# Patient Record
Sex: FEMALE | Race: BLACK OR AFRICAN AMERICAN | NOT HISPANIC OR LATINO | Employment: OTHER | URBAN - METROPOLITAN AREA
[De-identification: names, ages, dates, MRNs, and addresses within clinical notes are randomized per-mention and may not be internally consistent; named-entity substitution may affect disease eponyms.]

---

## 2017-01-26 ENCOUNTER — TELEPHONE (OUTPATIENT)
Dept: INTERNAL MEDICINE | Facility: CLINIC | Age: 82
End: 2017-01-26

## 2017-01-26 NOTE — TELEPHONE ENCOUNTER
----- Message from Noble Castaneda sent at 1/26/2017  3:51 PM CST -----  Contact: self/ [phone] home  Pt would like to speak with Dr. Whitfield about the swelling she is experiencing in her left leg.  She noticed the swelling when she got up this morning.  Please call and advise.    Thank you

## 2017-01-27 ENCOUNTER — OFFICE VISIT (OUTPATIENT)
Dept: INTERNAL MEDICINE | Facility: CLINIC | Age: 82
End: 2017-01-27
Payer: MEDICARE

## 2017-01-27 VITALS
BODY MASS INDEX: 30.04 KG/M2 | HEART RATE: 80 BPM | WEIGHT: 169.56 LBS | HEIGHT: 63 IN | SYSTOLIC BLOOD PRESSURE: 130 MMHG | OXYGEN SATURATION: 96 % | DIASTOLIC BLOOD PRESSURE: 60 MMHG

## 2017-01-27 DIAGNOSIS — D51.0 PERNICIOUS ANEMIA: ICD-10-CM

## 2017-01-27 DIAGNOSIS — M81.0 OSTEOPOROSIS: ICD-10-CM

## 2017-01-27 DIAGNOSIS — Z85.048 HISTORY OF RECTAL OR ANAL CANCER: ICD-10-CM

## 2017-01-27 DIAGNOSIS — C34.12 MALIGNANT NEOPLASM OF UPPER LOBE OF LEFT LUNG: ICD-10-CM

## 2017-01-27 DIAGNOSIS — E13.9 DIABETES MELLITUS DUE TO ABNORMAL INSULIN: ICD-10-CM

## 2017-01-27 DIAGNOSIS — E78.00 PURE HYPERCHOLESTEROLEMIA: ICD-10-CM

## 2017-01-27 DIAGNOSIS — I10 ESSENTIAL HYPERTENSION: ICD-10-CM

## 2017-01-27 DIAGNOSIS — I25.10 CORONARY ARTERY DISEASE INVOLVING NATIVE CORONARY ARTERY OF NATIVE HEART WITHOUT ANGINA PECTORIS: ICD-10-CM

## 2017-01-27 DIAGNOSIS — M79.605 LEFT LEG PAIN: Primary | ICD-10-CM

## 2017-01-27 PROCEDURE — 1126F AMNT PAIN NOTED NONE PRSNT: CPT | Mod: S$GLB,,, | Performed by: INTERNAL MEDICINE

## 2017-01-27 PROCEDURE — 3078F DIAST BP <80 MM HG: CPT | Mod: S$GLB,,, | Performed by: INTERNAL MEDICINE

## 2017-01-27 PROCEDURE — 1160F RVW MEDS BY RX/DR IN RCRD: CPT | Mod: S$GLB,,, | Performed by: INTERNAL MEDICINE

## 2017-01-27 PROCEDURE — 1159F MED LIST DOCD IN RCRD: CPT | Mod: S$GLB,,, | Performed by: INTERNAL MEDICINE

## 2017-01-27 PROCEDURE — 99999 PR PBB SHADOW E&M-EST. PATIENT-LVL III: CPT | Mod: PBBFAC,,, | Performed by: INTERNAL MEDICINE

## 2017-01-27 PROCEDURE — 99499 UNLISTED E&M SERVICE: CPT | Mod: S$PBB,,, | Performed by: INTERNAL MEDICINE

## 2017-01-27 PROCEDURE — 3075F SYST BP GE 130 - 139MM HG: CPT | Mod: S$GLB,,, | Performed by: INTERNAL MEDICINE

## 2017-01-27 PROCEDURE — 99214 OFFICE O/P EST MOD 30 MIN: CPT | Mod: S$GLB,,, | Performed by: INTERNAL MEDICINE

## 2017-01-27 PROCEDURE — 1157F ADVNC CARE PLAN IN RCRD: CPT | Mod: S$GLB,,, | Performed by: INTERNAL MEDICINE

## 2017-01-27 RX ORDER — TRAMADOL HYDROCHLORIDE 50 MG/1
50 TABLET ORAL EVERY 6 HOURS PRN
Qty: 30 TABLET | Refills: 1 | Status: SHIPPED | OUTPATIENT
Start: 2017-01-27 | End: 2017-09-25 | Stop reason: SDUPTHER

## 2017-01-27 NOTE — PROGRESS NOTES
CHIEF COMPLAINT: Left leg pain and swelling    HISTORY OF PRESENT ILLNESS: This is a 84-year-old woman who presents due to left leg pain and swelling. She had worsened left leg pain yesterday. Pain was severe and she could hardly walk due to the pain. The left leg has some mild swelling. She had shooting pain down the left thigh. She has had intermittent left leg pain and swelling since left common femoral endarterectomy, July 2007. She has been on her feet a little more the last week and she had eaten more salt than usual. Yesterday she rested, elevated her legs. She took a warm bath and rubbed liniment on her leg. Today her pain is much improved, slight. No chest pain, shortness of breath,       Her non small cell lung cancer is stable. She saw Dr Weir 7/28/16 and scans were improved. Nodule that they were watching got smaller. revealed a new 0.5 mm nodule - repeat CT scan in 6 months (feb). She is breathing well. No chest pain, shortness of breath., fever, chills.       She is currently taking actos 15 mg daily. She is not taking Prandin 1 mg prior to meals for her diabetes. No polydipsia, polyuria, hypoglycemia. Sugars are doing well. No hypoglycemia    She continues to take Lotrel 5/20 once daily, metoprolol 50 mg 1 tablet twice daily, and hydrochlorothiazide 12.5 mg daily for her hypertension. She denies any chest pain or shortness of breath. She continues to take simvastatin 80 mg at bedtime for her hyperlipidemia. No joint pain or muscle pain.        She is on Cymbalta 30 mg daliy and citalopram 10 mg daily for her mood, back and left leg pain.Mood is good.. She is coping with her  who has dementia. She is sleeping ok with Alprazolam at bedtime    She is taking Fosamax once week for her bones.  She denies any heartburn from the Fosamax. No melana, bloody stools, constipation. She has occasional diarrhea if she eats something wrong. .        She takes vitamin B12 1000 mcg daily for vitamin B12  "deficiency - anemia is stable        SHe has macular degeneration of the eyes and was followed by Dr Tresa Coreas. She got injections in her eyes every 6 weeks in Spring 2015. She now sees Dr Ruelas at [facility] (last saw him 10/2015 - no need for injections at that time). Vision has been stable. She is takinga Fish oil for her eyes.       NO vaginal bleeding and is using vaginal premarin cream    PAST MEDICAL HISTORY:    1. Stage III non-small cell lung cancer, completed chemoradiation with carboplatin and Taxol on 6/1/12    2. Hypertension.   3. Diabetes mellitus.   4. Hyperlipidemia.   5. Chronic diastolic dysfunction.   6. Coronary artery disease status post MI in 1990 and 2003. Stenting was   done at 2003 at [facility].   7. Cholecystectomy, December 2006.   8. Cataract removal.   9. Benign growth removed from her throat.   10. Left common femoral endarterectomy, July 2007.   11. History of anal cancer in 1996 status post radiation and chemotherapy.   12. Carpal tunnel syndrome.   13. Osteoporosis on BMD 12/11    14. Macular degeneration    SOCIAL HISTORY: She quit smoking in 1995, denies any alcohol use. She is etired.     REVIEW OF SYSTEMS: She denies fevers, chills, night sweats, fatigue, visual change, hearing loss, sinus congestion, sore throat, dysuria, hematuria, joint pain, muscle pain, polydipsia, and polyuria.     PHYSICAL EXAM:       Visit Vitals    /60 (BP Location: Right arm, Patient Position: Sitting, BP Method: Manual)    Pulse 80    Ht 5' 3" (1.6 m)    Wt 76.9 kg (169 lb 8.5 oz)    SpO2 96%    BMI 30.03 kg/m2           GENERAL: She is alert and oriented. No apparent distress. Affect within normal limits.   Conjunctivae anicteric. Tympanic membranes clear. Oropharynx clear.   NECK: Supple.   Respiratory effort normal. Lungs clear to auscultation.   HEART: Regular rate and rhythm without murmurs, gallops, or rubs. No lower   extremity edema.   NO pain or swelling in the " left leg. No pain upon flexion, internal or external rotation of the hip                  ASSESSMENT AND PLAN:    1. Left leg pain -chronic issue due to prior surgery. Wear compression stockings. Watch salt intake.   1. Stage III non-small cell lung cancer, completed chemoradiation with carboplatin and Taxol on 6/1/12 - doing well. FOllow up with Dr Weir in Feb 2017  2. Diabetes- stable   3. Hypertension-controlled  4. Hyperlipidemia-lipids controlled  5. Coronary artery disease modifying risk factors.   6. History of anal cancer-had a normal colonoscopy, November 2008; due 2015. Declined repeat  8. Pernicious anemia - on counter vitamin B12 1000 mcg daily. CBC  9. Osteoporosis - to restart alendroante.    10. Anxiety -stable on Cymbalta 30 mg daliy     Screening mammogram was December 2015 - scheduled. Colonoscopy is due 2015 -declined. Pap smear was July 2010.  Flu 9/272016, Prevnar 11/2015  I will see her back in 2 months as scheduled, sooner if problems arise

## 2017-01-27 NOTE — MR AVS SNAPSHOT
[facility] - Internal Medicine  [address]  [place] [address]  Phone: [phone]  Fax: [phone]                  Brunilda England   2017 9:30 AM   Office Visit    Description:  Female : 10/15/1931   Provider:  Pepper Whitfield MD   Department:  [facility] - Internal Medicine           Reason for Visit     Edema           Diagnoses this Visit        Comments    Diabetes mellitus due to abnormal insulin    -  Primary            To Do List           Future Appointments        Provider Department Dept Phone    2017 12:30 PM LAB, APPOINTMENT [place] [facility] [phone]    2017 1:30 PM [facility] CT1 64- LIMIT [address] [facility] [phone]    2017 3:00 PM Cielo Weir MD [place] - Hematology Oncology [phone]    3/21/2017 9:00 AM Pepper Whitfield MD [facility] - Internal Medicine [phone]    3/21/2017 10:00 AM [facility] [id number] SCREEN INT MED [facility] [phone]      Goals (5 Years of Data)     None       These Medications        Disp Refills Start End    tramadol (ULTRAM) 50 mg tablet 30 tablet 1 2017     Take 1 tablet (50 mg total) by mouth every 6 (six) hours as needed for Pain. - Oral    Pharmacy: [address] #: [phone]         [facility]     [facility] On Call Nurse Care Line -  Assistance  Registered nurses in the [facility] On Call Center provide clinical advisement, health education, appointment booking, and other advisory services.  Call for this free service at [phone].             Medications           Message regarding Medications     Verify the changes and/or additions to your medication regime listed below are the same as discussed with your clinician today.  If any of these changes or additions are incorrect, please notify your healthcare provider.        START taking these NEW medications        Refills    tramadol (ULTRAM)  50 mg tablet 1    Sig: Take 1 tablet (50 mg total) by mouth every 6 (six) hours as needed for Pain.    Class: Normal    Route: Oral           Verify that the below list of medications is an accurate representation of the medications you are currently taking.  If none reported, the list may be blank. If incorrect, please contact your healthcare provider. Carry this list with you in case of emergency.           Current Medications     alendronate (FOSAMAX) 70 MG tablet Take 1 tablet (70 mg total) by mouth every 7 days.    alprazolam (XANAX) 0.5 MG tablet One at bedtime as needed    amlodipine-benazepril 5-20 mg (LOTREL) 5-20 mg per capsule Take 1 capsule by mouth once daily.    ANTIOX #8/OM3/DHA/EPA/LUT/ZEAX (PRESERVISION AREDS 2, OMEGA-3, ORAL) Take by mouth 2 (two) times daily.    blood sugar diagnostic Strp Check blood sugar daily. Strips and lancets    blood-glucose meter kit Use as instructed    calcium-vitamin D (CALCIUM-VITAMIN D) 500 mg(1,250mg) -200 unit per tablet Take 1 tablet by mouth 2 (two) times daily with meals.      conjugated estrogens (PREMARIN) vaginal cream Place 0.5 g vaginally twice a week.    cyanocobalamin (VITAMIN B-12) 1000 MCG tablet Take 1,000 mcg by mouth once daily.     duloxetine (CYMBALTA) 30 MG capsule Take 1 capsule (30 mg total) by mouth once daily.    hydrochlorothiazide (MICROZIDE) 12.5 mg capsule Take 1 capsule (12.5 mg total) by mouth once daily.    lancets Misc Check blood sugar daily 250.00    loratadine (CLARITIN) 10 mg tablet Take 1 tablet (10 mg total) by mouth daily as needed for Allergies.    metoprolol tartrate (LOPRESSOR) 50 MG tablet Take 1 tablet (50 mg total) by mouth 2 (two) times daily.    nitroGLYCERIN (NITROSTAT) 0.4 MG SL tablet Place 1 tablet (0.4 mg total) under the tongue every 5 (five) minutes as needed.    pioglitazone (ACTOS) 15 MG tablet Take 1 tablet (15 mg total) by mouth every morning.    silver sulfADIAZINE 1% (SILVADENE) 1 % cream Apply topically  "once daily.    simvastatin (ZOCOR) 80 MG tablet Take 1 tablet (80 mg total) by mouth once daily.    VIT C/VIT E/LUTEIN/MIN/OMEGA-3 (OCUVITE ORAL) Take by mouth.    clobetasol (TEMOVATE) 0.05 % cream Apply topically nightly as needed.    tramadol (ULTRAM) 50 mg tablet Take 1 tablet (50 mg total) by mouth every 6 (six) hours as needed for Pain.           Clinical Reference Information           Vital Signs - Last Recorded  Most recent update: 1/27/2017 10:17 AM by Pepper Whitfield MD    BP Pulse Ht Wt SpO2 BMI    130/60 (BP Location: Right arm, Patient Position: Sitting, BP Method: Manual) 80 5' 3" (1.6 m) 76.9 kg (169 lb 8.5 oz) 96% 30.03 kg/m2      Blood Pressure          Most Recent Value    BP  130/60      Allergies as of 1/27/2017     Bextra [Valdecoxib]    Gabapentin      Immunizations Administered on Date of Encounter - 1/27/2017     None      Orders Placed During Today's Visit     Future Labs/Procedures Expected by Expires    Hemoglobin A1c  As directed 1/27/2018      "

## 2017-01-28 PROBLEM — D51.0 PERNICIOUS ANEMIA: Status: ACTIVE | Noted: 2017-01-28

## 2017-01-28 PROBLEM — I25.10 CORONARY ARTERY DISEASE INVOLVING NATIVE CORONARY ARTERY WITHOUT ANGINA PECTORIS: Status: ACTIVE | Noted: 2017-01-28

## 2017-01-31 RX ORDER — CITALOPRAM 10 MG/1
10 TABLET ORAL DAILY
Qty: 90 TABLET | Refills: 4 | Status: SHIPPED | OUTPATIENT
Start: 2017-01-31 | End: 2018-02-05 | Stop reason: SDUPTHER

## 2017-02-07 ENCOUNTER — OFFICE VISIT (OUTPATIENT)
Dept: OPHTHALMOLOGY | Facility: CLINIC | Age: 82
End: 2017-02-07
Payer: MEDICARE

## 2017-02-07 DIAGNOSIS — H35.62 MACULAR HEMORRHAGE OF LEFT EYE: ICD-10-CM

## 2017-02-07 DIAGNOSIS — H35.3230 BILATERAL EXUDATIVE AGE-RELATED MACULAR DEGENERATION: Primary | ICD-10-CM

## 2017-02-07 DIAGNOSIS — H35.033 HYPERTENSIVE RETINOPATHY OF BOTH EYES: ICD-10-CM

## 2017-02-07 DIAGNOSIS — H35.89 ATROPHY OF MACULA LUTEA: ICD-10-CM

## 2017-02-07 PROCEDURE — 99499 UNLISTED E&M SERVICE: CPT | Mod: S$PBB,,, | Performed by: OPHTHALMOLOGY

## 2017-02-07 PROCEDURE — 92014 COMPRE OPH EXAM EST PT 1/>: CPT | Mod: S$GLB,,, | Performed by: OPHTHALMOLOGY

## 2017-02-07 PROCEDURE — 92226 PR SPECIAL EYE EXAM, SUBSEQUENT: CPT | Mod: LT,S$GLB,, | Performed by: OPHTHALMOLOGY

## 2017-02-07 PROCEDURE — 92134 CPTRZ OPH DX IMG PST SGM RTA: CPT | Mod: S$GLB,,, | Performed by: OPHTHALMOLOGY

## 2017-02-07 PROCEDURE — 99999 PR PBB SHADOW E&M-EST. PATIENT-LVL III: CPT | Mod: PBBFAC,,, | Performed by: OPHTHALMOLOGY

## 2017-02-07 NOTE — MR AVS SNAPSHOT
[facility] - Ophthalmology  [address]  [place] [address]  Phone: [phone]  Fax: [phone]                  Brunilda England   2017 9:30 AM   Office Visit    Description:  Female : 10/15/1931   Provider:  ANTONI Leigh MD   Department:  [facility] - Ophthalmology           Reason for Visit     Eye Problem           Diagnoses this Visit        Comments    Bilateral exudative age-related macular degeneration    -  Primary     Hypertensive retinopathy of both eyes         Macular hemorrhage of left eye         Atrophy of macula lutea                To Do List           Future Appointments        Provider Department Dept Phone    2017 12:30 PM LAB, APPOINTMENT [place] [facility] [phone]    2017 1:30 PM [facility] CT1 64- LIM[address] [facility] [phone]    2017 3:00 PM MD [facility] - Hematology Oncology [phone]    3/21/2017 9:00 AM Pepper Whitfield MD [facility] - Internal Medicine [phone]    3/21/2017 10:00 AM [facility] [id number] SCREEN INT MED [facility] [phone]      Goals (5 Years of Data)     None      Follow-Up and Disposition     Return in about 6 months (around 2017).      [facility] On Call     [facility] On [facility] Nurse Care Line -  Assistance  Registered nurses in the [facility] On Call Center provide clinical advisement, health education, appointment booking, and other advisory services.  Call for this free service at [phone].             Medications           Message regarding Medications     Verify the changes and/or additions to your medication regime listed below are the same as discussed with your clinician today.  If any of these changes or additions are incorrect, please notify your healthcare provider.             Verify that the below list of medications is an accurate representation of the medications you are currently taking.  If none reported, the list  may be blank. If incorrect, please contact your healthcare provider. Carry this list with you in case of emergency.           Current Medications     alendronate (FOSAMAX) 70 MG tablet Take 1 tablet (70 mg total) by mouth every 7 days.    alprazolam (XANAX) 0.5 MG tablet One at bedtime as needed    amlodipine-benazepril 5-20 mg (LOTREL) 5-20 mg per capsule Take 1 capsule by mouth once daily.    ANTIOX #8/OM3/DHA/EPA/LUT/ZEAX (PRESERVISION AREDS 2, OMEGA-3, ORAL) Take by mouth 2 (two) times daily.    blood sugar diagnostic Strp Check blood sugar daily. Strips and lancets    blood-glucose meter kit Use as instructed    calcium-vitamin D (CALCIUM-VITAMIN D) 500 mg(1,250mg) -200 unit per tablet Take 1 tablet by mouth 2 (two) times daily with meals.      citalopram (CELEXA) 10 MG tablet Take 1 tablet (10 mg total) by mouth once daily.    conjugated estrogens (PREMARIN) vaginal cream Place 0.5 g vaginally twice a week.    cyanocobalamin (VITAMIN B-12) 1000 MCG tablet Take 1,000 mcg by mouth once daily.     duloxetine (CYMBALTA) 30 MG capsule Take 1 capsule (30 mg total) by mouth once daily.    hydrochlorothiazide (MICROZIDE) 12.5 mg capsule Take 1 capsule (12.5 mg total) by mouth once daily.    lancets Misc Check blood sugar daily 250.00    loratadine (CLARITIN) 10 mg tablet Take 1 tablet (10 mg total) by mouth daily as needed for Allergies.    metoprolol tartrate (LOPRESSOR) 50 MG tablet Take 1 tablet (50 mg total) by mouth 2 (two) times daily.    nitroGLYCERIN (NITROSTAT) 0.4 MG SL tablet Place 1 tablet (0.4 mg total) under the tongue every 5 (five) minutes as needed.    pioglitazone (ACTOS) 15 MG tablet Take 1 tablet (15 mg total) by mouth every morning.    silver sulfADIAZINE 1% (SILVADENE) 1 % cream Apply topically once daily.    simvastatin (ZOCOR) 80 MG tablet Take 1 tablet (80 mg total) by mouth once daily.    tramadol (ULTRAM) 50 mg tablet Take 1 tablet (50 mg total) by mouth every 6 (six) hours as needed for  Pain.    VIT C/VIT E/LUTEIN/MIN/OMEGA-3 (OCUVITE ORAL) Take by mouth.    clobetasol (TEMOVATE) 0.05 % cream Apply topically nightly as needed.           Clinical Reference Information           Allergies as of 2/7/2017     Bextra [Valdecoxib]    Gabapentin      Immunizations Administered on Date of Encounter - 2/7/2017     None      Orders Placed During Today's Visit      Normal Orders This Visit    Posterior Segment OCT Retina-Both eyes     Future Labs/Procedures Expected by Expires    Posterior Segment OCT Retina-Both eyes  As directed 2/7/2018      Language Assistance Services     ATTENTION: Language assistance services are available, free of charge. Please call [phone].      ATENCIÓN: Si cecy carroll, tiene a brito disposición servicios gratuitos de asistencia lingüística. Llame al [phone].     CHÚ Ý: N?u b?n nói Ti?ng Vi?t, có các d?ch v? h? tr? ngôn ng? mi?n phí dành cho b?n. G?i s? [phone].         [facility] complies with applicable Federal civil rights laws and does not discriminate on the basis of race, color, national origin, age, disability, or sex.

## 2017-02-07 NOTE — PROGRESS NOTES
OCT - [place]us[place], RPE atrophy, PED, some HE  No SRF OD, SRF OS improving and small SRH    Prior FA:  Shows window defect, no active leakage OU      A/P    1. Wet AMD OU  - H/o injections OU q3-4 months with Dr. Coreas  - No active leakage/edema OD cont to monitor  s/p Avastin OS x 2 here    With macular atrophy OS - conservative mgmt going forward    11/16 - had rebleed, but given poor prognosis, will monitor      2. HTN Ret OU    3. DM - No DR  BS/BP/chol contour    4. PCIOL OU    5. PVD OU      6 months OCT

## 2017-02-08 ENCOUNTER — HOSPITAL ENCOUNTER (OUTPATIENT)
Dept: RADIOLOGY | Facility: HOSPITAL | Age: 82
Discharge: HOME | End: 2017-02-08
Attending: NURSE PRACTITIONER
Payer: MEDICARE

## 2017-02-08 DIAGNOSIS — C77.9 SECONDARY MALIGNANT NEOPLASM OF REGIONAL LYMPH NODE: ICD-10-CM

## 2017-02-08 DIAGNOSIS — Z85.118 HISTORY OF LUNG CANCER: ICD-10-CM

## 2017-02-08 PROCEDURE — 71250 CT THORAX DX C-: CPT | Mod: TC

## 2017-02-08 PROCEDURE — 71250 CT THORAX DX C-: CPT | Mod: 26,,, | Performed by: RADIOLOGY

## 2017-02-09 ENCOUNTER — OFFICE VISIT (OUTPATIENT)
Dept: HEMATOLOGY/ONCOLOGY | Facility: CLINIC | Age: 82
End: 2017-02-09
Payer: MEDICARE

## 2017-02-09 VITALS
WEIGHT: 171.94 LBS | BODY MASS INDEX: 31.64 KG/M2 | RESPIRATION RATE: 18 BRPM | TEMPERATURE: 98 F | HEART RATE: 86 BPM | HEIGHT: 62 IN | OXYGEN SATURATION: 94 % | DIASTOLIC BLOOD PRESSURE: 72 MMHG | SYSTOLIC BLOOD PRESSURE: 140 MMHG

## 2017-02-09 DIAGNOSIS — Z85.118 HISTORY OF LUNG CANCER: Primary | ICD-10-CM

## 2017-02-09 DIAGNOSIS — I10 ESSENTIAL HYPERTENSION: ICD-10-CM

## 2017-02-09 DIAGNOSIS — C77.9 SECONDARY MALIGNANT NEOPLASM OF REGIONAL LYMPH NODE: ICD-10-CM

## 2017-02-09 PROCEDURE — 3077F SYST BP >= 140 MM HG: CPT | Mod: S$GLB,,, | Performed by: INTERNAL MEDICINE

## 2017-02-09 PROCEDURE — 3078F DIAST BP <80 MM HG: CPT | Mod: S$GLB,,, | Performed by: INTERNAL MEDICINE

## 2017-02-09 PROCEDURE — 99214 OFFICE O/P EST MOD 30 MIN: CPT | Mod: S$GLB,,, | Performed by: INTERNAL MEDICINE

## 2017-02-09 PROCEDURE — 1126F AMNT PAIN NOTED NONE PRSNT: CPT | Mod: S$GLB,,, | Performed by: INTERNAL MEDICINE

## 2017-02-09 PROCEDURE — 1160F RVW MEDS BY RX/DR IN RCRD: CPT | Mod: S$GLB,,, | Performed by: INTERNAL MEDICINE

## 2017-02-09 PROCEDURE — 99999 PR PBB SHADOW E&M-EST. PATIENT-LVL V: CPT | Mod: PBBFAC,,, | Performed by: INTERNAL MEDICINE

## 2017-02-09 PROCEDURE — 1159F MED LIST DOCD IN RCRD: CPT | Mod: S$GLB,,, | Performed by: INTERNAL MEDICINE

## 2017-02-09 PROCEDURE — 1157F ADVNC CARE PLAN IN RCRD: CPT | Mod: S$GLB,,, | Performed by: INTERNAL MEDICINE

## 2017-02-09 NOTE — PROGRESS NOTES
"PATIENT: Brunilda England  MRN: [id number]  DATE: 2/9/2017    Subjective:     Chief complaint:  Chief Complaint   Patient presents with    History of lung cancer       Oncologic History:  86 yo female with stage III non-small cell lung cancer, currently completed chemoradiation with carboplatin and Taxol on 6/1/12 . She did not receive consolidation chemotherapy due to worsening neuropathy .   PET scan on 8/7/12 revealed favorable response to therapy with post radiation therapy changes and a small right pleural effusion with pleural thickening. No new or metastatic disease identified    4/28/16-CT scan which reveals "In this patient with a history of lung cancer, there is interval development of a 0.5 cm pulmonary nodule located in the superior/anterior basal segment of the right lower lobe (SE:3, IM:27), concerning for metastatic disease or new primary neoplasm.  This nodule is likely too small for biopsy or PET scan; location may also limit biopsy.  Therefore we recommend continued CT surveillance with clinical considerations determining the surveillance schedule" \    CT from 7/27/16 reveals "Smaller, more inconspicuous pulmonary nodule in the superior/anterior basal segment of the right lower lobe. Given the improvement, recommend followup CT chest without contrast in 9-12 months.Stable 0.3 cm pulmonary nodule in the left upper lobe.Severe calcific atherosclerosis involving the aorta and its branches, as well as the coronary arteries.Stable trace pericardial effusion.Other stable findings in the lungs as previously described, including bilateral centrilobular emphysema with an upper lobe predominance, region of consolidation and volume loss along the right paramediastinal thorax, and other atelectatic changes in the right lung."    2/8/17 CT chest " 1. Interval resolution of a pulmonary nodule within the superior/anterior basal segment of the right lower lobe initially measuring 0.5 cm on examination dated 4/26/2016 " "and reduced in size to 0.3 cm on examination dated 7/26/2016. 2. Unchanged size and appearance of a 0.3 cm nodule within the anterior segment of the left upper lobe (axial series 2, image 21). This lesion demonstrates stability dating back to 9/22/2014. 3. Bilateral centrilobular emphysematous change with an upper lobe predominance. 4. Redemonstration of bandlike opacities involving the right upper and lower lobes consistent with atelectasis versus fibrotic changes versus postradiation change. 5. Extensive calcific atherosclerosis of the coronary vessels. 6. Small persistent pericardial effusion."    Interval History: Ms. England returns for follow up and scan results as above. She denies any nausea, vomiting, diarrhea, constipation, abdominal pain, weight loss or loss of appetite, chest pain, shortness of breath, leg swelling, fatigue, pain, headache, dizziness, or mood changes. She is alone.     ECOG Performance Status:   ECOG SCORE    0 - Fully active-able to carry on all pre-disease performance without restriction          PMFSH: all information reviewed and updated as relevant to today's visit    Review of Systems:   Review of Systems   Constitutional: Negative for activity change, appetite change, fatigue and fever.   HENT: Negative for mouth sores, nosebleeds and sore throat.    Eyes: Negative for visual disturbance.   Respiratory: Negative for cough and shortness of breath.    Cardiovascular: Negative for chest pain, palpitations and leg swelling.   Gastrointestinal: Negative for abdominal pain, constipation, diarrhea, nausea and vomiting.   Genitourinary: Negative for difficulty urinating and frequency.   Musculoskeletal: Negative for arthralgias and back pain.   Skin: Negative for rash.   Neurological: Negative for dizziness, numbness and headaches.   Hematological: Negative for adenopathy. Does not bruise/bleed easily.   Psychiatric/Behavioral: Negative for confusion and sleep disturbance. The patient is " "not nervous/anxious.    All other systems reviewed and are negative.        Objective:      Vitals:   Vitals:    02/09/17 1442   BP: (!) 140/72   Pulse: 86   Resp: 18   Temp: 98 °F (36.7 °C)   TempSrc: Oral   SpO2: (!) 94%   Weight: 78 kg (171 lb 15.3 oz)   Height: 5' 2" (1.575 m)     BMI: Body mass index is 31.45 kg/(m^2).      Physical Exam:   Physical Exam   Constitutional: She is oriented to person, place, and time. She appears well-developed and well-nourished. No distress.   HENT:   Head: Normocephalic.   Right Ear: External ear normal.   Left Ear: External ear normal.   Mouth/Throat: No oropharyngeal exudate.   No sinus tenderness.   Eyes: Conjunctivae and lids are normal. Pupils are equal, round, and reactive to light. No scleral icterus.   Neck: Trachea normal and normal range of motion. Neck supple. No thyromegaly present.   Cardiovascular: Normal rate, regular rhythm and normal heart sounds.    Pulmonary/Chest: Effort normal and breath sounds normal.   Abdominal: Soft. Normal appearance and bowel sounds are normal. She exhibits no distension and no mass. There is no tenderness.   Musculoskeletal: Normal range of motion.   Lymphadenopathy:        Head (right side): No submental and no submandibular adenopathy present.        Head (left side): No submental and no submandibular adenopathy present.     She has no cervical adenopathy.   Neurological: She is alert and oriented to person, place, and time. She has normal strength and normal reflexes. No cranial nerve deficit. Gait normal.   Skin: Skin is warm, dry and intact. No bruising and no rash noted. No cyanosis. Nails show no clubbing.   Psychiatric: She has a normal mood and affect. Her speech is normal and behavior is normal.   Nursing note and vitals reviewed.        Laboratory Data:  WBC   Date Value Ref Range Status   02/08/2017 3.77 (L) 3.90 - 12.70 K/uL Final     Hemoglobin   Date Value Ref Range Status   02/08/2017 10.4 (L) 12.0 - 16.0 g/dL Final "     Hematocrit   Date Value Ref Range Status   02/08/2017 30.6 (L) 37.0 - 48.5 % Final     Platelets   Date Value Ref Range Status   02/08/2017 179 150 - 350 K/uL Final     Gran #   Date Value Ref Range Status   02/08/2017 1.9 1.8 - 7.7 K/uL Final     Comment:     The ANC is based on a white cell differential from an   automated cell counter. It has not been microscopically   reviewed for the presence of abnormal cells. Clinical   correlation is required.       Gran%   Date Value Ref Range Status   08/09/2013 48.8 38 - 73 % Final       Chemistry        Component Value Date/Time     02/08/2017 1245    K 4.3 02/08/2017 1245     02/08/2017 1245    CO2 29 02/08/2017 1245    BUN 20 02/08/2017 1245    CREATININE 1.1 02/08/2017 1245     (H) 02/08/2017 1245        Component Value Date/Time    CALCIUM 9.5 02/08/2017 1245    ALKPHOS 40 (L) 02/08/2017 1245    AST 16 02/08/2017 1245    ALT 7 (L) 02/08/2017 1245    BILITOT 0.2 02/08/2017 1245              Assessment/Plan:     1. 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 Essential hypertension        Plan:    1,2- CT Chest shows improvement. RTC in 6 months to see Dr. Weir with labs (CBC,CMP) and CT Chest.   3- Controlled on meds.     Med and Orders:  Orders Placed This Encounter    CT Chest Without Contrast    CBC Oncology    Comprehensive metabolic panel       Follow Up:  Return in about 6 months (around 8/9/2017).      More than 25 mins were spent during this encounter, greater than 50% was spent in direct counseling and/or coordination of care.   Patient was also seen and examined by Dr. Weir who agrees with above plan. Patient is in agreement with the proposed treatment plan. All questions were answered to the patient's satisfaction. Pt knows to call clinic if anything is needed before the next clinic visit.    ZEESHAN Venegas  Hematology and Medical Oncology      STAFF NOTE:  I have reviewed the  notes, assessments, and/or procedures performed by the nurse practitioner, as above.  I have personally interviewed the patient at the beside, and rounded with the nurse practitioner. I formulated the plan of care.  I concur with her documentation of Brunilda England.

## 2017-02-24 ENCOUNTER — OFFICE VISIT (OUTPATIENT)
Dept: OPTOMETRY | Facility: CLINIC | Age: 82
End: 2017-02-24
Payer: MEDICARE

## 2017-02-24 DIAGNOSIS — H52.203 HYPEROPIC ASTIGMATISM, BILATERAL: Primary | ICD-10-CM

## 2017-02-24 PROCEDURE — 92015 DETERMINE REFRACTIVE STATE: CPT | Mod: S$GLB,,, | Performed by: OPTOMETRIST

## 2017-02-24 PROCEDURE — 99999 PR PBB SHADOW E&M-EST. PATIENT-LVL I: CPT | Mod: PBBFAC,,, | Performed by: OPTOMETRIST

## 2017-02-24 PROCEDURE — 99499 UNLISTED E&M SERVICE: CPT | Mod: S$GLB,,, | Performed by: OPTOMETRIST

## 2017-02-24 NOTE — PROGRESS NOTES
HPI     Refraction Per Lu (JLUIS 2/7/17)  Wet AMD  Requestsing new sRx to replace current sRx       Last edited by Regan Riggs, OD on 2/24/2017  7:44 AM.         Assessment /Plan     For exam results, see Encounter Report.    Hyperopic astigmatism, bilateral  Eyeglass Final Rx     Eyeglass Final Rx      Sphere Cylinder Axis Add   Right Kennedy +1.25 175 +2.75   Left Kennedy +0.75 180 +2.75       Type:  PAL    Expiration Date:  8/26/2017                  RTC as directed OMD

## 2017-03-21 ENCOUNTER — OFFICE VISIT (OUTPATIENT)
Dept: INTERNAL MEDICINE | Facility: CLINIC | Age: 82
End: 2017-03-21
Payer: MEDICARE

## 2017-03-21 VITALS
OXYGEN SATURATION: 96 % | HEIGHT: 65 IN | DIASTOLIC BLOOD PRESSURE: 70 MMHG | HEART RATE: 85 BPM | BODY MASS INDEX: 28.32 KG/M2 | WEIGHT: 170 LBS | SYSTOLIC BLOOD PRESSURE: 139 MMHG

## 2017-03-21 DIAGNOSIS — I10 ESSENTIAL HYPERTENSION: Primary | ICD-10-CM

## 2017-03-21 DIAGNOSIS — I73.9 PVD (PERIPHERAL VASCULAR DISEASE) WITH CLAUDICATION: ICD-10-CM

## 2017-03-21 DIAGNOSIS — M81.0 OSTEOPOROSIS: ICD-10-CM

## 2017-03-21 DIAGNOSIS — E11.51 TYPE 2 DIABETES MELLITUS WITH DIABETIC PERIPHERAL ANGIOPATHY WITHOUT GANGRENE, WITHOUT LONG-TERM CURRENT USE OF INSULIN: ICD-10-CM

## 2017-03-21 DIAGNOSIS — M54.16 LUMBAR RADICULOPATHY: ICD-10-CM

## 2017-03-21 DIAGNOSIS — G62.9 NEUROPATHY: ICD-10-CM

## 2017-03-21 PROBLEM — E11.59 TYPE 2 DIABETES MELLITUS WITH CIRCULATORY DISORDER, WITHOUT LONG-TERM CURRENT USE OF INSULIN: Status: ACTIVE | Noted: 2017-03-21

## 2017-03-21 PROCEDURE — 99214 OFFICE O/P EST MOD 30 MIN: CPT | Mod: S$GLB,,, | Performed by: INTERNAL MEDICINE

## 2017-03-21 PROCEDURE — 3078F DIAST BP <80 MM HG: CPT | Mod: S$GLB,,, | Performed by: INTERNAL MEDICINE

## 2017-03-21 PROCEDURE — 99499 UNLISTED E&M SERVICE: CPT | Mod: S$PBB,,, | Performed by: INTERNAL MEDICINE

## 2017-03-21 PROCEDURE — 1159F MED LIST DOCD IN RCRD: CPT | Mod: S$GLB,,, | Performed by: INTERNAL MEDICINE

## 2017-03-21 PROCEDURE — 1160F RVW MEDS BY RX/DR IN RCRD: CPT | Mod: S$GLB,,, | Performed by: INTERNAL MEDICINE

## 2017-03-21 PROCEDURE — 1126F AMNT PAIN NOTED NONE PRSNT: CPT | Mod: S$GLB,,, | Performed by: INTERNAL MEDICINE

## 2017-03-21 PROCEDURE — 1157F ADVNC CARE PLAN IN RCRD: CPT | Mod: S$GLB,,, | Performed by: INTERNAL MEDICINE

## 2017-03-21 PROCEDURE — 3075F SYST BP GE 130 - 139MM HG: CPT | Mod: S$GLB,,, | Performed by: INTERNAL MEDICINE

## 2017-03-21 PROCEDURE — 99999 PR PBB SHADOW E&M-EST. PATIENT-LVL III: CPT | Mod: PBBFAC,,, | Performed by: INTERNAL MEDICINE

## 2017-03-21 RX ORDER — ALENDRONATE SODIUM 70 MG/1
70 TABLET ORAL
Qty: 12 TABLET | Refills: 4 | Status: SHIPPED | OUTPATIENT
Start: 2017-03-21 | End: 2018-04-18 | Stop reason: SDUPTHER

## 2017-03-21 NOTE — PROGRESS NOTES
CHIEF COMPLAINT: Follow up of Left leg pain and swelling, hypertension, diabetes, lung cancer.    HISTORY OF PRESENT ILLNESS: This is a 85-year-old woman who presents for follow up of above. Left leg pain and swelling is better with compression stocking and elevating her leg.       Her non small cell lung cancer is stable. She saw Dr Weir 2/8/17 and scans were stable. She is breathing well. No chest pain, shortness of breath, fever, chills.       She is currently taking actos 15 mg daily. She is not taking Prandin 1 mg prior to meals for her diabetes. No polydipsia, polyuria, hypoglycemia. Sugars are doing well. No hypoglycemia. A1C 6.2 on 2/8/17    She continues to take Lotrel 5/20 once daily, metoprolol 50 mg 1 tablet twice daily, and hydrochlorothiazide 12.5 mg daily for her hypertension. She denies any chest pain or shortness of breath. She continues to take simvastatin 80 mg at bedtime for her hyperlipidemia. No joint pain or muscle pain.        She is on Cymbalta 30 mg daliy and citalopram 10 mg daily for her mood, back and left leg pain.Mood is good.. She is coping with her  who has dementia. She is sleeping ok with Alprazolam at bedtime    She is taking Fosamax once week for her bones. She denies any heartburn from the Fosamax. No melana, bloody stools, constipation. She has occasional diarrhea if she eats something wrong.      She takes vitamin B12 1000 mcg daily for vitamin B12 deficiency - anemia is stable        SHe has macular degeneration of the eyes and was followed by Dr Tresa Coreas. She got injections in her eyes every 6 weeks in Spring 2015. She now sees Dr Ruelas at [facility] (last saw him 10/2015 - no need for injections at that time). Vision has been stable. She is takinga Fish oil for her eyes.       NO vaginal bleeding and is using vaginal premarin cream    PAST MEDICAL HISTORY:    1. Stage III non-small cell lung cancer, completed chemoradiation with carboplatin and Taxol on 6/1/12  "   2. Hypertension.   3. Diabetes mellitus.   4. Hyperlipidemia.   5. Chronic diastolic dysfunction.   6. Coronary artery disease status post MI in 1990 and 2003. Stenting was   done at 2003 at [facility].   7. Cholecystectomy, December 2006.   8. Cataract removal.   9. Benign growth removed from her throat.   10. Left common femoral endarterectomy, July 2007.   11. History of anal cancer in 1996 status post radiation and chemotherapy.   12. Carpal tunnel syndrome.   13. Osteoporosis on BMD 12/11    14. Macular degeneration    SOCIAL HISTORY: She quit smoking in 1995, denies any alcohol use. She is etired.     REVIEW OF SYSTEMS: She denies fevers, chills, night sweats, fatigue, visual change, hearing loss, sinus congestion, sore throat, dysuria, hematuria, joint pain, muscle pain, polydipsia, and polyuria.     PHYSICAL EXAM:    /70 (BP Location: Right arm, Patient Position: Sitting, BP Method: Manual)  Pulse 85  Ht 5' 5" (1.651 m)  Wt 77.1 kg (169 lb 15.6 oz)  SpO2 96%  BMI 28.29 kg/m2    GENERAL: She is alert and oriented. No apparent distress. Affect within normal limits.   Conjunctivae anicteric. Tympanic membranes clear. Oropharynx clear.   NECK: Supple.   Respiratory effort normal. Lungs clear to auscultation.   HEART: Regular rate and rhythm without murmurs, gallops, or rubs. No lower   extremity edema.                     ASSESSMENT AND PLAN:    1. Left leg pain -better. chronic issue due to prior surgery. Wear compression stockings. Watch salt intake.   1. Stage III non-small cell lung cancer, completed chemoradiation with carboplatin and Taxol on 6/1/12 - doing well. FOllow up with Dr Weir in August 2017  2. Diabetes- stable   3. Hypertension-controlled  4. Hyperlipidemia-lipids controlled  5. Coronary artery disease modifying risk factors.   6. History of anal cancer-had a normal colonoscopy, November 2008; due 2015. Declined repeat  8. Pernicious anemia - on counter vitamin B12 1000 " mcg daily. CBC  9. Osteoporosis - on alendroante.    10. Anxiety -stable on Cymbalta 30 mg daliy      Screening mammogram was December 2015 - scheduled. Colonoscopy is due 2015 -declined. Pap smear was July 2010.  Flu 9/272016, Prevnar 11/2015  I will see her back in 4 months, sooner if problems arise

## 2017-04-10 DIAGNOSIS — E11.9 TYPE 2 DIABETES MELLITUS WITHOUT COMPLICATION: ICD-10-CM

## 2017-07-18 ENCOUNTER — HOSPITAL ENCOUNTER (OUTPATIENT)
Dept: RADIOLOGY | Facility: HOSPITAL | Age: 82
Discharge: HOME | End: 2017-07-18
Attending: INTERNAL MEDICINE
Payer: MEDICARE

## 2017-07-18 ENCOUNTER — OFFICE VISIT (OUTPATIENT)
Dept: INTERNAL MEDICINE | Facility: CLINIC | Age: 82
End: 2017-07-18
Payer: MEDICARE

## 2017-07-18 ENCOUNTER — TELEPHONE (OUTPATIENT)
Dept: INTERNAL MEDICINE | Facility: CLINIC | Age: 82
End: 2017-07-18

## 2017-07-18 VITALS — HEIGHT: 65 IN | BODY MASS INDEX: 27.16 KG/M2 | WEIGHT: 163 LBS

## 2017-07-18 VITALS
BODY MASS INDEX: 27.26 KG/M2 | OXYGEN SATURATION: 96 % | SYSTOLIC BLOOD PRESSURE: 128 MMHG | WEIGHT: 163.63 LBS | HEART RATE: 80 BPM | DIASTOLIC BLOOD PRESSURE: 60 MMHG | HEIGHT: 65 IN

## 2017-07-18 DIAGNOSIS — Z12.31 OTHER SCREENING MAMMOGRAM: ICD-10-CM

## 2017-07-18 DIAGNOSIS — M81.0 OSTEOPOROSIS, UNSPECIFIED OSTEOPOROSIS TYPE, UNSPECIFIED PATHOLOGICAL FRACTURE PRESENCE: ICD-10-CM

## 2017-07-18 DIAGNOSIS — E13.9 DIABETES MELLITUS DUE TO ABNORMAL INSULIN: Primary | ICD-10-CM

## 2017-07-18 DIAGNOSIS — I10 ESSENTIAL HYPERTENSION: ICD-10-CM

## 2017-07-18 DIAGNOSIS — I25.10 CORONARY ARTERY DISEASE INVOLVING NATIVE CORONARY ARTERY OF NATIVE HEART WITHOUT ANGINA PECTORIS: ICD-10-CM

## 2017-07-18 PROCEDURE — 77067 SCR MAMMO BI INCL CAD: CPT | Mod: 26,,, | Performed by: RADIOLOGY

## 2017-07-18 PROCEDURE — 99999 PR PBB SHADOW E&M-EST. PATIENT-LVL II: CPT | Mod: PBBFAC,,, | Performed by: INTERNAL MEDICINE

## 2017-07-18 PROCEDURE — 1159F MED LIST DOCD IN RCRD: CPT | Mod: S$GLB,,, | Performed by: INTERNAL MEDICINE

## 2017-07-18 PROCEDURE — 77067 SCR MAMMO BI INCL CAD: CPT | Mod: TC

## 2017-07-18 PROCEDURE — 99214 OFFICE O/P EST MOD 30 MIN: CPT | Mod: S$GLB,,, | Performed by: INTERNAL MEDICINE

## 2017-07-18 PROCEDURE — 99499 UNLISTED E&M SERVICE: CPT | Mod: S$PBB,,, | Performed by: INTERNAL MEDICINE

## 2017-07-18 RX ORDER — SIMVASTATIN 80 MG/1
80 TABLET, FILM COATED ORAL DAILY
Qty: 90 TABLET | Refills: 4 | Status: SHIPPED | OUTPATIENT
Start: 2017-07-18 | End: 2018-08-06 | Stop reason: SDUPTHER

## 2017-07-18 NOTE — PROGRESS NOTES
CHIEF COMPLAINT: Follow up of Left leg pain and swelling, hypertension, diabetes, lung cancer.    HISTORY OF PRESENT ILLNESS: This is a 85-year-old woman who presents for follow up of above. Left leg pain and swelling is better with compression stocking and elevating her leg.       Her non small cell lung cancer is stable. She saw Dr Weir 2/8/17  - (to see in August)and scans were stable. She is breathing well. No chest pain, shortness of breath, fever, chills.       She is currently taking actos 15 mg daily. She is not taking Prandin 1 mg prior to meals for her diabetes. No polydipsia, polyuria, hypoglycemia. Sugars are doing well. No hypoglycemia. A1C 6.2 on 2/8/17    She continues to take Lotrel 5/20 once daily, metoprolol 50 mg 1 tablet twice daily, and hydrochlorothiazide 12.5 mg daily for her hypertension. She denies any chest pain or shortness of breath. She continues to take simvastatin 80 mg at bedtime for her hyperlipidemia. No joint pain or muscle pain.        She is on Cymbalta 30 mg daliy and citalopram 10 mg daily for her mood, back and left leg pain.Mood is good.. She is coping with her  who has dementia. She is sleeping ok with Alprazolam at bedtime    She is taking Fosamax once week for her bones. She denies any heartburn from the Fosamax. No melana, bloody stools, constipation. She has occasional diarrhea if she eats something wrong.      She takes vitamin B12 1000 mcg daily for vitamin B12 deficiency - anemia is stable        SHe has macular degeneration of the eyes and was followed by Dr Tresa Coreas. She got injections in her eyes every 6 weeks in Spring 2015. She now sees Dr Ruelas at [facility] (last saw him 10/2015 - no need for injections at that time). Vision has been stable. She is takinga Fish oil for her eyes.       NO vaginal bleeding and is using vaginal premarin cream    PAST MEDICAL HISTORY:    1. Stage III non-small cell lung cancer, completed chemoradiation with carboplatin  "and Taxol on 6/1/12    2. Hypertension.   3. Diabetes mellitus.   4. Hyperlipidemia.   5. Chronic diastolic dysfunction.   6. Coronary artery disease status post MI in 1990 and 2003. Stenting was   done at 2003 at [facility].   7. Cholecystectomy, December 2006.   8. Cataract removal.   9. Benign growth removed from her throat.   10. Left common femoral endarterectomy, July 2007.   11. History of anal cancer in 1996 status post radiation and chemotherapy.   12. Carpal tunnel syndrome.   13. Osteoporosis on BMD 12/11    14. Macular degeneration    SOCIAL HISTORY: She quit smoking in 1995, denies any alcohol use. She is etired.     REVIEW OF SYSTEMS: She denies fevers, chills, night sweats, fatigue, visual change, hearing loss, sinus congestion, sore throat, dysuria, hematuria, joint pain, muscle pain, polydipsia, and polyuria.     PHYSICAL EXAM:      /60   Pulse 80   Ht 5' 5" (1.651 m)   Wt 74.2 kg (163 lb 9.6 oz)   SpO2 96%   BMI 27.22 kg/m²     GENERAL: She is alert and oriented. No apparent distress. Affect within normal limits.   Conjunctivae anicteric. Tympanic membranes clear. Oropharynx clear.   NECK: Supple.   Respiratory effort normal. Lungs clear to auscultation.   HEART: Regular rate and rhythm without murmurs, gallops, or rubs. No lower   extremity edema.                      ASSESSMENT AND PLAN:    1. Left leg pain -better. chronic issue due to prior surgery. Wear compression stockings. Watch salt intake.   1. Stage III non-small cell lung cancer, completed chemoradiation with carboplatin and Taxol on 6/1/12 - doing well. FOllow up with Dr Weir in August 2017  2. Diabetes- stable   3. Hypertension-controlled  4. Hyperlipidemia-lipids controlled  5. Coronary artery disease modifying risk factors.   6. History of anal cancer-had a normal colonoscopy, November 2008; due 2015. Declined repeat  8. Pernicious anemia - on counter vitamin B12 1000 mcg daily. CBC  9. Osteoporosis - on " ambar.    10. Anxiety -stable on Cymbalta 30 mg daliy      Screening mammogram was December 2015 - scheduled. Colonoscopy is due 2015 -declined. Pap smear was July 2010.  Flu 9/272016, Prevnar 11/2015  I will see her back in 4 months, sooner if problems arise

## 2017-07-18 NOTE — TELEPHONE ENCOUNTER
----- Message from Michaela Hurtado sent at 7/17/2017  4:30 PM CDT -----  Contact: Kaz/ / [phone]   Kaz wanted to let the doctor know the pt pass way on July 13. Please call and advise     Thank you

## 2017-07-18 NOTE — Clinical Note
Saw pt today. She has an apt with you 8/8.  No labs or CT are set up prior. Can you add my labs when you book her labs Thanks Pepper Whitfield M.D.

## 2017-07-19 NOTE — TELEPHONE ENCOUNTER
----- Message from Pepper Whitfield MD sent at 7/18/2017  7:58 PM CDT -----  Please notfiy pt  Your blood count, blood sugar, kidney function, liver function, cholesterol are stable  Continue current medications  SF

## 2017-08-02 ENCOUNTER — TELEPHONE (OUTPATIENT)
Dept: INTERNAL MEDICINE | Facility: CLINIC | Age: 82
End: 2017-08-02

## 2017-08-02 NOTE — TELEPHONE ENCOUNTER
----- Message from Omar Wiggins sent at 8/2/2017 10:24 AM CDT -----  Contact: SELF [phone]  Patient would like a call back from the office to discuss her leg pain , stated she have a sore the open up . Please call and advise , Thanks !

## 2017-08-02 NOTE — TELEPHONE ENCOUNTER
Pt states that the sore on her ankle is now painful. Pt states that the sore has opened up now for the first time ever.  Sore has been there since last visit. Pt would  Like to know what she should do about this.

## 2017-08-03 ENCOUNTER — OFFICE VISIT (OUTPATIENT)
Dept: INTERNAL MEDICINE | Facility: CLINIC | Age: 82
End: 2017-08-03
Payer: MEDICARE

## 2017-08-03 VITALS
TEMPERATURE: 98 F | HEIGHT: 62 IN | BODY MASS INDEX: 29.86 KG/M2 | HEART RATE: 76 BPM | DIASTOLIC BLOOD PRESSURE: 64 MMHG | SYSTOLIC BLOOD PRESSURE: 156 MMHG | WEIGHT: 162.25 LBS

## 2017-08-03 DIAGNOSIS — S90.511A: Primary | ICD-10-CM

## 2017-08-03 DIAGNOSIS — L08.9: Primary | ICD-10-CM

## 2017-08-03 PROCEDURE — 1126F AMNT PAIN NOTED NONE PRSNT: CPT | Mod: S$GLB,,, | Performed by: PHYSICIAN ASSISTANT

## 2017-08-03 PROCEDURE — 3008F BODY MASS INDEX DOCD: CPT | Mod: S$GLB,,, | Performed by: PHYSICIAN ASSISTANT

## 2017-08-03 PROCEDURE — 99999 PR PBB SHADOW E&M-EST. PATIENT-LVL V: CPT | Mod: PBBFAC,,, | Performed by: PHYSICIAN ASSISTANT

## 2017-08-03 PROCEDURE — 1159F MED LIST DOCD IN RCRD: CPT | Mod: S$GLB,,, | Performed by: PHYSICIAN ASSISTANT

## 2017-08-03 PROCEDURE — 99213 OFFICE O/P EST LOW 20 MIN: CPT | Mod: S$GLB,,, | Performed by: PHYSICIAN ASSISTANT

## 2017-08-03 RX ORDER — MUPIROCIN 20 MG/G
OINTMENT TOPICAL 2 TIMES DAILY
Qty: 30 G | Refills: 0 | Status: SHIPPED | OUTPATIENT
Start: 2017-08-03 | End: 2018-05-02

## 2017-08-03 RX ORDER — DOXYCYCLINE 100 MG/1
100 CAPSULE ORAL EVERY 12 HOURS
Qty: 14 CAPSULE | Refills: 0 | Status: SHIPPED | OUTPATIENT
Start: 2017-08-03 | End: 2017-09-12 | Stop reason: SDUPTHER

## 2017-08-03 NOTE — PATIENT INSTRUCTIONS
Abrasions  Abrasions are skin scrapes. Their treatment depends on how large and deep the abrasion is.  Home care  You may be prescribed an antibiotic cream or ointment to apply to the wound. This helps prevent infection. Follow instructions when using this medication.  General care  · To care for the abrasion, do the following each day for as long as directed by your health care provider.  ¨ If you were given a bandage, change it once a day. If your bandage sticks to the wound, soak it in warm water until it loosens.  ¨ Wash the area with soap and warm water. You may do this in a sink or under a tub faucet or shower. Rinse off the soap. Then pat the area dry with a clean towel.  ¨ If antibiotic ointment or cream was prescribed, reapply it to the wound as directed. Cover the wound with a fresh non-stick bandage. If the bandage becomes wet or dirty, change it as soon as possible.  · You may use acetaminophen or ibuprofen to control pain unless another pain medication was prescribed. Note: If you have chronic liver or kidney disease or ever had a stomach ulcer or GI bleeding, talk with your health care provider before using these medications. Do not use ibuprofen in children under six months of age.  · Most skin wounds heal within ten days. But an infection may occur despite treatment. Therefore, monitor the wound for signs of infection as listed below.  Follow-up care  Follow up with your health care provider, or as advised.  When to seek medical advice  Call your health care provider right away if any of these occur:  · Fever of 101ºF (38.3ºC) or higher, or as directed by your health care provider  · Increasing pain, redness, swelling, or drainage from the wound  · Bleeding from the wound that does not stop after a few minutes of steady, firm pressure  · Decreased ability to move any body part near wound  Date Last Reviewed: 3/22/2015  © 0701-5058 [employer]. [address] [place] [address], [place]  [address]. All rights reserved. This information is not intended as a substitute for professional medical care. Always follow your healthcare professional's instructions.

## 2017-08-03 NOTE — PROGRESS NOTES
Subjective:       Patient ID: Brunilda England is a 85 y.o. female.        Chief Complaint: Recurrent Skin Infections (R ankle)    Brunilda England is an established patient of Pepper Whitfield MD here today for urgent care visit.    3 weeks ago she sustained an abrasion to her right lateral ankle.  Then, 1 week ago, she hit the same area again.  2-3 days ago, the area opened and began to drain some serosanguinous fluid.  It is very sore and tender.  There is some mild surrounding erythema.  No fever, chills, sweats body aches.  No N/V/D/C.           Review of Systems   Constitutional: Negative for chills, diaphoresis, fatigue and fever.   HENT: Negative for congestion and sore throat.    Eyes: Negative for visual disturbance.   Respiratory: Negative for cough, chest tightness and shortness of breath.    Cardiovascular: Negative for chest pain, palpitations and leg swelling.   Gastrointestinal: Negative for abdominal pain, blood in stool, constipation, diarrhea, nausea and vomiting.   Genitourinary: Negative for dysuria, frequency, hematuria and urgency.   Musculoskeletal: Negative for arthralgias and back pain.   Skin: Positive for wound. Negative for rash.   Neurological: Negative for dizziness, syncope, weakness and headaches.   Psychiatric/Behavioral: Negative for dysphoric mood and sleep disturbance. The patient is not nervous/anxious.        Objective:      Physical Exam   Constitutional: She appears well-developed and well-nourished. No distress.   HENT:   Head: Normocephalic and atraumatic.   Right Ear: External ear normal.   Left Ear: External ear normal.   Neck: Neck supple.   Pulmonary/Chest: Effort normal.   Abdominal: Soft.   Musculoskeletal: She exhibits no edema.   Neurological: She is alert.   Skin: Skin is warm and dry. She is not diaphoretic.        Psychiatric: She has a normal mood and affect.       Assessment:       1. Infected abrasion of ankle, right, initial encounter        Plan:      "  Brunilda was seen today for recurrent skin infections.    Diagnoses and all orders for this visit:    Infected abrasion of ankle, right, initial encounter  -     doxycycline (VIBRAMYCIN) 100 MG Cap; Take 1 capsule (100 mg total) by mouth every 12 (twelve) hours.  -     mupirocin (BACTROBAN) 2 % ointment; Apply topically 2 (two) times daily.    Wound care instructions discussed.  Wash daily with Dial soap and warm water, pat dry.  Apply bactroban ointment and non adherent dressing.  Start doxycycline.      Pt has been given instructions populated from [employer] database and has verbalized understanding of the after visit summary and information contained wherein.    Follow up with a primary care provider. May go to ER for acute shortness of breath, lightheadedness, fever, or any other emergent complaints or changes in condition.    "This note will be shared with the patient"    Future Appointments  Date Time Provider Department Center   8/8/2017 7:45 AM [facility] CT2 64- LIMIT 600 LBS [facility] CT SCAN [place]   8/8/2017 10:30 AM Cielo Weir MD [facility] HEM ONC [place]   8/14/2017 7:50 AM ANTONI Leigh MD [facility] [place]   9/25/2017 11:30 AM Pepper Whitfield MD [facility] [place] PCW               "

## 2017-08-08 ENCOUNTER — OFFICE VISIT (OUTPATIENT)
Dept: HEMATOLOGY/ONCOLOGY | Facility: CLINIC | Age: 82
End: 2017-08-08
Payer: MEDICARE

## 2017-08-08 ENCOUNTER — HOSPITAL ENCOUNTER (OUTPATIENT)
Dept: RADIOLOGY | Facility: HOSPITAL | Age: 82
Discharge: HOME | End: 2017-08-08
Attending: NURSE PRACTITIONER
Payer: MEDICARE

## 2017-08-08 VITALS
WEIGHT: 160.94 LBS | OXYGEN SATURATION: 98 % | HEART RATE: 92 BPM | DIASTOLIC BLOOD PRESSURE: 78 MMHG | TEMPERATURE: 98 F | RESPIRATION RATE: 16 BRPM | SYSTOLIC BLOOD PRESSURE: 192 MMHG | HEIGHT: 62 IN | BODY MASS INDEX: 29.62 KG/M2

## 2017-08-08 DIAGNOSIS — Z85.118 HISTORY OF LUNG CANCER: Primary | ICD-10-CM

## 2017-08-08 DIAGNOSIS — E11.51 TYPE 2 DIABETES MELLITUS WITH DIABETIC PERIPHERAL ANGIOPATHY WITHOUT GANGRENE, WITHOUT LONG-TERM CURRENT USE OF INSULIN: ICD-10-CM

## 2017-08-08 DIAGNOSIS — Z85.118 HISTORY OF LUNG CANCER: ICD-10-CM

## 2017-08-08 PROCEDURE — 1159F MED LIST DOCD IN RCRD: CPT | Mod: S$GLB,,, | Performed by: INTERNAL MEDICINE

## 2017-08-08 PROCEDURE — 99999 PR PBB SHADOW E&M-EST. PATIENT-LVL V: CPT | Mod: PBBFAC,,, | Performed by: INTERNAL MEDICINE

## 2017-08-08 PROCEDURE — 3077F SYST BP >= 140 MM HG: CPT | Mod: S$GLB,,, | Performed by: INTERNAL MEDICINE

## 2017-08-08 PROCEDURE — 1126F AMNT PAIN NOTED NONE PRSNT: CPT | Mod: S$GLB,,, | Performed by: INTERNAL MEDICINE

## 2017-08-08 PROCEDURE — 71250 CT THORAX DX C-: CPT | Mod: 26,,, | Performed by: RADIOLOGY

## 2017-08-08 PROCEDURE — 99499 UNLISTED E&M SERVICE: CPT | Mod: S$PBB,,, | Performed by: INTERNAL MEDICINE

## 2017-08-08 PROCEDURE — 3078F DIAST BP <80 MM HG: CPT | Mod: S$GLB,,, | Performed by: INTERNAL MEDICINE

## 2017-08-08 PROCEDURE — 99213 OFFICE O/P EST LOW 20 MIN: CPT | Mod: S$GLB,,, | Performed by: INTERNAL MEDICINE

## 2017-08-08 NOTE — PROGRESS NOTES
"PATIENT: Brunilda England  MRN: [id number]  DATE: 8/8/2017    Subjective:     Chief complaint:  Chief Complaint   Patient presents with    History of lung cancer       Oncologic History:  84 yo female with stage III non-small cell lung cancer, currently completed chemoradiation with carboplatin and Taxol on 6/1/12 . She did not receive consolidation chemotherapy due to worsening neuropathy .   PET scan on 8/7/12 revealed favorable response to therapy with post radiation therapy changes and a small right pleural effusion with pleural thickening. No new or metastatic disease identified    4/28/16-CT scan which reveals "In this patient with a history of lung cancer, there is interval development of a 0.5 cm pulmonary nodule located in the superior/anterior basal segment of the right lower lobe (SE:3, IM:27), concerning for metastatic disease or new primary neoplasm.  This nodule is likely too small for biopsy or PET scan; location may also limit biopsy.  Therefore we recommend continued CT surveillance with clinical considerations determining the surveillance schedule" \     CT from 7/27/16 reveals "Smaller, more inconspicuous pulmonary nodule in the superior/anterior basal segment of the right lower lobe. Given the improvement, recommend followup CT chest without contrast in 9-12 months.Stable 0.3 cm pulmonary nodule in the left upper lobe.Severe calcific atherosclerosis involving the aorta and its branches, as well as the coronary arteries.Stable trace pericardial effusion.Other stable findings in the lungs as previously described, including bilateral centrilobular emphysema with an upper lobe predominance, region of consolidation and volume loss along the right paramediastinal thorax, and other atelectatic changes in the right lung."     2/8/17 CT chest " 1. Interval resolution of a pulmonary nodule within the superior/anterior basal segment of the right lower lobe initially measuring 0.5 cm on examination dated " "4/26/2016 and reduced in size to 0.3 cm on examination dated 7/26/2016. 2. Unchanged size and appearance of a 0.3 cm nodule within the anterior segment of the left upper lobe (axial series 2, image 21). This lesion demonstrates stability dating back to 9/22/2014. 3. Bilateral centrilobular emphysematous change with an upper lobe predominance. 4. Redemonstration of bandlike opacities involving the right upper and lower lobes consistent with atelectasis versus fibrotic changes versus postradiation change. 5. Extensive calcific atherosclerosis of the coronary vessels. 6. Small persistent pericardial effusion."     Interval History: Ms. England returns for follow up and scan results. Ct scan 8/8/17 reveals "1.  Stable cicatrization atelectasis involving the right paramediastinal lung lung parenchyma consistent with post radiation change. 2. Persistent moderate volume right pleural effusion with partial loculation along the right lateral hemithorax. 3.  Stable 0.3 cm solitary pulmonary nodule in the anterior segment of the left upper lobe unchanged since 2014.  No new opacities identified. 4.  Additional findings including centrilobular emphysematous change , coronary atherosclerosis, and small pericardial effusion. Although the patient has a history of malignancy, no measurable lesions per the RECIST criteria are identified. " She complains of fatigue.  She denies any nausea, vomiting, diarrhea, constipation, abdominal pain, weight loss or loss of appetite, chest pain, shortness of breath, leg swelling, pain, headache, dizziness, or mood changes. She is alone.   She did not take her daily medications today including BP medication.     ECOG Performance Status:   ECOG SCORE    0 - Fully active-able to carry on all pre-disease performance without restriction         PMFSH: all information reviewed and updated as relevant to today's visit    Review of Systems:   Review of Systems   Constitutional: Positive for fatigue. " "Negative for activity change, appetite change and fever.   HENT: Negative for mouth sores, nosebleeds and sore throat.    Eyes: Negative for visual disturbance.   Respiratory: Negative for cough and shortness of breath.    Cardiovascular: Negative for chest pain, palpitations and leg swelling.   Gastrointestinal: Negative for abdominal pain, constipation, diarrhea, nausea and vomiting.   Genitourinary: Negative for difficulty urinating and frequency.   Musculoskeletal: Negative for arthralgias and back pain.   Skin: Negative for rash.   Neurological: Negative for dizziness, numbness and headaches.   Hematological: Negative for adenopathy. Does not bruise/bleed easily.   Psychiatric/Behavioral: Negative for confusion and sleep disturbance. The patient is not nervous/anxious.    All other systems reviewed and are negative.        Objective:      Vitals:   Vitals:    08/08/17 1022   BP: (!) 192/78   BP Location: Right arm   Patient Position: Sitting   BP Method: Automatic   Pulse: 92   Resp: 16   Temp: 98.2 °F (36.8 °C)   TempSrc: Oral   SpO2: 98%   Weight: 73 kg (160 lb 15 oz)   Height: 5' 2" (1.575 m)     BMI: Body mass index is 29.44 kg/m².      Physical Exam:   Physical Exam   Constitutional: She is oriented to person, place, and time. She appears well-developed and well-nourished. No distress.   HENT:   Head: Normocephalic.   Right Ear: External ear normal.   Left Ear: External ear normal.   Mouth/Throat: No oropharyngeal exudate.   No sinus tenderness.   Eyes: Conjunctivae and lids are normal. Pupils are equal, round, and reactive to light. No scleral icterus.   Neck: Trachea normal and normal range of motion. Neck supple. No thyromegaly present.   Cardiovascular: Normal rate, regular rhythm and normal heart sounds.    Pulmonary/Chest: Effort normal.   BS slightly decreased to RLL.    Abdominal: Soft. Normal appearance and bowel sounds are normal. She exhibits no distension and no mass. There is no tenderness. "   Musculoskeletal: Normal range of motion.   Lymphadenopathy:        Head (right side): No submental and no submandibular adenopathy present.        Head (left side): No submental and no submandibular adenopathy present.     She has no cervical adenopathy.   Neurological: She is alert and oriented to person, place, and time. She has normal strength. No cranial nerve deficit. Gait normal.   Skin: Skin is warm, dry and intact. No bruising and no rash noted. No cyanosis. Nails show no clubbing.   Psychiatric: She has a normal mood and affect. Her speech is normal and behavior is normal.   Nursing note and vitals reviewed.        Laboratory Data:  WBC   Date Value Ref Range Status   07/18/2017 4.28 3.90 - 12.70 K/uL Final     Hemoglobin   Date Value Ref Range Status   07/18/2017 10.5 (L) 12.0 - 16.0 g/dL Final     Hematocrit   Date Value Ref Range Status   07/18/2017 30.9 (L) 37.0 - 48.5 % Final     Platelets   Date Value Ref Range Status   07/18/2017 184 150 - 350 K/uL Final     Gran #   Date Value Ref Range Status   07/18/2017 2.5 1.8 - 7.7 K/uL Final     Comment:     The ANC is based on a white cell differential from an   automated cell counter. It has not been microscopically   reviewed for the presence of abnormal cells. Clinical   correlation is required.         Chemistry        Component Value Date/Time     07/18/2017 0841    K 4.4 07/18/2017 0841     07/18/2017 0841    CO2 28 07/18/2017 0841    BUN 17 07/18/2017 0841    CREATININE 1.0 07/18/2017 0841     (H) 07/18/2017 0841        Component Value Date/Time    CALCIUM 9.8 07/18/2017 0841    ALKPHOS 41 (L) 07/18/2017 0841    AST 13 07/18/2017 0841    ALT 9 (L) 07/18/2017 0841    BILITOT 0.2 07/18/2017 0841    ESTGFRAFRICA 59 (A) 07/18/2017 0841    EGFRNONAA 51 (A) 07/18/2017 0841              Assessment/Plan:     1. History of lung cancer    2. Type 2 diabetes mellitus with diabetic peripheral angiopathy without gangrene, without long-term  current use of insulin        Plan:    1. She is doing well with no evidence of recurrence 5 years after diagnosis of lung cancer. So will plan on following her annually with Ct chest.  2. Stable on meds.    Above care plan was discussed with patient and all questions were addressed to her satisfaction

## 2017-08-14 ENCOUNTER — OFFICE VISIT (OUTPATIENT)
Dept: OPHTHALMOLOGY | Facility: CLINIC | Age: 82
End: 2017-08-14
Payer: MEDICARE

## 2017-08-14 ENCOUNTER — TELEPHONE (OUTPATIENT)
Dept: INTERNAL MEDICINE | Facility: CLINIC | Age: 82
End: 2017-08-14

## 2017-08-14 DIAGNOSIS — H35.3232 EXUDATIVE AGE-RELATED MACULAR DEGENERATION OF BOTH EYES WITH INACTIVE CHOROIDAL NEOVASCULARIZATION: Primary | ICD-10-CM

## 2017-08-14 DIAGNOSIS — H35.033 HYPERTENSIVE RETINOPATHY OF BOTH EYES: ICD-10-CM

## 2017-08-14 DIAGNOSIS — H43.813 POSTERIOR VITREOUS DETACHMENT, BOTH EYES: ICD-10-CM

## 2017-08-14 DIAGNOSIS — H35.3230 BILATERAL EXUDATIVE AGE-RELATED MACULAR DEGENERATION: ICD-10-CM

## 2017-08-14 PROCEDURE — 92134 CPTRZ OPH DX IMG PST SGM RTA: CPT | Mod: S$GLB,,, | Performed by: OPHTHALMOLOGY

## 2017-08-14 PROCEDURE — 92014 COMPRE OPH EXAM EST PT 1/>: CPT | Mod: S$GLB,,, | Performed by: OPHTHALMOLOGY

## 2017-08-14 PROCEDURE — 92226 PR SPECIAL EYE EXAM, SUBSEQUENT: CPT | Mod: RT,S$GLB,, | Performed by: OPHTHALMOLOGY

## 2017-08-14 PROCEDURE — 99999 PR PBB SHADOW E&M-EST. PATIENT-LVL III: CPT | Mod: PBBFAC,,, | Performed by: OPHTHALMOLOGY

## 2017-08-14 PROCEDURE — 99499 UNLISTED E&M SERVICE: CPT | Mod: S$PBB,,, | Performed by: OPHTHALMOLOGY

## 2017-08-14 NOTE — PROGRESS NOTES
HPI     DLS 2/7/17  Pt states vision seem to be ok. Not having any problems. No flashes no floaters no blurred vision.       OCT - Drusen, RPE atrophy, PED, some HE  No SRF OD, SRF OS improving and small SRH    Prior FA:  Shows window defect, no active leakage OU      A/P    1. Wet AMD OU  - H/o injections OU q3-4 months with Dr. Coreas  - No active leakage/edema OD cont to monitor  s/p Avastin OS x 2 here    With macular atrophy OS - conservative mgmt going forward    11/16 - had rebleed, but given poor prognosis, will monitor    2. HTN Ret OU    3. DM - No DR  BS/BP/chol contour    4. PCIOL OU    5. PVD OU      12 months OCT

## 2017-09-11 ENCOUNTER — TELEPHONE (OUTPATIENT)
Dept: INTERNAL MEDICINE | Facility: CLINIC | Age: 82
End: 2017-09-11

## 2017-09-11 NOTE — TELEPHONE ENCOUNTER
----- Message from Michaela Hurtado sent at 9/11/2017  2:58 PM CDT -----  Contact: Self/ [phone]   Type: Sooner appointment than  is able to schedule    When is the first available appointment? 09/21/2017     What is the nature of the appointment? Ankle pain     What appointment type: ep     Comments: pt is calling for a sooner appt. Please call and advise       Thank you

## 2017-09-11 NOTE — TELEPHONE ENCOUNTER
Pt  , not doing well, she also has the cellulitis on her leg that seems like it is getting worse. Pt seen by pa however she would like PCP to see her to determine what's going on.

## 2017-09-12 ENCOUNTER — OFFICE VISIT (OUTPATIENT)
Dept: INTERNAL MEDICINE | Facility: CLINIC | Age: 82
End: 2017-09-12
Payer: MEDICARE

## 2017-09-12 VITALS
DIASTOLIC BLOOD PRESSURE: 60 MMHG | SYSTOLIC BLOOD PRESSURE: 130 MMHG | BODY MASS INDEX: 30.31 KG/M2 | WEIGHT: 164.69 LBS | OXYGEN SATURATION: 96 % | HEIGHT: 62 IN | HEART RATE: 92 BPM

## 2017-09-12 DIAGNOSIS — S90.511A: ICD-10-CM

## 2017-09-12 DIAGNOSIS — I10 ESSENTIAL HYPERTENSION: ICD-10-CM

## 2017-09-12 DIAGNOSIS — L08.9: ICD-10-CM

## 2017-09-12 DIAGNOSIS — E11.51 TYPE 2 DIABETES MELLITUS WITH DIABETIC PERIPHERAL ANGIOPATHY WITHOUT GANGRENE, WITHOUT LONG-TERM CURRENT USE OF INSULIN: ICD-10-CM

## 2017-09-12 DIAGNOSIS — L97.319: Primary | ICD-10-CM

## 2017-09-12 PROCEDURE — 99999 PR PBB SHADOW E&M-EST. PATIENT-LVL IV: CPT | Mod: PBBFAC,,, | Performed by: INTERNAL MEDICINE

## 2017-09-12 PROCEDURE — 3008F BODY MASS INDEX DOCD: CPT | Mod: S$GLB,,, | Performed by: INTERNAL MEDICINE

## 2017-09-12 PROCEDURE — 1159F MED LIST DOCD IN RCRD: CPT | Mod: S$GLB,,, | Performed by: INTERNAL MEDICINE

## 2017-09-12 PROCEDURE — 99499 UNLISTED E&M SERVICE: CPT | Mod: S$PBB,,, | Performed by: INTERNAL MEDICINE

## 2017-09-12 PROCEDURE — 3075F SYST BP GE 130 - 139MM HG: CPT | Mod: S$GLB,,, | Performed by: INTERNAL MEDICINE

## 2017-09-12 PROCEDURE — 3078F DIAST BP <80 MM HG: CPT | Mod: S$GLB,,, | Performed by: INTERNAL MEDICINE

## 2017-09-12 PROCEDURE — 99214 OFFICE O/P EST MOD 30 MIN: CPT | Mod: S$GLB,,, | Performed by: INTERNAL MEDICINE

## 2017-09-12 RX ORDER — DOXYCYCLINE 100 MG/1
100 CAPSULE ORAL EVERY 12 HOURS
Qty: 14 CAPSULE | Refills: 0 | Status: SHIPPED | OUTPATIENT
Start: 2017-09-12 | End: 2017-09-25

## 2017-09-12 RX ORDER — CODEINE PHOSPHATE AND GUAIFENESIN 10; 100 MG/5ML; MG/5ML
10 SOLUTION ORAL EVERY 6 HOURS PRN
Qty: 240 ML | Refills: 1 | Status: SHIPPED | OUTPATIENT
Start: 2017-09-12 | End: 2018-12-05 | Stop reason: ALTCHOICE

## 2017-09-12 NOTE — PROGRESS NOTES
CHIEF COMPLAINT: Ulcer right lower leg    HISTORY OF PRESENT ILLNESS: This is a 85-year-old woman who presents  Due to ulcer right lateral ankle. The ulcer has been there for at least 6-8 weeks. She saw Princess HERNÁNDEZ on 8/3/17 and took a course of  Doxycycline which helped some.  She has not come sooner as she had been caring for her ill     Her  had been very ill and was in hospice. He passed away on August 26, 2017. They were  67 years. She misses him. She feels that she is coping well as he was sick for a very long time.     Left leg pain and swelling is controlled compression stocking and elevating her leg.       Her non small cell lung cancer is stable. She saw Dr Weir 8/8//17  and scans were stable. She is breathing well. No chest pain, shortness of breath, fever, chills.       She is currently taking actos 15 mg daily. She is not taking Prandin 1 mg prior to meals for her diabetes. No polydipsia, polyuria, hypoglycemia. Sugars are doing well. No hypoglycemia. A1C 6.2 on 2/8/17    She continues to take Lotrel 5/20 once daily, metoprolol 50 mg 1 tablet twice daily, and hydrochlorothiazide 12.5 mg daily for her hypertension. She denies any chest pain or shortness of breath. She continues to take simvastatin 80 mg at bedtime for her hyperlipidemia. No joint pain or muscle pain.        She is on Cymbalta 30 mg daliy and citalopram 10 mg daily for her mood, back and left leg pain.Mood is good.. She is coping with her  who has dementia. She is sleeping ok with Alprazolam at bedtime    She is taking Fosamax once week for her bones. She denies any heartburn from the Fosamax. No melana, bloody stools, constipation. She has occasional diarrhea if she eats something wrong.      She takes vitamin B12 1000 mcg daily for vitamin B12 deficiency - anemia is stable        SHe has macular degeneration of the eyes and was followed by Dr Tresa Coreas. She got injections in her eyes every 6 weeks  "in Spring 2015. She now sees Dr Ruelas at [facility] (last saw him 10/2015 - no need for injections at that time). Vision has been stable. She is takinga Fish oil for her eyes.       NO vaginal bleeding and is using vaginal premarin cream    PAST MEDICAL HISTORY:    1. Stage III non-small cell lung cancer, completed chemoradiation with carboplatin and Taxol on 6/1/12    2. Hypertension.   3. Diabetes mellitus.   4. Hyperlipidemia.   5. Chronic diastolic dysfunction.   6. Coronary artery disease status post MI in 1990 and 2003. Stenting was   done at 2003 at [facility].   7. Cholecystectomy, December 2006.   8. Cataract removal.   9. Benign growth removed from her throat.   10. Left common femoral endarterectomy, July 2007.   11. History of anal cancer in 1996 status post radiation and chemotherapy.   12. Carpal tunnel syndrome.   13. Osteoporosis on BMD 12/11    14. Macular degeneration    SOCIAL HISTORY: She quit smoking in 1995, denies any alcohol use. She is etired.     REVIEW OF SYSTEMS: She denies fevers, chills, night sweats, fatigue, visual change, hearing loss, sinus congestion, sore throat, dysuria, hematuria, joint pain, muscle pain, polydipsia, and polyuria.     PHYSICAL EXAM:     /60   Pulse 92   Ht 5' 2" (1.575 m)   Wt 74.7 kg (164 lb 10.9 oz)   SpO2 96%   BMI 30.12 kg/m²     GENERAL: She is alert and oriented. No apparent distress. Affect within normal limits.   Conjunctivae anicteric. Tympanic membranes clear. Oropharynx clear.   NECK: Supple.   Respiratory effort normal. Lungs clear to auscultation.   HEART: Regular rate and rhythm without murmurs, gallops, or rubs. No lower   extremity edema.   1x1 cm wound on the lateral mallelolus                     ASSESSMENT AND PLAN:    1. Right lower leg ulcer - arterial US lower leg. TO vascular and wound care. Doxycyline 100 mg twice daily for 7 days  2. Grief - appears to be coping well  2.  Stage III non-small cell lung cancer, " completed chemoradiation with carboplatin and Taxol on 6/1/12 - doing well. FOllow up with Dr Weir in August 2017  2. Diabetes- stable   3. Hypertension-controlled  4. Hyperlipidemia-lipids controlled  5. Coronary artery disease modifying risk factors.   6. History of anal cancer-had a normal colonoscopy, November 2008; due 2015. Declined repeat  8. Pernicious anemia - on counter vitamin B12 1000 mcg daily. CBC  9. Osteoporosis - on alendroante.    10. Anxiety -stable on Cymbalta 30 mg daliy  11. Left leg pain -better. chronic issue due to prior surgery. Wear compression stockings. .       Screening mammogram was December 2015 - scheduled. Colonoscopy is due 2015 -declined. Pap smear was July 2010.  Flu 9/272016, Prevnar 11/2015  I will see her back in 4 months, sooner if problems arise

## 2017-09-18 ENCOUNTER — HOSPITAL ENCOUNTER (OUTPATIENT)
Dept: VASCULAR SURGERY | Facility: CLINIC | Age: 82
Discharge: HOME | End: 2017-09-18
Attending: INTERNAL MEDICINE
Payer: MEDICARE

## 2017-09-18 DIAGNOSIS — L97.319: ICD-10-CM

## 2017-09-18 DIAGNOSIS — I73.9 PVD (PERIPHERAL VASCULAR DISEASE): ICD-10-CM

## 2017-09-18 PROCEDURE — 93923 UPR/LXTR ART STDY 3+ LVLS: CPT | Mod: S$GLB,,, | Performed by: SURGERY

## 2017-09-19 ENCOUNTER — HOSPITAL ENCOUNTER (OUTPATIENT)
Dept: RADIOLOGY | Facility: HOSPITAL | Age: 82
Discharge: HOME | End: 2017-09-19
Attending: SURGERY
Payer: MEDICARE

## 2017-09-19 ENCOUNTER — HOSPITAL ENCOUNTER (OUTPATIENT)
Dept: VASCULAR SURGERY | Facility: CLINIC | Age: 82
Discharge: HOME | End: 2017-09-19
Attending: SURGERY
Payer: MEDICARE

## 2017-09-19 ENCOUNTER — OFFICE VISIT (OUTPATIENT)
Dept: VASCULAR SURGERY | Facility: CLINIC | Age: 82
End: 2017-09-19
Payer: MEDICARE

## 2017-09-19 VITALS
SYSTOLIC BLOOD PRESSURE: 130 MMHG | HEIGHT: 65 IN | BODY MASS INDEX: 27.82 KG/M2 | HEART RATE: 86 BPM | WEIGHT: 167 LBS | TEMPERATURE: 99 F | DIASTOLIC BLOOD PRESSURE: 69 MMHG

## 2017-09-19 DIAGNOSIS — I73.9 PVD (PERIPHERAL VASCULAR DISEASE): ICD-10-CM

## 2017-09-19 DIAGNOSIS — I73.9 PAD (PERIPHERAL ARTERY DISEASE): ICD-10-CM

## 2017-09-19 DIAGNOSIS — I73.9 PAD (PERIPHERAL ARTERY DISEASE): Primary | ICD-10-CM

## 2017-09-19 DIAGNOSIS — I73.9 PVD (PERIPHERAL VASCULAR DISEASE): Primary | ICD-10-CM

## 2017-09-19 DIAGNOSIS — I70.238: Primary | ICD-10-CM

## 2017-09-19 PROCEDURE — 3078F DIAST BP <80 MM HG: CPT | Mod: S$GLB,,, | Performed by: SURGERY

## 2017-09-19 PROCEDURE — 73502 X-RAY EXAM HIP UNI 2-3 VIEWS: CPT | Mod: TC,RT

## 2017-09-19 PROCEDURE — 3075F SYST BP GE 130 - 139MM HG: CPT | Mod: S$GLB,,, | Performed by: SURGERY

## 2017-09-19 PROCEDURE — 99999 PR PBB SHADOW E&M-EST. PATIENT-LVL III: CPT | Mod: PBBFAC,,, | Performed by: SURGERY

## 2017-09-19 PROCEDURE — 73502 X-RAY EXAM HIP UNI 2-3 VIEWS: CPT | Mod: 26,RT,, | Performed by: RADIOLOGY

## 2017-09-19 PROCEDURE — 99205 OFFICE O/P NEW HI 60 MIN: CPT | Mod: S$GLB,,, | Performed by: SURGERY

## 2017-09-19 PROCEDURE — 1159F MED LIST DOCD IN RCRD: CPT | Mod: S$GLB,,, | Performed by: SURGERY

## 2017-09-19 PROCEDURE — 3008F BODY MASS INDEX DOCD: CPT | Mod: S$GLB,,, | Performed by: SURGERY

## 2017-09-19 PROCEDURE — 1125F AMNT PAIN NOTED PAIN PRSNT: CPT | Mod: S$GLB,,, | Performed by: SURGERY

## 2017-09-19 PROCEDURE — 99499 UNLISTED E&M SERVICE: CPT | Mod: S$PBB,,, | Performed by: SURGERY

## 2017-09-19 NOTE — PROGRESS NOTES
HISTORY OF PRESENT ILLNESS:  An 85-year-old female sent for evaluation of a   slowly healing right lateral lower leg ulcer.  She admits to some trauma with   this several weeks ago.  She states that this had much more swelling and   tenderness prior, but this has improved substantially.  Incidentally, she has   had a week's worth of right hip pain.  Denies any falls.    PAST MEDICAL HISTORY:  1.  Known peripheral arterial occlusive disease, status post left femoral   endarterectomy by me in 2007.  Notably, I have not seen her in many, many years.  2.  History of lung cancer at 2012, status post chemoradiation.  3.  Diabetes mellitus.  4.  Hypertension.  5.  History of anal cancer in 1995.    PAST SURGICAL HISTORY:  1.  Left femoral endarterectomy as above.  2.  Tonsillectomy.  3.  Cholecystectomy.  4.  PCI.    FAMILY HISTORY:  Positive for breast cancer and stroke.    SOCIAL HISTORY:  , 67 years, passed approximately four to six weeks ago.    She is an ex-smoker.    MEDICATIONS:  Include a statin.  See EPIC for full list.    ALLERGIES:  Gabapentin.    REVIEW OF SYSTEMS:  CARDIOVASCULAR:  Denies postprandial pain or DVT.  All other systems include   eyes, ENT, , musculoskeletal, breast, psychiatric, lymph, allergy and immune   are negative.    PHYSICAL EXAMINATION:  VITAL SIGNS:  See nursing note.  GENERAL:  She is no acute distress.  RESPIRATORY:  Normal effort, clear to auscultation.  CARDIOVASCULAR:  Regular rate and rhythm, nondisplaced PMI, no murmur.  VASCULAR:  2+ radial, brachial and femoral pulses.  EXTREMITIES:  Right foot:  Shows a lateral ulceration near the lateral malleolus   with some heaped up edges.  There is modest tenderness, no erythema or   drainage.  ABDOMEN:  No masses or tenderness.  No hepatosplenomegaly.  Aorta cannot be   palpated.  EYE:  Normal conjunctivae and lids.  ENT:  Good dentition.  NECK:  No JVD or thyromegaly.  MUSCULOSKELETAL:  No kyphosis or scoliosis.  Without  clubbing or cyanosis.  SKIN:  Warm and dry.  NEUROLOGIC:  Alert and oriented x3.  Normal mood and affect.  Midline tongue.    No speech difficulty or hoarseness, 5/5 motor strength in all extremities.    IMAGING:  ABIs from yesterday suggests moderate-to-severe right peripheral   arterial occlusive disease, mild-to-moderate on the left.    ASSESSMENT:  1.  Right venous stasis ulcer.  This is not ischemic in origin.  Despite her   significant peripheral arterial occlusive disease, she still has good   pulsatility at the ankle and this should heal with good wound care.  2.  Unexplained right hip pain x1 week.    PLAN:  1.  Ciera Deloris/Wound Care to see her now, has suggested a boot, and we will   follow up with her in approximately 1 week.  2.  Right hip x-rays to rule out fracture.      RUTHIE/DIANE  dd: 09/19/2017 10:08:06 (CDT)  td: 09/20/2017 02:23:40 (CDT)  Doc ID   #[id number]  Job ID #[id number]    CC: Pepper Whitfield M.D.

## 2017-09-19 NOTE — LETTER
September 19, 2017      Pepper Whitfield MD  [address]  [place] [address]           [facility]ascular [facility]  [address]  [place] [address]  Phone: [phone]  Fax: [phone]          Patient: Brunilda England   MR Number: [id number]   YOB: 1931   Date of Visit: 9/19/2017       Dear Dr. Pepper Whitfield:    Thank you for referring Brunilda England to me for evaluation. Attached you will find relevant portions of my assessment and plan of care.    If you have questions, please do not hesitate to call me. I look forward to following Brunilda England along with you.    Sincerely,    SEBASTIÁN Mcpherson III, MD    Enclosure  CC:  No Recipients    If you would like to receive this communication electronically, please contact [email or web address] or [phone] to request more information on [employer] access.    For providers and/or their staff who would like to refer a patient to [facility], please contact us through our one-stop-shop provider referral line, [facility], at [phone].    If you feel you have received this communication in error or would no longer like to receive these types of communications, please e-mail [email or web address]

## 2017-09-25 ENCOUNTER — IMMUNIZATION (OUTPATIENT)
Dept: INTERNAL MEDICINE | Facility: CLINIC | Age: 82
End: 2017-09-25
Payer: MEDICARE

## 2017-09-25 ENCOUNTER — OFFICE VISIT (OUTPATIENT)
Dept: INTERNAL MEDICINE | Facility: CLINIC | Age: 82
End: 2017-09-25
Payer: MEDICARE

## 2017-09-25 VITALS
OXYGEN SATURATION: 95 % | SYSTOLIC BLOOD PRESSURE: 138 MMHG | DIASTOLIC BLOOD PRESSURE: 60 MMHG | BODY MASS INDEX: 26.98 KG/M2 | HEIGHT: 65 IN | WEIGHT: 161.94 LBS | HEART RATE: 78 BPM

## 2017-09-25 DIAGNOSIS — I73.9 PVD (PERIPHERAL VASCULAR DISEASE): ICD-10-CM

## 2017-09-25 DIAGNOSIS — E11.51 TYPE 2 DIABETES MELLITUS WITH DIABETIC PERIPHERAL ANGIOPATHY WITHOUT GANGRENE, WITHOUT LONG-TERM CURRENT USE OF INSULIN: ICD-10-CM

## 2017-09-25 DIAGNOSIS — I10 ESSENTIAL HYPERTENSION: ICD-10-CM

## 2017-09-25 DIAGNOSIS — I73.9 PVD (PERIPHERAL VASCULAR DISEASE) WITH CLAUDICATION: ICD-10-CM

## 2017-09-25 DIAGNOSIS — L97.919 ULCER OF RIGHT LOWER LEG, WITH UNSPECIFIED SEVERITY: Primary | ICD-10-CM

## 2017-09-25 PROCEDURE — 90662 IIV NO PRSV INCREASED AG IM: CPT | Mod: S$GLB,,, | Performed by: INTERNAL MEDICINE

## 2017-09-25 PROCEDURE — 99999 PR PBB SHADOW E&M-EST. PATIENT-LVL II: CPT | Mod: PBBFAC,,, | Performed by: INTERNAL MEDICINE

## 2017-09-25 PROCEDURE — 99214 OFFICE O/P EST MOD 30 MIN: CPT | Mod: S$GLB,,, | Performed by: INTERNAL MEDICINE

## 2017-09-25 PROCEDURE — 3078F DIAST BP <80 MM HG: CPT | Mod: S$GLB,,, | Performed by: INTERNAL MEDICINE

## 2017-09-25 PROCEDURE — 3008F BODY MASS INDEX DOCD: CPT | Mod: S$GLB,,, | Performed by: INTERNAL MEDICINE

## 2017-09-25 PROCEDURE — G0008 ADMIN INFLUENZA VIRUS VAC: HCPCS | Mod: S$GLB,,, | Performed by: INTERNAL MEDICINE

## 2017-09-25 PROCEDURE — 3075F SYST BP GE 130 - 139MM HG: CPT | Mod: S$GLB,,, | Performed by: INTERNAL MEDICINE

## 2017-09-25 PROCEDURE — 99499 UNLISTED E&M SERVICE: CPT | Mod: S$PBB,,, | Performed by: INTERNAL MEDICINE

## 2017-09-25 PROCEDURE — 1159F MED LIST DOCD IN RCRD: CPT | Mod: S$GLB,,, | Performed by: INTERNAL MEDICINE

## 2017-09-25 RX ORDER — TRAMADOL HYDROCHLORIDE 50 MG/1
50 TABLET ORAL EVERY 6 HOURS PRN
Qty: 30 TABLET | Refills: 1 | Status: SHIPPED | OUTPATIENT
Start: 2017-09-25 | End: 2019-02-18

## 2017-09-25 NOTE — PROGRESS NOTES
CHIEF COMPLAINT: Follow up of Ulcer right lower leg, diabetes, hypertension, grief.     HISTORY OF PRESENT ILLNESS: This is a 85-year-old woman who presents for follow up of above. Since our last visit, she has seen vascular surgery on 9/19/17  for the ulcer on her right ankle.  Blood flow to the right foot is sufficient for healing of the ulcer.  She took doxycycline 100 mg twice daily for 7 days. NO fever, chills, nausea, vomiting, diarrhea. She is in a boot and will see wound care on 9/28/17. Her leg throbs and will keep her awake at night. She takes tramadol at night which helps.      She has had some right hip pain. 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  Right hip is better now.     Her  passed away on August 26, 2017. They were  67 years. She misses him. She feels that she is coping well as he was sick for a very long time.      Left leg pain and swelling is controlled compression stocking and elevating her leg.       Her non small cell lung cancer is stable. She saw Dr Weir 8/8//17  and scans were stable. She is breathing well. No chest pain, shortness of breath, fever, chills.       She is currently taking actos 15 mg daily. She is not taking Prandin 1 mg prior to meals for her diabetes. No polydipsia, polyuria, hypoglycemia. Sugars are doing well. No hypoglycemia. A1C 6.2 on 2/8/17    She continues to take Lotrel 5/20 once daily, metoprolol 50 mg 1 tablet twice daily, and hydrochlorothiazide 12.5 mg daily for her hypertension. She denies any chest pain or shortness of breath. She continues to take simvastatin 80 mg at bedtime for her hyperlipidemia. No joint pain or muscle pain.        She is on Cymbalta 30 mg daliy and citalopram 10 mg daily for her mood, back and left leg pain.Mood is good.. She is coping with her  who has dementia. She is sleeping ok with Alprazolam at bedtime    She is taking Fosamax once week for her bones. She denies any  "heartburn from the Fosamax. No melana, bloody stools, constipation. She has occasional diarrhea if she eats something wrong.      She takes vitamin B12 1000 mcg daily for vitamin B12 deficiency - anemia is stable        SHe has macular degeneration of the eyes and was followed by Dr Tresa Coreas. She got injections in her eyes every 6 weeks in Spring 2015. She now sees Dr Ruelas at [facility] (last saw him 10/2015 - no need for injections at that time). Vision has been stable. She is takinga Fish oil for her eyes.       NO vaginal bleeding and no longer needs vaginal premarin cream    PAST MEDICAL HISTORY:    1. Stage III non-small cell lung cancer, completed chemoradiation with carboplatin and Taxol on 6/1/12    2. Hypertension.   3. Diabetes mellitus.   4. Hyperlipidemia.   5. Chronic diastolic dysfunction.   6. Coronary artery disease status post MI in 1990 and 2003. Stenting was   done at 2003 at [facility].   7. Cholecystectomy, December 2006.   8. Cataract removal.   9. Benign growth removed from her throat.   10. Left common femoral endarterectomy, July 2007.   11. History of anal cancer in 1996 status post radiation and chemotherapy.   12. Carpal tunnel syndrome.   13. Osteoporosis on BMD 12/11    14. Macular degeneration    SOCIAL HISTORY: She quit smoking in 1995, denies any alcohol use. She is etired.     REVIEW OF SYSTEMS: She denies fevers, chills, night sweats, fatigue, visual change, hearing loss, sinus congestion, sore throat, dysuria, hematuria, joint pain, muscle pain, polydipsia, and polyuria.     PHYSICAL EXAM:     /60   Pulse 78   Ht 5' 5" (1.651 m)   Wt 73.5 kg (161 lb 14.9 oz)   SpO2 95%   BMI 26.95 kg/m²     GENERAL: She is alert and oriented. No apparent distress. Affect within normal limits.   Conjunctivae anicteric. Tympanic membranes clear. Oropharynx clear.   NECK: Supple.   Respiratory effort normal. Lungs clear to auscultation.   HEART: Regular rate and rhythm " without murmurs, gallops, or rubs. No lower   extremity edema.   Right leg wrapped in kerlec.                      ASSESSMENT AND PLAN:    1. Right lower leg ulcer -Follow up with vascular and wound care.   2. Grief - appears to be coping well. Counseling given  2.  Stage III non-small cell lung cancer, completed chemoradiation with carboplatin and Taxol on 6/1/12 - doing well. FOllow up with Dr Weir in August 2017  2. Diabetes- stable   3. Hypertension-controlled  4. Hyperlipidemia-lipids controlled  5. Coronary artery disease modifying risk factors.   6. History of anal cancer-had a normal colonoscopy, November 2008; due 2015. Declined repeat  8. Pernicious anemia - on counter vitamin B12 1000 mcg daily. CBC  9. Osteoporosis - on alendroante.    10. Anxiety -stable on Cymbalta 30 mg daliy  11. Left leg pain -better. chronic issue due to prior surgery. Wear compression stockings. .       Screening mammogram was December 2015 - scheduled. Colonoscopy is due 2015 -declined. Pap smear was July 2010.  Flu 9/272016, Prevnar 11/2015  I will see her back in 1 month, sooner if problems arise

## 2017-09-28 ENCOUNTER — OFFICE VISIT (OUTPATIENT)
Dept: WOUND CARE | Facility: CLINIC | Age: 82
End: 2017-09-28
Payer: MEDICARE

## 2017-09-28 VITALS
SYSTOLIC BLOOD PRESSURE: 162 MMHG | BODY MASS INDEX: 25.9 KG/M2 | DIASTOLIC BLOOD PRESSURE: 78 MMHG | HEIGHT: 66 IN | WEIGHT: 161.13 LBS | TEMPERATURE: 97 F | HEART RATE: 91 BPM

## 2017-09-28 DIAGNOSIS — L97.319: Primary | ICD-10-CM

## 2017-09-28 DIAGNOSIS — L97.919 VARICOSE VEINS OF RIGHT LOWER EXTREMITY WITH ULCER: ICD-10-CM

## 2017-09-28 DIAGNOSIS — I83.019 VARICOSE VEINS OF RIGHT LOWER EXTREMITY WITH ULCER: ICD-10-CM

## 2017-09-28 PROCEDURE — 29580 STRAPPING UNNA BOOT: CPT | Mod: RT,S$GLB,, | Performed by: NURSE PRACTITIONER

## 2017-09-28 PROCEDURE — 1159F MED LIST DOCD IN RCRD: CPT | Mod: S$GLB,,, | Performed by: NURSE PRACTITIONER

## 2017-09-28 PROCEDURE — 99999 PR PBB SHADOW E&M-EST. PATIENT-LVL V: CPT | Mod: PBBFAC,,, | Performed by: NURSE PRACTITIONER

## 2017-09-28 PROCEDURE — 3077F SYST BP >= 140 MM HG: CPT | Mod: S$GLB,,, | Performed by: NURSE PRACTITIONER

## 2017-09-28 PROCEDURE — 99212 OFFICE O/P EST SF 10 MIN: CPT | Mod: 25,S$GLB,, | Performed by: NURSE PRACTITIONER

## 2017-09-28 PROCEDURE — 3008F BODY MASS INDEX DOCD: CPT | Mod: S$GLB,,, | Performed by: NURSE PRACTITIONER

## 2017-09-28 PROCEDURE — 1125F AMNT PAIN NOTED PAIN PRSNT: CPT | Mod: S$GLB,,, | Performed by: NURSE PRACTITIONER

## 2017-09-28 PROCEDURE — 3078F DIAST BP <80 MM HG: CPT | Mod: S$GLB,,, | Performed by: NURSE PRACTITIONER

## 2017-09-28 NOTE — PATIENT INSTRUCTIONS
Elevate legs as much as possible. Do not get the dressings wet and use cast covers for showering.  Should the dressing become wet, remove it, place a wet-to-dry dressing over the wound, cover with gauze and roll gauze and use ace wraps for compression and to secure bandages.  Notify home health as soon as possible to have a new dressing applied.      Call [phone] with the name of your home health agency once Dr. Whitfield orders it.

## 2017-09-28 NOTE — PROGRESS NOTES
Subjective:       Patient ID: Brunilda England is a 85 y.o. female.    Chief Complaint: No chief complaint on file.    HPI   This is an 85 year old female referred by Dr. Mcpherson for evaluation and management of an ulcer to the right lower leg.  This began as trauma which occurred sometime the end of August.  A compression wrap was placed on the leg last week when she saw Dr. Mcpherson.  She is afebrile.  She denies increased redeness, swelling or purulent drainage.  Her pain level is 10/10.  She is homebound and could benefit from home health services.  She will discuss this with her PCP.  Review of Systems   Constitutional: Negative for chills, diaphoresis and fever.   HENT: Negative for hearing loss, postnasal drip, rhinorrhea, sinus pressure, sneezing, sore throat, tinnitus and trouble swallowing.    Eyes: Negative for visual disturbance.   Respiratory: Negative for apnea, cough, shortness of breath and wheezing.    Cardiovascular: Negative for chest pain, palpitations and leg swelling.   Gastrointestinal: Positive for constipation and diarrhea. Negative for nausea and vomiting.   Genitourinary: Negative for difficulty urinating, dysuria, frequency and hematuria.   Musculoskeletal: Positive for arthralgias (right hip). Negative for back pain and joint swelling.   Skin: Positive for wound.   Neurological: Negative for dizziness, weakness, light-headedness and headaches.   Hematological: Does not bruise/bleed easily.   Psychiatric/Behavioral: Negative for confusion, decreased concentration, dysphoric mood and sleep disturbance. The patient is not nervous/anxious.        Objective:      Physical Exam   Constitutional: She is oriented to person, place, and time. She appears well-developed and well-nourished. No distress.   HENT:   Head: Normocephalic and atraumatic.   Neck: Normal range of motion.   Pulmonary/Chest: Effort normal. No respiratory distress.   Musculoskeletal: Normal range of motion. She exhibits no  edema or tenderness.        Feet:    Neurological: She is alert and oriented to person, place, and time.   Skin: Skin is warm and dry. No rash noted. She is not diaphoretic. No cyanosis or erythema. Nails show no clubbing.   Psychiatric: She has a normal mood and affect. Her behavior is normal. Judgment and thought content normal.   Nursing note and vitals reviewed.      Brunilda was seen in the clinic room and placed in the supine position on the treatment table.  The coflex bandage was removed with scissors and the leg was cleansed with Easi-clense sponges and dried thoroughly.  Eucerin cream was applied to the lower legs. The shin and dorsal foot were padded with an ABD pad.  Medihoney gel and a hydrofiber dressing was applied to the wound.  The patient's foot was positioned at a 90 degree angle.  The coflex was applied per package instructions.  A two layered application was performed using a spiral technique beginning with the foam layer followed by the cohesive bandage avoiding creases or folds.  The wrap was started behind the first metatarsal and ended below the tibial tubercle of the knee.  There was overlap of each turn half the width of the previous turn.  The compression wrap will be changed every 2-3 days.    Assessment:       1. Chronic ulcer of right ankle, with unspecified severity    2. 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  Patient was warned not to get the dressings wet and to use cast covers for showering.  Should the dressing become wet, she is to remove it, place a wet-to-dry dressing over the wound, cover with gauze and roll gauze and use ace wraps for compression and to secure bandages.  She should then notify home health as soon as possible to have a new dressing applied.   Return to clinic in 2 weeks.  Patient will call us with home health information once it is ordered.    Home health  orders:  Cleanse right ankle ulcer with wound cleanser.  Apply medihoney gel to right ankle ulcer and cover with a hydrofiber dressing.  Pad shin and dorsal foot with ABD pads.  Coflex compression wrap right lower leg.          Change dressing three times weekly.

## 2017-09-28 NOTE — Clinical Note
She could benefit from home health services.  I cannot sign home health orders but if you order it for her and let me know which agency, I can them send them wound care orders. Thanks, Ciera

## 2017-09-29 ENCOUNTER — TELEPHONE (OUTPATIENT)
Dept: INTERNAL MEDICINE | Facility: CLINIC | Age: 82
End: 2017-09-29

## 2017-09-29 ENCOUNTER — TELEPHONE (OUTPATIENT)
Dept: WOUND CARE | Facility: CLINIC | Age: 82
End: 2017-09-29

## 2017-09-29 DIAGNOSIS — L97.319 CHRONIC ULCER OF RIGHT ANKLE, UNSPECIFIED ULCER STAGE: Primary | ICD-10-CM

## 2017-09-29 NOTE — TELEPHONE ENCOUNTER
----- Message from Dickey Tonya sent at 9/29/2017  8:54 AM CDT -----  Contact: Nora/ Daughter/ [phone] cell  Type: Referral to a Specialist    Where would you like a referral to? Home health wound care nurse    Have you previously requested? No    Is the physician within the [facility]? No    Name and phone number of specialist: [facility] preferred    Reason for appointment: Wound Care    Is an appointment scheduled with specialist? When? no    Comments: Pt was seen by Dr. Puentes on yesterday and told to request home health from the provider for wound care treatments 3 x per week.  [facility]  Pt's daughter would like to speak with someone in the office to discuss the home health and a form the [facility] needs to [facility].  She would like a call back today, if possible.      Thank you

## 2017-10-04 ENCOUNTER — TELEPHONE (OUTPATIENT)
Dept: INTERNAL MEDICINE | Facility: CLINIC | Age: 82
End: 2017-10-04

## 2017-10-04 NOTE — TELEPHONE ENCOUNTER
----- Message from Kim Cunningham sent at 10/4/2017  1:25 PM CDT -----  Contact: Dttete Vallejo [phone]  Genevieve is calling to follow up on a Form [facility] that was dropped off last Friday, please call her back and advise.      Thank you

## 2017-10-10 ENCOUNTER — TELEPHONE (OUTPATIENT)
Dept: INTERNAL MEDICINE | Facility: CLINIC | Age: 82
End: 2017-10-10

## 2017-10-10 NOTE — TELEPHONE ENCOUNTER
----- Message from Courtney Paul MA sent at 10/10/2017 12:26 PM CDT -----  Contact: Josie w/[employer] - [phone]   Stated the patient is taking 2 medications that a similar and would like to discuss this with someone today. Please call. Thanks!

## 2017-10-17 ENCOUNTER — OFFICE VISIT (OUTPATIENT)
Dept: WOUND CARE | Facility: CLINIC | Age: 82
End: 2017-10-17
Payer: MEDICARE

## 2017-10-17 VITALS
TEMPERATURE: 98 F | SYSTOLIC BLOOD PRESSURE: 137 MMHG | DIASTOLIC BLOOD PRESSURE: 70 MMHG | BODY MASS INDEX: 26.84 KG/M2 | HEART RATE: 91 BPM | HEIGHT: 66 IN | WEIGHT: 167 LBS

## 2017-10-17 DIAGNOSIS — L97.919 VARICOSE VEINS OF RIGHT LOWER EXTREMITY WITH ULCER: ICD-10-CM

## 2017-10-17 DIAGNOSIS — I83.019 VARICOSE VEINS OF RIGHT LOWER EXTREMITY WITH ULCER: ICD-10-CM

## 2017-10-17 DIAGNOSIS — L97.319 CHRONIC ULCER OF RIGHT ANKLE, UNSPECIFIED ULCER STAGE: Primary | ICD-10-CM

## 2017-10-17 PROCEDURE — 29580 STRAPPING UNNA BOOT: CPT | Mod: RT,S$GLB,, | Performed by: NURSE PRACTITIONER

## 2017-10-17 PROCEDURE — 99999 PR PBB SHADOW E&M-EST. PATIENT-LVL V: CPT | Mod: PBBFAC,,, | Performed by: NURSE PRACTITIONER

## 2017-10-17 PROCEDURE — 99499 UNLISTED E&M SERVICE: CPT | Mod: S$GLB,,, | Performed by: NURSE PRACTITIONER

## 2017-10-17 NOTE — PROGRESS NOTES
Subjective:       Patient ID: Brunilda England is a 86 y.o. female.    Chief Complaint: Wound Check    HPI   This patient is seen today for reevaluatio of an ulcer to the right lower leg.  This began as trauma which occurred sometime the end of August.  A compression wrap is on the leg and the wound is healing as evidenced by wound contracture.  She is afebrile.  She denies increased redeness, swelling or purulent drainage.  She does not complain of pain.    Review of Systems     Unchanged from prior visit.  Objective:      Physical Exam   Constitutional: She is oriented to person, place, and time. She appears well-developed and well-nourished. No distress.   HENT:   Head: Normocephalic and atraumatic.   Neck: Normal range of motion.   Pulmonary/Chest: Effort normal. No respiratory distress.   Musculoskeletal: Normal range of motion. She exhibits no edema or tenderness.        Feet:    Neurological: She is alert and oriented to person, place, and time.   Skin: Skin is warm and dry. No rash noted. She is not diaphoretic. No cyanosis or erythema. Nails show no clubbing.   Psychiatric: She has a normal mood and affect. Her behavior is normal. Judgment and thought content normal.   Nursing note and vitals reviewed.      Brunilda was seen in the clinic room and placed in the supine position on the treatment table.  The coflex bandage was removed with scissors and the leg was cleansed with Easi-clense sponges and dried thoroughly.  Eucerin cream was applied to the lower legs. The shin and dorsal foot were padded with an ABD pad.  Medihoney gel and a hydrofiber dressing was applied to the wound.  The patient's foot was positioned at a 90 degree angle.  The coflex was applied per package instructions.  A two layered application was performed using a spiral technique beginning with the foam layer followed by the cohesive bandage avoiding creases or folds.  The wrap was started behind the first metatarsal and ended below the  tibial tubercle of the knee.  There was overlap of each turn half the width of the previous turn.  The compression wrap will be changed every 2-3 days.    Assessment:       1. Chronic ulcer of right ankle, unspecified ulcer stage    2. 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  Patient was warned not to get the dressings wet and to use cast covers for showering.  Should the dressing become wet, she is to remove it, place a wet-to-dry dressing over the wound, cover with gauze and roll gauze and use ace wraps for compression and to secure bandages.  She should then notify home health as soon as possible to have a new dressing applied.   Return to clinic in 3 weeks.  [facility] notified of orders via [employer] fax.    Home health orders:  Cleanse right ankle ulcer with wound cleanser.  Apply medihoney gel to right ankle ulcer and cover with a hydrofiber dressing.  Pad shin and dorsal foot with ABD pads.  Coflex compression wrap right lower leg.              Change dressing three times weekly.

## 2017-10-27 ENCOUNTER — OFFICE VISIT (OUTPATIENT)
Dept: INTERNAL MEDICINE | Facility: CLINIC | Age: 82
End: 2017-10-27
Payer: MEDICARE

## 2017-10-27 VITALS
WEIGHT: 165 LBS | DIASTOLIC BLOOD PRESSURE: 60 MMHG | BODY MASS INDEX: 26.52 KG/M2 | HEIGHT: 66 IN | HEART RATE: 76 BPM | SYSTOLIC BLOOD PRESSURE: 128 MMHG | OXYGEN SATURATION: 96 %

## 2017-10-27 DIAGNOSIS — L97.919 CALCIPHYLAXIS OF RIGHT LOWER EXTREMITY WITH NONHEALING ULCER, UNSPECIFIED ULCER STAGE: ICD-10-CM

## 2017-10-27 DIAGNOSIS — I10 ESSENTIAL HYPERTENSION: ICD-10-CM

## 2017-10-27 DIAGNOSIS — E11.51 TYPE 2 DIABETES MELLITUS WITH DIABETIC PERIPHERAL ANGIOPATHY WITHOUT GANGRENE, WITHOUT LONG-TERM CURRENT USE OF INSULIN: Primary | ICD-10-CM

## 2017-10-27 DIAGNOSIS — Z85.118 HISTORY OF LUNG CANCER: ICD-10-CM

## 2017-10-27 DIAGNOSIS — E83.59 CALCIPHYLAXIS OF RIGHT LOWER EXTREMITY WITH NONHEALING ULCER, UNSPECIFIED ULCER STAGE: ICD-10-CM

## 2017-10-27 PROCEDURE — 99499 UNLISTED E&M SERVICE: CPT | Mod: S$PBB,,, | Performed by: INTERNAL MEDICINE

## 2017-10-27 PROCEDURE — 99214 OFFICE O/P EST MOD 30 MIN: CPT | Mod: S$GLB,,, | Performed by: INTERNAL MEDICINE

## 2017-10-27 PROCEDURE — 99999 PR PBB SHADOW E&M-EST. PATIENT-LVL II: CPT | Mod: PBBFAC,,, | Performed by: INTERNAL MEDICINE

## 2017-10-27 NOTE — PROGRESS NOTES
CHIEF COMPLAINT: Follow up of Ulcer right lower leg, diabetes, hypertension, grief.     HISTORY OF PRESENT ILLNESS: This is a 86-year-old woman who presents for follow up of above.    She continues to be followed by wound care for her right lower leg ulcer. Ulcer is slowly healing. Home health comes out 3 times a week.  Her leg throbs in the ace bandage and will keep her awake at night. She takes tramadol at night which helps.       She has had some right hip pain. 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  Right hip is better now.      Her  passed away on August 26, 2017. They were  67 years. She misses him. She feels that she is coping well as he was sick for a very long time. She was his primary care giver.       Her non small cell lung cancer is stable. She saw Dr Weir 8/8//17  and scans were stable. She is breathing well. No chest pain, shortness of breath, fever, chills.       She is currently taking actos 15 mg daily. She is not taking Prandin 1 mg prior to meals for her diabetes. No polydipsia, polyuria, hypoglycemia. Sugars are doing well. No hypoglycemia. A1C 6.2 on 2/8/17    She continues to take Lotrel 5/20 once daily, metoprolol 50 mg 1 tablet twice daily, and hydrochlorothiazide 12.5 mg daily for her hypertension. She denies any chest pain or shortness of breath. She continues to take simvastatin 80 mg at bedtime for her hyperlipidemia. No joint pain or muscle pain.        She is on Cymbalta 30 mg daliy and citalopram 10 mg daily for her mood, back and left leg pain.Mood is good.She is sleeping ok with Alprazolam at bedtime    She is taking Fosamax once week for her bones. She denies any heartburn from the Fosamax. No melana, bloody stools, constipation. She has occasional diarrhea if she eats something wrong.      She takes vitamin B12 1000 mcg daily for vitamin B12 deficiency - anemia is stable        SHe has macular degeneration of the eyes and  "was followed by Dr Tresa Coreas. She got injections in her eyes every 6 weeks in Spring 2015. She now sees Dr Ruelas at [facility] (last saw him 8/17- no need for injections at that time). Vision has been stable. She is takinga Fish oil for her eyes.       NO vaginal bleeding and no longer needs vaginal premarin cream    PAST MEDICAL HISTORY:    1. Stage III non-small cell lung cancer, completed chemoradiation with carboplatin and Taxol on 6/1/12    2. Hypertension.   3. Diabetes mellitus.   4. Hyperlipidemia.   5. Chronic diastolic dysfunction.   6. Coronary artery disease status post MI in 1990 and 2003. Stenting was   done at 2003 at [facility].   7. Cholecystectomy, December 2006.   8. Cataract removal.   9. Benign growth removed from her throat.   10. Left common femoral endarterectomy, July 2007.   11. History of anal cancer in 1996 status post radiation and chemotherapy.   12. Carpal tunnel syndrome.   13. Osteoporosis on BMD 12/11    14. Macular degeneration    SOCIAL HISTORY: She quit smoking in 1995, denies any alcohol use. She is etired.     REVIEW OF SYSTEMS: She denies fevers, chills, night sweats, fatigue, visual change, hearing loss, sinus congestion, sore throat, dysuria, hematuria, joint pain, muscle pain, polydipsia, and polyuria.     PHYSICAL EXAM:     /60   Pulse 76   Ht 5' 6" (1.676 m)   Wt 74.8 kg (165 lb)   SpO2 96%   BMI 26.63 kg/m²      GENERAL: She is alert and oriented. No apparent distress. Affect within normal limits.   Conjunctivae anicteric. Tympanic membranes clear. Oropharynx clear.   NECK: Supple.   Respiratory effort normal. Lungs clear to auscultation.   HEART: Regular rate and rhythm without murmurs, gallops, or rubs. No lower   extremity edema.   Right leg wrapped in bandage                 ASSESSMENT AND PLAN:    1. Right lower leg ulcer -Follow up with  wound care.   2. Grief - appears to be coping well. Counseling given  2.  Stage III non-small cell lung " cancer, completed chemoradiation with carboplatin and Taxol on 6/1/12 - doing well. FOllow up with Dr Weir in August 2017  2. Diabetes- stable   3. Hypertension-controlled  4. Hyperlipidemia-lipids controlled  5. Coronary artery disease modifying risk factors.   6. History of anal cancer-had a normal colonoscopy, November 2008; due 2015. Declined repeat  8. Pernicious anemia - on counter vitamin B12 1000 mcg daily. CBC  9. Osteoporosis - on alendroante.    10. Anxiety -stable on Cymbalta 30 mg daliy  11. Left leg pain -stable. Wear compression stockings. .       Screening mammogram was 7/17 Colonoscopy is due 2015 -declined. Pap smear was July 2010.  Flu 9/252017, Prevnar 11/2015  I will see her back in 1 month, sooner if problems arise

## 2017-11-07 ENCOUNTER — OFFICE VISIT (OUTPATIENT)
Dept: WOUND CARE | Facility: CLINIC | Age: 82
End: 2017-11-07
Payer: MEDICARE

## 2017-11-07 VITALS
HEIGHT: 66 IN | SYSTOLIC BLOOD PRESSURE: 137 MMHG | DIASTOLIC BLOOD PRESSURE: 66 MMHG | HEART RATE: 89 BPM | BODY MASS INDEX: 26.36 KG/M2 | WEIGHT: 164 LBS | TEMPERATURE: 97 F

## 2017-11-07 DIAGNOSIS — L97.319 CHRONIC ULCER OF RIGHT ANKLE, UNSPECIFIED ULCER STAGE: Primary | ICD-10-CM

## 2017-11-07 DIAGNOSIS — L97.919 VARICOSE VEINS OF RIGHT LOWER EXTREMITY WITH ULCER: ICD-10-CM

## 2017-11-07 DIAGNOSIS — I83.019 VARICOSE VEINS OF RIGHT LOWER EXTREMITY WITH ULCER: ICD-10-CM

## 2017-11-07 PROCEDURE — 99499 UNLISTED E&M SERVICE: CPT | Mod: S$GLB,,, | Performed by: NURSE PRACTITIONER

## 2017-11-07 PROCEDURE — 99999 PR PBB SHADOW E&M-EST. PATIENT-LVL V: CPT | Mod: PBBFAC,,, | Performed by: NURSE PRACTITIONER

## 2017-11-07 PROCEDURE — 29580 STRAPPING UNNA BOOT: CPT | Mod: RT,S$GLB,, | Performed by: NURSE PRACTITIONER

## 2017-11-07 NOTE — PROGRESS NOTES
Subjective:       Patient ID: Brunilda England is a 86 y.o. female.    Chief Complaint: Wound Check    HPI   This patient is seen today for reevaluatio of an ulcer to the right lower leg.  This began as trauma which occurred sometime the end of August.  She is supposed to have a coflex compression wrap on the leg but comes to clinic with gauze and ace wraps.  The lateral ankle wound is smaller.  However, she has a new wound to the anterior ankle.  She is afebrile.  She denies increased redeness, swelling or purulent drainage.  She does not complain of pain.    Review of Systems  Unchanged from prior visit.  Objective:      Physical Exam   Constitutional: She is oriented to person, place, and time. She appears well-developed and well-nourished. No distress.   HENT:   Head: Normocephalic and atraumatic.   Neck: Normal range of motion.   Pulmonary/Chest: Effort normal. No respiratory distress.   Musculoskeletal: Normal range of motion. She exhibits no edema or tenderness.        Feet:    Neurological: She is alert and oriented to person, place, and time.   Skin: Skin is warm and dry. No rash noted. She is not diaphoretic. No cyanosis or erythema. Nails show no clubbing.   Psychiatric: She has a normal mood and affect. Her behavior is normal. Judgment and thought content normal.   Nursing note and vitals reviewed.      Brunilda was seen in the clinic room and placed in the supine position on the treatment table.  The wrap was removed with scissors and the leg was cleansed with Easi-clense sponges and dried thoroughly.  Eucerin cream was applied to the lower legs. The shin and dorsal foot were padded with an ABD pad.  Medihoney gel and a hydrofiber dressing was applied to the wounds.  The patient's foot was positioned at a 90 degree angle.  The coflex was applied per package instructions.  A two layered application was performed using a spiral technique beginning with the foam layer followed by the cohesive bandage  avoiding creases or folds.  The wrap was started behind the first metatarsal and ended below the tibial tubercle of the knee.  There was overlap of each turn half the width of the previous turn.  The compression wrap will be changed every 2-3 days.    Assessment:       1. Chronic ulcer of right ankle, unspecified ulcer stage    2. 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  Patient was warned not to get the dressings wet and to use cast covers for showering.  Should the dressing become wet, she is to remove it, place a wet-to-dry dressing over the wound, cover with gauze and roll gauze and use ace wraps for compression and to secure bandages.  She should then notify home health as soon as possible to have a new dressing applied.   Return to clinic in 3 weeks.  [facility] notified of orders via [employer] fax.    Home health orders:  Cleanse right ankle ulcer with wound cleanser.  Apply medihoney gel to right ankle ulcers and cover with a hydrofiber dressing.  Pad shin and dorsal foot with ABD pads.  Coflex compression wrap right lower leg.    Right lateral ankle    Right anterior ankle              Change dressing three times weekly.

## 2017-11-28 ENCOUNTER — OFFICE VISIT (OUTPATIENT)
Dept: WOUND CARE | Facility: CLINIC | Age: 82
End: 2017-11-28
Payer: MEDICARE

## 2017-11-28 VITALS
TEMPERATURE: 98 F | HEIGHT: 66 IN | DIASTOLIC BLOOD PRESSURE: 60 MMHG | BODY MASS INDEX: 27.24 KG/M2 | HEART RATE: 89 BPM | WEIGHT: 169.5 LBS | SYSTOLIC BLOOD PRESSURE: 137 MMHG

## 2017-11-28 DIAGNOSIS — I83.019 VARICOSE VEINS OF RIGHT LOWER EXTREMITY WITH ULCER: ICD-10-CM

## 2017-11-28 DIAGNOSIS — L97.319 CHRONIC ULCER OF RIGHT ANKLE, UNSPECIFIED ULCER STAGE: Primary | ICD-10-CM

## 2017-11-28 DIAGNOSIS — L97.919 VARICOSE VEINS OF RIGHT LOWER EXTREMITY WITH ULCER: ICD-10-CM

## 2017-11-28 PROBLEM — E83.59: Status: RESOLVED | Noted: 2017-10-27 | Resolved: 2017-11-28

## 2017-11-28 PROCEDURE — 29580 STRAPPING UNNA BOOT: CPT | Mod: RT,S$GLB,, | Performed by: NURSE PRACTITIONER

## 2017-11-28 PROCEDURE — 99499 UNLISTED E&M SERVICE: CPT | Mod: S$GLB,,, | Performed by: NURSE PRACTITIONER

## 2017-11-28 PROCEDURE — 99999 PR PBB SHADOW E&M-EST. PATIENT-LVL V: CPT | Mod: PBBFAC,,, | Performed by: NURSE PRACTITIONER

## 2017-11-28 RX ORDER — SIMVASTATIN 80 MG/1
80 TABLET, FILM COATED ORAL NIGHTLY
COMMUNITY
Start: 2017-10-31 | End: 2018-08-09 | Stop reason: SDUPTHER

## 2017-11-28 NOTE — PROGRESS NOTES
Subjective:       Patient ID: Brunilda England is a 86 y.o. female.    Chief Complaint: Wound Check    HPI   This patient is seen today for reevaluatio of an ulcer to the right lower leg.  This began as trauma which occurred sometime the end of August.  A coflex compression wrap is on the leg.  The lateral ankle wound is healed and the anterior wound is healing as evidenced by wound contracture.  She is afebrile.  She denies increased redeness, swelling or purulent drainage.  Her pain level is 10/10.    Review of Systems  Unchanged from prior visit.  Objective:      Physical Exam   Constitutional: She is oriented to person, place, and time. She appears well-developed and well-nourished. No distress.   HENT:   Head: Normocephalic and atraumatic.   Neck: Normal range of motion.   Pulmonary/Chest: Effort normal. No respiratory distress.   Musculoskeletal: Normal range of motion. She exhibits no edema or tenderness.        Feet:    Neurological: She is alert and oriented to person, place, and time.   Skin: Skin is warm and dry. No rash noted. She is not diaphoretic. No cyanosis or erythema. Nails show no clubbing.   Psychiatric: She has a normal mood and affect. Her behavior is normal. Judgment and thought content normal.   Nursing note and vitals reviewed.      Brunilda was seen in the clinic room and placed in the supine position on the treatment table.  The wrap was removed with scissors and the leg was cleansed with Easi-clense sponges and dried thoroughly.  Eucerin cream was applied to the lower legs. The shin and dorsal foot were padded with an ABD pad.  A mepilex foam dressing was applied to the wound.  The patient's foot was positioned at a 90 degree angle.  The coflex was applied per package instructions.  A two layered application was performed using a spiral technique beginning with the foam layer followed by the cohesive bandage avoiding creases or folds.  The wrap was started behind the first metatarsal and  ended below the tibial tubercle of the knee.  There was overlap of each turn half the width of the previous turn.  The compression wrap will be changed every 2-3 days.    Assessment:       1. Chronic ulcer of right ankle, unspecified ulcer stage    2. 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  Patient was warned not to get the dressings wet and to use cast covers for showering.  Should the dressing become wet, she is to remove it, place a wet-to-dry dressing over the wound, cover with gauze and roll gauze and use ace wraps for compression and to secure bandages.  She should then notify home health as soon as possible to have a new dressing applied.   Return to clinic in 3 weeks.  [facility] notified of orders via [employer] fax.    Home health orders:  Cleanse right ankle ulcer with wound cleanser.  Apply mepilex foam to right ankle ulcer.  Pad shin and dorsal foot with ABD pads.  Coflex compression wrap right lower leg.  Change dressing three times weekly.    Right lateral ankle     Right anterior ankle

## 2017-12-01 RX ORDER — NITROGLYCERIN 0.4 MG/1
0.4 TABLET SUBLINGUAL EVERY 5 MIN PRN
Qty: 25 TABLET | Refills: 0 | Status: SHIPPED | OUTPATIENT
Start: 2017-12-01 | End: 2022-11-07 | Stop reason: SDUPTHER

## 2017-12-22 ENCOUNTER — OFFICE VISIT (OUTPATIENT)
Dept: WOUND CARE | Facility: CLINIC | Age: 82
End: 2017-12-22
Payer: MEDICARE

## 2017-12-22 VITALS
BODY MASS INDEX: 26.97 KG/M2 | DIASTOLIC BLOOD PRESSURE: 85 MMHG | WEIGHT: 167.81 LBS | HEART RATE: 91 BPM | TEMPERATURE: 98 F | SYSTOLIC BLOOD PRESSURE: 159 MMHG | HEIGHT: 66 IN

## 2017-12-22 DIAGNOSIS — L97.319 CHRONIC ULCER OF RIGHT ANKLE, UNSPECIFIED ULCER STAGE: Primary | ICD-10-CM

## 2017-12-22 DIAGNOSIS — I83.019 VARICOSE VEINS OF RIGHT LOWER EXTREMITY WITH ULCER: ICD-10-CM

## 2017-12-22 DIAGNOSIS — L97.919 VARICOSE VEINS OF RIGHT LOWER EXTREMITY WITH ULCER: ICD-10-CM

## 2017-12-22 PROCEDURE — 99999 PR PBB SHADOW E&M-EST. PATIENT-LVL V: CPT | Mod: PBBFAC,,, | Performed by: NURSE PRACTITIONER

## 2017-12-22 PROCEDURE — 29580 STRAPPING UNNA BOOT: CPT | Mod: RT,S$GLB,, | Performed by: NURSE PRACTITIONER

## 2017-12-22 PROCEDURE — 99499 UNLISTED E&M SERVICE: CPT | Mod: S$GLB,,, | Performed by: NURSE PRACTITIONER

## 2017-12-22 NOTE — PROGRESS NOTES
Subjective:       Patient ID: Brunilda England is a 86 y.o. female.    Chief Complaint: Wound Check    Wound Check        This patient is seen today for reevaluatio of an ulcer to the right lower leg.  This began as trauma which occurred sometime the end of August.  A coflex compression wrap is on the leg.  The anterior wound is healing as evidenced by wound contracture.  She is afebrile.  She denies increased redeness, swelling or purulent drainage.  She has no pain.    Review of Systems  Unchanged from prior visit.  Objective:      Physical Exam   Constitutional: She is oriented to person, place, and time. She appears well-developed and well-nourished. No distress.   HENT:   Head: Normocephalic and atraumatic.   Neck: Normal range of motion.   Pulmonary/Chest: Effort normal. No respiratory distress.   Musculoskeletal: Normal range of motion. She exhibits no edema or tenderness.        Feet:    Neurological: She is alert and oriented to person, place, and time.   Skin: Skin is warm and dry. No rash noted. She is not diaphoretic. No cyanosis or erythema. Nails show no clubbing.   Psychiatric: She has a normal mood and affect. Her behavior is normal. Judgment and thought content normal.   Nursing note and vitals reviewed.      Brunilda was seen in the clinic room and placed in the supine position on the treatment table.  The wrap was removed with scissors and the leg was cleansed with Easi-clense sponges and dried thoroughly.  Eucerin cream was applied to the lower legs. The shin and dorsal foot were padded with an ABD pad.  A mepilex lite foam dressing was applied to the wound.  The patient's foot was positioned at a 90 degree angle.  The coflex was applied per package instructions.  A two layered application was performed using a spiral technique beginning with the foam layer followed by the cohesive bandage avoiding creases or folds.  The wrap was started behind the first metatarsal and ended below the tibial  tubercle of the knee.  There was overlap of each turn half the width of the previous turn.  The compression wrap will be changed every 2-3 days.    Assessment:       1. Chronic ulcer of right ankle, unspecified ulcer stage    2. 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  Patient was warned not to get the dressings wet and to use cast covers for showering.  Should the dressing become wet, she is to remove it, place a wet-to-dry dressing over the wound, cover with gauze and roll gauze and use ace wraps for compression and to secure bandages.  She should then notify home health as soon as possible to have a new dressing applied.   Return to clinic in 3 weeks.  [facility] notified of orders via [employer] fax.    Home health orders:  Cleanse right ankle ulcer with wound cleanser.  Apply mepilex lite foam to right ankle ulcer.  Pad shin and dorsal foot with ABD pads.  Coflex compression wrap right lower leg.  Change dressing three times weekly.    Right anterior ankle

## 2017-12-26 DIAGNOSIS — I10 ESSENTIAL HYPERTENSION: ICD-10-CM

## 2017-12-27 ENCOUNTER — TELEPHONE (OUTPATIENT)
Dept: WOUND CARE | Facility: CLINIC | Age: 82
End: 2017-12-27

## 2017-12-27 RX ORDER — PIOGLITAZONEHYDROCHLORIDE 15 MG/1
15 TABLET ORAL EVERY MORNING
Qty: 90 TABLET | Refills: 4 | Status: SHIPPED | OUTPATIENT
Start: 2017-12-27 | End: 2019-03-30 | Stop reason: SDUPTHER

## 2017-12-27 RX ORDER — METOPROLOL TARTRATE 50 MG/1
50 TABLET ORAL 2 TIMES DAILY
Qty: 180 TABLET | Refills: 4 | Status: SHIPPED | OUTPATIENT
Start: 2017-12-27 | End: 2019-03-22 | Stop reason: SDUPTHER

## 2017-12-27 NOTE — TELEPHONE ENCOUNTER
----- Message from Rai Harvey sent at 12/27/2017 11:40 AM CST -----  Patient states that her wound is healed per the home health nurse and would like to knoe if she needs to still come in for her appt on 01/12//please call back at [phone]//thank you

## 2017-12-27 NOTE — TELEPHONE ENCOUNTER
"----- Message from Susan Bergeron sent at 12/27/2017 11:34 AM CST -----  Contact: Pt [phone]  RX request - refill or new RX.  Is this a refill or new RX:  Refill  RX name and strength: metoprolol tartrate (LOPRESSOR) 50 MG tablet  Directions:   Is this a 30 day or 90 day RX:    Pharmacy name and phone # (DON'T enter "on file" or "in chart"): [place], [place] - [address] [facility] [phone] (Phone)  [phone] (Fax)  Comments:      RX request - refill or new RX.  Is this a refill or new RX:  Refill  RX name and strength: pioglitazone (ACTOS) 15 MG tablet  Directions:   Is this a 30 day or 90 day RX:    Pharmacy name and phone # (DON'T enter "on file" or "in chart"): [place], [place] - [address] [facility] [phone] (Phone)  [phone] (Fax)  Comments:        "

## 2017-12-29 ENCOUNTER — TELEPHONE (OUTPATIENT)
Dept: WOUND CARE | Facility: CLINIC | Age: 82
End: 2017-12-29

## 2017-12-29 NOTE — TELEPHONE ENCOUNTER
----- Message from Cindy Mejia sent at 12/29/2017 10:43 AM CST -----  Contact: Abelardo/ home health   Sagrario States that she  needs a call returned by a nurse in reference to eleuterio's wound being heeled and has been discharged from home health Please call abelardo @ [phone] . Thanks :)

## 2018-01-04 RX ORDER — AMLODIPINE AND BENAZEPRIL HYDROCHLORIDE 5; 20 MG/1; MG/1
1 CAPSULE ORAL DAILY
Qty: 90 CAPSULE | Refills: 4 | Status: SHIPPED | OUTPATIENT
Start: 2018-01-04 | End: 2019-03-22 | Stop reason: SDUPTHER

## 2018-01-12 ENCOUNTER — LAB VISIT (OUTPATIENT)
Dept: LAB | Facility: HOSPITAL | Age: 83
End: 2018-01-12
Attending: INTERNAL MEDICINE
Payer: MEDICARE

## 2018-01-12 ENCOUNTER — OFFICE VISIT (OUTPATIENT)
Dept: INTERNAL MEDICINE | Facility: CLINIC | Age: 83
End: 2018-01-12
Payer: MEDICARE

## 2018-01-12 VITALS
SYSTOLIC BLOOD PRESSURE: 120 MMHG | DIASTOLIC BLOOD PRESSURE: 70 MMHG | HEART RATE: 86 BPM | WEIGHT: 170.88 LBS | BODY MASS INDEX: 28.47 KG/M2 | HEIGHT: 65 IN

## 2018-01-12 DIAGNOSIS — E55.9 VITAMIN D DEFICIENCY DISEASE: ICD-10-CM

## 2018-01-12 DIAGNOSIS — E53.8 VITAMIN B12 DEFICIENCY: ICD-10-CM

## 2018-01-12 DIAGNOSIS — M81.0 OSTEOPOROSIS, UNSPECIFIED OSTEOPOROSIS TYPE, UNSPECIFIED PATHOLOGICAL FRACTURE PRESENCE: ICD-10-CM

## 2018-01-12 DIAGNOSIS — E13.9 DIABETES MELLITUS DUE TO ABNORMAL INSULIN: ICD-10-CM

## 2018-01-12 DIAGNOSIS — E11.51 TYPE 2 DIABETES MELLITUS WITH DIABETIC PERIPHERAL ANGIOPATHY WITHOUT GANGRENE, WITHOUT LONG-TERM CURRENT USE OF INSULIN: Primary | ICD-10-CM

## 2018-01-12 DIAGNOSIS — I73.9 PVD (PERIPHERAL VASCULAR DISEASE) WITH CLAUDICATION: ICD-10-CM

## 2018-01-12 DIAGNOSIS — D51.0 PERNICIOUS ANEMIA: ICD-10-CM

## 2018-01-12 DIAGNOSIS — I25.10 CORONARY ARTERY DISEASE INVOLVING NATIVE CORONARY ARTERY OF NATIVE HEART WITHOUT ANGINA PECTORIS: ICD-10-CM

## 2018-01-12 DIAGNOSIS — I10 ESSENTIAL HYPERTENSION: ICD-10-CM

## 2018-01-12 LAB
25(OH)D3+25(OH)D2 SERPL-MCNC: 31 NG/ML
ALBUMIN SERPL BCP-MCNC: 3.9 G/DL
ALP SERPL-CCNC: 42 U/L
ALT SERPL W/O P-5'-P-CCNC: 8 U/L
ANION GAP SERPL CALC-SCNC: 8 MMOL/L
AST SERPL-CCNC: 13 U/L
BASOPHILS # BLD AUTO: 0.01 K/UL
BASOPHILS NFR BLD: 0.3 %
BILIRUB SERPL-MCNC: 0.3 MG/DL
BUN SERPL-MCNC: 16 MG/DL
CALCIUM SERPL-MCNC: 10.1 MG/DL
CHLORIDE SERPL-SCNC: 103 MMOL/L
CHOLEST SERPL-MCNC: 163 MG/DL
CHOLEST/HDLC SERPL: 2.9 {RATIO}
CO2 SERPL-SCNC: 29 MMOL/L
CREAT SERPL-MCNC: 0.8 MG/DL
DIFFERENTIAL METHOD: ABNORMAL
EOSINOPHIL # BLD AUTO: 0.1 K/UL
EOSINOPHIL NFR BLD: 1.5 %
ERYTHROCYTE [DISTWIDTH] IN BLOOD BY AUTOMATED COUNT: 16.9 %
EST. GFR  (AFRICAN AMERICAN): >60 ML/MIN/1.73 M^2
EST. GFR  (NON AFRICAN AMERICAN): >60 ML/MIN/1.73 M^2
ESTIMATED AVG GLUCOSE: 117 MG/DL
GLUCOSE SERPL-MCNC: 127 MG/DL
HBA1C MFR BLD HPLC: 5.7 %
HCT VFR BLD AUTO: 31.9 %
HDLC SERPL-MCNC: 56 MG/DL
HDLC SERPL: 34.4 %
HGB BLD-MCNC: 10.9 G/DL
LDLC SERPL CALC-MCNC: 82.6 MG/DL
LYMPHOCYTES # BLD AUTO: 1.2 K/UL
LYMPHOCYTES NFR BLD: 31.2 %
MCH RBC QN AUTO: 28.5 PG
MCHC RBC AUTO-ENTMCNC: 34.2 G/DL
MCV RBC AUTO: 84 FL
MONOCYTES # BLD AUTO: 0.5 K/UL
MONOCYTES NFR BLD: 12.4 %
NEUTROPHILS # BLD AUTO: 2.1 K/UL
NEUTROPHILS NFR BLD: 54.6 %
NONHDLC SERPL-MCNC: 107 MG/DL
PLATELET # BLD AUTO: 182 K/UL
PMV BLD AUTO: 10.2 FL
POTASSIUM SERPL-SCNC: 3.7 MMOL/L
PROT SERPL-MCNC: 8 G/DL
RBC # BLD AUTO: 3.82 M/UL
SODIUM SERPL-SCNC: 140 MMOL/L
TRIGL SERPL-MCNC: 122 MG/DL
TSH SERPL DL<=0.005 MIU/L-ACNC: 2.73 UIU/ML
VIT B12 SERPL-MCNC: 1207 PG/ML
WBC # BLD AUTO: 3.88 K/UL

## 2018-01-12 PROCEDURE — 99499 UNLISTED E&M SERVICE: CPT | Mod: S$GLB,,, | Performed by: INTERNAL MEDICINE

## 2018-01-12 PROCEDURE — 84443 ASSAY THYROID STIM HORMONE: CPT

## 2018-01-12 PROCEDURE — 99214 OFFICE O/P EST MOD 30 MIN: CPT | Mod: S$GLB,,, | Performed by: INTERNAL MEDICINE

## 2018-01-12 PROCEDURE — 80061 LIPID PANEL: CPT

## 2018-01-12 PROCEDURE — 82306 VITAMIN D 25 HYDROXY: CPT

## 2018-01-12 PROCEDURE — 99999 PR PBB SHADOW E&M-EST. PATIENT-LVL II: CPT | Mod: PBBFAC,,, | Performed by: INTERNAL MEDICINE

## 2018-01-12 PROCEDURE — 36415 COLL VENOUS BLD VENIPUNCTURE: CPT

## 2018-01-12 PROCEDURE — 82607 VITAMIN B-12: CPT

## 2018-01-12 PROCEDURE — 80053 COMPREHEN METABOLIC PANEL: CPT

## 2018-01-12 PROCEDURE — 85025 COMPLETE CBC W/AUTO DIFF WBC: CPT

## 2018-01-12 PROCEDURE — 83036 HEMOGLOBIN GLYCOSYLATED A1C: CPT

## 2018-01-12 RX ORDER — ALPRAZOLAM 0.5 MG/1
TABLET ORAL
Qty: 30 TABLET | Refills: 5 | Status: SHIPPED | OUTPATIENT
Start: 2018-01-12 | End: 2018-08-13 | Stop reason: SDUPTHER

## 2018-01-12 NOTE — PROGRESS NOTES
CHIEF COMPLAINT: Follow up of Ulcer right lower leg, diabetes, hypertension, grief.     HISTORY OF PRESENT ILLNESS: This is a 86-year-old woman who presents for follow up of above.     Ulcer on right lowe leg has healed. No edema. She has a dry scab.        Xray of the right hip on 17 revealed mild to moderate DJD of the right hip.  Right hip pain has resolved and has not returned.       Her  passed away on 2017. They were  67 years. She misses him. She feels that she is coping well as he was sick for a very long time. She was his primary care giver. She takes xanax 0.5 mg at bedtime to sleep (Since her daughter  years ago). She is on Cymbalta 30 mg daliy and citalopram 10 mg daily for her mood. Her daughter is with her today and feels that she is coping well. Daughter is worried about her memory      Her non small cell lung cancer is stable. She saw Dr Weir 17  and scans were stable. She is to follow up annually with CT scan chest (due 2018)  She is breathing well. No chest pain, shortness of breath, fever, chills.       She is currently taking actos 15 mg daily. She is not taking Prandin 1 mg prior to meals for her diabetes. No polydipsia, polyuria, hypoglycemia. Sugars are doing well. No hypoglycemia. A1C 6.2 on 17    She continues to take Lotrel 5/20 once daily, metoprolol 50 mg 1 tablet twice daily, and hydrochlorothiazide 12.5 mg daily for her hypertension. She denies any chest pain or shortness of breath. She continues to take simvastatin 80 mg at bedtime for her hyperlipidemia. No joint pain or muscle pain.        She is taking Fosamax once week for her bones. She denies any heartburn from the Fosamax. No melana, bloody stools, constipation. She has occasional diarrhea if she eats something wrong.      She takes vitamin B12 1000 mcg daily for vitamin B12 deficiency - anemia is stable        SHe has macular degeneration of the eyes and was followed by Dr Gtz  "Joss. She got injections in her eyes every 6 weeks in Spring 2015. She now sees Dr Ruelas at [facility] (last saw him 8/17- no need for injections at that time). Vision has been stable. She is takinga Fish oil for her eyes.       NO vaginal bleeding and no longer needs vaginal premarin cream    PAST MEDICAL HISTORY:    1. Stage III non-small cell lung cancer, completed chemoradiation with carboplatin and Taxol on 6/1/12    2. Hypertension.   3. Diabetes mellitus.   4. Hyperlipidemia.   5. Chronic diastolic dysfunction.   6. Coronary artery disease status post MI in 1990 and 2003. Stenting was   done at 2003 at [facility].   7. Cholecystectomy, December 2006.   8. Cataract removal.   9. Benign growth removed from her throat.   10. Left common femoral endarterectomy, July 2007.   11. History of anal cancer in 1996 status post radiation and chemotherapy.   12. Carpal tunnel syndrome.   13. Osteoporosis on BMD 12/11    14. Macular degeneration    SOCIAL HISTORY: She quit smoking in 1995, denies any alcohol use. She is etired.     REVIEW OF SYSTEMS: She denies fevers, chills, night sweats, fatigue, visual change, hearing loss, sinus congestion, sore throat, dysuria, hematuria, joint pain, muscle pain, polydipsia, and polyuria.     PHYSICAL EXAM:    /70 (BP Location: Left arm, Patient Position: Sitting, BP Method: Medium (Manual))   Pulse 86   Ht 5' 5" (1.651 m)   Wt 77.5 kg (170 lb 13.7 oz)   BMI 28.43 kg/m²     GENERAL: She is alert and oriented. No apparent distress. Affect within normal limits.   Conjunctivae anicteric. Tympanic membranes clear. Oropharynx clear.   NECK: Supple.   Respiratory effort normal. Lungs clear to auscultation.   HEART: Regular rate and rhythm without murmurs, gallops, or rubs. No lower   extremity edema.   Right leg scab on anterior lower ankle.      Mini mental status exam done and scored 27/30              ASSESSMENT AND PLAN:    1. Right lower leg ulcer- healed  2. " Grief - appears to be coping well. Counseling given. Very mild memory issues.   2.  Stage III non-small cell lung cancer, completed chemoradiation with carboplatin and Taxol on 6/1/12 - doing well. FOllow up with Dr Weir in August 2018  2. Diabetes- labs  3. Hypertension-controlled  4. Hyperlipidemia-lipids controlled  5. Coronary artery disease modifying risk factors.   6. History of anal cancer-had a normal colonoscopy, November 2008; due 2015. Declined repeat  8. Pernicious anemia - on counter vitamin B12 1000 mcg daily. CBC  9. Osteoporosis - on alendroante.    10. Anxiety -stable on Cymbalta 30 mg daliy  11. Left leg pain -stable. Wear compression stockings. .       Screening mammogram was 7/17 Colonoscopy is due 2015 -declined. Pap smear was July 2010.  Flu 9/252017, Prevnar 11/2015  I will see her back in 4 month, sooner if problems arise    Spent greater than 40 minutes with the patient, greater than 50% in face to face counseling.

## 2018-01-22 RX ORDER — DULOXETIN HYDROCHLORIDE 30 MG/1
30 CAPSULE, DELAYED RELEASE ORAL DAILY
Qty: 90 CAPSULE | Refills: 4 | Status: SHIPPED | OUTPATIENT
Start: 2018-01-22 | End: 2018-06-20 | Stop reason: SDUPTHER

## 2018-01-22 RX ORDER — HYDROCHLOROTHIAZIDE 12.5 MG/1
12.5 CAPSULE ORAL DAILY
Qty: 90 CAPSULE | Refills: 4 | Status: SHIPPED | OUTPATIENT
Start: 2018-01-22 | End: 2019-04-09 | Stop reason: SDUPTHER

## 2018-02-05 RX ORDER — CITALOPRAM 10 MG/1
10 TABLET ORAL DAILY
Qty: 90 TABLET | Refills: 4 | Status: SHIPPED | OUTPATIENT
Start: 2018-02-05 | End: 2018-06-20

## 2018-04-18 RX ORDER — ALENDRONATE SODIUM 70 MG/1
70 TABLET ORAL
Qty: 12 TABLET | Refills: 4 | Status: SHIPPED | OUTPATIENT
Start: 2018-04-18 | End: 2019-05-21 | Stop reason: SDUPTHER

## 2018-05-02 ENCOUNTER — OFFICE VISIT (OUTPATIENT)
Dept: INTERNAL MEDICINE | Facility: CLINIC | Age: 83
End: 2018-05-02
Payer: MEDICARE

## 2018-05-02 VITALS
BODY MASS INDEX: 31.07 KG/M2 | SYSTOLIC BLOOD PRESSURE: 138 MMHG | OXYGEN SATURATION: 96 % | DIASTOLIC BLOOD PRESSURE: 70 MMHG | HEART RATE: 86 BPM | WEIGHT: 175.38 LBS | HEIGHT: 63 IN

## 2018-05-02 DIAGNOSIS — D51.0 PERNICIOUS ANEMIA: ICD-10-CM

## 2018-05-02 DIAGNOSIS — I25.10 CORONARY ARTERY DISEASE INVOLVING NATIVE CORONARY ARTERY OF NATIVE HEART WITHOUT ANGINA PECTORIS: Primary | ICD-10-CM

## 2018-05-02 DIAGNOSIS — E11.51 TYPE 2 DIABETES MELLITUS WITH DIABETIC PERIPHERAL ANGIOPATHY WITHOUT GANGRENE, WITHOUT LONG-TERM CURRENT USE OF INSULIN: ICD-10-CM

## 2018-05-02 DIAGNOSIS — G57.92 NEUROPATHY OF LEFT LOWER EXTREMITY: ICD-10-CM

## 2018-05-02 DIAGNOSIS — I10 ESSENTIAL HYPERTENSION: ICD-10-CM

## 2018-05-02 DIAGNOSIS — Z12.31 SCREENING MAMMOGRAM, ENCOUNTER FOR: ICD-10-CM

## 2018-05-02 DIAGNOSIS — Z85.118 HISTORY OF LUNG CANCER: ICD-10-CM

## 2018-05-02 PROBLEM — L97.919 VARICOSE VEINS OF RIGHT LOWER EXTREMITY WITH ULCER: Status: RESOLVED | Noted: 2017-09-28 | Resolved: 2018-05-02

## 2018-05-02 PROBLEM — I83.019 VARICOSE VEINS OF RIGHT LOWER EXTREMITY WITH ULCER: Status: RESOLVED | Noted: 2017-09-28 | Resolved: 2018-05-02

## 2018-05-02 PROCEDURE — 99499 UNLISTED E&M SERVICE: CPT | Mod: S$GLB,,, | Performed by: INTERNAL MEDICINE

## 2018-05-02 PROCEDURE — 99214 OFFICE O/P EST MOD 30 MIN: CPT | Mod: S$GLB,,, | Performed by: INTERNAL MEDICINE

## 2018-05-02 PROCEDURE — 99999 PR PBB SHADOW E&M-EST. PATIENT-LVL III: CPT | Mod: PBBFAC,,, | Performed by: INTERNAL MEDICINE

## 2018-05-02 NOTE — PROGRESS NOTES
CHIEF COMPLAINT: Follow up of Ulcer right lower leg, diabetes, hypertension, grief.     HISTORY OF PRESENT ILLNESS: This is a 86-year-old woman who presents for follow up of above.     Ulcer on right lowe leg has healed. No edema. She moisturizes the area daily. She will occasionally have a sticking pain in the area.       Xray of the right hip on 17 revealed mild to moderate DJD of the right hip.  Right hip pain has resolved and has not returned.  She is not needed tramadol.      Her  passed away on 2017. They were  67 years. She misses him. She feels that she is coping well as he was sick for a very long time. She was his primary care giver. She takes xanax 0.5 mg at bedtime to sleep as needed.  Has not needed lately. (Since her daughter  years ago). She is on Cymbalta 30 mg daliy and citalopram 10 mg daily for her mood. Her daughter is with her today and feels that she is coping well. Daughter notes that her memory is a little worse. Her daughter notes that she is functioning well.       Her non small cell lung cancer is stable. She saw Dr Weir 17  and scans were stable. She is to follow up annually with CT scan chest (due 2018)  She is breathing well. No chest pain, shortness of breath, fever, chills.       She is currently taking actos 15 mg daily. She is not taking Prandin 1 mg prior to meals for her diabetes. No polydipsia, polyuria, hypoglycemia. She checks her blood sugars couple times a week. Blood sugar was 102 last time she checked it. No hypoglycemia. A1C 6.2 on 17    She continues to take Lotrel 5/20 once daily, metoprolol 50 mg 1 tablet twice daily, and hydrochlorothiazide 12.5 mg daily for her hypertension. She denies any chest pain or shortness of breath. She continues to take simvastatin 80 mg at bedtime for her hyperlipidemia. No joint pain or muscle pain.        She is taking Fosamax once week for her bones. She denies any heartburn from the Fosamax.  No melana, bloody stools, constipation. She has occasional diarrhea if she eats something wrong.      She takes vitamin B12 1000 mcg daily for vitamin B12 deficiency - anemia is stable        SHe has macular degeneration of the eyes and was followed by Dr Tresa Coreas. She got injections in her eyes every 6 weeks in Spring 2015. She now sees Dr Ruelas at [facility] (last saw him 8/17- no need for injections at that time). Vision has been stable. She is taking a Fish oil for her eyes.       NO vaginal bleeding and no longer needs vaginal premarin cream    PAST MEDICAL HISTORY:    1. Stage III non-small cell lung cancer, completed chemoradiation with carboplatin and Taxol on 6/1/12    2. Hypertension.   3. Diabetes mellitus.   4. Hyperlipidemia.   5. Chronic diastolic dysfunction.   6. Coronary artery disease status post MI in 1990 and 2003. Stenting was   done at 2003 at [facility].   7. Cholecystectomy, December 2006.   8. Cataract removal.   9. Benign growth removed from her throat.   10. Left common femoral endarterectomy, July 2007.   11. History of anal cancer in 1996 status post radiation and chemotherapy.   12. Carpal tunnel syndrome.   13. Osteoporosis on BMD 12/11    14. Macular degeneration    SOCIAL HISTORY: She quit smoking in 1995, denies any alcohol use. She is etired.     REVIEW OF SYSTEMS: She denies fevers, chills, night sweats, fatigue, visual change, hearing loss, sinus congestion, sore throat, dysuria, hematuria, joint pain, muscle pain, polydipsia, and polyuria.     PHYSICAL EXAM:         GENERAL: She is alert and oriented. No apparent distress. Affect within normal limits.   Conjunctivae anicteric. Tympanic membranes clear. Oropharynx clear.   NECK: Supple.   Respiratory effort normal. Lungs clear to auscultation.   HEART: Regular rate and rhythm without murmurs, gallops, or rubs. No lower   extremity edema. .                  ASSESSMENT AND PLAN:    1. Right lower leg ulcer-  healed  2. Grief - appears to be coping well. .   2.  Stage III non-small cell lung cancer, completed chemoradiation with carboplatin and Taxol on 6/1/12 - doing well. FOllow up with Dr Weir in August 2018  2. Diabetes- labs  3. Hypertension-controlled  4. Hyperlipidemia-lipids controlled  5. Coronary artery disease modifying risk factors.   6. History of anal cancer-had a normal colonoscopy, November 2008; due 2015. Declined repeat  8. Pernicious anemia - on counter vitamin B12 1000 mcg daily. Level fine in Jan 2018  9. Osteoporosis - on alendroante.    10. Anxiety -stable on Cymbalta 30 mg daliy and celexa  11. Left leg pain due to neuropathy-stable.  .    12. Macular degeneration - stable  Screening mammogram was 7/17 Colonoscopy is due 2015 -declined. Pap smear was July 2010.  Flu 9/252017, Prevnar 11/2015  I will see her back in 3-4 month, sooner if problems arise

## 2018-05-02 NOTE — Clinical Note
Dr Weir Saw pt today. She is doing well. I scheduled her yearly CT scan for August 6th. She will need her yearly follow up with you in August. Please assist in scheduling Pepper Whitfield M.D.

## 2018-06-11 ENCOUNTER — TELEPHONE (OUTPATIENT)
Dept: INTERNAL MEDICINE | Facility: CLINIC | Age: 83
End: 2018-06-11

## 2018-06-11 NOTE — TELEPHONE ENCOUNTER
----- Message from Selin Curtis sent at 6/11/2018  1:11 PM CDT -----  Contact: Daughter, Nora call  [phone]  Something is not right with her mental health and she would like to speak with doctor.
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
## 2018-06-18 ENCOUNTER — TELEPHONE (OUTPATIENT)
Dept: HEMATOLOGY/ONCOLOGY | Facility: CLINIC | Age: 83
End: 2018-06-18

## 2018-06-18 NOTE — TELEPHONE ENCOUNTER
spoke with pt daughter in regards to yearly f/u appointment, daughter has confirm new schedule time.

## 2018-06-20 ENCOUNTER — OFFICE VISIT (OUTPATIENT)
Dept: INTERNAL MEDICINE | Facility: CLINIC | Age: 83
End: 2018-06-20
Payer: MEDICARE

## 2018-06-20 VITALS
HEIGHT: 63 IN | WEIGHT: 178 LBS | OXYGEN SATURATION: 98 % | DIASTOLIC BLOOD PRESSURE: 80 MMHG | HEART RATE: 98 BPM | BODY MASS INDEX: 31.54 KG/M2 | SYSTOLIC BLOOD PRESSURE: 138 MMHG

## 2018-06-20 DIAGNOSIS — I10 HYPERTENSION, UNSPECIFIED TYPE: ICD-10-CM

## 2018-06-20 DIAGNOSIS — F43.21 GRIEF: ICD-10-CM

## 2018-06-20 DIAGNOSIS — E11.9 TYPE 2 DIABETES MELLITUS WITHOUT COMPLICATION, WITH LONG-TERM CURRENT USE OF INSULIN: ICD-10-CM

## 2018-06-20 DIAGNOSIS — R41.3 MEMORY LOSS: ICD-10-CM

## 2018-06-20 DIAGNOSIS — R26.9 GAIT ABNORMALITY: Primary | ICD-10-CM

## 2018-06-20 DIAGNOSIS — Z79.4 TYPE 2 DIABETES MELLITUS WITHOUT COMPLICATION, WITH LONG-TERM CURRENT USE OF INSULIN: ICD-10-CM

## 2018-06-20 PROCEDURE — 99499 UNLISTED E&M SERVICE: CPT | Mod: S$GLB,,, | Performed by: INTERNAL MEDICINE

## 2018-06-20 PROCEDURE — 99215 OFFICE O/P EST HI 40 MIN: CPT | Mod: S$GLB,,, | Performed by: INTERNAL MEDICINE

## 2018-06-20 PROCEDURE — 99999 PR PBB SHADOW E&M-EST. PATIENT-LVL III: CPT | Mod: PBBFAC,,, | Performed by: INTERNAL MEDICINE

## 2018-06-20 RX ORDER — DONEPEZIL HYDROCHLORIDE 5 MG/1
5 TABLET, FILM COATED ORAL DAILY
Qty: 30 TABLET | Refills: 2 | Status: SHIPPED | OUTPATIENT
Start: 2018-06-20 | End: 2018-08-13 | Stop reason: SDUPTHER

## 2018-06-20 RX ORDER — DULOXETIN HYDROCHLORIDE 60 MG/1
60 CAPSULE, DELAYED RELEASE ORAL DAILY
Qty: 90 CAPSULE | Refills: 4 | Status: SHIPPED | OUTPATIENT
Start: 2018-06-20 | End: 2019-05-21 | Stop reason: SDUPTHER

## 2018-06-20 NOTE — PROGRESS NOTES
CHIEF COMPLAINT: Follow up of Ulcer right lower leg, diabetes, hypertension, grief.     HISTORY OF PRESENT ILLNESS: This is a 86-year-old woman who presents with her daughters for follow up of above.    June has been a hard month for her.  Her 's birthday ws [birth year] then Father's day June 17, her anniversay is June 25, her mother's birthday is June 18.  She is sad.  Her memory gives her problems at times.  Her daughters report that she is repeating herself and is more confused. She is stumbling at times. No falls.     Her  passed away on August 26, 2017. They were  67 years. She misses him. She feels that she is coping well as he was sick for a very long time. She was his primary care giver. She is on Cymbalta 30 mg daliy and citalopram 10 mg daily for her mood      Ulcer on right lower leg has healed. No edema. She moisturizes the area daily. She has pain at the scar when she gets in bed around 6 pm.  She will take tramadol 50 mg in the evening as needed which helps the pain.  She sleeps well.  She goes to sleep after the 10 pm news.  She wakes up 6:30-7 am. She gets up 4-5 times at night to urinate then she goes back to sleep.  Leg does not bother her in the middling of the night.      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  Right hip pain has resolved and has not returned.          Her non small cell lung cancer is stable. She saw Dr Weir 8/8//17  and scans were stable. She is to follow up annually with CT scan chest (due 8/2018)  She is breathing well. No chest pain, shortness of breath, fever, chills.       She is currently taking actos 15 mg daily. She is not taking Prandin 1 mg prior to meals for her diabetes. No polydipsia, polyuria, hypoglycemia. She checks her blood sugars couple times a week. Blood sugar was 102 last time she checked it. No hypoglycemia.    She continues to take Lotrel 5/20 once daily, metoprolol 50 mg 1 tablet twice daily,  "and hydrochlorothiazide 12.5 mg daily for her hypertension. She denies any chest pain or shortness of breath. She continues to take simvastatin 80 mg at bedtime for her hyperlipidemia. No joint pain or muscle pain.        She is taking Fosamax once week for her bones. She denies any heartburn from the Fosamax. No melana, bloody stools, constipation. She has occasional diarrhea if she eats something wrong.      She takes vitamin B12 1000 mcg daily for vitamin B12 deficiency - anemia is stable        SHe has macular degeneration of the eyes and was followed by Dr Tresa Coreas. She got injections in her eyes every 6 weeks in Spring 2015. She now sees Dr Ruelas at [facility] (last saw him 8/17- no need for injections at that time). Vision has been stable. She is taking a Eye vitamins for her eyes.       NO vaginal bleeding and no longer needs vaginal premarin cream    PAST MEDICAL HISTORY:    1. Stage III non-small cell lung cancer, completed chemoradiation with carboplatin and Taxol on 6/1/12    2. Hypertension.   3. Diabetes mellitus.   4. Hyperlipidemia.   5. Chronic diastolic dysfunction.   6. Coronary artery disease status post MI in 1990 and 2003. Stenting was   done at 2003 at [facility].   7. Cholecystectomy, December 2006.   8. Cataract removal.   9. Benign growth removed from her throat.   10. Left common femoral endarterectomy, July 2007.   11. History of anal cancer in 1996 status post radiation and chemotherapy.   12. Carpal tunnel syndrome.   13. Osteoporosis on BMD 12/11    14. Macular degeneration    SOCIAL HISTORY: She quit smoking in 1995, denies any alcohol use. She is etired.     REVIEW OF SYSTEMS: She denies fevers, chills, night sweats, fatigue, visual change, hearing loss, sinus congestion, sore throat, dysuria, hematuria, joint pain, muscle pain, polydipsia, and polyuria.     PHYSICAL EXAM:    /80   Pulse 98   Ht 5' 3" (1.6 m)   Wt 80.7 kg (178 lb)   SpO2 98%   BMI 31.53 " kg/m²        GENERAL: She is alert and oriented. No apparent distress. Affect within normal limits.   Conjunctivae anicteric. Tympanic membranes clear. Oropharynx clear.   NECK: Supple.   Respiratory effort normal. Lungs clear to auscultation.   HEART: Regular rate and rhythm without murmurs, gallops, or rubs. No lower   extremity edema.     Mini mental status exam - 27/30  - could not recall the 3 objects                 ASSESSMENT AND PLAN:    1. Right lower leg ulcer- healed. Has some pain - try over the counter lidocaine gel or patches  2. Grief - stop celexa. Increase cymbalta to 60 mg daily  3. Memory issues - aricept 5 mg daily. Neurology apt  4. Gati abnormality - physical therapy for balance issues  5.  Stage III non-small cell lung cancer, completed chemoradiation with carboplatin and Taxol on 6/1/12 - doing well. FOllow up with Dr Weir in August 2018  5. Diabetes-stable  3. Hypertension-controlled  4. Hyperlipidemia-lipids controlled  5. Coronary artery disease modifying risk factors.   6. History of anal cancer-had a normal colonoscopy, November 2008; due 2015. Declined repeat  8. Pernicious anemia - on counter vitamin B12 1000 mcg daily. Level fine in Jan 2018  9. Osteoporosis - on alendroante.    10. Left leg pain due to neuropathy-stable.  .    11. Macular degeneration - stable  Screening mammogram was 7/17 Colonoscopy is due 2015 -declined. Pap smear was July 2010.  Flu 9/252017, Prevnar 11/2015  I will see her back in 2 months, sooner if problems arise    Spent greater than 40 minutes with the patient, greater than 50% in face to face counseling.

## 2018-07-05 ENCOUNTER — CLINICAL SUPPORT (OUTPATIENT)
Dept: REHABILITATION | Facility: HOSPITAL | Age: 83
End: 2018-07-05
Attending: INTERNAL MEDICINE
Payer: MEDICARE

## 2018-07-05 DIAGNOSIS — R26.89 BALANCE PROBLEM: ICD-10-CM

## 2018-07-05 DIAGNOSIS — R26.9 GAIT ABNORMALITY: ICD-10-CM

## 2018-07-05 PROCEDURE — G8979 MOBILITY GOAL STATUS: HCPCS | Mod: CI,PO

## 2018-07-05 PROCEDURE — G8978 MOBILITY CURRENT STATUS: HCPCS | Mod: CK,PO

## 2018-07-05 PROCEDURE — 97112 NEUROMUSCULAR REEDUCATION: CPT | Mod: PO

## 2018-07-05 PROCEDURE — 97161 PT EVAL LOW COMPLEX 20 MIN: CPT | Mod: PO

## 2018-07-05 NOTE — PLAN OF CARE
[facility]  Physical Therapy Initial Evaluation    Name: Eleuterio England  Clinic Number: [id number]    Therapy Diagnosis:   Encounter Diagnoses   Name Primary?    Gait abnormality     Balance problem      Physician: Pepper Whitfield MD    Physician Orders: PT Eval and Treat Gait abnormality   Medical Diagnosis:  R26.9 Gait abnormality  Evaluation Date: 7/5/2018  Authorization period Expiration:  6/19/2019  Plan of Care Certification Period:  8/9/2018    Visit #: 1/ Visits authorized: 1  Time In: 1000  Time Out: 1050  Total Billable Time: 50 minutes    Precautions: Standard  Subjective   Date of onset: Over the last 4 months  History of current condition - Eleuterio and eleuterio's daughter report that she has been less steady on her feet for the last year or so since her  has passed away.  Eleuterio also reported some decreased tolerance for bending activities and that her PCP wanted her to come to PT to improve her balance and decrease her risk of falling.       Past Medical History:   Diagnosis Date    Anal cancer 1995    Anemia     Diabetes mellitus     Hypertension     Lumbar radiculopathy 5/16/2014    Lung cancer 2012    s/p chemo/radiation    Macular degeneration     Myocardial infarction     x 2    Osteoporosis     Osteoporosis, unspecified 11/9/2012    Peripheral vascular disease      Eleuterio England  has a past surgical history that includes Tonsillectomy; Cholecystectomy; Hysterectomy; Bronchoscopy; Colonoscopy; left leg surgery; and heart stent.    Eleuterio has a current medication list which includes the following prescription(s): alendronate, alprazolam, amlodipine-benazepril 5-20 mg, antiox #8/om3/dha/epa/lut/zeax, blood sugar diagnostic, blood-glucose meter, calcium-vitamin d3, clobetasol, conjugated estrogens, cyanocobalamin, donepezil, duloxetine, guaifenesin-codeine 100-10 mg/5 ml, hydrochlorothiazide, lancets, loratadine, metoprolol tartrate, nitroglycerin,  pioglitazone, simvastatin, and tramadol.    Review of patient's allergies indicates:   Allergen Reactions    Bextra [valdecoxib] Swelling    Gabapentin Diarrhea and Nausea Only        Imaging, none    Prior Therapy:  None  Social History:  Patient lives alone in a 1 story home with an incline ramp.  (Daughter lives across the street)  Occupation:  Retired  Prior Level of Function:  (I) with all ADLs   Current Level of Function:  Intermittent sitting required for dressing, minimal difficulty with sit to stand, minimal difficulty with car transfers, difficulty with bending and returning to standing.    Pain:  Current 0/10, worst 0/10, best 0/10   Location: not tested   Description: none  Aggravating Factors: none  Easing Factors: none    Pts goals: Patient wants to have improved balance.      Objective     Observation:  unremarkable    Posture:  Forward head, rounded shoulders, decreased lumbar lordosis, forward trunk lean, increased thoracic kyphosis.       Lower Extremity Strength  Right LE  Left LE    Knee extension: 4/5 Knee extension: 4/5   Knee flexion: 4-/5 Knee flexion: 4-/5   Hip flexion: 4-/5 Hip flexion: 4-/5   Hip abduction: 4-/5 Hip abduction: 4-/5   Hip adduction: 3+/5 Hip adduction 3+/5   Ankle dorsiflexion: 4-/5 Ankle dorsiflexion: 4-/5   Ankle plantarflexion: 4-/5 Ankle plantarflexion: 4-/5       Gait Analysis:  Decreased step length, lacks arm swing, forward trunk lean present.    Function:    - SLS R: Fair with UE support  - SLS L: Fair with UE support   - Sit <--> Stand:  Fair with UE support, poor with no UE support    Balance Assessment:       ALVAREZ Assessment    1. Sitting to Standing   3 - able to stand independely using hands  2. Standing Unsupported   4 - able to stand safely 2 minutes without hold  3. Sitting Unsupported   4 - able to sit safely and securely 2 minutes  4. Standing to Sitting   3 - controls descent by using hands  5. Pivot Transfer   3 - able to transfer safely with  "definite use of hands  6. Standing with Eyes Closed   3 - able to stand 10 seconds with supervision  7. Standing with Feet Together   3 - able to place feet together independently and stand for 1 minute with supervision  8. Reaching Forward with Outstretched Arm   4 - can reach forward confidently 25 cm/10 inches  9. Retrieving Object from Floor   3 - able to pick slipper but needs supervision  10. Turning to Look Behind   4 - looks behind from both sides and weights shifts well  11. Turning 360 Degrees   3 - able to trun 360 safely one side only in 4 seconds or less  12. Placing Alternate Foot on Step   4 - able to stand independently safely and complete 8 steps in 20 seconds  13. Standing with One Foot in Front   2 - able to take small step indenpendently and hold 30 seconds  14. Standing on One Foot   3- able to lift leg independently and hold 5-10 seconds  Total: 46  Maximum: 56 (low fall risk)      CMS Impairment/Limitation/Restriction for FOTO Intake Survey    Therapist reviewed FOTO scores for Brunilda England on 7/5/2018.   FOTO documents entered into [employer] - see Media section.    Limitation Score: 58%  Category: Mobility    Current : CK = at least 40% but < 60% impaired, limited or restricted  Goal: CI = at least 1% but < 20% impaired, limited or restricted      PT Evaluation Completed? Yes  Discussed Plan of Care with patient: Yes    TREATMENT   Treatment Time In:  1000   Treatment Time Out:  1050  Total Treatment time separate from Evaluation time: 25      Brunilda participated in neuromuscular re-education activities to improve: Balance and Posture for 15 minutes. The following activities were included:  SLS bilaterally 3 x 20"  Tandem stance bilaterally 3 x 20"  Sit to stand 4 x 5    Home Exercises and Patient Education Provided    Education provided re: Activity modification, aging and balance  - progress towards goals   - role of therapy in multi - disciplinary team, goals for therapy  No spiritual or " educational barriers to learning provided    Written Home Exercises Provided: See scanned document in EMR.  Exercises were reviewed and Brunilda was able to demonstrate them prior to the end of the session.   Pt received a written copy of exercises to perform at home. Brunilda demonstrated fair  understanding of the education provided.       Assessment     Brunilda is a 86 y.o. female referred to outpatient Physical Therapy with a medical diagnosis of gait abnormality. Pt presents with weakness of BLE, decreased balance, and gait abnormalities.  Functionally, Brunilda has difficulty with bending, sit to stand, car transfers, dressing.    Pt prognosis is Good.   Pt will benefit from skilled outpatient Physical Therapy to address the deficits stated above and in the chart below, provide pt/family education, and to maximize pt's level of independence.     Plan of care discussed with patient: Yes  Pt's spiritual, cultural and educational needs considered and pt agreeable to plan of care and goals as stated below:     Anticipated Barriers for therapy: Forgetfullness, depression (patient's  recently  and she is still grieving)    Medical Necessity is demonstrated by the following  History  Co-morbidities and personal factors that may impact the plan of care Examination  Body Structures and Functions, activity limitations and participation restrictions that may impact the plan of care    Clinical Presentation   Co-morbidities:   CAD, depression, diabetes, history of cancer, HTN and PVD, macular degeneration, osteoporosis        Personal Factors:   coping style Body Regions:   Lower body    Body Systems:    balance  gait            Participation Restrictions:   none     Activity limitations:   Learning and applying knowledge  no deficits    General Tasks and Commands  no deficits    Communication  no deficits    Mobility  lifting and carrying objects  fine hand use (grasping/picking up)    Self  care  dressing    Domestic Life  no deficits    Interactions/Relationships  no deficits    Life Areas  no deficits    Community and Social Life  no deficits         stable and uncomplicated                      low   low  low Decision Making/ Complexity Score:  low       GOALS:   Short Term Goals:  3 weeks  1.  Brunilda will demonstrate a 3-4 point increase in ALVAREZ balance assessment.  2. Brunilda will demonstrate increased MMT to 4-/5 throughout LEs to increase tolerance for ADL and leisure activities.  3. Brunilda will demonstrate a fair sit to stand without UE support.  4. Brunilda to tolerate HEP to improve ROM and independence with ADL's.    Long Term Goals: 6 weeks  1.  Brunilda will demonstrate a 6-8 point increase in ALVAREZ balance assessment.  2. Brunilda will demonstrate increased MMT to 4/5 throughout LEs to increase tolerance for ADL and leisure activities.  3. Brunilda will demonstrate a good sit to stand without UE support.  4. Pt to be Independent with HEP to improve ROM and independence with ADL's      Plan     Certification Period: 7/5/2018 to 8/9/2018.    Outpatient Physical Therapy 2 times weekly for 6 weeks to include the following interventions: Gait Training, Neuromuscular Re-ed, Therapeutic Activites and Therapeutic Exercise.     Madina Garcia, PT, DPT

## 2018-07-20 ENCOUNTER — CLINICAL SUPPORT (OUTPATIENT)
Dept: REHABILITATION | Facility: HOSPITAL | Age: 83
End: 2018-07-20
Attending: INTERNAL MEDICINE
Payer: MEDICARE

## 2018-07-20 DIAGNOSIS — R26.89 BALANCE PROBLEM: ICD-10-CM

## 2018-07-20 DIAGNOSIS — R26.9 GAIT ABNORMALITY: ICD-10-CM

## 2018-07-20 PROCEDURE — 97110 THERAPEUTIC EXERCISES: CPT | Mod: PO

## 2018-07-20 NOTE — PROGRESS NOTES
"Physical Therapy Daily Treatment Note    Name: Brunilda England  Clinic Number: [id number]  Date of Treatment: 07/20/2018   Diagnosis:   Encounter Diagnoses   Name Primary?    Gait abnormality     Balance problem        Physician: Pepper Whitfield MD    Time in: 0100  Time Out: 0155  Total Billable Time: 555  Date of eval:  7/5/2018  Visit #:  1/1  Auth expiration:  6/19/2019  POC expiration:  8/9/2018    Precautions:  Standard    Subjective     Brunilda reports that her legs feel stiff today and they have felt weak for the last week or so.   Pain: 0/10  Location: n/a    Objective     Brunilda received therapeutic exercises to develop strength, endurance, ROM, flexibility and core stabilization for 55 minutes including:     Recumbent bike 8' L1 warm up  SLS 3 x 20"  Tandem stance 3 x 20"  Sit to stand 5 x 5  LAQ 1# 3 x 10  Seated alternating marching 1# 3 x 10  Standing HR 3 x 10  Standing TR 3 x 10  Hip abduction with OTB 3 x 10  Hip adduction with ball squeeze 10 x 10"    Written Home Exercises Provided: No new written exercises provided to patient today.    Pt educated on possibility of muscle soreness over the next 2-3 days following today's session. Pt demo fair understanding of the education provided. Brunilda demonstrated fair return demonstration of activities.     Assessment   Brunilda was able to tolerate all therex during tx session today.  Fair balance reactions present during single leg and tandem stances.  General fatigue present 75% through the treatment session secondary to decreased endurance.    Brunilda is progressing well towards her goals.   Pt prognosis is Good.     Pt will continue to benefit from skilled PT intervention. Medical Necessity is demonstrated by:  Fall Risk, Unable to participate fully in daily activities, Requires skilled supervision to complete and progress HEP and Weakness.    New/Revised goals:  No new goals established today      Plan       Continue with established Plan of Care " towards PT goals.     Madina Garcia, PT

## 2018-08-03 ENCOUNTER — CLINICAL SUPPORT (OUTPATIENT)
Dept: REHABILITATION | Facility: HOSPITAL | Age: 83
End: 2018-08-03
Attending: INTERNAL MEDICINE
Payer: MEDICARE

## 2018-08-03 DIAGNOSIS — R26.9 GAIT ABNORMALITY: ICD-10-CM

## 2018-08-03 DIAGNOSIS — R26.89 BALANCE PROBLEM: ICD-10-CM

## 2018-08-03 PROCEDURE — 97110 THERAPEUTIC EXERCISES: CPT | Mod: PO

## 2018-08-03 NOTE — PROGRESS NOTES
"Physical Therapy Daily Treatment Note    Name: Brunilda England  Clinic Number: [id number]  Date of Treatment: 08/03/2018   Diagnosis:   Encounter Diagnoses   Name Primary?    Gait abnormality     Balance problem        Physician: Pepper Whitfield MD    Time in: 0104  Time Out: 0158  Total Billable Time: 48  Date of eval:  7/5/2018  Visit #:  3/1  Auth expiration:  6/19/2019  POC expiration:  8/9/2018    Precautions:  Standard    Subjective     Brunilda reported that she could not remember why she has not come to PT in quite a while.  Patient denies any pain or increased weakness in the legs.  Patient reported that she has been doing her exercises at home.  Pain: 0/10  Location: n/a    Objective     Brunilda received therapeutic exercises to develop strength, endurance, ROM, flexibility and core stabilization for 48 minutes including:     Recumbent bike 8' L1 warm up (attempted upright bike but patient unable to tolerate secondary to feeling uncomfortable in her back and L leg)  SLS 3 x 20"  Tandem stance 3 x 20"-NP  Sit to stand 5 x 5-NP  LAQ 1# 3 x 10-NP  Seated alternating marching 1# 3 x 10-NP  Standing HR 3 x 10  Standing TR 3 x 10  Standing Hip abduction with 3 x 10  Hip adduction with ball squeeze 10 x 10"  Seated hip abduction with OTB 3 x 10  Standing stool taps 3 x 10 alternating onto 6" step    Written Home Exercises Provided: No new written exercises provided to patient today.    Pt educated on importance of not sitting for too long at home.  Patient advised to get up and walk every hour for 3-5 minutes. Pt demo fair understanding of the education provided. Brunilda demonstrated fair return demonstration of activities.     Assessment   Brunilda demonstrated significantly decreased tolerance and endurance during this session in comparison to last session.  Patient required more frequent and lengthier breaks than last session.  This may be do to possible non compliance with HEP and apparent non-compliance with " PT appointments.   Brunilda is progressing well towards her goals.   Pt prognosis is Good.     Pt will continue to benefit from skilled PT intervention. Medical Necessity is demonstrated by:  Fall Risk, Unable to participate fully in daily activities, Requires skilled supervision to complete and progress HEP and Weakness.    New/Revised goals:  No new goals established today      Plan       Continue with established Plan of Care towards PT goals.     Madina Garcia, PT

## 2018-08-06 ENCOUNTER — HOSPITAL ENCOUNTER (OUTPATIENT)
Dept: RADIOLOGY | Facility: HOSPITAL | Age: 83
Discharge: HOME | End: 2018-08-06
Attending: INTERNAL MEDICINE
Payer: MEDICARE

## 2018-08-06 DIAGNOSIS — Z12.31 SCREENING MAMMOGRAM, ENCOUNTER FOR: ICD-10-CM

## 2018-08-06 DIAGNOSIS — Z85.118 HISTORY OF LUNG CANCER: ICD-10-CM

## 2018-08-06 PROCEDURE — 77067 SCR MAMMO BI INCL CAD: CPT | Mod: 26,,, | Performed by: RADIOLOGY

## 2018-08-06 PROCEDURE — 71250 CT THORAX DX C-: CPT | Mod: 26,,, | Performed by: RADIOLOGY

## 2018-08-06 PROCEDURE — 77067 SCR MAMMO BI INCL CAD: CPT | Mod: TC

## 2018-08-06 PROCEDURE — 71250 CT THORAX DX C-: CPT | Mod: TC

## 2018-08-06 PROCEDURE — 77063 BREAST TOMOSYNTHESIS BI: CPT | Mod: 26,,, | Performed by: RADIOLOGY

## 2018-08-06 RX ORDER — SIMVASTATIN 80 MG/1
TABLET, FILM COATED ORAL
Qty: 90 TABLET | Refills: 4 | Status: SHIPPED | OUTPATIENT
Start: 2018-08-06 | End: 2019-09-23 | Stop reason: SDUPTHER

## 2018-08-07 ENCOUNTER — CLINICAL SUPPORT (OUTPATIENT)
Dept: REHABILITATION | Facility: HOSPITAL | Age: 83
End: 2018-08-07
Attending: INTERNAL MEDICINE
Payer: MEDICARE

## 2018-08-07 DIAGNOSIS — R26.9 GAIT ABNORMALITY: ICD-10-CM

## 2018-08-07 DIAGNOSIS — R26.89 BALANCE PROBLEM: ICD-10-CM

## 2018-08-07 PROCEDURE — 97110 THERAPEUTIC EXERCISES: CPT | Mod: PO

## 2018-08-07 NOTE — PROGRESS NOTES
"Physical Therapy Daily Treatment Note    Name: Brunilda England  Clinic Number: [id number]  Date of Treatment: 08/07/2018   Diagnosis:   Encounter Diagnoses   Name Primary?    Gait abnormality     Balance problem        Physician: Pepper Whitfield MD    Time in: 0104  Time Out: 0144  Total Billable Time: 40  Date of eval:  7/5/2018  Visit #:  4  Auth expiration:  6/19/2019  POC expiration:  8/9/2018    Precautions:  Standard    Subjective     Brunilda reported that she had a fall on Friday after going to lunch across the street after her PT session.  Brunilda said that her R shoulder and R knee are bothering her since then.  Brunilda reports that she feels tired today.  Brunilda denies any pain today.  Pain: 0/10  Location: n/a    Objective     Brunilda received therapeutic exercises to develop strength, endurance, ROM, flexibility and core stabilization for 40 minutes including:     Recumbent bike 8' L1 warm up (attempted upright bike but patient unable to tolerate secondary to feeling uncomfortable in her back and L leg)-NP  SLS 3 x 20"-NP  Tandem stance 3 x 20"-NP  Sit to stand 2 x 5  LAQ 1# 3 x 10  Seated alternating marching 1# 3 x 10  Standing HR 3 x 10  Standing TR 3 x 10  Standing Hip abduction with 3 x 10  Standing hip extension 3 x 10  Hip adduction with ball squeeze 5" x 15  Seated hip abduction with OTB 3 x 10  Standing stool taps 3 x 10 alternating onto 6" step-NP    Written Home Exercises Provided: No new written exercises provided to patient today.    Pt educated on importance of being active and not too sedentary at home.  Patient advised to get up and walk every hour for 3-5 minutes. Pt demo fair understanding of the education provided. Brunilda demonstrated fair return demonstration of activities.     Assessment   Brunilda attempted to leave 10 minutes into treatment session secondary to feeling that she was tired and "didn't feel like doing anymore". PT explained to PT the importance of performance of " exercises here and at home to improved overall function and balance.  Patient able to tolerate limited therex program with lengthy and more frequent breaks with fatigue reported at end of session.  Brunilda is progressing well towards her goals.   Pt prognosis is Good.     Pt will continue to benefit from skilled PT intervention. Medical Necessity is demonstrated by:  Fall Risk, Unable to participate fully in daily activities, Requires skilled supervision to complete and progress HEP and Weakness.    New/Revised goals:  No new goals established today      Plan       Continue with established Plan of Care towards PT goals.     Madina Garcia, PT

## 2018-08-09 ENCOUNTER — OFFICE VISIT (OUTPATIENT)
Dept: HEMATOLOGY/ONCOLOGY | Facility: CLINIC | Age: 83
End: 2018-08-09
Payer: MEDICARE

## 2018-08-09 VITALS
SYSTOLIC BLOOD PRESSURE: 176 MMHG | WEIGHT: 182.13 LBS | OXYGEN SATURATION: 93 % | DIASTOLIC BLOOD PRESSURE: 79 MMHG | TEMPERATURE: 99 F | HEIGHT: 62 IN | HEART RATE: 90 BPM | BODY MASS INDEX: 33.51 KG/M2 | RESPIRATION RATE: 18 BRPM

## 2018-08-09 DIAGNOSIS — Z85.118 HISTORY OF LUNG CANCER: Primary | ICD-10-CM

## 2018-08-09 PROCEDURE — 99214 OFFICE O/P EST MOD 30 MIN: CPT | Mod: S$GLB,,, | Performed by: INTERNAL MEDICINE

## 2018-08-09 PROCEDURE — 99999 PR PBB SHADOW E&M-EST. PATIENT-LVL IV: CPT | Mod: PBBFAC,,, | Performed by: INTERNAL MEDICINE

## 2018-08-09 PROCEDURE — 99499 UNLISTED E&M SERVICE: CPT | Mod: S$GLB,,, | Performed by: INTERNAL MEDICINE

## 2018-08-09 NOTE — PROGRESS NOTES
"Subjective:       Patient ID: Brunilda England is a 86 y.o. female.    Chief Complaint: History of lung cancer  Oncologic History:  86 yo female with stage III non-small cell lung cancer, currently completed chemoradiation with carboplatin and Taxol on 6/1/12 . She did not receive consolidation chemotherapy due to worsening neuropathy .   PET scan on 8/7/12 revealed favorable response to therapy with post radiation therapy changes and a small right pleural effusion with pleural thickening. No new or metastatic disease identified    4/28/16-CT scan which reveals "In this patient with a history of lung cancer, there is interval development of a 0.5 cm pulmonary nodule located in the superior/anterior basal segment of the right lower lobe (SE:3, IM:27), concerning for metastatic disease or new primary neoplasm.  This nodule is likely too small for biopsy or PET scan; location may also limit biopsy.  Therefore we recommend continued CT surveillance with clinical considerations determining the surveillance schedule" \     CT from 7/27/16 reveals "Smaller, more inconspicuous pulmonary nodule in the superior/anterior basal segment of the right lower lobe. Given the improvement, recommend followup CT chest without contrast in 9-12 months.Stable 0.3 cm pulmonary nodule in the left upper lobe.Severe calcific atherosclerosis involving the aorta and its branches, as well as the coronary arteries.Stable trace pericardial effusion.Other stable findings in the lungs as previously described, including bilateral centrilobular emphysema with an upper lobe predominance, region of consolidation and volume loss along the right paramediastinal thorax, and other atelectatic changes in the right lung."     2/8/17 CT chest " 1. Interval resolution of a pulmonary nodule within the superior/anterior basal segment of the right lower lobe initially measuring 0.5 cm on examination dated 4/26/2016 and reduced in size to 0.3 cm on examination dated " "7/26/2016. 2. Unchanged size and appearance of a 0.3 cm nodule within the anterior segment of the left upper lobe (axial series 2, image 21). This lesion demonstrates stability dating back to 9/22/2014. 3. Bilateral centrilobular emphysematous change with an upper lobe predominance. 4. Redemonstration of bandlike opacities involving the right upper and lower lobes consistent with atelectasis versus fibrotic changes versus postradiation change. 5. Extensive calcific atherosclerosis of the coronary vessels. 6. Small persistent pericardial effusion."       HPI Ms. England returns for follow up and scan results which reveal "Cicatricial atelectatic changes involving the right paramediastinal lung with rightward mediastinal shift and near collapse of the right lower lobe consistent with postradiation change. Subcentimeter pulmonary nodule in the left upper lobe, stable.  No evidence of new pulmonary nodules. Moderate right-sided pleural fluid, stable. Upper lung zone predominant centrilobular and paraseptal emphysematous changes. Calcific atherosclerosis of the thoracic aorta and coronary arteries, significant"    Review of Systems   Constitutional: Negative for appetite change, fatigue and unexpected weight change.   HENT: Negative for mouth sores.    Eyes: Negative for visual disturbance.   Respiratory: Negative for cough and shortness of breath.    Cardiovascular: Negative for chest pain.   Gastrointestinal: Negative for abdominal pain and diarrhea.   Genitourinary: Negative for frequency.   Musculoskeletal: Negative for back pain.   Skin: Negative for rash.   Neurological: Negative for headaches.   Hematological: Negative for adenopathy.   Psychiatric/Behavioral: The patient is not nervous/anxious.    All other systems reviewed and are negative.      Objective:      Physical Exam   Constitutional: She is oriented to person, place, and time. She appears well-developed and well-nourished.   HENT:   Mouth/Throat: No " oropharyngeal exudate.   Cardiovascular: Normal rate and normal heart sounds.    Pulmonary/Chest: Effort normal and breath sounds normal. She has no wheezes.   Abdominal: Soft. Bowel sounds are normal. There is no tenderness.   Musculoskeletal: She exhibits no edema or tenderness.   Lymphadenopathy:     She has no cervical adenopathy.   Neurological: She is alert and oriented to person, place, and time. Coordination normal.   Skin: Skin is warm and dry. No rash noted.   Psychiatric: She has a normal mood and affect. Judgment and thought content normal.   Vitals reviewed.        LABS:  WBC   Date Value Ref Range Status   08/06/2018 4.32 3.90 - 12.70 K/uL Final     Hemoglobin   Date Value Ref Range Status   08/06/2018 11.4 (L) 12.0 - 16.0 g/dL Final     Hematocrit   Date Value Ref Range Status   08/06/2018 33.7 (L) 37.0 - 48.5 % Final     Platelets   Date Value Ref Range Status   08/06/2018 192 150 - 350 K/uL Final     Gran # (ANC)   Date Value Ref Range Status   08/06/2018 2.5 1.8 - 7.7 K/uL Final     Comment:     The ANC is based on a white cell differential from an   automated cell counter. It has not been microscopically   reviewed for the presence of abnormal cells. Clinical   correlation is required.         Chemistry        Component Value Date/Time     08/06/2018 1033    K 4.2 08/06/2018 1033     08/06/2018 1033    CO2 30 (H) 08/06/2018 1033    BUN 14 08/06/2018 1033    CREATININE 1.1 08/06/2018 1033     (H) 08/06/2018 1033        Component Value Date/Time    CALCIUM 10.1 08/06/2018 1033    ALKPHOS 47 (L) 08/06/2018 1033    AST 15 08/06/2018 1033    ALT 11 08/06/2018 1033    BILITOT 0.3 08/06/2018 1033    ESTGFRAFRICA 53 (A) 08/06/2018 1033    EGFRNONAA 46 (A) 08/06/2018 1033          Assessment:       1. History of lung cancer        Plan:        1. She feels well and her CT scans reveal no evidence of recurrence. She will return in 1 year with labs and CT chest.    Above care plan was  discussed with patient and accompanying daughter and all questions were addressed to their satisfaction

## 2018-08-13 ENCOUNTER — OFFICE VISIT (OUTPATIENT)
Dept: INTERNAL MEDICINE | Facility: CLINIC | Age: 83
End: 2018-08-13
Payer: MEDICARE

## 2018-08-13 VITALS
WEIGHT: 179.19 LBS | DIASTOLIC BLOOD PRESSURE: 62 MMHG | HEART RATE: 92 BPM | BODY MASS INDEX: 29.85 KG/M2 | SYSTOLIC BLOOD PRESSURE: 124 MMHG | OXYGEN SATURATION: 96 % | HEIGHT: 65 IN

## 2018-08-13 DIAGNOSIS — E11.51 TYPE 2 DIABETES MELLITUS WITH DIABETIC PERIPHERAL ANGIOPATHY WITHOUT GANGRENE, WITHOUT LONG-TERM CURRENT USE OF INSULIN: ICD-10-CM

## 2018-08-13 DIAGNOSIS — G57.92 NEUROPATHY OF LEFT LOWER EXTREMITY: ICD-10-CM

## 2018-08-13 DIAGNOSIS — I10 ESSENTIAL HYPERTENSION: Primary | ICD-10-CM

## 2018-08-13 DIAGNOSIS — R26.9 GAIT ABNORMALITY: ICD-10-CM

## 2018-08-13 DIAGNOSIS — Z85.118 HISTORY OF LUNG CANCER: ICD-10-CM

## 2018-08-13 PROCEDURE — 99499 UNLISTED E&M SERVICE: CPT | Mod: S$GLB,,, | Performed by: INTERNAL MEDICINE

## 2018-08-13 PROCEDURE — 99999 PR PBB SHADOW E&M-EST. PATIENT-LVL III: CPT | Mod: PBBFAC,,, | Performed by: INTERNAL MEDICINE

## 2018-08-13 PROCEDURE — 99214 OFFICE O/P EST MOD 30 MIN: CPT | Mod: S$GLB,,, | Performed by: INTERNAL MEDICINE

## 2018-08-13 RX ORDER — DONEPEZIL HYDROCHLORIDE 5 MG/1
5 TABLET, FILM COATED ORAL DAILY
Qty: 90 TABLET | Refills: 3 | Status: SHIPPED | OUTPATIENT
Start: 2018-08-13 | End: 2019-08-06 | Stop reason: SDUPTHER

## 2018-08-13 RX ORDER — ALPRAZOLAM 0.5 MG/1
TABLET ORAL
Qty: 30 TABLET | Refills: 1 | Status: SHIPPED | OUTPATIENT
Start: 2018-08-13 | End: 2019-12-05

## 2018-08-13 NOTE — PROGRESS NOTES
CHIEF COMPLAINT: Follow up of Ulcer right lower leg, diabetes, hypertension, grief.     HISTORY OF PRESENT ILLNESS: This is a 86-year-old woman who presents for follow up of above.     June was a hard month for her.  Her 's birthday ws [birth year] then Father's day June 17, her anniversay is June 25, her mother's birthday is June 18.   Her  passed away on August 26, 2017. They were  67 years. It has been a tough year. She misses him. She feels that she is coping well as he was sick for a very long time. She was his primary care giver. She is on Cymbalta 60 mg daliy.  CItalopram 10 mg stopped at our last visit. Mood is doing better.  Neuropathic pain in the legs is better controlled.     She is doing physical therapy for her balance issues. She states that her balance is much better.  She has been doing her exercises at home.     She feels that her memory is better. She is taking aricept 5 mg daily.       Ulcer on right lower leg has healed. No edema. She moisturizes the area daily. She is no longer having to take tramadol.   She sleeps well.  seh gets up 2-3 times at night to urinate.       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  Right hip pain has resolved and has not returned.          Her non small cell lung cancer is stable. She saw Dr Weir 8/9/18  and scans were stable. She is to follow up annually with CT scan chest.  She is breathing well. No chest pain, shortness of breath, fever, chills.       She is currently taking actos 15 mg daily. She is not taking Prandin 1 mg prior to meals for her diabetes. No polydipsia, polyuria, hypoglycemia. She checks her blood sugars couple times a week. Blood sugar was 102 last time she checked it. No hypoglycemia.    She continues to take Lotrel 5/20 once daily, metoprolol 50 mg 1 tablet twice daily, and hydrochlorothiazide 12.5 mg daily for her hypertension. She denies any chest pain or shortness of breath. She  "continues to take simvastatin 80 mg at bedtime for her hyperlipidemia. No joint pain or muscle pain.        She is taking Fosamax once week for her bones. She denies any heartburn from the Fosamax. No melana, bloody stools, constipation. She has occasional diarrhea if she eats something wrong.      She takes vitamin B12 1000 mcg daily for vitamin B12 deficiency - anemia is stable        SHe has macular degeneration of the eyes and was followed by Dr Tresa Coreas. She got injections in her eyes every 6 weeks in Spring 2015. She now sees Dr Ruelas at [facility] (last saw him 8/17- no need for injections at that time). Vision has been stable. She is taking a Eye vitamins for her eyes.       NO vaginal bleeding and no longer needs vaginal premarin cream    PAST MEDICAL HISTORY:    1. Stage III non-small cell lung cancer, completed chemoradiation with carboplatin and Taxol on 6/1/12    2. Hypertension.   3. Diabetes mellitus.   4. Hyperlipidemia.   5. Chronic diastolic dysfunction.   6. Coronary artery disease status post MI in 1990 and 2003. Stenting was   done at 2003 at [facility].   7. Cholecystectomy, December 2006.   8. Cataract removal.   9. Benign growth removed from her throat.   10. Left common femoral endarterectomy, July 2007.   11. History of anal cancer in 1996 status post radiation and chemotherapy.   12. Carpal tunnel syndrome.   13. Osteoporosis on BMD 12/11    14. Macular degeneration    SOCIAL HISTORY: She quit smoking in 1995, denies any alcohol use. She is etired.     REVIEW OF SYSTEMS: She denies fevers, chills, night sweats, fatigue, visual change, hearing loss, sinus congestion, sore throat, dysuria, hematuria, joint pain, muscle pain, polydipsia, and polyuria.     PHYSICAL EXAM:    /62   Pulse 92   Ht 5' 5" (1.651 m)   Wt 81.3 kg (179 lb 3.2 oz)   SpO2 96%   BMI 29.82 kg/m²     GENERAL: She is alert and oriented. No apparent distress. Affect within normal limits. "   Conjunctivae anicteric. Tympanic membranes clear. Oropharynx clear.   NECK: Supple.   Respiratory effort normal. Lungs clear to auscultation.   HEART: Regular rate and rhythm without murmurs, gallops, or rubs. No lower   extremity edema.     Protective Sensation (w/ 10 gram monofilament):  Right: Intact  Left: Intact    Visual Inspection:  Onychomycosis -  Bilateral    Pedal Pulses:   Right: Present  Left: Present    Posterior tibialis:   Right:Present  Left: Present          ASSESSMENT AND PLAN:    1. Right lower leg ulcer- resolved  2. Grief - better on cymbalta to 60 mg daily  3. Memory issues - better on aricept 5 mg daily.   4. Gati abnormality - physical therapy for balance issues  5.  Stage III non-small cell lung cancer, completed chemoradiation with carboplatin and Taxol on 6/1/12 - doing well. FOllow up with Dr Weir in August 2018  5. Diabetes-stable  3. Hypertension-controlled  4. Hyperlipidemia-lipids controlled  5. Coronary artery disease modifying risk factors.   6. History of anal cancer-had a normal colonoscopy, November 2008; due 2015. Declined repeat  8. Pernicious anemia - on counter vitamin B12 1000 mcg daily. Level fine in Jan 2018  9. Osteoporosis - on alendroante.    10. Left leg pain due to neuropathy-stable.  .    11. Macular degeneration - stable  Screening mammogram was 8/18 Colonoscopy is due 2015 -declined. Pap smear was July 2010.  Flu 9/252017, Prevnar 11/2015  I will see her back in 3 months, sooner if problems arise

## 2018-08-15 ENCOUNTER — TELEPHONE (OUTPATIENT)
Dept: INTERNAL MEDICINE | Facility: CLINIC | Age: 83
End: 2018-08-15

## 2018-08-15 NOTE — TELEPHONE ENCOUNTER
----- Message from Charmaine Franklin sent at 8/15/2018 12:01 PM CDT -----  Contact: Patient [phone]  She said you needed to give her a phone number.    Please call and advise      Thank you

## 2018-08-20 ENCOUNTER — CLINICAL SUPPORT (OUTPATIENT)
Dept: REHABILITATION | Facility: HOSPITAL | Age: 83
End: 2018-08-20
Attending: INTERNAL MEDICINE
Payer: MEDICARE

## 2018-08-20 DIAGNOSIS — R26.9 GAIT ABNORMALITY: ICD-10-CM

## 2018-08-20 DIAGNOSIS — R26.89 BALANCE PROBLEM: ICD-10-CM

## 2018-08-20 PROCEDURE — 97110 THERAPEUTIC EXERCISES: CPT | Mod: PO

## 2018-08-20 NOTE — PROGRESS NOTES
"Physical Therapy Daily Treatment Note    Name: Brunilda England  Clinic Number: [id number]  Date of Treatment: 08/20/2018   Diagnosis:   Encounter Diagnoses   Name Primary?    Gait abnormality     Balance problem        Physician: Pepper Whitfield MD    Time in: 0855  Time Out: 0943  Total Billable Time: 44  Date of eval:  7/5/2018  Visit #:  5  Auth expiration:  6/19/2019  POC expiration:  8/9/2018    Precautions:  Standard    Subjective     Brunilda reported that she is feeling okay today and does not have any particular complaints.  Pain: 0/10  Location: n/a    Objective     Brunilda received therapeutic exercises to develop strength, endurance, ROM, flexibility and core stabilization for 44 minutes including:     Recumbent bike 5' L1 warm up  SLS 3 x 20"  Tandem stance 3 x 20"  Sit to stand 2 x 5  LAQ 1# 3 x 10  Seated alternating marching 1# 1'   Standing HR 3 x 10  Standing TR 3 x 10  Standing Hip abduction with 3 x 10  Standing hip extension 3 x 10  Hip adduction with ball squeeze 5" x 15  Seated hip abduction with OTB 3 x 10  Standing stool taps 3 x 10 alternating onto 6" step    Written Home Exercises Provided: No new written exercises provided to patient today.    Pt educated having one more visit to go over exercises and the importance of carryover of exercises after she is done with PT here. Patient advised to get up and walk every hour for 3-5 minutes. Pt demo fair understanding of the education provided. Brunilda demonstrated fair return demonstration of activities.     Assessment   Brunilda demonstrated a significant improvement in tolerance for activity today and was able to complete nearly 45 minutes of activity with less frequent and length breaks.  Brunilda continues to have minimal deficits in balance.    Brunilda is progressing well towards her goals.   Pt prognosis is Good.     Pt will continue to benefit from skilled PT intervention. Medical Necessity is demonstrated by:  Fall Risk, Unable to " participate fully in daily activities, Requires skilled supervision to complete and progress HEP and Weakness.    New/Revised goals:  No new goals established today      Plan       Continue with established Plan of Care towards PT goals.     Madina Garcia, PT

## 2018-08-21 ENCOUNTER — OFFICE VISIT (OUTPATIENT)
Dept: OPHTHALMOLOGY | Facility: CLINIC | Age: 83
End: 2018-08-21
Payer: MEDICARE

## 2018-08-21 DIAGNOSIS — H43.813 POSTERIOR VITREOUS DETACHMENT, BOTH EYES: ICD-10-CM

## 2018-08-21 DIAGNOSIS — H35.033 HYPERTENSIVE RETINOPATHY OF BOTH EYES: ICD-10-CM

## 2018-08-21 DIAGNOSIS — H35.62 MACULAR HEMORRHAGE OF LEFT EYE: ICD-10-CM

## 2018-08-21 DIAGNOSIS — H35.3232 EXUDATIVE AGE-RELATED MACULAR DEGENERATION OF BOTH EYES WITH INACTIVE CHOROIDAL NEOVASCULARIZATION: Primary | ICD-10-CM

## 2018-08-21 PROCEDURE — 99999 PR PBB SHADOW E&M-EST. PATIENT-LVL II: CPT | Mod: PBBFAC,,, | Performed by: OPHTHALMOLOGY

## 2018-08-21 PROCEDURE — 92226 PR SPECIAL EYE EXAM, SUBSEQUENT: CPT | Mod: LT,S$GLB,, | Performed by: OPHTHALMOLOGY

## 2018-08-21 PROCEDURE — 99499 UNLISTED E&M SERVICE: CPT | Mod: S$GLB,,, | Performed by: OPHTHALMOLOGY

## 2018-08-21 PROCEDURE — 92134 CPTRZ OPH DX IMG PST SGM RTA: CPT | Mod: S$GLB,,, | Performed by: OPHTHALMOLOGY

## 2018-08-21 PROCEDURE — 92014 COMPRE OPH EXAM EST PT 1/>: CPT | Mod: S$GLB,,, | Performed by: OPHTHALMOLOGY

## 2018-08-21 NOTE — PROGRESS NOTES
HPI     DLS 8/14/18  Pt sts vision seem to be good. No flashes no floaters.        OCT - Drusen, RPE atrophy, PED, some HE  No SRF OD, SRF OS improving and small SRH    Prior FA:  Shows window defect, no active leakage OU      A/P    1. Wet AMD OU  - H/o injections OU q3-4 months with Dr. Coreas  - No active leakage/edema OD cont to monitor  s/p Avastin OS x 2 here    With macular atrophy OS - conservative mgmt going forward    11/16 - had rebleed, but given poor prognosis, will monitor    2. HTN Ret OU    3. DM - No DR  BS/BP/chol contour    4. PCIOL OU    5. PVD OU      12 months OCT

## 2018-09-04 ENCOUNTER — OFFICE VISIT (OUTPATIENT)
Dept: OPTOMETRY | Facility: CLINIC | Age: 83
End: 2018-09-04
Payer: MEDICARE

## 2018-09-04 DIAGNOSIS — H52.203 HYPEROPIC ASTIGMATISM, BILATERAL: Primary | ICD-10-CM

## 2018-09-04 DIAGNOSIS — H52.4 PRESBYOPIA: ICD-10-CM

## 2018-09-04 PROCEDURE — 99212 OFFICE O/P EST SF 10 MIN: CPT | Mod: PBBFAC | Performed by: OPTOMETRIST

## 2018-09-04 PROCEDURE — 92015 DETERMINE REFRACTIVE STATE: CPT | Mod: ,,, | Performed by: OPTOMETRIST

## 2018-09-04 PROCEDURE — 99999 PR PBB SHADOW E&M-EST. PATIENT-LVL II: CPT | Mod: PBBFAC,,, | Performed by: OPTOMETRIST

## 2018-09-05 ENCOUNTER — OFFICE VISIT (OUTPATIENT)
Dept: NEUROLOGY | Facility: CLINIC | Age: 83
End: 2018-09-05
Payer: MEDICARE

## 2018-09-05 VITALS
BODY MASS INDEX: 30.08 KG/M2 | DIASTOLIC BLOOD PRESSURE: 78 MMHG | SYSTOLIC BLOOD PRESSURE: 156 MMHG | WEIGHT: 180.56 LBS | HEIGHT: 65 IN | HEART RATE: 97 BPM

## 2018-09-05 DIAGNOSIS — T45.1X5A ANEMIA DUE TO ANTINEOPLASTIC CHEMOTHERAPY: ICD-10-CM

## 2018-09-05 DIAGNOSIS — G31.84 MILD COGNITIVE IMPAIRMENT: Primary | ICD-10-CM

## 2018-09-05 DIAGNOSIS — G62.9 NEUROPATHY: ICD-10-CM

## 2018-09-05 DIAGNOSIS — E11.51 TYPE 2 DIABETES MELLITUS WITH DIABETIC PERIPHERAL ANGIOPATHY WITHOUT GANGRENE, WITHOUT LONG-TERM CURRENT USE OF INSULIN: ICD-10-CM

## 2018-09-05 DIAGNOSIS — D64.81 ANEMIA DUE TO ANTINEOPLASTIC CHEMOTHERAPY: ICD-10-CM

## 2018-09-05 DIAGNOSIS — I10 ESSENTIAL HYPERTENSION: ICD-10-CM

## 2018-09-05 DIAGNOSIS — D51.0 PERNICIOUS ANEMIA: ICD-10-CM

## 2018-09-05 DIAGNOSIS — I25.10 CORONARY ARTERY DISEASE INVOLVING NATIVE CORONARY ARTERY OF NATIVE HEART WITHOUT ANGINA PECTORIS: ICD-10-CM

## 2018-09-05 DIAGNOSIS — Z85.118 HISTORY OF LUNG CANCER: ICD-10-CM

## 2018-09-05 DIAGNOSIS — F43.23 ADJUSTMENT DISORDER WITH MIXED ANXIETY AND DEPRESSED MOOD: ICD-10-CM

## 2018-09-05 PROCEDURE — 99499 UNLISTED E&M SERVICE: CPT | Mod: S$GLB,,, | Performed by: PSYCHIATRY & NEUROLOGY

## 2018-09-05 PROCEDURE — 99213 OFFICE O/P EST LOW 20 MIN: CPT | Mod: PBBFAC | Performed by: PSYCHIATRY & NEUROLOGY

## 2018-09-05 PROCEDURE — 99999 PR PBB SHADOW E&M-EST. PATIENT-LVL III: CPT | Mod: PBBFAC,,, | Performed by: PSYCHIATRY & NEUROLOGY

## 2018-09-05 PROCEDURE — 3288F FALL RISK ASSESSMENT DOCD: CPT | Mod: CPTII,,, | Performed by: PSYCHIATRY & NEUROLOGY

## 2018-09-05 PROCEDURE — 99204 OFFICE O/P NEW MOD 45 MIN: CPT | Mod: S$PBB,,, | Performed by: PSYCHIATRY & NEUROLOGY

## 2018-09-05 PROCEDURE — 1100F PTFALLS ASSESS-DOCD GE2>/YR: CPT | Mod: CPTII,,, | Performed by: PSYCHIATRY & NEUROLOGY

## 2018-09-05 NOTE — PROGRESS NOTES
Subjective:       Patient ID: Brunilda England is a 86 y.o. female.    Chief Complaint:  Memory Loss      History of Present Illness  HPI   This is an 86-year-old female was referred for evaluation of memory difficulties.  The patient does admit that she has occasional difficulty with retention of recent information or repeating herself however she is otherwise quite independent.  Her daughter was present today and collaborated with the history.  Patient lives alone and her daughter lives across the street.  Patient is able to take care of her day-to-day needs at home without any problems including managing her medications, cooking, taking care of the finances and house work.  She continues to drive in the neighborhood going to the local grocery, to the bank or the head dresser.  Her daughter helps her with her appointments.  She denies any headaches or visual difficulties and is not hearing impaired.  She has had no falls.    She does report that her memory transiently got worse after the death of her .  Her  passed away on August 26, 2017. They were  67 years. She misses him. She feels that she is coping well as he was sick for a very long time and she was his primary care giver.  She does report that she has gradually improved since she has been on Cymbalta.  She also reports that her memory also improved specially since Aricept was started.  She is presently on 5 mg in the morning daily.  Her daughter did admit that she keeps fairly active and has not noted any significant memory changes otherwise.    Vascular risk factors include diabetes, hypertension and coronary artery disease.  In addition she has history non small cell lung cancer, which is stable. She saw Dr Weir in August 2018 and scans were stable. She is to follow up annually with CT scan chest.  She is breathing well and denied chest pain or shortness of breath.        Review of Systems  Review of Systems   Constitutional:  Negative.    HENT: Negative.  Negative for hearing loss.    Eyes: Negative.  Negative for visual disturbance.   Respiratory: Negative.  Negative for shortness of breath.         History of lung cancer   Cardiovascular: Negative.  Negative for chest pain and palpitations.   Gastrointestinal: Negative.    Genitourinary: Negative.    Musculoskeletal: Positive for arthralgias and gait problem. Negative for back pain and neck pain.   Skin: Negative.    Neurological: Positive for numbness. Negative for tremors, seizures, syncope, speech difficulty, weakness and headaches.   Psychiatric/Behavioral: Positive for decreased concentration. Negative for confusion. The patient is nervous/anxious.        Objective:      Neurologic Exam     Mental Status   Oriented to person, place, and time.   Registration: recalls 3 of 3 objects. Follows 3 step commands.   Attention: normal. Concentration: normal.   Speech: speech is normal   Level of consciousness: alert  Knowledge: good.   Able to name object. Able to read. Able to repeat. Able to write. Normal comprehension.    Patient is appropriate and able to give an an adequate recent and past medical history.  She is fully oriented.     Cranial Nerves   Cranial nerves II through XII intact.     Motor Exam   Muscle bulk: normal  Overall muscle tone: normal    Strength   Strength 5/5 throughout.     Sensory Exam   Right leg light touch: decreased from toes  Left leg light touch: decreased from toes  Proprioception normal.   Right leg pinprick: decreased from ankle  Left leg pinprick: decreased from ankle    Gait, Coordination, and Reflexes     Gait  Gait: normal (Walks slowly and stiffly but without assistance)    Coordination   Romberg: negative  Finger to nose coordination: normal    Tremor   Resting tremor: absent  Intention tremor: absent  Action tremor: absent    Reflexes   Right brachioradialis: 0  Left brachioradialis: 0  Right biceps: 1+  Left biceps: 1+  Right triceps: 1+  Left  triceps: 1+  Right patellar: 0  Left patellar: 0  Right achilles: 0  Left achilles: 0  Right plantar: normal  Left plantar: normal      Physical Exam   Constitutional: She is oriented to person, place, and time. She appears well-developed and well-nourished.   HENT:   Head: Normocephalic and atraumatic.   Neck: Normal range of motion. Neck supple. Carotid bruit is not present.   Neurological: She is oriented to person, place, and time. She has normal strength. She has a normal Finger-Nose-Finger Test and a normal Romberg Test. Gait normal.   Reflex Scores:       Tricep reflexes are 1+ on the right side and 1+ on the left side.       Bicep reflexes are 1+ on the right side and 1+ on the left side.       Brachioradialis reflexes are 0 on the right side and 0 on the left side.       Patellar reflexes are 0 on the right side and 0 on the left side.       Achilles reflexes are 0 on the right side and 0 on the left side.  Psychiatric: Her speech is normal.   Vitals reviewed.        Assessment:        1. Mild cognitive impairment    2. History of lung cancer    3. Essential hypertension    4. Anemia due to antineoplastic chemotherapy    5. Neuropathy    6. Pernicious anemia    7. Type 2 diabetes mellitus with diabetic peripheral angiopathy without gangrene, without long-term current use of insulin    8. Coronary artery disease involving native coronary artery of native heart without angina pectoris    9. Adjustment disorder with mixed anxiety and depressed mood            Plan:       Discussed with patient and daughter.  She had minimal cognitive difficulties that may be age related or may represent mild cognitive impairment on a vascular basis given her multiple risk factors.  She is otherwise quite independent, living alone.  History of an adjustment disorder with depression may also be a contributing factor but this has improved with Cymbalta.  She is advised to continue Aricept 5 mg daily in the morning.  B12 OTC  supplementation.  Continue present level of activities.  Patient counseled about getting her immunization shots and is given a prescription for this.  Follow-up in 1 year if stable

## 2018-09-05 NOTE — PATIENT INSTRUCTIONS
Discussed with patient and daughter.  She had minimal cognitive difficulties that may be age related or may represent mild cognitive impairment on a vascular basis given her multiple risk factors.  She is otherwise quite independent, living alone.  History of an adjustment disorder with depression may also be a contributing factor but this has improved with Cymbalta.  She is advised to continue Aricept 5 mg daily in the morning.  B12 OTC supplementation.  Continue present level of activities. Reviewed immunization history.  Patient counseled about getting her immunization shots and is given a prescription for this.  Follow-up in 1 year if stable

## 2018-10-02 ENCOUNTER — TELEPHONE (OUTPATIENT)
Dept: INTERNAL MEDICINE | Facility: CLINIC | Age: 83
End: 2018-10-02

## 2018-10-02 NOTE — TELEPHONE ENCOUNTER
Pt called back and stated her daughter called the dental office and they did not receive the fax. Re faxed paperwork to [phone]. Pt advised

## 2018-10-02 NOTE — TELEPHONE ENCOUNTER
----- Message from Mounika Tsai sent at 10/2/2018  3:59 PM CDT -----  Contact: Patient [phone]  Patient called about a medical clearance for [facility] [address] Li tosha [phone] fax [phone] Clearance requested on September 17, 2018.    Please call and advise  Thank you

## 2018-10-02 NOTE — TELEPHONE ENCOUNTER
----- Message from Yokasta Lei sent at 10/2/2018  4:34 PM CDT -----  Contact: Patient [phone]  Pt is calling to speak with someone in regards to a fax that was supposed to be sent over to her dentist. Please call back and advise.        Thanks

## 2018-10-04 ENCOUNTER — TELEPHONE (OUTPATIENT)
Dept: INTERNAL MEDICINE | Facility: CLINIC | Age: 83
End: 2018-10-04

## 2018-10-04 NOTE — TELEPHONE ENCOUNTER
----- Message from Skylar Beyer sent at 10/4/2018 11:10 AM CDT -----  Contact: Monica ([facility]) [phone]      ----- Message -----  From: Angie Joy  Sent: 10/4/2018  10:39 AM  To: Nahid [facility] Staff    Monica states Brunilda's daughter is calling them frustrated saying that Dr. Whitfield faxed over a medical release form to them 3 days ago. Monica states they did not receive the medical release forms for Brunilda , they also faxed a request over to Dr. Whitfield and didn't receive a response. Please give them a call back so that everything can be resolved and Brunilda can be seen they have been waiting for 3 weeks now.   Fax # [phone]

## 2018-10-04 NOTE — TELEPHONE ENCOUNTER
Spoke with Monica faxed form over several times while on the line, Monica confirmed she received the form and will notify pt and pt's daughter.

## 2018-11-13 ENCOUNTER — TELEPHONE (OUTPATIENT)
Dept: WOUND CARE | Facility: CLINIC | Age: 83
End: 2018-11-13

## 2018-11-13 NOTE — TELEPHONE ENCOUNTER
----- Message from Marge Vieira sent at 11/13/2018  2:00 PM CST -----  Contact: Pts' Daughter Ms Melendez [phone]  Pt's Daughter Nora England called to speak to the nurse regarding the pt's care due to pt having a wound that is not healing and would like a call back today.    Ms England can be reached [phone].    Thanks

## 2018-11-16 ENCOUNTER — OFFICE VISIT (OUTPATIENT)
Dept: WOUND CARE | Facility: CLINIC | Age: 83
End: 2018-11-16
Payer: MEDICARE

## 2018-11-16 ENCOUNTER — HOSPITAL ENCOUNTER (OUTPATIENT)
Dept: VASCULAR SURGERY | Facility: CLINIC | Age: 83
Discharge: HOME | End: 2018-11-16
Attending: NURSE PRACTITIONER
Payer: MEDICARE

## 2018-11-16 VITALS
DIASTOLIC BLOOD PRESSURE: 70 MMHG | SYSTOLIC BLOOD PRESSURE: 121 MMHG | WEIGHT: 176.56 LBS | BODY MASS INDEX: 29.42 KG/M2 | HEIGHT: 65 IN | HEART RATE: 99 BPM | TEMPERATURE: 98 F

## 2018-11-16 DIAGNOSIS — I73.9 PVD (PERIPHERAL VASCULAR DISEASE) WITH CLAUDICATION: ICD-10-CM

## 2018-11-16 DIAGNOSIS — I73.9 PVD (PERIPHERAL VASCULAR DISEASE) WITH CLAUDICATION: Primary | ICD-10-CM

## 2018-11-16 PROCEDURE — 1101F PT FALLS ASSESS-DOCD LE1/YR: CPT | Mod: CPTII,S$GLB,, | Performed by: NURSE PRACTITIONER

## 2018-11-16 PROCEDURE — 93923 UPR/LXTR ART STDY 3+ LVLS: CPT | Mod: S$GLB,,, | Performed by: SURGERY

## 2018-11-16 PROCEDURE — 99999 PR PBB SHADOW E&M-EST. PATIENT-LVL V: CPT | Mod: PBBFAC,,, | Performed by: NURSE PRACTITIONER

## 2018-11-16 PROCEDURE — 99213 OFFICE O/P EST LOW 20 MIN: CPT | Mod: S$GLB,,, | Performed by: NURSE PRACTITIONER

## 2018-11-16 NOTE — PROGRESS NOTES
Subjective:       Patient ID: Brunilda England is a 87 y.o. female.    Chief Complaint: Wound Check    Wound Check        This patient is well known to my service.  She is seen today for evaluation and management of pain to the right lower leg.  This occurs when she ambulates about 20 feet and is relieved if she stops and rests.  This has been going on for several weeks. Her pain level is 10/10.  This pain also sometimes occurs at night and will wake her up.  She has no open wounds to the lower legs or feet.        PAST MEDICAL HISTORY:  1.  Known peripheral arterial occlusive disease, status post left femoral   endarterectomy by me in 2007.  Notably, I have not seen her in many, many years.  2.  History of lung cancer at 2012, status post chemoradiation.  3.  Diabetes mellitus.  4.  Hypertension.  5.  History of anal cancer in 1995.     PAST SURGICAL HISTORY:  1.  Left femoral endarterectomy as above.  2.  Tonsillectomy.  3.  Cholecystectomy.  4.  PCI.      Review of Systems  Unchanged from prior visit.  Objective:      Physical Exam   Constitutional: She is oriented to person, place, and time. She appears well-developed and well-nourished. No distress.   HENT:   Head: Normocephalic and atraumatic.   Neck: Normal range of motion.   Cardiovascular:   Pulses:       Dorsalis pedis pulses are 0 on the right side, and 0 on the left side.        Posterior tibial pulses are 0 on the right side, and 0 on the left side.   Pulmonary/Chest: Effort normal. No respiratory distress.   Musculoskeletal: Normal range of motion. She exhibits no edema or tenderness.        Feet:    Neurological: She is alert and oriented to person, place, and time.   Skin: Skin is warm and dry. No rash noted. She is not diaphoretic. No cyanosis or erythema. Nails show no clubbing.   Psychiatric: She has a normal mood and affect. Her behavior is normal. Judgment and thought content normal.   Nursing note and vitals reviewed.        Assessment:       1.  PVD (peripheral vascular disease) with claudication        Plan:           STEPHANIA/PVRs today.  Return to clinic after vascular study.    Addendum:    Result of ABIs today as follows:  The right ABO was unobtainable due to absent DPA and PTA signals. PVR waveforms suggest ischemic PAOD.  The left STEPHANIA is 1.07 which suggests minimal PAOD. However, PVR waveforms suggest mild PAOD. Absent PTA signal.    Will refer to Dr. Mcpherson for vascular evaluation.    Right anterior ankle and lower leg

## 2018-11-20 ENCOUNTER — OFFICE VISIT (OUTPATIENT)
Dept: VASCULAR SURGERY | Facility: CLINIC | Age: 83
End: 2018-11-20
Payer: MEDICARE

## 2018-11-20 ENCOUNTER — HOSPITAL ENCOUNTER (OUTPATIENT)
Dept: RADIOLOGY | Facility: HOSPITAL | Age: 83
Discharge: HOME | End: 2018-11-20
Attending: SURGERY
Payer: MEDICARE

## 2018-11-20 VITALS
SYSTOLIC BLOOD PRESSURE: 135 MMHG | HEIGHT: 65 IN | RESPIRATION RATE: 20 BRPM | DIASTOLIC BLOOD PRESSURE: 73 MMHG | BODY MASS INDEX: 29.61 KG/M2 | TEMPERATURE: 99 F | WEIGHT: 177.69 LBS | HEART RATE: 91 BPM

## 2018-11-20 DIAGNOSIS — I70.219 ATHEROSCLEROTIC PVD WITH INTERMITTENT CLAUDICATION: ICD-10-CM

## 2018-11-20 DIAGNOSIS — I77.9 PAOD (PERIPHERAL ARTERIAL OCCLUSIVE DISEASE): Primary | ICD-10-CM

## 2018-11-20 DIAGNOSIS — I70.229 ATHEROSCLEROTIC PERIPHERAL VASCULAR DISEASE WITH REST PAIN: ICD-10-CM

## 2018-11-20 LAB
CREAT SERPL-MCNC: 1 MG/DL (ref 0.5–1.4)
SAMPLE: NORMAL

## 2018-11-20 PROCEDURE — 1101F PT FALLS ASSESS-DOCD LE1/YR: CPT | Mod: CPTII,S$GLB,, | Performed by: SURGERY

## 2018-11-20 PROCEDURE — 75635 CT ANGIO ABDOMINAL ARTERIES: CPT | Mod: 26,,, | Performed by: RADIOLOGY

## 2018-11-20 PROCEDURE — 99499 UNLISTED E&M SERVICE: CPT | Mod: S$GLB,,, | Performed by: SURGERY

## 2018-11-20 PROCEDURE — 75635 CT ANGIO ABDOMINAL ARTERIES: CPT | Mod: TC

## 2018-11-20 PROCEDURE — 99214 OFFICE O/P EST MOD 30 MIN: CPT | Mod: S$GLB,,, | Performed by: SURGERY

## 2018-11-20 PROCEDURE — 99999 PR PBB SHADOW E&M-EST. PATIENT-LVL III: CPT | Mod: PBBFAC,,, | Performed by: SURGERY

## 2018-11-20 PROCEDURE — 25500020 PHARM REV CODE 255: Performed by: SURGERY

## 2018-11-20 RX ORDER — IBUPROFEN 800 MG/1
800 TABLET ORAL EVERY 6 HOURS PRN
COMMUNITY
Start: 2018-10-22 | End: 2019-02-18

## 2018-11-20 RX ORDER — HYDROCODONE BITARTRATE AND ACETAMINOPHEN 5; 325 MG/1; MG/1
1 TABLET ORAL EVERY 4 HOURS PRN
COMMUNITY
Start: 2018-10-22 | End: 2019-02-18

## 2018-11-20 RX ORDER — CITALOPRAM 10 MG/1
10 TABLET ORAL EVERY MORNING
COMMUNITY
Start: 2018-10-03 | End: 2020-06-08

## 2018-11-20 RX ORDER — AMOXICILLIN 500 MG/1
CAPSULE ORAL
COMMUNITY
Start: 2018-11-08 | End: 2018-12-05 | Stop reason: ALTCHOICE

## 2018-11-20 RX ORDER — CHLORHEXIDINE GLUCONATE ORAL RINSE 1.2 MG/ML
15 SOLUTION DENTAL NIGHTLY
COMMUNITY
Start: 2018-11-08 | End: 2019-12-05

## 2018-11-20 RX ADMIN — IOHEXOL 125 ML: 350 INJECTION, SOLUTION INTRAVENOUS at 03:11

## 2018-11-20 NOTE — PROGRESS NOTES
HISTORY OF PRESENT ILLNESS:  An 87-year-old female with 2-3 month hx of ischemic rest pain, R foot and ~ 10 ft claudication.     PAST MEDICAL HISTORY:  1.  Known peripheral arterial occlusive disease, status post left femoral   endarterectomy by me in 2007.  Notably, I have not seen her in many, many years.  2.  History of lung cancer at 2012, status post chemoradiation.  3.  Diabetes mellitus.  4.  Hypertension.  5.  History of anal cancer in 1995.    PAST SURGICAL HISTORY:  1.  Left femoral endarterectomy as above.  2.  Tonsillectomy.  3.  Cholecystectomy.  4.  PCI.    FAMILY HISTORY:  Positive for breast cancer and stroke.    SOCIAL HISTORY:  , 67 years, passed approximately~1.5 yr ago       She is an ex-smoker.    MEDICATIONS:  Include a statin.  See EPIC for full list.    ALLERGIES:  Gabapentin.    REVIEW OF SYSTEMS:  CARDIOVASCULAR:  Denies postprandial pain or DVT.  All other systems include   eyes, ENT, , musculoskeletal, breast, psychiatric, lymph, allergy and immune   are negative.    PHYSICAL EXAMINATION:  VITAL SIGNS:  See nursing note.  GENERAL:  She is no acute distress.  RESPIRATORY:  Normal effort, clear to auscultation.  CARDIOVASCULAR:  Regular rate and rhythm, nondisplaced PMI, no murmur.  VASCULAR:  2+ radial, brachial.   R femoral non-palpable  EXTREMITIES:  Right foot:  Shows a healed lateral ulceration near the lateral malleolus   with some heaped up edges.  There is modest tenderness, no erythema or   drainage.  ABDOMEN:  No masses or tenderness.  No hepatosplenomegaly.  Aorta cannot be   palpated.  EYE:  Normal conjunctivae and lids.  ENT:  Good dentition.  NECK:  No JVD or thyromegaly.  MUSCULOSKELETAL:  No kyphosis or scoliosis.  Without clubbing or cyanosis.  SKIN:  Warm and dry.  NEUROLOGIC:  Alert and oriented x3.  Normal mood and affect.  Midline tongue.    No speech difficulty or hoarseness, 5/5 motor strength in all extremities.    IMAGING:  ABIs  0 on R (0.4 1 yr ago) and  1.1 L    ASSESSMENT:  1. Severe R LE PAOD with 2 month hx of ischemic rest pain    Cr 1.1    PLAN:  1.  CTA Ao/ro and f/u after    JOAQUÍN Mcpherson III, MD, FACS  Professor and Chief, Vascular and Endovascular Surgery

## 2018-11-27 ENCOUNTER — OFFICE VISIT (OUTPATIENT)
Dept: VASCULAR SURGERY | Facility: CLINIC | Age: 83
End: 2018-11-27
Payer: MEDICARE

## 2018-11-27 VITALS
WEIGHT: 180 LBS | HEIGHT: 65 IN | DIASTOLIC BLOOD PRESSURE: 68 MMHG | HEART RATE: 91 BPM | BODY MASS INDEX: 29.99 KG/M2 | TEMPERATURE: 99 F | SYSTOLIC BLOOD PRESSURE: 151 MMHG

## 2018-11-27 DIAGNOSIS — Z01.818 PRE-OP EVALUATION: ICD-10-CM

## 2018-11-27 DIAGNOSIS — I70.229 ATHEROSCLEROTIC PERIPHERAL VASCULAR DISEASE WITH REST PAIN: Primary | ICD-10-CM

## 2018-11-27 PROCEDURE — 99999 PR PBB SHADOW E&M-EST. PATIENT-LVL III: CPT | Mod: PBBFAC,,, | Performed by: SURGERY

## 2018-11-27 PROCEDURE — 99215 OFFICE O/P EST HI 40 MIN: CPT | Mod: S$GLB,,, | Performed by: SURGERY

## 2018-11-27 PROCEDURE — 1101F PT FALLS ASSESS-DOCD LE1/YR: CPT | Mod: CPTII,S$GLB,, | Performed by: SURGERY

## 2018-11-27 PROCEDURE — 99499 UNLISTED E&M SERVICE: CPT | Mod: S$GLB,,, | Performed by: SURGERY

## 2018-11-27 NOTE — PROGRESS NOTES
HISTORY OF PRESENT ILLNESS:  An 87-year-old female with 2-3 month hx of ischemic rest pain, R foot and ~ 10 ft claudication.    She now returns with a CTA.  No change in her symptoms     PAST MEDICAL HISTORY:  1.  Known peripheral arterial occlusive disease, status post left femoral   endarterectomy by me in 2007.  Notably, I have not seen her in many, many years.  2.  History of lung cancer at 2012, status post chemoradiation.  3.  Diabetes mellitus.  4.  Hypertension.  5.  History of anal cancer in 1995.    PAST SURGICAL HISTORY:  1.  Left femoral endarterectomy as above.  2.  Tonsillectomy.  3.  Cholecystectomy.  4.  PCI.    FAMILY HISTORY:  Positive for breast cancer and stroke.    SOCIAL HISTORY:  , 67 years, passed approximately~1.5 yr ago       She is an ex-smoker.    MEDICATIONS:  Include a statin.  See EPIC for full list.    ALLERGIES:  Gabapentin.    REVIEW OF SYSTEMS:  CARDIOVASCULAR:  Denies postprandial pain or DVT.  All other systems include   eyes, ENT, , musculoskeletal, breast, psychiatric, lymph, allergy and immune   are negative.    PHYSICAL EXAMINATION:  VITAL SIGNS:  See nursing note.  GENERAL:  She is no acute distress.  RESPIRATORY:  Normal effort, clear to auscultation.  CARDIOVASCULAR:  Regular rate and rhythm, nondisplaced PMI, no murmur.  VASCULAR:  2+ radial, brachial.   R femoral non-palpable  EXTREMITIES:  Right foot:  Shows a healed lateral ulceration near the lateral malleolus   with some heaped up edges.  There is modest tenderness, no erythema or   drainage.  ABDOMEN:  No masses or tenderness.  No hepatosplenomegaly.  Aorta cannot be   palpated.  EYE:  Normal conjunctivae and lids.  ENT:  Good dentition.  NECK:  No JVD or thyromegaly.  MUSCULOSKELETAL:  No kyphosis or scoliosis.  Without clubbing or cyanosis.  SKIN:  Warm and dry.  NEUROLOGIC:  Alert and oriented x3.  Normal mood and affect.  Midline tongue.    No speech difficulty or hoarseness, 5/5 motor strength in all  extremities.    IMAGING:  CTA personally reviewed:   RIGHT CFA occlusion, Prox profunda reconstitution, Complete SFA occlusion    Also signif LEFT ext iliac stenosis    ABIs  0 on R (0.4 1 yr ago) and 1.1 L    ASSESSMENT:  1. Severe R LE PAOD with 2 month hx of ischemic rest pain    R CFA occlusion.    While this would ideally be Rxed with an endarterectomy, given her age and co-morbidities, an endovascular approach is attractive    Cr 1.1    PLAN:  1.  Left CFA approach, R LE angio and possible intervention 12/6/18  2. May need LEFT ext iliac Rx to safely deliver sheath yo RIGHT    I have explained the risks, benefits and alternatives for this procedure in detail.  The patients voices understanding and all questions have be answered, and agrees to proceed with the procedure.    JOAQUÍN Mcpherson III, MD, FACS  Professor and Chief, Vascular and Endovascular Surgery

## 2018-12-05 ENCOUNTER — TELEPHONE (OUTPATIENT)
Dept: VASCULAR SURGERY | Facility: CLINIC | Age: 83
End: 2018-12-05

## 2018-12-05 RX ORDER — ASPIRIN 81 MG/1
81 TABLET ORAL EVERY MORNING
Status: ON HOLD | COMMUNITY
End: 2023-05-22 | Stop reason: HOSPADM

## 2018-12-05 NOTE — PRE-PROCEDURE INSTRUCTIONS
Spoke with Patient.  NPO, medication, and pre-op instructions reviewed.  Medications verified against her medicine list.  Denies previous problems with Anesthesia.  Stated that she does not check her glucose every morning, but her last reading was 110. Does not like to take pain medication.  Verbalized understanding of instructions.

## 2018-12-06 ENCOUNTER — ANESTHESIA (OUTPATIENT)
Dept: SURGERY | Facility: HOSPITAL | Age: 83
End: 2018-12-06
Payer: MEDICARE

## 2018-12-06 ENCOUNTER — HOSPITAL ENCOUNTER (OUTPATIENT)
Facility: HOSPITAL | Age: 83
Discharge: HOME | End: 2018-12-06
Attending: SURGERY | Admitting: SURGERY
Payer: MEDICARE

## 2018-12-06 ENCOUNTER — ANESTHESIA EVENT (OUTPATIENT)
Dept: SURGERY | Facility: HOSPITAL | Age: 83
End: 2018-12-06
Payer: MEDICARE

## 2018-12-06 ENCOUNTER — TELEPHONE (OUTPATIENT)
Dept: VASCULAR SURGERY | Facility: CLINIC | Age: 83
End: 2018-12-06

## 2018-12-06 VITALS
BODY MASS INDEX: 29.99 KG/M2 | RESPIRATION RATE: 16 BRPM | WEIGHT: 180 LBS | HEART RATE: 105 BPM | HEIGHT: 65 IN | TEMPERATURE: 98 F | OXYGEN SATURATION: 98 % | DIASTOLIC BLOOD PRESSURE: 68 MMHG | SYSTOLIC BLOOD PRESSURE: 140 MMHG

## 2018-12-06 DIAGNOSIS — I73.9 PVD (PERIPHERAL VASCULAR DISEASE): Primary | ICD-10-CM

## 2018-12-06 DIAGNOSIS — I73.9 PAD (PERIPHERAL ARTERY DISEASE): ICD-10-CM

## 2018-12-06 LAB
POCT GLUCOSE: 133 MG/DL (ref 70–110)
POCT GLUCOSE: 149 MG/DL (ref 70–110)

## 2018-12-06 PROCEDURE — 25000003 PHARM REV CODE 250: Performed by: STUDENT IN AN ORGANIZED HEALTH CARE EDUCATION/TRAINING PROGRAM

## 2018-12-06 PROCEDURE — 25000003 PHARM REV CODE 250: Performed by: SURGERY

## 2018-12-06 PROCEDURE — 63600175 PHARM REV CODE 636 W HCPCS: Performed by: NURSE ANESTHETIST, CERTIFIED REGISTERED

## 2018-12-06 PROCEDURE — 37226 PR FEM/POPL REVASC W/STENT: CPT | Mod: RT,,, | Performed by: SURGERY

## 2018-12-06 PROCEDURE — 82962 GLUCOSE BLOOD TEST: CPT | Performed by: SURGERY

## 2018-12-06 PROCEDURE — 71000015 HC POSTOP RECOV 1ST HR: Performed by: SURGERY

## 2018-12-06 PROCEDURE — C1769 GUIDE WIRE: HCPCS | Performed by: SURGERY

## 2018-12-06 PROCEDURE — C1887 CATHETER, GUIDING: HCPCS | Performed by: SURGERY

## 2018-12-06 PROCEDURE — 63600175 PHARM REV CODE 636 W HCPCS: Performed by: STUDENT IN AN ORGANIZED HEALTH CARE EDUCATION/TRAINING PROGRAM

## 2018-12-06 PROCEDURE — 63600175 PHARM REV CODE 636 W HCPCS: Performed by: SURGERY

## 2018-12-06 PROCEDURE — 27201423 OPTIME MED/SURG SUP & DEVICES STERILE SUPPLY: Performed by: SURGERY

## 2018-12-06 PROCEDURE — 36000705 HC OR TIME LEV I EA ADD 15 MIN: Performed by: SURGERY

## 2018-12-06 PROCEDURE — C1894 INTRO/SHEATH, NON-LASER: HCPCS | Performed by: SURGERY

## 2018-12-06 PROCEDURE — C1725 CATH, TRANSLUMIN NON-LASER: HCPCS | Performed by: SURGERY

## 2018-12-06 PROCEDURE — 75710 ARTERY X-RAYS ARM/LEG: CPT | Mod: 26,59,, | Performed by: SURGERY

## 2018-12-06 PROCEDURE — 37221 PR REVASCULARIZE ILIAC ARTERY,ANGIOPLASTY/STENT, INITIAL VESSEL: CPT | Mod: 51,LT,, | Performed by: SURGERY

## 2018-12-06 PROCEDURE — 71000044 HC DOSC ROUTINE RECOVERY FIRST HOUR: Performed by: SURGERY

## 2018-12-06 PROCEDURE — D9220A PRA ANESTHESIA: Mod: CRNA,,, | Performed by: NURSE ANESTHETIST, CERTIFIED REGISTERED

## 2018-12-06 PROCEDURE — 37000009 HC ANESTHESIA EA ADD 15 MINS: Performed by: SURGERY

## 2018-12-06 PROCEDURE — 25000003 PHARM REV CODE 250: Performed by: ANESTHESIOLOGY

## 2018-12-06 PROCEDURE — 71000016 HC POSTOP RECOV ADDL HR: Performed by: SURGERY

## 2018-12-06 PROCEDURE — 25500020 PHARM REV CODE 255: Performed by: SURGERY

## 2018-12-06 PROCEDURE — 37000008 HC ANESTHESIA 1ST 15 MINUTES: Performed by: SURGERY

## 2018-12-06 PROCEDURE — C1760 CLOSURE DEV, VASC: HCPCS | Performed by: SURGERY

## 2018-12-06 PROCEDURE — 36000704 HC OR TIME LEV I 1ST 15 MIN: Performed by: SURGERY

## 2018-12-06 PROCEDURE — C1876 STENT, NON-COA/NON-COV W/DEL: HCPCS | Performed by: SURGERY

## 2018-12-06 PROCEDURE — D9220A PRA ANESTHESIA: Mod: ANES,,, | Performed by: ANESTHESIOLOGY

## 2018-12-06 DEVICE — IMPLANTABLE DEVICE: Type: IMPLANTABLE DEVICE | Site: OTHER (ADD COMMENT) | Status: FUNCTIONAL

## 2018-12-06 RX ORDER — MIDAZOLAM HYDROCHLORIDE 1 MG/ML
INJECTION, SOLUTION INTRAMUSCULAR; INTRAVENOUS
Status: DISCONTINUED | OUTPATIENT
Start: 2018-12-06 | End: 2018-12-06

## 2018-12-06 RX ORDER — LIDOCAINE HYDROCHLORIDE 10 MG/ML
INJECTION, SOLUTION EPIDURAL; INFILTRATION; INTRACAUDAL; PERINEURAL
Status: DISCONTINUED | OUTPATIENT
Start: 2018-12-06 | End: 2018-12-06 | Stop reason: HOSPADM

## 2018-12-06 RX ORDER — CEFAZOLIN SODIUM 1 G/3ML
2 INJECTION, POWDER, FOR SOLUTION INTRAMUSCULAR; INTRAVENOUS
Status: COMPLETED | OUTPATIENT
Start: 2018-12-06 | End: 2018-12-06

## 2018-12-06 RX ORDER — IODIXANOL 320 MG/ML
INJECTION, SOLUTION INTRAVASCULAR
Status: DISCONTINUED | OUTPATIENT
Start: 2018-12-06 | End: 2018-12-06 | Stop reason: HOSPADM

## 2018-12-06 RX ORDER — LIDOCAINE HYDROCHLORIDE 10 MG/ML
1 INJECTION, SOLUTION EPIDURAL; INFILTRATION; INTRACAUDAL; PERINEURAL ONCE
Status: COMPLETED | OUTPATIENT
Start: 2018-12-06 | End: 2018-12-06

## 2018-12-06 RX ORDER — CLOPIDOGREL BISULFATE 75 MG/1
150 TABLET ORAL ONCE
Status: COMPLETED | OUTPATIENT
Start: 2018-12-06 | End: 2018-12-06

## 2018-12-06 RX ORDER — HEPARIN SODIUM 1000 [USP'U]/ML
INJECTION, SOLUTION INTRAVENOUS; SUBCUTANEOUS
Status: DISCONTINUED | OUTPATIENT
Start: 2018-12-06 | End: 2018-12-06 | Stop reason: HOSPADM

## 2018-12-06 RX ORDER — FENTANYL CITRATE 50 UG/ML
INJECTION, SOLUTION INTRAMUSCULAR; INTRAVENOUS
Status: DISCONTINUED | OUTPATIENT
Start: 2018-12-06 | End: 2018-12-06

## 2018-12-06 RX ORDER — HEPARIN SODIUM 1000 [USP'U]/ML
INJECTION, SOLUTION INTRAVENOUS; SUBCUTANEOUS
Status: DISCONTINUED | OUTPATIENT
Start: 2018-12-06 | End: 2018-12-06

## 2018-12-06 RX ORDER — SODIUM CHLORIDE 0.9 % (FLUSH) 0.9 %
3 SYRINGE (ML) INJECTION
Status: DISCONTINUED | OUTPATIENT
Start: 2018-12-06 | End: 2018-12-06 | Stop reason: HOSPADM

## 2018-12-06 RX ORDER — SODIUM CHLORIDE 0.9 % (FLUSH) 0.9 %
5 SYRINGE (ML) INJECTION
Status: DISPENSED | OUTPATIENT
Start: 2018-12-06

## 2018-12-06 RX ORDER — LORAZEPAM 2 MG/ML
0.25 INJECTION INTRAMUSCULAR ONCE AS NEEDED
Status: DISCONTINUED | OUTPATIENT
Start: 2018-12-06 | End: 2018-12-06 | Stop reason: HOSPADM

## 2018-12-06 RX ORDER — HYDROMORPHONE HYDROCHLORIDE 1 MG/ML
0.2 INJECTION, SOLUTION INTRAMUSCULAR; INTRAVENOUS; SUBCUTANEOUS EVERY 5 MIN PRN
Status: DISCONTINUED | OUTPATIENT
Start: 2018-12-06 | End: 2018-12-06 | Stop reason: HOSPADM

## 2018-12-06 RX ORDER — SODIUM CHLORIDE 9 MG/ML
INJECTION, SOLUTION INTRAVENOUS CONTINUOUS
Status: DISCONTINUED | OUTPATIENT
Start: 2018-12-06 | End: 2018-12-06 | Stop reason: HOSPADM

## 2018-12-06 RX ORDER — PROTAMINE SULFATE 10 MG/ML
INJECTION, SOLUTION INTRAVENOUS
Status: DISCONTINUED | OUTPATIENT
Start: 2018-12-06 | End: 2018-12-06

## 2018-12-06 RX ORDER — CLOPIDOGREL BISULFATE 75 MG/1
75 TABLET ORAL DAILY
Qty: 30 TABLET | Refills: 11 | Status: SHIPPED | OUTPATIENT
Start: 2018-12-06 | End: 2019-02-18

## 2018-12-06 RX ADMIN — FENTANYL CITRATE 25 MCG: 50 INJECTION, SOLUTION INTRAMUSCULAR; INTRAVENOUS at 01:12

## 2018-12-06 RX ADMIN — CEFAZOLIN 2 G: 330 INJECTION, POWDER, FOR SOLUTION INTRAMUSCULAR; INTRAVENOUS at 12:12

## 2018-12-06 RX ADMIN — MIDAZOLAM HYDROCHLORIDE 1 MG: 1 INJECTION, SOLUTION INTRAMUSCULAR; INTRAVENOUS at 01:12

## 2018-12-06 RX ADMIN — LIDOCAINE HYDROCHLORIDE 10 MG: 10 INJECTION, SOLUTION EPIDURAL; INFILTRATION; INTRACAUDAL; PERINEURAL at 10:12

## 2018-12-06 RX ADMIN — PROTAMINE SULFATE 5 MG: 10 INJECTION, SOLUTION INTRAVENOUS at 01:12

## 2018-12-06 RX ADMIN — MIDAZOLAM HYDROCHLORIDE 1 MG: 1 INJECTION, SOLUTION INTRAMUSCULAR; INTRAVENOUS at 12:12

## 2018-12-06 RX ADMIN — FENTANYL CITRATE 25 MCG: 50 INJECTION, SOLUTION INTRAMUSCULAR; INTRAVENOUS at 12:12

## 2018-12-06 RX ADMIN — HEPARIN SODIUM 7500 UNITS: 1000 INJECTION, SOLUTION INTRAVENOUS; SUBCUTANEOUS at 12:12

## 2018-12-06 RX ADMIN — SODIUM CHLORIDE: 0.9 INJECTION, SOLUTION INTRAVENOUS at 10:12

## 2018-12-06 RX ADMIN — CLOPIDOGREL 150 MG: 75 TABLET, FILM COATED ORAL at 02:12

## 2018-12-06 RX ADMIN — PROTAMINE SULFATE 55 MG: 10 INJECTION, SOLUTION INTRAVENOUS at 01:12

## 2018-12-06 NOTE — ANESTHESIA PREPROCEDURE EVALUATION
12/06/2018  Brunilda England is a 87 y.o., female.    Anesthesia Evaluation    I have reviewed the Patient Summary Reports.        Review of Systems  Anesthesia Hx:  No problems with previous Anesthesia    Social:  Non-Smoker    Hematology/Oncology:  Hematology Normal   Oncology Normal     EENT/Dental:EENT/Dental Normal   Cardiovascular:   Hypertension Past MI CAD      Pulmonary:  Pulmonary Normal    Renal/:  Renal/ Normal     Hepatic/GI:  Hepatic/GI Normal    Musculoskeletal:  Musculoskeletal Normal    Neurological:  Neurology Normal    Endocrine:  Endocrine Normal    Dermatological:  Skin Normal    Psych:  Psychiatric Normal           Physical Exam  General:  Well nourished    Airway/Jaw/Neck:  Airway Findings: Mouth Opening: Normal Tongue: Normal  General Airway Assessment: Adult  Mallampati: II  Improves to II with phonation.  TM Distance: Normal, at least 6 cm  Jaw/Neck Findings:  Neck ROM: Normal ROM      Dental:  Dental Findings: In tact   Chest/Lungs:  Chest/Lungs Findings: Clear to auscultation, Normal Respiratory Rate     Heart/Vascular:  Heart Findings: Rate: Normal  Rhythm: Regular Rhythm  Sounds: Normal             Anesthesia Plan  Type of Anesthesia, risks & benefits discussed:  Anesthesia Type:  general  Patient's Preference: General  Intra-op Monitoring Plan:   Intra-op Monitoring Plan Comments:   Post Op Pain Control Plan:   Post Op Pain Control Plan Comments:   Induction:   IV  Beta Blocker:  Patient is on a Beta-Blocker and has received one dose within the past 24 hours (No further documentation required).       Informed Consent: Patient understands risks and agrees with Anesthesia plan.  Questions answered. Anesthesia consent signed with patient.  ASA Score: 3     Day of Surgery Review of History & Physical: I have interviewed and examined the patient. I have reviewed the patient's H&P  dated:  There are no significant changes.          Ready For Surgery From Anesthesia Perspective.

## 2018-12-06 NOTE — BRIEF OP NOTE
[facility]  Brief Operative Note     SUMMARY     Surgery Date: 12/6/2018     Surgeon(s) and Role:     * SEBASTIÁN Mcpherson III, MD - Primary     * Anette Cruz MD - Fellow    Assisting Surgeon: None    Pre-op Diagnosis:  Atherosclerotic peripheral vascular disease with rest pain [I70.229]    Post-op Diagnosis:  Post-Op Diagnosis Codes:     * Atherosclerotic peripheral vascular disease with rest pain [I70.229]    PROCEDURES:    1. Stent placement, RIGHT CFA/profunda (6x100 Lifestent)  2. PTA, LEFT ext iliiac artery (7x20)  3. Selective L LE angiogram  4. US guided L CFA access    Anesthesia: LOCAL/MAC    Description of the findings of the procedure: 5 fr L CFA/mynx closure; 16.5 min fluoro; 1059 mGy; 59 cc visipaque    Findings/Key Components: R CFA/prox profunda occlusion, successful recannalization with <10% residual; L 80% ext iliac stenosis, 10% residual    Estimated Blood Loss: <5cc         Specimens:   Specimen (12h ago, onward)    None          Discharge Note    SUMMARY     Admit Date: 12/6/2018    Discharge Date and Time: 12/6/18    Hospital Course (synopsis of major diagnoses, care, treatment, and services provided during the course of the hospital stay): successful outpatient procedure    Final Diagnosis: Post-Op Diagnosis Codes:     * Atherosclerotic peripheral vascular disease with rest pain [I70.229]    Disposition: home    Follow Up/Patient Instructions: Diet: reg  Act: ad rustam in AM  FU: 2 week with STEPHANIA    Medications: plavix/ASA + pre-op

## 2018-12-06 NOTE — ANESTHESIA POSTPROCEDURE EVALUATION
"Anesthesia Post Evaluation    Patient: Brunilda England    Procedure(s) Performed: Procedure(s) (LRB):  ARTERIOGRAM-LEG AND ULTRASOUND (Right)    Final Anesthesia Type: general  Patient location during evaluation: PACU  Patient participation: Yes- Able to Participate  Level of consciousness: awake and alert  Post-procedure vital signs: reviewed and stable  Pain management: adequate  Airway patency: patent  PONV status at discharge: No PONV  Anesthetic complications: no      Cardiovascular status: blood pressure returned to baseline and stable  Respiratory status: unassisted  Hydration status: euvolemic  Follow-up not needed.        Visit Vitals  BP (!) 159/72 (BP Location: Left arm, Patient Position: Lying)   Pulse 94   Temp 36.6 °C (97.9 °F) (Temporal)   Resp 14   Ht 5' 5" (1.651 m)   Wt 81.6 kg (180 lb)   SpO2 96%   Breastfeeding? No   BMI 29.95 kg/m²       Pain/Shelia Score: Pain Assessment Performed: Yes (12/6/2018 10:20 AM)  Presence of Pain: denies (12/6/2018 10:20 AM)        "

## 2018-12-06 NOTE — DISCHARGE INSTRUCTIONS
Peripheral Angiography  Peripheral angiography is an outpatient procedure that makes a map of the vessels (arteries) in your lower body, legs, and arms, using X-ray and dye.This map can show where blood flow may be blocked.  Talk with your healthcare provider about the risks and complications of angiography.   Before the procedure  Prepare for the peripheral angiography as follows:   · Tell your healthcare provider about all medicines you take and any allergies you may have.  · Follow any directions youre given for not eating or drinking before the procedure. If your provider says to take your normal medicines, swallow them with only small sips of water.  · Arrange for a family member or friend to drive you home.  During the procedure  Here is what to expect:  ·   You may get medicine through an IV (intravenous) line to relax you. Youre given an injection to numb the insertion site. Then, a tiny skin cut (incision) is made near an artery in your groin.  · Your provider inserts a thin tube (catheter) through the incision. He or she then threads the catheter into an artery while looking at a video monitor.  · Contrast dye is injected into the catheter to confirm position. You may feel warmth or pressure in your legs and back. You lie still as X-rays are taken. The catheter is then taken out.  After the procedure  Youll be taken to a recovery area. A healthcare provider will apply pressure to the site for about 10 minutes. Your healthcare provider will tell you how long to lie down and keep the insertion site still. Your healthcare provider will discuss the results with you soon after the procedure.  Back at home  On the day you get home, dont drive, dont exercise, avoid walking and taking stairs, and avoid bending and lifting. Your healthcare provider may give you other care instructions.  Call your healthcare provider  Call your healthcare provider right away if:  · You notice a lump or bleeding at the  insertion site  · You feel pain at the insertion site  · You become lightheaded or dizzy  · You have leg pain or numbness  · You do not urinate in 8 hours    Date Last Reviewed: 5/1/2016  © 5800-6494 [employer]. [address] [place] [address], [place] [address]. All rights reserved. This information is not intended as a substitute for professional medical care. Always follow your healthcare professional's instructions.

## 2018-12-06 NOTE — TRANSFER OF CARE
"Anesthesia Transfer of Care Note    Patient: Brunilda England    Procedure(s) Performed: Procedure(s) (LRB):  ARTERIOGRAM-LEG AND ULTRASOUND (Right)    Patient location: [facility]    Anesthesia Type: MAC    Transport from OR: Transported from OR on 6-10 L/min O2 by face mask with adequate spontaneous ventilation    Post pain: adequate analgesia    Post assessment: no apparent anesthetic complications and tolerated procedure well    Post vital signs: stable    Level of consciousness: awake    Nausea/Vomiting: no nausea/vomiting    Complications: none    Transfer of care protocol was followed      Last vitals:   Visit Vitals  /79 (BP Location: Left arm, Patient Position: Lying)   Pulse 97   Temp 36.7 °C (98.1 °F) (Oral)   Resp 18   Ht 5' 5" (1.651 m)   Wt 81.6 kg (180 lb)   SpO2 99%   Breastfeeding? No   BMI 29.95 kg/m²     "

## 2018-12-07 NOTE — OP NOTE
Date: 12/06/2018    Surgeon: MIREYA Mcpherson III, MD    Assistant: Anette Cruz MD    Pre-op Diagnosis:   Atherosclerotic peripheral vascular disease with rest pain [I70.229]    Post-op Diagnosis: Same    Procedures:   1. Stent placement, RIGHT CFA/profunda (6x100 Lifestent)  2. PTA, LEFT ext iliiac artery (7x20)  3. Selective L LE angiogram  4. US guided L CFA access    Anesthesia: Local MAC    EBL: Minimal    Anesthesia: Local/Sedation    Findings:   5 fr L CFA/mynx closure; 16.5 min fluoro; 1059 mGy; 59 cc visipaque    R CFA/prox profunda occlusion, successful recannalization with <10% residual; L 80% ext iliac stenosis, 10% residual    Indications: 87 y F with RLE rest pain and short distance claudication, found to have R CFA occlusion on CTA. Angiogram done rather than CFA endarterectomy due to age and co-morbidities.    Description of Procedure:  After an informed consent was obtained the patient was brought to the interventional suite and placed in the supine position. Both the patient and procedure were confirmed and identified during timeout process. The patient received perioperative antibiotics. The patient's bilateral groins were prepped and draped in usual sterile fashion. Using ultrasound guidance, the L common femoral artery vessel patency was confirmed. Then with direct ultrasound guidance the L CFA was entered with a 21-G needle, Mandril wire and 4-Fr micropuncture needle. Then under fluoroscopic guidance, an 0.035-in wire was placed into the distal aorta. The micropuncture dilator was then exchanged over the wire for a 5-Tajik sheath. Sheathogram demonstrated access in the CFA, as well as a severe focal 80% stenosis of the L EIA. She was given 5000 u of heparin. This was then treated with a 7 x 20 mustang balloon (10 cindy, 2 min). Repeat angiogram showed resolution of the stenosis.with 10% residual. Next a VCF-catheter was placed over the wire and into the distal aorta under fluoroscopy and an  aortogram with runoff to R leg was performed which demonstrated:    Aorto-iliac vessels: Resolved focal stenosis of L EIA  R Common femoral, profunda femoral arteries: R CFA occluded. Large dilated profunda  R Superficial femoral artery: occluded  R Popliteal artery: Short segment reconstituted above knee, then again occluded  Tibials: Peroneal runoff to foot. ZEYAD and PTA occluded    Next, we placed a 5x45 jeanine and used a combination of an angled and straight 0.035 stiff shaft glide wire with berenstein catheter to cross the R CFA occlusion and select the PTA. Treatment of the R CFA was done with a pre-dilation with a 5 mm balloon , then a 6x 100 lifestent, and post-dilation with a 6 mm balloon. A follow-up angiogram showed resolution of the lesion with <10% residual stenosis. Heparin was reversed with 25 units of protamine. We then deployed a 5-Yi Mynx closure device without issue. At the conclusion of the case the patient was noted to have no evidence of a groin hematoma. All instrument and sponge counts were correct at the end of the case. The patient tolerated the procedure well and was transferred to the pacu for further recovery.     Complications:  None; patient tolerated the procedure well.    Disposition: Recovery- hemodynamically stable in good condition    Anette Cruz MD   Fellow, Vascular/Endovascular Surgery

## 2018-12-21 ENCOUNTER — HOSPITAL ENCOUNTER (OUTPATIENT)
Dept: VASCULAR SURGERY | Facility: CLINIC | Age: 83
Discharge: HOME | End: 2018-12-21
Payer: MEDICARE

## 2018-12-21 DIAGNOSIS — I73.9 PVD (PERIPHERAL VASCULAR DISEASE): ICD-10-CM

## 2018-12-21 PROCEDURE — 93923 UPR/LXTR ART STDY 3+ LVLS: CPT | Mod: S$GLB,,, | Performed by: SURGERY

## 2018-12-28 ENCOUNTER — TELEPHONE (OUTPATIENT)
Dept: VASCULAR SURGERY | Facility: CLINIC | Age: 83
End: 2018-12-28

## 2018-12-28 ENCOUNTER — OFFICE VISIT (OUTPATIENT)
Dept: VASCULAR SURGERY | Facility: CLINIC | Age: 83
End: 2018-12-28
Payer: MEDICARE

## 2018-12-28 VITALS
HEIGHT: 65 IN | BODY MASS INDEX: 29.75 KG/M2 | DIASTOLIC BLOOD PRESSURE: 70 MMHG | WEIGHT: 178.56 LBS | HEART RATE: 94 BPM | TEMPERATURE: 99 F | SYSTOLIC BLOOD PRESSURE: 131 MMHG

## 2018-12-28 DIAGNOSIS — I70.229 ATHEROSCLEROTIC PERIPHERAL VASCULAR DISEASE WITH REST PAIN: Primary | ICD-10-CM

## 2018-12-28 PROCEDURE — 99999 PR PBB SHADOW E&M-EST. PATIENT-LVL III: CPT | Mod: PBBFAC,,, | Performed by: SURGERY

## 2018-12-28 PROCEDURE — 99499 UNLISTED E&M SERVICE: CPT | Mod: S$GLB,,, | Performed by: SURGERY

## 2018-12-28 PROCEDURE — 99214 OFFICE O/P EST MOD 30 MIN: CPT | Mod: S$GLB,,, | Performed by: SURGERY

## 2018-12-28 PROCEDURE — 1101F PT FALLS ASSESS-DOCD LE1/YR: CPT | Mod: CPTII,S$GLB,, | Performed by: SURGERY

## 2018-12-28 NOTE — TELEPHONE ENCOUNTER
----- Message from SEBASTIÁN Mcpherson III, MD sent at 12/28/2018  1:30 PM CST -----  Contact: Pt.Self   ASA 81 mg daily is fine  ----- Message -----  From: Justa Perez MA  Sent: 12/28/2018   1:17 PM  To: SEBASTIÁN Mcpherson III, MD     Please Advise  ----- Message -----  From: Anastasiia Holliday  Sent: 12/28/2018  12:29 PM  To: Vida ROMO III Staff    Needs Advice    Reason for call:  Pt states the prescription given for clopidogrel (PLAVIX) 75 mg tablet is causing chronic diahrea and would like to know if she can get different medicine to try out     Communication Preference:  [phone] (call anytime)     Additional Information:    Thank You

## 2018-12-28 NOTE — PROGRESS NOTES
HISTORY OF PRESENT ILLNESS:  An 87-year-old female with 2-3 month hx of ischemic rest pain, R foot and ~ 10 ft claudication.    S/p    1.  R CFA stent for occlusion 12/6/18  2. L CFA endart and patch 2007    This is her initial return after the above procedure.   Complete relief from the severe ischemic rest pain, now no claudication.  She is very pleased     PAST MEDICAL HISTORY:  1.  Known peripheral arterial occlusive disease, status post left femoral   endarterectomy by me in 2007.  Notably, I have not seen her in many, many years.  2.  History of lung cancer at 2012, status post chemoradiation.  3.  Diabetes mellitus.  4.  Hypertension.  5.  History of anal cancer in 1995.    PAST SURGICAL HISTORY:  1.  Left femoral endarterectomy as above.  2.  Tonsillectomy.  3.  Cholecystectomy.  4.  PCI.    FAMILY HISTORY:  Positive for breast cancer and stroke.    SOCIAL HISTORY:  , 67 years, passed approximately~1.5 yr ago       She is an ex-smoker.    MEDICATIONS:  Include a statin. plavix/ASA See EPIC for full list.    ALLERGIES:  Gabapentin.    REVIEW OF SYSTEMS:  CARDIOVASCULAR:  Denies postprandial pain or DVT.  All other systems include   eyes, ENT, , musculoskeletal, breast, psychiatric, lymph, allergy and immune   are negative.    PHYSICAL EXAMINATION:  VITAL SIGNS:  See nursing note.  GENERAL:  She is no acute distress.  RESPIRATORY:  Normal effort, clear to auscultation.  CARDIOVASCULAR:  Regular rate and rhythm, nondisplaced PMI, no murmur.  VASCULAR:  2+ radial, brachial.   R femoral non-palpable  EXTREMITIES:  Right foot:  Shows a healed lateral ulceration near the lateral malleolus   with some heaped up edges.  There is modest tenderness, no erythema or   drainage.  ABDOMEN:  No masses or tenderness.  No hepatosplenomegaly.  Aorta cannot be   palpated.  EYE:  Normal conjunctivae and lids.  ENT:  Good dentition.  NECK:  No JVD or thyromegaly.  MUSCULOSKELETAL:  No kyphosis or scoliosis.  Without  clubbing or cyanosis.  SKIN:  Warm and dry.  NEUROLOGIC:  Alert and oriented x3.  Normal mood and affect.  Midline tongue.    No speech difficulty or hoarseness, 5/5 motor strength in all extremities.    IMAGING:    STEPHANIA R 0.73 (o prior), L non-compressible      ASSESSMENT:   MArked improvement in severe R PAOD    PLAN:  1.   Cont med Rx  2. 6 month f/u with R CFA stent duplex and stephania    JOAQUÍN Mcpherson III, MD, FACS  Professor and Chief, Vascular and Endovascular Surgery

## 2019-01-02 DIAGNOSIS — I73.9 PVD (PERIPHERAL VASCULAR DISEASE): Primary | ICD-10-CM

## 2019-01-02 DIAGNOSIS — I73.9 PAD (PERIPHERAL ARTERY DISEASE): ICD-10-CM

## 2019-01-04 ENCOUNTER — INITIAL CONSULT (OUTPATIENT)
Dept: OPHTHALMOLOGY | Facility: CLINIC | Age: 84
End: 2019-01-04
Payer: MEDICARE

## 2019-01-04 ENCOUNTER — OFFICE VISIT (OUTPATIENT)
Dept: OPTOMETRY | Facility: CLINIC | Age: 84
End: 2019-01-04
Payer: MEDICARE

## 2019-01-04 DIAGNOSIS — H43.813 POSTERIOR VITREOUS DETACHMENT, BOTH EYES: ICD-10-CM

## 2019-01-04 DIAGNOSIS — H35.033 HYPERTENSIVE RETINOPATHY OF BOTH EYES: ICD-10-CM

## 2019-01-04 DIAGNOSIS — H00.11 CHALAZION OF RIGHT UPPER EYELID: ICD-10-CM

## 2019-01-04 DIAGNOSIS — H35.3232 EXUDATIVE AGE-RELATED MACULAR DEGENERATION OF BOTH EYES WITH INACTIVE CHOROIDAL NEOVASCULARIZATION: Primary | ICD-10-CM

## 2019-01-04 DIAGNOSIS — H35.89 ATROPHY OF MACULA LUTEA: ICD-10-CM

## 2019-01-04 DIAGNOSIS — H02.9 LESION OF UPPER EYELID: Primary | ICD-10-CM

## 2019-01-04 PROCEDURE — 92012 PR EYE EXAM, EST PATIENT,INTERMED: ICD-10-PCS | Mod: S$GLB,,, | Performed by: OPTOMETRIST

## 2019-01-04 PROCEDURE — 99499 RISK ADDL DX/OHS AUDIT: ICD-10-PCS | Mod: S$GLB,,, | Performed by: OPHTHALMOLOGY

## 2019-01-04 PROCEDURE — 99999 PR PBB SHADOW E&M-EST. PATIENT-LVL I: ICD-10-PCS | Mod: PBBFAC,,, | Performed by: OPHTHALMOLOGY

## 2019-01-04 PROCEDURE — 99499 UNLISTED E&M SERVICE: CPT | Mod: S$GLB,,, | Performed by: OPHTHALMOLOGY

## 2019-01-04 PROCEDURE — 92014 COMPRE OPH EXAM EST PT 1/>: CPT | Mod: S$GLB,,, | Performed by: OPHTHALMOLOGY

## 2019-01-04 PROCEDURE — 92285 EXTERNAL OCULAR PHOTOGRAPHY: CPT | Mod: S$GLB,,, | Performed by: OPHTHALMOLOGY

## 2019-01-04 PROCEDURE — 92014 PR EYE EXAM, EST PATIENT,COMPREHESV: ICD-10-PCS | Mod: S$GLB,,, | Performed by: OPHTHALMOLOGY

## 2019-01-04 PROCEDURE — 99999 PR PBB SHADOW E&M-EST. PATIENT-LVL II: ICD-10-PCS | Mod: PBBFAC,,, | Performed by: OPTOMETRIST

## 2019-01-04 PROCEDURE — 99999 PR PBB SHADOW E&M-EST. PATIENT-LVL I: CPT | Mod: PBBFAC,,, | Performed by: OPHTHALMOLOGY

## 2019-01-04 PROCEDURE — 92285 EXTERNAL PHOTOGRAPHY - OU - BOTH EYES: ICD-10-PCS | Mod: S$GLB,,, | Performed by: OPHTHALMOLOGY

## 2019-01-04 PROCEDURE — 99999 PR PBB SHADOW E&M-EST. PATIENT-LVL II: CPT | Mod: PBBFAC,,, | Performed by: OPTOMETRIST

## 2019-01-04 PROCEDURE — 92012 INTRM OPH EXAM EST PATIENT: CPT | Mod: S$GLB,,, | Performed by: OPTOMETRIST

## 2019-01-04 NOTE — PROGRESS NOTES
HPI     87 year old female presents for urgent visit with Dr. Beltrán for bump   inside upper eyelid temp OD.  Pt states that for the past few days the bump on her OD has gotten larger   and notcied the OD was having a lot of discharge. Pt states that the   discharge will make vision slightly hazy. Pt states that it feels like a   FB sensation in OD. Pt has had the bump for a long time but it was never   this big. Pt denies use of eye gtts or during warm compresses at this   time.    JLUIS- 09/04/2018 Dr. Beltrán    Ocular Hx  Dry AMD OU  Hyperopic OU        Last edited by Ashlyn Chacon on 1/4/2019  9:44 AM. (History)            Assessment /Plan     For exam results, see Encounter Report.    Lesion of upper eyelid      Chalzion vs cyst?    Consult Dr. Castillo

## 2019-01-04 NOTE — LETTER
January 4, 2019      Ladan Beltrán, OD  [address]  [facility] [address]           [facility]  [address]  [facility] [address]  Phone: [phone]  Fax: [phone]          Patient: Brunilda England   MR Number: [id number]   YOB: 1931   Date of Visit: 1/4/2019       Dear Dr. Ladan Beltrán:    Thank you for referring Brunilda England to me for evaluation. Attached you will find relevant portions of my assessment and plan of care.    If you have questions, please do not hesitate to call me. I look forward to following Brunilda England along with you.    Sincerely,    Leslie Castillo MD    Enclosure  CC:  No Recipients    If you would like to receive this communication electronically, please contact [email or web address] or [phone] to request more information on [employer] Link access.    For providers and/or their staff who would like to refer a patient to [facility], please contact us through our one-stop-shop provider referral line, [facility], at [phone].    If you feel you have received this communication in error or would no longer like to receive these types of communications, please e-mail [email or web address]

## 2019-01-04 NOTE — PROGRESS NOTES
HPI     Pt here for a chalazion consult.  Referred by Dr. Beltrán from [facility].    Pt c/o of discharge that leaks from lesion.  Pt denies eye pain, blurry/double vision, tearing, dryness, black spots,   floaters, flashes, photophobia.  Pt denies squeezing lesion.  Pt has used warm compresses with no improvement.    Eye meds: otc drops for dryness ou prn.    Last edited by Caio Butt on 1/4/2019 10:49 AM. (History)            Assessment /Plan     For exam results, see Encounter Report.    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  She complains of a right upper eyelid growth that has been present for approximately a year but has worsened over the last 2 weeks.    The patient has a right upper eyelid chalazion.  Plan for I and D of the right upper eyelid chalazion in the minor room.    Informed consent obtained after extensive risks/benefits/alternatives were discussed with the patient including but not limited to pain, bleeding, infection, ocular injury, loss of the eye, asymmetry, need for revision in future, scarring.  Alternatives such as waiting were discussed.  All questions were answered.      Hold ASA, NSAIDS, and fish oil 5 to 7 days prior to procedure.    Return for surgery     Recommend strict blood sugar and blood pressure control.    Wet AMD ou monitored by Dr. Leigh and Dr. Luque

## 2019-01-09 ENCOUNTER — TELEPHONE (OUTPATIENT)
Dept: OPHTHALMOLOGY | Facility: CLINIC | Age: 84
End: 2019-01-09

## 2019-01-09 NOTE — TELEPHONE ENCOUNTER
----- Message from Mitchel Campbell sent at 1/9/2019 11:40 AM CST -----  Contact: eleuterio  This is being done on the 10th floor.  I will call james Grullon   ----- Message -----  From: Caio Butt  Sent: 1/9/2019  11:18 AM  To: Mitchel Campbell        ----- Message -----  From: Isha Cazares  Sent: 1/9/2019  10:45 AM  To: Jonathan Nelson Staff    Ms. England is schedule on Friday for a procedure. She would like to know if it is okay if she takes a half xanax before she comes. She can be reached at [phone]

## 2019-01-09 NOTE — TELEPHONE ENCOUNTER
----- Message from Caio Butt sent at 1/9/2019 11:18 AM CST -----  Contact: eleuterio      ----- Message -----  From: Isha Cazares  Sent: 1/9/2019  10:45 AM  To: Jonathan Nelson Staff    Ms. England is schedule on Friday for a procedure. She would like to know if it is okay if she takes a half xanax before she comes. She can be reached at [phone]    Phoned pt.  Advised she could take 1/2 Xanax before procedure on Friday with Dr. Castillo.  Pt will have someone with her.  [facility]

## 2019-01-11 ENCOUNTER — PROCEDURE VISIT (OUTPATIENT)
Dept: OPHTHALMOLOGY | Facility: CLINIC | Age: 84
End: 2019-01-11
Payer: MEDICARE

## 2019-01-11 DIAGNOSIS — H35.3232 EXUDATIVE AGE-RELATED MACULAR DEGENERATION OF BOTH EYES WITH INACTIVE CHOROIDAL NEOVASCULARIZATION: ICD-10-CM

## 2019-01-11 DIAGNOSIS — H02.9 LESION OF UPPER EYELID: ICD-10-CM

## 2019-01-11 DIAGNOSIS — H35.89 ATROPHY OF MACULA LUTEA: ICD-10-CM

## 2019-01-11 DIAGNOSIS — H00.11 CHALAZION OF RIGHT UPPER EYELID: Primary | ICD-10-CM

## 2019-01-11 DIAGNOSIS — H43.813 POSTERIOR VITREOUS DETACHMENT, BOTH EYES: ICD-10-CM

## 2019-01-11 DIAGNOSIS — H35.033 HYPERTENSIVE RETINOPATHY OF BOTH EYES: ICD-10-CM

## 2019-01-11 PROCEDURE — 88304 TISSUE EXAM BY PATHOLOGIST: CPT | Mod: 26,,, | Performed by: PATHOLOGY

## 2019-01-11 PROCEDURE — 99499 UNLISTED E&M SERVICE: CPT | Mod: S$GLB,,, | Performed by: OPHTHALMOLOGY

## 2019-01-11 PROCEDURE — 67800 PR EXCIS CHALAZION,SINGLE: ICD-10-PCS | Mod: 51,E3,S$GLB, | Performed by: OPHTHALMOLOGY

## 2019-01-11 PROCEDURE — 99499 RISK ADDL DX/OHS AUDIT: ICD-10-PCS | Mod: S$GLB,,, | Performed by: OPHTHALMOLOGY

## 2019-01-11 PROCEDURE — 88304 TISSUE EXAM BY PATHOLOGIST: CPT | Mod: 59 | Performed by: PATHOLOGY

## 2019-01-11 PROCEDURE — 67840 REMOVE EYELID LESION: CPT | Mod: E3,S$GLB,, | Performed by: OPHTHALMOLOGY

## 2019-01-11 PROCEDURE — 88304 TISSUE SPECIMEN TO PATHOLOGY, OPHTHALMOLOGY: ICD-10-PCS | Mod: 26,,, | Performed by: PATHOLOGY

## 2019-01-11 PROCEDURE — 67840 PR REMOVE, EYELID, LESN (NOT CHALAZION): ICD-10-PCS | Mod: E3,S$GLB,, | Performed by: OPHTHALMOLOGY

## 2019-01-11 PROCEDURE — 67800 REMOVE EYELID LESION: CPT | Mod: 51,E3,S$GLB, | Performed by: OPHTHALMOLOGY

## 2019-01-11 RX ORDER — HYDROCODONE BITARTRATE AND ACETAMINOPHEN 5; 325 MG/1; MG/1
1 TABLET ORAL EVERY 6 HOURS PRN
Qty: 12 TABLET | Refills: 0 | Status: SHIPPED | OUTPATIENT
Start: 2019-01-11 | End: 2019-02-18

## 2019-01-11 RX ORDER — TOBRAMYCIN AND DEXAMETHASONE 3; 1 MG/ML; MG/ML
1 SUSPENSION/ DROPS OPHTHALMIC 4 TIMES DAILY
Qty: 5 ML | Refills: 0 | Status: SHIPPED | OUTPATIENT
Start: 2019-01-11 | End: 2019-01-18

## 2019-01-11 NOTE — PROGRESS NOTES
HPI     Pt here for chalazion removal.  Last seen on 1/4/2019.    Last edited by Caio Butt on 1/11/2019  9:07 AM. (History)            Assessment /Plan     For exam results, see Encounter Report.    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 for 7 days  Dispense: 5 mL; Refill: 0  -     HYDROcodone-acetaminophen (NORCO) 5-325 mg per tablet; Take 1 tablet by mouth every 6 (six) hours as needed for Pain.  Dispense: 12 tablet; Refill: 0      The patient is here for removal of chalazion of right lateral upper eyelid. The patient is bothered by an umbilicated central upper eyelid lesion that has been present for at least a couple of years.    Procedure Note     Attending: Leslie Castillo  Resident: Teddy Austin  Pre-op Dx: right upper eyelid chalazion, rul lesion  Post-op Dx: same  Local: 2% lidocaine with epinephrine, 0.5 % marcaine with 4% NaHCO3, and vitrase  Specimens: right chalazion contents and right upper eyelid lesion  Complications: None  Blood Loss: minimal     The patient was prepped and draped in the usual sterile manner for ophthalmic plastic surgery.  A chalazion clamp was placed over the chalazion. 2 cc of local anesthesia was given to the eyelid. The chalazion clamp was placed on the eyelid and everted. An 11 blade was used to incise the tarsus and conjunctiva. The contents were expressed with curettes, and a  specimen was sent to pathology. Next, the eyelid margin lesion was excised with jacqueline scissors. Hemostasis was obtained with high-temp cautery. Tobradex ointment was applied to the wounds. The patient tolerated the procedure well. There were no complications.      Post-operative instructions were given to the patient.      Return prn     Follow-up with Dr. Leigh for AMD ou.

## 2019-01-18 ENCOUNTER — TELEPHONE (OUTPATIENT)
Dept: OPHTHALMOLOGY | Facility: CLINIC | Age: 84
End: 2019-01-18

## 2019-02-14 ENCOUNTER — TELEPHONE (OUTPATIENT)
Dept: INTERNAL MEDICINE | Facility: CLINIC | Age: 84
End: 2019-02-14

## 2019-02-14 NOTE — TELEPHONE ENCOUNTER
----- Message from Nicole Byers sent at 2/14/2019 10:52 AM CST -----  Contact: self  Pt called in about wanting to schedule appt.pt need to see her soon. Next appt is 4/4/19, Pt would like the nurse to give her a call back      Pt can be reached at [phone]      TY

## 2019-02-14 NOTE — TELEPHONE ENCOUNTER
Pt states that she ate some broccoli yesterday and her stomach has not been the same. She states that she took some OTC, with no relief, she now has gas. Pt doesn't want to see anyone else but PCP on Monday. apt booked .

## 2019-02-15 DIAGNOSIS — E11.59 TYPE 2 DIABETES MELLITUS WITH OTHER CIRCULATORY COMPLICATION, WITHOUT LONG-TERM CURRENT USE OF INSULIN: ICD-10-CM

## 2019-02-15 DIAGNOSIS — I10 ESSENTIAL HYPERTENSION: Primary | ICD-10-CM

## 2019-02-18 ENCOUNTER — OFFICE VISIT (OUTPATIENT)
Dept: INTERNAL MEDICINE | Facility: CLINIC | Age: 84
End: 2019-02-18
Payer: MEDICARE

## 2019-02-18 VITALS
WEIGHT: 181.75 LBS | SYSTOLIC BLOOD PRESSURE: 134 MMHG | HEIGHT: 65 IN | OXYGEN SATURATION: 98 % | HEART RATE: 77 BPM | DIASTOLIC BLOOD PRESSURE: 60 MMHG | BODY MASS INDEX: 30.28 KG/M2

## 2019-02-18 DIAGNOSIS — D64.9 ANEMIA, UNSPECIFIED TYPE: Primary | ICD-10-CM

## 2019-02-18 DIAGNOSIS — I10 ESSENTIAL HYPERTENSION: ICD-10-CM

## 2019-02-18 DIAGNOSIS — M81.0 OSTEOPOROSIS, UNSPECIFIED OSTEOPOROSIS TYPE, UNSPECIFIED PATHOLOGICAL FRACTURE PRESENCE: ICD-10-CM

## 2019-02-18 DIAGNOSIS — I25.10 CORONARY ARTERY DISEASE INVOLVING NATIVE CORONARY ARTERY OF NATIVE HEART WITHOUT ANGINA PECTORIS: ICD-10-CM

## 2019-02-18 DIAGNOSIS — G31.84 MILD COGNITIVE IMPAIRMENT: ICD-10-CM

## 2019-02-18 DIAGNOSIS — E11.51 TYPE 2 DIABETES MELLITUS WITH DIABETIC PERIPHERAL ANGIOPATHY WITHOUT GANGRENE, WITHOUT LONG-TERM CURRENT USE OF INSULIN: ICD-10-CM

## 2019-02-18 DIAGNOSIS — G57.92 NEUROPATHY OF LEFT LOWER EXTREMITY: ICD-10-CM

## 2019-02-18 DIAGNOSIS — F39 MOOD DISORDER: ICD-10-CM

## 2019-02-18 PROBLEM — F43.23 ADJUSTMENT DISORDER WITH MIXED ANXIETY AND DEPRESSED MOOD: Status: RESOLVED | Noted: 2018-09-05 | Resolved: 2019-02-18

## 2019-02-18 PROCEDURE — 99214 OFFICE O/P EST MOD 30 MIN: CPT | Mod: S$GLB,,, | Performed by: INTERNAL MEDICINE

## 2019-02-18 PROCEDURE — 99999 PR PBB SHADOW E&M-EST. PATIENT-LVL IV: ICD-10-PCS | Mod: PBBFAC,,, | Performed by: INTERNAL MEDICINE

## 2019-02-18 PROCEDURE — 1101F PR PT FALLS ASSESS DOC 0-1 FALLS W/OUT INJ PAST YR: ICD-10-PCS | Mod: CPTII,S$GLB,, | Performed by: INTERNAL MEDICINE

## 2019-02-18 PROCEDURE — 1101F PT FALLS ASSESS-DOCD LE1/YR: CPT | Mod: CPTII,S$GLB,, | Performed by: INTERNAL MEDICINE

## 2019-02-18 PROCEDURE — 99499 RISK ADDL DX/OHS AUDIT: ICD-10-PCS | Mod: S$GLB,,, | Performed by: INTERNAL MEDICINE

## 2019-02-18 PROCEDURE — 99999 PR PBB SHADOW E&M-EST. PATIENT-LVL IV: CPT | Mod: PBBFAC,,, | Performed by: INTERNAL MEDICINE

## 2019-02-18 PROCEDURE — 99499 UNLISTED E&M SERVICE: CPT | Mod: S$GLB,,, | Performed by: INTERNAL MEDICINE

## 2019-02-18 PROCEDURE — 99214 PR OFFICE/OUTPT VISIT, EST, LEVL IV, 30-39 MIN: ICD-10-PCS | Mod: S$GLB,,, | Performed by: INTERNAL MEDICINE

## 2019-02-18 NOTE — PROGRESS NOTES
CHIEF COMPLAINT: Follow up of Ulcer right lower leg, diabetes, hypertension, grief.     HISTORY OF PRESENT ILLNESS: This is a 87-year-old woman who presents for follow up of above.    She has been alternating with constipation and diarrhea. No melana or bloody stools, nausea, or vomiting. She had all of her teeth pulled in January. She is going Wednesday to get fitted for dentures.  Diet has been off.      June was a hard month for her.  Her 's birthday ws [birth year] then Father's day June 17, her anniversay is June 25, her mother's birthday is June 18.   Her  passed away on August 26, 2017. They were  67 years. It has been a tough year. She misses him. She feels that she is coping well as he was sick for a very long time. She was his primary care giver. She is on Cymbalta 60 mg daliy and celexa 10 mg daily.. Mood is good Neuropathic pain in the legs is better controlled with the duloxetine.      She had right leg pain - she had a blockage. Dr Gaviria placed a stent in the right CFA and PTA left ext loulou artery. Right leg pain has resolved.  The ulcerated area on the right leg has totally healed.  She is taking aspirin 81 mg daily.      Balance is ok.  She has to take her time when she first gets up.  She is not doing her physical therapy exercises at home.     She feels that her memory is better. She is taking aricept 5 mg daily.         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  Right hip pain has resolved and has not returned.          Her non small cell lung cancer is stable. She saw Dr Weir 8/9/18  and scans were stable. She is to follow up annually with CT scan chest.  She is breathing well. No chest pain, shortness of breath, fever, chills.       She is currently taking actos 15 mg daily. No polydipsia, polyuria, hypoglycemia. She checks her blood sugars couple times a week. Blood sugar was 102 last time she checked it. No hypoglycemia.    She  continues to take Lotrel 5/20 once daily, metoprolol 50 mg 1 tablet twice daily, and hydrochlorothiazide 12.5 mg daily for her hypertension. She denies any chest pain or shortness of breath. She continues to take simvastatin 80 mg at bedtime for her hyperlipidemia. No joint pain or muscle pain.        She is taking Fosamax once week for her bones. She denies any heartburn from the Fosamax. No melana, bloody stools, constipation. She has occasional diarrhea if she eats something wrong.      She takes vitamin B12 1000 mcg daily for vitamin B12 deficiency - anemia is stable        SHe has macular degeneration of the eyes and was followed by Dr Tresa Coreas. She got injections in her eyes every 6 weeks in Spring 2015. She now sees Dr Ruelas at [facility] (last saw him 8/17- no need for injections at that time). Vision has been stable. She is taking a Eye vitamins for her eyes.       NO vaginal bleeding and no longer needs vaginal premarin cream    PAST MEDICAL HISTORY:    1. Stage III non-small cell lung cancer, completed chemoradiation with carboplatin and Taxol on 6/1/12    2. Hypertension.   3. Diabetes mellitus.   4. Hyperlipidemia.   5. Chronic diastolic dysfunction.   6. Coronary artery disease status post MI in 1990 and 2003. Stenting was   done at 2003 at [facility].   7. Cholecystectomy, December 2006.   8. Cataract removal.   9. Benign growth removed from her throat.   10. Left common femoral endarterectomy, July 2007.   11. History of anal cancer in 1996 status post radiation and chemotherapy.   12. Carpal tunnel syndrome.   13. Osteoporosis on BMD 12/11    14. Macular degeneration    SOCIAL HISTORY: She quit smoking in 1995, denies any alcohol use. She is etired.     REVIEW OF SYSTEMS: She denies fevers, chills, night sweats, fatigue, visual change, hearing loss, sinus congestion, sore throat, dysuria, hematuria, joint pain, muscle pain, polydipsia, and polyuria.     PHYSICAL EXAM:    /60  "(BP Location: Right arm, Patient Position: Sitting, BP Method: Medium (Manual))   Pulse 77   Ht 5' 5" (1.651 m)   Wt 82.5 kg (181 lb 12.3 oz)   SpO2 98%   BMI 30.25 kg/m²   GENERAL: She is alert and oriented. No apparent distress. Affect within normal limits.   Conjunctivae anicteric. Tympanic membranes clear. Oropharynx clear.   NECK: Supple.   Respiratory effort normal. Lungs clear to auscultation.   HEART: Regular rate and rhythm without murmurs, gallops, or rubs. No lower   extremity edema.            ASSESSMENT AND PLAN:    1. PVD - better after stenting  2. Mood disorder - better on cymbalta to 60 mg daily  3. Memory issues - better on aricept 5 mg daily.   4..  Stage III non-small cell lung cancer, completed chemoradiation with carboplatin and Taxol on 6/1/12 - doing well. FOllow up with Dr Weir in August 2019  5. Diabetes-stable  3. Hypertension-controlled  4. Hyperlipidemia-lipids controlled  5. Coronary artery disease modifying risk factors.   6. History of anal cancer-had a normal colonoscopy, November 2008; due 2015. Declined repeat  8. Pernicious anemia - on counter vitamin B12 1000 mcg daily. Level fine in Jan 2018  9. Osteoporosis - on alendroante.    10. Left leg pain due to neuropathy-stable.  .    11. Macular degeneration - stable  Screening mammogram was 8/18 Colonoscopy is due 2015 -declined. Pap smear was July 2010.  Flu 9/252017, Prevnar 11/2015  I will see her back in 3 months, sooner if problems arise  "

## 2019-03-22 DIAGNOSIS — I10 ESSENTIAL HYPERTENSION: ICD-10-CM

## 2019-03-22 RX ORDER — METOPROLOL TARTRATE 50 MG/1
TABLET ORAL
Qty: 180 TABLET | Refills: 4 | Status: SHIPPED | OUTPATIENT
Start: 2019-03-22 | End: 2020-06-08

## 2019-03-22 RX ORDER — AMLODIPINE AND BENAZEPRIL HYDROCHLORIDE 5; 20 MG/1; MG/1
CAPSULE ORAL
Qty: 90 CAPSULE | Refills: 4 | Status: SHIPPED | OUTPATIENT
Start: 2019-03-22 | End: 2020-06-08

## 2019-03-30 RX ORDER — PIOGLITAZONEHYDROCHLORIDE 15 MG/1
TABLET ORAL
Qty: 90 TABLET | Refills: 4 | Status: SHIPPED | OUTPATIENT
Start: 2019-03-30 | End: 2020-06-08

## 2019-04-08 RX ORDER — CITALOPRAM 10 MG/1
10 TABLET ORAL DAILY
Qty: 90 TABLET | Refills: 4 | Status: SHIPPED | OUTPATIENT
Start: 2019-04-08 | End: 2020-04-07

## 2019-04-09 RX ORDER — HYDROCHLOROTHIAZIDE 12.5 MG/1
12.5 CAPSULE ORAL EVERY MORNING
Qty: 90 CAPSULE | Refills: 4 | Status: SHIPPED | OUTPATIENT
Start: 2019-04-09 | End: 2020-06-08

## 2019-04-16 ENCOUNTER — TELEPHONE (OUTPATIENT)
Dept: HEMATOLOGY/ONCOLOGY | Facility: CLINIC | Age: 84
End: 2019-04-16

## 2019-04-16 NOTE — TELEPHONE ENCOUNTER
spoke with pt on today in regards to August f/u,  inform pt  schedule is not quite open, pt voiced she understands,  inform pt once open will be mailed out, pt agrees.

## 2019-05-21 ENCOUNTER — OFFICE VISIT (OUTPATIENT)
Dept: INTERNAL MEDICINE | Facility: CLINIC | Age: 84
End: 2019-05-21
Payer: MEDICARE

## 2019-05-21 ENCOUNTER — LAB VISIT (OUTPATIENT)
Dept: LAB | Facility: HOSPITAL | Age: 84
End: 2019-05-21
Attending: INTERNAL MEDICINE
Payer: MEDICARE

## 2019-05-21 VITALS
BODY MASS INDEX: 29.66 KG/M2 | HEIGHT: 65 IN | SYSTOLIC BLOOD PRESSURE: 128 MMHG | OXYGEN SATURATION: 98 % | HEART RATE: 88 BPM | DIASTOLIC BLOOD PRESSURE: 60 MMHG | WEIGHT: 178 LBS

## 2019-05-21 DIAGNOSIS — I10 ESSENTIAL HYPERTENSION: Primary | ICD-10-CM

## 2019-05-21 DIAGNOSIS — M89.9 BONE DISORDER: ICD-10-CM

## 2019-05-21 DIAGNOSIS — E11.51 TYPE 2 DIABETES MELLITUS WITH DIABETIC PERIPHERAL ANGIOPATHY WITHOUT GANGRENE, WITHOUT LONG-TERM CURRENT USE OF INSULIN: ICD-10-CM

## 2019-05-21 DIAGNOSIS — F39 MOOD DISORDER: ICD-10-CM

## 2019-05-21 DIAGNOSIS — I25.10 CORONARY ARTERY DISEASE INVOLVING NATIVE CORONARY ARTERY OF NATIVE HEART WITHOUT ANGINA PECTORIS: ICD-10-CM

## 2019-05-21 DIAGNOSIS — Z85.048 HISTORY OF RECTAL OR ANAL CANCER: ICD-10-CM

## 2019-05-21 DIAGNOSIS — Z85.118 HISTORY OF LUNG CANCER: ICD-10-CM

## 2019-05-21 DIAGNOSIS — I70.229 ATHEROSCLEROTIC PERIPHERAL VASCULAR DISEASE WITH REST PAIN: ICD-10-CM

## 2019-05-21 DIAGNOSIS — I10 ESSENTIAL HYPERTENSION: ICD-10-CM

## 2019-05-21 DIAGNOSIS — M81.0 OSTEOPOROSIS, UNSPECIFIED OSTEOPOROSIS TYPE, UNSPECIFIED PATHOLOGICAL FRACTURE PRESENCE: ICD-10-CM

## 2019-05-21 DIAGNOSIS — E11.59 TYPE 2 DIABETES MELLITUS WITH OTHER CIRCULATORY COMPLICATION, WITHOUT LONG-TERM CURRENT USE OF INSULIN: ICD-10-CM

## 2019-05-21 LAB
ALBUMIN SERPL BCP-MCNC: 3.7 G/DL (ref 3.5–5.2)
ALP SERPL-CCNC: 40 U/L (ref 55–135)
ALT SERPL W/O P-5'-P-CCNC: 10 U/L (ref 10–44)
ANION GAP SERPL CALC-SCNC: 8 MMOL/L (ref 8–16)
AST SERPL-CCNC: 16 U/L (ref 10–40)
BASOPHILS # BLD AUTO: 0.01 K/UL (ref 0–0.2)
BASOPHILS NFR BLD: 0.2 % (ref 0–1.9)
BILIRUB SERPL-MCNC: 0.3 MG/DL (ref 0.1–1)
BUN SERPL-MCNC: 15 MG/DL (ref 8–23)
CALCIUM SERPL-MCNC: 10.4 MG/DL (ref 8.7–10.5)
CHLORIDE SERPL-SCNC: 102 MMOL/L (ref 95–110)
CHOLEST SERPL-MCNC: 156 MG/DL (ref 120–199)
CHOLEST/HDLC SERPL: 2.9 {RATIO} (ref 2–5)
CO2 SERPL-SCNC: 32 MMOL/L (ref 23–29)
CREAT SERPL-MCNC: 1.1 MG/DL (ref 0.5–1.4)
DIFFERENTIAL METHOD: ABNORMAL
EOSINOPHIL # BLD AUTO: 0.1 K/UL (ref 0–0.5)
EOSINOPHIL NFR BLD: 2.7 % (ref 0–8)
ERYTHROCYTE [DISTWIDTH] IN BLOOD BY AUTOMATED COUNT: 16.5 % (ref 11.5–14.5)
EST. GFR  (AFRICAN AMERICAN): 52 ML/MIN/1.73 M^2
EST. GFR  (NON AFRICAN AMERICAN): 45 ML/MIN/1.73 M^2
ESTIMATED AVG GLUCOSE: 140 MG/DL (ref 68–131)
GLUCOSE SERPL-MCNC: 107 MG/DL (ref 70–110)
HBA1C MFR BLD HPLC: 6.5 % (ref 4–5.6)
HCT VFR BLD AUTO: 33 % (ref 37–48.5)
HDLC SERPL-MCNC: 54 MG/DL (ref 40–75)
HDLC SERPL: 34.6 % (ref 20–50)
HGB BLD-MCNC: 11 G/DL (ref 12–16)
LDLC SERPL CALC-MCNC: 64.4 MG/DL (ref 63–159)
LYMPHOCYTES # BLD AUTO: 1.4 K/UL (ref 1–4.8)
LYMPHOCYTES NFR BLD: 33.7 % (ref 18–48)
MCH RBC QN AUTO: 28.9 PG (ref 27–31)
MCHC RBC AUTO-ENTMCNC: 33.3 G/DL (ref 32–36)
MCV RBC AUTO: 87 FL (ref 82–98)
MONOCYTES # BLD AUTO: 0.5 K/UL (ref 0.3–1)
MONOCYTES NFR BLD: 12.6 % (ref 4–15)
NEUTROPHILS # BLD AUTO: 2.1 K/UL (ref 1.8–7.7)
NEUTROPHILS NFR BLD: 50.6 % (ref 38–73)
NONHDLC SERPL-MCNC: 102 MG/DL
PLATELET # BLD AUTO: 175 K/UL (ref 150–350)
PMV BLD AUTO: 10.3 FL (ref 9.2–12.9)
POTASSIUM SERPL-SCNC: 3.9 MMOL/L (ref 3.5–5.1)
PROT SERPL-MCNC: 7.9 G/DL (ref 6–8.4)
RBC # BLD AUTO: 3.81 M/UL (ref 4–5.4)
SODIUM SERPL-SCNC: 142 MMOL/L (ref 136–145)
TRIGL SERPL-MCNC: 188 MG/DL (ref 30–150)
TSH SERPL DL<=0.005 MIU/L-ACNC: 3.86 UIU/ML (ref 0.4–4)
WBC # BLD AUTO: 4.12 K/UL (ref 3.9–12.7)

## 2019-05-21 PROCEDURE — 99999 PR PBB SHADOW E&M-EST. PATIENT-LVL IV: ICD-10-PCS | Mod: PBBFAC,,, | Performed by: INTERNAL MEDICINE

## 2019-05-21 PROCEDURE — 99214 OFFICE O/P EST MOD 30 MIN: CPT | Mod: S$GLB,,, | Performed by: INTERNAL MEDICINE

## 2019-05-21 PROCEDURE — 80053 COMPREHEN METABOLIC PANEL: CPT

## 2019-05-21 PROCEDURE — 83036 HEMOGLOBIN GLYCOSYLATED A1C: CPT

## 2019-05-21 PROCEDURE — 1101F PR PT FALLS ASSESS DOC 0-1 FALLS W/OUT INJ PAST YR: ICD-10-PCS | Mod: CPTII,S$GLB,, | Performed by: INTERNAL MEDICINE

## 2019-05-21 PROCEDURE — 99499 RISK ADDL DX/OHS AUDIT: ICD-10-PCS | Mod: S$GLB,,, | Performed by: INTERNAL MEDICINE

## 2019-05-21 PROCEDURE — 99214 PR OFFICE/OUTPT VISIT, EST, LEVL IV, 30-39 MIN: ICD-10-PCS | Mod: S$GLB,,, | Performed by: INTERNAL MEDICINE

## 2019-05-21 PROCEDURE — 99499 UNLISTED E&M SERVICE: CPT | Mod: S$GLB,,, | Performed by: INTERNAL MEDICINE

## 2019-05-21 PROCEDURE — 84443 ASSAY THYROID STIM HORMONE: CPT

## 2019-05-21 PROCEDURE — 1101F PT FALLS ASSESS-DOCD LE1/YR: CPT | Mod: CPTII,S$GLB,, | Performed by: INTERNAL MEDICINE

## 2019-05-21 PROCEDURE — 99999 PR PBB SHADOW E&M-EST. PATIENT-LVL IV: CPT | Mod: PBBFAC,,, | Performed by: INTERNAL MEDICINE

## 2019-05-21 PROCEDURE — 36415 COLL VENOUS BLD VENIPUNCTURE: CPT

## 2019-05-21 PROCEDURE — 85025 COMPLETE CBC W/AUTO DIFF WBC: CPT

## 2019-05-21 PROCEDURE — 80061 LIPID PANEL: CPT

## 2019-05-21 RX ORDER — ALENDRONATE SODIUM 70 MG/1
70 TABLET ORAL
Qty: 12 TABLET | Refills: 4 | Status: SHIPPED | OUTPATIENT
Start: 2019-05-21 | End: 2019-09-16

## 2019-05-21 RX ORDER — DULOXETIN HYDROCHLORIDE 60 MG/1
60 CAPSULE, DELAYED RELEASE ORAL EVERY MORNING
Qty: 90 CAPSULE | Refills: 4 | Status: SHIPPED | OUTPATIENT
Start: 2019-05-21 | End: 2020-06-11

## 2019-05-21 NOTE — PROGRESS NOTES
CHIEF COMPLAINT: Follow up of Ulcer right lower leg, diabetes, hypertension, grief.     HISTORY OF PRESENT ILLNESS: This is a 87-year-old woman who presents for follow up of above.     She has been alternating with constipation and diarrhea. No melana or bloody stools, nausea, or vomiting. She had all of her teeth pulled in January. She has not gotten her dentures yet. She had to have some bone cut down prior dentures.  Appetite is good.  She is eating good without her teeth.        She had right leg pain - she had a blockage. Dr Gaviria placed a stent in the right CFA and PTA left ext loulou artery 12/18. Right leg pain has resolved.  The ulcerated area on the right leg has totally healed and has not returned.  She is taking aspirin 81 mg daily.       Balance is ok.  No falls. She has to take her time when she gets up from a seated position.      She feels that her memory is better. She is taking aricept 5 mg daily.      June was a hard month for her.  Her 's birthday ws [birth year] then Father's day June 17, her anniversay is June 25, her mother's birthday is June 18.   Her  passed away on August 26, 2017. They were  67 years. It has been a tough year. She misses him. She feels that she is coping well as he was sick for a very long time. She was his primary care giver. She is on Cymbalta 60 mg daliy and celexa 10 mg daily. Mood is good Neuropathic pain in the legs is better controlled with the duloxetine.        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  Right hip pain has resolved and has not returned.          Her non small cell lung cancer is stable. She saw Dr Weir 8/9/18  and scans were stable. She is to follow up annually with CT scan chest.  She is breathing well. No chest pain, shortness of breath, fever, chills.       She is currently taking actos 15 mg daily. No polydipsia, polyuria, hypoglycemia. She checks her blood sugars couple times a week.  Blood sugar was 102 last time she checked it. No hypoglycemia.    She continues to take Lotrel 5/20 once daily, metoprolol 50 mg 1 tablet twice daily, and hydrochlorothiazide 12.5 mg daily for her hypertension. She denies any chest pain or shortness of breath. She continues to take simvastatin 80 mg at bedtime for her hyperlipidemia. No joint pain or muscle pain.        She is taking Fosamax once week for her bones. She denies any heartburn from the Fosamax. No melana, bloody stools, constipation. She has occasional diarrhea if she eats something wrong.      She takes vitamin B12 1000 mcg daily for vitamin B12 deficiency - anemia is stable        SHe has macular degeneration of the eyes and was followed by Dr Tresa Coreas. She got injections in her eyes every 6 weeks in Spring 2015. She now sees Dr Ruelas at [facility] (last saw him 8/18- no need for injections at that time- yearly follow up). Vision has been stable. She is taking a Eye vitamins for her eyes.       No vaginal bleeding and no longer needs vaginal premarin cream    PAST MEDICAL HISTORY:    1. Stage III non-small cell lung cancer, completed chemoradiation with carboplatin and Taxol on 6/1/12    2. Hypertension.   3. Diabetes mellitus.   4. Hyperlipidemia.   5. Chronic diastolic dysfunction.   6. Coronary artery disease status post MI in 1990 and 2003. Stenting was   done at 2003 at [facility].   7. Cholecystectomy, December 2006.   8. Cataract removal.   9. Benign growth removed from her throat.   10. Left common femoral endarterectomy, July 2007.   11. History of anal cancer in 1996 status post radiation and chemotherapy.   12. Carpal tunnel syndrome.   13. Osteoporosis on BMD 12/11    14. Macular degeneration    SOCIAL HISTORY: She quit smoking in 1995, denies any alcohol use. She is etired.     REVIEW OF SYSTEMS: She denies fevers, chills, night sweats, fatigue, visual change, hearing loss, sinus congestion, sore throat, dysuria,  hematuria, joint pain, muscle pain, polydipsia, and polyuria.     PHYSICAL EXAM:      GENERAL: She is alert and oriented. No apparent distress. Affect within normal limits.   Conjunctivae anicteric.    NECK: Supple.   Respiratory effort normal. Lungs clear to auscultation.   HEART: Regular rate and rhythm without murmurs, gallops, or rubs. No lower   extremity edema.            ASSESSMENT AND PLAN:    1. PVD - stable  2. Mood disorder - better on cymbalta to 60 mg daily  3. Memory issues - better on aricept 5 mg daily.   4. Stage III non-small cell lung cancer, completed chemoradiation with carboplatin and Taxol on 6/1/12 - doing well. FOllow up with Dr Weir in August 2019  5. Diabetes-labs  3. Hypertension-controlled  4. Hyperlipidemia-lipids controlled  5. Coronary artery disease modifying risk factors.   6. History of anal cancer-had a normal colonoscopy, November 2008; due 2015. Declined repeat  8. Pernicious anemia - on counter vitamin B12 1000 mcg daily. Level fine in Jan 2018  9. Osteoporosis - on alendroante.    10. Left leg pain due to neuropathy-stable.  .    11. Macular degeneration - stable  Screening mammogram was 8/18 Colonoscopy is due 2015 -declined. Pap smear was July 2010.  Flu 9/252017, Prevnar 11/2015  I will see her back in 3-4 months, sooner if problems arise

## 2019-05-22 ENCOUNTER — TELEPHONE (OUTPATIENT)
Dept: INTERNAL MEDICINE | Facility: CLINIC | Age: 84
End: 2019-05-22

## 2019-06-07 ENCOUNTER — OFFICE VISIT (OUTPATIENT)
Dept: OPTOMETRY | Facility: CLINIC | Age: 84
End: 2019-06-07
Payer: MEDICARE

## 2019-06-07 DIAGNOSIS — H00.11 CHALAZION OF RIGHT UPPER EYELID: Primary | ICD-10-CM

## 2019-06-07 PROCEDURE — 99999 PR PBB SHADOW E&M-EST. PATIENT-LVL II: ICD-10-PCS | Mod: PBBFAC,,, | Performed by: OPTOMETRIST

## 2019-06-07 PROCEDURE — 99999 PR PBB SHADOW E&M-EST. PATIENT-LVL II: CPT | Mod: PBBFAC,,, | Performed by: OPTOMETRIST

## 2019-06-07 PROCEDURE — 92012 INTRM OPH EXAM EST PATIENT: CPT | Mod: S$GLB,,, | Performed by: OPTOMETRIST

## 2019-06-07 PROCEDURE — 92012 PR EYE EXAM, EST PATIENT,INTERMED: ICD-10-PCS | Mod: S$GLB,,, | Performed by: OPTOMETRIST

## 2019-06-07 RX ORDER — DOXYCYCLINE 100 MG/1
100 CAPSULE ORAL EVERY 12 HOURS
Qty: 20 CAPSULE | Refills: 0 | Status: SHIPPED | OUTPATIENT
Start: 2019-06-07 | End: 2019-06-17

## 2019-06-07 NOTE — PROGRESS NOTES
HPI     Pt got chalazion removed in January.   Pt says feels like it is back underneath upper lid of OD.   Says it leaks and when it leaks it blurs her vision. Has been using otc   AT's to clear up the vision prn.   Says feels like been happening for past week.   No pain/itching/burning.     Last edited by Marixa Blanchard on 6/7/2019  8:50 AM. (History)            Assessment /Plan     For exam results, see Encounter Report.    Chalazion of right upper eyelid   No pain today, greenish mucous discharge when pressure applies to lesion   Start warm compresses QID OD  -     Followed by  tobramycin-dexamethasone (TOBRADEX ST) 0.3-0.05 % DrpS; Apply 1 drop to eye 4 (four) times daily. for 10 days  Dispense: 5 mL; Refill: 0   doxycycline (VIBRAMYCIN) 100 MG Cap; Take 1 capsule (100 mg total) by mouth every 12 (twelve) hours. for 10 days  Dispense: 20 capsule; Refill: 0    Return to [facility] if no improvement        Overdue for retinal exam with Lu

## 2019-06-17 ENCOUNTER — TELEPHONE (OUTPATIENT)
Dept: INTERNAL MEDICINE | Facility: CLINIC | Age: 84
End: 2019-06-17

## 2019-06-17 NOTE — TELEPHONE ENCOUNTER
----- Message from Omar Wiggins sent at 6/17/2019  3:24 PM CDT -----  Contact: [phone]  Patient requesting a call from the office to discuss a cold she has and receiving medication  . Please call and advise, Thanks

## 2019-06-18 ENCOUNTER — OFFICE VISIT (OUTPATIENT)
Dept: INTERNAL MEDICINE | Facility: CLINIC | Age: 84
End: 2019-06-18
Payer: MEDICARE

## 2019-06-18 VITALS
HEART RATE: 94 BPM | WEIGHT: 183.19 LBS | DIASTOLIC BLOOD PRESSURE: 65 MMHG | SYSTOLIC BLOOD PRESSURE: 129 MMHG | OXYGEN SATURATION: 96 % | TEMPERATURE: 98 F | HEIGHT: 65 IN | BODY MASS INDEX: 30.52 KG/M2

## 2019-06-18 DIAGNOSIS — Z85.118 HISTORY OF LUNG CANCER: ICD-10-CM

## 2019-06-18 DIAGNOSIS — F39 MOOD DISORDER: ICD-10-CM

## 2019-06-18 DIAGNOSIS — E11.51 TYPE 2 DIABETES MELLITUS WITH DIABETIC PERIPHERAL ANGIOPATHY WITHOUT GANGRENE, WITHOUT LONG-TERM CURRENT USE OF INSULIN: ICD-10-CM

## 2019-06-18 DIAGNOSIS — R09.81 SINUS CONGESTION: Primary | ICD-10-CM

## 2019-06-18 DIAGNOSIS — I10 ESSENTIAL HYPERTENSION: ICD-10-CM

## 2019-06-18 DIAGNOSIS — I25.10 CORONARY ARTERY DISEASE INVOLVING NATIVE CORONARY ARTERY OF NATIVE HEART WITHOUT ANGINA PECTORIS: ICD-10-CM

## 2019-06-18 PROCEDURE — 1101F PR PT FALLS ASSESS DOC 0-1 FALLS W/OUT INJ PAST YR: ICD-10-PCS | Mod: CPTII,S$GLB,, | Performed by: INTERNAL MEDICINE

## 2019-06-18 PROCEDURE — 1101F PT FALLS ASSESS-DOCD LE1/YR: CPT | Mod: CPTII,S$GLB,, | Performed by: INTERNAL MEDICINE

## 2019-06-18 PROCEDURE — 99499 UNLISTED E&M SERVICE: CPT | Mod: S$GLB,,, | Performed by: INTERNAL MEDICINE

## 2019-06-18 PROCEDURE — 99999 PR PBB SHADOW E&M-EST. PATIENT-LVL III: CPT | Mod: PBBFAC,,, | Performed by: INTERNAL MEDICINE

## 2019-06-18 PROCEDURE — 99214 OFFICE O/P EST MOD 30 MIN: CPT | Mod: S$GLB,,, | Performed by: INTERNAL MEDICINE

## 2019-06-18 PROCEDURE — 99499 RISK ADDL DX/OHS AUDIT: ICD-10-PCS | Mod: S$GLB,,, | Performed by: INTERNAL MEDICINE

## 2019-06-18 PROCEDURE — 99214 PR OFFICE/OUTPT VISIT, EST, LEVL IV, 30-39 MIN: ICD-10-PCS | Mod: S$GLB,,, | Performed by: INTERNAL MEDICINE

## 2019-06-18 PROCEDURE — 99999 PR PBB SHADOW E&M-EST. PATIENT-LVL III: ICD-10-PCS | Mod: PBBFAC,,, | Performed by: INTERNAL MEDICINE

## 2019-06-18 NOTE — PROGRESS NOTES
CHIEF COMPLAINT: cough.     HISTORY OF PRESENT ILLNESS: This is a 87-year-old woman who presents for follow up of above.\    She has had a cough for the last month. She has white rhinorrhea and coughs at night due to post nasal drip. White mucous is coming from the back of her throat. She denies fever, chills, sore throat, chest pain, shortness of breath, ear pain. She took 2 I buprofen last night which helped a little. She has not taken anything for cough.  Appetite is good.  Bowels are better.  She has a BM daily - formed stool.      She is currently taking doxycyline 100 mg twice daily for the Chalazion for her eye. Eye is better     She had right leg pain - she had a blockage. Dr Gaviria placed a stent in the right CFA and PTA left ext loulou artery 12/18. Right leg pain has resolved.  The ulcerated area on the right leg has totally healed and has not returned.  She is taking aspirin 81 mg daily.       Balance is ok.  No falls. She has to take her time when she gets up from a seated position.      She feels that her memory is better. She is taking aricept 5 mg daily.       June was a hard month for her.  Her 's birthday ws [birth year] then Father's day June 17, her anniversay is June 25, her mother's birthday is June 18.   Her  passed away on August 26, 2017. They were  67 years. It has been a tough year. She misses him. She feels that she is coping well as he was sick for a very long time. She was his primary care giver. She is on Cymbalta 60 mg daliy and celexa 10 mg daily. Mood is good Neuropathic pain in the legs is better controlled with the duloxetine.        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  Right hip pain has resolved and has not returned.          Her non small cell lung cancer is stable. She saw Dr Weir 8/9/18  and scans were stable. She is to follow up annually with CT scan chest.  She is breathing well. No chest pain, shortness of  breath, fever, chills.       She is currently taking actos 15 mg daily. No polydipsia, polyuria, hypoglycemia. She checks her blood sugars couple times a week. Blood sugar was 102 last time she checked it. No hypoglycemia.    She continues to take Lotrel 5/20 once daily, metoprolol 50 mg 1 tablet twice daily, and hydrochlorothiazide 12.5 mg daily for her hypertension. She denies any chest pain or shortness of breath. She continues to take simvastatin 80 mg at bedtime for her hyperlipidemia. No joint pain or muscle pain.        She is taking Fosamax once week for her bones. She denies any heartburn from the Fosamax. No melana, bloody stools, constipation. She has occasional diarrhea if she eats something wrong.      She takes vitamin B12 1000 mcg daily for vitamin B12 deficiency - anemia is stable        SHe has macular degeneration of the eyes and was followed by Dr Tresa Coreas. She got injections in her eyes every 6 weeks in Spring 2015. She now sees Dr Ruelas at [facility] (last saw him 8/18- no need for injections at that time- yearly follow up). Vision has been stable. She is taking a Eye vitamins for her eyes.       No vaginal bleeding and no longer needs vaginal premarin cream    PAST MEDICAL HISTORY:    1. Stage III non-small cell lung cancer, completed chemoradiation with carboplatin and Taxol on 6/1/12    2. Hypertension.   3. Diabetes mellitus.   4. Hyperlipidemia.   5. Chronic diastolic dysfunction.   6. Coronary artery disease status post MI in 1990 and 2003. Stenting was   done at 2003 at [facility].   7. Cholecystectomy, December 2006.   8. Cataract removal.   9. Benign growth removed from her throat.   10. Left common femoral endarterectomy, July 2007.   11. History of anal cancer in 1996 status post radiation and chemotherapy.   12. Carpal tunnel syndrome.   13. Osteoporosis on BMD 12/11    14. Macular degeneration    SOCIAL HISTORY: She quit smoking in 1995, denies any alcohol use. She  "is etired.     REVIEW OF SYSTEMS: She denies fevers, chills, night sweats, fatigue, visual change, hearing loss, sinus congestion, sore throat, dysuria, hematuria, joint pain, muscle pain, polydipsia, and polyuria.     PHYSICAL EXAM:     /65 (BP Location: Right arm, Patient Position: Sitting, BP Method: Large (Manual))   Pulse 94   Temp 98.3 °F (36.8 °C) (Oral)   Ht 5' 5" (1.651 m)   Wt 83.1 kg (183 lb 3.2 oz)   SpO2 96%   BMI 30.49 kg/m²     GENERAL: She is alert and oriented. No apparent distress. Affect within normal limits.   Conjunctivae anicteric.  Nodule right upper eye lid. No erythema, warmth or drainage.   NECK: Supple.   Respiratory effort normal. Lungs clear to auscultation.   HEART: Regular rate and rhythm without murmurs, gallops, or rubs. No lower   extremity edema.            ASSESSMENT AND PLAN:    1. Cough - due to sinus congestion. Loratadine 10 mg dialy and robitussin 2 tsp three times daily  2. PVD - stable  2. Mood disorder - stable on cymbalta to 60 mg daily  3. Memory issues - stable on aricept 5 mg daily.   4. Stage III non-small cell lung cancer, completed chemoradiation with carboplatin and Taxol on 6/1/12 - doing well. FOllow up with Dr Weir in August 2019  5. Diabetes-stable  3. Hypertension-controlled  4. Hyperlipidemia-lipids controlled  5. Coronary artery disease modifying risk factors.   6. History of anal cancer-had a normal colonoscopy, November 2008; due 2015. Declined repeat  8. Pernicious anemia - on counter vitamin B12 1000 mcg daily. Level fine in Jan 2018  9. Osteoporosis - on alendroante.    10. Left leg pain due to neuropathy-stable.  .    11. Macular degeneration - stable  Screening mammogram was 8/18 Colonoscopy is due 2015 -declined. Pap smear was July 2010.  Flu 9/252017, Prevnar 11/2015  I will see her back in 3-4 months, sooner if problems arise  "

## 2019-07-16 DIAGNOSIS — Z85.118 HISTORY OF LUNG CANCER: Primary | ICD-10-CM

## 2019-07-22 ENCOUNTER — TELEPHONE (OUTPATIENT)
Dept: OPHTHALMOLOGY | Facility: CLINIC | Age: 84
End: 2019-07-22

## 2019-07-22 NOTE — TELEPHONE ENCOUNTER
----- Message from Viri Roman sent at 7/22/2019  3:23 PM CDT -----  Contact: Brunilda England   Pt daughter  would like to speak with  nurse please ,about the right eye,she can be reached at [phone] please thank you.

## 2019-08-06 DIAGNOSIS — N93.9 VAGINAL BLEEDING: Primary | ICD-10-CM

## 2019-08-06 RX ORDER — DONEPEZIL HYDROCHLORIDE 5 MG/1
5 TABLET, FILM COATED ORAL EVERY MORNING
Qty: 90 TABLET | Refills: 4 | Status: SHIPPED | OUTPATIENT
Start: 2019-08-06 | End: 2020-11-09

## 2019-08-06 NOTE — TELEPHONE ENCOUNTER
Spoke with pt and informed her of gyn appt for 8/7 @ 9am and to go to the ED if bleeding starts again

## 2019-08-06 NOTE — TELEPHONE ENCOUNTER
Spoke with the pt she stated she had some bleeding this morning from her vagina and it was heavy. Pt stated she gets a little pinkish spotting once a month. She stated this morning she was getting in the shower and the blood just began running. She used a depend and a pad and the bleeding has stopped so far..please advise.Kelly Crouch LPN

## 2019-08-06 NOTE — TELEPHONE ENCOUNTER
She needs to see GYN asap for the vaginal bleeding  See if you can get her an apt tomorrow.  IF bleeding starts again,s he needs to come into the emergency room

## 2019-08-06 NOTE — TELEPHONE ENCOUNTER
----- Message from Hawa Crouch MA sent at 8/6/2019  1:12 PM CDT -----  Contact: Self [phone]      ----- Message -----  From: Ernesto Carbone  Sent: 8/6/2019  11:53 AM  To: [facility] Staff    Patient would like to get medical advice.  Symptoms (please be specific):  Losing blood from the vagina (vaginal bleeding)  How long has patient had these symptoms:  Few days   Pharmacy name and phone # (copy/paste from chart):    Any drug allergies (copy/paste from chart):      Would the patient rather a call back or a response via [facility]?:  Call Back   Comments:

## 2019-08-07 ENCOUNTER — TELEPHONE (OUTPATIENT)
Dept: GYNECOLOGIC ONCOLOGY | Facility: CLINIC | Age: 84
End: 2019-08-07

## 2019-08-07 ENCOUNTER — OFFICE VISIT (OUTPATIENT)
Dept: GYNECOLOGIC ONCOLOGY | Facility: CLINIC | Age: 84
End: 2019-08-07
Payer: MEDICARE

## 2019-08-07 VITALS
HEIGHT: 65 IN | SYSTOLIC BLOOD PRESSURE: 144 MMHG | HEART RATE: 87 BPM | WEIGHT: 183.19 LBS | BODY MASS INDEX: 30.52 KG/M2 | DIASTOLIC BLOOD PRESSURE: 64 MMHG

## 2019-08-07 DIAGNOSIS — N95.0 PMB (POSTMENOPAUSAL BLEEDING): ICD-10-CM

## 2019-08-07 DIAGNOSIS — N95.2 ATROPHIC VAGINITIS: ICD-10-CM

## 2019-08-07 PROCEDURE — 1101F PT FALLS ASSESS-DOCD LE1/YR: CPT | Mod: CPTII,S$GLB,, | Performed by: OBSTETRICS & GYNECOLOGY

## 2019-08-07 PROCEDURE — 88305 TISSUE EXAM BY PATHOLOGIST: CPT | Performed by: PATHOLOGY

## 2019-08-07 PROCEDURE — 99999 PR PBB SHADOW E&M-EST. PATIENT-LVL III: CPT | Mod: PBBFAC,,, | Performed by: OBSTETRICS & GYNECOLOGY

## 2019-08-07 PROCEDURE — 99205 PR OFFICE/OUTPT VISIT, NEW, LEVL V, 60-74 MIN: ICD-10-PCS | Mod: S$GLB,,, | Performed by: OBSTETRICS & GYNECOLOGY

## 2019-08-07 PROCEDURE — 99999 PR PBB SHADOW E&M-EST. PATIENT-LVL III: ICD-10-PCS | Mod: PBBFAC,,, | Performed by: OBSTETRICS & GYNECOLOGY

## 2019-08-07 PROCEDURE — 88305 TISSUE SPECIMEN TO PATHOLOGY, OBSTETRICS/GYNECOLOGY: ICD-10-PCS | Mod: 26,,, | Performed by: PATHOLOGY

## 2019-08-07 PROCEDURE — 1101F PR PT FALLS ASSESS DOC 0-1 FALLS W/OUT INJ PAST YR: ICD-10-PCS | Mod: CPTII,S$GLB,, | Performed by: OBSTETRICS & GYNECOLOGY

## 2019-08-07 PROCEDURE — 99205 OFFICE O/P NEW HI 60 MIN: CPT | Mod: S$GLB,,, | Performed by: OBSTETRICS & GYNECOLOGY

## 2019-08-07 PROCEDURE — 88305 TISSUE EXAM BY PATHOLOGIST: CPT | Mod: 26,,, | Performed by: PATHOLOGY

## 2019-08-07 NOTE — TELEPHONE ENCOUNTER
Spoke with pt daughter Nora. She was informed to contact the pharmacy regarding another brand of vaginal cream insurance will cover and to contact pharmacy. Nora voiced understanding

## 2019-08-07 NOTE — PROGRESS NOTES
Subjective:      Patient ID: Brunilda England is a 87 y.o. female.    Chief Complaint: Vaginal Bleeding (Consult)      HPI  Presents today for urgent problem visit of vaginal bleeding.   Reports vaginal spotting. Here with daughter.     Medical records reviewed.  After discussions with patient. Vaginal spotting is not new, has been intermittent off and on for many years. It did seem a little more than usual over the last several days.   Prior hysterectomy.   Seen by Dr. Orr and Dr. Galvan [id number] for vaginal bleeding.   History of anal cancer treated with radiation.   Biopsies from the vagina 2014 and 2016 negative for malignancy.   Previously prescribed vaginal estrogen cream but not using much lately.     Review of Systems   Constitutional: Negative for appetite change, chills, fatigue and fever.   HENT: Negative for mouth sores.    Respiratory: Negative for cough and shortness of breath.    Cardiovascular: Negative for leg swelling.   Gastrointestinal: Negative for abdominal pain, blood in stool, constipation and diarrhea.   Endocrine: Negative for cold intolerance.   Genitourinary: Positive for vaginal bleeding. Negative for dysuria.   Musculoskeletal: Negative for myalgias.   Skin: Negative for rash.   Allergic/Immunologic: Negative.    Neurological: Negative for weakness and numbness.   Hematological: Negative for adenopathy. Does not bruise/bleed easily.   Psychiatric/Behavioral: Negative for confusion.       Past Medical History:   Diagnosis Date    Anal cancer 1995    Anemia     Cancer 1995    anal cancer    Coronary artery disease     Diabetes mellitus     With circulatory disorder    Hypertension     Hypertensive retinopathy of both eyes    Lumbar radiculopathy 5/16/2014    Lung cancer 2012    s/p chemo/radiation    Macular degeneration     Macular hemorrhage of left eye     Myocardial infarction     x 2    Neuropathy     Osteoporosis     Osteoporosis, unspecified 11/9/2012     Peripheral vascular disease     Pure hypercholesterolemia      Past Surgical History:   Procedure Laterality Date    ARTERIOGRAM-LEG AND ULTRASOUND Right 2018    Performed [facility], [facility] OR 2ND FLR    BRONCHOSCOPY      CHOLECYSTECTOMY      COLONOSCOPY      CYSTOSCOPY WITH RETROGRADE PYELOGRAM Bilateral 2016    [facility]    CYSTOSCOPY; urethral biopsy N/A 2016    Performed by Francisco [facility]r [facility] at [facility] OR    heart stent      HYSTERECTOMY      left leg surgery      blockage    PLACEMENT-STENT N/A 2018    Performed by [facility] at [facility] OR 2ND FLR    PTA (ANGIOPLASTY, PERCUTANEOUS, TRANSLUMINAL) N/A 2018    Performed by [facility] at [facility] OR 2ND FLR    TONSILLECTOMY       Family History   Problem Relation Age of Onset    Stroke Mother     Cancer Father     Breast cancer Maternal Aunt     Ovarian cancer Neg Hx     Amblyopia Neg Hx     Blindness Neg Hx     Cataracts Neg Hx     Glaucoma Neg Hx     Macular degeneration Neg Hx     Retinal detachment Neg Hx     Strabismus Neg Hx      Social History     Socioeconomic History    Marital status:      Spouse name: Not on file    Number of children: Not on file    Years of education: Not on file    Highest education level: Not on file   Occupational History    Not on file   Social Needs    Financial resource strain: Not on file    Food insecurity:     Worry: Not on file     Inability: Not on file    Transportation needs:     Medical: Not on file     Non-medical: Not on file   Tobacco Use    Smoking status: Former Smoker     Packs/day: 0.50     Years: 30.00     Pack years: 15.00     Types: Cigarettes     Last attempt to quit: 1995     Years since quittin.6    Smokeless tobacco: Never Used   Substance and Sexual Activity    Alcohol use: No    Drug use: No    Sexual activity: Not Currently     Birth control/protection:  Surgical   Lifestyle    Physical activity:     Days per week: Not on file     Minutes per session: Not on file    Stress: Not on file   Relationships    Social connections:     Talks on phone: Not on file     Gets together: Not on file     Attends Nondenominational service: Not on file     Active member of club or organization: Not on file     Attends meetings of clubs or organizations: Not on file     Relationship status: Not on file   Other Topics Concern    Not on file   Social History Narrative    Not on file     Current Outpatient Medications   Medication Sig    alendronate (FOSAMAX) 70 MG tablet Take 1 tablet (70 mg total) by mouth every 7 days.    ALPRAZolam (XANAX) 0.5 MG tablet One at bedtime as needed    amlodipine-benazepril 5-20 mg (LOTREL) 5-20 mg per capsule TAKE ONE CAPSULE BY MOUTH ONCE DAILY    ANTIOX #8/OM3/DHA/EPA/LUT/ZEAX (PRESERVISION AREDS 2, OMEGA-3, ORAL) Take 1 capsule by mouth 2 (two) times daily.     aspirin (ECOTRIN) 81 MG EC tablet Take 81 mg by mouth every morning.    blood sugar diagnostic Strp Check blood sugar daily. Strips and lancets    calcium-vitamin D (CALCIUM-VITAMIN D) 500 mg(1,250mg) -200 unit per tablet Take 1 tablet by mouth every morning.     chlorhexidine (PERIDEX) 0.12 % solution Use as directed 15 mLs in the mouth or throat nightly.     citalopram (CELEXA) 10 MG tablet Take 10 mg by mouth every morning.     citalopram (CELEXA) 10 MG tablet Take 1 tablet (10 mg total) by mouth once daily.    cyanocobalamin (VITAMIN B-12) 1000 MCG tablet Take 1,000 mcg by mouth every morning.     donepezil (ARICEPT) 5 MG tablet Take 1 tablet (5 mg total) by mouth every morning.    DULoxetine (CYMBALTA) 60 MG capsule Take 1 capsule (60 mg total) by mouth every morning.    ferrous sulfate (IRON ORAL) Take 1 tablet by mouth every morning.    hydroCHLOROthiazide (MICROZIDE) 12.5 mg capsule Take 1 capsule (12.5 mg total) by mouth every morning.    lancets Misc Check blood sugar  daily 250.00    loratadine (CLARITIN) 10 mg tablet Take 1 tablet (10 mg total) by mouth daily as needed for Allergies.    metoprolol tartrate (LOPRESSOR) 50 MG tablet TAKE ONE TABLET BY MOUTH TWICE DAILY    nitroGLYCERIN (NITROSTAT) 0.4 MG SL tablet Place 1 tablet (0.4 mg total) under the tongue every 5 (five) minutes as needed.    pioglitazone (ACTOS) 15 MG tablet TAKE ONE TABLET BY MOUTH EVERY MORNING    simvastatin (ZOCOR) 80 MG tablet TAKE ONE TABLET BY MOUTH ONCE DAILY (Patient taking differently: TAKE ONE TABLET BY MOUTH ONCE DAILY, morning)    blood-glucose meter kit Use as instructed    clobetasol (TEMOVATE) 0.05 % cream Apply topically nightly as needed.    conjugated estrogens (PREMARIN) vaginal cream Place 0.5 g vaginally every evening.     No current facility-administered medications for this visit.      Facility-Administered Medications Ordered in Other Visits   Medication    sodium chloride 0.9% flush 5 mL     Review of patient's allergies indicates:   Allergen Reactions    Bextra [valdecoxib] Swelling    Gabapentin Diarrhea and Nausea Only       Objective:   Physical Exam:   Constitutional: She is oriented to person, place, and time. She appears well-developed and well-nourished.    HENT:   Head: Normocephalic and atraumatic.    Eyes: Pupils are equal, round, and reactive to light. EOM are normal.    Neck: Normal range of motion. Neck supple. No thyromegaly present.    Cardiovascular: Normal rate, regular rhythm and intact distal pulses.     Pulmonary/Chest: Effort normal and breath sounds normal. No respiratory distress. She has no wheezes.        Abdominal: Soft. Bowel sounds are normal. She exhibits no distension, no ascites and no mass. There is no tenderness.     Genitourinary: Rectum normal. Pelvic exam was performed with patient supine. There is no lesion on the right labia. There is no lesion on the left labia. Uterus is absent. There is an absent adnexa. Right adnexum displays no  mass. Left adnexum displays no mass. There is bleeding in the vagina. Vaginal cuff normal.Cervix exhibits absence.   Genitourinary Comments: At the vaginal opening there are several small vascular granulomatous lesions. Biopsy taken.            Musculoskeletal: Normal range of motion and moves all extremeties.      Lymphadenopathy:     She has no cervical adenopathy.        Right: No inguinal and no supraclavicular adenopathy present.        Left: No inguinal and no supraclavicular adenopathy present.    Neurological: She is alert and oriented to person, place, and time.    Skin: Skin is warm and dry. No rash noted.    Psychiatric: She has a normal mood and affect.       Assessment:     1. PMB (postmenopausal bleeding)    2. Atrophic vaginitis        Plan:   No orders of the defined types were placed in this encounter.      Vaginal bleeding is not a new occurrence. Exam shows some small granulomatous lesions at the vaginal introitus from prior radiation. These seem unchanged from review of prior exams. Previously biopsied in 2014 and 2016 and benign. I repeated a biopsy today and tissue is also benign. No new findings or concerns to suggest malignant cause of vaginal bleeding. Previously used vaginal estrogen but has not used recently. New rx sent and encouraged use.     May follow up prn.

## 2019-08-07 NOTE — LETTER
August 11, 2019      Pepper Whitfield MD  [address]  [place] [address]           [facility] [address] [facility] [address]  [address], [address]  [place] [address]  Phone: [phone]  Fax: [phone]          Patient: Brunilda England   MR Number: [id number]   YOB: 1931   Date of Visit: 8/7/2019       Dear Dr. Pepper Whitfield:    Thank you for referring Brunilda England to me for evaluation. Attached you will find relevant portions of my assessment and plan of care.    If you have questions, please do not hesitate to call me. I look forward to following Brunilda England along with you.    Sincerely,    Lorena Leach MD    Enclosure  CC:  No Recipients    If you would like to receive this communication electronically, please contact [email or web address] or [phone] to request more information on [facility] [facility] access.    For providers and/or their staff who would like to refer a patient to [facility], please contact us through our one-stop-shop provider referral line, [facility], at [phone].    If you feel you have received this communication in error or would no longer like to receive these types of communications, please e-mail [email or web address]

## 2019-08-07 NOTE — TELEPHONE ENCOUNTER
----- Message from Bella Rosen sent at 8/7/2019 10:53 AM CDT -----  Contact: Nora England [phone]  Pt daughter called saying that prescription sent this morning is not covered by ins. She says Sulfadiazine is.  Nora [phone].

## 2019-08-09 ENCOUNTER — TELEPHONE (OUTPATIENT)
Dept: GYNECOLOGIC ONCOLOGY | Facility: CLINIC | Age: 84
End: 2019-08-09

## 2019-08-09 NOTE — TELEPHONE ENCOUNTER
----- Message from Shruthi Hinson sent at 8/9/2019  2:40 PM CDT -----  Contact: Kera ( daughter )   Name of Who is Calling: Kera ( daughter )       What is the request in detail: Kera ( daughter ) is returning a call from staff in regards to her mothers medication  .....Please contact to further discuss and advise.     Can the clinic reply by [facility]: no     What Number to Call Back [facility] not [facility]: [phone]

## 2019-08-09 NOTE — TELEPHONE ENCOUNTER
Lm informing Emily with [employer] forms for medication authorization was not received and can it be re-faxed, phone and fax number was given in message.

## 2019-08-09 NOTE — TELEPHONE ENCOUNTER
----- Message from Shruthi Hinson sent at 8/9/2019 12:52 PM CDT -----  Contact: SEEMA TINAJERO [[id number]]  Name of Who is Calling: SEEMA TINAJERO [[id number]]       What is the request in detail: Patient is requesting a call from staff she would like to discuss getting a medication that her insurance will cover she states the medication prescribed isn't covered and is too expensive  .....Please contact to further discuss and advise.     Can the clinic reply by [facility]: no     What Number to Call Back if not in [facility]: [phone]

## 2019-08-09 NOTE — TELEPHONE ENCOUNTER
Spoke with pt. Pt informed someone form her insurance company contacted the office regarding medication authorization, return called and left message. Pt advised she can contact pharmacy or insurance to see what alternate medication will be covered by insurance and contact office. Pt voiced understanding

## 2019-08-09 NOTE — TELEPHONE ENCOUNTER
Spoke with pt daughter Nora. She was informed paperwork from pt insurance for medication has been received, Dr. Leach is not in until Monday. Nora voiced understanding

## 2019-08-09 NOTE — TELEPHONE ENCOUNTER
----- Message from Claudialouis Giraldo sent at 8/9/2019  9:51 AM CDT -----  Contact: Emily [phone] Ext [phone]    Name of Who is Calling: Emily with [facility]       What is the request in detail: Emily with [facility]  is requesting a call from staff in regards to authorization form in regards to her prescription that was sent in on Wednesday   .....Please contact to further discuss and advise.     Can the clinic reply by [facility]: No     What Number to Call Back if not in [facility]:  Emily [phone] Ext [phone]

## 2019-08-11 NOTE — PROCEDURES
Biopsy (Gynecological)  Date/Time: 8/7/2019 12:02 PM  Performed by: Lroena Leach MD  Authorized by: Lorena Leach MD     Consent Done?:  Yes (Verbal)    Biopsy Location:  Vagina  Estimated blood loss (cc):  0   Patient tolerated the procedure well with no immediate complications.     Biopsy of granulomatous lesion at the vaginal opening.

## 2019-08-12 ENCOUNTER — TELEPHONE (OUTPATIENT)
Dept: GYNECOLOGIC ONCOLOGY | Facility: CLINIC | Age: 84
End: 2019-08-12

## 2019-08-15 ENCOUNTER — TELEPHONE (OUTPATIENT)
Dept: GYNECOLOGIC ONCOLOGY | Facility: CLINIC | Age: 84
End: 2019-08-15

## 2019-08-15 NOTE — TELEPHONE ENCOUNTER
----- Message from Brigida Manrique, Patient Care Assistant sent at 8/15/2019  2:40 PM CDT -----  Contact: Kera (daughter)  Name of Who is Calling: Kera (daughter)    What is the request in detail:Kera (daughter) is calling in regards to [employer] is needing 3 forms filled out to receive RX estradiol. Please contact to further discuss and advise      Can the clinic reply by [facility]: No    What Number to Call Back [facility] not [facility]:   [phone]

## 2019-08-19 ENCOUNTER — TELEPHONE (OUTPATIENT)
Dept: GYNECOLOGIC ONCOLOGY | Facility: CLINIC | Age: 84
End: 2019-08-19

## 2019-08-19 DIAGNOSIS — N95.0 PMB (POSTMENOPAUSAL BLEEDING): ICD-10-CM

## 2019-08-19 DIAGNOSIS — N95.2 ATROPHIC VAGINITIS: ICD-10-CM

## 2019-08-19 RX ORDER — ESTRADIOL 0.1 MG/G
CREAM VAGINAL
Qty: 1 TUBE | Refills: 6 | Status: SHIPPED | OUTPATIENT
Start: 2019-08-19 | End: 2020-10-01

## 2019-08-19 NOTE — TELEPHONE ENCOUNTER
----- Message from Kelly Rubalcava RN sent at 8/19/2019  3:44 PM CDT -----  Spoke to pt daughter. States that Premarin is not covered by insurance. Requesting a prescription for estradiol. Please call into pharmacy on file-- [facility].    Thanks  Kelly

## 2019-08-20 ENCOUNTER — TELEPHONE (OUTPATIENT)
Dept: OPHTHALMOLOGY | Facility: CLINIC | Age: 84
End: 2019-08-20

## 2019-08-20 ENCOUNTER — OFFICE VISIT (OUTPATIENT)
Dept: OPHTHALMOLOGY | Facility: CLINIC | Age: 84
End: 2019-08-20
Payer: MEDICARE

## 2019-08-20 DIAGNOSIS — H00.11 CHALAZION OF RIGHT UPPER EYELID: Primary | ICD-10-CM

## 2019-08-20 DIAGNOSIS — C44.111 BASAL CELL CARCINOMA (BCC) OF RIGHT EYELID: ICD-10-CM

## 2019-08-20 DIAGNOSIS — H35.3232 EXUDATIVE AGE-RELATED MACULAR DEGENERATION OF BOTH EYES WITH INACTIVE CHOROIDAL NEOVASCULARIZATION: ICD-10-CM

## 2019-08-20 PROCEDURE — 92012 PR EYE EXAM, EST PATIENT,INTERMED: ICD-10-PCS | Mod: S$GLB,,, | Performed by: OPHTHALMOLOGY

## 2019-08-20 PROCEDURE — 92285 EXTERNAL PHOTOGRAPHY - OU - BOTH EYES: ICD-10-PCS | Mod: S$GLB,,, | Performed by: OPHTHALMOLOGY

## 2019-08-20 PROCEDURE — 99999 PR PBB SHADOW E&M-EST. PATIENT-LVL II: CPT | Mod: PBBFAC,,, | Performed by: OPHTHALMOLOGY

## 2019-08-20 PROCEDURE — 99499 RISK ADDL DX/OHS AUDIT: ICD-10-PCS | Mod: S$GLB,,, | Performed by: OPHTHALMOLOGY

## 2019-08-20 PROCEDURE — 92012 INTRM OPH EXAM EST PATIENT: CPT | Mod: S$GLB,,, | Performed by: OPHTHALMOLOGY

## 2019-08-20 PROCEDURE — 99999 PR PBB SHADOW E&M-EST. PATIENT-LVL II: ICD-10-PCS | Mod: PBBFAC,,, | Performed by: OPHTHALMOLOGY

## 2019-08-20 PROCEDURE — 99499 UNLISTED E&M SERVICE: CPT | Mod: S$GLB,,, | Performed by: OPHTHALMOLOGY

## 2019-08-20 PROCEDURE — 92285 EXTERNAL OCULAR PHOTOGRAPHY: CPT | Mod: S$GLB,,, | Performed by: OPHTHALMOLOGY

## 2019-08-20 NOTE — PROGRESS NOTES
HPI     Pt here for chalazion OD.  [facility]  Patient has had chalazion for several months. It was I&D and returned.  Pt c/o discharge OD + intermittent blurry vision.    otc drops prn OU  Warm compresses    No previous eye sx, nasal, facial.    Last edited by Caio Butt on 8/20/2019  8:58 AM. (History)            Assessment /Plan     For exam results, see Encounter Report.    Chalazion of right upper eyelid  -     External Photography - OU - Both Eyes    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    Basal cell carcinoma (BCC) of right eyelid  -     Ambulatory Referral to Dermatology        Chalazion, right upper eyelid  - recurrent, using WC, draining  - has had I&D x2, one with Dr. Castillo 1/11/19  - offered, however pt not interested in I&D at this time  - will continue WC BID  - monitor    Basal cell carcinoma, right upper lid  - biopsy with positive margin 1/11/19, has not had growth of lesion, no madarosis  - options include wedge resection with pathology vs observation with close f/u  - pt and family elect to observe at this time, are aware that they should return ASAP if lesion begins to grow, ulcerate, bothers patient, or lash loss occurs.  - also with concerning lesions of nose and lateral brow   - will biopsy nose and right brow lesions in next few weeks, RBA discussed, pt consented, all questions answered  - hold ASA 5d before procedure  - avoid goody powder, NSAIDS, fish oil, other blood thinners  - rec'd pt see dermatology  - obtaining external photos today    Return for procedure visit    Follow-up with Dr. Leigh for AMD ou.     I have reviewed and concur with the resident's history, physical, assessment, and plan.  I have personally interviewed and examined the patient.

## 2019-08-26 ENCOUNTER — PROCEDURE VISIT (OUTPATIENT)
Dept: OPHTHALMOLOGY | Facility: CLINIC | Age: 84
End: 2019-08-26
Payer: MEDICARE

## 2019-08-26 VITALS — HEART RATE: 92 BPM | SYSTOLIC BLOOD PRESSURE: 141 MMHG | DIASTOLIC BLOOD PRESSURE: 71 MMHG

## 2019-08-26 DIAGNOSIS — H02.9 LESION OF UPPER EYELID: Primary | ICD-10-CM

## 2019-08-26 DIAGNOSIS — H35.3232 EXUDATIVE AGE-RELATED MACULAR DEGENERATION OF BOTH EYES WITH INACTIVE CHOROIDAL NEOVASCULARIZATION: ICD-10-CM

## 2019-08-26 DIAGNOSIS — J34.89 NASAL LESION: ICD-10-CM

## 2019-08-26 DIAGNOSIS — H35.033 HYPERTENSIVE RETINOPATHY OF BOTH EYES: ICD-10-CM

## 2019-08-26 DIAGNOSIS — C44.111 BASAL CELL CARCINOMA (BCC) OF RIGHT EYELID: ICD-10-CM

## 2019-08-26 DIAGNOSIS — H35.89 ATROPHY OF MACULA LUTEA: ICD-10-CM

## 2019-08-26 DIAGNOSIS — H43.813 POSTERIOR VITREOUS DETACHMENT, BOTH EYES: ICD-10-CM

## 2019-08-26 PROCEDURE — 88305 TISSUE EXAM BY PATHOLOGIST: CPT | Mod: 26,,, | Performed by: PATHOLOGY

## 2019-08-26 PROCEDURE — 11441 EXC FACE-MM B9+MARG 0.6-1 CM: CPT | Mod: S$GLB,,, | Performed by: OPHTHALMOLOGY

## 2019-08-26 PROCEDURE — 99499 UNLISTED E&M SERVICE: CPT | Mod: S$GLB,,, | Performed by: OPHTHALMOLOGY

## 2019-08-26 PROCEDURE — 11441 PR EXC SKIN BENIG 0.6-1 CM FACE,FACIAL: ICD-10-PCS | Mod: 51,S$GLB,, | Performed by: OPHTHALMOLOGY

## 2019-08-26 PROCEDURE — 88305 TISSUE SPECIMEN TO PATHOLOGY, OPHTHALMOLOGY: ICD-10-PCS | Mod: 26,,, | Performed by: PATHOLOGY

## 2019-08-26 PROCEDURE — 99499 RISK ADDL DX/OHS AUDIT: ICD-10-PCS | Mod: S$GLB,,, | Performed by: OPHTHALMOLOGY

## 2019-08-26 PROCEDURE — 88305 TISSUE EXAM BY PATHOLOGIST: CPT | Performed by: PATHOLOGY

## 2019-08-26 NOTE — PROGRESS NOTES
Assessment /Plan     For exam results, see Encounter Report.    Lesion of upper eyelid    Nasal lesion    Basal cell carcinoma (BCC) of right eyelid    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    Hypertensive retinopathy of both eyes    Posterior vitreous detachment, both eyes    Atrophy of macula lutea      Patient is a pleasant 87-year-old female here for biopsy of right brow lesion and nasal lesion.  There is concern for basal cell carcinoma both of these lesions.    Procedure Note    Attending: Leslie Castillo  Resident:none  Pre-op Dx: Eyelid lesion  Post-op Dx: same  Local: 2% lidocaine with epinephrine with 0.5% marcaine and 4% NaHCO3 and vitrase  Specimens: right brow lesion, nasal lesion   Complications: None  Blood Loss: minimal    The patient was prepped and draped in the usual sterile manner for ophthalmic plastic surgery.  A corneal shield was placed in the right palpebral fissure. 1 cc of local anesthesia was given to the right brow and nose.  A jacqueline scissor was used to excise the lesion from its base in the brow. A 15 blade was used to excise the lesion from the nose. The tissue was sent to pathology.  High-temp cautery was used to obtain hemostasis. The corneal shield was removed. Tobradex ointment was applied  to the wounds. The patient tolerated the procedure well. There were no complications.     Post-operative instructions were given to the patient.

## 2019-09-03 ENCOUNTER — TELEPHONE (OUTPATIENT)
Dept: OPHTHALMOLOGY | Facility: CLINIC | Age: 84
End: 2019-09-03

## 2019-09-06 ENCOUNTER — HOSPITAL ENCOUNTER (OUTPATIENT)
Dept: RADIOLOGY | Facility: HOSPITAL | Age: 84
Discharge: HOME | End: 2019-09-06
Attending: INTERNAL MEDICINE
Payer: MEDICARE

## 2019-09-06 ENCOUNTER — HOSPITAL ENCOUNTER (OUTPATIENT)
Dept: RADIOLOGY | Facility: CLINIC | Age: 84
Discharge: HOME | End: 2019-09-06
Attending: INTERNAL MEDICINE
Payer: MEDICARE

## 2019-09-06 DIAGNOSIS — Z85.118 HISTORY OF LUNG CANCER: ICD-10-CM

## 2019-09-06 DIAGNOSIS — M89.9 BONE DISORDER: ICD-10-CM

## 2019-09-06 PROCEDURE — 77080 DEXA BONE DENSITY SPINE HIP: ICD-10-PCS | Mod: 26,,, | Performed by: INTERNAL MEDICINE

## 2019-09-06 PROCEDURE — 71250 CT THORAX DX C-: CPT | Mod: TC

## 2019-09-06 PROCEDURE — 77080 DXA BONE DENSITY AXIAL: CPT | Mod: TC

## 2019-09-06 PROCEDURE — 71250 CT THORAX DX C-: CPT | Mod: 26,,, | Performed by: RADIOLOGY

## 2019-09-06 PROCEDURE — 77080 DXA BONE DENSITY AXIAL: CPT | Mod: 26,,, | Performed by: INTERNAL MEDICINE

## 2019-09-06 PROCEDURE — 71250 CT CHEST WITHOUT CONTRAST: ICD-10-PCS | Mod: 26,,, | Performed by: RADIOLOGY

## 2019-09-09 ENCOUNTER — OFFICE VISIT (OUTPATIENT)
Dept: HEMATOLOGY/ONCOLOGY | Facility: CLINIC | Age: 84
End: 2019-09-09
Payer: MEDICARE

## 2019-09-09 ENCOUNTER — LAB VISIT (OUTPATIENT)
Dept: LAB | Facility: HOSPITAL | Age: 84
End: 2019-09-09
Payer: MEDICARE

## 2019-09-09 VITALS
WEIGHT: 184.31 LBS | RESPIRATION RATE: 18 BRPM | HEIGHT: 62 IN | SYSTOLIC BLOOD PRESSURE: 150 MMHG | BODY MASS INDEX: 33.92 KG/M2 | DIASTOLIC BLOOD PRESSURE: 67 MMHG | TEMPERATURE: 99 F | HEART RATE: 91 BPM | OXYGEN SATURATION: 94 %

## 2019-09-09 DIAGNOSIS — Z85.118 HISTORY OF LUNG CANCER: ICD-10-CM

## 2019-09-09 DIAGNOSIS — Z85.118 HISTORY OF LUNG CANCER: Primary | ICD-10-CM

## 2019-09-09 LAB
ALBUMIN SERPL BCP-MCNC: 3.6 G/DL (ref 3.5–5.2)
ALP SERPL-CCNC: 45 U/L (ref 55–135)
ALT SERPL W/O P-5'-P-CCNC: 7 U/L (ref 10–44)
ANION GAP SERPL CALC-SCNC: 11 MMOL/L (ref 8–16)
AST SERPL-CCNC: 15 U/L (ref 10–40)
BILIRUB SERPL-MCNC: 0.2 MG/DL (ref 0.1–1)
BUN SERPL-MCNC: 13 MG/DL (ref 8–23)
CALCIUM SERPL-MCNC: 10.1 MG/DL (ref 8.7–10.5)
CHLORIDE SERPL-SCNC: 102 MMOL/L (ref 95–110)
CO2 SERPL-SCNC: 29 MMOL/L (ref 23–29)
CREAT SERPL-MCNC: 1.1 MG/DL (ref 0.5–1.4)
ERYTHROCYTE [DISTWIDTH] IN BLOOD BY AUTOMATED COUNT: 15.9 % (ref 11.5–14.5)
EST. GFR  (AFRICAN AMERICAN): 52.2 ML/MIN/1.73 M^2
EST. GFR  (NON AFRICAN AMERICAN): 45.3 ML/MIN/1.73 M^2
GLUCOSE SERPL-MCNC: 111 MG/DL (ref 70–110)
HCT VFR BLD AUTO: 33.7 % (ref 37–48.5)
HGB BLD-MCNC: 11.1 G/DL (ref 12–16)
IMM GRANULOCYTES # BLD AUTO: 0.01 K/UL (ref 0–0.04)
MCH RBC QN AUTO: 29.1 PG (ref 27–31)
MCHC RBC AUTO-ENTMCNC: 32.9 G/DL (ref 32–36)
MCV RBC AUTO: 89 FL (ref 82–98)
NEUTROPHILS # BLD AUTO: 2.2 K/UL (ref 1.8–7.7)
PLATELET # BLD AUTO: 176 K/UL (ref 150–350)
PMV BLD AUTO: 10.6 FL (ref 9.2–12.9)
POTASSIUM SERPL-SCNC: 4.1 MMOL/L (ref 3.5–5.1)
PROT SERPL-MCNC: 7.9 G/DL (ref 6–8.4)
RBC # BLD AUTO: 3.81 M/UL (ref 4–5.4)
SODIUM SERPL-SCNC: 142 MMOL/L (ref 136–145)
WBC # BLD AUTO: 4.58 K/UL (ref 3.9–12.7)

## 2019-09-09 PROCEDURE — 80053 COMPREHEN METABOLIC PANEL: CPT

## 2019-09-09 PROCEDURE — 85027 COMPLETE CBC AUTOMATED: CPT

## 2019-09-09 PROCEDURE — 99999 PR PBB SHADOW E&M-EST. PATIENT-LVL V: ICD-10-PCS | Mod: PBBFAC,,, | Performed by: INTERNAL MEDICINE

## 2019-09-09 PROCEDURE — 99214 OFFICE O/P EST MOD 30 MIN: CPT | Mod: S$GLB,,, | Performed by: INTERNAL MEDICINE

## 2019-09-09 PROCEDURE — 99999 PR PBB SHADOW E&M-EST. PATIENT-LVL V: CPT | Mod: PBBFAC,,, | Performed by: INTERNAL MEDICINE

## 2019-09-09 PROCEDURE — 99214 PR OFFICE/OUTPT VISIT, EST, LEVL IV, 30-39 MIN: ICD-10-PCS | Mod: S$GLB,,, | Performed by: INTERNAL MEDICINE

## 2019-09-09 PROCEDURE — 36415 COLL VENOUS BLD VENIPUNCTURE: CPT

## 2019-09-09 PROCEDURE — 1101F PR PT FALLS ASSESS DOC 0-1 FALLS W/OUT INJ PAST YR: ICD-10-PCS | Mod: CPTII,S$GLB,, | Performed by: INTERNAL MEDICINE

## 2019-09-09 PROCEDURE — 1101F PT FALLS ASSESS-DOCD LE1/YR: CPT | Mod: CPTII,S$GLB,, | Performed by: INTERNAL MEDICINE

## 2019-09-09 NOTE — PROGRESS NOTES
"Subjective:       Patient ID: Brunilda England is a 87 y.o. female.    Chief Complaint: History of lung cancer  Oncologic History:  84 yo female with stage III non-small cell lung cancer, currently completed chemoradiation with carboplatin and Taxol on 6/1/12 . She did not receive consolidation chemotherapy due to worsening neuropathy .   PET scan on 8/7/12 revealed favorable response to therapy with post radiation therapy changes and a small right pleural effusion with pleural thickening. No new or metastatic disease identified   She is on surveillance.         HPI Ms. England returns for follow up and scan results from 9/6/19 which reveal "Cicatricial atelectatic changes involving the right paramediastinal lung with rightward mediastinal shift and near collapse of the right lower lobe, consistent with post radiation changes. Stable moderate right-sided pleural effusion with associated compressive atelectasis. Emphysematous lung changes. Additional stable findings as above. There are no measurable lesions per RECIST criteria"  She feels well and denies any new complaints. ECOG PS is zero.      Review of Systems   Constitutional: Negative for appetite change, fatigue and unexpected weight change.   HENT: Negative for mouth sores.    Eyes: Negative for visual disturbance.   Respiratory: Negative for cough and shortness of breath.    Cardiovascular: Negative for chest pain.   Gastrointestinal: Negative for abdominal pain and diarrhea.   Genitourinary: Negative for frequency.   Musculoskeletal: Negative for back pain.   Skin: Negative for rash.   Neurological: Negative for headaches.   Hematological: Negative for adenopathy.   Psychiatric/Behavioral: The patient is not nervous/anxious.    All other systems reviewed and are negative.      PMFSH: all information reviewed and updated as relevant to today's visit  Objective:      Physical Exam   Constitutional: She is oriented to person, place, and time. She appears " well-developed and well-nourished.   HENT:   Mouth/Throat: No oropharyngeal exudate.   Cardiovascular: Normal rate and normal heart sounds.   Pulmonary/Chest: Effort normal and breath sounds normal. She has no wheezes.   Abdominal: Soft. Bowel sounds are normal. There is no tenderness.   Musculoskeletal: She exhibits no edema or tenderness.   Lymphadenopathy:     She has no cervical adenopathy.   Neurological: She is alert and oriented to person, place, and time. Coordination normal.   Skin: Skin is warm and dry. No rash noted.   Psychiatric: She has a normal mood and affect. Judgment and thought content normal.   Vitals reviewed.        LABS:  WBC   Date Value Ref Range Status   09/09/2019 4.58 3.90 - 12.70 K/uL Final     Hemoglobin   Date Value Ref Range Status   09/09/2019 11.1 (L) 12.0 - 16.0 g/dL Final     Hematocrit   Date Value Ref Range Status   09/09/2019 33.7 (L) 37.0 - 48.5 % Final     Platelets   Date Value Ref Range Status   09/09/2019 176 150 - 350 K/uL Final     Gran # (ANC)   Date Value Ref Range Status   09/09/2019 2.2 1.8 - 7.7 K/uL Final     Comment:     The ANC is based on a white cell differential from an   automated cell counter. It has not been microscopically   reviewed for the presence of abnormal cells. Clinical   correlation is required.         Chemistry        Component Value Date/Time     09/09/2019 1408    K 4.1 09/09/2019 1408     09/09/2019 1408    CO2 29 09/09/2019 1408    BUN 13 09/09/2019 1408    CREATININE 1.1 09/09/2019 1408     (H) 09/09/2019 1408        Component Value Date/Time    CALCIUM 10.1 09/09/2019 1408    ALKPHOS 45 (L) 09/09/2019 1408    AST 15 09/09/2019 1408    ALT 7 (L) 09/09/2019 1408    BILITOT 0.2 09/09/2019 1408    ESTGFRAFRICA 52.2 (A) 09/09/2019 1408    EGFRNONAA 45.3 (A) 09/09/2019 1408          Assessment:       1. History of lung cancer        Plan:        1. She is doing well with no evidence of recurrence on CT and will return in 1  year with labs and CT chest.    Above care plan was discussed with patient and accompanying daughter and all questions were addressed to their satisfaction

## 2019-09-10 ENCOUNTER — OFFICE VISIT (OUTPATIENT)
Dept: OPHTHALMOLOGY | Facility: CLINIC | Age: 84
End: 2019-09-10
Payer: MEDICARE

## 2019-09-10 DIAGNOSIS — H35.033 HYPERTENSIVE RETINOPATHY OF BOTH EYES: ICD-10-CM

## 2019-09-10 DIAGNOSIS — H35.3232 EXUDATIVE AGE-RELATED MACULAR DEGENERATION OF BOTH EYES WITH INACTIVE CHOROIDAL NEOVASCULARIZATION: Primary | ICD-10-CM

## 2019-09-10 DIAGNOSIS — H43.813 POSTERIOR VITREOUS DETACHMENT, BOTH EYES: ICD-10-CM

## 2019-09-10 DIAGNOSIS — H35.62 MACULAR HEMORRHAGE OF LEFT EYE: ICD-10-CM

## 2019-09-10 PROCEDURE — 99499 RISK ADDL DX/OHS AUDIT: ICD-10-PCS | Mod: S$GLB,,, | Performed by: OPHTHALMOLOGY

## 2019-09-10 PROCEDURE — 99999 PR PBB SHADOW E&M-EST. PATIENT-LVL II: ICD-10-PCS | Mod: PBBFAC,,, | Performed by: OPHTHALMOLOGY

## 2019-09-10 PROCEDURE — 92134 CPTRZ OPH DX IMG PST SGM RTA: CPT | Mod: S$GLB,,, | Performed by: OPHTHALMOLOGY

## 2019-09-10 PROCEDURE — 92226 PR SPECIAL EYE EXAM, SUBSEQUENT: CPT | Mod: LT,S$GLB,, | Performed by: OPHTHALMOLOGY

## 2019-09-10 PROCEDURE — 92014 COMPRE OPH EXAM EST PT 1/>: CPT | Mod: S$GLB,,, | Performed by: OPHTHALMOLOGY

## 2019-09-10 PROCEDURE — 92226 PR SPECIAL EYE EXAM, SUBSEQUENT: ICD-10-PCS | Mod: LT,S$GLB,, | Performed by: OPHTHALMOLOGY

## 2019-09-10 PROCEDURE — 99499 UNLISTED E&M SERVICE: CPT | Mod: S$GLB,,, | Performed by: OPHTHALMOLOGY

## 2019-09-10 PROCEDURE — 92134 POSTERIOR SEGMENT OCT RETINA (OCULAR COHERENCE TOMOGRAPHY)-BOTH EYES: ICD-10-PCS | Mod: S$GLB,,, | Performed by: OPHTHALMOLOGY

## 2019-09-10 PROCEDURE — 99999 PR PBB SHADOW E&M-EST. PATIENT-LVL II: CPT | Mod: PBBFAC,,, | Performed by: OPHTHALMOLOGY

## 2019-09-10 PROCEDURE — 92014 PR EYE EXAM, EST PATIENT,COMPREHESV: ICD-10-PCS | Mod: S$GLB,,, | Performed by: OPHTHALMOLOGY

## 2019-09-10 NOTE — PROGRESS NOTES
HPI     12 mo OCT   DLS- 08/21/2018 Dr. Leigh     Pt sts still has chalazion on OD   Can see pretty well when her eye doesn't drain constantly   (-)Flashes (-)Floaters  (-)Photophobia  (-)Glare    Refresh tears PRN       OCT - Drusen, RPE atrophy, PED, some HE  No SRF OD, SRF OS improving and small SRH    Prior FA:  Shows window defect, no active leakage OU      A/P    1. Wet AMD OU  - H/o injections OU q3-4 months with Dr. Coreas  - No active leakage/edema OD cont to monitor  s/p Avastin OS x 2 here    With macular atrophy OS - conservative mgmt going forward    11/16 - had rebleed, but given poor prognosis, will monitor    2. HTN Ret OU    3. DM - No DR  BS/BP/chol contour    4. PCIOL OU    5. PVD OU    6. Chalazia RUL  Jonathan following      12 months OCT

## 2019-09-16 ENCOUNTER — OFFICE VISIT (OUTPATIENT)
Dept: INTERNAL MEDICINE | Facility: CLINIC | Age: 84
End: 2019-09-16
Payer: MEDICARE

## 2019-09-16 VITALS
BODY MASS INDEX: 30.49 KG/M2 | HEART RATE: 88 BPM | DIASTOLIC BLOOD PRESSURE: 52 MMHG | WEIGHT: 183 LBS | HEIGHT: 65 IN | SYSTOLIC BLOOD PRESSURE: 124 MMHG | TEMPERATURE: 98 F | OXYGEN SATURATION: 95 %

## 2019-09-16 DIAGNOSIS — I10 ESSENTIAL HYPERTENSION: Primary | ICD-10-CM

## 2019-09-16 DIAGNOSIS — E11.51 TYPE 2 DIABETES MELLITUS WITH DIABETIC PERIPHERAL ANGIOPATHY WITHOUT GANGRENE, WITHOUT LONG-TERM CURRENT USE OF INSULIN: ICD-10-CM

## 2019-09-16 DIAGNOSIS — G31.84 MILD COGNITIVE IMPAIRMENT: ICD-10-CM

## 2019-09-16 DIAGNOSIS — I73.9 PVD (PERIPHERAL VASCULAR DISEASE): ICD-10-CM

## 2019-09-16 PROCEDURE — 1101F PR PT FALLS ASSESS DOC 0-1 FALLS W/OUT INJ PAST YR: ICD-10-PCS | Mod: CPTII,S$GLB,, | Performed by: INTERNAL MEDICINE

## 2019-09-16 PROCEDURE — 99214 PR OFFICE/OUTPT VISIT, EST, LEVL IV, 30-39 MIN: ICD-10-PCS | Mod: S$GLB,,, | Performed by: INTERNAL MEDICINE

## 2019-09-16 PROCEDURE — 99499 UNLISTED E&M SERVICE: CPT | Mod: S$GLB,,, | Performed by: INTERNAL MEDICINE

## 2019-09-16 PROCEDURE — 99214 OFFICE O/P EST MOD 30 MIN: CPT | Mod: S$GLB,,, | Performed by: INTERNAL MEDICINE

## 2019-09-16 PROCEDURE — 99499 RISK ADDL DX/OHS AUDIT: ICD-10-PCS | Mod: S$GLB,,, | Performed by: INTERNAL MEDICINE

## 2019-09-16 PROCEDURE — 99999 PR PBB SHADOW E&M-EST. PATIENT-LVL III: ICD-10-PCS | Mod: PBBFAC,,, | Performed by: INTERNAL MEDICINE

## 2019-09-16 PROCEDURE — 1101F PT FALLS ASSESS-DOCD LE1/YR: CPT | Mod: CPTII,S$GLB,, | Performed by: INTERNAL MEDICINE

## 2019-09-16 PROCEDURE — 99999 PR PBB SHADOW E&M-EST. PATIENT-LVL III: CPT | Mod: PBBFAC,,, | Performed by: INTERNAL MEDICINE

## 2019-09-16 NOTE — PROGRESS NOTES
CHIEF COMPLAINT: Follow up of hypertension, diabetes, PVD, mood, arthriits     HISTORY OF PRESENT ILLNESS: This is a 87-year-old woman who presents for follow up of above.    She tripped on the post of her bed and her left breast hit the post of the bed.  She has a bruise on th left breast. She iced the area after the fall. Her breast is sore. She is rubbing aspercreme with lidocaine on the area.  She has been putting linimen on the area which is helping. She did not fall to the ground. She did not hit her head.      She has dry cough at night. She has some white mucous at times.      She saw Dr Castillo for the Chalazion on the right upper eye lid     She had right leg pain - she had a blockage. Dr Gaviria placed a stent in the right CFA and PTA left ext loulou artery 12/18. Right leg pain has resolved.  The ulcerated area on the right leg has totally healed and has not returned.  She is taking aspirin 81 mg daily.       She feels that her memory is better. She is taking aricept 5 mg daily.       June was a hard month for her.  Her 's birthday ws [birth year] then Father's day June 17, her anniversay is June 25, her mother's birthday is June 18.   Her  passed away on August 26, 2017. They were  67 years. It has been a tough year. She misses him. She feels that she is coping well as he was sick for a very long time. She was his primary care giver. She is on Cymbalta 60 mg daliy and celexa 10 mg daily. Mood is good Neuropathic pain in the legs is better controlled with the duloxetine.        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  Right hip pain has resolved and has not returned.          Her non small cell lung cancer is stable. She saw Dr Weir 8/9/18  and scans were stable. She is to follow up annually with CT scan chest.  She is breathing well. No chest pain, shortness of breath, fever, chills.       She is currently taking actos 15 mg daily. No polydipsia,  polyuria, hypoglycemia. She checks her blood sugars couple times a week. Blood sugar was 102 last time she checked it. No hypoglycemia.    She continues to take Lotrel 5/20 once daily, metoprolol 50 mg 1 tablet twice daily, and hydrochlorothiazide 12.5 mg daily for her hypertension. She denies any chest pain or shortness of breath. She continues to take simvastatin 80 mg at bedtime for her hyperlipidemia. No joint pain or muscle pain.        She is taking Fosamax once week for her bones. She denies any heartburn from the Fosamax. No melana, bloody stools, constipation. She has occasional diarrhea if she eats something wrong.      She takes vitamin B12 1000 mcg daily for vitamin B12 deficiency - anemia is stable        SHe has macular degeneration of the eyes and was followed by Dr Tresa Coreas. She got injections in her eyes every 6 weeks in Spring 2015. She now sees Dr Ruelas at [facility] (last saw him 8/18- no need for injections at that time- yearly follow up). Vision has been stable. She is taking a Eye vitamins for her eyes.       No vaginal bleeding and no longer needs vaginal premarin cream    PAST MEDICAL HISTORY:    1. Stage III non-small cell lung cancer, completed chemoradiation with carboplatin and Taxol on 6/1/12    2. Hypertension.   3. Diabetes mellitus.   4. Hyperlipidemia.   5. Chronic diastolic dysfunction.   6. Coronary artery disease status post MI in 1990 and 2003. Stenting was   done at 2003 at [facility].   7. Cholecystectomy, December 2006.   8. Cataract removal.   9. Benign growth removed from her throat.   10. Left common femoral endarterectomy, July 2007.   11. History of anal cancer in 1996 status post radiation and chemotherapy.   12. Carpal tunnel syndrome.   13. Osteoporosis on BMD 12/11    14. Macular degeneration    SOCIAL HISTORY: She quit smoking in 1995, denies any alcohol use. She is etired.     REVIEW OF SYSTEMS: She denies fevers, chills, night sweats, fatigue,  "visual change, hearing loss, sinus congestion, sore throat, dysuria, hematuria, joint pain, muscle pain, polydipsia, and polyuria.     PHYSICAL EXAM:    BP (!) 124/52 (BP Location: Right arm, Patient Position: Sitting, BP Method: Large (Manual))   Pulse 88   Temp 98.4 °F (36.9 °C) (Oral)   Ht 5' 5" (1.651 m)   Wt 83 kg (182 lb 15.7 oz)   SpO2 95%   BMI 30.45 kg/m²     GENERAL: She is alert and oriented. No apparent distress. Affect within normal limits.   Conjunctivae anicteric.  Nodule right upper eye lid. No erythema, warmth or drainage.   NECK: Supple.   Respiratory effort normal. Lungs clear to auscultation.   HEART: Regular rate and rhythm without murmurs, gallops, or rubs. No lower   extremity edema.   Superficial bruise on the left breast. No induration or warmth       BMD 9/2019 report reviewed    CT chest 9/6/10 stable      ASSESSMENT AND PLAN:    1. PVD - stable  2. Mood disorder - stable on cymbalta to 60 mg daily  3. Memory issues - stable on aricept 5 mg daily.   4. Stage III non-small cell lung cancer, completed chemoradiation with carboplatin and Taxol on 6/1/12 - doing well. FOllow up with Dr Weir in August 2019  5. Diabetes-stable  3. Hypertension-controlled  4. Hyperlipidemia-lipids controlled  5. Coronary artery disease modifying risk factors.   6. History of anal cancer-had a normal colonoscopy, November 2008; due 2015. Declined repeat  8. Pernicious anemia - on counter vitamin B12 1000 mcg daily. Level fine in Jan 2018  9. Osteoporosis - on alendroante for 7 years.  She can go on a drug holiday. BMD 9/2019  10. Left leg pain due to neuropathy-stable.  .    11. Macular degeneration - stable  Screening mammogram was 8/18 Colonoscopy is due 2015 -declined. Pap smear was July 2010.  Flu 9/252017, Prevnar 11/2015  I will see her back in 3-4 months, sooner if problems arise  "

## 2019-09-17 ENCOUNTER — OFFICE VISIT (OUTPATIENT)
Dept: OPHTHALMOLOGY | Facility: CLINIC | Age: 84
End: 2019-09-17
Payer: MEDICARE

## 2019-09-17 DIAGNOSIS — H35.033 HYPERTENSIVE RETINOPATHY OF BOTH EYES: ICD-10-CM

## 2019-09-17 DIAGNOSIS — H43.813 POSTERIOR VITREOUS DETACHMENT, BOTH EYES: ICD-10-CM

## 2019-09-17 DIAGNOSIS — H35.3232 EXUDATIVE AGE-RELATED MACULAR DEGENERATION OF BOTH EYES WITH INACTIVE CHOROIDAL NEOVASCULARIZATION: ICD-10-CM

## 2019-09-17 DIAGNOSIS — H35.62 MACULAR HEMORRHAGE OF LEFT EYE: ICD-10-CM

## 2019-09-17 DIAGNOSIS — H02.9 EYELID LESION: Primary | ICD-10-CM

## 2019-09-17 PROCEDURE — 92012 INTRM OPH EXAM EST PATIENT: CPT | Mod: S$GLB,,, | Performed by: OPHTHALMOLOGY

## 2019-09-17 PROCEDURE — 99499 UNLISTED E&M SERVICE: CPT | Mod: S$GLB,,, | Performed by: OPHTHALMOLOGY

## 2019-09-17 PROCEDURE — 99999 PR PBB SHADOW E&M-EST. PATIENT-LVL II: ICD-10-PCS | Mod: PBBFAC,,, | Performed by: OPHTHALMOLOGY

## 2019-09-17 PROCEDURE — 92012 PR EYE EXAM, EST PATIENT,INTERMED: ICD-10-PCS | Mod: S$GLB,,, | Performed by: OPHTHALMOLOGY

## 2019-09-17 PROCEDURE — 99999 PR PBB SHADOW E&M-EST. PATIENT-LVL II: CPT | Mod: PBBFAC,,, | Performed by: OPHTHALMOLOGY

## 2019-09-17 PROCEDURE — 99499 RISK ADDL DX/OHS AUDIT: ICD-10-PCS | Mod: S$GLB,,, | Performed by: OPHTHALMOLOGY

## 2019-09-23 ENCOUNTER — TELEPHONE (OUTPATIENT)
Dept: OPHTHALMOLOGY | Facility: CLINIC | Age: 84
End: 2019-09-23

## 2019-09-23 ENCOUNTER — TELEPHONE (OUTPATIENT)
Dept: INTERNAL MEDICINE | Facility: CLINIC | Age: 84
End: 2019-09-23

## 2019-09-23 RX ORDER — SIMVASTATIN 80 MG/1
TABLET, FILM COATED ORAL
Qty: 90 TABLET | Refills: 4 | Status: SHIPPED | OUTPATIENT
Start: 2019-09-23 | End: 2020-12-18 | Stop reason: SDUPTHER

## 2019-09-23 NOTE — TELEPHONE ENCOUNTER
Received a call from the pts daughter upset and unaware of the appt scheduled for her mom. I offered her another appt for tomorrow morning at 8 and she calmed down and accepted.Kelly Crouch LPN

## 2019-09-23 NOTE — TELEPHONE ENCOUNTER
Spoke with the pts daughter she stated the pt has a know in her breast that is kind of big and causing pain.Kelly Crouch LPN

## 2019-09-23 NOTE — TELEPHONE ENCOUNTER
----- Message from Monalisa Fung MA sent at 9/23/2019 11:58 AM CDT -----  Contact: Self [phone]      ----- Message -----  From: Ernesto Carbone  Sent: 9/23/2019   9:48 AM CDT  To: Nahid DE LA GARZA Staff    Patient would like to get medical advice.  Symptoms (please be specific):  Knot on left breast   How long has patient had these symptoms:  Few days  Pharmacy name and phone # (copy/paste from chart):    Any drug allergies (copy/paste from chart):      Would the patient rather a call back or a response via [facility]?: call back    Comments:

## 2019-09-23 NOTE — TELEPHONE ENCOUNTER
----- Message from Linda Reyes sent at 9/23/2019 12:44 PM CDT -----  Contact: Nora Wilcox daughter calling to schedule for surgery.  She can be reached at [phone]

## 2019-09-24 ENCOUNTER — OFFICE VISIT (OUTPATIENT)
Dept: INTERNAL MEDICINE | Facility: CLINIC | Age: 84
End: 2019-09-24
Payer: MEDICARE

## 2019-09-24 VITALS
TEMPERATURE: 98 F | DIASTOLIC BLOOD PRESSURE: 70 MMHG | HEIGHT: 65 IN | SYSTOLIC BLOOD PRESSURE: 156 MMHG | OXYGEN SATURATION: 95 % | WEIGHT: 177.25 LBS | BODY MASS INDEX: 29.53 KG/M2 | HEART RATE: 86 BPM

## 2019-09-24 DIAGNOSIS — Z23 NEED FOR 23-POLYVALENT PNEUMOCOCCAL POLYSACCHARIDE VACCINE: ICD-10-CM
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
## 2019-09-24 DIAGNOSIS — N63.22 BREAST LUMP ON LEFT SIDE AT 11 O'CLOCK POSITION: Primary | ICD-10-CM

## 2019-09-24 DIAGNOSIS — Z23 NEED FOR INFLUENZA VACCINATION: ICD-10-CM

## 2019-09-24 PROCEDURE — 99213 OFFICE O/P EST LOW 20 MIN: CPT | Mod: S$GLB,,, | Performed by: NURSE PRACTITIONER

## 2019-09-24 PROCEDURE — 1101F PR PT FALLS ASSESS DOC 0-1 FALLS W/OUT INJ PAST YR: ICD-10-PCS | Mod: CPTII,S$GLB,, | Performed by: NURSE PRACTITIONER

## 2019-09-24 PROCEDURE — 99999 PR PBB SHADOW E&M-EST. PATIENT-LVL III: CPT | Mod: PBBFAC,,, | Performed by: NURSE PRACTITIONER

## 2019-09-24 PROCEDURE — 1101F PT FALLS ASSESS-DOCD LE1/YR: CPT | Mod: CPTII,S$GLB,, | Performed by: NURSE PRACTITIONER

## 2019-09-24 PROCEDURE — 99999 PR PBB SHADOW E&M-EST. PATIENT-LVL III: ICD-10-PCS | Mod: PBBFAC,,, | Performed by: NURSE PRACTITIONER

## 2019-09-24 PROCEDURE — 99213 PR OFFICE/OUTPT VISIT, EST, LEVL III, 20-29 MIN: ICD-10-PCS | Mod: S$GLB,,, | Performed by: NURSE PRACTITIONER

## 2019-09-24 NOTE — PROGRESS NOTES
Subjective:       Patient ID: Brunilda England is a 87 y.o. female.    Chief Complaint: Fall and Breast Pain (left)    Pt of Dr. Whitfield, here for painful left breast lump s/p a fall. She was getting into the bed, fell, and hit the bedpost 2 Fridays ago. Seen on 9-16-19 by Dr. Whitfield, mentioned that she tripped on the post of her bed and her left breast hit the post of the bed.  She had a bruise on the left breast. She iced the area after the fall. Her breast is sore. She is rubbing aspercreme with lidocaine on the area.  She has been putting linimen on the area which is helping. She did not fall to the ground. She did not hit her head.      She prsents today because now a hard lump has developed in her left breast that is tender and was not there before. Denies nipple discharge, fever, or drainage.    Review of Systems   Constitutional: Negative for chills and fever.   Respiratory: Negative for chest tightness and shortness of breath.    Cardiovascular: Negative for chest pain.   Musculoskeletal: Positive for arthralgias. Negative for myalgias.        Left breast as documented in HPI   Skin: Negative for color change, pallor, rash and wound.   Allergic/Immunologic: Negative for environmental allergies, food allergies and immunocompromised state.   Neurological: Negative for dizziness, weakness and numbness.   Hematological: Negative for adenopathy. Does not bruise/bleed easily.         Review of patient's allergies indicates:   Allergen Reactions    Bextra [valdecoxib] Swelling    Gabapentin Diarrhea and Nausea Only     Current Outpatient Medications:     ALPRAZolam (XANAX) 0.5 MG tablet, One at bedtime as needed, Disp: 30 tablet, Rfl: 1    amlodipine-benazepril 5-20 mg (LOTREL) 5-20 mg per capsule, TAKE ONE CAPSULE BY MOUTH ONCE DAILY, Disp: 90 capsule, Rfl: 4    ANTIOX #8/OM3/DHA/EPA/LUT/ZEAX (PRESERVISION AREDS 2, OMEGA-3, ORAL), Take 1 capsule by mouth 2 (two) times daily. , Disp: , Rfl:     aspirin  (ECOTRIN) 81 MG EC tablet, Take 81 mg by mouth every morning., Disp: , Rfl:     blood sugar diagnostic Strp, Check blood sugar daily. Strips and lancets, Disp: 100 strip, Rfl: 6    calcium-vitamin D (CALCIUM-VITAMIN D) 500 mg(1,250mg) -200 unit per tablet, Take 1 tablet by mouth every morning. , Disp: , Rfl:     chlorhexidine (PERIDEX) 0.12 % solution, Use as directed 15 mLs in the mouth or throat nightly. , Disp: , Rfl:     citalopram (CELEXA) 10 MG tablet, Take 10 mg by mouth every morning. , Disp: , Rfl:     citalopram (CELEXA) 10 MG tablet, Take 1 tablet (10 mg total) by mouth once daily., Disp: 90 tablet, Rfl: 4    cyanocobalamin (VITAMIN B-12) 1000 MCG tablet, Take 1,000 mcg by mouth every morning. , Disp: , Rfl:     donepezil (ARICEPT) 5 MG tablet, Take 1 tablet (5 mg total) by mouth every morning., Disp: 90 tablet, Rfl: 4    DULoxetine (CYMBALTA) 60 MG capsule, Take 1 capsule (60 mg total) by mouth every morning., Disp: 90 capsule, Rfl: 4    estradiol (ESTRACE) 0.01 % (0.1 mg/gram) vaginal cream, Use 1/2 gram vaginally twice a week., Disp: 1 Tube, Rfl: 6    ferrous sulfate (IRON ORAL), Take 1 tablet by mouth every morning., Disp: , Rfl:     hydroCHLOROthiazide (MICROZIDE) 12.5 mg capsule, Take 1 capsule (12.5 mg total) by mouth every morning., Disp: 90 capsule, Rfl: 4    lancets Misc, Check blood sugar daily 250.00, Disp: 100 each, Rfl: 5    loratadine (CLARITIN) 10 mg tablet, Take 1 tablet (10 mg total) by mouth daily as needed for Allergies., Disp: , Rfl:     metoprolol tartrate (LOPRESSOR) 50 MG tablet, TAKE ONE TABLET BY MOUTH TWICE DAILY, Disp: 180 tablet, Rfl: 4    nitroGLYCERIN (NITROSTAT) 0.4 MG SL tablet, Place 1 tablet (0.4 mg total) under the tongue every 5 (five) minutes as needed., Disp: 25 tablet, Rfl: 0    pioglitazone (ACTOS) 15 MG tablet, TAKE ONE TABLET BY MOUTH EVERY MORNING, Disp: 90 tablet, Rfl: 4    simvastatin (ZOCOR) 80 MG tablet, TAKE ONE TABLET BY MOUTH ONCE DAILY,  Disp: 90 tablet, Rfl: 4    blood-glucose meter kit, Use as instructed, Disp: 1 each, Rfl: 0    clobetasol (TEMOVATE) 0.05 % cream, Apply topically nightly as needed., Disp: , Rfl:   No current facility-administered medications for this visit.     Facility-Administered Medications Ordered in Other Visits:     sodium chloride 0.9% flush 5 mL, 5 mL, Intravenous, PRN, Sarah Earl MD    Patient Active Problem List   Diagnosis    History of lung cancer    Anemia due to antineoplastic chemotherapy    Essential hypertension    Neuropathy    Osteoporosis    History of rectal or anal cancer    PMB (postmenopausal bleeding)    Pure hypercholesterolemia    Anemia    Chronic back pain    Lumbar radiculopathy    Neuropathy of left lower extremity    PVD (peripheral vascular disease) with claudication    Radiation vaginitis    Bilateral exudative age-related macular degeneration    Hypertensive retinopathy of both eyes    Posterior vitreous detachment, both eyes    History of lung cancer    Macular hemorrhage of left eye    Atrophy of macula lutea    Pernicious anemia    Coronary artery disease involving native coronary artery without angina pectoris    Type 2 diabetes mellitus with circulatory disorder, without long-term current use of insulin    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    Gait abnormality    Balance problem    Mild cognitive impairment    Atherosclerotic peripheral vascular disease with rest pain    PVD (peripheral vascular disease)    Mood disorder     Past Medical History:   Diagnosis Date    Anal cancer 1995    Anemia     Cancer 1995    anal cancer    Coronary artery disease     Diabetes mellitus     With circulatory disorder    Hypertension     Hypertensive retinopathy of both eyes    Lumbar radiculopathy 5/16/2014    Lung cancer 2012    s/p chemo/radiation    Macular degeneration     Macular  hemorrhage of left eye     Myocardial infarction     x 2    Neuropathy     Osteoporosis     Osteoporosis, unspecified 2012    Peripheral vascular disease     Pure hypercholesterolemia      Past Surgical History:   Procedure Laterality Date    BRONCHOSCOPY      CHOLECYSTECTOMY      COLONOSCOPY      FLUOROSCOPIC ANGIOGRAPHY OF LOWER EXTREMITY WITH TOPICAL ULTRASOUND Right 2018    Procedure: ARTERIOGRAM-LEG AND ULTRASOUND;  Surgeon: SEBASTIÁN Mcpherson III, MD;  Location: [facility] OR [address] [place] [address];  Service: Peripheral Vascular;  Laterality: Right;  16.5 min  1059.42 mGy  59ml Dye  2ml Local    heart stent      HYSTERECTOMY      left leg surgery      blockage    PERCUTANEOUS TRANSLUMINAL ANGIOPLASTY N/A 2018    Procedure: PTA (ANGIOPLASTY, PERCUTANEOUS, TRANSLUMINAL);  Surgeon: SEBASTIÁN Mcpherson III, MD;  Location: [facility] OR [address] [place] [address];  Service: Peripheral Vascular;  Laterality: N/A;    TONSILLECTOMY       Social History     Socioeconomic History    Marital status:      Spouse name: Not on file    Number of children: Not on file    Years of education: Not on file    Highest education level: Not on file   Occupational History    Not on file   Social Needs    Financial resource strain: Not on file    Food insecurity:     Worry: Not on file     Inability: Not on file    Transportation needs:     Medical: Not on file     Non-medical: Not on file   Tobacco Use    Smoking status: Former Smoker     Packs/day: 0.50     Years: 30.00     Pack years: 15.00     Types: Cigarettes     Last attempt to quit: 1995     Years since quittin.7    Smokeless tobacco: Never Used   Substance and Sexual Activity    Alcohol use: No    Drug use: No    Sexual activity: Not Currently     Birth control/protection: Surgical   Lifestyle    Physical activity:     Days per week: Not on file     Minutes per session: Not on file    Stress: Not on file   Relationships    Social connections:     Talks on phone: Not  "on file     Gets together: Not on file     Attends Christianity service: Not on file     Active member of club or organization: Not on file     Attends meetings of clubs or organizations: Not on file     Relationship status: Not on file   Other Topics Concern    Not on file   Social History Narrative    Not on file     Family History   Problem Relation Age of Onset    Stroke Mother     Cancer Father     Breast cancer Maternal Aunt     Ovarian cancer Neg Hx     Amblyopia Neg Hx     Blindness Neg Hx     Cataracts Neg Hx     Glaucoma Neg Hx     Macular degeneration Neg Hx     Retinal detachment Neg Hx     Strabismus Neg Hx        Objective:       Vitals:    09/24/19 0808   BP: (!) 156/70   Pulse: 86   Temp: 97.9 °F (36.6 °C)   SpO2: 95%   Weight: 80.4 kg (177 lb 4 oz)   Height: 5' 5" (1.651 m)   PainSc: 0-No pain       Body mass index is 29.5 kg/m².    Physical Exam   Constitutional: She appears well-developed and well-nourished.   overweight   HENT:   Head: Normocephalic.   Eyes: Pupils are equal, round, and reactive to light.   Neck: Normal range of motion. Neck supple.   Cardiovascular: Normal rate, regular rhythm, normal heart sounds and intact distal pulses.   Pulmonary/Chest: Effort normal and breath sounds normal.   Musculoskeletal: She exhibits tenderness. She exhibits no edema.   Left breast lump that is hard and tender, quarter sized and raised. No erythema. At the 11 o'clock position of breast.   Lymphadenopathy:     She has no cervical adenopathy.   Skin: Skin is warm and dry. Capillary refill takes less than 2 seconds.   Psychiatric: She has a normal mood and affect. Her behavior is normal. Judgment and thought content normal.   Nursing note and vitals reviewed.      Assessment:       1. Breast lump on left side at 11 o'clock position    2. BMI 29.0-29.9,adult    3. Need for influenza vaccination    4. Need for 23-polyvalent pneumococcal polysaccharide vaccine    5. Fall from bed, initial " encounter        Plan:       Brunilda was seen today for fall and breast pain.    Diagnoses and all orders for this visit:    Breast lump on left side at 11 o'clock position  -     US Breast Left Complete; Future    BMI 29.0-29.9,adult  BMI reviewed    Need for influenza vaccination  Given today    Need for 23-polyvalent pneumococcal polysaccharide vaccine  -     (In Office Administered) Pneumococcal Polysaccharide Vaccine (23 Valent) (SQ/IM)    Fall from bed, initial encounter  -     US Breast Left Complete; Future    Check US of left breast today, will call with results    Continue cold compresses and alternate with warm compress    May take Tylenol or Motrin otc as needed for pain    High dose flu shot and Pneumovax 23 vaccines today    Pt will get second [facility] at [facility]    Follow up if symptoms worsen or fail to improve.

## 2019-09-24 NOTE — PATIENT INSTRUCTIONS
Check US of left breast today, will call with results    Continue cold compresses and alternate with warm compress    May take Tylenol or Motrin otc as needed for pain    High dose flu shot and Pneumovax 23 vaccines today    Pt will get second Shingles shot at [facility]

## 2019-09-25 ENCOUNTER — IMMUNIZATION (OUTPATIENT)
Dept: PHARMACY | Facility: CLINIC | Age: 84
End: 2019-09-25

## 2019-09-25 ENCOUNTER — IMMUNIZATION (OUTPATIENT)
Dept: PHARMACY | Facility: CLINIC | Age: 84
End: 2019-09-25
Payer: MEDICARE

## 2019-10-14 NOTE — PROGRESS NOTES
HPI     S/p biopsy results from 8/26/19    Refresh prn OU    No previous nasal, facial, eye sx.    Last edited by Jacki Orosco on 9/17/2019  5:12 PM. (History)            Assessment /Plan     For exam results, see Encounter Report.    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 H/o prior I and D by me.     Plan for full-thickness resection and reconstruction of lateral right upper eyelid with removal of blocked duct.     Informed consent obtained after extensive risks/benefits/alternatives were discussed with the patient including but not limited to pain, bleeding, infection, ocular injury, loss of the eye, asymmetry, need for revision in future, scarring.  Alternatives such as waiting were discussed.  All questions were answered.      Hold ASA, NSAIDS, and fish oil 5 to 7 days prior to procedure.    Return for surgery

## 2019-12-02 ENCOUNTER — TELEPHONE (OUTPATIENT)
Dept: OPHTHALMOLOGY | Facility: CLINIC | Age: 84
End: 2019-12-02

## 2019-12-02 DIAGNOSIS — H02.9 EYELID LESION: Primary | ICD-10-CM

## 2019-12-03 ENCOUNTER — TELEPHONE (OUTPATIENT)
Dept: OPHTHALMOLOGY | Facility: CLINIC | Age: 84
End: 2019-12-03

## 2019-12-04 ENCOUNTER — TELEPHONE (OUTPATIENT)
Dept: OPHTHALMOLOGY | Facility: CLINIC | Age: 84
End: 2019-12-04

## 2019-12-04 ENCOUNTER — PATIENT OUTREACH (OUTPATIENT)
Dept: ADMINISTRATIVE | Facility: HOSPITAL | Age: 84
End: 2019-12-04

## 2019-12-04 DIAGNOSIS — E11.51 TYPE 2 DIABETES MELLITUS WITH DIABETIC PERIPHERAL ANGIOPATHY WITHOUT GANGRENE, WITHOUT LONG-TERM CURRENT USE OF INSULIN: Primary | ICD-10-CM

## 2019-12-04 NOTE — TELEPHONE ENCOUNTER
Mrs. Vallejo called me on my cell phone on my way in today. I spoke with her regarding surgery time and went over meds again she is with holding them correctly.     ----- Message from Caio Butt sent at 12/4/2019  9:19 AM CST -----  Contact: Nora England (daughter)      ----- Message -----  From: Shauna Barlow  Sent: 12/4/2019   9:10 AM CST  To: Jonathan Nelson Staff    Pt daughter is trying to get information on her mother surgery on 12/12/19 she has questions to ask about the time and about the procedure. She can be reached at [phone]

## 2019-12-05 ENCOUNTER — TELEPHONE (OUTPATIENT)
Dept: OPHTHALMOLOGY | Facility: CLINIC | Age: 84
End: 2019-12-05

## 2019-12-05 ENCOUNTER — ANESTHESIA EVENT (OUTPATIENT)
Dept: SURGERY | Facility: HOSPITAL | Age: 84
DRG: 987 | End: 2019-12-05
Payer: MEDICARE

## 2019-12-05 NOTE — ANESTHESIA PREPROCEDURE EVALUATION
12/05/2019  Brunilda England is a 88 y.o., female.  Pre-operative evaluation for RECONSTRUCTION USING FLAP/FULL THICKNESS MRESETION AND RECONSTRUCTION RIGHT UPPER EYELID WITH BIOPSY (Right), INCISIONAL BIOPSY (Right)    Chief Complaint:    PMH:  stage III non-small cell lung cancer, currently completed chemoradiation with carboplatin and Taxol on 6/1/12    Past Surgical History:   Procedure Laterality Date    BRONCHOSCOPY      CHOLECYSTECTOMY      COLONOSCOPY      FLUOROSCOPIC ANGIOGRAPHY OF LOWER EXTREMITY WITH TOPICAL ULTRASOUND Right 12/6/2018    Procedure: ARTERIOGRAM-LEG AND ULTRASOUND;  Surgeon: SEBASTIÁN Mcpherson III, MD;  Location: [facility] OR [address] [place] [address];  Service: Peripheral Vascular;  Laterality: Right;  16.5 min  1059.42 mGy  59ml Dye  2ml Local    heart stent      HYSTERECTOMY      left leg surgery      blockage    PERCUTANEOUS TRANSLUMINAL ANGIOPLASTY N/A 12/6/2018    Procedure: PTA (ANGIOPLASTY, PERCUTANEOUS, TRANSLUMINAL);  Surgeon: SEBASTIÁN Mcpherson III, MD;  Location: [facility] OR [address] [place] [address];  Service: Peripheral Vascular;  Laterality: N/A;    TONSILLECTOMY           Vital Signs Range (Last 24H):  Temp:  [37 °C (98.6 °F)]   Pulse:  [87]   Resp:  [18]   BP: (157)/(72)   SpO2:  [98 %]       CBC:   No results for input(s): WBC, RBC, HGB, HCT, PLT, MCV, MCH, MCHC in the last 720 hours.    CMP: No results for input(s): NA, K, CL, CO2, BUN, CREATININE, GLU, MG, PHOS, CALCIUM, ALBUMIN, PROT, ALKPHOS, ALT, AST, BILITOT in the last 720 hours.    INR:  No results for input(s): PT, INR, PROTIME, APTT in the last 720 hours.      Diagnostic Studies:    Cicatricial atelectatic changes involving the right paramediastinal lung with rightward mediastinal shift and near collapse of the right lower lobe, consistent with post radiation changes.    Stable moderate right-sided pleural effusion with associated compressive  atelectasis.    Emphysematous lung changes.    Additional stable findings as above.    There are no measurable lesions per RECIST criteria.    Electronically signed by resident: Leonid Hackett  Date: 2019  [facility] Echo:  Anesthesia Evaluation    I have reviewed the Patient Summary Reports.     I have reviewed the Nursing Notes.   I have reviewed the Medications.     Review of Systems  Anesthesia Hx:  No problems with previous Anesthesia  Personal Hx of Anesthesia complications   Social:  Non-Smoker, No Alcohol Use    Pulmonary:  Pulmonary Normal        Physical Exam  General:  Obesity    Airway/Jaw/Neck:  Airway Findings: Mouth Opening: Normal Tongue: Normal  General Airway Assessment: Good  Mallampati: I  TM Distance: Normal, at least 6 cm  Jaw/Neck Findings:  Neck ROM: Normal ROM      Dental:  Dental Findings: Edentulous   Chest/Lungs:  Chest/Lungs Findings: Clear to auscultation, Normal Respiratory Rate     Heart/Vascular:  Heart Findings: Rate: Normal  Rhythm: Regular Rhythm  Sounds: Normal        Mental Status:  Mental Status Findings:  Cooperative, Alert and Oriented         Anesthesia Plan  Type of Anesthesia, risks & benefits discussed:  Anesthesia Type:  general  Patient's Preference:   Intra-op Monitoring Plan:   Intra-op Monitoring Plan Comments:   Post Op Pain Control Plan: per primary service following discharge from PACU  Post Op Pain Control Plan Comments:   Induction:   IV  Beta Blocker:  Patient is not currently on a Beta-Blocker (No further documentation required).       Informed Consent: Patient understands risks and agrees with Anesthesia plan.  Questions answered. Anesthesia consent signed with patient.  ASA Score: 3     Day of Surgery Review of History & Physical:    H&P update referred to the surgeon.         Ready For Surgery From Anesthesia Perspective.     Patient's daughter denies patient having any side effects or issues with anesthesia or sedation.She does however recall one  incident associated with a surgical procedure at [facility] where the patient unexpectedly had to stay overnight because of breathing issues.

## 2019-12-06 ENCOUNTER — TELEPHONE (OUTPATIENT)
Dept: OPHTHALMOLOGY | Facility: CLINIC | Age: 84
End: 2019-12-06

## 2019-12-06 ENCOUNTER — HOSPITAL ENCOUNTER (INPATIENT)
Facility: HOSPITAL | Age: 84
LOS: 1 days | Discharge: HOME | DRG: 987 | End: 2019-12-07
Attending: EMERGENCY MEDICINE | Admitting: STUDENT IN AN ORGANIZED HEALTH CARE EDUCATION/TRAINING PROGRAM
Payer: MEDICARE

## 2019-12-06 ENCOUNTER — ANESTHESIA (OUTPATIENT)
Dept: SURGERY | Facility: HOSPITAL | Age: 84
DRG: 987 | End: 2019-12-06
Payer: MEDICARE

## 2019-12-06 DIAGNOSIS — Z98.890 POST-OPERATIVE STATE: Primary | ICD-10-CM

## 2019-12-06 DIAGNOSIS — R55 SYNCOPE: ICD-10-CM

## 2019-12-06 DIAGNOSIS — R09.02 HYPOXEMIA: ICD-10-CM

## 2019-12-06 PROBLEM — H00.13 CHALAZION OF RIGHT EYE: Status: ACTIVE | Noted: 2019-12-06

## 2019-12-06 LAB
ALBUMIN SERPL BCP-MCNC: 3.4 G/DL (ref 3.5–5.2)
ALLENS TEST: ABNORMAL
ALP SERPL-CCNC: 42 U/L (ref 55–135)
ALT SERPL W/O P-5'-P-CCNC: 6 U/L (ref 10–44)
ANION GAP SERPL CALC-SCNC: 7 MMOL/L (ref 8–16)
AST SERPL-CCNC: 14 U/L (ref 10–40)
BASOPHILS # BLD AUTO: 0.01 K/UL (ref 0–0.2)
BASOPHILS NFR BLD: 0.2 % (ref 0–1.9)
BILIRUB SERPL-MCNC: 0.2 MG/DL (ref 0.1–1)
BNP SERPL-MCNC: 220 PG/ML (ref 0–99)
BUN SERPL-MCNC: 16 MG/DL (ref 6–30)
BUN SERPL-MCNC: 16 MG/DL (ref 8–23)
CALCIUM SERPL-MCNC: 9.8 MG/DL (ref 8.7–10.5)
CHLORIDE SERPL-SCNC: 100 MMOL/L (ref 95–110)
CHLORIDE SERPL-SCNC: 102 MMOL/L (ref 95–110)
CO2 SERPL-SCNC: 30 MMOL/L (ref 23–29)
CREAT SERPL-MCNC: 1.1 MG/DL (ref 0.5–1.4)
CREAT SERPL-MCNC: 1.1 MG/DL (ref 0.5–1.4)
DELSYS: ABNORMAL
DIFFERENTIAL METHOD: ABNORMAL
EOSINOPHIL # BLD AUTO: 0.1 K/UL (ref 0–0.5)
EOSINOPHIL NFR BLD: 0.8 % (ref 0–8)
ERYTHROCYTE [DISTWIDTH] IN BLOOD BY AUTOMATED COUNT: 16 % (ref 11.5–14.5)
EST. GFR  (AFRICAN AMERICAN): 51.8 ML/MIN/1.73 M^2
EST. GFR  (NON AFRICAN AMERICAN): 44.9 ML/MIN/1.73 M^2
ESTIMATED AVG GLUCOSE: 143 MG/DL (ref 68–131)
FLOW: 1
GLUCOSE SERPL-MCNC: 144 MG/DL (ref 70–110)
GLUCOSE SERPL-MCNC: 151 MG/DL (ref 70–110)
HBA1C MFR BLD HPLC: 6.6 % (ref 4–5.6)
HCO3 UR-SCNC: 28 MMOL/L (ref 24–28)
HCT VFR BLD AUTO: 31.9 % (ref 37–48.5)
HCT VFR BLD CALC: 32 %PCV (ref 36–54)
HGB BLD-MCNC: 10.4 G/DL (ref 12–16)
IMM GRANULOCYTES # BLD AUTO: 0.02 K/UL (ref 0–0.04)
IMM GRANULOCYTES NFR BLD AUTO: 0.3 % (ref 0–0.5)
INR PPP: 1 (ref 0.8–1.2)
LYMPHOCYTES # BLD AUTO: 1 K/UL (ref 1–4.8)
LYMPHOCYTES NFR BLD: 16.1 % (ref 18–48)
MAGNESIUM SERPL-MCNC: 1.8 MG/DL (ref 1.6–2.6)
MCH RBC QN AUTO: 28.7 PG (ref 27–31)
MCHC RBC AUTO-ENTMCNC: 32.6 G/DL (ref 32–36)
MCV RBC AUTO: 88 FL (ref 82–98)
MODE: ABNORMAL
MONOCYTES # BLD AUTO: 0.5 K/UL (ref 0.3–1)
MONOCYTES NFR BLD: 8.4 % (ref 4–15)
NEUTROPHILS # BLD AUTO: 4.4 K/UL (ref 1.8–7.7)
NEUTROPHILS NFR BLD: 74.2 % (ref 38–73)
NRBC BLD-RTO: 0 /100 WBC
PCO2 BLDA: 48.6 MMHG (ref 35–45)
PH SMN: 7.37 [PH] (ref 7.35–7.45)
PLATELET # BLD AUTO: 172 K/UL (ref 150–350)
PMV BLD AUTO: 10.2 FL (ref 9.2–12.9)
PO2 BLDA: 63 MMHG (ref 80–100)
POC BE: 3 MMOL/L
POC IONIZED CALCIUM: 1.24 MMOL/L (ref 1.06–1.42)
POC SATURATED O2: 91 % (ref 95–100)
POC TCO2 (MEASURED): 29 MMOL/L (ref 23–29)
POC TCO2: 29 MMOL/L (ref 23–27)
POCT GLUCOSE: 100 MG/DL (ref 70–110)
POCT GLUCOSE: 130 MG/DL (ref 70–110)
POTASSIUM BLD-SCNC: 3.5 MMOL/L (ref 3.5–5.1)
POTASSIUM SERPL-SCNC: 3.6 MMOL/L (ref 3.5–5.1)
PROT SERPL-MCNC: 7.2 G/DL (ref 6–8.4)
PROTHROMBIN TIME: 10.2 SEC (ref 9–12.5)
RBC # BLD AUTO: 3.62 M/UL (ref 4–5.4)
SAMPLE: ABNORMAL
SAMPLE: ABNORMAL
SITE: ABNORMAL
SODIUM BLD-SCNC: 139 MMOL/L (ref 136–145)
SODIUM SERPL-SCNC: 139 MMOL/L (ref 136–145)
SP02: 98
TROPONIN I SERPL DL<=0.01 NG/ML-MCNC: 0.01 NG/ML (ref 0–0.03)
TSH SERPL DL<=0.005 MIU/L-ACNC: 2.38 UIU/ML (ref 0.4–4)
WBC # BLD AUTO: 5.95 K/UL (ref 3.9–12.7)

## 2019-12-06 PROCEDURE — D9220A PRA ANESTHESIA: ICD-10-PCS | Mod: CRNA,,, | Performed by: NURSE ANESTHETIST, CERTIFIED REGISTERED

## 2019-12-06 PROCEDURE — 36415 COLL VENOUS BLD VENIPUNCTURE: CPT

## 2019-12-06 PROCEDURE — 36600 WITHDRAWAL OF ARTERIAL BLOOD: CPT

## 2019-12-06 PROCEDURE — 63600175 PHARM REV CODE 636 W HCPCS: Performed by: NURSE ANESTHETIST, CERTIFIED REGISTERED

## 2019-12-06 PROCEDURE — 96361 HYDRATE IV INFUSION ADD-ON: CPT

## 2019-12-06 PROCEDURE — 63600175 PHARM REV CODE 636 W HCPCS: Performed by: PHYSICIAN ASSISTANT

## 2019-12-06 PROCEDURE — 80047 BASIC METABLC PNL IONIZED CA: CPT

## 2019-12-06 PROCEDURE — 93010 EKG 12-LEAD: ICD-10-PCS | Mod: ,,, | Performed by: INTERNAL MEDICINE

## 2019-12-06 PROCEDURE — 11000001 HC ACUTE MED/SURG PRIVATE ROOM

## 2019-12-06 PROCEDURE — 25500020 PHARM REV CODE 255: Performed by: EMERGENCY MEDICINE

## 2019-12-06 PROCEDURE — 25000003 PHARM REV CODE 250: Performed by: STUDENT IN AN ORGANIZED HEALTH CARE EDUCATION/TRAINING PROGRAM

## 2019-12-06 PROCEDURE — 82962 GLUCOSE BLOOD TEST: CPT

## 2019-12-06 PROCEDURE — 80053 COMPREHEN METABOLIC PANEL: CPT

## 2019-12-06 PROCEDURE — 93005 ELECTROCARDIOGRAM TRACING: CPT

## 2019-12-06 PROCEDURE — 96360 HYDRATION IV INFUSION INIT: CPT

## 2019-12-06 PROCEDURE — 83880 ASSAY OF NATRIURETIC PEPTIDE: CPT

## 2019-12-06 PROCEDURE — 99285 EMERGENCY DEPT VISIT HI MDM: CPT | Mod: ,,, | Performed by: PHYSICIAN ASSISTANT

## 2019-12-06 PROCEDURE — 99285 EMERGENCY DEPT VISIT HI MDM: CPT | Mod: 25

## 2019-12-06 PROCEDURE — 84484 ASSAY OF TROPONIN QUANT: CPT

## 2019-12-06 PROCEDURE — 88305 TISSUE EXAM BY PATHOLOGIST: CPT | Performed by: PATHOLOGY

## 2019-12-06 PROCEDURE — 84443 ASSAY THYROID STIM HORMONE: CPT

## 2019-12-06 PROCEDURE — 99900035 HC TECH TIME PER 15 MIN (STAT)

## 2019-12-06 PROCEDURE — 83036 HEMOGLOBIN GLYCOSYLATED A1C: CPT

## 2019-12-06 PROCEDURE — 93010 ELECTROCARDIOGRAM REPORT: CPT | Mod: ,,, | Performed by: INTERNAL MEDICINE

## 2019-12-06 PROCEDURE — D9220A PRA ANESTHESIA: Mod: ANES,,, | Performed by: ANESTHESIOLOGY

## 2019-12-06 PROCEDURE — 82803 BLOOD GASES ANY COMBINATION: CPT

## 2019-12-06 PROCEDURE — 85025 COMPLETE CBC W/AUTO DIFF WBC: CPT

## 2019-12-06 PROCEDURE — 99285 PR EMERGENCY DEPT VISIT,LEVEL V: ICD-10-PCS | Mod: ,,, | Performed by: PHYSICIAN ASSISTANT

## 2019-12-06 PROCEDURE — 63600175 PHARM REV CODE 636 W HCPCS: Performed by: OPHTHALMOLOGY

## 2019-12-06 PROCEDURE — 85610 PROTHROMBIN TIME: CPT

## 2019-12-06 PROCEDURE — 83735 ASSAY OF MAGNESIUM: CPT

## 2019-12-06 PROCEDURE — 63600175 PHARM REV CODE 636 W HCPCS: Performed by: STUDENT IN AN ORGANIZED HEALTH CARE EDUCATION/TRAINING PROGRAM

## 2019-12-06 PROCEDURE — D9220A PRA ANESTHESIA: Mod: CRNA,,, | Performed by: NURSE ANESTHETIST, CERTIFIED REGISTERED

## 2019-12-06 PROCEDURE — D9220A PRA ANESTHESIA: ICD-10-PCS | Mod: ANES,,, | Performed by: ANESTHESIOLOGY

## 2019-12-06 RX ORDER — ONDANSETRON 2 MG/ML
INJECTION INTRAMUSCULAR; INTRAVENOUS
Status: DISCONTINUED | OUTPATIENT
Start: 2019-12-06 | End: 2019-12-06

## 2019-12-06 RX ORDER — GLUCAGON 1 MG
1 KIT INJECTION
Status: DISCONTINUED | OUTPATIENT
Start: 2019-12-06 | End: 2019-12-06

## 2019-12-06 RX ORDER — CITALOPRAM 10 MG/1
10 TABLET ORAL EVERY MORNING
Status: DISCONTINUED | OUTPATIENT
Start: 2019-12-07 | End: 2019-12-06

## 2019-12-06 RX ORDER — GLUCAGON 1 MG
1 KIT INJECTION
Status: DISCONTINUED | OUTPATIENT
Start: 2019-12-06 | End: 2019-12-07 | Stop reason: HOSPADM

## 2019-12-06 RX ORDER — SODIUM CHLORIDE 0.9 % (FLUSH) 0.9 %
10 SYRINGE (ML) INJECTION
Status: CANCELLED | OUTPATIENT
Start: 2019-12-06

## 2019-12-06 RX ORDER — ASPIRIN 81 MG/1
81 TABLET ORAL EVERY MORNING
Status: DISCONTINUED | OUTPATIENT
Start: 2019-12-07 | End: 2019-12-07 | Stop reason: HOSPADM

## 2019-12-06 RX ORDER — PROPOFOL 10 MG/ML
VIAL (ML) INTRAVENOUS
Status: DISCONTINUED | OUTPATIENT
Start: 2019-12-06 | End: 2019-12-06

## 2019-12-06 RX ORDER — SODIUM CHLORIDE 9 MG/ML
INJECTION, SOLUTION INTRAVENOUS CONTINUOUS PRN
Status: DISCONTINUED | OUTPATIENT
Start: 2019-12-06 | End: 2019-12-06

## 2019-12-06 RX ORDER — IBUPROFEN 200 MG
16 TABLET ORAL
Status: DISCONTINUED | OUTPATIENT
Start: 2019-12-06 | End: 2019-12-07 | Stop reason: HOSPADM

## 2019-12-06 RX ORDER — INSULIN ASPART 100 [IU]/ML
0-5 INJECTION, SOLUTION INTRAVENOUS; SUBCUTANEOUS
Status: DISCONTINUED | OUTPATIENT
Start: 2019-12-06 | End: 2019-12-07 | Stop reason: HOSPADM

## 2019-12-06 RX ORDER — DULOXETIN HYDROCHLORIDE 60 MG/1
60 CAPSULE, DELAYED RELEASE ORAL EVERY MORNING
Status: DISCONTINUED | OUTPATIENT
Start: 2019-12-07 | End: 2019-12-07 | Stop reason: HOSPADM

## 2019-12-06 RX ORDER — METOPROLOL TARTRATE 50 MG/1
50 TABLET ORAL 2 TIMES DAILY
Status: DISCONTINUED | OUTPATIENT
Start: 2019-12-06 | End: 2019-12-07 | Stop reason: HOSPADM

## 2019-12-06 RX ORDER — IBUPROFEN 200 MG
24 TABLET ORAL
Status: DISCONTINUED | OUTPATIENT
Start: 2019-12-06 | End: 2019-12-07 | Stop reason: HOSPADM

## 2019-12-06 RX ORDER — INSULIN ASPART 100 [IU]/ML
1-10 INJECTION, SOLUTION INTRAVENOUS; SUBCUTANEOUS
Status: DISCONTINUED | OUTPATIENT
Start: 2019-12-06 | End: 2019-12-06

## 2019-12-06 RX ORDER — FENTANYL CITRATE 50 UG/ML
INJECTION, SOLUTION INTRAMUSCULAR; INTRAVENOUS
Status: DISCONTINUED | OUTPATIENT
Start: 2019-12-06 | End: 2019-12-06

## 2019-12-06 RX ORDER — AMLODIPINE AND BENAZEPRIL HYDROCHLORIDE 5; 20 MG/1; MG/1
1 CAPSULE ORAL DAILY
Status: DISCONTINUED | OUTPATIENT
Start: 2019-12-07 | End: 2019-12-07 | Stop reason: HOSPADM

## 2019-12-06 RX ORDER — SIMVASTATIN 40 MG/1
80 TABLET, FILM COATED ORAL DAILY
Status: DISCONTINUED | OUTPATIENT
Start: 2019-12-07 | End: 2019-12-07 | Stop reason: HOSPADM

## 2019-12-06 RX ORDER — HEPARIN SODIUM 5000 [USP'U]/ML
5000 INJECTION, SOLUTION INTRAVENOUS; SUBCUTANEOUS EVERY 8 HOURS
Status: DISCONTINUED | OUTPATIENT
Start: 2019-12-06 | End: 2019-12-07 | Stop reason: HOSPADM

## 2019-12-06 RX ORDER — DULOXETIN HYDROCHLORIDE 60 MG/1
60 CAPSULE, DELAYED RELEASE ORAL EVERY MORNING
Status: DISCONTINUED | OUTPATIENT
Start: 2019-12-07 | End: 2019-12-06

## 2019-12-06 RX ORDER — DONEPEZIL HYDROCHLORIDE 5 MG/1
5 TABLET, FILM COATED ORAL EVERY MORNING
Status: DISCONTINUED | OUTPATIENT
Start: 2019-12-07 | End: 2019-12-07 | Stop reason: HOSPADM

## 2019-12-06 RX ORDER — HYDROCHLOROTHIAZIDE 12.5 MG/1
12.5 TABLET ORAL DAILY
Status: DISCONTINUED | OUTPATIENT
Start: 2019-12-07 | End: 2019-12-07 | Stop reason: HOSPADM

## 2019-12-06 RX ORDER — LIDOCAINE HCL/PF 100 MG/5ML
SYRINGE (ML) INTRAVENOUS
Status: DISCONTINUED | OUTPATIENT
Start: 2019-12-06 | End: 2019-12-06

## 2019-12-06 RX ADMIN — METOPROLOL TARTRATE 50 MG: 50 TABLET ORAL at 09:12

## 2019-12-06 RX ADMIN — CEFAZOLIN 2 G: 330 INJECTION, POWDER, FOR SOLUTION INTRAMUSCULAR; INTRAVENOUS at 07:12

## 2019-12-06 RX ADMIN — SODIUM CHLORIDE 500 ML: 0.9 INJECTION, SOLUTION INTRAVENOUS at 02:12

## 2019-12-06 RX ADMIN — ONDANSETRON 4 MG: 2 INJECTION INTRAMUSCULAR; INTRAVENOUS at 08:12

## 2019-12-06 RX ADMIN — LIDOCAINE HYDROCHLORIDE 50 MG: 20 INJECTION, SOLUTION INTRAVENOUS at 07:12

## 2019-12-06 RX ADMIN — HEPARIN SODIUM 5000 UNITS: 5000 INJECTION, SOLUTION INTRAVENOUS; SUBCUTANEOUS at 09:12

## 2019-12-06 RX ADMIN — SODIUM CHLORIDE: 0.9 INJECTION, SOLUTION INTRAVENOUS at 06:12

## 2019-12-06 RX ADMIN — FENTANYL CITRATE 25 MCG: 50 INJECTION, SOLUTION INTRAMUSCULAR; INTRAVENOUS at 07:12

## 2019-12-06 RX ADMIN — PROPOFOL 100 MG: 10 INJECTION, EMULSION INTRAVENOUS at 07:12

## 2019-12-06 RX ADMIN — IOHEXOL 100 ML: 350 INJECTION, SOLUTION INTRAVENOUS at 06:12

## 2019-12-06 NOTE — TRANSFER OF CARE
"Anesthesia Transfer of Care Note    Patient: Brunilda England    Procedure(s) Performed: Procedure(s) (LRB):  RECONSTRUCTION USING FLAP/FULL THICKNESS MRESETION AND RECONSTRUCTION RIGHT UPPER EYELID WITH BIOPSY (Right)  INCISIONAL BIOPSY (Right)    Patient location: [facility]    Anesthesia Type: general    Transport from OR: Transported from OR on 6-10 L/min O2 by face mask with adequate spontaneous ventilation    Post pain: adequate analgesia    Post assessment: no apparent anesthetic complications    Post vital signs: stable    Level of consciousness: awake, alert and oriented    Nausea/Vomiting: no nausea/vomiting    Complications: none          Last vitals:   Visit Vitals  BP (!) 157/72 (BP Location: Left arm, Patient Position: Lying)   Pulse 87   Temp 37 °C (98.6 °F) (Oral)   Resp 18   Ht 5' 5" (1.651 m)   Wt 77.1 kg (170 lb)   SpO2 98%   Breastfeeding? No   BMI 28.29 kg/m²     "

## 2019-12-06 NOTE — ED TRIAGE NOTES
Pt presents via EMS s/p right eye surgery earlier this morning. She went under general anesthesia. Pt daughter states that she became unresponsive and O2 sats were in the 70's. Pt does not wear O2 at home. Pt was given 1 norco prior to discharge around 11 and got home close to noon.

## 2019-12-06 NOTE — ANESTHESIA POSTPROCEDURE EVALUATION
Anesthesia Post Evaluation    Patient: Brunilda England    Procedure(s) Performed: Procedure(s) (LRB):  RECONSTRUCTION USING FLAP/FULL THICKNESS MRESETION AND RECONSTRUCTION RIGHT UPPER EYELID WITH BIOPSY (Right)  INCISIONAL BIOPSY (Right)    Final Anesthesia Type: general    Patient location during evaluation: PACU  Patient participation: Yes- Able to Participate  Level of consciousness: awake and alert and oriented  Post-procedure vital signs: reviewed and stable  Pain management: adequate  Airway patency: patent    PONV status at discharge: No PONV  Anesthetic complications: no      Cardiovascular status: hemodynamically stable  Respiratory status: unassisted  Hydration status: euvolemic  Follow-up not needed.          Vitals Value Taken Time   /66 12/6/2019 10:47 AM   Temp 36.6 °C (97.9 °F) 12/6/2019 10:45 AM   Pulse 98 12/6/2019 11:04 AM   Resp 17 12/6/2019 10:45 AM   SpO2 99 % 12/6/2019 11:04 AM   Vitals shown include unvalidated device data.      No case tracking events are documented in the log.      Pain/Shelia Score: Pain Rating Prior to Med Admin: 10 (12/6/2019  9:30 AM)  Shelia Score: 10 (12/6/2019 11:06 AM)

## 2019-12-06 NOTE — TELEPHONE ENCOUNTER
Per guardian, Nora:    Patient was in recovery and nurse gave patient norco. Patient's oxygen level dropped and was given a breathing treatment. Patient levels were normal after treatment, and patient was released to go home. Nora states that patient was not very responsive and was sitting in a chair and passed out. Nora called 911 immediately and patient was brought to ER.    Patient currently on oxygen and Dr. Castillo informed.

## 2019-12-06 NOTE — ED PROVIDER NOTES
"Encounter Date: 12/6/2019       History     Chief Complaint   Patient presents with    Altered Mental Status     patient was discharged from surgery this morning after an eye surgery to have a cyst  removed.  patient had "episode of AMS and unresponsiveness" at home with daughter.  patient is now GCS 15 but appears lethargic and sats 85% on RA     Ms England is a 88yoF who presents for postoperative syncope; pertinent PMHx CAD with history MI, DM 2, history of lung cancer s/p resection and chemo radiation.  Patient underwent right upper eyelid flap reconstruction surgery this morning.  According to family and chart, no immediate postoperative complications and patient handled anesthesia well. She was given hydrocodone 5 mg prior to discharge. Patient was sitting at her table shortly after arriving home, when she "slumped over" became diaphoretic per daughter.  Daughter reports she was "out for 15-20 minutes".  Daughter reports shallow, slow breathing by the patient.  She eventually came to and was mildly confused.  85% on room air upon EMS arrival, placed on 2 L.  No trauma or fall from chair.  Patient has no recollection of syncopizing and denies prodrome.  Reports she feels "fine" and has not experienced any chest pain, palpitations, shortness of breath, nausea, abdominal pain. Daughter reports patient is more alert at this time.  Daughter recounts episode of "slow breathing after anesthesia" that occurred postop several years ago.  Cannot find report of this event on EMR.  The patients available PMH, PSH, Social History, medications, allergies, and triage vital signs were reviewed just prior to their medical evaluation.  A ten point review of systems was completed and is negative except as documented above.  Patient denies any other acute medical complaint.    Please be advised this text was dictated with avolution*Dreamise software and may contain errors due to translation.           Review of patient's allergies indicates: "   Allergen Reactions    Bextra [valdecoxib] Swelling    Gabapentin Diarrhea and Nausea Only     Past Medical History:   Diagnosis Date    Anal cancer     Anemia     Cancer     anal cancer    Coronary artery disease     Diabetes mellitus     With circulatory disorder    Hypertension     Hypertensive retinopathy of both eyes    Lumbar radiculopathy 2014    Lung cancer     s/p chemo/radiation    Macular degeneration     Macular hemorrhage of left eye     Myocardial infarction     x 2    Neuropathy     Osteoporosis     Osteoporosis, unspecified 2012    Peripheral vascular disease     Pure hypercholesterolemia      Past Surgical History:   Procedure Laterality Date    BRONCHOSCOPY      CHOLECYSTECTOMY      COLONOSCOPY      FLUOROSCOPIC ANGIOGRAPHY OF LOWER EXTREMITY WITH TOPICAL ULTRASOUND Right 2018    Procedure: ARTERIOGRAM-LEG AND ULTRASOUND;  Surgeon: SEBASTIÁN Mcpherson III, MD;  Location: [facility] OR [address] [place] [address];  Service: Peripheral Vascular;  Laterality: Right;  16.5 min  1059.42 mGy  59ml Dye  2ml Local    heart stent      HYSTERECTOMY      left leg surgery      blockage    PERCUTANEOUS TRANSLUMINAL ANGIOPLASTY N/A 2018    Procedure: PTA (ANGIOPLASTY, PERCUTANEOUS, TRANSLUMINAL);  Surgeon: SEBASTIÁN Mcpherson III, MD;  Location: [facility] OR [address] [place] [address];  Service: Peripheral Vascular;  Laterality: N/A;    TONSILLECTOMY       Family History   Problem Relation Age of Onset    Stroke Mother     Cancer Father     Breast cancer Maternal Aunt     Ovarian cancer Neg Hx     Amblyopia Neg Hx     Blindness Neg Hx     Cataracts Neg Hx     Glaucoma Neg Hx     Macular degeneration Neg Hx     Retinal detachment Neg Hx     Strabismus Neg Hx      Social History     Tobacco Use    Smoking status: Former Smoker     Packs/day: 0.50     Years: 30.00     Pack years: 15.00     Types: Cigarettes     Last attempt to quit: 1995     Years since quittin.9    Smokeless tobacco: Never  Used   Substance Use Topics    Alcohol use: No    Drug use: No     Review of Systems   Constitutional: Negative for chills and fever.   HENT: Negative for facial swelling, trouble swallowing and voice change.    Eyes: Negative for visual disturbance.   Respiratory: Negative for cough, chest tightness, shortness of breath and wheezing. Apnea: slowed breathing per daughter.    Cardiovascular: Negative for chest pain, palpitations and leg swelling.   Gastrointestinal: Negative for abdominal pain, nausea and vomiting.   Genitourinary: Negative for pelvic pain.   Musculoskeletal: Negative for back pain and neck pain.   Neurological: Positive for syncope. Negative for dizziness, facial asymmetry, weakness, light-headedness, numbness and headaches.   Psychiatric/Behavioral: Negative for confusion.       Physical Exam     Initial Vitals [12/06/19 1255]   BP Pulse Resp Temp SpO2   135/68 87 18 98.2 °F (36.8 °C) (!) 94 %      MAP       --         Physical Exam    Vitals reviewed.  Constitutional: She appears well-developed and well-nourished. She is not diaphoretic. No distress.   HENT:   Head: Normocephalic and atraumatic.   Right Ear: External ear normal.   Left Ear: External ear normal.   Nose: Nose normal.   Mouth/Throat: Oropharynx is clear and moist.   Small periorbital sutures, no active drainage  Ice pack to right eye  edentulous   Eyes: Right eye exhibits no discharge. Left eye exhibits no discharge.   OS: EOM intact, reactive to light, conjunctiva normal   Neck: Normal range of motion. No JVD present.   Cardiovascular: Normal rate, regular rhythm and intact distal pulses.   Pulmonary/Chest: Breath sounds normal.   Resting comfortably on 2 L, no tachypnea, no retractions, no nasal flaring, speaks in full sentences   Abdominal: Soft. Bowel sounds are normal. There is no tenderness.   Musculoskeletal: Normal range of motion. She exhibits no edema.   Neurological: She is alert and oriented to person, place, and time.  She has normal strength. No cranial nerve deficit or sensory deficit.   Skin: Skin is warm and dry. Capillary refill takes less than 2 seconds. No rash noted. No erythema. No pallor.   Psychiatric: She has a normal mood and affect. Her behavior is normal. Judgment and thought content normal.         ED Course   Procedures  Labs Reviewed   CBC W/ AUTO DIFFERENTIAL - Abnormal; Notable for the following components:       Result Value    RBC 3.62 (*)     Hemoglobin 10.4 (*)     Hematocrit 31.9 (*)     RDW 16.0 (*)     Gran% 74.2 (*)     Lymph% 16.1 (*)     All other components within normal limits   COMPREHENSIVE METABOLIC PANEL - Abnormal; Notable for the following components:    CO2 30 (*)     Glucose 151 (*)     Albumin 3.4 (*)     Alkaline Phosphatase 42 (*)     ALT 6 (*)     Anion Gap 7 (*)     eGFR if  51.8 (*)     eGFR if non  44.9 (*)     All other components within normal limits   B-TYPE NATRIURETIC PEPTIDE - Abnormal; Notable for the following components:     (*)     All other components within normal limits   POCT GLUCOSE - Abnormal; Notable for the following components:    POCT Glucose 130 (*)     All other components within normal limits   ISTAT PROCEDURE - Abnormal; Notable for the following components:    POC Glucose 144 (*)     POC Hematocrit 32 (*)     All other components within normal limits   ISTAT PROCEDURE - Abnormal; Notable for the following components:    POC PCO2 48.6 (*)     POC PO2 63 (*)     POC SATURATED O2 91 (*)     POC TCO2 29 (*)     All other components within normal limits   MAGNESIUM   TROPONIN I          Imaging Results          CTA Chest Non-Coronary (PE Study) (Final result)  Result time 12/06/19 19:00:21    Final result by Mark Crouch MD (12/06/19 19:00:21)                 Impression:      No evidence of pulmonary embolism.    Moderate right pleural effusion with atelectasis/consolidation of the right lower lobe.  Findings are  similar when compared to prior exam from 09/06/2019.      Electronically signed by: Mark Crouch MD  Date:    12/06/2019  Time:    19:00             Narrative:    EXAMINATION:  CTA CHEST NON CORONARY    CLINICAL HISTORY:  Dyspnea, cardiac origin suspected;    TECHNIQUE:  Low dose axial images, sagittal and coronal reformations were obtained from the thoracic inlet to the lung bases following the IV administration of 100 mL of Omnipaque 350.  Contrast timing was optimized to evaluate the pulmonary arteries.  MIP images were performed.    COMPARISON:  CT abdomen pelvis from 09/06/2019    FINDINGS:  Pulmonary Arteries: This study is adequate for the evaluation of pulmonary thromboembolism.  No evidence of pulmonary embolism to the level of the subsegmental arteries bilaterally.    Soft tissues of the neck:  Visualized portion of the thyroid is normal in appearance.    Thoracic aorta:  Normal in caliber and contour.  No evidence of dissection.  Moderate calcific atherosclerosis.    Heart: No cardiomegaly.  No pericardial effusion.    Lymph nodes:  No evidence of mediastinal, hilar, or axillary lymphadenopathy.    Trachea and bronchi: Patent.    Lungs:  Lucencies within the lungs centrally suggestive of centrilobular emphysema.  Moderate right-sided pleural effusion with atelectasis/consolidation of the right lower lobe.  Trace atelectasis of the left lower lobe.    Limited evaluation of the upper abdomen:  Small hiatal hernia.    Osseous structures:  No evidence of fractures or suspicious osseous lesions.                               X-Ray Chest PA And Lateral (Final result)  Result time 12/06/19 15:00:08    Final result by Nichelle Petersen MD (12/06/19 15:00:08)                 Impression:      No acute disease identified to account for the patient's history of syncope and altered mental status, noting that the patient has been treated for right lung cancer with post treatment changes stable compared with the  "recent CT of 09/06/2019.    This 88-year-old woman has underlying cardiovascular disease but no evidence of jessica cardiac decompensation.      Electronically signed by: Nichelle Petersen MD  Date:    12/06/2019  Time:    15:00             Narrative:    EXAMINATION:  XR CHEST PA AND LATERAL    CLINICAL HISTORY:  Syncope and collapse    TECHNIQUE:  PA and lateral views of the chest were performed.    COMPARISON:  Chest CT: 09/06/2019.    Chest radiograph 06/12/2012.    FINDINGS:  CT performed 9/June 2019 revealed cicatricial atelectatic changes in the right paramediastinal lung with rightward mediastinal shift and near collapse of the right lower lobe plus right subpulmonic pleural fluid.  Findings on chest radiograph today are similar with right lung volume reduced and rightward mediastinal shift.  There is dense calcific plaque in the thoracic aorta.    Allowing for the patient's treatment for right lung cancer I detect no convincing evidence of new pulmonary disease in this patient with a recent history of postoperative syncope followed by altered mental status.  There is no evidence of pulmonary edema, left pleural fluid, pneumothorax, pneumomediastinum, pneumoperitoneum or significant osseous abnormality.                                 Medical Decision Making:   History:   I obtained history from: someone other than patient.       <> Summary of History: Daughter at bedside  Old Medical Records: I decided to obtain old medical records.  Old Records Summarized: records from clinic visits and records from previous admission(s).  Initial Assessment:   Patient released from PACU to home presents for syncope while sitting in chair, no prodrome, slowed, shallow breathing per daughter, hypoxia at 85% room air upon EMS arrival, alert and oriented on ED arrival, "more alert" per daughter, hypoxia resolved with 2 L, other VSS, afebrile  Differential Diagnosis:   DDx includes delayed recovery from anesthesia, medication " overuse. Physical exam and history taking lower clinical suspicion for ACS, pneumothorax, pneumonia, anaphylaxis.   Independently Interpreted Test(s):   I have ordered and independently interpreted X-rays - see prior notes.  I have ordered and independently interpreted EKG Reading(s) - see prior notes  Clinical Tests:   Lab Tests: Ordered and Reviewed  Radiological Study: Ordered and Reviewed  Medical Tests: Ordered and Reviewed  ED Management:  Chest x-ray largely unchanged from prior, suspected radiation changes noted to right lung.  I-STAT largely unremarkable.  Update:  Weaned patient down to 1 L NC, no hypoxia noted. Weaned to room air, patient became hypoxic to 84% with mild tachypnea.  Heart rate around 100.  Workup expanded; labs show stable anemia, hypoxemia on ABG, stable renal function, troponin WNL.  , no prior readings.  Patient does not appear hypervolemic on exam.  Do not expect hypoxia, will obtain CTA to assess for possible PE.  Admitted to IM for hypoxia in postoperative state.  Neuro exam unchanged.  BP stable throughout ED stay. Patient agreed to plan of care and voiced understanding.    Lainey Yates PA-C  12/06/2019    I discussed the following case, diagnosis and plan of care with attending physician.                Attending Attestation:     Physician Attestation Statement for NP/PA:   I discussed this assessment and plan of this patient with the NP/PA, but I did not personally examine the patient. The face to face encounter was performed by the NP/PA.                  ED Course as of Dec 08 1305   Fri Dec 06, 2019   1717 SpO2: 95 % [MF]      ED Course User Index  [MF] Lainey Yates PA-C                Clinical Impression:       ICD-10-CM ICD-9-CM   1. Post-operative state Z98.890 V45.89   2. Syncope R55 780.2   3. Hypoxemia R09.02 799.02         Disposition:   Disposition: Admitted  Condition: Serious                     Lainey Yates PA-C  12/06/19 1726       Ji BENOIT  MD Roxann  12/08/19 0828

## 2019-12-07 VITALS
WEIGHT: 184.31 LBS | TEMPERATURE: 99 F | RESPIRATION RATE: 16 BRPM | HEIGHT: 65 IN | BODY MASS INDEX: 30.71 KG/M2 | DIASTOLIC BLOOD PRESSURE: 63 MMHG | HEART RATE: 95 BPM | OXYGEN SATURATION: 97 % | SYSTOLIC BLOOD PRESSURE: 139 MMHG

## 2019-12-07 LAB
ALBUMIN SERPL BCP-MCNC: 3.2 G/DL (ref 3.5–5.2)
ALP SERPL-CCNC: 39 U/L (ref 55–135)
ALT SERPL W/O P-5'-P-CCNC: 6 U/L (ref 10–44)
ANION GAP SERPL CALC-SCNC: 6 MMOL/L (ref 8–16)
AST SERPL-CCNC: 16 U/L (ref 10–40)
BASOPHILS # BLD AUTO: 0.02 K/UL (ref 0–0.2)
BASOPHILS NFR BLD: 0.5 % (ref 0–1.9)
BILIRUB SERPL-MCNC: 0.3 MG/DL (ref 0.1–1)
BUN SERPL-MCNC: 14 MG/DL (ref 8–23)
CALCIUM SERPL-MCNC: 9.7 MG/DL (ref 8.7–10.5)
CHLORIDE SERPL-SCNC: 104 MMOL/L (ref 95–110)
CO2 SERPL-SCNC: 31 MMOL/L (ref 23–29)
CREAT SERPL-MCNC: 1 MG/DL (ref 0.5–1.4)
DIFFERENTIAL METHOD: ABNORMAL
EOSINOPHIL # BLD AUTO: 0.1 K/UL (ref 0–0.5)
EOSINOPHIL NFR BLD: 3 % (ref 0–8)
ERYTHROCYTE [DISTWIDTH] IN BLOOD BY AUTOMATED COUNT: 16.1 % (ref 11.5–14.5)
EST. GFR  (AFRICAN AMERICAN): 58.1 ML/MIN/1.73 M^2
EST. GFR  (NON AFRICAN AMERICAN): 50.4 ML/MIN/1.73 M^2
GLUCOSE SERPL-MCNC: 119 MG/DL (ref 70–110)
HCT VFR BLD AUTO: 29.3 % (ref 37–48.5)
HGB BLD-MCNC: 9.6 G/DL (ref 12–16)
IMM GRANULOCYTES # BLD AUTO: 0.01 K/UL (ref 0–0.04)
IMM GRANULOCYTES NFR BLD AUTO: 0.2 % (ref 0–0.5)
LYMPHOCYTES # BLD AUTO: 1 K/UL (ref 1–4.8)
LYMPHOCYTES NFR BLD: 23 % (ref 18–48)
MAGNESIUM SERPL-MCNC: 1.7 MG/DL (ref 1.6–2.6)
MCH RBC QN AUTO: 28.9 PG (ref 27–31)
MCHC RBC AUTO-ENTMCNC: 32.8 G/DL (ref 32–36)
MCV RBC AUTO: 88 FL (ref 82–98)
MONOCYTES # BLD AUTO: 0.6 K/UL (ref 0.3–1)
MONOCYTES NFR BLD: 12.7 % (ref 4–15)
NEUTROPHILS # BLD AUTO: 2.6 K/UL (ref 1.8–7.7)
NEUTROPHILS NFR BLD: 60.6 % (ref 38–73)
NRBC BLD-RTO: 0 /100 WBC
PHOSPHATE SERPL-MCNC: 3.5 MG/DL (ref 2.7–4.5)
PLATELET # BLD AUTO: 167 K/UL (ref 150–350)
PMV BLD AUTO: 11 FL (ref 9.2–12.9)
POCT GLUCOSE: 152 MG/DL (ref 70–110)
POCT GLUCOSE: 86 MG/DL (ref 70–110)
POTASSIUM SERPL-SCNC: 4 MMOL/L (ref 3.5–5.1)
PROT SERPL-MCNC: 7 G/DL (ref 6–8.4)
RBC # BLD AUTO: 3.32 M/UL (ref 4–5.4)
SODIUM SERPL-SCNC: 141 MMOL/L (ref 136–145)
WBC # BLD AUTO: 4.34 K/UL (ref 3.9–12.7)

## 2019-12-07 PROCEDURE — 99223 1ST HOSP IP/OBS HIGH 75: CPT | Mod: AI,GC,, | Performed by: HOSPITALIST

## 2019-12-07 PROCEDURE — 83735 ASSAY OF MAGNESIUM: CPT

## 2019-12-07 PROCEDURE — 80053 COMPREHEN METABOLIC PANEL: CPT

## 2019-12-07 PROCEDURE — 84100 ASSAY OF PHOSPHORUS: CPT

## 2019-12-07 PROCEDURE — 36415 COLL VENOUS BLD VENIPUNCTURE: CPT

## 2019-12-07 PROCEDURE — 85025 COMPLETE CBC W/AUTO DIFF WBC: CPT

## 2019-12-07 PROCEDURE — 25000003 PHARM REV CODE 250: Performed by: STUDENT IN AN ORGANIZED HEALTH CARE EDUCATION/TRAINING PROGRAM

## 2019-12-07 PROCEDURE — 99223 PR INITIAL HOSPITAL CARE,LEVL III: ICD-10-PCS | Mod: AI,GC,, | Performed by: HOSPITALIST

## 2019-12-07 RX ADMIN — AMLODIPINE AND BENAZEPRIL HYDROCHLORIDE 1 CAPSULE: 5; 20 CAPSULE ORAL at 09:12

## 2019-12-07 RX ADMIN — METOPROLOL TARTRATE 50 MG: 50 TABLET ORAL at 09:12

## 2019-12-07 RX ADMIN — SIMVASTATIN 80 MG: 40 TABLET, FILM COATED ORAL at 09:12

## 2019-12-07 RX ADMIN — ASPIRIN 81 MG: 81 TABLET, COATED ORAL at 06:12

## 2019-12-07 RX ADMIN — HYDROCHLOROTHIAZIDE 12.5 MG: 12.5 TABLET ORAL at 09:12

## 2019-12-07 RX ADMIN — DONEPEZIL HYDROCHLORIDE 5 MG: 5 TABLET, FILM COATED ORAL at 06:12

## 2019-12-07 RX ADMIN — DULOXETINE HYDROCHLORIDE 60 MG: 60 CAPSULE, DELAYED RELEASE ORAL at 06:12

## 2019-12-07 NOTE — ED NOTES
2nd attempt to call report to inpatient floor. Nurse unable to take report because the nurses are currently in shift report

## 2019-12-07 NOTE — ASSESSMENT & PLAN NOTE
Syncope  Acute Hypoxic Respiratory Failure    Patient was unconscious with decreased respiratory rate for around 20 minutes today after a routine eyelid surgery with no complications. EKG was unremarkable, patient has no history of seizure disorder, CTA neg for PE, Trop  Neg, BNP only mildly elevated - but no s/sx to suggest CHF exacerbation. At time of my exam, pt asymptomatic and back at baseline.    - TSH wnl  - Telemetry  - Orthostatic neg  - Determine true O2 need. May not need supplemental O2 - as pt is not symptomatic (w/ or w/o O2) and there is no clinical reason to explain a new oxygen requirement. Suspect she has some degree of mild hypoxia at home, and this is not an acute issue. Will monitor O2 sat off supplemental O2 and walk pt to determine if she desats with ambulation. If so, will arrange home oxygen.  - VTE PPX: Heparin

## 2019-12-07 NOTE — DISCHARGE SUMMARY
"[facility] Hospital Medicine  Discharge Summary      Patient Name: Brunilda England  MRN: [id number]  Admission Date: 12/6/2019  Hospital Length of Stay: 1 days  Discharge Date and Time:  12/07/2019 3:52 PM  Attending Physician: Gilberto Ribera MD   Discharging Provider: Johana Blankenship MD  Primary Care Provider: Pepper Whitfield MD  Hospital Medicine Team: Networked reference to record PCT  Johana Blankenship MD    HPI:   89 yo F PMHx CAD with history MI, T2DM, HTN, history of lung cancer s/p resection and chemo radiation presents for syncope after undergoing right upper eyelid flap reconstruction surgery this morning 12/6.  According to family and chart, no immediate postoperative complications and patient handled anesthesia well. She was given hydrocodone 5 mg prior to discharge. Patient was sitting at her table shortly after arriving home, when she "slumped over" became diaphoretic per daughter. Daughter reports she was "out for 15-20 minutes".  Daughter reports shallow, slow breathing by the patient.  She eventually came to and was mildly confused.  85% on room air upon EMS arrival, placed on 2 L.  No trauma or fall from chair.  Patient has no recollection of syncopizing and denies prodrome.  Reports she feels "fine" and has not experienced any chest pain, palpitations, shortness of breath, nausea, abdominal pain. Patient is currently back to baseline and fully oriented.       * No surgery found *      Hospital Course:   Observed o/n and pt remained asymptomatic although still intermittently required very low O2 by NC. No further syncopal episodes. Orthostatics negative. Pt weaned to room air easily on 12/7. Did not desat with ambulation. Meeting criteria for d/c home on 12/7.     Consults:     No new Assessment & Plan notes have been filed under this hospital service since the last note was generated.  Service: Hospital Medicine    Final Active Diagnoses:    Diagnosis Date Noted POA    " PRINCIPAL PROBLEM:  Syncope [R55] 12/06/2019 Unknown    Post-operative state [Z98.890] 12/06/2019 Not Applicable    Chalazion of right eye [H00.13] 12/06/2019 Yes    Mood disorder [F39] 02/18/2019 Yes    Mild cognitive impairment [G31.84] 09/05/2018 Yes    Type 2 diabetes mellitus with circulatory disorder, without long-term current use of insulin [E11.59] 03/21/2017 Yes    Coronary artery disease involving native coronary artery without angina pectoris [I25.10] 01/28/2017 Yes    Pure hypercholesterolemia [E78.00] 10/18/2013 Yes    Essential hypertension [I10] 06/29/2012 Yes      Problems Resolved During this Admission:       Discharged Condition: good    Disposition: Home or Self Care    Follow Up:    Patient Instructions:   No discharge procedures on file.    Significant Diagnostic Studies: Labs:   CMP   Recent Labs   Lab 12/06/19  1608 12/07/19  0411    141   K 3.6 4.0    104   CO2 30* 31*   * 119*   BUN 16 14   CREATININE 1.1 1.0   CALCIUM 9.8 9.7   PROT 7.2 7.0   ALBUMIN 3.4* 3.2*   BILITOT 0.2 0.3   ALKPHOS 42* 39*   AST 14 16   ALT 6* 6*   ANIONGAP 7* 6*   ESTGFRAFRICA 51.8* 58.1*   EGFRNONAA 44.9* 50.4*   , CBC   Recent Labs   Lab 12/06/19  1608 12/07/19  0411   WBC 5.95 4.34   HGB 10.4* 9.6*   HCT 31.9* 29.3*    167   , Troponin   Recent Labs   Lab 12/06/19  1608   TROPONINI 0.013    and A1C:   Recent Labs   Lab 12/06/19 2052   HGBA1C 6.6*       Pending Diagnostic Studies:     None         Medications:  Reconciled Home Medications:      Medication List      CONTINUE taking these medications    amlodipine-benazepril 5-20 mg 5-20 mg per capsule  Commonly known as:  LOTREL  TAKE ONE CAPSULE BY MOUTH ONCE DAILY     aspirin 81 MG EC tablet  Commonly known as:  ECOTRIN  Take 81 mg by mouth every morning.     blood sugar diagnostic Strp  Check blood sugar daily. Strips and lancets     blood-glucose meter kit  Use as instructed     Calcium 500 + D 500 mg(1,250mg) -200 unit per  tablet  Generic drug:  calcium-vitamin D3  Take 1 tablet by mouth every morning.     * citalopram 10 MG tablet  Commonly known as:  CELEXA  Take 10 mg by mouth every morning.     * citalopram 10 MG tablet  Commonly known as:  CELEXA  Take 1 tablet (10 mg total) by mouth once daily.     donepezil 5 MG tablet  Commonly known as:  ARICEPT  Take 1 tablet (5 mg total) by mouth every morning.     DULoxetine 60 MG capsule  Commonly known as:  CYMBALTA  Take 1 capsule (60 mg total) by mouth every morning.     estradiol 0.01 % (0.1 mg/gram) vaginal cream  Commonly known as:  ESTRACE  Use 1/2 gram vaginally twice a week.     hydroCHLOROthiazide 12.5 mg capsule  Commonly known as:  MICROZIDE  Take 1 capsule (12.5 mg total) by mouth every morning.     IRON ORAL  Take 1 tablet by mouth every morning.     lancets Misc  Check blood sugar daily 250.00     metoprolol tartrate 50 MG tablet  Commonly known as:  LOPRESSOR  TAKE ONE TABLET BY MOUTH TWICE DAILY     nitroGLYCERIN 0.4 MG SL tablet  Commonly known as:  NITROSTAT  Place 1 tablet (0.4 mg total) under the tongue every 5 (five) minutes as needed.     pioglitazone 15 MG tablet  Commonly known as:  ACTOS  TAKE ONE TABLET BY MOUTH EVERY MORNING     PRESERVISION AREDS 2 (OMEGA-3) ORAL  Take 1 capsule by mouth 2 (two) times daily.     simvastatin 80 MG tablet  Commonly known as:  ZOCOR  TAKE ONE TABLET BY MOUTH ONCE DAILY     Tobradex 0.3-0.1 % Oint  Generic drug:  tobramycin-dexamethasone 0.3-0.1%  Apply to suture lines three times daily     Vitamin B-12 1000 MCG tablet  Generic drug:  cyanocobalamin  Take 1,000 mcg by mouth every morning.         * This list has 2 medication(s) that are the same as other medications prescribed for you. Read the directions carefully, and ask your doctor or other care provider to review them with you.            STOP taking these medications    HYDROcodone-acetaminophen 5-325 mg per tablet  Commonly known as:  NORCO            Indwelling  Lines/Drains at time of discharge:   Lines/Drains/Airways     None                 Time spent on the discharge of patient: 35 minutes  Patient was seen and examined on the date of discharge and determined to be suitable for discharge.         Johana Blankenship MD  Department of Hospital Medicine  [facility]

## 2019-12-07 NOTE — H&P
"[facility] Medicine  History & Physical    Patient Name: Brunilda England  MRN: [id number]  Admission Date: 12/6/2019  Attending Physician: Gilberto Ribera MD   Primary Care Provider: Pepper Whitfiedl MD    [facility] Medicine Team: Networked reference to record PCT  Johana Blankenship MD     Patient information was obtained from patient, relative(s), past medical records and ER records.     Subjective:     Principal Problem: Syncopal episode   Chief Complaint:   Chief Complaint   Patient presents with    Altered Mental Status     patient was discharged from surgery this morning after an eye surgery to have a cyst  removed.  patient had "episode of AMS and unresponsiveness" at home with daughter.  patient is now GCS 15 but appears lethargic and sats 85% on RA        HPI: 87 yo F PMHx CAD with history MI, T2DM, HTN, history of lung cancer s/p resection and chemo radiation presents for syncope after undergoing right upper eyelid flap reconstruction surgery this morning 12/6.  According to family and chart, no immediate postoperative complications and patient handled anesthesia well. She was given hydrocodone 5 mg prior to discharge. Patient was sitting at her table shortly after arriving home, when she "slumped over" became diaphoretic per daughter. Daughter reports she was "out for 15-20 minutes".  Daughter reports shallow, slow breathing by the patient.  She eventually came to and was mildly confused.  85% on room air upon EMS arrival, placed on 2 L.  No trauma or fall from chair.  Patient has no recollection of syncopizing and denies prodrome.  Reports she feels "fine" and has not experienced any chest pain, palpitations, shortness of breath, nausea, abdominal pain. Patient is currently back to baseline and fully oriented.       Past Medical History:   Diagnosis Date    Anal cancer 1995    Anemia     Cancer 1995    anal cancer    Coronary artery disease     Diabetes mellitus     " With circulatory disorder    Hypertension     Hypertensive retinopathy of both eyes    Lumbar radiculopathy 5/16/2014    Lung cancer 2012    s/p chemo/radiation    Macular degeneration     Macular hemorrhage of left eye     Myocardial infarction     x 2    Neuropathy     Osteoporosis     Osteoporosis, unspecified 11/9/2012    Peripheral vascular disease     Pure hypercholesterolemia        Past Surgical History:   Procedure Laterality Date    BRONCHOSCOPY      CHOLECYSTECTOMY      COLONOSCOPY      FLUOROSCOPIC ANGIOGRAPHY OF LOWER EXTREMITY WITH TOPICAL ULTRASOUND Right 12/6/2018    Procedure: ARTERIOGRAM-LEG AND ULTRASOUND;  Surgeon: SEBASTIÁN Mcpherson III, MD;  Location: [facility] OR [address] [place] [address];  Service: Peripheral Vascular;  Laterality: Right;  16.5 min  1059.42 mGy  59ml Dye  2ml Local    heart stent      HYSTERECTOMY      left leg surgery      blockage    PERCUTANEOUS TRANSLUMINAL ANGIOPLASTY N/A 12/6/2018    Procedure: PTA (ANGIOPLASTY, PERCUTANEOUS, TRANSLUMINAL);  Surgeon: SEBASTIÁN Mcpherson III, MD;  Location: [facility] OR [address] [place] [address];  Service: Peripheral Vascular;  Laterality: N/A;    TONSILLECTOMY         Review of patient's allergies indicates:   Allergen Reactions    Bextra [valdecoxib] Swelling    Gabapentin Diarrhea and Nausea Only       Current Facility-Administered Medications on File Prior to Encounter   Medication    [COMPLETED] ceFAZolin injection 1 g    sodium chloride 0.9% flush 5 mL    [DISCONTINUED] 0.9%  NaCl infusion    [DISCONTINUED] acetaminophen tablet 650 mg    [DISCONTINUED] albuterol sulfate nebulizer solution 2.5 mg    [DISCONTINUED] balanced salt irrigation intra-ocular solution    [DISCONTINUED] bupivacaine (PF) 0.5% (5 mg/mL) (MARCAINE) 0.5 % (5 mg/mL) injection    [DISCONTINUED] bupivacaine (PF) 0.5% (5 mg/mL) injection    [DISCONTINUED] fentaNYL injection 25 mcg    [DISCONTINUED] fentaNYL injection    [DISCONTINUED] hyaluronidase solution    [DISCONTINUED]  hyaluronidase, human recomb. 150 unit/mL injection Soln    [DISCONTINUED] HYDROcodone-acetaminophen 5-325 mg per tablet 1 tablet    [DISCONTINUED] lactated ringers infusion    [DISCONTINUED] lidocaine (cardiac) injection    [DISCONTINUED] lidocaine (PF) 10 mg/ml (1%) injection 10 mg    [DISCONTINUED] lidocaine-EPINEPHrine (PF) 2%-1:200,000 injection    [DISCONTINUED] metoclopramide HCl injection 10 mg    [DISCONTINUED] ondansetron injection 4 mg    [DISCONTINUED] ondansetron injection 4 mg    [DISCONTINUED] ondansetron injection    [DISCONTINUED] oxyCODONE immediate release tablet 10 mg    [DISCONTINUED] promethazine (PHENERGAN) 6.25 mg in dextrose 5 % 50 mL IVPB    [DISCONTINUED] propofol (DIPRIVAN) 10 mg/mL infusion    [DISCONTINUED] tetracaine HCl (PF) 0.5 % Drop 1 drop    [DISCONTINUED] tetracaine HCl (PF) 0.5 % Drop 1 drop    [DISCONTINUED] tobramycin-dexamethasone 0.3-0.1% (TOBRADEX) 0.3-0.1 % ophthalmic ointment    [DISCONTINUED] tobramycin-dexamethasone 0.3-0.1% (TOBRADEX) 0.3-0.1 % ophthalmic suspension    [DISCONTINUED] tobramycin-dexamethasone 0.3-0.1% ophthalmic ointment    [DISCONTINUED] tobramycin-dexamethasone 0.3-0.1% ophthalmic suspension     Current Outpatient Medications on File Prior to Encounter   Medication Sig    amlodipine-benazepril 5-20 mg (LOTREL) 5-20 mg per capsule TAKE ONE CAPSULE BY MOUTH ONCE DAILY    ANTIOX #8/OM3/DHA/EPA/LUT/ZEAX (PRESERVISION AREDS 2, OMEGA-3, ORAL) Take 1 capsule by mouth 2 (two) times daily.     aspirin (ECOTRIN) 81 MG EC tablet Take 81 mg by mouth every morning.    blood sugar diagnostic Strp Check blood sugar daily. Strips and lancets    blood-glucose meter kit Use as instructed    calcium-vitamin D (CALCIUM-VITAMIN D) 500 mg(1,250mg) -200 unit per tablet Take 1 tablet by mouth every morning.     citalopram (CELEXA) 10 MG tablet Take 10 mg by mouth every morning.     citalopram (CELEXA) 10 MG tablet Take 1 tablet (10 mg total) by mouth  "once daily.    cyanocobalamin (VITAMIN B-12) 1000 MCG tablet Take 1,000 mcg by mouth every morning.     donepezil (ARICEPT) 5 MG tablet Take 1 tablet (5 mg total) by mouth every morning.    DULoxetine (CYMBALTA) 60 MG capsule Take 1 capsule (60 mg total) by mouth every morning.    estradiol (ESTRACE) 0.01 % (0.1 mg/gram) vaginal cream Use 1/2 gram vaginally twice a week.    ferrous sulfate (IRON ORAL) Take 1 tablet by mouth every morning.    hydroCHLOROthiazide (MICROZIDE) 12.5 mg capsule Take 1 capsule (12.5 mg total) by mouth every morning.    HYDROcodone-acetaminophen (NORCO) 5-325 mg per tablet Take 1 tablet by mouth every 6 (six) hours as needed for Pain.    lancets Misc Check blood sugar daily 250.00    metoprolol tartrate (LOPRESSOR) 50 MG tablet TAKE ONE TABLET BY MOUTH TWICE DAILY    nitroGLYCERIN (NITROSTAT) 0.4 MG SL tablet Place 1 tablet (0.4 mg total) under the tongue every 5 (five) minutes as needed.    pioglitazone (ACTOS) 15 MG tablet TAKE ONE TABLET BY MOUTH EVERY MORNING    simvastatin (ZOCOR) 80 MG tablet TAKE ONE TABLET BY MOUTH ONCE DAILY    tobramycin-dexamethasone 0.3-0.1% (TOBRADEX) 0.3-0.1 % Oint Apply to suture lines three times daily     Family History     Problem Relation (Age of Onset)    Breast cancer Maternal Aunt    Cancer Father    Stroke Mother        Tobacco Use    Smoking status: Former Smoker     Packs/day: 0.50     Years: 30.00     Pack years: 15.00     Types: Cigarettes     Last attempt to quit: 1995     Years since quittin.9    Smokeless tobacco: Never Used   Substance and Sexual Activity    Alcohol use: No    Drug use: No    Sexual activity: Not Currently     Birth control/protection: Surgical     Review of Systems   Constitutional: Negative for chills and fever.   HENT: Negative for congestion, sore throat and trouble swallowing.    Eyes: Negative for photophobia, pain and visual disturbance.   Respiratory: Positive for apnea (per daughter, "slowed " "breathing" at time of incident). Negative for cough, choking, chest tightness, shortness of breath and wheezing.    Cardiovascular: Negative for chest pain, palpitations and leg swelling.   Gastrointestinal: Negative for abdominal pain, blood in stool, diarrhea, nausea and vomiting.   Genitourinary: Negative for decreased urine volume, difficulty urinating and dysuria.   Musculoskeletal: Positive for arthralgias. Negative for myalgias.   Allergic/Immunologic: Positive for immunocompromised state.   Neurological: Positive for syncope. Negative for speech difficulty and headaches.   Hematological: Negative for adenopathy.   Psychiatric/Behavioral: Negative for confusion.     Objective:     Vital Signs (Most Recent):  Temp: 98.2 °F (36.8 °C) (12/06/19 1255)  Pulse: 106 (12/06/19 1922)  Resp: 19 (12/06/19 1922)  BP: (!) 164/71 (12/06/19 1922)  SpO2: 95 % (12/06/19 1922) Vital Signs (24h Range):  Temp:  [97.7 °F (36.5 °C)-98.6 °F (37 °C)] 98.2 °F (36.8 °C)  Pulse:  [] 106  Resp:  [0-24] 19  SpO2:  [84 %-100 %] 95 %  BP: (135-197)/(62-96) 164/71     Weight: 77.1 kg (170 lb)  Body mass index is 28.29 kg/m².    Physical Exam   Constitutional: She is oriented to person, place, and time. She appears well-developed and well-nourished. No distress.   HENT:   Head: Normocephalic and atraumatic.   Eyes: Right eye exhibits no discharge. Left eye exhibits no discharge.   Periorbital sutures, no drainage    Neck: Normal range of motion. Neck supple.   Cardiovascular: Regular rhythm.   Mildly tachycardic, low 100s   Pulmonary/Chest: Effort normal and breath sounds normal. No respiratory distress. She has no wheezes. She has no rales.   O2 sat high 90s on O2 2L NC   Abdominal: Soft. She exhibits no distension. There is no tenderness. There is no rebound and no guarding.   Musculoskeletal: Normal range of motion. She exhibits no edema or tenderness.   Lymphadenopathy:     She has no cervical adenopathy.   Neurological: She is " alert and oriented to person, place, and time. No cranial nerve deficit or sensory deficit. She exhibits normal muscle tone.   Skin: Skin is warm and dry. No erythema.   Psychiatric: She has a normal mood and affect. Her behavior is normal.           Significant Labs:  Recent Results (from the past 24 hour(s))   POCT glucose    Collection Time: 12/06/19  6:19 AM   Result Value Ref Range    POCT Glucose 145 (H) 70 - 110 mg/dL   POCT glucose    Collection Time: 12/06/19  8:57 AM   Result Value Ref Range    POCT Glucose 115 (H) 70 - 110 mg/dL   POCT glucose    Collection Time: 12/06/19  1:58 PM   Result Value Ref Range    POCT Glucose 130 (H) 70 - 110 mg/dL   Magnesium    Collection Time: 12/06/19  2:34 PM   Result Value Ref Range    Magnesium 1.8 1.6 - 2.6 mg/dL   ISTAT PROCEDURE    Collection Time: 12/06/19  2:40 PM   Result Value Ref Range    POC Glucose 144 (H) 70 - 110 mg/dL    POC BUN 16 6 - 30 mg/dL    POC Creatinine 1.1 0.5 - 1.4 mg/dL    POC Sodium 139 136 - 145 mmol/L    POC Potassium 3.5 3.5 - 5.1 mmol/L    POC Chloride 100 95 - 110 mmol/L    POC TCO2 (MEASURED) 29 23 - 29 mmol/L    POC Ionized Calcium 1.24 1.06 - 1.42 mmol/L    POC Hematocrit 32 (L) 36 - 54 %PCV    Sample CRISTIAN    CBC auto differential    Collection Time: 12/06/19  4:08 PM   Result Value Ref Range    WBC 5.95 3.90 - 12.70 K/uL    RBC 3.62 (L) 4.00 - 5.40 M/uL    Hemoglobin 10.4 (L) 12.0 - 16.0 g/dL    Hematocrit 31.9 (L) 37.0 - 48.5 %    Mean Corpuscular Volume 88 82 - 98 fL    Mean Corpuscular Hemoglobin 28.7 27.0 - 31.0 pg    Mean Corpuscular Hemoglobin Conc 32.6 32.0 - 36.0 g/dL    RDW 16.0 (H) 11.5 - 14.5 %    Platelets 172 150 - 350 K/uL    MPV 10.2 9.2 - 12.9 fL    Immature Granulocytes 0.3 0.0 - 0.5 %    Gran # (ANC) 4.4 1.8 - 7.7 K/uL    Immature Grans (Abs) 0.02 0.00 - 0.04 K/uL    Lymph # 1.0 1.0 - 4.8 K/uL    Mono # 0.5 0.3 - 1.0 K/uL    Eos # 0.1 0.0 - 0.5 K/uL    Baso # 0.01 0.00 - 0.20 K/uL    nRBC 0 0 /100 WBC    Gran% 74.2 (H)  38.0 - 73.0 %    Lymph% 16.1 (L) 18.0 - 48.0 %    Mono% 8.4 4.0 - 15.0 %    Eosinophil% 0.8 0.0 - 8.0 %    Basophil% 0.2 0.0 - 1.9 %    Differential Method Automated    Comprehensive metabolic panel    Collection Time: 12/06/19  4:08 PM   Result Value Ref Range    Sodium 139 136 - 145 mmol/L    Potassium 3.6 3.5 - 5.1 mmol/L    Chloride 102 95 - 110 mmol/L    CO2 30 (H) 23 - 29 mmol/L    Glucose 151 (H) 70 - 110 mg/dL    BUN, Bld 16 8 - 23 mg/dL    Creatinine 1.1 0.5 - 1.4 mg/dL    Calcium 9.8 8.7 - 10.5 mg/dL    Total Protein 7.2 6.0 - 8.4 g/dL    Albumin 3.4 (L) 3.5 - 5.2 g/dL    Total Bilirubin 0.2 0.1 - 1.0 mg/dL    Alkaline Phosphatase 42 (L) 55 - 135 U/L    AST 14 10 - 40 U/L    ALT 6 (L) 10 - 44 U/L    Anion Gap 7 (L) 8 - 16 mmol/L    eGFR if African American 51.8 (A) >60 mL/min/1.73 m^2    eGFR if non African American 44.9 (A) >60 mL/min/1.73 m^2   Troponin I    Collection Time: 12/06/19  4:08 PM   Result Value Ref Range    Troponin I 0.013 0.000 - 0.026 ng/mL   Brain natriuretic peptide    Collection Time: 12/06/19  4:08 PM   Result Value Ref Range     (H) 0 - 99 pg/mL   ISTAT PROCEDURE    Collection Time: 12/06/19  4:16 PM   Result Value Ref Range    POC PH 7.368 7.35 - 7.45    POC PCO2 48.6 (H) 35 - 45 mmHg    POC PO2 63 (L) 80 - 100 mmHg    POC HCO3 28.0 24 - 28 mmol/L    POC BE 3 -2 to 2 mmol/L    POC SATURATED O2 91 (L) 95 - 100 %    POC TCO2 29 (H) 23 - 27 mmol/L    Sample ARTERIAL     Site RR     Allens Test Pass     DelSys Nasal Can     Mode SPONT     Flow 1     Sp02 98        Significant Imaging: I have reviewed all pertinent imaging results/findings within the past 24 hours.   CTA Chest Non-Coronary (PE Study) [[id number]] Resulted: 12/06/19 1900   Order Status: Completed Updated: 12/06/19 1902   Narrative:     EXAMINATION:  CTA CHEST NON CORONARY    CLINICAL HISTORY:  Dyspnea, cardiac origin suspected;    TECHNIQUE:  Low dose axial images, sagittal and coronal reformations were obtained  from the thoracic inlet to the lung bases following the IV administration of 100 mL of Omnipaque 350.  Contrast timing was optimized to evaluate the pulmonary arteries.  MIP images were performed.    COMPARISON:  CT abdomen pelvis from 09/06/2019    FINDINGS:  Pulmonary Arteries: This study is adequate for the evaluation of pulmonary thromboembolism.  No evidence of pulmonary embolism to the level of the subsegmental arteries bilaterally.    Soft tissues of the neck:  Visualized portion of the thyroid is normal in appearance.    Thoracic aorta:  Normal in caliber and contour.  No evidence of dissection.  Moderate calcific atherosclerosis.    Heart: No cardiomegaly.  No pericardial effusion.    Lymph nodes:  No evidence of mediastinal, hilar, or axillary lymphadenopathy.    Trachea and bronchi: Patent.    Lungs:  Lucencies within the lungs centrally suggestive of centrilobular emphysema.  Moderate right-sided pleural effusion with atelectasis/consolidation of the right lower lobe.  Trace atelectasis of the left lower lobe.    Limited evaluation of the upper abdomen:  Small hiatal hernia.    Osseous structures:  No evidence of fractures or suspicious osseous lesions.   Impression:       No evidence of pulmonary embolism.    Moderate right pleural effusion with atelectasis/consolidation of the right lower lobe.  Findings are similar when compared to prior exam from 09/06/2019.     X-Ray Chest PA And Lateral [[id number]] Resulted: 12/06/19 1500   Order Status: Completed Updated: 12/06/19 1502   Narrative:     EXAMINATION:  XR CHEST PA AND LATERAL    CLINICAL HISTORY:  Syncope and collapse    TECHNIQUE:  PA and lateral views of the chest were performed.    COMPARISON:  Chest CT: 09/06/2019.    Chest radiograph 06/12/2012.    FINDINGS:  CT performed 9/June 2019 revealed cicatricial atelectatic changes in the right paramediastinal lung with rightward mediastinal shift and near collapse of the right lower lobe plus right  subpulmonic pleural fluid.  Findings on chest radiograph today are similar with right lung volume reduced and rightward mediastinal shift.  There is dense calcific plaque in the thoracic aorta.    Allowing for the patient's treatment for right lung cancer I detect no convincing evidence of new pulmonary disease in this patient with a recent history of postoperative syncope followed by altered mental status.  There is no evidence of pulmonary edema, left pleural fluid, pneumothorax, pneumomediastinum, pneumoperitoneum or significant osseous abnormality.   Impression:       No acute disease identified to account for the patient's history of syncope and altered mental status, noting that the patient has been treated for right lung cancer with post treatment changes stable compared with the recent CT of 09/06/2019.    This 88-year-old woman has underlying cardiovascular disease but no evidence of jessica cardiac decompensation.         Assessment/Plan:     Syncope  Syncope  Acute Hypoxic Respiratory Failure    Patient was unconscious with decreased respiratory rate for around 20 minutes today after a routine eyelid surgery with no complications. EKG was unremarkable, patient has no history of seizure disorder, CTA neg for PE, Trop  Neg, BNP only mildly elevated - but no s/sx to suggest CHF exacerbation. At time of my exam, pt asymptomatic and back at baseline.    - f/u TSH  - Telemetry  - Orthostatics  - Closely monitor VS and repeat labs in AM  - Wean O2, currently on 2L NC with Os sat in 90s  - VTE PPX: Heparin     Post-operative state  Patient reportedly tolerated eyelid surgery well with no complications, but was given 5 mg hydrocodone on discharge. Cause of syncope still unknown although daughter reports the patient experienced an episode of slow breathing after anesthesia in the past. Patient now fully recovered and at baseline, fully oriented.           Chalazion of right eye  S/p surgical procedure with  ophthalmology day of admission, 12/6. Per their d/c summary, ABX eye ointment prescribed.    - Continue tobramycin-dexamethasone ointment to eye      Mood disorder  Per PCP progress note in 9/2019, pt stable on Cymbalta 60mg.    - Continue home Cymbalta       Mild cognitive impairment  - continue home donepezil       Type 2 diabetes mellitus with circulatory disorder, without long-term current use of insulin  Home med is Actos 15mg daily. A1c 6.5 in 5/2019.     - DM diet  - Accuchecks TIDAC meals + qhs  - LDSSI  - a1c pending    Coronary artery disease involving native coronary artery without angina pectoris  CAD, hx MI s/p stent. No CP or cardiac sx at present.    - Continue bASA      Pure hypercholesterolemia  - Continue home statin      Essential hypertension  - Continue home Amlodipine-benazepril, Lopressor, and HCTZ          VTE Risk Mitigation (From admission, onward)         Ordered     heparin (porcine) injection 5,000 Units  Every 8 hours      12/06/19 1833     IP VTE HIGH RISK PATIENT  Once      12/06/19 1833                   Johana Blankenship MD  Department of Hospital Medicine   [facility]

## 2019-12-07 NOTE — ASSESSMENT & PLAN NOTE
Home med is Actos 15mg daily. A1c 6.5 in 5/2019.     - DM diet  - Accuchecks TIDAC meals + qhs  - LDSSI  - a1c pending

## 2019-12-07 NOTE — SUBJECTIVE & OBJECTIVE
Past Medical History:   Diagnosis Date    Anal cancer 1995    Anemia     Cancer 1995    anal cancer    Coronary artery disease     Diabetes mellitus     With circulatory disorder    Hypertension     Hypertensive retinopathy of both eyes    Lumbar radiculopathy 5/16/2014    Lung cancer 2012    s/p chemo/radiation    Macular degeneration     Macular hemorrhage of left eye     Myocardial infarction     x 2    Neuropathy     Osteoporosis     Osteoporosis, unspecified 11/9/2012    Peripheral vascular disease     Pure hypercholesterolemia        Past Surgical History:   Procedure Laterality Date    BRONCHOSCOPY      CHOLECYSTECTOMY      COLONOSCOPY      FLUOROSCOPIC ANGIOGRAPHY OF LOWER EXTREMITY WITH TOPICAL ULTRASOUND Right 12/6/2018    Procedure: ARTERIOGRAM-LEG AND ULTRASOUND;  Surgeon: SEBASTIÁN Mcpherson III, MD;  Location: [facility] OR [address] [place] [address];  Service: Peripheral Vascular;  Laterality: Right;  16.5 min  1059.42 mGy  59ml Dye  2ml Local    heart stent      HYSTERECTOMY      left leg surgery      blockage    PERCUTANEOUS TRANSLUMINAL ANGIOPLASTY N/A 12/6/2018    Procedure: PTA (ANGIOPLASTY, PERCUTANEOUS, TRANSLUMINAL);  Surgeon: SEBASTIÁN Mcpherson III, MD;  Location: [facility] OR [address] [place] [address];  Service: Peripheral Vascular;  Laterality: N/A;    TONSILLECTOMY         Review of patient's allergies indicates:   Allergen Reactions    Bextra [valdecoxib] Swelling    Gabapentin Diarrhea and Nausea Only       Current Facility-Administered Medications on File Prior to Encounter   Medication    [COMPLETED] ceFAZolin injection 1 g    sodium chloride 0.9% flush 5 mL    [DISCONTINUED] 0.9%  NaCl infusion    [DISCONTINUED] acetaminophen tablet 650 mg    [DISCONTINUED] albuterol sulfate nebulizer solution 2.5 mg    [DISCONTINUED] balanced salt irrigation intra-ocular solution    [DISCONTINUED] bupivacaine (PF) 0.5% (5 mg/mL) (MARCAINE) 0.5 % (5 mg/mL) injection    [DISCONTINUED] bupivacaine (PF) 0.5% (5 mg/mL)  injection    [DISCONTINUED] fentaNYL injection 25 mcg    [DISCONTINUED] fentaNYL injection    [DISCONTINUED] hyaluronidase solution    [DISCONTINUED] hyaluronidase, human recomb. 150 unit/mL injection Soln    [DISCONTINUED] HYDROcodone-acetaminophen 5-325 mg per tablet 1 tablet    [DISCONTINUED] lactated ringers infusion    [DISCONTINUED] lidocaine (cardiac) injection    [DISCONTINUED] lidocaine (PF) 10 mg/ml (1%) injection 10 mg    [DISCONTINUED] lidocaine-EPINEPHrine (PF) 2%-1:200,000 injection    [DISCONTINUED] metoclopramide HCl injection 10 mg    [DISCONTINUED] ondansetron injection 4 mg    [DISCONTINUED] ondansetron injection 4 mg    [DISCONTINUED] ondansetron injection    [DISCONTINUED] oxyCODONE immediate release tablet 10 mg    [DISCONTINUED] promethazine (PHENERGAN) 6.25 mg in dextrose 5 % 50 mL IVPB    [DISCONTINUED] propofol (DIPRIVAN) 10 mg/mL infusion    [DISCONTINUED] tetracaine HCl (PF) 0.5 % Drop 1 drop    [DISCONTINUED] tetracaine HCl (PF) 0.5 % Drop 1 drop    [DISCONTINUED] tobramycin-dexamethasone 0.3-0.1% (TOBRADEX) 0.3-0.1 % ophthalmic ointment    [DISCONTINUED] tobramycin-dexamethasone 0.3-0.1% (TOBRADEX) 0.3-0.1 % ophthalmic suspension    [DISCONTINUED] tobramycin-dexamethasone 0.3-0.1% ophthalmic ointment    [DISCONTINUED] tobramycin-dexamethasone 0.3-0.1% ophthalmic suspension     Current Outpatient Medications on File Prior to Encounter   Medication Sig    amlodipine-benazepril 5-20 mg (LOTREL) 5-20 mg per capsule TAKE ONE CAPSULE BY MOUTH ONCE DAILY    ANTIOX #8/OM3/DHA/EPA/LUT/ZEAX (PRESERVISION AREDS 2, OMEGA-3, ORAL) Take 1 capsule by mouth 2 (two) times daily.     aspirin (ECOTRIN) 81 MG EC tablet Take 81 mg by mouth every morning.    blood sugar diagnostic Strp Check blood sugar daily. Strips and lancets    blood-glucose meter kit Use as instructed    calcium-vitamin D (CALCIUM-VITAMIN D) 500 mg(1,250mg) -200 unit per tablet Take 1 tablet by mouth every  morning.     citalopram (CELEXA) 10 MG tablet Take 10 mg by mouth every morning.     citalopram (CELEXA) 10 MG tablet Take 1 tablet (10 mg total) by mouth once daily.    cyanocobalamin (VITAMIN B-12) 1000 MCG tablet Take 1,000 mcg by mouth every morning.     donepezil (ARICEPT) 5 MG tablet Take 1 tablet (5 mg total) by mouth every morning.    DULoxetine (CYMBALTA) 60 MG capsule Take 1 capsule (60 mg total) by mouth every morning.    estradiol (ESTRACE) 0.01 % (0.1 mg/gram) vaginal cream Use 1/2 gram vaginally twice a week.    ferrous sulfate (IRON ORAL) Take 1 tablet by mouth every morning.    hydroCHLOROthiazide (MICROZIDE) 12.5 mg capsule Take 1 capsule (12.5 mg total) by mouth every morning.    HYDROcodone-acetaminophen (NORCO) 5-325 mg per tablet Take 1 tablet by mouth every 6 (six) hours as needed for Pain.    lancets Misc Check blood sugar daily 250.00    metoprolol tartrate (LOPRESSOR) 50 MG tablet TAKE ONE TABLET BY MOUTH TWICE DAILY    nitroGLYCERIN (NITROSTAT) 0.4 MG SL tablet Place 1 tablet (0.4 mg total) under the tongue every 5 (five) minutes as needed.    pioglitazone (ACTOS) 15 MG tablet TAKE ONE TABLET BY MOUTH EVERY MORNING    simvastatin (ZOCOR) 80 MG tablet TAKE ONE TABLET BY MOUTH ONCE DAILY    tobramycin-dexamethasone 0.3-0.1% (TOBRADEX) 0.3-0.1 % Oint Apply to suture lines three times daily     Family History     Problem Relation (Age of Onset)    Breast cancer Maternal Aunt    Cancer Father    Stroke Mother        Tobacco Use    Smoking status: Former Smoker     Packs/day: 0.50     Years: 30.00     Pack years: 15.00     Types: Cigarettes     Last attempt to quit: 1995     Years since quittin.9    Smokeless tobacco: Never Used   Substance and Sexual Activity    Alcohol use: No    Drug use: No    Sexual activity: Not Currently     Birth control/protection: Surgical     Review of Systems   Constitutional: Negative for chills and fever.   HENT: Negative for congestion,  "sore throat and trouble swallowing.    Eyes: Negative for photophobia, pain and visual disturbance.   Respiratory: Positive for apnea (per daughter, "slowed breathing" at time of incident). Negative for cough, choking, chest tightness, shortness of breath and wheezing.    Cardiovascular: Negative for chest pain, palpitations and leg swelling.   Gastrointestinal: Negative for abdominal pain, blood in stool, diarrhea, nausea and vomiting.   Genitourinary: Negative for decreased urine volume, difficulty urinating and dysuria.   Musculoskeletal: Positive for arthralgias. Negative for myalgias.   Allergic/Immunologic: Positive for immunocompromised state.   Neurological: Positive for syncope. Negative for speech difficulty and headaches.   Hematological: Negative for adenopathy.   Psychiatric/Behavioral: Negative for confusion.     Objective:     Vital Signs (Most Recent):  Temp: 98.2 °F (36.8 °C) (12/06/19 1255)  Pulse: 106 (12/06/19 1922)  Resp: 19 (12/06/19 1922)  BP: (!) 164/71 (12/06/19 1922)  SpO2: 95 % (12/06/19 1922) Vital Signs (24h Range):  Temp:  [97.7 °F (36.5 °C)-98.6 °F (37 °C)] 98.2 °F (36.8 °C)  Pulse:  [] 106  Resp:  [0-24] 19  SpO2:  [84 %-100 %] 95 %  BP: (135-197)/(62-96) 164/71     Weight: 77.1 kg (170 lb)  Body mass index is 28.29 kg/m².    Physical Exam   Constitutional: She is oriented to person, place, and time. She appears well-developed and well-nourished. No distress.   HENT:   Head: Normocephalic and atraumatic.   Eyes: Right eye exhibits no discharge. Left eye exhibits no discharge.   Periorbital sutures, no drainage    Neck: Normal range of motion. Neck supple.   Cardiovascular: Regular rhythm.   Mildly tachycardic, low 100s   Pulmonary/Chest: Effort normal and breath sounds normal. No respiratory distress. She has no wheezes. She has no rales.   O2 sat high 90s on O2 2L NC   Abdominal: Soft. She exhibits no distension. There is no tenderness. There is no rebound and no guarding. "   Musculoskeletal: Normal range of motion. She exhibits no edema or tenderness.   Lymphadenopathy:     She has no cervical adenopathy.   Neurological: She is alert and oriented to person, place, and time. No cranial nerve deficit or sensory deficit. She exhibits normal muscle tone.   Skin: Skin is warm and dry. No erythema.   Psychiatric: She has a normal mood and affect. Her behavior is normal.           Significant Labs:  Recent Results (from the past 24 hour(s))   POCT glucose    Collection Time: 12/06/19  6:19 AM   Result Value Ref Range    POCT Glucose 145 (H) 70 - 110 mg/dL   POCT glucose    Collection Time: 12/06/19  8:57 AM   Result Value Ref Range    POCT Glucose 115 (H) 70 - 110 mg/dL   POCT glucose    Collection Time: 12/06/19  1:58 PM   Result Value Ref Range    POCT Glucose 130 (H) 70 - 110 mg/dL   Magnesium    Collection Time: 12/06/19  2:34 PM   Result Value Ref Range    Magnesium 1.8 1.6 - 2.6 mg/dL   ISTAT PROCEDURE    Collection Time: 12/06/19  2:40 PM   Result Value Ref Range    POC Glucose 144 (H) 70 - 110 mg/dL    POC BUN 16 6 - 30 mg/dL    POC Creatinine 1.1 0.5 - 1.4 mg/dL    POC Sodium 139 136 - 145 mmol/L    POC Potassium 3.5 3.5 - 5.1 mmol/L    POC Chloride 100 95 - 110 mmol/L    POC TCO2 (MEASURED) 29 23 - 29 mmol/L    POC Ionized Calcium 1.24 1.06 - 1.42 mmol/L    POC Hematocrit 32 (L) 36 - 54 %PCV    Sample CRISTIAN    CBC auto differential    Collection Time: 12/06/19  4:08 PM   Result Value Ref Range    WBC 5.95 3.90 - 12.70 K/uL    RBC 3.62 (L) 4.00 - 5.40 M/uL    Hemoglobin 10.4 (L) 12.0 - 16.0 g/dL    Hematocrit 31.9 (L) 37.0 - 48.5 %    Mean Corpuscular Volume 88 82 - 98 fL    Mean Corpuscular Hemoglobin 28.7 27.0 - 31.0 pg    Mean Corpuscular Hemoglobin Conc 32.6 32.0 - 36.0 g/dL    RDW 16.0 (H) 11.5 - 14.5 %    Platelets 172 150 - 350 K/uL    MPV 10.2 9.2 - 12.9 fL    Immature Granulocytes 0.3 0.0 - 0.5 %    Gran # (ANC) 4.4 1.8 - 7.7 K/uL    Immature Grans (Abs) 0.02 0.00 - 0.04 K/uL     Lymph # 1.0 1.0 - 4.8 K/uL    Mono # 0.5 0.3 - 1.0 K/uL    Eos # 0.1 0.0 - 0.5 K/uL    Baso # 0.01 0.00 - 0.20 K/uL    nRBC 0 0 /100 WBC    Gran% 74.2 (H) 38.0 - 73.0 %    Lymph% 16.1 (L) 18.0 - 48.0 %    Mono% 8.4 4.0 - 15.0 %    Eosinophil% 0.8 0.0 - 8.0 %    Basophil% 0.2 0.0 - 1.9 %    Differential Method Automated    Comprehensive metabolic panel    Collection Time: 12/06/19  4:08 PM   Result Value Ref Range    Sodium 139 136 - 145 mmol/L    Potassium 3.6 3.5 - 5.1 mmol/L    Chloride 102 95 - 110 mmol/L    CO2 30 (H) 23 - 29 mmol/L    Glucose 151 (H) 70 - 110 mg/dL    BUN, Bld 16 8 - 23 mg/dL    Creatinine 1.1 0.5 - 1.4 mg/dL    Calcium 9.8 8.7 - 10.5 mg/dL    Total Protein 7.2 6.0 - 8.4 g/dL    Albumin 3.4 (L) 3.5 - 5.2 g/dL    Total Bilirubin 0.2 0.1 - 1.0 mg/dL    Alkaline Phosphatase 42 (L) 55 - 135 U/L    AST 14 10 - 40 U/L    ALT 6 (L) 10 - 44 U/L    Anion Gap 7 (L) 8 - 16 mmol/L    eGFR if African American 51.8 (A) >60 mL/min/1.73 m^2    eGFR if non African American 44.9 (A) >60 mL/min/1.73 m^2   Troponin I    Collection Time: 12/06/19  4:08 PM   Result Value Ref Range    Troponin I 0.013 0.000 - 0.026 ng/mL   Brain natriuretic peptide    Collection Time: 12/06/19  4:08 PM   Result Value Ref Range     (H) 0 - 99 pg/mL   ISTAT PROCEDURE    Collection Time: 12/06/19  4:16 PM   Result Value Ref Range    POC PH 7.368 7.35 - 7.45    POC PCO2 48.6 (H) 35 - 45 mmHg    POC PO2 63 (L) 80 - 100 mmHg    POC HCO3 28.0 24 - 28 mmol/L    POC BE 3 -2 to 2 mmol/L    POC SATURATED O2 91 (L) 95 - 100 %    POC TCO2 29 (H) 23 - 27 mmol/L    Sample ARTERIAL     Site RR     Allens Test Pass     DelSys Nasal Can     Mode SPONT     Flow 1     Sp02 98        Significant Imaging: I have reviewed all pertinent imaging results/findings within the past 24 hours.   CTA Chest Non-Coronary (PE Study) [[id number]] Resulted: 12/06/19 1900   Order Status: Completed Updated: 12/06/19 1902   Narrative:     EXAMINATION:  CTA CHEST  NON CORONARY    CLINICAL HISTORY:  Dyspnea, cardiac origin suspected;    TECHNIQUE:  Low dose axial images, sagittal and coronal reformations were obtained from the thoracic inlet to the lung bases following the IV administration of 100 mL of Omnipaque 350.  Contrast timing was optimized to evaluate the pulmonary arteries.  MIP images were performed.    COMPARISON:  CT abdomen pelvis from 09/06/2019    FINDINGS:  Pulmonary Arteries: This study is adequate for the evaluation of pulmonary thromboembolism.  No evidence of pulmonary embolism to the level of the subsegmental arteries bilaterally.    Soft tissues of the neck:  Visualized portion of the thyroid is normal in appearance.    Thoracic aorta:  Normal in caliber and contour.  No evidence of dissection.  Moderate calcific atherosclerosis.    Heart: No cardiomegaly.  No pericardial effusion.    Lymph nodes:  No evidence of mediastinal, hilar, or axillary lymphadenopathy.    Trachea and bronchi: Patent.    Lungs:  Lucencies within the lungs centrally suggestive of centrilobular emphysema.  Moderate right-sided pleural effusion with atelectasis/consolidation of the right lower lobe.  Trace atelectasis of the left lower lobe.    Limited evaluation of the upper abdomen:  Small hiatal hernia.    Osseous structures:  No evidence of fractures or suspicious osseous lesions.   Impression:       No evidence of pulmonary embolism.    Moderate right pleural effusion with atelectasis/consolidation of the right lower lobe.  Findings are similar when compared to prior exam from 09/06/2019.     X-Ray Chest PA And Lateral [[id number]] Resulted: 12/06/19 1500   Order Status: Completed Updated: 12/06/19 1502   Narrative:     EXAMINATION:  XR CHEST PA AND LATERAL    CLINICAL HISTORY:  Syncope and collapse    TECHNIQUE:  PA and lateral views of the chest were performed.    COMPARISON:  Chest CT: 09/06/2019.    Chest radiograph 06/12/2012.    FINDINGS:  CT performed 9/June 2019 revealed  cicatricial atelectatic changes in the right paramediastinal lung with rightward mediastinal shift and near collapse of the right lower lobe plus right subpulmonic pleural fluid.  Findings on chest radiograph today are similar with right lung volume reduced and rightward mediastinal shift.  There is dense calcific plaque in the thoracic aorta.    Allowing for the patient's treatment for right lung cancer I detect no convincing evidence of new pulmonary disease in this patient with a recent history of postoperative syncope followed by altered mental status.  There is no evidence of pulmonary edema, left pleural fluid, pneumothorax, pneumomediastinum, pneumoperitoneum or significant osseous abnormality.   Impression:       No acute disease identified to account for the patient's history of syncope and altered mental status, noting that the patient has been treated for right lung cancer with post treatment changes stable compared with the recent CT of 09/06/2019.    This 88-year-old woman has underlying cardiovascular disease but no evidence of jessica cardiac decompensation.

## 2019-12-07 NOTE — NURSING
2019    1) Patient's  O2 Sat on room air, while at rest: 98        If O2 sats on room air at rest are 88% or below, patient qualifies. No additional testing needed. Document N/A in steps 2 and 3. If 89% or above, complete steps 2.      2) Patient's O2 Sat on room air while exercisin-94

## 2019-12-07 NOTE — HOSPITAL COURSE
Observed o/n and pt remained asymptomatic although still intermittently required very low O2 by NC. No further syncopal episodes. Orthostatics negative. Pt weaned to room air easily on 12/7. Did not desat with ambulation. Meeting criteria for d/c home on 12/7.

## 2019-12-07 NOTE — SUBJECTIVE & OBJECTIVE
"Interval History: NAEON. Pt feels "great and ready to go home". Intermittently required supplemental O2 per nursing, however, pt asymptomatic. No further episodes of syncope. She denies  Fatigue, weakness, SOB, CP, HA, light-headedness, vision changes, N/V, F/C.    Review of Systems   Constitutional: Negative for chills and fever.   HENT: Negative for trouble swallowing.    Eyes: Negative for photophobia, pain and visual disturbance.   Respiratory: Negative for cough, choking, chest tightness, shortness of breath and wheezing.    Cardiovascular: Negative for chest pain, palpitations and leg swelling.   Gastrointestinal: Negative for abdominal pain, blood in stool, diarrhea, nausea and vomiting.   Genitourinary: Negative for decreased urine volume, difficulty urinating and dysuria.   Musculoskeletal: Negative for myalgias.   Neurological: Negative for syncope, speech difficulty and headaches.   Hematological: Negative for adenopathy.   Psychiatric/Behavioral: Negative for confusion.     Objective:     Vital Signs (Most Recent):  Temp: 98.9 °F (37.2 °C) (12/07/19 1108)  Pulse: 96 (12/07/19 1145)  Resp: 16 (12/07/19 1108)  BP: 139/63 (12/07/19 1108)  SpO2: 98 % (12/07/19 1322) Vital Signs (24h Range):  Temp:  [98.1 °F (36.7 °C)-98.9 °F (37.2 °C)] 98.9 °F (37.2 °C)  Pulse:  [] 96  Resp:  [0-24] 16  SpO2:  [84 %-99 %] 98 %  BP: (123-197)/(58-96) 139/63     Weight: 83.6 kg (184 lb 4.9 oz)  Body mass index is 30.67 kg/m².    Intake/Output Summary (Last 24 hours) at 12/7/2019 1358  Last data filed at 12/6/2019 1834  Gross per 24 hour   Intake 500 ml   Output --   Net 500 ml      Physical Exam   Constitutional: She is oriented to person, place, and time. She appears well-developed and well-nourished. No distress.   HENT:   Head: Normocephalic and atraumatic.   Eyes: Right eye exhibits no discharge. Left eye exhibits no discharge.   Periorbital sutures, no drainage    Neck: Normal range of motion. Neck supple. "   Cardiovascular: Regular rhythm.   Mildly tachycardic, low 100s   Pulmonary/Chest: Effort normal and breath sounds normal. No respiratory distress. She has no wheezes. She has no rales.   O2 sat 90s, ranges from RA-->2L O2 by NC   Abdominal: Soft. She exhibits no distension. There is no tenderness. There is no rebound and no guarding.   Musculoskeletal: Normal range of motion. She exhibits no edema or tenderness.   Lymphadenopathy:     She has no cervical adenopathy.   Neurological: She is alert and oriented to person, place, and time. No cranial nerve deficit or sensory deficit. She exhibits normal muscle tone.   Skin: Skin is warm and dry. No erythema.   Psychiatric: She has a normal mood and affect. Her behavior is normal.       Significant Labs:   Recent Results (from the past 24 hour(s))   CBC auto differential    Collection Time: 12/06/19  4:08 PM   Result Value Ref Range    WBC 5.95 3.90 - 12.70 K/uL    RBC 3.62 (L) 4.00 - 5.40 M/uL    Hemoglobin 10.4 (L) 12.0 - 16.0 g/dL    Hematocrit 31.9 (L) 37.0 - 48.5 %    Mean Corpuscular Volume 88 82 - 98 fL    Mean Corpuscular Hemoglobin 28.7 27.0 - 31.0 pg    Mean Corpuscular Hemoglobin Conc 32.6 32.0 - 36.0 g/dL    RDW 16.0 (H) 11.5 - 14.5 %    Platelets 172 150 - 350 K/uL    MPV 10.2 9.2 - 12.9 fL    Immature Granulocytes 0.3 0.0 - 0.5 %    Gran # (ANC) 4.4 1.8 - 7.7 K/uL    Immature Grans (Abs) 0.02 0.00 - 0.04 K/uL    Lymph # 1.0 1.0 - 4.8 K/uL    Mono # 0.5 0.3 - 1.0 K/uL    Eos # 0.1 0.0 - 0.5 K/uL    Baso # 0.01 0.00 - 0.20 K/uL    nRBC 0 0 /100 WBC    Gran% 74.2 (H) 38.0 - 73.0 %    Lymph% 16.1 (L) 18.0 - 48.0 %    Mono% 8.4 4.0 - 15.0 %    Eosinophil% 0.8 0.0 - 8.0 %    Basophil% 0.2 0.0 - 1.9 %    Differential Method Automated    Comprehensive metabolic panel    Collection Time: 12/06/19  4:08 PM   Result Value Ref Range    Sodium 139 136 - 145 mmol/L    Potassium 3.6 3.5 - 5.1 mmol/L    Chloride 102 95 - 110 mmol/L    CO2 30 (H) 23 - 29 mmol/L    Glucose  151 (H) 70 - 110 mg/dL    BUN, Bld 16 8 - 23 mg/dL    Creatinine 1.1 0.5 - 1.4 mg/dL    Calcium 9.8 8.7 - 10.5 mg/dL    Total Protein 7.2 6.0 - 8.4 g/dL    Albumin 3.4 (L) 3.5 - 5.2 g/dL    Total Bilirubin 0.2 0.1 - 1.0 mg/dL    Alkaline Phosphatase 42 (L) 55 - 135 U/L    AST 14 10 - 40 U/L    ALT 6 (L) 10 - 44 U/L    Anion Gap 7 (L) 8 - 16 mmol/L    eGFR if African American 51.8 (A) >60 mL/min/1.73 m^2    eGFR if non African American 44.9 (A) >60 mL/min/1.73 m^2   Troponin I    Collection Time: 12/06/19  4:08 PM   Result Value Ref Range    Troponin I 0.013 0.000 - 0.026 ng/mL   Brain natriuretic peptide    Collection Time: 12/06/19  4:08 PM   Result Value Ref Range     (H) 0 - 99 pg/mL   ISTAT PROCEDURE    Collection Time: 12/06/19  4:16 PM   Result Value Ref Range    POC PH 7.368 7.35 - 7.45    POC PCO2 48.6 (H) 35 - 45 mmHg    POC PO2 63 (L) 80 - 100 mmHg    POC HCO3 28.0 24 - 28 mmol/L    POC BE 3 -2 to 2 mmol/L    POC SATURATED O2 91 (L) 95 - 100 %    POC TCO2 29 (H) 23 - 27 mmol/L    Sample ARTERIAL     Site RR     Allens Test Pass     DelSys Nasal Can     Mode SPONT     Flow 1     Sp02 98    Hemoglobin A1c    Collection Time: 12/06/19  8:52 PM   Result Value Ref Range    Hemoglobin A1C 6.6 (H) 4.0 - 5.6 %    Estimated Avg Glucose 143 (H) 68 - 131 mg/dL   TSH    Collection Time: 12/06/19  8:52 PM   Result Value Ref Range    TSH 2.381 0.400 - 4.000 uIU/mL   PT/INR    Collection Time: 12/06/19  8:52 PM   Result Value Ref Range    Prothrombin Time 10.2 9.0 - 12.5 sec    INR 1.0 0.8 - 1.2   POCT glucose    Collection Time: 12/06/19  9:03 PM   Result Value Ref Range    POCT Glucose 100 70 - 110 mg/dL   Comprehensive Metabolic Panel (CMP)    Collection Time: 12/07/19  4:11 AM   Result Value Ref Range    Sodium 141 136 - 145 mmol/L    Potassium 4.0 3.5 - 5.1 mmol/L    Chloride 104 95 - 110 mmol/L    CO2 31 (H) 23 - 29 mmol/L    Glucose 119 (H) 70 - 110 mg/dL    BUN, Bld 14 8 - 23 mg/dL    Creatinine 1.0 0.5 -  1.4 mg/dL    Calcium 9.7 8.7 - 10.5 mg/dL    Total Protein 7.0 6.0 - 8.4 g/dL    Albumin 3.2 (L) 3.5 - 5.2 g/dL    Total Bilirubin 0.3 0.1 - 1.0 mg/dL    Alkaline Phosphatase 39 (L) 55 - 135 U/L    AST 16 10 - 40 U/L    ALT 6 (L) 10 - 44 U/L    Anion Gap 6 (L) 8 - 16 mmol/L    eGFR if African American 58.1 (A) >60 mL/min/1.73 m^2    eGFR if non African American 50.4 (A) >60 mL/min/1.73 m^2   Magnesium    Collection Time: 12/07/19  4:11 AM   Result Value Ref Range    Magnesium 1.7 1.6 - 2.6 mg/dL   Phosphorus    Collection Time: 12/07/19  4:11 AM   Result Value Ref Range    Phosphorus 3.5 2.7 - 4.5 mg/dL   CBC with Automated Differential    Collection Time: 12/07/19  4:11 AM   Result Value Ref Range    WBC 4.34 3.90 - 12.70 K/uL    RBC 3.32 (L) 4.00 - 5.40 M/uL    Hemoglobin 9.6 (L) 12.0 - 16.0 g/dL    Hematocrit 29.3 (L) 37.0 - 48.5 %    Mean Corpuscular Volume 88 82 - 98 fL    Mean Corpuscular Hemoglobin 28.9 27.0 - 31.0 pg    Mean Corpuscular Hemoglobin Conc 32.8 32.0 - 36.0 g/dL    RDW 16.1 (H) 11.5 - 14.5 %    Platelets 167 150 - 350 K/uL    MPV 11.0 9.2 - 12.9 fL    Immature Granulocytes 0.2 0.0 - 0.5 %    Gran # (ANC) 2.6 1.8 - 7.7 K/uL    Immature Grans (Abs) 0.01 0.00 - 0.04 K/uL    Lymph # 1.0 1.0 - 4.8 K/uL    Mono # 0.6 0.3 - 1.0 K/uL    Eos # 0.1 0.0 - 0.5 K/uL    Baso # 0.02 0.00 - 0.20 K/uL    nRBC 0 0 /100 WBC    Gran% 60.6 38.0 - 73.0 %    Lymph% 23.0 18.0 - 48.0 %    Mono% 12.7 4.0 - 15.0 %    Eosinophil% 3.0 0.0 - 8.0 %    Basophil% 0.5 0.0 - 1.9 %    Differential Method Automated    POCT glucose    Collection Time: 12/07/19  7:48 AM   Result Value Ref Range    POCT Glucose 86 70 - 110 mg/dL       Significant Imaging: I have reviewed all pertinent imaging results/findings within the past 24 hours.

## 2019-12-07 NOTE — PLAN OF CARE
Pt remains free from falls and injury. Pt makes statement of no pain. Family remains in room overnight. Bed low and locked, Call light within reach,

## 2019-12-07 NOTE — PLAN OF CARE
Problem: Fall Injury Risk  Goal: Absence of Fall and Fall-Related Injury  Outcome: Met  Intervention: Identify and Manage Contributors to Fall Injury Risk  Flowsheets (Taken 12/7/2019 1625)  Self-Care Promotion: independence encouraged; BADL personal objects within reach  Medication Review/Management: medications reviewed  Intervention: Promote Injury-Free Environment  Flowsheets (Taken 12/7/2019 1625)  Safety Promotion/Fall Prevention: assistive device/personal item within reach; diversional activities provided; Fall Risk reviewed with patient/family; Fall Risk signage in place; in recliner, wheels locked; medications reviewed; nonskid shoes/socks when out of bed; orthostatic vital signs; side rails raised x 3; instructed to call staff for mobility  Environmental Safety Modification: assistive device/personal items within reach; clutter free environment maintained; lighting adjusted   Pt discharged. VSS, no acute distress. No needs at this time. Discharge instructions given to pt and daughter. Pt being brought home by daughter.

## 2019-12-07 NOTE — ASSESSMENT & PLAN NOTE
Home med is Actos 15mg daily. A1c 6.5 in 5/2019, A1c this admission 6.6.    - DM diet  - Accuchecks TIDAC meals + qhs  - LDSSI

## 2019-12-07 NOTE — PROGRESS NOTES
"[facility] Hospital Medicine  Progress Note    Patient Name: Brunilda England  MRN: [id number]  Patient Class: IP- Inpatient   Admission Date: 12/6/2019  Length of Stay: 1 days  Attending Physician: Gilberto Ribera MD  Primary Care Provider: Pepper Whitfield MD    [facility] Medicine Team: Networked reference to record PCT  Johana Blankenship MD    Subjective:     Principal Problem:<principal problem not specified>        HPI:  89 yo F PMHx CAD with history MI, T2DM, HTN, history of lung cancer s/p resection and chemo radiation presents for syncope after undergoing right upper eyelid flap reconstruction surgery this morning 12/6.  According to family and chart, no immediate postoperative complications and patient handled anesthesia well. She was given hydrocodone 5 mg prior to discharge. Patient was sitting at her table shortly after arriving home, when she "slumped over" became diaphoretic per daughter. Daughter reports she was "out for 15-20 minutes".  Daughter reports shallow, slow breathing by the patient.  She eventually came to and was mildly confused.  85% on room air upon EMS arrival, placed on 2 L.  No trauma or fall from chair.  Patient has no recollection of syncopizing and denies prodrome.  Reports she feels "fine" and has not experienced any chest pain, palpitations, shortness of breath, nausea, abdominal pain. Patient is currently back to baseline and fully oriented.       Overview/Hospital Course:  Observed o/n and pt remained asymptomatic although still intermittently required very low O2 by NC. No further syncopal episodes. Orthostatics negative. Need to wean pt to RA or determine if there is an O2 requirement trish d/c prior to discharge.    Interval History: NAEON. Pt feels "great and ready to go home". Intermittently required supplemental O2 per nursing, however, pt asymptomatic. No further episodes of syncope. She denies  Fatigue, weakness, SOB, CP, HA, light-headedness, vision " changes, N/V, F/C.    Review of Systems   Constitutional: Negative for chills and fever.   HENT: Negative for trouble swallowing.    Eyes: Negative for photophobia, pain and visual disturbance.   Respiratory: Negative for cough, choking, chest tightness, shortness of breath and wheezing.    Cardiovascular: Negative for chest pain, palpitations and leg swelling.   Gastrointestinal: Negative for abdominal pain, blood in stool, diarrhea, nausea and vomiting.   Genitourinary: Negative for decreased urine volume, difficulty urinating and dysuria.   Musculoskeletal: Negative for myalgias.   Neurological: Negative for syncope, speech difficulty and headaches.   Hematological: Negative for adenopathy.   Psychiatric/Behavioral: Negative for confusion.     Objective:     Vital Signs (Most Recent):  Temp: 98.9 °F (37.2 °C) (12/07/19 1108)  Pulse: 96 (12/07/19 1145)  Resp: 16 (12/07/19 1108)  BP: 139/63 (12/07/19 1108)  SpO2: 98 % (12/07/19 1322) Vital Signs (24h Range):  Temp:  [98.1 °F (36.7 °C)-98.9 °F (37.2 °C)] 98.9 °F (37.2 °C)  Pulse:  [] 96  Resp:  [0-24] 16  SpO2:  [84 %-99 %] 98 %  BP: (123-197)/(58-96) 139/63     Weight: 83.6 kg (184 lb 4.9 oz)  Body mass index is 30.67 kg/m².    Intake/Output Summary (Last 24 hours) at 12/7/2019 1358  Last data filed at 12/6/2019 1834  Gross per 24 hour   Intake 500 ml   Output --   Net 500 ml      Physical Exam   Constitutional: She is oriented to person, place, and time. She appears well-developed and well-nourished. No distress.   HENT:   Head: Normocephalic and atraumatic.   Eyes: Right eye exhibits no discharge. Left eye exhibits no discharge.   Periorbital sutures, no drainage    Neck: Normal range of motion. Neck supple.   Cardiovascular: Regular rhythm.   Mildly tachycardic, low 100s   Pulmonary/Chest: Effort normal and breath sounds normal. No respiratory distress. She has no wheezes. She has no rales.   O2 sat 90s, ranges from RA-->2L O2 by NC   Abdominal: Soft. She  exhibits no distension. There is no tenderness. There is no rebound and no guarding.   Musculoskeletal: Normal range of motion. She exhibits no edema or tenderness.   Lymphadenopathy:     She has no cervical adenopathy.   Neurological: She is alert and oriented to person, place, and time. No cranial nerve deficit or sensory deficit. She exhibits normal muscle tone.   Skin: Skin is warm and dry. No erythema.   Psychiatric: She has a normal mood and affect. Her behavior is normal.       Significant Labs:   Recent Results (from the past 24 hour(s))   CBC auto differential    Collection Time: 12/06/19  4:08 PM   Result Value Ref Range    WBC 5.95 3.90 - 12.70 K/uL    RBC 3.62 (L) 4.00 - 5.40 M/uL    Hemoglobin 10.4 (L) 12.0 - 16.0 g/dL    Hematocrit 31.9 (L) 37.0 - 48.5 %    Mean Corpuscular Volume 88 82 - 98 fL    Mean Corpuscular Hemoglobin 28.7 27.0 - 31.0 pg    Mean Corpuscular Hemoglobin Conc 32.6 32.0 - 36.0 g/dL    RDW 16.0 (H) 11.5 - 14.5 %    Platelets 172 150 - 350 K/uL    MPV 10.2 9.2 - 12.9 fL    Immature Granulocytes 0.3 0.0 - 0.5 %    Gran # (ANC) 4.4 1.8 - 7.7 K/uL    Immature Grans (Abs) 0.02 0.00 - 0.04 K/uL    Lymph # 1.0 1.0 - 4.8 K/uL    Mono # 0.5 0.3 - 1.0 K/uL    Eos # 0.1 0.0 - 0.5 K/uL    Baso # 0.01 0.00 - 0.20 K/uL    nRBC 0 0 /100 WBC    Gran% 74.2 (H) 38.0 - 73.0 %    Lymph% 16.1 (L) 18.0 - 48.0 %    Mono% 8.4 4.0 - 15.0 %    Eosinophil% 0.8 0.0 - 8.0 %    Basophil% 0.2 0.0 - 1.9 %    Differential Method Automated    Comprehensive metabolic panel    Collection Time: 12/06/19  4:08 PM   Result Value Ref Range    Sodium 139 136 - 145 mmol/L    Potassium 3.6 3.5 - 5.1 mmol/L    Chloride 102 95 - 110 mmol/L    CO2 30 (H) 23 - 29 mmol/L    Glucose 151 (H) 70 - 110 mg/dL    BUN, Bld 16 8 - 23 mg/dL    Creatinine 1.1 0.5 - 1.4 mg/dL    Calcium 9.8 8.7 - 10.5 mg/dL    Total Protein 7.2 6.0 - 8.4 g/dL    Albumin 3.4 (L) 3.5 - 5.2 g/dL    Total Bilirubin 0.2 0.1 - 1.0 mg/dL    Alkaline Phosphatase 42  (L) 55 - 135 U/L    AST 14 10 - 40 U/L    ALT 6 (L) 10 - 44 U/L    Anion Gap 7 (L) 8 - 16 mmol/L    eGFR if African American 51.8 (A) >60 mL/min/1.73 m^2    eGFR if non African American 44.9 (A) >60 mL/min/1.73 m^2   Troponin I    Collection Time: 12/06/19  4:08 PM   Result Value Ref Range    Troponin I 0.013 0.000 - 0.026 ng/mL   Brain natriuretic peptide    Collection Time: 12/06/19  4:08 PM   Result Value Ref Range     (H) 0 - 99 pg/mL   ISTAT PROCEDURE    Collection Time: 12/06/19  4:16 PM   Result Value Ref Range    POC PH 7.368 7.35 - 7.45    POC PCO2 48.6 (H) 35 - 45 mmHg    POC PO2 63 (L) 80 - 100 mmHg    POC HCO3 28.0 24 - 28 mmol/L    POC BE 3 -2 to 2 mmol/L    POC SATURATED O2 91 (L) 95 - 100 %    POC TCO2 29 (H) 23 - 27 mmol/L    Sample ARTERIAL     Site RR     Allens Test Pass     DelSys Nasal Can     Mode SPONT     Flow 1     Sp02 98    Hemoglobin A1c    Collection Time: 12/06/19  8:52 PM   Result Value Ref Range    Hemoglobin A1C 6.6 (H) 4.0 - 5.6 %    Estimated Avg Glucose 143 (H) 68 - 131 mg/dL   TSH    Collection Time: 12/06/19  8:52 PM   Result Value Ref Range    TSH 2.381 0.400 - 4.000 uIU/mL   PT/INR    Collection Time: 12/06/19  8:52 PM   Result Value Ref Range    Prothrombin Time 10.2 9.0 - 12.5 sec    INR 1.0 0.8 - 1.2   POCT glucose    Collection Time: 12/06/19  9:03 PM   Result Value Ref Range    POCT Glucose 100 70 - 110 mg/dL   Comprehensive Metabolic Panel (CMP)    Collection Time: 12/07/19  4:11 AM   Result Value Ref Range    Sodium 141 136 - 145 mmol/L    Potassium 4.0 3.5 - 5.1 mmol/L    Chloride 104 95 - 110 mmol/L    CO2 31 (H) 23 - 29 mmol/L    Glucose 119 (H) 70 - 110 mg/dL    BUN, Bld 14 8 - 23 mg/dL    Creatinine 1.0 0.5 - 1.4 mg/dL    Calcium 9.7 8.7 - 10.5 mg/dL    Total Protein 7.0 6.0 - 8.4 g/dL    Albumin 3.2 (L) 3.5 - 5.2 g/dL    Total Bilirubin 0.3 0.1 - 1.0 mg/dL    Alkaline Phosphatase 39 (L) 55 - 135 U/L    AST 16 10 - 40 U/L    ALT 6 (L) 10 - 44 U/L    Anion  Gap 6 (L) 8 - 16 mmol/L    eGFR if African American 58.1 (A) >60 mL/min/1.73 m^2    eGFR if non African American 50.4 (A) >60 mL/min/1.73 m^2   Magnesium    Collection Time: 12/07/19  4:11 AM   Result Value Ref Range    Magnesium 1.7 1.6 - 2.6 mg/dL   Phosphorus    Collection Time: 12/07/19  4:11 AM   Result Value Ref Range    Phosphorus 3.5 2.7 - 4.5 mg/dL   CBC with Automated Differential    Collection Time: 12/07/19  4:11 AM   Result Value Ref Range    WBC 4.34 3.90 - 12.70 K/uL    RBC 3.32 (L) 4.00 - 5.40 M/uL    Hemoglobin 9.6 (L) 12.0 - 16.0 g/dL    Hematocrit 29.3 (L) 37.0 - 48.5 %    Mean Corpuscular Volume 88 82 - 98 fL    Mean Corpuscular Hemoglobin 28.9 27.0 - 31.0 pg    Mean Corpuscular Hemoglobin Conc 32.8 32.0 - 36.0 g/dL    RDW 16.1 (H) 11.5 - 14.5 %    Platelets 167 150 - 350 K/uL    MPV 11.0 9.2 - 12.9 fL    Immature Granulocytes 0.2 0.0 - 0.5 %    Gran # (ANC) 2.6 1.8 - 7.7 K/uL    Immature Grans (Abs) 0.01 0.00 - 0.04 K/uL    Lymph # 1.0 1.0 - 4.8 K/uL    Mono # 0.6 0.3 - 1.0 K/uL    Eos # 0.1 0.0 - 0.5 K/uL    Baso # 0.02 0.00 - 0.20 K/uL    nRBC 0 0 /100 WBC    Gran% 60.6 38.0 - 73.0 %    Lymph% 23.0 18.0 - 48.0 %    Mono% 12.7 4.0 - 15.0 %    Eosinophil% 3.0 0.0 - 8.0 %    Basophil% 0.5 0.0 - 1.9 %    Differential Method Automated    POCT glucose    Collection Time: 12/07/19  7:48 AM   Result Value Ref Range    POCT Glucose 86 70 - 110 mg/dL       Significant Imaging: I have reviewed all pertinent imaging results/findings within the past 24 hours.      Assessment/Plan:      Syncope  Syncope  Acute Hypoxic Respiratory Failure    Patient was unconscious with decreased respiratory rate for around 20 minutes today after a routine eyelid surgery with no complications. EKG was unremarkable, patient has no history of seizure disorder, CTA neg for PE, Trop  Neg, BNP only mildly elevated - but no s/sx to suggest CHF exacerbation. At time of my exam, pt asymptomatic and back at baseline.    - TSH wnl  -  Telemetry  - Orthostatic neg  - Determine true O2 need. May not need supplemental O2 - as pt is not symptomatic (w/ or w/o O2) and there is no clinical reason to explain a new oxygen requirement. Suspect she has some degree of mild hypoxia at home, and this is not an acute issue. Will monitor O2 sat off supplemental O2 and walk pt to determine if she desats with ambulation. If so, will arrange home oxygen.  - VTE PPX: Heparin     Post-operative state  Patient reportedly tolerated eyelid surgery well with no complications, but was given 5 mg hydrocodone on discharge. Cause of syncope still unknown although daughter reports the patient experienced an episode of slow breathing after anesthesia in the past. Patient now fully recovered and at baseline, fully oriented.           Chalazion of right eye  S/p surgical procedure with ophthalmology day of admission, 12/6. Per their d/c summary, ABX eye ointment prescribed.    - Continue tobramycin-dexamethasone ointment to eye      Mood disorder  Per PCP progress note in 9/2019, pt stable on Cymbalta 60mg.    - Continue home Cymbalta       Mild cognitive impairment  - continue home donepezil       Type 2 diabetes mellitus with circulatory disorder, without long-term current use of insulin  Home med is Actos 15mg daily. A1c 6.5 in 5/2019, A1c this admission 6.6.    - DM diet  - Accuchecks TIDAC meals + qhs  - LDSSI      Coronary artery disease involving native coronary artery without angina pectoris  CAD, hx MI s/p stent. No CP or cardiac sx at present.    - Continue bASA      Pure hypercholesterolemia  - Continue home statin      Essential hypertension  - Continue home Amlodipine-benazepril, Lopressor, and HCTZ            VTE Risk Mitigation (From admission, onward)         Ordered     heparin (porcine) injection 5,000 Units  Every 8 hours      12/06/19 1833     IP VTE HIGH RISK PATIENT  Once      12/06/19 1833                      Johana Blankenship MD  Department of Hospital  Medicine   [facility]

## 2019-12-07 NOTE — ASSESSMENT & PLAN NOTE
Patient reportedly tolerated eyelid surgery well with no complications, but was given 5 mg hydrocodone on discharge. Cause of syncope still unknown although daughter reports the patient experienced an episode of slow breathing after anesthesia in the past. Patient now fully recovered and at baseline, fully oriented.

## 2019-12-07 NOTE — HPI
"87 yo F PMHx CAD with history MI, T2DM, HTN, history of lung cancer s/p resection and chemo radiation presents for syncope after undergoing right upper eyelid flap reconstruction surgery this morning 12/6.  According to family and chart, no immediate postoperative complications and patient handled anesthesia well. She was given hydrocodone 5 mg prior to discharge. Patient was sitting at her table shortly after arriving home, when she "slumped over" became diaphoretic per daughter. Daughter reports she was "out for 15-20 minutes".  Daughter reports shallow, slow breathing by the patient.  She eventually came to and was mildly confused.  85% on room air upon EMS arrival, placed on 2 L.  No trauma or fall from chair.  Patient has no recollection of syncopizing and denies prodrome.  Reports she feels "fine" and has not experienced any chest pain, palpitations, shortness of breath, nausea, abdominal pain. Patient is currently back to baseline and fully oriented.     "

## 2019-12-07 NOTE — ASSESSMENT & PLAN NOTE
S/p surgical procedure with ophthalmology day of admission, 12/6. Per their d/c summary, ABX eye ointment prescribed.    - Continue tobramycin-dexamethasone ointment to eye

## 2019-12-07 NOTE — ASSESSMENT & PLAN NOTE
Syncope  Acute Hypoxic Respiratory Failure    Patient was unconscious with decreased respiratory rate for around 20 minutes today after a routine eyelid surgery with no complications. EKG was unremarkable, patient has no history of seizure disorder, CTA neg for PE, Trop  Neg, BNP only mildly elevated - but no s/sx to suggest CHF exacerbation. At time of my exam, pt asymptomatic and back at baseline.    - f/u TSH  - Telemetry  - Orthostatics  - Closely monitor VS and repeat labs in AM  - Wean O2, currently on 2L NC with Os sat in 90s  - VTE PPX: Heparin

## 2019-12-09 ENCOUNTER — TELEPHONE (OUTPATIENT)
Dept: OPHTHALMOLOGY | Facility: CLINIC | Age: 84
End: 2019-12-09

## 2019-12-09 NOTE — TELEPHONE ENCOUNTER
----- Message from Judson Hayes sent at 12/9/2019  9:02 AM CST -----  Contact: Nora (daughter) @ [phone]  Nora states eye is swollen after surgery and she needs to confirm what she should do. Nora is asking for a call back asap.

## 2019-12-11 NOTE — PHYSICIAN QUERY
"PT Name: Brunilda England  MR #: [id number]     Physician Query Form - Etiology of Condition Clarification      CDS/: Katya Richardson RN               Contact information:[email or web address]  This form is a permanent document in the medical record.     Query Date: December 11, 2019    By submitting this query, we are merely seeking further clarification of documentation.  Please utilize your independent clinical judgment when addressing the question(s) below.     The medical record contains the following:    Findings Supporting Clinical Information Location in Medical Record    presents via EMS s/p right eye surgery earlier this morning under general anesthesia. Pt daughter states that she became unresponsive ;O2 sats were in the 70's. Pt does not wear O2 at home. Pt was given 1 norco prior to discharge around 11 and got home close to noon.    --"episode of AMS and unresponsiveness" at home with daughter.  patient is now GCS 15 but appears lethargic and sats 85% on RA  --Patient was sitting at her table shortly after arriving home, when she "slumped over" became diaphoretic per daughter.  Daughter reports she was "out for 15-20 minutes".  Daughter reports shallow, slow breathing by the patient.  She eventually came to and was mildly confused.  85% on room air upon EMS arrival, placed on 2 L.      --CAD, DM2, hx of lung cancer s/p resection, chemo and XRT.   -- Suspect this may have been related to pain medication.   --Respiratory: Positive for apnea (per daughter, "slowed breathing" at time of incident).   --Mildly tachycardic, low 100s , O2 sat high 90s on O2 2L NC. Orthostatics negative    Acute Hypoxic Respiratory Failure    , POC ph 7.368 , POC PCO2 48.6, POC PO2 63, POC HCO3 28, POC SATURATED O2 91    Moderate right pleural effusion with atelectasis/consolidation of the right lower lobe. Findings are similar when compared to prior exam from 09/06/2019    Post-operative state, syncope, hypoxia ED " Nursing note           ED provider                H&P            Hospital Medicine note 12/6    Labs 12/6      Chest CT 12/6        Discharge summary     Please document your best medical opinion regarding the etiology of _Syncope__ for which treatment is/was directed.     Provider use only    [ x  ] Syncopal episode related to pain medication.  [   ] Syncopal episode related to acute respiratory failure with hyoxia  [   ] Syncopal episode related to anesthesia  [   ] Syncopal episode related to other ________(please specify)               [  ] Clinically Undetermined     Please document in your progress notes daily for the duration of treatment, until resolved, and include in your discharge summary.

## 2019-12-12 ENCOUNTER — TELEPHONE (OUTPATIENT)
Dept: OPHTHALMOLOGY | Facility: CLINIC | Age: 84
End: 2019-12-12

## 2019-12-12 NOTE — TELEPHONE ENCOUNTER
----- Message from Isha Cazares sent at 12/12/2019 11:17 AM CST -----  Contact: Nora England  Ms. England would like to know if she can still bring her mom in today for her post op appointment. She can be reached at [phone]

## 2019-12-16 ENCOUNTER — OFFICE VISIT (OUTPATIENT)
Dept: OPHTHALMOLOGY | Facility: CLINIC | Age: 84
End: 2019-12-16
Payer: MEDICARE

## 2019-12-16 DIAGNOSIS — H43.813 POSTERIOR VITREOUS DETACHMENT, BOTH EYES: ICD-10-CM

## 2019-12-16 DIAGNOSIS — Z98.890 POST-OPERATIVE STATE: Primary | ICD-10-CM

## 2019-12-16 DIAGNOSIS — H35.89 ATROPHY OF MACULA LUTEA: ICD-10-CM

## 2019-12-16 DIAGNOSIS — C44.1121 BASAL CELL CARCINOMA (BCC) OF RIGHT UPPER EYELID: ICD-10-CM

## 2019-12-16 DIAGNOSIS — H35.033 HYPERTENSIVE RETINOPATHY OF BOTH EYES: ICD-10-CM

## 2019-12-16 DIAGNOSIS — H35.3232 EXUDATIVE AGE-RELATED MACULAR DEGENERATION OF BOTH EYES WITH INACTIVE CHOROIDAL NEOVASCULARIZATION: ICD-10-CM

## 2019-12-16 DIAGNOSIS — H35.62 MACULAR HEMORRHAGE OF LEFT EYE: ICD-10-CM

## 2019-12-16 PROCEDURE — 99499 RISK ADDL DX/OHS AUDIT: ICD-10-PCS | Mod: S$GLB,,, | Performed by: OPHTHALMOLOGY

## 2019-12-16 PROCEDURE — 92285 EXTERNAL PHOTOGRAPHY - OU - BOTH EYES: ICD-10-PCS | Mod: S$GLB,,, | Performed by: OPHTHALMOLOGY

## 2019-12-16 PROCEDURE — 99024 POSTOP FOLLOW-UP VISIT: CPT | Mod: S$GLB,,, | Performed by: OPHTHALMOLOGY

## 2019-12-16 PROCEDURE — 92285 EXTERNAL OCULAR PHOTOGRAPHY: CPT | Mod: S$GLB,,, | Performed by: OPHTHALMOLOGY

## 2019-12-16 PROCEDURE — 99999 PR PBB SHADOW E&M-EST. PATIENT-LVL II: CPT | Mod: PBBFAC,,, | Performed by: OPHTHALMOLOGY

## 2019-12-16 PROCEDURE — 99024 PR POST-OP FOLLOW-UP VISIT: ICD-10-PCS | Mod: S$GLB,,, | Performed by: OPHTHALMOLOGY

## 2019-12-16 PROCEDURE — 99999 PR PBB SHADOW E&M-EST. PATIENT-LVL II: ICD-10-PCS | Mod: PBBFAC,,, | Performed by: OPHTHALMOLOGY

## 2019-12-16 PROCEDURE — 99499 UNLISTED E&M SERVICE: CPT | Mod: S$GLB,,, | Performed by: OPHTHALMOLOGY

## 2019-12-16 NOTE — PROGRESS NOTES
HPI     Post-op Evaluation      Additional comments: 1 wk              Comments     Patient here for 1 wk PO.    Tobradex nadira tid    S/P RECONSTRUCTION USING FLAP, RIGHT, 12/6/2019  S/P INCISIONAL BIOPSY, RIGHT - 12/6/2019    1. Eyelid lesion  2. Chalazion of right upper eyelid  3. Exudative age-related macular degeneration OU  4. Hypertensive retinopathy OU  5. PVD OU  6. Macular hemorrhage OS  7. Atrophy of macula lutea  8. HTN  9. H/o lung cancer  10. Anemia due to antineoplastic chemo  11. H/o rectal/anal cancer  12. Pernicious anemia  12. DM          Last edited by Leslie Castillo MD on 12/16/2019 11:51 AM. (History)            Assessment /Plan     For exam results, see Encounter Report.    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 Post-operative instructions reviewed. Sutures dissolving. All questions answered.  Return in 3 weeks prn sooner any worsening of vision/symptoms or any concerns.

## 2019-12-17 ENCOUNTER — HOSPITAL ENCOUNTER (OUTPATIENT)
Dept: RADIOLOGY | Facility: HOSPITAL | Age: 84
Discharge: HOME | End: 2019-12-17
Attending: INTERNAL MEDICINE
Payer: MEDICARE

## 2019-12-17 ENCOUNTER — OFFICE VISIT (OUTPATIENT)
Dept: INTERNAL MEDICINE | Facility: CLINIC | Age: 84
End: 2019-12-17
Payer: MEDICARE

## 2019-12-17 VITALS
SYSTOLIC BLOOD PRESSURE: 126 MMHG | OXYGEN SATURATION: 95 % | DIASTOLIC BLOOD PRESSURE: 62 MMHG | TEMPERATURE: 98 F | HEIGHT: 65 IN | HEART RATE: 92 BPM | WEIGHT: 183 LBS | BODY MASS INDEX: 30.49 KG/M2

## 2019-12-17 DIAGNOSIS — Z00.00 ROUTINE GENERAL MEDICAL EXAMINATION AT A HEALTH CARE FACILITY: Primary | ICD-10-CM

## 2019-12-17 DIAGNOSIS — Z12.31 SCREENING MAMMOGRAM, ENCOUNTER FOR: ICD-10-CM

## 2019-12-17 PROCEDURE — 99999 PR PBB SHADOW E&M-EST. PATIENT-LVL IV: CPT | Mod: PBBFAC,,, | Performed by: INTERNAL MEDICINE

## 2019-12-17 PROCEDURE — 99214 OFFICE O/P EST MOD 30 MIN: CPT | Mod: S$GLB,,, | Performed by: INTERNAL MEDICINE

## 2019-12-17 PROCEDURE — 77063 MAMMO DIGITAL SCREENING BILAT WITH TOMOSYNTHESIS_CAD: ICD-10-PCS | Mod: 26,,, | Performed by: RADIOLOGY

## 2019-12-17 PROCEDURE — 77067 SCR MAMMO BI INCL CAD: CPT | Mod: TC

## 2019-12-17 PROCEDURE — 77063 BREAST TOMOSYNTHESIS BI: CPT | Mod: 26,,, | Performed by: RADIOLOGY

## 2019-12-17 PROCEDURE — 77067 SCR MAMMO BI INCL CAD: CPT | Mod: 26,,, | Performed by: RADIOLOGY

## 2019-12-17 PROCEDURE — 99214 PR OFFICE/OUTPT VISIT, EST, LEVL IV, 30-39 MIN: ICD-10-PCS | Mod: S$GLB,,, | Performed by: INTERNAL MEDICINE

## 2019-12-17 PROCEDURE — 99499 UNLISTED E&M SERVICE: CPT | Mod: S$GLB,,, | Performed by: INTERNAL MEDICINE

## 2019-12-17 PROCEDURE — 99499 RISK ADDL DX/OHS AUDIT: ICD-10-PCS | Mod: S$GLB,,, | Performed by: INTERNAL MEDICINE

## 2019-12-17 PROCEDURE — 99999 PR PBB SHADOW E&M-EST. PATIENT-LVL IV: ICD-10-PCS | Mod: PBBFAC,,, | Performed by: INTERNAL MEDICINE

## 2019-12-17 PROCEDURE — 77067 MAMMO DIGITAL SCREENING BILAT WITH TOMOSYNTHESIS_CAD: ICD-10-PCS | Mod: 26,,, | Performed by: RADIOLOGY

## 2019-12-17 NOTE — PROGRESS NOTES
CHIEF COMPLAINT: Annual exam and  Follow up of hypertension, diabetes, PVD, mood, arthriits     HISTORY OF PRESENT ILLNESS: This is a 88-year-old woman who presents for follow up of above.     Bruising on left breast has resolved.    She had the chalazion removed from the right eye on 12/5/19. Area is healing. She passed out the evening after the procedure. She monitored overnight at [facility].      The night before the procedure, she slipped in the bathtub and and hit her left rib cage on the bathtub.  She has a small bruise on the area.  The area is slowing getting better. She is rubbing aspercreme with lidocaine on the area which helps.     Breathing has been good.  No cough, chest pain, palpitations. She has some white rhinorrhea with white post nasal drip       She had right leg pain - she had a blockage. Dr Gaviria placed a stent in the right CFA and PTA left ext loulou artery 12/18. Right leg pain has resolved.  The ulcerated area on the right leg has totally healed and has not returned.  She is taking aspirin 81 mg daily.       She feels that her memory is better. She is taking aricept 5 mg daily.       June is a hard month for her.  Her 's birthday ws [birth year] then Father's day June 17, her anniversay is June 25, her mother's birthday is June 18.   Her  passed away on August 26, 2017. They were  67 years. It has been a tough year. She misses him. She feels that she is coping well as he was sick for a very long time. She was his primary care giver. She is on Cymbalta 60 mg daliy and celexa 10 mg daily. Mood is good Neuropathic pain in the legs is better controlled with the duloxetine.        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  Right hip pain has resolved and has not returned.          Her non small cell lung cancer is stable. She saw Dr Weir 8/9/18  and scans were stable. She is to follow up annually with CT scan chest.  She is  breathing well. No chest pain, shortness of breath, fever, chills.       She is currently taking actos 15 mg daily. No polydipsia, polyuria, hypoglycemia. She checks her blood sugars couple times a week. Blood sugar was 102 last time she checked it. No hypoglycemia.    She continues to take Lotrel 5/20 once daily, metoprolol 50 mg 1 tablet twice daily, and hydrochlorothiazide 12.5 mg daily for her hypertension. She denies any chest pain or shortness of breath. She continues to take simvastatin 80 mg at bedtime for her hyperlipidemia. No joint pain or muscle pain.        She is off Fosamax once week for her bones.  No melana, bloody stools, constipation.     She takes vitamin B12 1000 mcg daily for vitamin B12 deficiency - anemia is stable        SHe has macular degeneration of the eyes and was followed by Dr Tersa Coreas. She got injections in her eyes every 6 weeks in Spring 2015. She now sees Dr Ruelas at [facility] (last saw him 8/18- no need for injections at that time- yearly follow up). Vision has been stable. She is taking a Eye vitamins for her eyes.       No vaginal bleeding and no longer needs vaginal premarin cream    PAST MEDICAL HISTORY:    1. Stage III non-small cell lung cancer, completed chemoradiation with carboplatin and Taxol on 6/1/12    2. Hypertension.   3. Diabetes mellitus.   4. Hyperlipidemia.   5. Chronic diastolic dysfunction.   6. Coronary artery disease status post MI in 1990 and 2003. Stenting was   done at 2003 at [facility].   7. Cholecystectomy, December 2006.   8. Cataract removal.   9. Benign growth removed from her throat.   10. Left common femoral endarterectomy, July 2007.   11. History of anal cancer in 1996 status post radiation and chemotherapy.   12. Carpal tunnel syndrome.   13. Osteoporosis on BMD 12/11    14. Macular degeneration    SOCIAL HISTORY: She quit smoking in 1995, denies any alcohol use. She is etired.     REVIEW OF SYSTEMS: She denies fevers, chills,  "night sweats, fatigue, visual change, hearing loss, sinus congestion, sore throat, dysuria, hematuria, joint pain, muscle pain, polydipsia, and polyuria.     PHYSICAL EXAM:    /62 (BP Location: Right arm, Patient Position: Sitting, BP Method: Large (Manual))   Pulse 92   Temp 97.7 °F (36.5 °C) (Oral)   Ht 5' 5" (1.651 m)   Wt 83 kg (182 lb 15.7 oz)   SpO2 95%   BMI 30.45 kg/m² \    GENERAL: She is alert and oriented. No apparent distress. Affect within normal limits.   Conjunctivae anicteric.  Nodule right upper eye lid. No erythema, warmth or drainage.   NECK: Supple.   Respiratory effort normal. Lungs clear to auscultation.   HEART: Regular rate and rhythm without murmurs, gallops, or rubs. No lower   extremity edema.   No bruising on the left ribs.       Labs 12/2019 reviewed    CTA chest 12/6/19 reviewed       BMD 9/2019 report reviewed     EKG 12/6/19 reviewed      ASSESSMENT AND PLAN:      Annual exam - discussed diet, exercise and safety issues.    1. PVD - stable  2. Mood disorder - stable on cymbalta to 60 mg daily  3. Memory issues - stable on aricept 5 mg daily.   4. Stage III non-small cell lung cancer, completed chemoradiation with carboplatin and Taxol on 6/1/12 - doing well. FOllow up with Dr Weir   5. Diabetes-stable  3. Hypertension-controlled  4. Hyperlipidemia-lipids controlled  5. Coronary artery disease modifying risk factors.   6. History of anal cancer-had a normal colonoscopy, November 2008; due 2015. Declined repeat  8. Pernicious anemia - on counter vitamin B12 1000 mcg daily. Level fine in Jan 2018  9. Osteoporosis - off alendroante for 7 years.  She can go on a drug holiday. BMD 9/2019  10. Left leg pain due to neuropathy-stable.  .    11. Macular degeneration - stable  Screening mammogram was 8/18 Colonoscopy is due 2015 -declined. Pap smear was July 2010.  Flu 9/252017, Prevnar 11/2015  I will see her back in 3-4 months, sooner if problems arise  "

## 2020-01-04 ENCOUNTER — OFFICE VISIT (OUTPATIENT)
Dept: URGENT CARE | Facility: CLINIC | Age: 85
End: 2020-01-04
Payer: MEDICARE

## 2020-01-04 VITALS
WEIGHT: 182 LBS | HEIGHT: 65 IN | TEMPERATURE: 99 F | RESPIRATION RATE: 20 BRPM | BODY MASS INDEX: 30.32 KG/M2 | SYSTOLIC BLOOD PRESSURE: 143 MMHG | DIASTOLIC BLOOD PRESSURE: 64 MMHG | HEART RATE: 85 BPM | OXYGEN SATURATION: 96 %

## 2020-01-04 DIAGNOSIS — B34.9 ACUTE VIRAL SYNDROME: Primary | ICD-10-CM

## 2020-01-04 PROCEDURE — 99214 OFFICE O/P EST MOD 30 MIN: CPT | Mod: S$GLB,,, | Performed by: EMERGENCY MEDICINE

## 2020-01-04 PROCEDURE — 99214 PR OFFICE/OUTPT VISIT, EST, LEVL IV, 30-39 MIN: ICD-10-PCS | Mod: S$GLB,,, | Performed by: EMERGENCY MEDICINE

## 2020-01-04 NOTE — PROGRESS NOTES
[facility] - Visit Note                                           Chief Complaint  88 y.o. female with Cough    History of Present Illness  Brunilda England presents to the urgent care with complaints of cough and congestion for 3 weeks.  Patient reports that she has a history of smoking but she stopped when she was 65 which was approximately 25 years ago.  She reports the symptoms that is making her the most crazy is mucus production. She denies fever.  She has no wheezing.  She has not feel short of breath.    Past Medical History:   Diagnosis Date    Anal cancer 1995    Anemia     Cancer 1995    anal cancer    Coronary artery disease     Diabetes mellitus     With circulatory disorder    Hypertension     Hypertensive retinopathy of both eyes    Lumbar radiculopathy 5/16/2014    Lung cancer 2012    s/p chemo/radiation    Macular degeneration     Macular hemorrhage of left eye     Myocardial infarction     x 2    Neuropathy     Osteoporosis     Osteoporosis, unspecified 11/9/2012    Peripheral vascular disease     Pure hypercholesterolemia      Past Surgical History:   Procedure Laterality Date    BRONCHOSCOPY      CHOLECYSTECTOMY      COLONOSCOPY      FLUOROSCOPIC ANGIOGRAPHY OF LOWER EXTREMITY WITH TOPICAL ULTRASOUND Right 12/6/2018    Procedure: ARTERIOGRAM-LEG AND ULTRASOUND;  Surgeon: SEBASTIÁN Mcpherson III, MD;  Location: [facility] OR [address] [place] [address];  Service: Peripheral Vascular;  Laterality: Right;  16.5 min  1059.42 mGy  59ml Dye  2ml Local    heart stent      HYSTERECTOMY      INCISIONAL BIOPSY Right 12/6/2019    Procedure: INCISIONAL BIOPSY;  Surgeon: Leslie Castillo MD;  Location: [facility] OR [address] [place] [address];  Service: Plastics;  Laterality: Right;    left leg surgery      blockage    PERCUTANEOUS TRANSLUMINAL ANGIOPLASTY N/A 12/6/2018    Procedure: PTA (ANGIOPLASTY, PERCUTANEOUS, TRANSLUMINAL);  Surgeon: SEBASTIÁN Mcpherson III, MD;  Location: [facility] OR [address] [place] [address];  Service: Peripheral Vascular;   "Laterality: N/A;    RECONSTRUCTION USING FLAP Right 12/6/2019    Procedure: RECONSTRUCTION USING FLAP/FULL THICKNESS MRESETION AND RECONSTRUCTION RIGHT UPPER EYELID WITH BIOPSY;  Surgeon: Leslie Castillo MD;  Location: [facility] OR [address] [place] [address];  Service: Plastics;  Laterality: Right;    TONSILLECTOMY        Review of patient's allergies indicates:   Allergen Reactions    Bextra [valdecoxib] Swelling    Gabapentin Diarrhea and Nausea Only        Review of Systems and Physical Exam     Review of Systems  -- Constitution - no fever, no weight loss, no loss of consciousness  -- Eyes - no changes in vision, no redness, no swelling, no discharge  -- Ear, Nose - no  earache, no loss of hearing, no epistaxis  -- Mouth,Throat - no sore throat, no toothache, normal voice, normal swallowing  -- Respiratory - reports cough and congestion with sinus mucus production, no shortness of breath, no wheezing, no increased WOB   -- Cardiovascular - denies chest pain, no palpitations, no lower extremity edema  -- Gastrointestinal - denies abdominal pain, denies nausea, vomiting, and diarrhea  -- Genitourinary - no dysuria, denies flank pain, no hematuria or frequency   -- Musculoskeletal - denies back pain, negative for myalgias and arthralgias   -- Neurological - no headache, no neurologic changes, no loss of bladder or bowel function no seizure like activity, no changes in hearing or vision  -- Skin - denies skin changes, no rash, no hives, no suspected skin infection    Vital Signs   height is 5' 5" (1.651 m) and weight is 82.6 kg (182 lb). Her temperature is 98.7 °F (37.1 °C). Her blood pressure is 143/64 (abnormal) and her pulse is 85. Her respiration is 20 and oxygen saturation is 96%.      Physical Exam  -- Nursing note and vitals reviewed -   -- Constitutional:  Awake alert and oriented, GCS 15, no acute distress.  Appears well.  -- Head: Atraumatic. Normocephalic. No obvious abnormality  -- Eyes: Pupils are equal and reactive to " light. Extraocular movements intact. No nystagmus.  No periorbital swelling. Normal conjunctiva.  -- Nose: Nose grossly normal in appearance, nares grossly normal. No rhinorrhea.  -- Throat: Mucous membranes moist, pharynx normal, normal tonsils.  Airway patent.  -- Ears: External ears and TM normal bilaterally. Normal hearing.   -- Neck: Normal range of motion. Neck supple. No meningismus. No adenopathy  -- Cardiac: Normal rate, regular rhythm and normal heart sounds. No carotid bruit. No lower extremity edema.  -- Pulmonary: Normal respiratory effort, breath sounds equal bilaterally. Adequate flow.  No wheezing.  No crackles. No increased work of breathing  -- Abdominal: Soft, no tenderness, no guarding, no rebound. Normal bowel sounds.   -- Musculoskeletal: Normal range of motion, all 4 extremities 5/5 strength.  Neurovascularly intact. Atraumatic. No deformities.  -- Neurological:  Cranial nerves 2-12 grossly intact. No focal deficits.   -- Vascular: Posterior tibial, dorsalis pedis and radial pulses 2+ bilaterally    -- Lymphatics: No cervical or peripheral lymphadenopathy.   -- Skin: Warm and dry. No evidence of rash or cellulitis  -- Psychiatric: Normal mood and affect. Bedside behavior is appropriate.  Patient is cooperative.  Denies suicidal homicidal ideation.    Emergency Room Course     Treatment Course, Evaluation, and Medical Decision Makin.  Physical exam unremarkable. Patient has no wheezing.  Lungs are clear to auscultation.  She has no lower extremity edema. She has no crackles.  No signs of pneumonia or sinusitis, no concern for congestive heart failure  2.  Over-the-counter recommendations for acute viral syndrome      Diagnosis  -- acute viral syndrome    Disposition and Plan  -- Disposition: home  -- Condition: stable  -- Follow-up: Patient to follow up with Pepper Whitfield MD in 1-2 days, and any specialists noted on discharge paperwork  -- I advised the patient that we have found no  life threatening condition today and have provided recommendations his/her care  -- At this time, I believe the patient is clinically stable for discharge.   -- The patient acknowledges that ongoing follow up with a MD is required   -- Patient agrees to comply with all instruction and direction given in the urgent care  -- Patient counseled on strict return precautions as discussed

## 2020-01-04 NOTE — PATIENT INSTRUCTIONS
"Over the Counter Medications for Upper Respiratory Infection:    1. Ibuprofen 800 mg every 8 hours. May alternate with tylenol 1000 mg every 4 hours. (Do not take tylenol with other sources of acetaminophen such as Dayquil)  2. Zyrtec or Claritin 10 mg daily  3. Delsym or Dayquil for cough and congestion  4. Flonase for congestion and sinus pressure      Please return for evaluation if you began to have fever.      Viral Syndrome (Adult)  A viral illness may cause a number of symptoms. The symptoms depend on the part of the body that the virus affects. If it settles in your nose, throat, and lungs, it may cause cough, sore throat, congestion, and sometimes headache. If it settles in your stomach and intestinal tract, it may cause vomiting and diarrhea. Sometimes it causes vague symptoms like "aching all over," feeling tired, loss of appetite, or fever.  A viral illness usually lasts 1 to 2 weeks, but sometimes it lasts longer. In some cases, a more serious infection can look like a viral syndrome in the first few days of the illness. You may need another exam and additional tests to know the difference. Watch for the warning signs listed below.  Home care  Follow these guidelines for taking care of yourself at home:  · If symptoms are severe, rest at home for the first 2 to 3 days.  · Stay away from cigarette smoke - both your smoke and the smoke from others.  · You may use over-the-counter acetaminophen or ibuprofen for fever, muscle aching, and headache, unless another medicine was prescribed for this. If you have chronic liver or kidney disease or ever had a stomach ulcer or GI bleeding, talk with your doctor before using these medicines. No one who is younger than 18 and ill with a fever should take aspirin. It may cause severe disease or death.  · Your appetite may be poor, so a light diet is fine. Avoid dehydration by drinking 8 to 12 8-ounce glasses of fluids each day. This may include water; orange juice; " lemonade; apple, grape, and cranberry juice; clear fruit drinks; electrolyte replacement and sports drinks; and decaffeinated teas and coffee. If you have been diagnosed with a kidney disease, ask your doctor how much and what types of fluids you should drink to prevent dehydration. If you have kidney disease, drinking too much fluid can cause it build up in the your body and be dangerous to your health.  · Over-the-counter remedies won't shorten the length of the illness but may be helpful for cough, sore throat; and nasal and sinus congestion. Don't use decongestants if you have high blood pressure.  Follow-up care  Follow up with your healthcare provider if you do not improve over the next week.  Call 911  Get emergency medical care if any of the following occur:  · Convulsion  · Feeling weak, dizzy, or like you are going to faint  · Chest pain, shortness of breath, wheezing, or difficulty breathing  When to seek medical advice  Call your healthcare provider right away if any of these occur:  · Cough with lots of colored sputum (mucus) or blood in your sputum  · Chest pain, shortness of breath, wheezing, or difficulty breathing  · Severe headache; face, neck, or ear pain  · Severe, constant pain in the lower right side of your belly (abdominal)  · Continued vomiting (cant keep liquids down)  · Frequent diarrhea (more than 5 times a day); blood (red or black color) or mucus in diarrhea  · Feeling weak, dizzy, or like you are going to faint  · Extreme thirst  · Fever of 100.4°F (38°C) or higher, or as directed by your healthcare provider  Date Last Reviewed: 9/25/2015 © 2000-2017 [employer]. [address] [place] [address], [place] [address]. All rights reserved. This information is not intended as a substitute for professional medical care. Always follow your healthcare professional's instructions.

## 2020-01-16 ENCOUNTER — OFFICE VISIT (OUTPATIENT)
Dept: OPHTHALMOLOGY | Facility: CLINIC | Age: 85
End: 2020-01-16
Payer: MEDICARE

## 2020-01-16 DIAGNOSIS — H35.3232 EXUDATIVE AGE-RELATED MACULAR DEGENERATION OF BOTH EYES WITH INACTIVE CHOROIDAL NEOVASCULARIZATION: ICD-10-CM

## 2020-01-16 DIAGNOSIS — H43.813 POSTERIOR VITREOUS DETACHMENT, BOTH EYES: ICD-10-CM

## 2020-01-16 DIAGNOSIS — H35.89 ATROPHY OF MACULA LUTEA: ICD-10-CM

## 2020-01-16 DIAGNOSIS — H00.11 CHALAZION OF RIGHT UPPER EYELID: ICD-10-CM

## 2020-01-16 DIAGNOSIS — Z98.890 POST-OPERATIVE STATE: Primary | ICD-10-CM

## 2020-01-16 DIAGNOSIS — H35.033 HYPERTENSIVE RETINOPATHY OF BOTH EYES: ICD-10-CM

## 2020-01-16 PROCEDURE — 99024 PR POST-OP FOLLOW-UP VISIT: ICD-10-PCS | Mod: S$GLB,,, | Performed by: OPHTHALMOLOGY

## 2020-01-16 PROCEDURE — 99999 PR PBB SHADOW E&M-EST. PATIENT-LVL II: ICD-10-PCS | Mod: PBBFAC,,, | Performed by: OPHTHALMOLOGY

## 2020-01-16 PROCEDURE — 99499 UNLISTED E&M SERVICE: CPT | Mod: S$GLB,,, | Performed by: OPHTHALMOLOGY

## 2020-01-16 PROCEDURE — 92285 EXTERNAL OCULAR PHOTOGRAPHY: CPT | Mod: S$GLB,,, | Performed by: OPHTHALMOLOGY

## 2020-01-16 PROCEDURE — 92285 EXTERNAL PHOTOGRAPHY - OU - BOTH EYES: ICD-10-PCS | Mod: S$GLB,,, | Performed by: OPHTHALMOLOGY

## 2020-01-16 PROCEDURE — 99499 RISK ADDL DX/OHS AUDIT: ICD-10-PCS | Mod: S$GLB,,, | Performed by: OPHTHALMOLOGY

## 2020-01-16 PROCEDURE — 99999 PR PBB SHADOW E&M-EST. PATIENT-LVL II: CPT | Mod: PBBFAC,,, | Performed by: OPHTHALMOLOGY

## 2020-01-16 PROCEDURE — 99024 POSTOP FOLLOW-UP VISIT: CPT | Mod: S$GLB,,, | Performed by: OPHTHALMOLOGY

## 2020-01-16 NOTE — PROGRESS NOTES
HPI     Post-op Evaluation      Additional comments: 1 mo              Comments     Patient here for 1 mo PO. Patient c/o intermittent sharp pain behind   right eye. No pain at this time. Patient denies any other complaints.    Tobradex nadira tid D/C    S/P RECONSTRUCTION USING FLAP, RIGHT, 12/6/2019  S/P INCISIONAL BIOPSY, RIGHT - 12/6/2019    1. Eyelid lesion  2. Chalazion of right upper eyelid  3. Exudative age-related macular degeneration OU  4. Hypertensive retinopathy OU  5. PVD OU  6. Macular hemorrhage OS  7. Atrophy of macula lutea  8. HTN  9. H/o lung cancer  10. Anemia due to antineoplastic chemo  11. H/o rectal/anal cancer  12. Pernicious anemia  12. DM          Last edited by Caio Butt on 1/16/2020 10:17 AM. (History)            Assessment /Plan     For exam results, see Encounter Report.    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 Post-operative instructions reviewed. All questions answered. Return prn sooner any worsening of vision/symptoms or any concerns.    Path: dilated dermoid cyst with acute and chronic inflammation

## 2020-03-10 ENCOUNTER — TELEPHONE (OUTPATIENT)
Dept: INTERNAL MEDICINE | Facility: CLINIC | Age: 85
End: 2020-03-10

## 2020-03-10 NOTE — TELEPHONE ENCOUNTER
----- Message from Tiara Yuejeannie sent at 3/10/2020 11:28 AM CDT -----  Contact: Patient [phone]  Patient is requesting Rx for symptoms below.    Symptoms:   Runny nose/clear mucus    If patient taken anything OTC: yes    How long has patient has these symptoms: over a week    Comment: call patient once sent    Pharmacy: [address] [phone] [phone] (Phone)  [phone] (Fax)    Please call and advise.    Thank You

## 2020-03-10 NOTE — TELEPHONE ENCOUNTER
Nasal congestion, persistent cough x 2 weeks that is worse at night. Pt is coughing up thick white mucus. Pt states that the cough Is aggravating at this point. Pt has tried OTC medication that isn't working (triametic, robitussin). Pt has no fever. Pt keeps saying she doesn't feel good or right. Pt daughter states that the pt looks weak.  Pt daughter also thinks that the pt is down in her spirit. Please advise.

## 2020-03-11 ENCOUNTER — OFFICE VISIT (OUTPATIENT)
Dept: INTERNAL MEDICINE | Facility: CLINIC | Age: 85
End: 2020-03-11
Payer: MEDICARE

## 2020-03-11 ENCOUNTER — LAB VISIT (OUTPATIENT)
Dept: LAB | Facility: HOSPITAL | Age: 85
End: 2020-03-11
Attending: INTERNAL MEDICINE
Payer: MEDICARE

## 2020-03-11 VITALS
SYSTOLIC BLOOD PRESSURE: 126 MMHG | OXYGEN SATURATION: 97 % | WEIGHT: 192 LBS | HEIGHT: 65 IN | BODY MASS INDEX: 31.99 KG/M2 | DIASTOLIC BLOOD PRESSURE: 60 MMHG | HEART RATE: 101 BPM

## 2020-03-11 DIAGNOSIS — I70.229 ATHEROSCLEROTIC PERIPHERAL VASCULAR DISEASE WITH REST PAIN: ICD-10-CM

## 2020-03-11 DIAGNOSIS — G31.84 MILD COGNITIVE IMPAIRMENT: ICD-10-CM

## 2020-03-11 DIAGNOSIS — E13.9 DIABETES MELLITUS DUE TO ABNORMAL INSULIN: Primary | ICD-10-CM

## 2020-03-11 DIAGNOSIS — E13.9 DIABETES MELLITUS DUE TO ABNORMAL INSULIN: ICD-10-CM

## 2020-03-11 DIAGNOSIS — T45.1X5A ANEMIA DUE TO ANTINEOPLASTIC CHEMOTHERAPY: ICD-10-CM

## 2020-03-11 DIAGNOSIS — Z85.048 HISTORY OF RECTAL OR ANAL CANCER: ICD-10-CM

## 2020-03-11 DIAGNOSIS — I10 ESSENTIAL HYPERTENSION: ICD-10-CM

## 2020-03-11 DIAGNOSIS — D64.81 ANEMIA DUE TO ANTINEOPLASTIC CHEMOTHERAPY: ICD-10-CM

## 2020-03-11 DIAGNOSIS — Z85.118 HISTORY OF LUNG CANCER: ICD-10-CM

## 2020-03-11 LAB
ALBUMIN SERPL BCP-MCNC: 3.5 G/DL (ref 3.5–5.2)
ALP SERPL-CCNC: 52 U/L (ref 55–135)
ALT SERPL W/O P-5'-P-CCNC: 5 U/L (ref 10–44)
ANION GAP SERPL CALC-SCNC: 9 MMOL/L (ref 8–16)
AST SERPL-CCNC: 13 U/L (ref 10–40)
BASOPHILS # BLD AUTO: 0.02 K/UL (ref 0–0.2)
BASOPHILS NFR BLD: 0.4 % (ref 0–1.9)
BILIRUB SERPL-MCNC: 0.3 MG/DL (ref 0.1–1)
BUN SERPL-MCNC: 14 MG/DL (ref 8–23)
CALCIUM SERPL-MCNC: 10.6 MG/DL (ref 8.7–10.5)
CHLORIDE SERPL-SCNC: 101 MMOL/L (ref 95–110)
CO2 SERPL-SCNC: 31 MMOL/L (ref 23–29)
CREAT SERPL-MCNC: 1.2 MG/DL (ref 0.5–1.4)
DIFFERENTIAL METHOD: ABNORMAL
EOSINOPHIL # BLD AUTO: 0.1 K/UL (ref 0–0.5)
EOSINOPHIL NFR BLD: 1.1 % (ref 0–8)
ERYTHROCYTE [DISTWIDTH] IN BLOOD BY AUTOMATED COUNT: 15.9 % (ref 11.5–14.5)
EST. GFR  (AFRICAN AMERICAN): 46.6 ML/MIN/1.73 M^2
EST. GFR  (NON AFRICAN AMERICAN): 40.4 ML/MIN/1.73 M^2
ESTIMATED AVG GLUCOSE: 146 MG/DL (ref 68–131)
GLUCOSE SERPL-MCNC: 176 MG/DL (ref 70–110)
HBA1C MFR BLD HPLC: 6.7 % (ref 4–5.6)
HCT VFR BLD AUTO: 35.9 % (ref 37–48.5)
HGB BLD-MCNC: 11.6 G/DL (ref 12–16)
IMM GRANULOCYTES # BLD AUTO: 0.01 K/UL (ref 0–0.04)
IMM GRANULOCYTES NFR BLD AUTO: 0.2 % (ref 0–0.5)
LYMPHOCYTES # BLD AUTO: 1.4 K/UL (ref 1–4.8)
LYMPHOCYTES NFR BLD: 30.1 % (ref 18–48)
MCH RBC QN AUTO: 28.8 PG (ref 27–31)
MCHC RBC AUTO-ENTMCNC: 32.3 G/DL (ref 32–36)
MCV RBC AUTO: 89 FL (ref 82–98)
MONOCYTES # BLD AUTO: 0.4 K/UL (ref 0.3–1)
MONOCYTES NFR BLD: 9.8 % (ref 4–15)
NEUTROPHILS # BLD AUTO: 2.6 K/UL (ref 1.8–7.7)
NEUTROPHILS NFR BLD: 58.4 % (ref 38–73)
NRBC BLD-RTO: 0 /100 WBC
PLATELET # BLD AUTO: 197 K/UL (ref 150–350)
PMV BLD AUTO: 11.5 FL (ref 9.2–12.9)
POTASSIUM SERPL-SCNC: 4.3 MMOL/L (ref 3.5–5.1)
PROT SERPL-MCNC: 7.9 G/DL (ref 6–8.4)
RBC # BLD AUTO: 4.03 M/UL (ref 4–5.4)
SODIUM SERPL-SCNC: 141 MMOL/L (ref 136–145)
TSH SERPL DL<=0.005 MIU/L-ACNC: 3.39 UIU/ML (ref 0.4–4)
WBC # BLD AUTO: 4.49 K/UL (ref 3.9–12.7)

## 2020-03-11 PROCEDURE — 83036 HEMOGLOBIN GLYCOSYLATED A1C: CPT

## 2020-03-11 PROCEDURE — 36415 COLL VENOUS BLD VENIPUNCTURE: CPT

## 2020-03-11 PROCEDURE — 1159F MED LIST DOCD IN RCRD: CPT | Mod: S$GLB,,, | Performed by: INTERNAL MEDICINE

## 2020-03-11 PROCEDURE — 99499 UNLISTED E&M SERVICE: CPT | Mod: S$GLB,,, | Performed by: INTERNAL MEDICINE

## 2020-03-11 PROCEDURE — 85025 COMPLETE CBC W/AUTO DIFF WBC: CPT

## 2020-03-11 PROCEDURE — 99214 PR OFFICE/OUTPT VISIT, EST, LEVL IV, 30-39 MIN: ICD-10-PCS | Mod: S$GLB,,, | Performed by: INTERNAL MEDICINE

## 2020-03-11 PROCEDURE — 99499 RISK ADDL DX/OHS AUDIT: ICD-10-PCS | Mod: S$GLB,,, | Performed by: INTERNAL MEDICINE

## 2020-03-11 PROCEDURE — 99999 PR PBB SHADOW E&M-EST. PATIENT-LVL II: ICD-10-PCS | Mod: PBBFAC,,, | Performed by: INTERNAL MEDICINE

## 2020-03-11 PROCEDURE — 99999 PR PBB SHADOW E&M-EST. PATIENT-LVL II: CPT | Mod: PBBFAC,,, | Performed by: INTERNAL MEDICINE

## 2020-03-11 PROCEDURE — 84443 ASSAY THYROID STIM HORMONE: CPT

## 2020-03-11 PROCEDURE — 1159F PR MEDICATION LIST DOCUMENTED IN MEDICAL RECORD: ICD-10-PCS | Mod: S$GLB,,, | Performed by: INTERNAL MEDICINE

## 2020-03-11 PROCEDURE — 99214 OFFICE O/P EST MOD 30 MIN: CPT | Mod: S$GLB,,, | Performed by: INTERNAL MEDICINE

## 2020-03-11 PROCEDURE — 1101F PT FALLS ASSESS-DOCD LE1/YR: CPT | Mod: CPTII,S$GLB,, | Performed by: INTERNAL MEDICINE

## 2020-03-11 PROCEDURE — 80053 COMPREHEN METABOLIC PANEL: CPT

## 2020-03-11 PROCEDURE — 1101F PR PT FALLS ASSESS DOC 0-1 FALLS W/OUT INJ PAST YR: ICD-10-PCS | Mod: CPTII,S$GLB,, | Performed by: INTERNAL MEDICINE

## 2020-03-11 NOTE — PROGRESS NOTES
CHIEF COMPLAINT:  Follow up of hypertension, diabetes, PVD, mood, arthriits     HISTORY OF PRESENT ILLNESS: This is a 88-year-old woman who presents for follow up of above.    She reports that she has not been feeling good. Daughter feels that she could be depressed.  She feels that she is quiet.  She has not been getting out of the house.  She feels that she will get out of the house when the weather is better.     She had her teeth pulled at least 6 months ago.  She has not gotten her dentures yet.  Her gums are sore at times.  She eats a lot of bread. She eats liver, chicken, meatballs.  She can eat just about everything.       Rib cage is fine from fall. No pain.       Breathing has been good.  No fever, chills, chest pain, palpitations.  She has a cough at night with white mucous.  She has had a little sinus congestion with rhinorrhea at night - white mucous.     She had right leg pain - she had a blockage. Dr Gaviria placed a stent in the right CFA and PTA left ext loulou artery 12/18. Right leg pain has resolved.  The ulcerated area on the right leg has totally healed and has not returned.  She is taking aspirin 81 mg daily.       She feels that her memory is better. She is taking aricept 5 mg daily.       June is a hard month for her.  Her 's birthday ws [birth year] then Father's day June 17, her anniversay is June 25, her mother's birthday is June 18.   Her  passed away on August 26, 2017. They were  67 years. It has been a tough year. She misses him. She feels that she is coping well as he was sick for a very long time. She was his primary care giver. She is on Cymbalta 60 mg daliy and celexa 10 mg daily. Mood is good Neuropathic pain in the legs is better controlled with the duloxetine.        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  Right hip pain has resolved and has not returned.          Her non small cell lung cancer is stable. She saw   Carmella 8/9/18  and scans were stable. She is to follow up annually with CT scan chest.  She is breathing well. No chest pain, shortness of breath, fever, chills.       She is currently taking actos 15 mg daily. No polydipsia, polyuria, hypoglycemia. She checks her blood sugars couple times a week. Blood sugar was 102 last time she checked it. No hypoglycemia.    She continues to take Lotrel 5/20 once daily, metoprolol 50 mg 1 tablet twice daily, and hydrochlorothiazide 12.5 mg daily for her hypertension. She denies any chest pain or shortness of breath. She continues to take simvastatin 80 mg at bedtime for her hyperlipidemia. No joint pain or muscle pain.        She is off Fosamax once week for her bones.  No melana, bloody stools, constipation.     She takes vitamin B12 1000 mcg daily for vitamin B12 deficiency - anemia is stable        SHe has macular degeneration of the eyes and was followed by Dr Tresa Coreas. She got injections in her eyes every 6 weeks in Spring 2015. She now sees Dr Ruelas at [facility] (last saw him 8/18- no need for injections at that time- yearly follow up). Vision has been stable. She is taking a Eye vitamins for her eyes.       No vaginal bleeding and no longer needs vaginal premarin cream    PAST MEDICAL HISTORY:    1. Stage III non-small cell lung cancer, completed chemoradiation with carboplatin and Taxol on 6/1/12    2. Hypertension.   3. Diabetes mellitus.   4. Hyperlipidemia.   5. Chronic diastolic dysfunction.   6. Coronary artery disease status post MI in 1990 and 2003. Stenting was   done at 2003 at [facility].   7. Cholecystectomy, December 2006.   8. Cataract removal.   9. Benign growth removed from her throat.   10. Left common femoral endarterectomy, July 2007.   11. History of anal cancer in 1996 status post radiation and chemotherapy.   12. Carpal tunnel syndrome.   13. Osteoporosis on BMD 12/11    14. Macular degeneration    SOCIAL HISTORY: She quit smoking in  "1995, denies any alcohol use. She is etired.     REVIEW OF SYSTEMS: She denies fevers, chills, night sweats, fatigue, visual change, hearing loss, sinus congestion, sore throat, dysuria, hematuria, joint pain, muscle pain, polydipsia, and polyuria.     PHYSICAL EXAM:    /60   Pulse 101   Ht 5' 5" (1.651 m)   Wt 87.1 kg (192 lb 0.3 oz)   SpO2 97%   BMI 31.95 kg/m²        GENERAL: She is alert and oriented. No apparent distress. Affect within normal limits.   Conjunctivae anicteric.  Nodule right upper eye lid. No erythema, warmth or drainage.   NECK: Supple.   Respiratory effort normal. Lungs clear to auscultation.   HEART: Regular rate and rhythm without murmurs, gallops, or rubs. No lower   extremity edema.       ASSESSMENT AND PLAN:     1. Not feeling good- labs today. Wonder if dehydrated.  She is to push fluids. Will not change meds for depression at this point in time. Encoursged to get outside  1. PVD - stable  2. Mood disorder -get out cymbalta to 60 mg daily  3. Memory issues - stable on aricept 5 mg daily.   4. Stage III non-small cell lung cancer, completed chemoradiation with carboplatin and Taxol on 6/1/12 - doing well. FOllow up with Dr Weir   5. Diabetes-labs  3. Hypertension-controlled  4. Hyperlipidemia-lipids controlled  5. Coronary artery disease modifying risk factors.   6. History of anal cancer-had a normal colonoscopy, November 2008; due 2015. Declined repeat  8. Pernicious anemia - on counter vitamin B12 1000 mcg daily. Level fine in Jan 2018  9. Osteoporosis - off alendroante for 7 years.  She can go on a drug holiday. BMD 9/2019  10. Left leg pain due to neuropathy-stable.  .    11. Macular degeneration - stable  Screening mammogram was 8/18 Colonoscopy is due 2015 -declined. Pap smear was July 2010.  Flu 9/252017, Prevnar 11/2015  I will see her back in 1 months, sooner if problems arise  "

## 2020-03-12 ENCOUNTER — TELEPHONE (OUTPATIENT)
Dept: INTERNAL MEDICINE | Facility: CLINIC | Age: 85
End: 2020-03-12

## 2020-03-12 NOTE — TELEPHONE ENCOUNTER
----- Message from Ernesto Carbone sent at 3/12/2020 11:37 AM CDT -----  Contact: Self [phone]  Calling to get test results.  Name of test (lab, xray, etc.):   labs  Date of test:  3/11/20  Ordering provider: Dr. Whitfield  Where was the test performed:  [facility]  Would the patient rather a call back or a response via [facility]?:  Call back  Comments:

## 2020-03-13 NOTE — TELEPHONE ENCOUNTER
Please notify pt  Your blood work is fine.  Your blood count, blood sugar, kidney function, liver function, thyroid function are fine  Continue current medications

## 2020-03-13 NOTE — TELEPHONE ENCOUNTER
Pt advised, pt wants to know if she can get something for coughing up thick white mucus. Pt states that this was discussed at her last visit, however no rx was given.

## 2020-03-13 NOTE — TELEPHONE ENCOUNTER
----- Message from Pepper Whitfield MD sent at 3/12/2020  9:07 PM CDT -----  Please notify pt  Your blood work is fine.  Your blood count, blood sugar, kidney function, liver function, thyroid function are fine  Continue current medications  SF

## 2020-04-02 ENCOUNTER — TELEPHONE (OUTPATIENT)
Dept: INTERNAL MEDICINE | Facility: CLINIC | Age: 85
End: 2020-04-02

## 2020-04-02 RX ORDER — ALPRAZOLAM 0.5 MG/1
0.5 TABLET ORAL 3 TIMES DAILY PRN
Qty: 60 TABLET | Refills: 0 | Status: SHIPPED | OUTPATIENT
Start: 2020-04-02 | End: 2020-12-18 | Stop reason: SDUPTHER

## 2020-04-02 NOTE — TELEPHONE ENCOUNTER
----- Message from Mounika Tsai sent at 4/2/2020  9:56 AM CDT -----  Contact: Patient [phone]  Requesting an RX refill or new RX.  Is this a refill or new RX: new Rx    RX name and strength: alprazolam (XANAX) 0.5 MG tablet   Directions (copy/paste from chart):  [phone]  Is this a 30 day or 90 day RX:  30  Local pharmacy or mail order pharmacy:  local  Pharmacy name and phone #[id number] - [place], [place] - [facility] [address] [phone] (Phone) [phone] (Fax)      Patients daughter has been hospitalized.

## 2020-04-02 NOTE — TELEPHONE ENCOUNTER
----- Message from Mounika Tsai sent at 4/2/2020  9:56 AM CDT -----  Contact: Patient [phone]  Requesting an RX refill or new RX.  Is this a refill or new RX: new Rx    RX name and strength: alprazolam (XANAX) 0.5 MG tablet   Directions (copy/paste from chart):  [phone]  Is this a 30 day or 90 day RX:  30  Local pharmacy or mail order pharmacy:  local  Pharmacy name and phone #[id number] - [place], [place] - [facility] [address] [phone] (Phone) [phone] (Fax)      Patients daughter has been hospitalized.

## 2020-04-21 ENCOUNTER — OFFICE VISIT (OUTPATIENT)
Dept: INTERNAL MEDICINE | Facility: CLINIC | Age: 85
End: 2020-04-21
Payer: MEDICARE

## 2020-04-21 DIAGNOSIS — E11.51 TYPE 2 DIABETES MELLITUS WITH DIABETIC PERIPHERAL ANGIOPATHY WITHOUT GANGRENE, WITHOUT LONG-TERM CURRENT USE OF INSULIN: ICD-10-CM

## 2020-04-21 DIAGNOSIS — F39 MOOD DISORDER: Primary | ICD-10-CM

## 2020-04-21 DIAGNOSIS — G57.92 NEUROPATHY OF LEFT LOWER EXTREMITY: ICD-10-CM

## 2020-04-21 DIAGNOSIS — I25.10 CORONARY ARTERY DISEASE INVOLVING NATIVE CORONARY ARTERY OF NATIVE HEART WITHOUT ANGINA PECTORIS: ICD-10-CM

## 2020-04-21 DIAGNOSIS — I10 ESSENTIAL HYPERTENSION: ICD-10-CM

## 2020-04-21 DIAGNOSIS — Z85.118 HISTORY OF LUNG CANCER: ICD-10-CM

## 2020-04-21 PROCEDURE — 1101F PR PT FALLS ASSESS DOC 0-1 FALLS W/OUT INJ PAST YR: ICD-10-PCS | Mod: CPTII,95,, | Performed by: INTERNAL MEDICINE

## 2020-04-21 PROCEDURE — 1159F PR MEDICATION LIST DOCUMENTED IN MEDICAL RECORD: ICD-10-PCS | Mod: 95,,, | Performed by: INTERNAL MEDICINE

## 2020-04-21 PROCEDURE — 99442 PR PHYSICIAN TELEPHONE EVALUATION 11-20 MIN: CPT | Mod: 95,,, | Performed by: INTERNAL MEDICINE

## 2020-04-21 PROCEDURE — 99442 PR PHYSICIAN TELEPHONE EVALUATION 11-20 MIN: ICD-10-PCS | Mod: 95,,, | Performed by: INTERNAL MEDICINE

## 2020-04-21 PROCEDURE — 1159F MED LIST DOCD IN RCRD: CPT | Mod: 95,,, | Performed by: INTERNAL MEDICINE

## 2020-04-21 PROCEDURE — 1101F PT FALLS ASSESS-DOCD LE1/YR: CPT | Mod: CPTII,95,, | Performed by: INTERNAL MEDICINE

## 2020-04-21 NOTE — PROGRESS NOTES
The patient location is: home  The chief complaint leading to consultation is: follow up  Visit type: audio only  Total time spent with patient: 15 minutes  Each patient to whom he or she provides medical services by telemedicine is:  (1) informed of the relationship between the physician and patient and the respective role of any other health care provider with respect to management of the patient; and (2) notified that he or she may decline to receive medical services by telemedicine and may withdraw from such care at any time.    Notes:     CHIEF COMPLAINT:  Follow up of mood    HISTORY OF PRESENT ILLNESS: This is a 88-year-old woman who presents for follow up of above.     At our last visit on 3/11/20, She had not been feeling good. Daughter thought she was depressed.  She feels that she is quiet.  She was not getting out of the house.  She continues to take duloxetine 60 mg daily and citalopram 10 mg daily.  Neuropathic pain in the legs is better controlled with the duloxetine.  I encouraged her to get out of the house.  She only took xanax 0.5 mg 1/2 tablet once since our last ivist.     Today she reports that she has been upset for 3 weeks. Her daughter was in the hospital at [facility] for 3 weeks with COVID 19. SHe was discharged to inpatient rehab (5 blocks from her house)  yesterday.  She has not been able to rest very well. Her daughter's friends were calling her.  She is taking care of her own household chores. Her neighbor is going to the grocery for her.     She is not sad.  She is grateful to God that her daughter is better.  She is very spirtual.     June tends to be a hard month for her.  Her 's birthday ws [birth year] then Father's day June 17, her anniversay is June 25, her mother's birthday is June 18.   Her  passed away on August 26, 2017. They were  67 years.  She continues to miss him.     Legs are good. Appetite is great. She is eating well. Ribs are fine.       Breathing has  been good.  No fever, chills, chest pain, palpitations.  She has occasional cough with white mucous. Sinuses are ok.       She had right leg pain - she had a blockage. Dr Gaviria placed a stent in the right CFA and PTA left ext loulou artery 12/18. Right leg pain has resolved.  The ulcerated area on the right leg has totally healed and has not returned.  She is taking aspirin 81 mg daily.       She feels that her memory is better. She is taking aricept 5 mg daily.        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  Right hip pain has resolved and has not returned.          Her non small cell lung cancer is stable. She saw Dr Weir 8/9/18  and scans were stable. She is to follow up annually with CT scan chest.  She is breathing well.     She is currently taking actos 15 mg daily. No polydipsia, polyuria, hypoglycemia. She checks her blood sugars couple times a week. Blood sugar was 102 last time she checked it. No hypoglycemia.    She continues to take Lotrel 5/20 once daily, metoprolol 50 mg 1 tablet twice daily, and hydrochlorothiazide 12.5 mg daily for her hypertension. She denies any chest pain or shortness of breath. She continues to take simvastatin 80 mg at bedtime for her hyperlipidemia. No joint pain or muscle pain.        She is off Fosamax once week for her bones.  No melana, bloody stools, constipation.     She takes vitamin B12 1000 mcg daily for vitamin B12 deficiency - anemia is stable        SHe has macular degeneration of the eyes and was followed by Dr Tresa Coreas. She got injections in her eyes every 6 weeks in Spring 2015. She now sees Dr Ruelas at [facility] (last saw him 8/18- no need for injections at that time- yearly follow up). Vision has been stable. She is taking a Eye vitamins for her eyes.       No vaginal bleeding and no longer needs vaginal premarin cream    PAST MEDICAL HISTORY:    1. Stage III non-small cell lung cancer, completed chemoradiation  with carboplatin and Taxol on 6/1/12    2. Hypertension.   3. Diabetes mellitus.   4. Hyperlipidemia.   5. Chronic diastolic dysfunction.   6. Coronary artery disease status post MI in 1990 and 2003. Stenting was   done at 2003 at [facility].   7. Cholecystectomy, December 2006.   8. Cataract removal.   9. Benign growth removed from her throat.   10. Left common femoral endarterectomy, July 2007.   11. History of anal cancer in 1996 status post radiation and chemotherapy.   12. Carpal tunnel syndrome.   13. Osteoporosis on BMD 12/11    14. Macular degeneration    SOCIAL HISTORY: She quit smoking in 1995, denies any alcohol use. She is etired.     REVIEW OF SYSTEMS: She denies fevers, chills, night sweats, fatigue, visual change, hearing loss, sinus congestion, sore throat, dysuria, hematuria, joint pain, muscle pain, polydipsia, and polyuria.     PHYSICAL EXAM:        GENERAL: She is alert and oriented. No apparent distress.  Speech normal    Labs 3/11/20 reviewed      ASSESSMENT AND PLAN:    1. PVD - stable  2. Mood disorder - stable. Counseling given  3. Memory issues - stable on aricept 5 mg daily.   4. Stage III non-small cell lung cancer, completed chemoradiation with carboplatin and Taxol on 6/1/12 - doing well. FOllow up with Dr Weir yearly  5. Diabetes-stable  3. Hypertension-controlled  4. Hyperlipidemia-lipids controlled  5. Coronary artery disease modifying risk factors.   6. History of anal cancer-had a normal colonoscopy, November 2008; due 2015. Declined repeat  8. Pernicious anemia - on counter vitamin B12 1000 mcg daily. Level fine in Jan 2018  9. Osteoporosis - off alendroante for 7 years.  She can go on a drug holiday. BMD 9/2019  10. Left leg pain due to neuropathy-stable.  .    11. Macular degeneration - stable  Screening mammogram was 8/18 Colonoscopy is due 2015 -declined. Pap smear was July 2010.  Flu 9/252017, Prevnar 11/2015  I will see her back in 4 months, sooner if problems  arise

## 2020-05-22 ENCOUNTER — TELEPHONE (OUTPATIENT)
Dept: INTERNAL MEDICINE | Facility: CLINIC | Age: 85
End: 2020-05-22

## 2020-05-22 ENCOUNTER — LAB VISIT (OUTPATIENT)
Dept: INTERNAL MEDICINE | Facility: CLINIC | Age: 85
End: 2020-05-22
Payer: MEDICARE

## 2020-05-22 DIAGNOSIS — R52 BODY ACHES: ICD-10-CM

## 2020-05-22 DIAGNOSIS — R52 BODY ACHES: Primary | ICD-10-CM

## 2020-05-22 LAB — SARS-COV-2 RNA RESP QL NAA+PROBE: NOT DETECTED

## 2020-05-22 PROCEDURE — U0003 INFECTIOUS AGENT DETECTION BY NUCLEIC ACID (DNA OR RNA); SEVERE ACUTE RESPIRATORY SYNDROME CORONAVIRUS 2 (SARS-COV-2) (CORONAVIRUS DISEASE [COVID-19]), AMPLIFIED PROBE TECHNIQUE, MAKING USE OF HIGH THROUGHPUT TECHNOLOGIES AS DESCRIBED BY CMS-2020-01-R: HCPCS

## 2020-05-22 NOTE — TELEPHONE ENCOUNTER
----- Message from Charissa Cole sent at 5/22/2020  8:58 AM CDT -----  Contact: [phone] pt eleuterio Wilcox would like a call from  regarding, new symptoms weak, fatigue. May need pain meds.

## 2020-05-22 NOTE — TELEPHONE ENCOUNTER
Pt states that she doesn't have any energy. She has lost her get up and go. Pt states that this recently started. Pt has a good appetite. Pt wants to know what she can do for th is. X awhile. Pt daughter has covid, she doesn't live with her but she does.

## 2020-05-25 ENCOUNTER — TELEPHONE (OUTPATIENT)
Dept: INTERNAL MEDICINE | Facility: CLINIC | Age: 85
End: 2020-05-25

## 2020-05-25 NOTE — TELEPHONE ENCOUNTER
----- Message from Pepper Whitfield MD sent at 5/24/2020  2:56 PM CDT -----  PLease notify pt  You do NOT have COVID 19  If you start to develop fever, chlls, sore throat, cough, nausea, vomiting, diarrhea, let us know  SF

## 2020-06-02 ENCOUNTER — TELEPHONE (OUTPATIENT)
Dept: INTERNAL MEDICINE | Facility: CLINIC | Age: 85
End: 2020-06-02

## 2020-06-02 NOTE — TELEPHONE ENCOUNTER
----- Message from Karel Hopson sent at 6/2/2020  9:46 AM CDT -----  Contact: patient [phone]  Patient would like to get medical advice.    Comments: has a form that is needing to be filled out, will drop off, stating is for [employer]. Would like for office to give a call back to discuss forms.    Please call an advise  Thank you

## 2020-06-07 DIAGNOSIS — I10 ESSENTIAL HYPERTENSION: ICD-10-CM

## 2020-06-08 RX ORDER — AMLODIPINE AND BENAZEPRIL HYDROCHLORIDE 5; 20 MG/1; MG/1
CAPSULE ORAL
Qty: 90 CAPSULE | Refills: 3 | Status: SHIPPED | OUTPATIENT
Start: 2020-06-08 | End: 2021-06-18

## 2020-06-08 RX ORDER — HYDROCHLOROTHIAZIDE 12.5 MG/1
12.5 CAPSULE ORAL EVERY MORNING
Qty: 90 CAPSULE | Refills: 1 | Status: SHIPPED | OUTPATIENT
Start: 2020-06-08 | End: 2020-12-18

## 2020-06-08 RX ORDER — PIOGLITAZONEHYDROCHLORIDE 15 MG/1
TABLET ORAL
Qty: 90 TABLET | Refills: 1 | Status: SHIPPED | OUTPATIENT
Start: 2020-06-08 | End: 2020-12-18 | Stop reason: SDUPTHER

## 2020-06-09 ENCOUNTER — TELEPHONE (OUTPATIENT)
Dept: INTERNAL MEDICINE | Facility: CLINIC | Age: 85
End: 2020-06-09

## 2020-06-09 RX ORDER — CITALOPRAM 10 MG/1
TABLET ORAL
Qty: 90 TABLET | Refills: 1 | Status: SHIPPED | OUTPATIENT
Start: 2020-06-09 | End: 2020-12-18 | Stop reason: SDUPTHER

## 2020-06-09 RX ORDER — METOPROLOL TARTRATE 50 MG/1
TABLET ORAL
Qty: 180 TABLET | Refills: 3 | Status: SHIPPED | OUTPATIENT
Start: 2020-06-09 | End: 2020-08-05 | Stop reason: SDUPTHER

## 2020-06-09 NOTE — TELEPHONE ENCOUNTER
----- Message from Karel Hopson sent at 6/9/2020  9:50 AM CDT -----  Contact: Patient [phone]  Patient would like to get medical advice.    Comments: call to check the status of the forms that are for [employer], is needing to have office send back to patient in mail, call to update.    Please call an advise  Thank you

## 2020-06-09 NOTE — TELEPHONE ENCOUNTER
Pt is calling to check the status of her forms. Pt need forms asap as she has to get them back to the [employer].

## 2020-06-09 NOTE — PROGRESS NOTES
Refill Authorization Note     is requesting a refill authorization.    Brief assessment and rationale for refill: DEFER: citalopram -drug drug interaction / metoprolol tartrate -drug disease interaction //  APPROVE: hydroCHLOROthiazide, amlodipine-benazepril and pioglitazone -prr     Medication-related problems identified:   Drug-drug interaction  Drug-disease interaction    Medication Therapy Plan: Drug-Drug: citalopram and donepeziL; Use of citalopram in patients maintained on agents that prolong the QTc interval may result in potentially life-threatening cardiac arrhythmias, including torsades de pointes.(1-3);  Drug-Disease: metoprolol tartrate and PVD (peripheral vascular disease); PVD (peripheral vascular disease) with claudication;; Defer citalopram and metoprolol tartrate; Approve hydrochlorothiazide, amlodipine-benazepril and pioglitazone    Medication reconciliation completed: No                         Comments:   Automatic Epic Protocol Generated Data:    Requested Prescriptions   Pending Prescriptions Disp Refills    citalopram (CELEXA) 10 MG tablet [Pharmacy Med Name: citalopram 10 mg tablet] 90 tablet 1     Sig: TAKE ONE TABLET BY MOUTH ONCE DAILY       Psychiatry:  Antidepressants - SSRI Passed - 6/7/2020  8:00 AM        Passed - Patient is at least 18 years old        Passed - Office visit in past 6 months or future 90 days.     Recent Outpatient Visits            1 month ago Mood disorder    [facility] Pepper Whitfield MD    2 months ago Diabetes mellitus due to abnormal insulin    [facility] Pepper Whitfield MD    4 months ago Post-operative state    [facility] Leslie Castillo MD    5 months ago Acute viral syndrome    [facility] Deepti Hinson MD    5 months ago Routine general medical examination at a health care facility    [facility] Pepper Whitfield MD          Future Appointments               In 1 month MD [facility], [facility]               metoprolol tartrate (LOPRESSOR) 50 MG tablet [Pharmacy Med Name: metoprolol tartrate 50 mg tablet] 180 tablet 3     Sig: TAKE ONE TABLET BY MOUTH TWICE DAILY       Cardiovascular:  Beta Blockers Passed - 6/7/2020  8:00 AM        Passed - Patient is at least 18 years old        Passed - Last BP in normal range within 360 days.     BP Readings from Last 3 Encounters:   03/11/20 126/60   01/04/20 (!) 143/64   12/17/19 126/62              Passed - Last Heart Rate in normal range within 360 days.     Pulse Readings from Last 3 Encounters:   03/11/20 101   01/04/20 85   12/17/19 92             Passed - Office visit in past 12 months or future 90 days.     Recent Outpatient Visits            1 month ago Mood disorder    [facility] Pepper Whitfield MD    2 months ago Diabetes mellitus due to abnormal insulin    [facility] Pepper Whitfield MD    4 months ago Post-operative state    [facility] - Ophthalmology Leslie Castillo MD    5 months ago Acute viral syndrome    [facility] Deepti Hinson MD    5 months ago Routine general medical examination at a health care facility    [facility] Pepper Whitfield MD          Future Appointments              In 1 month Pepper Whitfield MD [facility] Internal Medicine, [facility]              Signed Prescriptions Disp Refills    hydroCHLOROthiazide (MICROZIDE) 12.5 mg capsule 90 capsule 1     Sig: Take 1 capsule (12.5 mg total) by mouth every morning.       Cardiovascular: Diuretics - Thiazide Failed - 6/7/2020  8:00 AM        Failed - Ca in normal range and within 360 days     Calcium   Date Value Ref Range Status   03/11/2020 10.6 (H) 8.7 - 10.5 mg/dL Final   12/07/2019 9.7 8.7 - 10.5 mg/dL Final   12/06/2019 9.8 8.7 - 10.5 mg/dL Final              Passed - Patient is at least 18 years old        Passed -  Last BP in normal range within 360 days.     BP Readings from Last 3 Encounters:   03/11/20 126/60   01/04/20 (!) 143/64   12/17/19 126/62              Passed - Office visit in past 12 months or future 90 days.     Recent Outpatient Visits            1 month ago Mood disorder    [facility] Internal Medicine Pepper Whitfield MD    2 months ago Diabetes mellitus due to abnormal insulin    [facility] Internal Medicine Pepper Whitfield MD    4 months ago Post-operative state    [facility] - Ophthalmology Leslie Castillo MD    5 months ago Acute viral syndrome    [facility] De[facility]i Hinson MD    5 months ago Routine general medical examination at a health care facility    [facility] Internal Medicine Pepper Whitfield MD          Future Appointments              In 1 month Pepper Whitfield MD [facility] Internal Medicine, [facility]                Passed - Cr is 1.3 or below and within 180 days     Creatinine   Date Value Ref Range Status   03/11/2020 1.2 0.5 - 1.4 mg/dL Final   12/07/2019 1.0 0.5 - 1.4 mg/dL Final   12/06/2019 1.1 0.5 - 1.4 mg/dL Final     POC Creatinine   Date Value Ref Range Status   12/06/2019 1.1 0.5 - 1.4 mg/dL Final   11/20/2018 1.0 0.5 - 1.4 mg/dL Final              Passed - K in normal range and within 180 days     POC Potassium   Date Value Ref Range Status   12/06/2019 3.5 3.5 - 5.1 mmol/L Final     Potassium   Date Value Ref Range Status   03/11/2020 4.3 3.5 - 5.1 mmol/L Final   12/07/2019 4.0 3.5 - 5.1 mmol/L Final   12/06/2019 3.6 3.5 - 5.1 mmol/L Final              Passed - Na is between 130 and 148 and within 180 days     POC Sodium   Date Value Ref Range Status   12/06/2019 139 136 - 145 mmol/L Final     Sodium   Date Value Ref Range Status   03/11/2020 141 136 - 145 mmol/L Final   12/07/2019 141 136 - 145 mmol/L Final   12/06/2019 139 136 - 145 mmol/L Final              Passed - eGFR within 180 days     eGFR if non    Date Value Ref Range  Status   03/11/2020 40.4 (A) >60 mL/min/1.73 m^2 Final     Comment:     Calculation used to obtain the estimated glomerular filtration  rate (eGFR) is the CKD-EPI equation.      12/07/2019 50.4 (A) >60 mL/min/1.73 m^2 Final     Comment:     Calculation used to obtain the estimated glomerular filtration  rate (eGFR) is the CKD-EPI equation.      12/06/2019 44.9 (A) >60 mL/min/1.73 m^2 Final     Comment:     Calculation used to obtain the estimated glomerular filtration  rate (eGFR) is the CKD-EPI equation.        eGFR if    Date Value Ref Range Status   03/11/2020 46.6 (A) >60 mL/min/1.73 m^2 Final   12/07/2019 58.1 (A) >60 mL/min/1.73 m^2 Final   12/06/2019 51.8 (A) >60 mL/min/1.73 m^2 Final             pioglitazone (ACTOS) 15 MG tablet 90 tablet 1     Sig: TAKE ONE TABLET BY MOUTH EVERY MORNING       Endocrinology:  Diabetes - Glitazones - pioglitazone Passed - 6/7/2020  8:00 AM        Passed - Patient is at least 18 years old        Passed - Office visit in past 12 months or future 90 days.     Recent Outpatient Visits            1 month ago Mood disorder    [facility] Internal Medicine Pepper Whitfield MD    2 months ago Diabetes mellitus due to abnormal insulin    [facility] - Internal Medicine Pepper Whitfield MD    4 months ago Post-operative state    [facility] - Ophthalmology Leslie Castillo MD    5 months ago Acute viral syndrome    [facility] Deepti Hinson MD    5 months ago Routine general medical examination at a health care facility    [facility] Pepper Whitfield MD          Future Appointments              In 1 month Pepper Whitfield MD [facility] - Internal Medicine, [facility]                Passed - Patient does not have a history of Heart Failure        Passed - HBA1C is 7.9 or below and within 180 days     Hemoglobin A1C   Date Value Ref Range Status   03/11/2020 6.7 (H) 4.0 - 5.6 % Final     Comment:     ADA Screening  Guidelines:  5.7-6.4%  Consistent with prediabetes  >or=6.5%  Consistent with diabetes  High levels of fetal hemoglobin interfere with the HbA1C  assay. Heterozygous hemoglobin variants (HbS, HgC, etc)do  not significantly interfere with this assay.   However, presence of multiple variants may affect accuracy.     12/06/2019 6.6 (H) 4.0 - 5.6 % Final     Comment:     ADA Screening Guidelines:  5.7-6.4%  Consistent with prediabetes  >or=6.5%  Consistent with diabetes  High levels of fetal hemoglobin interfere with the HbA1C  assay. Heterozygous hemoglobin variants (HbS, HgC, etc)do  not significantly interfere with this assay.   However, presence of multiple variants may affect accuracy.     05/21/2019 6.5 (H) 4.0 - 5.6 % Final     Comment:     ADA Screening Guidelines:  5.7-6.4%  Consistent with prediabetes  >or=6.5%  Consistent with diabetes  High levels of fetal hemoglobin interfere with the HbA1C  assay. Heterozygous hemoglobin variants (HbS, HgC, etc)do  not significantly interfere with this assay.   However, presence of multiple variants may affect accuracy.                Passed - ALT is 94 or below and within 360 days     ALT   Date Value Ref Range Status   03/11/2020 5 (L) 10 - 44 U/L Final   12/07/2019 6 (L) 10 - 44 U/L Final   12/06/2019 6 (L) 10 - 44 U/L Final             amlodipine-benazepril 5-20 mg (LOTREL) 5-20 mg per capsule 90 capsule 3     Sig: TAKE ONE CAPSULE BY MOUTH ONCE DAILY       Cardiovascular: CCB + ACEI Combos Passed - 6/7/2020  8:00 AM        Passed - Patient is at least 18 years old        Passed - Last BP in normal range within 360 days.     BP Readings from Last 3 Encounters:   03/11/20 126/60   01/04/20 (!) 143/64   12/17/19 126/62              Passed - Office visit in past 12 months or future 90 days.     Recent Outpatient Visits            1 month ago Mood disorder    [facility] Pepper Whitfield MD    2 months ago Diabetes mellitus due to abnormal insulin     [facility] - Internal Medicine Pepper Whitfield MD    4 months ago Post-operative [facility] - Ophthalmology Leslie Castillo MD    5 months ago Acute viral syndrome    [facility] De[facility] H[facility] MD    5 months ago Routine general medical examination at a health care facility    [facility] - Internal Medicine Pepper Whitfield MD          Future Appointments              In 1 month Pepper Whitfield MD [facility] - Internal Medicine, [facility]                Passed - Cr is 1.3 or below and within 360 days     Creatinine   Date Value Ref Range Status   03/11/2020 1.2 0.5 - 1.4 mg/dL Final   12/07/2019 1.0 0.5 - 1.4 mg/dL Final   12/06/2019 1.1 0.5 - 1.4 mg/dL Final     POC Creatinine   Date Value Ref Range Status   12/06/2019 1.1 0.5 - 1.4 mg/dL Final   11/20/2018 1.0 0.5 - 1.4 mg/dL Final              Passed - K in normal range and within 360 days     POC Potassium   Date Value Ref Range Status   12/06/2019 3.5 3.5 - 5.1 mmol/L Final     Potassium   Date Value Ref Range Status   03/11/2020 4.3 3.5 - 5.1 mmol/L Final   12/07/2019 4.0 3.5 - 5.1 mmol/L Final   12/06/2019 3.6 3.5 - 5.1 mmol/L Final              Passed - eGFR within 360 days     eGFR if non    Date Value Ref Range Status   03/11/2020 40.4 (A) >60 mL/min/1.73 m^2 Final     Comment:     Calculation used to obtain the estimated glomerular filtration  rate (eGFR) is the CKD-EPI equation.      12/07/2019 50.4 (A) >60 mL/min/1.73 m^2 Final     Comment:     Calculation used to obtain the estimated glomerular filtration  rate (eGFR) is the CKD-EPI equation.      12/06/2019 44.9 (A) >60 mL/min/1.73 m^2 Final     Comment:     Calculation used to obtain the estimated glomerular filtration  rate (eGFR) is the CKD-EPI equation.        eGFR if    Date Value Ref Range Status   03/11/2020 46.6 (A) >60 mL/min/1.73 m^2 Final   12/07/2019 58.1 (A) >60 mL/min/1.73 m^2 Final   12/06/2019 51.8 (A) >60 mL/min/1.73  m^2 Final                 Appointments  past 12m or future 3m with PCP    Date Provider   Last Visit   4/21/2020 Pepper Whitfield MD   Next Visit   7/23/2020 Pepper Whitfield MD   ED visits in past 90 days: 0     Note composed:10:57 PM 06/08/2020

## 2020-06-10 ENCOUNTER — TELEPHONE (OUTPATIENT)
Dept: INTERNAL MEDICINE | Facility: CLINIC | Age: 85
End: 2020-06-10

## 2020-06-10 NOTE — TELEPHONE ENCOUNTER
----- Message from Ernesto Carbone sent at 6/10/2020  4:09 PM CDT -----  Contact: Nora cintron [phone]  Patient is returning a phone call.  Who left a message for the patient: unknown  Does patient know what this is regarding:  Missed call  Comments:

## 2020-06-11 RX ORDER — DULOXETIN HYDROCHLORIDE 60 MG/1
CAPSULE, DELAYED RELEASE ORAL
Qty: 90 CAPSULE | Refills: 4 | Status: SHIPPED | OUTPATIENT
Start: 2020-06-11 | End: 2021-06-18

## 2020-06-11 NOTE — PROGRESS NOTES
Refill Routing Note    Medication(s) are not appropriate for processing by [facility]:       Drug-Drug Interaction (citalopram and DULoxetine)     Medication-related problems identified: Drug-drug interaction  Medication Therapy Plan: Duplicate Therapy: citalopram, DULoxetine; Serotonin Reuptake Inhib. Antidepressants (SSRIs,SNRIs,SDRI); Defer to you  Medication reconciliation completed: No      Automatic Epic Protocol Generated Data:    Requested Prescriptions   Pending Prescriptions Disp Refills    DULoxetine (CYMBALTA) 60 MG capsule [Pharmacy Med Name: duloxetine 60 mg capsule,delayed release] 90 capsule 0     Sig: TAKE ONE CAPSULE BY MOUTH EVERY MORNING       Psychiatry: Antidepressants - SNRI Passed - 6/10/2020 12:29 PM        Passed - Patient is at least 18 years old        Passed - Last BP in normal range within 360 days.     BP Readings from Last 3 Encounters:   03/11/20 126/60   01/04/20 (!) 143/64   12/17/19 126/62              Passed - Office visit in past 6 months or future 90 days.     Recent Outpatient Visits            1 month ago Mood disorder    [facility] - Internal Medicine Pepper Whitfield MD    3 months ago Diabetes mellitus due to abnormal insulin    [facility] Internal Medicine Pepper Whitfield MD    4 months ago Post-operative state    [facility] - Ophthalmology Leslie Castillo MD    5 months ago Acute viral syndrome    [facility] Deepti Hinson MD    5 months ago Routine general medical examination at a health care facility    [facility] Internal Medicine Pepper Whitfield MD          Future Appointments              In 1 month Pepper Whitifeld MD [facility] Internal Medicine, [facility]                Passed - Cr is 1.3 or below and within 360 days     Creatinine   Date Value Ref Range Status   03/11/2020 1.2 0.5 - 1.4 mg/dL Final   12/07/2019 1.0 0.5 - 1.4 mg/dL Final   12/06/2019 1.1 0.5 - 1.4 mg/dL Final     POC Creatinine   Date Value Ref Range  Status   12/06/2019 1.1 0.5 - 1.4 mg/dL Final   11/20/2018 1.0 0.5 - 1.4 mg/dL Final              Passed - eGFR within 360 days     eGFR if non    Date Value Ref Range Status   03/11/2020 40.4 (A) >60 mL/min/1.73 m^2 Final     Comment:     Calculation used to obtain the estimated glomerular filtration  rate (eGFR) is the CKD-EPI equation.      12/07/2019 50.4 (A) >60 mL/min/1.73 m^2 Final     Comment:     Calculation used to obtain the estimated glomerular filtration  rate (eGFR) is the CKD-EPI equation.      12/06/2019 44.9 (A) >60 mL/min/1.73 m^2 Final     Comment:     Calculation used to obtain the estimated glomerular filtration  rate (eGFR) is the CKD-EPI equation.        eGFR if    Date Value Ref Range Status   03/11/2020 46.6 (A) >60 mL/min/1.73 m^2 Final   12/07/2019 58.1 (A) >60 mL/min/1.73 m^2 Final   12/06/2019 51.8 (A) >60 mL/min/1.73 m^2 Final                 Appointments  past 12m or future 3m with PCP    Date Provider   Last Visit   4/21/2020 Pepper Whitfield MD   Next Visit   6/10/2020 Pepper Whitfield MD   ED visits in past 90 days: 0     Note composed:6:53 AM 06/11/2020

## 2020-07-06 ENCOUNTER — OFFICE VISIT (OUTPATIENT)
Dept: INTERNAL MEDICINE | Facility: CLINIC | Age: 85
End: 2020-07-06
Payer: MEDICARE

## 2020-07-06 VITALS
DIASTOLIC BLOOD PRESSURE: 72 MMHG | HEIGHT: 63 IN | SYSTOLIC BLOOD PRESSURE: 138 MMHG | WEIGHT: 176 LBS | BODY MASS INDEX: 31.18 KG/M2

## 2020-07-06 DIAGNOSIS — E11.51 TYPE 2 DIABETES MELLITUS WITH DIABETIC PERIPHERAL ANGIOPATHY WITHOUT GANGRENE, WITHOUT LONG-TERM CURRENT USE OF INSULIN: ICD-10-CM

## 2020-07-06 DIAGNOSIS — F39 MOOD DISORDER: ICD-10-CM

## 2020-07-06 DIAGNOSIS — J31.0 RHINITIS, UNSPECIFIED TYPE: Primary | ICD-10-CM

## 2020-07-06 DIAGNOSIS — J43.2 CENTRILOBULAR EMPHYSEMA: ICD-10-CM

## 2020-07-06 DIAGNOSIS — I10 ESSENTIAL HYPERTENSION: ICD-10-CM

## 2020-07-06 PROBLEM — R55 SYNCOPE: Status: RESOLVED | Noted: 2019-12-06 | Resolved: 2020-07-06

## 2020-07-06 PROBLEM — I73.9 PVD (PERIPHERAL VASCULAR DISEASE): Status: RESOLVED | Noted: 2018-12-06 | Resolved: 2020-07-06

## 2020-07-06 PROBLEM — Z98.890 POST-OPERATIVE STATE: Status: RESOLVED | Noted: 2019-12-06 | Resolved: 2020-07-06

## 2020-07-06 PROCEDURE — 99214 OFFICE O/P EST MOD 30 MIN: CPT | Mod: S$GLB,,, | Performed by: INTERNAL MEDICINE

## 2020-07-06 PROCEDURE — 99999 PR PBB SHADOW E&M-EST. PATIENT-LVL V: CPT | Mod: PBBFAC,,, | Performed by: INTERNAL MEDICINE

## 2020-07-06 PROCEDURE — 99499 RISK ADDL DX/OHS AUDIT: ICD-10-PCS | Mod: S$GLB,,, | Performed by: INTERNAL MEDICINE

## 2020-07-06 PROCEDURE — 1101F PR PT FALLS ASSESS DOC 0-1 FALLS W/OUT INJ PAST YR: ICD-10-PCS | Mod: CPTII,S$GLB,, | Performed by: INTERNAL MEDICINE

## 2020-07-06 PROCEDURE — 1125F AMNT PAIN NOTED PAIN PRSNT: CPT | Mod: S$GLB,,, | Performed by: INTERNAL MEDICINE

## 2020-07-06 PROCEDURE — 1101F PT FALLS ASSESS-DOCD LE1/YR: CPT | Mod: CPTII,S$GLB,, | Performed by: INTERNAL MEDICINE

## 2020-07-06 PROCEDURE — 99999 PR PBB SHADOW E&M-EST. PATIENT-LVL V: ICD-10-PCS | Mod: PBBFAC,,, | Performed by: INTERNAL MEDICINE

## 2020-07-06 PROCEDURE — 1159F MED LIST DOCD IN RCRD: CPT | Mod: S$GLB,,, | Performed by: INTERNAL MEDICINE

## 2020-07-06 PROCEDURE — 1125F PR PAIN SEVERITY QUANTIFIED, PAIN PRESENT: ICD-10-PCS | Mod: S$GLB,,, | Performed by: INTERNAL MEDICINE

## 2020-07-06 PROCEDURE — 99214 PR OFFICE/OUTPT VISIT, EST, LEVL IV, 30-39 MIN: ICD-10-PCS | Mod: S$GLB,,, | Performed by: INTERNAL MEDICINE

## 2020-07-06 PROCEDURE — 1159F PR MEDICATION LIST DOCUMENTED IN MEDICAL RECORD: ICD-10-PCS | Mod: S$GLB,,, | Performed by: INTERNAL MEDICINE

## 2020-07-06 PROCEDURE — 99499 UNLISTED E&M SERVICE: CPT | Mod: S$GLB,,, | Performed by: INTERNAL MEDICINE

## 2020-07-06 RX ORDER — FLUTICASONE PROPIONATE 50 MCG
2 SPRAY, SUSPENSION (ML) NASAL DAILY
Qty: 16 G | Refills: 3 | Status: SHIPPED | OUTPATIENT
Start: 2020-07-06 | End: 2022-03-07 | Stop reason: SDUPTHER

## 2020-07-06 RX ORDER — LORATADINE 10 MG/1
10 TABLET ORAL DAILY PRN
Qty: 30 TABLET | Refills: 0 | Status: SHIPPED | OUTPATIENT
Start: 2020-07-06 | End: 2020-12-18

## 2020-07-06 NOTE — PATIENT INSTRUCTIONS

## 2020-07-06 NOTE — PROGRESS NOTES
Subjective:       Patient ID: Brunilda England is a 88 y.o. female.    Chief Complaint: URI and Cough    [facility] appt    Dr Whitfield patient    Blowing nose all day, runs like water, clear all day long and also aggravates at night.    No fever or chills.   Daughter had coronavirus, she was tested and was negative.    Multiple other stable issues, lung cancer gets CT scans annually, last done .  Denies cough, shortness of breath, chest pain, wheezing.    Stable depression, stress OK despite COVID.  No SI or HI.    about 2 years ago.    CKD reviewed, she was not aware.  Tries to drink water.    Patient Active Problem List:     History of lung cancer     Essential hypertension     Neuropathy     Osteoporosis     History of rectal or anal cancer     PMB (postmenopausal bleeding)     Pure hypercholesterolemia     Anemia     Chronic back pain     Lumbar radiculopathy     Neuropathy of left lower extremity     PVD (peripheral vascular disease) with claudication     Radiation vaginitis     Bilateral exudative age-related macular degeneration     Hypertensive retinopathy of both eyes     Posterior vitreous detachment, both eyes     Macular hemorrhage of left eye     Atrophy of macula lutea     Pernicious anemia     Coronary artery disease involving native coronary artery without angina pectoris     Type 2 diabetes mellitus with circulatory disorder, without long-term current use of insulin     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   HENT: Positive for postnasal drip and sneezing. Negative for congestion, ear pain, sinus pressure and sinus pain.    Eyes: Negative.  Negative  for visual disturbance.   Respiratory: Negative for cough, chest tightness, shortness of breath and wheezing.    Cardiovascular: Negative.    Gastrointestinal: Negative.    Musculoskeletal: Positive for arthralgias.        Chronic, stable   Psychiatric/Behavioral:        Stable mood       Objective:      Physical Exam  Constitutional:       Appearance: She is well-developed.   HENT:      Head: Normocephalic and atraumatic.      Right Ear: External ear normal.      Left Ear: External ear normal.   Eyes:      Conjunctiva/sclera: Conjunctivae normal.   Neck:      Musculoskeletal: Normal range of motion and neck supple.      Thyroid: No thyromegaly.   Cardiovascular:      Rate and Rhythm: Normal rate and regular rhythm.      Heart sounds: Normal heart sounds.   Pulmonary:      Effort: No respiratory distress.      Breath sounds: No wheezing or rales.   Abdominal:      General: Bowel sounds are normal.      Palpations: Abdomen is soft.   Lymphadenopathy:      Cervical: No cervical adenopathy.   Skin:     General: Skin is warm and dry.      Findings: No erythema or rash.   Neurological:      Mental Status: She is alert and oriented to person, place, and time.         Assessment:       1. Rhinitis, unspecified type    2. Centrilobular emphysema    3. Mood disorder    4. Essential hypertension    5. Type 2 diabetes mellitus with diabetic peripheral angiopathy without gangrene, without long-term current use of insulin        Plan:           Brunilda was seen today for uri and cough.    Diagnoses and all orders for this visit:    Rhinitis, unspecified type    Centrilobular emphysema    Mood disorder    Essential hypertension; continue regimen    Type 2 diabetes mellitus with diabetic peripheral angiopathy without gangrene, without long-term current use of insulin; continue regimen    Other orders  -     fluticasone propionate (FLONASE) 50 mcg/actuation nasal spray; 2 sprays (100 mcg total) by Each Nostril route once daily.  -      loratadine (CLARITIN) 10 mg tablet; Take 1 tablet (10 mg total) by mouth daily as needed for Allergies.     Symptoms do not sound like a bacterial infection or either viral syndrome.  She does not have fever, chills, sweats, myalgias, cough, chest pain or shortness of breath.  No nausea, vomiting or diarrhea.  She sounds like she has rhinitis with clear drainage.    Trial of Flonase and low-dose Claritin p.r.n., cautions and side effects reviewed  Hydration  Continue regimen  Schedule follow-up with her PCP within the next 4-6 weeks, sooner with problems in the interim

## 2020-07-21 ENCOUNTER — TELEPHONE (OUTPATIENT)
Dept: HEMATOLOGY/ONCOLOGY | Facility: CLINIC | Age: 85
End: 2020-07-21

## 2020-07-21 NOTE — TELEPHONE ENCOUNTER
Returned call scheduled recall for patient in Sept. Mailed appt slip.      ----- Message from Nathaniel Dominguez sent at 7/21/2020  2:45 PM CDT -----  Contact: Patient  Scheduling request    Scheduling appt :: f/u    Treating provider:: Carmella    Do you feel you need to be seen today:: No    Contact:: [phone]    Additional info::

## 2020-08-05 ENCOUNTER — OFFICE VISIT (OUTPATIENT)
Dept: INTERNAL MEDICINE | Facility: CLINIC | Age: 85
End: 2020-08-05
Payer: MEDICARE

## 2020-08-05 ENCOUNTER — LAB VISIT (OUTPATIENT)
Dept: LAB | Facility: HOSPITAL | Age: 85
End: 2020-08-05
Attending: INTERNAL MEDICINE
Payer: MEDICARE

## 2020-08-05 VITALS
HEART RATE: 108 BPM | DIASTOLIC BLOOD PRESSURE: 52 MMHG | HEIGHT: 65 IN | BODY MASS INDEX: 29.33 KG/M2 | SYSTOLIC BLOOD PRESSURE: 122 MMHG | OXYGEN SATURATION: 96 % | WEIGHT: 176.06 LBS

## 2020-08-05 DIAGNOSIS — E13.9 DIABETES MELLITUS DUE TO ABNORMAL INSULIN: ICD-10-CM

## 2020-08-05 DIAGNOSIS — E13.9 DIABETES MELLITUS DUE TO ABNORMAL INSULIN: Primary | ICD-10-CM

## 2020-08-05 DIAGNOSIS — E78.00 PURE HYPERCHOLESTEROLEMIA: ICD-10-CM

## 2020-08-05 DIAGNOSIS — Z85.118 HISTORY OF LUNG CANCER: ICD-10-CM

## 2020-08-05 DIAGNOSIS — G62.9 NEUROPATHY: ICD-10-CM

## 2020-08-05 DIAGNOSIS — I10 ESSENTIAL HYPERTENSION: ICD-10-CM

## 2020-08-05 LAB
ALBUMIN SERPL BCP-MCNC: 3.8 G/DL (ref 3.5–5.2)
ALP SERPL-CCNC: 45 U/L (ref 55–135)
ALT SERPL W/O P-5'-P-CCNC: 9 U/L (ref 10–44)
ANION GAP SERPL CALC-SCNC: 8 MMOL/L (ref 8–16)
AST SERPL-CCNC: 15 U/L (ref 10–40)
BASOPHILS # BLD AUTO: 0.02 K/UL (ref 0–0.2)
BASOPHILS NFR BLD: 0.6 % (ref 0–1.9)
BILIRUB SERPL-MCNC: 0.3 MG/DL (ref 0.1–1)
BUN SERPL-MCNC: 11 MG/DL (ref 8–23)
CALCIUM SERPL-MCNC: 10.2 MG/DL (ref 8.7–10.5)
CHLORIDE SERPL-SCNC: 102 MMOL/L (ref 95–110)
CO2 SERPL-SCNC: 31 MMOL/L (ref 23–29)
CREAT SERPL-MCNC: 1.2 MG/DL (ref 0.5–1.4)
DIFFERENTIAL METHOD: ABNORMAL
EOSINOPHIL # BLD AUTO: 0.1 K/UL (ref 0–0.5)
EOSINOPHIL NFR BLD: 2.2 % (ref 0–8)
ERYTHROCYTE [DISTWIDTH] IN BLOOD BY AUTOMATED COUNT: 16.5 % (ref 11.5–14.5)
EST. GFR  (AFRICAN AMERICAN): 46.6 ML/MIN/1.73 M^2
EST. GFR  (NON AFRICAN AMERICAN): 40.4 ML/MIN/1.73 M^2
GLUCOSE SERPL-MCNC: 214 MG/DL (ref 70–110)
HCT VFR BLD AUTO: 35.2 % (ref 37–48.5)
HGB BLD-MCNC: 11 G/DL (ref 12–16)
IMM GRANULOCYTES # BLD AUTO: 0.01 K/UL (ref 0–0.04)
IMM GRANULOCYTES NFR BLD AUTO: 0.3 % (ref 0–0.5)
LYMPHOCYTES # BLD AUTO: 1.2 K/UL (ref 1–4.8)
LYMPHOCYTES NFR BLD: 32.3 % (ref 18–48)
MAGNESIUM SERPL-MCNC: 1.8 MG/DL (ref 1.6–2.6)
MCH RBC QN AUTO: 28.5 PG (ref 27–31)
MCHC RBC AUTO-ENTMCNC: 31.3 G/DL (ref 32–36)
MCV RBC AUTO: 91 FL (ref 82–98)
MONOCYTES # BLD AUTO: 0.5 K/UL (ref 0.3–1)
MONOCYTES NFR BLD: 12.4 % (ref 4–15)
NEUTROPHILS # BLD AUTO: 1.9 K/UL (ref 1.8–7.7)
NEUTROPHILS NFR BLD: 52.2 % (ref 38–73)
NRBC BLD-RTO: 0 /100 WBC
PLATELET # BLD AUTO: 189 K/UL (ref 150–350)
PMV BLD AUTO: 11.7 FL (ref 9.2–12.9)
POTASSIUM SERPL-SCNC: 4.3 MMOL/L (ref 3.5–5.1)
PROT SERPL-MCNC: 7.8 G/DL (ref 6–8.4)
RBC # BLD AUTO: 3.86 M/UL (ref 4–5.4)
SODIUM SERPL-SCNC: 141 MMOL/L (ref 136–145)
TSH SERPL DL<=0.005 MIU/L-ACNC: 2.3 UIU/ML (ref 0.4–4)
WBC # BLD AUTO: 3.62 K/UL (ref 3.9–12.7)

## 2020-08-05 PROCEDURE — 1101F PT FALLS ASSESS-DOCD LE1/YR: CPT | Mod: CPTII,S$GLB,, | Performed by: INTERNAL MEDICINE

## 2020-08-05 PROCEDURE — 36415 COLL VENOUS BLD VENIPUNCTURE: CPT

## 2020-08-05 PROCEDURE — 1101F PR PT FALLS ASSESS DOC 0-1 FALLS W/OUT INJ PAST YR: ICD-10-PCS | Mod: CPTII,S$GLB,, | Performed by: INTERNAL MEDICINE

## 2020-08-05 PROCEDURE — 84443 ASSAY THYROID STIM HORMONE: CPT

## 2020-08-05 PROCEDURE — 99214 PR OFFICE/OUTPT VISIT, EST, LEVL IV, 30-39 MIN: ICD-10-PCS | Mod: S$GLB,,, | Performed by: INTERNAL MEDICINE

## 2020-08-05 PROCEDURE — 83036 HEMOGLOBIN GLYCOSYLATED A1C: CPT

## 2020-08-05 PROCEDURE — 85025 COMPLETE CBC W/AUTO DIFF WBC: CPT

## 2020-08-05 PROCEDURE — 99214 OFFICE O/P EST MOD 30 MIN: CPT | Mod: S$GLB,,, | Performed by: INTERNAL MEDICINE

## 2020-08-05 PROCEDURE — 99499 RISK ADDL DX/OHS AUDIT: ICD-10-PCS | Mod: S$GLB,,, | Performed by: INTERNAL MEDICINE

## 2020-08-05 PROCEDURE — 83735 ASSAY OF MAGNESIUM: CPT

## 2020-08-05 PROCEDURE — 99999 PR PBB SHADOW E&M-EST. PATIENT-LVL III: CPT | Mod: PBBFAC,,, | Performed by: INTERNAL MEDICINE

## 2020-08-05 PROCEDURE — 99999 PR PBB SHADOW E&M-EST. PATIENT-LVL III: ICD-10-PCS | Mod: PBBFAC,,, | Performed by: INTERNAL MEDICINE

## 2020-08-05 PROCEDURE — 80053 COMPREHEN METABOLIC PANEL: CPT

## 2020-08-05 PROCEDURE — 1159F MED LIST DOCD IN RCRD: CPT | Mod: S$GLB,,, | Performed by: INTERNAL MEDICINE

## 2020-08-05 PROCEDURE — 1159F PR MEDICATION LIST DOCUMENTED IN MEDICAL RECORD: ICD-10-PCS | Mod: S$GLB,,, | Performed by: INTERNAL MEDICINE

## 2020-08-05 PROCEDURE — 99499 UNLISTED E&M SERVICE: CPT | Mod: S$GLB,,, | Performed by: INTERNAL MEDICINE

## 2020-08-05 RX ORDER — METOPROLOL TARTRATE 50 MG/1
75 TABLET ORAL 2 TIMES DAILY
Start: 2020-08-05 | End: 2020-12-18

## 2020-08-05 NOTE — PROGRESS NOTES
CHIEF COMPLAINT:  Follow up of mood    HISTORY OF PRESENT ILLNESS: This is a 88-year-old woman who presents for follow up of above.    She has been getting tired when she walks through her house. Occasional lightheadedness.     She has been having sinus congestion and has been taking flonase 1 spray in each nostril once daily and  fexofenadine 180 mg once daily which is helping a little. SHe is no longer having any symptoms at night.  No fever, chills, sore throat, nausea, vomiting     SHe continues to take duloxetine 60 mg daily and citalopram 10 mg daily. Mood has been goo.  Neuropathic pain in the legs is better controlled with the duloxetine.  I encouraged her to get out of the house.  She has not needed  xanax 0.5 mg since April     Daughter is doing much better.  She is completing outpatient rehab this week. . Her daughter was in the hospital at [facility] for 3 weeks with COVID 19 (she was intubated for 5 days).  She is grateful to God that her daughter is better.  She is very spirtual.      June tends to be a hard month for her.  Her 's birthday ws [birth year] then Father's day June 17, her anniversay is June 25, her mother's birthday is June 18.   Her  passed away on August 26, 2017. They were  67 years.  She continues to miss him.      Breathing has been good.  Appetite is good. No chest pain, palpitations.  She has occasional cough with white mucous.        She had right leg pain - she had a blockage. Dr Gaviria placed a stent in the right CFA and PTA left ext loulou artery 12/18. Right leg pain has resolved.  The ulcerated area on the right leg has totally healed and has not returned.  She is taking aspirin 81 mg daily.       She feels that her memory is better. She is taking aricept 5 mg daily.        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  Right hip pain has resolved and has not returned.          Her non small cell lung cancer is stable. She  saw Dr Weir 8/9/18  and scans were stable. She is to follow up annually with CT scan chest.  She is breathing well.      She is currently taking actos 15 mg daily. No polydipsia, polyuria, hypoglycemia. She checks her blood sugars couple times a week. Blood sugar was 102 last time she checked it. No hypoglycemia.    She continues to take Lotrel 5/20 once daily, metoprolol 50 mg 1 tablet twice daily, and hydrochlorothiazide 12.5 mg daily for her hypertension. She denies any chest pain or shortness of breath. She continues to take simvastatin 80 mg at bedtime for her hyperlipidemia. No joint pain or muscle pain.        She is off Fosamax once week for her bones.  No melana, bloody stools, constipation.     She takes vitamin B12 1000 mcg daily for vitamin B12 deficiency - anemia is stable        SHe has macular degeneration of the eyes and was followed by Dr Tresa Coreas. She got injections in her eyes every 6 weeks in Spring 2015. She now sees Dr Ruelas at [facility] (last saw him 8/18- no need for injections at that time- yearly follow up). Vision has been stable. She is taking a Eye vitamins for her eyes.       No vaginal bleeding and no longer needs vaginal premarin cream    PAST MEDICAL HISTORY:    1. Stage III non-small cell lung cancer, completed chemoradiation with carboplatin and Taxol on 6/1/12    2. Hypertension.   3. Diabetes mellitus.   4. Hyperlipidemia.   5. Chronic diastolic dysfunction.   6. Coronary artery disease status post MI in 1990 and 2003. Stenting was   done at 2003 at [facility].   7. Cholecystectomy, December 2006.   8. Cataract removal.   9. Benign growth removed from her throat.   10. Left common femoral endarterectomy, July 2007.   11. History of anal cancer in 1996 status post radiation and chemotherapy.   12. Carpal tunnel syndrome.   13. Osteoporosis on BMD 12/11    14. Macular degeneration    SOCIAL HISTORY: She quit smoking in 1995, denies any alcohol use. She is etired.  "    REVIEW OF SYSTEMS: She denies fevers, chills, night sweats, fatigue, visual change, hearing loss, sinus congestion, sore throat, dysuria, hematuria, joint pain, muscle pain, polydipsia, and polyuria.     PHYSICAL EXAM:     BP (!) 122/52   Pulse 108   Ht 5' 5" (1.651 m)   Wt 79.9 kg (176 lb 0.6 oz)   SpO2 96%   BMI 29.29 kg/m²        GENERAL: She is alert and oriented. No apparent distress. Affect within normal limits.   Conjunctivae anicteric.    NECK: Supple.   Respiratory effort normal. Lungs clear to auscultation.   HEART: Regular rate and rhythm without murmurs, gallops, or rubs. No lower   extremity edema.      Labs 3/11/20 reviewed      ASSESSMENT AND PLAN:    1. Hypertension with history of diastolic dyfunction on echo in 2006 - pulse is elevated today.  Increase metoprolol to 50 mg 1.5 tablets twice a day. Stop hydrochlorothyzide  2.PVD - stable  2. Mood disorder - stable. Counseling given  3. Memory issues - stable on aricept 5 mg daily.   4. Stage III non-small cell lung cancer, completed chemoradiation with carboplatin and Taxol on 6/1/12 - doing well. FOllow up with Dr Weir yearly  5. Diabetes-stable  3. Hypertension-controlled  4. Hyperlipidemia-lipids controlled  5. Coronary artery disease modifying risk factors.   6. History of anal cancer-had a normal colonoscopy, November 2008; due 2015. Declined repeat  8. Pernicious anemia - on counter vitamin B12 1000 mcg daily. Level fine in Jan 2018  9. Osteoporosis - off alendroante for 7 years.  She can go on a drug holiday. BMD 9/2019  10. Left leg pain due to neuropathy-stable.  .    11. Macular degeneration - stable  Screening mammogram was 9/19 Colonoscopy is due 2015 -declined. Pap smear was July 2010.  Flu 9/252017, Prevnar 11/2015  I will see her back in 2 weeks, sooner if problems arise     "

## 2020-08-06 ENCOUNTER — PATIENT OUTREACH (OUTPATIENT)
Dept: ADMINISTRATIVE | Facility: HOSPITAL | Age: 85
End: 2020-08-06

## 2020-08-06 DIAGNOSIS — E78.5 HYPERLIPIDEMIA, UNSPECIFIED HYPERLIPIDEMIA TYPE: Primary | ICD-10-CM

## 2020-08-06 LAB
ESTIMATED AVG GLUCOSE: 140 MG/DL (ref 68–131)
HBA1C MFR BLD HPLC: 6.5 % (ref 4–5.6)

## 2020-08-07 ENCOUNTER — TELEPHONE (OUTPATIENT)
Dept: INTERNAL MEDICINE | Facility: CLINIC | Age: 85
End: 2020-08-07

## 2020-08-07 ENCOUNTER — PATIENT OUTREACH (OUTPATIENT)
Dept: ADMINISTRATIVE | Facility: HOSPITAL | Age: 85
End: 2020-08-07

## 2020-08-07 NOTE — TELEPHONE ENCOUNTER
----- Message from Pepper Whitfield MD sent at 8/6/2020  9:13 PM CDT -----  Please notify pt   Your blood work is fine. How are you feeling with the change in medication?  Pepper Whitfield M.D.

## 2020-08-07 NOTE — TELEPHONE ENCOUNTER
Pt states that she feels fine, she just wishes she can stop the cough at night and constantly blowing her nose.Other than that pt feels fine.

## 2020-08-18 ENCOUNTER — OFFICE VISIT (OUTPATIENT)
Dept: INTERNAL MEDICINE | Facility: CLINIC | Age: 85
End: 2020-08-18
Payer: MEDICARE

## 2020-08-18 VITALS
HEIGHT: 65 IN | WEIGHT: 176.25 LBS | OXYGEN SATURATION: 98 % | SYSTOLIC BLOOD PRESSURE: 130 MMHG | HEART RATE: 84 BPM | BODY MASS INDEX: 29.37 KG/M2 | DIASTOLIC BLOOD PRESSURE: 60 MMHG

## 2020-08-18 DIAGNOSIS — E13.9 DIABETES MELLITUS DUE TO ABNORMAL INSULIN: ICD-10-CM

## 2020-08-18 DIAGNOSIS — C34.90 MALIGNANT NEOPLASM OF LUNG, UNSPECIFIED LATERALITY, UNSPECIFIED PART OF LUNG: ICD-10-CM

## 2020-08-18 DIAGNOSIS — I10 ESSENTIAL HYPERTENSION: Primary | ICD-10-CM

## 2020-08-18 DIAGNOSIS — I51.89 DIASTOLIC DYSFUNCTION: ICD-10-CM

## 2020-08-18 PROCEDURE — 1159F PR MEDICATION LIST DOCUMENTED IN MEDICAL RECORD: ICD-10-PCS | Mod: S$GLB,,, | Performed by: INTERNAL MEDICINE

## 2020-08-18 PROCEDURE — 99499 UNLISTED E&M SERVICE: CPT | Mod: S$GLB,,, | Performed by: INTERNAL MEDICINE

## 2020-08-18 PROCEDURE — 99214 OFFICE O/P EST MOD 30 MIN: CPT | Mod: S$GLB,,, | Performed by: INTERNAL MEDICINE

## 2020-08-18 PROCEDURE — 99214 PR OFFICE/OUTPT VISIT, EST, LEVL IV, 30-39 MIN: ICD-10-PCS | Mod: S$GLB,,, | Performed by: INTERNAL MEDICINE

## 2020-08-18 PROCEDURE — 99999 PR PBB SHADOW E&M-EST. PATIENT-LVL III: CPT | Mod: PBBFAC,,, | Performed by: INTERNAL MEDICINE

## 2020-08-18 PROCEDURE — 1159F MED LIST DOCD IN RCRD: CPT | Mod: S$GLB,,, | Performed by: INTERNAL MEDICINE

## 2020-08-18 PROCEDURE — 99499 RISK ADDL DX/OHS AUDIT: ICD-10-PCS | Mod: S$GLB,,, | Performed by: INTERNAL MEDICINE

## 2020-08-18 PROCEDURE — 1101F PR PT FALLS ASSESS DOC 0-1 FALLS W/OUT INJ PAST YR: ICD-10-PCS | Mod: CPTII,S$GLB,, | Performed by: INTERNAL MEDICINE

## 2020-08-18 PROCEDURE — 1126F AMNT PAIN NOTED NONE PRSNT: CPT | Mod: S$GLB,,, | Performed by: INTERNAL MEDICINE

## 2020-08-18 PROCEDURE — 1126F PR PAIN SEVERITY QUANTIFIED, NO PAIN PRESENT: ICD-10-PCS | Mod: S$GLB,,, | Performed by: INTERNAL MEDICINE

## 2020-08-18 PROCEDURE — 99999 PR PBB SHADOW E&M-EST. PATIENT-LVL III: ICD-10-PCS | Mod: PBBFAC,,, | Performed by: INTERNAL MEDICINE

## 2020-08-18 PROCEDURE — 1101F PT FALLS ASSESS-DOCD LE1/YR: CPT | Mod: CPTII,S$GLB,, | Performed by: INTERNAL MEDICINE

## 2020-08-18 RX ORDER — CITALOPRAM 10 MG/1
TABLET, FILM COATED ORAL
COMMUNITY
Start: 2020-06-09 | End: 2020-12-18

## 2020-08-18 RX ORDER — HYDROCHLOROTHIAZIDE 12.5 MG/1
CAPSULE ORAL
COMMUNITY
Start: 2020-06-09 | End: 2020-12-18

## 2020-08-18 NOTE — PROGRESS NOTES
CHIEF COMPLAINT:  Follow up of hypertension, copd, sinus congestion, diastolic dysfunction    HISTORY OF PRESENT ILLNESS: This is a 88-year-old woman who presents for follow up of above.    Her lightheadedness and tiredness upon ambulation has resolved since our last visit.  She increased her metoprolol tartrate 50 mg 1.5 tablets twice daily.  SHe is breathing ok. No chest pain, palpitations. She did not stop HCTZ 12.5 mg daily.  She continues to take amlodipine - benazepril 5/20 one tablet daliy.  She reports that she sleeps all the time since increasing the metoprolol. No swelling      She has been having sinus congestion and has been taking flonase 1 spray in each nostril once daily and  fexofenadine 180 mg once daily which is helping a little. No fever, chills, sore throat, nausea, vomiting     SHe continues to take duloxetine 60 mg daily and citalopram 10 mg daily. Mood has been goo.  Neuropathic pain in the legs is better controlled with the duloxetine.  I encouraged her to get out of the house.  She has not needed  xanax 0.5 mg since April       Breathing has been good.  Appetite is good. No chest pain, palpitations.  She has occasional cough with white mucous.        She had right leg pain - she had a blockage. Dr Gaviria placed a stent in the right CFA and PTA left ext loulou artery 12/18. Right leg pain has resolved.  The ulcerated area on the right leg has totally healed and has not returned.  She is taking aspirin 81 mg daily.       She feels that her memory is better. She is taking aricept 5 mg daily.        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  Right hip pain has resolved and has not returned.          Her non small cell lung cancer is stable. She saw Dr Weir 8/19 and scans were stable. She is to follow up annually with CT scan chest.  She is breathing well.      She is currently taking actos 15 mg daily. No polydipsia, polyuria, hypoglycemia. She checks her  "blood sugars couple times a week. Blood sugar was 102 last time she checked it. No hypoglycemia.     She continues to take simvastatin 80 mg at bedtime for her hyperlipidemia. No joint pain or muscle pain.        She is off Fosamax once week for her bones.  No melana, bloody stools, constipation.     She takes vitamin B12 1000 mcg daily for vitamin B12 deficiency - anemia is stable        SHe has macular degeneration of the eyes and was followed by Dr Tresa Coreas. She got injections in her eyes every 6 weeks in Spring 2015. She now sees Dr Ruelas at [facility] (last saw him 8/18- no need for injections at that time- yearly follow up). Vision has been stable. She is taking a Eye vitamins for her eyes.       No vaginal bleeding and no longer needs vaginal premarin cream    PAST MEDICAL HISTORY:    1. Stage III non-small cell lung cancer, completed chemoradiation with carboplatin and Taxol on 6/1/12    2. Hypertension.   3. Diabetes mellitus.   4. Hyperlipidemia.   5. Chronic diastolic dysfunction.   6. Coronary artery disease status post MI in 1990 and 2003. Stenting was   done at 2003 at [facility].   7. Cholecystectomy, December 2006.   8. Cataract removal.   9. Benign growth removed from her throat.   10. Left common femoral endarterectomy, July 2007.   11. History of anal cancer in 1996 status post radiation and chemotherapy.   12. Carpal tunnel syndrome.   13. Osteoporosis on BMD 12/11    14. Macular degeneration    SOCIAL HISTORY: She quit smoking in 1995, denies any alcohol use. She is etired.     REVIEW OF SYSTEMS: She denies fevers, chills, night sweats, fatigue, visual change, hearing loss, sinus congestion, sore throat, dysuria, hematuria, joint pain, muscle pain, polydipsia, and polyuria.     PHYSICAL EXAM:         /60   Pulse 84   Ht 5' 5" (1.651 m)   Wt 79.9 kg (176 lb 4.1 oz)   SpO2 98%   BMI 29.33 kg/m²     GENERAL: She is alert and oriented. No apparent distress. Affect within " normal limits.   Conjunctivae anicteric.    NECK: Supple.   Respiratory effort normal. Lungs clear to auscultation.   HEART: Regular rate and rhythm without murmurs, gallops, or rubs. No lower   extremity edema.      Labs 8/5/20 reviewed      ASSESSMENT AND PLAN:    1. Hypertension with history of diastolic dyfunction on echo in 2006 -.  Stop hydrochlorothyzide.  Change metoprolol tartrate 50 mg one tablet in am and take 1.5 tablets at night. Due to sedation  2.PVD - stable  2. Mood disorder - stable. Counseling given  3. Memory issues - stable on aricept 5 mg daily.   4. Stage III non-small cell lung cancer, completed chemoradiation with carboplatin and Taxol on 6/1/12 - doing well. FOllow up with Dr Weir yearly  5. Diabetes-stable  3. Hypertension-controlled  4. Hyperlipidemia-lipids controlled  5. Coronary artery disease modifying risk factors.   6. History of anal cancer-had a normal colonoscopy, November 2008; due 2015. Declined repeat  8. Pernicious anemia - on counter vitamin B12 1000 mcg daily. Level fine in Jan 2018  9. Osteoporosis - off alendroante for 7 years.  She can go on a drug holiday. BMD 9/2019  10. Left leg pain due to neuropathy-stable.  .    11. Macular degeneration - stable  Screening mammogram was 9/19 Colonoscopy is due 2015 -declined. Pap smear was July 2010.  Flu 9/252017, Prevnar 11/2015  I will see her back in 4 weeks, sooner if problems arise

## 2020-08-18 NOTE — PATIENT INSTRUCTIONS
Stop hydrochlorothyzide.    Change metoprolol tartrate 50 mg one tablet in am and take 1.5 tablets at night.

## 2020-09-08 ENCOUNTER — HOSPITAL ENCOUNTER (OUTPATIENT)
Dept: RADIOLOGY | Facility: HOSPITAL | Age: 85
Discharge: HOME | End: 2020-09-08
Attending: INTERNAL MEDICINE
Payer: MEDICARE

## 2020-09-08 DIAGNOSIS — Z85.118 HISTORY OF LUNG CANCER: ICD-10-CM

## 2020-09-08 PROCEDURE — 71250 CT THORAX DX C-: CPT | Mod: 26,,, | Performed by: RADIOLOGY

## 2020-09-08 PROCEDURE — 71250 CT CHEST WITHOUT CONTRAST: ICD-10-PCS | Mod: 26,,, | Performed by: RADIOLOGY

## 2020-09-08 PROCEDURE — 71250 CT THORAX DX C-: CPT | Mod: TC

## 2020-09-09 ENCOUNTER — PATIENT OUTREACH (OUTPATIENT)
Dept: ADMINISTRATIVE | Facility: HOSPITAL | Age: 85
End: 2020-09-09

## 2020-09-09 ENCOUNTER — OFFICE VISIT (OUTPATIENT)
Dept: HEMATOLOGY/ONCOLOGY | Facility: CLINIC | Age: 85
End: 2020-09-09
Payer: MEDICARE

## 2020-09-09 VITALS
BODY MASS INDEX: 32.49 KG/M2 | OXYGEN SATURATION: 96 % | DIASTOLIC BLOOD PRESSURE: 67 MMHG | HEART RATE: 90 BPM | WEIGHT: 176.56 LBS | RESPIRATION RATE: 18 BRPM | SYSTOLIC BLOOD PRESSURE: 151 MMHG | TEMPERATURE: 98 F | HEIGHT: 62 IN

## 2020-09-09 DIAGNOSIS — Z85.118 HISTORY OF LUNG CANCER: Primary | ICD-10-CM

## 2020-09-09 PROCEDURE — 1126F PR PAIN SEVERITY QUANTIFIED, NO PAIN PRESENT: ICD-10-PCS | Mod: S$GLB,,, | Performed by: INTERNAL MEDICINE

## 2020-09-09 PROCEDURE — 1101F PR PT FALLS ASSESS DOC 0-1 FALLS W/OUT INJ PAST YR: ICD-10-PCS | Mod: CPTII,S$GLB,, | Performed by: INTERNAL MEDICINE

## 2020-09-09 PROCEDURE — 99213 PR OFFICE/OUTPT VISIT, EST, LEVL III, 20-29 MIN: ICD-10-PCS | Mod: S$GLB,,, | Performed by: INTERNAL MEDICINE

## 2020-09-09 PROCEDURE — 1159F MED LIST DOCD IN RCRD: CPT | Mod: S$GLB,,, | Performed by: INTERNAL MEDICINE

## 2020-09-09 PROCEDURE — 1126F AMNT PAIN NOTED NONE PRSNT: CPT | Mod: S$GLB,,, | Performed by: INTERNAL MEDICINE

## 2020-09-09 PROCEDURE — 1101F PT FALLS ASSESS-DOCD LE1/YR: CPT | Mod: CPTII,S$GLB,, | Performed by: INTERNAL MEDICINE

## 2020-09-09 PROCEDURE — 99999 PR PBB SHADOW E&M-EST. PATIENT-LVL V: CPT | Mod: PBBFAC,,, | Performed by: INTERNAL MEDICINE

## 2020-09-09 PROCEDURE — 99213 OFFICE O/P EST LOW 20 MIN: CPT | Mod: S$GLB,,, | Performed by: INTERNAL MEDICINE

## 2020-09-09 PROCEDURE — 99999 PR PBB SHADOW E&M-EST. PATIENT-LVL V: ICD-10-PCS | Mod: PBBFAC,,, | Performed by: INTERNAL MEDICINE

## 2020-09-09 PROCEDURE — 1159F PR MEDICATION LIST DOCUMENTED IN MEDICAL RECORD: ICD-10-PCS | Mod: S$GLB,,, | Performed by: INTERNAL MEDICINE

## 2020-09-09 NOTE — PROGRESS NOTES
"Subjective:       Patient ID: Brunilda England is a 88 y.o. female.    Chief Complaint: History of lung cancer  Oncologic History:  87 yo female with stage III non-small cell lung cancer, currently completed chemoradiation with carboplatin and Taxol on 6/1/12 . She did not receive consolidation chemotherapy due to worsening neuropathy .   PET scan on 8/7/12 revealed favorable response to therapy with post radiation therapy changes and a small right pleural effusion with pleural thickening. No new or metastatic disease identified   She is on surveillance.        .   HPI Ms. England returns for follow up and scan results from 9/8/2020 reveals "Patient with a reported history of lung cancer status post chemoradiation.  No CT findings to suggest recurrent tumor. Persistent volume loss throughout the right lung with paramediastinal consolidation in keeping with sequela of post radiation change. Stable right-sided pleural effusion. Upper lobe predominant emphysema"  She feels well overall and denies any new issues    Review of Systems   Constitutional: Negative for appetite change, fatigue and unexpected weight change.   HENT: Negative for mouth sores.    Eyes: Negative for visual disturbance.   Respiratory: Negative for cough and shortness of breath.    Cardiovascular: Negative for chest pain.   Gastrointestinal: Negative for abdominal pain and diarrhea.   Genitourinary: Negative for frequency.   Musculoskeletal: Negative for back pain.   Integumentary:  Negative for rash.   Neurological: Negative for headaches.   Hematological: Negative for adenopathy.   Psychiatric/Behavioral: The patient is not nervous/anxious.    All other systems reviewed and are negative.        Objective:      Physical Exam  Vitals signs reviewed.   Constitutional:       Appearance: She is well-developed.   HENT:      Mouth/Throat:      Pharynx: No oropharyngeal exudate.   Cardiovascular:      Rate and Rhythm: Normal rate.      Heart sounds: Normal " heart sounds.   Pulmonary:      Effort: Pulmonary effort is normal.      Breath sounds: Normal breath sounds. No wheezing.   Abdominal:      General: Bowel sounds are normal.      Palpations: Abdomen is soft.      Tenderness: There is no abdominal tenderness.   Musculoskeletal:         General: No tenderness.   Lymphadenopathy:      Cervical: No cervical adenopathy.   Skin:     General: Skin is warm and dry.      Findings: No rash.   Neurological:      Mental Status: She is alert and oriented to person, place, and time.      Coordination: Coordination normal.   Psychiatric:         Thought Content: Thought content normal.         Judgment: Judgment normal.           LABS:  WBC   Date Value Ref Range Status   09/08/2020 3.16 (L) 3.90 - 12.70 K/uL Final     Hemoglobin   Date Value Ref Range Status   09/08/2020 10.3 (L) 12.0 - 16.0 g/dL Final     POC Hematocrit   Date Value Ref Range Status   12/06/2019 32 (L) 36 - 54 %PCV Final     Hematocrit   Date Value Ref Range Status   09/08/2020 30.9 (L) 37.0 - 48.5 % Final     Platelets   Date Value Ref Range Status   09/08/2020 168 150 - 350 K/uL Final     Gran # (ANC)   Date Value Ref Range Status   09/08/2020 1.6 (L) 1.8 - 7.7 K/uL Final     Comment:     The ANC is based on a white cell differential from an   automated cell counter. It has not been microscopically   reviewed for the presence of abnormal cells. Clinical   correlation is required.         Chemistry        Component Value Date/Time     09/08/2020 1124    K 4.7 09/08/2020 1124     09/08/2020 1124    CO2 27 09/08/2020 1124    BUN 13 09/08/2020 1124    CREATININE 1.1 09/08/2020 1124     (H) 09/08/2020 1124        Component Value Date/Time    CALCIUM 9.6 09/08/2020 1124    ALKPHOS 46 (L) 09/08/2020 1124    AST 13 09/08/2020 1124    ALT 8 (L) 09/08/2020 1124    BILITOT 0.2 09/08/2020 1124    ESTGFRAFRICA 51.8 (A) 09/08/2020 1124    EGFRNONAA 44.9 (A) 09/08/2020 1124           Assessment:       1.  History of lung cancer        Plan:         1. She is doing very well clinically with no evidence of recurrence on her CT scans and will return in 1 year with labs and CT chest.    Above care plan was discussed with patient and accompanying daughter and all questions were addressed to their satisfaction

## 2020-09-15 ENCOUNTER — OFFICE VISIT (OUTPATIENT)
Dept: OPHTHALMOLOGY | Facility: CLINIC | Age: 85
End: 2020-09-15
Payer: MEDICARE

## 2020-09-15 DIAGNOSIS — H35.033 HYPERTENSIVE RETINOPATHY OF BOTH EYES: ICD-10-CM

## 2020-09-15 DIAGNOSIS — H35.3232 EXUDATIVE AGE-RELATED MACULAR DEGENERATION OF BOTH EYES WITH INACTIVE CHOROIDAL NEOVASCULARIZATION: Primary | ICD-10-CM

## 2020-09-15 DIAGNOSIS — H43.813 POSTERIOR VITREOUS DETACHMENT, BOTH EYES: ICD-10-CM

## 2020-09-15 PROCEDURE — 92202 OPSCPY EXTND ON/MAC DRAW: CPT | Mod: S$GLB,,, | Performed by: OPHTHALMOLOGY

## 2020-09-15 PROCEDURE — 99499 RISK ADDL DX/OHS AUDIT: ICD-10-PCS | Mod: S$GLB,,, | Performed by: OPHTHALMOLOGY

## 2020-09-15 PROCEDURE — 92014 PR EYE EXAM, EST PATIENT,COMPREHESV: ICD-10-PCS | Mod: S$GLB,,, | Performed by: OPHTHALMOLOGY

## 2020-09-15 PROCEDURE — 92202 PR OPHTHALMOSCOPY, EXT, W/DRAW OPTIC NERVE/MACULA, I&R, UNI/BI: ICD-10-PCS | Mod: S$GLB,,, | Performed by: OPHTHALMOLOGY

## 2020-09-15 PROCEDURE — 92134 POSTERIOR SEGMENT OCT RETINA (OCULAR COHERENCE TOMOGRAPHY)-BOTH EYES: ICD-10-PCS | Mod: S$GLB,,, | Performed by: OPHTHALMOLOGY

## 2020-09-15 PROCEDURE — 92014 COMPRE OPH EXAM EST PT 1/>: CPT | Mod: S$GLB,,, | Performed by: OPHTHALMOLOGY

## 2020-09-15 PROCEDURE — 92134 CPTRZ OPH DX IMG PST SGM RTA: CPT | Mod: S$GLB,,, | Performed by: OPHTHALMOLOGY

## 2020-09-15 PROCEDURE — 99999 PR PBB SHADOW E&M-EST. PATIENT-LVL IV: ICD-10-PCS | Mod: PBBFAC,,, | Performed by: OPHTHALMOLOGY

## 2020-09-15 PROCEDURE — 99999 PR PBB SHADOW E&M-EST. PATIENT-LVL IV: CPT | Mod: PBBFAC,,, | Performed by: OPHTHALMOLOGY

## 2020-09-15 PROCEDURE — 99499 UNLISTED E&M SERVICE: CPT | Mod: S$GLB,,, | Performed by: OPHTHALMOLOGY

## 2020-09-15 NOTE — PROGRESS NOTES
HPI     12 mo OCT   DLS: 09/10/2019 Dr. Leigh       (-)Flashes (-)Floaters  (-)Photophobia  (-)Glare    Refresh tears PRN       OCT - Drusen, RPE atrophy, PED, some HE  No SRF OD, SRF OS improving and small SRH    Prior FA:  Shows window defect, no active leakage OU      A/P    1. Wet AMD OU  - H/o injections OU q3-4 months with Dr. Coreas  - No active leakage/edema OD cont to monitor  s/p Avastin OS x 2 here    With macular atrophy OS - conservative mgmt going forward    11/16 - had rebleed, but given poor prognosis, will monitor    2. HTN Ret OU    3. DM - No DR  BS/BP/chol contour    4. PCIOL OU    5. PVD OU    6. Chalazia RUL  Jonatahn following      12 months OCT

## 2020-09-23 ENCOUNTER — OFFICE VISIT (OUTPATIENT)
Dept: INTERNAL MEDICINE | Facility: CLINIC | Age: 85
End: 2020-09-23
Payer: MEDICARE

## 2020-09-23 VITALS
DIASTOLIC BLOOD PRESSURE: 70 MMHG | BODY MASS INDEX: 32.46 KG/M2 | SYSTOLIC BLOOD PRESSURE: 132 MMHG | HEIGHT: 62 IN | HEART RATE: 89 BPM | OXYGEN SATURATION: 98 % | WEIGHT: 176.38 LBS

## 2020-09-23 DIAGNOSIS — G57.92 NEUROPATHY OF LEFT LOWER EXTREMITY: ICD-10-CM

## 2020-09-23 DIAGNOSIS — I10 ESSENTIAL HYPERTENSION: ICD-10-CM

## 2020-09-23 DIAGNOSIS — E11.51 TYPE 2 DIABETES MELLITUS WITH DIABETIC PERIPHERAL ANGIOPATHY WITHOUT GANGRENE, WITHOUT LONG-TERM CURRENT USE OF INSULIN: ICD-10-CM

## 2020-09-23 DIAGNOSIS — M54.16 LUMBAR RADICULOPATHY: Primary | ICD-10-CM

## 2020-09-23 DIAGNOSIS — F39 MOOD DISORDER: ICD-10-CM

## 2020-09-23 PROCEDURE — 1101F PR PT FALLS ASSESS DOC 0-1 FALLS W/OUT INJ PAST YR: ICD-10-PCS | Mod: CPTII,S$GLB,, | Performed by: INTERNAL MEDICINE

## 2020-09-23 PROCEDURE — 1159F MED LIST DOCD IN RCRD: CPT | Mod: S$GLB,,, | Performed by: INTERNAL MEDICINE

## 2020-09-23 PROCEDURE — 99999 PR PBB SHADOW E&M-EST. PATIENT-LVL II: ICD-10-PCS | Mod: PBBFAC,,, | Performed by: INTERNAL MEDICINE

## 2020-09-23 PROCEDURE — 99214 OFFICE O/P EST MOD 30 MIN: CPT | Mod: S$GLB,,, | Performed by: INTERNAL MEDICINE

## 2020-09-23 PROCEDURE — 99999 PR PBB SHADOW E&M-EST. PATIENT-LVL II: CPT | Mod: PBBFAC,,, | Performed by: INTERNAL MEDICINE

## 2020-09-23 PROCEDURE — 1159F PR MEDICATION LIST DOCUMENTED IN MEDICAL RECORD: ICD-10-PCS | Mod: S$GLB,,, | Performed by: INTERNAL MEDICINE

## 2020-09-23 PROCEDURE — 99214 PR OFFICE/OUTPT VISIT, EST, LEVL IV, 30-39 MIN: ICD-10-PCS | Mod: S$GLB,,, | Performed by: INTERNAL MEDICINE

## 2020-09-23 PROCEDURE — 1101F PT FALLS ASSESS-DOCD LE1/YR: CPT | Mod: CPTII,S$GLB,, | Performed by: INTERNAL MEDICINE

## 2020-09-23 NOTE — PROGRESS NOTES
CHIEF COMPLAINT:  Follow up of hypertension, copd, sinus congestion, diastolic dysfunction    HISTORY OF PRESENT ILLNESS: This is a 88-year-old woman who presents for follow up of above.    Her lightheadedness resolved with stopping HCTZ 12.5 mg daily and decreasing metoprolol - she now takes metoprolol 50 mg one tablet in am and 1/2 tablet at [facility]. NO   chest pain, palpitations.  She continues to take amlodipine - benazepril 5/20 one tablet daliy.        She has been having sinus congestion and has been taking flonase 1 spray in each nostril once daily and  fexofenadine 180 mg once daily which is helping a little. No fever, chills, sore throat, nausea, vomiting     SHe continues to take duloxetine 60 mg daily and citalopram 10 mg daily. Mood has been goo.  Neuropathic pain in the legs is better controlled with the duloxetine.  I encouraged her to get out of the house.  She has not needed  xanax 0.5 mg since April    Breathing has been good.  Appetite is good. No chest pain, palpitations.  She has occasional cough with white mucous.        She had right leg pain - she had a blockage. Dr Gaviria placed a stent in the right CFA and PTA left ext loulou artery 12/18. Right leg pain has resolved.  The ulcerated area on the right leg has totally healed and has not returned.  She is taking aspirin 81 mg daily.       She feels that her memory is better. She is taking aricept 5 mg daily.        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  Right hip pain has resolved and has not returned.          Her non small cell lung cancer is stable. She saw Dr Weir 8/19 and scans were stable. She is to follow up annually with CT scan chest.  She is breathing well.      She is currently taking actos 15 mg daily. No polydipsia, polyuria, hypoglycemia. She checks her blood sugars couple times a week. Blood sugar was 102 last time she checked it. No hypoglycemia.     She continues to take simvastatin  80 mg at bedtime for her hyperlipidemia. No joint pain or muscle pain.        She is off Fosamax once week for her bones.  No melana, bloody stools, constipation.     She takes vitamin B12 1000 mcg daily for vitamin B12 deficiency - anemia is stable        SHe has macular degeneration of the eyes and was followed by Dr Tresa Coreas. She got injections in her eyes every 6 weeks in Spring 2015. She now sees Dr Ruelas at [facility] (last saw him 8/18- no need for injections at that time- yearly follow up). Vision has been stable. She is taking a Eye vitamins for her eyes.       No vaginal bleeding and no longer needs vaginal premarin cream    PAST MEDICAL HISTORY:    1. Stage III non-small cell lung cancer, completed chemoradiation with carboplatin and Taxol on 6/1/12    2. Hypertension.   3. Diabetes mellitus.   4. Hyperlipidemia.   5. Chronic diastolic dysfunction.   6. Coronary artery disease status post MI in 1990 and 2003. Stenting was   done at 2003 at [facility].   7. Cholecystectomy, December 2006.   8. Cataract removal.   9. Benign growth removed from her throat.   10. Left common femoral endarterectomy, July 2007.   11. History of anal cancer in 1996 status post radiation and chemotherapy.   12. Carpal tunnel syndrome.   13. Osteoporosis on BMD 12/11    14. Macular degeneration    SOCIAL HISTORY: She quit smoking in 1995, denies any alcohol use. She is etired.     REVIEW OF SYSTEMS: She denies fevers, chills, night sweats, fatigue, visual change, hearing loss, sinus congestion, sore throat, dysuria, hematuria, joint pain, muscle pain, polydipsia, and polyuria.     PHYSICAL EXAM:        GENERAL: She is alert and oriented. No apparent distress. Affect within normal limits.   Conjunctivae anicteric.    NECK: Supple.   Respiratory effort normal. Lungs clear to auscultation.   HEART: Regular rate and rhythm without murmurs, gallops, or rubs. No lower   extremity edema.      Labs 9/2/20 reviewed. CT  chest 9/8/20 reviewed and stable      ASSESSMENT AND PLAN:    1. Hypertension with history of diastolic dyfunction on echo in 2006 -. Stable  2.PVD - stable  2. Mood disorder - stable. Counseling given  3. Memory issues - stable on aricept 5 mg daily.   4. Stage III non-small cell lung cancer, completed chemoradiation with carboplatin and Taxol on 6/1/12 - doing well. FOllow up with Dr Weir yearly  5. Diabetes-stable  3. Hypertension-controlled  4. Hyperlipidemia-lipids controlled  5. Coronary artery disease modifying risk factors.   6. History of anal cancer-had a normal colonoscopy, November 2008; due 2015. Declined repeat  8. Pernicious anemia - on counter vitamin B12 1000 mcg daily. Level fine in Jan 2018  9. Osteoporosis - off alendroante for 7 years.  She can go on a drug holiday. BMD 9/2019  10. Left leg pain due to neuropathy-stable.  .    11. Macular degeneration - stable  Screening mammogram was 12/19 - declines further. Colonoscopy is due 2015 -declined. Pap smear was July 2010.  Flu 8/31/20, Prevnar 11/2015  I will see her back in 4 weeks, sooner if problems arise

## 2020-11-09 RX ORDER — DONEPEZIL HYDROCHLORIDE 5 MG/1
TABLET, FILM COATED ORAL
Qty: 90 TABLET | Refills: 4 | Status: SHIPPED | OUTPATIENT
Start: 2020-11-09 | End: 2021-12-10 | Stop reason: SDUPTHER

## 2020-11-09 NOTE — PROGRESS NOTES
Refill Routing Note   Medication(s) are not appropriate for processing by [facility] for the following reason(s):     - Outside of Protocol  [facility] action(s):   Route          Medication reconciliation completed: No   Automatic Epic Generated Protocol Data:        Requested Prescriptions   Pending Prescriptions Disp Refills    donepeziL (ARICEPT) 5 MG tablet [Pharmacy Med Name: donepezil 5 mg tablet] 90 tablet 4     Sig: TAKE ONE TABLET BY MOUTH EVERY MORNING,       There is no refill protocol information for this order           Appointments  past 12m or future 3m with PCP    Date Provider   Last Visit   9/23/2020 Pepper Whitfield MD   Next Visit   12/18/2020 Pepper Whitfield MD   ED visits in past 90 days: 0        Note composed:4:18 PM 11/09/2020

## 2020-12-04 ENCOUNTER — PATIENT OUTREACH (OUTPATIENT)
Dept: ADMINISTRATIVE | Facility: HOSPITAL | Age: 85
End: 2020-12-04

## 2020-12-18 ENCOUNTER — OFFICE VISIT (OUTPATIENT)
Dept: INTERNAL MEDICINE | Facility: CLINIC | Age: 85
End: 2020-12-18
Payer: MEDICARE

## 2020-12-18 VITALS
DIASTOLIC BLOOD PRESSURE: 58 MMHG | HEART RATE: 93 BPM | OXYGEN SATURATION: 95 % | SYSTOLIC BLOOD PRESSURE: 128 MMHG | WEIGHT: 174.69 LBS | HEIGHT: 62 IN | BODY MASS INDEX: 32.15 KG/M2

## 2020-12-18 DIAGNOSIS — Z00.00 ROUTINE GENERAL MEDICAL EXAMINATION AT A HEALTH CARE FACILITY: Primary | ICD-10-CM

## 2020-12-18 DIAGNOSIS — Z91.89 AT INCREASED RISK OF EXPOSURE TO COVID-19 VIRUS: ICD-10-CM

## 2020-12-18 DIAGNOSIS — N95.2 ATROPHIC VAGINITIS: ICD-10-CM

## 2020-12-18 DIAGNOSIS — E11.51 TYPE 2 DIABETES MELLITUS WITH DIABETIC PERIPHERAL ANGIOPATHY WITHOUT GANGRENE, WITHOUT LONG-TERM CURRENT USE OF INSULIN: ICD-10-CM

## 2020-12-18 DIAGNOSIS — I10 ESSENTIAL HYPERTENSION: ICD-10-CM

## 2020-12-18 DIAGNOSIS — E55.9 VITAMIN D DEFICIENCY DISEASE: ICD-10-CM

## 2020-12-18 DIAGNOSIS — E53.8 VITAMIN B12 DEFICIENCY: ICD-10-CM

## 2020-12-18 PROCEDURE — 1126F PR PAIN SEVERITY QUANTIFIED, NO PAIN PRESENT: ICD-10-PCS | Mod: S$GLB,,, | Performed by: INTERNAL MEDICINE

## 2020-12-18 PROCEDURE — U0003 INFECTIOUS AGENT DETECTION BY NUCLEIC ACID (DNA OR RNA); SEVERE ACUTE RESPIRATORY SYNDROME CORONAVIRUS 2 (SARS-COV-2) (CORONAVIRUS DISEASE [COVID-19]), AMPLIFIED PROBE TECHNIQUE, MAKING USE OF HIGH THROUGHPUT TECHNOLOGIES AS DESCRIBED BY CMS-2020-01-R: HCPCS

## 2020-12-18 PROCEDURE — 99999 PR PBB SHADOW E&M-EST. PATIENT-LVL III: ICD-10-PCS | Mod: PBBFAC,,, | Performed by: INTERNAL MEDICINE

## 2020-12-18 PROCEDURE — 1126F AMNT PAIN NOTED NONE PRSNT: CPT | Mod: S$GLB,,, | Performed by: INTERNAL MEDICINE

## 2020-12-18 PROCEDURE — 99214 PR OFFICE/OUTPT VISIT, EST, LEVL IV, 30-39 MIN: ICD-10-PCS | Mod: S$GLB,,, | Performed by: INTERNAL MEDICINE

## 2020-12-18 PROCEDURE — 99499 RISK ADDL DX/OHS AUDIT: ICD-10-PCS | Mod: S$GLB,,, | Performed by: INTERNAL MEDICINE

## 2020-12-18 PROCEDURE — 99999 PR PBB SHADOW E&M-EST. PATIENT-LVL III: CPT | Mod: PBBFAC,,, | Performed by: INTERNAL MEDICINE

## 2020-12-18 PROCEDURE — 99499 UNLISTED E&M SERVICE: CPT | Mod: S$GLB,,, | Performed by: INTERNAL MEDICINE

## 2020-12-18 PROCEDURE — 99214 OFFICE O/P EST MOD 30 MIN: CPT | Mod: S$GLB,,, | Performed by: INTERNAL MEDICINE

## 2020-12-18 RX ORDER — SIMVASTATIN 80 MG/1
80 TABLET, FILM COATED ORAL DAILY
Qty: 90 TABLET | Refills: 4 | Status: SHIPPED | OUTPATIENT
Start: 2020-12-18 | End: 2021-12-10 | Stop reason: SDUPTHER

## 2020-12-18 RX ORDER — CITALOPRAM 10 MG/1
10 TABLET ORAL DAILY
Qty: 90 TABLET | Refills: 4 | Status: SHIPPED | OUTPATIENT
Start: 2020-12-18 | End: 2021-12-10 | Stop reason: SDUPTHER

## 2020-12-18 RX ORDER — ALPRAZOLAM 0.5 MG/1
0.5 TABLET ORAL 3 TIMES DAILY PRN
Qty: 60 TABLET | Refills: 0 | Status: SHIPPED | OUTPATIENT
Start: 2020-12-18 | End: 2022-11-15

## 2020-12-18 RX ORDER — PIOGLITAZONEHYDROCHLORIDE 15 MG/1
15 TABLET ORAL EVERY MORNING
Qty: 90 TABLET | Refills: 4 | Status: SHIPPED | OUTPATIENT
Start: 2020-12-18 | End: 2021-12-10 | Stop reason: SDUPTHER

## 2020-12-18 RX ORDER — METOPROLOL TARTRATE 25 MG/1
TABLET, FILM COATED ORAL
Qty: 270 TABLET | Refills: 3 | Status: SHIPPED | OUTPATIENT
Start: 2020-12-18 | End: 2021-12-10 | Stop reason: SDUPTHER

## 2020-12-18 RX ORDER — ESTRADIOL 0.1 MG/G
CREAM VAGINAL
Qty: 42.5 G | Refills: 6 | Status: SHIPPED | OUTPATIENT
Start: 2020-12-18 | End: 2022-04-19 | Stop reason: SDUPTHER

## 2020-12-18 NOTE — PROGRESS NOTES
CHIEF COMPLAINT: Annual exam and   Follow up of hypertension, copd, sinus congestion, diastolic dysfunction    HISTORY OF PRESENT ILLNESS: This is a 89-year-old woman who presents for follow up of above.     Her lightheadedness resolved with stopping HCTZ 12.5 mg daily and decreasing metoprolol - she continues to take metoprolol 50 mg one tablet in am and 1/2 tablet at [facility]. She feels that the metoprolol makes her sleepy. NO   chest pain, palpitations.  She continues to take amlodipine - benazepril 5/20 one tablet daliy.        She has been having sinus congestion that comes and goes. She uses flonase 1 spray in each nostril once daily. No fever, chills, sore throat, nausea, vomiting     SHe continues to take duloxetine 60 mg daily and citalopram 10 mg daily. Mood has been goo.  Neuropathic pain in the legs is better controlled with the duloxetine.  I encouraged her to get out of the house.  She has not needed  xanax 0.5 mg since April     Breathing has been good.  Appetite is good. No chest pain, palpitations.  She has occasional cough with white mucous.        She had right leg pain - she had a blockage. Dr Gaviria placed a stent in the right CFA and PTA left ext loulou artery 12/18. Right leg pain has resolved.  The ulcerated area on the right leg has totally healed and has not returned.  She is taking aspirin 81 mg daily.       She feels that her memory is better. She is taking aricept 5 mg daily.        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  Right hip pain has resolved and has not returned.          Her non small cell lung cancer is stable. She saw Dr Weir 8/19 and scans were stable. She is to follow up annually with CT scan chest.  She is breathing well.      She is currently taking actos 15 mg daily. No polydipsia, polyuria, hypoglycemia. She checks her blood sugars couple times a week. Blood sugar was 102 last time she checked it. No hypoglycemia.     She  continues to take simvastatin 80 mg at bedtime for her hyperlipidemia. No joint pain or muscle pain.        She is off Fosamax once week for her bones.  No melana, bloody stools, constipation.       She takes vitamin B12 1000 mcg daily for vitamin B12 deficiency - anemia is stable        SHe has macular degeneration of the eyes and was followed by Dr Tresa Coreas. She got injections in her eyes every 6 weeks in Spring 2015. She now sees Dr Ruelas at [facility] (last saw him 8/18- no need for injections at that time- yearly follow up). Vision has been stable. She is taking a Eye vitamins for her eyes.       No vaginal bleeding and no longer needs vaginal premarin cream    PAST MEDICAL HISTORY:    1. Stage III non-small cell lung cancer, completed chemoradiation with carboplatin and Taxol on 6/1/12    2. Hypertension.   3. Diabetes mellitus.   4. Hyperlipidemia.   5. Chronic diastolic dysfunction.   6. Coronary artery disease status post MI in 1990 and 2003. Stenting was   done at 2003 at [facility].   7. Cholecystectomy, December 2006.   8. Cataract removal.   9. Benign growth removed from her throat.   10. Left common femoral endarterectomy, July 2007.   11. History of anal cancer in 1996 status post radiation and chemotherapy.   12. Carpal tunnel syndrome.   13. Osteoporosis on BMD 12/11    14. Macular degeneration    SOCIAL HISTORY: She quit smoking in 1995, denies any alcohol use. She is etired.     REVIEW OF SYSTEMS: She denies fevers, chills, night sweats, fatigue, visual change, hearing loss, sinus congestion, sore throat, dysuria, hematuria, joint pain, muscle pain, polydipsia, and polyuria.     PHYSICAL EXAM:        GENERAL: She is alert and oriented. No apparent distress. Affect within normal limits.   Conjunctivae anicteric.    NECK: Supple.   Respiratory effort normal. Lungs clear to auscultation.   HEART: Regular rate and rhythm without murmurs, gallops, or rubs. No lower   extremity  edema.    Protective Sensation (w/ 10 gram monofilament):  Right: Intact  Left: Intact    Visual Inspection:  Onychomycosis -  Bilateral    Pedal Pulses:   Right: Present  Left: Present    Posterior tibialis:   Right:Present  Left: Present          Labs 9/2/20 reviewed. CT chest 9/8/20 reviewed and stable      ASSESSMENT AND PLAN:      Annual exam - discussed diet, exercise and safety issues.    1. Hypertension with history of diastolic dyfunction on echo in 2006 -. Stable  2.PVD - stable  2. Mood disorder - stable. Counseling given  3. Memory issues - stable on aricept 5 mg daily.   4. Stage III non-small cell lung cancer, completed chemoradiation with carboplatin and Taxol on 6/1/12 - doing well. FOllow up with Dr Weir yearly  5. Diabetes-stable  3. Hypertension-controlled  4. Hyperlipidemia-lipids controlled  5. Coronary artery disease modifying risk factors.   6. History of anal cancer-had a normal colonoscopy, November 2008; due 2015. Declined repeat  8. Pernicious anemia - on counter vitamin B12 1000 mcg daily. Level fine in Jan 2018  9. Osteoporosis - off alendroante for 7 years.  She can go on a drug holiday. BMD 9/2019  10. Left leg pain due to neuropathy-stable.  .    11. Macular degeneration - stable  Screening mammogram was 12/19 - declines further. Colonoscopy is due 2015 -declined. Pap smear was July 2010.  Flu 8/31/20, Prevnar 11/2015  I will see her back in 4 weeks, sooner if problems arise

## 2020-12-20 LAB — SARS-COV-2 RNA RESP QL NAA+PROBE: NOT DETECTED

## 2021-01-08 ENCOUNTER — TELEPHONE (OUTPATIENT)
Dept: INTERNAL MEDICINE | Facility: CLINIC | Age: 86
End: 2021-01-08

## 2021-01-10 ENCOUNTER — IMMUNIZATION (OUTPATIENT)
Dept: INTERNAL MEDICINE | Facility: CLINIC | Age: 86
End: 2021-01-10
Payer: MEDICARE

## 2021-01-10 DIAGNOSIS — Z23 NEED FOR VACCINATION: ICD-10-CM

## 2021-01-10 PROCEDURE — 91300 COVID-19, MRNA, LNP-S, PF, 30 MCG/0.3 ML DOSE VACCINE: CPT | Mod: PBBFAC | Performed by: INTERNAL MEDICINE

## 2021-01-20 NOTE — Clinical Note
Tammi,Chalazion continuing to drain x 6-8 weeks, she is inquiring about formal I&D/sac excision. Patent

## 2021-01-31 ENCOUNTER — IMMUNIZATION (OUTPATIENT)
Dept: INTERNAL MEDICINE | Facility: CLINIC | Age: 86
End: 2021-01-31
Payer: MEDICARE

## 2021-01-31 DIAGNOSIS — Z23 NEED FOR VACCINATION: Primary | ICD-10-CM

## 2021-01-31 PROCEDURE — 0002A PR IMMUNIZ ADMIN, SARS-COV-2 COVID-19 VACC, 30MCG/0.3ML, 2ND DOSE: CPT | Mod: PBBFAC | Performed by: INTERNAL MEDICINE

## 2021-01-31 PROCEDURE — 91300 PR SARS-COV- 2 COVID-19 VACCINE, NO PRSV, 30MCG/0.3ML, IM: CPT | Mod: PBBFAC | Performed by: INTERNAL MEDICINE

## 2021-01-31 RX ADMIN — RNA INGREDIENT BNT-162B2 0.3 ML: 0.23 INJECTION, SUSPENSION INTRAMUSCULAR at 09:01

## 2021-04-12 ENCOUNTER — HOSPITAL ENCOUNTER (EMERGENCY)
Facility: HOSPITAL | Age: 86
Discharge: HOME | End: 2021-04-12
Attending: EMERGENCY MEDICINE
Payer: MEDICARE

## 2021-04-12 VITALS
HEIGHT: 63 IN | RESPIRATION RATE: 25 BRPM | OXYGEN SATURATION: 96 % | DIASTOLIC BLOOD PRESSURE: 73 MMHG | HEART RATE: 98 BPM | BODY MASS INDEX: 33.13 KG/M2 | TEMPERATURE: 99 F | WEIGHT: 187 LBS | SYSTOLIC BLOOD PRESSURE: 159 MMHG

## 2021-04-12 DIAGNOSIS — R42 LIGHTHEADEDNESS: Primary | ICD-10-CM

## 2021-04-12 LAB
ALBUMIN SERPL BCP-MCNC: 3.2 G/DL (ref 3.5–5.2)
ALP SERPL-CCNC: 54 U/L (ref 55–135)
ALT SERPL W/O P-5'-P-CCNC: 8 U/L (ref 10–44)
ANION GAP SERPL CALC-SCNC: 11 MMOL/L (ref 8–16)
AST SERPL-CCNC: 12 U/L (ref 10–40)
BASOPHILS # BLD AUTO: 0.02 K/UL (ref 0–0.2)
BASOPHILS NFR BLD: 0.5 % (ref 0–1.9)
BILIRUB SERPL-MCNC: 0.2 MG/DL (ref 0.1–1)
BUN SERPL-MCNC: 10 MG/DL (ref 8–23)
CALCIUM SERPL-MCNC: 9.6 MG/DL (ref 8.7–10.5)
CHLORIDE SERPL-SCNC: 103 MMOL/L (ref 95–110)
CO2 SERPL-SCNC: 27 MMOL/L (ref 23–29)
CREAT SERPL-MCNC: 0.9 MG/DL (ref 0.5–1.4)
DIFFERENTIAL METHOD: ABNORMAL
EOSINOPHIL # BLD AUTO: 0.1 K/UL (ref 0–0.5)
EOSINOPHIL NFR BLD: 1.4 % (ref 0–8)
ERYTHROCYTE [DISTWIDTH] IN BLOOD BY AUTOMATED COUNT: 16.3 % (ref 11.5–14.5)
EST. GFR  (AFRICAN AMERICAN): >60 ML/MIN/1.73 M^2
EST. GFR  (NON AFRICAN AMERICAN): 56.9 ML/MIN/1.73 M^2
GLUCOSE SERPL-MCNC: 153 MG/DL (ref 70–110)
HCT VFR BLD AUTO: 32.4 % (ref 37–48.5)
HGB BLD-MCNC: 11.1 G/DL (ref 12–16)
IMM GRANULOCYTES # BLD AUTO: 0.01 K/UL (ref 0–0.04)
IMM GRANULOCYTES NFR BLD AUTO: 0.3 % (ref 0–0.5)
LIPASE SERPL-CCNC: 42 U/L (ref 4–60)
LYMPHOCYTES # BLD AUTO: 1.1 K/UL (ref 1–4.8)
LYMPHOCYTES NFR BLD: 30.2 % (ref 18–48)
MCH RBC QN AUTO: 29.3 PG (ref 27–31)
MCHC RBC AUTO-ENTMCNC: 34.3 G/DL (ref 32–36)
MCV RBC AUTO: 86 FL (ref 82–98)
MONOCYTES # BLD AUTO: 0.4 K/UL (ref 0.3–1)
MONOCYTES NFR BLD: 12.1 % (ref 4–15)
NEUTROPHILS # BLD AUTO: 2 K/UL (ref 1.8–7.7)
NEUTROPHILS NFR BLD: 55.5 % (ref 38–73)
NRBC BLD-RTO: 0 /100 WBC
PLATELET # BLD AUTO: 184 K/UL (ref 150–450)
PMV BLD AUTO: 11.6 FL (ref 9.2–12.9)
POTASSIUM SERPL-SCNC: 4.1 MMOL/L (ref 3.5–5.1)
PROT SERPL-MCNC: 7.7 G/DL (ref 6–8.4)
RBC # BLD AUTO: 3.79 M/UL (ref 4–5.4)
SODIUM SERPL-SCNC: 141 MMOL/L (ref 136–145)
TROPONIN I SERPL DL<=0.01 NG/ML-MCNC: 0.02 NG/ML (ref 0–0.03)
WBC # BLD AUTO: 3.64 K/UL (ref 3.9–12.7)

## 2021-04-12 PROCEDURE — 99284 PR EMERGENCY DEPT VISIT,LEVEL IV: ICD-10-PCS | Mod: GC,,, | Performed by: EMERGENCY MEDICINE

## 2021-04-12 PROCEDURE — 99284 EMERGENCY DEPT VISIT MOD MDM: CPT | Mod: 25

## 2021-04-12 PROCEDURE — 85025 COMPLETE CBC W/AUTO DIFF WBC: CPT | Performed by: EMERGENCY MEDICINE

## 2021-04-12 PROCEDURE — 99284 EMERGENCY DEPT VISIT MOD MDM: CPT | Mod: GC,,, | Performed by: EMERGENCY MEDICINE

## 2021-04-12 PROCEDURE — 93010 EKG 12-LEAD: ICD-10-PCS | Mod: ,,, | Performed by: INTERNAL MEDICINE

## 2021-04-12 PROCEDURE — 84484 ASSAY OF TROPONIN QUANT: CPT | Performed by: EMERGENCY MEDICINE

## 2021-04-12 PROCEDURE — 93005 ELECTROCARDIOGRAM TRACING: CPT

## 2021-04-12 PROCEDURE — 83690 ASSAY OF LIPASE: CPT | Performed by: EMERGENCY MEDICINE

## 2021-04-12 PROCEDURE — 93010 ELECTROCARDIOGRAM REPORT: CPT | Mod: ,,, | Performed by: INTERNAL MEDICINE

## 2021-04-12 PROCEDURE — 80053 COMPREHEN METABOLIC PANEL: CPT | Performed by: EMERGENCY MEDICINE

## 2021-04-13 ENCOUNTER — PES CALL (OUTPATIENT)
Dept: ADMINISTRATIVE | Facility: CLINIC | Age: 86
End: 2021-04-13

## 2021-04-13 ENCOUNTER — LAB VISIT (OUTPATIENT)
Dept: LAB | Facility: HOSPITAL | Age: 86
End: 2021-04-13
Attending: INTERNAL MEDICINE
Payer: MEDICARE

## 2021-04-13 ENCOUNTER — TELEPHONE (OUTPATIENT)
Dept: EMERGENCY MEDICINE | Facility: HOSPITAL | Age: 86
End: 2021-04-13

## 2021-04-13 DIAGNOSIS — E55.9 VITAMIN D DEFICIENCY DISEASE: ICD-10-CM

## 2021-04-13 DIAGNOSIS — E53.8 VITAMIN B12 DEFICIENCY: ICD-10-CM

## 2021-04-13 DIAGNOSIS — E11.51 TYPE 2 DIABETES MELLITUS WITH DIABETIC PERIPHERAL ANGIOPATHY WITHOUT GANGRENE, WITHOUT LONG-TERM CURRENT USE OF INSULIN: ICD-10-CM

## 2021-04-13 DIAGNOSIS — I10 ESSENTIAL HYPERTENSION: ICD-10-CM

## 2021-04-13 LAB
ALBUMIN SERPL BCP-MCNC: 3.4 G/DL (ref 3.5–5.2)
ALP SERPL-CCNC: 52 U/L (ref 55–135)
ALT SERPL W/O P-5'-P-CCNC: 6 U/L (ref 10–44)
ANION GAP SERPL CALC-SCNC: 11 MMOL/L (ref 8–16)
AST SERPL-CCNC: 13 U/L (ref 10–40)
BASOPHILS # BLD AUTO: 0.02 K/UL (ref 0–0.2)
BASOPHILS NFR BLD: 0.5 % (ref 0–1.9)
BILIRUB SERPL-MCNC: 0.3 MG/DL (ref 0.1–1)
BUN SERPL-MCNC: 10 MG/DL (ref 8–23)
CALCIUM SERPL-MCNC: 10 MG/DL (ref 8.7–10.5)
CHLORIDE SERPL-SCNC: 105 MMOL/L (ref 95–110)
CHOLEST SERPL-MCNC: 160 MG/DL (ref 120–199)
CHOLEST/HDLC SERPL: 3 {RATIO} (ref 2–5)
CO2 SERPL-SCNC: 26 MMOL/L (ref 23–29)
CREAT SERPL-MCNC: 1 MG/DL (ref 0.5–1.4)
DIFFERENTIAL METHOD: ABNORMAL
EOSINOPHIL # BLD AUTO: 0.1 K/UL (ref 0–0.5)
EOSINOPHIL NFR BLD: 1.5 % (ref 0–8)
ERYTHROCYTE [DISTWIDTH] IN BLOOD BY AUTOMATED COUNT: 16.7 % (ref 11.5–14.5)
EST. GFR  (AFRICAN AMERICAN): 57.7 ML/MIN/1.73 M^2
EST. GFR  (NON AFRICAN AMERICAN): 50.1 ML/MIN/1.73 M^2
ESTIMATED AVG GLUCOSE: 134 MG/DL (ref 68–131)
GLUCOSE SERPL-MCNC: 145 MG/DL (ref 70–110)
HBA1C MFR BLD: 6.3 % (ref 4–5.6)
HCT VFR BLD AUTO: 33.6 % (ref 37–48.5)
HDLC SERPL-MCNC: 53 MG/DL (ref 40–75)
HDLC SERPL: 33.1 % (ref 20–50)
HGB BLD-MCNC: 11.3 G/DL (ref 12–16)
IMM GRANULOCYTES # BLD AUTO: 0.01 K/UL (ref 0–0.04)
IMM GRANULOCYTES NFR BLD AUTO: 0.3 % (ref 0–0.5)
LDLC SERPL CALC-MCNC: 72 MG/DL (ref 63–159)
LYMPHOCYTES # BLD AUTO: 1.5 K/UL (ref 1–4.8)
LYMPHOCYTES NFR BLD: 38.6 % (ref 18–48)
MCH RBC QN AUTO: 28.9 PG (ref 27–31)
MCHC RBC AUTO-ENTMCNC: 33.6 G/DL (ref 32–36)
MCV RBC AUTO: 86 FL (ref 82–98)
MONOCYTES # BLD AUTO: 0.4 K/UL (ref 0.3–1)
MONOCYTES NFR BLD: 9.5 % (ref 4–15)
NEUTROPHILS # BLD AUTO: 1.9 K/UL (ref 1.8–7.7)
NEUTROPHILS NFR BLD: 49.6 % (ref 38–73)
NONHDLC SERPL-MCNC: 107 MG/DL
NRBC BLD-RTO: 0 /100 WBC
PLATELET # BLD AUTO: 207 K/UL (ref 150–450)
PMV BLD AUTO: 11.5 FL (ref 9.2–12.9)
POTASSIUM SERPL-SCNC: 3.8 MMOL/L (ref 3.5–5.1)
PROT SERPL-MCNC: 8 G/DL (ref 6–8.4)
RBC # BLD AUTO: 3.91 M/UL (ref 4–5.4)
SODIUM SERPL-SCNC: 142 MMOL/L (ref 136–145)
TRIGL SERPL-MCNC: 175 MG/DL (ref 30–150)
TSH SERPL DL<=0.005 MIU/L-ACNC: 2.92 UIU/ML (ref 0.4–4)
WBC # BLD AUTO: 3.91 K/UL (ref 3.9–12.7)

## 2021-04-13 PROCEDURE — 80053 COMPREHEN METABOLIC PANEL: CPT | Performed by: INTERNAL MEDICINE

## 2021-04-13 PROCEDURE — 85025 COMPLETE CBC W/AUTO DIFF WBC: CPT | Performed by: INTERNAL MEDICINE

## 2021-04-13 PROCEDURE — 82306 VITAMIN D 25 HYDROXY: CPT | Performed by: INTERNAL MEDICINE

## 2021-04-13 PROCEDURE — 80061 LIPID PANEL: CPT | Performed by: INTERNAL MEDICINE

## 2021-04-13 PROCEDURE — 82607 VITAMIN B-12: CPT | Performed by: INTERNAL MEDICINE

## 2021-04-13 PROCEDURE — 84443 ASSAY THYROID STIM HORMONE: CPT | Performed by: INTERNAL MEDICINE

## 2021-04-13 PROCEDURE — 36415 COLL VENOUS BLD VENIPUNCTURE: CPT | Mod: PO | Performed by: INTERNAL MEDICINE

## 2021-04-13 PROCEDURE — 83036 HEMOGLOBIN GLYCOSYLATED A1C: CPT | Performed by: INTERNAL MEDICINE

## 2021-04-14 LAB
25(OH)D3+25(OH)D2 SERPL-MCNC: 24 NG/ML (ref 30–96)
VIT B12 SERPL-MCNC: 793 PG/ML (ref 210–950)

## 2021-04-16 ENCOUNTER — OFFICE VISIT (OUTPATIENT)
Dept: INTERNAL MEDICINE | Facility: CLINIC | Age: 86
End: 2021-04-16
Payer: MEDICARE

## 2021-04-16 VITALS
OXYGEN SATURATION: 96 % | WEIGHT: 170.31 LBS | DIASTOLIC BLOOD PRESSURE: 60 MMHG | BODY MASS INDEX: 30.18 KG/M2 | HEIGHT: 63 IN | SYSTOLIC BLOOD PRESSURE: 120 MMHG

## 2021-04-16 DIAGNOSIS — Z85.118 HISTORY OF LUNG CANCER: ICD-10-CM

## 2021-04-16 DIAGNOSIS — E78.00 PURE HYPERCHOLESTEROLEMIA: ICD-10-CM

## 2021-04-16 DIAGNOSIS — G62.9 NEUROPATHY: ICD-10-CM

## 2021-04-16 DIAGNOSIS — I10 ESSENTIAL HYPERTENSION: Primary | ICD-10-CM

## 2021-04-16 DIAGNOSIS — G31.84 MILD COGNITIVE IMPAIRMENT: ICD-10-CM

## 2021-04-16 DIAGNOSIS — E11.51 TYPE 2 DIABETES MELLITUS WITH DIABETIC PERIPHERAL ANGIOPATHY WITHOUT GANGRENE, WITHOUT LONG-TERM CURRENT USE OF INSULIN: ICD-10-CM

## 2021-04-16 DIAGNOSIS — I25.10 CORONARY ARTERY DISEASE INVOLVING NATIVE CORONARY ARTERY OF NATIVE HEART WITHOUT ANGINA PECTORIS: ICD-10-CM

## 2021-04-16 DIAGNOSIS — Z85.048 HISTORY OF RECTAL OR ANAL CANCER: ICD-10-CM

## 2021-04-16 DIAGNOSIS — H35.3232 EXUDATIVE AGE-RELATED MACULAR DEGENERATION OF BOTH EYES WITH INACTIVE CHOROIDAL NEOVASCULARIZATION: ICD-10-CM

## 2021-04-16 DIAGNOSIS — I73.9 PVD (PERIPHERAL VASCULAR DISEASE) WITH CLAUDICATION: ICD-10-CM

## 2021-04-16 DIAGNOSIS — I70.229 ATHEROSCLEROTIC PERIPHERAL VASCULAR DISEASE WITH REST PAIN: ICD-10-CM

## 2021-04-16 PROCEDURE — 99499 UNLISTED E&M SERVICE: CPT | Mod: S$GLB,,, | Performed by: INTERNAL MEDICINE

## 2021-04-16 PROCEDURE — 99999 PR PBB SHADOW E&M-EST. PATIENT-LVL II: ICD-10-PCS | Mod: PBBFAC,,, | Performed by: INTERNAL MEDICINE

## 2021-04-16 PROCEDURE — 1159F MED LIST DOCD IN RCRD: CPT | Mod: S$GLB,,, | Performed by: INTERNAL MEDICINE

## 2021-04-16 PROCEDURE — 99214 PR OFFICE/OUTPT VISIT, EST, LEVL IV, 30-39 MIN: ICD-10-PCS | Mod: S$GLB,,, | Performed by: INTERNAL MEDICINE

## 2021-04-16 PROCEDURE — 1159F PR MEDICATION LIST DOCUMENTED IN MEDICAL RECORD: ICD-10-PCS | Mod: S$GLB,,, | Performed by: INTERNAL MEDICINE

## 2021-04-16 PROCEDURE — 99499 RISK ADDL DX/OHS AUDIT: ICD-10-PCS | Mod: S$GLB,,, | Performed by: INTERNAL MEDICINE

## 2021-04-16 PROCEDURE — 99999 PR PBB SHADOW E&M-EST. PATIENT-LVL II: CPT | Mod: PBBFAC,,, | Performed by: INTERNAL MEDICINE

## 2021-04-16 PROCEDURE — 99214 OFFICE O/P EST MOD 30 MIN: CPT | Mod: S$GLB,,, | Performed by: INTERNAL MEDICINE

## 2021-04-19 ENCOUNTER — TELEPHONE (OUTPATIENT)
Dept: INTERNAL MEDICINE | Facility: CLINIC | Age: 86
End: 2021-04-19

## 2021-04-29 ENCOUNTER — TELEPHONE (OUTPATIENT)
Dept: INTERNAL MEDICINE | Facility: CLINIC | Age: 86
End: 2021-04-29

## 2021-04-30 ENCOUNTER — TELEPHONE (OUTPATIENT)
Dept: INTERNAL MEDICINE | Facility: CLINIC | Age: 86
End: 2021-04-30

## 2021-04-30 DIAGNOSIS — R41.3 MEMORY LOSS: Primary | ICD-10-CM

## 2021-05-04 ENCOUNTER — OFFICE VISIT (OUTPATIENT)
Dept: DERMATOLOGY | Facility: CLINIC | Age: 86
End: 2021-05-04
Payer: MEDICARE

## 2021-05-04 DIAGNOSIS — B07.9 VERRUCA: Primary | ICD-10-CM

## 2021-05-04 DIAGNOSIS — D23.30 INTRADERMAL NEVUS OF FACE: ICD-10-CM

## 2021-05-04 DIAGNOSIS — L85.3 XEROSIS CUTIS: ICD-10-CM

## 2021-05-04 DIAGNOSIS — L73.8 SEBACEOUS HYPERPLASIA OF FACE: ICD-10-CM

## 2021-05-04 DIAGNOSIS — D22.9 MULTIPLE NEVI: ICD-10-CM

## 2021-05-04 PROCEDURE — 99999 PR PBB SHADOW E&M-EST. PATIENT-LVL III: ICD-10-PCS | Mod: PBBFAC,,,

## 2021-05-04 PROCEDURE — 99999 PR PBB SHADOW E&M-EST. PATIENT-LVL III: CPT | Mod: PBBFAC,,,

## 2021-05-04 PROCEDURE — 1159F PR MEDICATION LIST DOCUMENTED IN MEDICAL RECORD: ICD-10-PCS | Mod: S$GLB,,, | Performed by: DERMATOLOGY

## 2021-05-04 PROCEDURE — 99203 OFFICE O/P NEW LOW 30 MIN: CPT | Mod: GC,S$GLB,, | Performed by: DERMATOLOGY

## 2021-05-04 PROCEDURE — 1159F MED LIST DOCD IN RCRD: CPT | Mod: S$GLB,,, | Performed by: DERMATOLOGY

## 2021-05-04 PROCEDURE — 99203 PR OFFICE/OUTPT VISIT, NEW, LEVL III, 30-44 MIN: ICD-10-PCS | Mod: GC,S$GLB,, | Performed by: DERMATOLOGY

## 2021-05-31 ENCOUNTER — OFFICE VISIT (OUTPATIENT)
Dept: OTOLARYNGOLOGY | Facility: CLINIC | Age: 86
End: 2021-05-31
Payer: MEDICARE

## 2021-05-31 ENCOUNTER — OFFICE VISIT (OUTPATIENT)
Dept: URGENT CARE | Facility: CLINIC | Age: 86
End: 2021-05-31
Payer: MEDICARE

## 2021-05-31 VITALS
HEIGHT: 63 IN | DIASTOLIC BLOOD PRESSURE: 68 MMHG | WEIGHT: 172 LBS | HEART RATE: 104 BPM | SYSTOLIC BLOOD PRESSURE: 138 MMHG | BODY MASS INDEX: 30.48 KG/M2 | RESPIRATION RATE: 16 BRPM | TEMPERATURE: 97 F | OXYGEN SATURATION: 98 %

## 2021-05-31 VITALS — SYSTOLIC BLOOD PRESSURE: 136 MMHG | DIASTOLIC BLOOD PRESSURE: 70 MMHG | HEART RATE: 108 BPM

## 2021-05-31 DIAGNOSIS — H61.21 IMPACTED CERUMEN OF RIGHT EAR: Primary | ICD-10-CM

## 2021-05-31 DIAGNOSIS — T16.1XXA FOREIGN BODY OF RIGHT EAR, INITIAL ENCOUNTER: ICD-10-CM

## 2021-05-31 DIAGNOSIS — H60.91 OTITIS EXTERNA OF RIGHT EAR, UNSPECIFIED CHRONICITY, UNSPECIFIED TYPE: Primary | ICD-10-CM

## 2021-05-31 PROCEDURE — 1125F PR PAIN SEVERITY QUANTIFIED, PAIN PRESENT: ICD-10-PCS | Mod: S$GLB,,, | Performed by: NURSE PRACTITIONER

## 2021-05-31 PROCEDURE — 99214 OFFICE O/P EST MOD 30 MIN: CPT | Mod: S$GLB,,, | Performed by: INTERNAL MEDICINE

## 2021-05-31 PROCEDURE — 1125F AMNT PAIN NOTED PAIN PRSNT: CPT | Mod: S$GLB,,, | Performed by: NURSE PRACTITIONER

## 2021-05-31 PROCEDURE — 69200 PR REMV EXT CANAL FOREIGN BODY: ICD-10-PCS | Mod: RT,S$GLB,, | Performed by: NURSE PRACTITIONER

## 2021-05-31 PROCEDURE — 99203 PR OFFICE/OUTPT VISIT, NEW, LEVL III, 30-44 MIN: ICD-10-PCS | Mod: 25,S$GLB,, | Performed by: NURSE PRACTITIONER

## 2021-05-31 PROCEDURE — 1159F PR MEDICATION LIST DOCUMENTED IN MEDICAL RECORD: ICD-10-PCS | Mod: S$GLB,,, | Performed by: NURSE PRACTITIONER

## 2021-05-31 PROCEDURE — 1159F MED LIST DOCD IN RCRD: CPT | Mod: S$GLB,,, | Performed by: NURSE PRACTITIONER

## 2021-05-31 PROCEDURE — 1101F PR PT FALLS ASSESS DOC 0-1 FALLS W/OUT INJ PAST YR: ICD-10-PCS | Mod: CPTII,S$GLB,, | Performed by: NURSE PRACTITIONER

## 2021-05-31 PROCEDURE — 99999 PR PBB SHADOW E&M-EST. PATIENT-LVL III: ICD-10-PCS | Mod: PBBFAC,,, | Performed by: NURSE PRACTITIONER

## 2021-05-31 PROCEDURE — 3288F PR FALLS RISK ASSESSMENT DOCUMENTED: ICD-10-PCS | Mod: CPTII,S$GLB,, | Performed by: NURSE PRACTITIONER

## 2021-05-31 PROCEDURE — 3288F FALL RISK ASSESSMENT DOCD: CPT | Mod: CPTII,S$GLB,, | Performed by: NURSE PRACTITIONER

## 2021-05-31 PROCEDURE — 1101F PT FALLS ASSESS-DOCD LE1/YR: CPT | Mod: CPTII,S$GLB,, | Performed by: NURSE PRACTITIONER

## 2021-05-31 PROCEDURE — 69200 CLEAR OUTER EAR CANAL: CPT | Mod: RT,S$GLB,, | Performed by: NURSE PRACTITIONER

## 2021-05-31 PROCEDURE — 99214 PR OFFICE/OUTPT VISIT, EST, LEVL IV, 30-39 MIN: ICD-10-PCS | Mod: S$GLB,,, | Performed by: INTERNAL MEDICINE

## 2021-05-31 PROCEDURE — 99203 OFFICE O/P NEW LOW 30 MIN: CPT | Mod: 25,S$GLB,, | Performed by: NURSE PRACTITIONER

## 2021-05-31 PROCEDURE — 99999 PR PBB SHADOW E&M-EST. PATIENT-LVL III: CPT | Mod: PBBFAC,,, | Performed by: NURSE PRACTITIONER

## 2021-05-31 RX ORDER — CIPROFLOXACIN AND DEXAMETHASONE 3; 1 MG/ML; MG/ML
4 SUSPENSION/ DROPS AURICULAR (OTIC) 2 TIMES DAILY
Qty: 7.5 ML | Refills: 0 | Status: SHIPPED | OUTPATIENT
Start: 2021-05-31 | End: 2021-06-07

## 2021-06-07 ENCOUNTER — OFFICE VISIT (OUTPATIENT)
Dept: OTOLARYNGOLOGY | Facility: CLINIC | Age: 86
End: 2021-06-07
Payer: MEDICARE

## 2021-06-07 VITALS — DIASTOLIC BLOOD PRESSURE: 77 MMHG | HEART RATE: 89 BPM | SYSTOLIC BLOOD PRESSURE: 144 MMHG

## 2021-06-07 DIAGNOSIS — H60.91 OTITIS EXTERNA OF RIGHT EAR, UNSPECIFIED CHRONICITY, UNSPECIFIED TYPE: Primary | ICD-10-CM

## 2021-06-07 DIAGNOSIS — T16.1XXD FOREIGN BODY OF RIGHT EAR, SUBSEQUENT ENCOUNTER: ICD-10-CM

## 2021-06-07 PROCEDURE — 3288F PR FALLS RISK ASSESSMENT DOCUMENTED: ICD-10-PCS | Mod: CPTII,S$GLB,, | Performed by: NURSE PRACTITIONER

## 2021-06-07 PROCEDURE — 99999 PR PBB SHADOW E&M-EST. PATIENT-LVL III: CPT | Mod: PBBFAC,,, | Performed by: NURSE PRACTITIONER

## 2021-06-07 PROCEDURE — 1159F MED LIST DOCD IN RCRD: CPT | Mod: S$GLB,,, | Performed by: NURSE PRACTITIONER

## 2021-06-07 PROCEDURE — 1126F AMNT PAIN NOTED NONE PRSNT: CPT | Mod: S$GLB,,, | Performed by: NURSE PRACTITIONER

## 2021-06-07 PROCEDURE — 1101F PR PT FALLS ASSESS DOC 0-1 FALLS W/OUT INJ PAST YR: ICD-10-PCS | Mod: CPTII,S$GLB,, | Performed by: NURSE PRACTITIONER

## 2021-06-07 PROCEDURE — 1101F PT FALLS ASSESS-DOCD LE1/YR: CPT | Mod: CPTII,S$GLB,, | Performed by: NURSE PRACTITIONER

## 2021-06-07 PROCEDURE — 1126F PR PAIN SEVERITY QUANTIFIED, NO PAIN PRESENT: ICD-10-PCS | Mod: S$GLB,,, | Performed by: NURSE PRACTITIONER

## 2021-06-07 PROCEDURE — 99212 OFFICE O/P EST SF 10 MIN: CPT | Mod: S$GLB,,, | Performed by: NURSE PRACTITIONER

## 2021-06-07 PROCEDURE — 3288F FALL RISK ASSESSMENT DOCD: CPT | Mod: CPTII,S$GLB,, | Performed by: NURSE PRACTITIONER

## 2021-06-07 PROCEDURE — 92504 PR EAR MICROSCOPY EXAMINATION: ICD-10-PCS | Mod: RT,S$GLB,, | Performed by: NURSE PRACTITIONER

## 2021-06-07 PROCEDURE — 99212 PR OFFICE/OUTPT VISIT, EST, LEVL II, 10-19 MIN: ICD-10-PCS | Mod: S$GLB,,, | Performed by: NURSE PRACTITIONER

## 2021-06-07 PROCEDURE — 1159F PR MEDICATION LIST DOCUMENTED IN MEDICAL RECORD: ICD-10-PCS | Mod: S$GLB,,, | Performed by: NURSE PRACTITIONER

## 2021-06-07 PROCEDURE — 99999 PR PBB SHADOW E&M-EST. PATIENT-LVL III: ICD-10-PCS | Mod: PBBFAC,,, | Performed by: NURSE PRACTITIONER

## 2021-06-07 PROCEDURE — 92504 EAR MICROSCOPY EXAMINATION: CPT | Mod: RT,S$GLB,, | Performed by: NURSE PRACTITIONER

## 2021-06-16 DIAGNOSIS — F39 MOOD DISORDER: Primary | ICD-10-CM

## 2021-06-16 DIAGNOSIS — I10 ESSENTIAL HYPERTENSION: ICD-10-CM

## 2021-06-20 RX ORDER — DULOXETIN HYDROCHLORIDE 60 MG/1
60 CAPSULE, DELAYED RELEASE ORAL EVERY MORNING
Qty: 90 CAPSULE | Refills: 3 | Status: SHIPPED | OUTPATIENT
Start: 2021-06-20 | End: 2022-07-11 | Stop reason: SDUPTHER

## 2021-06-20 RX ORDER — AMLODIPINE AND BENAZEPRIL HYDROCHLORIDE 5; 20 MG/1; MG/1
1 CAPSULE ORAL DAILY
Qty: 90 CAPSULE | Refills: 3 | Status: SHIPPED | OUTPATIENT
Start: 2021-06-20 | End: 2021-08-06 | Stop reason: SDUPTHER

## 2021-06-24 ENCOUNTER — TELEPHONE (OUTPATIENT)
Dept: NEUROLOGY | Facility: CLINIC | Age: 86
End: 2021-06-24

## 2021-06-29 ENCOUNTER — OFFICE VISIT (OUTPATIENT)
Dept: NEUROLOGY | Facility: CLINIC | Age: 86
End: 2021-06-29
Payer: MEDICARE

## 2021-06-29 VITALS
BODY MASS INDEX: 30.46 KG/M2 | HEART RATE: 90 BPM | WEIGHT: 171.94 LBS | SYSTOLIC BLOOD PRESSURE: 130 MMHG | DIASTOLIC BLOOD PRESSURE: 67 MMHG

## 2021-06-29 DIAGNOSIS — R41.3 MEMORY LOSS: Primary | ICD-10-CM

## 2021-06-29 PROCEDURE — 1159F MED LIST DOCD IN RCRD: CPT | Mod: S$GLB,,, | Performed by: PSYCHIATRY & NEUROLOGY

## 2021-06-29 PROCEDURE — 99999 PR PBB SHADOW E&M-EST. PATIENT-LVL IV: CPT | Mod: PBBFAC,,, | Performed by: PSYCHIATRY & NEUROLOGY

## 2021-06-29 PROCEDURE — 3288F FALL RISK ASSESSMENT DOCD: CPT | Mod: CPTII,S$GLB,, | Performed by: PSYCHIATRY & NEUROLOGY

## 2021-06-29 PROCEDURE — 99214 OFFICE O/P EST MOD 30 MIN: CPT | Mod: S$GLB,,, | Performed by: PSYCHIATRY & NEUROLOGY

## 2021-06-29 PROCEDURE — 1126F PR PAIN SEVERITY QUANTIFIED, NO PAIN PRESENT: ICD-10-PCS | Mod: S$GLB,,, | Performed by: PSYCHIATRY & NEUROLOGY

## 2021-06-29 PROCEDURE — 99999 PR PBB SHADOW E&M-EST. PATIENT-LVL IV: ICD-10-PCS | Mod: PBBFAC,,, | Performed by: PSYCHIATRY & NEUROLOGY

## 2021-06-29 PROCEDURE — 3288F PR FALLS RISK ASSESSMENT DOCUMENTED: ICD-10-PCS | Mod: CPTII,S$GLB,, | Performed by: PSYCHIATRY & NEUROLOGY

## 2021-06-29 PROCEDURE — 1101F PR PT FALLS ASSESS DOC 0-1 FALLS W/OUT INJ PAST YR: ICD-10-PCS | Mod: CPTII,S$GLB,, | Performed by: PSYCHIATRY & NEUROLOGY

## 2021-06-29 PROCEDURE — 99214 PR OFFICE/OUTPT VISIT, EST, LEVL IV, 30-39 MIN: ICD-10-PCS | Mod: S$GLB,,, | Performed by: PSYCHIATRY & NEUROLOGY

## 2021-06-29 PROCEDURE — 1126F AMNT PAIN NOTED NONE PRSNT: CPT | Mod: S$GLB,,, | Performed by: PSYCHIATRY & NEUROLOGY

## 2021-06-29 PROCEDURE — 99499 RISK ADDL DX/OHS AUDIT: ICD-10-PCS | Mod: S$GLB,,, | Performed by: PSYCHIATRY & NEUROLOGY

## 2021-06-29 PROCEDURE — 1159F PR MEDICATION LIST DOCUMENTED IN MEDICAL RECORD: ICD-10-PCS | Mod: S$GLB,,, | Performed by: PSYCHIATRY & NEUROLOGY

## 2021-06-29 PROCEDURE — 1101F PT FALLS ASSESS-DOCD LE1/YR: CPT | Mod: CPTII,S$GLB,, | Performed by: PSYCHIATRY & NEUROLOGY

## 2021-06-29 PROCEDURE — 99499 UNLISTED E&M SERVICE: CPT | Mod: S$GLB,,, | Performed by: PSYCHIATRY & NEUROLOGY

## 2021-07-07 ENCOUNTER — HOSPITAL ENCOUNTER (OUTPATIENT)
Dept: RADIOLOGY | Facility: HOSPITAL | Age: 86
Discharge: HOME | End: 2021-07-07
Attending: PSYCHIATRY & NEUROLOGY
Payer: MEDICARE

## 2021-07-07 DIAGNOSIS — R41.3 MEMORY LOSS: ICD-10-CM

## 2021-07-07 PROCEDURE — 70551 MRI BRAIN WITHOUT CONTRAST: ICD-10-PCS | Mod: 26,,, | Performed by: RADIOLOGY

## 2021-07-07 PROCEDURE — 70551 MRI BRAIN STEM W/O DYE: CPT | Mod: 26,,, | Performed by: RADIOLOGY

## 2021-07-07 PROCEDURE — 70551 MRI BRAIN STEM W/O DYE: CPT | Mod: TC

## 2021-07-08 ENCOUNTER — TELEPHONE (OUTPATIENT)
Dept: NEUROLOGY | Facility: CLINIC | Age: 86
End: 2021-07-08

## 2021-07-12 ENCOUNTER — TELEPHONE (OUTPATIENT)
Dept: NEUROLOGY | Facility: CLINIC | Age: 86
End: 2021-07-12

## 2021-07-14 ENCOUNTER — TELEPHONE (OUTPATIENT)
Dept: NEUROLOGY | Facility: CLINIC | Age: 86
End: 2021-07-14

## 2021-07-20 ENCOUNTER — TELEPHONE (OUTPATIENT)
Dept: HEMATOLOGY/ONCOLOGY | Facility: CLINIC | Age: 86
End: 2021-07-20

## 2021-07-21 DIAGNOSIS — Z85.118 HISTORY OF LUNG CANCER: Primary | ICD-10-CM

## 2021-08-03 ENCOUNTER — LAB VISIT (OUTPATIENT)
Dept: LAB | Facility: HOSPITAL | Age: 86
End: 2021-08-03
Attending: INTERNAL MEDICINE
Payer: MEDICARE

## 2021-08-03 DIAGNOSIS — E11.51 TYPE 2 DIABETES MELLITUS WITH DIABETIC PERIPHERAL ANGIOPATHY WITHOUT GANGRENE, WITHOUT LONG-TERM CURRENT USE OF INSULIN: ICD-10-CM

## 2021-08-03 LAB
ALBUMIN SERPL BCP-MCNC: 3.5 G/DL (ref 3.5–5.2)
ALP SERPL-CCNC: 44 U/L (ref 55–135)
ALT SERPL W/O P-5'-P-CCNC: 8 U/L (ref 10–44)
ANION GAP SERPL CALC-SCNC: 5 MMOL/L (ref 8–16)
AST SERPL-CCNC: 12 U/L (ref 10–40)
BILIRUB SERPL-MCNC: 0.3 MG/DL (ref 0.1–1)
BUN SERPL-MCNC: 13 MG/DL (ref 8–23)
CALCIUM SERPL-MCNC: 9.9 MG/DL (ref 8.7–10.5)
CHLORIDE SERPL-SCNC: 101 MMOL/L (ref 95–110)
CO2 SERPL-SCNC: 30 MMOL/L (ref 23–29)
CREAT SERPL-MCNC: 1.1 MG/DL (ref 0.5–1.4)
EST. GFR  (AFRICAN AMERICAN): 51.4 ML/MIN/1.73 M^2
EST. GFR  (NON AFRICAN AMERICAN): 44.6 ML/MIN/1.73 M^2
ESTIMATED AVG GLUCOSE: 131 MG/DL (ref 68–131)
GLUCOSE SERPL-MCNC: 184 MG/DL (ref 70–110)
HBA1C MFR BLD: 6.2 % (ref 4–5.6)
POTASSIUM SERPL-SCNC: 4.4 MMOL/L (ref 3.5–5.1)
PROT SERPL-MCNC: 7.5 G/DL (ref 6–8.4)
SODIUM SERPL-SCNC: 136 MMOL/L (ref 136–145)

## 2021-08-03 PROCEDURE — 36415 COLL VENOUS BLD VENIPUNCTURE: CPT | Mod: PO | Performed by: INTERNAL MEDICINE

## 2021-08-03 PROCEDURE — 80053 COMPREHEN METABOLIC PANEL: CPT | Performed by: INTERNAL MEDICINE

## 2021-08-03 PROCEDURE — 83036 HEMOGLOBIN GLYCOSYLATED A1C: CPT | Performed by: INTERNAL MEDICINE

## 2021-08-06 ENCOUNTER — OFFICE VISIT (OUTPATIENT)
Dept: INTERNAL MEDICINE | Facility: CLINIC | Age: 86
End: 2021-08-06
Payer: MEDICARE

## 2021-08-06 VITALS
BODY MASS INDEX: 27.56 KG/M2 | DIASTOLIC BLOOD PRESSURE: 60 MMHG | OXYGEN SATURATION: 96 % | HEIGHT: 65 IN | HEART RATE: 97 BPM | SYSTOLIC BLOOD PRESSURE: 122 MMHG | WEIGHT: 165.38 LBS

## 2021-08-06 DIAGNOSIS — E11.51 TYPE 2 DIABETES MELLITUS WITH DIABETIC PERIPHERAL ANGIOPATHY WITHOUT GANGRENE, WITHOUT LONG-TERM CURRENT USE OF INSULIN: ICD-10-CM

## 2021-08-06 DIAGNOSIS — I10 ESSENTIAL HYPERTENSION: ICD-10-CM

## 2021-08-06 DIAGNOSIS — M54.16 LUMBAR RADICULOPATHY: ICD-10-CM

## 2021-08-06 DIAGNOSIS — R41.3 MEMORY LOSS: Primary | ICD-10-CM

## 2021-08-06 DIAGNOSIS — E78.00 PURE HYPERCHOLESTEROLEMIA: ICD-10-CM

## 2021-08-06 PROCEDURE — 3288F PR FALLS RISK ASSESSMENT DOCUMENTED: ICD-10-PCS | Mod: CPTII,S$GLB,, | Performed by: INTERNAL MEDICINE

## 2021-08-06 PROCEDURE — 99499 UNLISTED E&M SERVICE: CPT | Mod: S$GLB,,, | Performed by: INTERNAL MEDICINE

## 2021-08-06 PROCEDURE — 1126F AMNT PAIN NOTED NONE PRSNT: CPT | Mod: CPTII,S$GLB,, | Performed by: INTERNAL MEDICINE

## 2021-08-06 PROCEDURE — 1159F MED LIST DOCD IN RCRD: CPT | Mod: CPTII,S$GLB,, | Performed by: INTERNAL MEDICINE

## 2021-08-06 PROCEDURE — 3288F FALL RISK ASSESSMENT DOCD: CPT | Mod: CPTII,S$GLB,, | Performed by: INTERNAL MEDICINE

## 2021-08-06 PROCEDURE — 1159F PR MEDICATION LIST DOCUMENTED IN MEDICAL RECORD: ICD-10-PCS | Mod: CPTII,S$GLB,, | Performed by: INTERNAL MEDICINE

## 2021-08-06 PROCEDURE — 99999 PR PBB SHADOW E&M-EST. PATIENT-LVL III: ICD-10-PCS | Mod: PBBFAC,,, | Performed by: INTERNAL MEDICINE

## 2021-08-06 PROCEDURE — 99999 PR PBB SHADOW E&M-EST. PATIENT-LVL III: CPT | Mod: PBBFAC,,, | Performed by: INTERNAL MEDICINE

## 2021-08-06 PROCEDURE — 99214 OFFICE O/P EST MOD 30 MIN: CPT | Mod: S$GLB,,, | Performed by: INTERNAL MEDICINE

## 2021-08-06 PROCEDURE — 99499 RISK ADDL DX/OHS AUDIT: ICD-10-PCS | Mod: S$GLB,,, | Performed by: INTERNAL MEDICINE

## 2021-08-06 PROCEDURE — 99214 PR OFFICE/OUTPT VISIT, EST, LEVL IV, 30-39 MIN: ICD-10-PCS | Mod: S$GLB,,, | Performed by: INTERNAL MEDICINE

## 2021-08-06 PROCEDURE — 1101F PR PT FALLS ASSESS DOC 0-1 FALLS W/OUT INJ PAST YR: ICD-10-PCS | Mod: CPTII,S$GLB,, | Performed by: INTERNAL MEDICINE

## 2021-08-06 PROCEDURE — 1101F PT FALLS ASSESS-DOCD LE1/YR: CPT | Mod: CPTII,S$GLB,, | Performed by: INTERNAL MEDICINE

## 2021-08-06 PROCEDURE — 1126F PR PAIN SEVERITY QUANTIFIED, NO PAIN PRESENT: ICD-10-PCS | Mod: CPTII,S$GLB,, | Performed by: INTERNAL MEDICINE

## 2021-08-06 RX ORDER — AMLODIPINE AND BENAZEPRIL HYDROCHLORIDE 5; 20 MG/1; MG/1
1 CAPSULE ORAL DAILY
Qty: 90 CAPSULE | Refills: 3 | Status: SHIPPED | OUTPATIENT
Start: 2021-08-06 | End: 2022-07-11 | Stop reason: SDUPTHER

## 2021-08-11 NOTE — PROGRESS NOTES
HPI     86yr old female present for Glasses prescription per Dr. Leigh. Pt   last seen by Dr. Leigh (8/21/18) due to ARMD OU. Patient says she has   no trouble with her vision OU, just need a new pair of glasses. Pt denies   floaters and flashes.     Last edited by Tigre Henley on 9/4/2018  2:22 PM. (History)            Assessment /Plan     For exam results, see Encounter Report.    Hyperopic astigmatism, bilateral    Presbyopia      Rx specs                     
Detail Level: Zone

## 2021-09-29 ENCOUNTER — HOSPITAL ENCOUNTER (OUTPATIENT)
Dept: RADIOLOGY | Facility: HOSPITAL | Age: 86
Discharge: HOME | End: 2021-09-29
Attending: INTERNAL MEDICINE
Payer: MEDICARE

## 2021-09-29 DIAGNOSIS — Z85.118 HISTORY OF LUNG CANCER: ICD-10-CM

## 2021-09-29 PROCEDURE — 71250 CT THORAX DX C-: CPT | Mod: 26,,, | Performed by: RADIOLOGY

## 2021-09-29 PROCEDURE — 71250 CT CHEST WITHOUT CONTRAST: ICD-10-PCS | Mod: 26,,, | Performed by: RADIOLOGY

## 2021-09-29 PROCEDURE — 71250 CT THORAX DX C-: CPT | Mod: TC

## 2021-10-04 ENCOUNTER — OFFICE VISIT (OUTPATIENT)
Dept: HEMATOLOGY/ONCOLOGY | Facility: CLINIC | Age: 86
End: 2021-10-04
Payer: MEDICARE

## 2021-10-04 VITALS
HEIGHT: 62 IN | TEMPERATURE: 99 F | BODY MASS INDEX: 29.7 KG/M2 | OXYGEN SATURATION: 95 % | WEIGHT: 161.38 LBS | SYSTOLIC BLOOD PRESSURE: 162 MMHG | DIASTOLIC BLOOD PRESSURE: 73 MMHG | RESPIRATION RATE: 20 BRPM | HEART RATE: 106 BPM

## 2021-10-04 DIAGNOSIS — C34.90 MALIGNANT NEOPLASM OF LUNG, UNSPECIFIED LATERALITY, UNSPECIFIED PART OF LUNG: ICD-10-CM

## 2021-10-04 DIAGNOSIS — J43.2 CENTRILOBULAR EMPHYSEMA: ICD-10-CM

## 2021-10-04 DIAGNOSIS — Z85.118 HISTORY OF LUNG CANCER: Primary | ICD-10-CM

## 2021-10-04 PROCEDURE — 99499 RISK ADDL DX/OHS AUDIT: ICD-10-PCS | Mod: S$GLB,,, | Performed by: INTERNAL MEDICINE

## 2021-10-04 PROCEDURE — 1101F PR PT FALLS ASSESS DOC 0-1 FALLS W/OUT INJ PAST YR: ICD-10-PCS | Mod: CPTII,S$GLB,, | Performed by: INTERNAL MEDICINE

## 2021-10-04 PROCEDURE — 1126F AMNT PAIN NOTED NONE PRSNT: CPT | Mod: CPTII,S$GLB,, | Performed by: INTERNAL MEDICINE

## 2021-10-04 PROCEDURE — 1159F MED LIST DOCD IN RCRD: CPT | Mod: CPTII,S$GLB,, | Performed by: INTERNAL MEDICINE

## 2021-10-04 PROCEDURE — 1159F PR MEDICATION LIST DOCUMENTED IN MEDICAL RECORD: ICD-10-PCS | Mod: CPTII,S$GLB,, | Performed by: INTERNAL MEDICINE

## 2021-10-04 PROCEDURE — 99499 UNLISTED E&M SERVICE: CPT | Mod: S$GLB,,, | Performed by: INTERNAL MEDICINE

## 2021-10-04 PROCEDURE — 99213 PR OFFICE/OUTPT VISIT, EST, LEVL III, 20-29 MIN: ICD-10-PCS | Mod: S$GLB,,, | Performed by: INTERNAL MEDICINE

## 2021-10-04 PROCEDURE — 3288F FALL RISK ASSESSMENT DOCD: CPT | Mod: CPTII,S$GLB,, | Performed by: INTERNAL MEDICINE

## 2021-10-04 PROCEDURE — 99213 OFFICE O/P EST LOW 20 MIN: CPT | Mod: S$GLB,,, | Performed by: INTERNAL MEDICINE

## 2021-10-04 PROCEDURE — 3288F PR FALLS RISK ASSESSMENT DOCUMENTED: ICD-10-PCS | Mod: CPTII,S$GLB,, | Performed by: INTERNAL MEDICINE

## 2021-10-04 PROCEDURE — 99999 PR PBB SHADOW E&M-EST. PATIENT-LVL IV: CPT | Mod: PBBFAC,,, | Performed by: INTERNAL MEDICINE

## 2021-10-04 PROCEDURE — 1101F PT FALLS ASSESS-DOCD LE1/YR: CPT | Mod: CPTII,S$GLB,, | Performed by: INTERNAL MEDICINE

## 2021-10-04 PROCEDURE — 1126F PR PAIN SEVERITY QUANTIFIED, NO PAIN PRESENT: ICD-10-PCS | Mod: CPTII,S$GLB,, | Performed by: INTERNAL MEDICINE

## 2021-10-04 PROCEDURE — 99999 PR PBB SHADOW E&M-EST. PATIENT-LVL IV: ICD-10-PCS | Mod: PBBFAC,,, | Performed by: INTERNAL MEDICINE

## 2021-10-05 ENCOUNTER — IMMUNIZATION (OUTPATIENT)
Dept: INTERNAL MEDICINE | Facility: CLINIC | Age: 86
End: 2021-10-05
Payer: MEDICARE

## 2021-10-05 DIAGNOSIS — Z23 NEED FOR VACCINATION: Primary | ICD-10-CM

## 2021-10-05 PROCEDURE — 0003A COVID-19, MRNA, LNP-S, PF, 30 MCG/0.3 ML DOSE VACCINE: CPT | Mod: CV19,PBBFAC | Performed by: INTERNAL MEDICINE

## 2021-10-05 PROCEDURE — 91300 COVID-19, MRNA, LNP-S, PF, 30 MCG/0.3 ML DOSE VACCINE: CPT | Mod: PBBFAC | Performed by: INTERNAL MEDICINE

## 2021-11-11 ENCOUNTER — OFFICE VISIT (OUTPATIENT)
Dept: OPHTHALMOLOGY | Facility: CLINIC | Age: 86
End: 2021-11-11
Payer: MEDICARE

## 2021-11-11 DIAGNOSIS — H35.033 HYPERTENSIVE RETINOPATHY OF BOTH EYES: ICD-10-CM

## 2021-11-11 DIAGNOSIS — H43.813 POSTERIOR VITREOUS DETACHMENT, BOTH EYES: ICD-10-CM

## 2021-11-11 DIAGNOSIS — H35.3232 EXUDATIVE AGE-RELATED MACULAR DEGENERATION OF BOTH EYES WITH INACTIVE CHOROIDAL NEOVASCULARIZATION: Primary | ICD-10-CM

## 2021-11-11 PROCEDURE — 92134 CPTRZ OPH DX IMG PST SGM RTA: CPT | Mod: S$GLB,,, | Performed by: OPHTHALMOLOGY

## 2021-11-11 PROCEDURE — 1126F AMNT PAIN NOTED NONE PRSNT: CPT | Mod: CPTII,S$GLB,, | Performed by: OPHTHALMOLOGY

## 2021-11-11 PROCEDURE — 92014 COMPRE OPH EXAM EST PT 1/>: CPT | Mod: S$GLB,,, | Performed by: OPHTHALMOLOGY

## 2021-11-11 PROCEDURE — 3288F PR FALLS RISK ASSESSMENT DOCUMENTED: ICD-10-PCS | Mod: CPTII,S$GLB,, | Performed by: OPHTHALMOLOGY

## 2021-11-11 PROCEDURE — 1101F PR PT FALLS ASSESS DOC 0-1 FALLS W/OUT INJ PAST YR: ICD-10-PCS | Mod: CPTII,S$GLB,, | Performed by: OPHTHALMOLOGY

## 2021-11-11 PROCEDURE — 1160F RVW MEDS BY RX/DR IN RCRD: CPT | Mod: CPTII,S$GLB,, | Performed by: OPHTHALMOLOGY

## 2021-11-11 PROCEDURE — 92014 PR EYE EXAM, EST PATIENT,COMPREHESV: ICD-10-PCS | Mod: S$GLB,,, | Performed by: OPHTHALMOLOGY

## 2021-11-11 PROCEDURE — 99499 RISK ADDL DX/OHS AUDIT: ICD-10-PCS | Mod: S$GLB,,, | Performed by: OPHTHALMOLOGY

## 2021-11-11 PROCEDURE — 1160F PR REVIEW ALL MEDS BY PRESCRIBER/CLIN PHARMACIST DOCUMENTED: ICD-10-PCS | Mod: CPTII,S$GLB,, | Performed by: OPHTHALMOLOGY

## 2021-11-11 PROCEDURE — 92201 OPSCPY EXTND RTA DRAW UNI/BI: CPT | Mod: S$GLB,,, | Performed by: OPHTHALMOLOGY

## 2021-11-11 PROCEDURE — 1159F PR MEDICATION LIST DOCUMENTED IN MEDICAL RECORD: ICD-10-PCS | Mod: CPTII,S$GLB,, | Performed by: OPHTHALMOLOGY

## 2021-11-11 PROCEDURE — 1126F PR PAIN SEVERITY QUANTIFIED, NO PAIN PRESENT: ICD-10-PCS | Mod: CPTII,S$GLB,, | Performed by: OPHTHALMOLOGY

## 2021-11-11 PROCEDURE — 1101F PT FALLS ASSESS-DOCD LE1/YR: CPT | Mod: CPTII,S$GLB,, | Performed by: OPHTHALMOLOGY

## 2021-11-11 PROCEDURE — 1159F MED LIST DOCD IN RCRD: CPT | Mod: CPTII,S$GLB,, | Performed by: OPHTHALMOLOGY

## 2021-11-11 PROCEDURE — 92134 POSTERIOR SEGMENT OCT RETINA (OCULAR COHERENCE TOMOGRAPHY)-BOTH EYES: ICD-10-PCS | Mod: S$GLB,,, | Performed by: OPHTHALMOLOGY

## 2021-11-11 PROCEDURE — 99999 PR PBB SHADOW E&M-EST. PATIENT-LVL III: CPT | Mod: PBBFAC,,, | Performed by: OPHTHALMOLOGY

## 2021-11-11 PROCEDURE — 92201 PR OPHTHALMOSCOPY, EXT, W/RET DRAW/SCLERAL DEPR, I&R, UNI/BI: ICD-10-PCS | Mod: S$GLB,,, | Performed by: OPHTHALMOLOGY

## 2021-11-11 PROCEDURE — 99999 PR PBB SHADOW E&M-EST. PATIENT-LVL III: ICD-10-PCS | Mod: PBBFAC,,, | Performed by: OPHTHALMOLOGY

## 2021-11-11 PROCEDURE — 99499 UNLISTED E&M SERVICE: CPT | Mod: S$GLB,,, | Performed by: OPHTHALMOLOGY

## 2021-11-11 PROCEDURE — 3288F FALL RISK ASSESSMENT DOCD: CPT | Mod: CPTII,S$GLB,, | Performed by: OPHTHALMOLOGY

## 2021-12-07 ENCOUNTER — LAB VISIT (OUTPATIENT)
Dept: LAB | Facility: HOSPITAL | Age: 86
End: 2021-12-07
Attending: INTERNAL MEDICINE
Payer: MEDICARE

## 2021-12-07 DIAGNOSIS — E11.51 TYPE 2 DIABETES MELLITUS WITH DIABETIC PERIPHERAL ANGIOPATHY WITHOUT GANGRENE, WITHOUT LONG-TERM CURRENT USE OF INSULIN: ICD-10-CM

## 2021-12-07 LAB
ALBUMIN SERPL BCP-MCNC: 3.4 G/DL (ref 3.5–5.2)
ALP SERPL-CCNC: 49 U/L (ref 55–135)
ALT SERPL W/O P-5'-P-CCNC: 6 U/L (ref 10–44)
ANION GAP SERPL CALC-SCNC: 10 MMOL/L (ref 8–16)
AST SERPL-CCNC: 11 U/L (ref 10–40)
BILIRUB SERPL-MCNC: 0.3 MG/DL (ref 0.1–1)
BUN SERPL-MCNC: 16 MG/DL (ref 8–23)
CALCIUM SERPL-MCNC: 10.3 MG/DL (ref 8.7–10.5)
CHLORIDE SERPL-SCNC: 105 MMOL/L (ref 95–110)
CO2 SERPL-SCNC: 26 MMOL/L (ref 23–29)
CREAT SERPL-MCNC: 0.9 MG/DL (ref 0.5–1.4)
EST. GFR  (AFRICAN AMERICAN): >60 ML/MIN/1.73 M^2
EST. GFR  (NON AFRICAN AMERICAN): 56.5 ML/MIN/1.73 M^2
ESTIMATED AVG GLUCOSE: 128 MG/DL (ref 68–131)
GLUCOSE SERPL-MCNC: 99 MG/DL (ref 70–110)
HBA1C MFR BLD: 6.1 % (ref 4–5.6)
POTASSIUM SERPL-SCNC: 4.1 MMOL/L (ref 3.5–5.1)
PROT SERPL-MCNC: 7.3 G/DL (ref 6–8.4)
SODIUM SERPL-SCNC: 141 MMOL/L (ref 136–145)

## 2021-12-07 PROCEDURE — 83036 HEMOGLOBIN GLYCOSYLATED A1C: CPT | Performed by: INTERNAL MEDICINE

## 2021-12-07 PROCEDURE — 80053 COMPREHEN METABOLIC PANEL: CPT | Performed by: INTERNAL MEDICINE

## 2021-12-07 PROCEDURE — 36415 COLL VENOUS BLD VENIPUNCTURE: CPT | Mod: PO | Performed by: INTERNAL MEDICINE

## 2021-12-10 ENCOUNTER — OFFICE VISIT (OUTPATIENT)
Dept: INTERNAL MEDICINE | Facility: CLINIC | Age: 86
End: 2021-12-10
Payer: MEDICARE

## 2021-12-10 VITALS
BODY MASS INDEX: 25.99 KG/M2 | SYSTOLIC BLOOD PRESSURE: 132 MMHG | HEIGHT: 67 IN | WEIGHT: 165.56 LBS | OXYGEN SATURATION: 99 % | HEART RATE: 53 BPM | DIASTOLIC BLOOD PRESSURE: 60 MMHG

## 2021-12-10 DIAGNOSIS — E11.51 TYPE 2 DIABETES MELLITUS WITH DIABETIC PERIPHERAL ANGIOPATHY WITHOUT GANGRENE, WITHOUT LONG-TERM CURRENT USE OF INSULIN: ICD-10-CM

## 2021-12-10 DIAGNOSIS — I10 ESSENTIAL HYPERTENSION: ICD-10-CM

## 2021-12-10 DIAGNOSIS — Z00.00 ROUTINE GENERAL MEDICAL EXAMINATION AT A HEALTH CARE FACILITY: Primary | ICD-10-CM

## 2021-12-10 PROCEDURE — 99999 PR PBB SHADOW E&M-EST. PATIENT-LVL IV: CPT | Mod: PBBFAC,,, | Performed by: INTERNAL MEDICINE

## 2021-12-10 PROCEDURE — 99397 PR PREVENTIVE VISIT,EST,65 & OVER: ICD-10-PCS | Mod: S$GLB,,, | Performed by: INTERNAL MEDICINE

## 2021-12-10 PROCEDURE — 99499 UNLISTED E&M SERVICE: CPT | Mod: S$GLB,,, | Performed by: INTERNAL MEDICINE

## 2021-12-10 PROCEDURE — 99999 PR PBB SHADOW E&M-EST. PATIENT-LVL IV: ICD-10-PCS | Mod: PBBFAC,,, | Performed by: INTERNAL MEDICINE

## 2021-12-10 PROCEDURE — 99397 PER PM REEVAL EST PAT 65+ YR: CPT | Mod: S$GLB,,, | Performed by: INTERNAL MEDICINE

## 2021-12-10 PROCEDURE — 99499 RISK ADDL DX/OHS AUDIT: ICD-10-PCS | Mod: S$GLB,,, | Performed by: INTERNAL MEDICINE

## 2021-12-10 RX ORDER — CITALOPRAM 10 MG/1
10 TABLET ORAL DAILY
Qty: 90 TABLET | Refills: 4 | Status: SHIPPED | OUTPATIENT
Start: 2021-12-10 | End: 2022-09-24 | Stop reason: SDUPTHER

## 2021-12-10 RX ORDER — METOPROLOL TARTRATE 25 MG/1
TABLET, FILM COATED ORAL
Qty: 270 TABLET | Refills: 3 | Status: SHIPPED | OUTPATIENT
Start: 2021-12-10 | End: 2022-07-11 | Stop reason: SDUPTHER

## 2021-12-10 RX ORDER — DONEPEZIL HYDROCHLORIDE 5 MG/1
5 TABLET, FILM COATED ORAL EVERY MORNING
Qty: 90 TABLET | Refills: 4 | Status: SHIPPED | OUTPATIENT
Start: 2021-12-10 | End: 2022-01-14

## 2021-12-10 RX ORDER — PIOGLITAZONEHYDROCHLORIDE 15 MG/1
15 TABLET ORAL EVERY MORNING
Qty: 90 TABLET | Refills: 4 | Status: SHIPPED | OUTPATIENT
Start: 2021-12-10 | End: 2022-05-03

## 2021-12-10 RX ORDER — SIMVASTATIN 80 MG/1
80 TABLET, FILM COATED ORAL DAILY
Qty: 90 TABLET | Refills: 4 | Status: SHIPPED | OUTPATIENT
Start: 2021-12-10 | End: 2022-07-11 | Stop reason: SDUPTHER

## 2022-01-01 NOTE — TELEPHONE ENCOUNTER
----- Message from Jolly Rooney sent at 8/14/2017 12:00 PM CDT -----  Contact: self [phone]  Patient would like to speak to nurse regarding lotrel 5-20 mg     Please advise   
If she verified the medication with the pharmacist, then it is likely the same medication,She can always verify the pill on the internet    A cold can cause a cough. Dose she need to be seen for the cold. If so, book her an urgent care apt  
Pt states she did verify with the pharmacy. Pt states she does not want to be seen for the cough that she has been taking OTC robitussin. Advised pt to call if the cough worsens. Pt agreed  
Pt states that she picked up her medication. It looked different. Pt took the medication and started coughing. Pt states that she has a cold, so she is uncertain if its the medication or the cold that is making her cough. Pt states that she didn't take the medication this morning and is still coughing. Pt confirmed the medication with her pharmacy and they advised her that this is the same medication just a different manufacture. Pt would like to know what PCP thinks.   
DISPLAY PLAN FREE TEXT

## 2022-02-02 ENCOUNTER — TELEPHONE (OUTPATIENT)
Dept: NEUROLOGY | Facility: CLINIC | Age: 87
End: 2022-02-02
Payer: MEDICARE

## 2022-02-02 NOTE — TELEPHONE ENCOUNTER
----- Message from Selam Barnard sent at 2/2/2022  9:03 AM CST -----  Regarding: Pt Advise  Contact: patient daughter  Pt. Daughter requesting a call back in regards to assistance with pt. Care. Stated pt. Is angry and depressed requesting new mediation be called in to help with dementia.               Pt. Daughter [phone]

## 2022-02-03 ENCOUNTER — TELEPHONE (OUTPATIENT)
Dept: NEUROLOGY | Facility: CLINIC | Age: 87
End: 2022-02-03
Payer: MEDICARE

## 2022-02-03 NOTE — TELEPHONE ENCOUNTER
I have attempted without success to contact this patient by phone, I left a message on answering machine.

## 2022-02-03 NOTE — TELEPHONE ENCOUNTER
----- Message from Radha Lei sent at 2/3/2022  3:51 PM CST -----  Regarding: Patient Advice  Contact: Haley [phone]  Pt. Daughter Haley calling back in regards to assistance with pt. Care. Stated pt. Is angry and depressed requesting new mediation be called in to help with dementia . Please call.

## 2022-02-07 ENCOUNTER — TELEPHONE (OUTPATIENT)
Dept: NEUROLOGY | Facility: HOSPITAL | Age: 87
End: 2022-02-07
Payer: MEDICARE

## 2022-02-07 ENCOUNTER — OFFICE VISIT (OUTPATIENT)
Dept: URGENT CARE | Facility: CLINIC | Age: 87
End: 2022-02-07
Payer: MEDICARE

## 2022-02-07 VITALS
TEMPERATURE: 98 F | SYSTOLIC BLOOD PRESSURE: 169 MMHG | HEART RATE: 69 BPM | DIASTOLIC BLOOD PRESSURE: 77 MMHG | WEIGHT: 165 LBS | RESPIRATION RATE: 18 BRPM | BODY MASS INDEX: 25.84 KG/M2 | OXYGEN SATURATION: 96 %

## 2022-02-07 DIAGNOSIS — R09.89 RUNNY NOSE: ICD-10-CM

## 2022-02-07 DIAGNOSIS — R05.9 COUGH: ICD-10-CM

## 2022-02-07 DIAGNOSIS — J06.9 UPPER RESPIRATORY TRACT INFECTION, UNSPECIFIED TYPE: Primary | ICD-10-CM

## 2022-02-07 LAB
CTP QC/QA: YES
SARS-COV-2 RDRP RESP QL NAA+PROBE: NEGATIVE

## 2022-02-07 PROCEDURE — 1159F PR MEDICATION LIST DOCUMENTED IN MEDICAL RECORD: ICD-10-PCS | Mod: CPTII,S$GLB,, | Performed by: NURSE PRACTITIONER

## 2022-02-07 PROCEDURE — 1159F MED LIST DOCD IN RCRD: CPT | Mod: CPTII,S$GLB,, | Performed by: NURSE PRACTITIONER

## 2022-02-07 PROCEDURE — 99213 PR OFFICE/OUTPT VISIT, EST, LEVL III, 20-29 MIN: ICD-10-PCS | Mod: S$GLB,,, | Performed by: NURSE PRACTITIONER

## 2022-02-07 PROCEDURE — U0002 COVID-19 LAB TEST NON-CDC: HCPCS | Mod: QW,S$GLB,, | Performed by: NURSE PRACTITIONER

## 2022-02-07 PROCEDURE — 1160F PR REVIEW ALL MEDS BY PRESCRIBER/CLIN PHARMACIST DOCUMENTED: ICD-10-PCS | Mod: CPTII,S$GLB,, | Performed by: NURSE PRACTITIONER

## 2022-02-07 PROCEDURE — U0002: ICD-10-PCS | Mod: QW,S$GLB,, | Performed by: NURSE PRACTITIONER

## 2022-02-07 PROCEDURE — 99213 OFFICE O/P EST LOW 20 MIN: CPT | Mod: S$GLB,,, | Performed by: NURSE PRACTITIONER

## 2022-02-07 PROCEDURE — 1160F RVW MEDS BY RX/DR IN RCRD: CPT | Mod: CPTII,S$GLB,, | Performed by: NURSE PRACTITIONER

## 2022-02-07 RX ORDER — FLUTICASONE PROPIONATE 50 MCG
2 SPRAY, SUSPENSION (ML) NASAL DAILY
Qty: 16 ML | Refills: 0 | Status: SHIPPED | OUTPATIENT
Start: 2022-02-07 | End: 2022-11-15

## 2022-02-07 NOTE — TELEPHONE ENCOUNTER
Spoke to pt daughter in regards of  Requesting new medication. Daughter states pt Is having emotion ways going on and needs help and answers for pt current state of mind. Daughter states she is new to dealing with dementia and would love help and answers please advise.

## 2022-02-07 NOTE — TELEPHONE ENCOUNTER
The patient's daughter, Haley England,  phoned ([phone]) to report that her mother shows worsening of memory function, and is frequently anxious.  Her mother is reported to be refusing to take her present medications.  I am setting up an appointment with Dr. Alanis, in [facility],  for followup next week.     The patient, Ms. Brunilda England, may be contact at: [phone].    Alok Ureña MD

## 2022-02-07 NOTE — PATIENT INSTRUCTIONS
Please drink plenty of fluids.  Please get plenty of rest.  Please return here or go to the Emergency Department for any concerns or worsening of condition.  If you do not have Hypertension or any history of palpitations, it is ok to take over the counter Sudafed or Mucinex D or Allegra-D or Claritin-D or Zyrtec-D.  If you do take one of the above, it is ok to combine that with plain over the counter Mucinex or Allegra or Claritin or Zyrtec.  If for example you are taking Zyrtec -D, you can combine that with Mucinex, but not Mucinex-D.  If you are taking Mucinex-D, you can combine that with plain Allegra or Claritin or Zyrtec.   If you do have Hypertension or palpitations, it is safe to take Coricidin HBP for relief of sinus symptoms.  We recommend you take over the counter Flonase (Fluticasone) or another nasally inhaled steroid unless you are already taking one.  Nasal irrigation with a saline spray or Netti Pot like device per their directions is also recommended.  If not allergic, please take over the counter Tylenol (Acetaminophen) and/or Motrin (Ibuprofen) as directed for control of pain and/or fever.  Please follow up with your primary care doctor or specialist as needed.    If you  smoke, please stop smoking.  Patient Education       Cough, Runny Nose, and the Common Cold Discharge Instructions   About this topic   The common cold is an infection in the nose and throat. A tiny germ, called a virus, causes this infection. It often affects your nose, throat, ears, and sinuses. A cold can easily spread from person to person. Coughing, sneezing, or touching something with the germ on it spreads the cold.  Most colds go away on their own without treatment. Antibiotics will not help a cold. Your doctor may order drugs to help with the signs of a cold. Even though you may feel very sick, the common cold is not a health emergency.         What care is needed at home?   · Ask your doctor what you need to do when you  go home. Make sure you ask questions if you do not understand what the doctor says.  · To help you feel better:  ? Use a cool mist humidifier to avoid breathing dry air.  ? Use hard candy or cough drops to soothe sore throat and cough.  ? Gargle with salt water. Mix 1/2 teaspoon (2.5 grams) salt with a cup (240 mL) of warm water a few times a day.  ? Spray saltwater mist in each nostril. Any normal saline spray works.  ? Sip warm liquids to keep your throat moist.  ? Take warm, steamy showers to help soothe the cough.  · Do not smoke or be in smoke-filled places. Avoid things that may cause breathing problems like vaping, fumes, pollution, dust, and other common allergens.  · You may want to use over-the-counter medicines for allergies or acid reflux if your cough is due to one of these problems.  · You can also use an over-the-counter cough medicine.  · Drink plenty of fluids whenever you are thirsty.  What follow-up care is needed?   Your doctor may ask you to make visits to the office to check on your progress. Be sure to keep these visits.  What drugs may be needed?   Your doctor may order drugs to:  · Help a stuffy nose  · Lower a fever  · Help with pain  · Treat an allergy  · Help with cough  Always talk to your doctor before you take any drugs. This includes over-the-counter (OTC) drugs and herbal supplements. This is very important if you have high blood pressure.  Will physical activity be limited?   Get lots of rest. Sleep when you are feeling tired. Avoid doing tiring activities.  What changes to diet are needed?   · Chicken soup may be helpful. Warm fluids help thin mucus and help the body get rid of it easier.  What problems could happen?   · Colds may cause signs of asthma for people who have asthma.  · Sinus or ear infection  · Lung infections  · Bronchitis  What can be done to prevent this health problem?   · Wash your hands often with soap and water for at least 20 seconds, especially after coughing  or sneezing. Alcohol-based hand sanitizers also work to kill the virus.  · If you are sick, cover your mouth and nose with tissue when you cough or sneeze. You can also cough into your elbow. Throw away tissues in the trash and wash your hands after touching used tissues.  · Clean items and surfaces you most often touch like door handles, remotes, phones, or toys. Wipe them with a disinfectant. This can help reduce the spread of infection.  · Do not get too close (kissing, hugging) to people who are sick.  · Do not share towels or hankies with anyone who is sick.  · Stay away from crowded places.  · Keep your hands away from your eyes and nose because the virus can enter easily to those body areas.  · Get a flu shot each year.  When do I need to call the doctor?   · You have chest pain when you cough or trouble breathing.  · You start to cough up blood or yellow or green mucus.  · You have a fever of 100.4°F (38°C) or higher or chills.  · You cough so hard you throw up.  · You are still coughing in 10 days.  Helpful tips   · It is normal that mucus from your runny nose may become thicker and change color. Do not take an antibiotic. Instead, drink more water-based fluids, especially hot tea and soups.  · Most colds go away within a week. If you are still sick after 14 days, see your doctor.  Teach Back: Helping You Understand   The Teach Back Method helps you understand the information we are giving you. After you talk with the staff, tell them in your own words what you learned. This helps to make sure the staff has described each thing clearly. It also helps to explain things that may have been confusing. Before going home, make sure you can do these:  · I can tell you about my condition.  · I can tell you what may help ease my breathing.  · I can tell you what I can do to help avoid passing the infection to others.  · I can tell you what I will do if I have a fever, chills, or trouble breathing.  Where can I learn  more?   American Academy of Family Physicians  https://familydoctor.org/condition/colds-and-the-flu/   American Lung Association  http://www.lung.org/lung-health-and-diseases/lung-disease-lookup/influenza/facts-about-the-common-cold.html   Centers for Disease Control and Prevention  https://www.cdc.gov/features/rhinoviruses/   Kids Health  http://kidshealth.org/en/parents/cold.html?ref=search&WT.ac=zsa-d-ocfq-aa-myfaiu-adn   Last Reviewed Date   2021-06-09  Consumer Information Use and Disclaimer   This information is not specific medical advice and does not replace information you receive from your health care provider. This is only a brief summary of general information. It does NOT include all information about conditions, illnesses, injuries, tests, procedures, treatments, therapies, discharge instructions or life-style choices that may apply to you. You must talk with your health care provider for complete information about your health and treatment options. This information should not be used to decide whether or not to accept your health care providers advice, instructions or recommendations. Only your health care provider has the knowledge and training to provide advice that is right for you.  Copyright   Copyright © 2021 [employer] and its affiliates and/or licensors. All rights reserved.

## 2022-02-07 NOTE — PROGRESS NOTES
Subjective:       Patient ID: Brunilda England is a 89 y.o. female.    Vitals:  weight is 74.8 kg (165 lb). Her temperature is 98.2 °F (36.8 °C). Her blood pressure is 169/77 (abnormal) and her pulse is 69. Her respiration is 18 and oxygen saturation is 96%.     Chief Complaint: Cough, COVID-19 Concerns, and Sinus Problem    C/o cough with a runny nose x 2-3 days.  Denies purulent rhinorrhea.  Denies bad taste in the back of her mouth.  Denies trouble swallowing or sore throat.  Denies fever.  Taking Mucinex DM with some relief.  Denies shortness of breath or chest pain.  Denies dental pain or ear pain.  Here with family member.  Denies covid 19 exposure.    Cough  This is a new problem. The current episode started in the past 7 days. The problem has been unchanged. The problem occurs every few minutes. The cough is non-productive. Associated symptoms include headaches, postnasal drip and rhinorrhea. Pertinent negatives include no chest pain, chills, ear congestion, ear pain, fever, heartburn, hemoptysis, myalgias, nasal congestion, rash, sore throat, shortness of breath, sweats, weight loss or wheezing. Nothing aggravates the symptoms. She has tried prescription cough suppressant for the symptoms. The treatment provided mild relief. There is no history of asthma, bronchitis, COPD or pneumonia.       Constitution: Negative for chills, sweating, fatigue and fever.   HENT: Positive for postnasal drip. Negative for ear pain, congestion, sinus pain, sinus pressure, sore throat, trouble swallowing and voice change.    Cardiovascular: Negative for chest pain.   Respiratory: Positive for cough. Negative for bloody sputum, shortness of breath and wheezing.    Gastrointestinal: Negative for heartburn.   Musculoskeletal: Negative for muscle ache.   Skin: Negative for rash.   Neurological: Positive for headaches.       Objective:      Physical Exam   Constitutional: She is oriented to person, place, and time. She appears  well-developed and well-nourished. She is cooperative.  Non-toxic appearance. She does not have a sickly appearance. She does not appear ill. No distress.   HENT:   Head: Normocephalic and atraumatic.   Ears:   Right Ear: Hearing, tympanic membrane, external ear and ear canal normal.   Left Ear: Hearing, tympanic membrane, external ear and ear canal normal.   Nose: Mucosal edema and rhinorrhea present. No purulent discharge or nasal deformity. No epistaxis. Right sinus exhibits no maxillary sinus tenderness and no frontal sinus tenderness. Left sinus exhibits no maxillary sinus tenderness and no frontal sinus tenderness.   Mouth/Throat: Uvula is midline, oropharynx is clear and moist and mucous membranes are normal. No trismus in the jaw. Normal dentition. No uvula swelling. No oropharyngeal exudate, posterior oropharyngeal edema or posterior oropharyngeal erythema.   Eyes: Conjunctivae and lids are normal. No scleral icterus.   Neck: Trachea normal and phonation normal. Neck supple. No edema present. No erythema present. No neck rigidity present.   Cardiovascular: Normal rate, regular rhythm, normal heart sounds, intact distal pulses and normal pulses.   Pulmonary/Chest: Effort normal and breath sounds normal. No respiratory distress. She has no decreased breath sounds. She has no rhonchi.   Abdominal: Normal appearance.   Musculoskeletal: Normal range of motion.         General: No deformity or edema. Normal range of motion.   Neurological: She is alert and oriented to person, place, and time. She exhibits normal muscle tone. Coordination normal.   Skin: Skin is warm, dry, intact, not diaphoretic and not pale.   Psychiatric: She has a normal mood and affect. Her speech is normal and behavior is normal. Judgment and thought content normal. Cognition and memory  Nursing note and vitals reviewed.    Results for orders placed or performed in visit on 02/07/22   POCT COVID-19 Rapid Screening   Result Value Ref Range     POC Rapid COVID Negative Negative     Acceptable Yes            Assessment:       1. Upper respiratory tract infection, unspecified type    2. Cough    3. Runny nose          Plan:       Lab reviewed.  Upper respiratory tract infection, unspecified type  -     fluticasone propionate (FLONASE) 50 mcg/actuation nasal spray; 2 sprays (100 mcg total) by Each Nostril route once daily.  Dispense: 16 mL; Refill: 0    Cough  -     POCT COVID-19 Rapid Screening    Runny nose      Patient Instructions   Please drink plenty of fluids.  Please get plenty of rest.  Please return here or go to the Emergency Department for any concerns or worsening of condition.  If you do not have Hypertension or any history of palpitations, it is ok to take over the counter Sudafed or Mucinex D or Allegra-D or Claritin-D or Zyrtec-D.  If you do take one of the above, it is ok to combine that with plain over the counter Mucinex or Allegra or Claritin or Zyrtec.  If for example you are taking Zyrtec -D, you can combine that with Mucinex, but not Mucinex-D.  If you are taking Mucinex-D, you can combine that with plain Allegra or Claritin or Zyrtec.   If you do have Hypertension or palpitations, it is safe to take Coricidin HBP for relief of sinus symptoms.  We recommend you take over the counter Flonase (Fluticasone) or another nasally inhaled steroid unless you are already taking one.  Nasal irrigation with a saline spray or Netti Pot like device per their directions is also recommended.  If not allergic, please take over the counter Tylenol (Acetaminophen) and/or Motrin (Ibuprofen) as directed for control of pain and/or fever.  Please follow up with your primary care doctor or specialist as needed.    If you  smoke, please stop smoking.  Patient Education       Cough, Runny Nose, and the Common Cold Discharge Instructions   About this topic   The common cold is an infection in the nose and throat. A tiny germ, called a virus,  causes this infection. It often affects your nose, throat, ears, and sinuses. A cold can easily spread from person to person. Coughing, sneezing, or touching something with the germ on it spreads the cold.  Most colds go away on their own without treatment. Antibiotics will not help a cold. Your doctor may order drugs to help with the signs of a cold. Even though you may feel very sick, the common cold is not a health emergency.         What care is needed at home?   · Ask your doctor what you need to do when you go home. Make sure you ask questions if you do not understand what the doctor says.  · To help you feel better:  ? Use a cool mist humidifier to avoid breathing dry air.  ? Use hard candy or cough drops to soothe sore throat and cough.  ? Gargle with salt water. Mix 1/2 teaspoon (2.5 grams) salt with a cup (240 mL) of warm water a few times a day.  ? Spray saltwater mist in each nostril. Any normal saline spray works.  ? Sip warm liquids to keep your throat moist.  ? Take warm, steamy showers to help soothe the cough.  · Do not smoke or be in smoke-filled places. Avoid things that may cause breathing problems like vaping, fumes, pollution, dust, and other common allergens.  · You may want to use over-the-counter medicines for allergies or acid reflux if your cough is due to one of these problems.  · You can also use an over-the-counter cough medicine.  · Drink plenty of fluids whenever you are thirsty.  What follow-up care is needed?   Your doctor may ask you to make visits to the office to check on your progress. Be sure to keep these visits.  What drugs may be needed?   Your doctor may order drugs to:  · Help a stuffy nose  · Lower a fever  · Help with pain  · Treat an allergy  · Help with cough  Always talk to your doctor before you take any drugs. This includes over-the-counter (OTC) drugs and herbal supplements. This is very important if you have high blood pressure.  Will physical activity be limited?    Get lots of rest. Sleep when you are feeling tired. Avoid doing tiring activities.  What changes to diet are needed?   · Chicken soup may be helpful. Warm fluids help thin mucus and help the body get rid of it easier.  What problems could happen?   · Colds may cause signs of asthma for people who have asthma.  · Sinus or ear infection  · Lung infections  · Bronchitis  What can be done to prevent this health problem?   · Wash your hands often with soap and water for at least 20 seconds, especially after coughing or sneezing. Alcohol-based hand sanitizers also work to kill the virus.  · If you are sick, cover your mouth and nose with tissue when you cough or sneeze. You can also cough into your elbow. Throw away tissues in the trash and wash your hands after touching used tissues.  · Clean items and surfaces you most often touch like door handles, remotes, phones, or toys. Wipe them with a disinfectant. This can help reduce the spread of infection.  · Do not get too close (kissing, hugging) to people who are sick.  · Do not share towels or hankies with anyone who is sick.  · Stay away from crowded places.  · Keep your hands away from your eyes and nose because the virus can enter easily to those body areas.  · Get a flu shot each year.  When do I need to call the doctor?   · You have chest pain when you cough or trouble breathing.  · You start to cough up blood or yellow or green mucus.  · You have a fever of 100.4°F (38°C) or higher or chills.  · You cough so hard you throw up.  · You are still coughing in 10 days.  Helpful tips   · It is normal that mucus from your runny nose may become thicker and change color. Do not take an antibiotic. Instead, drink more water-based fluids, especially hot tea and soups.  · Most colds go away within a week. If you are still sick after 14 days, see your doctor.  Teach Back: Helping You Understand   The Teach Back Method helps you understand the information we are giving you.  After you talk with the staff, tell them in your own words what you learned. This helps to make sure the staff has described each thing clearly. It also helps to explain things that may have been confusing. Before going home, make sure you can do these:  · I can tell you about my condition.  · I can tell you what may help ease my breathing.  · I can tell you what I can do to help avoid passing the infection to others.  · I can tell you what I will do if I have a fever, chills, or trouble breathing.  Where can I learn more?   American Academy of Family Physicians  https://familydoctor.org/condition/colds-and-the-flu/   American Lung Association  http://www.lung.org/lung-health-and-diseases/lung-disease-lookup/influenza/facts-about-the-common-cold.html   Centers for Disease Control and Prevention  https://www.cdc.gov/features/rhinoviruses/   Kids Health  http://kidshealth.org/en/parents/cold.html?ref=search&WT.ac=ihi-a-intb-mr-brntbv-okl   Last Reviewed Date   2021-06-09  Consumer Information Use and Disclaimer   This information is not specific medical advice and does not replace information you receive from your health care provider. This is only a brief summary of general information. It does NOT include all information about conditions, illnesses, injuries, tests, procedures, treatments, therapies, discharge instructions or life-style choices that may apply to you. You must talk with your health care provider for complete information about your health and treatment options. This information should not be used to decide whether or not to accept your health care providers advice, instructions or recommendations. Only your health care provider has the knowledge and training to provide advice that is right for you.  Copyright   Copyright © 2021 [employer], [employer]. and its affiliates and/or licensors. All rights reserved.

## 2022-02-09 ENCOUNTER — TELEPHONE (OUTPATIENT)
Dept: URGENT CARE | Facility: CLINIC | Age: 87
End: 2022-02-09
Payer: MEDICARE

## 2022-02-22 ENCOUNTER — TELEPHONE (OUTPATIENT)
Dept: NEUROLOGY | Facility: CLINIC | Age: 87
End: 2022-02-22
Payer: MEDICARE

## 2022-02-22 DIAGNOSIS — D84.9 IMMUNOSUPPRESSED STATUS: ICD-10-CM

## 2022-02-22 NOTE — TELEPHONE ENCOUNTER
Spoke to daughter, Haley and patient, regarding appt follow-up in Neurology. Appt offered/accepted for 3/3/22 @ 1:00pm red Alanis. Last seen as outpatient by Dr. Ureña mid-2021.

## 2022-02-22 NOTE — TELEPHONE ENCOUNTER
----- Message from Luis Carlos Arshad sent at 2/22/2022  3:26 PM CST -----  Contact: Patient daughter  Patient daughter requesting requesting call back       [phone] ([facility]) care taker

## 2022-02-28 ENCOUNTER — OUTPATIENT CASE MANAGEMENT (OUTPATIENT)
Dept: NEUROLOGY | Facility: CLINIC | Age: 87
End: 2022-02-28
Payer: MEDICARE

## 2022-02-28 DIAGNOSIS — G31.84 MCI (MILD COGNITIVE IMPAIRMENT): ICD-10-CM

## 2022-02-28 PROCEDURE — 99358 PR PROLONGED SERV,NO CONTACT,1ST HR: ICD-10-PCS | Mod: S$GLB,,, | Performed by: PSYCHIATRY & NEUROLOGY

## 2022-02-28 PROCEDURE — 99358 PROLONG SERVICE W/O CONTACT: CPT | Mod: S$GLB,,, | Performed by: PSYCHIATRY & NEUROLOGY

## 2022-02-28 NOTE — PROGRESS NOTES
[facility]  Brain Health and Cognitive Disorders Program    PATIENT: Brunilda England  DATE: 02/28/2022  MRN: [id number]  PRIMARY PROVIDER: Pepper Whitfield MD    Future Appointments   Date Time Provider Department Center   3/3/2022  1:00 PM Rubio Alanis MD [facility] NEURO8 [facility]   4/14/2022  1:30 PM LAB, [facility] LAB [facility]   4/19/2022  1:30 PM Pepper Whitfield MD [facility]     I reviewed old records and/or communicated with other professionals or the patient's family from 12:16 PM until 12:56 PM on 02/28/2022. This is directly related to a face-to-face visit encounter with the patient (Evaluation and Management service) conducted on 03/03/2022.    I reviewed the following documentation for a total of 40 minutes.  CPT codes for billing for prolonged evaluation and management service (non-face-to-face review of records or communications with patient's family or other medical professionals):   00488  [facility] EMR records I reviewed include:     Relevant Background/Context   Known Relevant Genetics:  o There is no known relevant genetic testing available.   Known Relevant Family history:  o No Known Relevant Family History.  o The family denies a history of early/late onset cognitive impairment.  o The family denies a history of movement disorder (PD, PDD, tremor, etc).  o The family denies a history of motor neuron disease (ALS).  o The family denies a history of developmental learning disorder (Dyslexia, ADHD, ASD, etc.).  o The family denies a history of mood/substance abuse disorder (MDD, PIPER, Schizophrenia, etc.).   Developmental/Milestones:  o The patient/family report no known birth complications or early life problems. The patient met all developmental milestones.   Learning Disorders:  o The patient/family report no signs or symptoms suggestive of developmental learning disorder.   Education:  o 12 years of formal education.  o HS.   Social History:  o Her   passed away on August 26, 2017.   Relevant Medical History:  o Stage III non-small cell lung cancer, completed chemoradiation with carboplatin and Taxol on 6/1/12  o Hypertension.  o Diabetes mellitus.  o Hyperlipidemia.  o Chronic diastolic dysfunction.  o Coronary artery disease status post MI in 1990 and 2003. Stenting was  o done at 2003 at [facility].  o Cholecystectomy, December 2006  o Osteoporosis on BMD 12/11  o Macular degeneration   Relevant Exposure/Trauma to CNS:  o No History of Traumatic Brain Injury or Concussions  o No History of Chronic Mood Disorder  o No History of Chronic Stress  o No History of Chronic Substance Abuse  o No History of Malnutrition  o No History of Toxic Exposure     Neurocognitive Disorder Features   Onset/Duration:  o Feb 2017 (~5-year)   First Symptom:  o Memory impairment   Progression:  o Gradually Progressive   Clinical Course:  o Neurologist (09/05/2018)  - Type: Chart Review. This is an 86-year-old female was referred for evaluation of memory difficulties. The patient does admit that she has occasional difficulty with retention of recent information or repeating herself however she is otherwise quite independent. Her daughter was present today and collaborated with the history. The patient lives alone and her daughter lives across the street. The patient is able to take care of her day-to-day needs at home without any problems including managing her medications, cooking, taking care of the finances and house work. She continues to drive in the neighborhood going to the local grocery, to the bank or the head dresser. Her daughter helps her with her appointments. She denies any headaches or visual difficulties and is not hearing impaired. She has had no falls. She does report that her memory transiently got worse after the death of her . Her  passed away on August 26, 2017. They were  67 years. She misses him. She feels that she is  coping well as he was sick for a very long time and she was his primary care giver. She does report that she has gradually improved since she has been on Cymbalta. She also reports that her memory also improved specially since Aricept was started. She is presently on 5 mg in the morning daily. Her daughter did admit that she keeps fairly active and has not noted any significant memory changes otherwise. Vascular risk factors include diabetes, hypertension and coronary artery disease. In addition she has history non small cell lung cancer, which is stable. She saw Dr Weir in August 2018 and scans were stable. She is to follow up annually with CT scan chest.   She is breathing well and denied chest pain or shortness of breath. Discussed with the patient and her daughter. She had minimal cognitive difficulties that may be age related or may represent mild cognitive impairment on a vascular basis given her multiple risk factors. She is otherwise quite independent, living alone. History of an adjustment disorder with depression may also be a contributing factor but this has improved with Cymbalta. She is advised to continue Aricept 5 mg daily in the morning. B12 OTC supplementation. Continue present level of activities. The patient counseled about getting her immunization shots and is given a prescription for this. Follow-up in 1 year if stable.  o Neurologist (06/29/2021)  - Type: Chart Review. Significant issues in the patient's past medical history is that of non-small cell lung cancer that was diagnosed in 2012. The patient was treated in 2012, and is followed by Oncology on a yearly basis, with no report of recurrence. She was treated with chemotherapy and radiation therapy. Additional medical issues include hypertension, coronary artery disease, and atherosclerotic peripheral vascular disease. The patient was seen in the ER at [facility] for lightheadedness for several days on 4-13-21. Symptoms appear to have abated  in the ER. The patient reports that she continues to operate a motor vehicle. This is of concern to her family. Noted by her family has been occasions in which she left her house and forgot to lock it. The family is concerned as well that the patient is appears generally weaker than in the past, and may not be able to take care of herself when she exits her house to go to the street. I reviewed a prior CT scan of the head from 3-. Noted on my reading of the scan is mild cortical atrophy with ventricular enlargement. The patient manifests cognitive impairment. I am not certain as to its severity, based on my testing today. The patient will be re-examined by me in several months. It would be useful to view an MRI scan of the brain for possible presence of vascular type lesions. My belief at this time is that I am observing an early dementia.  o Primary Care Provider (08/06/2021)  - Type: Chart Review. No more lightheadedness. She is taking metoprolol 25 mg 2 tablets in am and 1 tablet at night (sometimes skips the evening dose).  NO   chest pain, palpitations.  She continues to take amlodipine - benazepril 5/20 one tablet daliy.  . She has been having sinus congestion that comes and goes. She uses flonase 1 spray in each nostril once daily. No fever, chills, sore throat, nausea, vomiting. SHe continues to take duloxetine 60 mg daily and citalopram 10 mg daily.  Mood has been good.   Neuropathic pain in the legs is better controlled with the duloxetine.   I encouraged her to get out of the house.  She has not needed  xanax 0. 5 mg since April.     Current Presentation   Recent/Interim History:  o Unknown   Unresolved Concern(s) reported by patient/family:  o Unknown          Neuroimaging:    MRI brain/head without contrast on 7/7/2021   Formal interpretation by Radiology:   No acute intracranial process. Changes of chronic small vessel ischemic disease and cerebral volume loss.   Independently reviewed  radiological imaging by Rubio Garner MD. MPH. Behavioral Neurologist   T1: moderate degree P>F D>V cortical atrophy with severe widening of marginal sulci - multi-infarct subcortical WMD with cystic cavitation, relative sparing of subcortical/hippocampus   T2/FLAIR: Compared to 2012, there are some progression of the T2/FLAIR signal hyperintensities within the periventricular and subcortical white matter.   DWI/ADC: No Significant DWI hyperintensities/hypointensities. No ADC correlation.   SWI/GRE: No Significant hypointensities to suggest cortical/subcortical hemosiderin deposition.  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 MPH.    Brain Health and Cognitive Disorders Program  [facility]

## 2022-03-03 ENCOUNTER — TELEPHONE (OUTPATIENT)
Dept: NEUROLOGY | Facility: CLINIC | Age: 87
End: 2022-03-03
Payer: MEDICARE

## 2022-03-03 ENCOUNTER — OFFICE VISIT (OUTPATIENT)
Dept: NEUROLOGY | Facility: CLINIC | Age: 87
End: 2022-03-03
Payer: MEDICARE

## 2022-03-03 VITALS
SYSTOLIC BLOOD PRESSURE: 159 MMHG | HEART RATE: 87 BPM | DIASTOLIC BLOOD PRESSURE: 76 MMHG | BODY MASS INDEX: 26.26 KG/M2 | WEIGHT: 167.69 LBS

## 2022-03-03 DIAGNOSIS — G30.1 LATE ONSET ALZHEIMER'S DEMENTIA WITH BEHAVIORAL DISTURBANCE: Primary | ICD-10-CM

## 2022-03-03 DIAGNOSIS — E16.2 HYPOGLYCEMIA: ICD-10-CM

## 2022-03-03 DIAGNOSIS — F02.818 LATE ONSET ALZHEIMER'S DEMENTIA WITH BEHAVIORAL DISTURBANCE: Primary | ICD-10-CM

## 2022-03-03 PROCEDURE — 96116 NUBHVL XM PHYS/QHP 1ST HR: CPT | Mod: 59,S$GLB,, | Performed by: PSYCHIATRY & NEUROLOGY

## 2022-03-03 PROCEDURE — 1101F PT FALLS ASSESS-DOCD LE1/YR: CPT | Mod: CPTII,S$GLB,, | Performed by: PSYCHIATRY & NEUROLOGY

## 2022-03-03 PROCEDURE — 99999 PR PBB SHADOW E&M-EST. PATIENT-LVL V: ICD-10-PCS | Mod: PBBFAC,,, | Performed by: PSYCHIATRY & NEUROLOGY

## 2022-03-03 PROCEDURE — 99215 PR OFFICE/OUTPT VISIT, EST, LEVL V, 40-54 MIN: ICD-10-PCS | Mod: S$GLB,,, | Performed by: PSYCHIATRY & NEUROLOGY

## 2022-03-03 PROCEDURE — 90887 PR CONSULTATION WITH FAMILY: ICD-10-PCS | Mod: 59,S$GLB,, | Performed by: PSYCHIATRY & NEUROLOGY

## 2022-03-03 PROCEDURE — 1101F PR PT FALLS ASSESS DOC 0-1 FALLS W/OUT INJ PAST YR: ICD-10-PCS | Mod: CPTII,S$GLB,, | Performed by: PSYCHIATRY & NEUROLOGY

## 2022-03-03 PROCEDURE — 99417 PR PROLONGED SVC, OUTPT, W/WO DIRECT PT CONTACT,  EA ADDTL 15 MIN: ICD-10-PCS | Mod: S$GLB,,, | Performed by: PSYCHIATRY & NEUROLOGY

## 2022-03-03 PROCEDURE — 3288F PR FALLS RISK ASSESSMENT DOCUMENTED: ICD-10-PCS | Mod: CPTII,S$GLB,, | Performed by: PSYCHIATRY & NEUROLOGY

## 2022-03-03 PROCEDURE — 96116 PR NEUROBEHAVIORAL STATUS EXAM BY PSYCH/PHYS: ICD-10-PCS | Mod: 59,S$GLB,, | Performed by: PSYCHIATRY & NEUROLOGY

## 2022-03-03 PROCEDURE — 99417 PROLNG OP E/M EACH 15 MIN: CPT | Mod: S$GLB,,, | Performed by: PSYCHIATRY & NEUROLOGY

## 2022-03-03 PROCEDURE — 1159F PR MEDICATION LIST DOCUMENTED IN MEDICAL RECORD: ICD-10-PCS | Mod: CPTII,S$GLB,, | Performed by: PSYCHIATRY & NEUROLOGY

## 2022-03-03 PROCEDURE — 99215 OFFICE O/P EST HI 40 MIN: CPT | Mod: S$GLB,,, | Performed by: PSYCHIATRY & NEUROLOGY

## 2022-03-03 PROCEDURE — 1160F RVW MEDS BY RX/DR IN RCRD: CPT | Mod: CPTII,S$GLB,, | Performed by: PSYCHIATRY & NEUROLOGY

## 2022-03-03 PROCEDURE — 1126F AMNT PAIN NOTED NONE PRSNT: CPT | Mod: CPTII,S$GLB,, | Performed by: PSYCHIATRY & NEUROLOGY

## 2022-03-03 PROCEDURE — 3288F FALL RISK ASSESSMENT DOCD: CPT | Mod: CPTII,S$GLB,, | Performed by: PSYCHIATRY & NEUROLOGY

## 2022-03-03 PROCEDURE — 99999 PR PBB SHADOW E&M-EST. PATIENT-LVL V: CPT | Mod: PBBFAC,,, | Performed by: PSYCHIATRY & NEUROLOGY

## 2022-03-03 PROCEDURE — 90887 INTERPJ/EXPLNAJ RSLT PSYC XM: CPT | Mod: 59,S$GLB,, | Performed by: PSYCHIATRY & NEUROLOGY

## 2022-03-03 PROCEDURE — 1126F PR PAIN SEVERITY QUANTIFIED, NO PAIN PRESENT: ICD-10-PCS | Mod: CPTII,S$GLB,, | Performed by: PSYCHIATRY & NEUROLOGY

## 2022-03-03 PROCEDURE — 1159F MED LIST DOCD IN RCRD: CPT | Mod: CPTII,S$GLB,, | Performed by: PSYCHIATRY & NEUROLOGY

## 2022-03-03 PROCEDURE — 1160F PR REVIEW ALL MEDS BY PRESCRIBER/CLIN PHARMACIST DOCUMENTED: ICD-10-PCS | Mod: CPTII,S$GLB,, | Performed by: PSYCHIATRY & NEUROLOGY

## 2022-03-03 RX ORDER — MEMANTINE HYDROCHLORIDE 5 MG/1
TABLET ORAL
Qty: 60 TABLET | Refills: 0 | Status: SHIPPED | OUTPATIENT
Start: 2022-03-03 | End: 2022-04-18 | Stop reason: SDUPTHER

## 2022-03-03 NOTE — PROGRESS NOTES
[facility]  [facility]     PATIENT: Brunilda England  VISIT DATE: 2022  MRN: [id number]  PRIMARY PROVIDER: Pepper Whitfield MD  : 10/15/1931       Chief complaint: Progressive Cognitive Impairment     History of present illness:      Ms. England is a 89-year-old right-handed female who presents today to the [facility]'s [facility] due to concerns related to progressive neurocognitive impairment.    Ms. England is accompanied by daughters who participates in providing history.   Additional information is obtained by reviewing available medical records.     Relevant Background/Context   Known Relevant Genetics:  o There is no known relevant genetic testing available.   Known Relevant Family history:  o No Known Relevant Family History.  o The family denies a history of early/late onset cognitive impairment.  o The family denies a history of movement disorder (PD, PDD, tremor, etc).  o The family denies a history of motor neuron disease (ALS).  o The family denies a history of developmental learning disorder (Dyslexia, ADHD, ASD, etc.).  o The family denies a history of mood/substance abuse disorder (MDD, PIPER, Schizophrenia, etc.).   Developmental/Milestones:  o The patient/family report no known birth complications or early life problems. The patient met all developmental milestones.   Learning Disorders:  o The patient/family report no signs or symptoms suggestive of developmental learning disorder.   Education:  o 12 years of formal education.  o HS.   Social History:  o Her  passed away on 2017. daughter and primary caregiver moved in to her home within the last 3 months.   Career/Skills:  o Housewife   Relevant Medical History:  o Stage III non-small cell lung cancer, completed chemoradiation with carboplatin and Taxol on 12  o Hypertension.  o Diabetes mellitus.  o Hyperlipidemia.  o Chronic diastolic  dysfunction.  o Coronary artery disease status post MI in 1990 and 2003. Stenting was  o done at 2003 at [facility].  o Cholecystectomy, December 2006  o Osteoporosis on BMD 12/11  o Macular degeneration   Relevant Exposure/Trauma to CNS:  o No History of Traumatic Brain Injury or Concussions  o No History of Chronic Mood Disorder  o No History of Chronic Stress  o No History of Chronic Substance Abuse  o No History of Malnutrition  o No History of Toxic Exposure     Neurocognitive Disorder Features   Onset/Duration:  o Feb 2017 (~5-year)   First Symptom:  o Memory impairment   Progression:  o Gradually Progressive   Clinical Course:  o Neurologist (09/05/2018)  - Type: Chart Review. This is an 86-year-old female was referred for evaluation of memory difficulties. The patient does admit that she has occasional difficulty with retention of recent information or repeating herself however she is otherwise quite independent. Her daughter was present today and collaborated with the history. The patient lives alone and her daughter lives across the street. The patient is able to take care of her day-to-day needs at home without any problems including managing her medications, cooking, taking care of the finances and house work. She continues to drive in the neighborhood going to the local grocery, to the bank or the head dresser. Her daughter helps her with her appointments. She denies any headaches or visual difficulties and is not hearing impaired. She has had no falls. She does report that her memory transiently got worse after the death of her . Her  passed away on August 26, 2017. They were  67 years. She misses him. She feels that she is coping well as he was sick for a very long time and she was his primary care giver. She does report that she has gradually improved since she has been on Cymbalta. She also reports that her memory also improved specially since Aricept was  started. She is presently on 5 mg in the morning daily. Her daughter did admit that she keeps fairly active and has not noted any significant memory changes otherwise. Vascular risk factors include diabetes, hypertension and coronary artery disease. In addition she has history non small cell lung cancer, which is stable. She saw Dr Weri in August 2018 and scans were stable. She is to follow up annually with CT scan chest.   She is breathing well and denied chest pain or shortness of breath. Discussed with the patient and her daughter. She had minimal cognitive difficulties that may be age related or may represent mild cognitive impairment on a vascular basis given her multiple risk factors. She is otherwise quite independent, living alone. History of an adjustment disorder with depression may also be a contributing factor but this has improved with Cymbalta. She is advised to continue Aricept 5 mg daily in the morning. B12 OTC supplementation. Continue present level of activities. The patient counseled about getting her immunization shots and is given a prescription for this. Follow-up in 1 year if stable.  o Neurologist (06/29/2021)  - Type: Chart Review. Significant issues in the patient's past medical history is that of non-small cell lung cancer that was diagnosed in 2012. The patient was treated in 2012, and is followed by Oncology on a yearly basis, with no report of recurrence. She was treated with chemotherapy and radiation therapy. Additional medical issues include hypertension, coronary artery disease, and atherosclerotic peripheral vascular disease. The patient was seen in the ER at [facility] for lightheadedness for several days on 4-13-21. Symptoms appear to have abated in the ER. The patient reports that she continues to operate a motor vehicle. This is of concern to her family. Noted by her family has been occasions in which she left her house and forgot to lock it. The family is concerned as well that  the patient is appears generally weaker than in the past, and may not be able to take care of herself when she exits her house to go to the street. I reviewed a prior CT scan of the head from 3-. Noted on my reading of the scan is mild cortical atrophy with ventricular enlargement. The patient manifests cognitive impairment. I am not certain as to its severity, based on my testing today. The patient will be re-examined by me in several months. It would be useful to view an MRI scan of the brain for possible presence of vascular type lesions. My belief at this time is that I am observing an early dementia.  o Primary Care Provider (08/06/2021)  - Type: Chart Review. No more lightheadedness. She is taking metoprolol 25 mg 2 tablets in am and 1 tablet at night (sometimes skips the evening dose).  NO   chest pain, palpitations.  She continues to take amlodipine - benazepril 5/20 one tablet daliy.  . She has been having sinus congestion that comes and goes. She uses flonase 1 spray in each nostril once daily. No fever, chills, sore throat, nausea, vomiting. SHe continues to take duloxetine 60 mg daily and citalopram 10 mg daily.  Mood has been good.   Neuropathic pain in the legs is better controlled with the duloxetine.   I encouraged her to get out of the house.  She has not needed  xanax 0. 5 mg since April.     Current Presentation   Recent/Interim History:  o The patient presents with 2 her daughters. Records indicate the patient has been describing cognitive impairment as early as 2018 however the patient was the primary her caregiver of her  with late onset dementia who passed in 2017. As such is unclear whether not the patient was having any memory deficits as early as 2017 or prior to this. Regardless the patient was diagnosed with mild cognitive impairment in 2018. Over the last 3-4 years her family reported gradual progression in patient's cognitive deficits. The patient is largely stable  "however her family does report new bothersome and progressive behavior. Over the last calendar year her family report the patient often is confused and disoriented her house. She moves objects frequently throughout her house often forgetting where she has placed them. The patient is able to maintain her household responsibilities include lutein cooking, cleaning, and handling simple bills, her family has reported that her cooking has declined and she tends to add abnormal ingredients to her meals. her family's primary concern today include worsening irritability and agitation. Per the report of the family, the patient has always been a very strong-willed woman. The patient has always wanted do things "her way "and has had somewhat OCD tendencies throughout her life. These prior tendencies have magnified. her family reports frequent irritability more often during the morning prior to and during breakfast. The patient has diabetes which appears to be somewhat poorly controlled. The patient refuses frequent glucose checks as such is uncertain whether not the patient is hyperglycemic in the morning. Patient's mood tends to improve after eating her morning meal. her family report frequent bouts of irritability and often jessica agitation and borderline violence if frustrated during the morning time however can not the patient can be irritable and easily frustrated throughout the day. The patient is currently taking citalopram 10 mg q. Day. We discussed potential interventions which include but are not limited to simple over-the-counter juice/sugar water in the morning prior to her coffee and sweetener in order to potentially a counterbalance hypo glycemia. We discussed potential management by Endocrinology. Patient's primary her caregiver current her daughter recently moved from [place] into her current home to facilitate care. her caregiver reports burnout over the last 3 months due to frequent irritability and " frustrating behavior by the patient. her caregiver does support patient's independence however does seem to be worn down by the experience. We discussed social work opportunities for which the patient's her caregiver is open. The patient continues to take Xanax however very infrequently often less than once per month for agitation. The patient denies any bothersome insomnia, hallucinations, or any other bothersome behavior at this time. We have discussed opportunities for social engagement however per the family, the patient has there been a very social individual in never been part of any community groups. The patient does not have any meaningful hobbies. As such patient's primary activities include wandering the house cleaning up.   Unresolved Concern(s) reported by patient/family:  o Agitation/Irritability - start with Namenda, continue citalopram, will increase to 20mg at next appt (max 40mg)  o Caregiver Burnout - referral to social work  o Symptomatic hypoglycemia - referral to endocrinology        Review of cognitive, visuospatial, motor, sensory, and behavioral systems:     Memory:    Ms. Mascorros memory has worsened in the past few years.   She does repeat statements or asks the same question repeatedly.   She does have difficulty remembering recent important conversations.   She does have difficulty remembering recent events.   She does not forget information within minutes.   Her remote memory is intact.   Her recent retrograde memory is intact.  Attention:    Her attention and concentration are impaired.   She does not have attentional fluctuations.   She does not have difficulty maintaining selective attention.   She does become easily distracted.   She does not have difficulty dividing their attention.  Executive:    Ms. England's cognitive processing speed is slower.   She does have difficulty with working memory.   She does misplace personal items (e.g., keys, cell phone, wallet) more  frequently.   She does not have difficulty keeping track of her medications.   She does not have difficulty with planning/organizing/completing multistep tasks.   She does have difficulty with executive attention.   She does not have difficulty with flexible thinking.   She does not difficulty with self control.   She is exhibiting new symptoms that suggest they have become more impulsive, rash and/or careless.   Her judgment is intact.  Language:    Her speech output is affected.   She does forget people's names more frequently.   She does have word-finding difficulties.   Ms. Mascorros speech is fluent and non-effortful.   Ms. Mascorros speech is grammatically intact.   She does not make word substitutions.   She does not have difficulty reading.   She does not appear to have impaired comprehension.  Visuospatial:    She has new visuospatial problems.   She does not have difficulty recognizing objects or faces.  Motor/Coordination:    Ms. England does have difficulty with walking.   She does feel imbalanced.   She has fallen.   She does not appear to have new muscle weakness.   She does not have difficulty buttoning shirts, operating zippers, or manipulating tools/utensils.   Her handwriting has not become micrographic.   She does not have a resting tremor.   She denies having any new involuntary movements and/or muscle jerking.   She does not have swallowing difficulty.   She denies new muscle cramps and twitching.  Sensory:    Ms. England denies new numbness, tingling, paresthesias, or pain.   Ms. England denies a loss of vision, blurry vision, or double vision.   Ms. England denies new loss of hearing or worsening tinnitus.   Ms. England denies anosmia.  Sleep:    Ms. England denies difficulty sleeping.   Ms. England does not have difficulty going to sleep.   Ms. England denies difficulty staying asleep or frequently awakening at night.   Ms. England does not snore or have witnessed apneas while  sleeping.   When she wakes up in the morning, she does feel well-rested.   She denies dream-enactment behavior.   She denies symptoms suggestive of restless leg syndrome.  Behavior:    Ms. England's personality has not changed.   She does not have symptoms of disinhibition and social inappropriateness.   She does not have symptoms to suggest a loss of manners or decorum.   She does not appear apathetic or has decreased motivation.   She does not appear to have a change in inertia.   There is no report that Ms. England has had a change in their emotional expression.   She does not have emotional blunting or lability.   She does have symptoms of irritability and mood lability.   She has been reported to have new symptoms of agitation, aggression, or violent outbursts.   Her insight into his disease and situation is impaired.   Her personal hygiene is intact.   She is not exhibiting a diminished response to other people's needs and feelings.   She is not exhibiting a diminished social interest, interrelatedness, or personal warmth.   She denies restlessness.   She denies new and/or worsening simple repetitive behaviors.   Her speech has not become simplified or become repetitive/stereotyped.   She reports symptoms that are suggestive of new/worsening complex repetitive/ritualistic compulsions and behaviors. Comment: baseline   She does not have symptoms of hyper-religiosity or dogmatism.   Her interests/pleasures have not become restrictive, simplified, interrupting, or repetitive.   She denies a change of self-stimulating behavior.   She denies any changes in eating behavior.   She denies increased consumption of food or substances.   She denies oral exploration or consumption of inedible objects.  Psychiatric:    She does not feel depressed.   She is exhibiting symptoms of social withdrawal/indifference.   She denies anxiety.   She does not exhibit cycling behavior.   She does not exhibit  hyperactive behavior.   She is not exhibited symptoms of paranoia.   She does not have delusions.   She does not have hallucinations.   She does not have a history of sensitivity to neuroleptic/psychotropic medications.  Medical Review of Systems:    Ms. England does not have constipation.   Ms. England does not have urinary incontinence.   Ms. England denies orthostatic lightheadedness.   Ms. England's weight is stable.  Functional status:   Difficulty performing the following Instrumental ADLs:  o Housekeeping: No  o Food Preparation: Yes  o Shopping: Yes  o Ability to Handle Finances: No  o Transportation/Driving: Yes  o Household Appliances/Stove: No  o Laundry: No   Difficulty performing the following Basic ADLs:  o Dressing: No  o Bathing: No  o Toileting: No  o Personal hygiene and grooming: No  o Feeding: No  Care Management:   Patient/Family Safety Concerns:  o Medication Adherence: No  o Home Safety: No  o Wandered: No  o Firearms: No  o Fall Risk: Yes  o Home Alone: No       Past Medical History:   Diagnosis Date    Anal cancer 1995    Anemia     Cancer 1995    anal cancer    Centrilobular emphysema 7/6/2020    Coronary artery disease     Diabetes mellitus     With circulatory disorder    Hypertension     Hypertensive retinopathy of both eyes    Lumbar radiculopathy 5/16/2014    Lung cancer 2012    s/p chemo/radiation    Macular degeneration     Macular hemorrhage of left eye     Myocardial infarction     x 2    Neuropathy     Osteoporosis     Osteoporosis, unspecified 11/9/2012    Peripheral vascular disease     Pure hypercholesterolemia        Past Surgical History:   Procedure Laterality Date    BRONCHOSCOPY      CHOLECYSTECTOMY      COLONOSCOPY      FLUOROSCOPIC ANGIOGRAPHY OF LOWER EXTREMITY WITH TOPICAL ULTRASOUND Right 12/6/2018    Procedure: ARTERIOGRAM-LEG AND ULTRASOUND;  Surgeon: SEBASTIÁN Mcpherson III, MD;  Location: [facility] OR [address] [place] [address];  Service: Peripheral Vascular;   Laterality: Right;  16.5 min  1059.42 mGy  59ml Dye  2ml Local    heart stent      HYSTERECTOMY      INCISIONAL BIOPSY Right 2019    Procedure: INCISIONAL BIOPSY;  Surgeon: Leslie Castillo MD;  Location: [facility] OR [address] [place] [address];  Service: Plastics;  Laterality: Right;    left leg surgery      blockage    PERCUTANEOUS TRANSLUMINAL ANGIOPLASTY N/A 2018    Procedure: PTA (ANGIOPLASTY, PERCUTANEOUS, TRANSLUMINAL);  Surgeon: SEBASTIÁN Mcpherson III, MD;  [facility];  Service: Peripheral Vascular;  Laterality: N/A;    RECONSTRUCTION USING FLAP Right 2019    Procedure: RECONSTRUCTION USING FLAP/FULL THICKNESS MRESETION AND RECONSTRUCTION RIGHT UPPER EYELID WITH BIOPSY;  Surgeon: Leslie Castillo MD;  Location: [facility] OR [address] [place] [address];  Service: Plastics;  Laterality: Right;    TONSILLECTOMY         Family History   Problem Relation Age of Onset    Stroke Mother     Cancer Father     Breast cancer Maternal Aunt     Ovarian cancer Neg Hx     Amblyopia Neg Hx     Blindness Neg Hx     Cataracts Neg Hx     Glaucoma Neg Hx     Macular degeneration Neg Hx     Retinal detachment Neg Hx     Strabismus Neg Hx        Social History     Socioeconomic History    Marital status:    Tobacco Use    Smoking status: Former Smoker     Packs/day: 0.50     Years: 30.00     Pack years: 15.00     Types: Cigarettes     Quit date: 1995     Years since quittin.1    Smokeless tobacco: Never Used   Substance and Sexual Activity    Alcohol use: No    Drug use: No    Sexual activity: Not Currently     Birth control/protection: Surgical       Medication:     Current Outpatient Medications on File Prior to Visit   Medication Sig Dispense Refill    amlodipine-benazepril 5-20 mg (LOTREL) 5-20 mg per capsule Take 1 capsule by mouth once daily. 90 capsule 3    ANTIOX #8/OM3/DHA/EPA/LUT/ZEAX (PRESERVISION AREDS 2, OMEGA-3, ORAL) Take 1 capsule by mouth 2 (two) times daily.       aspirin (ECOTRIN) 81 MG EC  tablet Take 81 mg by mouth every morning.      blood sugar diagnostic Strp Check blood sugar daily. Strips and lancets 100 strip 6    calcium-vitamin D3 (OS-ERIC 500 + D3) 500 mg-5 mcg (200 unit) per tablet Take 1 tablet by mouth every morning.      citalopram (CELEXA) 10 MG tablet Take 1 tablet (10 mg total) by mouth once daily. 90 tablet 4    cyanocobalamin (VITAMIN B-12) 1000 MCG tablet Take 1,000 mcg by mouth every morning.      donepeziL (ARICEPT) 10 MG tablet Take 1 tablet (10 mg total) by mouth every evening. 30 tablet 3    DULoxetine (CYMBALTA) 60 MG capsule Take 1 capsule (60 mg total) by mouth every morning. 90 capsule 3    estradioL (ESTRACE) 0.01 % (0.1 mg/gram) vaginal cream USE ONE-HALF GRAM VAGINALLY TWICE a WEEK 42.5 g 6    ferrous sulfate (IRON ORAL) Take 1 tablet by mouth every morning.      fluticasone propionate (FLONASE) 50 mcg/actuation nasal spray 2 sprays (100 mcg total) by Each Nostril route once daily. 16 g 3    fluticasone propionate (FLONASE) 50 mcg/actuation nasal spray 2 sprays (100 mcg total) by Each Nostril route once daily. 16 mL 0    lancets Misc Check blood sugar daily 250.00 100 each 5    metoprolol tartrate (LOPRESSOR) 25 MG tablet 2 tablets in the morning and one in the evening. 270 tablet 3    nitroGLYCERIN (NITROSTAT) 0.4 MG SL tablet Place 1 tablet (0.4 mg total) under the tongue every 5 (five) minutes as needed. 25 tablet 0    pioglitazone (ACTOS) 15 MG tablet Take 1 tablet (15 mg total) by mouth every morning. 90 tablet 4    simvastatin (ZOCOR) 80 MG tablet Take 1 tablet (80 mg total) by mouth once daily. 90 tablet 4    ALPRAZolam (XANAX) 0.5 MG tablet Take 1 tablet (0.5 mg total) by mouth 3 (three) times daily as needed for Anxiety. 60 tablet 0    blood-glucose meter kit Use as instructed 1 each 0     Current Facility-Administered Medications on File Prior to Visit   Medication Dose Route Frequency Provider Last Rate Last Admin    sodium chloride 0.9%  flush 5 mL  5 mL Intravenous PRN Sarah Earl MD            Review of patient's allergies indicates:   Allergen Reactions    Bextra [valdecoxib] Swelling    Gabapentin Diarrhea and Nausea Only       Medications Reconciliation:   I have reconciled the patient's home medications and discharge medications with the patient/family. I have updated all changes.  Refer to After-Visit Medication List.    Objective:  Vital Signs:  Vitals:    03/03/22 1300   BP: (!) 159/76   Pulse: 87     Wt Readings from Last 3 Encounters:   03/03/22 1300 76.1 kg (167 lb 10.6 oz)   02/07/22 0808 74.8 kg (165 lb)   12/10/21 1340 75.1 kg (165 lb 9.1 oz)     Body mass index is 26.26 kg/m².     Neurological examination:      Mental Status:       Ms. England is awake; Her energy level appeared normal.   Her appearance is normal (hygiene is appropriate; attire is proper and clean).   She has appropriate attention/concentration (GULSHAN/BARBIE forwards and backward).   She can complete three-step commands.   Her thought process is logical and goal-oriented.   She has no evidence of hallucinations (auditory, visual, olfactory).   She has no evidence of delusions (paranoid, grandiose, bizarre).   She demonstrated good judgment based on actions and plans for the future.   She demonstrated appropriate insight based on actions, awareness of her illness, plans for the future.   Throughout the interview, she is cooperative, her eye contact is appropriate.  Cranial Nerves:    Her pupils were normal.   Her visual fields were full to confrontation in all quadrants.   Her ocular pursuit in the horizontal and vertical plane was complete.   Her saccadic initiation, velocity, and amplitude are normal.   She demonstrated no square-wave jerks.   Her eyelid assessment showed no apraxia. There was no eyelid dysfunction, retraction, or torres sign.   Her facial strength was normal.   Her facial expression was symmetric and appropriate to the  "context.   Her tongue showed no evidence of scalloping.   Her tongue showed no evidence of fasciculation or scalloping.   She can protrude their tongue beyond Her lips for >10 sec.   She can move their extended tongue back and forth rapidly.   She had no significant evidence of anterocollis or retrocollis.  Speech/Language:    Ms. Lu speech was fluent, non-effortful, and her rate was appropriate to the context.   Her speech volume is within normal range and appropriate to the context.   Her speech rate is normal.   She made articulation (segmental features) errors.   She has no speech dysdiadochokinesia with repetition of syllables such as "/PA/, /TA/, /KA/, /OM/".   She made errors during the repetition of rapid syllables and or words.    Comment: 'caterpillar' '', and 'huckleberry'   She made errors during the repetition of rapid sequences of consonants.    Comment: 'Restorationist Latter day' or '[employer]'.   She has no prosody (suprasegmental features) errors.   Her stress assessment showed no repetition errors in linguistically complex words, including multisyllabic words ("planetarium," "questionable," "accomplishment," "phonetic.   Ms. Mascorros speech is not dysarthric.   Ms. Mascorros speech was without evidence of anomia.   She showed no evidence of anomia during spontaneous speech.   She makes no phonological loop errors.   She makes no errors during the repetition of gibberish words (e.g., "Supercalifragilisticexpialidocious," "Pigglywiggly," "Woospiedoo," "Zowzy," "Bazinga").   She can comprehend commands that cross the midline (e.g., with your left thumb, touch your right ear).  Motor:    Ms. England's bilateral upper extremity muscle bulk is appropriate.   Ms. England's upper extremity muscle tone is increased.    Comment: mainly paratonia with very mild rigidity b/l   Ms. Mascorros bilateral upper extremity muscle tone does not suggest spasticity.   There is " evidence of rigidity/cogwheeling.    Comment: Muscle tone is increased and there is evidence of rigidity/cogwheeling.   There is evidence of paratonia.    Comment: Muscle tone is increased and there is evidence of paratonia.   Assessment of motor strength showed evidence of abnormal weakness.    Comment: mild b/l LE proximal weakness   There is no pronator or downward drift.   There is no upward drift.   There is no outward/diagonal drift.   There is no myoclonus observed in Ms. England's bilateral upper and lower extremities.   There are no fasciculations observed in Ms. England's bilateral upper and lower extremities.  Coordination:    She has no bilateral upper extremity limb dysmetria or past pointing on finger-nose-finger bilaterally.   She has no limb dysdiadochokinesia of the upper extremity on the pronation/supination test and screwing in a light bulb or lower extremity during tapping ball of each foot bilaterally.   She has no visible tremor.   She has evidence of interhemispheric motor control deficits.   She demonstrates evidence of motor overflow.   Ms. Mascorros upper extremity fine motor coordination was abnormal.    Comment: mild L>R   Ms. England's upper extremity fine motor coordination was not slow.    Comment: finger tapping, pronation/supination, and the open-close fist was slow.   Ms. England's upper extremity fine motor coordination was not hypometric.    Comment: finger tapping, pronation/supination, and the open-close fist showed hypometria.   Ms. Mascorros upper extremity fine motor coordination was not dysrhythmic.    Comment: finger tapping, pronation/supination, and the open-close fist showed dysrhythmia.  Higher Cortical Function:    Ms. England showed evidence of apraxia.   She showed no evidence of ideomotor apraxia performing tool-use pantomimes (e.g., use a hammer, use a screwdriver, use a comb, flip a coin, waving goodbye).   She showed no evidence of ideational apraxia  (e.g., taking off and putting on shoes, folding paper into an envelope).   She showed no evidence of limb-motor apraxia while mimicking complex bimanual hand shapes.   She showed no evidence of buccofacial apraxia (e.g., blow out a candle, puff out cheeks, and whistle).   She showed no dysexecutive behavior.   She has no perseverative or stereotyped behavior.  Sensory:    Her cortical sensory assessment demonstrated no neglect bilaterally.  Reflexes:    Reflexes were symmetric and 2+ at biceps, 2+ triceps, and 2+ brachioradialis, 2+ at the knees bilaterally, there was no cross-abductor sign, 2+ in the bilateral ankles.  Gait:    She has normal posture sitting unaided.   She is unable to rise from a chair and sit back down without using their arms.   Her gait was abnormal.   Her posture while walking is normal.   Her gait initiation/inhibition was normal.   Her stance while walking is abnormal.    Comment: narrow   Her gait speed was abnormal (70-80 F 1.13 m/s M 1.26 m/s, >80 F 0.94 m/sec, M 0.97 m/sec).    Comment: slow   Her stride (gait cycle) was abnormal.    Comment: decreased step-time, step-width, and step-length.   Her arms swing is symmetric and of normal amplitude.   She takes turns in <4 steps.   When attempting to walk abnormally (heels, tiptoes, tandem), she makes errors.   She has no evidence of posture/balance impairment.   She has no evidence of a specific gait disorder.       Neuropsychological Evaluation Summary:     Prior Neurocognitive/Neuropsychological Evaluations   Summary from EMR:    Neurocognitive Evaluation completed on 03/03/2022:  Neuropsychiatric/Behavioral Focused Evaluation Assessment   BEHAV5+ 3/6 See ROS section for a full description   Laboratories:     Lab Date Value [Reference]   Metabolic Screening           Hemoglobin A1C External 2021, Dec-07  2021, Aug-03  2021, Apr-13    6.1 (H) [4.0 - 5.6 %]  6.2 (H) [4.0 - 5.6 %]  6.3 (H) [4.0 - 5.6 %]      Lipase  04/13/2021  42 [4 - 60 U/L]      TSH 01/12/2018  2.923 [0.400 - 4.000 uIU/mL]  2.301 [0.400 - 4.000 uIU/mL]  3.394 [0.400 - 4.000 uIU/mL]      Cholesterol 2021, Apr-13    160 [120 - 199 mg/dL]      HDL 2021, Apr-13    53 [40 - 75 mg/dL]      Non-HDL Cholesterol 2021, Apr-13    107 [mg/dL]      Triglycerides 2021, Apr-13    175 (H) [30 - 150 mg/dL]      Vit D, 25-Hydroxy 04/13/2021  24 (L) [30 - 96 ng/mL]  31 [30 - 96 ng/mL]      Vitamin B-12 01/12/20182018, Jan-12    793 [210 - 950 pg/mL]  1207 (H) [210 - 950 pg/mL]      Coagulopathy Screening   INR 2019, Dec-06    1.0 [0.8 - 1.2]      Protime 08/05/2020  10.2 [9.0 - 12.5 sec]      Neuroendocrine/Electrolyte Screening   Magnesium 12/06/21382442, Dec-06    1.8 [1.6 - 2.6 mg/dL]  1.7 [1.6 - 2.6 mg/dL]  1.8 [1.6 - 2.6 mg/dL]      Phosphorus 2019, Dec-07    3.5 [2.7 - 4.5 mg/dL]      Infectious Disease/Immunocompromised Screening   SARS-CoV-2 RNA, Amplification, Qual 2022, Feb-07    Negative      SARS-CoV2 (COVID-19) Qualitative PCR 2020, Dec-18  2020, May-22    Not Detected [Not Detected]  Not Detected [Not Detected]      Standard Hematology Screen   Hematocrit 2021, Sep-29    30.3 (L) [37.0 - 48.5 %]      Hemoglobin 2021, Sep-29    10.1 (L) [12.0 - 16.0 g/dL]      MCV 2021, Sep-29    88 [82 - 98 fL]      Platelets 2021, Sep-29    179 [150 - 450 K/uL]           Neuroimaging:    MRI brain/head without contrast on 7/7/2021   Formal interpretation by Radiology:   No acute intracranial process. Changes of chronic small vessel ischemic disease and cerebral volume loss.   Independently reviewed radiological imaging by Rubio Garner MD. MPH. Behavioral Neurologist   T1: moderate degree P>F D>V cortical atrophy with severe widening of marginal sulci - multi-infarct subcortical WMD with cystic cavitation, relative sparing of subcortical/hippocampus   T2/FLAIR: Compared to 2012, there are some progression of the T2/FLAIR signal hyperintensities within the  periventricular and subcortical white matter.   DWI/ADC: No Significant DWI hyperintensities/hypointensities. No ADC correlation.   SWI/GRE: No Significant hypointensities to suggest cortical/subcortical hemosiderin deposition.  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 Rate : 093 BPM     Atrial Rate : 093 BPM    P-R Int : 168 ms          QRS Dur : 084 ms     QT Int : 350 ms       P-R-T Axes : 071 045 034 degrees    QTc Int : 435 ms Normal sinus rhythm Normal ECG   Independently reviewed Electrocardiogram by Rubio Garner MD. MPH. Behavioral Neurologist   Impression: : Received ECG has no evidence of sinus node disease. HR (>=50-60). Prolonged TX interval (>0.22 s). Broad QRS complex (> 0.12 s).     Clinical Summary:     Ms. England is a 89-year-old right-handed female with a relevant past medical history of HTN, HLD, CAD, CHF, MD, Lung cancer s/p chemo/rad, who presents reporting a 5-year history of gradually progressive neurocognitive impairment.       The clinical history is suggestive of:   Memory Impairment: STM encoding impairment, LTM encoding-retrieval impairment   Attention Impairment: Attention, Sustained attention   Executive Impairment: Energization, Working Memory, Set-Shifting, Response Inhibition   Language Impairment: Language Dysfunction   Motor/Coordination Impairment: Sensory motor integration   Behavior Impairment: Emotional Regulation, Self-Preservation Dysregulation, Sensorimotor Dysregulation   Psychiatric Impairment: Social Coherence   iADL Impairment: [place] Instrumental Activities of Daily Living Scale  The neurological examination is significant for:   Cortical Transcallosal Disconnection: interhemispheric motor control (interhemispheric motor control ), motor efference (motor overflow)   Movement Disorder (Gait): strength (difficulty rising),  abnormal features (stance, speed, stride/cycle)   Movement Disorder (Hypokinetic): parkinsonism (tone, bradykinesia), dyskinesia (slowing, hypometria, dysrhythmia)   Movement Disorder (Speech): abnormal vocal features (articulation), AMR/Dysdiadochokinesia (multisyllabic, consonants)  Informal neuropsychology battery is positive (based on age and education) for:   No testing completed today - prior assessment consistent with amnestic multidomain major cognitive impairment    BEHAV5+ 3/6: See ROS section for a full description  Neurological imaging   MRI brain/head without contrast (7/7/2021): moderate degree WMD and cortical atrophy, given age 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     Ms. 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 MAlzheimer's & Dementia 2011). Meets criteria for dementia. Insidious onset over months to years. Clear-cut history of worsening cognition by report or observation. Amnestic presentation: impairment in learning and recall. There is no evidence of a) stroke temporarily related to the onset of cognitive symptoms or presence of extensive infarcts or severe white matter hyperintensity burden, b) core features of DLB other than dementia itself, c) prominent features of bvFTD, d) prominent features of semantic or non-fluent/agrammatic PPA or e) another active neurological disease, medical comorbidity or use of medications with effects on cognition.    The pathology underlying Ms. Mascorros cognitive impairment is likely a mixture of pathologies (Alzheimer's Disease Related Pathology, Vascular Contributions to Cognitive Impairment and Dementia).     The observations made above were discussed with the patient her  family. The patient presents reporting a minimal 4 year history of progressive cognitive impairment however deficits likely preceded this. Per the report of the family, memory symptoms were 1st time have gradually progressed to general cognitive impairment. The patient maintained most instrumental activities of daily living however is having more difficulty shopping and cooking. The patient has a strong support network and her daughter recently moved in to help support her general well-being. her family report declines in appetite and recent months with mild weight loss. Primary concerns at this time include agitation. The patient is taking citalopram 10 mg. Recommend starting Namenda titrating up to 10 mg b. I. D. Following this we will consider increasing citalopram or adding Seroquel. Patient's morning irritability might be due to hypoglycemia. Recommended over-the-counter strategies for managing hypoglycemia. Recommend referral to endocrinology. her caregiver reports burnout. Recommend referral to social Work for care management and additional resources.        Care Management Plan:        #Neurocognitive Disorder Treatment:   Continue donepezil 10mg qD   We have made referrals to care [facility] for additional care support.  #Behavioral Disorder Treatment:   Start Namenda and titrate over 1 month to 10mg BID   Continue citalopram 10mg  -next appt will increase to 20mg (max 40mg)   Recommend PCP D/C xanax and duloxetine   Patient's morning irritability likely due to hypoglycemia.  Recommend over-the-counter sugar water/ grape juice and follow-up with Endocrinology for closer diabetes management  #Behavioral/Environmental Treatment   We recommend engaging in activities that stimulate cognitively and socially while avoiding excessive stimulation and fatigue in overwhelmingly complex situations.   We recommend integrating routine and schedule into your daily life.  "https://www.alzheimersproject.org/news/the-importance-of-routine-and-familiarity-to-persons-with-dementia/  #Health Maintenance/Lifestyle Advice   We have discussed the value in aggressively controlling vascular risk factors like hypertension, hyperlipidemia, and Diabetes SBP<130, LDL<100, A1C<7.0.   We discussed the need to optimize lifestyle choices including a heart-healthy diet (e.g., Mediterranean or DASH), increased cardiovascular exercise (goal 150 minutes of moderate-intensity per week), and stay cognitively and socially active.  #Support   We all need support sometimes. Get easy access to local resources, community programs, and services. https://www.communityresourcefinder.org/   Learn more about Cognitive Impairment in [place]: https://www.alz.org/professionals/public-health/state-overview/[place]  #Safety   [place] has no laws against driving with dementia specifically but obviously has laws about medical conditions which impact a person's ability to drive safely. If you believe your loved one's driving capacity has diminished, please reach out to either your primary care physician or our office to discuss driving restrictions or revocation of their license. To learn more: https://www.dementiacarecentral.com/caregiverinfo/driving-problems/   The Alzheimer's Association administers the nationwide "Safe Return" program with identification bracelets, necklaces, or clothing tags and 24-hour assistance. More information is available online at https://www.alz.org/help-support/caregiving/safety/medicalert-with-24-7-wandering-support  #Follow up:   Follow-up in 4 weeks (Mar 2022).    Thank you for allowing us to participate in the care of your patient. Please do not hesitate to contact us with any questions or concerns.     It was a pleasure seeing Ms. England and we look forward to seeing them at their follow-up visit.     This note is dictated on M*Modal Fluency Direct word recognition program. " There are word recognition mistakes that are occasionally missed on review.      Scheduled Follow-up :  Future Appointments   Date Time Provider Department [place]   4/14/2022  1:30 PM LAB, [facility]   4/19/2022  1:30 PM Pepper Whitfield MD [facility]       After Visit Medication List :     Medication List          Accurate as of March 3, 2022  5:36 PM. If you have any questions, ask your nurse or doctor.            START taking these medications    memantine 5 MG Tab  Commonly known as: NAMENDA  Take 1 tablet in AM for 1 week then increase to 1 tablet twice a day for 2 weeks, then 2 tablets in AM and 1 tablet in PM for 1 week, then 2 tablets Twice a day. After 4 weeks switch to 10 mg prescription  Started by: Rubio Alanis MD        CONTINUE taking these medications    ALPRAZolam 0.5 MG tablet  Commonly known as: XANAX  Take 1 tablet (0.5 mg total) by mouth 3 (three) times daily as needed for Anxiety.     amlodipine-benazepril 5-20 mg 5-20 mg per capsule  Commonly known as: LOTREL  Take 1 capsule by mouth once daily.     aspirin 81 MG EC tablet  Commonly known as: ECOTRIN     blood sugar diagnostic Strp  Check blood sugar daily. Strips and lancets     blood-glucose meter kit  Use as instructed     calcium-vitamin D3 500 mg-5 mcg (200 unit) per tablet  Commonly known as: OS-ERIC 500 + D3     citalopram 10 MG tablet  Commonly known as: CeleXA  Take 1 tablet (10 mg total) by mouth once daily.     cyanocobalamin 1000 MCG tablet  Commonly known as: VITAMIN B-12     donepeziL 10 MG tablet  Commonly known as: ARICEPT  Take 1 tablet (10 mg total) by mouth every evening.     DULoxetine 60 MG capsule  Commonly known as: CYMBALTA  Take 1 capsule (60 mg total) by mouth every morning.     estradioL 0.01 % (0.1 mg/gram) vaginal cream  Commonly known as: ESTRACE  USE ONE-HALF GRAM VAGINALLY TWICE a WEEK     * fluticasone propionate 50 mcg/actuation nasal spray  Commonly known as: FLONASE  2 sprays (100  mcg total) by Each Nostril route once daily.     * fluticasone propionate 50 mcg/actuation nasal spray  Commonly known as: FLONASE  2 sprays (100 mcg total) by Each Nostril route once daily.     IRON ORAL     lancets Misc  Check blood sugar daily 250.00     metoprolol tartrate 25 MG tablet  Commonly known as: LOPRESSOR  2 tablets in the morning and one in the evening.     nitroGLYCERIN 0.4 MG SL tablet  Commonly known as: NITROSTAT  Place 1 tablet (0.4 mg total) under the tongue every 5 (five) minutes as needed.     pioglitazone 15 MG tablet  Commonly known as: ACTOS  Take 1 tablet (15 mg total) by mouth every morning.     PRESERVISION AREDS 2 (OMEGA-3) ORAL     simvastatin 80 MG tablet  Commonly known as: ZOCOR  Take 1 tablet (80 mg total) by mouth once daily.         * This list has 2 medication(s) that are the same as other medications prescribed for you. Read the directions carefully, and ask your doctor or other care provider to review them with you.               Where to Get Your Medications      These medications were sent to [address] [address]    Phone: [phone]   · memantine 5 MG Tab         Signing Physician:  Rubio Alanis MD    Billing:    -----------------------------------------------------------------------------    I spent a total of 90 minutes (time-in: 13:00 PM; time-out: 14:30 PM) on 03/03/2022, in-person face-to-face with the patient and caregiver(s), >50% of that time was spent counseling regarding the symptoms, treatment plan, risks, therapeutic options, lifestyle modifications, and/or safety issues for the diagnoses above.    10/14 Review of Systems completed and is negative except as stated above in HPI (Systems reviewed: Const, Eyes, ENT, Resp, CV, GI, , MSK, Skin, Neuro)    I performed a neurobehavioral status examination that included a clinical assessment of thinking, reasoning, and judgment. Please see  above HPI and ROS for full details. This exam was performed on 03/03/2022 and included 12 minutes spent on direct face-to-face clinical observation and interview with the patient and 27 minutes spent interpreting test results and preparing the report. The total time of 39 minutes spent on the neurobehavioral status examination is not included in the time spent on evaluation and management coding.    I provided prolonged explanation to the family regarding the nature of the diagnosis and the need for their support in the therapy process including an explanation psychiatric exams, medical exams, neurocognitive testing, and other accumulated data. This information was provided to family members/caregivers to give them advice on how to help care for the patient.    Total Billing time spent on encounter/documentation for this patient's evaluation and management, not including the neurobehavioral status examination: 78 minutes.

## 2022-03-03 NOTE — TELEPHONE ENCOUNTER
I called [facility] pharmacy and spoke with tech, confirmed that current prescription is correct.  They will fill as ordered.

## 2022-03-03 NOTE — TELEPHONE ENCOUNTER
----- Message from Evelyne Quiros sent at 3/3/2022  2:39 PM CST -----  Regarding: [facility]  Pharmacy is calling to speak with the nurse in ref to the pts namenda 5 mg they are calling to clarify the pts prescription they are asking if they will 10 mg prescription they don't have that right now they are asking if you will be resending this with in the four weeks. Can you please call pharmacy at [phone].    JAZ

## 2022-03-07 ENCOUNTER — PATIENT MESSAGE (OUTPATIENT)
Dept: NEUROLOGY | Facility: CLINIC | Age: 87
End: 2022-03-07
Payer: MEDICARE

## 2022-03-07 NOTE — TELEPHONE ENCOUNTER
Received [facility] message with medication list.  I updated medication profile, but not that I am not aware of a medication named Diagram, and there are also other medications in her profile that are not on the list that was sent.  Reply sent in [facility] as well as voicemail left with daughter Nora requesting a call back for clarification.

## 2022-03-07 NOTE — TELEPHONE ENCOUNTER
Medication list updated, per conversation with daughter, Haley and routed to Dr. Alanis for review.  Haley was also inquiring to see if Endocrinology, Behavioral Health and a  referral were in place, and I told her I would inquire and send a message through [facility].  Upon review, Encocrinology and Behavior Health are referred, and I sent a message to Dr. Alanis inquiring about  support with goal of caregiving resources.     Regular rate and rhythm, Heart sounds S1 S2 present, no murmurs, rubs or gallops

## 2022-03-08 NOTE — TELEPHONE ENCOUNTER
Message sent to Chandni Kaur and Nichelle Pena for referral to [facility] per request of Dr. Alanis, with goal of receiving help with caregiving.  I also sent a [facility] message to daughter Haley informing her of this.

## 2022-03-16 ENCOUNTER — TELEPHONE (OUTPATIENT)
Dept: NEUROLOGY | Facility: CLINIC | Age: 87
End: 2022-03-16
Payer: MEDICARE

## 2022-03-16 NOTE — TELEPHONE ENCOUNTER
Patient and caregiver referred to [facility] by Dr. Alanis. I call caregiver (Nora) today to offer program, she requests callback, we schedule for 3/18 at 2:00 pm.     3/18 - Called Nora and Haley to offer [facility] program, they are interested in enrolling. I mail welcome package and books, we schedule over the phone baseline visit for 3/30 at 2:00 pm.

## 2022-03-21 ENCOUNTER — PATIENT MESSAGE (OUTPATIENT)
Dept: NEUROLOGY | Facility: CLINIC | Age: 87
End: 2022-03-21
Payer: MEDICARE

## 2022-03-28 ENCOUNTER — PATIENT MESSAGE (OUTPATIENT)
Dept: NEUROLOGY | Facility: CLINIC | Age: 87
End: 2022-03-28
Payer: MEDICARE

## 2022-03-31 ENCOUNTER — OUTPATIENT CASE MANAGEMENT (OUTPATIENT)
Dept: NEUROLOGY | Facility: CLINIC | Age: 87
End: 2022-03-31
Payer: MEDICARE

## 2022-03-31 NOTE — PROGRESS NOTES
Subject was seen in clinic today and consented for the following study:     Study title: [employer]  IRB #: 2018.241  IRB approval date: 02/13/2019  Sponsor: [facility]       Informed Consent Process/ involvement in care/ Proxy   Present for discussion: Nora England Chifaisalsadia Venturase    Does subject have capacity to consent per evaluation: no  Is LAR Consenting for Subject: yes      LAR Determined by: Next of Kin   If applicable, next of kin:Adult Child    Verbal Consent yes  Written Consent no      Notes on signing ICF/Involvement in Care/Proxy  (Capacity is determined per chart review, interview with LAR and patient, along with taking into consideration past practices)     Subject does not have capacity - No POA  LAR will sign forms and we will attempt to acquire POA as part of the study    Remove if not addressed:  Advance Care Planning - Caregivers believe they do have POA and Living Will, they will look for these and let me know so that we can work on getting them in case they do not have them yet.        Caregiver Name: Haley England and Nora England  Subject ID: [id number]     Prior to the Informed Consent (IC) being signed, or any protocol required testing, procedure, or intervention being performed, the following was done or discussed:    · Purpose of the Study, Qualifications to Participate: yes  · Study Design, Schedule and Procedures: yes  · Risks, Benefits, Alternative Treatments, Compensation and Costs: yes  · Confidentiality and HIPAA Authorization for Release of Medical Records for the research trial/subject's right/study related injury: yes  · Study related contact information: yes  · Voluntary Participation and Withdrawal from the research trial at any time: yes  · Optional samples/procedures (if applicable): yes  · Patient has been offered the opportunity to ask questions regarding the study and all questions were answered satisfactorily: yes  · Patient and/or LAR verbalizes understanding of the  study/procedures and agrees to participate: yes  · CRC and PI contact information given to LAR and/or patient: yes  · Signed copy given to patient and/or LAR: no - Verbal consent sent via email.   · Copy in patient's chart and original uploaded to Epic: no - Documented on [employer]     Person Obtaining Consent: Chandni Kaur  Witness (if applicable) Philly Strong       Baseline Visit:  Questionnaires completed with caregiver: Demographics, self-efficacy, Zarit, PHQ, QDRS, NPI, QualiDem  MMSE and PIPER-7 with dementia patient  Involvement in Care and Proxy Access forms completed  Inclusion/Exclusion assessed: patient eligible  Medications reviewed  Discussed the following:     Cognitive Behavior Function Cognitive: Short term memory is affected, interferes with everyday activities, mild disorientation to place and time, in familiar places she will walk around and per caregiver not really know what she is looking for (I.e. grocery shopping). Cgs report difficulty making decisions, will even defer small decisions like what to eat or wear.   Behavior: Major concern is patient's anxiety, per caregivers she is constatnly worried about something, might get fixated on topics (I.e. news). One isolated episode of delusion/hallucination reported by caregivers. Might resist help at times and becomes easily irritable. Caregivers report she sleeps a lot during the day.   Function: Able to perform ADLs, dependent on IADLs, needs help preparing meals and doing heavy house chores, can heat something in microwave, will use stove under supervision. Not driving, needs to be accompanied if going outside of the home, no wandering events.    Social Background Patient is close to her daughters, enjoys talking on the phone to friends and family members, compliant with medication, likes to work on the yard and sit outside when weather is nice.    Living & Caregiving Patient now lives with daughter Haley who moved in after seeing  patient was having difficulty caring for her self. Both daughters (Nora and Haley) highly involved in patient's care and eager to learn about diagnosis and how to better care for patient.    Medication Reconciliation  No major concerns, daughter Haley organizes medication and patient can take them on her own, currently not taking Xanax or Valium. Caregivers wonder if they should take her off some medications, especially dementia medications, they feel she is on a lot and fear side effects.    CTN Recommendations After initial contact I send books, care ecosystem welcome pages, introduction to behavioral changes, and self-care handout to both daughters. After baseline I offer support groups and education series, flyer sent via email. No other needs or concerns at this time, I encourage them to reach out if needed before I call again next month.      GAD7 8/24/2021   1. Feeling nervous, anxious, or on edge? 0   2. Not being able to stop or control worrying? 0   3. Worrying too much about different things? 0   4. Trouble relaxing? 0   5. Being so restless that it is hard to sit still? 0   6. Becoming easily annoyed or irritable? 0   7. Feeling afraid as if something awful might happen? 0   8. If you checked off any problems, how difficult have these problems made it for you to do your work, take care of things at home, or get along with other people? 0   PIPER-7 Score 0         NPIQ RFS 8/24/2021   WHO IS FILLING OUT FORM? Caregiver   Does this patient have false beliefs, such as thinking that others are stealing from him/her or planning to harm him/her in some way? No   Does this patient have hallucinations such as false visions or voices? Li she/he seem to hear or see things that are not present? No   Is the patient resistive to help from others at times, or hard to handle? Yes   Agitation Agression Severity 1   Agitation/Agression Distress 4   Does the patient seem sad or say that he/she is depressed? Yes    Depression/Dysphoria Severity 1   Depression/Dysphoria Distress 4   Does the patient become upset when  from you? Does he/she have any other signs of nervousness such as shortness of breath, sighing, being unable tor elax, or feeling excessively tense? No   Does the patient appear to feel good or act excessively happy? No   Does this patient seem less interested in his/her usual activities or in the activities and plans of others? Yes   Apathy/Indifference Severity 1   Apathy/Indifference Distress 3   Does this patient seem to act cumpolsively, for example, talking to strangers as if she/he knows them, or saying things that may hurt people's feelings? No   Is the patient impatient and cranky? Does he/she have difficulty coping with delays or waiting for planned activities? No   Does the patient engage in repetitive activities such as pacing around the house, handling buttons, wrapping string, or doing other things repeatedly? No   Does this patient awaken you during the night, rise too early in the morning, or take excessive naps during the day? Yes   Nightime Behavior Severity 1   Nightime Behavior Distress 2   Has the patient lost or gained weight, or had a change in the type of food he/she likes? No   NPI Total Severity Score 4   NPI Total Distress Score 13       [facility] 8/24/2021   Memory and Recall Consistent mild forgetfulness or partial recollection of events that may interfere with performing everyday activities: repeats questions/statements, misplaces items, forgets appointments   Orientation Slight difficulty keeping track of time, may forget day or date more frequently than in the past   Decision Making and Problem Solving Abilities Moderate difficulty with handling problems and making decisions, defers many decisions to others, social judgment and behavior may be slightly impaired, loss of insight   Activities Outside the Home No pretense of independent function outside the home, appears well  enough to be taken to activities outside the family home but generally needs to be accompanied   Function at Home and Hobby Activities Mild but definite impairment in home and hobby function, more difficult chores or tasks abandoned, more complicated hobbies and interests given up   Toileting and Personal Hygeine Slight changes in abilities and attention to these activities   Behavior and Personality Changes Mild changes in behavior or personality   Language and Communication Abilities Moderate word finding difficulty in speech, cannot name objects, marked reduction in word production, reduced comprehension, conversation, writing and/or reading   Mood Daily mild issues with sadness, depression, anxiety, nervousness or loss of interest/motivation   Attention and Concentration Moderate problems with attention and concentration, may have staring spells or spend time with eyes closed, increased daytime sleepiness       No flowsheet data found.    ZARIT-12 RFS 8/24/2021   That because of the time you spend with your relative that you don't have enough time for yourself? 4   Stressed between caring for your relative and trying to meet other responsibilities (work/family)? 4   Angry when you are around your relative? 2   That your relative currently affects your relationship with family members or friends in a negative way? 0   Strained when you are around your relative? 2   That your health has suffered because of your involvement with your relative?  4   That you don't have as much privacy as you would like because of your relative? 2   That your social life has suffered because you are caring for your relative? 3   That you have lost control of your life since your relative's illness? 3   Uncertain about what to do about your relative? 3   You should be doing more for your relative? 2   You could do a better job in caring for your relative 2   ZARIT Total Score 31       No flowsheet data found.    No flowsheet data  found.

## 2022-04-06 ENCOUNTER — OFFICE VISIT (OUTPATIENT)
Dept: ENDOCRINOLOGY | Facility: CLINIC | Age: 87
End: 2022-04-06
Payer: MEDICARE

## 2022-04-06 VITALS
WEIGHT: 168.13 LBS | SYSTOLIC BLOOD PRESSURE: 130 MMHG | HEIGHT: 67 IN | DIASTOLIC BLOOD PRESSURE: 60 MMHG | BODY MASS INDEX: 26.39 KG/M2

## 2022-04-06 DIAGNOSIS — E16.2 HYPOGLYCEMIA: Primary | ICD-10-CM

## 2022-04-06 DIAGNOSIS — E11.51 TYPE 2 DIABETES MELLITUS WITH DIABETIC PERIPHERAL ANGIOPATHY WITHOUT GANGRENE, WITHOUT LONG-TERM CURRENT USE OF INSULIN: ICD-10-CM

## 2022-04-06 PROCEDURE — 3288F FALL RISK ASSESSMENT DOCD: CPT | Mod: CPTII,S$GLB,, | Performed by: GENERAL ACUTE CARE HOSPITAL

## 2022-04-06 PROCEDURE — 1160F RVW MEDS BY RX/DR IN RCRD: CPT | Mod: CPTII,S$GLB,, | Performed by: GENERAL ACUTE CARE HOSPITAL

## 2022-04-06 PROCEDURE — 1126F PR PAIN SEVERITY QUANTIFIED, NO PAIN PRESENT: ICD-10-PCS | Mod: CPTII,S$GLB,, | Performed by: GENERAL ACUTE CARE HOSPITAL

## 2022-04-06 PROCEDURE — 99999 PR PBB SHADOW E&M-EST. PATIENT-LVL IV: CPT | Mod: PBBFAC,,, | Performed by: GENERAL ACUTE CARE HOSPITAL

## 2022-04-06 PROCEDURE — 1159F MED LIST DOCD IN RCRD: CPT | Mod: CPTII,S$GLB,, | Performed by: GENERAL ACUTE CARE HOSPITAL

## 2022-04-06 PROCEDURE — 1101F PR PT FALLS ASSESS DOC 0-1 FALLS W/OUT INJ PAST YR: ICD-10-PCS | Mod: CPTII,S$GLB,, | Performed by: GENERAL ACUTE CARE HOSPITAL

## 2022-04-06 PROCEDURE — 1160F PR REVIEW ALL MEDS BY PRESCRIBER/CLIN PHARMACIST DOCUMENTED: ICD-10-PCS | Mod: CPTII,S$GLB,, | Performed by: GENERAL ACUTE CARE HOSPITAL

## 2022-04-06 PROCEDURE — 3288F PR FALLS RISK ASSESSMENT DOCUMENTED: ICD-10-PCS | Mod: CPTII,S$GLB,, | Performed by: GENERAL ACUTE CARE HOSPITAL

## 2022-04-06 PROCEDURE — 1101F PT FALLS ASSESS-DOCD LE1/YR: CPT | Mod: CPTII,S$GLB,, | Performed by: GENERAL ACUTE CARE HOSPITAL

## 2022-04-06 PROCEDURE — 99204 OFFICE O/P NEW MOD 45 MIN: CPT | Mod: S$GLB,,, | Performed by: GENERAL ACUTE CARE HOSPITAL

## 2022-04-06 PROCEDURE — 99204 PR OFFICE/OUTPT VISIT, NEW, LEVL IV, 45-59 MIN: ICD-10-PCS | Mod: S$GLB,,, | Performed by: GENERAL ACUTE CARE HOSPITAL

## 2022-04-06 PROCEDURE — 1159F PR MEDICATION LIST DOCUMENTED IN MEDICAL RECORD: ICD-10-PCS | Mod: CPTII,S$GLB,, | Performed by: GENERAL ACUTE CARE HOSPITAL

## 2022-04-06 PROCEDURE — 1126F AMNT PAIN NOTED NONE PRSNT: CPT | Mod: CPTII,S$GLB,, | Performed by: GENERAL ACUTE CARE HOSPITAL

## 2022-04-06 PROCEDURE — 99999 PR PBB SHADOW E&M-EST. PATIENT-LVL IV: ICD-10-PCS | Mod: PBBFAC,,, | Performed by: GENERAL ACUTE CARE HOSPITAL

## 2022-04-11 ENCOUNTER — PES CALL (OUTPATIENT)
Dept: ADMINISTRATIVE | Facility: CLINIC | Age: 87
End: 2022-04-11
Payer: MEDICARE

## 2022-04-14 ENCOUNTER — LAB VISIT (OUTPATIENT)
Dept: LAB | Facility: HOSPITAL | Age: 87
End: 2022-04-14
Attending: INTERNAL MEDICINE
Payer: MEDICARE

## 2022-04-14 DIAGNOSIS — I10 ESSENTIAL HYPERTENSION: ICD-10-CM

## 2022-04-14 DIAGNOSIS — E11.51 TYPE 2 DIABETES MELLITUS WITH DIABETIC PERIPHERAL ANGIOPATHY WITHOUT GANGRENE, WITHOUT LONG-TERM CURRENT USE OF INSULIN: ICD-10-CM

## 2022-04-14 LAB
ALBUMIN SERPL BCP-MCNC: 3.3 G/DL (ref 3.5–5.2)
ALP SERPL-CCNC: 50 U/L (ref 55–135)
ALT SERPL W/O P-5'-P-CCNC: 9 U/L (ref 10–44)
ANION GAP SERPL CALC-SCNC: 9 MMOL/L (ref 8–16)
AST SERPL-CCNC: 12 U/L (ref 10–40)
BASOPHILS # BLD AUTO: 0.02 K/UL (ref 0–0.2)
BASOPHILS NFR BLD: 0.6 % (ref 0–1.9)
BILIRUB SERPL-MCNC: 0.3 MG/DL (ref 0.1–1)
BUN SERPL-MCNC: 10 MG/DL (ref 8–23)
CALCIUM SERPL-MCNC: 10 MG/DL (ref 8.7–10.5)
CHLORIDE SERPL-SCNC: 103 MMOL/L (ref 95–110)
CO2 SERPL-SCNC: 28 MMOL/L (ref 23–29)
CREAT SERPL-MCNC: 0.9 MG/DL (ref 0.5–1.4)
DIFFERENTIAL METHOD: ABNORMAL
EOSINOPHIL # BLD AUTO: 0.1 K/UL (ref 0–0.5)
EOSINOPHIL NFR BLD: 1.7 % (ref 0–8)
ERYTHROCYTE [DISTWIDTH] IN BLOOD BY AUTOMATED COUNT: 16.3 % (ref 11.5–14.5)
EST. GFR  (AFRICAN AMERICAN): >60 ML/MIN/1.73 M^2
EST. GFR  (NON AFRICAN AMERICAN): 56.5 ML/MIN/1.73 M^2
ESTIMATED AVG GLUCOSE: 140 MG/DL (ref 68–131)
GLUCOSE SERPL-MCNC: 203 MG/DL (ref 70–110)
HBA1C MFR BLD: 6.5 % (ref 4–5.6)
HCT VFR BLD AUTO: 34.3 % (ref 37–48.5)
HGB BLD-MCNC: 11 G/DL (ref 12–16)
IMM GRANULOCYTES # BLD AUTO: 0 K/UL (ref 0–0.04)
IMM GRANULOCYTES NFR BLD AUTO: 0 % (ref 0–0.5)
LYMPHOCYTES # BLD AUTO: 1.4 K/UL (ref 1–4.8)
LYMPHOCYTES NFR BLD: 39 % (ref 18–48)
MCH RBC QN AUTO: 28.6 PG (ref 27–31)
MCHC RBC AUTO-ENTMCNC: 32.1 G/DL (ref 32–36)
MCV RBC AUTO: 89 FL (ref 82–98)
MONOCYTES # BLD AUTO: 0.3 K/UL (ref 0.3–1)
MONOCYTES NFR BLD: 8.1 % (ref 4–15)
NEUTROPHILS # BLD AUTO: 1.8 K/UL (ref 1.8–7.7)
NEUTROPHILS NFR BLD: 50.6 % (ref 38–73)
NRBC BLD-RTO: 0 /100 WBC
PLATELET # BLD AUTO: 195 K/UL (ref 150–450)
PMV BLD AUTO: 12 FL (ref 9.2–12.9)
POTASSIUM SERPL-SCNC: 4.2 MMOL/L (ref 3.5–5.1)
PROT SERPL-MCNC: 7.4 G/DL (ref 6–8.4)
RBC # BLD AUTO: 3.84 M/UL (ref 4–5.4)
SODIUM SERPL-SCNC: 140 MMOL/L (ref 136–145)
TSH SERPL DL<=0.005 MIU/L-ACNC: 3.19 UIU/ML (ref 0.4–4)
WBC # BLD AUTO: 3.56 K/UL (ref 3.9–12.7)

## 2022-04-14 PROCEDURE — 83036 HEMOGLOBIN GLYCOSYLATED A1C: CPT | Performed by: INTERNAL MEDICINE

## 2022-04-14 PROCEDURE — 36415 COLL VENOUS BLD VENIPUNCTURE: CPT | Mod: PO | Performed by: INTERNAL MEDICINE

## 2022-04-14 PROCEDURE — 84443 ASSAY THYROID STIM HORMONE: CPT | Performed by: INTERNAL MEDICINE

## 2022-04-14 PROCEDURE — 85025 COMPLETE CBC W/AUTO DIFF WBC: CPT | Performed by: INTERNAL MEDICINE

## 2022-04-14 PROCEDURE — 80053 COMPREHEN METABOLIC PANEL: CPT | Performed by: INTERNAL MEDICINE

## 2022-04-18 ENCOUNTER — PATIENT MESSAGE (OUTPATIENT)
Dept: NEUROLOGY | Facility: CLINIC | Age: 87
End: 2022-04-18
Payer: MEDICARE

## 2022-04-18 DIAGNOSIS — G30.1 LATE ONSET ALZHEIMER'S DEMENTIA WITH BEHAVIORAL DISTURBANCE: ICD-10-CM

## 2022-04-18 DIAGNOSIS — F02.818 LATE ONSET ALZHEIMER'S DEMENTIA WITH BEHAVIORAL DISTURBANCE: ICD-10-CM

## 2022-04-18 RX ORDER — MEMANTINE HYDROCHLORIDE 5 MG/1
TABLET ORAL
Qty: 60 TABLET | Refills: 3 | Status: SHIPPED | OUTPATIENT
Start: 2022-04-18 | End: 2022-08-27 | Stop reason: SDUPTHER

## 2022-04-19 ENCOUNTER — OFFICE VISIT (OUTPATIENT)
Dept: INTERNAL MEDICINE | Facility: CLINIC | Age: 87
End: 2022-04-19
Payer: MEDICARE

## 2022-04-19 ENCOUNTER — CLINICAL SUPPORT (OUTPATIENT)
Dept: ENDOCRINOLOGY | Facility: CLINIC | Age: 87
End: 2022-04-19
Payer: MEDICARE

## 2022-04-19 ENCOUNTER — TELEPHONE (OUTPATIENT)
Dept: NEUROLOGY | Facility: CLINIC | Age: 87
End: 2022-04-19
Payer: MEDICARE

## 2022-04-19 VITALS
HEIGHT: 65 IN | WEIGHT: 168.13 LBS | SYSTOLIC BLOOD PRESSURE: 138 MMHG | DIASTOLIC BLOOD PRESSURE: 70 MMHG | HEART RATE: 83 BPM | OXYGEN SATURATION: 98 % | BODY MASS INDEX: 28.01 KG/M2

## 2022-04-19 DIAGNOSIS — F39 MOOD DISORDER: ICD-10-CM

## 2022-04-19 DIAGNOSIS — E11.51 TYPE 2 DIABETES MELLITUS WITH DIABETIC PERIPHERAL ANGIOPATHY WITHOUT GANGRENE, WITHOUT LONG-TERM CURRENT USE OF INSULIN: ICD-10-CM

## 2022-04-19 DIAGNOSIS — G57.92 NEUROPATHY OF LEFT LOWER EXTREMITY: ICD-10-CM

## 2022-04-19 DIAGNOSIS — J43.2 CENTRILOBULAR EMPHYSEMA: ICD-10-CM

## 2022-04-19 DIAGNOSIS — N95.2 ATROPHIC VAGINITIS: ICD-10-CM

## 2022-04-19 DIAGNOSIS — I25.10 CORONARY ARTERY DISEASE INVOLVING NATIVE CORONARY ARTERY OF NATIVE HEART WITHOUT ANGINA PECTORIS: ICD-10-CM

## 2022-04-19 DIAGNOSIS — I10 ESSENTIAL HYPERTENSION: Primary | ICD-10-CM

## 2022-04-19 DIAGNOSIS — E16.2 HYPOGLYCEMIA: ICD-10-CM

## 2022-04-19 DIAGNOSIS — G31.84 MILD COGNITIVE IMPAIRMENT: ICD-10-CM

## 2022-04-19 DIAGNOSIS — I70.229 ATHEROSCLEROTIC PERIPHERAL VASCULAR DISEASE WITH REST PAIN: ICD-10-CM

## 2022-04-19 DIAGNOSIS — E13.9 DIABETES MELLITUS DUE TO ABNORMAL INSULIN: ICD-10-CM

## 2022-04-19 PROCEDURE — 99214 PR OFFICE/OUTPT VISIT, EST, LEVL IV, 30-39 MIN: ICD-10-PCS | Mod: S$GLB,,, | Performed by: INTERNAL MEDICINE

## 2022-04-19 PROCEDURE — 99999 PR PBB SHADOW E&M-EST. PATIENT-LVL III: ICD-10-PCS | Mod: PBBFAC,,, | Performed by: INTERNAL MEDICINE

## 2022-04-19 PROCEDURE — 1159F PR MEDICATION LIST DOCUMENTED IN MEDICAL RECORD: ICD-10-PCS | Mod: CPTII,S$GLB,, | Performed by: INTERNAL MEDICINE

## 2022-04-19 PROCEDURE — 1101F PT FALLS ASSESS-DOCD LE1/YR: CPT | Mod: CPTII,S$GLB,, | Performed by: INTERNAL MEDICINE

## 2022-04-19 PROCEDURE — 1126F AMNT PAIN NOTED NONE PRSNT: CPT | Mod: CPTII,S$GLB,, | Performed by: INTERNAL MEDICINE

## 2022-04-19 PROCEDURE — 99214 OFFICE O/P EST MOD 30 MIN: CPT | Mod: S$GLB,,, | Performed by: INTERNAL MEDICINE

## 2022-04-19 PROCEDURE — 3288F FALL RISK ASSESSMENT DOCD: CPT | Mod: CPTII,S$GLB,, | Performed by: INTERNAL MEDICINE

## 2022-04-19 PROCEDURE — 1126F PR PAIN SEVERITY QUANTIFIED, NO PAIN PRESENT: ICD-10-PCS | Mod: CPTII,S$GLB,, | Performed by: INTERNAL MEDICINE

## 2022-04-19 PROCEDURE — 1159F MED LIST DOCD IN RCRD: CPT | Mod: CPTII,S$GLB,, | Performed by: INTERNAL MEDICINE

## 2022-04-19 PROCEDURE — 3288F PR FALLS RISK ASSESSMENT DOCUMENTED: ICD-10-PCS | Mod: CPTII,S$GLB,, | Performed by: INTERNAL MEDICINE

## 2022-04-19 PROCEDURE — 99999 PR PBB SHADOW E&M-EST. PATIENT-LVL III: CPT | Mod: PBBFAC,,, | Performed by: INTERNAL MEDICINE

## 2022-04-19 PROCEDURE — 1101F PR PT FALLS ASSESS DOC 0-1 FALLS W/OUT INJ PAST YR: ICD-10-PCS | Mod: CPTII,S$GLB,, | Performed by: INTERNAL MEDICINE

## 2022-04-19 PROCEDURE — 99499 RISK ADDL DX/OHS AUDIT: ICD-10-PCS | Mod: S$GLB,,, | Performed by: INTERNAL MEDICINE

## 2022-04-19 PROCEDURE — 99499 UNLISTED E&M SERVICE: CPT | Mod: S$GLB,,, | Performed by: INTERNAL MEDICINE

## 2022-04-19 RX ORDER — ESTRADIOL 0.1 MG/G
CREAM VAGINAL
Qty: 42.5 G | Refills: 3 | Status: SHIPPED | OUTPATIENT
Start: 2022-04-19 | End: 2022-12-19 | Stop reason: SDUPTHER

## 2022-04-19 RX ORDER — CHOLECALCIFEROL (VITAMIN D3) 25 MCG
1000 TABLET ORAL DAILY
COMMUNITY

## 2022-04-19 NOTE — PROGRESS NOTES
CHIEF COMPLAINT:  Follow up of hypertension, copd, sinus congestion, diastolic dysfunction    HISTORY OF PRESENT ILLNESS: This is a 89-year-old woman who presents for follow up of above. Her daughter, Haley is with her today. Haley is living with her right now.     Today she reports that she has had some vaginal spotting the last month. She uses one pad a day - has bright red blood.  No vaginal pain. She has a history of radiation to the rectum for her rectal cancer - had granulomatous area on the vagina..  She saw Dr Lorena Leach 8/7/2019 with biopsies that were benign    She is seeing Dr Alanis in neurology on 3/3/2022 for her dementia - she is shaky in the morning.  Her daughter is giving her juice which is helping her mood in the morning.  She is now taking Memantine 5 mg just at night (was sleeping too much) and Donepezil 10 mg in the morning.      Her oldest daughter, Brenda, age 70 has been ill -  Now in the hospital with sepsis and heart and kidney issues. She will be going to a rehab facility. Her daughter, Nora has been having to help care for her sister.    Appetite is ok.  Weight is stable.       No more lightheadedness.  She is taking metoprolol 25 mg 2 tablets in am and 1 tablet at night (sometimes skips the evening dose). NO   chest pain, palpitations.  She continues to take amlodipine - benazepril 5/20 one tablet daliy.        She has been having sinus congestion that comes and goes. She has clear drainage.  She uses flonase 1 spray in each nostril once daily. No fever, chills, sore throat, nausea, vomiting     SHe continues to take duloxetine 60 mg daily and citalopram 10 mg daily. Mood has been good.  Neuropathic pain in the legs is better controlled with the duloxetine.  She has taken  xanax 0.5 mg twice since our last visit.      Breathing has been good.  appetite is so so. . No chest pain, palpitations.  She has occasional cough with white mucous.   She is short of breath of being in the  tub     She had right leg pain - she had a blockage. Dr Gaviria placed a stent in the right CFA and PTA left ext loulou artery 12/18. Right leg pain has resolved.  The ulcerated area on the right leg has totally healed and has not returned.  She is taking aspirin 81 mg daily.        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  Right hip pain has resolved and has not returned.          Her non small cell lung cancer is stable. She saw Dr Carmella Bennett0/4/2011 and scans were stable. She is 11 years out from her diagnosis     She is currently taking actos 15 mg EVERY OTHER daily. No polydipsia, polyuria, hypoglycemia. She has a dexcom 6 continuous blood sugars. She saw endocrine 4/6/2022     She continues to take simvastatin 80 mg at bedtime for her hyperlipidemia. No joint pain or muscle pain.        She is off Fosamax once week for her bones.  No melana, bloody stools, constipation.        She takes vitamin B12 1000 mcg daily for vitamin B12 deficiency - anemia is stable        SHe has macular degeneration of the eyes and was followed by Dr Tresa Coreas. She got injections in her eyes every 6 weeks in Spring 2015. She now sees Dr Ruelas at [facility] (last saw him 8/18- no need for injections at that time- yearly follow up). Vision has been stable. She is taking a Eye vitamins for her eyes.       PAST MEDICAL HISTORY:    1. Stage III non-small cell lung cancer, completed chemoradiation with carboplatin and Taxol on 6/1/12    2. Hypertension.   3. Diabetes mellitus.   4. Hyperlipidemia.   5. Chronic diastolic dysfunction.   6. Coronary artery disease status post MI in 1990 and 2003. Stenting was   done at 2003 at [facility].   7. Cholecystectomy, December 2006.   8. Cataract removal.   9. Benign growth removed from her throat.   10. Left common femoral endarterectomy, July 2007.   11. History of anal cancer in 1996 status post radiation and chemotherapy.   12. Carpal tunnel  syndrome.   13. Osteoporosis on BMD 12/11    14. Macular degeneration    SOCIAL HISTORY: She quit smoking in 1995, denies any alcohol use. She is etired.     REVIEW OF SYSTEMS: She denies fevers, chills, night sweats, fatigue, visual change, hearing loss, sinus congestion, sore throat, dysuria, hematuria, joint pain, muscle pain, polydipsia, and polyuria.     PHYSICAL EXAM:          GENERAL: She is alert and oriented. No apparent distress. Affect within normal limits.   Conjunctivae anicteric.  TM clear  NECK: Supple.   Respiratory effort normal. Lungs clear to auscultation.   HEART: Regular rate and rhythm without murmurs, gallops, or rubs. No lower   extremity edema.            Labs 4/14/22  reviewed.      CT chest 9/8/20 reviewed and stable      ASSESSMENT AND PLAN:       1. Hypertension with history of diastolic dyfunction on echo in 2006 and atheroscloeritc heart disease and peripheral vascular disease -.push fluids.   2.PVD - stable  2. Mood disorder - stable.   3. Memory issues - follow up with Dr Alanis  4. Stage III non-small cell lung cancer, completed chemoradiation with carboplatin and Taxol on 6/1/12 - doing well.   5. Diabetes-controlled on  pioglitazone to 15 mg every other day. Has a Dexcom 6 to see if she can stop the pioglitazone  3. Hypertension-controlled  4. Hyperlipidemia-lipids controlled  5. Coronary artery disease modifying risk factors.   6. History of anal cancer-had a normal colonoscopy, November 2008; due 2015. Declined repeat  8. Pernicious anemia - on counter vitamin B12 1000 mcg daily. Level fine in Jan 2018  9. Osteoporosis - off alendroante for 7 years.  She can go on a drug holiday. BMD 9/2019  10. Left leg pain due to neuropathy-stable.  .    11. Macular degeneration - stable  12.  VItamin D deficiency -  vitamin D 2000 units daily.   13. Vaginal bleeding - get back on estradiol cream twice weekly - if does not resolve, will get back to DR Leach for additional biopsies  Screening  mammogram was 12/19 - declines further. Colonoscopy is due 2015 -declined. Pap smear was July 2010.  Flu 8/31/20, Prevnar 11/2015  I will see her back in 4 months, sooner if problems arise

## 2022-04-19 NOTE — PROGRESS NOTES
"DIABETES EDUCATOR NOTE   PLACEMENT OF DEXCOM G6 PRO SENSOR  CONTINOUS GLUCOSE MONITORING SYSTEM (CGMS)     Patient is here in clinic today for placement of continuous glucose monitoring sensor.                Each patient verified that they were here for CGMS procedure ordered by their provider and that they have a working glucose meter and supplies at home.   Patient will be provided with a Dexcom G6 Pro sensor, transmitter, and a copy of the Continuous Glucose Monitoring Patient Log to fill out during the study.              A detailed  explanation of Continuous Glucose Monitoring was  provided. Patient informed that this is a blind procedure and that they will not actually see the blood sugar tracing in real time.    Instructed patient to check blood sugar using home glucometer and to record the following on provided patient log sheets:Blood sugar taken at home, Meals and snacks, Activity, and Diabetes medications taken and dosage               Patient was brought to a private location.  Site selected and prepared and allowed to dry. Glucose Transmitter Serial Number [id number]  was inserted to patient's abdomen.               The following forms  were given and reviewed in detail with patient and all questions answered.   · Continuous Glucose Monitoring Patient Log   · Dexcom G6 PRO Patient Handout "Blinded CGM Patient Handout"                 Instructions: Time: 15 min   Insertion of sensor:  Time: 5 minutes     "

## 2022-04-19 NOTE — TELEPHONE ENCOUNTER
----- Message from Rubio Alanis MD sent at 4/18/2022  4:54 PM CDT -----  Please set up telephone call next week to discussion medication adjustments.  Rubio

## 2022-04-25 ENCOUNTER — OFFICE VISIT (OUTPATIENT)
Dept: INTERNAL MEDICINE | Facility: CLINIC | Age: 87
End: 2022-04-25
Payer: MEDICARE

## 2022-04-25 ENCOUNTER — LAB VISIT (OUTPATIENT)
Dept: LAB | Facility: HOSPITAL | Age: 87
End: 2022-04-25
Payer: MEDICARE

## 2022-04-25 ENCOUNTER — CLINICAL SUPPORT (OUTPATIENT)
Dept: ENDOCRINOLOGY | Facility: CLINIC | Age: 87
End: 2022-04-25
Payer: MEDICARE

## 2022-04-25 VITALS
DIASTOLIC BLOOD PRESSURE: 72 MMHG | BODY MASS INDEX: 27.85 KG/M2 | WEIGHT: 167.13 LBS | HEIGHT: 65 IN | HEART RATE: 100 BPM | SYSTOLIC BLOOD PRESSURE: 180 MMHG

## 2022-04-25 DIAGNOSIS — I25.10 CORONARY ARTERY DISEASE INVOLVING NATIVE CORONARY ARTERY OF NATIVE HEART WITHOUT ANGINA PECTORIS: ICD-10-CM

## 2022-04-25 DIAGNOSIS — E66.3 OVERWEIGHT (BMI 25.0-29.9): ICD-10-CM

## 2022-04-25 DIAGNOSIS — E11.51 TYPE 2 DIABETES MELLITUS WITH DIABETIC PERIPHERAL ANGIOPATHY WITHOUT GANGRENE, WITHOUT LONG-TERM CURRENT USE OF INSULIN: ICD-10-CM

## 2022-04-25 DIAGNOSIS — I70.229 ATHEROSCLEROTIC PERIPHERAL VASCULAR DISEASE WITH REST PAIN: ICD-10-CM

## 2022-04-25 DIAGNOSIS — H35.3232 EXUDATIVE AGE-RELATED MACULAR DEGENERATION OF BOTH EYES WITH INACTIVE CHOROIDAL NEOVASCULARIZATION: ICD-10-CM

## 2022-04-25 DIAGNOSIS — Z23 NEED FOR SHINGLES VACCINE: ICD-10-CM

## 2022-04-25 DIAGNOSIS — J43.2 CENTRILOBULAR EMPHYSEMA: ICD-10-CM

## 2022-04-25 DIAGNOSIS — Z78.0 POSTMENOPAUSAL ESTROGEN DEFICIENCY: ICD-10-CM

## 2022-04-25 DIAGNOSIS — I73.9 PVD (PERIPHERAL VASCULAR DISEASE) WITH CLAUDICATION: ICD-10-CM

## 2022-04-25 DIAGNOSIS — E78.00 PURE HYPERCHOLESTEROLEMIA: ICD-10-CM

## 2022-04-25 DIAGNOSIS — H35.033 HYPERTENSIVE RETINOPATHY OF BOTH EYES: ICD-10-CM

## 2022-04-25 DIAGNOSIS — E11.9 ENCOUNTER FOR DIABETIC FOOT EXAM: ICD-10-CM

## 2022-04-25 DIAGNOSIS — G57.92 NEUROPATHY OF LEFT LOWER EXTREMITY: ICD-10-CM

## 2022-04-25 DIAGNOSIS — H35.3230 BILATERAL EXUDATIVE AGE-RELATED MACULAR DEGENERATION, UNSPECIFIED STAGE: ICD-10-CM

## 2022-04-25 DIAGNOSIS — F39 MOOD DISORDER: ICD-10-CM

## 2022-04-25 DIAGNOSIS — G31.84 MILD COGNITIVE IMPAIRMENT: ICD-10-CM

## 2022-04-25 DIAGNOSIS — I10 ESSENTIAL HYPERTENSION: ICD-10-CM

## 2022-04-25 DIAGNOSIS — C34.90 MALIGNANT NEOPLASM OF LUNG, UNSPECIFIED LATERALITY, UNSPECIFIED PART OF LUNG: ICD-10-CM

## 2022-04-25 DIAGNOSIS — Z85.118 HISTORY OF LUNG CANCER: ICD-10-CM

## 2022-04-25 DIAGNOSIS — Z00.00 ENCOUNTER FOR PREVENTIVE HEALTH EXAMINATION: Primary | ICD-10-CM

## 2022-04-25 LAB
CHOLEST SERPL-MCNC: 162 MG/DL (ref 120–199)
CHOLEST/HDLC SERPL: 2.4 {RATIO} (ref 2–5)
HDLC SERPL-MCNC: 68 MG/DL (ref 40–75)
HDLC SERPL: 42 % (ref 20–50)
LDLC SERPL CALC-MCNC: 72.4 MG/DL (ref 63–159)
NONHDLC SERPL-MCNC: 94 MG/DL
TRIGL SERPL-MCNC: 108 MG/DL (ref 30–150)

## 2022-04-25 PROCEDURE — 1170F PR FUNCTIONAL STATUS ASSESSED: ICD-10-PCS | Mod: CPTII,S$GLB,, | Performed by: NURSE PRACTITIONER

## 2022-04-25 PROCEDURE — G0439 PPPS, SUBSEQ VISIT: HCPCS | Mod: S$GLB,,, | Performed by: NURSE PRACTITIONER

## 2022-04-25 PROCEDURE — 1170F FXNL STATUS ASSESSED: CPT | Mod: CPTII,S$GLB,, | Performed by: NURSE PRACTITIONER

## 2022-04-25 PROCEDURE — 1159F PR MEDICATION LIST DOCUMENTED IN MEDICAL RECORD: ICD-10-PCS | Mod: CPTII,S$GLB,, | Performed by: NURSE PRACTITIONER

## 2022-04-25 PROCEDURE — 99999 PR PBB SHADOW E&M-EST. PATIENT-LVL V: CPT | Mod: PBBFAC,,, | Performed by: NURSE PRACTITIONER

## 2022-04-25 PROCEDURE — 95251 PR GLUCOSE MONITOR, 72 HOUR, PHYS INTERP: ICD-10-PCS | Mod: S$GLB,,, | Performed by: NURSE PRACTITIONER

## 2022-04-25 PROCEDURE — 3288F FALL RISK ASSESSMENT DOCD: CPT | Mod: CPTII,S$GLB,, | Performed by: NURSE PRACTITIONER

## 2022-04-25 PROCEDURE — 95250 CONT GLUC MNTR PHYS/QHP EQP: CPT | Mod: S$GLB,,, | Performed by: NURSE PRACTITIONER

## 2022-04-25 PROCEDURE — 1101F PT FALLS ASSESS-DOCD LE1/YR: CPT | Mod: CPTII,S$GLB,, | Performed by: NURSE PRACTITIONER

## 2022-04-25 PROCEDURE — 1160F RVW MEDS BY RX/DR IN RCRD: CPT | Mod: CPTII,S$GLB,, | Performed by: NURSE PRACTITIONER

## 2022-04-25 PROCEDURE — 1160F PR REVIEW ALL MEDS BY PRESCRIBER/CLIN PHARMACIST DOCUMENTED: ICD-10-PCS | Mod: CPTII,S$GLB,, | Performed by: NURSE PRACTITIONER

## 2022-04-25 PROCEDURE — 36415 COLL VENOUS BLD VENIPUNCTURE: CPT | Performed by: NURSE PRACTITIONER

## 2022-04-25 PROCEDURE — 80061 LIPID PANEL: CPT | Performed by: NURSE PRACTITIONER

## 2022-04-25 PROCEDURE — 3288F PR FALLS RISK ASSESSMENT DOCUMENTED: ICD-10-PCS | Mod: CPTII,S$GLB,, | Performed by: NURSE PRACTITIONER

## 2022-04-25 PROCEDURE — 95250 PR GLUCOSE MONITORING,72 HRS,SUB-Q SENSOR: ICD-10-PCS | Mod: S$GLB,,, | Performed by: NURSE PRACTITIONER

## 2022-04-25 PROCEDURE — 95251 CONT GLUC MNTR ANALYSIS I&R: CPT | Mod: S$GLB,,, | Performed by: NURSE PRACTITIONER

## 2022-04-25 PROCEDURE — 1126F AMNT PAIN NOTED NONE PRSNT: CPT | Mod: CPTII,S$GLB,, | Performed by: NURSE PRACTITIONER

## 2022-04-25 PROCEDURE — 1159F MED LIST DOCD IN RCRD: CPT | Mod: CPTII,S$GLB,, | Performed by: NURSE PRACTITIONER

## 2022-04-25 PROCEDURE — 1126F PR PAIN SEVERITY QUANTIFIED, NO PAIN PRESENT: ICD-10-PCS | Mod: CPTII,S$GLB,, | Performed by: NURSE PRACTITIONER

## 2022-04-25 PROCEDURE — 99999 PR PBB SHADOW E&M-EST. PATIENT-LVL V: ICD-10-PCS | Mod: PBBFAC,,, | Performed by: NURSE PRACTITIONER

## 2022-04-25 PROCEDURE — G0439 PR MEDICARE ANNUAL WELLNESS SUBSEQUENT VISIT: ICD-10-PCS | Mod: S$GLB,,, | Performed by: NURSE PRACTITIONER

## 2022-04-25 PROCEDURE — 1101F PR PT FALLS ASSESS DOC 0-1 FALLS W/OUT INJ PAST YR: ICD-10-PCS | Mod: CPTII,S$GLB,, | Performed by: NURSE PRACTITIONER

## 2022-04-25 NOTE — PATIENT INSTRUCTIONS
Counseling and Referral of Other Preventative  (Italic type indicates deductible and co-insurance are waived)    Patient Name: Brunilda England  Today's Date: 4/25/2022    Health Maintenance         Date Due Completion Date    Shingles Vaccine (2 of 2) 09/03/2019 7/9/2019    DEXA Scan 09/06/2021 9/6/2019    Foot Exam 12/18/2021 12/18/2020 (Done)    Override on 12/18/2020: Done    Override on 7/15/2016: Done    Lipid Panel 04/13/2022 4/13/2021    Diabetes Urine Screening 12/10/2022 (Originally 11/15/2017) 11/15/2016    Hemoglobin A1c 10/14/2022 4/14/2022    Eye Exam 11/11/2022 11/11/2021    Aspirin/Antiplatelet Therapy 04/19/2023 4/19/2022    TETANUS VACCINE 09/05/2028 9/5/2018          Orders Placed This Encounter   Procedures    DXA Bone Density Spine And Hip    (In Office Administered) Zoster Recombinant Vaccine    Lipid Panel     The following information is provided to all patients.  This information is to help you find resources for any of the problems found today that may be affecting your health:                Living healthy guide: www.[facility].[place].gov      Understanding Diabetes: www.diabetes.org      Eating healthy: www.cdc.gov/healthyweight      CDC home safety checklist: www.cdc.gov/steadi/patient.html      Agency on Aging: www.goea.[place].[facility]      Alcoholics anonymous (AA): www.aa.org      Physical Activity: www.katherine.nih.gov/sb0cemh      Tobacco use: www.quitwithusla.org

## 2022-04-25 NOTE — PROGRESS NOTES
"  Brunilda England presented for a  Medicare AWV and comprehensive Health Risk Assessment today. The following components were reviewed and updated:    · Medical history  · Family History  · Social history  · Allergies and Current Medications  · Health Risk Assessment  · Health Maintenance  · Care Team         ** See Completed Assessments for Annual Wellness Visit within the encounter summary.**         The following assessments were completed:  · Living Situation  · CAGE  · Depression Screening  · Timed Get Up and Go--not done walks with a cane  · Whisper Test  · Cognitive Function Screening--not done has dementia and on meds  · Nutrition Screening  · ADL Screening  · PAQ Screening        Vitals:    04/25/22 0855   BP: (!) 180/72   BP Location: Right arm   Patient Position: Sitting   BP Method: Medium (Manual)   Pulse: 100   Weight: 75.8 kg (167 lb 1.7 oz)   Height: 5' 5" (1.651 m)     Body mass index is 27.81 kg/m².  Physical Exam  Vitals and nursing note reviewed.   Constitutional:       Appearance: Normal appearance.   HENT:      Head: Normocephalic.      Nose: Nose normal.      Mouth/Throat:      Mouth: Mucous membranes are moist.   Eyes:      Conjunctiva/sclera: Conjunctivae normal.   Cardiovascular:      Rate and Rhythm: Normal rate.   Pulmonary:      Effort: Pulmonary effort is normal.   Musculoskeletal:         General: Normal range of motion.      Cervical back: Normal range of motion.   Skin:     General: Skin is warm and dry.   Neurological:      General: No focal deficit present.      Mental Status: She is alert and oriented to person, place, and time.   Psychiatric:         Mood and Affect: Mood normal.         Behavior: Behavior normal.         Thought Content: Thought content normal.         Judgment: Judgment normal.       Diagnoses and health risks identified today and associated recommendations/orders:    1. Encounter for preventive health examination  Exam done    Health Maintenance " updated    Records reviewed    2. Malignant neoplasm of lung, unspecified laterality, unspecified part of lung  Chronic, followed by Oncology    3. Type 2 diabetes mellitus with diabetic peripheral angiopathy without gangrene, without long-term current use of insulin  Chronic, followed by PCP    A1C goal < 7.0, BP goal < 140/80, LDL goal < 100.    Adhere to ADA diet.    Take meds as prescribed, check BS daily. Notify if sugars are out of range discussed during visit.    Yearly eye exam discussed.    Yearly foot exam discussed.    - Lipid Panel; Future    4. Mood disorder  Chronic, followed by PCP    5. Centrilobular emphysema  Chronic, followed by PCP    6. Hypertensive retinopathy of both eyes  Chronic, followed by PCP    7. Bilateral exudative age-related macular degeneration, unspecified stage  Chronic, followed by Ophthalmology    8. Mild cognitive impairment  Chronic, followed by PCP    9. Neuropathy of left lower extremity  Chronic, followed by PCP    10. 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 Essential hypertension  High today, did not take meds    Chronic, followed by PCP    Take medications as prescribed.    Monitor BP at home, goal BP < or = 140/80, call office if consistently above this range.    Follow low salt DASH diet and exercise.    BMI reviewed.    Go to ED if Headaches, blurred vision, chest pain, or SOB occurs along with elevated readings > or = 160/90.    12. Pure hypercholesterolemia  Chronic, followed by PCP    Continue cholesterol med, low fat diet, and exercise. Check lipids.    - Lipid Panel; Future    13. PVD (peripheral vascular disease) with claudication  Chronic, followed by PCP    14. Atherosclerotic peripheral vascular disease with rest pain  Chronic, followed by PCP    15. Coronary artery disease involving native coronary artery of native heart without angina pectoris  Chronic,  followed by PCP    16. History of lung cancer  Chronic, followed by Oncology    17. Postmenopausal estrogen deficiency  - DXA Bone Density Spine And Hip; Future    18. Need for shingles vaccine  - (In Office Administered) Zoster Recombinant Vaccine    19. Encounter for diabetic foot exam  Pt will defer to PCP during visit in August 20. BMI 27.0-27.9,adult  BMI reviewed    21. Overweight (BMI 25.0-29.9)  BMI reviewed.    Diet and exercise to lose weight.    Provided Brunilda with a 5-10 year written screening schedule and personal prevention plan. Recommendations were developed using the USPSTF age appropriate recommendations. Education, counseling, and referrals were provided as needed. After Visit Summary printed and given to patient which includes a list of additional screenings\tests needed.    Follow up in about 4 months (around 8/30/2022) for with PCP Dr. Whitfield as scheduled.    Shanda Burleson, DNP  I offered to discuss advanced care planning, including how to pick a person who would make decisions for you if you were unable to make them for yourself, called a health care power of , and what kind of decisions you might make such as use of life sustaining treatments such as ventilators and tube feeding when faced with a life limiting illness recorded on a living will that they will need to know. (How you want to be cared for as you near the end of your natural life)     X Patient is interested in learning more about how to make advanced directives.  I provided them paperwork and offered to discuss this with them.

## 2022-04-26 ENCOUNTER — OFFICE VISIT (OUTPATIENT)
Dept: NEUROLOGY | Facility: CLINIC | Age: 87
End: 2022-04-26
Payer: MEDICARE

## 2022-04-26 DIAGNOSIS — F02.818 LATE ONSET ALZHEIMER'S DEMENTIA WITH BEHAVIORAL DISTURBANCE: Primary | ICD-10-CM

## 2022-04-26 DIAGNOSIS — R26.81 GAIT INSTABILITY: ICD-10-CM

## 2022-04-26 DIAGNOSIS — T88.7XXA MEDICATION SIDE EFFECT: ICD-10-CM

## 2022-04-26 DIAGNOSIS — E16.2 HYPOGLYCEMIA: ICD-10-CM

## 2022-04-26 DIAGNOSIS — G30.1 LATE ONSET ALZHEIMER'S DEMENTIA WITH BEHAVIORAL DISTURBANCE: Primary | ICD-10-CM

## 2022-04-26 PROCEDURE — 99443 PR PHYSICIAN TELEPHONE EVALUATION 21-30 MIN: ICD-10-PCS | Mod: 95,,, | Performed by: PSYCHIATRY & NEUROLOGY

## 2022-04-26 PROCEDURE — 99443 PR PHYSICIAN TELEPHONE EVALUATION 21-30 MIN: CPT | Mod: 95,,, | Performed by: PSYCHIATRY & NEUROLOGY

## 2022-04-26 NOTE — PROGRESS NOTES
Established Patient - Audio Only Telehealth Visit     The patient location is: home  The chief complaint leading to consultation is: dementia  Visit type: Virtual visit with audio only (telephone)  -----------------------------------------------------------------------------    I spent a total of 20 minutes (time-in: 14:30 PM; time-out: 14:50 PM) on 04/26/2022 via non-video telephone with the daughter using real-time Telehealth tools and >50% of that time was spent counseling regarding the symptoms, treatment plan, risks, therapeutic options, lifestyle modifications, and/or safety issues for the diagnoses above.    I reviewed the summation of records from outside physicians for a total of 10 minutes on 04/26/2022. Reviewed and summation of records from an outside physician was performed as summarized above in HPI        Total Billing time spent on encounter/documentation for this patient's evaluation and management: 30 minutes.   -----------------------------------------------------------------------------    The reason for the audio only service rather than synchronous audio and video virtual visit was related to technical difficulties or patient preference/necessity.     Each patient to whom I provide medical services by telemedicine is:  (1) informed of the relationship between the physician and patient and the respective role of any other health care provider with respect to management of the patient; and (2) notified that they may decline to receive medical services by telemedicine and may withdraw from such care at any time. Patient verbally consented to receive this service via voice-only telephone call.        Chief complaint: Progressive Cognitive Impairment     History of present illness:      Ms. England is a 89-year-old right-handed female who presents today to the [facility] due to concerns related to progressive neurocognitive impairment.    Ms. England  is accompanied by the daughter who participates in providing history.   Additional information is obtained by reviewing available medical records.     Relevant Background/Context   Known Relevant Genetics:  o There is no known relevant genetic testing available.   Known Relevant Family history:  o No Known Relevant Family History.  o The family denies a history of early/late onset cognitive impairment.  o The family denies a history of movement disorder (PD, PDD, tremor, etc).  o The family denies a history of motor neuron disease (ALS).  o The family denies a history of developmental learning disorder (Dyslexia, ADHD, ASD, etc.).  o The family denies a history of mood/substance abuse disorder (MDD, PIPER, Schizophrenia, etc.).   Developmental/Milestones:  o The patient/family report no known birth complications or early life problems. The patient met all developmental milestones.   Learning Disorders:  o The patient/family report no signs or symptoms suggestive of developmental learning disorder.   Education:  o 12 years of formal education.  o HS.   Social History:  o Her  passed away on August 26, 2017. daughter and primary caregiver moved in to her home within the last 3 months.   Career/Skills:  o Housewife   Relevant Medical History:  o Stage III non-small cell lung cancer, completed chemoradiation with carboplatin and Taxol on 6/1/12  o Hypertension.  o Diabetes mellitus.  o Hyperlipidemia.  o Chronic diastolic dysfunction.  o Coronary artery disease status post MI in 1990 and 2003. Stenting was  o done at 2003 at [facility].  o Cholecystectomy, December 2006  o Osteoporosis on BMD 12/11  o Macular degeneration   Relevant Exposure/Trauma to CNS:  o No History of Traumatic Brain Injury or Concussions  o No History of Chronic Mood Disorder  o No History of Chronic Stress  o No History of Chronic Substance Abuse  o No History of Malnutrition  o No History of Toxic Exposure      Neurocognitive Disorder Features   Onset/Duration:  o Feb 2017 (~5-year)   First Symptom:  o Memory impairment   Progression:  o Gradually Progressive   Clinical Course:  o Neurologist (09/05/2018)  - Type: Chart Review. This is an 86-year-old female was referred for evaluation of memory difficulties. The patient does admit that she has occasional difficulty with retention of recent information or repeating herself however she is otherwise quite independent. Her daughter was present today and collaborated with the history. The patient lives alone and her daughter lives across the street. The patient is able to take care of her day-to-day needs at home without any problems including managing her medications, cooking, taking care of the finances and house work. She continues to drive in the neighborhood going to the local grocery, to the bank or the head dresser. Her daughter helps her with her appointments. She denies any headaches or visual difficulties and is not hearing impaired. She has had no falls. She does report that her memory transiently got worse after the death of her . Her  passed away on August 26, 2017. They were  67 years. She misses him. She feels that she is coping well as he was sick for a very long time and she was his primary care giver. She does report that she has gradually improved since she has been on Cymbalta. She also reports that her memory also improved specially since Aricept was started. She is presently on 5 mg in the morning daily. Her daughter did admit that she keeps fairly active and has not noted any significant memory changes otherwise. Vascular risk factors include diabetes, hypertension and coronary artery disease. In addition she has history non small cell lung cancer, which is stable. She saw Dr Weir in August 2018 and scans were stable. She is to follow up annually with CT scan chest.   She is breathing well and denied chest pain or shortness of  breath. Discussed with the patient and her daughter. She had minimal cognitive difficulties that may be age related or may represent mild cognitive impairment on a vascular basis given her multiple risk factors. She is otherwise quite independent, living alone. History of an adjustment disorder with depression may also be a contributing factor but this has improved with Cymbalta. She is advised to continue Aricept 5 mg daily in the morning. B12 OTC supplementation. Continue present level of activities. The patient counseled about getting her immunization shots and is given a prescription for this. Follow-up in 1 year if stable.  o Neurologist (06/29/2021)  - Type: Chart Review. Significant issues in the patient's past medical history is that of non-small cell lung cancer that was diagnosed in 2012. The patient was treated in 2012, and is followed by Oncology on a yearly basis, with no report of recurrence. She was treated with chemotherapy and radiation therapy. Additional medical issues include hypertension, coronary artery disease, and atherosclerotic peripheral vascular disease. The patient was seen in the ER at [facility] for lightheadedness for several days on 4-13-21. Symptoms appear to have abated in the ER. The patient reports that she continues to operate a motor vehicle. This is of concern to her family. Noted by her family has been occasions in which she left her house and forgot to lock it. The family is concerned as well that the patient is appears generally weaker than in the past, and may not be able to take care of herself when she exits her house to go to the street. I reviewed a prior CT scan of the head from 3-. Noted on my reading of the scan is mild cortical atrophy with ventricular enlargement. The patient manifests cognitive impairment. I am not certain as to its severity, based on my testing today. The patient will be re-examined by me in several months. It would be useful to view an MRI  scan of the brain for possible presence of vascular type lesions. My belief at this time is that I am observing an early dementia.  o Primary Care Provider (08/06/2021)  - Type: Chart Review. No more lightheadedness. She is taking metoprolol 25 mg 2 tablets in am and 1 tablet at night (sometimes skips the evening dose).  NO   chest pain, palpitations.  She continues to take amlodipine - benazepril 5/20 one tablet daliy.  . She has been having sinus congestion that comes and goes. She uses flonase 1 spray in each nostril once daily. No fever, chills, sore throat, nausea, vomiting. SHe continues to take duloxetine 60 mg daily and citalopram 10 mg daily.  Mood has been good.   Neuropathic pain in the legs is better controlled with the duloxetine.   I encouraged her to get out of the house.  She has not needed  xanax 0. 5 mg since April.  o [facility] - Rubio Alanis MD. Neurologist (03/03/2022)  - Type: Chart Review. The patient presents with 2 her daughters. Records indicate the patient has been describing cognitive impairment as early as 2018 however the patient was the primary her caregiver of her  with late onset dementia who passed in 2017. As such is unclear whether not the patient was having any memory deficits as early as 2017 or prior to this. Regardless the patient was diagnosed with mild cognitive impairment in 2018. Over the last 3-4 years her family reported gradual progression in patient's cognitive deficits. The patient is largely stable however her family does report new bothersome and progressive behavior. Over the last calendar year her family report the patient often is confused and disoriented her house. She moves objects frequently throughout her house often forgetting where she has placed them. The patient is able to maintain her household responsibilities include lutein cooking, cleaning, and handling simple bills, her family has reported that her cooking has declined and  "she tends to add abnormal ingredients to her meals. her family's primary concern today include worsening irritability and agitation. Per the report of the family, the patient has always been a very strong-willed woman. The patient has always wanted do things "her way "and has had somewhat OCD tendencies throughout her life. These prior tendencies have magnified. her family reports frequent irritability more often during the morning prior to and during breakfast. The patient has diabetes which appears to be somewhat poorly controlled. The patient refuses frequent glucose checks as such is uncertain whether not the patient is hyperglycemic in the morning. Patient's mood tends to improve after eating her morning meal. her family report frequent bouts of irritability and often jessica agitation and borderline violence if frustrated during the morning time however can not the patient can be irritable and easily frustrated throughout the day. The patient is currently taking citalopram 10 mg q. Day. We discussed potential interventions which include but are not limited to simple over-the-counter juice/sugar water in the morning prior to her coffee and sweetener in order to potentially a counterbalance hypo glycemia. We discussed potential management by Endocrinology. Patient's primary her caregiver current her daughter recently moved from [place] into her current home to facilitate care. her caregiver reports burnout over the last 3 months due to frequent irritability and frustrating behavior by the patient. her caregiver does support patient's independence however does seem to be worn down by the experience. We discussed social work opportunities for which the patient's her caregiver is open. The patient continues to take Xanax however very infrequently often less than once per month for agitation. The patient denies any bothersome insomnia, hallucinations, or any other bothersome behavior at this time. We have discussed " opportunities for social engagement however per the family, the patient has there been a very social individual in never been part of any community groups. The patient does not have any meaningful hobbies. As such patient's primary activities include wandering the house cleaning up. The observations made above were discussed with the patient her family. The patient presents reporting a minimal 4 year history of progressive cognitive impairment however deficits likely preceded this. Per the report of the family, memory symptoms were 1st time have gradually progressed to general cognitive impairment. The patient maintained most instrumental activities of daily living however is having more difficulty shopping and cooking. The patient has a strong support network and her daughter recently moved in to help support her general well-being. her family report declines in appetite and recent months with mild weight loss. Primary concerns at this time include agitation. The patient is taking citalopram 10 mg. Recommend starting Namenda titrating up to 10 mg b. I. D. Following this we will consider increasing citalopram or adding Seroquel. Patient's morning irritability might be due to hypoglycemia. Recommended over-the-counter strategies for managing hypoglycemia. Recommend referral to endocrinology. her caregiver reports burnout. Recommend referral to social Work for care management and additional resources.  o Electronic Medical Record (03/28/2022)  - Type: Chart Review. Thank you for the clarification. It would appear there is more going on than the simple change in Namenda.  . I recommend the following changes. 1) Switch the Namenda to 5 mg at night - I sent a new prescription to your pharmacy. 2) Switch the Donepzil 10mg to the morning (this may help with the urination at night). 3) Try to consolidate the majority of oral intake of water to before 4-6 PM. Do not limit water intake just attempt to have her consume it in  the earlier part of the day.  . My MA will set up a telephone visit next week to see if medication adjustments helped and if not we can work on consolidating sleep at night so she has more energy during the day. Type: Chart Review. Since following your directives, cutting back on the medication, my mom has not been as sleepy during late evenings, however she has continued to be somewhat sleepy in the late evenings. I filled her medication tray for this week and she has no more of Namenda and there are no refills. Please advise the next steps. Thank you. Thank you for the clarification. It would appear there is more going on than the simple change in Namenda. I recommend the following changes. 1) Switch the Namenda to 5 mg at night - I sent a new prescription to your pharmacy. 2) Switch the Donepzil 10mg to the morning (this may help with the urination at night). 3) Try to consolidate the majority of oral intake of water to before 4-6 PM. Do not limit water intake just attempt to have her consume it in the earlier part of the day. Type: Chart Review. Good morning, needed to make Dr. Alanis aware of my mother's condition. Since starting the medicine that Dr. Alanis has her on, for the last maybe 2 to 3 weeks she becomes very sleepy around the middle part of the day & has to lay down. She goes to sleep & sleeps for hours. She tells me she doesn't like how she's feeling & strongly feels that it's the medicine & doesn't want to continue taking it. I feel that she's not eating enough for all of the medicine that she takes in the morning & night. Recently she tells me she doesn't have an appetite in the evening, so she won't allow me to give her food, the next morning she awakes & is very weak,disoriented & sleepy. Let Dr. Alanis know & let me know what to do. Thank you. Like before, I agree with you that many of Brunilda's symptoms begin with adequate nutrition and hypoglycemia. We started Namenda for the irritability/agitation  so hearing that she has become less hyperactive and more sleepy is not entirely unexpected. As it has been three weeks since your appointment, I imagine she is taking Namenda 10 mg in AM and 5 mg in PM at this point. I recommend decreasing the Namenda back to 5 mg in AM and seeing if the fatigue and appetite improve. After adjusting the medication, please send me a message in 1 week to see if there has been a change and/or improvement.  o Electronic Medical Record (04/18/2022)  - Type: Chart Review. Yes she is sleepy late evenings, her appetite is still the same and she is out of the medication! For the past 2weeks she has been sleeping very late ! 12noon to 1pm. I have been waking her up because that's too late to be sleeping, she says it's because she's up & down all during the night going to the bathroom.     Current Presentation   Recent/Interim History:  o Since last time seen patient's her family has completed medication changes as requested. Namenda was decreased to 5 mg at night. Donepezil 10 mg was which the morning. Increase p. O. Water intake and morning juice for irritability. Since adjustments patient's irritability and frustration has improved. Patient's appetite is improved. The patient's morning daytime irritability likely due to hypoglycemia improved with PO juice. Addition of donepezil Namenda have curved aggressive outbursts. The patient continues to have mild daytime fatigue however does not interfere with diet at this time. No recent falls. At this time there are no major concerns reported by her family.   Unresolved Concern(s) reported by patient/family:  o Agitation/Irritability - continue citalopram, Continue low-dose Namenda 5 mg the morning. High-dose 10-15 mg Namenda caused bothersome sedation.  o Caregiver Burnout - referral to social work  o Symptomatic hypoglycemia - referral to endocrinology        Review of cognitive, visuospatial, motor, sensory, and behavioral systems:     Memory:     Ms. Mascorros memory has worsened in the past few years.   She does repeat statements or asks the same question repeatedly.   She does have difficulty remembering recent important conversations.   She does have difficulty remembering recent events.   She does not forget information within minutes.   Her remote memory is intact.   Her recent retrograde memory is intact.  Attention:    Her attention and concentration are impaired.   She does not have attentional fluctuations.   She does not have difficulty maintaining selective attention.   She does become easily distracted.   She does not have difficulty dividing their attention.  Executive:    Ms. Mascorros cognitive processing speed is slower.   She does have difficulty with working memory.   She does misplace personal items (e.g., keys, cell phone, wallet) more frequently.   She does not have difficulty keeping track of her medications.   She does not have difficulty with planning/organizing/completing multistep tasks.   She does have difficulty with executive attention.   She does not have difficulty with flexible thinking.   She does not difficulty with self control.   She is exhibiting new symptoms that suggest they have become more impulsive, rash and/or careless.   Her judgment is intact.  Language:    Her speech output is affected.   She does forget people's names more frequently.   She does have word-finding difficulties.   Ms. Mascorros speech is fluent and non-effortful.   Ms. Mascorros speech is grammatically intact.   She does not make word substitutions.   She does not have difficulty reading.   She does not appear to have impaired comprehension.  Visuospatial:    She has new visuospatial problems.   She does not have difficulty recognizing objects or faces.  Motor/Coordination:    Ms. England does have difficulty with walking.   She does feel imbalanced.   She has fallen.   She does not appear to have new muscle  weakness.   She does not have difficulty buttoning shirts, operating zippers, or manipulating tools/utensils.   Her handwriting has not become micrographic.   She does not have a resting tremor.   She denies having any new involuntary movements and/or muscle jerking.   She does not have swallowing difficulty.   She denies new muscle cramps and twitching.  Sensory:    Ms. England denies new numbness, tingling, paresthesias, or pain.   Ms. England denies a loss of vision, blurry vision, or double vision.   Ms. England denies new loss of hearing or worsening tinnitus.   Ms. England denies anosmia.  Sleep:    Ms. England denies difficulty sleeping.   Ms. England does not have difficulty going to sleep.   Ms. England denies difficulty staying asleep or frequently awakening at night.   Ms. England does not snore or have witnessed apneas while sleeping.   When she wakes up in the morning, she does feel well-rested.   She denies dream-enactment behavior.   She denies symptoms suggestive of restless leg syndrome.  Behavior:    Ms. England's personality has not changed.   She does not have symptoms of disinhibition and social inappropriateness.   She does not have symptoms to suggest a loss of manners or decorum.   She does not appear apathetic or has decreased motivation.   She does not appear to have a change in inertia.   There is no report that Ms. England has had a change in their emotional expression.   She does not have emotional blunting or lability.   She does have symptoms of irritability and mood lability.   She has been reported to have new symptoms of agitation, aggression, or violent outbursts.   Her insight into his disease and situation is impaired.   Her personal hygiene is intact.   She is not exhibiting a diminished response to other people's needs and feelings.   She is not exhibiting a diminished social interest, interrelatedness, or personal warmth.   She denies restlessness.   She denies new  and/or worsening simple repetitive behaviors.   Her speech has not become simplified or become repetitive/stereotyped.   She reports symptoms that are suggestive of new/worsening complex repetitive/ritualistic compulsions and behaviors. Comment: baseline   She does not have symptoms of hyper-religiosity or dogmatism.   Her interests/pleasures have not become restrictive, simplified, interrupting, or repetitive.   She denies a change of self-stimulating behavior.   She denies any changes in eating behavior.   She denies increased consumption of food or substances.   She denies oral exploration or consumption of inedible objects.  Psychiatric:    She does not feel depressed.   She is exhibiting symptoms of social withdrawal/indifference.   She denies anxiety.   She does not exhibit cycling behavior.   She does not exhibit hyperactive behavior.   She is not exhibited symptoms of paranoia.   She does not have delusions.   She does not have hallucinations.   She does not have a history of sensitivity to neuroleptic/psychotropic medications.  Medical Review of Systems:    Ms. England does not have constipation.   Ms. England does not have urinary incontinence.   Ms. England denies orthostatic lightheadedness.   Ms. England's weight is stable.  Functional status:   Difficulty performing the following Instrumental ADLs:  o Housekeeping: No  o Food Preparation: Yes  o Shopping: Yes  o Ability to Handle Finances: No  o Transportation/Driving: Yes  o Household Appliances/Stove: No  o Laundry: No   Difficulty performing the following Basic ADLs:  o Dressing: No  o Bathing: No  o Toileting: No  o Personal hygiene and grooming: No  o Feeding: No  Care Management:   Patient/Family Safety Concerns:  o Medication Adherence: No  o Home Safety: No  o Wandered: No  o Firearms: No  o Fall Risk: Yes  o Home Alone: No       Neuropsychological Evaluation Summary:     Prior Neurocognitive/Neuropsychological  Evaluations   Summary from EMR:   2022-03-03:  o No testing completed today - prior assessment consistent with amnestic multidomain major cognitive impairment   o BEHAV5+ 3/6: See ROS section for a full description    Neurocognitive Evaluation completed on 04/26/2022:  Neuropsychiatric/Behavioral Focused Evaluation Assessment   BEHAV5+ 3/6 See ROS section for a full description   Laboratories:     Lab Date Value [Reference]   Metabolic Screening           Hemoglobin A1C External 2021, Dec-07  2021, Aug-03  2021, Apr-13    6.1 (H) [4.0 - 5.6 %]  6.2 (H) [4.0 - 5.6 %]  6.3 (H) [4.0 - 5.6 %]      Lipase 04/13/2021  42 [4 - 60 U/L]      TSH 01/12/2018  2.923 [0.400 - 4.000 uIU/mL]  2.301 [0.400 - 4.000 uIU/mL]  3.394 [0.400 - 4.000 uIU/mL]      Cholesterol 2021, Apr-13    160 [120 - 199 mg/dL]      HDL 2021, Apr-13    53 [40 - 75 mg/dL]      Non-HDL Cholesterol 2021, Apr-13    107 [mg/dL]      Triglycerides 2021, Apr-13    175 (H) [30 - 150 mg/dL]      Vit D, 25-Hydroxy 04/13/2021  24 (L) [30 - 96 ng/mL]  31 [30 - 96 ng/mL]      Vitamin B-12 01/12/20182018, Jan-12    793 [210 - 950 pg/mL]  1207 (H) [210 - 950 pg/mL]      Coagulopathy Screening   INR 2019, Dec-06    1.0 [0.8 - 1.2]      Protime 08/05/2020  10.2 [9.0 - 12.5 sec]      Neuroendocrine/Electrolyte Screening   Magnesium 12/06/03754824, Dec-06    1.8 [1.6 - 2.6 mg/dL]  1.7 [1.6 - 2.6 mg/dL]  1.8 [1.6 - 2.6 mg/dL]      Phosphorus 2019, Dec-07    3.5 [2.7 - 4.5 mg/dL]      Infectious Disease/Immunocompromised Screening   SARS-CoV-2 RNA, Amplification, Qual 2022, Feb-07    Negative      SARS-CoV2 (COVID-19) Qualitative PCR 2020, Dec-18  2020, May-22    Not Detected [Not Detected]  Not Detected [Not Detected]      Standard Hematology Screen   Hematocrit 2021, Sep-29    30.3 (L) [37.0 - 48.5 %]      Hemoglobin 2021, Sep-29    10.1 (L) [12.0 - 16.0 g/dL]      MCV 2021, Sep-29    88 [82 - 98 fL]      Platelets 2021, Sep-29    179 [150 - 450 K/uL]            Neuroimaging:    MRI brain/head without contrast on 7/7/2021   Formal interpretation by Radiology:   No acute intracranial process. Changes of chronic small vessel ischemic disease and cerebral volume loss.   Independently reviewed radiological imaging by Rubio Garner MD. MPH. Behavioral Neurologist   T1: moderate degree P>F D>V cortical atrophy with severe widening of marginal sulci - multi-infarct subcortical WMD with cystic cavitation, relative sparing of subcortical/hippocampus   T2/FLAIR: Compared to 2012, there are some progression of the T2/FLAIR signal hyperintensities within the periventricular and subcortical white matter.   DWI/ADC: No Significant DWI hyperintensities/hypointensities. No ADC correlation.   SWI/GRE: No Significant hypointensities to suggest cortical/subcortical hemosiderin deposition.  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 Rate : 093 BPM     Atrial Rate : 093 BPM    P-R Int : 168 ms          QRS Dur : 084 ms     QT Int : 350 ms       P-R-T Axes : 071 045 034 degrees    QTc Int : 435 ms Normal sinus rhythm Normal ECG   Independently reviewed Electrocardiogram by Rubio Garner MD. MPH. Behavioral Neurologist   Impression: : Received ECG has no evidence of sinus node disease. HR (>=50-60). Prolonged RI interval (>0.22 s). Broad QRS complex (> 0.12 s).     Clinical Summary:     Ms. England is a 89-year-old right-handed female with a relevant past medical history of HTN, HLD, CAD, CHF, MD, Lung cancer s/p chemo/rad, who presents reporting a 5-year history of gradually progressive neurocognitive impairment.       The clinical history is suggestive of:   Memory Impairment: STM encoding impairment, LTM encoding-retrieval impairment   Attention Impairment: Attention, Sustained attention   Executive Impairment: Energization, Working Memory,  Set-Shifting, Response Inhibition   Language Impairment: Language Dysfunction   Motor/Coordination Impairment: Sensory motor integration   Behavior Impairment: Emotional Regulation, Self-Preservation Dysregulation, Sensorimotor Dysregulation   Psychiatric Impairment: Social Coherence   iADL Impairment: [place] Rafael Instrumental Activities of Daily Living Scale  The neurological examination is significant for:   Cortical Transcallosal Disconnection: interhemispheric motor control (interhemispheric motor control ), motor efference (motor overflow)   Movement Disorder (Gait): strength (difficulty rising), abnormal features (stance, speed, stride/cycle)   Movement Disorder (Hypokinetic): parkinsonism (tone, bradykinesia), dyskinesia (slowing, hypometria, dysrhythmia)   Movement Disorder (Speech): abnormal vocal features (articulation), AMR/Dysdiadochokinesia (multisyllabic, consonants)  Informal neuropsychology battery is positive (based on age and education) for:   No testing completed today - prior assessment consistent with amnestic multidomain major cognitive impairment    BEHAV5+ 3/6: See ROS section for a full description  Neurological imaging   MRI brain/head without contrast (7/7/2021): moderate degree WMD and cortical atrophy, given age 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     Ms. 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 MAlzheimer's & Dementia 2011). Meets criteria for dementia. Insidious onset over months to years. Clear-cut history of worsening cognition by report or observation. Amnestic presentation: impairment in learning and recall. There is no evidence of a) stroke temporarily related to the onset of cognitive symptoms  or presence of extensive infarcts or severe white matter hyperintensity burden, b) core features of DLB other than dementia itself, c) prominent features of bvFTD, d) prominent features of semantic or non-fluent/agrammatic PPA or e) another active neurological disease, medical comorbidity or use of medications with effects on cognition.  The pathology underlying Ms. England's neurocognitive impairment is likely a mixture of pathologies (Alzheimer's Disease Related Pathology, Vascular Contributions to Cognitive Impairment and Dementia).        The observations made above, were discussed with the patient and her family. Since last time seen minor adjustments to Namenda donepezil and diet have improved daytime aggression / agitation. The patient has mild sedation however is tolerable by the patient and her family. The patient appears to have low threshold for medication sensitivity. Will refrain from significant medication adjustments moving forward. Previous referrals have been to social work and Endocrinology which the patient has not yet followed up on. At this time her family does not have any other major medical concerns requiring adjustment. Follow-up as needed.        Care Management Plan:     #Neurocognitive Disorder Treatment:   Continue donepezil 10mg qD   We have made ref[facility] to [facility] for additional care support.  #Behavioral Disorder Treatment:   Continue Namenda 5 mg in the morning   Continue citalopram 10mg  -next appt will increase to 20mg (max 40mg)   Recommend D/C xanax 0.5 mg BID due to excessive daytime sedation   Patient's morning irritability likely due to hypoglycemia.  Recommend over-the-counter sugar water/ grape juice and follow-up with Endocrinology for closer diabetes management  #Behavioral/Environmental Treatment   We recommend engaging in activities that stimulate cognitively and socially while avoiding excessive stimulation and fatigue in overwhelmingly complex  "situations.   We recommend integrating routine and schedule into your daily life. https://www.alzheimersproject.org/news/the-importance-of-routine-and-familiarity-to-persons-with-dementia/  #Health Maintenance/Lifestyle Advice   We have discussed the value in aggressively controlling vascular risk factors like hypertension, hyperlipidemia, and Diabetes SBP<130, LDL<100, A1C<7.0.   We discussed the need to optimize lifestyle choices including a heart-healthy diet (e.g., Mediterranean or DASH), increased cardiovascular exercise (goal 150 minutes of moderate-intensity per week), and stay cognitively and socially active.  #Support   We all need support sometimes. Get easy access to local resources, community programs, and services. https://www.communityresourcefinder.org/   Learn more about Cognitive Impairment in [place]: https://www.alz.org/professionals/public-health/state-overview/[place]  #Safety   [place] has no laws against driving with dementia specifically but obviously has laws about medical conditions which impact a person's ability to drive safely. If you believe your loved one's driving capacity has diminished, please reach out to either your primary care physician or our office to discuss driving restrictions or revocation of their license. To learn more: https://www.dementiacarecentral.com/caregiverinfo/driving-problems/   The Alzheimer's Association administers the nationwide "Safe Return" program with identification bracelets, necklaces, or clothing tags and 24-hour assistance. More information is available online at https://www.alz.org/help-support/caregiving/safety/medicalert-with-24-7-wandering-support  #Follow up:   Follow-up as needed.    Thank you for allowing us to participate in the care of your patient. Please do not hesitate to contact us with any questions or concerns.     It was a pleasure seeing Ms. England and we look forward to seeing them at their follow-up visit.     This " note is dictated on M*Modal Fluency Direct word recognition program. There are word recognition mistakes that are occasionally missed on review.                 This service was not originating from a related E/M service provided within the previous 7 days nor will  to an E/M service or procedure within the next 24 hours or my soonest available appointment.  Prevailing standard of care was able to be met in this audio-only visit.

## 2022-04-27 ENCOUNTER — OUTPATIENT CASE MANAGEMENT (OUTPATIENT)
Dept: NEUROLOGY | Facility: CLINIC | Age: 87
End: 2022-04-27
Payer: MEDICARE

## 2022-04-30 ENCOUNTER — IMMUNIZATION (OUTPATIENT)
Dept: INTERNAL MEDICINE | Facility: CLINIC | Age: 87
End: 2022-04-30
Payer: MEDICARE

## 2022-04-30 DIAGNOSIS — Z23 NEED FOR VACCINATION: Primary | ICD-10-CM

## 2022-04-30 PROCEDURE — 91300 COVID-19, MRNA, LNP-S, PF, 30 MCG/0.3 ML DOSE VACCINE: CPT | Mod: PBBFAC

## 2022-05-03 ENCOUNTER — PATIENT MESSAGE (OUTPATIENT)
Dept: ENDOCRINOLOGY | Facility: CLINIC | Age: 87
End: 2022-05-03
Payer: MEDICARE

## 2022-05-03 DIAGNOSIS — E11.51 TYPE 2 DIABETES MELLITUS WITH DIABETIC PERIPHERAL ANGIOPATHY WITHOUT GANGRENE, WITHOUT LONG-TERM CURRENT USE OF INSULIN: Primary | ICD-10-CM

## 2022-05-03 RX ORDER — LINAGLIPTIN 5 MG/1
5 TABLET, FILM COATED ORAL DAILY
Qty: 90 TABLET | Refills: 3 | Status: SHIPPED | OUTPATIENT
Start: 2022-05-03 | End: 2022-07-11

## 2022-05-03 NOTE — TELEPHONE ENCOUNTER
Thank You , I did called pt jerel Cote to let her know and they are going get her medication shortly .

## 2022-05-04 ENCOUNTER — PATIENT MESSAGE (OUTPATIENT)
Dept: ENDOCRINOLOGY | Facility: CLINIC | Age: 87
End: 2022-05-04
Payer: MEDICARE

## 2022-05-05 NOTE — PROGRESS NOTES
"Called CG (Yuri) for monthly visit. CG reports things are going well overall. Eating seems to be a challenge, patient having cravings for "junk food" (i.e. Sharona Kulkarni). We talk about strategies to get her to eat and how she will need prompts to remember that there is food in the fridge. We review our pharmacist's medication recommendations, CG states that memory medication was changed recently and that they will try it for several months. No other needs or concerns at this time, no falls, UTIs, or hospital encounters in the past month. ClinCard was received.    "

## 2022-05-11 ENCOUNTER — HOSPITAL ENCOUNTER (OUTPATIENT)
Dept: RADIOLOGY | Facility: CLINIC | Age: 87
Discharge: HOME | End: 2022-05-11
Attending: NURSE PRACTITIONER
Payer: MEDICARE

## 2022-05-11 DIAGNOSIS — Z78.0 POSTMENOPAUSAL ESTROGEN DEFICIENCY: ICD-10-CM

## 2022-05-11 PROCEDURE — 77080 DXA BONE DENSITY AXIAL: CPT | Mod: 26,,, | Performed by: INTERNAL MEDICINE

## 2022-05-11 PROCEDURE — 77080 DEXA BONE DENSITY SPINE HIP: ICD-10-PCS | Mod: 26,,, | Performed by: INTERNAL MEDICINE

## 2022-05-11 PROCEDURE — 77080 DXA BONE DENSITY AXIAL: CPT | Mod: TC

## 2022-05-16 ENCOUNTER — PATIENT MESSAGE (OUTPATIENT)
Dept: NEUROLOGY | Facility: CLINIC | Age: 87
End: 2022-05-16
Payer: MEDICARE

## 2022-05-17 RX ORDER — DONEPEZIL HYDROCHLORIDE 10 MG/1
10 TABLET, FILM COATED ORAL NIGHTLY
Qty: 90 TABLET | Refills: 3 | Status: SHIPPED | OUTPATIENT
Start: 2022-05-17 | End: 2022-08-27 | Stop reason: SDUPTHER

## 2022-05-21 ENCOUNTER — PATIENT MESSAGE (OUTPATIENT)
Dept: NEUROLOGY | Facility: CLINIC | Age: 87
End: 2022-05-21
Payer: MEDICARE

## 2022-05-22 ENCOUNTER — TELEPHONE (OUTPATIENT)
Dept: INTERNAL MEDICINE | Facility: CLINIC | Age: 87
End: 2022-05-22
Payer: MEDICARE

## 2022-05-22 DIAGNOSIS — M81.0 OSTEOPOROSIS, UNSPECIFIED OSTEOPOROSIS TYPE, UNSPECIFIED PATHOLOGICAL FRACTURE PRESENCE: Primary | ICD-10-CM

## 2022-05-23 ENCOUNTER — PATIENT MESSAGE (OUTPATIENT)
Dept: NEUROLOGY | Facility: CLINIC | Age: 87
End: 2022-05-23
Payer: MEDICARE

## 2022-05-23 NOTE — TELEPHONE ENCOUNTER
PLease notify pt's daughter that her bone density reveals that you have weak bones that put you at risk for fractures  DR REYES wants you to see endocrinology for further evaluation of your bone den

## 2022-06-07 ENCOUNTER — OUTPATIENT CASE MANAGEMENT (OUTPATIENT)
Dept: NEUROLOGY | Facility: CLINIC | Age: 87
End: 2022-06-07
Payer: MEDICARE

## 2022-06-07 NOTE — PROGRESS NOTES
Called CG (Keegan) for monthly visit. She reports patient is doing well, no falls, UTIs, or hospital encounters in the past month. Patient's diabetes medication was recently changed and she seems to be doing well with the new medication. She also had a bone density exam a couple of weeks ago and her bones were found to be weak, we reviewed safety and fall prevention strategies. CG still struggling getting patient to eat meals sometimes, she has been implementing some of the strategies. CG reports she herself is doing better, she will start seeing a professional at the end of this month for additional support. Her efforts and strategies are validated. No major needs or concerns at this time, CG knows to reach me if needed before I call again next month.

## 2022-07-08 ENCOUNTER — PATIENT MESSAGE (OUTPATIENT)
Dept: INTERNAL MEDICINE | Facility: CLINIC | Age: 87
End: 2022-07-08
Payer: MEDICARE

## 2022-07-08 DIAGNOSIS — E55.9 VITAMIN D DEFICIENCY DISEASE: ICD-10-CM

## 2022-07-08 DIAGNOSIS — I10 ESSENTIAL HYPERTENSION: ICD-10-CM

## 2022-07-08 DIAGNOSIS — H35.033 HYPERTENSIVE RETINOPATHY OF BOTH EYES: ICD-10-CM

## 2022-07-08 DIAGNOSIS — R94.120 ABNORMAL AUDITORY FUNCTION STUDY: ICD-10-CM

## 2022-07-08 DIAGNOSIS — E11.51 TYPE 2 DIABETES MELLITUS WITH DIABETIC PERIPHERAL ANGIOPATHY WITHOUT GANGRENE, WITHOUT LONG-TERM CURRENT USE OF INSULIN: Primary | ICD-10-CM

## 2022-07-09 ENCOUNTER — LAB VISIT (OUTPATIENT)
Dept: LAB | Facility: HOSPITAL | Age: 87
End: 2022-07-09
Attending: INTERNAL MEDICINE
Payer: MEDICARE

## 2022-07-09 DIAGNOSIS — E55.9 VITAMIN D DEFICIENCY DISEASE: ICD-10-CM

## 2022-07-09 DIAGNOSIS — I10 ESSENTIAL HYPERTENSION: ICD-10-CM

## 2022-07-09 DIAGNOSIS — R94.120 ABNORMAL AUDITORY FUNCTION STUDY: ICD-10-CM

## 2022-07-09 DIAGNOSIS — E11.51 TYPE 2 DIABETES MELLITUS WITH DIABETIC PERIPHERAL ANGIOPATHY WITHOUT GANGRENE, WITHOUT LONG-TERM CURRENT USE OF INSULIN: ICD-10-CM

## 2022-07-09 LAB
25(OH)D3+25(OH)D2 SERPL-MCNC: 46 NG/ML (ref 30–96)
ALBUMIN SERPL BCP-MCNC: 3.5 G/DL (ref 3.5–5.2)
ALP SERPL-CCNC: 53 U/L (ref 55–135)
ALT SERPL W/O P-5'-P-CCNC: 14 U/L (ref 10–44)
ANION GAP SERPL CALC-SCNC: 9 MMOL/L (ref 8–16)
AST SERPL-CCNC: 14 U/L (ref 10–40)
BASOPHILS # BLD AUTO: 0.02 K/UL (ref 0–0.2)
BASOPHILS NFR BLD: 0.5 % (ref 0–1.9)
BILIRUB SERPL-MCNC: 0.3 MG/DL (ref 0.1–1)
BNP SERPL-MCNC: 280 PG/ML (ref 0–99)
BUN SERPL-MCNC: 11 MG/DL (ref 8–23)
CALCIUM SERPL-MCNC: 9.4 MG/DL (ref 8.7–10.5)
CHLORIDE SERPL-SCNC: 105 MMOL/L (ref 95–110)
CO2 SERPL-SCNC: 28 MMOL/L (ref 23–29)
CREAT SERPL-MCNC: 1.1 MG/DL (ref 0.5–1.4)
DIFFERENTIAL METHOD: ABNORMAL
EOSINOPHIL # BLD AUTO: 0.1 K/UL (ref 0–0.5)
EOSINOPHIL NFR BLD: 1.5 % (ref 0–8)
ERYTHROCYTE [DISTWIDTH] IN BLOOD BY AUTOMATED COUNT: 15.9 % (ref 11.5–14.5)
EST. GFR  (AFRICAN AMERICAN): 51.1 ML/MIN/1.73 M^2
EST. GFR  (NON AFRICAN AMERICAN): 44.3 ML/MIN/1.73 M^2
ESTIMATED AVG GLUCOSE: 134 MG/DL (ref 68–131)
GLUCOSE SERPL-MCNC: 238 MG/DL (ref 70–110)
HBA1C MFR BLD: 6.3 % (ref 4–5.6)
HCT VFR BLD AUTO: 33.8 % (ref 37–48.5)
HGB BLD-MCNC: 11.2 G/DL (ref 12–16)
IMM GRANULOCYTES # BLD AUTO: 0.01 K/UL (ref 0–0.04)
IMM GRANULOCYTES NFR BLD AUTO: 0.2 % (ref 0–0.5)
LYMPHOCYTES # BLD AUTO: 1.1 K/UL (ref 1–4.8)
LYMPHOCYTES NFR BLD: 28 % (ref 18–48)
MCH RBC QN AUTO: 29.9 PG (ref 27–31)
MCHC RBC AUTO-ENTMCNC: 33.1 G/DL (ref 32–36)
MCV RBC AUTO: 90 FL (ref 82–98)
MONOCYTES # BLD AUTO: 0.3 K/UL (ref 0.3–1)
MONOCYTES NFR BLD: 8.4 % (ref 4–15)
NEUTROPHILS # BLD AUTO: 2.5 K/UL (ref 1.8–7.7)
NEUTROPHILS NFR BLD: 61.4 % (ref 38–73)
NRBC BLD-RTO: 0 /100 WBC
PLATELET # BLD AUTO: 167 K/UL (ref 150–450)
PMV BLD AUTO: 11.6 FL (ref 9.2–12.9)
POTASSIUM SERPL-SCNC: 4.3 MMOL/L (ref 3.5–5.1)
PROT SERPL-MCNC: 7.2 G/DL (ref 6–8.4)
RBC # BLD AUTO: 3.74 M/UL (ref 4–5.4)
SODIUM SERPL-SCNC: 142 MMOL/L (ref 136–145)
TSH SERPL DL<=0.005 MIU/L-ACNC: 2.39 UIU/ML (ref 0.4–4)
WBC # BLD AUTO: 4.07 K/UL (ref 3.9–12.7)

## 2022-07-09 PROCEDURE — 83880 ASSAY OF NATRIURETIC PEPTIDE: CPT | Performed by: INTERNAL MEDICINE

## 2022-07-09 PROCEDURE — 83036 HEMOGLOBIN GLYCOSYLATED A1C: CPT | Performed by: INTERNAL MEDICINE

## 2022-07-09 PROCEDURE — 84443 ASSAY THYROID STIM HORMONE: CPT | Performed by: INTERNAL MEDICINE

## 2022-07-09 PROCEDURE — 85025 COMPLETE CBC W/AUTO DIFF WBC: CPT | Performed by: INTERNAL MEDICINE

## 2022-07-09 PROCEDURE — 80053 COMPREHEN METABOLIC PANEL: CPT | Performed by: INTERNAL MEDICINE

## 2022-07-09 PROCEDURE — 82306 VITAMIN D 25 HYDROXY: CPT | Performed by: INTERNAL MEDICINE

## 2022-07-09 PROCEDURE — 36415 COLL VENOUS BLD VENIPUNCTURE: CPT | Performed by: INTERNAL MEDICINE

## 2022-07-11 ENCOUNTER — OFFICE VISIT (OUTPATIENT)
Dept: INTERNAL MEDICINE | Facility: CLINIC | Age: 87
End: 2022-07-11
Payer: MEDICARE

## 2022-07-11 VITALS
BODY MASS INDEX: 28.54 KG/M2 | HEART RATE: 95 BPM | SYSTOLIC BLOOD PRESSURE: 136 MMHG | OXYGEN SATURATION: 96 % | HEIGHT: 65 IN | WEIGHT: 171.31 LBS | DIASTOLIC BLOOD PRESSURE: 60 MMHG

## 2022-07-11 DIAGNOSIS — F39 MOOD DISORDER: ICD-10-CM

## 2022-07-11 DIAGNOSIS — I10 ESSENTIAL HYPERTENSION: ICD-10-CM

## 2022-07-11 DIAGNOSIS — E78.00 PURE HYPERCHOLESTEROLEMIA: ICD-10-CM

## 2022-07-11 DIAGNOSIS — R06.00 DYSPNEA, UNSPECIFIED TYPE: Primary | ICD-10-CM

## 2022-07-11 DIAGNOSIS — E11.51 TYPE 2 DIABETES MELLITUS WITH DIABETIC PERIPHERAL ANGIOPATHY WITHOUT GANGRENE, WITHOUT LONG-TERM CURRENT USE OF INSULIN: ICD-10-CM

## 2022-07-11 PROCEDURE — 99999 PR PBB SHADOW E&M-EST. PATIENT-LVL IV: CPT | Mod: PBBFAC,,, | Performed by: INTERNAL MEDICINE

## 2022-07-11 PROCEDURE — 99499 UNLISTED E&M SERVICE: CPT | Mod: S$GLB,,, | Performed by: INTERNAL MEDICINE

## 2022-07-11 PROCEDURE — 1101F PR PT FALLS ASSESS DOC 0-1 FALLS W/OUT INJ PAST YR: ICD-10-PCS | Mod: CPTII,S$GLB,, | Performed by: INTERNAL MEDICINE

## 2022-07-11 PROCEDURE — 1159F MED LIST DOCD IN RCRD: CPT | Mod: CPTII,S$GLB,, | Performed by: INTERNAL MEDICINE

## 2022-07-11 PROCEDURE — 99214 PR OFFICE/OUTPT VISIT, EST, LEVL IV, 30-39 MIN: ICD-10-PCS | Mod: S$GLB,,, | Performed by: INTERNAL MEDICINE

## 2022-07-11 PROCEDURE — 1159F PR MEDICATION LIST DOCUMENTED IN MEDICAL RECORD: ICD-10-PCS | Mod: CPTII,S$GLB,, | Performed by: INTERNAL MEDICINE

## 2022-07-11 PROCEDURE — 99214 OFFICE O/P EST MOD 30 MIN: CPT | Mod: S$GLB,,, | Performed by: INTERNAL MEDICINE

## 2022-07-11 PROCEDURE — 99999 PR PBB SHADOW E&M-EST. PATIENT-LVL IV: ICD-10-PCS | Mod: PBBFAC,,, | Performed by: INTERNAL MEDICINE

## 2022-07-11 PROCEDURE — 1126F AMNT PAIN NOTED NONE PRSNT: CPT | Mod: CPTII,S$GLB,, | Performed by: INTERNAL MEDICINE

## 2022-07-11 PROCEDURE — 1101F PT FALLS ASSESS-DOCD LE1/YR: CPT | Mod: CPTII,S$GLB,, | Performed by: INTERNAL MEDICINE

## 2022-07-11 PROCEDURE — 3288F FALL RISK ASSESSMENT DOCD: CPT | Mod: CPTII,S$GLB,, | Performed by: INTERNAL MEDICINE

## 2022-07-11 PROCEDURE — 99499 RISK ADDL DX/OHS AUDIT: ICD-10-PCS | Mod: S$GLB,,, | Performed by: INTERNAL MEDICINE

## 2022-07-11 PROCEDURE — 3288F PR FALLS RISK ASSESSMENT DOCUMENTED: ICD-10-PCS | Mod: CPTII,S$GLB,, | Performed by: INTERNAL MEDICINE

## 2022-07-11 PROCEDURE — 1126F PR PAIN SEVERITY QUANTIFIED, NO PAIN PRESENT: ICD-10-PCS | Mod: CPTII,S$GLB,, | Performed by: INTERNAL MEDICINE

## 2022-07-11 RX ORDER — METOPROLOL TARTRATE 50 MG/1
50 TABLET ORAL 2 TIMES DAILY
Qty: 60 TABLET | Refills: 11 | Status: SHIPPED | OUTPATIENT
Start: 2022-07-11 | End: 2022-08-27

## 2022-07-11 RX ORDER — AMLODIPINE AND BENAZEPRIL HYDROCHLORIDE 5; 20 MG/1; MG/1
1 CAPSULE ORAL DAILY
Qty: 90 CAPSULE | Refills: 3 | Status: SHIPPED | OUTPATIENT
Start: 2022-07-11 | End: 2023-07-17 | Stop reason: SDUPTHER

## 2022-07-11 RX ORDER — DULOXETIN HYDROCHLORIDE 60 MG/1
60 CAPSULE, DELAYED RELEASE ORAL EVERY MORNING
Qty: 90 CAPSULE | Refills: 3 | OUTPATIENT
Start: 2022-07-11

## 2022-07-11 NOTE — TELEPHONE ENCOUNTER
No new care gaps identified.  [facility] Embedded Care Gaps. Reference number: [id number]. 7/11/2022   8:48:16 AM CDT

## 2022-07-11 NOTE — PROGRESS NOTES
CHIEF COMPLAINT:  Follow up of hypertension, copd, sinus congestion, diastolic dysfunction    HISTORY OF PRESENT ILLNESS: This is a 89-year-old woman who presents for follow up of above. Her daughter, Nora is with her today.   Haley is living with her.    She has had dyspnea on exertion from the kitchen to the bedroom (about 20 feet) for the last 1-2 months.  She also gets tired upon exertion.  NO fever, chills, chest pain, sinus congestion, sore throat, nausea, vomiting.     THe vaginal bleeding had stopped. She has a small spot of blood daily on her underwear.  She is NOT using Estradiol vaginal cream.  Haley did not know how to apply the cream.   No vaginal pain. She has a history of radiation to the rectum for her rectal cancer - had granulomatous area on the vagina..  She saw Dr Lorena Leach 8/7/2019 with biopsies that were benign. Nora demonstrated to Bartfaisalsadia how to apply the cream and the cream is now being applied     She is seeing Dr Alanis in neurology on 3/3/2022 for her dementia - she is shaky in the morning.  Her daughter is giving her juice which is helping her mood in the morning.  She is now taking Memantine 5 mg just at night (was sleeping too much) and Donepezil 10 mg in the morning.      Appetite is ok.  Weight is stable.       No more lightheadedness.  She is taking metoprolol 25 mg 2 tablets in am and 1 tablet at night (sometimes skips the evening dose). NO   chest pain, palpitations.  She continues to take amlodipine - benazepril 5/20 one tablet daliy.        She has been having sinus congestion that comes and goes. She has clear drainage.  She uses flonase 1 spray in each nostril once daily. No fever, chills, sore throat, nausea, vomiting     SHe continues to take duloxetine 60 mg daily and citalopram 10 mg daily. Mood has been good.  Neuropathic pain in the legs is better controlled with the duloxetine.  She has taken  xanax 0.5 mg twice since our last visit.      Breathing has been  good.  appetite is so so. . No chest pain, palpitations.  She has occasional cough with white mucous.   She is short of breath of being in the tub     She had right leg pain - she had a blockage. Dr Gaviria placed a stent in the right CFA and PTA left ext loulou artery 12/18. Right leg pain has resolved.  The ulcerated area on the right leg has totally healed and has not returned.  She is taking aspirin 81 mg daily.        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  Right hip pain has resolved and has not returned.          Her non small cell lung cancer is stable. She saw Dr Carmella Bennett0/4/2011 and scans were stable. She is 11 years out from her diagnosis     She is off actos 15 mg due to excellent blood sugars.  . No polydipsia, polyuria, hypoglycemia. She has a dexcom 6 continuous blood sugars. She saw endocrine 4/6/2022     She continues to take simvastatin 80 mg at bedtime for her hyperlipidemia. No joint pain or muscle pain.        She is off Fosamax once week for her bones.  No melana, bloody stools, constipation.        She takes vitamin B12 1000 mcg daily for vitamin B12 deficiency - anemia is stable        SHe has macular degeneration of the eyes and was followed by Dr Tresa Coreas. She got injections in her eyes every 6 weeks in Spring 2015. She now sees Dr Ruelas at [facility] (last saw him 8/18- no need for injections at that time- yearly follow up). Vision has been stable. She is taking a Eye vitamins for her eyes.       PAST MEDICAL HISTORY:    1. Stage III non-small cell lung cancer, completed chemoradiation with carboplatin and Taxol on 6/1/12    2. Hypertension.   3. Diabetes mellitus.   4. Hyperlipidemia.   5. Chronic diastolic dysfunction.   6. Coronary artery disease status post MI in 1990 and 2003. Stenting was   done at 2003 at [facility].   7. Cholecystectomy, December 2006.   8. Cataract removal.   9. Benign growth removed from her throat.   10.  "Left common femoral endarterectomy, July 2007.   11. History of anal cancer in 1996 status post radiation and chemotherapy.   12. Carpal tunnel syndrome.   13. Osteoporosis on BMD 12/11    14. Macular degeneration    SOCIAL HISTORY: She quit smoking in 1995, denies any alcohol use. She is etired.     REVIEW OF SYSTEMS: She denies fevers, chills, night sweats, fatigue, visual change, hearing loss, sinus congestion, sore throat, dysuria, hematuria, joint pain, muscle pain, polydipsia, and polyuria.     PHYSICAL EXAM:        /60 (BP Location: Left arm, Patient Position: Sitting)   Pulse 95   Ht 5' 5" (1.651 m)   Wt 77.7 kg (171 lb 4.8 oz)   SpO2 96%   BMI 28.51 kg/m²     GENERAL: She is alert and oriented. No apparent distress. Affect within normal limits.   Conjunctivae anicteric.  TM clear  NECK: Supple.   Respiratory effort normal. Lungs clear to auscultation.   HEART: Regular rate and rhythm without murmurs, gallops, or rubs. No lower   extremity edema.    Walked 100 feet and pulse went up 112 and pulse ox was 93%.  Heavy dyspnea.           Labs 4/14/22  reviewed.      CT chest 9/8/20 reviewed and stable      ASSESSMENT AND PLAN:       1. Hypertension with history of diastolic dyfunction on echo in 2006 - repeat echo. Increase metoprolol tartrate to 50 mg twice daily. 6 minute walk.   2.PVD - stable  2. Mood disorder - stable.   3. Memory issues - follow up with Dr Alains  4. Stage III non-small cell lung cancer, completed chemoradiation with carboplatin and Taxol on 6/1/12 - doing well.   5. Diabetes-off pioglitazone  3. Hypertension-controlled  4. Hyperlipidemia-lipids controlled  5. Coronary artery disease modifying risk factors.   6. History of anal cancer-had a normal colonoscopy, November 2008; due 2015. Declined repeat  8. Pernicious anemia - on counter vitamin B12 1000 mcg daily. Level fine in Jan 2018  9. Osteoporosis - took alendroante for 7 years - 2012 to 2019.  Currently on a drug holiday.  " BMD 5/2022 slightly worse.   10. Left leg pain due to neuropathy-stable.  .    11. Macular degeneration - stable  12.  VItamin D deficiency -  vitamin D 2000 units daily.   13. Vaginal bleeding - get back on estradiol cream twice weekly - if does not resolve, will get back to DR Leach for additional biopsies  Screening mammogram was 12/19 - declines further. Colonoscopy is due 2015 -declined. Pap smear was July 2010.  Flu 8/31/20, Prevnar 11/2015  I will see her back in 4 months, sooner if problems arise

## 2022-07-12 ENCOUNTER — OUTPATIENT CASE MANAGEMENT (OUTPATIENT)
Dept: NEUROLOGY | Facility: CLINIC | Age: 87
End: 2022-07-12
Payer: MEDICARE

## 2022-07-12 NOTE — TELEPHONE ENCOUNTER
Refill Decision Note   Brunilda Tomás  is requesting a refill authorization.  Brief Assessment and Rationale for Refill:  Quick Discontinue     Medication Therapy Plan:       Medication Reconciliation Completed: No   Comments:     No Care Gaps recommended.     Note composed:10:36 PM 07/11/2022

## 2022-07-13 ENCOUNTER — PATIENT MESSAGE (OUTPATIENT)
Dept: INTERNAL MEDICINE | Facility: CLINIC | Age: 87
End: 2022-07-13
Payer: MEDICARE

## 2022-07-15 NOTE — PROGRESS NOTES
Called CG (Yuri) for monthly visit. She reports feeling overwhelmed with patient's care, difficulty managing some behaviors at home as patient is resistant to help and hard to redirect at times. CG interested in getting some respite or more in-home help. She has already signed up for the [facility] resp[facility] program but there is a wait list. They are interested in [facility] adult day program, messaged Hector Gama to check eligibility and offer more information on the program. CG looking for professional support, I email her counseling/therapy options and link to the Stress-Busting Program (SBP) for Family Caregivers for her to explore. We talked about strategy to manage most difficult behavior at the time. Encouraged CG to reach me as needed. No falls, UTIs, or hospital encounters int he past month.

## 2022-07-21 ENCOUNTER — PATIENT MESSAGE (OUTPATIENT)
Dept: INTERNAL MEDICINE | Facility: CLINIC | Age: 87
End: 2022-07-21
Payer: MEDICARE

## 2022-07-21 DIAGNOSIS — R00.2 PALPITATIONS: Primary | ICD-10-CM

## 2022-07-25 ENCOUNTER — HOSPITAL ENCOUNTER (OUTPATIENT)
Dept: CARDIOLOGY | Facility: HOSPITAL | Age: 87
Discharge: HOME | End: 2022-07-25
Attending: INTERNAL MEDICINE
Payer: MEDICARE

## 2022-07-25 DIAGNOSIS — R00.2 PALPITATIONS: ICD-10-CM

## 2022-07-25 PROCEDURE — 93226 XTRNL ECG REC<48 HR SCAN A/R: CPT

## 2022-07-25 PROCEDURE — 93227 XTRNL ECG REC<48 HR R&I: CPT | Mod: ,,, | Performed by: INTERNAL MEDICINE

## 2022-07-25 PROCEDURE — 93227 HOLTER MONITOR - 48 HOUR (CUPID ONLY): ICD-10-PCS | Mod: ,,, | Performed by: INTERNAL MEDICINE

## 2022-07-27 ENCOUNTER — HOSPITAL ENCOUNTER (OUTPATIENT)
Dept: CARDIOLOGY | Facility: HOSPITAL | Age: 87
Discharge: HOME | End: 2022-07-27
Attending: INTERNAL MEDICINE
Payer: MEDICARE

## 2022-07-27 ENCOUNTER — HOSPITAL ENCOUNTER (OUTPATIENT)
Dept: PULMONOLOGY | Facility: CLINIC | Age: 87
Discharge: HOME | End: 2022-07-27
Payer: MEDICARE

## 2022-07-27 VITALS — BODY MASS INDEX: 29.81 KG/M2 | HEIGHT: 62 IN | WEIGHT: 162 LBS

## 2022-07-27 VITALS
SYSTOLIC BLOOD PRESSURE: 130 MMHG | HEIGHT: 65 IN | WEIGHT: 171 LBS | BODY MASS INDEX: 28.49 KG/M2 | HEART RATE: 57 BPM | DIASTOLIC BLOOD PRESSURE: 85 MMHG

## 2022-07-27 DIAGNOSIS — R06.00 DYSPNEA, UNSPECIFIED TYPE: ICD-10-CM

## 2022-07-27 LAB
ASCENDING AORTA: 2.86 CM
AV INDEX (PROSTH): 0.66
AV MEAN GRADIENT: 6 MMHG
AV PEAK GRADIENT: 12 MMHG
AV VALVE AREA: 2 CM2
AV VELOCITY RATIO: 0.56
BSA FOR ECHO PROCEDURE: 1.89 M2
CV ECHO LV RWT: 0.48 CM
DOP CALC AO PEAK VEL: 1.71 M/S
DOP CALC AO VTI: 34.33 CM
DOP CALC LVOT AREA: 3 CM2
DOP CALC LVOT DIAMETER: 1.97 CM
DOP CALC LVOT PEAK VEL: 0.95 M/S
DOP CALC LVOT STROKE VOLUME: 68.67 CM3
DOP CALCLVOT PEAK VEL VTI: 22.54 CM
E WAVE DECELERATION TIME: 238.27 MSEC
E/A RATIO: 0.67
E/E' RATIO: 17.09 M/S
ECHO LV POSTERIOR WALL: 0.76 CM (ref 0.6–1.1)
EJECTION FRACTION: 55 %
FRACTIONAL SHORTENING: 21 % (ref 28–44)
INTERVENTRICULAR SEPTUM: 0.95 CM (ref 0.6–1.1)
LA MAJOR: 5.42 CM
LA MINOR: 5.07 CM
LA WIDTH: 3.82 CM
LEFT ATRIUM SIZE: 2.65 CM
LEFT ATRIUM VOLUME INDEX MOD: 26.5 ML/M2
LEFT ATRIUM VOLUME INDEX: 24.4 ML/M2
LEFT ATRIUM VOLUME MOD: 49.11 CM3
LEFT ATRIUM VOLUME: 45.08 CM3
LEFT INTERNAL DIMENSION IN SYSTOLE: 2.47 CM (ref 2.1–4)
LEFT VENTRICLE DIASTOLIC VOLUME INDEX: 21.22 ML/M2
LEFT VENTRICLE DIASTOLIC VOLUME: 39.25 ML
LEFT VENTRICLE MASS INDEX: 38 G/M2
LEFT VENTRICLE SYSTOLIC VOLUME INDEX: 11.7 ML/M2
LEFT VENTRICLE SYSTOLIC VOLUME: 21.6 ML
LEFT VENTRICULAR INTERNAL DIMENSION IN DIASTOLE: 3.14 CM (ref 3.5–6)
LEFT VENTRICULAR MASS: 69.76 G
LV LATERAL E/E' RATIO: 15.67 M/S
LV SEPTAL E/E' RATIO: 18.8 M/S
MV A" WAVE DURATION": 12.18 MSEC
MV PEAK A VEL: 1.41 M/S
MV PEAK E VEL: 0.94 M/S
MV STENOSIS PRESSURE HALF TIME: 69.1 MS
MV VALVE AREA P 1/2 METHOD: 3.18 CM2
PISA TR MAX VEL: 2.54 M/S
PULM VEIN S/D RATIO: 1.09
PV PEAK D VEL: 0.35 M/S
PV PEAK S VEL: 0.38 M/S
RA MAJOR: 4.57 CM
RA PRESSURE: 15 MMHG
RA WIDTH: 3.42 CM
RIGHT VENTRICULAR END-DIASTOLIC DIMENSION: 3.68 CM
RV TISSUE DOPPLER FREE WALL SYSTOLIC VELOCITY 1 (APICAL 4 CHAMBER VIEW): 8.22 CM/S
SINUS: 2.88 CM
STJ: 2.68 CM
TDI LATERAL: 0.06 M/S
TDI SEPTAL: 0.05 M/S
TDI: 0.06 M/S
TR MAX PG: 26 MMHG
TRICUSPID ANNULAR PLANE SYSTOLIC EXCURSION: 1.71 CM
TV REST PULMONARY ARTERY PRESSURE: 41 MMHG

## 2022-07-27 PROCEDURE — 93306 TTE W/DOPPLER COMPLETE: CPT | Mod: 26,,, | Performed by: INTERNAL MEDICINE

## 2022-07-27 PROCEDURE — 94618 PULMONARY STRESS TESTING: ICD-10-PCS | Mod: S$GLB,,, | Performed by: INTERNAL MEDICINE

## 2022-07-27 PROCEDURE — 93306 TTE W/DOPPLER COMPLETE: CPT

## 2022-07-27 PROCEDURE — 94618 PULMONARY STRESS TESTING: CPT | Mod: S$GLB,,, | Performed by: INTERNAL MEDICINE

## 2022-07-27 PROCEDURE — 93306 ECHO (CUPID ONLY): ICD-10-PCS | Mod: 26,,, | Performed by: INTERNAL MEDICINE

## 2022-07-28 NOTE — PROCEDURES
Brunilda England is a 89 y.o.  female patient, who presents for a 6 minute walk test ordered by MD Nahid.  The diagnosis is Qualify for Oxygen, Shortness of Breath.  The patient's BMI is 29.6 kg/m2.  Predicted distance (lower limit of normal) is 168.6 meters.      Test Results:    The test was not completed.  The patient stopped 1 time for a total of 91 seconds.  The total time walked was 269 seconds.  During walking, the patient reported:  Dyspnea.  The patient used a wheelchair for assistance during testing.     07/27/2022---------Distance: 152.4 meters (500 feet)     O2 Sat % Supplemental Oxygen Heart Rate Blood Pressure Jillian Scale   Pre-exercise  (Resting) 96 % Room Air 63 bpm 147/64 mmHg 0   During Exercise 95 % Room Air 57 bpm 151/69 mmHg 4   Post-exercise  (Recovery) 97 % Room Air  96 bpm       Recovery Time: 429 seconds    Performing nurse/tech: Lillian LAN      PREVIOUS STUDY:   The patient has not had a previous study.      CLINICAL INTERPRETATION:  Six minute walk distance is 152.4 meters (500 feet) with somewhat heavy dyspnea.  During exercise, there was no significant desaturation while breathing room air.  Both blood pressure and heart rate remained stable with walking.  The patient did not report non-pulmonary symptoms during exercise.  Significant exercise impairment is likely due to subjective symptoms.  The patient did not complete the study, walking 269 seconds of the 360 second test.  No previous study performed.  Based upon age and body mass index, exercise capacity may be normal.

## 2022-07-29 ENCOUNTER — PATIENT MESSAGE (OUTPATIENT)
Dept: INTERNAL MEDICINE | Facility: CLINIC | Age: 87
End: 2022-07-29
Payer: MEDICARE

## 2022-07-29 DIAGNOSIS — I50.32 CHRONIC DIASTOLIC CONGESTIVE HEART FAILURE: Primary | ICD-10-CM

## 2022-07-29 LAB
OHS CV EVENT MONITOR DAY: 0
OHS CV HOLTER LENGTH DECIMAL HOURS: 48
OHS CV HOLTER LENGTH HOURS: 48
OHS CV HOLTER LENGTH MINUTES: 0
OHS CV HOLTER SINUS AVERAGE HR: 93
OHS CV HOLTER SINUS MAX HR: 133
OHS CV HOLTER SINUS MIN HR: 42

## 2022-07-31 ENCOUNTER — PATIENT MESSAGE (OUTPATIENT)
Dept: ENDOCRINOLOGY | Facility: CLINIC | Age: 87
End: 2022-07-31
Payer: MEDICARE

## 2022-07-31 ENCOUNTER — PATIENT MESSAGE (OUTPATIENT)
Dept: INTERNAL MEDICINE | Facility: CLINIC | Age: 87
End: 2022-07-31
Payer: MEDICARE

## 2022-07-31 DIAGNOSIS — E11.51 TYPE 2 DIABETES MELLITUS WITH DIABETIC PERIPHERAL ANGIOPATHY WITHOUT GANGRENE, WITHOUT LONG-TERM CURRENT USE OF INSULIN: Primary | ICD-10-CM

## 2022-07-31 NOTE — TELEPHONE ENCOUNTER
PLease notify pt below    Mrs England  The ultrasound of your heart reveals that the pump action of your heart is good.  Your heart is slightly thickened and has trouble relaxing due to your age.  The thickening of your heart could be playing a role in your shortness of breath.     You had significant shortness of breath on your walking test. You had good oxygen levels during and after the walking test.        The holter monitor revealed that you have a few extra beats (PVC) and intermittent elevated heart rate during activity.     Your shortness of breath could be due to the thickened heart.  Your blood pressure medications may need to be adjusted. I want you to see cardiology for further evaluation. I have booked you to see DR Yousuf Champion for Tuesday 8/2/2022 at 1 pm at [facility] [place] - [address]     PLease let me know if you cannot make this apt

## 2022-08-01 RX ORDER — LINAGLIPTIN 5 MG/1
5 TABLET, FILM COATED ORAL DAILY
Qty: 90 TABLET | Refills: 3 | Status: SHIPPED | OUTPATIENT
Start: 2022-08-01 | End: 2023-04-29 | Stop reason: SDUPTHER

## 2022-08-04 ENCOUNTER — HOSPITAL ENCOUNTER (OUTPATIENT)
Dept: RADIOLOGY | Facility: HOSPITAL | Age: 87
Discharge: HOME | End: 2022-08-04
Attending: INTERNAL MEDICINE
Payer: MEDICARE

## 2022-08-04 ENCOUNTER — OFFICE VISIT (OUTPATIENT)
Dept: CARDIOLOGY | Facility: CLINIC | Age: 87
End: 2022-08-04
Payer: MEDICARE

## 2022-08-04 ENCOUNTER — PATIENT MESSAGE (OUTPATIENT)
Dept: CARDIOLOGY | Facility: CLINIC | Age: 87
End: 2022-08-04

## 2022-08-04 VITALS — HEIGHT: 62 IN | WEIGHT: 170.19 LBS | BODY MASS INDEX: 31.32 KG/M2

## 2022-08-04 DIAGNOSIS — I50.32 CHRONIC DIASTOLIC CONGESTIVE HEART FAILURE: ICD-10-CM

## 2022-08-04 DIAGNOSIS — I73.9 PVD (PERIPHERAL VASCULAR DISEASE) WITH CLAUDICATION: ICD-10-CM

## 2022-08-04 DIAGNOSIS — I10 ESSENTIAL HYPERTENSION: ICD-10-CM

## 2022-08-04 DIAGNOSIS — N18.31 CHRONIC RENAL FAILURE, STAGE 3A: ICD-10-CM

## 2022-08-04 DIAGNOSIS — I25.10 CORONARY ARTERY DISEASE INVOLVING NATIVE CORONARY ARTERY OF NATIVE HEART WITHOUT ANGINA PECTORIS: ICD-10-CM

## 2022-08-04 DIAGNOSIS — E78.00 PURE HYPERCHOLESTEROLEMIA: ICD-10-CM

## 2022-08-04 DIAGNOSIS — R06.02 SHORTNESS OF BREATH: ICD-10-CM

## 2022-08-04 DIAGNOSIS — R00.1 JUNCTIONAL BRADYCARDIA: ICD-10-CM

## 2022-08-04 DIAGNOSIS — E11.51 TYPE 2 DIABETES MELLITUS WITH DIABETIC PERIPHERAL ANGIOPATHY WITHOUT GANGRENE, WITHOUT LONG-TERM CURRENT USE OF INSULIN: ICD-10-CM

## 2022-08-04 DIAGNOSIS — I50.32 CHRONIC DIASTOLIC CONGESTIVE HEART FAILURE: Primary | ICD-10-CM

## 2022-08-04 DIAGNOSIS — I70.0 AORTIC ATHEROSCLEROSIS: ICD-10-CM

## 2022-08-04 PROBLEM — I50.33 ACUTE ON CHRONIC DIASTOLIC HEART FAILURE: Status: ACTIVE | Noted: 2022-08-04

## 2022-08-04 PROCEDURE — 99499 UNLISTED E&M SERVICE: CPT | Mod: S$GLB,,, | Performed by: INTERNAL MEDICINE

## 2022-08-04 PROCEDURE — 71046 XR CHEST PA AND LATERAL: ICD-10-PCS | Mod: 26,,, | Performed by: RADIOLOGY

## 2022-08-04 PROCEDURE — 3288F PR FALLS RISK ASSESSMENT DOCUMENTED: ICD-10-PCS | Mod: CPTII,S$GLB,, | Performed by: INTERNAL MEDICINE

## 2022-08-04 PROCEDURE — 93000 ELECTROCARDIOGRAM COMPLETE: CPT | Mod: S$GLB,,, | Performed by: INTERNAL MEDICINE

## 2022-08-04 PROCEDURE — 1126F PR PAIN SEVERITY QUANTIFIED, NO PAIN PRESENT: ICD-10-PCS | Mod: CPTII,S$GLB,, | Performed by: INTERNAL MEDICINE

## 2022-08-04 PROCEDURE — 99204 PR OFFICE/OUTPT VISIT, NEW, LEVL IV, 45-59 MIN: ICD-10-PCS | Mod: 25,S$GLB,, | Performed by: INTERNAL MEDICINE

## 2022-08-04 PROCEDURE — 1101F PR PT FALLS ASSESS DOC 0-1 FALLS W/OUT INJ PAST YR: ICD-10-PCS | Mod: CPTII,S$GLB,, | Performed by: INTERNAL MEDICINE

## 2022-08-04 PROCEDURE — 1159F MED LIST DOCD IN RCRD: CPT | Mod: CPTII,S$GLB,, | Performed by: INTERNAL MEDICINE

## 2022-08-04 PROCEDURE — 1126F AMNT PAIN NOTED NONE PRSNT: CPT | Mod: CPTII,S$GLB,, | Performed by: INTERNAL MEDICINE

## 2022-08-04 PROCEDURE — 71046 X-RAY EXAM CHEST 2 VIEWS: CPT | Mod: TC,PO

## 2022-08-04 PROCEDURE — 1159F PR MEDICATION LIST DOCUMENTED IN MEDICAL RECORD: ICD-10-PCS | Mod: CPTII,S$GLB,, | Performed by: INTERNAL MEDICINE

## 2022-08-04 PROCEDURE — 71046 X-RAY EXAM CHEST 2 VIEWS: CPT | Mod: 26,,, | Performed by: RADIOLOGY

## 2022-08-04 PROCEDURE — 99499 RISK ADDL DX/OHS AUDIT: ICD-10-PCS | Mod: S$GLB,,, | Performed by: INTERNAL MEDICINE

## 2022-08-04 PROCEDURE — 3288F FALL RISK ASSESSMENT DOCD: CPT | Mod: CPTII,S$GLB,, | Performed by: INTERNAL MEDICINE

## 2022-08-04 PROCEDURE — 99999 PR PBB SHADOW E&M-EST. PATIENT-LVL V: ICD-10-PCS | Mod: PBBFAC,,, | Performed by: INTERNAL MEDICINE

## 2022-08-04 PROCEDURE — 99204 OFFICE O/P NEW MOD 45 MIN: CPT | Mod: 25,S$GLB,, | Performed by: INTERNAL MEDICINE

## 2022-08-04 PROCEDURE — 99999 PR PBB SHADOW E&M-EST. PATIENT-LVL V: CPT | Mod: PBBFAC,,, | Performed by: INTERNAL MEDICINE

## 2022-08-04 PROCEDURE — 93000 EKG 12-LEAD: ICD-10-PCS | Mod: S$GLB,,, | Performed by: INTERNAL MEDICINE

## 2022-08-04 PROCEDURE — 1101F PT FALLS ASSESS-DOCD LE1/YR: CPT | Mod: CPTII,S$GLB,, | Performed by: INTERNAL MEDICINE

## 2022-08-04 NOTE — PROGRESS NOTES
Subjective:   08/04/2022     Patient ID:  Brunilda England is a 89 y.o. female who presents for evaulation of Shortness of Breath      Patient here for evaluation of shortness of breath.  This occurs primarily with activity.  There is no PND or orthopnea.  She has not had chest pain.      Echocardiography does show overall normal left ventricular function, but there is mild pulmonary hypertension and elevation of central venous pressures.  Diastolic dysfunction appears to be present.  She does have an elevated BNP.    She gives a history of prior myocardial infarction; none recent.  Echocardiography does not show the presence of wall motion abnormalities.    Other medical problems include peripheral arterial disease, diabetes, history of tobacco abuse.    Recent echocardiogram:  · The left ventricle is normal in size with concentric remodeling and normal systolic function.  · There are segmental left ventricular wall motion abnormalities.  · The estimated ejection fraction is 55%.  · Grade II left ventricular diastolic dysfunction.  · Small circumferential pericardial effusion.  · Normal right ventricular size.  · Mild aortic regurgitation.  · Elevated central venous pressure (15 mmHg).  · The estimated PA systolic pressure is 41 mmHg.  · Mild tricuspid regurgitation.           Past Medical History:   Diagnosis Date    Anal cancer 1995    Anemia     Cancer 1995    anal cancer    Centrilobular emphysema 7/6/2020    Diabetes mellitus     With circulatory disorder    Lumbar radiculopathy 5/16/2014    Lung cancer 2012    s/p chemo/radiation    Macular degeneration     Macular hemorrhage of left eye     Neuropathy     Osteoporosis     Osteoporosis, unspecified 11/9/2012    Pure hypercholesterolemia        Review of patient's allergies indicates:   Allergen Reactions    Bextra [valdecoxib] Swelling    Gabapentin Diarrhea and Nausea Only         Current Outpatient Medications:     amlodipine-benazepril 5-20  mg (LOTREL) 5-20 mg per capsule, Take 1 capsule by mouth once daily., Disp: 90 capsule, Rfl: 3    aspirin (ECOTRIN) 81 MG EC tablet, Take 81 mg by mouth every morning., Disp: , Rfl:     calcium-vitamin D3 (OS-ERIC 500 + D3) 500 mg-5 mcg (200 unit) per tablet, Take 1 tablet by mouth every morning., Disp: , Rfl:     citalopram (CELEXA) 10 MG tablet, Take 1 tablet (10 mg total) by mouth once daily., Disp: 90 tablet, Rfl: 4    cyanocobalamin (VITAMIN B-12) 1000 MCG tablet, Take 1,000 mcg by mouth every morning., Disp: , Rfl:     donepeziL (ARICEPT) 10 MG tablet, Take 1 tablet (10 mg total) by mouth every evening., Disp: 90 tablet, Rfl: 3    DULoxetine (CYMBALTA) 60 MG capsule, Take 1 capsule (60 mg total) by mouth every morning., Disp: 90 capsule, Rfl: 3    estradioL (ESTRACE) 0.01 % (0.1 mg/gram) vaginal cream, USE ONE-HALF GRAM VAGINALLY TWICE a WEEK, Disp: 42.5 g, Rfl: 3    fluticasone propionate (FLONASE) 50 mcg/actuation nasal spray, 2 sprays (100 mcg total) by Each Nostril route once daily., Disp: 16 mL, Rfl: 0    linaGLIPtin (TRADJENTA) 5 mg Tab tablet, Take 1 tablet (5 mg total) by mouth once daily., Disp: 90 tablet, Rfl: 3    memantine (NAMENDA) 5 MG Tab, Take 1 tablet in the PM, Disp: 60 tablet, Rfl: 3    metoprolol tartrate (LOPRESSOR) 50 MG tablet, Take 1 tablet (50 mg total) by mouth 2 (two) times daily., Disp: 60 tablet, Rfl: 11    nitroGLYCERIN (NITROSTAT) 0.4 MG SL tablet, Place 1 tablet (0.4 mg total) under the tongue every 5 (five) minutes as needed., Disp: 25 tablet, Rfl: 0    simvastatin (ZOCOR) 80 MG tablet, Take 1 tablet (80 mg total) by mouth once daily., Disp: 90 tablet, Rfl: 4    vitamin D (VITAMIN D3) 1000 units Tab, Take 1,000 Units by mouth once daily., Disp: , Rfl:     ALPRAZolam (XANAX) 0.5 MG tablet, Take 1 tablet (0.5 mg total) by mouth 3 (three) times daily as needed for Anxiety., Disp: 60 tablet, Rfl: 0  No current facility-administered medications for this  visit.    Facility-Administered Medications Ordered in Other Visits:     sodium chloride 0.9% flush 5 mL, 5 mL, Intravenous, PRN, Sarah Earl MD     Objective:   Review of Systems   Cardiovascular: Positive for dyspnea on exertion. Negative for chest pain, claudication, cyanosis, irregular heartbeat, leg swelling, near-syncope, orthopnea, palpitations, paroxysmal nocturnal dyspnea and syncope.         There were no vitals filed for this visit.  Wt Readings from Last 3 Encounters:   08/04/22 77.2 kg (170 lb 3.1 oz)   07/27/22 73.5 kg (162 lb)   07/27/22 77.6 kg (171 lb)     Temp Readings from Last 3 Encounters:   02/07/22 98.2 °F (36.8 °C)   10/04/21 98.9 °F (37.2 °C) (Oral)   05/31/21 97 °F (36.1 °C)     BP Readings from Last 3 Encounters:   07/27/22 130/85   07/11/22 136/60   04/25/22 (!) 180/72     Pulse Readings from Last 3 Encounters:   07/27/22 (!) 57   07/11/22 95   04/25/22 100             Physical Exam  Vitals reviewed.   Constitutional:       General: She is not in acute distress.     Appearance: She is well-developed.   HENT:      Head: Normocephalic and atraumatic.      Nose: Nose normal.   Eyes:      Conjunctiva/sclera: Conjunctivae normal.      Pupils: Pupils are equal, round, and reactive to light.   Neck:      Vascular: No hepatojugular reflux or JVD.   Cardiovascular:      Rate and Rhythm: Normal rate and regular rhythm.      Pulses: Intact distal pulses.      Heart sounds: Normal heart sounds. No murmur heard.    No friction rub. No gallop.   Pulmonary:      Effort: Pulmonary effort is normal. No respiratory distress.      Breath sounds: Normal breath sounds. No wheezing or rales.   Chest:      Chest wall: No tenderness.   Abdominal:      General: Bowel sounds are normal. There is no distension.      Palpations: Abdomen is soft.      Tenderness: There is no abdominal tenderness.   Musculoskeletal:         General: No tenderness or deformity. Normal range of motion.      Cervical back:  Normal range of motion and neck supple.      Right lower leg: No edema.      Left lower leg: No edema.   Skin:     General: Skin is warm and dry.      Findings: No erythema or rash.   Neurological:      Mental Status: She is alert and oriented to person, place, and time.      Cranial Nerves: No cranial nerve deficit.      Motor: No abnormal muscle tone.      Coordination: Coordination normal.   Psychiatric:         Behavior: Behavior normal.         Thought Content: Thought content normal.         Judgment: Judgment normal.           Lab Results   Component Value Date    CHOL 162 04/25/2022    CHOL 160 04/13/2021    CHOL 156 05/21/2019     Lab Results   Component Value Date    HDL 68 04/25/2022    HDL 53 04/13/2021    HDL 54 05/21/2019     Lab Results   Component Value Date    LDLCALC 72.4 04/25/2022    LDLCALC 72.0 04/13/2021    LDLCALC 64.4 05/21/2019     Lab Results   Component Value Date    ALT 14 07/09/2022    AST 14 07/09/2022    AST 12 04/14/2022    AST 11 12/07/2021     Lab Results   Component Value Date    CREATININE 1.1 07/09/2022    BUN 11 07/09/2022     07/09/2022    K 4.3 07/09/2022    CO2 28 07/09/2022    CO2 28 04/14/2022    CO2 26 12/07/2021     Lab Results   Component Value Date    HGB 11.2 (L) 07/09/2022    HCT 33.8 (L) 07/09/2022    HCT 34.3 (L) 04/14/2022    HCT 30.3 (L) 09/29/2021     Component      Latest Ref Rng & Units 7/9/2022   WBC      3.90 - 12.70 K/uL 4.07   RBC      4.00 - 5.40 M/uL 3.74 (L)   Hemoglobin      12.0 - 16.0 g/dL 11.2 (L)   Hematocrit      37.0 - 48.5 % 33.8 (L)   MCV      82 - 98 fL 90   MCH      27.0 - 31.0 pg 29.9   MCHC      32.0 - 36.0 g/dL 33.1   RDW      11.5 - 14.5 % 15.9 (H)   Platelets      150 - 450 K/uL 167   MPV      9.2 - 12.9 fL 11.6   Immature Granulocytes      0.0 - 0.5 % 0.2   Gran # (ANC)      1.8 - 7.7 K/uL 2.5   Immature Grans (Abs)      0.00 - 0.04 K/uL 0.01   Lymph #      1.0 - 4.8 K/uL 1.1   Mono #      0.3 - 1.0 K/uL 0.3   Eos #      0.0 - 0.5  K/uL 0.1   Baso #      0.00 - 0.20 K/uL 0.02   nRBC      0 /100 WBC 0   Gran %      38.0 - 73.0 % 61.4   Lymph %      18.0 - 48.0 % 28.0   Mono %      4.0 - 15.0 % 8.4   Eosinophil %      0.0 - 8.0 % 1.5   Basophil %      0.0 - 1.9 % 0.5   Differential Method       Automated   Sodium      136 - 145 mmol/L 142   Potassium      3.5 - 5.1 mmol/L 4.3   Chloride      95 - 110 mmol/L 105   CO2      23 - 29 mmol/L 28   Glucose      70 - 110 mg/dL 238 (H)   BUN      8 - 23 mg/dL 11   Creatinine      0.5 - 1.4 mg/dL 1.1   Calcium      8.7 - 10.5 mg/dL 9.4   PROTEIN TOTAL      6.0 - 8.4 g/dL 7.2   Albumin      3.5 - 5.2 g/dL 3.5   BILIRUBIN TOTAL      0.1 - 1.0 mg/dL 0.3   Alkaline Phosphatase      55 - 135 U/L 53 (L)   AST      10 - 40 U/L 14   ALT      10 - 44 U/L 14   Anion Gap      8 - 16 mmol/L 9   eGFR if African American      >60 mL/min/1.73 m:2 51.1 (A)   eGFR if non African American      >60 mL/min/1.73 m:2 44.3 (A)   Hemoglobin A1C External      4.0 - 5.6 % 6.3 (H)   Estimated Avg Glucose      68 - 131 mg/dL 134 (H)   TSH      0.400 - 4.000 uIU/mL 2.393   Vit D, 25-Hydroxy      30 - 96 ng/mL 46   BNP      0 - 99 pg/mL 280 (H)                       Assessment and Plan:     Chronic diastolic congestive heart failure  Comments:  Chest x-ray to be reviewed  Recommend addition of SG LT 2 inhibitor, Jardiance 10 mg daily  Orders:  -     Ambulatory referral/consult to Cardiology  -     IN OFFICE EKG 12-LEAD (to Muse)  -     X-Ray Chest PA And Lateral; Future; Expected date: 08/04/2022    Junctional bradycardia  Comments:  Certainly may play a role in her dyspnea on exertion, wean and discontinue metoprolol    Essential hypertension  Comments:  Well controlled    Pure hypercholesterolemia  Comments:  Well controlled on simvastatin    Coronary artery disease involving native coronary artery of native heart without angina pectoris    Chronic renal failure, stage 3a  Comments:  May benefit from addition of SG LT 2  inhibitor    Type 2 diabetes mellitus with diabetic peripheral angiopathy without gangrene, without long-term current use of insulin    Aortic atherosclerosis    Shortness of breath  -     IN OFFICE EKG 12-LEAD (to Muse)    PVD (peripheral vascular disease) with claudication      Patient Instructions   Recommendations:    1. Decrease metoprolol to 1/2 tablet twice a day for 1 week, then 1/2 tablet once a day for 1 week, then stop    2. I will talk to Dr. Whitfield about beginning you on Jardiance for diastolic heart failure       Follow up in about 4 weeks (around 9/1/2022).          Future Appointments   Date Time Provider Department Center   8/17/2022 10:30 AM Azam Cohen MD [facility] [place]   8/30/2022 12:30 PM Pepper Whitfield MD [facility] [place]     Dr. Nahid jackson addition of Jardiance to medications, will begin

## 2022-08-04 NOTE — PATIENT INSTRUCTIONS
Recommendations:    1. Decrease metoprolol to 1/2 tablet twice a day for 1 week, then 1/2 tablet once a day for 1 week, then stop    2. I will talk to Dr. Whitfield about beginning you on Jardiance for diastolic heart failure

## 2022-08-15 ENCOUNTER — OUTPATIENT CASE MANAGEMENT (OUTPATIENT)
Dept: NEUROLOGY | Facility: CLINIC | Age: 87
End: 2022-08-15
Payer: MEDICARE

## 2022-08-15 ENCOUNTER — OFFICE VISIT (OUTPATIENT)
Dept: URGENT CARE | Facility: CLINIC | Age: 87
End: 2022-08-15
Payer: MEDICARE

## 2022-08-15 VITALS
DIASTOLIC BLOOD PRESSURE: 65 MMHG | TEMPERATURE: 97 F | RESPIRATION RATE: 20 BRPM | OXYGEN SATURATION: 95 % | BODY MASS INDEX: 31.28 KG/M2 | HEART RATE: 111 BPM | SYSTOLIC BLOOD PRESSURE: 161 MMHG | HEIGHT: 62 IN | WEIGHT: 170 LBS

## 2022-08-15 DIAGNOSIS — R05.9 COUGH: Primary | ICD-10-CM

## 2022-08-15 DIAGNOSIS — J20.9 ACUTE BRONCHITIS DUE TO INFECTION: ICD-10-CM

## 2022-08-15 DIAGNOSIS — U07.1 COVID-19: ICD-10-CM

## 2022-08-15 DIAGNOSIS — U07.1 COVID-19 VIRUS DETECTED: ICD-10-CM

## 2022-08-15 DIAGNOSIS — R09.81 NASAL CONGESTION: ICD-10-CM

## 2022-08-15 LAB
CTP QC/QA: YES
SARS-COV-2 RDRP RESP QL NAA+PROBE: POSITIVE

## 2022-08-15 PROCEDURE — 99214 PR OFFICE/OUTPT VISIT, EST, LEVL IV, 30-39 MIN: ICD-10-PCS | Mod: S$GLB,,, | Performed by: FAMILY MEDICINE

## 2022-08-15 PROCEDURE — 1159F MED LIST DOCD IN RCRD: CPT | Mod: CPTII,S$GLB,, | Performed by: FAMILY MEDICINE

## 2022-08-15 PROCEDURE — U0002 COVID-19 LAB TEST NON-CDC: HCPCS | Mod: QW,S$GLB,, | Performed by: FAMILY MEDICINE

## 2022-08-15 PROCEDURE — 1160F PR REVIEW ALL MEDS BY PRESCRIBER/CLIN PHARMACIST DOCUMENTED: ICD-10-PCS | Mod: CPTII,S$GLB,, | Performed by: FAMILY MEDICINE

## 2022-08-15 PROCEDURE — 71046 X-RAY EXAM CHEST 2 VIEWS: CPT | Mod: S$GLB,,, | Performed by: RADIOLOGY

## 2022-08-15 PROCEDURE — 1125F PR PAIN SEVERITY QUANTIFIED, PAIN PRESENT: ICD-10-PCS | Mod: CPTII,S$GLB,, | Performed by: FAMILY MEDICINE

## 2022-08-15 PROCEDURE — 99214 OFFICE O/P EST MOD 30 MIN: CPT | Mod: S$GLB,,, | Performed by: FAMILY MEDICINE

## 2022-08-15 PROCEDURE — U0002: ICD-10-PCS | Mod: QW,S$GLB,, | Performed by: FAMILY MEDICINE

## 2022-08-15 PROCEDURE — 1160F RVW MEDS BY RX/DR IN RCRD: CPT | Mod: CPTII,S$GLB,, | Performed by: FAMILY MEDICINE

## 2022-08-15 PROCEDURE — 1125F AMNT PAIN NOTED PAIN PRSNT: CPT | Mod: CPTII,S$GLB,, | Performed by: FAMILY MEDICINE

## 2022-08-15 PROCEDURE — 71046 XR CHEST PA AND LATERAL: ICD-10-PCS | Mod: S$GLB,,, | Performed by: RADIOLOGY

## 2022-08-15 PROCEDURE — 1159F PR MEDICATION LIST DOCUMENTED IN MEDICAL RECORD: ICD-10-PCS | Mod: CPTII,S$GLB,, | Performed by: FAMILY MEDICINE

## 2022-08-15 RX ORDER — DOXYCYCLINE HYCLATE 100 MG
100 TABLET ORAL 2 TIMES DAILY
Qty: 14 TABLET | Refills: 0 | Status: SHIPPED | OUTPATIENT
Start: 2022-08-15 | End: 2022-08-22

## 2022-08-15 RX ORDER — BENZONATATE 100 MG/1
200 CAPSULE ORAL 3 TIMES DAILY PRN
Qty: 20 CAPSULE | Refills: 0 | Status: SHIPPED | OUTPATIENT
Start: 2022-08-15 | End: 2022-10-18

## 2022-08-15 NOTE — PROGRESS NOTES
"Subjective:       Patient ID: Brunilda England is a 89 y.o. female.    Vitals:  height is 5' 2" (1.575 m) and weight is 77.1 kg (170 lb). Her temperature is 96.8 °F (36 °C). Her blood pressure is 161/65 (abnormal) and her pulse is 111 (abnormal). Her respiration is 20 and oxygen saturation is 95%.     Chief Complaint: Cough    Pt presents with a cough, sinus/chest congestion and fatigue that increased over the weekend. Aslo reports mild headache,  Pt also reports constipation and inability to sleep. Pt denies fever, chills, CP, SOB, N/V, diarrhea, dysuria, loss of smell or taste.    Pt's daughter/caretaker is using otc cough suppressants to help with symptoms with mild relief.     Cough  This is a new problem. The current episode started 1 to 4 weeks ago. The problem has been rapidly worsening. The problem occurs constantly. The cough is productive of sputum. Associated symptoms include headaches, nasal congestion and postnasal drip. She has tried OTC cough suppressant for the symptoms. The treatment provided mild relief.       HENT: Positive for postnasal drip.    Respiratory: Positive for cough.    Skin: Erythema: .eua.   Neurological: Positive for headaches.       Objective:      Physical Exam   Constitutional: She is oriented to person, place, and time. She appears well-developed. She is cooperative.  Non-toxic appearance. She does not appear ill. No distress.   HENT:   Head: Normocephalic and atraumatic.   Ears:   Right Ear: Hearing normal.   Left Ear: Hearing normal.   Nose: Congestion present. No mucosal edema, rhinorrhea or nasal deformity. No epistaxis. Right sinus exhibits no maxillary sinus tenderness and no frontal sinus tenderness. Left sinus exhibits no maxillary sinus tenderness and no frontal sinus tenderness.   Mouth/Throat: Uvula is midline, oropharynx is clear and moist and mucous membranes are normal. No trismus in the jaw. Normal dentition. No uvula swelling.   Eyes: Conjunctivae and lids are " normal. Right eye exhibits no discharge. Left eye exhibits no discharge. No scleral icterus.   Neck: Trachea normal and phonation normal. Neck supple.   Cardiovascular: Normal rate, regular rhythm, normal heart sounds and normal pulses.   No murmur heard.  Pulmonary/Chest: Effort normal and breath sounds normal. No stridor. No respiratory distress. She has no wheezes. She has no rhonchi. She has no rales.   Abdominal: Normal appearance and bowel sounds are normal. She exhibits no distension and no mass. Soft. There is no abdominal tenderness.   Musculoskeletal: Normal range of motion.         General: No deformity. Normal range of motion.      Cervical back: She exhibits no tenderness.   Lymphadenopathy:     She has no cervical adenopathy.   Neurological: She is alert and oriented to person, place, and time. She exhibits normal muscle tone. Coordination normal.   Skin: Skin is warm, dry, intact, not diaphoretic and not pale. Erythema: .eua.   Psychiatric: Her speech is normal and behavior is normal. Judgment and thought content normal.   Nursing note and vitals reviewed.        Assessment:       1. Cough    2. Nasal congestion    3. COVID-19    4. Acute bronchitis due to infection          Plan:   Counseled patient and answered questions in regards to COVID-19 testing and diagnosis. Quarantine precautions, home care with plenty of fluids and use of OTC meds discussed. Use of pulse oxymeter discussed. Advised the patient to inform the PCP for +ve COVID status. ER precautions discussed. Patient verbalized understanding.   EUA treatment is not considered as symptoms for more than one week.   CXR shows no interval changes from last x-ray.  Negative for pneumonia.         Cough  -     POCT COVID-19 Rapid Screening  -     X-Ray Chest PA And Lateral; Future; Expected date: 08/15/2022    Nasal congestion  -     POCT COVID-19 Rapid Screening    COVID-19  -     X-Ray Chest PA And Lateral; Future; Expected date:  08/15/2022    Acute bronchitis due to infection    Other orders  -     doxycycline (VIBRA-TABS) 100 MG tablet; Take 1 tablet (100 mg total) by mouth 2 (two) times daily. for 7 days  Dispense: 14 tablet; Refill: 0  -     benzonatate (TESSALON PERLES) 100 MG capsule; Take 2 capsules (200 mg total) by mouth 3 (three) times daily as needed for Cough.  Dispense: 20 capsule; Refill: 0

## 2022-08-15 NOTE — PROGRESS NOTES
Spoke to caregiver (Haley) for monthly visit. Call was brief as they were at urgent care waiting for next steps because patient tested positive for Covid. Haley does report they are going through a family loss and this has taken a toll on all of them. Encouraged caregiver to call me later this week or next week so that we can talk more in depth about current situation.

## 2022-08-21 DIAGNOSIS — F02.818 LATE ONSET ALZHEIMER'S DEMENTIA WITH BEHAVIORAL DISTURBANCE: ICD-10-CM

## 2022-08-21 DIAGNOSIS — G30.1 LATE ONSET ALZHEIMER'S DEMENTIA WITH BEHAVIORAL DISTURBANCE: ICD-10-CM

## 2022-08-21 RX ORDER — MEMANTINE HYDROCHLORIDE 5 MG/1
TABLET ORAL
Qty: 60 TABLET | Refills: 3 | Status: CANCELLED | OUTPATIENT
Start: 2022-08-21

## 2022-08-21 RX ORDER — DONEPEZIL HYDROCHLORIDE 10 MG/1
10 TABLET, FILM COATED ORAL NIGHTLY
Qty: 90 TABLET | Refills: 3 | Status: CANCELLED | OUTPATIENT
Start: 2022-08-21 | End: 2023-08-21

## 2022-08-24 ENCOUNTER — TELEPHONE (OUTPATIENT)
Dept: INTERNAL MEDICINE | Facility: CLINIC | Age: 87
End: 2022-08-24
Payer: MEDICARE

## 2022-08-24 NOTE — TELEPHONE ENCOUNTER
Pt tested pos for Covid, after attending her daughter  on .  Pt is feeling beter but she is still congested.

## 2022-08-24 NOTE — TELEPHONE ENCOUNTER
----- Message from Mounika Tsai sent at 8/24/2022 12:07 PM CDT -----  Regarding: advice  Contact: Daughter Nora [phone]  [email or web address]     Patient is calling for Medical Advice regarding: COVID positive    How long has patient had these symptoms: 8-    Pharmacy name and phone#:    [facility] - [address] [address]  Phone: [phone] Fax: [phone]    Would like response via [employer]:  please call    Comments: Patient is still testing positive. Please advise

## 2022-08-26 NOTE — TELEPHONE ENCOUNTER
Called and spoke to pt daughter. Pt tested positive for covid August 15 at  and was given Doxycycline only. Pt denies fever, chills, body aches, HA. Pt still c/o white productive cough and SOB with exertion. Pt also recently loss daughter and is grieving. Daughter not sure if the SOB is from stress or covid.     covid risk 9

## 2022-08-27 ENCOUNTER — OFFICE VISIT (OUTPATIENT)
Dept: INTERNAL MEDICINE | Facility: CLINIC | Age: 87
End: 2022-08-27
Payer: MEDICARE

## 2022-08-27 ENCOUNTER — LAB VISIT (OUTPATIENT)
Dept: LAB | Facility: HOSPITAL | Age: 87
End: 2022-08-27
Attending: INTERNAL MEDICINE
Payer: MEDICARE

## 2022-08-27 VITALS
BODY MASS INDEX: 30.26 KG/M2 | SYSTOLIC BLOOD PRESSURE: 136 MMHG | HEIGHT: 62 IN | HEART RATE: 69 BPM | WEIGHT: 164.44 LBS | DIASTOLIC BLOOD PRESSURE: 80 MMHG | OXYGEN SATURATION: 98 %

## 2022-08-27 DIAGNOSIS — F02.818 LATE ONSET ALZHEIMER'S DEMENTIA WITH BEHAVIORAL DISTURBANCE: ICD-10-CM

## 2022-08-27 DIAGNOSIS — F39 MOOD DISORDER: ICD-10-CM

## 2022-08-27 DIAGNOSIS — I10 ESSENTIAL HYPERTENSION: ICD-10-CM

## 2022-08-27 DIAGNOSIS — I25.119 ATHEROSCLEROSIS OF NATIVE CORONARY ARTERY OF NATIVE HEART WITH ANGINA PECTORIS: ICD-10-CM

## 2022-08-27 DIAGNOSIS — I10 ESSENTIAL HYPERTENSION: Primary | ICD-10-CM

## 2022-08-27 DIAGNOSIS — E11.51 TYPE 2 DIABETES MELLITUS WITH DIABETIC PERIPHERAL ANGIOPATHY WITHOUT GANGRENE, WITHOUT LONG-TERM CURRENT USE OF INSULIN: ICD-10-CM

## 2022-08-27 DIAGNOSIS — G30.1 LATE ONSET ALZHEIMER'S DEMENTIA WITH BEHAVIORAL DISTURBANCE: ICD-10-CM

## 2022-08-27 LAB
ALBUMIN SERPL BCP-MCNC: 3.4 G/DL (ref 3.5–5.2)
ALP SERPL-CCNC: 53 U/L (ref 55–135)
ALT SERPL W/O P-5'-P-CCNC: 9 U/L (ref 10–44)
ANION GAP SERPL CALC-SCNC: 7 MMOL/L (ref 8–16)
AST SERPL-CCNC: 13 U/L (ref 10–40)
BASOPHILS # BLD AUTO: 0.01 K/UL (ref 0–0.2)
BASOPHILS NFR BLD: 0.2 % (ref 0–1.9)
BILIRUB SERPL-MCNC: 0.3 MG/DL (ref 0.1–1)
BNP SERPL-MCNC: 256 PG/ML (ref 0–99)
BUN SERPL-MCNC: 12 MG/DL (ref 8–23)
CALCIUM SERPL-MCNC: 9.8 MG/DL (ref 8.7–10.5)
CHLORIDE SERPL-SCNC: 104 MMOL/L (ref 95–110)
CO2 SERPL-SCNC: 28 MMOL/L (ref 23–29)
CREAT SERPL-MCNC: 1.1 MG/DL (ref 0.5–1.4)
DIFFERENTIAL METHOD: ABNORMAL
EOSINOPHIL # BLD AUTO: 0 K/UL (ref 0–0.5)
EOSINOPHIL NFR BLD: 0.5 % (ref 0–8)
ERYTHROCYTE [DISTWIDTH] IN BLOOD BY AUTOMATED COUNT: 15.6 % (ref 11.5–14.5)
EST. GFR  (NO RACE VARIABLE): 47.7 ML/MIN/1.73 M^2
GLUCOSE SERPL-MCNC: 206 MG/DL (ref 70–110)
HCT VFR BLD AUTO: 33.4 % (ref 37–48.5)
HGB BLD-MCNC: 11.2 G/DL (ref 12–16)
IMM GRANULOCYTES # BLD AUTO: 0.01 K/UL (ref 0–0.04)
IMM GRANULOCYTES NFR BLD AUTO: 0.2 % (ref 0–0.5)
LYMPHOCYTES # BLD AUTO: 1 K/UL (ref 1–4.8)
LYMPHOCYTES NFR BLD: 22.1 % (ref 18–48)
MCH RBC QN AUTO: 29.6 PG (ref 27–31)
MCHC RBC AUTO-ENTMCNC: 33.5 G/DL (ref 32–36)
MCV RBC AUTO: 88 FL (ref 82–98)
MONOCYTES # BLD AUTO: 0.3 K/UL (ref 0.3–1)
MONOCYTES NFR BLD: 7.8 % (ref 4–15)
NEUTROPHILS # BLD AUTO: 3 K/UL (ref 1.8–7.7)
NEUTROPHILS NFR BLD: 69.2 % (ref 38–73)
NRBC BLD-RTO: 0 /100 WBC
PLATELET # BLD AUTO: 190 K/UL (ref 150–450)
PMV BLD AUTO: 11.3 FL (ref 9.2–12.9)
POTASSIUM SERPL-SCNC: 4 MMOL/L (ref 3.5–5.1)
PROT SERPL-MCNC: 7.2 G/DL (ref 6–8.4)
RBC # BLD AUTO: 3.78 M/UL (ref 4–5.4)
SODIUM SERPL-SCNC: 139 MMOL/L (ref 136–145)
WBC # BLD AUTO: 4.34 K/UL (ref 3.9–12.7)

## 2022-08-27 PROCEDURE — 1126F PR PAIN SEVERITY QUANTIFIED, NO PAIN PRESENT: ICD-10-PCS | Mod: CPTII,S$GLB,, | Performed by: INTERNAL MEDICINE

## 2022-08-27 PROCEDURE — 3288F FALL RISK ASSESSMENT DOCD: CPT | Mod: CPTII,S$GLB,, | Performed by: INTERNAL MEDICINE

## 2022-08-27 PROCEDURE — 1101F PR PT FALLS ASSESS DOC 0-1 FALLS W/OUT INJ PAST YR: ICD-10-PCS | Mod: CPTII,S$GLB,, | Performed by: INTERNAL MEDICINE

## 2022-08-27 PROCEDURE — 1101F PT FALLS ASSESS-DOCD LE1/YR: CPT | Mod: CPTII,S$GLB,, | Performed by: INTERNAL MEDICINE

## 2022-08-27 PROCEDURE — 3288F PR FALLS RISK ASSESSMENT DOCUMENTED: ICD-10-PCS | Mod: CPTII,S$GLB,, | Performed by: INTERNAL MEDICINE

## 2022-08-27 PROCEDURE — 99214 PR OFFICE/OUTPT VISIT, EST, LEVL IV, 30-39 MIN: ICD-10-PCS | Mod: S$GLB,,, | Performed by: INTERNAL MEDICINE

## 2022-08-27 PROCEDURE — 85025 COMPLETE CBC W/AUTO DIFF WBC: CPT | Performed by: INTERNAL MEDICINE

## 2022-08-27 PROCEDURE — 1159F MED LIST DOCD IN RCRD: CPT | Mod: CPTII,S$GLB,, | Performed by: INTERNAL MEDICINE

## 2022-08-27 PROCEDURE — 36415 COLL VENOUS BLD VENIPUNCTURE: CPT | Performed by: INTERNAL MEDICINE

## 2022-08-27 PROCEDURE — 1126F AMNT PAIN NOTED NONE PRSNT: CPT | Mod: CPTII,S$GLB,, | Performed by: INTERNAL MEDICINE

## 2022-08-27 PROCEDURE — 99499 UNLISTED E&M SERVICE: CPT | Mod: S$GLB,,, | Performed by: INTERNAL MEDICINE

## 2022-08-27 PROCEDURE — 1159F PR MEDICATION LIST DOCUMENTED IN MEDICAL RECORD: ICD-10-PCS | Mod: CPTII,S$GLB,, | Performed by: INTERNAL MEDICINE

## 2022-08-27 PROCEDURE — 99999 PR PBB SHADOW E&M-EST. PATIENT-LVL IV: CPT | Mod: PBBFAC,,, | Performed by: INTERNAL MEDICINE

## 2022-08-27 PROCEDURE — 99214 OFFICE O/P EST MOD 30 MIN: CPT | Mod: S$GLB,,, | Performed by: INTERNAL MEDICINE

## 2022-08-27 PROCEDURE — 99999 PR PBB SHADOW E&M-EST. PATIENT-LVL IV: ICD-10-PCS | Mod: PBBFAC,,, | Performed by: INTERNAL MEDICINE

## 2022-08-27 PROCEDURE — 99499 RISK ADDL DX/OHS AUDIT: ICD-10-PCS | Mod: S$GLB,,, | Performed by: INTERNAL MEDICINE

## 2022-08-27 PROCEDURE — 83880 ASSAY OF NATRIURETIC PEPTIDE: CPT | Performed by: INTERNAL MEDICINE

## 2022-08-27 PROCEDURE — 80053 COMPREHEN METABOLIC PANEL: CPT | Performed by: INTERNAL MEDICINE

## 2022-08-27 RX ORDER — MEMANTINE HYDROCHLORIDE 5 MG/1
TABLET ORAL
Qty: 90 TABLET | Refills: 3 | Status: SHIPPED | OUTPATIENT
Start: 2022-08-27 | End: 2022-11-07

## 2022-08-27 RX ORDER — DONEPEZIL HYDROCHLORIDE 10 MG/1
10 TABLET, FILM COATED ORAL NIGHTLY
Qty: 90 TABLET | Refills: 3 | Status: SHIPPED | OUTPATIENT
Start: 2022-08-27 | End: 2022-11-20 | Stop reason: SDUPTHER

## 2022-08-27 NOTE — PROGRESS NOTES
CHIEF COMPLAINT:  Follow up of hypertension, copd, sinus congestion, diastolic dysfunction    HISTORY OF PRESENT ILLNESS: This is a 89-year-old woman who presents for follow up of above. Her daughter, Nora is with her today.   Haley is living with her.    Her daughter   in her sleep at age 70.   was .  She came down with COVID on .  She continues to be tired    SHe had covid on 22. She went to urgent care. She was given doxycycline and tessalon perlse and mucinex XM.  She if feeling much better. Cough has resolved.  She is occasionally nauseated. No diarrhea. Some constipation that is controlled with miralax.  NO chest pain, shorntess of breath.     She has seen cardiology and is off metoprolol. She states she is feeling better off metoprolol    NO fever, chills, chest pain, sinus congestion, sore throat, vomiting.      THe vaginal bleeding had stopped. She has a small spot of blood daily on her underwear.  She is NOT using Estradiol vaginal cream.  Haley did not know how to apply the cream.   No vaginal pain. She has a history of radiation to the rectum for her rectal cancer - had granulomatous area on the vagina..  She saw Dr Lorena Leach 2019 with biopsies that were benign. Nora demonstrated to Haley how to apply the cream and the cream is now being applied     She is seeing Dr Alanis in neurology on 3/3/2022 for her dementia - she is shaky in the morning.  Juice helps the shakiness.   She is now taking Memantine 5 mg just at night (was sleeping too much) and Donepezil 10 mg in the morning.      Appetite is ok.  Weight is stable.       No more lightheadedness.  She continues to take amlodipine - benazepril /20 one tablet daliy.        She has been having sinus congestion that comes and goes. She has clear drainage.  She uses flonase 1 spray in each nostril once daily. No fever, chills, sore throat, nausea, vomiting     SHe continues to take duloxetine 60 mg  daily and citalopram 10 mg daily. Mood has been good.  Neuropathic pain in the legs is better controlled with the duloxetine.  She has taken  xanax 0.5 mg twice since our last visit.       She had right leg pain - she had a blockage. Dr Gaviria placed a stent in the right CFA and PTA left ext loulou artery 12/18. Right leg pain has resolved.  The ulcerated area on the right leg has totally healed and has not returned.  She is taking aspirin 81 mg daily.        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  Right hip pain has resolved and has not returned.          Her non small cell lung cancer is stable. She saw Dr Weir 910/4/2011 and scans were stable. She is 11 years out from her diagnosis     She is now prescribed Trajenta 5 mg daily and Jardiance 10 mg daily for her diabetes.     She continues to take simvastatin 80 mg at bedtime for her hyperlipidemia. No joint pain or muscle pain.        She is off Fosamax once week for her bones.  No melana, bloody stools, constipation.        She takes vitamin B12 1000 mcg daily for vitamin B12 deficiency - anemia is stable        SHe has macular degeneration of the eyes and was followed by Dr Tresa Coreas. She got injections in her eyes every 6 weeks in Spring 2015. She now sees Dr Ruelas at [facility] (last saw him 8/18- no need for injections at that time- yearly follow up). Vision has been stable. She is taking a Eye vitamins for her eyes.       PAST MEDICAL HISTORY:    1. Stage III non-small cell lung cancer, completed chemoradiation with carboplatin and Taxol on 6/1/12    2. Hypertension.   3. Diabetes mellitus.   4. Hyperlipidemia.   5. Chronic diastolic dysfunction.   6. Coronary artery disease status post MI in 1990 and 2003. Stenting was   done at 2003 at [facility].   7. Cholecystectomy, December 2006.   8. Cataract removal.   9. Benign growth removed from her throat.   10. Left common femoral endarterectomy, July  "2007.   11. History of anal cancer in 1996 status post radiation and chemotherapy.   12. Carpal tunnel syndrome.   13. Osteoporosis on BMD 12/11    14. Macular degeneration    SOCIAL HISTORY: She quit smoking in 1995, denies any alcohol use. She is etired.     REVIEW OF SYSTEMS: She denies fevers, chills, night sweats, fatigue, visual change, hearing loss, sinus congestion, sore throat, dysuria, hematuria, joint pain, muscle pain, polydipsia, and polyuria.     PHYSICAL EXAM:         Blood pressure 136/80, pulse 69, height 5' 2" (1.575 m), weight 74.6 kg (164 lb 7.4 oz), SpO2 98 %.    GENERAL: She is alert and oriented. No apparent distress. Affect within normal limits.   Conjunctivae anicteric.  TM clear  NECK: Supple.   Respiratory effort normal. Lungs clear to auscultation.   HEART: Regular rate and rhythm without murmurs, gallops, or rubs. No lower   extremity edema.         ASSESSMENT AND PLAN:    COVID - resolved  Diabetes  continue the Tradjenta. Stop Jardiance.   3. Hypertension with history of diastolic dyfunction on echo in 2006 - Follow up with cardiology  4.PVD - stable  2. Mood disorder - stable.   3. Memory issues - follow up with Dr Alanis  4. Stage III non-small cell lung cancer, completed chemoradiation with carboplatin and Taxol on 6/1/12 - doing well.   5. Diabetes-off pioglitazone  3. Hypertension-controlled  4. Hyperlipidemia-lipids controlled  5. Coronary artery disease modifying risk factors.   6. History of anal cancer-had a normal colonoscopy, November 2008; due 2015. Declined repeat  8. Pernicious anemia - on counter vitamin B12 1000 mcg daily. Level fine in Jan 2018  9. Osteoporosis - took alendroante for 7 years - 2012 to 2019.  Currently on a drug holiday.  BMD 5/2022 slightly worse.   10. Left leg pain due to neuropathy-stable.  .    11. Macular degeneration - stable  12.  VItamin D deficiency -  vitamin D 2000 units daily.   13. Vaginal bleeding - get back on estradiol cream twice weekly " - if does not resolve, will get back to DR Leach for additional biopsies  Screening mammogram was 12/19 - declines further. Colonoscopy is due 2015 -declined. Pap smear was July 2010.  Flu 8/31/20, Prevnar 11/2015  I will see her back in 1 month as scheduled, sooner if problems arise

## 2022-08-29 ENCOUNTER — PATIENT MESSAGE (OUTPATIENT)
Dept: INTERNAL MEDICINE | Facility: CLINIC | Age: 87
End: 2022-08-29
Payer: MEDICARE

## 2022-08-30 ENCOUNTER — TELEPHONE (OUTPATIENT)
Dept: INTERNAL MEDICINE | Facility: CLINIC | Age: 87
End: 2022-08-30
Payer: MEDICARE

## 2022-08-30 PROBLEM — I25.119 ATHEROSCLEROSIS OF NATIVE CORONARY ARTERY OF NATIVE HEART WITH ANGINA PECTORIS: Status: ACTIVE | Noted: 2022-08-30

## 2022-08-31 NOTE — TELEPHONE ENCOUNTER
Called and relayed message from PCP. Daughter states she will check her pill bottles and if the Jardiance is in there, she will take it out.

## 2022-09-07 ENCOUNTER — PATIENT MESSAGE (OUTPATIENT)
Dept: INTERNAL MEDICINE | Facility: CLINIC | Age: 87
End: 2022-09-07
Payer: MEDICARE

## 2022-09-07 RX ORDER — PREDNISONE 10 MG/1
TABLET ORAL
Qty: 10 TABLET | Refills: 0 | Status: SHIPPED | OUTPATIENT
Start: 2022-09-07 | End: 2022-09-20

## 2022-09-07 NOTE — TELEPHONE ENCOUNTER
I want her to take prednisone 10 mg 2 tablets daily for 3 days then one tablet daily for 4 days for the cough and nasal drainage.   Prescription sent to [facility].  Pepper Whitfield M.D.

## 2022-09-20 ENCOUNTER — OUTPATIENT CASE MANAGEMENT (OUTPATIENT)
Dept: NEUROLOGY | Facility: CLINIC | Age: 87
End: 2022-09-20
Payer: MEDICARE

## 2022-09-20 ENCOUNTER — OFFICE VISIT (OUTPATIENT)
Dept: INTERNAL MEDICINE | Facility: CLINIC | Age: 87
End: 2022-09-20
Payer: MEDICARE

## 2022-09-20 ENCOUNTER — IMMUNIZATION (OUTPATIENT)
Dept: INTERNAL MEDICINE | Facility: CLINIC | Age: 87
End: 2022-09-20
Payer: MEDICARE

## 2022-09-20 VITALS
HEART RATE: 98 BPM | HEIGHT: 62 IN | SYSTOLIC BLOOD PRESSURE: 136 MMHG | BODY MASS INDEX: 30.79 KG/M2 | DIASTOLIC BLOOD PRESSURE: 60 MMHG | WEIGHT: 167.31 LBS | OXYGEN SATURATION: 96 %

## 2022-09-20 DIAGNOSIS — J44.9 CHRONIC OBSTRUCTIVE PULMONARY DISEASE, UNSPECIFIED COPD TYPE: ICD-10-CM

## 2022-09-20 DIAGNOSIS — R26.9 GAIT ABNORMALITY: ICD-10-CM

## 2022-09-20 DIAGNOSIS — I10 ESSENTIAL HYPERTENSION: ICD-10-CM

## 2022-09-20 DIAGNOSIS — E11.9 TYPE 2 DIABETES MELLITUS WITHOUT COMPLICATION, WITHOUT LONG-TERM CURRENT USE OF INSULIN: ICD-10-CM

## 2022-09-20 DIAGNOSIS — M16.12 PRIMARY OSTEOARTHRITIS OF LEFT HIP: Primary | ICD-10-CM

## 2022-09-20 PROCEDURE — 99214 OFFICE O/P EST MOD 30 MIN: CPT | Mod: S$GLB,,, | Performed by: INTERNAL MEDICINE

## 2022-09-20 PROCEDURE — 1159F MED LIST DOCD IN RCRD: CPT | Mod: CPTII,S$GLB,, | Performed by: INTERNAL MEDICINE

## 2022-09-20 PROCEDURE — 1125F AMNT PAIN NOTED PAIN PRSNT: CPT | Mod: CPTII,S$GLB,, | Performed by: INTERNAL MEDICINE

## 2022-09-20 PROCEDURE — 1159F PR MEDICATION LIST DOCUMENTED IN MEDICAL RECORD: ICD-10-PCS | Mod: CPTII,S$GLB,, | Performed by: INTERNAL MEDICINE

## 2022-09-20 PROCEDURE — 1101F PT FALLS ASSESS-DOCD LE1/YR: CPT | Mod: CPTII,S$GLB,, | Performed by: INTERNAL MEDICINE

## 2022-09-20 PROCEDURE — 3288F FALL RISK ASSESSMENT DOCD: CPT | Mod: CPTII,S$GLB,, | Performed by: INTERNAL MEDICINE

## 2022-09-20 PROCEDURE — 99999 PR PBB SHADOW E&M-EST. PATIENT-LVL IV: CPT | Mod: PBBFAC,,, | Performed by: INTERNAL MEDICINE

## 2022-09-20 PROCEDURE — G0008 ADMIN INFLUENZA VIRUS VAC: HCPCS | Mod: S$GLB,,, | Performed by: INTERNAL MEDICINE

## 2022-09-20 PROCEDURE — 99214 PR OFFICE/OUTPT VISIT, EST, LEVL IV, 30-39 MIN: ICD-10-PCS | Mod: S$GLB,,, | Performed by: INTERNAL MEDICINE

## 2022-09-20 PROCEDURE — 99499 UNLISTED E&M SERVICE: CPT | Mod: S$GLB,,, | Performed by: INTERNAL MEDICINE

## 2022-09-20 PROCEDURE — 1125F PR PAIN SEVERITY QUANTIFIED, PAIN PRESENT: ICD-10-PCS | Mod: CPTII,S$GLB,, | Performed by: INTERNAL MEDICINE

## 2022-09-20 PROCEDURE — 90694 VACC AIIV4 NO PRSRV 0.5ML IM: CPT | Mod: S$GLB,,, | Performed by: INTERNAL MEDICINE

## 2022-09-20 PROCEDURE — 3288F PR FALLS RISK ASSESSMENT DOCUMENTED: ICD-10-PCS | Mod: CPTII,S$GLB,, | Performed by: INTERNAL MEDICINE

## 2022-09-20 PROCEDURE — 99499 RISK ADDL DX/OHS AUDIT: ICD-10-PCS | Mod: S$GLB,,, | Performed by: INTERNAL MEDICINE

## 2022-09-20 PROCEDURE — 1101F PR PT FALLS ASSESS DOC 0-1 FALLS W/OUT INJ PAST YR: ICD-10-PCS | Mod: CPTII,S$GLB,, | Performed by: INTERNAL MEDICINE

## 2022-09-20 PROCEDURE — G0008 FLU VACCINE - QUADRIVALENT - ADJUVANTED: ICD-10-PCS | Mod: S$GLB,,, | Performed by: INTERNAL MEDICINE

## 2022-09-20 PROCEDURE — 90694 FLU VACCINE - QUADRIVALENT - ADJUVANTED: ICD-10-PCS | Mod: S$GLB,,, | Performed by: INTERNAL MEDICINE

## 2022-09-20 PROCEDURE — 99999 PR PBB SHADOW E&M-EST. PATIENT-LVL IV: ICD-10-PCS | Mod: PBBFAC,,, | Performed by: INTERNAL MEDICINE

## 2022-09-20 NOTE — PROGRESS NOTES
CHIEF COMPLAINT:  Follow up of hypertension, copd, sinus congestion, diastolic dysfunction    HISTORY OF PRESENT ILLNESS: This is a 89-year-old woman who presents for follow up of above. Her daughter, Nora is with her today.   Haley lives with her.    She is having more trouble with her left hip. The salon pas patch helps her hip.  She walks with a cane which helps her hip.       She feels that she has gotten over COVID.   She took a prednisone taper which helped her cough and congestion. NO chest pain, shorntess of breath.      NO fever, chills, chest pain, sinus congestion, sore throat, vomiting.      THe vaginal bleeding had stopped. She has a small spot of blood daily on her underwear.  She is NOT using Estradiol vaginal cream.  Barttana did not know how to apply the cream.   No vaginal pain. She has a history of radiation to the rectum for her rectal cancer - had granulomatous area on the vagina..  She saw Dr Lorena Leach 8/7/2019 with biopsies that were benign. Nora demonstrated to Haley how to apply the cream and the cream is now being applied     She is seeing Dr Alanis in neurology on 3/3/2022 for her dementia - she is shaky in the morning.  Juice helps the shakiness.   She is now taking Memantine 5 mg just at night (was sleeping too much) and Donepezil 10 mg in the morning.      Appetite is ok.  Weight is stable.       No more lightheadedness.  She continues to take amlodipine - benazepril 5/20 one tablet daliy.        She has been having sinus congestion that comes and goes. She has clear drainage.  She uses flonase 1 spray in each nostril once daily. No fever, chills, sore throat, nausea, vomiting     SHe continues to take duloxetine 60 mg daily and citalopram 10 mg daily. Mood has been good.  Neuropathic pain in the legs is better controlled with the duloxetine.  She has taken  xanax 0.5 mg twice since our last visit.       She had right leg pain - she had a blockage. Dr Gaviria placed a stent in  the right CFA and PTA left ext loulou artery 12/18. Right leg pain has resolved.  The ulcerated area on the right leg has totally healed and has not returned.  She is taking aspirin 81 mg daily.        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  Right hip pain has resolved and has not returned.          Her non small cell lung cancer is stable. She saw Dr Weir 910/4/2011 and scans were stable. She is 11 years out from her diagnosis     She is now prescribed Trajenta 5 mg daily and Jardiance 10 mg daily for her diabetes.     She continues to take simvastatin 80 mg at bedtime for her hyperlipidemia. No joint pain or muscle pain.        She is off Fosamax once week for her bones.  No melana, bloody stools, constipation.        She takes vitamin B12 1000 mcg daily for vitamin B12 deficiency - anemia is stable        SHe has macular degeneration of the eyes and was followed by Dr Tresa Coreas. She got injections in her eyes every 6 weeks in Spring 2015. She now sees Dr Ruelas at [facility] (last saw him 8/18- no need for injections at that time- yearly follow up). Vision has been stable. She is taking a Eye vitamins for her eyes.       PAST MEDICAL HISTORY:    1. Stage III non-small cell lung cancer, completed chemoradiation with carboplatin and Taxol on 6/1/12    2. Hypertension.   3. Diabetes mellitus.   4. Hyperlipidemia.   5. Chronic diastolic dysfunction.   6. Coronary artery disease status post MI in 1990 and 2003. Stenting was   done at 2003 at [facility].   7. Cholecystectomy, December 2006.   8. Cataract removal.   9. Benign growth removed from her throat.   10. Left common femoral endarterectomy, July 2007.   11. History of anal cancer in 1996 status post radiation and chemotherapy.   12. Carpal tunnel syndrome.   13. Osteoporosis on BMD 12/11    14. Macular degeneration    SOCIAL HISTORY: She quit smoking in 1995, denies any alcohol use. She is etired.     REVIEW  "OF SYSTEMS: She denies fevers, chills, night sweats, fatigue, visual change, hearing loss, sinus congestion, sore throat, dysuria, hematuria, joint pain, muscle pain, polydipsia, and polyuria.     PHYSICAL EXAM:       /60 (BP Location: Left arm, Patient Position: Sitting)   Pulse 98   Ht 5' 2" (1.575 m)   Wt 75.9 kg (167 lb 5.3 oz)   SpO2 96%   BMI 30.60 kg/m²        GENERAL: She is alert and oriented. No apparent distress. Affect within normal limits.   Conjunctivae anicteric.  TM clear  NECK: Supple.   Respiratory effort normal. Lungs clear to auscultation.   HEART: Regular rate and rhythm without murmurs, gallops, or rubs. No lower   extremity edema.         ASSESSMENT AND PLAN:    Left hip pain - home health for physical therapy and aide for bathing.  Continue Salon pas patches.  Diabetes  continue the Tradjenta.   3. Hypertension with history of diastolic dyfunction on echo in 2006 - Follow up with cardiology  4.PVD - stable  5. Mood disorder - stable.   6. Memory issues - follow up with Dr Alanis  7. Stage III non-small cell lung cancer, completed chemoradiation with carboplatin and Taxol on 6/1/12 - doing well.   8.  Hypertension-controlled  9. Hyperlipidemia-lipids controlled  10. Coronary artery disease modifying risk factors.   11. History of anal cancer-had a normal colonoscopy, November 2008; due 2015. Declined repeat  12. Pernicious anemia - on counter vitamin B12 1000 mcg daily  10. Osteoporosis - took alendroante for 7 years - 2012 to 2019.  Currently on a drug holiday.  BMD 5/2022 slightly worse.    11. Macular degeneration - stable  12.  VItamin D deficiency -  vitamin D 2000 units daily.   13. Vaginal bleeding - get back on estradiol cream twice weekly - if does not resolve, will get back to DR Leach for additional biopsies    Screening mammogram was 12/19 - declines     I will see her back in 1 month as scheduled, sooner if problems arise  "

## 2022-09-21 NOTE — PROGRESS NOTES
Spoke to caregiver (Haley - daughter) for monthly visit. She reports patient's memory seems to be getting worse, patient is repeating herself more and asking the same question over and over. No falls, UTIs, or hospital encounters. Caregiver interested in exploring in-home help options as she reports being overwhelmed with patients care and having little support. [facility] reached out, caregiver yet to follow up and establish what services she is interested in. We also explore [facility] respite program and Medicaid waivers. Information about these options sent via email.    Caregiver is burdened and overwhelmed with patient's care, she is aware that she needs more help as she is also dealing with her own personal health issues. Caregiver has been looking for professional support to help her cope through current situation, specially recent family loss. Risk assessment performed, caregiver denies death or suicidal thoughts. She finds support in her magdiel and was encouraged to attend to community Worship. Grief support group and grief counselor list sent via email. Advised caregiver to reach out to her PCP as well for referrals.     I also called caregiver Nora, she requested call back for later during the day, when calling back no answer, left detailed voicemail. Will reach out again the week of 9/26.

## 2022-09-24 ENCOUNTER — PATIENT MESSAGE (OUTPATIENT)
Dept: CARDIOLOGY | Facility: CLINIC | Age: 87
End: 2022-09-24
Payer: MEDICARE

## 2022-09-26 ENCOUNTER — TELEPHONE (OUTPATIENT)
Dept: CARDIOLOGY | Facility: CLINIC | Age: 87
End: 2022-09-26
Payer: MEDICARE

## 2022-09-26 NOTE — TELEPHONE ENCOUNTER
----- Message from Che Thibodeaux sent at 9/26/2022 12:48 PM CDT -----  Regarding: empagliflozin (JARDIANCE) 10 mg tablet  Pt daughter calling in regards to     Rx empagliflozin (JARDIANCE) 10 mg tablet      Pt daughter would like a refill called in       Confirmed patient's contact info below:  Contact Name: Brunilda England  Phone Number: [phone]    [address] [address]  Phone: [phone] Fax: [phone]

## 2022-09-26 NOTE — TELEPHONE ENCOUNTER
Patient asking for refill on Jardiance. Medication was discontinued by PCP on 8/30 (note in chart review). Please advise.

## 2022-10-14 ENCOUNTER — HOSPITAL ENCOUNTER (EMERGENCY)
Facility: HOSPITAL | Age: 87
Discharge: HOME | End: 2022-10-14
Attending: EMERGENCY MEDICINE
Payer: MEDICARE

## 2022-10-14 ENCOUNTER — PATIENT MESSAGE (OUTPATIENT)
Dept: INTERNAL MEDICINE | Facility: CLINIC | Age: 87
End: 2022-10-14
Payer: MEDICARE

## 2022-10-14 VITALS
HEIGHT: 62 IN | DIASTOLIC BLOOD PRESSURE: 74 MMHG | TEMPERATURE: 98 F | BODY MASS INDEX: 32.39 KG/M2 | RESPIRATION RATE: 17 BRPM | WEIGHT: 176 LBS | HEART RATE: 92 BPM | SYSTOLIC BLOOD PRESSURE: 151 MMHG | OXYGEN SATURATION: 98 %
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
## 2022-10-14 DIAGNOSIS — M79.601 RIGHT ARM PAIN: ICD-10-CM

## 2022-10-14 LAB
ALBUMIN SERPL BCP-MCNC: 3.5 G/DL (ref 3.5–5.2)
ALP SERPL-CCNC: 49 U/L (ref 55–135)
ALT SERPL W/O P-5'-P-CCNC: 10 U/L (ref 10–44)
ANION GAP SERPL CALC-SCNC: 9 MMOL/L (ref 8–16)
AST SERPL-CCNC: 15 U/L (ref 10–40)
BACTERIA #/AREA URNS AUTO: ABNORMAL /HPF
BASOPHILS # BLD AUTO: 0.01 K/UL (ref 0–0.2)
BASOPHILS NFR BLD: 0.2 % (ref 0–1.9)
BILIRUB SERPL-MCNC: 0.4 MG/DL (ref 0.1–1)
BILIRUB UR QL STRIP: NEGATIVE
BUN SERPL-MCNC: 9 MG/DL (ref 8–23)
CALCIUM SERPL-MCNC: 9.9 MG/DL (ref 8.7–10.5)
CHLORIDE SERPL-SCNC: 103 MMOL/L (ref 95–110)
CK SERPL-CCNC: 42 U/L (ref 20–180)
CLARITY UR REFRACT.AUTO: ABNORMAL
CO2 SERPL-SCNC: 29 MMOL/L (ref 23–29)
COLOR UR AUTO: YELLOW
CREAT SERPL-MCNC: 1 MG/DL (ref 0.5–1.4)
DIFFERENTIAL METHOD: ABNORMAL
EOSINOPHIL # BLD AUTO: 0 K/UL (ref 0–0.5)
EOSINOPHIL NFR BLD: 0.4 % (ref 0–8)
ERYTHROCYTE [DISTWIDTH] IN BLOOD BY AUTOMATED COUNT: 16.2 % (ref 11.5–14.5)
EST. GFR  (NO RACE VARIABLE): 53.5 ML/MIN/1.73 M^2
GLUCOSE SERPL-MCNC: 147 MG/DL (ref 70–110)
GLUCOSE UR QL STRIP: NEGATIVE
HCT VFR BLD AUTO: 31.8 % (ref 37–48.5)
HGB BLD-MCNC: 10.6 G/DL (ref 12–16)
HGB UR QL STRIP: ABNORMAL
HYALINE CASTS UR QL AUTO: 23 /LPF
IMM GRANULOCYTES # BLD AUTO: 0.01 K/UL (ref 0–0.04)
IMM GRANULOCYTES NFR BLD AUTO: 0.2 % (ref 0–0.5)
KETONES UR QL STRIP: NEGATIVE
LEUKOCYTE ESTERASE UR QL STRIP: ABNORMAL
LYMPHOCYTES # BLD AUTO: 1.1 K/UL (ref 1–4.8)
LYMPHOCYTES NFR BLD: 25.6 % (ref 18–48)
MCH RBC QN AUTO: 29.2 PG (ref 27–31)
MCHC RBC AUTO-ENTMCNC: 33.3 G/DL (ref 32–36)
MCV RBC AUTO: 88 FL (ref 82–98)
MICROSCOPIC COMMENT: ABNORMAL
MONOCYTES # BLD AUTO: 0.5 K/UL (ref 0.3–1)
MONOCYTES NFR BLD: 11.7 % (ref 4–15)
NEUTROPHILS # BLD AUTO: 2.8 K/UL (ref 1.8–7.7)
NEUTROPHILS NFR BLD: 61.9 % (ref 38–73)
NITRITE UR QL STRIP: NEGATIVE
NRBC BLD-RTO: 0 /100 WBC
PH UR STRIP: 7 [PH] (ref 5–8)
PLATELET # BLD AUTO: 201 K/UL (ref 150–450)
PMV BLD AUTO: 10.9 FL (ref 9.2–12.9)
POTASSIUM SERPL-SCNC: 3.8 MMOL/L (ref 3.5–5.1)
PROT SERPL-MCNC: 7.5 G/DL (ref 6–8.4)
PROT UR QL STRIP: ABNORMAL
RBC # BLD AUTO: 3.63 M/UL (ref 4–5.4)
RBC #/AREA URNS AUTO: >100 /HPF (ref 0–4)
SODIUM SERPL-SCNC: 141 MMOL/L (ref 136–145)
SP GR UR STRIP: 1.01 (ref 1–1.03)
SQUAMOUS #/AREA URNS AUTO: 1 /HPF
TROPONIN I SERPL DL<=0.01 NG/ML-MCNC: 0.02 NG/ML (ref 0–0.03)
URN SPEC COLLECT METH UR: ABNORMAL
WBC # BLD AUTO: 4.45 K/UL (ref 3.9–12.7)
WBC #/AREA URNS AUTO: 57 /HPF (ref 0–5)

## 2022-10-14 PROCEDURE — 80053 COMPREHEN METABOLIC PANEL: CPT | Performed by: EMERGENCY MEDICINE

## 2022-10-14 PROCEDURE — 82550 ASSAY OF CK (CPK): CPT | Performed by: EMERGENCY MEDICINE

## 2022-10-14 PROCEDURE — 93005 ELECTROCARDIOGRAM TRACING: CPT

## 2022-10-14 PROCEDURE — 93010 ELECTROCARDIOGRAM REPORT: CPT | Mod: ,,, | Performed by: INTERNAL MEDICINE

## 2022-10-14 PROCEDURE — 93010 EKG 12-LEAD: ICD-10-PCS | Mod: ,,, | Performed by: INTERNAL MEDICINE

## 2022-10-14 PROCEDURE — 84484 ASSAY OF TROPONIN QUANT: CPT | Performed by: EMERGENCY MEDICINE

## 2022-10-14 PROCEDURE — 87086 URINE CULTURE/COLONY COUNT: CPT | Performed by: EMERGENCY MEDICINE

## 2022-10-14 PROCEDURE — 81001 URINALYSIS AUTO W/SCOPE: CPT | Performed by: EMERGENCY MEDICINE

## 2022-10-14 PROCEDURE — 99285 PR EMERGENCY DEPT VISIT,LEVEL V: ICD-10-PCS | Mod: ,,, | Performed by: EMERGENCY MEDICINE

## 2022-10-14 PROCEDURE — 25500020 PHARM REV CODE 255: Performed by: EMERGENCY MEDICINE

## 2022-10-14 PROCEDURE — 99285 EMERGENCY DEPT VISIT HI MDM: CPT | Mod: 25

## 2022-10-14 PROCEDURE — 99285 EMERGENCY DEPT VISIT HI MDM: CPT | Mod: ,,, | Performed by: EMERGENCY MEDICINE

## 2022-10-14 PROCEDURE — 25000003 PHARM REV CODE 250: Performed by: EMERGENCY MEDICINE

## 2022-10-14 PROCEDURE — 85025 COMPLETE CBC W/AUTO DIFF WBC: CPT | Performed by: EMERGENCY MEDICINE

## 2022-10-14 RX ADMIN — IOHEXOL 75 ML: 350 INJECTION, SOLUTION INTRAVENOUS at 03:10

## 2022-10-14 RX ADMIN — SODIUM CHLORIDE 500 ML: 0.9 INJECTION, SOLUTION INTRAVENOUS at 04:10

## 2022-10-14 NOTE — ED NOTES
"Patient identifiers verified and correct for Brunilda England.  LOC: The patient is awake, alert and aware of environment with an appropriate affect, the patient is oriented x 3 and speaking appropriately.   APPEARANCE: Patient appears comfortable and in no acute distress, patient is clean and well groomed.  SKIN: The skin is warm and dry, color consistent with ethnicity, patient has normal skin turgor and moist mucus membranes, skin intact, no breakdown or bruising noted.   MUSCULOSKELETAL: Patient moving all extremities spontaneously, no swelling noted. Patient c/o right elbow pain after falling out of the bed and laying on her arm for a little while. Patient states she wasn't "down there long".  RESPIRATORY: Airway is open and patent, respirations are spontaneous, patient has a normal effort and rate, no accessory muscle use noted, O2 sats noted at 97% on room air.  CARDIAC: Patient has a normal rate and regular rhythm, no edema noted, capillary refill < 3 seconds.   GASTRO: Soft and non tender to palpation, no distention noted, normoactive bowel sounds present in all four quadrants. Pt states bowel movements have been regular.  : Pt denies any pain or frequency with urination.  NEURO: Pt opens eyes spontaneously, behavior appropriate to situation, follows commands, facial expression symmetrical, bilateral hand grasp equal and even, purposeful motor response noted, normal sensation in all extremities when touched with a finger.   "

## 2022-10-14 NOTE — PROVIDER PROGRESS NOTES - EMERGENCY DEPT.
Encounter Date: 10/14/2022    ED Physician Progress Notes          STAFF PHYSICIAN F/U NOTE:  Brunilda England has been evaluated and treated. She reports much improvement/complete resolution of Sx and is ready to return home.  She does not want to spend her birthday here with me.  She is ambulating with no respiratory or any other distress.  No hypoxia or tachypnea appreciated on ambulation.  Imaging Results              CTA Chest Non-Coronary (PE Studies) (Final result)  Result time 10/14/22 16:53:14      Final result by Jc Scanlon MD (10/14/22 16:53:14)                   Impression:      1. Allowing for motion artifact, no convincing pulmonary thromboembolism.  2. Partially loculated right pleural effusion with associated compressive atelectasis of the right lung particularly involving the right lower lobe.  There are a few air bronchograms within the regions of atelectasis, superimposed infectious consolidation is not excluded.  3. Stable focus of bandlike atelectasis or scarring within the right upper lobe.  4. Mosaic parenchymal attenuation throughout the pulmonary parenchyma suggests sequela of small airways disease or small vessel disease versus edema.  5. Please see above for several additional findings.      Electronically signed by: Jc Scanlon MD  Date:    10/14/2022  Time:    16:53               Narrative:    EXAMINATION:  CTA CHEST NON CORONARY (PE STUDIES)    CLINICAL HISTORY:  Pulmonary embolism (PE) suspected, unknown D-dimer;    TECHNIQUE:  Low dose axial images, sagittal and coronal reformations were obtained from the thoracic inlet to the lung bases following the IV administration of 75 mL of Omnipaque 350.  Contrast timing was optimized to evaluate the pulmonary arteries.  MIP images were performed.    COMPARISON:  Radiograph 08/15/2022, CT chest 09/29/2021    FINDINGS:  The structures base of the neck are remarkable for subcentimeter low attenuating lesions and calcification within  the right lobe of the thyroid, further evaluation with ultrasound recommended on a nonemergent basis if not previously performed.  No significant mediastinal lymphadenopathy.  There is a small pericardial effusion.  The heart is not enlarged.  There is extensive atherosclerotic calcification of the aorta and in the distribution of the coronary arteries.  The thoracic aorta tapers normally.  There is infrarenal abdominal aortic ectasia measuring 2.2 cm.  Allowing for bolus timing, the visualized portions of the spleen, pancreas, gallbladder and liver are grossly unremarkable.  The gallbladder is absent.  There are low attenuating lesions arising from the kidneys, possibly reflecting cysts, attenuation measurement is inaccurate given artifact in the region.    Motion artifact limits evaluation of the airways and pulmonary parenchyma.  Allowing for this, the airways appear grossly patent.  There is bilateral emphysematous change.  There is scattered mosaic attenuation throughout the pulmonary parenchyma suggesting sequela of small airways disease or small vessel disease versus edema.  There is linear atelectasis versus consolidation within the right upper lobe noting a few scattered air bronchograms, similar to the previous examination.  There is a loculated right pleural effusion noting adjacent compressive atelectasis of the right lower lobe most significantly involving the posterior aspect where there are several air bronchograms.  There is left basilar dependent atelectasis.  No pneumothorax.    Bolus timing is adequate for evaluation of pulmonary thromboembolism.  Allowing for motion artifact, no convincing pulmonary arterial filling defect to the level of the segmental branches bilaterally to suggest pulmonary thromboembolism.    There is osteopenia.  There are degenerative changes of the spine.  No significant axillary lymphadenopathy.                                       X-Ray Elbow Complete Right (Final  result)  Result time 10/14/22 14:25:09      Final result by Rafa Salazar MD (10/14/22 14:25:09)                   Impression:      See above      Electronically signed by: Rafa Salazar MD  Date:    10/14/2022  Time:    14:25               Narrative:    EXAMINATION:  XR ELBOW COMPLETE 3 VIEW RIGHT    CLINICAL HISTORY:  . Unspecified fall, initial encounter    TECHNIQUE:  AP, lateral, and oblique views of the right elbow were performed.    COMPARISON:  None    FINDINGS:  DJD.  No definite fractures or dislocation.  No bone destruction identified.  Linear calcification posterior to the olecranon process probably an osteophyte which might have fractured at its attachment                                       CT Head Without Contrast (Final result)  Result time 10/14/22 14:05:12      Final result by Trever Carcamo DO (10/14/22 14:05:12)                   Impression:      No acute intracranial findings as detailed above specifically without evidence for acute intracranial hemorrhage or sulcal effacement to suggest large territory recent infarction.    Clinical correlation and further evaluation as warranted.      Electronically signed by: Trever Carcamo DO  Date:    10/14/2022  Time:    14:05               Narrative:    EXAMINATION:  CT HEAD WITHOUT CONTRAST    CLINICAL HISTORY:  Head trauma, minor (Age >= 65y);    TECHNIQUE:  Multiple sequential 5 mm axial images of the head without contrast.  Coronal and sagittal reformatted imaging from the axial acquisition.    COMPARISON:  MRI 07/07/2021    FINDINGS:  Generalized cerebral volume loss with compensatory enlargement ventricles sulci and cisterns similar to prior without evidence for hydrocephalus.    There is hypoattenuation slight volume loss in the left basal ganglia corresponds to remote infarction seen on MRI.  Patchy and confluent decreased attenuation elsewhere supratentorial white matter while nonspecific concerning for chronic ischemic change.    There is no  evidence for acute intracranial hemorrhage or sulcal effacement to suggest large territory recent infarction.  Visualized paranasal sinuses and mastoid air cells are clear.                                        CT Cervical Spine Without Contrast (Final result)  Result time 10/14/22 14:50:53      Final result by Tom Bliss MD (10/14/22 14:50:53)                   Impression:      No acute fracture or traumatic malalignment of the cervical spine.    Multilevel degenerative changes of the cervical spine with likely mild spinal canal stenosis at C6-7.    Centrilobular emphysema.    Lobulated consolidative opacity in the right apex, incompletely visualized and could represent atelectasis, infection, or pleural effusion.  Recommend chest radiograph for further evaluation.    This report was flagged in Epic as abnormal.    Electronically signed by resident: Gilberto Tellez  Date:    10/14/2022  Time:    14:07    Electronically signed by: Tom Bliss MD  Date:    10/14/2022  Time:    14:50               Narrative:    EXAMINATION:  CT CERVICAL SPINE WITHOUT CONTRAST    CLINICAL HISTORY:  Neck trauma (Age >= 65y);    TECHNIQUE:  Low dose axial images, sagittal and coronal reformations were performed though the cervical spine.  Contrast was not administered.    COMPARISON:  Chest radiograph 08/15/2022    FINDINGS:  Skull base and craniocervical junction (partially imaged): No significant abnormality.    Spinal alignment: Mild anterolisthesis of C4 on C5.  Otherwise vertebral bodies are well aligned..  Mild straightening of the normal cervical lordosis.    Vertebrae: Vertebral body heights are well maintained.  There is no evidence of fracture or dislocation.    Discs: Multilevel degenerative disc height loss most prominent at C6-7.    Multilevel facet hypertrophic arthropathy, posterior disc osteophyte complex, and uncovertebral spurring.  Likely mild spinal canal stenosis at C6-7.  Varying degrees of neural  foraminal narrowing, most pronounced with severe left neural foraminal narrowing at C4-5.    Limited evaluation of the intraspinal contents demonstrates no hematoma or mass.    Paraspinal soft tissues exhibit no acute abnormalities.    The soft tissue structures visualized in the neck are unremarkable.  Extensive atherosclerotic calcifications within the aortic arch, arch branch vessels, and the carotid arteries.  Focal calcification within the bilateral V3 segments of the vertebral arteries.    Centrilobular emphysematous changes within the lung apices.  Incompletely visualized slightly lobulated consolidative opacity layering along the posterior pleura, potentially atelectasis, infection, or pleural effusion.  The visualized portions of the brain demonstrate no significant abnormality.                                    Future Appointments   Date Time Provider Department Center   11/2/2022  1:30 PM Yuosuf Champion Jr., MD [facility] [place]   12/13/2022 10:20 AM ANTONI Leigh MD [facility] [place]   12/19/2022  1:30 PM Pepper Whitfield MD [facility]       Currently patient reports no new Sx and is tolerating PO challenge. We discussed Sx warranting immediate ED return, which were acknowledged. I recommended F/U and discussion of ED visit with primary care physician.  ____________________  Yassine Lei MD, [facility]  Emergency Medicine Staff  5:36 PM 10/14/2022

## 2022-10-14 NOTE — ED PROVIDER NOTES
Encounter Date: 10/14/2022       History     Chief Complaint   Patient presents with    Fall     Fell twice this week. R arm pain.      89 yo F presents w/ c/o right elbow pain after fall x 2.  She has a history of osteoporosis, remote history of anal cancer, anemia, diabetes, emphysema, lung cancer.  Patient reports that she tripped while walking across her bedroom 2 days ago.  She ambulates with a cane but since her recent fall she has been instructed to use a walker.  She denies syncope.  She denies seizure.  She has not had nausea vomiting since this episode.  Patient then reports that she fell out of bed last night but did not realize this immediately.  She slipped on the floor for extended period.  Now she complains of right elbow pain and believes she may have laid on her right side for a long time.  She has no discoloration of the affected extremity.  She has no numbness or weakness.  She has no swelling of the affected extremity.  She denies headache, neck pain, nausea, vomiting, fever, chills, urinary complaint.    Review of patient's allergies indicates:   Allergen Reactions    Bextra [valdecoxib] Swelling    Gabapentin Diarrhea and Nausea Only     Past Medical History:   Diagnosis Date    Anal cancer 1995    Anemia     Cancer 1995    anal cancer    Centrilobular emphysema 7/6/2020    Diabetes mellitus     With circulatory disorder    Lumbar radiculopathy 5/16/2014    Lung cancer 2012    s/p chemo/radiation    Macular degeneration     Macular hemorrhage of left eye     Neuropathy     Osteoporosis     Osteoporosis, unspecified 11/9/2012    Pure hypercholesterolemia      Past Surgical History:   Procedure Laterality Date    BRONCHOSCOPY      CHOLECYSTECTOMY      COLONOSCOPY      FLUOROSCOPIC ANGIOGRAPHY OF LOWER EXTREMITY WITH TOPICAL ULTRASOUND Right 12/6/2018    Procedure: ARTERIOGRAM-LEG AND ULTRASOUND;  Surgeon: SEBASTIÁN Mcpherson III, MD;  Location: [facility] OR [address] [place] [address];  Service: Peripheral Vascular;   Laterality: Right;  16.5 min  1059.42 mGy  59ml Dye  2ml Local    heart stent      HYSTERECTOMY      INCISIONAL BIOPSY Right 2019    Procedure: INCISIONAL BIOPSY;  Surgeon: Leslie Castillo MD;  Location: [facility] OR [address] [place] [address];  Service: Plastics;  Laterality: Right;    left leg surgery      blockage    PERCUTANEOUS TRANSLUMINAL ANGIOPLASTY N/A 2018    Procedure: PTA (ANGIOPLASTY, PERCUTANEOUS, TRANSLUMINAL);  Surgeon: SEBASTIÁN Mcpherson III, MD;  [facility];  Service: Peripheral Vascular;  Laterality: N/A;    RECONSTRUCTION USING FLAP Right 2019    Procedure: RECONSTRUCTION USING FLAP/FULL THICKNESS MRESETION AND RECONSTRUCTION RIGHT UPPER EYELID WITH BIOPSY;  Surgeon: Leslie Castillo MD;  Location: [facility] OR [address] [place] [address];  Service: Plastics;  Laterality: Right;    TONSILLECTOMY       Family History   Problem Relation Age of Onset    Stroke Mother     Cancer Father     Breast cancer Maternal Aunt     Ovarian cancer Neg Hx     Amblyopia Neg Hx     Blindness Neg Hx     Cataracts Neg Hx     Glaucoma Neg Hx     Macular degeneration Neg Hx     Retinal detachment Neg Hx     Strabismus Neg Hx      Social History     Tobacco Use    Smoking status: Former     Packs/day: 0.50     Years: 30.00     Pack years: 15.00     Types: Cigarettes     Quit date: 1995     Years since quittin.8    Smokeless tobacco: Never   Substance Use Topics    Alcohol use: No    Drug use: No     Review of Systems   Constitutional:  Negative for chills and fever.   Eyes:  Negative for photophobia and visual disturbance.   Respiratory:  Negative for cough and shortness of breath.    Cardiovascular:  Negative for chest pain, palpitations and leg swelling.   Gastrointestinal:  Negative for abdominal pain, constipation, diarrhea, nausea and vomiting.   Genitourinary:  Negative for difficulty urinating, dysuria, enuresis, flank pain and frequency.   Musculoskeletal:  Negative for gait problem and joint swelling.   Neurological:  Negative  for dizziness, tremors, seizures, syncope, weakness and light-headedness.   Hematological:  Does not bruise/bleed easily.   All other systems reviewed and are negative.    Physical Exam     Initial Vitals [10/14/22 1318]   BP Pulse Resp Temp SpO2   (!) 155/67 (!) 120 16 98.7 °F (37.1 °C) 95 %      MAP       --         Physical Exam  General: AO x 3, NAD. Well nourished. Well Developed  Head: Normocephalic and Atraumatic  HEENT: PERRLA. EOMI. OP Clear  Neck: Supple, Nontender in midline.  Cardiovascular: RRR. No m/r/g. 2+ Distal Pulses  Pulm/Chest: Chest nontender. Lungs clear to auscultation. No respiratory distress.  Abdomen: Nontender. Nondistended. No rigidity, rebound, or guarding  MSK: extremities atraumatic x 4. Gait normal. Painful ROM of right elbow. Soft compartments to RUE.  Ext: no clubbing, cyanosis, or edema  Neuro: GCS 15. CN II-XII intact. Intact symmetric sensation x 4 extremities  Skin: no bullae, petechiae, or purpura. Warm, dry, and intact.  Psych: normal mood and affect.    ED Course   Procedures  Labs Reviewed   COMPREHENSIVE METABOLIC PANEL - Abnormal; Notable for the following components:       Result Value    Glucose 147 (*)     Alkaline Phosphatase 49 (*)     eGFR 53.5 (*)     All other components within normal limits   CBC W/ AUTO DIFFERENTIAL - Abnormal; Notable for the following components:    RBC 3.63 (*)     Hemoglobin 10.6 (*)     Hematocrit 31.8 (*)     RDW 16.2 (*)     All other components within normal limits   CK   TROPONIN I   URINALYSIS, REFLEX TO URINE CULTURE   TROPONIN I   URINALYSIS MICROSCOPIC     EKG Readings: (Independently Interpreted)   Initial Reading: No STEMI. Previous EKG: Compared with most recent EKG Previous EKG Date: 8/4/22. Rhythm: Sinus Tachycardia. Heart Rate: 121. Ectopy: No Ectopy. Conduction: 1st Degree AV Block. ST Segments: Non-Specific ST Segment Depression. Axis: Normal.   Sinus tach has replaced junctional bradycardia from most recent EKG     Imaging  Results              X-Ray Elbow Complete Right (Final result)  Result time 10/14/22 14:25:09      Final result by Rafa Salazar MD (10/14/22 14:25:09)                   Impression:      See above      Electronically signed by: Rafa Salazar MD  Date:    10/14/2022  Time:    14:25               Narrative:    EXAMINATION:  XR ELBOW COMPLETE 3 VIEW RIGHT    CLINICAL HISTORY:  . Unspecified fall, initial encounter    TECHNIQUE:  AP, lateral, and oblique views of the right elbow were performed.    COMPARISON:  None    FINDINGS:  DJD.  No definite fractures or dislocation.  No bone destruction identified.  Linear calcification posterior to the olecranon process probably an osteophyte which might have fractured at its attachment                                       CT Head Without Contrast (Final result)  Result time 10/14/22 14:05:12      Final result by Trever Carcamo DO (10/14/22 14:05:12)                   Impression:      No acute intracranial findings as detailed above specifically without evidence for acute intracranial hemorrhage or sulcal effacement to suggest large territory recent infarction.    Clinical correlation and further evaluation as warranted.      Electronically signed by: Trever Carcamo DO  Date:    10/14/2022  Time:    14:05               Narrative:    EXAMINATION:  CT HEAD WITHOUT CONTRAST    CLINICAL HISTORY:  Head trauma, minor (Age >= 65y);    TECHNIQUE:  Multiple sequential 5 mm axial images of the head without contrast.  Coronal and sagittal reformatted imaging from the axial acquisition.    COMPARISON:  MRI 07/07/2021    FINDINGS:  Generalized cerebral volume loss with compensatory enlargement ventricles sulci and cisterns similar to prior without evidence for hydrocephalus.    There is hypoattenuation slight volume loss in the left basal ganglia corresponds to remote infarction seen on MRI.  Patchy and confluent decreased attenuation elsewhere supratentorial white matter while nonspecific  concerning for chronic ischemic change.    There is no evidence for acute intracranial hemorrhage or sulcal effacement to suggest large territory recent infarction.  Visualized paranasal sinuses and mastoid air cells are clear.                                        CT Cervical Spine Without Contrast (Final result)  Result time 10/14/22 14:50:53      Final result by Tom Bliss MD (10/14/22 14:50:53)                   Impression:      No acute fracture or traumatic malalignment of the cervical spine.    Multilevel degenerative changes of the cervical spine with likely mild spinal canal stenosis at C6-7.    Centrilobular emphysema.    Lobulated consolidative opacity in the right apex, incompletely visualized and could represent atelectasis, infection, or pleural effusion.  Recommend chest radiograph for further evaluation.    This report was flagged in Epic as abnormal.    Electronically signed by resident: Gilberto Tellez  Date:    10/14/2022  Time:    14:07    Electronically signed by: Tom Bliss MD  Date:    10/14/2022  Time:    14:50               Narrative:    EXAMINATION:  CT CERVICAL SPINE WITHOUT CONTRAST    CLINICAL HISTORY:  Neck trauma (Age >= 65y);    TECHNIQUE:  Low dose axial images, sagittal and coronal reformations were performed though the cervical spine.  Contrast was not administered.    COMPARISON:  Chest radiograph 08/15/2022    FINDINGS:  Skull base and craniocervical junction (partially imaged): No significant abnormality.    Spinal alignment: Mild anterolisthesis of C4 on C5.  Otherwise vertebral bodies are well aligned..  Mild straightening of the normal cervical lordosis.    Vertebrae: Vertebral body heights are well maintained.  There is no evidence of fracture or dislocation.    Discs: Multilevel degenerative disc height loss most prominent at C6-7.    Multilevel facet hypertrophic arthropathy, posterior disc osteophyte complex, and uncovertebral spurring.  Likely mild  spinal canal stenosis at C6-7.  Varying degrees of neural foraminal narrowing, most pronounced with severe left neural foraminal narrowing at C4-5.    Limited evaluation of the intraspinal contents demonstrates no hematoma or mass.    Paraspinal soft tissues exhibit no acute abnormalities.    The soft tissue structures visualized in the neck are unremarkable.  Extensive atherosclerotic calcifications within the aortic arch, arch branch vessels, and the carotid arteries.  Focal calcification within the bilateral V3 segments of the vertebral arteries.    Centrilobular emphysematous changes within the lung apices.  Incompletely visualized slightly lobulated consolidative opacity layering along the posterior pleura, potentially atelectasis, infection, or pleural effusion.  The visualized portions of the brain demonstrate no significant abnormality.                                       Medications   sodium chloride 0.9% bolus 500 mL (500 mLs Intravenous New Bag 10/14/22 1600)     Medical Decision Making:   History:   I obtained history from: someone other than patient.  Old Medical Records: I decided to obtain old medical records.  Old Records Summarized: records from clinic visits.       <> Summary of Records: H/o right apical lung abnormalities on prior CT  Initial Assessment:   Concern for possible traumatic injury after mechanical fall. Right apical lung abnormalities and tachycardia w/ ECG change noted. CT chest ordered to assess for PE, empyema, or recurrent Lung CA.  Independently Interpreted Test(s):   I have ordered and independently interpreted X-rays - see prior notes.  I have ordered and independently interpreted EKG Reading(s) - see prior notes  Clinical Tests:   Lab Tests: Ordered and Reviewed  Radiological Study: Ordered and Reviewed  Medical Tests: Ordered and Reviewed  ED Management:  See ED course for Medical Decision Making  Care transitioned to Dr. Lei pending CT and troponin results.           ED  Course as of 10/14/22 1537   Fri Oct 14, 2022   1455 CK unremarkable.  Compartments soft.  Chemistry unremarkable.  Slight worsening of chronic normocytic anemia. [DS]   1456 X-ray of elbow shows only possible fractured osteophyte but this is not correlate with the patient's area of tenderness to palpation.  CT head is unremarkable.  CT of cervical spine did not show any acute cervical injury.  About however, patient is noted to have a lobulated right apical lesion in her lung.  Additionally, she has significant tachycardia that was also noted an appointment 2 months ago.  Will obtain CT of chest. [DS]   1458 Chart review shows the patient has a history of lung cancer and has some right-sided pleural and lung changes consistent with her previous disease and therapy on her last surveillance CT in July of 2021.  Given the patient's new tachycardia, and the appearance on CT cervical spine, will obtain CT of chest.  Will assess for PE and possible recurrence of lung carcinoma [DS]      ED Course User Index  [DS] Bebeto Badillo MD                 Clinical Impression:   Final diagnoses:  [W19.XXXA] Fall               Bebeto Badillo MD  10/14/22 1537       Bebeto Badillo MD  10/15/22 0741

## 2022-10-14 NOTE — TELEPHONE ENCOUNTER
Called and spoke to daughters pt. States pt fell on Monday while walking, denies tripping, LOC, or NV. +THOMPSON Daughter states she examined her head and didn't see any marks or bruising. Daughter states pt didn't complain of anything on Tuesday or Wednesday. Daughter also states that pt fell out of bed yesterday morning while sleeping. Denied any injury. This morning started to c/o right arm pain. Daughter states pt just hasn't been feeling good, prior to the fall and has been having difficulty sleeping. Daughter wants pt to have head CT. Explained to daughter it would be best for pt to be seen today, no availability in clinic. Daughter states she will take pt into ED for evaluation.

## 2022-10-14 NOTE — ED TRIAGE NOTES
Patient arrived to the ER via POV c/o falling twice this week one on Monday and then again this morning. Patient now c/o right arm pain after the fall this AM. Patient states that she was laying in the bed and had a dream that she was falling, and then she landed on the floor (carpet). Patient states she was not on the floor long, but she now has right arm pain. Patient uses a cane and a walker. Denies any LOC or blood thinners, besides a baby aspirin.

## 2022-10-14 NOTE — ED NOTES
Hourly rounding complete. Patient resting in stretcher and is in NAD at this time. Pt is awake alert and oriented x4, VSS. Pt denies pain at this time. Pt updated on POC. Bed low and locked, SR up x2, call bell in patient reach. Pt voices no needs at this time, besides wanting to leave. MD updated.

## 2022-10-14 NOTE — DISCHARGE INSTRUCTIONS
Future Appointments   Date Time Provider Department Center   11/2/2022  1:30 PM Yousuf Champion Jr., MD [facility] [place]   12/13/2022 10:20 AM ANTONI Leigh MD [facility] [place]   12/19/2022  1:30 PM Pepper Whitfield MD [facility] [place] [facility] [facility]     Imaging Results              CTA Chest Non-Coronary (PE Studies) (Final result)  Result time 10/14/22 16:53:14      Final result by Jc Scanlon MD (10/14/22 16:53:14)                   Impression:      1. Allowing for motion artifact, no convincing pulmonary thromboembolism.  2. Partially loculated right pleural effusion with associated compressive atelectasis of the right lung particularly involving the right lower lobe.  There are a few air bronchograms within the regions of atelectasis, superimposed infectious consolidation is not excluded.  3. Stable focus of bandlike atelectasis or scarring within the right upper lobe.  4. Mosaic parenchymal attenuation throughout the pulmonary parenchyma suggests sequela of small airways disease or small vessel disease versus edema.  5. Please see above for several additional findings.      Electronically signed by: Jc Scanlon MD  Date:    10/14/2022  Time:    16:53               Narrative:    EXAMINATION:  CTA CHEST NON CORONARY (PE STUDIES)    CLINICAL HISTORY:  Pulmonary embolism (PE) suspected, unknown D-dimer;    TECHNIQUE:  Low dose axial images, sagittal and coronal reformations were obtained from the thoracic inlet to the lung bases following the IV administration of 75 mL of Omnipaque 350.  Contrast timing was optimized to evaluate the pulmonary arteries.  MIP images were performed.    COMPARISON:  Radiograph 08/15/2022, CT chest 09/29/2021    FINDINGS:  The structures base of the neck are remarkable for subcentimeter low attenuating lesions and calcification within the right lobe of the thyroid, further evaluation with ultrasound recommended on a nonemergent basis if not previously performed.   No significant mediastinal lymphadenopathy.  There is a small pericardial effusion.  The heart is not enlarged.  There is extensive atherosclerotic calcification of the aorta and in the distribution of the coronary arteries.  The thoracic aorta tapers normally.  There is infrarenal abdominal aortic ectasia measuring 2.2 cm.  Allowing for bolus timing, the visualized portions of the spleen, pancreas, gallbladder and liver are grossly unremarkable.  The gallbladder is absent.  There are low attenuating lesions arising from the kidneys, possibly reflecting cysts, attenuation measurement is inaccurate given artifact in the region.    Motion artifact limits evaluation of the airways and pulmonary parenchyma.  Allowing for this, the airways appear grossly patent.  There is bilateral emphysematous change.  There is scattered mosaic attenuation throughout the pulmonary parenchyma suggesting sequela of small airways disease or small vessel disease versus edema.  There is linear atelectasis versus consolidation within the right upper lobe noting a few scattered air bronchograms, similar to the previous examination.  There is a loculated right pleural effusion noting adjacent compressive atelectasis of the right lower lobe most significantly involving the posterior aspect where there are several air bronchograms.  There is left basilar dependent atelectasis.  No pneumothorax.    Bolus timing is adequate for evaluation of pulmonary thromboembolism.  Allowing for motion artifact, no convincing pulmonary arterial filling defect to the level of the segmental branches bilaterally to suggest pulmonary thromboembolism.    There is osteopenia.  There are degenerative changes of the spine.  No significant axillary lymphadenopathy.                                       X-Ray Elbow Complete Right (Final result)  Result time 10/14/22 14:25:09      Final result by Rafa Salazar MD (10/14/22 14:25:09)                   Impression:      See  above      Electronically signed by: Rafa Salazar MD  Date:    10/14/2022  Time:    14:25               Narrative:    EXAMINATION:  XR ELBOW COMPLETE 3 VIEW RIGHT    CLINICAL HISTORY:  . Unspecified fall, initial encounter    TECHNIQUE:  AP, lateral, and oblique views of the right elbow were performed.    COMPARISON:  None    FINDINGS:  DJD.  No definite fractures or dislocation.  No bone destruction identified.  Linear calcification posterior to the olecranon process probably an osteophyte which might have fractured at its attachment                                       CT Head Without Contrast (Final result)  Result time 10/14/22 14:05:12      Final result by Trever Carcamo DO (10/14/22 14:05:12)                   Impression:      No acute intracranial findings as detailed above specifically without evidence for acute intracranial hemorrhage or sulcal effacement to suggest large territory recent infarction.    Clinical correlation and further evaluation as warranted.      Electronically signed by: Trever Carcamo DO  Date:    10/14/2022  Time:    14:05               Narrative:    EXAMINATION:  CT HEAD WITHOUT CONTRAST    CLINICAL HISTORY:  Head trauma, minor (Age >= 65y);    TECHNIQUE:  Multiple sequential 5 mm axial images of the head without contrast.  Coronal and sagittal reformatted imaging from the axial acquisition.    COMPARISON:  MRI 07/07/2021    FINDINGS:  Generalized cerebral volume loss with compensatory enlargement ventricles sulci and cisterns similar to prior without evidence for hydrocephalus.    There is hypoattenuation slight volume loss in the left basal ganglia corresponds to remote infarction seen on MRI.  Patchy and confluent decreased attenuation elsewhere supratentorial white matter while nonspecific concerning for chronic ischemic change.    There is no evidence for acute intracranial hemorrhage or sulcal effacement to suggest large territory recent infarction.  Visualized paranasal  sinuses and mastoid air cells are clear.                                        CT Cervical Spine Without Contrast (Final result)  Result time 10/14/22 14:50:53      Final result by Tom Bliss MD (10/14/22 14:50:53)                   Impression:      No acute fracture or traumatic malalignment of the cervical spine.    Multilevel degenerative changes of the cervical spine with likely mild spinal canal stenosis at C6-7.    Centrilobular emphysema.    Lobulated consolidative opacity in the right apex, incompletely visualized and could represent atelectasis, infection, or pleural effusion.  Recommend chest radiograph for further evaluation.    This report was flagged in Epic as abnormal.    Electronically signed by resident: Gilberto Tellez  Date:    10/14/2022  Time:    14:07    Electronically signed by: Tom Bliss MD  Date:    10/14/2022  Time:    14:50               Narrative:    EXAMINATION:  CT CERVICAL SPINE WITHOUT CONTRAST    CLINICAL HISTORY:  Neck trauma (Age >= 65y);    TECHNIQUE:  Low dose axial images, sagittal and coronal reformations were performed though the cervical spine.  Contrast was not administered.    COMPARISON:  Chest radiograph 08/15/2022    FINDINGS:  Skull base and craniocervical junction (partially imaged): No significant abnormality.    Spinal alignment: Mild anterolisthesis of C4 on C5.  Otherwise vertebral bodies are well aligned..  Mild straightening of the normal cervical lordosis.    Vertebrae: Vertebral body heights are well maintained.  There is no evidence of fracture or dislocation.    Discs: Multilevel degenerative disc height loss most prominent at C6-7.    Multilevel facet hypertrophic arthropathy, posterior disc osteophyte complex, and uncovertebral spurring.  Likely mild spinal canal stenosis at C6-7.  Varying degrees of neural foraminal narrowing, most pronounced with severe left neural foraminal narrowing at C4-5.    Limited evaluation of the intraspinal  contents demonstrates no hematoma or mass.    Paraspinal soft tissues exhibit no acute abnormalities.    The soft tissue structures visualized in the neck are unremarkable.  Extensive atherosclerotic calcifications within the aortic arch, arch branch vessels, and the carotid arteries.  Focal calcification within the bilateral V3 segments of the vertebral arteries.    Centrilobular emphysematous changes within the lung apices.  Incompletely visualized slightly lobulated consolidative opacity layering along the posterior pleura, potentially atelectasis, infection, or pleural effusion.  The visualized portions of the brain demonstrate no significant abnormality.

## 2022-10-16 LAB — BACTERIA UR CULT: NORMAL

## 2022-10-18 ENCOUNTER — OFFICE VISIT (OUTPATIENT)
Dept: INTERNAL MEDICINE | Facility: CLINIC | Age: 87
End: 2022-10-18
Payer: MEDICARE

## 2022-10-18 VITALS
SYSTOLIC BLOOD PRESSURE: 150 MMHG | TEMPERATURE: 98 F | HEART RATE: 114 BPM | WEIGHT: 169.06 LBS | BODY MASS INDEX: 28.17 KG/M2 | HEIGHT: 65 IN | OXYGEN SATURATION: 96 % | DIASTOLIC BLOOD PRESSURE: 60 MMHG

## 2022-10-18 DIAGNOSIS — Z85.118 HISTORY OF LUNG CANCER: ICD-10-CM

## 2022-10-18 DIAGNOSIS — R09.81 CONGESTION OF PARANASAL SINUS: Primary | ICD-10-CM

## 2022-10-18 DIAGNOSIS — N76.0 RADIATION VAGINITIS: ICD-10-CM

## 2022-10-18 DIAGNOSIS — E11.8 CONTROLLED TYPE 2 DIABETES MELLITUS WITH COMPLICATION, WITHOUT LONG-TERM CURRENT USE OF INSULIN: ICD-10-CM

## 2022-10-18 DIAGNOSIS — Z85.048 HISTORY OF RECTAL OR ANAL CANCER: ICD-10-CM

## 2022-10-18 DIAGNOSIS — I10 HYPERTENSION, ESSENTIAL: ICD-10-CM

## 2022-10-18 PROCEDURE — 3288F PR FALLS RISK ASSESSMENT DOCUMENTED: ICD-10-PCS | Mod: CPTII,S$GLB,, | Performed by: PHYSICIAN ASSISTANT

## 2022-10-18 PROCEDURE — 1126F PR PAIN SEVERITY QUANTIFIED, NO PAIN PRESENT: ICD-10-PCS | Mod: CPTII,S$GLB,, | Performed by: PHYSICIAN ASSISTANT

## 2022-10-18 PROCEDURE — 1160F PR REVIEW ALL MEDS BY PRESCRIBER/CLIN PHARMACIST DOCUMENTED: ICD-10-PCS | Mod: CPTII,S$GLB,, | Performed by: PHYSICIAN ASSISTANT

## 2022-10-18 PROCEDURE — 99999 PR PBB SHADOW E&M-EST. PATIENT-LVL V: ICD-10-PCS | Mod: PBBFAC,,, | Performed by: PHYSICIAN ASSISTANT

## 2022-10-18 PROCEDURE — 1101F PT FALLS ASSESS-DOCD LE1/YR: CPT | Mod: CPTII,S$GLB,, | Performed by: PHYSICIAN ASSISTANT

## 2022-10-18 PROCEDURE — 99215 OFFICE O/P EST HI 40 MIN: CPT | Mod: S$GLB,,, | Performed by: PHYSICIAN ASSISTANT

## 2022-10-18 PROCEDURE — 99499 UNLISTED E&M SERVICE: CPT | Mod: S$GLB,,, | Performed by: PHYSICIAN ASSISTANT

## 2022-10-18 PROCEDURE — 1159F PR MEDICATION LIST DOCUMENTED IN MEDICAL RECORD: ICD-10-PCS | Mod: CPTII,S$GLB,, | Performed by: PHYSICIAN ASSISTANT

## 2022-10-18 PROCEDURE — 99215 PR OFFICE/OUTPT VISIT, EST, LEVL V, 40-54 MIN: ICD-10-PCS | Mod: S$GLB,,, | Performed by: PHYSICIAN ASSISTANT

## 2022-10-18 PROCEDURE — 1126F AMNT PAIN NOTED NONE PRSNT: CPT | Mod: CPTII,S$GLB,, | Performed by: PHYSICIAN ASSISTANT

## 2022-10-18 PROCEDURE — 99999 PR PBB SHADOW E&M-EST. PATIENT-LVL V: CPT | Mod: PBBFAC,,, | Performed by: PHYSICIAN ASSISTANT

## 2022-10-18 PROCEDURE — 1101F PR PT FALLS ASSESS DOC 0-1 FALLS W/OUT INJ PAST YR: ICD-10-PCS | Mod: CPTII,S$GLB,, | Performed by: PHYSICIAN ASSISTANT

## 2022-10-18 PROCEDURE — 3288F FALL RISK ASSESSMENT DOCD: CPT | Mod: CPTII,S$GLB,, | Performed by: PHYSICIAN ASSISTANT

## 2022-10-18 PROCEDURE — 1160F RVW MEDS BY RX/DR IN RCRD: CPT | Mod: CPTII,S$GLB,, | Performed by: PHYSICIAN ASSISTANT

## 2022-10-18 PROCEDURE — 1159F MED LIST DOCD IN RCRD: CPT | Mod: CPTII,S$GLB,, | Performed by: PHYSICIAN ASSISTANT

## 2022-10-18 RX ORDER — DOXYCYCLINE 100 MG/1
100 CAPSULE ORAL EVERY 12 HOURS
Qty: 20 CAPSULE | Refills: 0 | Status: SHIPPED | OUTPATIENT
Start: 2022-10-18 | End: 2022-11-07

## 2022-10-18 RX ORDER — PREDNISONE 10 MG/1
10 TABLET ORAL 2 TIMES DAILY
Qty: 10 TABLET | Refills: 0 | Status: SHIPPED | OUTPATIENT
Start: 2022-10-18 | End: 2022-11-07

## 2022-10-18 NOTE — PROGRESS NOTES
Subjective:       Patient ID: Brunilda England is a 89 y.o. female.        Chief Complaint: Follow-up    Brunilda England is an established patient of Pepper Whitfield MD here today for ED f/u visit.    Her today with her daughter, Haley.  She complains of ongoing issues with nasal congestion, blowing copious mucus from nose and coughing up mucus, unsure if worse than normal.  No fever.  Mucus is clear.  She took a course of prednisone 9/2022 and this did seem to help though temporarily.  She does not check her home blood sugar.  She is tachycardic.  She denies palpitations.  She has ongoing DONALDSON with no change.  No chest pain.  She was on metoprolol but that was tapered by cardiology 8/2022 due to junctional bradycardia.  She has been off of it for over a month.  She is not sure if she feels any different off of it.  She has cardiology f/u in 2 weeks.      She continues to have intermittent vaginal spotting but has NOT been using estrogen cream.    Seen in ED 10/14.  2 falls, one 10/10 and one 10/13.  The first fall she tripped while walking in her bedroom and hit her head.  The second fall was a fall out of bed.      She had right elbow pain.  X-ray showed a likely osteophyte that had fractured at its attachment.  Her elbow is doing much better with very little residual pain.  She has been applying muscle cream and resting the elbow.      She did hit her head during both falls.  She had CT scan of head and cervical spine with no bleed or fracture.  Abnormality of the lung partially visualized on CT cervical spine so CTA performed to better visualize lungs and also eval for PE given tachycardia.    No PE on CTA.  She has h/o non small cell lung cancer and completed cheomradiation 6/1/12.  She saw Dr. Weir 10/2021 who released her.    CTA 10/2022  There is bilateral emphysematous change.  There is scattered mosaic attenuation throughout the pulmonary parenchyma suggesting sequela of small airways disease or small  vessel disease versus edema.  There is linear atelectasis versus consolidation within the right upper lobe noting a few scattered air bronchograms, similar to the previous examination.  There is a loculated right pleural effusion noting adjacent compressive atelectasis of the right lower lobe most significantly involving the posterior aspect where there are several air bronchograms.  There is left basilar dependent atelectasis.  No pneumothorax.    CT 9/2021  Moderate to large sized right pleural effusion tracking along the fissure with associated lower lobe atelectasis.  The pleural fluid is incompletely dependent partially loculated.  There is progressive right lung volume loss and slight rightward mediastinal shift with persistent lower, middle, and right upper lobe consolidation in a predominantly perihilar distribution with air bronchograms and traction bronchiectasis.         Review of Systems   Constitutional:  Negative for chills, diaphoresis, fatigue and fever.   HENT:  Positive for congestion. Negative for sore throat.    Eyes:  Negative for visual disturbance.   Respiratory:  Positive for cough. Negative for chest tightness and shortness of breath.    Cardiovascular:  Negative for chest pain, palpitations and leg swelling.   Gastrointestinal:  Negative for abdominal pain, blood in stool, constipation, diarrhea, nausea and vomiting.   Genitourinary:  Negative for dysuria, frequency, hematuria and urgency.   Musculoskeletal:  Negative for arthralgias and back pain.   Skin:  Negative for rash.   Neurological:  Negative for dizziness, syncope, weakness and headaches.   Psychiatric/Behavioral:  Negative for dysphoric mood and sleep disturbance. The patient is not nervous/anxious.      Objective:      Physical Exam  Vitals and nursing note reviewed.   Constitutional:       Appearance: Normal appearance. She is well-developed.      Comments: Sitting in wheelchair   HENT:      Head: Normocephalic.      Right Ear:  External ear normal.      Left Ear: External ear normal.   Eyes:      Pupils: Pupils are equal, round, and reactive to light.   Cardiovascular:      Rate and Rhythm: Normal rate and regular rhythm.      Heart sounds: Normal heart sounds. No murmur heard.    No friction rub. No gallop.   Pulmonary:      Effort: Pulmonary effort is normal. No respiratory distress.      Breath sounds: Normal breath sounds.   Abdominal:      Palpations: Abdomen is soft.      Tenderness: There is no abdominal tenderness.   Musculoskeletal:      Comments: Right elbow non tender and with full ROM    Skin:     General: Skin is warm and dry.   Neurological:      Mental Status: She is alert.       Assessment:       1. Congestion of paranasal sinus    2. Hypertension, essential    3. History of lung cancer    4. Radiation vaginitis    5. History of rectal or anal cancer    6. Controlled type 2 diabetes mellitus with complication, without long-term current use of insulin        Plan:       Brunilda was seen today for follow-up.    Diagnoses and all orders for this visit:    Congestion of paranasal sinus  -     predniSONE (DELTASONE) 10 MG tablet; Take 1 tablet (10 mg total) by mouth 2 (two) times daily.  -     doxycycline (VIBRAMYCIN) 100 MG Cap; Take 1 capsule (100 mg total) by mouth every 12 (twelve) hours.    Hypertension, essential - keep BP log, monitor home BP  BP Readings from Last 5 Encounters:   10/18/22 (!) 150/60   10/14/22 (!) 151/74   09/20/22 136/60   08/27/22 136/80   08/15/22 (!) 161/65      History of lung cancer - discussed with Dr. Whitfield, will do dedicated chest CT in 2-3 weeks after completion of doxy as above as there was question of superimposed infection in review of CTA performed in ED and she has ongoing cough  -     CT Chest Without Contrast    Radiation vaginitis - use vaginal cream    History of rectal or anal cancer    Controlled type 2 diabetes mellitus with complication, without long-term current use of insulin  "- monitor blood sugar while on prednisone  Lab Results   Component Value Date    HGBA1C 6.3 (H) 07/09/2022    HGBA1C 6.5 (H) 04/14/2022    HGBA1C 6.1 (H) 12/07/2021     Tachycardia - discussed with Dr. Whitfield, will resume metoprolol 50 mg at lower dose of 1/2 tablet BID and monitor heart rate closely    >45 minutes spent on patient encounter  Will get CT in 2-3 weeks and f/u with Dr. REYES after CT complete    Pt has been given instructions populated from [employer] database and has verbalized understanding of the after visit summary and information contained wherein.    Follow up with a primary care provider. May go to ER for acute shortness of breath, lightheadedness, fever, or any other emergent complaints or changes in condition.    "This note will be shared with the patient"    Future Appointments   Date Time Provider Department Center   11/2/2022  1:30 PM Yousuf Champion Jr., MD [facility] [place]   12/13/2022 10:20 AM ANTONI Leigh MD [facility] [place]   12/19/2022  1:30 PM Pepper Whitfield MD [facility] [place] [facility]                   "

## 2022-10-20 ENCOUNTER — TELEPHONE (OUTPATIENT)
Dept: INTERNAL MEDICINE | Facility: CLINIC | Age: 87
End: 2022-10-20
Payer: MEDICARE

## 2022-10-20 RX ORDER — METOPROLOL TARTRATE 25 MG/1
25 TABLET, FILM COATED ORAL 2 TIMES DAILY
Qty: 60 TABLET | Refills: 11 | Status: SHIPPED | OUTPATIENT
Start: 2022-10-20 | End: 2023-04-03

## 2022-10-20 NOTE — TELEPHONE ENCOUNTER
Pt daughter advised, verbally understood. Pt daughter would like rx for metoprolol called to the pharmacy for her to .

## 2022-10-20 NOTE — TELEPHONE ENCOUNTER
Please call patient and her daughter  Let her know I discussed all with Dr. Whitfield  She needs to get a repeat chest CT in 2-3 weeks (ordered, please schedule) and then have a f/u with Dr. REYES once CT is complete, in about 3 weeks, Dr. REYES said she has some time on 11/10 she believes    Also, see if she still has her old metoprolol 50 mg to take BID  Dr. REYES would like her to resume at 1/2 tablet BID to see if that helps elevated heart rate    Let me know once complete  Thanks!   no indicators present

## 2022-10-28 ENCOUNTER — OUTPATIENT CASE MANAGEMENT (OUTPATIENT)
Dept: NEUROLOGY | Facility: CLINIC | Age: 87
End: 2022-10-28
Payer: MEDICARE

## 2022-11-03 ENCOUNTER — PATIENT MESSAGE (OUTPATIENT)
Dept: INTERNAL MEDICINE | Facility: CLINIC | Age: 87
End: 2022-11-03
Payer: MEDICARE

## 2022-11-03 ENCOUNTER — HOSPITAL ENCOUNTER (OUTPATIENT)
Dept: RADIOLOGY | Facility: HOSPITAL | Age: 87
Discharge: HOME | End: 2022-11-03
Attending: PHYSICIAN ASSISTANT
Payer: MEDICARE

## 2022-11-03 PROCEDURE — 71250 CT CHEST WITHOUT CONTRAST: ICD-10-PCS | Mod: 26,,, | Performed by: RADIOLOGY

## 2022-11-03 PROCEDURE — 71250 CT THORAX DX C-: CPT | Mod: 26,,, | Performed by: RADIOLOGY

## 2022-11-03 PROCEDURE — 71250 CT THORAX DX C-: CPT | Mod: TC

## 2022-11-07 ENCOUNTER — OFFICE VISIT (OUTPATIENT)
Dept: INTERNAL MEDICINE | Facility: CLINIC | Age: 87
End: 2022-11-07
Payer: MEDICARE

## 2022-11-07 VITALS
DIASTOLIC BLOOD PRESSURE: 60 MMHG | HEIGHT: 65 IN | OXYGEN SATURATION: 96 % | WEIGHT: 160.5 LBS | BODY MASS INDEX: 26.74 KG/M2 | HEART RATE: 83 BPM | SYSTOLIC BLOOD PRESSURE: 128 MMHG

## 2022-11-07 DIAGNOSIS — E78.00 PURE HYPERCHOLESTEROLEMIA: ICD-10-CM

## 2022-11-07 DIAGNOSIS — F39 MOOD DISORDER: Primary | ICD-10-CM

## 2022-11-07 DIAGNOSIS — I10 ESSENTIAL HYPERTENSION: ICD-10-CM

## 2022-11-07 DIAGNOSIS — G31.84 MILD COGNITIVE IMPAIRMENT: ICD-10-CM

## 2022-11-07 PROCEDURE — 1126F AMNT PAIN NOTED NONE PRSNT: CPT | Mod: CPTII,S$GLB,, | Performed by: INTERNAL MEDICINE

## 2022-11-07 PROCEDURE — 99999 PR PBB SHADOW E&M-EST. PATIENT-LVL IV: CPT | Mod: PBBFAC,,, | Performed by: INTERNAL MEDICINE

## 2022-11-07 PROCEDURE — 1126F PR PAIN SEVERITY QUANTIFIED, NO PAIN PRESENT: ICD-10-PCS | Mod: CPTII,S$GLB,, | Performed by: INTERNAL MEDICINE

## 2022-11-07 PROCEDURE — 99214 PR OFFICE/OUTPT VISIT, EST, LEVL IV, 30-39 MIN: ICD-10-PCS | Mod: S$GLB,,, | Performed by: INTERNAL MEDICINE

## 2022-11-07 PROCEDURE — 3288F FALL RISK ASSESSMENT DOCD: CPT | Mod: CPTII,S$GLB,, | Performed by: INTERNAL MEDICINE

## 2022-11-07 PROCEDURE — 1100F PTFALLS ASSESS-DOCD GE2>/YR: CPT | Mod: CPTII,S$GLB,, | Performed by: INTERNAL MEDICINE

## 2022-11-07 PROCEDURE — 1100F PR PT FALLS ASSESS DOC 2+ FALLS/FALL W/INJURY/YR: ICD-10-PCS | Mod: CPTII,S$GLB,, | Performed by: INTERNAL MEDICINE

## 2022-11-07 PROCEDURE — 99999 PR PBB SHADOW E&M-EST. PATIENT-LVL IV: ICD-10-PCS | Mod: PBBFAC,,, | Performed by: INTERNAL MEDICINE

## 2022-11-07 PROCEDURE — 99214 OFFICE O/P EST MOD 30 MIN: CPT | Mod: S$GLB,,, | Performed by: INTERNAL MEDICINE

## 2022-11-07 PROCEDURE — 3288F PR FALLS RISK ASSESSMENT DOCUMENTED: ICD-10-PCS | Mod: CPTII,S$GLB,, | Performed by: INTERNAL MEDICINE

## 2022-11-07 PROCEDURE — 1159F MED LIST DOCD IN RCRD: CPT | Mod: CPTII,S$GLB,, | Performed by: INTERNAL MEDICINE

## 2022-11-07 PROCEDURE — 1159F PR MEDICATION LIST DOCUMENTED IN MEDICAL RECORD: ICD-10-PCS | Mod: CPTII,S$GLB,, | Performed by: INTERNAL MEDICINE

## 2022-11-07 RX ORDER — NITROGLYCERIN 0.4 MG/1
0.4 TABLET SUBLINGUAL EVERY 5 MIN PRN
Qty: 25 TABLET | Refills: 0 | Status: SHIPPED | OUTPATIENT
Start: 2022-11-07 | End: 2024-03-12 | Stop reason: SDUPTHER

## 2022-11-07 NOTE — PROGRESS NOTES
CHIEF COMPLAINT:  Follow up of hypertension, copd, sinus congestion, diastolic dysfunction    HISTORY OF PRESENT ILLNESS: This is a 89-year-old woman who presents for follow up of above. Her daughter,  Haley is with her today    She has been sleeping more and has trouble staying awake.   No pain. Dyspnea on exertion is the same.  No nausea, vomiting, constiopation, dysuria, hematuria. Occasional diarrhea.    Her cough resolved after prednisone and doxycycline . CT chest last week was stable.     SHe is back taking metoprolol tartrate 25 mg twice daily for elevated heart rate.      Left hip is fine.       THe vaginal bleeding had stopped. She has a small spot of blood daily on her underwear.  She is NOT using Estradiol vaginal cream.  Haley did not know how to apply the cream.   No vaginal pain. She has a history of radiation to the rectum for her rectal cancer - had granulomatous area on the vagina..  She saw Dr Lorena Leach 8/7/2019 with biopsies that were benign. Nora demonstrated to Haley how to apply the cream and the cream is now being applied     She is seeing Dr Alanis in neurology on 3/3/2022 for her dementia - she is shaky in the morning.  Juice helps the shakiness.   She is now taking Memantine 5 mg just at night (was sleeping too much) and Donepezil 10 mg in the morning.      Appetite is ok.  Weight is stable.       No more lightheadedness.  She continues to take amlodipine - benazepril 5/20 one tablet daily.        She has been having sinus congestion that comes and goes. She has clear drainage.  She uses flonase 1 spray in each nostril once daily. No fever, chills, sore throat, nausea, vomiting     SHe continues to take duloxetine 60 mg daily and citalopram 10 mg daily. Mood has been good.  Neuropathic pain in the legs is better controlled with the duloxetine.  She has taken  xanax 0.5 mg twice since our last visit.       She had right leg pain - she had a blockage. Dr Gaviria placed a stent in  the right CFA and PTA left ext loulou artery 12/18. Right leg pain has resolved.  The ulcerated area on the right leg has totally healed and has not returned.  She is taking aspirin 81 mg daily.        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  Right hip pain has resolved and has not returned.          Her non small cell lung cancer is stable. She saw Dr Weir 910/4/2011 and scans were stable. She is 11 years out from her diagnosis     She is now prescribed Trajenta 5 mg daily for her diabetes.     She continues to take simvastatin 80 mg at bedtime for her hyperlipidemia. No joint pain or muscle pain.        She is off Fosamax once week for her bones.  No melana, bloody stools, constipation.        She takes vitamin B12 1000 mcg daily for vitamin B12 deficiency - anemia is stable        SHe has macular degeneration of the eyes and was followed by Dr Tresa Coreas. She got injections in her eyes every 6 weeks in Spring 2015. She now sees Dr Ruelas at [facility] (last saw him 8/18- no need for injections at that time- yearly follow up). Vision has been stable. She is taking a Eye vitamins for her eyes.       PAST MEDICAL HISTORY:    1. Stage III non-small cell lung cancer, completed chemoradiation with carboplatin and Taxol on 6/1/12    2. Hypertension.   3. Diabetes mellitus.   4. Hyperlipidemia.   5. Chronic diastolic dysfunction.   6. Coronary artery disease status post MI in 1990 and 2003. Stenting was   done at 2003 at [facility].   7. Cholecystectomy, December 2006.   8. Cataract removal.   9. Benign growth removed from her throat.   10. Left common femoral endarterectomy, July 2007.   11. History of anal cancer in 1996 status post radiation and chemotherapy.   12. Carpal tunnel syndrome.   13. Osteoporosis on BMD 12/11    14. Macular degeneration    SOCIAL HISTORY: She quit smoking in 1995, denies any alcohol use. She is etired.     REVIEW OF SYSTEMS: She denies  "fevers, chills, night sweats, fatigue, visual change, hearing loss, sinus congestion, sore throat, dysuria, hematuria, joint pain, muscle pain, polydipsia, and polyuria.     PHYSICAL EXAM:          /60   Pulse 83   Ht 5' 5" (1.651 m)   Wt 72.8 kg (160 lb 7.9 oz)   SpO2 96%   BMI 26.71 kg/m²     GENERAL: She is alert and oriented. No apparent distress. Affect within normal limits.   Conjunctivae anicteric.  TM clear  NECK: Supple.   Respiratory effort normal. Lungs clear to auscultation.   HEART: Regular rate and rhythm without murmurs, gallops, or rubs. No lower   extremity edema.         ASSESSMENT AND PLAN:    Lethargy - stop memantine and citalopram.  May need to eventually Decrease donepezil to 10 mg 1/2 tablet daily   Diabetes  continue the Tradjenta.   3. Hypertension with history of diastolic dyfunction on echo in 2006 - Follow up with cardiology. Heart rate is better on low dose metoprolol.  Suspect deconditioning could be playing a role in elevated heart rate.  Might need to increase metoprolol but since making other med changes tolday, will hold off on increasing this medication.  4.PVD - stable  5. Mood disorder - stop citalpram due to sedation  6. Memory issues - follow up with Dr Alanis. Stop memantine  7. Stage III non-small cell lung cancer, completed chemoradiation with carboplatin and Taxol on 6/1/12 - doing well.   8.   Hyperlipidemia-lipids controlled  10. Coronary artery disease modifying risk factors.   11. History of anal cancer-had a normal colonoscopy, November 2008; due 2015. Declined repeat  12. Pernicious anemia - on counter vitamin B12 1000 mcg daily  10. Osteoporosis - took alendroante for 7 years - 2012 to 2019.  Currently on a drug holiday.  BMD 5/2022 slightly worse.    11. Macular degeneration - stable  12.  VItamin D deficiency -  vitamin D 2000 units daily.   13. Vaginal bleeding - get back on estradiol cream twice weekly - if does not resolve, will get back to DR Leach for " additional biopsies     Screening mammogram was 12/19 - declines      I will see her back in 1 month as scheduled, sooner if problems arise\    See Princess Mojica in 2 weeks

## 2022-11-08 ENCOUNTER — PATIENT MESSAGE (OUTPATIENT)
Dept: INTERNAL MEDICINE | Facility: CLINIC | Age: 87
End: 2022-11-08
Payer: MEDICARE

## 2022-11-15 ENCOUNTER — OFFICE VISIT (OUTPATIENT)
Dept: CARDIOLOGY | Facility: CLINIC | Age: 87
End: 2022-11-15
Payer: MEDICARE

## 2022-11-15 VITALS
DIASTOLIC BLOOD PRESSURE: 58 MMHG | BODY MASS INDEX: 28.32 KG/M2 | HEART RATE: 84 BPM | SYSTOLIC BLOOD PRESSURE: 133 MMHG | WEIGHT: 170 LBS | HEIGHT: 65 IN

## 2022-11-15 DIAGNOSIS — I50.32 CHRONIC DIASTOLIC HEART FAILURE: Primary | ICD-10-CM

## 2022-11-15 DIAGNOSIS — E78.00 PURE HYPERCHOLESTEROLEMIA: ICD-10-CM

## 2022-11-15 DIAGNOSIS — I25.119 ATHEROSCLEROSIS OF NATIVE CORONARY ARTERY OF NATIVE HEART WITH ANGINA PECTORIS: ICD-10-CM

## 2022-11-15 DIAGNOSIS — I10 ESSENTIAL HYPERTENSION: ICD-10-CM

## 2022-11-15 PROCEDURE — 99999 PR PBB SHADOW E&M-EST. PATIENT-LVL III: CPT | Mod: PBBFAC,,, | Performed by: INTERNAL MEDICINE

## 2022-11-15 PROCEDURE — 1126F AMNT PAIN NOTED NONE PRSNT: CPT | Mod: CPTII,S$GLB,, | Performed by: INTERNAL MEDICINE

## 2022-11-15 PROCEDURE — 1126F PR PAIN SEVERITY QUANTIFIED, NO PAIN PRESENT: ICD-10-PCS | Mod: CPTII,S$GLB,, | Performed by: INTERNAL MEDICINE

## 2022-11-15 PROCEDURE — 99999 PR PBB SHADOW E&M-EST. PATIENT-LVL III: ICD-10-PCS | Mod: PBBFAC,,, | Performed by: INTERNAL MEDICINE

## 2022-11-15 PROCEDURE — 1159F MED LIST DOCD IN RCRD: CPT | Mod: CPTII,S$GLB,, | Performed by: INTERNAL MEDICINE

## 2022-11-15 PROCEDURE — 3288F PR FALLS RISK ASSESSMENT DOCUMENTED: ICD-10-PCS | Mod: CPTII,S$GLB,, | Performed by: INTERNAL MEDICINE

## 2022-11-15 PROCEDURE — 99213 PR OFFICE/OUTPT VISIT, EST, LEVL III, 20-29 MIN: ICD-10-PCS | Mod: S$GLB,,, | Performed by: INTERNAL MEDICINE

## 2022-11-15 PROCEDURE — 1100F PTFALLS ASSESS-DOCD GE2>/YR: CPT | Mod: CPTII,S$GLB,, | Performed by: INTERNAL MEDICINE

## 2022-11-15 PROCEDURE — 1159F PR MEDICATION LIST DOCUMENTED IN MEDICAL RECORD: ICD-10-PCS | Mod: CPTII,S$GLB,, | Performed by: INTERNAL MEDICINE

## 2022-11-15 PROCEDURE — 3288F FALL RISK ASSESSMENT DOCD: CPT | Mod: CPTII,S$GLB,, | Performed by: INTERNAL MEDICINE

## 2022-11-15 PROCEDURE — 1100F PR PT FALLS ASSESS DOC 2+ FALLS/FALL W/INJURY/YR: ICD-10-PCS | Mod: CPTII,S$GLB,, | Performed by: INTERNAL MEDICINE

## 2022-11-15 PROCEDURE — 99213 OFFICE O/P EST LOW 20 MIN: CPT | Mod: S$GLB,,, | Performed by: INTERNAL MEDICINE

## 2022-11-15 NOTE — PROGRESS NOTES
Subjective:   11/15/2022     Patient ID:  Brunilda England is a 89 y.o. female who presents for evaulation of Hypertension, Hyperlipidemia, Peripheral Vascular Disease, and Congestive Heart Failure      Patient seen for follow-up.  I had treated her for diastolic heart failure with Jardiance, subsequently discontinued.  I would also discontinued metoprolol, back on now due to sinus tachycardia, heart rate appears to be well controlled.    Blood pressure appears to be well controlled    Last LDL cholesterol 72 on moderate dose statin therapy, appears well controlled.        Prior note:   Patient here for evaluation of shortness of breath.  This occurs primarily with activity.  There is no PND or orthopnea.  She has not had chest pain.      Echocardiography does show overall normal left ventricular function, but there is mild pulmonary hypertension and elevation of central venous pressures.  Diastolic dysfunction appears to be present.  She does have an elevated BNP.    She gives a history of prior myocardial infarction; none recent.  Echocardiography does not show the presence of wall motion abnormalities.    Other medical problems include peripheral arterial disease, diabetes, history of tobacco abuse.    Recent echocardiogram:  · The left ventricle is normal in size with concentric remodeling and normal systolic function.  · There are segmental left ventricular wall motion abnormalities.  · The estimated ejection fraction is 55%.  · Grade II left ventricular diastolic dysfunction.  · Small circumferential pericardial effusion.  · Normal right ventricular size.  · Mild aortic regurgitation.  · Elevated central venous pressure (15 mmHg).  · The estimated PA systolic pressure is 41 mmHg.  · Mild tricuspid regurgitation.           Past Medical History:   Diagnosis Date    Anal cancer 1995    Anemia     Cancer 1995    anal cancer    Centrilobular emphysema 7/6/2020    Diabetes mellitus     With circulatory disorder     Lumbar radiculopathy 5/16/2014    Lung cancer 2012    s/p chemo/radiation    Macular degeneration     Macular hemorrhage of left eye     Neuropathy     Osteoporosis     Osteoporosis, unspecified 11/9/2012    Pure hypercholesterolemia        Review of patient's allergies indicates:   Allergen Reactions    Bextra [valdecoxib] Swelling    Gabapentin Diarrhea and Nausea Only         Current Outpatient Medications:     amlodipine-benazepril 5-20 mg (LOTREL) 5-20 mg per capsule, Take 1 capsule by mouth once daily., Disp: 90 capsule, Rfl: 3    aspirin (ECOTRIN) 81 MG EC tablet, Take 81 mg by mouth every morning., Disp: , Rfl:     cyanocobalamin (VITAMIN B-12) 1000 MCG tablet, Take 1,000 mcg by mouth every morning., Disp: , Rfl:     donepeziL (ARICEPT) 10 MG tablet, Take 1 tablet (10 mg total) by mouth every evening., Disp: 90 tablet, Rfl: 3    DULoxetine (CYMBALTA) 60 MG capsule, Take 1 capsule (60 mg total) by mouth every morning., Disp: 90 capsule, Rfl: 3    estradioL (ESTRACE) 0.01 % (0.1 mg/gram) vaginal cream, USE ONE-HALF GRAM VAGINALLY TWICE a WEEK, Disp: 42.5 g, Rfl: 3    linaGLIPtin (TRADJENTA) 5 mg Tab tablet, Take 1 tablet (5 mg total) by mouth once daily., Disp: 90 tablet, Rfl: 3    metoprolol tartrate (LOPRESSOR) 25 MG tablet, Take 1 tablet (25 mg total) by mouth 2 (two) times daily., Disp: 60 tablet, Rfl: 11    nitroGLYCERIN (NITROSTAT) 0.4 MG SL tablet, Place 1 tablet (0.4 mg total) under the tongue every 5 (five) minutes as needed., Disp: 25 tablet, Rfl: 0    simvastatin (ZOCOR) 80 MG tablet, Take 1 tablet (80 mg total) by mouth once daily., Disp: 90 tablet, Rfl: 4    vitamin D (VITAMIN D3) 1000 units Tab, Take 1,000 Units by mouth once daily., Disp: , Rfl:   No current facility-administered medications for this visit.    Facility-Administered Medications Ordered in Other Visits:     sodium chloride 0.9% flush 5 mL, 5 mL, Intravenous, PRN, Sarah Earl MD     Objective:   Review of Systems    Cardiovascular:  Positive for dyspnea on exertion. Negative for chest pain, claudication, cyanosis, irregular heartbeat, leg swelling, near-syncope, orthopnea, palpitations, paroxysmal nocturnal dyspnea and syncope.       Vitals:    11/15/22 1347   BP: (!) 133/58   Pulse: 84     Wt Readings from Last 3 Encounters:   11/15/22 77.1 kg (169 lb 15.6 oz)   11/07/22 72.8 kg (160 lb 7.9 oz)   10/18/22 76.7 kg (169 lb 1.5 oz)     Temp Readings from Last 3 Encounters:   10/18/22 98 °F (36.7 °C)   10/14/22 97.8 °F (36.6 °C) (Oral)   08/15/22 96.8 °F (36 °C)     BP Readings from Last 3 Encounters:   11/15/22 (!) 133/58   11/07/22 128/60   10/18/22 (!) 150/60     Pulse Readings from Last 3 Encounters:   11/15/22 84   11/07/22 83   10/18/22 (!) 114             Physical Exam  Vitals reviewed.   Constitutional:       General: She is not in acute distress.     Appearance: She is well-developed.   HENT:      Head: Normocephalic and atraumatic.      Nose: Nose normal.   Eyes:      Conjunctiva/sclera: Conjunctivae normal.      Pupils: Pupils are equal, round, and reactive to light.   Neck:      Vascular: No hepatojugular reflux or JVD.   Cardiovascular:      Rate and Rhythm: Normal rate and regular rhythm.      Pulses: Intact distal pulses.      Heart sounds: Normal heart sounds. No murmur heard.    No friction rub. No gallop.   Pulmonary:      Effort: Pulmonary effort is normal. No respiratory distress.      Breath sounds: Normal breath sounds. No wheezing or rales.   Chest:      Chest wall: No tenderness.   Abdominal:      General: Bowel sounds are normal. There is no distension.      Palpations: Abdomen is soft.      Tenderness: There is no abdominal tenderness.   Musculoskeletal:         General: No tenderness or deformity. Normal range of motion.      Cervical back: Normal range of motion and neck supple.      Right lower leg: No edema.      Left lower leg: No edema.   Skin:     General: Skin is warm and dry.      Findings: No  erythema or rash.   Neurological:      Mental Status: She is alert and oriented to person, place, and time.      Cranial Nerves: No cranial nerve deficit.      Motor: No abnormal muscle tone.      Coordination: Coordination normal.   Psychiatric:         Behavior: Behavior normal.         Thought Content: Thought content normal.         Judgment: Judgment normal.         Lab Results   Component Value Date    CHOL 162 04/25/2022    CHOL 160 04/13/2021    CHOL 156 05/21/2019     Lab Results   Component Value Date    HDL 68 04/25/2022    HDL 53 04/13/2021    HDL 54 05/21/2019     Lab Results   Component Value Date    LDLCALC 72.4 04/25/2022    LDLCALC 72.0 04/13/2021    LDLCALC 64.4 05/21/2019     Lab Results   Component Value Date    ALT 10 10/14/2022    AST 15 10/14/2022    AST 13 08/27/2022    AST 14 07/09/2022     Lab Results   Component Value Date    CREATININE 1.0 10/14/2022    BUN 9 10/14/2022     10/14/2022    K 3.8 10/14/2022    CO2 29 10/14/2022    CO2 28 08/27/2022    CO2 28 07/09/2022     Lab Results   Component Value Date    HGB 10.6 (L) 10/14/2022    HCT 31.8 (L) 10/14/2022    HCT 33.4 (L) 08/27/2022    HCT 33.8 (L) 07/09/2022     Component      Latest Ref Rng & Units 7/9/2022   WBC      3.90 - 12.70 K/uL 4.07   RBC      4.00 - 5.40 M/uL 3.74 (L)   Hemoglobin      12.0 - 16.0 g/dL 11.2 (L)   Hematocrit      37.0 - 48.5 % 33.8 (L)   MCV      82 - 98 fL 90   MCH      27.0 - 31.0 pg 29.9   MCHC      32.0 - 36.0 g/dL 33.1   RDW      11.5 - 14.5 % 15.9 (H)   Platelets      150 - 450 K/uL 167   MPV      9.2 - 12.9 fL 11.6   Immature Granulocytes      0.0 - 0.5 % 0.2   Gran # (ANC)      1.8 - 7.7 K/uL 2.5   Immature Grans (Abs)      0.00 - 0.04 K/uL 0.01   Lymph #      1.0 - 4.8 K/uL 1.1   Mono #      0.3 - 1.0 K/uL 0.3   Eos #      0.0 - 0.5 K/uL 0.1   Baso #      0.00 - 0.20 K/uL 0.02   nRBC      0 /100 WBC 0   Gran %      38.0 - 73.0 % 61.4   Lymph %      18.0 - 48.0 % 28.0   Mono %      4.0 - 15.0 %  8.4   Eosinophil %      0.0 - 8.0 % 1.5   Basophil %      0.0 - 1.9 % 0.5   Differential Method       Automated   Sodium      136 - 145 mmol/L 142   Potassium      3.5 - 5.1 mmol/L 4.3   Chloride      95 - 110 mmol/L 105   CO2      23 - 29 mmol/L 28   Glucose      70 - 110 mg/dL 238 (H)   BUN      8 - 23 mg/dL 11   Creatinine      0.5 - 1.4 mg/dL 1.1   Calcium      8.7 - 10.5 mg/dL 9.4   PROTEIN TOTAL      6.0 - 8.4 g/dL 7.2   Albumin      3.5 - 5.2 g/dL 3.5   BILIRUBIN TOTAL      0.1 - 1.0 mg/dL 0.3   Alkaline Phosphatase      55 - 135 U/L 53 (L)   AST      10 - 40 U/L 14   ALT      10 - 44 U/L 14   Anion Gap      8 - 16 mmol/L 9   eGFR if African American      >60 mL/min/1.73 m:2 51.1 (A)   eGFR if non African American      >60 mL/min/1.73 m:2 44.3 (A)   Hemoglobin A1C External      4.0 - 5.6 % 6.3 (H)   Estimated Avg Glucose      68 - 131 mg/dL 134 (H)   TSH      0.400 - 4.000 uIU/mL 2.393   Vit D, 25-Hydroxy      30 - 96 ng/mL 46   BNP      0 - 99 pg/mL 280 (H)                       Assessment and Plan:     Chronic diastolic heart failure  Comments:  Appears well controlled currently without therapy with Jardiance    Atherosclerosis of native coronary artery of native heart with angina pectoris  Comments:  Currently stable    Essential hypertension  Comments:  Blood pressure well controlled    Pure hypercholesterolemia  Comments:  LDL well controlled           No follow-ups on file.  Okay to return to clinic p.r.n.          Future Appointments   Date Time Provider Department Center   11/16/2022 11:30 AM Princess Mojica PA-C [facility] [place]   12/13/2022 10:20 AM ANTONI Leigh MD [facility] [place]   12/19/2022  1:30 PM Pepper Whitfield MD [facility] [place] [facility]

## 2022-11-16 ENCOUNTER — OFFICE VISIT (OUTPATIENT)
Dept: INTERNAL MEDICINE | Facility: CLINIC | Age: 87
End: 2022-11-16
Payer: MEDICARE

## 2022-11-16 VITALS
HEART RATE: 87 BPM | OXYGEN SATURATION: 97 % | WEIGHT: 164.56 LBS | DIASTOLIC BLOOD PRESSURE: 60 MMHG | BODY MASS INDEX: 29.16 KG/M2 | SYSTOLIC BLOOD PRESSURE: 134 MMHG | HEIGHT: 63 IN

## 2022-11-16 DIAGNOSIS — I10 ESSENTIAL HYPERTENSION: ICD-10-CM

## 2022-11-16 DIAGNOSIS — J43.2 CENTRILOBULAR EMPHYSEMA: Primary | ICD-10-CM

## 2022-11-16 DIAGNOSIS — G31.84 MILD COGNITIVE IMPAIRMENT: ICD-10-CM

## 2022-11-16 DIAGNOSIS — D64.9 ANEMIA, UNSPECIFIED TYPE: ICD-10-CM

## 2022-11-16 DIAGNOSIS — F39 MOOD DISORDER: ICD-10-CM

## 2022-11-16 DIAGNOSIS — Z85.118 HISTORY OF LUNG CANCER: ICD-10-CM

## 2022-11-16 PROCEDURE — 99999 PR PBB SHADOW E&M-EST. PATIENT-LVL IV: ICD-10-PCS | Mod: PBBFAC,,, | Performed by: PHYSICIAN ASSISTANT

## 2022-11-16 PROCEDURE — 1126F PR PAIN SEVERITY QUANTIFIED, NO PAIN PRESENT: ICD-10-PCS | Mod: CPTII,S$GLB,, | Performed by: PHYSICIAN ASSISTANT

## 2022-11-16 PROCEDURE — 3288F FALL RISK ASSESSMENT DOCD: CPT | Mod: CPTII,S$GLB,, | Performed by: PHYSICIAN ASSISTANT

## 2022-11-16 PROCEDURE — 3288F PR FALLS RISK ASSESSMENT DOCUMENTED: ICD-10-PCS | Mod: CPTII,S$GLB,, | Performed by: PHYSICIAN ASSISTANT

## 2022-11-16 PROCEDURE — 1159F MED LIST DOCD IN RCRD: CPT | Mod: CPTII,S$GLB,, | Performed by: PHYSICIAN ASSISTANT

## 2022-11-16 PROCEDURE — 1159F PR MEDICATION LIST DOCUMENTED IN MEDICAL RECORD: ICD-10-PCS | Mod: CPTII,S$GLB,, | Performed by: PHYSICIAN ASSISTANT

## 2022-11-16 PROCEDURE — 1101F PT FALLS ASSESS-DOCD LE1/YR: CPT | Mod: CPTII,S$GLB,, | Performed by: PHYSICIAN ASSISTANT

## 2022-11-16 PROCEDURE — 1101F PR PT FALLS ASSESS DOC 0-1 FALLS W/OUT INJ PAST YR: ICD-10-PCS | Mod: CPTII,S$GLB,, | Performed by: PHYSICIAN ASSISTANT

## 2022-11-16 PROCEDURE — 1160F RVW MEDS BY RX/DR IN RCRD: CPT | Mod: CPTII,S$GLB,, | Performed by: PHYSICIAN ASSISTANT

## 2022-11-16 PROCEDURE — 99999 PR PBB SHADOW E&M-EST. PATIENT-LVL IV: CPT | Mod: PBBFAC,,, | Performed by: PHYSICIAN ASSISTANT

## 2022-11-16 PROCEDURE — 1126F AMNT PAIN NOTED NONE PRSNT: CPT | Mod: CPTII,S$GLB,, | Performed by: PHYSICIAN ASSISTANT

## 2022-11-16 PROCEDURE — 1160F PR REVIEW ALL MEDS BY PRESCRIBER/CLIN PHARMACIST DOCUMENTED: ICD-10-PCS | Mod: CPTII,S$GLB,, | Performed by: PHYSICIAN ASSISTANT

## 2022-11-16 PROCEDURE — 99214 PR OFFICE/OUTPT VISIT, EST, LEVL IV, 30-39 MIN: ICD-10-PCS | Mod: S$GLB,,, | Performed by: PHYSICIAN ASSISTANT

## 2022-11-16 PROCEDURE — 99214 OFFICE O/P EST MOD 30 MIN: CPT | Mod: S$GLB,,, | Performed by: PHYSICIAN ASSISTANT

## 2022-11-16 RX ORDER — ALBUTEROL SULFATE 0.83 MG/ML
2.5 SOLUTION RESPIRATORY (INHALATION) EVERY 6 HOURS PRN
Qty: 90 EACH | Refills: 3 | Status: ON HOLD | OUTPATIENT
Start: 2022-11-16 | End: 2023-09-21 | Stop reason: HOSPADM

## 2022-11-16 NOTE — PROGRESS NOTES
Subjective:       Patient ID: Brunilda England is a 89 y.o. female.        Chief Complaint: Follow-up    Brunilda England is an established patient of Pepper Whitfield MD here today for follow up visit.    Here today with her daughter, Yuri.  She had a full body massage which helped all pain and was very enjoyable.      Last visit memantine and citalopram d/c secondary to lethargy.  Donepezil reduced to half dose.  Energy level much better.  No longer sleeping all day.      She has chronic intermittent cough that previously resolved with prednisone and doxy but now recurred.  She had f/u chest CT that was stable compared to prior.  Coughing again, lots of mucus production, mucus is clear.       Heart rate stable with resuming metoprolol 25 mg BID.  She was released by cardiology.       She follows with Dr. Alanis in neurology.     She continues to take duloxetine 60 mg daily.  Neuropathic pain in the legs is better controlled with the duloxetine.      Her non small cell lung cancer is stable. She follows with Dr. Weir.      She is now taking Tradjenta 5 mg daily for her diabetes.    She continues to take simvastatin 80 mg at bedtime for her hyperlipidemia. No joint pain or muscle pain.       She takes vitamin B12 1000 mcg daily for vitamin B12 deficiency - anemia is stable             Review of Systems   Constitutional:  Negative for chills, diaphoresis, fatigue and fever.   HENT:  Negative for congestion and sore throat.    Eyes:  Negative for visual disturbance.   Respiratory:  Positive for cough. Negative for chest tightness and shortness of breath.    Cardiovascular:  Negative for chest pain, palpitations and leg swelling.   Gastrointestinal:  Negative for abdominal pain, blood in stool, constipation, diarrhea, nausea and vomiting.   Genitourinary:  Negative for dysuria, frequency, hematuria and urgency.   Musculoskeletal:  Negative for arthralgias and back pain.   Skin:  Negative for rash.   Neurological:   Negative for dizziness, syncope, weakness and headaches.   Psychiatric/Behavioral:  Negative for dysphoric mood and sleep disturbance. The patient is not nervous/anxious.      Objective:      Physical Exam  Vitals and nursing note reviewed.   Constitutional:       Appearance: Normal appearance. She is well-developed.   HENT:      Head: Normocephalic.      Right Ear: External ear normal.      Left Ear: External ear normal.   Eyes:      Pupils: Pupils are equal, round, and reactive to light.   Cardiovascular:      Rate and Rhythm: Normal rate and regular rhythm.      Heart sounds: Normal heart sounds. No murmur heard.    No friction rub. No gallop.   Pulmonary:      Effort: Pulmonary effort is normal. No respiratory distress.      Breath sounds: Normal breath sounds.   Abdominal:      Palpations: Abdomen is soft.      Tenderness: There is no abdominal tenderness.   Skin:     General: Skin is warm and dry.   Neurological:      Mental Status: She is alert.       Assessment:       1. Centrilobular emphysema    2. Mild cognitive impairment    3. Mood disorder    4. Essential hypertension    5. History of lung cancer    6. Anemia, unspecified type          Plan:       Brunilda was seen today for follow-up.    Diagnoses and all orders for this visit:    Centrilobular emphysema - recommend scheduled mucinex  -     albuterol (PROVENTIL) 2.5 mg /3 mL (0.083 %) nebulizer solution; Take 3 mLs (2.5 mg total) by nebulization every 6 (six) hours as needed for Wheezing. Rescue  -     NEBULIZER FOR HOME USE    Mild cognitive impairment - stable, followed by Dr. Alanis, doing fine off memantine, energy level much improved    Mood disorder - improved off citalopram, energy level much better    Essential hypertension - stable and controlled  BP Readings from Last 5 Encounters:   11/16/22 134/60   11/15/22 (!) 133/58   11/07/22 128/60   10/18/22 (!) 150/60   10/14/22 (!) 151/74      History of lung cancer - recent CT stable    Anemia,  "unspecified type - continue b12    Pt has been given instructions populated from [employer] database and has verbalized understanding of the after visit summary and information contained wherein.    Follow up with a primary care provider. May go to ER for acute shortness of breath, lightheadedness, fever, or any other emergent complaints or changes in condition.    "This note will be shared with the patient"    Future Appointments   Date Time Provider Department Center   12/13/2022 10:20 AM ANTONI Leigh MD [facility] [place]   12/19/2022  1:30 PM Pepper Whitfield MD [facility] [place] PC[facility]                 "

## 2022-11-20 ENCOUNTER — PATIENT MESSAGE (OUTPATIENT)
Dept: INTERNAL MEDICINE | Facility: CLINIC | Age: 87
End: 2022-11-20
Payer: MEDICARE

## 2022-11-20 RX ORDER — DONEPEZIL HYDROCHLORIDE 5 MG/1
5 TABLET, FILM COATED ORAL NIGHTLY
Qty: 90 TABLET | Refills: 4 | Status: SHIPPED | OUTPATIENT
Start: 2022-11-20 | End: 2023-12-24

## 2022-11-21 ENCOUNTER — PATIENT MESSAGE (OUTPATIENT)
Dept: INTERNAL MEDICINE | Facility: CLINIC | Age: 87
End: 2022-11-21
Payer: MEDICARE

## 2022-11-21 NOTE — TELEPHONE ENCOUNTER
Spoke to daughter. States pt chest area is not bruised, there are no markings noted. Pt denies shortness of breath or pain with deep breath. Pt is walking on foot but with some discomfort. Daughter states it is only 3 toes on the left foot that are swollen, no bruising noted. States that her foot doesn't seem injured. Daughter states they ordered pt a bed rail as well as a baby monitor.

## 2022-11-21 NOTE — TELEPHONE ENCOUNTER
Please call the daughter and find out more information  Is she walking on the foot?  Any difficulty breathing?

## 2022-12-02 ENCOUNTER — OUTPATIENT CASE MANAGEMENT (OUTPATIENT)
Dept: NEUROLOGY | Facility: CLINIC | Age: 87
End: 2022-12-02
Payer: MEDICARE

## 2022-12-02 NOTE — PROGRESS NOTES
"LILLIAM spoke with pt's daughter/caregiver, Haley, to complete 7 month follow up call and 6 month questionnaires along with CTN, Chandni Ball.  CG reports still feeling overwhelmed by the scope of caring for her mother.  She started therapy, but was not satisfied with the service. She declined psychotherapy resources noting she would go through her insurance provider list. She said she wants talk therapy only, not a psychiatric eval. She started a support group, but "got off track" due to pt demands. CG said her sister Nora is there to help when something goes wrong, but is otherwise absent from pt's care. CG said pt's sister has moved to town and takes pt to [employer] on Sundays. CG said this is a big help, but indicated she needs more help.      CG said pt would refuse to go to a day program even though she loves to talk and visit with others. She said pt may be more receptive if she heard this suggestion from medical personnel.  (Chandni and LILLIAM will call pt Friday afternoon). Psychosocial support and advice on redirecting pt were offered to CG. CG was encouraged to call [facility] to inquire about their respite program.  According to CG pt has had no UTIs since last check in, but has had two falls, one resulting in a visit to the ED.  One of the falls involved pt falling out of bed.  CG now has a baby monitor in place and bedrails.  She said the bedrails are not working because in order to be effective they must be between pt and bathroom access.  Chandni and LILLIAM will troubleshoot this problem on Friday's phone call to pt.    CG was encouraged to call.    12/2 - At CG request LILLIAM called and spoke with pt.  LILLIAM outlined day program information for pt and discussed home safety/fall prevention.  Pt said she would discuss day program with CG and consider attending.  LILLIAM remains available.    "

## 2022-12-13 ENCOUNTER — OFFICE VISIT (OUTPATIENT)
Dept: OPHTHALMOLOGY | Facility: CLINIC | Age: 87
End: 2022-12-13
Payer: MEDICARE

## 2022-12-13 DIAGNOSIS — H43.813 POSTERIOR VITREOUS DETACHMENT, BOTH EYES: ICD-10-CM

## 2022-12-13 DIAGNOSIS — H35.033 HYPERTENSIVE RETINOPATHY OF BOTH EYES: ICD-10-CM

## 2022-12-13 DIAGNOSIS — H35.3232 EXUDATIVE AGE-RELATED MACULAR DEGENERATION OF BOTH EYES WITH INACTIVE CHOROIDAL NEOVASCULARIZATION: Primary | ICD-10-CM

## 2022-12-13 PROCEDURE — 1159F MED LIST DOCD IN RCRD: CPT | Mod: CPTII,S$GLB,, | Performed by: OPHTHALMOLOGY

## 2022-12-13 PROCEDURE — 1160F RVW MEDS BY RX/DR IN RCRD: CPT | Mod: CPTII,S$GLB,, | Performed by: OPHTHALMOLOGY

## 2022-12-13 PROCEDURE — 1160F PR REVIEW ALL MEDS BY PRESCRIBER/CLIN PHARMACIST DOCUMENTED: ICD-10-PCS | Mod: CPTII,S$GLB,, | Performed by: OPHTHALMOLOGY

## 2022-12-13 PROCEDURE — 1159F PR MEDICATION LIST DOCUMENTED IN MEDICAL RECORD: ICD-10-PCS | Mod: CPTII,S$GLB,, | Performed by: OPHTHALMOLOGY

## 2022-12-13 PROCEDURE — 92201 OPSCPY EXTND RTA DRAW UNI/BI: CPT | Mod: S$GLB,,, | Performed by: OPHTHALMOLOGY

## 2022-12-13 PROCEDURE — 1101F PT FALLS ASSESS-DOCD LE1/YR: CPT | Mod: CPTII,S$GLB,, | Performed by: OPHTHALMOLOGY

## 2022-12-13 PROCEDURE — 3288F PR FALLS RISK ASSESSMENT DOCUMENTED: ICD-10-PCS | Mod: CPTII,S$GLB,, | Performed by: OPHTHALMOLOGY

## 2022-12-13 PROCEDURE — 92134 CPTRZ OPH DX IMG PST SGM RTA: CPT | Mod: S$GLB,,, | Performed by: OPHTHALMOLOGY

## 2022-12-13 PROCEDURE — 92014 COMPRE OPH EXAM EST PT 1/>: CPT | Mod: S$GLB,,, | Performed by: OPHTHALMOLOGY

## 2022-12-13 PROCEDURE — 92134 POSTERIOR SEGMENT OCT RETINA (OCULAR COHERENCE TOMOGRAPHY)-BOTH EYES: ICD-10-PCS | Mod: S$GLB,,, | Performed by: OPHTHALMOLOGY

## 2022-12-13 PROCEDURE — 1101F PR PT FALLS ASSESS DOC 0-1 FALLS W/OUT INJ PAST YR: ICD-10-PCS | Mod: CPTII,S$GLB,, | Performed by: OPHTHALMOLOGY

## 2022-12-13 PROCEDURE — 1126F PR PAIN SEVERITY QUANTIFIED, NO PAIN PRESENT: ICD-10-PCS | Mod: CPTII,S$GLB,, | Performed by: OPHTHALMOLOGY

## 2022-12-13 PROCEDURE — 3288F FALL RISK ASSESSMENT DOCD: CPT | Mod: CPTII,S$GLB,, | Performed by: OPHTHALMOLOGY

## 2022-12-13 PROCEDURE — 92014 PR EYE EXAM, EST PATIENT,COMPREHESV: ICD-10-PCS | Mod: S$GLB,,, | Performed by: OPHTHALMOLOGY

## 2022-12-13 PROCEDURE — 99999 PR PBB SHADOW E&M-EST. PATIENT-LVL III: ICD-10-PCS | Mod: PBBFAC,,, | Performed by: OPHTHALMOLOGY

## 2022-12-13 PROCEDURE — 1126F AMNT PAIN NOTED NONE PRSNT: CPT | Mod: CPTII,S$GLB,, | Performed by: OPHTHALMOLOGY

## 2022-12-13 PROCEDURE — 99999 PR PBB SHADOW E&M-EST. PATIENT-LVL III: CPT | Mod: PBBFAC,,, | Performed by: OPHTHALMOLOGY

## 2022-12-13 PROCEDURE — 92201 PR OPHTHALMOSCOPY, EXT, W/RET DRAW/SCLERAL DEPR, I&R, UNI/BI: ICD-10-PCS | Mod: S$GLB,,, | Performed by: OPHTHALMOLOGY

## 2022-12-13 NOTE — PROGRESS NOTES
HPI    Patient here today for yearly AMD check. Worsening VA OS > OD, no pain or   f/f.    No gtts  Last edited by Anastasiia Beckett on 12/13/2022 10:36 AM.              OCT - Drusen, RPE atrophy, PED, some HE  No SRF OD, SRF OS improving and small SRH    Prior FA:  Shows window defect, no active leakage OU      A/P    1. Wet AMD OU  - H/o injections OU q3-4 months with Dr. Coreas  - No active leakage/edema OD cont to monitor  s/p Avastin OS x 2 here    With macular atrophy OS - conservative mgmt going forward    11/16 - had rebleed, but given poor prognosis, will monitor    2. HTN Ret OU    3. DM - No DR  BS/BP/chol contour    4. PCIOL OU    5. PVD OU    6. Chalazia RUL  Jonathan following      12 months OCT

## 2022-12-19 ENCOUNTER — OFFICE VISIT (OUTPATIENT)
Dept: INTERNAL MEDICINE | Facility: CLINIC | Age: 87
End: 2022-12-19
Payer: MEDICARE

## 2022-12-19 VITALS
HEIGHT: 63 IN | OXYGEN SATURATION: 95 % | HEART RATE: 88 BPM | WEIGHT: 163.69 LBS | BODY MASS INDEX: 29 KG/M2 | DIASTOLIC BLOOD PRESSURE: 72 MMHG | SYSTOLIC BLOOD PRESSURE: 138 MMHG

## 2022-12-19 DIAGNOSIS — Z00.00 ROUTINE GENERAL MEDICAL EXAMINATION AT A HEALTH CARE FACILITY: Primary | ICD-10-CM

## 2022-12-19 DIAGNOSIS — N95.2 ATROPHIC VAGINITIS: ICD-10-CM

## 2022-12-19 PROCEDURE — 99397 PR PREVENTIVE VISIT,EST,65 & OVER: ICD-10-PCS | Mod: GZ,S$GLB,, | Performed by: INTERNAL MEDICINE

## 2022-12-19 PROCEDURE — 99999 PR PBB SHADOW E&M-EST. PATIENT-LVL III: ICD-10-PCS | Mod: PBBFAC,,, | Performed by: INTERNAL MEDICINE

## 2022-12-19 PROCEDURE — 1126F AMNT PAIN NOTED NONE PRSNT: CPT | Mod: CPTII,S$GLB,, | Performed by: INTERNAL MEDICINE

## 2022-12-19 PROCEDURE — 3288F FALL RISK ASSESSMENT DOCD: CPT | Mod: CPTII,S$GLB,, | Performed by: INTERNAL MEDICINE

## 2022-12-19 PROCEDURE — 1159F MED LIST DOCD IN RCRD: CPT | Mod: CPTII,S$GLB,, | Performed by: INTERNAL MEDICINE

## 2022-12-19 PROCEDURE — 1126F PR PAIN SEVERITY QUANTIFIED, NO PAIN PRESENT: ICD-10-PCS | Mod: CPTII,S$GLB,, | Performed by: INTERNAL MEDICINE

## 2022-12-19 PROCEDURE — 99999 PR PBB SHADOW E&M-EST. PATIENT-LVL III: CPT | Mod: PBBFAC,,, | Performed by: INTERNAL MEDICINE

## 2022-12-19 PROCEDURE — 3288F PR FALLS RISK ASSESSMENT DOCUMENTED: ICD-10-PCS | Mod: CPTII,S$GLB,, | Performed by: INTERNAL MEDICINE

## 2022-12-19 PROCEDURE — 1159F PR MEDICATION LIST DOCUMENTED IN MEDICAL RECORD: ICD-10-PCS | Mod: CPTII,S$GLB,, | Performed by: INTERNAL MEDICINE

## 2022-12-19 PROCEDURE — 1101F PT FALLS ASSESS-DOCD LE1/YR: CPT | Mod: CPTII,S$GLB,, | Performed by: INTERNAL MEDICINE

## 2022-12-19 PROCEDURE — 99397 PER PM REEVAL EST PAT 65+ YR: CPT | Mod: GZ,S$GLB,, | Performed by: INTERNAL MEDICINE

## 2022-12-19 PROCEDURE — 1101F PR PT FALLS ASSESS DOC 0-1 FALLS W/OUT INJ PAST YR: ICD-10-PCS | Mod: CPTII,S$GLB,, | Performed by: INTERNAL MEDICINE

## 2022-12-19 RX ORDER — ESTRADIOL 0.1 MG/G
CREAM VAGINAL
Qty: 42.5 G | Refills: 3 | Status: SHIPPED | OUTPATIENT
Start: 2022-12-19

## 2022-12-19 NOTE — PROGRESS NOTES
CHIEF COMPLAINT:  Follow up of hypertension, copd, sinus congestion, diastolic dysfunction    HISTORY OF PRESENT ILLNESS: This is a 89-year-old woman who presents for follow up of above. Her daughter,  Nora is with her today    FOr the last several days, she  has had right shoulder pain  - aching pain - when the weather changes. S he has pain in the CMC joint of the right thumb. Aching pain. She has been using aspercreme and heat which has been helping.      At her last visit with me on 11/7/2022, I stopped memantine and citalopram. Since then she has been more alert. She is not sedated. She is now taking donepezil 5 mg once daily for her memory    No cough.   She has some dyspnea on exertion.  CT chest last week was stable.      SHe is back taking metoprolol tartrate 25 mg twice daily for elevated heart rate.      Left hip is fine.       THe vaginal bleeding had stopped. She has a small spot of blood daily on her underwear.  She is NOT using Estradiol vaginal cream.  Haley did not know how to apply the cream.   No vaginal pain. She has a history of radiation to the rectum for her rectal cancer - had granulomatous area on the vagina..  She saw Dr Lorena Leach 8/7/2019 with biopsies that were benign. Nora demonstrated to Haley how to apply the cream and the cream is now being applied     She is seeing Dr Alanis in neurology on 3/3/2022 for her dementia - she is shaky in the morning.  Juice helps the shakiness.   She is now taking Memantine 5 mg just at night (was sleeping too much) and Donepezil 10 mg in the morning.      Appetite is ok.  Weight is stable.       She continues to take amlodipine - benazepril 5/20 one tablet daily and metoprolol tartrate 25 mg twice daliy        She has been having sinus congestion that comes and goes. She has clear drainage.  She uses flonase 1 spray in each nostril once daily. No fever, chills, sore throat, nausea, vomiting     SHe continues to take duloxetine 60 mg daily Mood  has been good.  Neuropathic pain in the legs is better controlled with the duloxetine.  She has taken  xanax 0.5 mg twice since our last visit.       She had right leg pain - she had a blockage. Dr Gaviria placed a stent in the right CFA and PTA left ext loulou artery 12/18. Right leg pain has resolved.  The ulcerated area on the right leg has totally healed and has not returned.  She is taking aspirin 81 mg daily.        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  Right hip pain has resolved and has not returned.          Her non small cell lung cancer is stable. She saw Dr Weir 910/4/2011 and scans were stable. She is 11 years out from her diagnosis     She is now prescribed Trajenta 5 mg daily for her diabetes.     She continues to take simvastatin 80 mg at bedtime for her hyperlipidemia. No joint pain or muscle pain.        She is off Fosamax once week for her bones.  No melana, bloody stools, constipation.        She takes vitamin B12 1000 mcg daily for vitamin B12 deficiency - anemia is stable        SHe has macular degeneration of the eyes and was followed by Dr Tresa Coreas. She got injections in her eyes every 6 weeks in Spring 2015. She now sees Dr Ruelas at [facility] (last saw him 8/18- no need for injections at that time- yearly follow up). Vision has been stable. She is taking a Eye vitamins for her eyes.       PAST MEDICAL HISTORY:    1. Stage III non-small cell lung cancer, completed chemoradiation with carboplatin and Taxol on 6/1/12    2. Hypertension.   3. Diabetes mellitus.   4. Hyperlipidemia.   5. Chronic diastolic dysfunction.   6. Coronary artery disease status post MI in 1990 and 2003. Stenting was   done at 2003 at [facility].   7. Cholecystectomy, December 2006.   8. Cataract removal.   9. Benign growth removed from her throat.   10. Left common femoral endarterectomy, July 2007.   11. History of anal cancer in 1996 status post radiation  "and chemotherapy.   12. Carpal tunnel syndrome.   13. Osteoporosis on BMD 12/11    14. Macular degeneration    SOCIAL HISTORY: She quit smoking in 1995, denies any alcohol use. She is etired.     REVIEW OF SYSTEMS: She denies fevers, chills, night sweats, fatigue, visual change, hearing loss, sinus congestion, sore throat, dysuria, hematuria, joint pain, muscle pain, polydipsia, and polyuria.     PHYSICAL EXAM:        BP (!) 140/72 (BP Location: Right arm, Patient Position: Sitting)   Pulse (!) 119   Ht 5' 3" (1.6 m)   Wt 74.2 kg (163 lb 11.1 oz)   SpO2 95%   BMI 29.00 kg/m²          GENERAL: She is alert and oriented. No apparent distress. Affect within normal limits.   Conjunctivae anicteric.  TM clear  NECK: Supple.   Respiratory effort normal. Lungs clear to auscultation.   HEART: Regular rate and rhythm without murmurs, gallops, or rubs. No lower   extremity edema.         ASSESSMENT AND PLAN:      Annual exam - discussed diet, exercise and safety issues.  Diabetes  continue the Tradjenta.   3. Hypertension with history of diastolic dyfunction on echo in 2006 - Follow up with cardiology. Heart rate is better on low dose metoprolol.   4.PVD - stable  5. Mood disorder - stable  6. Memory issues - follow up with Dr Alanis. off memantine  7. Stage III non-small cell lung cancer, completed chemoradiation with carboplatin and Taxol on 6/1/12 - doing well.   8.   Hyperlipidemia-lipids controlled  10. Coronary artery disease modifying risk factors.   11. History of anal cancer-had a normal colonoscopy, November 2008; due 2015. Declined repeat  12. Pernicious anemia - on counter vitamin B12 1000 mcg daily  10. Osteoporosis - took alendroante for 7 years - 2012 to 2019.  Currently on a drug holiday.  BMD 5/2022 slightly worse.    11. Macular degeneration - stable  12.  VItamin D deficiency -  vitamin D 2000 units daily.   13. Vaginal bleeding - get back on estradiol cream twice weekly - if does not resolve, will get " back to DR Leach for additional biopsies     Screening mammogram was 12/19 - declines      I will see her back in 4 month as scheduled, sooner if problems arise\

## 2022-12-29 ENCOUNTER — OUTPATIENT CASE MANAGEMENT (OUTPATIENT)
Dept: NEUROLOGY | Facility: CLINIC | Age: 87
End: 2022-12-29
Payer: MEDICARE

## 2022-12-29 NOTE — PROGRESS NOTES
"SW called CG for 8 month care eco follow up.  She was unavailable due to an appt and asked to be called tomorrow at 11AM.  SW will call tomorrow.   12/30/22-SW made [employer], 8th month follow up phone call to CG, Haley, who reported that her stress level has decreased as additional support are in place. She said she has had better communication with the pt, "watching how I say things." She said this has reduced her stress and allowed for more effective communication with pt.  She said her niece, who is also a nurse, has been advising her and this has been helpful.  She also noted that someone from the [place] [facility] has come to their home and completed an assessment. A COA sitter will begin sitting with the pt a few hours per week.  In addition, [facility] will be providing home services.  Pt's  was a  and her PCP referred her to this home service program.  Pt had no falls or UTIs this past month.  At CG request SW will send email with direct contact information.  She was encouraged to reach out with any future needs.  "

## 2023-01-23 ENCOUNTER — OUTPATIENT CASE MANAGEMENT (OUTPATIENT)
Dept: NEUROLOGY | Facility: CLINIC | Age: 88
End: 2023-01-23
Payer: MEDICARE

## 2023-01-25 ENCOUNTER — TELEPHONE (OUTPATIENT)
Dept: INTERNAL MEDICINE | Facility: CLINIC | Age: 88
End: 2023-01-25
Payer: MEDICARE

## 2023-01-25 NOTE — TELEPHONE ENCOUNTER
----- Message from Claudia Bro sent at 1/25/2023  9:58 AM CST -----  Contact: Gely-- [phone] ext [phone]  [email or web address]     Patient is calling for Medical Advice regarding:    Status of Department [facility] form. Requesting pt restrictions and diagnoses.     Would like response via [employer]:  call back    Comments:   Gely--from [facility] states that the form was faxed over on 1.24.23 to fax #[phone]. She is requesting to have the form faxed back to her [phone].

## 2023-01-27 ENCOUNTER — PATIENT MESSAGE (OUTPATIENT)
Dept: INTERNAL MEDICINE | Facility: CLINIC | Age: 88
End: 2023-01-27
Payer: MEDICARE

## 2023-01-27 ENCOUNTER — TELEPHONE (OUTPATIENT)
Dept: INTERNAL MEDICINE | Facility: CLINIC | Age: 88
End: 2023-01-27
Payer: MEDICARE

## 2023-01-27 RX ORDER — ALPRAZOLAM 0.5 MG/1
0.5 TABLET ORAL 2 TIMES DAILY PRN
Qty: 30 TABLET | Refills: 0 | Status: ON HOLD | OUTPATIENT
Start: 2023-01-27 | End: 2023-05-22 | Stop reason: HOSPADM

## 2023-01-27 NOTE — TELEPHONE ENCOUNTER
----- Message from Prateek Smallwood sent at 1/27/2023 10:04 AM CST -----  Contact: Kelly Gely [phone]  [email or web address]      Patient is calling for Medical Advice regarding:     Status of Department Veterans [facility] form. Requesting pt restrictions and diagnoses.      Would like response via [employer]:  call back     Comments:   Gely--from [facility] states that the form was faxed over on 1.24.23 to fax #[phone]. She is requesting to have the form faxed back to her [phone].Gely said she needs a call back about this.

## 2023-01-28 ENCOUNTER — PATIENT MESSAGE (OUTPATIENT)
Dept: INTERNAL MEDICINE | Facility: CLINIC | Age: 88
End: 2023-01-28
Payer: MEDICARE

## 2023-01-28 ENCOUNTER — OFFICE VISIT (OUTPATIENT)
Dept: URGENT CARE | Facility: CLINIC | Age: 88
End: 2023-01-28
Payer: MEDICARE

## 2023-01-28 VITALS
TEMPERATURE: 98 F | SYSTOLIC BLOOD PRESSURE: 149 MMHG | OXYGEN SATURATION: 99 % | HEART RATE: 125 BPM | HEIGHT: 63 IN | RESPIRATION RATE: 19 BRPM | WEIGHT: 163.56 LBS | BODY MASS INDEX: 28.98 KG/M2 | DIASTOLIC BLOOD PRESSURE: 71 MMHG

## 2023-01-28 DIAGNOSIS — R31.9 URINARY TRACT INFECTION WITH HEMATURIA, SITE UNSPECIFIED: ICD-10-CM

## 2023-01-28 DIAGNOSIS — N39.0 URINARY TRACT INFECTION WITH HEMATURIA, SITE UNSPECIFIED: ICD-10-CM

## 2023-01-28 DIAGNOSIS — R09.81 CONGESTION OF NASAL SINUS: Primary | ICD-10-CM

## 2023-01-28 LAB
BILIRUB UR QL STRIP: NEGATIVE
GLUCOSE UR QL STRIP: NEGATIVE
KETONES UR QL STRIP: POSITIVE
LEUKOCYTE ESTERASE UR QL STRIP: NEGATIVE
PH, POC UA: 5 (ref 5–8)
POC BLOOD, URINE: POSITIVE
POC NITRATES, URINE: NEGATIVE
PROT UR QL STRIP: POSITIVE
SP GR UR STRIP: 1.02 (ref 1–1.03)
UROBILINOGEN UR STRIP-ACNC: ABNORMAL (ref 0.1–1.1)

## 2023-01-28 PROCEDURE — 99214 PR OFFICE/OUTPT VISIT, EST, LEVL IV, 30-39 MIN: ICD-10-PCS | Mod: S$GLB,,, | Performed by: FAMILY MEDICINE

## 2023-01-28 PROCEDURE — 81003 POCT URINALYSIS, DIPSTICK, AUTOMATED, W/O SCOPE: ICD-10-PCS | Mod: QW,S$GLB,, | Performed by: FAMILY MEDICINE

## 2023-01-28 PROCEDURE — 1126F AMNT PAIN NOTED NONE PRSNT: CPT | Mod: CPTII,S$GLB,, | Performed by: FAMILY MEDICINE

## 2023-01-28 PROCEDURE — 1159F PR MEDICATION LIST DOCUMENTED IN MEDICAL RECORD: ICD-10-PCS | Mod: CPTII,S$GLB,, | Performed by: FAMILY MEDICINE

## 2023-01-28 PROCEDURE — 1159F MED LIST DOCD IN RCRD: CPT | Mod: CPTII,S$GLB,, | Performed by: FAMILY MEDICINE

## 2023-01-28 PROCEDURE — 81003 URINALYSIS AUTO W/O SCOPE: CPT | Mod: QW,S$GLB,, | Performed by: FAMILY MEDICINE

## 2023-01-28 PROCEDURE — 1101F PT FALLS ASSESS-DOCD LE1/YR: CPT | Mod: CPTII,S$GLB,, | Performed by: FAMILY MEDICINE

## 2023-01-28 PROCEDURE — 1160F PR REVIEW ALL MEDS BY PRESCRIBER/CLIN PHARMACIST DOCUMENTED: ICD-10-PCS | Mod: CPTII,S$GLB,, | Performed by: FAMILY MEDICINE

## 2023-01-28 PROCEDURE — 3288F PR FALLS RISK ASSESSMENT DOCUMENTED: ICD-10-PCS | Mod: CPTII,S$GLB,, | Performed by: FAMILY MEDICINE

## 2023-01-28 PROCEDURE — 1101F PR PT FALLS ASSESS DOC 0-1 FALLS W/OUT INJ PAST YR: ICD-10-PCS | Mod: CPTII,S$GLB,, | Performed by: FAMILY MEDICINE

## 2023-01-28 PROCEDURE — 1126F PR PAIN SEVERITY QUANTIFIED, NO PAIN PRESENT: ICD-10-PCS | Mod: CPTII,S$GLB,, | Performed by: FAMILY MEDICINE

## 2023-01-28 PROCEDURE — 99214 OFFICE O/P EST MOD 30 MIN: CPT | Mod: S$GLB,,, | Performed by: FAMILY MEDICINE

## 2023-01-28 PROCEDURE — 3288F FALL RISK ASSESSMENT DOCD: CPT | Mod: CPTII,S$GLB,, | Performed by: FAMILY MEDICINE

## 2023-01-28 PROCEDURE — 1160F RVW MEDS BY RX/DR IN RCRD: CPT | Mod: CPTII,S$GLB,, | Performed by: FAMILY MEDICINE

## 2023-01-28 RX ORDER — NITROFURANTOIN 25; 75 MG/1; MG/1
100 CAPSULE ORAL 2 TIMES DAILY
Qty: 14 CAPSULE | Refills: 0 | Status: SHIPPED | OUTPATIENT
Start: 2023-01-28 | End: 2023-02-04

## 2023-01-28 NOTE — PROGRESS NOTES
"Subjective:       Patient ID: Brunilda England is a 89 y.o. female.    Vitals:  height is 5' 3" (1.6 m) and weight is 74.2 kg (163 lb 9.3 oz). Her oral temperature is 98.4 °F (36.9 °C). Her blood pressure is 149/71 (abnormal) and her pulse is 125 (abnormal). Her respiration is 19 and oxygen saturation is 99%.     Chief Complaint: Urinary Tract Infection    Patient reports to the clinic with the compliant of a possible UTI that presented on last night. Patient's daughter reports that patient seemed a little confused and out of it on last night, she then reached out to patient's pcp whom instructed her to bring patient into the clinic to be tested for a possible uti. Patient also reports some congestion that has been off and on for a couple of days, she reports with taking mucinex, however her symptoms are minimal.    Urinary Tract Infection   This is a new problem. The current episode started yesterday. The problem occurs every urination. The problem has been unchanged. The pain is at a severity of 0/10. The patient is experiencing no pain. There has been no fever. She is Not sexually active. There is No history of pyelonephritis. Pertinent negatives include no behavior changes, chills, discharge, flank pain, frequency, hematuria, hesitancy, nausea, possible pregnancy, sweats, urgency, vomiting, weight loss, bubble bath use, constipation, rash or withholding. She has tried NSAIDs for the symptoms. The treatment provided no relief. Her past medical history is significant for hypertension. There is no history of catheterization, diabetes insipidus, diabetes mellitus, genitourinary reflux, kidney stones, recurrent UTIs, a single kidney, STD, urinary stasis or a urological procedure.     Constitution: Negative for chills.   Gastrointestinal:  Negative for nausea, vomiting and constipation.   Genitourinary:  Negative for frequency, urgency, flank pain and hematuria.   Skin:  Negative for rash.   Neurological:  Positive for " disorientation.   Psychiatric/Behavioral:  Positive for disorientation.      Objective:      Physical Exam   Constitutional: She is oriented to person, place, and time. She appears well-developed. She is cooperative.  Non-toxic appearance. She does not appear ill. No distress.   HENT:   Head: Normocephalic and atraumatic.   Ears:   Right Ear: Hearing, tympanic membrane, external ear and ear canal normal.   Left Ear: Hearing, tympanic membrane, external ear and ear canal normal.   Nose: Nose normal. No mucosal edema, rhinorrhea or nasal deformity. No epistaxis. Right sinus exhibits no maxillary sinus tenderness and no frontal sinus tenderness. Left sinus exhibits no maxillary sinus tenderness and no frontal sinus tenderness.   Mouth/Throat: Uvula is midline, oropharynx is clear and moist and mucous membranes are normal. No trismus in the jaw. Normal dentition. No uvula swelling. No oropharyngeal exudate, posterior oropharyngeal edema or posterior oropharyngeal erythema.   Eyes: Conjunctivae and lids are normal. No scleral icterus.   Neck: Trachea normal and phonation normal. Neck supple. No edema present. No erythema present. No neck rigidity present.   Cardiovascular: Normal rate, regular rhythm, normal heart sounds and normal pulses.   Pulmonary/Chest: Effort normal and breath sounds normal. No respiratory distress. She has no decreased breath sounds. She has no rhonchi.   Abdominal: Normal appearance and bowel sounds are normal. She exhibits no distension. Soft. There is no abdominal tenderness.   Musculoskeletal: Normal range of motion.         General: No deformity. Normal range of motion.   Neurological: She is alert and oriented to person, place, and time. She exhibits normal muscle tone. Coordination normal.   Skin: Skin is warm, dry, intact, not diaphoretic and not pale.   Psychiatric: Her speech is normal and behavior is normal. Judgment and thought content normal.   Nursing note and vitals reviewed.       Assessment:       1. Congestion of nasal sinus    2. Urinary tract infection with hematuria, site unspecified          Plan:         Congestion of nasal sinus  -     POCT Urinalysis, Dipstick, Automated, W/O Scope    Urinary tract infection with hematuria, site unspecified    Other orders  -     nitrofurantoin, macrocrystal-monohydrate, (MACROBID) 100 MG capsule; Take 1 capsule (100 mg total) by mouth 2 (two) times daily. for 7 days  Dispense: 14 capsule; Refill: 0

## 2023-01-31 NOTE — PROGRESS NOTES
Spoke to caregiver (Haley) for monthly visit. She reports patient is being treated for an UTI at this time, caregiver is making sure she drinks enough water and cranberry juice following PCPs recommendations. She will message PCP because patient is having stomach aches related to the antibiotic.     Caregiver is feeling somewhat better and less burdened since her niece (RN who lives in [place]) has been providing more support. She will come visit and will also check in via phone. Caregiver has a VA advisor who is helping them apply for in-home services through the VA. Caregiver is completing the paperwork and knows how to reach her advisor if needed. [place] on Aging sitter service started this past week, they provide 4 hours twice a month. Encouraged caregiver to reach out if needed before our next monthly check in.

## 2023-02-27 ENCOUNTER — OUTPATIENT CASE MANAGEMENT (OUTPATIENT)
Dept: NEUROLOGY | Facility: CLINIC | Age: 88
End: 2023-02-27
Payer: MEDICARE

## 2023-03-01 NOTE — PROGRESS NOTES
Spoke to caregiver (Yuri - daughter) for monthly visit. She reports patient is doing well overall, completed antibiotics for UTI that was identified last month, no falls or hospital encounters reported. Caregiver shares that she hired a lady to help with meal preparation during the week, patient has not been wanting to eat this food as she used to eat takeout before, behavioral strategies were discussed with caregiver, she will implement and we will follow up with a phone call on 3/10 to reassess if needed.     Caregiver provides update regarding VA process, per VA advisor they should start receiving in-home help this month.

## 2023-03-13 NOTE — PROGRESS NOTES
Spoke to daughter (Haley) to follow up on previous conversation on 2/27. Caregiver was going to implement strategies to get patient to eat appropriately, she shares that the strategies did not work. Patient did not give her approval to hire a cook, caregiver is frustrated and upset, she is also concerned that patient is not eating nutritiously. Education about dementia and decision making was provided, caregiver understands she must not trust patient's decision making but patient resists having caregiver make decisions for her, she has always been strong and independent. Patient is able to go to the fridge and select what she wants to eat, caregiver willing to try Meals on Wheels and have those in the fridge for patient. CTN placed referral to the [place] [facility] Aging. Caregiver knows how to reach if needed before our next scheduled call early April.

## 2023-04-03 ENCOUNTER — OFFICE VISIT (OUTPATIENT)
Dept: INTERNAL MEDICINE | Facility: CLINIC | Age: 88
End: 2023-04-03
Payer: MEDICARE

## 2023-04-03 ENCOUNTER — LAB VISIT (OUTPATIENT)
Dept: LAB | Facility: HOSPITAL | Age: 88
End: 2023-04-03
Attending: INTERNAL MEDICINE
Payer: MEDICARE

## 2023-04-03 ENCOUNTER — PATIENT MESSAGE (OUTPATIENT)
Dept: INTERNAL MEDICINE | Facility: CLINIC | Age: 88
End: 2023-04-03

## 2023-04-03 VITALS
BODY MASS INDEX: 28.59 KG/M2 | DIASTOLIC BLOOD PRESSURE: 60 MMHG | HEIGHT: 63 IN | WEIGHT: 161.38 LBS | SYSTOLIC BLOOD PRESSURE: 120 MMHG | HEART RATE: 94 BPM | OXYGEN SATURATION: 95 %

## 2023-04-03 DIAGNOSIS — E11.51 TYPE 2 DIABETES MELLITUS WITH DIABETIC PERIPHERAL ANGIOPATHY WITHOUT GANGRENE, WITHOUT LONG-TERM CURRENT USE OF INSULIN: ICD-10-CM

## 2023-04-03 DIAGNOSIS — I10 ESSENTIAL HYPERTENSION: Primary | ICD-10-CM

## 2023-04-03 DIAGNOSIS — E55.9 VITAMIN D DEFICIENCY DISEASE: ICD-10-CM

## 2023-04-03 DIAGNOSIS — N18.31 CHRONIC RENAL FAILURE, STAGE 3A: ICD-10-CM

## 2023-04-03 DIAGNOSIS — I50.32 CHRONIC DIASTOLIC HEART FAILURE: ICD-10-CM

## 2023-04-03 DIAGNOSIS — G30.1 LATE ONSET ALZHEIMER'S DEMENTIA WITH BEHAVIORAL DISTURBANCE: ICD-10-CM

## 2023-04-03 DIAGNOSIS — I10 HYPERTENSION, ESSENTIAL: ICD-10-CM

## 2023-04-03 DIAGNOSIS — C34.90 MALIGNANT NEOPLASM OF LUNG, UNSPECIFIED LATERALITY, UNSPECIFIED PART OF LUNG: ICD-10-CM

## 2023-04-03 DIAGNOSIS — E11.8 CONTROLLED TYPE 2 DIABETES MELLITUS WITH COMPLICATION, WITHOUT LONG-TERM CURRENT USE OF INSULIN: ICD-10-CM

## 2023-04-03 DIAGNOSIS — G31.84 MILD COGNITIVE IMPAIRMENT: ICD-10-CM

## 2023-04-03 DIAGNOSIS — F02.818 LATE ONSET ALZHEIMER'S DEMENTIA WITH BEHAVIORAL DISTURBANCE: ICD-10-CM

## 2023-04-03 DIAGNOSIS — F39 MOOD DISORDER: ICD-10-CM

## 2023-04-03 DIAGNOSIS — E78.00 PURE HYPERCHOLESTEROLEMIA: ICD-10-CM

## 2023-04-03 DIAGNOSIS — Z85.118 HISTORY OF LUNG CANCER: ICD-10-CM

## 2023-04-03 DIAGNOSIS — I25.119 ATHEROSCLEROSIS OF NATIVE CORONARY ARTERY OF NATIVE HEART WITH ANGINA PECTORIS: ICD-10-CM

## 2023-04-03 DIAGNOSIS — D64.9 ANEMIA, UNSPECIFIED TYPE: ICD-10-CM

## 2023-04-03 DIAGNOSIS — H35.3232 EXUDATIVE AGE-RELATED MACULAR DEGENERATION OF BOTH EYES WITH INACTIVE CHOROIDAL NEOVASCULARIZATION: ICD-10-CM

## 2023-04-03 DIAGNOSIS — J43.2 CENTRILOBULAR EMPHYSEMA: ICD-10-CM

## 2023-04-03 LAB
ALBUMIN SERPL BCP-MCNC: 3.6 G/DL (ref 3.5–5.2)
ALP SERPL-CCNC: 46 U/L (ref 55–135)
ALT SERPL W/O P-5'-P-CCNC: 7 U/L (ref 10–44)
ANION GAP SERPL CALC-SCNC: 9 MMOL/L (ref 8–16)
AST SERPL-CCNC: 12 U/L (ref 10–40)
BASOPHILS # BLD AUTO: 0.02 K/UL (ref 0–0.2)
BASOPHILS NFR BLD: 0.5 % (ref 0–1.9)
BILIRUB SERPL-MCNC: 0.3 MG/DL (ref 0.1–1)
BUN SERPL-MCNC: 9 MG/DL (ref 10–30)
CALCIUM SERPL-MCNC: 10.6 MG/DL (ref 8.7–10.5)
CHLORIDE SERPL-SCNC: 106 MMOL/L (ref 95–110)
CO2 SERPL-SCNC: 29 MMOL/L (ref 23–29)
CREAT SERPL-MCNC: 1 MG/DL (ref 0.5–1.4)
DIFFERENTIAL METHOD: ABNORMAL
EOSINOPHIL # BLD AUTO: 0.1 K/UL (ref 0–0.5)
EOSINOPHIL NFR BLD: 1.5 % (ref 0–8)
ERYTHROCYTE [DISTWIDTH] IN BLOOD BY AUTOMATED COUNT: 17.5 % (ref 11.5–14.5)
EST. GFR  (NO RACE VARIABLE): 53.2 ML/MIN/1.73 M^2
ESTIMATED AVG GLUCOSE: 140 MG/DL (ref 68–131)
GLUCOSE SERPL-MCNC: 170 MG/DL (ref 70–110)
HBA1C MFR BLD: 6.5 % (ref 4–5.6)
HCT VFR BLD AUTO: 32.7 % (ref 37–48.5)
HGB BLD-MCNC: 10.5 G/DL (ref 12–16)
IMM GRANULOCYTES # BLD AUTO: 0.01 K/UL (ref 0–0.04)
IMM GRANULOCYTES NFR BLD AUTO: 0.3 % (ref 0–0.5)
LYMPHOCYTES # BLD AUTO: 1 K/UL (ref 1–4.8)
LYMPHOCYTES NFR BLD: 26.5 % (ref 18–48)
MCH RBC QN AUTO: 28 PG (ref 27–31)
MCHC RBC AUTO-ENTMCNC: 32.1 G/DL (ref 32–36)
MCV RBC AUTO: 87 FL (ref 82–98)
MONOCYTES # BLD AUTO: 0.4 K/UL (ref 0.3–1)
MONOCYTES NFR BLD: 9.2 % (ref 4–15)
NEUTROPHILS # BLD AUTO: 2.4 K/UL (ref 1.8–7.7)
NEUTROPHILS NFR BLD: 62 % (ref 38–73)
NRBC BLD-RTO: 0 /100 WBC
PLATELET # BLD AUTO: 173 K/UL (ref 150–450)
PMV BLD AUTO: 12.2 FL (ref 9.2–12.9)
POTASSIUM SERPL-SCNC: 3.9 MMOL/L (ref 3.5–5.1)
PROT SERPL-MCNC: 7.6 G/DL (ref 6–8.4)
RBC # BLD AUTO: 3.75 M/UL (ref 4–5.4)
SODIUM SERPL-SCNC: 144 MMOL/L (ref 136–145)
TSH SERPL DL<=0.005 MIU/L-ACNC: 2.49 UIU/ML (ref 0.4–4)
WBC # BLD AUTO: 3.92 K/UL (ref 3.9–12.7)

## 2023-04-03 PROCEDURE — 99214 PR OFFICE/OUTPT VISIT, EST, LEVL IV, 30-39 MIN: ICD-10-PCS | Mod: S$GLB,,, | Performed by: INTERNAL MEDICINE

## 2023-04-03 PROCEDURE — 99999 PR PBB SHADOW E&M-EST. PATIENT-LVL III: ICD-10-PCS | Mod: PBBFAC,,, | Performed by: INTERNAL MEDICINE

## 2023-04-03 PROCEDURE — 99999 PR PBB SHADOW E&M-EST. PATIENT-LVL III: CPT | Mod: PBBFAC,,, | Performed by: INTERNAL MEDICINE

## 2023-04-03 PROCEDURE — 99214 OFFICE O/P EST MOD 30 MIN: CPT | Mod: S$GLB,,, | Performed by: INTERNAL MEDICINE

## 2023-04-03 PROCEDURE — 83036 HEMOGLOBIN GLYCOSYLATED A1C: CPT | Performed by: INTERNAL MEDICINE

## 2023-04-03 PROCEDURE — 84443 ASSAY THYROID STIM HORMONE: CPT | Performed by: INTERNAL MEDICINE

## 2023-04-03 PROCEDURE — 85025 COMPLETE CBC W/AUTO DIFF WBC: CPT | Performed by: INTERNAL MEDICINE

## 2023-04-03 PROCEDURE — 36415 COLL VENOUS BLD VENIPUNCTURE: CPT | Performed by: INTERNAL MEDICINE

## 2023-04-03 PROCEDURE — 80053 COMPREHEN METABOLIC PANEL: CPT | Performed by: INTERNAL MEDICINE

## 2023-04-03 RX ORDER — METOPROLOL TARTRATE 50 MG/1
50 TABLET ORAL 2 TIMES DAILY
Qty: 60 TABLET | Refills: 11 | Status: SHIPPED | OUTPATIENT
Start: 2023-04-03 | End: 2023-04-29

## 2023-04-03 NOTE — PROGRESS NOTES
CHIEF COMPLAINT:  Follow up of hypertension, copd, sinus congestion, diastolic dysfunction    HISTORY OF PRESENT ILLNESS: This is a 89-year-old woman who presents for follow up of above.       She is only taking donepezil 5 mg once daily for her memory. She feels that here mood and memory are ok.  SHe continues to take duloxetine 60 mg daily.  Neuropathic pain in the legs is better controlled with the duloxetine.  She has refused to xanax 0.5 mg.     No cough.   She has some dyspnea on exertion which is stable CT chest last week was stable.      SHe is back taking metoprolol tartrate 25 mg twice daily for elevated heart rate.      Left hip is fine.       THe vaginal bleeding had stopped. She has a small spot of blood daily on her underwear.  She is NOT using Estradiol vaginal cream.  Haley did not know how to apply the cream.   No vaginal pain. She has a history of radiation to the rectum for her rectal cancer - had granulomatous area on the vagina..  She saw Dr Lorena Leach 8/7/2019 with biopsies that were benign. Nora demonstrated to Haley how to apply the cream and the cream is now being applied     She is seeing Dr Alanis in neurology on 3/3/2022 for her dementia - she is shaky in the morning.  Juice helps the shakiness.        Appetite is ok.  Weight is stable.       She continues to take amlodipine - benazepril 5/20 one tablet daily and metoprolol tartrate 25 mg twice daliy        She has been having sinus congestion that comes and goes. She has clear drainage.  She uses flonase 1 spray in each nostril once daily. No fever, chills, sore throat, nausea, vomiting           She had right leg pain - she had a blockage. Dr Gaviria placed a stent in the right CFA and PTA left ext loulou artery 12/18. Right leg pain has resolved.  The ulcerated area on the right leg has totally healed and has not returned.  She is taking aspirin 81 mg daily.        Xray of the right hip on 9/19/17 revealed mild to moderate DJD of  the right hip.  Right hip pain has resolved and has not returned.          Her non small cell lung cancer is stable. She saw Dr Weir 910/4/2011 and scans were stable. She is 11 years out from her diagnosis     She is now prescribed Trajenta 5 mg daily for her diabetes.     She continues to take simvastatin 80 mg at bedtime for her hyperlipidemia. No joint pain or muscle pain.        She is off Fosamax once week for her bones.  No melana, bloody stools, constipation.        She takes vitamin B12 1000 mcg daily for vitamin B12 deficiency - anemia is stable        SHe has macular degeneration of the eyes and was followed by Dr Tresa Coreas. She got injections in her eyes every 6 weeks in Spring 2015. She now sees Dr Ruelas at [facility] (last saw him 8/18- no need for injections at that time- yearly follow up). Vision has been stable. She is taking a Eye vitamins for her eyes.       PAST MEDICAL HISTORY:    1. Stage III non-small cell lung cancer, completed chemoradiation with carboplatin and Taxol on 6/1/12    2. Hypertension.   3. Diabetes mellitus.   4. Hyperlipidemia.   5. Chronic diastolic dysfunction.   6. Coronary artery disease status post MI in 1990 and 2003. Stenting was   done at 2003 at [facility].   7. Cholecystectomy, December 2006.   8. Cataract removal.   9. Benign growth removed from her throat.   10. Left common femoral endarterectomy, July 2007.   11. History of anal cancer in 1996 status post radiation and chemotherapy.   12. Carpal tunnel syndrome.   13. Osteoporosis on BMD 12/11    14. Macular degeneration    SOCIAL HISTORY: She quit smoking in 1995, denies any alcohol use. She is etired.     REVIEW OF SYSTEMS: She denies fevers, chills, night sweats, fatigue, visual change, hearing loss, sinus congestion, sore throat, dysuria, hematuria, joint pain, muscle pain, polydipsia, and polyuria.     PHYSICAL EXAM:              GENERAL: She is alert and oriented. No apparent distress. Affect  within normal limits.   Conjunctivae anicteric.  TM clear  NECK: Supple.   Respiratory effort normal. Lungs clear to auscultation.   HEART: Regular rate and rhythm without murmurs, gallops, or rubs. No lower   extremity edema.         ASSESSMENT AND PLAN:    Diabetes  continue the Tradjenta.   3. Hypertension with history of diastolic dyfunction on echo in 2006 - Increase metoprolol tartrate to 50 mg twice daily  4.PVD - stable  5. Mood disorder - stable  6. Memory issues/alzheimers- follow up with Dr Alanis. off memantine  7. Stage III non-small cell lung cancer, completed chemoradiation with carboplatin and Taxol on 6/1/12 - doing well. No signs of recurrence  8.   Hyperlipidemia-lipids controlled  10. Coronary artery disease modifying risk factors.   11. History of anal cancer-had a normal colonoscopy, November 2008; due 2015. Declined repeat  12. Pernicious anemia - on counter vitamin B12 1000 mcg daily  10. Osteoporosis - took alendroante for 7 years - 2012 to 2019.  Currently on a drug holiday.  BMD 5/2022 slightly worse.    11. Macular degeneration - stable  12.  VItamin D deficiency -  vitamin D 2000 units daily.   13. Vaginal bleeding - asx currently  Screening mammogram was 12/19 - declines      I will see her back in 1 month as scheduled, sooner if problems arise\

## 2023-04-04 ENCOUNTER — OUTPATIENT CASE MANAGEMENT (OUTPATIENT)
Dept: NEUROLOGY | Facility: CLINIC | Age: 88
End: 2023-04-04
Payer: MEDICARE

## 2023-04-06 NOTE — PROGRESS NOTES
Protocol: [facility] Ecosystem: [facility]  Identifier: [id number]  IRB#: [id number]  PI: Dr. Rafa Carlson, PhD  Pt Study ID: [id number]  Visit Month: 11    Timeline:   Consent: Completed(03/30/2022)  Baseline questionnaires: Completed(03/30/2022)  6-month questionnaires: Completed(10/28/2022)  12-month questionnaires: Planned(05/01/2023)      Visit Note:   Spoke with caregiver Haley (Daughter) for month 11 visit. Caregiver reports things are much better now that patient agreed to having someone cook for them, this is someone they know and expense is within their budget, so far patient has been good about eating the meals that the person prepares and this lifts a load for caregiver both financially and physically. Caregiver does not report major needs or concerns at this time. She reports Metoprolol was increased to 50 mg twice a day and PCP recommended patient drink more liquids, caregiver is working on this. Caregiver also shares that patient is getting her [facility] visit today. [facility] respite program was mentioned but caregiver is not interested at this time. Caregiver reports she has not heard back from [facility] regarding in-[facility] services, it was recommended she reach out to [place], their point of contact with the [facility].     Falls in the past month: 0  UTIs in the past month: 0  Hospital encounters in the past month (reported by caregiver): 0      Next monthly visit scheduled for: 05/01/2023. Caregiver knows how to reach CTN, and is encouraged to do so, as needed before our next scheduled call.     Action items for CTN: No major needs or concerns at this time, no action items for CTN.         ClinCard sent/loaded: no  (*Applies for Baseline, 6-month, and 12-month visits)

## 2023-04-12 ENCOUNTER — PATIENT MESSAGE (OUTPATIENT)
Dept: INTERNAL MEDICINE | Facility: CLINIC | Age: 88
End: 2023-04-12
Payer: MEDICARE

## 2023-04-13 ENCOUNTER — OFFICE VISIT (OUTPATIENT)
Dept: INTERNAL MEDICINE | Facility: CLINIC | Age: 88
End: 2023-04-13
Payer: MEDICARE

## 2023-04-13 VITALS
HEART RATE: 115 BPM | OXYGEN SATURATION: 95 % | WEIGHT: 154 LBS | HEIGHT: 63 IN | BODY MASS INDEX: 27.29 KG/M2 | SYSTOLIC BLOOD PRESSURE: 120 MMHG | DIASTOLIC BLOOD PRESSURE: 60 MMHG

## 2023-04-13 DIAGNOSIS — I25.10 CORONARY ARTERY DISEASE INVOLVING NATIVE CORONARY ARTERY OF NATIVE HEART WITHOUT ANGINA PECTORIS: ICD-10-CM

## 2023-04-13 DIAGNOSIS — I70.0 AORTIC ATHEROSCLEROSIS: ICD-10-CM

## 2023-04-13 DIAGNOSIS — R19.7 DIARRHEA, UNSPECIFIED TYPE: Primary | ICD-10-CM

## 2023-04-13 DIAGNOSIS — I10 ESSENTIAL HYPERTENSION: ICD-10-CM

## 2023-04-13 PROCEDURE — 1126F PR PAIN SEVERITY QUANTIFIED, NO PAIN PRESENT: ICD-10-PCS | Mod: CPTII,S$GLB,, | Performed by: INTERNAL MEDICINE

## 2023-04-13 PROCEDURE — 1101F PT FALLS ASSESS-DOCD LE1/YR: CPT | Mod: CPTII,S$GLB,, | Performed by: INTERNAL MEDICINE

## 2023-04-13 PROCEDURE — 99214 OFFICE O/P EST MOD 30 MIN: CPT | Mod: S$GLB,,, | Performed by: INTERNAL MEDICINE

## 2023-04-13 PROCEDURE — 1101F PR PT FALLS ASSESS DOC 0-1 FALLS W/OUT INJ PAST YR: ICD-10-PCS | Mod: CPTII,S$GLB,, | Performed by: INTERNAL MEDICINE

## 2023-04-13 PROCEDURE — 1159F PR MEDICATION LIST DOCUMENTED IN MEDICAL RECORD: ICD-10-PCS | Mod: CPTII,S$GLB,, | Performed by: INTERNAL MEDICINE

## 2023-04-13 PROCEDURE — 3288F PR FALLS RISK ASSESSMENT DOCUMENTED: ICD-10-PCS | Mod: CPTII,S$GLB,, | Performed by: INTERNAL MEDICINE

## 2023-04-13 PROCEDURE — 99999 PR PBB SHADOW E&M-EST. PATIENT-LVL IV: CPT | Mod: PBBFAC,,, | Performed by: INTERNAL MEDICINE

## 2023-04-13 PROCEDURE — 3288F FALL RISK ASSESSMENT DOCD: CPT | Mod: CPTII,S$GLB,, | Performed by: INTERNAL MEDICINE

## 2023-04-13 PROCEDURE — 1159F MED LIST DOCD IN RCRD: CPT | Mod: CPTII,S$GLB,, | Performed by: INTERNAL MEDICINE

## 2023-04-13 PROCEDURE — 99999 PR PBB SHADOW E&M-EST. PATIENT-LVL IV: ICD-10-PCS | Mod: PBBFAC,,, | Performed by: INTERNAL MEDICINE

## 2023-04-13 PROCEDURE — 1126F AMNT PAIN NOTED NONE PRSNT: CPT | Mod: CPTII,S$GLB,, | Performed by: INTERNAL MEDICINE

## 2023-04-13 PROCEDURE — 99214 PR OFFICE/OUTPT VISIT, EST, LEVL IV, 30-39 MIN: ICD-10-PCS | Mod: S$GLB,,, | Performed by: INTERNAL MEDICINE

## 2023-04-13 RX ORDER — METOPROLOL TARTRATE 25 MG/1
25 TABLET, FILM COATED ORAL 2 TIMES DAILY
COMMUNITY
End: 2023-04-29

## 2023-04-13 NOTE — PROGRESS NOTES
CHIEF COMPLAINT:  Diarrhea    HISTORY OF PRESENT ILLNESS: This is a 89-year-old woman who presents with her daughter due to diarrhea.    She ate boiled crawfish on 4/8/23.  She normally does not eat crawfish. The next 3 days she had 1-2 watery stools. No nausea, vomiting, abdominal pain, bloody stools or black tarry stools.  She has been drinking Powerade.  She took a total of 3 IBGard which has helped her symptoms. She is feeling good today. No diarrhea in over 24 hours.     No fever, chills, sinus congestion, sore throat.       She is taking donepezil 5 mg once daily for her memory. She feels that here mood and memory are ok.  SHe continues to take duloxetine 60 mg daily.  Neuropathic pain in the legs is better controlled with the duloxetine.  She has refused to xanax 0.5 mg.     No cough.   She has some dyspnea on exertion which is stable. CT chest was stable.      SHe is now taking metoprolol tartrate 50 mg twice daily for elevated heart rate.      Left hip is fine.       THe vaginal bleeding had stopped. She has a small spot of blood daily on her underwear.  She is NOT using Estradiol vaginal cream.  Haley did not know how to apply the cream.   No vaginal pain. She has a history of radiation to the rectum for her rectal cancer - had granulomatous area on the vagina..  She saw Dr Lorena Leach 8/7/2019 with biopsies that were benign. Nora demonstrated to Haley how to apply the cream and the cream is now being applied     She is seeing Dr Alanis in neurology on 3/3/2022 for her dementia - she is shaky in the morning.  Juice helps the shakiness.        Appetite is ok.  Weight is stable.       She continues to take amlodipine - benazepril 5/20 one tablet daily and metoprolol tartrate 50 mg twice daliy        She has been having sinus congestion that comes and goes. She has clear drainage.  She uses flonase 1 spray in each nostril once daily. No fever, chills, sore throat, nausea, vomiting            She had  right leg pain - she had a blockage. Dr Gaviria placed a stent in the right CFA and PTA left ext loulou artery 12/18. Right leg pain has resolved.  The ulcerated area on the right leg has totally healed and has not returned.  She is taking aspirin 81 mg daily.        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  Right hip pain has resolved and has not returned.          Her non small cell lung cancer is stable. She saw Dr Carmella Bennett0/4/2011 and scans were stable. She is 11 years out from her diagnosis     She is now prescribed Trajenta 5 mg daily for her diabetes.     She continues to take simvastatin 80 mg at bedtime for her hyperlipidemia. No joint pain or muscle pain.        She is off Fosamax once week for her bones.  No melana, bloody stools, constipation.        She takes vitamin B12 1000 mcg daily for vitamin B12 deficiency - anemia is stable        SHe has macular degeneration of the eyes and was followed by Dr Tresa Coreas. She got injections in her eyes every 6 weeks in Spring 2015. She now sees Dr Ruelas at [facility] (last saw him 8/18- no need for injections at that time- yearly follow up). Vision has been stable. She is taking a Eye vitamins for her eyes.       PAST MEDICAL HISTORY:    1. Stage III non-small cell lung cancer, completed chemoradiation with carboplatin and Taxol on 6/1/12    2. Hypertension.   3. Diabetes mellitus.   4. Hyperlipidemia.   5. Chronic diastolic dysfunction.   6. Coronary artery disease status post MI in 1990 and 2003. Stenting was   done at 2003 at [facility].   7. Cholecystectomy, December 2006.   8. Cataract removal.   9. Benign growth removed from her throat.   10. Left common femoral endarterectomy, July 2007.   11. History of anal cancer in 1996 status post radiation and chemotherapy.   12. Carpal tunnel syndrome.   13. Osteoporosis on BMD 12/11    14. Macular degeneration    SOCIAL HISTORY: She quit smoking in 1995, denies  "any alcohol use. She is etired.     REVIEW OF SYSTEMS: She denies fevers, chills, night sweats, fatigue, visual change, hearing loss, sinus congestion, sore throat, dysuria, hematuria, joint pain, muscle pain, polydipsia, and polyuria.     PHYSICAL EXAM:            /60 (BP Location: Left arm, Patient Position: Sitting, BP Method: Large (Manual))   Pulse (!) 115   Ht 5' 3" (1.6 m)   Wt 69.9 kg (154 lb)   SpO2 95%   BMI 27.28 kg/m²     GENERAL: She is alert and oriented. No apparent distress. Affect within normal limits.   Conjunctivae anicteric.  TM clear  NECK: Supple.   Respiratory effort normal. Lungs clear to auscultation.   HEART: Regular rate and rhythm without murmurs, gallops, or rubs. No lower   extremity edema.         ASSESSMENT AND PLAN:    Diarrhea- resolving - likely due to spice from boiled crawfish.  May take imodium as needed.  Push fluids  Diabetes  continue the Tradjenta.   3. Hypertension with history of diastolic dyfunction on echo in 2006 -increase metoprolol tartrate to 75 mg (50 mg tablet PLUS 25 mg tablet) twice daily  4.PVD - stable  5. Mood disorder - stable  6. Memory issues/alzheimers- follow up with Dr Alanis. off memantine  7. Stage III non-small cell lung cancer, completed chemoradiation with carboplatin and Taxol on 6/1/12 - doing well. No signs of recurrence  8.   Hyperlipidemia-lipids controlled  10. Coronary artery disease modifying risk factors.   11. History of anal cancer-had a normal colonoscopy, November 2008; due 2015. Declined repeat  12. Pernicious anemia - on counter vitamin B12 1000 mcg daily  10. Osteoporosis - took alendroante for 7 years - 2012 to 2019.  Currently on a drug holiday.  BMD 5/2022 slightly worse.    11. Macular degeneration - stable  12.  VItamin D deficiency -  vitamin D 2000 units daily.   13. Vaginal bleeding - asx currently  Screening mammogram was 12/19 - declines      I will see her back in 2 weeks as scheduled, sooner if problems arise\  "

## 2023-04-29 ENCOUNTER — PATIENT MESSAGE (OUTPATIENT)
Dept: INTERNAL MEDICINE | Facility: CLINIC | Age: 88
End: 2023-04-29
Payer: MEDICARE

## 2023-04-29 DIAGNOSIS — E11.51 TYPE 2 DIABETES MELLITUS WITH DIABETIC PERIPHERAL ANGIOPATHY WITHOUT GANGRENE, WITHOUT LONG-TERM CURRENT USE OF INSULIN: ICD-10-CM

## 2023-04-29 RX ORDER — METOPROLOL TARTRATE 50 MG/1
50 TABLET ORAL 2 TIMES DAILY
Qty: 60 TABLET | Refills: 11 | Status: ON HOLD | OUTPATIENT
Start: 2023-04-29 | End: 2023-05-22 | Stop reason: HOSPADM

## 2023-04-29 RX ORDER — METOPROLOL TARTRATE 25 MG/1
25 TABLET, FILM COATED ORAL 2 TIMES DAILY
Qty: 60 TABLET | Refills: 11 | Status: ON HOLD | OUTPATIENT
Start: 2023-04-29 | End: 2023-05-22 | Stop reason: SDUPTHER

## 2023-04-29 RX ORDER — LINAGLIPTIN 5 MG/1
5 TABLET, FILM COATED ORAL DAILY
Qty: 90 TABLET | Refills: 3 | Status: SHIPPED | OUTPATIENT
Start: 2023-04-29 | End: 2024-04-28

## 2023-05-01 ENCOUNTER — OUTPATIENT CASE MANAGEMENT (OUTPATIENT)
Dept: NEUROLOGY | Facility: CLINIC | Age: 88
End: 2023-05-01
Payer: MEDICARE

## 2023-05-01 NOTE — PROGRESS NOTES
Protocol: Care Ecosystem: [facility]  Identifier: [id number]  IRB#: [id number]  PI: Dr. Rafa Carlson, PhD  Pt Study ID: [id number]  Visit Month: 12    Timeline:   Consent: Completed(03/30/2022)  Baseline questionnaires: Completed(03/30/2022)  6-month questionnaires: Completed(11/4/2023)  12-month questionnaires: Completed(5/4/2023)      Visit Note:   Spoke with caregiver Yuri (Daughter) for month 12 visit. She reports patient is now fully recovered from nausea, vomiting, and diarrhea episode she had earlier this month. Patient is still irritable and resistant to help, she is now refusing to receive prepared meals from the lady the caregiver had hired. Caregiver is frustrated and even though she knows the patient cannot make decision she feels she has to pick her battles. [facility] in-home sitter service was approved, caregiver now has name and number of the person who will be coming in to help them. Caregiver is worried that mom might refuse the help, we talked about setting boundaries and making sure that the caregiver understood what the patient's diagnosis was. CTN offered to speak to the sitter to provide education and strategies if needed. Long term placement was dicussed, caregiver thinks this could be an option later on but knows that patient will refuse to go and patient has already stated she will not leave home. Education and information about [facility] services for placement were discussed. No other needs or concerns at this time, caregiver is doing well overall.      Falls in the past month: 0  UTIs in the past month: 0  Hospital encounters in the past month (reported by caregiver): 0      Next monthly visit scheduled for: 05/04/2023 at 11 am for 12-month questionnaires. Caregiver knows how to reach CTN, and is encouraged to do so, as needed before our next scheduled call.     Action items for CTN: Complete 12-month questionnaires.        ClinCard sent/loaded: yes  (*Applies for Baseline, 6-month, and 12-month  visits)

## 2023-05-05 ENCOUNTER — OFFICE VISIT (OUTPATIENT)
Dept: INTERNAL MEDICINE | Facility: CLINIC | Age: 88
End: 2023-05-05
Payer: MEDICARE

## 2023-05-05 VITALS
WEIGHT: 158.81 LBS | OXYGEN SATURATION: 91 % | BODY MASS INDEX: 28.14 KG/M2 | SYSTOLIC BLOOD PRESSURE: 138 MMHG | HEART RATE: 99 BPM | HEIGHT: 63 IN | DIASTOLIC BLOOD PRESSURE: 68 MMHG

## 2023-05-05 DIAGNOSIS — C34.90 MALIGNANT NEOPLASM OF LUNG, UNSPECIFIED LATERALITY, UNSPECIFIED PART OF LUNG: ICD-10-CM

## 2023-05-05 DIAGNOSIS — F02.818 LATE ONSET ALZHEIMER'S DEMENTIA WITH BEHAVIORAL DISTURBANCE: ICD-10-CM

## 2023-05-05 DIAGNOSIS — J44.9 CHRONIC OBSTRUCTIVE PULMONARY DISEASE, UNSPECIFIED COPD TYPE: ICD-10-CM

## 2023-05-05 DIAGNOSIS — E11.51 TYPE 2 DIABETES MELLITUS WITH DIABETIC PERIPHERAL ANGIOPATHY WITHOUT GANGRENE, WITHOUT LONG-TERM CURRENT USE OF INSULIN: ICD-10-CM

## 2023-05-05 DIAGNOSIS — F39 MOOD DISORDER: ICD-10-CM

## 2023-05-05 DIAGNOSIS — I50.32 CHRONIC DIASTOLIC CONGESTIVE HEART FAILURE: Primary | ICD-10-CM

## 2023-05-05 DIAGNOSIS — E78.00 PURE HYPERCHOLESTEROLEMIA: ICD-10-CM

## 2023-05-05 DIAGNOSIS — G30.1 LATE ONSET ALZHEIMER'S DEMENTIA WITH BEHAVIORAL DISTURBANCE: ICD-10-CM

## 2023-05-05 DIAGNOSIS — I10 ESSENTIAL HYPERTENSION: ICD-10-CM

## 2023-05-05 PROCEDURE — 99999 PR PBB SHADOW E&M-EST. PATIENT-LVL III: CPT | Mod: PBBFAC,,, | Performed by: INTERNAL MEDICINE

## 2023-05-05 PROCEDURE — 1101F PT FALLS ASSESS-DOCD LE1/YR: CPT | Mod: CPTII,S$GLB,, | Performed by: INTERNAL MEDICINE

## 2023-05-05 PROCEDURE — 99999 PR PBB SHADOW E&M-EST. PATIENT-LVL III: ICD-10-PCS | Mod: PBBFAC,,, | Performed by: INTERNAL MEDICINE

## 2023-05-05 PROCEDURE — 3288F FALL RISK ASSESSMENT DOCD: CPT | Mod: CPTII,S$GLB,, | Performed by: INTERNAL MEDICINE

## 2023-05-05 PROCEDURE — 1126F AMNT PAIN NOTED NONE PRSNT: CPT | Mod: CPTII,S$GLB,, | Performed by: INTERNAL MEDICINE

## 2023-05-05 PROCEDURE — 1159F PR MEDICATION LIST DOCUMENTED IN MEDICAL RECORD: ICD-10-PCS | Mod: CPTII,S$GLB,, | Performed by: INTERNAL MEDICINE

## 2023-05-05 PROCEDURE — 99214 PR OFFICE/OUTPT VISIT, EST, LEVL IV, 30-39 MIN: ICD-10-PCS | Mod: S$GLB,,, | Performed by: INTERNAL MEDICINE

## 2023-05-05 PROCEDURE — 1101F PR PT FALLS ASSESS DOC 0-1 FALLS W/OUT INJ PAST YR: ICD-10-PCS | Mod: CPTII,S$GLB,, | Performed by: INTERNAL MEDICINE

## 2023-05-05 PROCEDURE — 99214 OFFICE O/P EST MOD 30 MIN: CPT | Mod: S$GLB,,, | Performed by: INTERNAL MEDICINE

## 2023-05-05 PROCEDURE — 1126F PR PAIN SEVERITY QUANTIFIED, NO PAIN PRESENT: ICD-10-PCS | Mod: CPTII,S$GLB,, | Performed by: INTERNAL MEDICINE

## 2023-05-05 PROCEDURE — 1159F MED LIST DOCD IN RCRD: CPT | Mod: CPTII,S$GLB,, | Performed by: INTERNAL MEDICINE

## 2023-05-05 PROCEDURE — 3288F PR FALLS RISK ASSESSMENT DOCUMENTED: ICD-10-PCS | Mod: CPTII,S$GLB,, | Performed by: INTERNAL MEDICINE

## 2023-05-05 RX ORDER — AZELASTINE 1 MG/ML
1 SPRAY, METERED NASAL 2 TIMES DAILY
Qty: 30 ML | Refills: 3 | Status: SHIPPED | OUTPATIENT
Start: 2023-05-05 | End: 2024-05-04

## 2023-05-05 NOTE — PROGRESS NOTES
CHIEF COMPLAINT: Follow up of multiple issues    HISTORY OF PRESENT ILLNESS: This is a 89-year-old woman who presents with her daughter for follow up of above    No significant diarrhea since our last visit. Occurs sporadically from certain foods and her history of colon surgery.  No nausea or vomiting.   She has a BM daily. No fever, chills, sinus congestion, sore throat.       She is taking donepezil 5 mg once daily for her memory. She feels that here mood and memory are ok.  SHe continues to take duloxetine 60 mg daily.  Neuropathic pain in the legs is better controlled with the duloxetine.  She has refused to xanax 0.5 mg.     No cough.   She has some dyspnea on exertion which is stable. CT chest was stable. She has some nasal congestion     She continues to take amlodipine - benazepril 5/20 one tablet daily and metoprolol tartrate 75 mg twice daily.    Her daughter notes that she sleeps a lot during the day- has been this way for a long time.  She feels that she is bored.  She will do laundry and fool with her plants.  iF she is sitting and watching TV, she will fall asleep.      THe vaginal bleeding had stopped. She has a small spot of blood daily on her underwear.  She is NOT using Estradiol vaginal cream.  Haley did not know how to apply the cream.   No vaginal pain. She has a history of radiation to the rectum for her rectal cancer - had granulomatous area on the vagina..  She saw Dr Lorena Leach 8/7/2019 with biopsies that were benign. Nora demonstrated to Haley how to apply the cream and the cream is now being applied     She is seeing Dr Alanis in neurology on 3/3/2022 for her dementia - she is shaky in the morning.  Juice helps the shakiness.        Appetite is ok.  Weight is stable.           She has been having sinus congestion that comes and goes. She has clear drainage.  She uses flonase 1 spray in each nostril once daily. No fever, chills, sore throat, nausea, vomiting            She had right  leg pain - she had a blockage. Dr Gaviria placed a stent in the right CFA and PTA left ext loulou artery 12/18. Right leg pain has resolved.  The ulcerated area on the right leg has totally healed and has not returned.  She is taking aspirin 81 mg daily.        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  Right hip pain has resolved and has not returned.          Her non small cell lung cancer is stable. She saw Dr Carmella Bennett0/4/2011 and scans were stable. She is 11 years out from her diagnosis     She is now prescribed Trajenta 5 mg daily for her diabetes.     She continues to take simvastatin 80 mg at bedtime for her hyperlipidemia. No joint pain or muscle pain.        She is off Fosamax once week for her bones.  No melana, bloody stools, constipation.        She takes vitamin B12 1000 mcg daily for vitamin B12 deficiency - anemia is stable        SHe has macular degeneration of the eyes and was followed by Dr Tresa Coreas. She got injections in her eyes every 6 weeks in Spring 2015. She now sees Dr Ruelas at [facility] (last saw him 8/18- no need for injections at that time- yearly follow up). Vision has been stable. She is taking a Eye vitamins for her eyes.       PAST MEDICAL HISTORY:    1. Stage III non-small cell lung cancer, completed chemoradiation with carboplatin and Taxol on 6/1/12    2. Hypertension.   3. Diabetes mellitus.   4. Hyperlipidemia.   5. Chronic diastolic dysfunction.   6. Coronary artery disease status post MI in 1990 and 2003. [facility]   7. Cholecystectomy, December 2006.   8. Cataract removal.   9. Benign growth removed from her throat.   10. Left common femoral endarterectomy, July 2007.   11. History of anal cancer in 1996 status post radiation and chemotherapy.   12. Carpal tunnel syndrome.   13. Osteoporosis on BMD 12/11    14. Macular degeneration    SOCIAL HISTORY: She quit smoking in 1995, denies any  "alcohol use. She is etired.     REVIEW OF SYSTEMS: She denies fevers, chills, night sweats, fatigue, visual change, hearing loss, sinus congestion, sore throat, dysuria, hematuria, joint pain, muscle pain, polydipsia, and polyuria.     PHYSICAL EXAM:      /68 (BP Location: Right arm, Patient Position: Sitting, BP Method: Medium (Manual))   Pulse 99   Ht 5' 3" (1.6 m)   Wt 72 kg (158 lb 13.5 oz)   SpO2 (!) 91% Comment: room air  BMI 28.14 kg/m²       Oxygen saturation at rest on room air 91% with pulse of 99-10    Oxygen saturation upon exertion at 100 feet on room air- 85% with pulse of 125.     Oxygen upon exertion at 100 feet with 2 liters nasal canula 93%       GENERAL: She is alert and oriented. No apparent distress. Affect within normal limits.   Conjunctivae anicteric.  TM clear  NECK: Supple.   Respiratory effort normal. Lungs clear to auscultation.   HEART: Regular rate and rhythm without murmurs, gallops, or rubs. Trace lower   extremity edema.         ASSESSMENT AND PLAN:    Diastolic dysfunction with history of lung cancer and COPD - now with hypoxic respiratory failure - oxygen 2 liters nasal cannula  Diabetes  continue the Tradjenta.   3. Hypertension -continue metoprolol tartrate to 75 mg (50 mg tablet PLUS 25 mg tablet) twice daily and amlodipine benazepril 5/20 once daily    4.PVD - stable  5. Mood disorder - stable  6. Memory issues/alzheimers- follow up with Dr Alanis. off memantine  7. Stage III non-small cell lung cancer, completed chemoradiation with carboplatin and Taxol on 6/1/12 - doing well. No signs of recurrence - CT Chest 1/3/2022  8.   Hyperlipidemia-lipids controlled  10. Coronary artery disease modifying risk factors.   11. History of anal cancer-had a normal colonoscopy, November 2008; due 2015. Declined repeat  12. Pernicious anemia - on counter vitamin B12 1000 mcg daily  10. Osteoporosis - took alendroante for 7 years - 2012 to 2019.  Currently on a drug holiday.  BMD " 5/2022 slightly worse.    11. Macular degeneration - stable  12.  VItamin D deficiency -  vitamin D 2000 units daily.   13. Vaginal bleeding - asx currently  14. Allergic rhinitis - astelin nasal spray  Screening mammogram was 12/19 - declines      I will see her back in 6 weeks sooner if problems arise

## 2023-05-06 ENCOUNTER — PATIENT MESSAGE (OUTPATIENT)
Dept: INTERNAL MEDICINE | Facility: CLINIC | Age: 88
End: 2023-05-06
Payer: MEDICARE

## 2023-05-08 ENCOUNTER — PATIENT MESSAGE (OUTPATIENT)
Dept: INTERNAL MEDICINE | Facility: CLINIC | Age: 88
End: 2023-05-08
Payer: MEDICARE

## 2023-05-08 NOTE — TELEPHONE ENCOUNTER
Faxed oxygen orders to [facility] for home oxygen.  Can you look into this issue. Still has not gotten oxygen.    She has [facility], do we need to fax to [facility]

## 2023-05-11 ENCOUNTER — PATIENT MESSAGE (OUTPATIENT)
Dept: INTERNAL MEDICINE | Facility: CLINIC | Age: 88
End: 2023-05-11
Payer: MEDICARE

## 2023-05-12 ENCOUNTER — PATIENT MESSAGE (OUTPATIENT)
Dept: INTERNAL MEDICINE | Facility: CLINIC | Age: 88
End: 2023-05-12
Payer: MEDICARE

## 2023-05-19 ENCOUNTER — PATIENT MESSAGE (OUTPATIENT)
Dept: INTERNAL MEDICINE | Facility: CLINIC | Age: 88
End: 2023-05-19
Payer: MEDICARE

## 2023-05-20 ENCOUNTER — HOSPITAL ENCOUNTER (INPATIENT)
Facility: HOSPITAL | Age: 88
LOS: 2 days | Discharge: HOME | DRG: 194 | End: 2023-05-22
Attending: EMERGENCY MEDICINE | Admitting: STUDENT IN AN ORGANIZED HEALTH CARE EDUCATION/TRAINING PROGRAM
Payer: MEDICARE

## 2023-05-20 ENCOUNTER — PATIENT MESSAGE (OUTPATIENT)
Dept: INTERNAL MEDICINE | Facility: CLINIC | Age: 88
End: 2023-05-20
Payer: MEDICARE

## 2023-05-20 DIAGNOSIS — R00.0 TACHYCARDIA: ICD-10-CM

## 2023-05-20 DIAGNOSIS — R07.9 CHEST PAIN: ICD-10-CM

## 2023-05-20 DIAGNOSIS — F03.90 DEMENTIA, UNSPECIFIED DEMENTIA SEVERITY, UNSPECIFIED DEMENTIA TYPE, UNSPECIFIED WHETHER BEHAVIORAL, PSYCHOTIC, OR MOOD DISTURBANCE OR ANXIETY: ICD-10-CM

## 2023-05-20 DIAGNOSIS — I48.91 ATRIAL FIBRILLATION: ICD-10-CM

## 2023-05-20 DIAGNOSIS — R55 SYNCOPE: ICD-10-CM

## 2023-05-20 DIAGNOSIS — R41.0 CONFUSION: ICD-10-CM

## 2023-05-20 DIAGNOSIS — I49.5 SSS (SICK SINUS SYNDROME): Primary | ICD-10-CM

## 2023-05-20 DIAGNOSIS — I27.20 PULMONARY HYPERTENSION: ICD-10-CM

## 2023-05-20 DIAGNOSIS — R00.1 BRADYCARDIA: ICD-10-CM

## 2023-05-20 DIAGNOSIS — R53.83 FATIGUE, UNSPECIFIED TYPE: ICD-10-CM

## 2023-05-20 DIAGNOSIS — R53.83 FATIGUE: ICD-10-CM

## 2023-05-20 DIAGNOSIS — I48.0 PAF (PAROXYSMAL ATRIAL FIBRILLATION): ICD-10-CM

## 2023-05-20 DIAGNOSIS — I35.1 AORTIC VALVE INSUFFICIENCY, ETIOLOGY OF CARDIAC VALVE DISEASE UNSPECIFIED: ICD-10-CM

## 2023-05-20 PROBLEM — J18.9 PNEUMONIA: Status: ACTIVE | Noted: 2023-05-20

## 2023-05-20 LAB
ALBUMIN SERPL BCP-MCNC: 3.6 G/DL (ref 3.5–5.2)
ALP SERPL-CCNC: 64 U/L (ref 55–135)
ALT SERPL W/O P-5'-P-CCNC: 30 U/L (ref 10–44)
ANION GAP SERPL CALC-SCNC: 12 MMOL/L (ref 8–16)
AST SERPL-CCNC: 38 U/L (ref 10–40)
BASOPHILS # BLD AUTO: 0.03 K/UL (ref 0–0.2)
BASOPHILS NFR BLD: 0.8 % (ref 0–1.9)
BILIRUB SERPL-MCNC: 0.3 MG/DL (ref 0.1–1)
BILIRUB UR QL STRIP: NEGATIVE
BUN SERPL-MCNC: 20 MG/DL (ref 10–30)
CALCIUM SERPL-MCNC: 10.2 MG/DL (ref 8.7–10.5)
CHLORIDE SERPL-SCNC: 102 MMOL/L (ref 95–110)
CLARITY UR REFRACT.AUTO: CLEAR
CO2 SERPL-SCNC: 23 MMOL/L (ref 23–29)
COLOR UR AUTO: NORMAL
CREAT SERPL-MCNC: 1.4 MG/DL (ref 0.5–1.4)
DIFFERENTIAL METHOD: ABNORMAL
EOSINOPHIL # BLD AUTO: 0 K/UL (ref 0–0.5)
EOSINOPHIL NFR BLD: 1.1 % (ref 0–8)
ERYTHROCYTE [DISTWIDTH] IN BLOOD BY AUTOMATED COUNT: 16.9 % (ref 11.5–14.5)
EST. GFR  (NO RACE VARIABLE): 35.5 ML/MIN/1.73 M^2
GLUCOSE SERPL-MCNC: 192 MG/DL (ref 70–110)
GLUCOSE UR QL STRIP: NEGATIVE
HCT VFR BLD AUTO: 32 % (ref 37–48.5)
HGB BLD-MCNC: 10.5 G/DL (ref 12–16)
HGB UR QL STRIP: NEGATIVE
IMM GRANULOCYTES # BLD AUTO: 0.01 K/UL (ref 0–0.04)
IMM GRANULOCYTES NFR BLD AUTO: 0.3 % (ref 0–0.5)
KETONES UR QL STRIP: NEGATIVE
LEUKOCYTE ESTERASE UR QL STRIP: NEGATIVE
LYMPHOCYTES # BLD AUTO: 1.3 K/UL (ref 1–4.8)
LYMPHOCYTES NFR BLD: 33.8 % (ref 18–48)
MCH RBC QN AUTO: 28.7 PG (ref 27–31)
MCHC RBC AUTO-ENTMCNC: 32.8 G/DL (ref 32–36)
MCV RBC AUTO: 87 FL (ref 82–98)
MONOCYTES # BLD AUTO: 0.5 K/UL (ref 0.3–1)
MONOCYTES NFR BLD: 11.9 % (ref 4–15)
NEUTROPHILS # BLD AUTO: 2 K/UL (ref 1.8–7.7)
NEUTROPHILS NFR BLD: 52.1 % (ref 38–73)
NITRITE UR QL STRIP: NEGATIVE
NRBC BLD-RTO: 0 /100 WBC
PH UR STRIP: 6 [PH] (ref 5–8)
PLATELET # BLD AUTO: 156 K/UL (ref 150–450)
PMV BLD AUTO: 11.8 FL (ref 9.2–12.9)
POCT GLUCOSE: 130 MG/DL (ref 70–110)
POTASSIUM SERPL-SCNC: 3.7 MMOL/L (ref 3.5–5.1)
PROT SERPL-MCNC: 7.4 G/DL (ref 6–8.4)
PROT UR QL STRIP: NEGATIVE
RBC # BLD AUTO: 3.66 M/UL (ref 4–5.4)
SODIUM SERPL-SCNC: 137 MMOL/L (ref 136–145)
SP GR UR STRIP: 1 (ref 1–1.03)
TROPONIN I SERPL DL<=0.01 NG/ML-MCNC: 0.02 NG/ML (ref 0–0.03)
TSH SERPL DL<=0.005 MIU/L-ACNC: 2.41 UIU/ML (ref 0.4–4)
URN SPEC COLLECT METH UR: NORMAL
WBC # BLD AUTO: 3.79 K/UL (ref 3.9–12.7)

## 2023-05-20 PROCEDURE — 25000003 PHARM REV CODE 250

## 2023-05-20 PROCEDURE — 93010 EKG 12-LEAD: ICD-10-PCS | Mod: ,,, | Performed by: INTERNAL MEDICINE

## 2023-05-20 PROCEDURE — 81003 URINALYSIS AUTO W/O SCOPE: CPT

## 2023-05-20 PROCEDURE — 84443 ASSAY THYROID STIM HORMONE: CPT

## 2023-05-20 PROCEDURE — 99285 PR EMERGENCY DEPT VISIT,LEVEL V: ICD-10-PCS | Mod: ,,, | Performed by: EMERGENCY MEDICINE

## 2023-05-20 PROCEDURE — 25000003 PHARM REV CODE 250: Performed by: STUDENT IN AN ORGANIZED HEALTH CARE EDUCATION/TRAINING PROGRAM

## 2023-05-20 PROCEDURE — 99285 EMERGENCY DEPT VISIT HI MDM: CPT | Mod: ,,, | Performed by: EMERGENCY MEDICINE

## 2023-05-20 PROCEDURE — 99285 EMERGENCY DEPT VISIT HI MDM: CPT | Mod: 25

## 2023-05-20 PROCEDURE — 96365 THER/PROPH/DIAG IV INF INIT: CPT

## 2023-05-20 PROCEDURE — 96375 TX/PRO/DX INJ NEW DRUG ADDON: CPT

## 2023-05-20 PROCEDURE — 63600175 PHARM REV CODE 636 W HCPCS: Performed by: STUDENT IN AN ORGANIZED HEALTH CARE EDUCATION/TRAINING PROGRAM

## 2023-05-20 PROCEDURE — 12000002 HC ACUTE/MED SURGE SEMI-PRIVATE ROOM

## 2023-05-20 PROCEDURE — 99223 1ST HOSP IP/OBS HIGH 75: CPT | Mod: ,,, | Performed by: INTERNAL MEDICINE

## 2023-05-20 PROCEDURE — 99223 1ST HOSP IP/OBS HIGH 75: CPT | Mod: AI,,, | Performed by: STUDENT IN AN ORGANIZED HEALTH CARE EDUCATION/TRAINING PROGRAM

## 2023-05-20 PROCEDURE — 85025 COMPLETE CBC W/AUTO DIFF WBC: CPT

## 2023-05-20 PROCEDURE — 80053 COMPREHEN METABOLIC PANEL: CPT

## 2023-05-20 PROCEDURE — 21400001 HC TELEMETRY ROOM

## 2023-05-20 PROCEDURE — 93005 ELECTROCARDIOGRAM TRACING: CPT

## 2023-05-20 PROCEDURE — 93010 ELECTROCARDIOGRAM REPORT: CPT | Mod: ,,, | Performed by: INTERNAL MEDICINE

## 2023-05-20 PROCEDURE — 99223 PR INITIAL HOSPITAL CARE,LEVL III: ICD-10-PCS | Mod: AI,,, | Performed by: STUDENT IN AN ORGANIZED HEALTH CARE EDUCATION/TRAINING PROGRAM

## 2023-05-20 PROCEDURE — 84484 ASSAY OF TROPONIN QUANT: CPT

## 2023-05-20 PROCEDURE — 63600175 PHARM REV CODE 636 W HCPCS

## 2023-05-20 PROCEDURE — 99223 PR INITIAL HOSPITAL CARE,LEVL III: ICD-10-PCS | Mod: ,,, | Performed by: INTERNAL MEDICINE

## 2023-05-20 RX ORDER — AZITHROMYCIN 250 MG/1
250 TABLET, FILM COATED ORAL DAILY
Status: DISCONTINUED | OUTPATIENT
Start: 2023-05-21 | End: 2023-05-22 | Stop reason: HOSPADM

## 2023-05-20 RX ORDER — HEPARIN SODIUM 5000 [USP'U]/ML
5000 INJECTION, SOLUTION INTRAVENOUS; SUBCUTANEOUS EVERY 8 HOURS
Status: DISCONTINUED | OUTPATIENT
Start: 2023-05-20 | End: 2023-05-21

## 2023-05-20 RX ORDER — SODIUM CHLORIDE 0.9 % (FLUSH) 0.9 %
10 SYRINGE (ML) INJECTION EVERY 12 HOURS PRN
Status: DISCONTINUED | OUTPATIENT
Start: 2023-05-20 | End: 2023-05-22 | Stop reason: HOSPADM

## 2023-05-20 RX ORDER — INSULIN ASPART 100 [IU]/ML
0-5 INJECTION, SOLUTION INTRAVENOUS; SUBCUTANEOUS
Status: DISCONTINUED | OUTPATIENT
Start: 2023-05-20 | End: 2023-05-22 | Stop reason: HOSPADM

## 2023-05-20 RX ORDER — DULOXETIN HYDROCHLORIDE 30 MG/1
30 CAPSULE, DELAYED RELEASE ORAL DAILY
Status: DISCONTINUED | OUTPATIENT
Start: 2023-05-21 | End: 2023-05-22 | Stop reason: HOSPADM

## 2023-05-20 RX ORDER — ATORVASTATIN CALCIUM 20 MG/1
80 TABLET, FILM COATED ORAL DAILY
Status: DISCONTINUED | OUTPATIENT
Start: 2023-05-21 | End: 2023-05-22 | Stop reason: HOSPADM

## 2023-05-20 RX ORDER — IBUPROFEN 200 MG
24 TABLET ORAL
Status: DISCONTINUED | OUTPATIENT
Start: 2023-05-20 | End: 2023-05-22 | Stop reason: HOSPADM

## 2023-05-20 RX ORDER — DONEPEZIL HYDROCHLORIDE 5 MG/1
5 TABLET, FILM COATED ORAL NIGHTLY
Status: DISCONTINUED | OUTPATIENT
Start: 2023-05-20 | End: 2023-05-22 | Stop reason: HOSPADM

## 2023-05-20 RX ORDER — AMLODIPINE BESYLATE 5 MG/1
5 TABLET ORAL DAILY
Status: DISCONTINUED | OUTPATIENT
Start: 2023-05-21 | End: 2023-05-22 | Stop reason: HOSPADM

## 2023-05-20 RX ORDER — CHOLECALCIFEROL (VITAMIN D3) 25 MCG
1000 TABLET ORAL DAILY
Status: DISCONTINUED | OUTPATIENT
Start: 2023-05-21 | End: 2023-05-22 | Stop reason: HOSPADM

## 2023-05-20 RX ORDER — NALOXONE HCL 0.4 MG/ML
0.02 VIAL (ML) INJECTION
Status: DISCONTINUED | OUTPATIENT
Start: 2023-05-20 | End: 2023-05-22 | Stop reason: HOSPADM

## 2023-05-20 RX ORDER — IPRATROPIUM BROMIDE AND ALBUTEROL SULFATE 2.5; .5 MG/3ML; MG/3ML
3 SOLUTION RESPIRATORY (INHALATION) EVERY 4 HOURS PRN
Status: DISCONTINUED | OUTPATIENT
Start: 2023-05-20 | End: 2023-05-22 | Stop reason: HOSPADM

## 2023-05-20 RX ORDER — ENOXAPARIN SODIUM 100 MG/ML
75 INJECTION SUBCUTANEOUS EVERY 12 HOURS
Status: DISCONTINUED | OUTPATIENT
Start: 2023-05-20 | End: 2023-05-20

## 2023-05-20 RX ORDER — TALC
6 POWDER (GRAM) TOPICAL NIGHTLY PRN
Status: DISCONTINUED | OUTPATIENT
Start: 2023-05-20 | End: 2023-05-22 | Stop reason: HOSPADM

## 2023-05-20 RX ORDER — IBUPROFEN 200 MG
16 TABLET ORAL
Status: DISCONTINUED | OUTPATIENT
Start: 2023-05-20 | End: 2023-05-22 | Stop reason: HOSPADM

## 2023-05-20 RX ORDER — ONDANSETRON 4 MG/1
4 TABLET, ORALLY DISINTEGRATING ORAL EVERY 8 HOURS PRN
Status: DISCONTINUED | OUTPATIENT
Start: 2023-05-20 | End: 2023-05-22 | Stop reason: HOSPADM

## 2023-05-20 RX ORDER — LANOLIN ALCOHOL/MO/W.PET/CERES
1000 CREAM (GRAM) TOPICAL EVERY MORNING
Status: DISCONTINUED | OUTPATIENT
Start: 2023-05-21 | End: 2023-05-22 | Stop reason: HOSPADM

## 2023-05-20 RX ORDER — GLUCAGON 1 MG
1 KIT INJECTION
Status: DISCONTINUED | OUTPATIENT
Start: 2023-05-20 | End: 2023-05-22 | Stop reason: HOSPADM

## 2023-05-20 RX ORDER — ACETAMINOPHEN 325 MG/1
650 TABLET ORAL EVERY 4 HOURS PRN
Status: DISCONTINUED | OUTPATIENT
Start: 2023-05-20 | End: 2023-05-22 | Stop reason: HOSPADM

## 2023-05-20 RX ORDER — ASPIRIN 81 MG/1
81 TABLET ORAL EVERY MORNING
Status: DISCONTINUED | OUTPATIENT
Start: 2023-05-21 | End: 2023-05-22 | Stop reason: HOSPADM

## 2023-05-20 RX ADMIN — HEPARIN SODIUM 5000 UNITS: 5000 INJECTION INTRAVENOUS; SUBCUTANEOUS at 10:05

## 2023-05-20 RX ADMIN — DEXTROSE MONOHYDRATE 1 G: 5 INJECTION INTRAVENOUS at 02:05

## 2023-05-20 RX ADMIN — AZITHROMYCIN MONOHYDRATE 500 MG: 500 INJECTION, POWDER, LYOPHILIZED, FOR SOLUTION INTRAVENOUS at 03:05

## 2023-05-20 RX ADMIN — DONEPEZIL HYDROCHLORIDE 5 MG: 5 TABLET, FILM COATED ORAL at 10:05

## 2023-05-20 RX ADMIN — Medication 6 MG: at 10:05

## 2023-05-20 RX ADMIN — POTASSIUM BICARBONATE 20 MEQ: 391 TABLET, EFFERVESCENT ORAL at 10:05

## 2023-05-20 NOTE — HPI
"Ms. England is a 89-year-old female PMH dementia, DM, HTN, CKD 3A, HFpEF and previous non-small cell lung carcinoma who presented to the ED  with worsening fatigue and hallucinations. Reports fatigue x3d and shortness of breath. Was recently started on supplemental 2L O2 by her PCP. She is accompanied by her daughter who reports that she has been more restless, constantly talking out of her head since last night. Examples include her saying "it was raining in her bedroom". She says that she has been having these symptoms for "a good while" but she has not ever told her daughter. Patient herself is alert and has no major complaints. ROS notable for emesis x1 yesterday. Otherwise denies fever, coughing, shortness of breath, abdominal pain, dysuria.     In the ED, she was afebrile. Tachycardic and found to be in new afib. Labs with stable CBC, CMP with mild SEA. Trop neg. TSH normal. Xray with known chronic R pleural effusion, with possible associated basilar consolidation. Cardiology consulted. She was admitted to hospital medicine for possible PNA.   "

## 2023-05-20 NOTE — SUBJECTIVE & OBJECTIVE
Past Medical History:   Diagnosis Date    Anal cancer 1995    Anemia     Cancer 1995    anal cancer    Centrilobular emphysema 7/6/2020    Diabetes mellitus     With circulatory disorder    Lumbar radiculopathy 5/16/2014    Lung cancer 2012    s/p chemo/radiation    Macular degeneration     Macular hemorrhage of left eye     Neuropathy     Osteoporosis     Osteoporosis, unspecified 11/9/2012    Pure hypercholesterolemia        Past Surgical History:   Procedure Laterality Date    BRONCHOSCOPY      CHOLECYSTECTOMY      COLONOSCOPY      FLUOROSCOPIC ANGIOGRAPHY OF LOWER EXTREMITY WITH TOPICAL ULTRASOUND Right 12/6/2018    Procedure: ARTERIOGRAM-LEG AND ULTRASOUND;  Surgeon: SEBASTIÁN Mcpherson III, MD;  Location: [facility] OR [address] [place] [address];  Service: Peripheral Vascular;  Laterality: Right;  16.5 min  1059.42 mGy  59ml Dye  2ml Local    heart stent      HYSTERECTOMY      INCISIONAL BIOPSY Right 12/6/2019    Procedure: INCISIONAL BIOPSY;  Surgeon: Leslie Castillo MD;  Location: [facility] OR [address] [place] [address];  Service: Plastics;  Laterality: Right;    left leg surgery      blockage    PERCUTANEOUS TRANSLUMINAL ANGIOPLASTY N/A 12/6/2018    Procedure: PTA (ANGIOPLASTY, PERCUTANEOUS, TRANSLUMINAL);  Surgeon: SEBASTIÁN Mcpherson III, MD;  Location: [facility] OR [address] [place] [address];  Service: Peripheral Vascular;  Laterality: N/A;    RECONSTRUCTION USING FLAP Right 12/6/2019    Procedure: RECONSTRUCTION USING FLAP/FULL THICKNESS MRESETION AND RECONSTRUCTION RIGHT UPPER EYELID WITH BIOPSY;  Surgeon: Leslie Castillo MD;  Location: [facility] OR [address] [place] [address];  Service: Plastics;  Laterality: Right;    TONSILLECTOMY         Review of patient's allergies indicates:   Allergen Reactions    Bextra [valdecoxib] Swelling    Gabapentin Diarrhea and Nausea Only       Current Facility-Administered Medications on File Prior to Encounter   Medication    sodium chloride 0.9% flush 5 mL     Current Outpatient Medications on File Prior to Encounter   Medication Sig    albuterol (PROVENTIL) 2.5 mg /3 mL  (0.083 %) nebulizer solution Take 3 mLs (2.5 mg total) by nebulization every 6 (six) hours as needed for Wheezing. Rescue    ALPRAZolam (XANAX) 0.5 MG tablet Take 1 tablet (0.5 mg total) by mouth 2 (two) times daily as needed for Anxiety. (Patient not taking: Reported on 2023)    amlodipine-benazepril 5-20 mg (LOTREL) 5-20 mg per capsule Take 1 capsule by mouth once daily.    aspirin (ECOTRIN) 81 MG EC tablet Take 81 mg by mouth every morning.    azelastine (ASTELIN) 137 mcg (0.1 %) nasal spray 1 spray (137 mcg total) by Nasal route 2 (two) times daily.    cyanocobalamin (VITAMIN B-12) 1000 MCG tablet Take 1,000 mcg by mouth every morning.    donepeziL (ARICEPT) 5 MG tablet Take 1 tablet (5 mg total) by mouth every evening.    DULoxetine (CYMBALTA) 60 MG capsule Take 1 capsule (60 mg total) by mouth every morning.    estradioL (ESTRACE) 0.01 % (0.1 mg/gram) vaginal cream USE ONE-HALF GRAM VAGINALLY TWICE a WEEK    linaGLIPtin (TRADJENTA) 5 mg Tab tablet Take 1 tablet (5 mg total) by mouth once daily.    metoprolol tartrate (LOPRESSOR) 25 MG tablet Take 1 tablet (25 mg total) by mouth 2 (two) times daily. In addition to 50 mg twice daily to equal 75 mg twice daily    metoprolol tartrate (LOPRESSOR) 50 MG tablet Take 1 tablet (50 mg total) by mouth 2 (two) times daily.    nitroGLYCERIN (NITROSTAT) 0.4 MG SL tablet Place 1 tablet (0.4 mg total) under the tongue every 5 (five) minutes as needed.    simvastatin (ZOCOR) 80 MG tablet Take 1 tablet (80 mg total) by mouth once daily.    vitamin D (VITAMIN D3) 1000 units Tab Take 1,000 Units by mouth once daily.     Family History       Problem Relation (Age of Onset)    Breast cancer Maternal Aunt    Cancer Father    Stroke Mother          Tobacco Use    Smoking status: Former     Packs/day: 0.50     Years: 30.00     Pack years: 15.00     Types: Cigarettes     Quit date: 1995     Years since quittin.4     Passive exposure: Past    Smokeless tobacco: Never    Substance and Sexual Activity    Alcohol use: No    Drug use: No    Sexual activity: Not Currently     Birth control/protection: Surgical     Review of Systems   Constitutional: Positive for decreased appetite and malaise/fatigue.   Cardiovascular:  Negative for chest pain, dyspnea on exertion, leg swelling, near-syncope, orthopnea, palpitations, paroxysmal nocturnal dyspnea and syncope.   Respiratory:  Negative for shortness of breath.    Musculoskeletal:  Negative for falls.   Neurological:  Negative for dizziness.   Psychiatric/Behavioral:  Positive for memory loss.    Objective:     Vital Signs (Most Recent):  Temp: 98.5 °F (36.9 °C) (05/20/23 1209)  Pulse: 96 (05/20/23 1459)  Resp: (!) 22 (05/20/23 1449)  BP: (!) 179/80 (05/20/23 1459)  SpO2: 96 % (05/20/23 1449) Vital Signs (24h Range):  Temp:  [98.5 °F (36.9 °C)] 98.5 °F (36.9 °C)  Pulse:  [59-99] 96  Resp:  [16-22] 22  SpO2:  [95 %-97 %] 96 %  BP: (130-179)/(71-85) 179/80     Weight: 74.8 kg (165 lb)  Body mass index is 29.23 kg/m².    SpO2: 96 %         Intake/Output Summary (Last 24 hours) at 5/20/2023 1531  Last data filed at 5/20/2023 1454  Gross per 24 hour   Intake 50 ml   Output --   Net 50 ml       Lines/Drains/Airways       Peripheral Intravenous Line  Duration                  Peripheral IV - Single Lumen 05/20/23 1329 20 G Left Antecubital <1 day                     Physical Exam  Constitutional:       General: She is not in acute distress.     Appearance: Normal appearance.   HENT:      Head: Normocephalic.      Mouth/Throat:      Mouth: Mucous membranes are moist.   Eyes:      Extraocular Movements: Extraocular movements intact.   Cardiovascular:      Rate and Rhythm: Normal rate and regular rhythm.      Pulses: Normal pulses.      Heart sounds: No murmur heard.  Pulmonary:      Effort: Pulmonary effort is normal. No respiratory distress.   Abdominal:      General: There is no distension.      Palpations: Abdomen is soft.      Tenderness:  There is no abdominal tenderness.   Musculoskeletal:      Cervical back: Neck supple.      Right lower leg: No edema.      Left lower leg: No edema.   Skin:     General: Skin is warm.   Neurological:      Mental Status: She is oriented to person, place, and time.      Comments: Some memory lapses noted        Significant Labs: BMP:   Recent Labs   Lab 05/20/23  1309   *      K 3.7      CO2 23   BUN 20   CREATININE 1.4   CALCIUM 10.2   , CMP   Recent Labs   Lab 05/20/23  1309      K 3.7      CO2 23   *   BUN 20   CREATININE 1.4   CALCIUM 10.2   PROT 7.4   ALBUMIN 3.6   BILITOT 0.3   ALKPHOS 64   AST 38   ALT 30   ANIONGAP 12   , CBC   Recent Labs   Lab 05/20/23  1309   WBC 3.79*   HGB 10.5*   HCT 32.0*      , and INR No results for input(s): INR, PROTIME in the last 48 hours.    Significant Imaging:   TTE 7/27/22  The left ventricle is normal in size with concentric remodeling and normal systolic function.  There are segmental left ventricular wall motion abnormalities.  The estimated ejection fraction is 55%.  Grade II left ventricular diastolic dysfunction.  Small circumferential pericardial effusion.  Normal right ventricular size.  Mild aortic regurgitation.  Elevated central venous pressure (15 mmHg).  The estimated PA systolic pressure is 41 mmHg.  Mild tricuspid regurgitation.

## 2023-05-20 NOTE — ASSESSMENT & PLAN NOTE
Cardiology consulted in the ED. 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 Do not think this is afib  -  holding home Lopressor per cards recs  - defer anticoagulation   - if no improvement in bradycardia consider DC donepezil   - walk test with heart rate eval 5/20  - If significant tachycardia or bradycardia causing symptoms, would consideration of PPM (if bradycardia); no indication for PPM at this time  - cards notification at discharge for outpatient planning

## 2023-05-20 NOTE — HPI
Mrs England is a 89 yo F with PMH HFpEF, non-obstructive CAD (reportedly), HTN, HLD, dementia presenting from home with her daughter for fatigue and hallucinations. She has been feeling overall just fatigued these past few days and reduced appetite. She walks around the house just fine with her walker/cane without any DONALDSON, chest discomfort, palps, falls, syncope, etc. No N/V/D/C. No bleeding reported and takes ASA daily. She has been hallucinating at home according to her daughter, and this can really upset and disturb her. No harm to herself or others.     She was last seen by her cardiologist, Dr Champion, 11/22 and was doing well at that time.     Cardiology consulted her HR ranging from 40s-100s, although asymptomatic and hemodynamically stable.

## 2023-05-20 NOTE — ASSESSMENT & PLAN NOTE
Patient's FSGs are controlled on current medication regimen.  Last A1c reviewed-   Lab Results   Component Value Date    HGBA1C 6.5 (H) 04/03/2023     Most recent fingerstick glucose reviewed- No results for input(s): POCTGLUCOSE in the last 24 hours.  Current correctional scale  Low  Maintain anti-hyperglycemic dose as follows-   Antihyperglycemics (From admission, onward)    None        Hold Oral hypoglycemics while patient is in the hospital.

## 2023-05-20 NOTE — CONSULTS
[facility] - Emergency Dept  Cardiology  Consult Note    Patient Name: Brunilda England  MRN: [id number]  Admission Date: 5/20/2023  Hospital Length of Stay: 0 days  Code Status: Prior   Attending Provider: Nuzhat Bell MD   Consulting Provider: Genaro Kapadia MD  Primary Care Physician: Pepper Whitfield MD  Principal Problem:<principal problem not specified>    Patient information was obtained from patient, relative(s) and ER records.     Inpatient consult to Cardiology  Consult performed by: Genaro Kapadia MD  Consult ordered by: Day Rob PA-C  Reason for consult: tachycardia-bradycardia  Assessment/Recommendations: I have personally taken the history and examined the patient and agree with the resident's note as stated above.  Since bradycardia not serious and she has tachycardia, maybe just reduce the BB to 25 mg twice.      Subjective:     Chief Complaint:  Fatigue, hallucinations     HPI:   Mrs England is a 89 yo F with PMH HFpEF, non-obstructive CAD (reportedly), HTN, HLD, dementia presenting from home with her daughter for fatigue and hallucinations. She has been feeling overall just fatigued these past few days and reduced appetite. She walks around the house just fine with her walker/cane without any DONALDSON, chest discomfort, palps, falls, syncope, etc. No N/V/D/C. No bleeding reported and takes ASA daily. She has been hallucinating at home according to her daughter, and this can really upset and disturb her. No harm to herself or others.     She was last seen by her cardiologist, Dr Champion, 11/22 and was doing well at that time.     Cardiology consulted her HR ranging from 40s-100s, although asymptomatic and hemodynamically stable.       Past Medical History:   Diagnosis Date    Anal cancer 1995    Anemia     Cancer 1995    anal cancer    Centrilobular emphysema 7/6/2020    Diabetes mellitus     With circulatory disorder    Lumbar radiculopathy 5/16/2014    Lung cancer 2012    s/p  chemo/radiation    Macular degeneration     Macular hemorrhage of left eye     Neuropathy     Osteoporosis     Osteoporosis, unspecified 11/9/2012    Pure hypercholesterolemia        Past Surgical History:   Procedure Laterality Date    BRONCHOSCOPY      CHOLECYSTECTOMY      COLONOSCOPY      FLUOROSCOPIC ANGIOGRAPHY OF LOWER EXTREMITY WITH TOPICAL ULTRASOUND Right 12/6/2018    Procedure: ARTERIOGRAM-LEG AND ULTRASOUND;  Surgeon: SEBASTIÁN Mcpherson III, MD;  Location: [facility] OR [address] [place] [address];  Service: Peripheral Vascular;  Laterality: Right;  16.5 min  1059.42 mGy  59ml Dye  2ml Local    heart stent      HYSTERECTOMY      INCISIONAL BIOPSY Right 12/6/2019    Procedure: INCISIONAL BIOPSY;  Surgeon: Leslie Castillo MD;  Location: [facility] OR [address] [place] [address];  Service: Plastics;  Laterality: Right;    left leg surgery      blockage    PERCUTANEOUS TRANSLUMINAL ANGIOPLASTY N/A 12/6/2018    Procedure: PTA (ANGIOPLASTY, PERCUTANEOUS, TRANSLUMINAL);  Surgeon: SEBASTIÁN Mcpherson III, MD;  Location: [facility] OR [address] [place] [address];  Service: Peripheral Vascular;  Laterality: N/A;    RECONSTRUCTION USING FLAP Right 12/6/2019    Procedure: RECONSTRUCTION USING FLAP/FULL THICKNESS MRESETION AND RECONSTRUCTION RIGHT UPPER EYELID WITH BIOPSY;  Surgeon: Leslie Castillo MD;  Location: [facility] OR [address] [place] [address];  Service: Plastics;  Laterality: Right;    TONSILLECTOMY         Review of patient's allergies indicates:   Allergen Reactions    Bextra [valdecoxib] Swelling    Gabapentin Diarrhea and Nausea Only       Current Facility-Administered Medications on File Prior to Encounter   Medication    sodium chloride 0.9% flush 5 mL     Current Outpatient Medications on File Prior to Encounter   Medication Sig    albuterol (PROVENTIL) 2.5 mg /3 mL (0.083 %) nebulizer solution Take 3 mLs (2.5 mg total) by nebulization every 6 (six) hours as needed for Wheezing. Rescue    ALPRAZolam (XANAX) 0.5 MG tablet Take 1 tablet (0.5 mg total) by mouth 2 (two) times daily as needed for Anxiety. (Patient  not taking: Reported on 2023)    amlodipine-benazepril 5-20 mg (LOTREL) 5-20 mg per capsule Take 1 capsule by mouth once daily.    aspirin (ECOTRIN) 81 MG EC tablet Take 81 mg by mouth every morning.    azelastine (ASTELIN) 137 mcg (0.1 %) nasal spray 1 spray (137 mcg total) by Nasal route 2 (two) times daily.    cyanocobalamin (VITAMIN B-12) 1000 MCG tablet Take 1,000 mcg by mouth every morning.    donepeziL (ARICEPT) 5 MG tablet Take 1 tablet (5 mg total) by mouth every evening.    DULoxetine (CYMBALTA) 60 MG capsule Take 1 capsule (60 mg total) by mouth every morning.    estradioL (ESTRACE) 0.01 % (0.1 mg/gram) vaginal cream USE ONE-HALF GRAM VAGINALLY TWICE a WEEK    linaGLIPtin (TRADJENTA) 5 mg Tab tablet Take 1 tablet (5 mg total) by mouth once daily.    metoprolol tartrate (LOPRESSOR) 25 MG tablet Take 1 tablet (25 mg total) by mouth 2 (two) times daily. In addition to 50 mg twice daily to equal 75 mg twice daily    metoprolol tartrate (LOPRESSOR) 50 MG tablet Take 1 tablet (50 mg total) by mouth 2 (two) times daily.    nitroGLYCERIN (NITROSTAT) 0.4 MG SL tablet Place 1 tablet (0.4 mg total) under the tongue every 5 (five) minutes as needed.    simvastatin (ZOCOR) 80 MG tablet Take 1 tablet (80 mg total) by mouth once daily.    vitamin D (VITAMIN D3) 1000 units Tab Take 1,000 Units by mouth once daily.     Family History       Problem Relation (Age of Onset)    Breast cancer Maternal Aunt    Cancer Father    Stroke Mother          Tobacco Use    Smoking status: Former     Packs/day: 0.50     Years: 30.00     Pack years: 15.00     Types: Cigarettes     Quit date: 1995     Years since quittin.4     Passive exposure: Past    Smokeless tobacco: Never   Substance and Sexual Activity    Alcohol use: No    Drug use: No    Sexual activity: Not Currently     Birth control/protection: Surgical     Review of Systems   Constitutional: Positive for decreased appetite and malaise/fatigue.   Cardiovascular:   Negative for chest pain, dyspnea on exertion, leg swelling, near-syncope, orthopnea, palpitations, paroxysmal nocturnal dyspnea and syncope.   Respiratory:  Negative for shortness of breath.    Musculoskeletal:  Negative for falls.   Neurological:  Negative for dizziness.   Psychiatric/Behavioral:  Positive for memory loss.    Objective:     Vital Signs (Most Recent):  Temp: 98.5 °F (36.9 °C) (05/20/23 1209)  Pulse: 96 (05/20/23 1459)  Resp: (!) 22 (05/20/23 1449)  BP: (!) 179/80 (05/20/23 1459)  SpO2: 96 % (05/20/23 1449) Vital Signs (24h Range):  Temp:  [98.5 °F (36.9 °C)] 98.5 °F (36.9 °C)  Pulse:  [59-99] 96  Resp:  [16-22] 22  SpO2:  [95 %-97 %] 96 %  BP: (130-179)/(71-85) 179/80     Weight: 74.8 kg (165 lb)  Body mass index is 29.23 kg/m².    SpO2: 96 %         Intake/Output Summary (Last 24 hours) at 5/20/2023 1531  Last data filed at 5/20/2023 1454  Gross per 24 hour   Intake 50 ml   Output --   Net 50 ml       Lines/Drains/Airways       Peripheral Intravenous Line  Duration                  Peripheral IV - Single Lumen 05/20/23 1329 20 G Left Antecubital <1 day                     Physical Exam  Constitutional:       General: She is not in acute distress.     Appearance: Normal appearance.   HENT:      Head: Normocephalic.      Mouth/Throat:      Mouth: Mucous membranes are moist.   Eyes:      Extraocular Movements: Extraocular movements intact.   Cardiovascular:      Rate and Rhythm: Normal rate and regular rhythm.      Pulses: Normal pulses.      Heart sounds: No murmur heard.  Pulmonary:      Effort: Pulmonary effort is normal. No respiratory distress.   Abdominal:      General: There is no distension.      Palpations: Abdomen is soft.      Tenderness: There is no abdominal tenderness.   Musculoskeletal:      Cervical back: Neck supple.      Right lower leg: No edema.      Left lower leg: No edema.   Skin:     General: Skin is warm.   Neurological:      Mental Status: She is oriented to person, place,  and time.      Comments: Some memory lapses noted        Significant Labs: BMP:   Recent Labs   Lab 05/20/23  1309   *      K 3.7      CO2 23   BUN 20   CREATININE 1.4   CALCIUM 10.2   , CMP   Recent Labs   Lab 05/20/23  1309      K 3.7      CO2 23   *   BUN 20   CREATININE 1.4   CALCIUM 10.2   PROT 7.4   ALBUMIN 3.6   BILITOT 0.3   ALKPHOS 64   AST 38   ALT 30   ANIONGAP 12   , CBC   Recent Labs   Lab 05/20/23  1309   WBC 3.79*   HGB 10.5*   HCT 32.0*      , and INR No results for input(s): INR, PROTIME in the last 48 hours.    Significant Imaging:   TTE 7/27/22  The left ventricle is normal in size with concentric remodeling and normal systolic function.  There are segmental left ventricular wall motion abnormalities.  The estimated ejection fraction is 55%.  Grade II left ventricular diastolic dysfunction.  Small circumferential pericardial effusion.  Normal right ventricular size.  Mild aortic regurgitation.  Elevated central venous pressure (15 mmHg).  The estimated PA systolic pressure is 41 mmHg.  Mild tricuspid regurgitation.    Assessment and Plan:     Tachy-juan a syndrome  EKGs and telemetry consistent with tachy-juan a syndrome. She is either in NSR or sinus tachycardia in the low 100s with an intermittent junctional bradycardia. No evidence of Afib. Hemodynamically stable.     - Labs all stable or WNL  - Recommend holding home Lopressor 50/25 mg BID  - Would not recommend anticoagulation at this time  - Would also consider discontinuation of donepezil if no improvement in bradycardia with cessation of Lopressor as this is a potential side effect of the medication   - Would ambulate patient to assess symptoms and HR response  - If asymptomatic, would recommend holding BB and follow-up as outpt  - If significant tachycardia or bradycardia causing symptoms, would admit to medicine for closer titration of BB and/or consideration of PPM (if bradycardia); no  indication for PPM at this time    Please notify cardiology if planning on discharge home to assist with outpt follow-up.       VTE Risk Mitigation (From admission, onward)      None            Thank you for your consult. I will follow-up with patient. Please contact us if you have any additional questions.    Genaro Kapadia MD  Cardiology   [facility] - Emergency Dept

## 2023-05-20 NOTE — ASSESSMENT & PLAN NOTE
Noted, diagnosed 11 years ago. Most recent CT reviewed with no signs of recurrence  - consider repeat CT chest

## 2023-05-20 NOTE — ASSESSMENT & PLAN NOTE
Admitted with CC of fatigue in the setting of possible PNA, dementia, tachy/juan a syndrome, remote malignancy, and advanced age. Has history of non small cell lung cancer and rectal cancer, in remission. Last completed chemoradiation with carboplatin and Taxol on 6/1/12 - doing well. No signs of recurrence. Consideration that her fatigue and altered mentation could be progression of underlying dementia or bradycardia. Will treat PNA and eval for improvement.

## 2023-05-20 NOTE — SUBJECTIVE & OBJECTIVE
Past Medical History:   Diagnosis Date    Anal cancer 1995    Anemia     Cancer 1995    anal cancer    Centrilobular emphysema 7/6/2020    Diabetes mellitus     With circulatory disorder    Lumbar radiculopathy 5/16/2014    Lung cancer 2012    s/p chemo/radiation    Macular degeneration     Macular hemorrhage of left eye     Neuropathy     Osteoporosis     Osteoporosis, unspecified 11/9/2012    Pure hypercholesterolemia        Past Surgical History:   Procedure Laterality Date    BRONCHOSCOPY      CHOLECYSTECTOMY      COLONOSCOPY      FLUOROSCOPIC ANGIOGRAPHY OF LOWER EXTREMITY WITH TOPICAL ULTRASOUND Right 12/6/2018    Procedure: ARTERIOGRAM-LEG AND ULTRASOUND;  Surgeon: SEBASTIÁN Mcpherson III, MD;  Location: [facility] OR [address] [place] [address];  Service: Peripheral Vascular;  Laterality: Right;  16.5 min  1059.42 mGy  59ml Dye  2ml Local    heart stent      HYSTERECTOMY      INCISIONAL BIOPSY Right 12/6/2019    Procedure: INCISIONAL BIOPSY;  Surgeon: Leslie Castillo MD;  Location: [facility] OR [address] [place] [address];  Service: Plastics;  Laterality: Right;    left leg surgery      blockage    PERCUTANEOUS TRANSLUMINAL ANGIOPLASTY N/A 12/6/2018    Procedure: PTA (ANGIOPLASTY, PERCUTANEOUS, TRANSLUMINAL);  Surgeon: SEBASTIÁN Mcpherson III, MD;  Location: [facility] OR [address] [place] [address];  Service: Peripheral Vascular;  Laterality: N/A;    RECONSTRUCTION USING FLAP Right 12/6/2019    Procedure: RECONSTRUCTION USING FLAP/FULL THICKNESS MRESETION AND RECONSTRUCTION RIGHT UPPER EYELID WITH BIOPSY;  Surgeon: Leslie Castillo MD;  Location: [facility] OR [address] [place] [address];  Service: Plastics;  Laterality: Right;    TONSILLECTOMY         Review of patient's allergies indicates:   Allergen Reactions    Bextra [valdecoxib] Swelling    Gabapentin Diarrhea and Nausea Only       Current Facility-Administered Medications on File Prior to Encounter   Medication    sodium chloride 0.9% flush 5 mL     Current Outpatient Medications on File Prior to Encounter   Medication Sig    albuterol (PROVENTIL) 2.5 mg /3 mL  (0.083 %) nebulizer solution Take 3 mLs (2.5 mg total) by nebulization every 6 (six) hours as needed for Wheezing. Rescue    ALPRAZolam (XANAX) 0.5 MG tablet Take 1 tablet (0.5 mg total) by mouth 2 (two) times daily as needed for Anxiety. (Patient not taking: Reported on 2023)    amlodipine-benazepril 5-20 mg (LOTREL) 5-20 mg per capsule Take 1 capsule by mouth once daily.    aspirin (ECOTRIN) 81 MG EC tablet Take 81 mg by mouth every morning.    azelastine (ASTELIN) 137 mcg (0.1 %) nasal spray 1 spray (137 mcg total) by Nasal route 2 (two) times daily.    cyanocobalamin (VITAMIN B-12) 1000 MCG tablet Take 1,000 mcg by mouth every morning.    donepeziL (ARICEPT) 5 MG tablet Take 1 tablet (5 mg total) by mouth every evening.    DULoxetine (CYMBALTA) 60 MG capsule Take 1 capsule (60 mg total) by mouth every morning.    estradioL (ESTRACE) 0.01 % (0.1 mg/gram) vaginal cream USE ONE-HALF GRAM VAGINALLY TWICE a WEEK    linaGLIPtin (TRADJENTA) 5 mg Tab tablet Take 1 tablet (5 mg total) by mouth once daily.    metoprolol tartrate (LOPRESSOR) 25 MG tablet Take 1 tablet (25 mg total) by mouth 2 (two) times daily. In addition to 50 mg twice daily to equal 75 mg twice daily    metoprolol tartrate (LOPRESSOR) 50 MG tablet Take 1 tablet (50 mg total) by mouth 2 (two) times daily.    nitroGLYCERIN (NITROSTAT) 0.4 MG SL tablet Place 1 tablet (0.4 mg total) under the tongue every 5 (five) minutes as needed.    simvastatin (ZOCOR) 80 MG tablet Take 1 tablet (80 mg total) by mouth once daily.    vitamin D (VITAMIN D3) 1000 units Tab Take 1,000 Units by mouth once daily.     Family History       Problem Relation (Age of Onset)    Breast cancer Maternal Aunt    Cancer Father    Stroke Mother          Tobacco Use    Smoking status: Former     Packs/day: 0.50     Years: 30.00     Pack years: 15.00     Types: Cigarettes     Quit date: 1995     Years since quittin.4     Passive exposure: Past    Smokeless tobacco: Never    Substance and Sexual Activity    Alcohol use: No    Drug use: No    Sexual activity: Not Currently     Birth control/protection: Surgical     Review of Systems  Objective:     Vital Signs (Most Recent):  Temp: 98.5 °F (36.9 °C) (05/20/23 1209)  Pulse: 96 (05/20/23 1459)  Resp: (!) 22 (05/20/23 1449)  BP: (!) 179/80 (05/20/23 1459)  SpO2: 96 % (05/20/23 1449) Vital Signs (24h Range):  Temp:  [98.5 °F (36.9 °C)] 98.5 °F (36.9 °C)  Pulse:  [59-99] 96  Resp:  [16-22] 22  SpO2:  [95 %-97 %] 96 %  BP: (130-179)/(71-85) 179/80     Weight: 74.8 kg (165 lb)  Body mass index is 29.23 kg/m².     Physical Exam  Constitutional:       General: She is not in acute distress.     Appearance: Normal appearance. She is not ill-appearing or toxic-appearing.   HENT:      Head: Normocephalic and atraumatic.   Eyes:      General: No scleral icterus.     Extraocular Movements: Extraocular movements intact.      Conjunctiva/sclera: Conjunctivae normal.   Neck:      Comments: No JVD  Cardiovascular:      Rate and Rhythm: Normal rate and regular rhythm.   Pulmonary:      Effort: Pulmonary effort is normal. No respiratory distress.      Breath sounds: Normal breath sounds.      Comments: Blunted breath sounds and R lung base  Abdominal:      General: Bowel sounds are normal. There is no distension.      Palpations: Abdomen is soft.      Tenderness: There is no abdominal tenderness.   Musculoskeletal:      Cervical back: Normal range of motion. No rigidity.      Right lower leg: No edema.      Left lower leg: No edema.   Skin:     General: Skin is warm and dry.      Capillary Refill: Capillary refill takes less than 2 seconds.   Neurological:      General: No focal deficit present.      Mental Status: She is alert and oriented to person, place, and time.   Psychiatric:         Mood and Affect: Mood normal.         Behavior: Behavior normal.         Thought Content: Thought content normal.         Judgment: Judgment normal.      Comments: No  hallucinations noted              Significant Labs: All pertinent labs within the past 24 hours have been reviewed.    Significant Imaging: I have reviewed all pertinent imaging results/findings within the past 24 hours.

## 2023-05-20 NOTE — ED TRIAGE NOTES
Pt to ED via personal transport for + weakness, confusion over last few days. Pt denies any fevers, chills, does report 1x episode of vomiting last night. Hx dementia, referred by MD for possible UTI.

## 2023-05-20 NOTE — ASSESSMENT & PLAN NOTE
Noted on admission  No s/s wheezing or hypoxia  Consider steroids if symptoms develop  A/A nebs PRN

## 2023-05-20 NOTE — ASSESSMENT & PLAN NOTE
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 She is either in NSR or sinus tachycardia in the low 100s with an intermittent junctional bradycardia. No evidence of Afib. Hemodynamically stable.     - Labs all stable or WNL  - Recommend holding home Lopressor 50/25 mg BID  - Would not recommend anticoagulation at this time  - Would also consider discontinuation of donepezil if no improvement in bradycardia with cessation of Lopressor as this is a potential side effect of the medication   - Would ambulate patient to assess symptoms and HR response  - If asymptomatic, would recommend holding BB and follow-up as outpt  - If significant tachycardia or bradycardia causing symptoms, would admit to medicine for closer titration of BB and/or consideration of PPM (if bradycardia); no indication for PPM at this time    Please notify cardiology if planning on discharge home to assist with outpt follow-up.

## 2023-05-20 NOTE — ASSESSMENT & PLAN NOTE
Admitted with fatigue and concern for PNA on CXR. Notably, she has a chronic R pleural effusion with atelectasis. Recent imaging reviewed. Will continue abx and empirically treat x5d course. Notably has been hypoxic in the recent history, on 2L NC PRN and at night per outpatient PCP    - continue rocephin and azithro  - procal pending  - consider CT chest if symptoms worsen to better eval pleural effusion

## 2023-05-20 NOTE — ASSESSMENT & PLAN NOTE
Continue home statin, consider stopping at DC given advanced age and desire for consolidating medications

## 2023-05-20 NOTE — PROVIDER PROGRESS NOTES - EMERGENCY DEPT.
Encounter Date: 5/20/2023    ED Physician Progress Notes         EKG - STEMI Decision  Initial Reading: No STEMI present.

## 2023-05-20 NOTE — ED PROVIDER NOTES
"Encounter Date: 5/20/2023       History     Chief Complaint   Patient presents with    Fatigue     Daughter reports worsened confusion. +fatigue. Hx of dementia.      89-year-old female PMH of anemia, stable small cell lung carcinoma, dementia, diabetes mellitus hypertension, anemia, CAD, PAD and CKD 3A presents emergency department with her daughter due to concerns of excessive fatigue and hallucinations.  Daughter reports she has experienced this in the past however it appears that it has become more frequent and consistent recently.  Patient reports having her mother Xanax have 4:00 a.m. this morning as she has been restless and constantly talking at night.  Patient daughter reports she says things like "I feel like I am leaving (Dying) ".  Daughter also endorses patient had an episode of emesis yesterday evening.  Patient is alert and reports that she feels fine, besides feeling fatigued.    Review of patient's allergies indicates:   Allergen Reactions    Bextra [valdecoxib] Swelling    Gabapentin Diarrhea and Nausea Only     Past Medical History:   Diagnosis Date    Anal cancer 1995    Anemia     Cancer 1995    anal cancer    Centrilobular emphysema 7/6/2020    Diabetes mellitus     With circulatory disorder    Lumbar radiculopathy 5/16/2014    Lung cancer 2012    s/p chemo/radiation    Macular degeneration     Macular hemorrhage of left eye     Neuropathy     Osteoporosis     Osteoporosis, unspecified 11/9/2012    Pure hypercholesterolemia      Past Surgical History:   Procedure Laterality Date    BRONCHOSCOPY      CHOLECYSTECTOMY      COLONOSCOPY      FLUOROSCOPIC ANGIOGRAPHY OF LOWER EXTREMITY WITH TOPICAL ULTRASOUND Right 12/6/2018    Procedure: ARTERIOGRAM-LEG AND ULTRASOUND;  Surgeon: SEBASTIÁN Mcpherson III, MD;  Location: [facility] OR [address] [place] [address];  Service: Peripheral Vascular;  Laterality: Right;  16.5 min  1059.42 mGy  59ml Dye  2ml Local    heart stent      HYSTERECTOMY      INCISIONAL BIOPSY Right 12/6/2019    " Procedure: INCISIONAL BIOPSY;  Surgeon: Leslie Castillo MD;  [facility];  Service: Plastics;  Laterality: Right;    left leg surgery      blockage    PERCUTANEOUS TRANSLUMINAL ANGIOPLASTY N/A 2018    Procedure: PTA (ANGIOPLASTY, PERCUTANEOUS, TRANSLUMINAL);  Surgeon: SEBASTIÁN Mcpherson III, MD;  Location: [facility] OR 2ND FLR;  Service: Peripheral Vascular;  Laterality: N/A;    RECONSTRUCTION USING FLAP Right 2019    Procedure: RECONSTRUCTION USING FLAP/FULL THICKNESS MRESETION AND RECONSTRUCTION RIGHT UPPER EYELID WITH BIOPSY;  Surgeon: Leslie Castillo MD;  Location: [facility] OR [address] [place] [address];  Service: Plastics;  Laterality: Right;    TONSILLECTOMY       Family History   Problem Relation Age of Onset    Stroke Mother     Cancer Father     Breast cancer Maternal Aunt     Ovarian cancer Neg Hx     Amblyopia Neg Hx     Blindness Neg Hx     Cataracts Neg Hx     Glaucoma Neg Hx     Macular degeneration Neg Hx     Retinal detachment Neg Hx     Strabismus Neg Hx      Social History     Tobacco Use    Smoking status: Former     Packs/day: 0.50     Years: 30.00     Pack years: 15.00     Types: Cigarettes     Quit date: 1995     Years since quittin.4     Passive exposure: Past    Smokeless tobacco: Never   Substance Use Topics    Alcohol use: No    Drug use: No     Review of Systems   Constitutional:  Positive for fatigue. Negative for activity change.     Physical Exam     Initial Vitals [23 1209]   BP Pulse Resp Temp SpO2   134/71 (!) 59 16 98.5 °F (36.9 °C) 95 %      MAP       --         Physical Exam    Constitutional: She appears well-developed and well-nourished.   HENT:   Head: Normocephalic and atraumatic.   Eyes: Conjunctivae and EOM are normal.   Neck:   Normal range of motion.  Cardiovascular:  Regular rhythm.           Patient ranges from tachycardic to bradycardic.   Pulmonary/Chest: Breath sounds normal. No respiratory distress. She has no wheezes.   Abdominal: Abdomen is soft. She exhibits  no distension. There is no abdominal tenderness.   Musculoskeletal:         General: Normal range of motion.      Cervical back: Normal range of motion.     Neurological: She is alert and oriented to person, place, and time. She has normal strength. No cranial nerve deficit.   Skin: Skin is warm and dry.   Psychiatric: She has a normal mood and affect. Thought content normal.   Patient is alert oriented  Is not experiencing hallucinations or delusions at this time  Answers questions appropriately       ED Course   Procedures  Labs Reviewed   CBC W/ AUTO DIFFERENTIAL - Abnormal; Notable for the following components:       Result Value    WBC 3.79 (*)     RBC 3.66 (*)     Hemoglobin 10.5 (*)     Hematocrit 32.0 (*)     RDW 16.9 (*)     All other components within normal limits   COMPREHENSIVE METABOLIC PANEL - Abnormal; Notable for the following components:    Glucose 192 (*)     eGFR 35.5 (*)     All other components within normal limits   URINALYSIS, REFLEX TO URINE CULTURE    Narrative:     Specimen Source->Urine   TROPONIN I   TSH   B-TYPE NATRIURETIC PEPTIDE        ECG Results              EKG 12-lead (Final result)  Result time 05/21/23 09:24:05      Final result by Interface, Lab In [facility] (05/21/23 09:24:05)                   Narrative:    Test Reason : R53.83,    Vent. Rate : 060 BPM     Atrial Rate : 065 BPM     P-R Int : 000 ms          QRS Dur : 132 ms      QT Int : 432 ms       P-R-T Axes : 000 -66 062 degrees     QTc Int : 432 ms    Atrial fibrillation with premature ventricular or aberrantly conducted  complexes  and a single sinus beat  Left axis deviation (intermittent)  Right bundle branch block (intermittent)  Abnormal ECG  When compared with ECG of 14-OCT-2022 14:48,  Significant change has occurred  Confirmed by Giuseppe Oro MD ([id number]) on 5/21/2023 9:23:58 AM    Referred By: [email or web address]   SELF           Confirmed By:Giuseppe Oro MD                                  Imaging Results               X-Ray Chest AP Portable (Final result)  Result time 05/20/23 13:28:25      Final result by Benjy Ortega MD (05/20/23 13:28:25)                   Impression:      As above.      Electronically signed by: Benjy Ortega MD  Date:    05/20/2023  Time:    13:28               Narrative:    EXAMINATION:  XR CHEST AP PORTABLE    CLINICAL HISTORY:  Other fatigue    TECHNIQUE:  Single frontal view of the chest was performed.    COMPARISON:  08/15/2022    FINDINGS:  Moderate right pleural effusion with right basilar atelectasis or consolidation.  Additional patchy opacification seen bilaterally.  No pneumothorax.  Cardiac silhouette is unremarkable.                                       Medications   cefTRIAXone (ROCEPHIN) 1 g in dextrose 5 % in water (D5W) 5 % 50 mL IVPB (MB+) (0 g Intravenous Stopped 5/20/23 1454)   azithromycin (ZITHROMAX) 500 mg in dextrose 5 % (D5W) 250 mL IVPB (Vial-Mate) (0 mg Intravenous Stopped 5/20/23 1648)   potassium bicarbonate disintegrating tablet 20 mEq (20 mEq Oral Given 5/20/23 2244)   amLODIPine tablet 5 mg (5 mg Oral Given 5/21/23 0107)     Medical Decision Making:   Differential Diagnosis:   UTI, pneumonia, dementia   ED Management:  89-year-old female with dementia presenting to the emergency department with daughter to concerns of excessive fatigue.  While in exam room patient becomes tachycardic up to 143 with new onset atrial fibrillation and then becomes bradycardic as low as 40.  These episodes are transient in presentation however she returned back to normal sinus rhythm.   In reviewing patient's chart she has no previous documentation of atrial fibrillations of this is likely a new onset.  Labs and imaging ordered.  No leukocytosis however patient has a remarkable right-sided pleural effusion with concern of bilateral opacities.  Patient will be covered for with IV antibiotics for cap pneumonia.  A place a consult to Cardiology due to new onset abnormal heart rhythm/rate.  I  discussed patient's presentation.  On reassessment of patient she is still alert orientated she does not have any complaints at this time.  I believe patient's best interests to be admitted to Hospital Medicine for continued IV antibiotics for pneumonia as well as assessment by Cardiology.  I discussed with inpatient team who were agreeable to plan.  Patient admitted to their service  Pt discussed with supervising physician            Attending Attestation:     Physician Attestation Statement for NP/PA:       Other NP/PA Attestation Additions:      Medical Decision Making: Patient has no mental status changes concerning for CVA, but does show new Afib alternating with bradycardia and this may be contributing to her fatigue.    I discussed the patient's care with Advanced Practice Clinician, and did see this patient with the APC.  I reviewed their note and agree with the history, physical, assessment, diagnosis, treatment, and disposition plan provided by the Advanced Practice Clinician.                  ED Course as of 05/29/23 2356   Sat May 20, 2023   1327 X-Ray Chest AP Portable [CR]   1336 Per my independent reading of chest x-ray there is a right-sided effusion , with also concern of opacities. [CR]      ED Course User Index  [CR] Day Rob PA-C                 Clinical Impression:   Final diagnoses:  [R53.83] Fatigue        ED Disposition Condition    Admit Stable                Day Rob PA-C  05/20/23 2012       Radha Solis MD  05/29/23 [id number]

## 2023-05-21 PROBLEM — I48.0 PAF (PAROXYSMAL ATRIAL FIBRILLATION): Status: ACTIVE | Noted: 2023-05-20

## 2023-05-21 LAB
ALBUMIN SERPL BCP-MCNC: 3.5 G/DL (ref 3.5–5.2)
ANION GAP SERPL CALC-SCNC: 9 MMOL/L (ref 8–16)
ANION GAP SERPL CALC-SCNC: 9 MMOL/L (ref 8–16)
ASCENDING AORTA: 2.9 CM
AV INDEX (PROSTH): 0.45
AV MEAN GRADIENT: 6 MMHG
AV PEAK GRADIENT: 9 MMHG
AV VALVE AREA: 1.4 CM2
AV VELOCITY RATIO: 0.51
BASOPHILS # BLD AUTO: 0.02 K/UL (ref 0–0.2)
BASOPHILS NFR BLD: 0.5 % (ref 0–1.9)
BSA FOR ECHO PROCEDURE: 1.82 M2
BUN SERPL-MCNC: 13 MG/DL (ref 10–30)
BUN SERPL-MCNC: 13 MG/DL (ref 10–30)
CALCIUM SERPL-MCNC: 10.5 MG/DL (ref 8.7–10.5)
CALCIUM SERPL-MCNC: 10.5 MG/DL (ref 8.7–10.5)
CHLORIDE SERPL-SCNC: 107 MMOL/L (ref 95–110)
CHLORIDE SERPL-SCNC: 107 MMOL/L (ref 95–110)
CO2 SERPL-SCNC: 27 MMOL/L (ref 23–29)
CO2 SERPL-SCNC: 27 MMOL/L (ref 23–29)
CREAT SERPL-MCNC: 1 MG/DL (ref 0.5–1.4)
CREAT SERPL-MCNC: 1 MG/DL (ref 0.5–1.4)
CV ECHO LV RWT: 0.38 CM
DIFFERENTIAL METHOD: ABNORMAL
DOP CALC AO PEAK VEL: 1.54 M/S
DOP CALC AO VTI: 28.11 CM
DOP CALC LVOT AREA: 3.1 CM2
DOP CALC LVOT DIAMETER: 2 CM
DOP CALC LVOT PEAK VEL: 0.78 M/S
DOP CALC LVOT STROKE VOLUME: 39.31 CM3
DOP CALCLVOT PEAK VEL VTI: 12.52 CM
E/E' RATIO: 14 M/S
ECHO LV POSTERIOR WALL: 0.87 CM (ref 0.6–1.1)
EJECTION FRACTION: 53 %
EOSINOPHIL # BLD AUTO: 0.1 K/UL (ref 0–0.5)
EOSINOPHIL NFR BLD: 2.2 % (ref 0–8)
ERYTHROCYTE [DISTWIDTH] IN BLOOD BY AUTOMATED COUNT: 16.7 % (ref 11.5–14.5)
EST. GFR  (NO RACE VARIABLE): 53.2 ML/MIN/1.73 M^2
EST. GFR  (NO RACE VARIABLE): 53.2 ML/MIN/1.73 M^2
FRACTIONAL SHORTENING: 18 % (ref 28–44)
GLUCOSE SERPL-MCNC: 121 MG/DL (ref 70–110)
GLUCOSE SERPL-MCNC: 121 MG/DL (ref 70–110)
HCT VFR BLD AUTO: 34.9 % (ref 37–48.5)
HGB BLD-MCNC: 11.7 G/DL (ref 12–16)
IMM GRANULOCYTES # BLD AUTO: 0.02 K/UL (ref 0–0.04)
IMM GRANULOCYTES NFR BLD AUTO: 0.5 % (ref 0–0.5)
INTERVENTRICULAR SEPTUM: 0.82 CM (ref 0.6–1.1)
IVRT: 102.76 MSEC
LA MAJOR: 5.75 CM
LA MINOR: 5.25 CM
LA WIDTH: 4.28 CM
LEFT ATRIUM SIZE: 3.35 CM
LEFT ATRIUM VOLUME INDEX MOD: 37.6 ML/M2
LEFT ATRIUM VOLUME INDEX: 37.6 ML/M2
LEFT ATRIUM VOLUME MOD: 67 CM3
LEFT ATRIUM VOLUME: 66.89 CM3
LEFT INTERNAL DIMENSION IN SYSTOLE: 3.76 CM (ref 2.1–4)
LEFT VENTRICLE DIASTOLIC VOLUME INDEX: 53.74 ML/M2
LEFT VENTRICLE DIASTOLIC VOLUME: 95.66 ML
LEFT VENTRICLE MASS INDEX: 70 G/M2
LEFT VENTRICLE SYSTOLIC VOLUME INDEX: 34 ML/M2
LEFT VENTRICLE SYSTOLIC VOLUME: 60.57 ML
LEFT VENTRICULAR INTERNAL DIMENSION IN DIASTOLE: 4.57 CM (ref 3.5–6)
LEFT VENTRICULAR MASS: 125.29 G
LV LATERAL E/E' RATIO: 11.45 M/S
LV SEPTAL E/E' RATIO: 18 M/S
LYMPHOCYTES # BLD AUTO: 1 K/UL (ref 1–4.8)
LYMPHOCYTES NFR BLD: 24.3 % (ref 18–48)
MAGNESIUM SERPL-MCNC: 1.5 MG/DL (ref 1.6–2.6)
MCH RBC QN AUTO: 28.9 PG (ref 27–31)
MCHC RBC AUTO-ENTMCNC: 33.5 G/DL (ref 32–36)
MCV RBC AUTO: 86 FL (ref 82–98)
MONOCYTES # BLD AUTO: 0.5 K/UL (ref 0.3–1)
MONOCYTES NFR BLD: 12.4 % (ref 4–15)
MV PEAK E VEL: 1.26 M/S
NEUTROPHILS # BLD AUTO: 2.5 K/UL (ref 1.8–7.7)
NEUTROPHILS NFR BLD: 60.1 % (ref 38–73)
NRBC BLD-RTO: 0 /100 WBC
PHOSPHATE SERPL-MCNC: 3.2 MG/DL (ref 2.7–4.5)
PISA TR MAX VEL: 3.08 M/S
PLATELET # BLD AUTO: 161 K/UL (ref 150–450)
PMV BLD AUTO: 11.6 FL (ref 9.2–12.9)
POCT GLUCOSE: 123 MG/DL (ref 70–110)
POCT GLUCOSE: 133 MG/DL (ref 70–110)
POCT GLUCOSE: 159 MG/DL (ref 70–110)
POCT GLUCOSE: 197 MG/DL (ref 70–110)
POTASSIUM SERPL-SCNC: 3.9 MMOL/L (ref 3.5–5.1)
POTASSIUM SERPL-SCNC: 3.9 MMOL/L (ref 3.5–5.1)
RA MAJOR: 4.42 CM
RA PRESSURE: 3 MMHG
RA WIDTH: 3.51 CM
RBC # BLD AUTO: 4.05 M/UL (ref 4–5.4)
RIGHT VENTRICULAR END-DIASTOLIC DIMENSION: 2.6 CM
SINUS: 3.03 CM
SODIUM SERPL-SCNC: 143 MMOL/L (ref 136–145)
SODIUM SERPL-SCNC: 143 MMOL/L (ref 136–145)
STJ: 2.66 CM
TDI LATERAL: 0.11 M/S
TDI SEPTAL: 0.07 M/S
TDI: 0.09 M/S
TR MAX PG: 38 MMHG
TRICUSPID ANNULAR PLANE SYSTOLIC EXCURSION: 1.35 CM
TV REST PULMONARY ARTERY PRESSURE: 41 MMHG
WBC # BLD AUTO: 4.12 K/UL (ref 3.9–12.7)

## 2023-05-21 PROCEDURE — 85025 COMPLETE CBC W/AUTO DIFF WBC: CPT | Performed by: STUDENT IN AN ORGANIZED HEALTH CARE EDUCATION/TRAINING PROGRAM

## 2023-05-21 PROCEDURE — 99233 PR SUBSEQUENT HOSPITAL CARE,LEVL III: ICD-10-PCS | Mod: ,,, | Performed by: STUDENT IN AN ORGANIZED HEALTH CARE EDUCATION/TRAINING PROGRAM

## 2023-05-21 PROCEDURE — 25000003 PHARM REV CODE 250: Performed by: STUDENT IN AN ORGANIZED HEALTH CARE EDUCATION/TRAINING PROGRAM

## 2023-05-21 PROCEDURE — A4216 STERILE WATER/SALINE, 10 ML: HCPCS | Performed by: STUDENT IN AN ORGANIZED HEALTH CARE EDUCATION/TRAINING PROGRAM

## 2023-05-21 PROCEDURE — 99232 PR SUBSEQUENT HOSPITAL CARE,LEVL II: ICD-10-PCS | Mod: 25,,, | Performed by: INTERNAL MEDICINE

## 2023-05-21 PROCEDURE — 80069 RENAL FUNCTION PANEL: CPT | Performed by: STUDENT IN AN ORGANIZED HEALTH CARE EDUCATION/TRAINING PROGRAM

## 2023-05-21 PROCEDURE — 63600175 PHARM REV CODE 636 W HCPCS: Performed by: STUDENT IN AN ORGANIZED HEALTH CARE EDUCATION/TRAINING PROGRAM

## 2023-05-21 PROCEDURE — 99233 SBSQ HOSP IP/OBS HIGH 50: CPT | Mod: ,,, | Performed by: STUDENT IN AN ORGANIZED HEALTH CARE EDUCATION/TRAINING PROGRAM

## 2023-05-21 PROCEDURE — 21400001 HC TELEMETRY ROOM

## 2023-05-21 PROCEDURE — 83735 ASSAY OF MAGNESIUM: CPT | Performed by: STUDENT IN AN ORGANIZED HEALTH CARE EDUCATION/TRAINING PROGRAM

## 2023-05-21 PROCEDURE — 63700000 PHARM REV CODE 250 ALT 637 W/O HCPCS: Performed by: STUDENT IN AN ORGANIZED HEALTH CARE EDUCATION/TRAINING PROGRAM

## 2023-05-21 PROCEDURE — 99232 SBSQ HOSP IP/OBS MODERATE 35: CPT | Mod: 25,,, | Performed by: INTERNAL MEDICINE

## 2023-05-21 RX ORDER — METOPROLOL TARTRATE 25 MG/1
25 TABLET, FILM COATED ORAL 2 TIMES DAILY
Status: DISCONTINUED | OUTPATIENT
Start: 2023-05-21 | End: 2023-05-22 | Stop reason: HOSPADM

## 2023-05-21 RX ORDER — AMLODIPINE BESYLATE 5 MG/1
5 TABLET ORAL ONCE
Status: COMPLETED | OUTPATIENT
Start: 2023-05-21 | End: 2023-05-21

## 2023-05-21 RX ADMIN — ASPIRIN 81 MG: 81 TABLET, COATED ORAL at 06:05

## 2023-05-21 RX ADMIN — APIXABAN 5 MG: 5 TABLET, FILM COATED ORAL at 12:05

## 2023-05-21 RX ADMIN — AMLODIPINE BESYLATE 5 MG: 5 TABLET ORAL at 01:05

## 2023-05-21 RX ADMIN — AMLODIPINE BESYLATE 5 MG: 5 TABLET ORAL at 08:05

## 2023-05-21 RX ADMIN — METOPROLOL TARTRATE 25 MG: 25 TABLET, FILM COATED ORAL at 12:05

## 2023-05-21 RX ADMIN — Medication 10 ML: at 08:05

## 2023-05-21 RX ADMIN — Medication 1000 UNITS: at 08:05

## 2023-05-21 RX ADMIN — AZITHROMYCIN MONOHYDRATE 250 MG: 250 TABLET ORAL at 08:05

## 2023-05-21 RX ADMIN — METOPROLOL TARTRATE 25 MG: 25 TABLET, FILM COATED ORAL at 08:05

## 2023-05-21 RX ADMIN — ATORVASTATIN CALCIUM 80 MG: 20 TABLET, FILM COATED ORAL at 08:05

## 2023-05-21 RX ADMIN — DULOXETINE HYDROCHLORIDE 30 MG: 30 CAPSULE, DELAYED RELEASE ORAL at 02:05

## 2023-05-21 RX ADMIN — CYANOCOBALAMIN TAB 1000 MCG 1000 MCG: 1000 TAB at 06:05

## 2023-05-21 RX ADMIN — APIXABAN 5 MG: 5 TABLET, FILM COATED ORAL at 08:05

## 2023-05-21 RX ADMIN — DONEPEZIL HYDROCHLORIDE 5 MG: 5 TABLET, FILM COATED ORAL at 08:05

## 2023-05-21 RX ADMIN — CEFTRIAXONE 1 G: 1 INJECTION, POWDER, FOR SOLUTION INTRAMUSCULAR; INTRAVENOUS at 02:05

## 2023-05-21 NOTE — ASSESSMENT & PLAN NOTE
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 She is either in NSR or sinus tachycardia in the low 100s with an intermittent junctional bradycardia.  Hemodynamically stable.     - Labs all stable or WNL  - Recommend holding home Lopressor 50/25 mg BID  - Would not recommend anticoagulation at this time  - Would also consider discontinuation of donepezil if no improvement in bradycardia with cessation of Lopressor as this is a potential side effect of the medication   - Would ambulate patient to assess symptoms and HR response  - If asymptomatic, would recommend holding BB and follow-up as outpt  - Recommend EP consult for pacemaker evaluation in setting of tachycardia-bradycardia syndrome with evidence of AF and junctional bradycardia    Please notify cardiology if planning on discharge home to assist with outpt follow-up.

## 2023-05-21 NOTE — ASSESSMENT & PLAN NOTE
Admitted with fatigue and concern for PNA on CXR. Notably, she has a chronic R pleural effusion with atelectasis. Recent imaging reviewed. Will continue abx and empirically treat x5d course. Notably has been hypoxic in the recent history, on 2L NC PRN and at night per outpatient PCP    - continue rocephin and azithro  - consider CT chest if symptoms worsen to better eval pleural effusion

## 2023-05-21 NOTE — HOSPITAL COURSE
Patient denies any issues. Patient continuing to have periods of bradycardia then AF w/ RVR. Cardiology recommending EP consult for possible PPM. EP state no indication for PPM at current; plans for event monitor for 30d and f/u in 6 weeks. Decreased home metoprolol to 25mg BID and started DOAC inpatient. Patient discharged with rx for apixaban and 3 additional days of CAP therapy.

## 2023-05-21 NOTE — NURSING
Message the on call for Med Q, Arcenio Smith MD. Reported patient SBP  mid 170s to 180s. Patient asymptomatic for filipe BP. MD order was to give one time only dose of Amlodipine 5 mg. Reported that patient refusing accucheck when the PCT asked, but allow primary nurse to take baseline blood sugar. Patient does not want to take insulin and would rather take her trajenta instead.  MD orders was document refusal of BG and insulin per sliding scale..

## 2023-05-21 NOTE — ASSESSMENT & PLAN NOTE
Most recent TTE from 07/2022 reviewed with grade 2 diastolic dysfunction  - repeat TTE ordered in setting of tachy-juan a arrhythmia.

## 2023-05-21 NOTE — SUBJECTIVE & OBJECTIVE
Interval Hx: Patient doing well this AM. Alert and conversant. She denies CP, SOB, episodes of dizziness, lightheadedness. Telemetry review shows no episodes of bradycardia. EKG significant for AF in tachycardia phase with junctional bradycardia. Will recommend consult EP for pacemaker evaluation in setting of tachycardia-bradycardia syndrome.      Past Medical History:   Diagnosis Date    Anal cancer 1995    Anemia     Cancer 1995    anal cancer    Centrilobular emphysema 7/6/2020    Diabetes mellitus     With circulatory disorder    Lumbar radiculopathy 5/16/2014    Lung cancer 2012    s/p chemo/radiation    Macular degeneration     Macular hemorrhage of left eye     Neuropathy     Osteoporosis     Osteoporosis, unspecified 11/9/2012    Pure hypercholesterolemia        Past Surgical History:   Procedure Laterality Date    BRONCHOSCOPY      CHOLECYSTECTOMY      COLONOSCOPY      FLUOROSCOPIC ANGIOGRAPHY OF LOWER EXTREMITY WITH TOPICAL ULTRASOUND Right 12/6/2018    Procedure: ARTERIOGRAM-LEG AND ULTRASOUND;  Surgeon: SEBASTIÁN Mcpherson III, MD;  Location: [facility] OR [address] [place] [address];  Service: Peripheral Vascular;  Laterality: Right;  16.5 min  1059.42 mGy  59ml Dye  2ml Local    heart stent      HYSTERECTOMY      INCISIONAL BIOPSY Right 12/6/2019    Procedure: INCISIONAL BIOPSY;  Surgeon: Leslie Castillo MD;  Location: [facility] OR [address] [place] [address];  Service: Plastics;  Laterality: Right;    left leg surgery      blockage    PERCUTANEOUS TRANSLUMINAL ANGIOPLASTY N/A 12/6/2018    Procedure: PTA (ANGIOPLASTY, PERCUTANEOUS, TRANSLUMINAL);  Surgeon: SEBASTIÁN Mcpherson III, MD;  Location: [facility] OR [address] [place] [address];  Service: Peripheral Vascular;  Laterality: N/A;    RECONSTRUCTION USING FLAP Right 12/6/2019    Procedure: RECONSTRUCTION USING FLAP/FULL THICKNESS MRESETION AND RECONSTRUCTION RIGHT UPPER EYELID WITH BIOPSY;  Surgeon: Leslie Castillo MD;  Location: [facility] OR [address] [place] [address];  Service: Plastics;  Laterality: Right;    TONSILLECTOMY         Review of patient's  allergies indicates:   Allergen Reactions    Bextra [valdecoxib] Swelling    Gabapentin Diarrhea and Nausea Only       Current Facility-Administered Medications on File Prior to Encounter   Medication    sodium chloride 0.9% flush 5 mL     Current Outpatient Medications on File Prior to Encounter   Medication Sig    albuterol (PROVENTIL) 2.5 mg /3 mL (0.083 %) nebulizer solution Take 3 mLs (2.5 mg total) by nebulization every 6 (six) hours as needed for Wheezing. Rescue    ALPRAZolam (XANAX) 0.5 MG tablet Take 1 tablet (0.5 mg total) by mouth 2 (two) times daily as needed for Anxiety. (Patient not taking: Reported on 4/13/2023)    amlodipine-benazepril 5-20 mg (LOTREL) 5-20 mg per capsule Take 1 capsule by mouth once daily.    aspirin (ECOTRIN) 81 MG EC tablet Take 81 mg by mouth every morning.    azelastine (ASTELIN) 137 mcg (0.1 %) nasal spray 1 spray (137 mcg total) by Nasal route 2 (two) times daily.    cyanocobalamin (VITAMIN B-12) 1000 MCG tablet Take 1,000 mcg by mouth every morning.    donepeziL (ARICEPT) 5 MG tablet Take 1 tablet (5 mg total) by mouth every evening.    DULoxetine (CYMBALTA) 60 MG capsule Take 1 capsule (60 mg total) by mouth every morning.    estradioL (ESTRACE) 0.01 % (0.1 mg/gram) vaginal cream USE ONE-HALF GRAM VAGINALLY TWICE a WEEK    linaGLIPtin (TRADJENTA) 5 mg Tab tablet Take 1 tablet (5 mg total) by mouth once daily.    metoprolol tartrate (LOPRESSOR) 25 MG tablet Take 1 tablet (25 mg total) by mouth 2 (two) times daily. In addition to 50 mg twice daily to equal 75 mg twice daily    metoprolol tartrate (LOPRESSOR) 50 MG tablet Take 1 tablet (50 mg total) by mouth 2 (two) times daily.    nitroGLYCERIN (NITROSTAT) 0.4 MG SL tablet Place 1 tablet (0.4 mg total) under the tongue every 5 (five) minutes as needed.    simvastatin (ZOCOR) 80 MG tablet Take 1 tablet (80 mg total) by mouth once daily.    vitamin D (VITAMIN D3) 1000 units Tab Take 1,000 Units by mouth once daily.     Family  History       Problem Relation (Age of Onset)    Breast cancer Maternal Aunt    Cancer Father    Stroke Mother          Tobacco Use    Smoking status: Former     Packs/day: 0.50     Years: 30.00     Pack years: 15.00     Types: Cigarettes     Quit date: 1995     Years since quittin.4     Passive exposure: Past    Smokeless tobacco: Never   Substance and Sexual Activity    Alcohol use: No    Drug use: No    Sexual activity: Not Currently     Birth control/protection: Surgical     Review of Systems   Constitutional: Positive for decreased appetite and malaise/fatigue.   Cardiovascular:  Negative for chest pain, dyspnea on exertion, leg swelling, near-syncope, orthopnea, palpitations, paroxysmal nocturnal dyspnea and syncope.   Respiratory:  Negative for shortness of breath.    Musculoskeletal:  Negative for falls.   Neurological:  Negative for dizziness and light-headedness.   Psychiatric/Behavioral:  Positive for memory loss.    Objective:     Vital Signs (Most Recent):  Temp: 96.2 °F (35.7 °C) (23)  Pulse: (!) 118 (23)  Resp: 14 (23)  BP: (!) 153/67 (23)  SpO2: (!) 93 % (23) Vital Signs (24h Range):  Temp:  [96.2 °F (35.7 °C)-98.8 °F (37.1 °C)] 96.2 °F (35.7 °C)  Pulse:  [] 118  Resp:  [14-22] 14  SpO2:  [93 %-97 %] 93 %  BP: (130-184)/(67-88) 153/67     Weight: 74.8 kg (164 lb 14.5 oz)  Body mass index is 29.21 kg/m².    SpO2: (!) 93 %         Intake/Output Summary (Last 24 hours) at 2023 1011  Last data filed at 2023 0950  Gross per 24 hour   Intake 600 ml   Output 2050 ml   Net -1450 ml         Lines/Drains/Airways       Drain  Duration             Female External Urinary Catheter 23 1739 <1 day              Peripheral Intravenous Line  Duration                  Peripheral IV - Single Lumen 23 1329 20 G Left Antecubital <1 day                     Physical Exam  Constitutional:       General: She is not in acute distress.      Appearance: Normal appearance.   HENT:      Head: Normocephalic.      Mouth/Throat:      Mouth: Mucous membranes are moist.   Eyes:      Extraocular Movements: Extraocular movements intact.   Cardiovascular:      Rate and Rhythm: Normal rate and regular rhythm.      Pulses: Normal pulses.      Heart sounds: No murmur heard.  Pulmonary:      Effort: Pulmonary effort is normal. No respiratory distress.   Abdominal:      General: There is no distension.      Palpations: Abdomen is soft.      Tenderness: There is no abdominal tenderness.   Musculoskeletal:      Cervical back: Neck supple.      Right lower leg: No edema.      Left lower leg: No edema.   Skin:     General: Skin is warm.   Neurological:      Mental Status: She is oriented to person, place, and time.      Comments: Some memory lapses noted        Significant Labs: BMP:   Recent Labs   Lab 05/20/23  1309 05/21/23  0546   * 121*  121*    143  143   K 3.7 3.9  3.9    107  107   CO2 23 27  27   BUN 20 13  13   CREATININE 1.4 1.0  1.0   CALCIUM 10.2 10.5  10.5   MG  --  1.5*     , CMP   Recent Labs   Lab 05/20/23  1309 05/21/23  0546    143  143   K 3.7 3.9  3.9    107  107   CO2 23 27  27   * 121*  121*   BUN 20 13  13   CREATININE 1.4 1.0  1.0   CALCIUM 10.2 10.5  10.5   PROT 7.4  --    ALBUMIN 3.6 3.5   BILITOT 0.3  --    ALKPHOS 64  --    AST 38  --    ALT 30  --    ANIONGAP 12 9  9     , CBC   Recent Labs   Lab 05/20/23  1309 05/21/23  0547   WBC 3.79* 4.12   HGB 10.5* 11.7*   HCT 32.0* 34.9*    161     , and INR No results for input(s): INR, PROTIME in the last 48 hours.    Significant Imaging:   TTE 7/27/22  The left ventricle is normal in size with concentric remodeling and normal systolic function.  There are segmental left ventricular wall motion abnormalities.  The estimated ejection fraction is 55%.  Grade II left ventricular diastolic dysfunction.  Small circumferential pericardial  effusion.  Normal right ventricular size.  Mild aortic regurgitation.  Elevated central venous pressure (15 mmHg).  The estimated PA systolic pressure is 41 mmHg.  Mild tricuspid regurgitation.

## 2023-05-21 NOTE — ASSESSMENT & PLAN NOTE
Pt with new onset AF. EP consulted given HR ranging from 40s-110s. ECG and tele reviewed. Pt noted to be in junctional rhythm in 2022. Has been in AF on this hospitalization. No symtpoms noted such as syncope, palpitations, chest pain.     -No indication for PPM at this time  -Pt on Metop 50 mg BID at home. Would restart pt on Metoprolol tartrate 25 mg BID   - Maintain K > 4, Mag > 2 and Ca/iCal WNL to decrease arrhythmogenic potential  -[id number] score 6 which would favor use of anticoagulation. Would recommend Eliquis if pt has no contraindication to AC   - Surface echo to look for any evidence of valvular/structural disease  - Maintain on telemetry and daily morning EKGs for the next few days   --> Please perform EKG if the patient converts spontaneously   -Would recommend keeping pt NPO at midnight in case pt needs ELIZABETH/DCCV in AM

## 2023-05-21 NOTE — SUBJECTIVE & OBJECTIVE
Past Medical History:   Diagnosis Date    Anal cancer 1995    Anemia     Cancer 1995    anal cancer    Centrilobular emphysema 7/6/2020    Diabetes mellitus     With circulatory disorder    Lumbar radiculopathy 5/16/2014    Lung cancer 2012    s/p chemo/radiation    Macular degeneration     Macular hemorrhage of left eye     Neuropathy     Osteoporosis     Osteoporosis, unspecified 11/9/2012    Pure hypercholesterolemia        Past Surgical History:   Procedure Laterality Date    BRONCHOSCOPY      CHOLECYSTECTOMY      COLONOSCOPY      FLUOROSCOPIC ANGIOGRAPHY OF LOWER EXTREMITY WITH TOPICAL ULTRASOUND Right 12/6/2018    Procedure: ARTERIOGRAM-LEG AND ULTRASOUND;  Surgeon: SEBASTIÁN Mcpherson III, MD;  Location: [facility] OR [address] [place] [address];  Service: Peripheral Vascular;  Laterality: Right;  16.5 min  1059.42 mGy  59ml Dye  2ml Local    heart stent      HYSTERECTOMY      INCISIONAL BIOPSY Right 12/6/2019    Procedure: INCISIONAL BIOPSY;  Surgeon: Leslie Castillo MD;  Location: [facility] OR [address] [place] [address];  Service: Plastics;  Laterality: Right;    left leg surgery      blockage    PERCUTANEOUS TRANSLUMINAL ANGIOPLASTY N/A 12/6/2018    Procedure: PTA (ANGIOPLASTY, PERCUTANEOUS, TRANSLUMINAL);  Surgeon: SEBASTIÁN Mcpherson III, MD;  Location: [facility] OR [address] [place] [address];  Service: Peripheral Vascular;  Laterality: N/A;    RECONSTRUCTION USING FLAP Right 12/6/2019    Procedure: RECONSTRUCTION USING FLAP/FULL THICKNESS MRESETION AND RECONSTRUCTION RIGHT UPPER EYELID WITH BIOPSY;  Surgeon: Leslie Castillo MD;  Location: [facility] OR [address] [place] [address];  Service: Plastics;  Laterality: Right;    TONSILLECTOMY         Review of patient's allergies indicates:   Allergen Reactions    Bextra [valdecoxib] Swelling    Gabapentin Diarrhea and Nausea Only       Current Facility-Administered Medications on File Prior to Encounter   Medication    sodium chloride 0.9% flush 5 mL     Current Outpatient Medications on File Prior to Encounter   Medication Sig    albuterol (PROVENTIL) 2.5 mg /3 mL  (0.083 %) nebulizer solution Take 3 mLs (2.5 mg total) by nebulization every 6 (six) hours as needed for Wheezing. Rescue    ALPRAZolam (XANAX) 0.5 MG tablet Take 1 tablet (0.5 mg total) by mouth 2 (two) times daily as needed for Anxiety. (Patient not taking: Reported on 2023)    amlodipine-benazepril 5-20 mg (LOTREL) 5-20 mg per capsule Take 1 capsule by mouth once daily.    aspirin (ECOTRIN) 81 MG EC tablet Take 81 mg by mouth every morning.    azelastine (ASTELIN) 137 mcg (0.1 %) nasal spray 1 spray (137 mcg total) by Nasal route 2 (two) times daily.    cyanocobalamin (VITAMIN B-12) 1000 MCG tablet Take 1,000 mcg by mouth every morning.    donepeziL (ARICEPT) 5 MG tablet Take 1 tablet (5 mg total) by mouth every evening.    DULoxetine (CYMBALTA) 60 MG capsule Take 1 capsule (60 mg total) by mouth every morning.    estradioL (ESTRACE) 0.01 % (0.1 mg/gram) vaginal cream USE ONE-HALF GRAM VAGINALLY TWICE a WEEK    linaGLIPtin (TRADJENTA) 5 mg Tab tablet Take 1 tablet (5 mg total) by mouth once daily.    metoprolol tartrate (LOPRESSOR) 25 MG tablet Take 1 tablet (25 mg total) by mouth 2 (two) times daily. In addition to 50 mg twice daily to equal 75 mg twice daily    metoprolol tartrate (LOPRESSOR) 50 MG tablet Take 1 tablet (50 mg total) by mouth 2 (two) times daily.    nitroGLYCERIN (NITROSTAT) 0.4 MG SL tablet Place 1 tablet (0.4 mg total) under the tongue every 5 (five) minutes as needed.    simvastatin (ZOCOR) 80 MG tablet Take 1 tablet (80 mg total) by mouth once daily.    vitamin D (VITAMIN D3) 1000 units Tab Take 1,000 Units by mouth once daily.     Family History       Problem Relation (Age of Onset)    Breast cancer Maternal Aunt    Cancer Father    Stroke Mother          Tobacco Use    Smoking status: Former     Packs/day: 0.50     Years: 30.00     Pack years: 15.00     Types: Cigarettes     Quit date: 1995     Years since quittin.4     Passive exposure: Past    Smokeless tobacco: Never    Substance and Sexual Activity    Alcohol use: No    Drug use: No    Sexual activity: Not Currently     Birth control/protection: Surgical     Review of Systems   Constitutional: Negative for fever and malaise/fatigue.   Eyes:  Negative for blurred vision and pain.   Cardiovascular:  Negative for chest pain, dyspnea on exertion, leg swelling, orthopnea, palpitations and paroxysmal nocturnal dyspnea.   Respiratory:  Negative for cough, shortness of breath, sputum production and wheezing.    Hematologic/Lymphatic: Negative for adenopathy and bleeding problem.   Skin:  Negative for rash.   Musculoskeletal:  Negative for back pain and neck pain.   Gastrointestinal:  Negative for abdominal pain, constipation, diarrhea, nausea and vomiting.   Genitourinary:  Negative for dysuria.   Neurological:  Negative for dizziness, headaches, light-headedness and weakness.   Objective:     Vital Signs (Most Recent):  Temp: 96.2 °F (35.7 °C) (05/21/23 0731)  Pulse: (!) 112 (05/21/23 1126)  Resp: 14 (05/21/23 0731)  BP: (!) 153/67 (05/21/23 0731)  SpO2: (!) 93 % (05/21/23 0731) Vital Signs (24h Range):  Temp:  [96.2 °F (35.7 °C)-98.8 °F (37.1 °C)] 96.2 °F (35.7 °C)  Pulse:  [] 112  Resp:  [14-22] 14  SpO2:  [93 %-97 %] 93 %  BP: (130-184)/(67-88) 153/67       Weight: 74.8 kg (164 lb 14.5 oz)  Body mass index is 29.21 kg/m².    SpO2: (!) 93 %        Physical Exam  Constitutional:       General: She is not in acute distress.     Appearance: Normal appearance. She is not ill-appearing, toxic-appearing or diaphoretic.   HENT:      Head: Normocephalic and atraumatic.      Nose: Nose normal.   Eyes:      Extraocular Movements: Extraocular movements intact.      Pupils: Pupils are equal, round, and reactive to light.   Cardiovascular:      Rate and Rhythm: Normal rate and regular rhythm.      Heart sounds: No murmur heard.    No friction rub. No gallop.   Pulmonary:      Effort: Pulmonary effort is normal. No respiratory distress.       Breath sounds: Normal breath sounds. No wheezing or rales.   Abdominal:      General: Abdomen is flat. There is no distension.      Palpations: Abdomen is soft. There is no mass.      Tenderness: There is no abdominal tenderness.   Musculoskeletal:         General: No swelling. Normal range of motion.      Cervical back: Normal range of motion. No rigidity.      Right lower leg: No edema.      Left lower leg: No edema.   Skin:     General: Skin is warm and dry.      Coloration: Skin is not jaundiced.      Findings: No bruising.   Neurological:      General: No focal deficit present.      Mental Status: She is alert and oriented to person, place, and time.      Cranial Nerves: No cranial nerve deficit.      Motor: No weakness.          Significant Labs: BMP:   Recent Labs   Lab 05/20/23  1309 05/21/23  0546   * 121*  121*    143  143   K 3.7 3.9  3.9    107  107   CO2 23 27  27   BUN 20 13  13   CREATININE 1.4 1.0  1.0   CALCIUM 10.2 10.5  10.5   MG  --  1.5*    and CBC:   Recent Labs   Lab 05/20/23  1309 05/21/23  0547   WBC 3.79* 4.12   HGB 10.5* 11.7*   HCT 32.0* 34.9*    161       Significant Imaging: Reviewed

## 2023-05-21 NOTE — NURSING
Transfer from the ED and admitted to 16 WT to rm [id number].  Admitting diagnosis Pneumonia. Patient stable condition but very anxious and agitated at the time of admission. After talked to daughter and granddaughter, patient calm down allow us to do patient care.  Vital signs WNL except /88 .  Recheck 2112 157/69 MAP 99.  Placed on Telemetry.  Admission assessment and documentation completed.  Basic needs met at time admission.  Will continue to monitor for changes in condition.

## 2023-05-21 NOTE — ASSESSMENT & PLAN NOTE
Cardiology consulted in the ED. 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 Did not think this is afib. Patient's tachy-juan a rhythm continued and cardiology recommended EP consult for possible PPM.   - EP consulted, recommend against PPM. Discussed possible performing DCCV/ELIZABETH tomorrow. Also recommending anticoag given patient's chadsvasc  - starting apixaban 5mg BID today  - Restarted lopressor 25mg BID  - cards notification at discharge for outpatient planning

## 2023-05-21 NOTE — ASSESSMENT & PLAN NOTE
Noted, diagnosed 11 years ago. Most recent CT 11/22 reviewed with no signs of recurrence  - consider repeat CT chest

## 2023-05-21 NOTE — CONSULTS
[facility] ([place] [address])  Cardiac Electrophysiology  Consult Note    Admission Date: 5/20/2023  Code Status: DNR   Attending Provider: Ying Norman MD  Consulting Provider: Burton Arroyo MD  Principal Problem:Pneumonia    Inpatient consult to Electrophysiology  Consult performed by: Burton Arroyo MD  Consult ordered by: Ying Norman MD        Subjective:     Chief Complaint:  AF     HPI:   Ms. England is a 89-year-old female PMH dementia, DM, HTN, CKD 3A, HFpEF and previous non-small cell lung carcinoma who presented to the ED  with worsening fatigue and hallucinations. Reports fatigue x 3d and shortness of breath. Was recently started on supplemental 2L O2 by her PCP. She is accompanied by her daughter who reports that she has been more restless. Patient herself is alert and has no major complaints.      In the ED, she was afebrile. Tachycardic and found to be in new afib. Labs with stable CBC, CMP with mild SEA. Trop neg. TSH normal. Xray with known chronic R pleural effusion, with possible associated basilar consolidation. She was last seen by her cardiologist, Dr Champion, 11/22 and was doing well at that time.      Cardiology consulted her HR ranging from 40s-100s, although asymptomatic and hemodynamically stable. EP was consulted given new onset AF and concern for tachy-juan a syndrome. On my interview pt denies any recent syncope, chest pain, palpitations or any other issues at this time. Pt is on Metop 50 mg BID as outpatient for HTN.     07/2022 TTE    The left ventricle is normal in size with concentric remodeling and normal systolic function.   There are segmental left ventricular wall motion abnormalities.   The estimated ejection fraction is 55%.   Grade II left ventricular diastolic dysfunction.   Small circumferential pericardial effusion.   Normal right ventricular size.   Mild aortic regurgitation.   Elevated central venous pressure (15 mmHg).   The estimated PA  systolic pressure is 41 mmHg.   Mild tricuspid regurgitation.            Past Medical History:   Diagnosis Date    Anal cancer 1995    Anemia     Cancer 1995    anal cancer    Centrilobular emphysema 7/6/2020    Diabetes mellitus     With circulatory disorder    Lumbar radiculopathy 5/16/2014    Lung cancer 2012    s/p chemo/radiation    Macular degeneration     Macular hemorrhage of left eye     Neuropathy     Osteoporosis     Osteoporosis, unspecified 11/9/2012    Pure hypercholesterolemia        Past Surgical History:   Procedure Laterality Date    BRONCHOSCOPY      CHOLECYSTECTOMY      COLONOSCOPY      FLUOROSCOPIC ANGIOGRAPHY OF LOWER EXTREMITY WITH TOPICAL ULTRASOUND Right 12/6/2018    Procedure: ARTERIOGRAM-LEG AND ULTRASOUND;  Surgeon: SEBASTIÁN Mcpherson III, MD;  Location: [facility] OR [address] [place] [address];  Service: Peripheral Vascular;  Laterality: Right;  16.5 min  1059.42 mGy  59ml Dye  2ml Local    heart stent      HYSTERECTOMY      INCISIONAL BIOPSY Right 12/6/2019    Procedure: INCISIONAL BIOPSY;  Surgeon: Leslie Castillo MD;  Location: [facility] OR [address] [place] [address];  Service: Plastics;  Laterality: Right;    left leg surgery      blockage    PERCUTANEOUS TRANSLUMINAL ANGIOPLASTY N/A 12/6/2018    Procedure: PTA (ANGIOPLASTY, PERCUTANEOUS, TRANSLUMINAL);  Surgeon: SEBASTIÁN Mcpherson III, MD;  Location: [facility] OR [address] [place] [address];  Service: Peripheral Vascular;  Laterality: N/A;    RECONSTRUCTION USING FLAP Right 12/6/2019    Procedure: RECONSTRUCTION USING FLAP/FULL THICKNESS MRESETION AND RECONSTRUCTION RIGHT UPPER EYELID WITH BIOPSY;  Surgeon: Leslie Castillo MD;  Location: [facility] OR [address] [place] [address];  Service: Plastics;  Laterality: Right;    TONSILLECTOMY         Review of patient's allergies indicates:   Allergen Reactions    Bextra [valdecoxib] Swelling    Gabapentin Diarrhea and Nausea Only       Current Facility-Administered Medications on File Prior to Encounter   Medication    sodium chloride 0.9% flush 5 mL     Current  Outpatient Medications on File Prior to Encounter   Medication Sig    albuterol (PROVENTIL) 2.5 mg /3 mL (0.083 %) nebulizer solution Take 3 mLs (2.5 mg total) by nebulization every 6 (six) hours as needed for Wheezing. Rescue    ALPRAZolam (XANAX) 0.5 MG tablet Take 1 tablet (0.5 mg total) by mouth 2 (two) times daily as needed for Anxiety. (Patient not taking: Reported on 4/13/2023)    amlodipine-benazepril 5-20 mg (LOTREL) 5-20 mg per capsule Take 1 capsule by mouth once daily.    aspirin (ECOTRIN) 81 MG EC tablet Take 81 mg by mouth every morning.    azelastine (ASTELIN) 137 mcg (0.1 %) nasal spray 1 spray (137 mcg total) by Nasal route 2 (two) times daily.    cyanocobalamin (VITAMIN B-12) 1000 MCG tablet Take 1,000 mcg by mouth every morning.    donepeziL (ARICEPT) 5 MG tablet Take 1 tablet (5 mg total) by mouth every evening.    DULoxetine (CYMBALTA) 60 MG capsule Take 1 capsule (60 mg total) by mouth every morning.    estradioL (ESTRACE) 0.01 % (0.1 mg/gram) vaginal cream USE ONE-HALF GRAM VAGINALLY TWICE a WEEK    linaGLIPtin (TRADJENTA) 5 mg Tab tablet Take 1 tablet (5 mg total) by mouth once daily.    metoprolol tartrate (LOPRESSOR) 25 MG tablet Take 1 tablet (25 mg total) by mouth 2 (two) times daily. In addition to 50 mg twice daily to equal 75 mg twice daily    metoprolol tartrate (LOPRESSOR) 50 MG tablet Take 1 tablet (50 mg total) by mouth 2 (two) times daily.    nitroGLYCERIN (NITROSTAT) 0.4 MG SL tablet Place 1 tablet (0.4 mg total) under the tongue every 5 (five) minutes as needed.    simvastatin (ZOCOR) 80 MG tablet Take 1 tablet (80 mg total) by mouth once daily.    vitamin D (VITAMIN D3) 1000 units Tab Take 1,000 Units by mouth once daily.     Family History       Problem Relation (Age of Onset)    Breast cancer Maternal Aunt    Cancer Father    Stroke Mother          Tobacco Use    Smoking status: Former     Packs/day: 0.50     Years: 30.00     Pack years: 15.00     Types:  Cigarettes     Quit date: 1995     Years since quittin.4     Passive exposure: Past    Smokeless tobacco: Never   Substance and Sexual Activity    Alcohol use: No    Drug use: No    Sexual activity: Not Currently     Birth control/protection: Surgical     Review of Systems   Constitutional: Negative for fever and malaise/fatigue.   Eyes:  Negative for blurred vision and pain.   Cardiovascular:  Negative for chest pain, dyspnea on exertion, leg swelling, orthopnea, palpitations and paroxysmal nocturnal dyspnea.   Respiratory:  Negative for cough, shortness of breath, sputum production and wheezing.    Hematologic/Lymphatic: Negative for adenopathy and bleeding problem.   Skin:  Negative for rash.   Musculoskeletal:  Negative for back pain and neck pain.   Gastrointestinal:  Negative for abdominal pain, constipation, diarrhea, nausea and vomiting.   Genitourinary:  Negative for dysuria.   Neurological:  Negative for dizziness, headaches, light-headedness and weakness.   Objective:     Vital Signs (Most Recent):  Temp: 96.2 °F (35.7 °C) (23 07)  Pulse: (!) 112 (23 1126)  Resp: 14 (23 0731)  BP: (!) 153/67 (23 0731)  SpO2: (!) 93 % (23 07) Vital Signs (24h Range):  Temp:  [96.2 °F (35.7 °C)-98.8 °F (37.1 °C)] 96.2 °F (35.7 °C)  Pulse:  [] 112  Resp:  [14-22] 14  SpO2:  [93 %-97 %] 93 %  BP: (130-184)/(67-88) 153/67       Weight: 74.8 kg (164 lb 14.5 oz)  Body mass index is 29.21 kg/m².    SpO2: (!) 93 %        Physical Exam  Constitutional:       General: She is not in acute distress.     Appearance: Normal appearance. She is not ill-appearing, toxic-appearing or diaphoretic.   HENT:      Head: Normocephalic and atraumatic.      Nose: Nose normal.   Eyes:      Extraocular Movements: Extraocular movements intact.      Pupils: Pupils are equal, round, and reactive to light.   Cardiovascular:      Rate and Rhythm: Normal rate and regular rhythm.      Heart sounds: No  murmur heard.    No friction rub. No gallop.   Pulmonary:      Effort: Pulmonary effort is normal. No respiratory distress.      Breath sounds: Normal breath sounds. No wheezing or rales.   Abdominal:      General: Abdomen is flat. There is no distension.      Palpations: Abdomen is soft. There is no mass.      Tenderness: There is no abdominal tenderness.   Musculoskeletal:         General: No swelling. Normal range of motion.      Cervical back: Normal range of motion. No rigidity.      Right lower leg: No edema.      Left lower leg: No edema.   Skin:     General: Skin is warm and dry.      Coloration: Skin is not jaundiced.      Findings: No bruising.   Neurological:      General: No focal deficit present.      Mental Status: She is alert and oriented to person, place, and time.      Cranial Nerves: No cranial nerve deficit.      Motor: No weakness.          Significant Labs: BMP:   Recent Labs   Lab 05/20/23  1309 05/21/23  0546   * 121*  121*    143  143   K 3.7 3.9  3.9    107  107   CO2 23 27  27   BUN 20 13  13   CREATININE 1.4 1.0  1.0   CALCIUM 10.2 10.5  10.5   MG  --  1.5*    and CBC:   Recent Labs   Lab 05/20/23  1309 05/21/23  0547   WBC 3.79* 4.12   HGB 10.5* 11.7*   HCT 32.0* 34.9*    161       Significant Imaging: Reviewed               Assessment and Plan:     PAF (paroxysmal atrial fibrillation)  Pt with new onset AF. EP consulted given HR ranging from 40s-110s. ECG and tele reviewed. Pt noted to be in junctional rhythm in 2022. Has been in AF on this hospitalization. No symtpoms noted such as syncope, palpitations, chest pain.     -No indication for PPM at this time  -Pt on Metop 50 mg BID at home. Would restart pt on Metoprolol tartrate 25 mg BID   - Maintain K > 4, Mag > 2 and Ca/iCal WNL to decrease arrhythmogenic potential  -[id number] score 6 which would favor use of anticoagulation. Would recommend Eliquis if pt has no contraindication to AC   - Surface  echo to look for any evidence of valvular/structural disease  - Maintain on telemetry and daily morning EKGs for the next few days   --> Please perform EKG if the patient converts spontaneously   -Would recommend keeping pt NPO at midnight in case pt needs ELIZABETH/DCCV in AM           Thank you for your consult. I will follow-up with patient. Please contact us if you have any additional questions.    Burton Arroyo MD  Cardiac Electrophysiology  [facility] - Intensive Care ([place])

## 2023-05-21 NOTE — ASSESSMENT & PLAN NOTE
Patient's FSGs are controlled on current medication regimen.  Last A1c reviewed-   Lab Results   Component Value Date    HGBA1C 6.5 (H) 04/03/2023     Most recent fingerstick glucose reviewed-   Recent Labs   Lab 05/20/23  2252 05/21/23  0850   POCTGLUCOSE 130* 123*     Current correctional scale  Low  Maintain anti-hyperglycemic dose as follows-   Antihyperglycemics (From admission, onward)    Start     Stop Route Frequency Ordered    05/20/23 1738  insulin aspart U-100 pen 0-5 Units         -- SubQ Before meals & nightly PRN 05/20/23 1641        Hold Oral hypoglycemics while patient is in the hospital.

## 2023-05-21 NOTE — HPI
Ms. England is a 89-year-old female PMH dementia, DM, HTN, CKD 3A, HFpEF and previous non-small cell lung carcinoma who presented to the ED  with worsening fatigue and hallucinations. Reports fatigue x 3d and shortness of breath. Was recently started on supplemental 2L O2 by her PCP. She is accompanied by her daughter who reports that she has been more restless. Patient herself is alert and has no major complaints.      In the ED, she was afebrile. Tachycardic and found to be in new afib. Labs with stable CBC, CMP with mild SEA. Trop neg. TSH normal. Xray with known chronic R pleural effusion, with possible associated basilar consolidation. She was last seen by her cardiologist, Dr Champion, 11/22 and was doing well at that time.      Cardiology consulted her HR ranging from 40s-100s, although asymptomatic and hemodynamically stable. EP was consulted given new onset AF and concern for tachy-juan a syndrome. On my interview pt denies any recent syncope, chest pain, palpitations or any other issues at this time. Pt is on Metop 50 mg BID as outpatient for HTN.     07/2022 TTE   The left ventricle is normal in size with concentric remodeling and normal systolic function.  There are segmental left ventricular wall motion abnormalities.  The estimated ejection fraction is 55%.  Grade II left ventricular diastolic dysfunction.  Small circumferential pericardial effusion.  Normal right ventricular size.  Mild aortic regurgitation.  Elevated central venous pressure (15 mmHg).  The estimated PA systolic pressure is 41 mmHg.  Mild tricuspid regurgitation.

## 2023-05-21 NOTE — SUBJECTIVE & OBJECTIVE
"Interval History: No acute events overnight. Patient states that she feels well, denies any palpitations or dyspnea. Denies any chest pain or discomfort. Able to remember mentioning "raining inside her room" to her daughter but patient states that it was likely her dreaming.     Review of Systems  Objective:     Vital Signs (Most Recent):  Temp: 97.6 °F (36.4 °C) (05/21/23 1200)  Pulse: (!) 120 (05/21/23 1200)  Resp: 16 (05/21/23 1200)  BP: 139/67 (05/21/23 1200)  SpO2: (!) 93 % (05/21/23 1200) Vital Signs (24h Range):  Temp:  [96.2 °F (35.7 °C)-98.8 °F (37.1 °C)] 97.6 °F (36.4 °C)  Pulse:  [] 120  Resp:  [14-22] 16  SpO2:  [93 %-97 %] 93 %  BP: (130-184)/(67-88) 139/67     Weight: 74.8 kg (164 lb 14.5 oz)  Body mass index is 29.21 kg/m².    Intake/Output Summary (Last 24 hours) at 5/21/2023 1209  Last data filed at 5/21/2023 0950  Gross per 24 hour   Intake 600 ml   Output 2050 ml   Net -1450 ml         Physical Exam  Constitutional:       Appearance: She is well-developed.   HENT:      Head: Normocephalic and atraumatic.   Eyes:      Conjunctiva/sclera: Conjunctivae normal.      Pupils: Pupils are equal, round, and reactive to light.   Neck:      Thyroid: No thyromegaly.      Vascular: No JVD.   Cardiovascular:      Rate and Rhythm: Tachycardia present. Rhythm irregular.      Heart sounds: Normal heart sounds. No murmur heard.    No friction rub. No gallop.   Pulmonary:      Effort: Pulmonary effort is normal.      Breath sounds: Normal breath sounds. No wheezing or rales.   Abdominal:      General: Bowel sounds are normal. There is no distension.      Palpations: Abdomen is soft.      Tenderness: There is no abdominal tenderness. There is no guarding or rebound.   Musculoskeletal:         General: No tenderness. Normal range of motion.      Cervical back: Neck supple.   Skin:     General: Skin is warm and dry.   Neurological:      Mental Status: She is alert and oriented to person, place, and time.      " Cranial Nerves: No cranial nerve deficit.   Psychiatric:         Behavior: Behavior normal.           Significant Labs: All pertinent labs within the past 24 hours have been reviewed.    Significant Imaging: I have reviewed all pertinent imaging results/findings within the past 24 hours.

## 2023-05-21 NOTE — NURSING
Nurses Note -- 4 Eyes      5/20/2023   7:24 PM      Skin assessed during: Admit      [x] No Altered Skin Integrity Present    [x]Prevention Measures Documented      [] Yes- Altered Skin Integrity Present or Discovered   [] LDA Added if Not in Epic (Describe Wound)   [] New Altered Skin Integrity was Present on Admit and Documented in LDA   [] Wound Image Taken    Wound Care Consulted? No    Attending Nurse:  Gina Hanna RN     Second RN/Staff Member:  Marito Marie RN

## 2023-05-21 NOTE — PROGRESS NOTES
"[facility] ([address] Medicine  Progress Note    Patient Name: Brunilda England  MRN: [id number]  Patient Class: IP- Inpatient   Admission Date: 5/20/2023  Length of Stay: 1 days  Attending Physician: Ying Norman MD  Primary Care Provider: Pepper Whitfield MD        Subjective:     Principal Problem:SSS (sick sinus syndrome)        HPI:  Ms. England is a 89-year-old female PMH dementia, DM, HTN, CKD 3A, HFpEF and previous non-small cell lung carcinoma who presented to the ED  with worsening fatigue and hallucinations. Reports fatigue x3d and shortness of breath. Was recently started on supplemental 2L O2 by her PCP. She is accompanied by her daughter who reports that she has been more restless, constantly talking out of her head since last night. Examples include her saying "it was raining in her bedroom". She says that she has been having these symptoms for "a good while" but she has not ever told her daughter. Patient herself is alert and has no major complaints. ROS notable for emesis x1 yesterday. Otherwise denies fever, coughing, shortness of breath, abdominal pain, dysuria.     In the ED, she was afebrile. Tachycardic and found to be in new afib. Labs with stable CBC, CMP with mild SEA. Trop neg. TSH normal. Xray with known chronic R pleural effusion, with possible associated basilar consolidation. Cardiology consulted. She was admitted to hospital medicine for possible PNA.       Overview/Hospital Course:  Patient denies any issues. Patient continuing to have periods of bradycardia then AF w/ RVR. Cardiology recommending EP consult for possible PPM. EP state no indication for PPM at current; however, patient may need ELIZABETH/DCCV in AM. Patient started back on metop tart and started on apixaban for AF.      Interval History: No acute events overnight. Patient states that she feels well, denies any palpitations or dyspnea. Denies any chest pain or discomfort. Able to remember " "mentioning "raining inside her room" to her daughter but patient states that it was likely her dreaming.     Review of Systems  Objective:     Vital Signs (Most Recent):  Temp: 97.6 °F (36.4 °C) (05/21/23 1200)  Pulse: (!) 120 (05/21/23 1200)  Resp: 16 (05/21/23 1200)  BP: 139/67 (05/21/23 1200)  SpO2: (!) 93 % (05/21/23 1200) Vital Signs (24h Range):  Temp:  [96.2 °F (35.7 °C)-98.8 °F (37.1 °C)] 97.6 °F (36.4 °C)  Pulse:  [] 120  Resp:  [14-22] 16  SpO2:  [93 %-97 %] 93 %  BP: (130-184)/(67-88) 139/67     Weight: 74.8 kg (164 lb 14.5 oz)  Body mass index is 29.21 kg/m².    Intake/Output Summary (Last 24 hours) at 5/21/2023 1209  Last data filed at 5/21/2023 0950  Gross per 24 hour   Intake 600 ml   Output 2050 ml   Net -1450 ml         Physical Exam  Constitutional:       Appearance: She is well-developed.   HENT:      Head: Normocephalic and atraumatic.   Eyes:      Conjunctiva/sclera: Conjunctivae normal.      Pupils: Pupils are equal, round, and reactive to light.   Neck:      Thyroid: No thyromegaly.      Vascular: No JVD.   Cardiovascular:      Rate and Rhythm: Tachycardia present. Rhythm irregular.      Heart sounds: Normal heart sounds. No murmur heard.    No friction rub. No gallop.   Pulmonary:      Effort: Pulmonary effort is normal.      Breath sounds: Normal breath sounds. No wheezing or rales.   Abdominal:      General: Bowel sounds are normal. There is no distension.      Palpations: Abdomen is soft.      Tenderness: There is no abdominal tenderness. There is no guarding or rebound.   Musculoskeletal:         General: No tenderness. Normal range of motion.      Cervical back: Neck supple.   Skin:     General: Skin is warm and dry.   Neurological:      Mental Status: She is alert and oriented to person, place, and time.      Cranial Nerves: No cranial nerve deficit.   Psychiatric:         Behavior: Behavior normal.           Significant Labs: All pertinent labs within the past 24 hours have been " reviewed.    Significant Imaging: I have reviewed all pertinent imaging results/findings within the past 24 hours.      Assessment/Plan:      * SSS (sick sinus syndrome)  Cardiology consulted in the ED. 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 Did not think this is afib. Patient's tachy-juan a rhythm continued and cardiology recommended EP consult for possible PPM.   - EP consulted, recommend against PPM. Discussed possible performing DCCV/ELIZABETH tomorrow. Also recommending anticoag given patient's chadsvasc  - starting apixaban 5mg BID today  - Restarted lopressor 25mg BID  - cards notification at discharge for outpatient planning       Pneumonia  Admitted with fatigue and concern for PNA on CXR. Notably, she has a chronic R pleural effusion with atelectasis. Recent imaging reviewed. Will continue abx and empirically treat x5d course. Notably has been hypoxic in the recent history, on 2L NC PRN and at night per outpatient PCP    - continue rocephin and azithro  - consider CT chest if symptoms worsen to better eval pleural effusion     Fatigue  Admitted with CC of fatigue in the setting of possible PNA, dementia, tachy/juan a syndrome, remote malignancy, and advanced age. Has history of non small cell lung cancer and rectal cancer, in remission. Last completed chemoradiation with carboplatin and Taxol on 6/1/12 - doing well. No signs of recurrence. Consideration that her fatigue and altered mentation could be progression of underlying dementia or bradycardia. Will treat PNA and eval for improvement.     PAF (paroxysmal atrial fibrillation)  See tachy/juan a syndrome      Chronic renal failure, stage 3a  Baseline Cr 1.1. Cr 1.4 on admission. Trend with AM labs.      Chronic diastolic heart failure  Most recent TTE from 07/2022 reviewed with grade 2 diastolic dysfunction  - repeat TTE ordered in setting of tachy-juan a arrhythmia.    Centrilobular emphysema  Noted on admission. No s/s wheezing or hypoxia on  exam 5/21  - Consider steroids if symptoms develop        Type 2 diabetes mellitus with circulatory disorder, without long-term current use of insulin  Patient's FSGs are controlled on current medication regimen.  Last A1c reviewed-   Lab Results   Component Value Date    HGBA1C 6.5 (H) 04/03/2023     Most recent fingerstick glucose reviewed-   Recent Labs   Lab 05/20/23  2252 05/21/23  0850   POCTGLUCOSE 130* 123*     Current correctional scale  Low  Maintain anti-hyperglycemic dose as follows-   Antihyperglycemics (From admission, onward)    Start     Stop Route Frequency Ordered    05/20/23 1738  insulin aspart U-100 pen 0-5 Units         -- SubQ Before meals & nightly PRN 05/20/23 1641        Hold Oral hypoglycemics while patient is in the hospital.    Anemia  Stable on admission at baseline Hgb 10.5      Pure hypercholesterolemia  Continue home statin, consider stopping at DC given advanced age and desire for consolidating medications      History of rectal or anal cancer  Noted, diagnosed and treated 1996. No further intervention      Essential hypertension  Continue home amlodipine  - plan to restart ARB/ACE tomorrow if BP remains elevated after restarting lopresor 5/21.      History of lung cancer  Noted, diagnosed 11 years ago. Most recent CT 11/22 reviewed with no signs of recurrence  - consider repeat CT chest       VTE Risk Mitigation (From admission, onward)         Ordered     apixaban tablet 5 mg  2 times daily         05/21/23 1206     IP VTE HIGH RISK PATIENT  Once         05/20/23 1641     Place sequential compression device  Until discontinued         05/20/23 1641                Discharge Planning   DUTCH:      Code Status: DNR   Is the patient medically ready for discharge?:     Reason for patient still in hospital (select all that apply): Patient trending condition and Pending disposition                     Ying Norman MD  Department of Hospital Medicine   [facility] - Intensive Care [facility]  [id number])

## 2023-05-21 NOTE — PROGRESS NOTES
[facility] - [facility] ([place] [address])  Cardiology  Progress Note    Patient Name: Brunilda England  MRN: [id number]  Admission Date: 5/20/2023  Hospital Length of Stay: 1 days  Code Status: DNR   Attending Physician: Ying Norman MD   Primary Care Physician: Pepper Whitfield MD  Expected Discharge Date:   Principal Problem:Pneumonia    Subjective:     Interval Hx: Patient doing well this AM. Alert and conversant. She denies CP, SOB, episodes of dizziness, lightheadedness. Telemetry review shows no episodes of bradycardia. EKG significant for AF in tachycardia phase with junctional bradycardia. Will recommend consult EP for pacemaker evaluation in setting of tachycardia-bradycardia syndrome.      Past Medical History:   Diagnosis Date    Anal cancer 1995    Anemia     Cancer 1995    anal cancer    Centrilobular emphysema 7/6/2020    Diabetes mellitus     With circulatory disorder    Lumbar radiculopathy 5/16/2014    Lung cancer 2012    s/p chemo/radiation    Macular degeneration     Macular hemorrhage of left eye     Neuropathy     Osteoporosis     Osteoporosis, unspecified 11/9/2012    Pure hypercholesterolemia        Past Surgical History:   Procedure Laterality Date    BRONCHOSCOPY      CHOLECYSTECTOMY      COLONOSCOPY      FLUOROSCOPIC ANGIOGRAPHY OF LOWER EXTREMITY WITH TOPICAL ULTRASOUND Right 12/6/2018    Procedure: ARTERIOGRAM-LEG AND ULTRASOUND;  Surgeon: SEBASTIÁN Mcpherson III, MD;  Location: [address];  Service: Peripheral Vascular;  Laterality: Right;  16.5 min  1059.42 mGy  59ml Dye  2ml Local    heart stent      HYSTERECTOMY      INCISIONAL BIOPSY Right 12/6/2019    Procedure: INCISIONAL BIOPSY;  Surgeon: Leslie Castillo MD;  Location: [facility] OR [address] [place] [address];  Service: Plastics;  Laterality: Right;    left leg surgery      blockage    PERCUTANEOUS TRANSLUMINAL ANGIOPLASTY N/A 12/6/2018    Procedure: PTA (ANGIOPLASTY, PERCUTANEOUS, TRANSLUMINAL);  Surgeon: SEBASTIÁN Mcpherson III, MD;  Location: [facility] OR [facility]  [facility];  Service: Peripheral Vascular;  Laterality: N/A;    RECONSTRUCTION USING FLAP Right 12/6/2019    Procedure: RECONSTRUCTION USING FLAP/FULL THICKNESS MRESETION AND RECONSTRUCTION RIGHT UPPER EYELID WITH BIOPSY;  Surgeon: Leslie Castillo MD;  Location: [facility] OR [address] [place] [address];  Service: Plastics;  Laterality: Right;    TONSILLECTOMY         Review of patient's allergies indicates:   Allergen Reactions    Bextra [valdecoxib] Swelling    Gabapentin Diarrhea and Nausea Only       Current Facility-Administered Medications on File Prior to Encounter   Medication    sodium chloride 0.9% flush 5 mL     Current Outpatient Medications on File Prior to Encounter   Medication Sig    albuterol (PROVENTIL) 2.5 mg /3 mL (0.083 %) nebulizer solution Take 3 mLs (2.5 mg total) by nebulization every 6 (six) hours as needed for Wheezing. Rescue    ALPRAZolam (XANAX) 0.5 MG tablet Take 1 tablet (0.5 mg total) by mouth 2 (two) times daily as needed for Anxiety. (Patient not taking: Reported on 4/13/2023)    amlodipine-benazepril 5-20 mg (LOTREL) 5-20 mg per capsule Take 1 capsule by mouth once daily.    aspirin (ECOTRIN) 81 MG EC tablet Take 81 mg by mouth every morning.    azelastine (ASTELIN) 137 mcg (0.1 %) nasal spray 1 spray (137 mcg total) by Nasal route 2 (two) times daily.    cyanocobalamin (VITAMIN B-12) 1000 MCG tablet Take 1,000 mcg by mouth every morning.    donepeziL (ARICEPT) 5 MG tablet Take 1 tablet (5 mg total) by mouth every evening.    DULoxetine (CYMBALTA) 60 MG capsule Take 1 capsule (60 mg total) by mouth every morning.    estradioL (ESTRACE) 0.01 % (0.1 mg/gram) vaginal cream USE ONE-HALF GRAM VAGINALLY TWICE a WEEK    linaGLIPtin (TRADJENTA) 5 mg Tab tablet Take 1 tablet (5 mg total) by mouth once daily.    metoprolol tartrate (LOPRESSOR) 25 MG tablet Take 1 tablet (25 mg total) by mouth 2 (two) times daily. In addition to 50 mg twice daily to equal 75 mg twice daily    metoprolol tartrate (LOPRESSOR) 50 MG tablet Take  1 tablet (50 mg total) by mouth 2 (two) times daily.    nitroGLYCERIN (NITROSTAT) 0.4 MG SL tablet Place 1 tablet (0.4 mg total) under the tongue every 5 (five) minutes as needed.    simvastatin (ZOCOR) 80 MG tablet Take 1 tablet (80 mg total) by mouth once daily.    vitamin D (VITAMIN D3) 1000 units Tab Take 1,000 Units by mouth once daily.     Family History       Problem Relation (Age of Onset)    Breast cancer Maternal Aunt    Cancer Father    Stroke Mother          Tobacco Use    Smoking status: Former     Packs/day: 0.50     Years: 30.00     Pack years: 15.00     Types: Cigarettes     Quit date: 1995     Years since quittin.4     Passive exposure: Past    Smokeless tobacco: Never   Substance and Sexual Activity    Alcohol use: No    Drug use: No    Sexual activity: Not Currently     Birth control/protection: Surgical     Review of Systems   Constitutional: Positive for decreased appetite and malaise/fatigue.   Cardiovascular:  Negative for chest pain, dyspnea on exertion, leg swelling, near-syncope, orthopnea, palpitations, paroxysmal nocturnal dyspnea and syncope.   Respiratory:  Negative for shortness of breath.    Musculoskeletal:  Negative for falls.   Neurological:  Negative for dizziness and light-headedness.   Psychiatric/Behavioral:  Positive for memory loss.    Objective:     Vital Signs (Most Recent):  Temp: 96.2 °F (35.7 °C) (23)  Pulse: (!) 118 (23)  Resp: 14 (23)  BP: (!) 153/67 (23)  SpO2: (!) 93 % (23) Vital Signs (24h Range):  Temp:  [96.2 °F (35.7 °C)-98.8 °F (37.1 °C)] 96.2 °F (35.7 °C)  Pulse:  [] 118  Resp:  [14-22] 14  SpO2:  [93 %-97 %] 93 %  BP: (130-184)/(67-88) 153/67     Weight: 74.8 kg (164 lb 14.5 oz)  Body mass index is 29.21 kg/m².    SpO2: (!) 93 %         Intake/Output Summary (Last 24 hours) at 2023 1011  Last data filed at 2023 0950  Gross per 24 hour   Intake 600 ml   Output 2050 ml   Net -1450 ml          Lines/Drains/Airways       Drain  Duration             Female External Urinary Catheter 05/20/23 1739 <1 day              Peripheral Intravenous Line  Duration                  Peripheral IV - Single Lumen 05/20/23 1329 20 G Left Antecubital <1 day                     Physical Exam  Constitutional:       General: She is not in acute distress.     Appearance: Normal appearance.   HENT:      Head: Normocephalic.      Mouth/Throat:      Mouth: Mucous membranes are moist.   Eyes:      Extraocular Movements: Extraocular movements intact.   Cardiovascular:      Rate and Rhythm: Normal rate and regular rhythm.      Pulses: Normal pulses.      Heart sounds: No murmur heard.  Pulmonary:      Effort: Pulmonary effort is normal. No respiratory distress.   Abdominal:      General: There is no distension.      Palpations: Abdomen is soft.      Tenderness: There is no abdominal tenderness.   Musculoskeletal:      Cervical back: Neck supple.      Right lower leg: No edema.      Left lower leg: No edema.   Skin:     General: Skin is warm.   Neurological:      Mental Status: She is oriented to person, place, and time.      Comments: Some memory lapses noted        Significant Labs: BMP:   Recent Labs   Lab 05/20/23  1309 05/21/23  0546   * 121*  121*    143  143   K 3.7 3.9  3.9    107  107   CO2 23 27  27   BUN 20 13  13   CREATININE 1.4 1.0  1.0   CALCIUM 10.2 10.5  10.5   MG  --  1.5*     , CMP   Recent Labs   Lab 05/20/23  1309 05/21/23  0546    143  143   K 3.7 3.9  3.9    107  107   CO2 23 27  27   * 121*  121*   BUN 20 13  13   CREATININE 1.4 1.0  1.0   CALCIUM 10.2 10.5  10.5   PROT 7.4  --    ALBUMIN 3.6 3.5   BILITOT 0.3  --    ALKPHOS 64  --    AST 38  --    ALT 30  --    ANIONGAP 12 9  9     , CBC   Recent Labs   Lab 05/20/23  1309 05/21/23  0547   WBC 3.79* 4.12   HGB 10.5* 11.7*   HCT 32.0* 34.9*    161     , and INR No results for input(s): INR,  PROTIME in the last 48 hours.    Significant Imaging:   TTE 7/27/22  The left ventricle is normal in size with concentric remodeling and normal systolic function.  There are segmental left ventricular wall motion abnormalities.  The estimated ejection fraction is 55%.  Grade II left ventricular diastolic dysfunction.  Small circumferential pericardial effusion.  Normal right ventricular size.  Mild aortic regurgitation.  Elevated central venous pressure (15 mmHg).  The estimated PA systolic pressure is 41 mmHg.  Mild tricuspid regurgitation.    Assessment and Plan:       SSS (sick sinus syndrome)  EKGs and telemetry consistent with tachy-juan a syndrome. She is either in NSR or sinus tachycardia in the low 100s with an intermittent junctional bradycardia.  Hemodynamically stable.     - Labs all stable or WNL  - Recommend holding home Lopressor 50/25 mg BID  - Would not recommend anticoagulation at this time  - Would also consider discontinuation of donepezil if no improvement in bradycardia with cessation of Lopressor as this is a potential side effect of the medication   - Would ambulate patient to assess symptoms and HR response  - If asymptomatic, would recommend holding BB and follow-up as outpt  - Recommend EP consult for pacemaker evaluation in setting of tachycardia-bradycardia syndrome with evidence of AF and junctional bradycardia    Please notify cardiology if planning on discharge home to assist with outpt follow-up.         VTE Risk Mitigation (From admission, onward)           Ordered     heparin (porcine) injection 5,000 Units  Every 8 hours         05/20/23 1650     IP VTE HIGH RISK PATIENT  Once         05/20/23 1641     Place sequential compression device  Until discontinued         05/20/23 1641                  We will sign off. Thank you for allowing us to participate in the care of this patient. Please reach out with any further questions or concerns.     Kristina Gutiérrez MD  Cardiology  [facility]  - Intensive Care ([facility]-16)  I have personally taken the history and examined the patient and agree with the resident's note as stated above.Probably not a PPM candidate mars with no syncope and advanced dementia and rhythm issues not likely symptomatic yet.

## 2023-05-21 NOTE — ASSESSMENT & PLAN NOTE
Continue home amlodipine  - plan to restart ARB/ACE tomorrow if BP remains elevated after restarting lopresor 5/21.

## 2023-05-21 NOTE — ASSESSMENT & PLAN NOTE
Noted on admission. No s/s wheezing or hypoxia on exam 5/21  - Consider steroids if symptoms develop

## 2023-05-22 ENCOUNTER — PATIENT MESSAGE (OUTPATIENT)
Dept: INTERNAL MEDICINE | Facility: CLINIC | Age: 88
End: 2023-05-22
Payer: MEDICARE

## 2023-05-22 ENCOUNTER — CLINICAL SUPPORT (OUTPATIENT)
Dept: CARDIOLOGY | Facility: HOSPITAL | Age: 88
DRG: 194 | End: 2023-05-22
Attending: INTERNAL MEDICINE
Payer: MEDICARE

## 2023-05-22 VITALS
SYSTOLIC BLOOD PRESSURE: 144 MMHG | WEIGHT: 155.44 LBS | OXYGEN SATURATION: 94 % | RESPIRATION RATE: 18 BRPM | HEART RATE: 106 BPM | DIASTOLIC BLOOD PRESSURE: 66 MMHG | HEIGHT: 63 IN | BODY MASS INDEX: 27.54 KG/M2 | TEMPERATURE: 98 F

## 2023-05-22 PROBLEM — I48.0 PAROXYSMAL ATRIAL FIBRILLATION: Status: ACTIVE | Noted: 2023-05-20

## 2023-05-22 LAB
ALBUMIN SERPL BCP-MCNC: 3.3 G/DL (ref 3.5–5.2)
ANION GAP SERPL CALC-SCNC: 12 MMOL/L (ref 8–16)
BASOPHILS # BLD AUTO: 0.01 K/UL (ref 0–0.2)
BASOPHILS NFR BLD: 0.3 % (ref 0–1.9)
BUN SERPL-MCNC: 12 MG/DL (ref 10–30)
CALCIUM SERPL-MCNC: 10.3 MG/DL (ref 8.7–10.5)
CHLORIDE SERPL-SCNC: 106 MMOL/L (ref 95–110)
CO2 SERPL-SCNC: 25 MMOL/L (ref 23–29)
CREAT SERPL-MCNC: 0.9 MG/DL (ref 0.5–1.4)
DIFFERENTIAL METHOD: ABNORMAL
EOSINOPHIL # BLD AUTO: 0.1 K/UL (ref 0–0.5)
EOSINOPHIL NFR BLD: 2.3 % (ref 0–8)
ERYTHROCYTE [DISTWIDTH] IN BLOOD BY AUTOMATED COUNT: 16.6 % (ref 11.5–14.5)
EST. GFR  (NO RACE VARIABLE): >60 ML/MIN/1.73 M^2
GLUCOSE SERPL-MCNC: 126 MG/DL (ref 70–110)
HCT VFR BLD AUTO: 35.9 % (ref 37–48.5)
HGB BLD-MCNC: 11.8 G/DL (ref 12–16)
IMM GRANULOCYTES # BLD AUTO: 0.01 K/UL (ref 0–0.04)
IMM GRANULOCYTES NFR BLD AUTO: 0.3 % (ref 0–0.5)
LYMPHOCYTES # BLD AUTO: 1.1 K/UL (ref 1–4.8)
LYMPHOCYTES NFR BLD: 27 % (ref 18–48)
MAGNESIUM SERPL-MCNC: 1.6 MG/DL (ref 1.6–2.6)
MCH RBC QN AUTO: 28.2 PG (ref 27–31)
MCHC RBC AUTO-ENTMCNC: 32.9 G/DL (ref 32–36)
MCV RBC AUTO: 86 FL (ref 82–98)
MONOCYTES # BLD AUTO: 0.6 K/UL (ref 0.3–1)
MONOCYTES NFR BLD: 15.2 % (ref 4–15)
NEUTROPHILS # BLD AUTO: 2.1 K/UL (ref 1.8–7.7)
NEUTROPHILS NFR BLD: 54.9 % (ref 38–73)
NRBC BLD-RTO: 0 /100 WBC
PHOSPHATE SERPL-MCNC: 3.2 MG/DL (ref 2.7–4.5)
PLATELET # BLD AUTO: 183 K/UL (ref 150–450)
PMV BLD AUTO: 11.9 FL (ref 9.2–12.9)
POCT GLUCOSE: 127 MG/DL (ref 70–110)
POCT GLUCOSE: 199 MG/DL (ref 70–110)
POTASSIUM SERPL-SCNC: 4.2 MMOL/L (ref 3.5–5.1)
RBC # BLD AUTO: 4.18 M/UL (ref 4–5.4)
SODIUM SERPL-SCNC: 143 MMOL/L (ref 136–145)
WBC # BLD AUTO: 3.89 K/UL (ref 3.9–12.7)

## 2023-05-22 PROCEDURE — 99239 HOSP IP/OBS DSCHRG MGMT >30: CPT | Mod: ,,, | Performed by: STUDENT IN AN ORGANIZED HEALTH CARE EDUCATION/TRAINING PROGRAM

## 2023-05-22 PROCEDURE — 93010 ELECTROCARDIOGRAM REPORT: CPT | Mod: ,,, | Performed by: INTERNAL MEDICINE

## 2023-05-22 PROCEDURE — 99239 PR HOSPITAL DISCHARGE DAY,>30 MIN: ICD-10-PCS | Mod: ,,, | Performed by: STUDENT IN AN ORGANIZED HEALTH CARE EDUCATION/TRAINING PROGRAM

## 2023-05-22 PROCEDURE — 93272 ECG/REVIEW INTERPRET ONLY: CPT | Mod: ,,, | Performed by: INTERNAL MEDICINE

## 2023-05-22 PROCEDURE — 93010 EKG 12-LEAD: ICD-10-PCS | Mod: ,,, | Performed by: INTERNAL MEDICINE

## 2023-05-22 PROCEDURE — 93005 ELECTROCARDIOGRAM TRACING: CPT

## 2023-05-22 PROCEDURE — 83735 ASSAY OF MAGNESIUM: CPT | Performed by: STUDENT IN AN ORGANIZED HEALTH CARE EDUCATION/TRAINING PROGRAM

## 2023-05-22 PROCEDURE — 80069 RENAL FUNCTION PANEL: CPT | Performed by: STUDENT IN AN ORGANIZED HEALTH CARE EDUCATION/TRAINING PROGRAM

## 2023-05-22 PROCEDURE — 25000003 PHARM REV CODE 250: Performed by: STUDENT IN AN ORGANIZED HEALTH CARE EDUCATION/TRAINING PROGRAM

## 2023-05-22 PROCEDURE — 93272 CARDIAC EVENT MONITOR (CUPID ONLY): ICD-10-PCS | Mod: ,,, | Performed by: INTERNAL MEDICINE

## 2023-05-22 PROCEDURE — 63700000 PHARM REV CODE 250 ALT 637 W/O HCPCS: Performed by: STUDENT IN AN ORGANIZED HEALTH CARE EDUCATION/TRAINING PROGRAM

## 2023-05-22 PROCEDURE — 85025 COMPLETE CBC W/AUTO DIFF WBC: CPT | Performed by: STUDENT IN AN ORGANIZED HEALTH CARE EDUCATION/TRAINING PROGRAM

## 2023-05-22 PROCEDURE — 36415 COLL VENOUS BLD VENIPUNCTURE: CPT | Performed by: STUDENT IN AN ORGANIZED HEALTH CARE EDUCATION/TRAINING PROGRAM

## 2023-05-22 PROCEDURE — 93270 REMOTE 30 DAY ECG REV/REPORT: CPT

## 2023-05-22 RX ORDER — CEFPODOXIME PROXETIL 100 MG/1
100 TABLET, FILM COATED ORAL 2 TIMES DAILY
Qty: 6 TABLET | Refills: 0 | Status: SHIPPED | OUTPATIENT
Start: 2023-05-22 | End: 2023-05-25

## 2023-05-22 RX ORDER — METOPROLOL TARTRATE 25 MG/1
25 TABLET, FILM COATED ORAL 2 TIMES DAILY
Qty: 60 TABLET | Refills: 11 | Status: SHIPPED | OUTPATIENT
Start: 2023-05-22 | End: 2023-08-01 | Stop reason: SDUPTHER

## 2023-05-22 RX ORDER — AZITHROMYCIN 250 MG/1
250 TABLET, FILM COATED ORAL DAILY
Qty: 3 TABLET | Refills: 0 | Status: SHIPPED | OUTPATIENT
Start: 2023-05-23 | End: 2023-05-26

## 2023-05-22 RX ADMIN — CYANOCOBALAMIN TAB 1000 MCG 1000 MCG: 1000 TAB at 06:05

## 2023-05-22 RX ADMIN — METOPROLOL TARTRATE 25 MG: 25 TABLET, FILM COATED ORAL at 08:05

## 2023-05-22 RX ADMIN — Medication 1000 UNITS: at 08:05

## 2023-05-22 RX ADMIN — ASPIRIN 81 MG: 81 TABLET, COATED ORAL at 06:05

## 2023-05-22 RX ADMIN — DULOXETINE HYDROCHLORIDE 30 MG: 30 CAPSULE, DELAYED RELEASE ORAL at 08:05

## 2023-05-22 RX ADMIN — APIXABAN 5 MG: 5 TABLET, FILM COATED ORAL at 08:05

## 2023-05-22 RX ADMIN — ATORVASTATIN CALCIUM 80 MG: 20 TABLET, FILM COATED ORAL at 08:05

## 2023-05-22 RX ADMIN — AMLODIPINE BESYLATE 5 MG: 5 TABLET ORAL at 08:05

## 2023-05-22 RX ADMIN — AZITHROMYCIN MONOHYDRATE 250 MG: 250 TABLET ORAL at 08:05

## 2023-05-22 NOTE — PROGRESS NOTES
"  Clinical Cardiac Electrophysiology Follow-Up Note     Date:  5/22/2023  Requesting attending:  Ying Norman MD    Chief Complaint/Reason for Consultation:    A Fib     Problem List:   New onset A fib (CHADSVASC 5 -Age 2, CHFpEF 1, HTN 1, DM1-)  DM, HTN, CKD 3A  HFpEF   Previous non-small cell lung carcinoma  Dementia     24 Hr Events and Subjective:   No events overnight.     Telemetry:  Sinus tachycardia 100's    No events     Scheduled Meds:       amLODIPine  5 mg Oral Daily    apixaban  5 mg Oral BID    aspirin  81 mg Oral QAM    atorvastatin  80 mg Oral Daily    azithromycin  250 mg Oral Daily    cefTRIAXone (ROCEPHIN) IVPB  1 g Intravenous Q24H    cyanocobalamin  1,000 mcg Oral QAM    donepeziL  5 mg Oral QHS    DULoxetine  30 mg Oral Daily    metoprolol tartrate  25 mg Oral BID    vitamin D  1,000 Units Oral Daily     PRN Meds:   acetaminophen, albuterol-ipratropium, dextrose 10%, dextrose 10%, glucagon (human recombinant), glucose, glucose, insulin aspart U-100, melatonin, naloxone, ondansetron, sodium chloride 0.9%    Allergies:     Review of patient's allergies indicates:   Allergen Reactions    Bextra [valdecoxib] Swelling    Gabapentin Diarrhea and Nausea Only     Physical Exam:     /65 (BP Location: Right arm, Patient Position: Lying)   Pulse 97   Temp 97.6 °F (36.4 °C) (Oral)   Resp 16   Ht 5' 3" (1.6 m)   Wt 70.5 kg (155 lb 6.8 oz)   SpO2 (!) 90%   BMI 27.53 kg/m²     Eyes: Sclerae anicteric. Ears/nose/throat: Mucous membranes moist. Neck: No jugular venous distention. Respiratory: Lungs clear to auscultation bilaterally. Cardiovascular: Heart was regular with normal first and second heart sounds. No S3 or S4. GI: Soft, nontender, nondistended, with normal bowel sounds. Musculoskeletal: extremities without cyanosis, clubbing or pitting edema. Neurologic: Patient is alert and oriented x3 and there is no focal strength deficit. Skin: no rashes, cuts, sores. Psychiatric: normal affect. " Hematologic: no abnormal bruising or bleeding.    Laboratory Data:      BUN/Cr/glu/ALT/AST/amyl/lip:  12/0.9/--/--/--/--/-- (05/22 0413)  WBC/Hgb/Hct/Plts:  3.89/11.8/35.9/183 (05/22 0413)     Diagnostic Studies:      5/20/23 TTE:   The left ventricle is normal in size with low normal systolic function.  The estimated ejection fraction is 53%.  There are segmental left ventricular wall motion abnormalities.  There is abnormal septal wall motion.  Mild left atrial enlargement.  Indeterminate left ventricular diastolic function.  Normal right ventricular size with low normal right ventricular systolic function.  There is mild-to-moderate aortic valve stenosis.  Aortic valve area is 1.40 cm2; peak velocity is 1.54 m/s; mean gradient is 6 mmHg.  Mild aortic regurgitation.  Mild mitral regurgitation.  Mild tricuspid regurgitation.  Normal central venous pressure (3 mmHg).  The estimated PA systolic pressure is 41 mmHg.  There is mild pulmonary hypertension.  Trivial posterior pericardial effusion. At base and under the RA and small outside the RV apically.    7/25/22 Holter:      Plan:   Paroxysmal A FIB (new onset)  CHADSVASC 5 (Age 2, CHFpEF 1, HTN 1, DM1)  PMH: junctional rhythm in EKG 8/4/2022  Todays telemetry: Sinus tachy 104 bpm  Patient is currently asymptomatic. Asking to go home.     VS range since admission:   Temp:  [96.2 °F (35.7 °C)-98.8 °F (37.1 °C)]   Pulse:  []   Resp:  [14-22]   BP: (130-184)/(65-88)   SpO2:  [89 %-97 %]      On:   Apixaban 5 mg BID   Metoprolol tartrate 25 mg BID     Plan:  Continue metoprolol   Continue apixaban 5 mg BID  Follow up in EP clinic on dc   We will sign off     Pt  discussed with the attending physician, Dr. Roman  of the arrhythmia consult service.   We appreciate the opportunity to assist in the care of Ms England. Should you have any further questions, please do not hesitate to call.       Bebeto ALMANZAR Bernard  [facility]  PGY4 Cardiology Fellow

## 2023-05-22 NOTE — PLAN OF CARE
[facility] - [facility] ([facility]-16)  Discharge Reassessment    Primary Care Provider: Pepper Whitfield MD    Expected Discharge Date: 5/22/2023    Reassessment (most recent)       Discharge Reassessment - 05/22/23 1348          Discharge Reassessment    Assessment Type Discharge Planning Reassessment (P)      Did the patient's condition or plan change since previous assessment? No (P)      Discharge Plan discussed with: Patient (P)      Communicated DUTCH with patient/caregiver No (P)      Discharge Plan A Home (P)      Discharge Plan B Home with family (P)      DME Needed Upon Discharge  none (P)      Transition of Care Barriers None (P)      Why the patient remains in the hospital Requires continued medical care (P)         Post-Acute Status    Discharge Delays None known at this time (P)                  Spoke with pt in room.  Her d/c plan is to go home, and her dtr will come to get her.    BRIA FaustN, BS, RN, CCM

## 2023-05-22 NOTE — PLAN OF CARE
W scheduled pcp f/u apt   Future Appointments   Date Time Provider Department Center   5/22/2023  1:00 PM MONITOR, ARRHYTHMIA EVENT [facility]   5/24/2023 10:30 AM EDVIN Watson II, MD [facility]   6/14/2023  1:00 PM Pepper Whitfield MD [facility]

## 2023-05-22 NOTE — ASSESSMENT & PLAN NOTE
Cardiology consulted in the ED due to tachy-juan a apearing rhythm. 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 Patient's tachy-juan a rhythm continued and cardiology recommended EP consult for possible PPM.   - EP consulted, recommend against PPM. Patient spontaneously converted to sinus tachy then sinus rhythm  - continue apixaban 5mg BID   - Continue lopressor 25mg BID  -  Plan for 30d event recorder on discharge, already arranged by EP

## 2023-05-22 NOTE — PLAN OF CARE
Problem: Adult Inpatient Plan of Care  Goal: Plan of Care Review  Outcome: Ongoing, Progressing  Goal: Absence of Hospital-Acquired Illness or Injury  Outcome: Ongoing, Progressing  Goal: Optimal Comfort and Wellbeing  Outcome: Ongoing, Progressing  Goal: Readiness for Transition of Care  Outcome: Ongoing, Progressing     Problem: Diabetes Comorbidity  Goal: Blood Glucose Level Within Targeted Range  Outcome: Ongoing, Progressing     Problem: Fluid Imbalance (Pneumonia)  Goal: Fluid Balance  Outcome: Ongoing, Progressing     Problem: Infection (Pneumonia)  Goal: Resolution of Infection Signs and Symptoms  Outcome: Ongoing, Progressing     Problem: Respiratory Compromise (Pneumonia)  Goal: Effective Oxygenation and Ventilation  Outcome: Ongoing, Progressing     Problem: Skin Injury Risk Increased  Goal: Skin Health and Integrity  Outcome: Ongoing, Progressing

## 2023-05-22 NOTE — PLAN OF CARE
Problem: Adult Inpatient Plan of Care  Goal: Plan of Care Review  Outcome: Met  Goal: Patient-Specific Goal (Individualized)  Outcome: Met  Goal: Absence of Hospital-Acquired Illness or Injury  Outcome: Met  Goal: Optimal Comfort and Wellbeing  Outcome: Met  Goal: Readiness for Transition of Care  Outcome: Met     Problem: Diabetes Comorbidity  Goal: Blood Glucose Level Within Targeted Range  Outcome: Met     Problem: Fluid Imbalance (Pneumonia)  Goal: Fluid Balance  Outcome: Met     Problem: Infection (Pneumonia)  Goal: Resolution of Infection Signs and Symptoms  Outcome: Met     Problem: Respiratory Compromise (Pneumonia)  Goal: Effective Oxygenation and Ventilation  Outcome: Met     Problem: Skin Injury Risk Increased  Goal: Skin Health and Integrity  Outcome: Met     Problem: Fall Injury Risk  Goal: Absence of Fall and Fall-Related Injury  Outcome: Met

## 2023-05-22 NOTE — DISCHARGE SUMMARY
"[facility] - Intensive Care ([place] [address])  [facility] Medicine  Discharge Summary      Patient Name: Brunilda England  MRN: [id number]  EMY: [id number]  Patient Class: IP- Inpatient  Admission Date: 5/20/2023  Hospital Length of Stay: 2 days  Discharge Date and Time:  05/22/2023 2:05 PM  Attending Physician: Ying Norman MD   Discharging Provider: Ying Norman MD  Primary Care Provider: Pepper Whitfield MD  [facility] Medicine Team: [facility] HOSP MED Q Ying Norman MD  Primary Care Team: [facility] HOSP MED Q    HPI:   Ms. England is a 89-year-old female PMH dementia, DM, HTN, CKD 3A, HFpEF and previous non-small cell lung carcinoma who presented to the ED  with worsening fatigue and hallucinations. Reports fatigue x3d and shortness of breath. Was recently started on supplemental 2L O2 by her PCP. She is accompanied by her daughter who reports that she has been more restless, constantly talking out of her head since last night. Examples include her saying "it was raining in her bedroom". She says that she has been having these symptoms for "a good while" but she has not ever told her daughter. Patient herself is alert and has no major complaints. ROS notable for emesis x1 yesterday. Otherwise denies fever, coughing, shortness of breath, abdominal pain, dysuria.     In the ED, she was afebrile. Tachycardic and found to be in new afib. Labs with stable CBC, CMP with mild SEA. Trop neg. TSH normal. Xray with known chronic R pleural effusion, with possible associated basilar consolidation. Cardiology consulted. She was admitted to hospital medicine for possible PNA.       * No surgery found *      Hospital Course:   Patient denies any issues. Patient continuing to have periods of bradycardia then AF w/ RVR. Cardiology recommending EP consult for possible PPM. EP state no indication for PPM at current; plans for event monitor for 30d and f/u in 6 weeks. Decreased home metoprolol to 25mg BID and started DOAC inpatient. Patient " discharged with rx for apixaban and 3 additional days of CAP therapy.       Goals of Care Treatment Preferences:  Code Status: DNR      Consults:   Consults (From admission, onward)        Status Ordering Provider     Inpatient consult to Electrophysiology  Once        Provider:  (Not yet assigned)    Completed LIZABETH CASTILLO     Inpatient consult to Cardiology  Once        Provider:  (Not yet assigned)    Completed LEA GUTIERREZ          Pulmonary  Pneumonia  Admitted with fatigue and concern for PNA on CXR. Notably, she has a chronic R pleural effusion with atelectasis. Recent imaging reviewed. Will continue abx and empirically treat x5d course. Notably has been hypoxic in the recent history, on 2L NC PRN and at night per outpatient PCP    - continue cefpodoxime and azithro on dc      Cardiac/Vascular  * Paroxysmal atrial fibrillation  Cardiology consulted in the ED due to tachy-juan a apearing rhythm. 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 Patient's tachy-juan a rhythm continued and cardiology recommended EP consult for possible PPM.   - EP consulted, recommend against PPM. Patient spontaneously converted to sinus tachy then sinus rhythm  - continue apixaban 5mg BID   - Continue lopressor 25mg BID  -  Plan for 30d event recorder on discharge, already arranged by EP        Final Active Diagnoses:    Diagnosis Date Noted POA    PRINCIPAL PROBLEM:  Paroxysmal atrial fibrillation [I48.0] 05/20/2023 Yes    Pneumonia [J18.9] 05/20/2023 Yes    PAF (paroxysmal atrial fibrillation) [I48.0] 05/20/2023 No    Fatigue [R53.83] 05/20/2023 Yes    Tachycardia [R00.0] 05/20/2023 Yes    Bradycardia [R00.1] 05/20/2023 Yes    Confusion [R41.0] 05/20/2023 Yes    Pulmonary hypertension [I27.20] 05/20/2023 Yes    Aortic valve regurgitation [I35.1] 05/20/2023 Unknown    Chronic diastolic heart failure [I50.32] 08/04/2022 Yes    Chronic renal failure, stage 3a [N18.31] 08/04/2022 Yes    Centrilobular  emphysema [J43.2] 07/06/2020 Yes    Dementia [F03.90] 09/05/2018 Yes    Type 2 diabetes mellitus with circulatory disorder, without long-term current use of insulin [E11.59] 03/21/2017 Yes    Anemia [D64.9] 02/25/2014 Yes    Pure hypercholesterolemia [E78.00] 10/18/2013 Yes    History of rectal or anal cancer [Z85.048] 03/27/2013 Yes    History of lung cancer [Z85.118] 06/29/2012 Not Applicable    Essential hypertension [I10] 06/29/2012 Yes      Problems Resolved During this Admission:       Discharged Condition: stable    Disposition: Home or Self Care    Follow Up:    Patient Instructions:      Ambulatory referral/consult to Electrophysiology   Standing Status: Future   Referral Priority: Routine Referral Type: Consultation   Referral Reason: Specialty Services Required   Requested Specialty: Electrophysiology   Number of Visits Requested: 1     Cardiac event monitor   Standing Status: Future Standing Exp. Date: 05/22/24     Order Specific Question Answer Comments   Cardiac Event Monitor Looping Recorder    Release to patient Immediate        Significant Diagnostic Studies: Labs:   CMP   Recent Labs   Lab 05/21/23  0546 05/22/23  0413     143 143   K 3.9  3.9 4.2     107 106   CO2 27  27 25   *  121* 126*   BUN 13  13 12   CREATININE 1.0  1.0 0.9   CALCIUM 10.5  10.5 10.3   ALBUMIN 3.5 3.3*   ANIONGAP 9  9 12    and CBC   Recent Labs   Lab 05/21/23  0547 05/22/23  0413   WBC 4.12 3.89*   HGB 11.7* 11.8*   HCT 34.9* 35.9*    183     Radiology: X-Ray: CXR: X-Ray Chest 1 View (CXR):  FINDINGS:  Moderate right pleural effusion with right basilar atelectasis or consolidation.  Additional patchy opacification seen bilaterally.  No pneumothorax.  Cardiac silhouette is unremarkable.    Cardiac Graphics: Echocardiogram:   Transthoracic echo (TTE) complete (Cupid Only):   Results for orders placed or performed during the hospital encounter of 05/20/23   Echo   Result Value Ref Range     Ascending aorta 2.90 cm    STJ 2.66 cm    AV mean gradient 6 mmHg    Ao peak ernesto 1.54 m/s    Ao VTI 28.11 cm    IVRT 102.76 msec    IVS 0.82 0.6 - 1.1 cm    LA size 3.35 cm    Left Atrium Major Axis 5.75 cm    Left Atrium Minor Axis 5.25 cm    LVIDd 4.57 3.5 - 6.0 cm    LVIDs 3.76 2.1 - 4.0 cm    LVOT diameter 2.00 cm    LVOT peak VTI 12.52 cm    Posterior Wall 0.87 0.6 - 1.1 cm    MV Peak E Ernesto 1.26 m/s    RA Major Axis 4.42 cm    RA Width 3.51 cm    RVDD 2.60 cm    Sinus 3.03 cm    TAPSE 1.35 cm    TR Max Ernesto 3.08 m/s    LA WIDTH 4.28 cm    LV Diastolic Volume 95.66 mL    LV Systolic Volume 60.57 mL    LVOT peak ernesto 0.78 m/s    TDI LATERAL 0.11 m/s    TDI SEPTAL 0.07 m/s    LA volume (mod) 67.00 cm3    LV LATERAL E/E' RATIO 11.45 m/s    LV SEPTAL E/E' RATIO 18.00 m/s    FS 18 %    LA volume 66.89 cm3    LV mass 125.29 g    Left Ventricle Relative Wall Thickness 0.38 cm    AV valve area 1.40 cm2    AV Velocity Ratio 0.51     AV index (prosthetic) 0.45     Mean e' 0.09 m/s    LVOT area 3.1 cm2    LVOT stroke volume 39.31 cm3    AV peak gradient 9 mmHg    E/E' ratio 14.00 m/s    LV Systolic Volume Index 34.0 mL/m2    LV Diastolic Volume Index 53.74 mL/m2    LA Volume Index 37.6 mL/m2    LV Mass Index 70 g/m2    Triscuspid Valve Regurgitation Peak Gradient 38 mmHg    LA Volume Index (Mod) 37.6 mL/m2    BSA 1.82 m2    Right Atrial Pressure (from IVC) 3 mmHg    EF 53 %    TV rest pulmonary artery pressure 41 mmHg    Narrative    · The left ventricle is normal in size with low normal systolic function.  · The estimated ejection fraction is 53%.  · There are segmental left ventricular wall motion abnormalities.  · There is abnormal septal wall motion.  · Mild left atrial enlargement.  · Indeterminate left ventricular diastolic function.  · Normal right ventricular size with low normal right ventricular systolic   function.  · There is mild-to-moderate aortic valve stenosis.  · Aortic valve area is 1.40 cm2; peak  velocity is 1.54 m/s; mean gradient   is 6 mmHg.  · Mild aortic regurgitation.  · Mild mitral regurgitation.  · Mild tricuspid regurgitation.  · Normal central venous pressure (3 mmHg).  · The estimated PA systolic pressure is 41 mmHg.  · There is mild pulmonary hypertension.  · Trivial posterior pericardial effusion. At base and under the RA and   small outside the RV apically.          Pending Diagnostic Studies:     None         Medications:  Reconciled Home Medications:      Medication List      START taking these medications    azithromycin 250 MG tablet  Commonly known as: Z-ALBERT  Take 1 tablet (250 mg total) by mouth once daily. for 3 days  Start taking on: May 23, 2023     cefpodoxime 100 MG tablet  Commonly known as: VANTIN  Take 1 tablet (100 mg total) by mouth 2 (two) times daily. for 3 days     ELIQUIS 5 mg Tab  Generic drug: apixaban  Take 1 tablet (5 mg total) by mouth 2 (two) times daily.        CHANGE how you take these medications    metoprolol tartrate 25 MG tablet  Commonly known as: LOPRESSOR  Take 1 tablet (25 mg total) by mouth 2 (two) times daily.  What changed:   · additional instructions  · Another medication with the same name was removed. Continue taking this medication, and follow the directions you see here.        CONTINUE taking these medications    albuterol 2.5 mg /3 mL (0.083 %) nebulizer solution  Commonly known as: PROVENTIL  Take 3 mLs (2.5 mg total) by nebulization every 6 (six) hours as needed for Wheezing. Rescue     amlodipine-benazepril 5-20 mg 5-20 mg per capsule  Commonly known as: LOTREL  Take 1 capsule by mouth once daily.     azelastine 137 mcg (0.1 %) nasal spray  Commonly known as: ASTELIN  1 spray (137 mcg total) by Nasal route 2 (two) times daily.     cyanocobalamin 1000 MCG tablet  Commonly known as: VITAMIN B-12  Take 1,000 mcg by mouth every morning.     donepeziL 5 MG tablet  Commonly known as: ARICEPT  Take 1 tablet (5 mg total) by mouth every evening.      DULoxetine 60 MG capsule  Commonly known as: CYMBALTA  Take 1 capsule (60 mg total) by mouth every morning.     estradioL 0.01 % (0.1 mg/gram) vaginal cream  Commonly known as: ESTRACE  USE ONE-HALF GRAM VAGINALLY TWICE a WEEK     nitroGLYCERIN 0.4 MG SL tablet  Commonly known as: NITROSTAT  Place 1 tablet (0.4 mg total) under the tongue every 5 (five) minutes as needed.     simvastatin 80 MG tablet  Commonly known as: ZOCOR  Take 1 tablet (80 mg total) by mouth once daily.     TRADJENTA 5 mg Tab tablet  Generic drug: linaGLIPtin  Take 1 tablet (5 mg total) by mouth once daily.     vitamin D 1000 units Tab  Commonly known as: VITAMIN D3  Take 1,000 Units by mouth once daily.        STOP taking these medications    ALPRAZolam 0.5 MG tablet  Commonly known as: XANAX     aspirin 81 MG EC tablet  Commonly known as: ECOTRIN            Indwelling Lines/Drains at time of discharge:   Lines/Drains/Airways     Drain  Duration           Female External Urinary Catheter 05/20/23 0937 1 day                Time spent on the discharge of patient: 35 minutes         Ying Norman MD  Department of Hospital Medicine  [facility] - Intensive Care ([place]-16)

## 2023-05-23 ENCOUNTER — PATIENT MESSAGE (OUTPATIENT)
Dept: INTERNAL MEDICINE | Facility: CLINIC | Age: 88
End: 2023-05-23
Payer: MEDICARE

## 2023-05-23 ENCOUNTER — PATIENT OUTREACH (OUTPATIENT)
Dept: ADMINISTRATIVE | Facility: CLINIC | Age: 88
End: 2023-05-23
Payer: MEDICARE

## 2023-05-23 ENCOUNTER — DOCUMENTATION ONLY (OUTPATIENT)
Dept: CARDIOLOGY | Facility: HOSPITAL | Age: 88
End: 2023-05-23
Payer: MEDICARE

## 2023-05-23 NOTE — PROGRESS NOTES
C3 nurse attempted to contact Brunilda England for a TCC post hospital discharge follow up call. No answer. Left voicemail with callback information. The patient has a scheduled HOSFU appointment with EDVIN Watson II, MD on 05/24/23 @ [id number].

## 2023-05-23 NOTE — PLAN OF CARE
[facility] - [facility] ([facility])  Discharge Final Note    Primary Care Provider: Pepper Whitfield MD    Expected Discharge Date: 5/22/2023    Final Discharge Note (most recent)       Final Note - 05/23/23 0832          Final Note    Assessment Type Final Discharge Note (P)      Anticipated Discharge Disposition Home or Self Care (P)         Post-Acute Status    Discharge Delays None known at this time (P)                  Pt d/c'd to home.    BRIA FaustN, BS, RN, CCM        Important Message from Medicare

## 2023-05-23 NOTE — PROGRESS NOTES
Patient wearing 30 day event monitor for diagnosis syncope     Received auto-triggered alert notification for new onset AF on May 22, 2023 at 5: 43 PM     Patient was d/c'd from hospital on 5/22/23 on Eliquis 5 mg BID after having AF w/ RVR while inpatient.    Will continue to monitor until 6/21/23.

## 2023-05-24 ENCOUNTER — OFFICE VISIT (OUTPATIENT)
Dept: INTERNAL MEDICINE | Facility: CLINIC | Age: 88
End: 2023-05-24
Payer: MEDICARE

## 2023-05-24 VITALS
BODY MASS INDEX: 27.57 KG/M2 | OXYGEN SATURATION: 95 % | DIASTOLIC BLOOD PRESSURE: 64 MMHG | WEIGHT: 155.63 LBS | HEIGHT: 63 IN | HEART RATE: 70 BPM | SYSTOLIC BLOOD PRESSURE: 128 MMHG

## 2023-05-24 DIAGNOSIS — Z09 HOSPITAL DISCHARGE FOLLOW-UP: Primary | ICD-10-CM

## 2023-05-24 DIAGNOSIS — Z87.01 HISTORY OF BACTERIAL PNEUMONIA: ICD-10-CM

## 2023-05-24 DIAGNOSIS — I48.0 PAF (PAROXYSMAL ATRIAL FIBRILLATION): ICD-10-CM

## 2023-05-24 PROCEDURE — 1160F PR REVIEW ALL MEDS BY PRESCRIBER/CLIN PHARMACIST DOCUMENTED: ICD-10-PCS | Mod: CPTII,S$GLB,, | Performed by: INTERNAL MEDICINE

## 2023-05-24 PROCEDURE — 99495 TRANSJ CARE MGMT MOD F2F 14D: CPT | Mod: S$GLB,,, | Performed by: INTERNAL MEDICINE

## 2023-05-24 PROCEDURE — 1160F RVW MEDS BY RX/DR IN RCRD: CPT | Mod: CPTII,S$GLB,, | Performed by: INTERNAL MEDICINE

## 2023-05-24 PROCEDURE — 3288F FALL RISK ASSESSMENT DOCD: CPT | Mod: CPTII,S$GLB,, | Performed by: INTERNAL MEDICINE

## 2023-05-24 PROCEDURE — 1159F PR MEDICATION LIST DOCUMENTED IN MEDICAL RECORD: ICD-10-PCS | Mod: CPTII,S$GLB,, | Performed by: INTERNAL MEDICINE

## 2023-05-24 PROCEDURE — 99999 PR PBB SHADOW E&M-EST. PATIENT-LVL V: CPT | Mod: PBBFAC,,, | Performed by: INTERNAL MEDICINE

## 2023-05-24 PROCEDURE — 1126F AMNT PAIN NOTED NONE PRSNT: CPT | Mod: CPTII,S$GLB,, | Performed by: INTERNAL MEDICINE

## 2023-05-24 PROCEDURE — 1111F PR DISCHARGE MEDS RECONCILED W/ CURRENT OUTPATIENT MED LIST: ICD-10-PCS | Mod: CPTII,S$GLB,, | Performed by: INTERNAL MEDICINE

## 2023-05-24 PROCEDURE — 99495 TCM SERVICES (MODERATE COMPLEXITY): ICD-10-PCS | Mod: S$GLB,,, | Performed by: INTERNAL MEDICINE

## 2023-05-24 PROCEDURE — 1101F PR PT FALLS ASSESS DOC 0-1 FALLS W/OUT INJ PAST YR: ICD-10-PCS | Mod: CPTII,S$GLB,, | Performed by: INTERNAL MEDICINE

## 2023-05-24 PROCEDURE — 1126F PR PAIN SEVERITY QUANTIFIED, NO PAIN PRESENT: ICD-10-PCS | Mod: CPTII,S$GLB,, | Performed by: INTERNAL MEDICINE

## 2023-05-24 PROCEDURE — 1101F PT FALLS ASSESS-DOCD LE1/YR: CPT | Mod: CPTII,S$GLB,, | Performed by: INTERNAL MEDICINE

## 2023-05-24 PROCEDURE — 1111F DSCHRG MED/CURRENT MED MERGE: CPT | Mod: CPTII,S$GLB,, | Performed by: INTERNAL MEDICINE

## 2023-05-24 PROCEDURE — 3288F PR FALLS RISK ASSESSMENT DOCUMENTED: ICD-10-PCS | Mod: CPTII,S$GLB,, | Performed by: INTERNAL MEDICINE

## 2023-05-24 PROCEDURE — 99999 PR PBB SHADOW E&M-EST. PATIENT-LVL V: ICD-10-PCS | Mod: PBBFAC,,, | Performed by: INTERNAL MEDICINE

## 2023-05-24 PROCEDURE — 1159F MED LIST DOCD IN RCRD: CPT | Mod: CPTII,S$GLB,, | Performed by: INTERNAL MEDICINE

## 2023-05-24 NOTE — PROGRESS NOTES
"HOSPITAL FOLLOWUP NOTE    Discharge note information (in italics) was reviewed in detail and discussed with the patient:   HPI:   Ms. England is a 89-year-old female PMH dementia, DM, HTN, CKD 3A, HFpEF and previous non-small cell lung carcinoma who presented to the ED  with worsening fatigue and hallucinations. Reports fatigue x3d and shortness of breath. Was recently started on supplemental 2L O2 by her PCP. She is accompanied by her daughter who reports that she has been more restless, constantly talking out of her head since last night. Examples include her saying "it was raining in her bedroom". She says that she has been having these symptoms for "a good while" but she has not ever told her daughter. Patient herself is alert and has no major complaints. ROS notable for emesis x1 yesterday. Otherwise denies fever, coughing, shortness of breath, abdominal pain, dysuria.      In the ED, she was afebrile. Tachycardic and found to be in new afib. Labs with stable CBC, CMP with mild SEA. Trop neg. TSH normal. Xray with known chronic R pleural effusion, with possible associated basilar consolidation. Cardiology consulted. She was admitted to hospital medicine for possible PNA.       Hospital Course:   Patient denies any issues. Patient continuing to have periods of bradycardia then AF w/ RVR. Cardiology recommending EP consult for possible PPM. EP state no indication for PPM at current; plans for event monitor for 30d and f/u in 6 weeks. Decreased home metoprolol to 25mg BID and started DOAC inpatient. Patient discharged with rx for apixaban and 3 additional days of CAP therapy.       PMFSH: all information reviewed and updated as necessary during this encounter.  Medication list reviewed and reconciled.    No complaints today.  She is feeling well and back to her normal self.     Lab Results   Component Value Date    WBC 3.89 (L) 05/22/2023    HGB 11.8 (L) 05/22/2023    HCT 35.9 (L) 05/22/2023     05/22/2023    " CHOL 162 04/25/2022    TRIG 108 04/25/2022    HDL 68 04/25/2022    ALT 30 05/20/2023    AST 38 05/20/2023     05/22/2023    K 4.2 05/22/2023     05/22/2023    CREATININE 0.9 05/22/2023    BUN 12 05/22/2023    CO2 25 05/22/2023    TSH 2.410 05/20/2023    INR 1.0 12/06/2019    HGBA1C 6.5 (H) 04/03/2023        Review of Systems   Respiratory:  Negative for cough, sputum production, shortness of breath and wheezing.    Cardiovascular:  Negative for chest pain, palpitations, orthopnea, claudication, leg swelling and PND.   All other systems reviewed and are negative.  Physical Exam  Constitutional:       General: She is not in acute distress.     Appearance: Normal appearance. She is not ill-appearing, toxic-appearing or diaphoretic.   Cardiovascular:      Rate and Rhythm: Normal rate and regular rhythm.   Pulmonary:      Effort: Pulmonary effort is normal. No respiratory distress.      Breath sounds: No stridor. No wheezing, rhonchi or rales.   Chest:      Chest wall: No tenderness.   Neurological:      Mental Status: She is alert.      Assessment/plan:  1. Hospital discharge follow-up    2. History of bacterial pneumonia  Pt. Doing well.  She will finish her antibiotics tomorrow as scheduled.     3. PAF (paroxysmal atrial fibrillation)  In NSR today.  She has her 30 day even monitor in place. Discussed how the afib could have been triggered by her pneumonia. Continue meds and f/u with cardiology as planned.        No follow-ups on file.  Medication List with Changes/Refills   Current Medications    ALBUTEROL (PROVENTIL) 2.5 MG /3 ML (0.083 %) NEBULIZER SOLUTION    Take 3 mLs (2.5 mg total) by nebulization every 6 (six) hours as needed for Wheezing. Rescue    AMLODIPINE-BENAZEPRIL 5-20 MG (LOTREL) 5-20 MG PER CAPSULE    Take 1 capsule by mouth once daily.    APIXABAN (ELIQUIS) 5 MG TAB    Take 1 tablet (5 mg total) by mouth 2 (two) times daily.    AZELASTINE (ASTELIN) 137 MCG (0.1 %) NASAL SPRAY    1 spray  (137 mcg total) by Nasal route 2 (two) times daily.    AZITHROMYCIN (Z-ALBERT) 250 MG TABLET    Take 1 tablet (250 mg total) by mouth once daily. for 3 days    CEFPODOXIME (VANTIN) 100 MG TABLET    Take 1 tablet (100 mg total) by mouth 2 (two) times daily. for 3 days    CYANOCOBALAMIN (VITAMIN B-12) 1000 MCG TABLET    Take 1,000 mcg by mouth every morning.    DONEPEZIL (ARICEPT) 5 MG TABLET    Take 1 tablet (5 mg total) by mouth every evening.    DULOXETINE (CYMBALTA) 60 MG CAPSULE    Take 1 capsule (60 mg total) by mouth every morning.    ESTRADIOL (ESTRACE) 0.01 % (0.1 MG/GRAM) VAGINAL CREAM    USE ONE-HALF GRAM VAGINALLY TWICE a WEEK    LINAGLIPTIN (TRADJENTA) 5 MG TAB TABLET    Take 1 tablet (5 mg total) by mouth once daily.    METOPROLOL TARTRATE (LOPRESSOR) 25 MG TABLET    Take 1 tablet (25 mg total) by mouth 2 (two) times daily.    NITROGLYCERIN (NITROSTAT) 0.4 MG SL TABLET    Place 1 tablet (0.4 mg total) under the tongue every 5 (five) minutes as needed.    SIMVASTATIN (ZOCOR) 80 MG TABLET    Take 1 tablet (80 mg total) by mouth once daily.    VITAMIN D (VITAMIN D3) 1000 UNITS TAB    Take 1,000 Units by mouth once daily.      Transitional Care Note:  Family and/or Caretaker present at visit?  Yes.  Diagnostic tests reviewed/disposition: No diagnosic tests pending after this hospitalization.  Disease/illness education: see discussion above  Home health/community services discussion/referrals: Patient does not have home health established from hospital visit.  They do not need home health.  If needed, we will set up home health for the patient.   Establishment or re-establishment of referral orders for community resources: No other necessary community resources.   Discussion with other health care providers: No discussion with other health care providers necessary.

## 2023-05-27 NOTE — TELEPHONE ENCOUNTER
I ordered a portable oxygen concetrator for her.  She did not get a portable concentrator.  Please contact [employer] to see what we need to do to approve the portable concentrator   Vaccine status unknown

## 2023-05-28 ENCOUNTER — HOSPITAL ENCOUNTER (INPATIENT)
Facility: HOSPITAL | Age: 88
LOS: 7 days | Discharge: NURSING, REHAB OR LONG-TERM CARE | DRG: 956 | End: 2023-06-05
Attending: EMERGENCY MEDICINE | Admitting: EMERGENCY MEDICINE
Payer: MEDICARE

## 2023-05-28 DIAGNOSIS — S72.002A LEFT DISPLACED FEMORAL NECK FRACTURE: ICD-10-CM

## 2023-05-28 DIAGNOSIS — S06.6X0D SUBARACHNOID HEMORRHAGE FOLLOWING INJURY, NO LOSS OF CONSCIOUSNESS, SUBSEQUENT ENCOUNTER: ICD-10-CM

## 2023-05-28 DIAGNOSIS — R00.0 TACHYCARDIA: ICD-10-CM

## 2023-05-28 DIAGNOSIS — S72.142A INTERTROCHANTERIC FRACTURE OF LEFT HIP, CLOSED, INITIAL ENCOUNTER: ICD-10-CM

## 2023-05-28 DIAGNOSIS — S72.002A LEFT DISPLACED FEMORAL NECK FRACTURE: Primary | ICD-10-CM
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
## 2023-05-28 DIAGNOSIS — I60.9 SAH (SUBARACHNOID HEMORRHAGE): ICD-10-CM

## 2023-05-28 LAB
BASOPHILS # BLD AUTO: 0.03 K/UL (ref 0–0.2)
BASOPHILS NFR BLD: 0.3 % (ref 0–1.9)
DIFFERENTIAL METHOD: ABNORMAL
EOSINOPHIL # BLD AUTO: 0.1 K/UL (ref 0–0.5)
EOSINOPHIL NFR BLD: 0.7 % (ref 0–8)
ERYTHROCYTE [DISTWIDTH] IN BLOOD BY AUTOMATED COUNT: 17.3 % (ref 11.5–14.5)
HCT VFR BLD AUTO: 32.9 % (ref 37–48.5)
HGB BLD-MCNC: 10.9 G/DL (ref 12–16)
IMM GRANULOCYTES # BLD AUTO: 0.03 K/UL (ref 0–0.04)
IMM GRANULOCYTES NFR BLD AUTO: 0.3 % (ref 0–0.5)
LYMPHOCYTES # BLD AUTO: 1.4 K/UL (ref 1–4.8)
LYMPHOCYTES NFR BLD: 16.5 % (ref 18–48)
MCH RBC QN AUTO: 28.7 PG (ref 27–31)
MCHC RBC AUTO-ENTMCNC: 33.1 G/DL (ref 32–36)
MCV RBC AUTO: 87 FL (ref 82–98)
MONOCYTES # BLD AUTO: 0.6 K/UL (ref 0.3–1)
MONOCYTES NFR BLD: 7.1 % (ref 4–15)
NEUTROPHILS # BLD AUTO: 6.5 K/UL (ref 1.8–7.7)
NEUTROPHILS NFR BLD: 75.1 % (ref 38–73)
NRBC BLD-RTO: 0 /100 WBC
PLATELET # BLD AUTO: 174 K/UL (ref 150–450)
PMV BLD AUTO: 13.1 FL (ref 9.2–12.9)
RBC # BLD AUTO: 3.8 M/UL (ref 4–5.4)
WBC # BLD AUTO: 8.6 K/UL (ref 3.9–12.7)

## 2023-05-28 PROCEDURE — 86803 HEPATITIS C AB TEST: CPT | Performed by: PHYSICIAN ASSISTANT

## 2023-05-28 PROCEDURE — 99291 PR CRITICAL CARE, E/M 30-74 MINUTES: ICD-10-PCS | Mod: ,,, | Performed by: EMERGENCY MEDICINE

## 2023-05-28 PROCEDURE — 96375 TX/PRO/DX INJ NEW DRUG ADDON: CPT

## 2023-05-28 PROCEDURE — 99291 CRITICAL CARE FIRST HOUR: CPT | Mod: ,,, | Performed by: EMERGENCY MEDICINE

## 2023-05-28 PROCEDURE — 87389 HIV-1 AG W/HIV-1&-2 AB AG IA: CPT | Performed by: PHYSICIAN ASSISTANT

## 2023-05-28 PROCEDURE — 85025 COMPLETE CBC W/AUTO DIFF WBC: CPT | Performed by: STUDENT IN AN ORGANIZED HEALTH CARE EDUCATION/TRAINING PROGRAM

## 2023-05-28 PROCEDURE — 63600175 PHARM REV CODE 636 W HCPCS: Performed by: STUDENT IN AN ORGANIZED HEALTH CARE EDUCATION/TRAINING PROGRAM

## 2023-05-28 RX ORDER — MORPHINE SULFATE 2 MG/ML
2 INJECTION, SOLUTION INTRAMUSCULAR; INTRAVENOUS
Status: COMPLETED | OUTPATIENT
Start: 2023-05-28 | End: 2023-05-28

## 2023-05-28 RX ORDER — FERROUS SULFATE, DRIED 160(50) MG
2 TABLET, EXTENDED RELEASE ORAL DAILY
Status: DISCONTINUED | OUTPATIENT
Start: 2023-05-29 | End: 2023-06-05 | Stop reason: HOSPADM

## 2023-05-28 RX ADMIN — MORPHINE SULFATE 2 MG: 2 INJECTION, SOLUTION INTRAMUSCULAR; INTRAVENOUS at 10:05

## 2023-05-29 ENCOUNTER — ANESTHESIA EVENT (OUTPATIENT)
Dept: SURGERY | Facility: HOSPITAL | Age: 88
DRG: 956 | End: 2023-05-29
Payer: MEDICARE

## 2023-05-29 PROBLEM — I49.5 TACHY-BRADY SYNDROME: Status: ACTIVE | Noted: 2023-05-29

## 2023-05-29 PROBLEM — I48.92 PAROXYSMAL ATRIAL FLUTTER: Status: ACTIVE | Noted: 2023-05-20

## 2023-05-29 PROBLEM — S06.6X0A SUBARACHNOID HEMORRHAGE FOLLOWING INJURY, NO LOSS OF CONSCIOUSNESS: Status: ACTIVE | Noted: 2023-05-29

## 2023-05-29 PROBLEM — J96.11 CHRONIC HYPOXEMIC RESPIRATORY FAILURE: Status: ACTIVE | Noted: 2023-05-29

## 2023-05-29 LAB
25(OH)D3+25(OH)D2 SERPL-MCNC: 49 NG/ML (ref 30–96)
ALBUMIN SERPL BCP-MCNC: 3.5 G/DL (ref 3.5–5.2)
ALP SERPL-CCNC: 51 U/L (ref 55–135)
ALT SERPL W/O P-5'-P-CCNC: 11 U/L (ref 10–44)
ANION GAP SERPL CALC-SCNC: 11 MMOL/L (ref 8–16)
APTT PPP: 25.9 SEC (ref 21–32)
AST SERPL-CCNC: 16 U/L (ref 10–40)
BILIRUB SERPL-MCNC: 0.2 MG/DL (ref 0.1–1)
BILIRUB UR QL STRIP: NEGATIVE
BUN SERPL-MCNC: 15 MG/DL (ref 10–30)
CALCIUM SERPL-MCNC: 9.9 MG/DL (ref 8.7–10.5)
CHLORIDE SERPL-SCNC: 102 MMOL/L (ref 95–110)
CLARITY UR REFRACT.AUTO: CLEAR
CO2 SERPL-SCNC: 24 MMOL/L (ref 23–29)
COLOR UR AUTO: YELLOW
CREAT SERPL-MCNC: 1.2 MG/DL (ref 0.5–1.4)
EST. GFR  (NO RACE VARIABLE): 42.7 ML/MIN/1.73 M^2
ESTIMATED AVG GLUCOSE: 134 MG/DL (ref 68–131)
GLUCOSE SERPL-MCNC: 190 MG/DL (ref 70–110)
GLUCOSE UR QL STRIP: NEGATIVE
HBA1C MFR BLD: 6.3 % (ref 4–5.6)
HCV AB SERPL QL IA: NORMAL
HGB UR QL STRIP: NEGATIVE
HIV 1+2 AB+HIV1 P24 AG SERPL QL IA: NORMAL
KETONES UR QL STRIP: NEGATIVE
LEUKOCYTE ESTERASE UR QL STRIP: NEGATIVE
MAGNESIUM SERPL-MCNC: 1.5 MG/DL (ref 1.6–2.6)
NITRITE UR QL STRIP: NEGATIVE
PH UR STRIP: 6 [PH] (ref 5–8)
PHOSPHATE SERPL-MCNC: 2.5 MG/DL (ref 2.7–4.5)
POCT GLUCOSE: 154 MG/DL (ref 70–110)
POTASSIUM SERPL-SCNC: 3.8 MMOL/L (ref 3.5–5.1)
PREALB SERPL-MCNC: 19 MG/DL (ref 20–43)
PROCALCITONIN SERPL IA-MCNC: 0.02 NG/ML
PROT SERPL-MCNC: 7.5 G/DL (ref 6–8.4)
PROT UR QL STRIP: ABNORMAL
SODIUM SERPL-SCNC: 137 MMOL/L (ref 136–145)
SP GR UR STRIP: 1.01 (ref 1–1.03)
TRANSFERRIN SERPL-MCNC: 209 MG/DL (ref 200–375)
URN SPEC COLLECT METH UR: ABNORMAL

## 2023-05-29 PROCEDURE — 96365 THER/PROPH/DIAG IV INF INIT: CPT

## 2023-05-29 PROCEDURE — 81003 URINALYSIS AUTO W/O SCOPE: CPT | Performed by: STUDENT IN AN ORGANIZED HEALTH CARE EDUCATION/TRAINING PROGRAM

## 2023-05-29 PROCEDURE — 99223 PR INITIAL HOSPITAL CARE,LEVL III: ICD-10-PCS | Mod: ,,, | Performed by: HOSPITALIST

## 2023-05-29 PROCEDURE — 93005 ELECTROCARDIOGRAM TRACING: CPT

## 2023-05-29 PROCEDURE — 84100 ASSAY OF PHOSPHORUS: CPT | Performed by: STUDENT IN AN ORGANIZED HEALTH CARE EDUCATION/TRAINING PROGRAM

## 2023-05-29 PROCEDURE — 96375 TX/PRO/DX INJ NEW DRUG ADDON: CPT

## 2023-05-29 PROCEDURE — 84145 PROCALCITONIN (PCT): CPT | Performed by: STUDENT IN AN ORGANIZED HEALTH CARE EDUCATION/TRAINING PROGRAM

## 2023-05-29 PROCEDURE — 85730 THROMBOPLASTIN TIME PARTIAL: CPT | Performed by: STUDENT IN AN ORGANIZED HEALTH CARE EDUCATION/TRAINING PROGRAM

## 2023-05-29 PROCEDURE — 12000002 HC ACUTE/MED SURGE SEMI-PRIVATE ROOM

## 2023-05-29 PROCEDURE — 83735 ASSAY OF MAGNESIUM: CPT | Performed by: STUDENT IN AN ORGANIZED HEALTH CARE EDUCATION/TRAINING PROGRAM

## 2023-05-29 PROCEDURE — 25000003 PHARM REV CODE 250: Performed by: STUDENT IN AN ORGANIZED HEALTH CARE EDUCATION/TRAINING PROGRAM

## 2023-05-29 PROCEDURE — 93010 EKG 12-LEAD: ICD-10-PCS | Mod: ,,, | Performed by: INTERNAL MEDICINE

## 2023-05-29 PROCEDURE — 96366 THER/PROPH/DIAG IV INF ADDON: CPT

## 2023-05-29 PROCEDURE — 84134 ASSAY OF PREALBUMIN: CPT | Performed by: STUDENT IN AN ORGANIZED HEALTH CARE EDUCATION/TRAINING PROGRAM

## 2023-05-29 PROCEDURE — 99291 CRITICAL CARE FIRST HOUR: CPT

## 2023-05-29 PROCEDURE — 99223 1ST HOSP IP/OBS HIGH 75: CPT | Mod: GC,,, | Performed by: PSYCHIATRY & NEUROLOGY

## 2023-05-29 PROCEDURE — 93010 ELECTROCARDIOGRAM REPORT: CPT | Mod: ,,, | Performed by: INTERNAL MEDICINE

## 2023-05-29 PROCEDURE — 83036 HEMOGLOBIN GLYCOSYLATED A1C: CPT | Performed by: STUDENT IN AN ORGANIZED HEALTH CARE EDUCATION/TRAINING PROGRAM

## 2023-05-29 PROCEDURE — 99223 1ST HOSP IP/OBS HIGH 75: CPT | Mod: ,,, | Performed by: HOSPITALIST

## 2023-05-29 PROCEDURE — P9612 CATHETERIZE FOR URINE SPEC: HCPCS

## 2023-05-29 PROCEDURE — 84466 ASSAY OF TRANSFERRIN: CPT | Performed by: STUDENT IN AN ORGANIZED HEALTH CARE EDUCATION/TRAINING PROGRAM

## 2023-05-29 PROCEDURE — 25000003 PHARM REV CODE 250

## 2023-05-29 PROCEDURE — 99223 PR INITIAL HOSPITAL CARE,LEVL III: ICD-10-PCS | Mod: GC,,, | Performed by: PSYCHIATRY & NEUROLOGY

## 2023-05-29 PROCEDURE — 25000003 PHARM REV CODE 250: Performed by: HOSPITALIST

## 2023-05-29 PROCEDURE — 80053 COMPREHEN METABOLIC PANEL: CPT | Performed by: STUDENT IN AN ORGANIZED HEALTH CARE EDUCATION/TRAINING PROGRAM

## 2023-05-29 PROCEDURE — 96376 TX/PRO/DX INJ SAME DRUG ADON: CPT

## 2023-05-29 PROCEDURE — 82306 VITAMIN D 25 HYDROXY: CPT | Performed by: STUDENT IN AN ORGANIZED HEALTH CARE EDUCATION/TRAINING PROGRAM

## 2023-05-29 PROCEDURE — 63600175 PHARM REV CODE 636 W HCPCS: Performed by: STUDENT IN AN ORGANIZED HEALTH CARE EDUCATION/TRAINING PROGRAM

## 2023-05-29 RX ORDER — OXYCODONE HYDROCHLORIDE 5 MG/1
5 TABLET ORAL
Status: DISCONTINUED | OUTPATIENT
Start: 2023-05-29 | End: 2023-05-30 | Stop reason: HOSPADM

## 2023-05-29 RX ORDER — DEXTROSE 40 %
15 GEL (GRAM) ORAL
Status: DISCONTINUED | OUTPATIENT
Start: 2023-05-29 | End: 2023-06-05 | Stop reason: HOSPADM

## 2023-05-29 RX ORDER — GLUCAGON 1 MG
1 KIT INJECTION
Status: DISCONTINUED | OUTPATIENT
Start: 2023-05-29 | End: 2023-06-05 | Stop reason: HOSPADM

## 2023-05-29 RX ORDER — HYDRALAZINE HYDROCHLORIDE 50 MG/1
50 TABLET, FILM COATED ORAL EVERY 8 HOURS PRN
Status: DISCONTINUED | OUTPATIENT
Start: 2023-05-29 | End: 2023-06-05 | Stop reason: HOSPADM

## 2023-05-29 RX ORDER — MORPHINE SULFATE 2 MG/ML
2 INJECTION, SOLUTION INTRAMUSCULAR; INTRAVENOUS
Status: DISCONTINUED | OUTPATIENT
Start: 2023-05-29 | End: 2023-05-30 | Stop reason: HOSPADM

## 2023-05-29 RX ORDER — DULOXETIN HYDROCHLORIDE 60 MG/1
60 CAPSULE, DELAYED RELEASE ORAL EVERY MORNING
Status: DISCONTINUED | OUTPATIENT
Start: 2023-05-29 | End: 2023-06-05 | Stop reason: HOSPADM

## 2023-05-29 RX ORDER — ACETAMINOPHEN 500 MG
1000 TABLET ORAL
Status: COMPLETED | OUTPATIENT
Start: 2023-05-29 | End: 2023-05-29

## 2023-05-29 RX ORDER — METOPROLOL TARTRATE 25 MG/1
25 TABLET, FILM COATED ORAL 2 TIMES DAILY
Status: DISCONTINUED | OUTPATIENT
Start: 2023-05-29 | End: 2023-06-05 | Stop reason: HOSPADM

## 2023-05-29 RX ORDER — POLYETHYLENE GLYCOL 3350 17 G/17G
17 POWDER, FOR SOLUTION ORAL DAILY
Status: DISCONTINUED | OUTPATIENT
Start: 2023-05-29 | End: 2023-06-03

## 2023-05-29 RX ORDER — SODIUM CHLORIDE 0.9 % (FLUSH) 0.9 %
10 SYRINGE (ML) INJECTION
Status: DISCONTINUED | OUTPATIENT
Start: 2023-05-29 | End: 2023-06-05 | Stop reason: HOSPADM

## 2023-05-29 RX ORDER — ACETAMINOPHEN 500 MG
1000 TABLET ORAL EVERY 8 HOURS
Status: DISPENSED | OUTPATIENT
Start: 2023-05-29 | End: 2023-05-30

## 2023-05-29 RX ORDER — DONEPEZIL HYDROCHLORIDE 5 MG/1
5 TABLET, FILM COATED ORAL NIGHTLY
Status: DISCONTINUED | OUTPATIENT
Start: 2023-05-29 | End: 2023-06-05 | Stop reason: HOSPADM

## 2023-05-29 RX ORDER — METHOCARBAMOL 500 MG/1
500 TABLET, FILM COATED ORAL EVERY 6 HOURS PRN
Status: DISCONTINUED | OUTPATIENT
Start: 2023-05-29 | End: 2023-06-01

## 2023-05-29 RX ORDER — LABETALOL HCL 20 MG/4 ML
10 SYRINGE (ML) INTRAVENOUS ONCE
Status: COMPLETED | OUTPATIENT
Start: 2023-05-29 | End: 2023-05-29

## 2023-05-29 RX ORDER — MORPHINE SULFATE 2 MG/ML
2 INJECTION, SOLUTION INTRAMUSCULAR; INTRAVENOUS
Status: COMPLETED | OUTPATIENT
Start: 2023-05-29 | End: 2023-05-29

## 2023-05-29 RX ORDER — IBUPROFEN 200 MG
16 TABLET ORAL
Status: DISCONTINUED | OUTPATIENT
Start: 2023-05-29 | End: 2023-05-29

## 2023-05-29 RX ORDER — AMLODIPINE AND BENAZEPRIL HYDROCHLORIDE 5; 20 MG/1; MG/1
1 CAPSULE ORAL
Status: COMPLETED | OUTPATIENT
Start: 2023-05-29 | End: 2023-05-29

## 2023-05-29 RX ORDER — HYDRALAZINE HYDROCHLORIDE 25 MG/1
25 TABLET, FILM COATED ORAL EVERY 8 HOURS
Status: DISCONTINUED | OUTPATIENT
Start: 2023-05-29 | End: 2023-05-30

## 2023-05-29 RX ORDER — AMLODIPINE AND BENAZEPRIL HYDROCHLORIDE 5; 20 MG/1; MG/1
1 CAPSULE ORAL DAILY
Status: DISCONTINUED | OUTPATIENT
Start: 2023-05-30 | End: 2023-06-05 | Stop reason: HOSPADM

## 2023-05-29 RX ORDER — LEVETIRACETAM 500 MG/1
500 TABLET ORAL 2 TIMES DAILY
Status: DISCONTINUED | OUTPATIENT
Start: 2023-05-29 | End: 2023-06-05 | Stop reason: HOSPADM

## 2023-05-29 RX ORDER — NICARDIPINE HYDROCHLORIDE 0.2 MG/ML
0-15 INJECTION INTRAVENOUS CONTINUOUS
Status: DISCONTINUED | OUTPATIENT
Start: 2023-05-29 | End: 2023-05-29

## 2023-05-29 RX ORDER — IBUPROFEN 200 MG
24 TABLET ORAL
Status: DISCONTINUED | OUTPATIENT
Start: 2023-05-29 | End: 2023-05-29

## 2023-05-29 RX ORDER — LEVETIRACETAM 500 MG/5ML
500 INJECTION, SOLUTION, CONCENTRATE INTRAVENOUS
Status: COMPLETED | OUTPATIENT
Start: 2023-05-29 | End: 2023-05-29

## 2023-05-29 RX ORDER — DEXTROSE 40 %
30 GEL (GRAM) ORAL
Status: DISCONTINUED | OUTPATIENT
Start: 2023-05-29 | End: 2023-06-05 | Stop reason: HOSPADM

## 2023-05-29 RX ORDER — HYDROCODONE BITARTRATE AND ACETAMINOPHEN 500; 5 MG/1; MG/1
TABLET ORAL
Status: DISCONTINUED | OUTPATIENT
Start: 2023-05-29 | End: 2023-05-31

## 2023-05-29 RX ORDER — CHOLECALCIFEROL (VITAMIN D3) 25 MCG
1000 TABLET ORAL DAILY
Status: DISCONTINUED | OUTPATIENT
Start: 2023-05-29 | End: 2023-06-05 | Stop reason: HOSPADM

## 2023-05-29 RX ORDER — SODIUM CHLORIDE 9 MG/ML
INJECTION, SOLUTION INTRAVENOUS CONTINUOUS
Status: ACTIVE | OUTPATIENT
Start: 2023-05-29 | End: 2023-05-30

## 2023-05-29 RX ORDER — METOPROLOL TARTRATE 25 MG/1
25 TABLET, FILM COATED ORAL
Status: COMPLETED | OUTPATIENT
Start: 2023-05-29 | End: 2023-05-29

## 2023-05-29 RX ORDER — TALC
6 POWDER (GRAM) TOPICAL NIGHTLY PRN
Status: DISCONTINUED | OUTPATIENT
Start: 2023-05-29 | End: 2023-06-05 | Stop reason: HOSPADM

## 2023-05-29 RX ORDER — ONDANSETRON 2 MG/ML
4 INJECTION INTRAMUSCULAR; INTRAVENOUS EVERY 12 HOURS PRN
Status: DISCONTINUED | OUTPATIENT
Start: 2023-05-29 | End: 2023-06-05 | Stop reason: HOSPADM

## 2023-05-29 RX ORDER — INSULIN ASPART 100 [IU]/ML
0-5 INJECTION, SOLUTION INTRAVENOUS; SUBCUTANEOUS
Status: DISCONTINUED | OUTPATIENT
Start: 2023-05-29 | End: 2023-06-05 | Stop reason: HOSPADM

## 2023-05-29 RX ORDER — BISACODYL 10 MG
10 SUPPOSITORY, RECTAL RECTAL DAILY PRN
Status: DISCONTINUED | OUTPATIENT
Start: 2023-05-29 | End: 2023-06-05 | Stop reason: HOSPADM

## 2023-05-29 RX ADMIN — LEVETIRACETAM 500 MG: 100 INJECTION, SOLUTION INTRAVENOUS at 04:05

## 2023-05-29 RX ADMIN — HYDRALAZINE HYDROCHLORIDE 50 MG: 50 TABLET ORAL at 10:05

## 2023-05-29 RX ADMIN — METOPROLOL TARTRATE 25 MG: 25 TABLET, FILM COATED ORAL at 10:05

## 2023-05-29 RX ADMIN — ACETAMINOPHEN 1000 MG: 500 TABLET ORAL at 10:05

## 2023-05-29 RX ADMIN — ACETAMINOPHEN 1000 MG: 500 TABLET ORAL at 02:05

## 2023-05-29 RX ADMIN — CHOLECALCIFEROL TAB 25 MCG (1000 UNIT) 1000 UNITS: 25 TAB at 10:05

## 2023-05-29 RX ADMIN — SODIUM CHLORIDE: 9 INJECTION, SOLUTION INTRAVENOUS at 10:05

## 2023-05-29 RX ADMIN — NICARDIPINE HYDROCHLORIDE 5 MG/HR: 0.2 INJECTION, SOLUTION INTRAVENOUS at 04:05

## 2023-05-29 RX ADMIN — LABETALOL HYDROCHLORIDE 10 MG: 5 INJECTION, SOLUTION INTRAVENOUS at 07:05

## 2023-05-29 RX ADMIN — METOPROLOL TARTRATE 25 MG: 25 TABLET, FILM COATED ORAL at 05:05

## 2023-05-29 RX ADMIN — AMLODIPINE BESYLATE AND BENAZEPRIL HYDROCHLORIDE 1 CAPSULE: 5; 20 CAPSULE ORAL at 06:05

## 2023-05-29 RX ADMIN — MORPHINE SULFATE 2 MG: 2 INJECTION, SOLUTION INTRAMUSCULAR; INTRAVENOUS at 01:05

## 2023-05-29 RX ADMIN — HYDRALAZINE HYDROCHLORIDE 25 MG: 25 TABLET, FILM COATED ORAL at 10:05

## 2023-05-29 RX ADMIN — DULOXETINE 60 MG: 60 CAPSULE, DELAYED RELEASE ORAL at 10:05

## 2023-05-29 RX ADMIN — OXYCODONE HYDROCHLORIDE 5 MG: 5 TABLET ORAL at 10:05

## 2023-05-29 RX ADMIN — Medication 2 TABLET: at 10:05

## 2023-05-29 RX ADMIN — DONEPEZIL HYDROCHLORIDE 5 MG: 5 TABLET ORAL at 10:05

## 2023-05-29 RX ADMIN — LEVETIRACETAM 500 MG: 500 TABLET, FILM COATED ORAL at 10:05

## 2023-05-29 RX ADMIN — ACETAMINOPHEN 1000 MG: 500 TABLET ORAL at 04:05

## 2023-05-29 NOTE — HPI
Ms. England is a 89 y/o f w/ pmhx of dementia, DM, HTN, CKD3a, Tachybrady, pAF (on eliquis), HFpEF (most recent echo EF 53% on 5/21), previous NSCLC who presents to the ED 5/28 after a mechanical fall.     CTH on arrival revealed trace traumatic appearing SAH in the L parietal lobe, no mass, MLS, or other parenchymal hemorrhage. Patient was neuro intact and at baseline at this time. Further imaging was pursued for trauma workup that was negative for further fx in c-spine, but patient does have a comminuted mildly displaced fx of the L proximal femur.     Repeat CTH was done and revealed stable tSAH, at which point BP goal was liberalized to <160 per NSGY and patient was to be admitted to Hospital Medicine. Patient received home antihypertensives, but unfortunately remains above BP goal and was started on cardene gtt at 5.       NCC consulted for concern of patient on minimal cardene gtt for BP goal of <160 in setting of stable tSAH. At time of exam, pt off cardene gtt, SBP in 120s s/p resuming home PO antihypertensives. Pt only complaint at time of exam L hip pain at site of fracture, along w/ some numbness along L inner thigh/calf.

## 2023-05-29 NOTE — PROGRESS NOTES
[facility]  Orthopedics  Progress Note    Patient Name: Brunilda England  MRN: [id number]  Admission Date: 5/28/2023  Hospital Length of Stay: 0 days  Attending Provider: No att. providers found  Primary Care Provider: Pepper Whitfield MD    Subjective:     Principal Problem:Left displaced femoral neck fracture    Principal Orthopedic Problem: Same    Interval History: NAEON, patient stable, pain controlled. No acute complaints this morning. CT with a SAH, stable on repeat. Likely surgery tomorrow pending medical optimization.    Review of patient's allergies indicates:   Allergen Reactions    Bextra [valdecoxib] Swelling    Gabapentin Diarrhea and Nausea Only       Current Facility-Administered Medications   Medication    calcium-vitamin D3 500 mg-5 mcg (200 unit) per tablet 2 tablet    labetalol 20 mg/4 mL (5 mg/mL) IV syring    niCARdipine 40 mg/200 mL (0.2 mg/mL) infusion     Current Outpatient Medications   Medication Sig    albuterol (PROVENTIL) 2.5 mg /3 mL (0.083 %) nebulizer solution Take 3 mLs (2.5 mg total) by nebulization every 6 (six) hours as needed for Wheezing. Rescue    amlodipine-benazepril 5-20 mg (LOTREL) 5-20 mg per capsule Take 1 capsule by mouth once daily.    apixaban (ELIQUIS) 5 mg Tab Take 1 tablet (5 mg total) by mouth 2 (two) times daily.    azelastine (ASTELIN) 137 mcg (0.1 %) nasal spray 1 spray (137 mcg total) by Nasal route 2 (two) times daily.    cyanocobalamin (VITAMIN B-12) 1000 MCG tablet Take 1,000 mcg by mouth every morning.    donepeziL (ARICEPT) 5 MG tablet Take 1 tablet (5 mg total) by mouth every evening.    DULoxetine (CYMBALTA) 60 MG capsule Take 1 capsule (60 mg total) by mouth every morning.    estradioL (ESTRACE) 0.01 % (0.1 mg/gram) vaginal cream USE ONE-HALF GRAM VAGINALLY TWICE a WEEK    linaGLIPtin (TRADJENTA) 5 mg Tab tablet Take 1 tablet (5 mg total) by mouth once daily.    metoprolol tartrate (LOPRESSOR) 25 MG tablet Take 1 tablet (25 mg total) by  mouth 2 (two) times daily.    nitroGLYCERIN (NITROSTAT) 0.4 MG SL tablet Place 1 tablet (0.4 mg total) under the tongue every 5 (five) minutes as needed.    simvastatin (ZOCOR) 80 MG tablet Take 1 tablet (80 mg total) by mouth once daily.    vitamin D (VITAMIN D3) 1000 units Tab Take 1,000 Units by mouth once daily.     Facility-Administered Medications Ordered in Other Encounters   Medication    sodium chloride 0.9% flush 5 mL     Objective:     Vital Signs (Most Recent):  Temp: 98 °F (36.7 °C) (05/28/23 2122)  Pulse: 98 (05/29/23 0702)  Resp: 19 (05/29/23 0706)  BP: (!) 146/60 (05/29/23 0702)  SpO2: 97 % (05/29/23 0702) Vital Signs (24h Range):  Temp:  [98 °F (36.7 °C)] 98 °F (36.7 °C)  Pulse:  [] 98  Resp:  [16-33] 19  SpO2:  [93 %-99 %] 97 %  BP: (128-168)/(60-80) 146/60           There is no height or weight on file to calculate BMI.    No intake or output data in the 24 hours ending 05/29/23 0711    Physical Exam    LLE:  TTP around the hip, painful log roll, neg SLR  Motor intact Tib Ant, gastroc, EHL, FHL.  Sensation intact saphenous, sural, deep/superficial peroneal, tibial nerves  Palp DP/PT pulse, BCR      Significant Labs: A1C:   Recent Labs   Lab 04/03/23  1316 05/29/23  0003   HGBA1C 6.5* 6.3*     CBC:   Recent Labs   Lab 05/28/23  2231   WBC 8.60   HGB 10.9*   HCT 32.9*        CMP:   Recent Labs   Lab 05/29/23  0003      K 3.8      CO2 24   *   BUN 15   CREATININE 1.2   CALCIUM 9.9   PROT 7.5   ALBUMIN 3.5   BILITOT 0.2   ALKPHOS 51*   AST 16   ALT 11   ANIONGAP 11     Coagulation:   Recent Labs   Lab 05/29/23  0003   APTT 25.9     Prealbumin:   Recent Labs   Lab 05/29/23  0003   PREALBUMIN 19*     Urine Studies:   Recent Labs   Lab 05/29/23  0148   COLORU Yellow   APPEARANCEUA Clear   PHUR 6.0   SPECGRAV 1.015   PROTEINUA Trace*   GLUCUA Negative   KETONESU Negative   BILIRUBINUA Negative   OCCULTUA Negative   NITRITE Negative   LEUKOCYTESUR Negative     All pertinent  labs within the past 24 hours have been reviewed.      Significant Imaging: I have reviewed and interpreted all pertinent imaging results/findings.   Assessment/Plan:     * Left basicervical femoral neck fracture with extension into the intertroch region  Brunilda England is a 89 y.o. female with a left basicervical femoral neck fracture with extension into the intertroch region, closed, NVI. They take Eliquis 5 b.i.d. anticoagulation at home. They required a walker for ambulatory assistive devices prior to this injury.     -Likely Surgery tomorrow pending medical optimization   -Pt marked, booked, and consented for surgery  -DVT PPx: Per primary hold tonight   -Abx: Preop abx ordered  -Bed rest, pack, NPO at midnight  -Iv: ordered for contralateral arm        Dale Rojo MD  Orthopedics  [facility]

## 2023-05-29 NOTE — ED PROVIDER NOTES
Encounter Date: 5/28/2023       History     Chief Complaint   Patient presents with    Fall     Trip and fall at home. Fell on left side, pain in the left hip. Leg is bent up and unable to straighten due to pain. EMS given 75 mcg Fentanyl IV. On Holter monitor with Cardiology with [facility] for episode of juna a to tachy.     89-year-old female with history of AFib on anticoagulation, diabetes, history of anal cancer presenting to the ED after mechanical fall.  Patient was turning around and tripped. Positive head trauma, no LOC or vomiting. Was unable to get up due to significant pain in L hip. EMS was called and patient transported to ED for evaluation. Denies any CP, SOB, abd pain, diarrhea, urinary symptoms, numbness or weakness in upper or lower extremities      Review of patient's allergies indicates:   Allergen Reactions    Bextra [valdecoxib] Swelling    Gabapentin Diarrhea and Nausea Only     Past Medical History:   Diagnosis Date    Anal cancer 1995    Anemia     Cancer 1995    anal cancer    Centrilobular emphysema 7/6/2020    Diabetes mellitus     With circulatory disorder    Lumbar radiculopathy 5/16/2014    Lung cancer 2012    s/p chemo/radiation    Macular degeneration     Macular hemorrhage of left eye     Neuropathy     Osteoporosis     Osteoporosis, unspecified 11/9/2012    Pure hypercholesterolemia      Past Surgical History:   Procedure Laterality Date    BRONCHOSCOPY      CHOLECYSTECTOMY      COLONOSCOPY      FLUOROSCOPIC ANGIOGRAPHY OF LOWER EXTREMITY WITH TOPICAL ULTRASOUND Right 12/6/2018    Procedure: ARTERIOGRAM-LEG AND ULTRASOUND;  Surgeon: SEBASTIÁN Mcpherson III, MD;  [facility];  Service: Peripheral Vascular;  Laterality: Right;  16.5 min  1059.42 mGy  59ml Dye  2ml Local    heart stent      HYSTERECTOMY      INCISIONAL BIOPSY Right 12/6/2019    Procedure: INCISIONAL BIOPSY;  Surgeon: Leslie Castillo MD;  Location: [facility] OR [address] [place] [address];  Service: Plastics;  Laterality: Right;    left  leg surgery      blockage    PERCUTANEOUS TRANSLUMINAL ANGIOPLASTY N/A 2018    Procedure: PTA (ANGIOPLASTY, PERCUTANEOUS, TRANSLUMINAL);  Surgeon: SEBASTIÁN Mcpherson III, MD;  Location: [facility] OR [address] [place] [address];  Service: Peripheral Vascular;  Laterality: N/A;    RECONSTRUCTION USING FLAP Right 2019    Procedure: RECONSTRUCTION USING FLAP/FULL THICKNESS MRESETION AND RECONSTRUCTION RIGHT UPPER EYELID WITH BIOPSY;  Surgeon: Leslie Castillo MD;  Location: [facility] OR [address] [place] [address];  Service: Plastics;  Laterality: Right;    TONSILLECTOMY       Family History   Problem Relation Age of Onset    Stroke Mother     Cancer Father     Breast cancer Maternal Aunt     Ovarian cancer Neg Hx     Amblyopia Neg Hx     Blindness Neg Hx     Cataracts Neg Hx     Glaucoma Neg Hx     Macular degeneration Neg Hx     Retinal detachment Neg Hx     Strabismus Neg Hx      Social History     Tobacco Use    Smoking status: Former     Packs/day: 0.50     Years: 30.00     Pack years: 15.00     Types: Cigarettes     Quit date: 1995     Years since quittin.4     Passive exposure: Past    Smokeless tobacco: Never   Substance Use Topics    Alcohol use: No    Drug use: No     Review of Systems    Physical Exam     Initial Vitals [23]   BP Pulse Resp Temp SpO2   138/80 65 18 98 °F (36.7 °C) 98 %      MAP       --         Physical Exam    Nursing note and vitals reviewed.  Constitutional: She appears well-developed and well-nourished. She is not diaphoretic. No distress.   HENT:   Head: Normocephalic and atraumatic.   Eyes: Conjunctivae and EOM are normal. Right eye exhibits no discharge. Left eye exhibits no discharge. No scleral icterus.   Neck:   Normal range of motion.  Cardiovascular:  Normal rate, regular rhythm and intact distal pulses.     Exam reveals no gallop and no friction rub.       No murmur heard.  Pulmonary/Chest: No respiratory distress. She has no wheezes. She has no rhonchi. She has no rales. She exhibits no tenderness.    Abdominal: Abdomen is soft. She exhibits no distension and no mass. There is no abdominal tenderness. There is no rebound and no guarding.   Musculoskeletal:      Cervical back: Normal range of motion.      Comments: LLE flexed at knee. Significant pain with palpation or movement of LLE.       Neurological: She is alert and oriented to person, place, and time. No cranial nerve deficit or sensory deficit.   Limited strength of LLE due to pain. No numbness   Skin: Skin is warm and dry. No erythema. No pallor.       ED Course   Procedures  Labs Reviewed   CBC W/ AUTO DIFFERENTIAL - Abnormal; Notable for the following components:       Result Value    RBC 3.80 (*)     Hemoglobin 10.9 (*)     Hematocrit 32.9 (*)     RDW 17.3 (*)     MPV 13.1 (*)     Gran % 75.1 (*)     Lymph % 16.5 (*)     All other components within normal limits    Narrative:     Release to patient->Immediate   PREALBUMIN - Abnormal; Notable for the following components:    Prealbumin 19 (*)     All other components within normal limits   MAGNESIUM - Abnormal; Notable for the following components:    Magnesium 1.5 (*)     All other components within normal limits   PHOSPHORUS - Abnormal; Notable for the following components:    Phosphorus 2.5 (*)     All other components within normal limits   HEMOGLOBIN A1C - Abnormal; Notable for the following components:    Hemoglobin A1C 6.3 (*)     Estimated Avg Glucose 134 (*)     All other components within normal limits   URINALYSIS, REFLEX TO URINE CULTURE - Abnormal; Notable for the following components:    Protein, UA Trace (*)     All other components within normal limits    Narrative:     Specimen Source->Urine   COMPREHENSIVE METABOLIC PANEL - Abnormal; Notable for the following components:    Glucose 190 (*)     Alkaline Phosphatase 51 (*)     eGFR 42.7 (*)     All other components within normal limits   HIV 1 / 2 ANTIBODY    Narrative:     Release to patient->Immediate   HEPATITIS C ANTIBODY     Narrative:     Release to patient->Immediate   TRANSFERRIN   PROCALCITONIN   VITAMIN D   APTT   POCT GLUCOSE MONITORING CONTINUOUS     EKG Readings: (Independently Interpreted)   Atrial flutter, rate of 97. No ST elevations or depressions concerning for ischemia.  No STEMI per my independent interpretation     ECG Results              EKG 12-lead (Final result)  Result time 05/29/23 09:53:30      Final result by Interface, [facility] (05/29/23 09:53:30)                   Narrative:    Test Reason : W19.XXXA,    Vent. Rate : 097 BPM     Atrial Rate : 194 BPM     P-R Int : 000 ms          QRS Dur : 090 ms      QT Int : 340 ms       P-R-T Axes : 269 061 -78 degrees     QTc Int : 431 ms    Probably  Atrial flutter with 2:1 A-V conduction vs ST  ST and T wave abnormality, consider inferior ischemia  ST and T wave abnormality, consider anterolateral ischemia  Abnormal ECG  When compared with ECG of 22-MAY-2023 09:16,  Atrial flutter has replaced Sinus rhythm probably  Inverted T waves have replaced nonspecific T wave abnormality in Inferior  leads  T wave inversion now evident in Anterior leads  Confirmed by Ellis SOLER MD ([id number]) on 5/29/2023 9:53:16 AM    Referred By: [email or web address]   SELF           Confirmed By:Ellis SOLER MD                                  Imaging Results              CT Head Without Contrast (Final result)  Result time 05/29/23 03:52:32      Final result by Dale Trimble MD (05/29/23 03:52:32)                   Impression:      No significant detrimental change compared to prior examination of 05/28/2023.  Small volume of left parietal subarachnoid hemorrhage, similar to prior examination.  No evidence of new large parenchymal hemorrhage or abnormal region of parenchymal hypoattenuation.      Electronically signed by: Dale Trimble MD  Date:    05/29/2023  Time:    03:52               Narrative:    EXAMINATION:  CT HEAD WITHOUT CONTRAST    CLINICAL HISTORY:  Subarachnoid hemorrhage (SAH)  suspected;SAH follow up;    TECHNIQUE:  Low dose axial images were obtained through the head.  Coronal and sagittal reformations were also performed. Contrast was not administered.    COMPARISON:  CT head 05/28/2023, 10/14/2022    FINDINGS:  Small volume of left parietal subarachnoid hemorrhage again noted, similar to prior examination.  No new large intracranial hemorrhage identified.  There is generalized cerebral volume loss with compensatory sulcal widening and ventricular enlargement.  There is patchy periventricular white matter hypoattenuation suggesting sequelae of chronic microvascular ischemic change.  There are small remote lacunar-type infarcts involving the bilateral basal ganglia.  No evidence of hydrocephalus or midline shift.  The mastoid air cells and paranasal sinuses are clear of acute process. Mild soft tissue induration noted within the left parietal scalp.  No depressed calvarial fracture identified.                                       X-Ray Hip 1 View Left (with Pelvis when performed) (Final result)  Result time 05/28/23 23:56:30   Procedure changed from X-Ray Hip 2 or 3 views Left (with Pelvis when performed)     Final result by Liu Polanco MD (05/28/23 23:56:30)                   Impression:      Intertrochanteric fracture of the left proximal femur as seen on recent CT and pelvic radiograph.      Electronically signed by: Liu Polanco MD  Date:    05/28/2023  Time:    23:56               Narrative:    EXAMINATION:  XR HIP 1 VIEW LEFT (WITH PELVIS WHEN PERFORMED)    CLINICAL HISTORY:  fracture;    TECHNIQUE:  4 total cross-table lateral views of the left hip.    COMPARISON:  Pelvis radiograph, same day.    FINDINGS:  Acute displaced intertrochanteric fracture of the left proximal femur which is better evaluated on recent pelvic radiograph and pelvic CT.  No new acute abnormality.                                       CT Pelvis Without Contrast (Final result)  Result time 05/28/23  23:55:07      Final result by Dale Trimble MD (05/28/23 23:55:07)                   Impression:      1.  Comminuted, mildly displaced fracture of the proximal left femur as above.  Fracture predominately involves the intertrochanteric portion with suspected involvement of the base of the femoral neck.    2.  Diffuse osteopenia.    3.  Intramuscular and soft tissue edema about the left hip with apparent evolving hematoma adjacent to the greater trochanter.    4.  Multiple relatively stable appearing intra-abdominal findings as above.      Electronically signed by: Dale Trimble MD  Date:    05/28/2023  Time:    23:55               Narrative:    EXAMINATION:  CT PELVIS WITHOUT CONTRAST; CT 3D WITHOUT INDEPENDENT WS    CLINICAL HISTORY:  Pelvic trauma;; Pelvic trauma;pelvic trauma;    TECHNIQUE:  1.25 mm axial images were acquired of the bony pelvis without IV contrast.  Additional 3D reconstructions were performed at a separate independent workstation and reviewed.    COMPARISON:  Pelvic radiograph, left femur radiograph series 05/28/2023.    FINDINGS:  There is diffuse osteopenia which limits assessment of fine bony detail.  There is redemonstration of a comminuted, mildly displaced fracture of the proximal left femur.  The fracture predominantly involves the intertrochanteric portion, with involvement of the greater and lesser trochanters.  The superior margin of the fracture plane appears to involve the base of the femoral neck.  The left femoral head remains appropriately seated within the acetabula with associated osteoarthrosis.  The right femur is intact.  The remaining osseous structures appear intact allowing for underlying osteopenia.  No definite displaced sacral fracture identified.  There are degenerative changes of bilateral sacroiliac joints.  There is degenerative change of the visualized lower lumbar spine with facet arthropathy and intervertebral disc space height loss contributing to  bilateral neural foraminal narrowing at L4-L5 and L5-S1.    There is intramuscular and soft tissue edema about the left hip.  There is a 4.0 x 4.3 cm fluid collection with layering fluid-fluid level about the greater trochanter suggestive of an evolving hematoma.    Pelvic contents demonstrate nonspecific stable appearing mildly complex calcified lesions within the pelvis/bilateral adnexa, similar to prior examination of 11/20/2018.  There is mild nonspecific circumferential bladder wall thickening.  There is prominent atherosclerotic calcification of the abdominal aorta and branch vessels.  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  There is scattered retained stool throughout the colon.                                       CT 3D RECON WITHOUT INDEPENDENT WS (Final result)  Result time 05/28/23 23:55:07      Final result by Dale Trimble MD (05/28/23 23:55:07)                   Impression:      1.  Comminuted, mildly displaced fracture of the proximal left femur as above.  Fracture predominately involves the intertrochanteric portion with suspected involvement of the base of the femoral neck.    2.  Diffuse osteopenia.    3.  Intramuscular and soft tissue edema about the left hip with apparent evolving hematoma adjacent to the greater trochanter.    4.  Multiple relatively stable appearing intra-abdominal findings as above.      Electronically signed by: Dale Trimble MD  Date:    05/28/2023  Time:    23:55               Narrative:    EXAMINATION:  CT PELVIS WITHOUT CONTRAST; CT 3D WITHOUT INDEPENDENT WS    CLINICAL HISTORY:  Pelvic trauma;; Pelvic trauma;pelvic trauma;    TECHNIQUE:  1.25 mm axial images were acquired of the bony pelvis without IV contrast.  Additional 3D reconstructions were performed at a separate independent workstation and reviewed.    COMPARISON:  Pelvic radiograph, left femur radiograph series  05/28/2023.    FINDINGS:  There is diffuse osteopenia which limits assessment of fine bony detail.  There is redemonstration of a comminuted, mildly displaced fracture of the proximal left femur.  The fracture predominantly involves the intertrochanteric portion, with involvement of the greater and lesser trochanters.  The superior margin of the fracture plane appears to involve the base of the femoral neck.  The left femoral head remains appropriately seated within the acetabula with associated osteoarthrosis.  The right femur is intact.  The remaining osseous structures appear intact allowing for underlying osteopenia.  No definite displaced sacral fracture identified.  There are degenerative changes of bilateral sacroiliac joints.  There is degenerative change of the visualized lower lumbar spine with facet arthropathy and intervertebral disc space height loss contributing to bilateral neural foraminal narrowing at L4-L5 and L5-S1.    There is intramuscular and soft tissue edema about the left hip.  There is a 4.0 x 4.3 cm fluid collection with layering fluid-fluid level about the greater trochanter suggestive of an evolving hematoma.    Pelvic contents demonstrate nonspecific stable appearing mildly complex calcified lesions within the pelvis/bilateral adnexa, similar to prior examination of 11/20/2018.  There is mild nonspecific circumferential bladder wall thickening.  There is prominent atherosclerotic calcification of the abdominal aorta and branch vessels.  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  There is scattered retained stool throughout the colon.                                        CT Head Without Contrast (Final result)  Result time 05/28/23 23:30:15      Final result by Liu Polanco MD (05/28/23 23:30:15)                   Impression:      Trace subarachnoid hemorrhage in the left parietal lobe.  No mass effect,  midline shift, or sizable acute parenchymal hemorrhage.    Generalized cerebral volume loss and chronic ischemic changes.    This report was flagged in Epic as abnormal.    COMMUNICATION  This critical result was discovered/received at 23:25.  The critical information above was relayed directly by Liu Polanco MD by [facility] secure chat (with acknowledgement) to Sloan Phan MD on 5/28/2023 at 23:27.      Electronically signed by: Liu Polanco MD  Date:    05/28/2023  Time:    23:30               Narrative:    EXAMINATION:  CT HEAD WITHOUT CONTRAST    CLINICAL HISTORY:  Head trauma, minor (Age >= 65y);    TECHNIQUE:  Low dose axial images were obtained through the head.  Coronal and sagittal reformations were also performed. Contrast was not administered.    COMPARISON:  CT head, 10/14/2022.    FINDINGS:  Trace subarachnoid hemorrhage in the left parietal lobe (axial image 19 and sagittal image 132).    Generalized cerebral volume loss with ex vacuo dilation of the ventricles and sulci.  Patchy periventricular white matter hypoattenuation suggestive of chronic microvascular ischemic change.  Probable remote lacunar infarcts in the basal ganglia.    No evidence of acute territorial infarct, hemorrhage, mass effect, or midline shift.  No subdural hemorrhage identified.    No evidence of hydrocephalus.  No convincing intraventricular hemorrhage.    No displaced calvarial fracture.    Visualized paranasal sinuses and mastoid air cells are essentially clear.                                       CT Cervical Spine Without Contrast (Final result)  Result time 05/28/23 23:48:24      Final result by Liu Polanco MD (05/28/23 23:48:24)                   Impression:      No acute cervical fracture.    Moderate cervical spondylosis.    Biapical pulmonary emphysema.    Partially visualized right pleural effusion.    Additional findings discussed in the body of the report.      Electronically signed by: Liu  MD Collin  Date:    05/28/2023  Time:    23:48               Narrative:    EXAMINATION:  CT CERVICAL SPINE WITHOUT CONTRAST    CLINICAL HISTORY:  Neck trauma (Age >= 65y);    TECHNIQUE:  Low dose axial images, sagittal and coronal reformations were performed though the cervical spine.  Contrast was not administered.    COMPARISON:  Cervical spine CT, 10/14/2022.    FINDINGS:    Grossly normal sagittal curvature and alignment.  Vertebral body heights are well maintained.  Moderate degenerative changes in the cervical spine with stable small disc osteophyte complexes at multiple levels, most notable at C5-C6 and C6-C7. Possible mild central canal stenosis at C6-C7.  Variable mild multilevel neural foraminal narrowing, most pronounced on the left at C4-C5. No acute fracture identified.  Prevertebral soft tissues are normal.  Moderate pulmonary emphysema in the lung apices.  Significant right-sided pleural effusion, partially visualized.  Atherosclerotic vascular calcifications involving the major arteries in the neck.  Small amount of venous air in the lower neck/upper chest likely from vascular access.  Subcentimeter nodule in the right lobe of the thyroid gland, not large enough for dedicated follow-up.                                       X-Ray Pelvis Routine AP (Final result)  Result time 05/28/23 22:45:57      Final result by Liu Polanco MD (05/28/23 22:45:57)                   Impression:      Acute displaced intertrochanteric fracture of the left proximal femur.      Electronically signed by: Liu Polanco MD  Date:    05/28/2023  Time:    22:45               Narrative:    EXAMINATION:  XR PELVIS ROUTINE AP; XR FEMUR 2 VIEW LEFT    CLINICAL HISTORY:  left hip injury;    TECHNIQUE:  AP view of the pelvis was performed.  Six total views of the left femur also obtained.    COMPARISON:  None.    FINDINGS:  Pelvis: Bones are mildly demineralized.  There are moderate degenerative changes in both hips.   Multiple prominent calcifications overlying the pelvis which could be related to calcified fibroids and gluteal soft tissue calcifications.  Aortoiliac atherosclerotic vascular calcifications are noted.  There is a comminuted, displaced, and impacted fracture of the left proximal femur which likely represents an intertrochanteric fracture.  No additional acute fracture identified.  No dislocation.    Left femur: Comminuted, displaced, and impacted acute fracture of the left intertrochanteric proximal femur.  No additional acute fracture.  Atherosclerotic vascular calcifications.  Soft tissue edema in the left hip.                                       X-Ray Knee 2 View Left (Final result)  Result time 05/28/23 22:47:10      Final result by Liu Polanco MD (05/28/23 22:47:10)                   Impression:      Moderate tricompartmental degenerative changes.  No acute displaced fracture.      Electronically signed by: Liu Polanco MD  Date:    05/28/2023  Time:    22:47               Narrative:    EXAMINATION:  XR KNEE 1 OR 2 VIEW LEFT    CLINICAL HISTORY:  left hip injury;    TECHNIQUE:  Two views left knee.    COMPARISON:  Left femur, same day.    FINDINGS:  Exam quality is limited by suboptimal positioning.  No acute displaced fracture.  No dislocation.  Moderate degenerative changes in the left knee.  No large joint effusion.  Atherosclerotic vascular calcifications are present.  No unexpected radiopaque foreign body.                                       X-Ray Femur 2 View Left (Final result)  Result time 05/28/23 22:45:57      Final result by Liu Polanco MD (05/28/23 22:45:57)                   Impression:      Acute displaced intertrochanteric fracture of the left proximal femur.      Electronically signed by: Liu Polanco MD  Date:    05/28/2023  Time:    22:45               Narrative:    EXAMINATION:  XR PELVIS ROUTINE AP; XR FEMUR 2 VIEW LEFT    CLINICAL HISTORY:  left hip  injury;    TECHNIQUE:  AP view of the pelvis was performed.  Six total views of the left femur also obtained.    COMPARISON:  None.    FINDINGS:  Pelvis: Bones are mildly demineralized.  There are moderate degenerative changes in both hips.  Multiple prominent calcifications overlying the pelvis which could be related to calcified fibroids and gluteal soft tissue calcifications.  Aortoiliac atherosclerotic vascular calcifications are noted.  There is a comminuted, displaced, and impacted fracture of the left proximal femur which likely represents an intertrochanteric fracture.  No additional acute fracture identified.  No dislocation.    Left femur: Comminuted, displaced, and impacted acute fracture of the left intertrochanteric proximal femur.  No additional acute fracture.  Atherosclerotic vascular calcifications.  Soft tissue edema in the left hip.                                       X-Ray Chest AP Portable (Final result)  Result time 05/28/23 22:47:34      Final result by Kehinde Ryder MD (05/28/23 22:47:34)                   Impression:      Stable examination findings of moderate right-sided pleural effusion and additional bilateral airspace opacities.      Electronically signed by: Kehinde Ryder MD  Date:    05/28/2023  Time:    22:47               Narrative:    EXAMINATION:  XR CHEST AP PORTABLE    CLINICAL HISTORY:  Pre-operative.    TECHNIQUE:  Single frontal view of the chest was performed.    COMPARISON:  05/20/2023.    FINDINGS:  Monitoring EKG leads are present.  The trachea is unremarkable.  There are calcifications of the aortic knob.  The cardiomediastinal silhouette is enlarged.  There is no evidence of free air beneath the hemidiaphragms.  There is a stable moderate right-sided pleural effusion.  There is a probable trace left-sided pleural effusion.  There is no evidence of a pneumothorax.  There is unchanged appearance of scattered bilateral interstitial opacities.  There is no focal  consolidation.  There are degenerative changes in the osseous structures.                                    X-Rays:   Independently Interpreted Readings:   Other Readings:  Acute fracture of left femoral neck  Medications   calcium-vitamin D3 500 mg-5 mcg (200 unit) per tablet 2 tablet (2 tablets Oral Given 5/29/23 1036)   amlodipine-benazepril 5-20 mg per capsule 1 capsule (has no administration in time range)   donepeziL tablet 5 mg (has no administration in time range)   DULoxetine DR capsule 60 mg (60 mg Oral Given 5/29/23 1036)   metoprolol tartrate (LOPRESSOR) tablet 25 mg (has no administration in time range)   vitamin D 1000 units tablet 1,000 Units (1,000 Units Oral Given 5/29/23 1037)   0.9%  NaCl infusion ( Intravenous New Bag 5/29/23 1038)   sodium chloride 0.9% flush 10 mL (has no administration in time range)   acetaminophen tablet 1,000 mg (has no administration in time range)   melatonin tablet 6 mg (has no administration in time range)   bisacodyL suppository 10 mg (has no administration in time range)   ondansetron injection 4 mg (has no administration in time range)   methocarbamoL tablet 500 mg (has no administration in time range)   oxyCODONE immediate release tablet 5 mg (5 mg Oral Given 5/29/23 1036)   morphine injection 2 mg (has no administration in time range)   polyethylene glycol packet 17 g (17 g Oral Not Given 5/29/23 1015)   levETIRAcetam tablet 500 mg (500 mg Oral Given 5/29/23 1037)   hydrALAZINE tablet 50 mg (50 mg Oral Given 5/29/23 1049)   dextrose 10% bolus 125 mL 125 mL (has no administration in time range)   dextrose 10% bolus 250 mL 250 mL (has no administration in time range)   glucagon (human recombinant) injection 1 mg (has no administration in time range)   insulin aspart U-100 pen 0-5 Units (has no administration in time range)   dextrose 40 % gel 15,000 mg (has no administration in time range)   dextrose 40 % gel 30,000 mg (has no administration in time range)    morphine injection 2 mg (2 mg Intravenous Given 5/28/23 2230)   morphine injection 2 mg (2 mg Intravenous Given 5/29/23 0101)   acetaminophen tablet 1,000 mg (1,000 mg Oral Given 5/29/23 0400)   levETIRAcetam injection 500 mg (500 mg Intravenous Given 5/29/23 0429)   amlodipine-benazepril 5-20 mg per capsule 1 capsule (1 capsule Oral Given 5/29/23 0645)   metoprolol tartrate (LOPRESSOR) tablet 25 mg (25 mg Oral Given 5/29/23 0538)   labetalol 20 mg/4 mL (5 mg/mL) IV syring (10 mg Intravenous Given 5/29/23 0738)     Medical Decision Making:   History:   Old Medical Records: I decided to obtain old medical records.  Initial Assessment:   Emergent evaluation of 89-year-old female on anticoagulation presenting to the ED after mechanical fall onto her left side with head trauma.  No LOC, vomiting.  Left lower extremity flexed at knee, significant pain with palpation of left hip and significant limitation of range of motion of left lower extremity due to pain.  Neurovascular intact distally    Differential includes but is not limited to hip fracture, femur fracture, intracranial hemorrhage, C-spine fracture, intrathoracic or intra-abdominal hemorrhage.    No obvious signs of intrathoracic or intra-abdominal injuries, patient has no pain, normal vital signs.  X-ray of left lower extremity showed concern for for more neck fracture.  Discussed with orthopedics who evaluated patient.  CT head showed concern for small amount of subarachnoid hemorrhage.  Patient is on Eliquis, last time she took it was approximately 6 hours prior to arrival to the emergency department.  Discussed with neurosurgery who will evaluate patient, recommended a repeat CT head in 4-6 hours.  Pending further management, patient handed off to oncoming team at shift change.    Independently Interpreted Test(s):   I have ordered and independently interpreted X-rays - see prior notes.  I have ordered and independently interpreted EKG Reading(s) - see  prior notes  Clinical Tests:   Lab Tests: Ordered and Reviewed  Radiological Study: Ordered and Reviewed  Medical Tests: Ordered and Reviewed  Other:   I have discussed this case with another health care provider.     Critical Care   Date: 05/29/2023  Performed by: Domingo Del Rosario MD   Authorized by: Domingo Del Rosario MD    Total critical care time (exclusive of procedural time) : 40 minutes  Critical care was necessary to treat or prevent imminent or life-threatening deterioration of the following conditions:  SAH         Attending Attestation:   Physician Attestation Statement for Resident:  As the supervising MD   Physician Attestation Statement: I have personally seen and examined this patient.   I agree with the above history.  -:   As the supervising MD I agree with the above PE.     As the supervising MD I agree with the above treatment, course, plan, and disposition.                               Clinical Impression:   Final diagnoses:  [W19.XXXA] Fall  [I60.9] SAH (subarachnoid hemorrhage) (Primary)  [S72.142A] Intertrochanteric fracture of left hip, closed, initial encounter        ED Disposition Condition    Admit Stable                Sloan Phan MD  Resident  05/29/23 [id number]       Domingo Del Rosario MD  05/29/23 [id number]

## 2023-05-29 NOTE — ASSESSMENT & PLAN NOTE
89 y/o f w/ pmhx of dementia, DM, HTN, CKD3a, Tachybrady, pAF (on eliquis), HFpEF (most recent echo EF 53% on 5/21), previous NSCLC who presents to the ED 5/28 after a mechanical fall.  CTH on arrival revealed trace traumatic appearing SAH in the L parietal lobe, no mass, MLS, or other parenchymal hemorrhage. Patient was neuro intact and at baseline at this time. Repeat CTH was done and revealed stable tSAH, BP goal was liberalized to <160 per NSGY.    Patient received home antihypertensives, but unfortunately remains above BP goal and was started on cardene gtt at 5 -> now off 2/2 restarting home antihypertensives.       NCC consulted for concern of patient on minimal cardene gtt for BP goal of <160 in setting of stable tSAH.     - On exam patient is neurologically intact, is not in respiratory distress  -BP at goal after one time PRN labetalol. Recommend increasing pain regimen appropriately and continuing home antihypertensives.    -Primary concern is L femur fx and not neurologic.  -No obvious ICU needs at this time.     The above has been discussed with my attending. Thank you  for this consult, we will sign off at this time.

## 2023-05-29 NOTE — ASSESSMENT & PLAN NOTE
Brunilda England is a 89 y.o. female with a left basicervical femoral neck fracture with extension into the intertroch region, closed, NVI. They take Eliquis 5 b.i.d. anticoagulation at home. They required a walker for ambulatory assistive devices prior to this injury.     -Admitted to medicine hip fracture service for pre-operative clearance and medical evaluation  -To OR possibly 5/29/23 for operative fixation of left femoral neck fracture  -Pt marked, booked, and consented for surgery  -DVT PPx: Hold anticoagulation  -Abx: Preop abx ordered  -Labs:  Preoperative labs ordered  -Bed rest, pack, NPO at midnight  -Iv: ordered for contralateral arm    Patient was explained in detail the severity of the injury that was suffered. Patient was explained the risks/benefits/and alternatives to operative management in detail including infection, bleeding, pain, nerve and vascular damage, heterotopic ossification, leg length discrepancies, rotational deformities and they express full understanding.  We discussed the 30% national first year mortality rate with hip fractures, the need for early mobilization, and the expected rehab course.   she and her daughter express full understanding of the condition and expresses that they want to proceed with surgery. The patient is admitted to the medicine hip fracture service for optimization of medical comorbidities. Will plan for OR 05/29/2023. No guarantees were made, informed consent was obtained. All questions were answered to patient's and family's satisfaction.

## 2023-05-29 NOTE — CONSULTS
[facility] - Emergency Dept  Neurosurgery  Consult Note    Inpatient consult to Neurosurgery  Consult performed by: Jaimie Ariza MD  Consult ordered by: Aidan Tilley MD  Reason for consult: fall        Subjective:     Chief Complaint/Reason for Admission: Fall    History of Present Illness: 89F with h/o CAD, DM, CHF, CKD, Afib on Eliquis BID presenting after ground level fall at home, no LOC, with L femoral neck fracture. CTH with punctate L parietal tSAH, repeat CT pending. She is currently on a Holter monitor for tachy-juan a. She walks with a walker at baseline, lives with her daughter in a one story house.  She denies other areas of pain or injury at this time.  No neck pain, low back pain, no new numbness/tingling/weakness.    Eliquis BID.    Pending OR w/Ortho for L femoral neck fx.         (Not in a hospital admission)      Review of patient's allergies indicates:   Allergen Reactions    Bextra [valdecoxib] Swelling    Gabapentin Diarrhea and Nausea Only       Past Medical History:   Diagnosis Date    Anal cancer 1995    Anemia     Cancer 1995    anal cancer    Centrilobular emphysema 7/6/2020    Diabetes mellitus     With circulatory disorder    Lumbar radiculopathy 5/16/2014    Lung cancer 2012    s/p chemo/radiation    Macular degeneration     Macular hemorrhage of left eye     Neuropathy     Osteoporosis     Osteoporosis, unspecified 11/9/2012    Pure hypercholesterolemia      Past Surgical History:   Procedure Laterality Date    BRONCHOSCOPY      CHOLECYSTECTOMY      COLONOSCOPY      FLUOROSCOPIC ANGIOGRAPHY OF LOWER EXTREMITY WITH TOPICAL ULTRASOUND Right 12/6/2018    Procedure: ARTERIOGRAM-LEG AND ULTRASOUND;  Surgeon: SEBASTIÁN Mcpherson III, MD;  Location: [facility] OR [address] [place] [address];  Service: Peripheral Vascular;  Laterality: Right;  16.5 min  1059.42 mGy  59ml Dye  2ml Local    heart stent      HYSTERECTOMY      INCISIONAL BIOPSY Right 12/6/2019    Procedure: INCISIONAL BIOPSY;   Surgeon: Leslie Castillo MD;  Location: [facility] OR 1ST FLR;  Service: Plastics;  Laterality: Right;    left leg surgery      blockage    PERCUTANEOUS TRANSLUMINAL ANGIOPLASTY N/A 2018    Procedure: PTA (ANGIOPLASTY, PERCUTANEOUS, TRANSLUMINAL);  Surgeon: SEBASTIÁN Mcpherson III, MD;  Location: [facility] OR 2ND FLR;  Service: Peripheral Vascular;  Laterality: N/A;    RECONSTRUCTION USING FLAP Right 2019    Procedure: RECONSTRUCTION USING FLAP/FULL THICKNESS MRESETION AND RECONSTRUCTION RIGHT UPPER EYELID WITH BIOPSY;  Surgeon: Leslie Castillo MD;  Location: [facility] OR 1ST FLR;  Service: Plastics;  Laterality: Right;    TONSILLECTOMY       Family History       Problem Relation (Age of Onset)    Breast cancer Maternal Aunt    Cancer Father    Stroke Mother          Tobacco Use    Smoking status: Former     Packs/day: 0.50     Years: 30.00     Pack years: 15.00     Types: Cigarettes     Quit date: 1995     Years since quittin.4     Passive exposure: Past    Smokeless tobacco: Never   Substance and Sexual Activity    Alcohol use: No    Drug use: No    Sexual activity: Not Currently     Birth control/protection: Surgical     Review of Systems   Constitutional:  Negative for chills and fever.   HENT:  Negative for trouble swallowing and voice change.    Eyes:  Negative for photophobia and visual disturbance.   Respiratory:  Negative for chest tightness.    Cardiovascular:  Negative for chest pain and palpitations.   Gastrointestinal:  Negative for nausea and vomiting.   Genitourinary:  Negative for difficulty urinating.   Musculoskeletal:  Positive for arthralgias, gait problem and myalgias. Negative for back pain and neck pain.   Neurological:  Negative for weakness.   Objective:        There is no height or weight on file to calculate BMI.  Vital Signs (Most Recent):  Temp: 98 °F (36.7 °C) (23)  Pulse: 65 (23)  Resp: 18 (23)  BP: 138/80 (23)  SpO2: 98 % (23  2122) Vital Signs (24h Range):  Temp:  [98 °F (36.7 °C)] 98 °F (36.7 °C)  Pulse:  [65] 65  Resp:  [18] 18  SpO2:  [98 %] 98 %  BP: (138)/(80) 138/80                                 Physical Exam         Neurosurgery Physical Exam  General: no distress  Head: Non-traumatic, normocephalic  Eyes: Pupils equal, EOMi  Neck: Supple, normal ROM, no tenderness to palpation  CVS: tachycardic  Pulm: Symmetric expansion, no respiratory distress; on NC  GI: Abdomen nondistended, nontender  MSK: Moves all extremities but LLE w/o restriction  Skin: Dry, intact  Psych: Normal thought content and cognition  Neuro: AOx3, GCS [id number]  CNII-XII: Intact on fine exam, PERRL, EOMi, facial sensation preserved, no facial asymmetry, tongue/uvula/palate midline, shoulder shrug equal, No pronator drift  Extremities:  Motor:  Upper Extremity:                                  Deltoids        Triceps        Biceps        WE        WF                Interosseous           R        5/5              5/5              5/5            5/5         5/5         5/5                5/5           L        5/5              5/5              5/5            5/5         5/5         5/5                5/5  Lower Extremity:  pain limited movement LLE, proximal RLE                                    HF        KE        KF        DF        PF        EHL           R       5/5        5/5        5/5        5/5        5/5        5/5           L       deferred given hip fx       5/5        5/5        5/5    Reflexes:     DTR: 1-2+ and symmetrically throughout; LLE not tested    Sensory:      Sensorium intact throughout, no sensory level present    Coordination:      Coordination intact throughout     Cervical Spine:      ROM: Full with flexion, extension, lateral rotation and ear-to-shoulder bend.      Midline TTP: Negative        Significant Labs:  No results for input(s): GLU, NA, K, CL, CO2, BUN, CREATININE, CALCIUM, MG in the last 48 hours.  Recent Labs   Lab  05/28/23 2231   WBC 8.60   HGB 10.9*   HCT 32.9*        Recent Labs   Lab 05/28/23 2231   INR 1.0     Microbiology Results (last 7 days)       ** No results found for the last 168 hours. **          All pertinent labs from the last 24 hours have been reviewed.    Significant Diagnostics:  I have reviewed and interpreted all pertinent imaging results/findings within the past 24 hours.  X-Ray Femur 2 View Left    Result Date: 5/28/2023  Acute displaced intertrochanteric fracture of the left proximal femur. Electronically signed by: Liu Polanco MD Date:    05/28/2023 Time:    22:45    X-Ray Knee 2 View Left    Result Date: 5/28/2023  Moderate tricompartmental degenerative changes.  No acute displaced fracture. Electronically signed by: Liu Polanco MD Date:    05/28/2023 Time:    22:47    CT Head Without Contrast    Result Date: 5/28/2023  Trace subarachnoid hemorrhage in the left parietal lobe.  No mass effect, midline shift, or sizable acute parenchymal hemorrhage. Generalized cerebral volume loss and chronic ischemic changes. This report was flagged in Epic as abnormal. COMMUNICATION This critical result was discovered/received at 23:25.  The critical information above was relayed directly by Liu Polanco MD by [employer] secure chat (with acknowledgement) to Sloan Phan MD on 5/28/2023 at 23:27. Electronically signed by: Liu Polanco MD Date:    05/28/2023 Time:    23:30    X-Ray Chest AP Portable    Result Date: 5/28/2023  Stable examination findings of moderate right-sided pleural effusion and additional bilateral airspace opacities. Electronically signed by: Kehinde Ryder MD Date:    05/28/2023 Time:    22:47    X-Ray Pelvis Routine AP    Result Date: 5/28/2023  Acute displaced intertrochanteric fracture of the left proximal femur. Electronically signed by: Liu Polanco MD Date:    05/28/2023 Time:    22:45       Assessment/Plan:     * Left basicervical femoral neck fracture with  extension into the intertroch region  89F with extensive PMHx including Afib on Eliquis BID presents s/p fall at home w/o LOC found to have L femoral neck fracture and punctate L parietal tSAH:    --No acute neurosurgical intervention  --Follow-up CTH 4-6h scan for stability  --Reverse anti-plt/coag medications - hold Eliquis, if blossoming of tSAH will need reversal  --SBP <140  --Na >135  --Keppra 500 BID x14d; can consider lower dose given age  --HOB >30  --NPO  --Continue to monitor clinically, notify NSGY immediately with any changes in neuro status  --Hip fx per Ortho    Dispo: ongoing, final recs pending interval CT    Will d/w Dr. Butcher          Thank you for your consult. I will follow-up with patient. Please contact us if you have any additional questions.    Jaimie Cisneros MD  Neurosurgery  [facility] - Emergency Dept

## 2023-05-29 NOTE — CONSULTS
[facility] - Emergency Dept  Orthopedics  Consult Note    Patient Name: Brunilda England  MRN: [id number]  Admission Date: 5/28/2023  Hospital Length of Stay: 0 days  Attending Provider: Domingo Del Rosario MD  Primary Care Provider: Pepper Whitfield MD      Inpatient consult to Orthopedic Surgery  Consult performed by: Dale Rojo MD  Consult ordered by: Sloan Phan MD        Subjective:     Principal Problem:Left displaced femoral neck fracture    Chief Complaint:   Chief Complaint   Patient presents with    Fall     Trip and fall at home. Fell on left side, pain in the left hip. Leg is bent up and unable to straighten due to pain. EMS given 75 mcg Fentanyl IV. On Holter monitor with Cardiology with [facility] for episode of juan a to tachy.        HPI: Patient is a 89-year-old female with a past medical history of osteoporosis, anemia, lung cancer, low back pain, hypertension, CAD, type 2 diabetes, dementia, emphysema, CHF, CKD 3, and  AFib.  She has a ground level fall at home with immediate pain and inability to bear weight on the left leg.  She presented to the ED where x-rays show a left basicervical femoral neck fracture that may extend into the intertrochanteric region.  She is currently on a Holter monitor for tachy-juan a.  She quit smoking in 1995.  She takes Eliquis 5 mg b.i.d. at home.  She walks with a walker at baseline.  She lives with her daughter in a one story house.  Orthopedic surgery is consulted for management of her left femoral neck fracture. She denies other areas of pain or injury at this time.          Past Medical History:   Diagnosis Date    Anal cancer 1995    Anemia     Cancer 1995    anal cancer    Centrilobular emphysema 7/6/2020    Diabetes mellitus     With circulatory disorder    Lumbar radiculopathy 5/16/2014    Lung cancer 2012    s/p chemo/radiation    Macular degeneration     Macular hemorrhage of left eye     Neuropathy     Osteoporosis     Osteoporosis,  unspecified 11/9/2012    Pure hypercholesterolemia        Past Surgical History:   Procedure Laterality Date    BRONCHOSCOPY      CHOLECYSTECTOMY      COLONOSCOPY      FLUOROSCOPIC ANGIOGRAPHY OF LOWER EXTREMITY WITH TOPICAL ULTRASOUND Right 12/6/2018    Procedure: ARTERIOGRAM-LEG AND ULTRASOUND;  Surgeon: SEBASTIÁN Mcpherson III, MD;  Location: [facility] OR [address] [place] [address];  Service: Peripheral Vascular;  Laterality: Right;  16.5 min  1059.42 mGy  59ml Dye  2ml Local    heart stent      HYSTERECTOMY      INCISIONAL BIOPSY Right 12/6/2019    Procedure: INCISIONAL BIOPSY;  Surgeon: Leslie Castillo MD;  Location: [facility] OR [address] [place] [address];  Service: Plastics;  Laterality: Right;    left leg surgery      blockage    PERCUTANEOUS TRANSLUMINAL ANGIOPLASTY N/A 12/6/2018    Procedure: PTA (ANGIOPLASTY, PERCUTANEOUS, TRANSLUMINAL);  Surgeon: SEBASTIÁN Mcpherson III, MD;  Location: [facility] OR [address] [place] [address];  Service: Peripheral Vascular;  Laterality: N/A;    RECONSTRUCTION USING FLAP Right 12/6/2019    Procedure: RECONSTRUCTION USING FLAP/FULL THICKNESS MRESETION AND RECONSTRUCTION RIGHT UPPER EYELID WITH BIOPSY;  Surgeon: Leslie Castillo MD;  Location: [facility] OR [address] [place] [address];  Service: Plastics;  Laterality: Right;    TONSILLECTOMY         Review of patient's allergies indicates:   Allergen Reactions    Bextra [valdecoxib] Swelling    Gabapentin Diarrhea and Nausea Only       Current Facility-Administered Medications   Medication    [START ON 5/29/2023] calcium-vitamin D3 500 mg-5 mcg (200 unit) per tablet 2 tablet     Current Outpatient Medications   Medication Sig    albuterol (PROVENTIL) 2.5 mg /3 mL (0.083 %) nebulizer solution Take 3 mLs (2.5 mg total) by nebulization every 6 (six) hours as needed for Wheezing. Rescue    amlodipine-benazepril 5-20 mg (LOTREL) 5-20 mg per capsule Take 1 capsule by mouth once daily.    apixaban (ELIQUIS) 5 mg Tab Take 1 tablet (5 mg total) by mouth 2 (two) times daily.    azelastine (ASTELIN) 137 mcg (0.1 %) nasal  spray 1 spray (137 mcg total) by Nasal route 2 (two) times daily.    cyanocobalamin (VITAMIN B-12) 1000 MCG tablet Take 1,000 mcg by mouth every morning.    donepeziL (ARICEPT) 5 MG tablet Take 1 tablet (5 mg total) by mouth every evening.    DULoxetine (CYMBALTA) 60 MG capsule Take 1 capsule (60 mg total) by mouth every morning.    estradioL (ESTRACE) 0.01 % (0.1 mg/gram) vaginal cream USE ONE-HALF GRAM VAGINALLY TWICE a WEEK    linaGLIPtin (TRADJENTA) 5 mg Tab tablet Take 1 tablet (5 mg total) by mouth once daily.    metoprolol tartrate (LOPRESSOR) 25 MG tablet Take 1 tablet (25 mg total) by mouth 2 (two) times daily.    nitroGLYCERIN (NITROSTAT) 0.4 MG SL tablet Place 1 tablet (0.4 mg total) under the tongue every 5 (five) minutes as needed.    simvastatin (ZOCOR) 80 MG tablet Take 1 tablet (80 mg total) by mouth once daily.    vitamin D (VITAMIN D3) 1000 units Tab Take 1,000 Units by mouth once daily.     Facility-Administered Medications Ordered in Other Encounters   Medication    sodium chloride 0.9% flush 5 mL     Family History       Problem Relation (Age of Onset)    Breast cancer Maternal Aunt    Cancer Father    Stroke Mother          Tobacco Use    Smoking status: Former     Packs/day: 0.50     Years: 30.00     Pack years: 15.00     Types: Cigarettes     Quit date: 1995     Years since quittin.4     Passive exposure: Past    Smokeless tobacco: Never   Substance and Sexual Activity    Alcohol use: No    Drug use: No    Sexual activity: Not Currently     Birth control/protection: Surgical     ROS  Constitutional: negative for fevers/chills/night sweats  Eyes: no acute visual changes  ENT: negative acute  for hearing loss  Respiratory: negative for dyspnea  Cardiovascular: negative for chest pain  Gastrointestinal: negative for abdominal pain  Genitourinary: negative for dysuria  Neurological: negative for headaches  Behavioral/Psych: negative for hallucinations  Endocrine: negative  for temperature intolerance  MSK: per HPI  Objective:     Vital Signs (Most Recent):  Temp: 98 °F (36.7 °C) (05/28/23 2122)  Pulse: 65 (05/28/23 2122)  Resp: 18 (05/28/23 2230)  BP: 138/80 (05/28/23 2122)  SpO2: 98 % (05/28/23 2122) Vital Signs (24h Range):  Temp:  [98 °F (36.7 °C)] 98 °F (36.7 °C)  Pulse:  [65] 65  Resp:  [18] 18  SpO2:  [98 %] 98 %  BP: (138)/(80) 138/80           There is no height or weight on file to calculate BMI.    No intake or output data in the 24 hours ending 05/28/23 2318     Ortho/SPM Exam  General:  no acute distress, appears stated age   Neuro: alert and oriented x3  Psych: normal mood  Head: normocephalic, atraumatic.  Eyes: no scleral icterus  Mouth: moist mucous membranes  Cardiovascular: extremities warm and well perfused  Lungs: breathing comfortably, equal chest rise bilat  Skin: clean, dry, intact (any exceptions noted in below musculoskeletal exam)       Musculoskeletal  Right Upper Extremity     -Skin, Intact : no ecchymosis, lesions, lacerations or abrasions   -Deltoid, biceps, triceps intact   -ROM full range of motion to shoulder wrist and elbow  -SILT M/R/U/Ax  -Motor intact Ain/PIN/U/Ax  -WWP     Left Upper Extremity     -Skin, Intact : no ecchymosis, lesions, lacerations or abrasions   -Deltoid, biceps, triceps intact   -ROM full range of motion to shoulder wrist and elbow  -SILT M/R/U/Ax  -Motor intact Ain/PIN/U/Ax  -WWP     Right Lower Extremity Exam     - Skin Intact : no ecchymosis, lesions, lacerations or abrasions   - Quad/ Hip flexor intact   - ROM full range of motion to hip knee and ankle  - TA/EHL/Gastroc/FHL intact  - SILT throughout  - 2+ dorsalis pedis pulses        Left Lower Extremity Exam    - Skin Intact : no ecchymosis, lesions, lacerations or abrasions   - ROM range of motion to the left hip deferred due to known fracture, no tenderness to palpation around the knee, pain with log roll and inability to perform straight leg raise.  Mild anterior groin  tenderness.  - TA/EHL/Gastroc/FHL intact  - SILT throughout  - 2+ dorsalis pedis pulses    Spine:  Denies headache, no tenderness to palpation in the cervical spine.       All joints (shoulder/elbow/wrist/hip/knee/ankle) were examined and had full ROM and were non-tender to palpation except as above          Significant Labs: A1C:   Recent Labs   Lab 04/03/23  1316   HGBA1C 6.5*     CBC:   Recent Labs   Lab 05/28/23  2231   WBC 8.60   HGB 10.9*   HCT 32.9*          All pertinent labs within the past 24 hours have been reviewed.    Significant Imaging: I have reviewed and interpreted all pertinent imaging results/findings.  X-ray of the pelvis shows a unlikely basicervical left femoral neck fracture with extension into the intertrochanteric region and some comminution around the fracture site.  CT scan pending.    Assessment/Plan:     * Left basicervical femoral neck fracture with extension into the intertroch region  Brunilda England is a 89 y.o. female with a left basicervical femoral neck fracture with extension into the intertroch region, closed, NVI. They take Eliquis 5 b.i.d. anticoagulation at home. They required a walker for ambulatory assistive devices prior to this injury.     -Admitted to medicine hip fracture service for pre-operative clearance and medical evaluation  -To OR possibly 5/29/23 for operative fixation of left femoral neck fracture  -Pt marked, booked, and consented for surgery  -DVT PPx: Hold anticoagulation  -Abx: Preop abx ordered  -Labs:  Preoperative labs ordered  -Bed rest, pack, NPO at midnight  -Iv: ordered for contralateral arm    Patient was explained in detail the severity of the injury that was suffered. Patient was explained the risks/benefits/and alternatives to operative management in detail including infection, bleeding, pain, nerve and vascular damage, heterotopic ossification, leg length discrepancies, rotational deformities and they express full understanding.  We  discussed the 30% national first year mortality rate with hip fractures, the need for early mobilization, and the expected rehab course.   she and her daughter express full understanding of the condition and expresses that they want to proceed with surgery. The patient is admitted to the medicine hip fracture service for optimization of medical comorbidities. Will plan for OR 05/29/2023. No guarantees were made, informed consent was obtained. All questions were answered to patient's and family's satisfaction.           Dale Rojo MD  Orthopedics  [facility] - Emergency Dept

## 2023-05-29 NOTE — HPI
89F with h/o CAD, DM, CHF, CKD, Afib on Eliquis BID presenting after ground level fall at home, no LOC, with L femoral neck fracture. CTH with punctate L parietal tSAH, repeat CT pending. She is currently on a Holter monitor for tachy-juan a. She walks with a walker at baseline, lives with her daughter in a one story house.  She denies other areas of pain or injury at this time.  No neck pain, low back pain, no new numbness/tingling/weakness.    Eliquis BID.    Pending OR w/Ortho for L femoral neck fx.

## 2023-05-29 NOTE — HPI
Patient is a 89-year-old female with a past medical history of osteoporosis, anemia, lung cancer, low back pain, hypertension, CAD, type 2 diabetes, dementia, emphysema, CHF, CKD 3, and  AFib.  She has a ground level fall at home with immediate pain and inability to bear weight on the left leg.  She presented to the ED where x-rays show a left femoral neck fracture that may extend into the intertrochanteric region.  She is currently on a Holter monitor for tachy-juan a.  She quit smoking in 1995.  She takes Eliquis 5 mg b.i.d. at home.  She walks with a walker at baseline.  She lives with her daughter in a one story house.  Orthopedic surgery is consulted for management of her left femoral neck fracture. She denies other areas of pain or injury at this time.

## 2023-05-29 NOTE — ED NOTES
Pt is resting comfortably and voices no complaints at this time. Pt breathing regular, unlabored, and symmetrical with no accessory muscle use. Pt abdomen soft, non distended, and non tender. Pt denies NV and pain at this time. Pt skin is warm, dry, and intact. Pt is AAOx4 with confusion. Pt is able to correctly answer orientation questions but gets confused about who people are. Pt LLE has rotation and shortening. VSS.

## 2023-05-29 NOTE — ANESTHESIA PREPROCEDURE EVALUATION
[facility]  Anesthesia Pre-Operative Evaluation         Patient Name: Brunilda England  YOB: 1931  MRN: [id number]    SUBJECTIVE:     Pre-operative evaluation for Procedure(s) (LRB):  INSERTION, INTRAMEDULLARY TEREZA, FEMUR (Left)     05/29/2023    Brunilda England is a 89 y.o. female w/ a significant PMHx of dementia, DM, HTN, CKD3a, Tachybrady, pAF (on eliquis), HFpEF (most recent echo EF 53% on 5/21), previous NSCLC who presents to the ED 5/28 after a mechanical fall. CTH on arrival revealed trace traumatic appearing SAH in the L parietal lobe, no mass, MLS, or other parenchymal hemorrhage. Pt also with mildly displaced fx of the L proximal femur.    Patient now presents for the above procedure(s).      LDA:        Peripheral IV - Single Lumen 05/28/23 2100 20 G Anterior;Left Forearm (Active)   Site Assessment Clean;Dry;Intact 05/28/23 2308   Line Status Blood return noted;Flushed 05/28/23 2308   Dressing Status Clean;Dry;Intact 05/28/23 2308   Number of days: 0       Prev airway: None documented.    Drips:    sodium chloride 0.9% 100 mL/hr at 05/29/23 1038       Patient Active Problem List   Diagnosis    History of lung cancer    Essential hypertension    Neuropathy    Osteoporosis    History of rectal or anal cancer    PMB (postmenopausal bleeding)    Pure hypercholesterolemia    Anemia    Malignant neoplasm of lung    Chronic back pain    Lumbar radiculopathy    Neuropathy of left lower extremity    PVD (peripheral vascular disease) with claudication    Radiation vaginitis    Bilateral exudative age-related macular degeneration    Hypertensive retinopathy of both eyes    Posterior vitreous detachment, both eyes    Macular hemorrhage of left eye    Atrophy of macula lutea    Pernicious anemia    Coronary artery disease involving native coronary artery without angina pectoris    Type 2 diabetes mellitus with circulatory disorder, without long-term current use of  insulin    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    Gait abnormality    Balance problem    Dementia    Atherosclerotic peripheral vascular disease with rest pain    Mood disorder    Chalazion of right eye    Centrilobular emphysema    Chronic diastolic heart failure    Chronic renal failure, stage 3a    Aortic atherosclerosis    Atherosclerosis of native coronary artery of native heart with angina pectoris    Late onset Alzheimer's dementia with behavioral disturbance    Paroxysmal atrial fibrillation    PAF (paroxysmal atrial fibrillation)    Pneumonia    Fatigue    Tachycardia    Bradycardia    Confusion    Pulmonary hypertension    Aortic valve regurgitation    Left basicervical femoral neck fracture with extension into the intertroch region    Subarachnoid hemorrhage following injury, no loss of consciousness       Review of patient's allergies indicates:   Allergen Reactions    Bextra [valdecoxib] Swelling    Gabapentin Diarrhea and Nausea Only       Current Inpatient Medications:   acetaminophen  1,000 mg Oral Q8H    [START ON 5/30/2023] amlodipine-benazepril 5-20 mg  1 capsule Oral Daily    calcium-vitamin D3  2 tablet Oral Daily    donepeziL  5 mg Oral QHS    DULoxetine  60 mg Oral QAM    levETIRAcetam  500 mg Oral BID    metoprolol tartrate  25 mg Oral BID    polyethylene glycol  17 g Oral Daily    vitamin D  1,000 Units Oral Daily       Current Facility-Administered Medications on File Prior to Encounter   Medication Dose Route Frequency Provider Last Rate Last Admin    sodium chloride 0.9% flush 5 mL  5 mL Intravenous PRN Sarah Earl MD         Current Outpatient Medications on File Prior to Encounter   Medication Sig Dispense Refill    albuterol (PROVENTIL) 2.5 mg /3 mL (0.083 %) nebulizer solution Take 3 mLs (2.5 mg total) by nebulization every 6 (six) hours as needed for Wheezing. Rescue 90  each 3    amlodipine-benazepril 5-20 mg (LOTREL) 5-20 mg per capsule Take 1 capsule by mouth once daily. 90 capsule 3    apixaban (ELIQUIS) 5 mg Tab Take 1 tablet (5 mg total) by mouth 2 (two) times daily. 60 tablet 2    azelastine (ASTELIN) 137 mcg (0.1 %) nasal spray 1 spray (137 mcg total) by Nasal route 2 (two) times daily. 30 mL 3    cyanocobalamin (VITAMIN B-12) 1000 MCG tablet Take 1,000 mcg by mouth every morning.      donepeziL (ARICEPT) 5 MG tablet Take 1 tablet (5 mg total) by mouth every evening. 90 tablet 4    DULoxetine (CYMBALTA) 60 MG capsule Take 1 capsule (60 mg total) by mouth every morning. 90 capsule 3    estradioL (ESTRACE) 0.01 % (0.1 mg/gram) vaginal cream USE ONE-HALF GRAM VAGINALLY TWICE a WEEK 42.5 g 3    linaGLIPtin (TRADJENTA) 5 mg Tab tablet Take 1 tablet (5 mg total) by mouth once daily. 90 tablet 3    metoprolol tartrate (LOPRESSOR) 25 MG tablet Take 1 tablet (25 mg total) by mouth 2 (two) times daily. 60 tablet 11    nitroGLYCERIN (NITROSTAT) 0.4 MG SL tablet Place 1 tablet (0.4 mg total) under the tongue every 5 (five) minutes as needed. 25 tablet 0    simvastatin (ZOCOR) 80 MG tablet Take 1 tablet (80 mg total) by mouth once daily. 90 tablet 4    vitamin D (VITAMIN D3) 1000 units Tab Take 1,000 Units by mouth once daily.         Past Surgical History:   Procedure Laterality Date    BRONCHOSCOPY      CHOLECYSTECTOMY      COLONOSCOPY      FLUOROSCOPIC ANGIOGRAPHY OF LOWER EXTREMITY WITH TOPICAL ULTRASOUND Right 12/6/2018    Procedure: ARTERIOGRAM-LEG AND ULTRASOUND;  Surgeon: SEBASTIÁN Mcpherson III, MD;  Location: [facility] OR [address] [place] [address];  Service: Peripheral Vascular;  Laterality: Right;  16.5 min  1059.42 mGy  59ml Dye  2ml Local    heart stent      HYSTERECTOMY      INCISIONAL BIOPSY Right 12/6/2019    Procedure: INCISIONAL BIOPSY;  Surgeon: Leslie Castillo MD;  [facility];  Service: Plastics;  Laterality: Right;    left leg surgery      blockage     PERCUTANEOUS TRANSLUMINAL ANGIOPLASTY N/A 2018    Procedure: PTA (ANGIOPLASTY, PERCUTANEOUS, TRANSLUMINAL);  Surgeon: SEBASTIÁN Mcpherson III, MD;  Location: [facility] OR [address] [place] [address];  Service: Peripheral Vascular;  Laterality: N/A;    RECONSTRUCTION USING FLAP Right 2019    Procedure: RECONSTRUCTION USING FLAP/FULL THICKNESS MRESETION AND RECONSTRUCTION RIGHT UPPER EYELID WITH BIOPSY;  Surgeon: Leslie Castillo MD;  Location: [facility] OR [address] [place] [address];  Service: Plastics;  Laterality: Right;    TONSILLECTOMY         Social History     Socioeconomic History    Marital status:    Tobacco Use    Smoking status: Former     Packs/day: 0.50     Years: 30.00     Pack years: 15.00     Types: Cigarettes     Quit date: 1995     Years since quittin.4     Passive exposure: Past    Smokeless tobacco: Never   Substance and Sexual Activity    Alcohol use: No    Drug use: No    Sexual activity: Not Currently     Birth control/protection: Surgical     Social Determinants of Health     Financial Resource Strain: Medium Risk    Difficulty of Paying Living Expenses: Somewhat hard   Food Insecurity: Food Insecurity Present    Worried About Running Out of Food in the Last Year: Sometimes true    Ran Out of Food in the Last Year: Sometimes true   Transportation Needs: No Transportation Needs    Lack of Transportation (Medical): No    Lack of Transportation (Non-Medical): No   Physical Activity: Inactive    Days of Exercise per Week: 0 days    Minutes of Exercise per Session: 0 min   Stress: Stress Concern Present    Feeling of Stress : To some extent   Social Connections: Unknown    Frequency of Communication with Friends and Family: More than three times a week    Frequency of Social Gatherings with Friends and Family: Once a week    Active Member of Clubs or Organizations: No    Marital Status:    Housing Stability: Low Risk     Unable to Pay for Housing in the Last Year: No    Number of Places Lived in  the Last Year: 1    Unstable Housing in the Last Year: No       OBJECTIVE:     Vital Signs Range (Last 24H):  Temp:  [36.7 °C (98 °F)-37.2 °C (98.9 °F)]   Pulse:  []   Resp:  [16-33]   BP: (128-168)/(56-80)   SpO2:  [93 %-99 %]       Significant Labs:  Lab Results   Component Value Date    WBC 8.60 05/28/2023    HGB 10.9 (L) 05/28/2023    HCT 32.9 (L) 05/28/2023     05/28/2023    CHOL 162 04/25/2022    TRIG 108 04/25/2022    HDL 68 04/25/2022    ALT 11 05/29/2023    AST 16 05/29/2023     05/29/2023    K 3.8 05/29/2023     05/29/2023    CREATININE 1.2 05/29/2023    BUN 15 05/29/2023    CO2 24 05/29/2023    TSH 2.410 05/20/2023    INR 1.0 12/06/2019    HGBA1C 6.3 (H) 05/29/2023       Diagnostic Studies: No relevant studies.    EKG:   Results for orders placed or performed during the hospital encounter of 05/28/23   EKG 12-lead    Collection Time: 05/29/23  4:15 AM    Narrative    Test Reason : W19.XXXA,    Vent. Rate : 097 BPM     Atrial Rate : 194 BPM     P-R Int : 000 ms          QRS Dur : 090 ms      QT Int : 340 ms       P-R-T Axes : 269 061 -78 degrees     QTc Int : 431 ms    Probably  Atrial flutter with 2:1 A-V conduction vs ST  ST and T wave abnormality, consider inferior ischemia  ST and T wave abnormality, consider anterolateral ischemia  Abnormal ECG  When compared with ECG of 22-MAY-2023 09:16,  Atrial flutter has replaced Sinus rhythm probably  Inverted T waves have replaced nonspecific T wave abnormality in Inferior  leads  T wave inversion now evident in Anterior leads  Confirmed by Ellis SOLER MD ([id number]) on 5/29/2023 9:53:16 AM    Referred By: [email or web address]   SELF           Confirmed By:Ellis SOLER MD       2D ECHO:  TTE:  Results for orders placed or performed during the hospital encounter of 05/20/23   Echo   Result Value Ref Range    Ascending aorta 2.90 cm    STJ 2.66 cm    AV mean gradient 6 mmHg    Ao peak olesya 1.54 m/s    Ao VTI 28.11 cm    IVRT 102.76 msec    IVS 0.82 0.6 -  1.1 cm    LA size 3.35 cm    Left Atrium Major Axis 5.75 cm    Left Atrium Minor Axis 5.25 cm    LVIDd 4.57 3.5 - 6.0 cm    LVIDs 3.76 2.1 - 4.0 cm    LVOT diameter 2.00 cm    LVOT peak VTI 12.52 cm    Posterior Wall 0.87 0.6 - 1.1 cm    MV Peak E Ernesto 1.26 m/s    RA Major Axis 4.42 cm    RA Width 3.51 cm    RVDD 2.60 cm    Sinus 3.03 cm    TAPSE 1.35 cm    TR Max Ernesto 3.08 m/s    LA WIDTH 4.28 cm    LV Diastolic Volume 95.66 mL    LV Systolic Volume 60.57 mL    LVOT peak ernesto 0.78 m/s    TDI LATERAL 0.11 m/s    TDI SEPTAL 0.07 m/s    LA volume (mod) 67.00 cm3    LV LATERAL E/E' RATIO 11.45 m/s    LV SEPTAL E/E' RATIO 18.00 m/s    FS 18 %    LA volume 66.89 cm3    LV mass 125.29 g    Left Ventricle Relative Wall Thickness 0.38 cm    AV valve area 1.40 cm2    AV Velocity Ratio 0.51     AV index (prosthetic) 0.45     Mean e' 0.09 m/s    LVOT area 3.1 cm2    LVOT stroke volume 39.31 cm3    AV peak gradient 9 mmHg    E/E' ratio 14.00 m/s    LV Systolic Volume Index 34.0 mL/m2    LV Diastolic Volume Index 53.74 mL/m2    LA Volume Index 37.6 mL/m2    LV Mass Index 70 g/m2    Triscuspid Valve Regurgitation Peak Gradient 38 mmHg    LA Volume Index (Mod) 37.6 mL/m2    BSA 1.82 m2    Right Atrial Pressure (from IVC) 3 mmHg    EF 53 %    TV rest pulmonary artery pressure 41 mmHg    Narrative    · The left ventricle is normal in size with low normal systolic function.  · The estimated ejection fraction is 53%.  · There are segmental left ventricular wall motion abnormalities.  · There is abnormal septal wall motion.  · Mild left atrial enlargement.  · Indeterminate left ventricular diastolic function.  · Normal right ventricular size with low normal right ventricular systolic   function.  · There is mild-to-moderate aortic valve stenosis.  · Aortic valve area is 1.40 cm2; peak velocity is 1.54 m/s; mean gradient   is 6 mmHg.  · Mild aortic regurgitation.  · Mild mitral regurgitation.  · Mild tricuspid regurgitation.  · Normal  central venous pressure (3 mmHg).  · The estimated PA systolic pressure is 41 mmHg.  · There is mild pulmonary hypertension.  · Trivial posterior pericardial effusion. At base and under the RA and   small outside the RV apically.          ELIZABETH:  No results found for this or any previous visit.    ASSESSMENT/PLAN:                                                                                                                  05/29/2023  Brunilda England is a 89 y.o., female.      Pre-op Assessment    I have reviewed the Patient Summary Reports.     I have reviewed the Nursing Notes. I have reviewed the NPO Status.   I have reviewed the Medications.     Review of Systems  Anesthesia Hx:  No problems with previous Anesthesia  History of prior surgery of interest to airway management or planning:  Denies Personal Hx of Anesthesia complications.   Social:  Non-Smoker, No Alcohol Use    Cardiovascular:   Hypertension Valvular problems/Murmurs Past MI (1970a reports 2 MI in ) CAD  Dysrhythmias atrial fibrillation  Denies Angina.    Pulmonary:   COPD    Renal/:   Chronic Renal Disease, CKD    Musculoskeletal:  Spine Disorders: lumbar    Neurological:  Dementia    Endocrine:   Diabetes, type 2        Physical Exam  General: Well nourished, Cooperative, Alert and Oriented    Airway:  Mallampati: II   Mouth Opening: Normal  TM Distance: Normal  Tongue: Normal  Neck ROM: Normal ROM    Dental:  Edentulous        Anesthesia Plan  Type of Anesthesia, risks & benefits discussed:    Anesthesia Type: Gen ETT, Regional  Intra-op Monitoring Plan: Standard ASA Monitors  Post Op Pain Control Plan: multimodal analgesia  Induction:  IV  Airway Plan: Direct, Post-Induction  Informed Consent: Informed consent signed with the Patient representative and all parties understand the risks and agree with anesthesia plan.  All questions answered.   ASA Score: 3  Day of Surgery Review of History & Physical: H&P Update referred to the  surgeon/provider.    Ready For Surgery From Anesthesia Perspective.     .

## 2023-05-29 NOTE — ASSESSMENT & PLAN NOTE
89F with extensive PMHx including Afib on Eliquis BID presents s/p fall at home w/o LOC found to have L femoral neck fracture and punctate L parietal tSAH:    --No acute neurosurgical intervention  --Follow-up CTH 4-6h scan for stability  --Reverse anti-plt/coag medications - hold Eliquis, if blossoming of tSAH will need reversal  --SBP <140  --Na >135  --Keppra 500 BID x14d; can consider lower dose given age  --HOB >30  --NPO  --Continue to monitor clinically, notify NSGY immediately with any changes in neuro status  --Hip fx per Ortho    Dispo: ongoing, final recs pending interval CT    Will d/w Dr. Butcher

## 2023-05-29 NOTE — CONSULTS
[facility] - Emergency Dept  Neurocritical Care  Consult Note    Admit Date: 5/28/2023  Service Date: 05/29/2023  Length of Stay: 0    Consults  Subjective:     Chief Complaint: Left displaced femoral neck fracture    History of Present Illness: Ms. England is a 89 y/o f w/ pmhx of dementia, DM, HTN, CKD3a, Tachybrady, pAF (on eliquis), HFpEF (most recent echo EF 53% on 5/21), previous NSCLC who presents to the ED 5/28 after a mechanical fall.     CTH on arrival revealed trace traumatic appearing SAH in the L parietal lobe, no mass, MLS, or other parenchymal hemorrhage. Patient was neuro intact and at baseline at this time. Further imaging was pursued for trauma workup that was negative for further fx in c-spine, but patient does have a comminuted mildly displaced fx of the L proximal femur.     Repeat CTH was done and revealed stable tSAH, at which point BP goal was liberalized to <160 per NSGY and patient was to be admitted to Hospital Medicine. Patient received home antihypertensives, but unfortunately remains above BP goal and was started on cardene gtt at 5.       NCC consulted for concern of patient on minimal cardene gtt for BP goal of <160 in setting of stable tSAH. At time of exam, pt off cardene gtt, SBP in 120s s/p resuming home PO antihypertensives. Pt only complaint at time of exam L hip pain at site of fracture, along w/ some numbness along L inner thigh/calf.       Past Medical History:   Diagnosis Date    Anal cancer 1995    Anemia     Cancer 1995    anal cancer    Centrilobular emphysema 7/6/2020    Diabetes mellitus     With circulatory disorder    Lumbar radiculopathy 5/16/2014    Lung cancer 2012    s/p chemo/radiation    Macular degeneration     Macular hemorrhage of left eye     Neuropathy     Osteoporosis     Osteoporosis, unspecified 11/9/2012    Pure hypercholesterolemia      Past Surgical History:   Procedure Laterality Date    BRONCHOSCOPY      CHOLECYSTECTOMY      COLONOSCOPY       FLUOROSCOPIC ANGIOGRAPHY OF LOWER EXTREMITY WITH TOPICAL ULTRASOUND Right 12/6/2018    Procedure: ARTERIOGRAM-LEG AND ULTRASOUND;  Surgeon: SEBASTIÁN Mcpherson III, MD;  [facility];  Service: Peripheral Vascular;  Laterality: Right;  16.5 min  1059.42 mGy  59ml Dye  2ml Local    heart stent      HYSTERECTOMY      INCISIONAL BIOPSY Right 12/6/2019    Procedure: INCISIONAL BIOPSY;  Surgeon: Leslie Castillo MD;  [facility];  Service: Plastics;  Laterality: Right;    left leg surgery      blockage    PERCUTANEOUS TRANSLUMINAL ANGIOPLASTY N/A 12/6/2018    Procedure: PTA (ANGIOPLASTY, PERCUTANEOUS, TRANSLUMINAL);  Surgeon: SEBASTIÁN Mcpherson III, MD;  [facility];  Service: Peripheral Vascular;  Laterality: N/A;    RECONSTRUCTION USING FLAP Right 12/6/2019    Procedure: RECONSTRUCTION USING FLAP/FULL THICKNESS MRESETION AND RECONSTRUCTION RIGHT UPPER EYELID WITH BIOPSY;  Surgeon: Leslie Castillo MD;  Location: [facility] OR [address] [place] [address];  Service: Plastics;  Laterality: Right;    TONSILLECTOMY        Current Facility-Administered Medications on File Prior to Encounter   Medication Dose Route Frequency Provider Last Rate Last Admin    sodium chloride 0.9% flush 5 mL  5 mL Intravenous PRN Sarah Earl MD         Current Outpatient Medications on File Prior to Encounter   Medication Sig Dispense Refill    albuterol (PROVENTIL) 2.5 mg /3 mL (0.083 %) nebulizer solution Take 3 mLs (2.5 mg total) by nebulization every 6 (six) hours as needed for Wheezing. Rescue 90 each 3    amlodipine-benazepril 5-20 mg (LOTREL) 5-20 mg per capsule Take 1 capsule by mouth once daily. 90 capsule 3    apixaban (ELIQUIS) 5 mg Tab Take 1 tablet (5 mg total) by mouth 2 (two) times daily. 60 tablet 2    azelastine (ASTELIN) 137 mcg (0.1 %) nasal spray 1 spray (137 mcg total) by Nasal route 2 (two) times daily. 30 mL 3    cyanocobalamin (VITAMIN B-12) 1000 MCG tablet Take 1,000 mcg by mouth every  morning.      donepeziL (ARICEPT) 5 MG tablet Take 1 tablet (5 mg total) by mouth every evening. 90 tablet 4    DULoxetine (CYMBALTA) 60 MG capsule Take 1 capsule (60 mg total) by mouth every morning. 90 capsule 3    estradioL (ESTRACE) 0.01 % (0.1 mg/gram) vaginal cream USE ONE-HALF GRAM VAGINALLY TWICE a WEEK 42.5 g 3    linaGLIPtin (TRADJENTA) 5 mg Tab tablet Take 1 tablet (5 mg total) by mouth once daily. 90 tablet 3    metoprolol tartrate (LOPRESSOR) 25 MG tablet Take 1 tablet (25 mg total) by mouth 2 (two) times daily. 60 tablet 11    nitroGLYCERIN (NITROSTAT) 0.4 MG SL tablet Place 1 tablet (0.4 mg total) under the tongue every 5 (five) minutes as needed. 25 tablet 0    simvastatin (ZOCOR) 80 MG tablet Take 1 tablet (80 mg total) by mouth once daily. 90 tablet 4    vitamin D (VITAMIN D3) 1000 units Tab Take 1,000 Units by mouth once daily.        Allergies: Bextra [valdecoxib] and Gabapentin    Family History   Problem Relation Age of Onset    Stroke Mother     Cancer Father     Breast cancer Maternal Aunt     Ovarian cancer Neg Hx     Amblyopia Neg Hx     Blindness Neg Hx     Cataracts Neg Hx     Glaucoma Neg Hx     Macular degeneration Neg Hx     Retinal detachment Neg Hx     Strabismus Neg Hx      Social History     Tobacco Use    Smoking status: Former     Packs/day: 0.50     Years: 30.00     Pack years: 15.00     Types: Cigarettes     Quit date: 1995     Years since quittin.4     Passive exposure: Past    Smokeless tobacco: Never   Substance Use Topics    Alcohol use: No    Drug use: No     Review of Systems   Constitutional:  Negative for fatigue and fever.   HENT:  Negative for trouble swallowing and voice change.    Eyes:  Negative for photophobia and visual disturbance.   Respiratory:  Negative for cough and shortness of breath.    Cardiovascular:  Negative for chest pain and palpitations.   Gastrointestinal:  Negative for abdominal pain, nausea and vomiting.    Musculoskeletal:  Positive for gait problem. Negative for neck pain and neck stiffness.   Skin:  Negative for rash and wound.   Neurological:  Positive for weakness (secondary to pain) and numbness. Negative for speech difficulty and headaches.        L inner leg numbness, likely 2/2 fracture   Psychiatric/Behavioral:  Negative for agitation.    Objective:     Vitals:    Temp: 98 °F (36.7 °C)  Pulse: 98  BP: (!) 163/74  MAP (mmHg): 106  Resp: (!) 22  SpO2: 96 %    Temp  Min: 98 °F (36.7 °C)  Max: 98 °F (36.7 °C)  Pulse  Min: 62  Max: 101  BP  Min: 128/61  Max: 168/78  MAP (mmHg)  Min: 86  Max: 112  Resp  Min: 16  Max: 33  SpO2  Min: 93 %  Max: 99 %    No intake/output data recorded.            Physical Exam  General Appearance: Not in acute hemodynamic distress  Mental Status Exam: awake, alert, aware, oriented x4, no aphasia, no dysarthria  Cranial Nerves: VFF, EOM full, pupils are equal and reactive to light bilaterally, symmetric facial sensory, symmetric facial motor, hearing grossly normal, midline tongue  Motor: no drift in the UE/LE. Power is 5/5 in both UE, RLE. LLE limited by pain, distal strength 3/5, proximal strength testing deferred 2/2 known displaced hip fracture. Normal tone.  Sensory: Symmetric to LT in b/l upper extremities. Slightly decreased over L inner thigh/inner calf, intact over posterior and lateral aspects of leg.  Coordination: FTN without dysmetria, fine motor regular and fast  Vascular: S1/S2 of normal intensity, no S3/S4 appreciated, no murmurs appreciated  Lungs: CTA bilaterally without wheezing  Abdomen: Soft, non-distended, non-tender, BS +             Today I personally reviewed pertinent medications, imaging, notably: Cth w/ stable SAH    Assessment/Plan:     Neuro  Subarachnoid hemorrhage following injury, no loss of consciousness  Small L parietal SAH in setting of mechanical fall, pt therapeutically anticoagulated    - Stable on repeat imaging, pt asymptomatic from bleed  -  Keppra 250 mg BID x7 days for post-TBI AED ppx, discussed w/ patient and daughter at bedside  - SBP<160  - Timing of resuming home eliquis per NSGY and ortho     Cardiac/Vascular  Essential hypertension  89 y/o f w/ pmhx of dementia, DM, HTN, CKD3a, Tachybrady, pAF (on eliquis), HFpEF (most recent echo EF 53% on 5/21), previous NSCLC who presents to the ED 5/28 after a mechanical fall.  CTH on arrival revealed trace traumatic appearing SAH in the L parietal lobe, no mass, MLS, or other parenchymal hemorrhage. Patient was neuro intact and at baseline at this time. Repeat CTH was done and revealed stable tSAH, BP goal was liberalized to <160 per NSGY.    Patient received home antihypertensives, but unfortunately remains above BP goal and was started on cardene gtt at 5 -> now off 2/2 restarting home antihypertensives.       NCC consulted for concern of patient on minimal cardene gtt for BP goal of <160 in setting of stable tSAH.     - On exam patient is neurologically intact, is not in respiratory distress  -BP at goal after one time PRN labetalol. Recommend increasing pain regimen appropriately and continuing home antihypertensives.    -Primary concern is L femur fx and not neurologic.  -No obvious ICU needs at this time.     The above has been discussed with my attending. Thank you  for this consult, we will sign off at this time.       Orthopedic  * Left basicervical femoral neck fracture with extension into the intertroch region  Management per ortho         Activity Orders          Diet NPO: NPO starting at 05/30 0001    Turn patient starting at 05/29 1000    Diet Adult Regular (IDDSI Level 7): Regular starting at 05/29 0905    Bed rest starting at 05/29 0900        Prior     NCC to sign off at this time. Please call with any further questions or concerns.     Level III visit    Linda Baltazar MD  Neurocritical Care  [facility] - Emergency Dept

## 2023-05-29 NOTE — SUBJECTIVE & OBJECTIVE
Past Medical History:   Diagnosis Date    Anal cancer 1995    Anemia     Cancer 1995    anal cancer    Centrilobular emphysema 7/6/2020    Diabetes mellitus     With circulatory disorder    Lumbar radiculopathy 5/16/2014    Lung cancer 2012    s/p chemo/radiation    Macular degeneration     Macular hemorrhage of left eye     Neuropathy     Osteoporosis     Osteoporosis, unspecified 11/9/2012    Pure hypercholesterolemia      Past Surgical History:   Procedure Laterality Date    BRONCHOSCOPY      CHOLECYSTECTOMY      COLONOSCOPY      FLUOROSCOPIC ANGIOGRAPHY OF LOWER EXTREMITY WITH TOPICAL ULTRASOUND Right 12/6/2018    Procedure: ARTERIOGRAM-LEG AND ULTRASOUND;  Surgeon: SEBASTIÁN Mcpherson III, MD;  Location: [facility] OR [address] [place] [address];  Service: Peripheral Vascular;  Laterality: Right;  16.5 min  1059.42 mGy  59ml Dye  2ml Local    heart stent      HYSTERECTOMY      INCISIONAL BIOPSY Right 12/6/2019    Procedure: INCISIONAL BIOPSY;  Surgeon: Leslie Castillo MD;  Location: [facility] OR [address] [place] [address];  Service: Plastics;  Laterality: Right;    left leg surgery      blockage    PERCUTANEOUS TRANSLUMINAL ANGIOPLASTY N/A 12/6/2018    Procedure: PTA (ANGIOPLASTY, PERCUTANEOUS, TRANSLUMINAL);  Surgeon: SEBASTIÁN Mcpherson III, MD;  Location: [facility] OR [address] [place] [address];  Service: Peripheral Vascular;  Laterality: N/A;    RECONSTRUCTION USING FLAP Right 12/6/2019    Procedure: RECONSTRUCTION USING FLAP/FULL THICKNESS MRESETION AND RECONSTRUCTION RIGHT UPPER EYELID WITH BIOPSY;  Surgeon: Leslie Castillo MD;  Location: [facility] OR [address] [place] [address];  Service: Plastics;  Laterality: Right;    TONSILLECTOMY        Current Facility-Administered Medications on File Prior to Encounter   Medication Dose Route Frequency Provider Last Rate Last Admin    sodium chloride 0.9% flush 5 mL  5 mL Intravenous PRN Sarah Earl MD         Current Outpatient Medications on File Prior to Encounter   Medication Sig Dispense Refill    albuterol (PROVENTIL) 2.5 mg /3 mL (0.083 %) nebulizer  solution Take 3 mLs (2.5 mg total) by nebulization every 6 (six) hours as needed for Wheezing. Rescue 90 each 3    amlodipine-benazepril 5-20 mg (LOTREL) 5-20 mg per capsule Take 1 capsule by mouth once daily. 90 capsule 3    apixaban (ELIQUIS) 5 mg Tab Take 1 tablet (5 mg total) by mouth 2 (two) times daily. 60 tablet 2    azelastine (ASTELIN) 137 mcg (0.1 %) nasal spray 1 spray (137 mcg total) by Nasal route 2 (two) times daily. 30 mL 3    cyanocobalamin (VITAMIN B-12) 1000 MCG tablet Take 1,000 mcg by mouth every morning.      donepeziL (ARICEPT) 5 MG tablet Take 1 tablet (5 mg total) by mouth every evening. 90 tablet 4    DULoxetine (CYMBALTA) 60 MG capsule Take 1 capsule (60 mg total) by mouth every morning. 90 capsule 3    estradioL (ESTRACE) 0.01 % (0.1 mg/gram) vaginal cream USE ONE-HALF GRAM VAGINALLY TWICE a WEEK 42.5 g 3    linaGLIPtin (TRADJENTA) 5 mg Tab tablet Take 1 tablet (5 mg total) by mouth once daily. 90 tablet 3    metoprolol tartrate (LOPRESSOR) 25 MG tablet Take 1 tablet (25 mg total) by mouth 2 (two) times daily. 60 tablet 11    nitroGLYCERIN (NITROSTAT) 0.4 MG SL tablet Place 1 tablet (0.4 mg total) under the tongue every 5 (five) minutes as needed. 25 tablet 0    simvastatin (ZOCOR) 80 MG tablet Take 1 tablet (80 mg total) by mouth once daily. 90 tablet 4    vitamin D (VITAMIN D3) 1000 units Tab Take 1,000 Units by mouth once daily.        Allergies: Bextra [valdecoxib] and Gabapentin    Family History   Problem Relation Age of Onset    Stroke Mother     Cancer Father     Breast cancer Maternal Aunt     Ovarian cancer Neg Hx     Amblyopia Neg Hx     Blindness Neg Hx     Cataracts Neg Hx     Glaucoma Neg Hx     Macular degeneration Neg Hx     Retinal detachment Neg Hx     Strabismus Neg Hx      Social History     Tobacco Use    Smoking status: Former     Packs/day: 0.50     Years: 30.00     Pack years: 15.00     Types: Cigarettes     Quit date: 1995     Years since quittin.4      Passive exposure: Past    Smokeless tobacco: Never   Substance Use Topics    Alcohol use: No    Drug use: No     Review of Systems   Constitutional:  Negative for fatigue and fever.   HENT:  Negative for trouble swallowing and voice change.    Eyes:  Negative for photophobia and visual disturbance.   Respiratory:  Negative for cough and shortness of breath.    Cardiovascular:  Negative for chest pain and palpitations.   Gastrointestinal:  Negative for abdominal pain, nausea and vomiting.   Musculoskeletal:  Positive for gait problem. Negative for neck pain and neck stiffness.   Skin:  Negative for rash and wound.   Neurological:  Positive for weakness (secondary to pain) and numbness. Negative for speech difficulty and headaches.        L inner leg numbness, likely 2/2 fracture   Psychiatric/Behavioral:  Negative for agitation.    Objective:     Vitals:    Temp: 98 °F (36.7 °C)  Pulse: 98  BP: (!) 163/74  MAP (mmHg): 106  Resp: (!) 22  SpO2: 96 %    Temp  Min: 98 °F (36.7 °C)  Max: 98 °F (36.7 °C)  Pulse  Min: 62  Max: 101  BP  Min: 128/61  Max: 168/78  MAP (mmHg)  Min: 86  Max: 112  Resp  Min: 16  Max: 33  SpO2  Min: 93 %  Max: 99 %    No intake/output data recorded.            Physical Exam  General Appearance: Not in acute hemodynamic distress  Mental Status Exam: awake, alert, aware, oriented x4, no aphasia, no dysarthria  Cranial Nerves: VFF, EOM full, pupils are equal and reactive to light bilaterally, symmetric facial sensory, symmetric facial motor, hearing grossly normal, midline tongue  Motor: no drift in the UE/LE. Power is 5/5 in both UE, RLE. LLE limited by pain, distal strength 3/5, proximal strength testing deferred 2/2 known displaced hip fracture. Normal tone.  Sensory: Symmetric to LT in b/l upper extremities. Slightly decreased over L inner thigh/inner calf, intact over posterior and lateral aspects of leg.  Coordination: FTN without dysmetria, fine motor regular and fast  Vascular: S1/S2 of  normal intensity, no S3/S4 appreciated, no murmurs appreciated  Lungs: CTA bilaterally without wheezing  Abdomen: Soft, non-distended, non-tender, BS +             Today I personally reviewed pertinent medications, imaging, notably: Cth w/ stable SAH

## 2023-05-29 NOTE — ED NOTES
Patient resting in stretcher, pain 6/10 r/t L hip ain. Pt is awake alert and oriented x4, VSS. Pt updated on POC. Bed low and locked, SR up x2, call bell in patient reach. Family ay bedside     Two patient identifiers have been checked and are correct.      Appearance: Pt awake, alert & oriented to person, place & time. Pt in no acute distress at present time. Pt is clean and well groomed with clothes appropriately fastened.   Skin: Skin warm, dry & intact. Color consistent with ethnicity. Mucous membranes moist. No breakdown or brusing noted.   Musculoskeletal: L hip pain, nonambulatory, limited movement/ Pulses and sensation intact bilaterally  Respiratory: Respirations spontaneous, even, and non-labored. Visible chest rise noted. Airway is open and patent. No accessory muscle use noted.   Neurologic: Sensation is intact. Speech is clear and appropriate. Eyes open spontaneously, behavior appropriate to situation, follows commands, facial expression symmetrical, bilateral hand grasp equal and even, purposeful motor response noted.  Cardiac: All peripheral pulses present. No Bilateral lower extremity edema. Cap refill is <3 seconds. Outside heart monitor in place   Abdomen: Abdomen soft, non-tender to palpation.   : Pt reports no dysuria or hematuria.  Ruiz in place

## 2023-05-29 NOTE — PROVIDER PROGRESS NOTES - EMERGENCY DEPT.
Encounter Date: 5/28/2023    ED Physician Progress Notes        Physician Note:   ED Resident HAND-OFF NOTE:  7:03 AM 5/29/2023  Brunilda England is a 89 y.o. female who presented to the ED on 5/29/2023 and has been managed by Dr. Lancaster and Dr. Tilley, who reports patient C/O fall. I assumed care of patient from off-going ED physician team at 7:03 AM pending BP control, re-evaluation, and admission.    On my evaluation, Brunilda England appears hemodynamically stable and in NAD. Thus far, Brunilda England has received:  Medications  calcium-vitamin D3 500 mg-5 mcg (200 unit) per tablet 2 tablet (has no administration in time range)  niCARdipine 40 mg/200 mL (0.2 mg/mL) infusion (5 mg/hr Intravenous Restarted 5/29/23 0605)  labetalol 20 mg/4 mL (5 mg/mL) IV syring (has no administration in time range)  morphine injection 2 mg (2 mg Intravenous Given 5/28/23 2230)  morphine injection 2 mg (2 mg Intravenous Given 5/29/23 0101)  acetaminophen tablet 1,000 mg (1,000 mg Oral Given 5/29/23 0400)  levETIRAcetam injection 500 mg (500 mg Intravenous Given 5/29/23 0429)  amlodipine-benazepril 5-20 mg per capsule 1 capsule (1 capsule Oral Given 5/29/23 0645)  metoprolol tartrate (LOPRESSOR) tablet 25 mg (25 mg Oral Given 5/29/23 0538)    On my exam, I appreciate:  BP (!) 163/74   Pulse 98   Temp 98 °F (36.7 °C)   Resp (!) 22   SpO2 96%           Additional ED course:  - Patient reassessed and her systolic blood pressure has been underneath 160 since stopping her Cardene drip.    Disposition:  Patient will be admitted to Hospital Medicine for inpatient in the setting of her fall and traumatic SAH.  ______________________  Zain Valenzuela MD   Emergency Medicine Resident  5/29/2023

## 2023-05-29 NOTE — PROVIDER PROGRESS NOTES - EMERGENCY DEPT.
Encounter Date: 5/28/2023    ED Physician Progress Notes        Physician Note:   ED Resident HAND-OFF NOTE:  11:00 PM 5/29/2023  Brunilda England is a 89 y.o. female who presented to the ED on 5/29/2023 and has been managed by Dr. Phan and Miguel Ángel, who reports patient C/O head trauma in the setting of a mechanical fall. I assumed care of patient from off-going ED physician team at 11:00 PM pending repeat CT head and NSGY evaluation.    On my evaluation, Brunilda England appears well, hemodynamically stable and in NAD. Thus far, Brunilda England has received:  Medications  calcium-vitamin D3 500 mg-5 mcg (200 unit) per tablet 2 tablet (has no administration in time range)  niCARdipine 40 mg/200 mL (0.2 mg/mL) infusion (0 mg/hr Intravenous Hold 5/29/23 0100)  morphine injection 2 mg (2 mg Intravenous Given 5/28/23 2230)  morphine injection 2 mg (2 mg Intravenous Given 5/29/23 0101)    On my exam, I appreciate:  BP (!) 147/74   Pulse 97   Temp 98 °F (36.7 °C)   Resp 19   SpO2 (!) 94%   Constitutional: Well-appearing; Well-Nourished; Non-Toxic-appearing and in NAD.  Head/Face: AT/NC & No Facial asymmetry.  Oropharynx: Speaking Full Sentences with No drooling or slurring of speech.  Cardiovascular: Reg Rate; Reg Rhythm; No Murmurs.  Pulmonary/Chest: AT Thorax with Lungs CTA B/L.  Abdominal: Soft, ND, NT.  Musculoskeletal: FROM, NML Gait.  Neuro/Psych: Calm; Cooperative, Following Command, Answering Questions Appropriately, and No Gait/Balance deficits.    NSGY evaluated pt; recommended repeat 4-6 hour head CT, which showed stability. No need for reversal per NSGY. BP goal now SBP <160 given the stability on repeat head CT. There was difficulty controlling pressure, requiring intermittent Cardene gtt, but improved after receiving home meds + labetalol push. Neuro ICU evaluated patient and deemed her stable for the floor. The patient was signed out to the oncoming team at shift pending weaning of Cardene  gtt.    Disposition: I anticipate patient will be admitted to hip fracture service.  I have discussed and counseled Brunilda JAY Tomás regarding exam, results, diagnosis, treatment, and plan.  ______________________  Aidan Tilley,    Emergency Medicine Resident  3:29 AM 5/29/2023               Attending Note:  Signed out with CT imaging pending.  CTH with trace subarachnoid.  No Csp injury. Neurosurgery consulted, recommend no AC reversal, repeat CT, SBP <140. Ordered for cardene if needed.  Repeat CT stable. New BP goal 160 systolic, per NSG.  Neuro ICU consulted.  Neuro ICU and NSG recommend admission to medicine.

## 2023-05-29 NOTE — ASSESSMENT & PLAN NOTE
Small L parietal SAH in setting of mechanical fall, pt therapeutically anticoagulated    - Stable on repeat imaging, pt asymptomatic from bleed  - Keppra 250 mg BID x7 days for post-TBI AED ppx, discussed w/ patient and daughter at bedside  - SBP<160  - Timing of resuming home eliquis per NSGY and ortho

## 2023-05-29 NOTE — SUBJECTIVE & OBJECTIVE
(Not in a hospital admission)      Review of patient's allergies indicates:   Allergen Reactions    Bextra [valdecoxib] Swelling    Gabapentin Diarrhea and Nausea Only       Past Medical History:   Diagnosis Date    Anal cancer 1995    Anemia     Cancer 1995    anal cancer    Centrilobular emphysema 7/6/2020    Diabetes mellitus     With circulatory disorder    Lumbar radiculopathy 5/16/2014    Lung cancer 2012    s/p chemo/radiation    Macular degeneration     Macular hemorrhage of left eye     Neuropathy     Osteoporosis     Osteoporosis, unspecified 11/9/2012    Pure hypercholesterolemia      Past Surgical History:   Procedure Laterality Date    BRONCHOSCOPY      CHOLECYSTECTOMY      COLONOSCOPY      FLUOROSCOPIC ANGIOGRAPHY OF LOWER EXTREMITY WITH TOPICAL ULTRASOUND Right 12/6/2018    Procedure: ARTERIOGRAM-LEG AND ULTRASOUND;  Surgeon: SEBASTIÁN Mcpherson III, MD;  [facility];  Service: Peripheral Vascular;  Laterality: Right;  16.5 min  1059.42 mGy  59ml Dye  2ml Local    heart stent      HYSTERECTOMY      INCISIONAL BIOPSY Right 12/6/2019    Procedure: INCISIONAL BIOPSY;  Surgeon: Leslie Castillo MD;  Location: [facility] OR [address] [place] [address];  Service: Plastics;  Laterality: Right;    left leg surgery      blockage    PERCUTANEOUS TRANSLUMINAL ANGIOPLASTY N/A 12/6/2018    Procedure: PTA (ANGIOPLASTY, PERCUTANEOUS, TRANSLUMINAL);  Surgeon: SEBASTIÁN Mcpherson III, MD;  [facility];  Service: Peripheral Vascular;  Laterality: N/A;    RECONSTRUCTION USING FLAP Right 12/6/2019    Procedure: RECONSTRUCTION USING FLAP/FULL THICKNESS MRESETION AND RECONSTRUCTION RIGHT UPPER EYELID WITH BIOPSY;  Surgeon: Leslie Castillo MD;  [facility];  Service: Plastics;  Laterality: Right;    TONSILLECTOMY       Family History       Problem Relation (Age of Onset)    Breast cancer Maternal Aunt    Cancer Father    Stroke Mother          Tobacco Use    Smoking status: Former     Packs/day: 0.50     Years: 30.00      Pack years: 15.00     Types: Cigarettes     Quit date: 1995     Years since quittin.4     Passive exposure: Past    Smokeless tobacco: Never   Substance and Sexual Activity    Alcohol use: No    Drug use: No    Sexual activity: Not Currently     Birth control/protection: Surgical     Review of Systems   Constitutional:  Negative for chills and fever.   HENT:  Negative for trouble swallowing and voice change.    Eyes:  Negative for photophobia and visual disturbance.   Respiratory:  Negative for chest tightness.    Cardiovascular:  Negative for chest pain and palpitations.   Gastrointestinal:  Negative for nausea and vomiting.   Genitourinary:  Negative for difficulty urinating.   Musculoskeletal:  Positive for arthralgias, gait problem and myalgias. Negative for back pain and neck pain.   Neurological:  Negative for weakness.   Objective:        There is no height or weight on file to calculate BMI.  Vital Signs (Most Recent):  Temp: 98 °F (36.7 °C) (23)  Pulse: 65 (23)  Resp: 18 (23)  BP: 138/80 (23)  SpO2: 98 % (23) Vital Signs (24h Range):  Temp:  [98 °F (36.7 °C)] 98 °F (36.7 °C)  Pulse:  [65] 65  Resp:  [18] 18  SpO2:  [98 %] 98 %  BP: (138)/(80) 138/80                                 Physical Exam         Neurosurgery Physical Exam  General: no distress  Head: Non-traumatic, normocephalic  Eyes: Pupils equal, EOMi  Neck: Supple, normal ROM, no tenderness to palpation  CVS: tachycardic  Pulm: Symmetric expansion, no respiratory distress; on NC  GI: Abdomen nondistended, nontender  MSK: Moves all extremities but LLE w/o restriction  Skin: Dry, intact  Psych: Normal thought content and cognition  Neuro: AOx3, GCS [id number]  CNII-XII: Intact on fine exam, PERRL, EOMi, facial sensation preserved, no facial asymmetry, tongue/uvula/palate midline, shoulder shrug equal, No pronator drift  Extremities:  Motor:  Upper Extremity:                                   Deltoids        Triceps        Biceps        WE        WF                Interosseous           R        5/5 5/5 5/5 5/5 5/5 5/5 5/5           L        5/5 5/5 5/5 5/5 5/5 5/5 5/5  Lower Extremity:  pain limited movement LLE, proximal RLE                                    HF        KE        KF        DF        PF        EHL           R       5/5 5/5 5/5 5/5 5/5 5/5           L       deferred given hip fx       5/5 5/5 5/5    Reflexes:     DTR: 1-2+ and symmetrically throughout; LLE not tested    Sensory:      Sensorium intact throughout, no sensory level present    Coordination:      Coordination intact throughout     Cervical Spine:      ROM: Full with flexion, extension, lateral rotation and ear-to-shoulder bend.      Midline TTP: Negative        Significant Labs:  No results for input(s): GLU, NA, K, CL, CO2, BUN, CREATININE, CALCIUM, MG in the last 48 hours.  Recent Labs   Lab 05/28/23  2231   WBC 8.60   HGB 10.9*   HCT 32.9*        Recent Labs   Lab 05/28/23  2231   INR 1.0     Microbiology Results (last 7 days)       ** No results found for the last 168 hours. **          All pertinent labs from the last 24 hours have been reviewed.    Significant Diagnostics:  I have reviewed and interpreted all pertinent imaging results/findings within the past 24 hours.  X-Ray Femur 2 View Left    Result Date: 5/28/2023  Acute displaced intertrochanteric fracture of the left proximal femur. Electronically signed by: Liu Polanco MD Date:    05/28/2023 Time:    22:45    X-Ray Knee 2 View Left    Result Date: 5/28/2023  Moderate tricompartmental degenerative changes.  No acute displaced fracture. Electronically signed by: Liu Polanco MD Date:    05/28/2023 Time:    22:47    CT Head Without Contrast    Result Date:  5/28/2023  Trace subarachnoid hemorrhage in the left parietal lobe.  No mass effect, midline shift, or sizable acute parenchymal hemorrhage. Generalized cerebral volume loss and chronic ischemic changes. This report was flagged in Epic as abnormal. COMMUNICATION This critical result was discovered/received at 23:25.  The critical information above was relayed directly by Liu Polanco MD by [facility] secure chat (with acknowledgement) to Sloan Phan MD on 5/28/2023 at 23:27. Electronically signed by: Liu Polanco MD Date:    05/28/2023 Time:    23:30    X-Ray Chest AP Portable    Result Date: 5/28/2023  Stable examination findings of moderate right-sided pleural effusion and additional bilateral airspace opacities. Electronically signed by: Kehinde Ryder MD Date:    05/28/2023 Time:    22:47    X-Ray Pelvis Routine AP    Result Date: 5/28/2023  Acute displaced intertrochanteric fracture of the left proximal femur. Electronically signed by: Liu Polanco MD Date:    05/28/2023 Time:    22:45

## 2023-05-29 NOTE — ED NOTES
Report rec'd from CAROLE Tatum. Assumed care of 89 yo F presenting to the ED for eval of L hip pain s/p fall. Pt dx with L hip fracture and SAH. Shortening and rotation of L leg noted, L leg strength, color, sensation, and pulses in tact. Pt AO x4, CGS 15, PERRL. Pending rpt head CT.

## 2023-05-29 NOTE — SUBJECTIVE & OBJECTIVE
Past Medical History:   Diagnosis Date    Anal cancer 1995    Anemia     Cancer 1995    anal cancer    Centrilobular emphysema 7/6/2020    Diabetes mellitus     With circulatory disorder    Lumbar radiculopathy 5/16/2014    Lung cancer 2012    s/p chemo/radiation    Macular degeneration     Macular hemorrhage of left eye     Neuropathy     Osteoporosis     Osteoporosis, unspecified 11/9/2012    Pure hypercholesterolemia        Past Surgical History:   Procedure Laterality Date    BRONCHOSCOPY      CHOLECYSTECTOMY      COLONOSCOPY      FLUOROSCOPIC ANGIOGRAPHY OF LOWER EXTREMITY WITH TOPICAL ULTRASOUND Right 12/6/2018    Procedure: ARTERIOGRAM-LEG AND ULTRASOUND;  Surgeon: SEBASTIÁN Mcpherson III, MD;  Location: [facility] OR [address] [place] [address];  Service: Peripheral Vascular;  Laterality: Right;  16.5 min  1059.42 mGy  59ml Dye  2ml Local    heart stent      HYSTERECTOMY      INCISIONAL BIOPSY Right 12/6/2019    Procedure: INCISIONAL BIOPSY;  Surgeon: Leslie Castillo MD;  Location: [facility] OR [address] [place] [address];  Service: Plastics;  Laterality: Right;    left leg surgery      blockage    PERCUTANEOUS TRANSLUMINAL ANGIOPLASTY N/A 12/6/2018    Procedure: PTA (ANGIOPLASTY, PERCUTANEOUS, TRANSLUMINAL);  Surgeon: SEBASTIÁN Mcpherson III, MD;  Location: [facility] OR [address] [place] [address];  Service: Peripheral Vascular;  Laterality: N/A;    RECONSTRUCTION USING FLAP Right 12/6/2019    Procedure: RECONSTRUCTION USING FLAP/FULL THICKNESS MRESETION AND RECONSTRUCTION RIGHT UPPER EYELID WITH BIOPSY;  Surgeon: Leslie Castillo MD;  Location: [facility] OR [address] [place] [address];  Service: Plastics;  Laterality: Right;    TONSILLECTOMY         Review of patient's allergies indicates:   Allergen Reactions    Bextra [valdecoxib] Swelling    Gabapentin Diarrhea and Nausea Only       Current Facility-Administered Medications   Medication    [START ON 5/29/2023] calcium-vitamin D3 500 mg-5 mcg (200 unit) per tablet 2 tablet     Current Outpatient Medications   Medication Sig    albuterol (PROVENTIL) 2.5 mg /3 mL  (0.083 %) nebulizer solution Take 3 mLs (2.5 mg total) by nebulization every 6 (six) hours as needed for Wheezing. Rescue    amlodipine-benazepril 5-20 mg (LOTREL) 5-20 mg per capsule Take 1 capsule by mouth once daily.    apixaban (ELIQUIS) 5 mg Tab Take 1 tablet (5 mg total) by mouth 2 (two) times daily.    azelastine (ASTELIN) 137 mcg (0.1 %) nasal spray 1 spray (137 mcg total) by Nasal route 2 (two) times daily.    cyanocobalamin (VITAMIN B-12) 1000 MCG tablet Take 1,000 mcg by mouth every morning.    donepeziL (ARICEPT) 5 MG tablet Take 1 tablet (5 mg total) by mouth every evening.    DULoxetine (CYMBALTA) 60 MG capsule Take 1 capsule (60 mg total) by mouth every morning.    estradioL (ESTRACE) 0.01 % (0.1 mg/gram) vaginal cream USE ONE-HALF GRAM VAGINALLY TWICE a WEEK    linaGLIPtin (TRADJENTA) 5 mg Tab tablet Take 1 tablet (5 mg total) by mouth once daily.    metoprolol tartrate (LOPRESSOR) 25 MG tablet Take 1 tablet (25 mg total) by mouth 2 (two) times daily.    nitroGLYCERIN (NITROSTAT) 0.4 MG SL tablet Place 1 tablet (0.4 mg total) under the tongue every 5 (five) minutes as needed.    simvastatin (ZOCOR) 80 MG tablet Take 1 tablet (80 mg total) by mouth once daily.    vitamin D (VITAMIN D3) 1000 units Tab Take 1,000 Units by mouth once daily.     Facility-Administered Medications Ordered in Other Encounters   Medication    sodium chloride 0.9% flush 5 mL     Family History       Problem Relation (Age of Onset)    Breast cancer Maternal Aunt    Cancer Father    Stroke Mother          Tobacco Use    Smoking status: Former     Packs/day: 0.50     Years: 30.00     Pack years: 15.00     Types: Cigarettes     Quit date: 1995     Years since quittin.4     Passive exposure: Past    Smokeless tobacco: Never   Substance and Sexual Activity    Alcohol use: No    Drug use: No    Sexual activity: Not Currently     Birth control/protection: Surgical     ROS  Constitutional: negative for fevers/chills/night  sweats  Eyes: no acute visual changes  ENT: negative acute  for hearing loss  Respiratory: negative for dyspnea  Cardiovascular: negative for chest pain  Gastrointestinal: negative for abdominal pain  Genitourinary: negative for dysuria  Neurological: negative for headaches  Behavioral/Psych: negative for hallucinations  Endocrine: negative for temperature intolerance  MSK: per HPI  Objective:     Vital Signs (Most Recent):  Temp: 98 °F (36.7 °C) (05/28/23 2122)  Pulse: 65 (05/28/23 2122)  Resp: 18 (05/28/23 2230)  BP: 138/80 (05/28/23 2122)  SpO2: 98 % (05/28/23 2122) Vital Signs (24h Range):  Temp:  [98 °F (36.7 °C)] 98 °F (36.7 °C)  Pulse:  [65] 65  Resp:  [18] 18  SpO2:  [98 %] 98 %  BP: (138)/(80) 138/80           There is no height or weight on file to calculate BMI.    No intake or output data in the 24 hours ending 05/28/23 2318     Ortho/SPM Exam  General:  no acute distress, appears stated age   Neuro: alert and oriented x3  Psych: normal mood  Head: normocephalic, atraumatic.  Eyes: no scleral icterus  Mouth: moist mucous membranes  Cardiovascular: extremities warm and well perfused  Lungs: breathing comfortably, equal chest rise bilat  Skin: clean, dry, intact (any exceptions noted in below musculoskeletal exam)       Musculoskeletal  Right Upper Extremity     -Skin, Intact : no ecchymosis, lesions, lacerations or abrasions   -Deltoid, biceps, triceps intact   -ROM full range of motion to shoulder wrist and elbow  -SILT M/R/U/Ax  -Motor intact Ain/PIN/U/Ax  -WWP     Left Upper Extremity     -Skin, Intact : no ecchymosis, lesions, lacerations or abrasions   -Deltoid, biceps, triceps intact   -ROM full range of motion to shoulder wrist and elbow  -SILT M/R/U/Ax  -Motor intact Ain/PIN/U/Ax  -WWP     Right Lower Extremity Exam     - Skin Intact : no ecchymosis, lesions, lacerations or abrasions   - Quad/ Hip flexor intact   - ROM full range of motion to hip knee and ankle  - TA/EHL/Gastroc/FHL intact  - SILT  throughout  - 2+ dorsalis pedis pulses        Left Lower Extremity Exam    - Skin Intact : no ecchymosis, lesions, lacerations or abrasions   - ROM range of motion to the left hip deferred due to known fracture, no tenderness to palpation around the knee, pain with log roll and inability to perform straight leg raise.  Mild anterior groin tenderness.  - TA/EHL/Gastroc/FHL intact  - SILT throughout  - 2+ dorsalis pedis pulses    Spine:  Denies headache, no tenderness to palpation in the cervical spine.       All joints (shoulder/elbow/wrist/hip/knee/ankle) were examined and had full ROM and were non-tender to palpation except as above          Significant Labs: A1C:   Recent Labs   Lab 04/03/23  1316   HGBA1C 6.5*     CBC:   Recent Labs   Lab 05/28/23  2231   WBC 8.60   HGB 10.9*   HCT 32.9*          All pertinent labs within the past 24 hours have been reviewed.    Significant Imaging: I have reviewed and interpreted all pertinent imaging results/findings.  X-ray of the pelvis shows a unlikely basicervical left femoral neck fracture with extension into the intertrochanteric region and some comminution around the fracture site.  CT scan pending.

## 2023-05-29 NOTE — H&P
"History and Physical  Hospital Medicine       Patient Name: Brunilda England  MRN:  [id number]  Hospital Medicine [facility] [facility]  Honey Calixto MD  Date of Admission:  5/28/2023     Principal Problem:  Left displaced femoral neck fracture   Primary Care Physician: Pepper Whitfield MD      History of Present Illness:     Ms. Brunilda England is a 89 y.o. female with past medical history of HTN, paroxysmal atrial fibrillation/flutter COPD on home oxygen, Chronic diastolic HF< CKD 3, dementia, hx of lung/rectal cancer who was in her usual state of health in which she lives at home with her daughter until she got up at night to change from her sneakers into a pair of slippers in the utility room when she turned wrong and fell while doing so and her daughter heard it and came in to find her on the ground and called EMS to bring her to the hospital. She was found to have a left femur neck fracture. She was using her walker when this happened which she uses for ambulation. She was also found to have a small left parietal SAH on admit and NS saw her as well as NCC in the ER and recommendations given and stable for the floor at this time. Ortho plans for OR tomorrow given this to monitor in interim. Her other daughter is at bedside now and able to assist with history but patien tis also about to do so. She reports having some mild pain to left leg now and she denies any dizziness, chest pain, palpitations, or dyspnea now. Has occaisonal constipation issues but is UTD on BMs now. Discussed planning for post op PT/OT and likely SNF, patient says "well see about that" but daughter reports will need and interested in [facility] and I told her is normal for this post op course to need SNF after a fracture like this.     Home meds- albuterol, norvasc-benazepril, azelastine, b12, donepezil, cymbalta, eliquis, estradiol, metoprolol, simvastatin, tradjenta, vit d        Review of Systems   Constitutional: Negative for chills, " fatigue, fever.   HENT: Negative for sore throat, trouble swallowing.    Eyes: Negative for photophobia, visual disturbance.   Respiratory: Negative for cough, shortness of breath.    Cardiovascular: Negative for chest pain, palpitations, leg swelling.   Gastrointestinal: Negative for abdominal pain, constipation, diarrhea, nausea, vomiting.   Endocrine: Negative for cold intolerance, heat intolerance.   Genitourinary: Negative for dysuria, frequency.   Musculoskeletal: + for arthralgias, myalgias.   Skin: Negative for rash, wound, erythema   Neurological: Negative for dizziness, syncope, weakness, light-headedness.   Psychiatric/Behavioral: Negative for confusion, hallucinations, anxiety  All other systems reviewed and are negative.      Past Medical History: Patient has a past medical history of Anal cancer (1995), Anemia, Cancer (1995), Centrilobular emphysema (7/6/2020), Diabetes mellitus, Lumbar radiculopathy (5/16/2014), Lung cancer (2012), Macular degeneration, Macular hemorrhage of left eye, Neuropathy, Osteoporosis, Osteoporosis, unspecified (11/9/2012), and Pure hypercholesterolemia.    Past Surgical History: Patient has a past surgical history that includes Tonsillectomy; Cholecystectomy; Hysterectomy; Bronchoscopy; Colonoscopy; left leg surgery; heart stent; Fluoroscopic angiography of lower extremity with topical ultrasound (Right, 12/6/2018); Percutaneous transluminal angioplasty (N/A, 12/6/2018); Reconstruction using flap (Right, 12/6/2019); and Incisional biopsy (Right, 12/6/2019).    Social History: Patient reports that she quit smoking about 28 years ago. Her smoking use included cigarettes. She has a 15.00 pack-year smoking history. She has been exposed to tobacco smoke. She has never used smokeless tobacco. She reports that she does not drink alcohol and does not use drugs.    Family History: family history includes Breast cancer in her maternal aunt; Cancer in her father; Stroke in her  mother.    Medications: Scheduled Meds:   acetaminophen  1,000 mg Oral Q8H    [START ON 5/30/2023] amlodipine-benazepril 5-20 mg  1 capsule Oral Daily    calcium-vitamin D3  2 tablet Oral Daily    donepeziL  5 mg Oral QHS    DULoxetine  60 mg Oral QAM    levETIRAcetam  500 mg Oral BID    metoprolol tartrate  25 mg Oral BID    polyethylene glycol  17 g Oral Daily    vitamin D  1,000 Units Oral Daily     Continuous Infusions:   sodium chloride 0.9% 100 mL/hr at 05/29/23 1038     PRN Meds:.bisacodyL, hydrALAZINE, melatonin, methocarbamoL, morphine, ondansetron, oxyCODONE, sodium chloride 0.9%    Allergies: Patient is allergic to bextra [valdecoxib] and gabapentin.    Physical Exam:     Vital Signs (Most Recent):  Temp: 98.9 °F (37.2 °C) (05/29/23 0944)  Pulse: 98 (05/29/23 0944)  Resp: 18 (05/29/23 1036)  BP: (!) 155/70 (05/29/23 0944)  SpO2: 99 % (05/29/23 0944) Vital Signs Range (Last 24H):  Temp:  [98 °F (36.7 °C)-98.9 °F (37.2 °C)]   Pulse:  []   Resp:  [16-33]   BP: (128-168)/(56-80)   SpO2:  [93 %-99 %]    There is no height or weight on file to calculate BMI.     Physical Exam:  Constitutional: Appears well-developed and well-nourished. Pleasant. Family at bedside. Mild pain.  Head: Normocephalic and atraumatic.   Mouth/Throat: Oropharynx is clear and moist.   Eyes: EOM are normal. Pupils are equal, round, and reactive to light. No scleral icterus.   Neck: Normal range of motion. Neck supple.   Cardiovascular: Normal rate and regular rhythm.  No murmur heard.  Pulmonary/Chest: Effort normal and breath sounds normal. No respiratory distress. No wheezes, rales, or rhonchi. On home oxygen.  Abdominal: Soft. Bowel sounds are normal.  No distension or tenderness  Musculoskeletal: LLE shortened and rotated and pain with movement on exam.  Neurological: Alert and oriented to person, place, and time.   Skin: Skin is warm and dry.   Psychiatric: Normal mood and affect. Behavior is normal.   Vitals  reviewed.    Recent Labs   Lab 05/28/23 2231   WBC 8.60   HGB 10.9*   HCT 32.9*          Recent Labs   Lab 05/29/23  0003      K 3.8      CO2 24   BUN 15   CREATININE 1.2   *   CALCIUM 9.9   MG 1.5*   PHOS 2.5*     Recent Labs   Lab 05/29/23  0003   ALKPHOS 51*   ALT 11   AST 16   ALBUMIN 3.5   PROT 7.5   BILITOT 0.2      No results for input(s): POCTGLUCOSE in the last 168 hours.      Assessment and Plan:     Ms. Brunilda England is a 89 y.o. female who presented to [facility] on 5/28/2023 with     Left femur neck fracture  -sustained in mechanical fall with fx seen on imaging on admit  -plan for OR on 5/30 with ortho and hip fx pathway started  -multimodals for pain with bowel regimen, reports UTD on BMs on admit  -vit D at goal at 49  -EKG with atrial flutter with hx of paroxysmal afib/flutter with HR 90s, metoprolol given in the ER from home meds  -post op anticoagulation will discuss with NS given small SAH on admit may need heparin per protocols but will discuss with them tomorrow  -PT/OT on POD 1 and likely will need SNF- family prefers [facility] for referrals as top choice  -pack out on POD 1  -ortho f/u in 1 weeks for bandage removal  -UA neg for infection on admit  -CXR with chronic effusions seen previously  -RCRI of 1 with remote history of stenting with 6% 30 day risk of death, MI cardiac arrest and benefit outweighs risk and would proceed with surgery    Subarachnoid hemorrhage  -small over left parietal area from fall  -seen by NS and NCC, no neuro deficits  -hold eliquis  - goal  -keppra BID x 2 weeks  -HOB 30 degrees  Na >135  -will f/u NS recs for PPX post op    HX of tachy-juan a syndrome  -seen in ER for this earlier this month and cards recs dec metoprolol to 25 BID from 50 BID as HRs ranged 40s-100s  -in atrial flutter now so needs telemetry while here to ensure no further issues that would require further cardiac work up for this    HX of CAD  -remote stents at  [facility] in 1990 and 2003  -not on asa now, holding statin as not on formularly now    Hx of lung cancer  -s/p chemo and last imaging in 2022 was stable dx. Chronic effusions seen on previous imaging seen on CXR here  -on home oxygen    Hx of chronic respiratory failure  -continue home oxygen    HX of COPD  -no acute issues    Paroxysmal atrial flutter/fibrillation  -atrial flutter on admission HR 90s  -given home metoprolol 25 BID now  -hold eliquis with SAH and surgery will have to f/u post op recs from NS regarding anticoagulation    Chronic diastolic heart failure  -euvolemic on exam, not on diuretics    Stage 3 CKD  -Cr at baseline 1.2 now    Dementia  -continue home meds, alert and oriented to person and place and able to give history  -mild per family at bedside    HTN  -keep BP under 140 systolic per NS recs, home meds on board  -hydralazine PRN added, will titrate for goal    Pre DM  -home meds held  -A1C 6.3  -will place SSI + accuchecks               Diet regular nPO MN  DVT PPx:  hold with sah    Disposition:  OR tomorrow    Discharge Planning   DUTCH:  friday    Code Status: Prior   Is the patient medically ready for discharge?:     Reason for patient still in hospital (select all that apply): Patient trending condition

## 2023-05-30 ENCOUNTER — ANESTHESIA (OUTPATIENT)
Dept: SURGERY | Facility: HOSPITAL | Age: 88
DRG: 956 | End: 2023-05-30
Payer: MEDICARE

## 2023-05-30 PROBLEM — S72.142A INTERTROCHANTERIC FRACTURE OF LEFT HIP, CLOSED, INITIAL ENCOUNTER: Status: ACTIVE | Noted: 2023-05-30

## 2023-05-30 LAB
ABO + RH BLD: ABNORMAL
ANION GAP SERPL CALC-SCNC: 9 MMOL/L (ref 8–16)
BASOPHILS # BLD AUTO: 0.02 K/UL (ref 0–0.2)
BASOPHILS NFR BLD: 0.3 % (ref 0–1.9)
BLD GP AB SCN CELLS X3 SERPL QL: ABNORMAL
BLOOD GROUP ANTIBODIES SERPL: NORMAL
BUN SERPL-MCNC: 12 MG/DL (ref 10–30)
CALCIUM SERPL-MCNC: 9.6 MG/DL (ref 8.7–10.5)
CHLORIDE SERPL-SCNC: 103 MMOL/L (ref 95–110)
CO2 SERPL-SCNC: 24 MMOL/L (ref 23–29)
CREAT SERPL-MCNC: 0.9 MG/DL (ref 0.5–1.4)
DIFFERENTIAL METHOD: ABNORMAL
EOSINOPHIL # BLD AUTO: 0.1 K/UL (ref 0–0.5)
EOSINOPHIL NFR BLD: 1.1 % (ref 0–8)
ERYTHROCYTE [DISTWIDTH] IN BLOOD BY AUTOMATED COUNT: 16.1 % (ref 11.5–14.5)
EST. GFR  (NO RACE VARIABLE): >60 ML/MIN/1.73 M^2
GLUCOSE SERPL-MCNC: 126 MG/DL (ref 70–110)
HCT VFR BLD AUTO: 27.4 % (ref 37–48.5)
HGB BLD-MCNC: 9.3 G/DL (ref 12–16)
IMM GRANULOCYTES # BLD AUTO: 0.02 K/UL (ref 0–0.04)
IMM GRANULOCYTES NFR BLD AUTO: 0.3 % (ref 0–0.5)
LYMPHOCYTES # BLD AUTO: 1.1 K/UL (ref 1–4.8)
LYMPHOCYTES NFR BLD: 16.6 % (ref 18–48)
MAGNESIUM SERPL-MCNC: 1.5 MG/DL (ref 1.6–2.6)
MCH RBC QN AUTO: 28.7 PG (ref 27–31)
MCHC RBC AUTO-ENTMCNC: 33.9 G/DL (ref 32–36)
MCV RBC AUTO: 85 FL (ref 82–98)
MONOCYTES # BLD AUTO: 0.7 K/UL (ref 0.3–1)
MONOCYTES NFR BLD: 10.7 % (ref 4–15)
NEUTROPHILS # BLD AUTO: 4.6 K/UL (ref 1.8–7.7)
NEUTROPHILS NFR BLD: 71 % (ref 38–73)
NRBC BLD-RTO: 0 /100 WBC
PHOSPHATE SERPL-MCNC: 2.7 MG/DL (ref 2.7–4.5)
PLATELET # BLD AUTO: 154 K/UL (ref 150–450)
PMV BLD AUTO: 12.4 FL (ref 9.2–12.9)
POCT GLUCOSE: 128 MG/DL (ref 70–110)
POCT GLUCOSE: 156 MG/DL (ref 70–110)
POCT GLUCOSE: 172 MG/DL (ref 70–110)
POCT GLUCOSE: 208 MG/DL (ref 70–110)
POTASSIUM SERPL-SCNC: 5.7 MMOL/L (ref 3.5–5.1)
RBC # BLD AUTO: 3.24 M/UL (ref 4–5.4)
SODIUM SERPL-SCNC: 136 MMOL/L (ref 136–145)
SPECIMEN OUTDATE: ABNORMAL
WBC # BLD AUTO: 6.45 K/UL (ref 3.9–12.7)

## 2023-05-30 PROCEDURE — 94761 N-INVAS EAR/PLS OXIMETRY MLT: CPT

## 2023-05-30 PROCEDURE — 25000003 PHARM REV CODE 250: Performed by: STUDENT IN AN ORGANIZED HEALTH CARE EDUCATION/TRAINING PROGRAM

## 2023-05-30 PROCEDURE — 63600175 PHARM REV CODE 636 W HCPCS: Performed by: STUDENT IN AN ORGANIZED HEALTH CARE EDUCATION/TRAINING PROGRAM

## 2023-05-30 PROCEDURE — 36415 COLL VENOUS BLD VENIPUNCTURE: CPT | Performed by: HOSPITALIST

## 2023-05-30 PROCEDURE — 27201423 OPTIME MED/SURG SUP & DEVICES STERILE SUPPLY: Performed by: ORTHOPAEDIC SURGERY

## 2023-05-30 PROCEDURE — 71000033 HC RECOVERY, INTIAL HOUR: Performed by: ORTHOPAEDIC SURGERY

## 2023-05-30 PROCEDURE — 25000003 PHARM REV CODE 250: Performed by: NURSE ANESTHETIST, CERTIFIED REGISTERED

## 2023-05-30 PROCEDURE — 84100 ASSAY OF PHOSPHORUS: CPT | Performed by: HOSPITALIST

## 2023-05-30 PROCEDURE — 27245 TREAT THIGH FRACTURE: CPT | Mod: LT,,, | Performed by: ORTHOPAEDIC SURGERY

## 2023-05-30 PROCEDURE — 63600175 PHARM REV CODE 636 W HCPCS: Performed by: HOSPITALIST

## 2023-05-30 PROCEDURE — 86900 BLOOD TYPING SEROLOGIC ABO: CPT | Performed by: STUDENT IN AN ORGANIZED HEALTH CARE EDUCATION/TRAINING PROGRAM

## 2023-05-30 PROCEDURE — 86922 COMPATIBILITY TEST ANTIGLOB: CPT | Performed by: STUDENT IN AN ORGANIZED HEALTH CARE EDUCATION/TRAINING PROGRAM

## 2023-05-30 PROCEDURE — D9220A PRA ANESTHESIA: Mod: CRNA,,, | Performed by: NURSE ANESTHETIST, CERTIFIED REGISTERED

## 2023-05-30 PROCEDURE — 25000003 PHARM REV CODE 250: Performed by: HOSPITALIST

## 2023-05-30 PROCEDURE — 27245 PR OPEN FIX INTER/SUBTROCH FX,IMPLNT: ICD-10-PCS | Mod: LT,,, | Performed by: ORTHOPAEDIC SURGERY

## 2023-05-30 PROCEDURE — 36620 INSERTION CATHETER ARTERY: CPT | Mod: ,,, | Performed by: ANESTHESIOLOGY

## 2023-05-30 PROCEDURE — 36000710: Performed by: ORTHOPAEDIC SURGERY

## 2023-05-30 PROCEDURE — 86905 BLOOD TYPING RBC ANTIGENS: CPT | Performed by: STUDENT IN AN ORGANIZED HEALTH CARE EDUCATION/TRAINING PROGRAM

## 2023-05-30 PROCEDURE — 36620 ARTERIAL: ICD-10-PCS | Mod: ,,, | Performed by: ANESTHESIOLOGY

## 2023-05-30 PROCEDURE — 27201037 HC PRESSURE MONITORING SET UP

## 2023-05-30 PROCEDURE — 85025 COMPLETE CBC W/AUTO DIFF WBC: CPT | Performed by: HOSPITALIST

## 2023-05-30 PROCEDURE — 63600175 PHARM REV CODE 636 W HCPCS: Performed by: NURSE ANESTHETIST, CERTIFIED REGISTERED

## 2023-05-30 PROCEDURE — 37000009 HC ANESTHESIA EA ADD 15 MINS: Performed by: ORTHOPAEDIC SURGERY

## 2023-05-30 PROCEDURE — 82962 GLUCOSE BLOOD TEST: CPT | Performed by: ORTHOPAEDIC SURGERY

## 2023-05-30 PROCEDURE — 71000016 HC POSTOP RECOV ADDL HR: Performed by: ORTHOPAEDIC SURGERY

## 2023-05-30 PROCEDURE — 83735 ASSAY OF MAGNESIUM: CPT | Performed by: HOSPITALIST

## 2023-05-30 PROCEDURE — 99223 1ST HOSP IP/OBS HIGH 75: CPT | Mod: 57,ICN,, | Performed by: ORTHOPAEDIC SURGERY

## 2023-05-30 PROCEDURE — 36000711: Performed by: ORTHOPAEDIC SURGERY

## 2023-05-30 PROCEDURE — C1713 ANCHOR/SCREW BN/BN,TIS/BN: HCPCS | Performed by: ORTHOPAEDIC SURGERY

## 2023-05-30 PROCEDURE — 63600175 PHARM REV CODE 636 W HCPCS: Performed by: ORTHOPAEDIC SURGERY

## 2023-05-30 PROCEDURE — 86902 BLOOD TYPE ANTIGEN DONOR EA: CPT | Performed by: STUDENT IN AN ORGANIZED HEALTH CARE EDUCATION/TRAINING PROGRAM

## 2023-05-30 PROCEDURE — 37000008 HC ANESTHESIA 1ST 15 MINUTES: Performed by: ORTHOPAEDIC SURGERY

## 2023-05-30 PROCEDURE — 80048 BASIC METABOLIC PNL TOTAL CA: CPT | Performed by: HOSPITALIST

## 2023-05-30 PROCEDURE — 99233 SBSQ HOSP IP/OBS HIGH 50: CPT | Mod: ,,, | Performed by: HOSPITALIST

## 2023-05-30 PROCEDURE — 71000015 HC POSTOP RECOV 1ST HR: Performed by: ORTHOPAEDIC SURGERY

## 2023-05-30 PROCEDURE — 20600001 HC STEP DOWN PRIVATE ROOM

## 2023-05-30 PROCEDURE — 99223 PR INITIAL HOSPITAL CARE,LEVL III: ICD-10-PCS | Mod: 57,ICN,, | Performed by: ORTHOPAEDIC SURGERY

## 2023-05-30 PROCEDURE — 86870 RBC ANTIBODY IDENTIFICATION: CPT | Performed by: STUDENT IN AN ORGANIZED HEALTH CARE EDUCATION/TRAINING PROGRAM

## 2023-05-30 PROCEDURE — D9220A PRA ANESTHESIA: Mod: ANES,,, | Performed by: ANESTHESIOLOGY

## 2023-05-30 PROCEDURE — D9220A PRA ANESTHESIA: ICD-10-PCS | Mod: CRNA,,, | Performed by: NURSE ANESTHETIST, CERTIFIED REGISTERED

## 2023-05-30 PROCEDURE — 99233 PR SUBSEQUENT HOSPITAL CARE,LEVL III: ICD-10-PCS | Mod: ,,, | Performed by: HOSPITALIST

## 2023-05-30 PROCEDURE — 99900035 HC TECH TIME PER 15 MIN (STAT)

## 2023-05-30 PROCEDURE — C1769 GUIDE WIRE: HCPCS | Performed by: ORTHOPAEDIC SURGERY

## 2023-05-30 PROCEDURE — D9220A PRA ANESTHESIA: ICD-10-PCS | Mod: ANES,,, | Performed by: ANESTHESIOLOGY

## 2023-05-30 DEVICE — IMPLANTABLE DEVICE: Type: IMPLANTABLE DEVICE | Site: FEMUR | Status: FUNCTIONAL

## 2023-05-30 RX ORDER — DEXAMETHASONE SODIUM PHOSPHATE 4 MG/ML
INJECTION, SOLUTION INTRA-ARTICULAR; INTRALESIONAL; INTRAMUSCULAR; INTRAVENOUS; SOFT TISSUE
Status: DISCONTINUED | OUTPATIENT
Start: 2023-05-30 | End: 2023-05-30

## 2023-05-30 RX ORDER — SODIUM CHLORIDE 0.9 % (FLUSH) 0.9 %
10 SYRINGE (ML) INJECTION
Status: DISCONTINUED | OUTPATIENT
Start: 2023-05-30 | End: 2023-05-30 | Stop reason: HOSPADM

## 2023-05-30 RX ORDER — LIDOCAINE HYDROCHLORIDE 20 MG/ML
INJECTION, SOLUTION EPIDURAL; INFILTRATION; INTRACAUDAL; PERINEURAL
Status: DISCONTINUED | OUTPATIENT
Start: 2023-05-30 | End: 2023-05-30

## 2023-05-30 RX ORDER — POLYETHYLENE GLYCOL 3350 17 G/17G
17 POWDER, FOR SOLUTION ORAL DAILY
Status: DISCONTINUED | OUTPATIENT
Start: 2023-05-30 | End: 2023-05-31

## 2023-05-30 RX ORDER — MUPIROCIN 20 MG/G
1 OINTMENT TOPICAL
Status: COMPLETED | OUTPATIENT
Start: 2023-05-30 | End: 2023-05-30

## 2023-05-30 RX ORDER — FENTANYL CITRATE 50 UG/ML
INJECTION, SOLUTION INTRAMUSCULAR; INTRAVENOUS
Status: DISCONTINUED | OUTPATIENT
Start: 2023-05-30 | End: 2023-05-30

## 2023-05-30 RX ORDER — METHOCARBAMOL 500 MG/1
500 TABLET, FILM COATED ORAL EVERY 6 HOURS PRN
Status: DISCONTINUED | OUTPATIENT
Start: 2023-05-30 | End: 2023-05-31

## 2023-05-30 RX ORDER — FENTANYL CITRATE 50 UG/ML
25-200 INJECTION, SOLUTION INTRAMUSCULAR; INTRAVENOUS
Status: DISCONTINUED | OUTPATIENT
Start: 2023-05-30 | End: 2023-05-30 | Stop reason: HOSPADM

## 2023-05-30 RX ORDER — ONDANSETRON 2 MG/ML
INJECTION INTRAMUSCULAR; INTRAVENOUS
Status: DISCONTINUED | OUTPATIENT
Start: 2023-05-30 | End: 2023-05-30

## 2023-05-30 RX ORDER — TRANEXAMIC ACID 100 MG/ML
INJECTION, SOLUTION INTRAVENOUS
Status: DISCONTINUED | OUTPATIENT
Start: 2023-05-30 | End: 2023-05-30

## 2023-05-30 RX ORDER — PROPOFOL 10 MG/ML
VIAL (ML) INTRAVENOUS
Status: DISCONTINUED | OUTPATIENT
Start: 2023-05-30 | End: 2023-05-30

## 2023-05-30 RX ORDER — VANCOMYCIN HYDROCHLORIDE 1 G/20ML
INJECTION, POWDER, LYOPHILIZED, FOR SOLUTION INTRAVENOUS
Status: DISCONTINUED | OUTPATIENT
Start: 2023-05-30 | End: 2023-05-30 | Stop reason: HOSPADM

## 2023-05-30 RX ORDER — CLINDAMYCIN PHOSPHATE 900 MG/50ML
900 INJECTION, SOLUTION INTRAVENOUS
Status: DISCONTINUED | OUTPATIENT
Start: 2023-05-30 | End: 2023-05-30 | Stop reason: HOSPADM

## 2023-05-30 RX ORDER — AMOXICILLIN 250 MG
1 CAPSULE ORAL 2 TIMES DAILY
Status: DISCONTINUED | OUTPATIENT
Start: 2023-05-30 | End: 2023-06-03

## 2023-05-30 RX ORDER — DEXMEDETOMIDINE HYDROCHLORIDE 100 UG/ML
INJECTION, SOLUTION INTRAVENOUS
Status: DISCONTINUED | OUTPATIENT
Start: 2023-05-30 | End: 2023-05-30

## 2023-05-30 RX ORDER — ROCURONIUM BROMIDE 10 MG/ML
INJECTION, SOLUTION INTRAVENOUS
Status: DISCONTINUED | OUTPATIENT
Start: 2023-05-30 | End: 2023-05-30

## 2023-05-30 RX ORDER — MUPIROCIN 20 MG/G
OINTMENT TOPICAL
Status: DISCONTINUED | OUTPATIENT
Start: 2023-05-30 | End: 2023-05-30 | Stop reason: HOSPADM

## 2023-05-30 RX ORDER — HYDROMORPHONE HYDROCHLORIDE 1 MG/ML
0.2 INJECTION, SOLUTION INTRAMUSCULAR; INTRAVENOUS; SUBCUTANEOUS EVERY 5 MIN PRN
Status: DISCONTINUED | OUTPATIENT
Start: 2023-05-30 | End: 2023-05-30 | Stop reason: HOSPADM

## 2023-05-30 RX ORDER — MUPIROCIN 20 MG/G
1 OINTMENT TOPICAL 2 TIMES DAILY
Status: DISPENSED | OUTPATIENT
Start: 2023-05-30 | End: 2023-06-03

## 2023-05-30 RX ORDER — LABETALOL HYDROCHLORIDE 5 MG/ML
INJECTION, SOLUTION INTRAVENOUS
Status: DISCONTINUED | OUTPATIENT
Start: 2023-05-30 | End: 2023-05-30

## 2023-05-30 RX ORDER — PROCHLORPERAZINE EDISYLATE 5 MG/ML
5 INJECTION INTRAMUSCULAR; INTRAVENOUS EVERY 30 MIN PRN
Status: DISCONTINUED | OUTPATIENT
Start: 2023-05-30 | End: 2023-05-30 | Stop reason: HOSPADM

## 2023-05-30 RX ORDER — HYDRALAZINE HYDROCHLORIDE 50 MG/1
50 TABLET, FILM COATED ORAL EVERY 8 HOURS
Status: DISCONTINUED | OUTPATIENT
Start: 2023-05-30 | End: 2023-05-30

## 2023-05-30 RX ORDER — MIDAZOLAM HYDROCHLORIDE 1 MG/ML
.5-4 INJECTION INTRAMUSCULAR; INTRAVENOUS
Status: DISCONTINUED | OUTPATIENT
Start: 2023-05-30 | End: 2023-05-30 | Stop reason: HOSPADM

## 2023-05-30 RX ORDER — ROPIVACAINE HYDROCHLORIDE 2 MG/ML
0.1 INJECTION, SOLUTION EPIDURAL; INFILTRATION; PERINEURAL CONTINUOUS
Status: DISCONTINUED | OUTPATIENT
Start: 2023-05-30 | End: 2023-06-02

## 2023-05-30 RX ORDER — ACETAMINOPHEN 500 MG
1000 TABLET ORAL EVERY 6 HOURS
Status: DISPENSED | OUTPATIENT
Start: 2023-05-30 | End: 2023-05-31

## 2023-05-30 RX ORDER — PHENYLEPHRINE HYDROCHLORIDE 10 MG/ML
INJECTION INTRAVENOUS
Status: DISCONTINUED | OUTPATIENT
Start: 2023-05-30 | End: 2023-05-30

## 2023-05-30 RX ADMIN — MUPIROCIN 1 G: 20 OINTMENT TOPICAL at 08:05

## 2023-05-30 RX ADMIN — DEXMEDETOMIDINE HYDROCHLORIDE 8 MCG: 100 INJECTION, SOLUTION INTRAVENOUS at 08:05

## 2023-05-30 RX ADMIN — ACETAMINOPHEN 1000 MG: 500 TABLET ORAL at 11:05

## 2023-05-30 RX ADMIN — FENTANYL CITRATE 25 MCG: 50 INJECTION, SOLUTION INTRAMUSCULAR; INTRAVENOUS at 07:05

## 2023-05-30 RX ADMIN — HYDRALAZINE HYDROCHLORIDE 50 MG: 50 TABLET ORAL at 01:05

## 2023-05-30 RX ADMIN — TRANEXAMIC ACID 1000 MG: 100 INJECTION, SOLUTION INTRAVENOUS at 08:05

## 2023-05-30 RX ADMIN — DONEPEZIL HYDROCHLORIDE 5 MG: 5 TABLET ORAL at 08:05

## 2023-05-30 RX ADMIN — ROCURONIUM BROMIDE 20 MG: 10 INJECTION, SOLUTION INTRAVENOUS at 08:05

## 2023-05-30 RX ADMIN — METOPROLOL TARTRATE 25 MG: 25 TABLET, FILM COATED ORAL at 08:05

## 2023-05-30 RX ADMIN — SODIUM CHLORIDE, SODIUM GLUCONATE, SODIUM ACETATE, POTASSIUM CHLORIDE, MAGNESIUM CHLORIDE, SODIUM PHOSPHATE, DIBASIC, AND POTASSIUM PHOSPHATE: .53; .5; .37; .037; .03; .012; .00082 INJECTION, SOLUTION INTRAVENOUS at 07:05

## 2023-05-30 RX ADMIN — FENTANYL CITRATE 25 MCG: 50 INJECTION, SOLUTION INTRAMUSCULAR; INTRAVENOUS at 09:05

## 2023-05-30 RX ADMIN — LEVETIRACETAM 500 MG: 500 TABLET, FILM COATED ORAL at 08:05

## 2023-05-30 RX ADMIN — HYDRALAZINE HYDROCHLORIDE 75 MG: 50 TABLET ORAL at 09:05

## 2023-05-30 RX ADMIN — POLYETHYLENE GLYCOL 3350 17 G: 17 POWDER, FOR SOLUTION ORAL at 10:05

## 2023-05-30 RX ADMIN — FENTANYL CITRATE 25 MCG: 50 INJECTION, SOLUTION INTRAMUSCULAR; INTRAVENOUS at 08:05

## 2023-05-30 RX ADMIN — PROPOFOL 70 MG: 10 INJECTION, EMULSION INTRAVENOUS at 07:05

## 2023-05-30 RX ADMIN — SENNOSIDES AND DOCUSATE SODIUM 1 TABLET: 50; 8.6 TABLET ORAL at 10:05

## 2023-05-30 RX ADMIN — PROPOFOL 50 MG: 10 INJECTION, EMULSION INTRAVENOUS at 07:05

## 2023-05-30 RX ADMIN — Medication 2 TABLET: at 11:05

## 2023-05-30 RX ADMIN — CEFAZOLIN 2 G: 2 INJECTION, POWDER, FOR SOLUTION INTRAMUSCULAR; INTRAVENOUS at 07:05

## 2023-05-30 RX ADMIN — ROPIVACAINE HYDROCHLORIDE 0.1 ML/HR: 2 INJECTION, SOLUTION EPIDURAL; INFILTRATION at 10:05

## 2023-05-30 RX ADMIN — PHENYLEPHRINE HYDROCHLORIDE 100 MCG: 10 INJECTION INTRAVENOUS at 07:05

## 2023-05-30 RX ADMIN — TRANEXAMIC ACID 1000 MG: 100 INJECTION, SOLUTION INTRAVENOUS at 09:05

## 2023-05-30 RX ADMIN — SUGAMMADEX 200 MG: 100 INJECTION, SOLUTION INTRAVENOUS at 09:05

## 2023-05-30 RX ADMIN — LEVETIRACETAM 500 MG: 500 TABLET, FILM COATED ORAL at 10:05

## 2023-05-30 RX ADMIN — AMLODIPINE BESYLATE AND BENAZEPRIL HYDROCHLORIDE 1 CAPSULE: 5; 20 CAPSULE ORAL at 11:05

## 2023-05-30 RX ADMIN — LABETALOL HYDROCHLORIDE 5 MG: 5 INJECTION, SOLUTION INTRAVENOUS at 09:05

## 2023-05-30 RX ADMIN — DEXAMETHASONE SODIUM PHOSPHATE 8 MG: 4 INJECTION, SOLUTION INTRAMUSCULAR; INTRAVENOUS at 08:05

## 2023-05-30 RX ADMIN — METOPROLOL TARTRATE 25 MG: 25 TABLET, FILM COATED ORAL at 10:05

## 2023-05-30 RX ADMIN — SODIUM CHLORIDE: 9 INJECTION, SOLUTION INTRAVENOUS at 07:05

## 2023-05-30 RX ADMIN — CEFAZOLIN 2 G: 2 INJECTION, POWDER, FOR SOLUTION INTRAMUSCULAR; INTRAVENOUS at 11:05

## 2023-05-30 RX ADMIN — SENNOSIDES AND DOCUSATE SODIUM 1 TABLET: 50; 8.6 TABLET ORAL at 08:05

## 2023-05-30 RX ADMIN — ONDANSETRON 4 MG: 2 INJECTION INTRAMUSCULAR; INTRAVENOUS at 08:05

## 2023-05-30 RX ADMIN — CHOLECALCIFEROL TAB 25 MCG (1000 UNIT) 1000 UNITS: 25 TAB at 11:05

## 2023-05-30 RX ADMIN — MUPIROCIN 1 G: 20 OINTMENT TOPICAL at 10:05

## 2023-05-30 RX ADMIN — LIDOCAINE HYDROCHLORIDE 100 MG: 20 INJECTION, SOLUTION EPIDURAL; INFILTRATION; INTRACAUDAL; PERINEURAL at 07:05

## 2023-05-30 RX ADMIN — ACETAMINOPHEN 1000 MG: 500 TABLET ORAL at 06:05

## 2023-05-30 RX ADMIN — CEFAZOLIN 2 G: 2 INJECTION, POWDER, FOR SOLUTION INTRAMUSCULAR; INTRAVENOUS at 03:05

## 2023-05-30 RX ADMIN — ROCURONIUM BROMIDE 50 MG: 10 INJECTION, SOLUTION INTRAVENOUS at 07:05

## 2023-05-30 RX ADMIN — MUPIROCIN 1 G: 20 OINTMENT TOPICAL at 07:05

## 2023-05-30 RX ADMIN — ACETAMINOPHEN 1000 MG: 500 TABLET ORAL at 01:05

## 2023-05-30 NOTE — SUBJECTIVE & OBJECTIVE
Principal Problem:Left displaced femoral neck fracture    Principal Orthopedic Problem: same    Interval History: NAEON. Repeat CTH was stable. Labs need to be sent this AM.     Review of patient's allergies indicates:   Allergen Reactions    Bextra [valdecoxib] Swelling    Gabapentin Diarrhea and Nausea Only       Current Facility-Administered Medications   Medication    0.9%  NaCl infusion (for blood administration)    0.9%  NaCl infusion    acetaminophen tablet 1,000 mg    acetaminophen tablet 1,000 mg    amlodipine-benazepril 5-20 mg per capsule 1 capsule    bisacodyL suppository 10 mg    calcium-vitamin D3 500 mg-5 mcg (200 unit) per tablet 2 tablet    ceFAZolin 2 g in dextrose 5 % in water (D5W) 5 % 50 mL IVPB (MB+)    ceFAZolin 2 g in dextrose 5 % in water (D5W) 5 % 50 mL IVPB (MB+)    clindamycin in D5W 900 mg/50 mL IVPB 900 mg    dextrose 10% bolus 125 mL 125 mL    dextrose 10% bolus 250 mL 250 mL    dextrose 40 % gel 15,000 mg    dextrose 40 % gel 30,000 mg    donepeziL tablet 5 mg    DULoxetine DR capsule 60 mg    fentaNYL 50 mcg/mL injection  mcg    fentaNYL 50 mcg/mL injection  mcg    glucagon (human recombinant) injection 1 mg    hydrALAZINE tablet 25 mg    hydrALAZINE tablet 50 mg    insulin aspart U-100 pen 0-5 Units    levETIRAcetam tablet 500 mg    melatonin tablet 6 mg    methocarbamoL tablet 500 mg    methocarbamoL tablet 500 mg    metoprolol tartrate (LOPRESSOR) tablet 25 mg    midazolam (VERSED) 1 mg/mL injection 0.5-4 mg    midazolam (VERSED) 1 mg/mL injection 0.5-4 mg    morphine injection 2 mg    mupirocin 2 % ointment 1 g    mupirocin 2 % ointment 1 g    mupirocin 2 % ointment    ondansetron injection 4 mg    oxyCODONE immediate release tablet 5 mg    polyethylene glycol packet 17 g    polyethylene glycol packet 17 g    ROPIvacaine (PF) 2 mg/ml (0.2%) solution    senna-docusate 8.6-50 mg per tablet 1 tablet    sodium chloride 0.9% flush 10 mL    sodium chloride 0.9% flush 10 mL     tranexamic acid (CYKLOKAPRON) 3,000 mg in sodium chloride 0.9% SolP 100 mL    vitamin D 1000 units tablet 1,000 Units     Facility-Administered Medications Ordered in Other Encounters   Medication    sodium chloride 0.9% flush 5 mL     Objective:     Vital Signs (Most Recent):  Temp: 97.5 °F (36.4 °C) (05/30/23 0315)  Pulse: 99 (05/30/23 0315)  Resp: 18 (05/30/23 0315)  BP: (!) 147/67 (05/30/23 0315)  SpO2: 99 % (05/30/23 0315) Vital Signs (24h Range):  Temp:  [97.5 °F (36.4 °C)-98.9 °F (37.2 °C)] 97.5 °F (36.4 °C)  Pulse:  [] 99  Resp:  [18-26] 18  SpO2:  [94 %-99 %] 99 %  BP: (128-191)/(56-81) 147/67     Weight: 76.9 kg (169 lb 8.5 oz)     Body mass index is 30.03 kg/m².      Intake/Output Summary (Last 24 hours) at 5/30/2023 0651  Last data filed at 5/30/2023 0014  Gross per 24 hour   Intake --   Output 1000 ml   Net -1000 ml        Ortho/SPM Exam  On O2 2L NC breathing unlabored    LLE  TTP at hip  NVI       Significant Labs: A1C:   Recent Labs   Lab 04/03/23  1316 05/29/23  0003   HGBA1C 6.5* 6.3*     CBC:   Recent Labs   Lab 05/28/23  2231   WBC 8.60   HGB 10.9*   HCT 32.9*        CMP:   Recent Labs   Lab 05/29/23  0003      K 3.8      CO2 24   *   BUN 15   CREATININE 1.2   CALCIUM 9.9   PROT 7.5   ALBUMIN 3.5   BILITOT 0.2   ALKPHOS 51*   AST 16   ALT 11   ANIONGAP 11     Coagulation:   Recent Labs   Lab 05/29/23  0003   APTT 25.9     Urine Culture: No results for input(s): LABURIN in the last 48 hours.  Urine Studies:   Recent Labs   Lab 05/29/23  0148   COLORU Yellow   APPEARANCEUA Clear   PHUR 6.0   SPECGRAV 1.015   PROTEINUA Trace*   GLUCUA Negative   KETONESU Negative   BILIRUBINUA Negative   OCCULTUA Negative   NITRITE Negative   LEUKOCYTESUR Negative     All pertinent labs within the past 24 hours have been reviewed.    Significant Imaging: I have reviewed and interpreted all pertinent imaging results/findings.

## 2023-05-30 NOTE — NURSING TRANSFER
Nursing Transfer Note      5/30/2023     Reason patient is being transferred: MD order    Transfer To: [id number]    Transfer via bed    Transfer with  to O2, cardiac monitoring    Transported by PCT    Telemetry: Rate 98 and Rhythm Nsr,     Medicines sent: Clindamycin, mupirocin,       Chart send with patient: Yes    Notified: daughter    Patient reassessed at: 1255

## 2023-05-30 NOTE — ANESTHESIA PROCEDURE NOTES
Arterial    Diagnosis: hip fracture    Patient location during procedure: done in OR  Procedure start time: 5/30/2023 8:10 AM  Procedure end time: 5/30/2023 8:20 AM    Staffing  Authorizing Provider: Genaro Jaquez Jr., MD  Performing Provider: Genaro Jaquez Jr., MD    Anesthesiologist was present at the time of the procedure.    Preanesthetic Checklist  Completed: patient identified, IV checked, site marked, risks and benefits discussed, surgical consent, monitors and equipment checked, pre-op evaluation, timeout performed and anesthesia consent givenArterial  Skin Prep: chlorhexidine gluconate  Local Infiltration: none  Orientation: left  Location: radial    Catheter Size: 20 G  Catheter placement by Ultrasound guidance. Heme positive aspiration all ports.   Vessel Caliber: medium, patent, compressibility normal  Needle advanced into vessel with real time Ultrasound guidance.  Guidewire confirmed in vessel.  Sterile sheath used.Insertion Attempts: 2  Assessment  Dressing: secured with tape and tegaderm  Patient: Tolerated well

## 2023-05-30 NOTE — ANESTHESIA POSTPROCEDURE EVALUATION
Anesthesia Post Evaluation    Patient: Brunilda England    Procedure(s) Performed: Procedure(s) (LRB):  INSERTION, INTRAMEDULLARY TEREZA, FEMUR (Left)    Final Anesthesia Type: general      Patient location during evaluation: PACU  Patient participation: Yes- Able to Participate  Level of consciousness: sedated  Post-procedure vital signs: reviewed and stable  Pain management: adequate  Airway patency: patent    PONV status at discharge: No PONV  Anesthetic complications: no      Cardiovascular status: stable  Respiratory status: unassisted, spontaneous ventilation and face mask  Hydration status: euvolemic  Follow-up not needed.          Vitals Value Taken Time   /70 05/30/23 1132   Temp 36.3 °C (97.3 °F) 05/30/23 1000   Pulse 94 05/30/23 1144   Resp 19 05/30/23 1144   SpO2 100 % 05/30/23 1144   Vitals shown include unvalidated device data.      No case tracking events are documented in the log.      Pain/Shelia Score: Pain Rating Prior to Med Admin: 5 (5/30/2023 10:01 AM)  Pain Rating Post Med Admin: 0 (5/30/2023  7:20 AM)  Shelia Score: 9 (5/30/2023 10:30 AM)

## 2023-05-30 NOTE — PT/OT/SLP PROGRESS
Occupational Therapy      Patient Name:  Brunilda England   MRN:  [id number]    Patient not seen today secondary to pt off floor for procedure . Will follow-up as appropriate for OT eval.    5/30/2023

## 2023-05-30 NOTE — PROGRESS NOTES
Patient's lab (ABO, BMP, CBC, Mag, and phos) collected and sent to the blood and lab, respectively. Another IV placed to right FA #[id number].

## 2023-05-30 NOTE — PROGRESS NOTES
Progress Note  Hospital Medicine       Patient Name: Brunilda England  MRN:  [id number]  [facility] Medicine Team: [facility]  Honey Calixto MD  Date of Admission:  5/28/2023     Principal Problem:  Left displaced femoral neck fracture   Primary Care Physician: Pepper Whitfield MD      Interval history     Seen in PACU and still sleepy, BP not at goal as 160s systolic and PACU nursing to give her home regimen this AM once awake and was above goal  last night as well so increased hydralazine scheduled to 50 today and has the PRn also in case needs in between as goal under 140 with small sDH present on admit. HR 80s and back in sinus as was in atrial flutter on admit and was here last week for tachy/juan a and afib so need to watch closely on tele for arrythmias. Discussed with NS resident regarding anticoagulation given SDH on admit, they report may need to hold for a week given this, updated ortho as may be too long to hold for dvt ppx post hip fx so they will discuss with ortho staff as if so may need to discuss potentially IVC filter consideration for DVT protection in the interim if so. Will f/u recs further today.      Review of Systems   Constitutional: Negative for chills, fatigue, fever.   HENT: Negative for sore throat, trouble swallowing.    Eyes: Negative for photophobia, visual disturbance.   Respiratory: Negative for cough, shortness of breath.    Cardiovascular: Negative for chest pain, palpitations, leg swelling.   Gastrointestinal: Negative for abdominal pain, constipation, diarrhea, nausea, vomiting.   Endocrine: Negative for cold intolerance, heat intolerance.   Genitourinary: Negative for dysuria, frequency.   Musculoskeletal: + for arthralgias, myalgias.   Skin: Negative for rash, wound, erythema   Neurological: Negative for dizziness, syncope, weakness, light-headedness.   Psychiatric/Behavioral: Negative for confusion, hallucinations, anxiety  All other systems reviewed and are  negative.      Past Medical History: Patient has a past medical history of Anal cancer (1995), Anemia, Cancer (1995), Centrilobular emphysema (7/6/2020), Diabetes mellitus, Lumbar radiculopathy (5/16/2014), Lung cancer (2012), Macular degeneration, Macular hemorrhage of left eye, Neuropathy, Osteoporosis, Osteoporosis, unspecified (11/9/2012), and Pure hypercholesterolemia.    Past Surgical History: Patient has a past surgical history that includes Tonsillectomy; Cholecystectomy; Hysterectomy; Bronchoscopy; Colonoscopy; left leg surgery; heart stent; Fluoroscopic angiography of lower extremity with topical ultrasound (Right, 12/6/2018); Percutaneous transluminal angioplasty (N/A, 12/6/2018); Reconstruction using flap (Right, 12/6/2019); and Incisional biopsy (Right, 12/6/2019).    Social History: Patient reports that she quit smoking about 28 years ago. Her smoking use included cigarettes. She has a 15.00 pack-year smoking history. She has been exposed to tobacco smoke. She has never used smokeless tobacco. She reports that she does not drink alcohol and does not use drugs.    Family History: family history includes Breast cancer in her maternal aunt; Cancer in her father; Stroke in her mother.    Medications: Scheduled Meds:   acetaminophen  1,000 mg Oral Q8H    acetaminophen  1,000 mg Oral Q6H    amlodipine-benazepril 5-20 mg  1 capsule Oral Daily    calcium-vitamin D3  2 tablet Oral Daily    ceFAZolin (ANCEF) IVPB  2 g Intravenous Q8H    donepeziL  5 mg Oral QHS    DULoxetine  60 mg Oral QAM    hydrALAZINE  50 mg Oral Q8H    levETIRAcetam  500 mg Oral BID    metoprolol tartrate  25 mg Oral BID    mupirocin  1 g Nasal BID    polyethylene glycol  17 g Oral Daily    polyethylene glycol  17 g Oral Daily    senna-docusate 8.6-50 mg  1 tablet Oral BID    vitamin D  1,000 Units Oral Daily     Continuous Infusions:   ROPIvacaine (PF) 2 mg/ml (0.2%) Stopped (05/30/23 0100)     PRN Meds:.sodium chloride, bisacodyL,  ceFAZolin (ANCEF) IVPB, clindamycin (CLEOCIN) IVPB, dextrose 10%, dextrose 10%, dextrose, dextrose, fentaNYL, fentaNYL, glucagon (human recombinant), hydrALAZINE, insulin aspart U-100, melatonin, methocarbamoL, methocarbamoL, midazolam, midazolam, morphine, mupirocin, ondansetron, oxyCODONE, sodium chloride 0.9%, sodium chloride 0.9%, tranexamic acid (CYKLOKAPRON) 3,000 mg in sodium chloride 0.9% SolP 100 mL    Allergies: Patient is allergic to bextra [valdecoxib] and gabapentin.    Physical Exam:     Vital Signs (Most Recent):  Temp: 98.4 °F (36.9 °C) (05/30/23 0644)  Pulse: 106 (05/30/23 0644)  Resp: 18 (05/30/23 0644)  BP: (!) 173/79 (05/30/23 0644)  SpO2: 100 % (05/30/23 0644) Vital Signs Range (Last 24H):  Temp:  [97.5 °F (36.4 °C)-98.9 °F (37.2 °C)]   Pulse:  []   Resp:  [18-26]   BP: (128-191)/(56-81)   SpO2:  [95 %-100 %]    Body mass index is 30.03 kg/m².     Physical Exam:  Constitutional: Appears well-developed and well-nourished. Pleasant. Sleepin pacu still  Head: Normocephalic and atraumatic.   Mouth/Throat: Oropharynx is clear and moist.   Eyes: EOM are normal. Pupils are equal, round, and reactive to light. No scleral icterus.   Neck: Normal range of motion. Neck supple.   Cardiovascular: Normal rate and regular rhythm.  No murmur heard.  Pulmonary/Chest: Effort normal and breath sounds normal. No respiratory distress. No wheezes, rales, or rhonchi. On home oxygen.  Abdominal: Soft. Bowel sounds are normal.  No distension or tenderness  Musculoskeletal: LLE with bandages in place and ropivicaine.  Neurological: Alert and oriented to person, place, and time.   Skin: Skin is warm and dry.   Psychiatric: Normal mood and affect. Behavior is normal.   Vitals reviewed.    Recent Labs   Lab 05/28/23  2231   WBC 8.60   HGB 10.9*   HCT 32.9*          Recent Labs   Lab 05/29/23  0003 05/30/23  0200    136   K 3.8 5.7*    103   CO2 24 24   BUN 15 12   CREATININE 1.2 0.9   * 126*    CALCIUM 9.9 9.6   MG 1.5* 1.5*   PHOS 2.5* 2.7     Recent Labs   Lab 05/29/23  0003   ALKPHOS 51*   ALT 11   AST 16   ALBUMIN 3.5   PROT 7.5   BILITOT 0.2      Recent Labs   Lab 05/29/23  1423 05/30/23  0056 05/30/23  0703   POCTGLUCOSE 154* 156* 128*         Assessment and Plan:     Ms. Brunilda England is a 89 y.o. female who presented to [facility] on 5/28/2023 with     Left femur neck fracture  -sustained in mechanical fall with fx seen on imaging on admit  -OR on 5/30 with ortho and hip fx pathway started on admit  -multimodals for pain with bowel regimen, reports UTD on BMs on admit  -vit D at goal at 49  -EKG with atrial flutter with hx of paroxysmal afib/flutter with HR 90s, metoprolol given in the ER from home meds  -post op anticoagulation -pending NS recs given SAH on admit  -PT/OT on POD 1 and likely will need SNF- family prefers [facility] for referrals as top choice  -pack out on POD 1  -ortho f/u in 1 weeks for bandage removal  -UA neg for infection on admit  -CXR with chronic effusions seen previously      Subarachnoid hemorrhage  -small over left parietal area from fall  -seen by NS and NCC, no neuro deficits  -hold eliquis  - goal- increased BP meds as not at goal at all times overnight and this AM 160s  -keppra BID x 2 weeks  -HOB 30 degrees  Na >135  -will f/u NS recs for PPX post op    HX of tachy-juna a syndrome  -seen in ER for this earlier this month and cards recs dec metoprolol to 25 BID from 50 BID as HRs ranged 40s-100s  -in atrial flutter now so needs telemetry while here to ensure no further issues that would require further cardiac work up for this  -back in NSR in pacu    HX of CAD  -remote stents at [facility] in 1990 and 2003  -not on asa now, holding statin as not on formularly now    Hx of lung cancer  -s/p chemo and last imaging in 2022 was stable dx. Chronic effusions seen on previous imaging seen on CXR here  -on home oxygen    Hx of chronic respiratory failure  -continue home  oxygen    HX of COPD  -no acute issues    Paroxysmal atrial flutter/fibrillation  -atrial flutter on admission HR 90s but now back in NSR in pacu  -given home metoprolol 25 BID and continue  -hold eliquis with SAH and surgery will have to f/u post op recs from NS regarding anticoagulation    Chronic diastolic heart failure  -euvolemic on exam, not on diuretics    Stage 3 CKD  -Cr at baseline 1.2 now    Dementia  -continue home meds, alert and oriented to person and place and able to give history  -mild per family     HTN  -keep BP under 140 systolic per NS recs, home meds on board  -hydralazine PRN added, will titrate for goal  -added scheduled for better control with increase to 50 now scheduled 5/30 for higher bPs overnight    Pre DM  -home meds held  -A1C 6.3  -will place SSI + accuchecks               Diet DM  DVT PPx:  hold with sah    Disposition:  NS recs, BP goals.    Discharge Planning   DUTCH: 6/2/2023 friday    Code Status: Full Code   Is the patient medically ready for discharge?: No    Reason for patient still in hospital (select all that apply): Patient trending condition

## 2023-05-30 NOTE — PROGRESS NOTES
[facility] - [facility] ([facility])  Orthopedics  Progress Note    Patient Name: Brunilda England  MRN: [id number]  Admission Date: 5/28/2023  Hospital Length of Stay: 1 days  Attending Provider: Honey Calixto MD  Primary Care Provider: Pepper Whitfield MD  Follow-up For: Procedure(s) (LRB):  INSERTION, INTRAMEDULLARY TEREZA, FEMUR (Left)    Post-Operative Day: Day of Surgery  Subjective:     Principal Problem:Left displaced femoral neck fracture    Principal Orthopedic Problem: same    Interval History: NAEON. Repeat CTH was stable. Labs need to be sent this AM.     Review of patient's allergies indicates:   Allergen Reactions    Bextra [valdecoxib] Swelling    Gabapentin Diarrhea and Nausea Only       Current Facility-Administered Medications   Medication    0.9%  NaCl infusion (for blood administration)    0.9%  NaCl infusion    acetaminophen tablet 1,000 mg    acetaminophen tablet 1,000 mg    amlodipine-benazepril 5-20 mg per capsule 1 capsule    bisacodyL suppository 10 mg    calcium-vitamin D3 500 mg-5 mcg (200 unit) per tablet 2 tablet    ceFAZolin 2 g in dextrose 5 % in water (D5W) 5 % 50 mL IVPB (MB+)    ceFAZolin 2 g in dextrose 5 % in water (D5W) 5 % 50 mL IVPB (MB+)    clindamycin in D5W 900 mg/50 mL IVPB 900 mg    dextrose 10% bolus 125 mL 125 mL    dextrose 10% bolus 250 mL 250 mL    dextrose 40 % gel 15,000 mg    dextrose 40 % gel 30,000 mg    donepeziL tablet 5 mg    DULoxetine DR capsule 60 mg    fentaNYL 50 mcg/mL injection  mcg    fentaNYL 50 mcg/mL injection  mcg    glucagon (human recombinant) injection 1 mg    hydrALAZINE tablet 25 mg    hydrALAZINE tablet 50 mg    insulin aspart U-100 pen 0-5 Units    levETIRAcetam tablet 500 mg    melatonin tablet 6 mg    methocarbamoL tablet 500 mg    methocarbamoL tablet 500 mg    metoprolol tartrate (LOPRESSOR) tablet 25 mg    midazolam (VERSED) 1 mg/mL injection 0.5-4 mg    midazolam (VERSED) 1 mg/mL injection 0.5-4 mg     morphine injection 2 mg    mupirocin 2 % ointment 1 g    mupirocin 2 % ointment 1 g    mupirocin 2 % ointment    ondansetron injection 4 mg    oxyCODONE immediate release tablet 5 mg    polyethylene glycol packet 17 g    polyethylene glycol packet 17 g    ROPIvacaine (PF) 2 mg/ml (0.2%) solution    senna-docusate 8.6-50 mg per tablet 1 tablet    sodium chloride 0.9% flush 10 mL    sodium chloride 0.9% flush 10 mL    tranexamic acid (CYKLOKAPRON) 3,000 mg in sodium chloride 0.9% SolP 100 mL    vitamin D 1000 units tablet 1,000 Units     Facility-Administered Medications Ordered in Other Encounters   Medication    sodium chloride 0.9% flush 5 mL     Objective:     Vital Signs (Most Recent):  Temp: 97.5 °F (36.4 °C) (05/30/23 0315)  Pulse: 99 (05/30/23 0315)  Resp: 18 (05/30/23 0315)  BP: (!) 147/67 (05/30/23 0315)  SpO2: 99 % (05/30/23 0315) Vital Signs (24h Range):  Temp:  [97.5 °F (36.4 °C)-98.9 °F (37.2 °C)] 97.5 °F (36.4 °C)  Pulse:  [] 99  Resp:  [18-26] 18  SpO2:  [94 %-99 %] 99 %  BP: (128-191)/(56-81) 147/67     Weight: 76.9 kg (169 lb 8.5 oz)     Body mass index is 30.03 kg/m².      Intake/Output Summary (Last 24 hours) at 5/30/2023 0651  Last data filed at 5/30/2023 0014  Gross per 24 hour   Intake --   Output 1000 ml   Net -1000 ml        Ortho/SPM Exam  On O2 2L NC breathing unlabored    LLE  TTP at hip  NVI       Significant Labs: A1C:   Recent Labs   Lab 04/03/23  1316 05/29/23  0003   HGBA1C 6.5* 6.3*     CBC:   Recent Labs   Lab 05/28/23  2231   WBC 8.60   HGB 10.9*   HCT 32.9*        CMP:   Recent Labs   Lab 05/29/23  0003      K 3.8      CO2 24   *   BUN 15   CREATININE 1.2   CALCIUM 9.9   PROT 7.5   ALBUMIN 3.5   BILITOT 0.2   ALKPHOS 51*   AST 16   ALT 11   ANIONGAP 11     Coagulation:   Recent Labs   Lab 05/29/23  0003   APTT 25.9     Urine Culture: No results for input(s): LABURIN in the last 48 hours.  Urine Studies:   Recent Labs   Lab  05/29/23  0148   COLORU Yellow   APPEARANCEUA Clear   PHUR 6.0   SPECGRAV 1.015   PROTEINUA Trace*   GLUCUA Negative   KETONESU Negative   BILIRUBINUA Negative   OCCULTUA Negative   NITRITE Negative   LEUKOCYTESUR Negative     All pertinent labs within the past 24 hours have been reviewed.    Significant Imaging: I have reviewed and interpreted all pertinent imaging results/findings.    Assessment/Plan:     * Left basicervical femoral neck fracture with extension into the intertroch region  Brunilda England is a 89 y.o. female with a left basicervical femoral neck fracture with extension into the intertroch region, closed, NVI. They take Eliquis 5 b.i.d. anticoagulation at home. They required a walker for ambulatory assistive devices prior to this injury. Also had small SAH on CT head which was stable on repeat imaging.     -Admitted to medicine hip fracture service for pre-operative clearance and medical evaluation. Per  optimized for OR today. Npo.2u prbc prepared.  -AM labs need to be sent. Messaged nurse.   -pack in place            Bettina Gama MD  Orthopedics  [facility] - Surgery ([facility])

## 2023-05-30 NOTE — ANESTHESIA PROCEDURE NOTES
Intubation    Date/Time: 5/30/2023 7:41 AM  Performed by: Shabbir De Dios CRNA  Authorized by: Jean Marie Pritchett MD     Intubation:     Induction:  Intravenous    Intubated:  Postinduction    Mask Ventilation:  Easy mask    Attempts:  1    Attempted By:  CRNA    Method of Intubation:  Video laryngoscopy    Blade:  Blood 3    Laryngeal View Grade: Grade I - full view of cords      Difficult Airway Encountered?: No      Complications:  None    Airway Device:  Oral endotracheal tube    Airway Device Size:  7.0    Style/Cuff Inflation:  Cuffed (inflated to minimal occlusive pressure)    Tube secured:  21    Secured at:  The lips    Placement Verified By:  Capnometry    Complicating Factors:  None    Findings Post-Intubation:  BS equal bilateral and atraumatic/condition of teeth unchanged

## 2023-05-30 NOTE — OP NOTE
OPERATIVE NOTE     DATE OF PROCEDURE:  05/30/2023     PREOPERATIVE DIAGNOSIS:           Left intertrochanteric hip fracture, closed, displaced, initial encounter  Fall from standing     POSTOPERATIVE DIAGNOSIS:         Left intertrochanteric hip fracture, closed, displaced, initial encounter  Fall from standing     PROCEDURE:              Open reduction internal fixation left intertrochanteric hip fracture with intramedullary nail     SURGEON:       Desmond Barker MD     ASSISTANT:                 Genaro Gonzalez MD     ANESTHESIA:              General     EBL:                  150 mL     COMPLICATIONS:  none     IMPLANTS:       Richard Gamma 4, 12 x 170 mm  Compression screw, 90 mm  Distal interlocking screw, 5 mm, x1    SPECIMENS:    None     INDICATIONS FOR PROCEDURE:  89-year-old female  Osteoporosis, anemia, history of lung cancer, low back pain, hypertension, coronary artery disease, type 2 diabetes, dementia, emphysema, CHF, CKD, AFib  Mechanical fall 05/28/2023  Immediate left hip pain.  Inability to bear weight.  Brought to the emergency room.  Evaluated by orthopedic resident on-call team, hospitalist team.  Physical exam x-rays indicated an intertrochanteric hip fracture.  Admitted to the hospital for further care.  Also noted to have a left subarachnoid hemorrhage, evaluated by Neurosurgery, stable on subsequent CT.     At the time of my evaluation, patient complained of isolated pain in left hip, 7/10, sharp, stabbing, worse with motion, improved with rest.  No numbness and tingling.     Lives at home with daughter  Household ambulator with walker  Diabetes, hemoglobin A1c 6.3  Coronary artery disease, AFib, CHF, on Eliquis   former smoker  History of lung cancer, no known Mets       Discussed diagnosis of intertrochanteric hip fracture with both patient and family members.  Discussed both non operative and operative management options.  Non operative management would consist of bed rest,  protected weight bearing and would likely result in a malunion/nonunion, prolonged pain, debility, and the medical comorbidities associated with prolonged bed rest/immobility.  Discussed operative intervention the form of open reduction internal fixation with intramedullary nail.  Hopefully this would improve pain, alignment, mobility, healing potential, overall outcome.     The risks, benefits, and alternatives to surgery were discussed with the patient and/or family.    Specific risks discussed included, but were not limited to:  Limb length discrepancy, malrotation, head perforation, avascular necrosis, hip arthritis, abductor pain, limp, gait difficulty, failure of hardware, damage to nearby structures, including neurovascular structures leading to loss of function or loss of limb, bleeding, need for blood transfusion, pain, stiffness, scarring, numbness, tingling, weakness, compartment syndrome, malunion/nonunion, hardware failure, hardware prominence, infection, need for multiple staged procedures, prolonged antibiotics, iatrogenic fracture, heterotopic ossification, arthritis, a variety of medical complications including but not limited to heart attack, stroke, deep venous thrombosis, pulmonary embolism, prolonged hospitalization, prolonged intubation, and death.   Patient and/or family expressed an understanding and desires to proceed with surgery.   All questions were answered.  No guarantees were implied or stated.  Informed consent was obtained.        OPERATIVE PROCEDURE:  Patient met in the preop hold area, the correct site and side of surgery being the left hip were marked and verified.  Regional block placed by Anesthesia.  Patient brought back to the operative suite.     General anesthesia smoothly induced    Fracture boots were placed.  Patient was transferred over to operative table and placed in supine position.  Peroneal post was placed. Operative side arm was draped across the chest and well  padded. All bony prominences were appropriately padded.  We performed traction and reduction maneuvers, and were able to obtain a closed reduction of the fracture.  Left lower extremity was prepped and draped in normal sterile fashion. Preoperative antibiotics of Ancef 2g were given. 1g IV TXA was given to aid in hemostasis     Time-out was performed verifying the correct patient, site/side of surgery, surgical consent, radiographs as applicable, preop antibiotics, necessary equipment, anticipated blood loss, length of procedure, postoperative disposition.     We began the case by marking anatomic landmarks on the patient. Incision was made proximal to the greater trochanter.  Fascia was incised. Awl was utilized. Guide pin was entered in a center center position confirmed on both AP and lateral fluoroscopic imaging.  It was advanced to the level of the lesser trochanter. Opening Reamer with appropriate soft tissue guide was utilized to open the canal.     Plan for short nail    Appropriate size/length nail was placed on the insertion jig and inserted into the intramedullary canal, confirmed on multiplanar fluoroscopic imaging.  This was inserted to the appropriate depth        We then used the outrigger guide and triple sleeve inserted through a lateral incision on the thigh to place a guide pin in appropriate center center position through the femoral neck and head.  We used fluoroscopic imaging to confirm our  pin placement.  This was appropriate tip apex distance.  We confirmed that the tip of the wire did not perforate femoral head.  We then used the lateral opening Reamer.  The appropriate size compression screw was then placed with the appropriate tip apex distance on AP and lateral views. Traction removed.  Fracture was compressed through the guide.  Set screw tightened.  Set screw loosened quarter turn to allow compression.    We turned our attention to placement of distal interlocking screws.  We chose to  place 1 distal interlocking screw to provide stability in axial loading and rotation.  1 screw was placed from lateral to medial using percutaneous stab incision and perfect [place] technique. We confirmed appropriate placement of the screws on both AP and lateral fluoroscopic imaging.      Final fluoroscopic imaging of the entire femur was taken in AP and lateral views confirming appropriate hardware placement fracture reduction.     Wounds were thoroughly irrigated with saline. Fascia was closed with 0 Vicryl.  Subcutaneous tissue closed with 2 O Vicryl. Skin closed with 3 Monocryl. Dermabond applied.  Aquacel dressing applied.     Prior to final closure all counts were confirmed to be correct.  Patient tolerated procedure well, was extubated, transferred to PACU for further recovery.     At the conclusion of the procedure the patient had soft and compressible compartments, brisk cap refill, palpable DP and PT pulse in the operative extremity.     POSTOPERATIVE PLAN:  89-year-old female, multiple medical comorbidities including diabetes, AFib, CHF, fall 05/28/2023  Left intertrochanteric hip fracture  SAH - evalulated by NS, stable on repeat CT     05/30/2023 - IM nail left hip IT fracture     Antibiotics x 24 hr  eliquis x 4 weeks postop for DVT prophylaxis     Hospitalist comanagement  Multimodal pain management  Calcium, vitamin-D, boost  PT     Fragility fracture Clinic referral     WBAT left lower extremity  Left lower extremity, range of motion as tolerated     X-ray left hip at subsequent followups     Follow-up postop 2 weeks, 6 weeks, 3 months, 6 months, 1 year           =====================  Desmond Barker MD  Orthopaedic Surgery

## 2023-05-30 NOTE — ED NOTES
Received report and assumed care at this time.   The patient is awake, alert and cooperative with a calm affect, patient is aware of environment. Airway is open and patent, respirations are spontaneous, normal respiratory effort and rate noted, skin warm and dry, moves all extremities well, appearance: no apparent distress noted. Family at the bedside

## 2023-05-30 NOTE — PLAN OF CARE
Problem: Adult Inpatient Plan of Care  Goal: Plan of Care Review  Outcome: Ongoing, Progressing  Goal: Patient-Specific Goal (Individualized)  Outcome: Ongoing, Progressing  Goal: Absence of Hospital-Acquired Illness or Injury  Outcome: Ongoing, Progressing  Goal: Optimal Comfort and Wellbeing  Outcome: Ongoing, Progressing  Goal: Readiness for Transition of Care  Outcome: Ongoing, Progressing     A&ox4. Forgetful at times. Daughter at bedside. Pt complains of no pain. CHG completed. Preop checklist started. VSS. Will continue to monitor.

## 2023-05-30 NOTE — ASSESSMENT & PLAN NOTE
Brunilda England is a 89 y.o. female with a left basicervical femoral neck fracture with extension into the intertroch region, closed, NVI. They take Eliquis 5 b.i.d. anticoagulation at home. They required a walker for ambulatory assistive devices prior to this injury. Also had small SAH on CT head which was stable on repeat imaging.     -Admitted to medicine hip fracture service for pre-operative clearance and medical evaluation. Per  optimized for OR today. Npo.2u prbc prepared.  -AM labs need to be sent. Messaged nurse.   -pack in place

## 2023-05-30 NOTE — PLAN OF CARE
Problem: Infection  Goal: Absence of Infection Signs and Symptoms  Outcome: Ongoing, Progressing     Problem: Adult Inpatient Plan of Care  Goal: Plan of Care Review  Outcome: Ongoing, Progressing  Goal: Patient-Specific Goal (Individualized)  Outcome: Ongoing, Progressing  Goal: Absence of Hospital-Acquired Illness or Injury  Outcome: Ongoing, Progressing  Goal: Optimal Comfort and Wellbeing  Outcome: Ongoing, Progressing  Goal: Readiness for Transition of Care  Outcome: Ongoing, Progressing     Problem: Adjustment to Injury (Hip Fracture Medical Management)  Goal: Optimal Coping with Change in Health Status  Outcome: Ongoing, Progressing     Problem: Bleeding (Hip Fracture Medical Management)  Goal: Absence of Bleeding  Outcome: Ongoing, Progressing     Problem: Bowel Elimination Impaired (Hip Fracture Medical Management)  Goal: Effective Bowel Elimination  Outcome: Ongoing, Progressing     Problem: Cognitive Decline Risk (Hip Fracture Medical Management)  Goal: Baseline Cognitive Function Maintained  Outcome: Ongoing, Progressing     Problem: Embolism (Hip Fracture Medical Management)  Goal: Absence of Embolism  Outcome: Ongoing, Progressing     Problem: Fracture Stabilization and Management (Hip Fracture Medical Management)  Goal: Fracture Stability  Outcome: Ongoing, Progressing     Problem: Functional Ability Impaired (Hip Fracture Medical Management)  Goal: Optimal Functional Performance  Outcome: Ongoing, Progressing     Problem: Pain (Hip Fracture Medical Management)  Goal: Acceptable Pain Level  Outcome: Ongoing, Progressing     Problem: Urinary Elimination Impaired (Hip Fracture Medical Management)  Goal: Effective Urinary Elimination  Outcome: Ongoing, Progressing     Problem: Bleeding (Surgery Nonspecified)  Goal: Absence of Bleeding  Outcome: Ongoing, Progressing     Problem: Bowel Motility Impaired (Surgery Nonspecified)  Goal: Effective Bowel Elimination  Outcome: Ongoing, Progressing     Problem:  Fluid and Electrolyte Imbalance (Surgery Nonspecified)  Goal: Fluid and Electrolyte Balance  Outcome: Ongoing, Progressing     Problem: Glycemic Control Impaired (Surgery Nonspecified)  Goal: Blood Glucose Level Within Targeted Range  Outcome: Ongoing, Progressing     Problem: Infection (Surgery Nonspecified)  Goal: Absence of Infection Signs and Symptoms  Outcome: Ongoing, Progressing     Problem: Ongoing Anesthesia Effects (Surgery Nonspecified)  Goal: Anesthesia/Sedation Recovery  Outcome: Ongoing, Progressing     Problem: Pain (Surgery Nonspecified)  Goal: Acceptable Pain Control  Outcome: Ongoing, Progressing     Problem: Postoperative Nausea and Vomiting (Surgery Nonspecified)  Goal: Nausea and Vomiting Relief  Outcome: Ongoing, Progressing     Problem: Postoperative Urinary Retention (Surgery Nonspecified)  Goal: Effective Urinary Elimination  Outcome: Ongoing, Progressing     Problem: Respiratory Compromise (Surgery Nonspecified)  Goal: Effective Oxygenation and Ventilation  Outcome: Ongoing, Progressing     Problem: Device-Related Complication Risk (Anesthesia/Analgesia, Neuraxial)  Goal: Safe Infusion Delivery Completion  Outcome: Ongoing, Progressing     Problem: Infection (Anesthesia/Analgesia, Neuraxial)  Goal: Absence of Infection Signs and Symptoms  Outcome: Ongoing, Progressing     Problem: Nausea and Vomiting (Anesthesia/Analgesia, Neuraxial)  Goal: Nausea and Vomiting Relief  Outcome: Ongoing, Progressing     Problem: Pain (Anesthesia/Analgesia, Neuraxial)  Goal: Effective Pain Control  Outcome: Ongoing, Progressing     Problem: Respiratory Compromise (Anesthesia/Analgesia, Neuraxial)  Goal: Effective Oxygenation and Ventilation  Outcome: Ongoing, Progressing     Problem: Sensorimotor Impairment (Anesthesia/Analgesia, Neuraxial)  Goal: Baseline Motor Function  Outcome: Ongoing, Progressing     Problem: Urinary Retention (Anesthesia/Analgesia, Neuraxial)  Goal: Effective Urinary  Elimination  Outcome: Ongoing, Progressing     Problem: Fall Injury Risk  Goal: Absence of Fall and Fall-Related Injury  Outcome: Ongoing, Progressing     Problem: Diabetes Comorbidity  Goal: Blood Glucose Level Within Targeted Range  Outcome: Ongoing, Progressing     Problem: Fluid Imbalance (Pneumonia)  Goal: Fluid Balance  Outcome: Ongoing, Progressing     Problem: Infection (Pneumonia)  Goal: Resolution of Infection Signs and Symptoms  Outcome: Ongoing, Progressing     Problem: Respiratory Compromise (Pneumonia)  Goal: Effective Oxygenation and Ventilation  Outcome: Ongoing, Progressing     Problem: Skin Injury Risk Increased  Goal: Skin Health and Integrity  Outcome: Ongoing, Progressing    Pt AAOx4. No complaints of pain or discomfort. Routine meds administered. Continuous telemetry monitoring. Safety measures maintained. POC reviewed with pt and daughter verbalizes understanding.

## 2023-05-30 NOTE — PROGRESS NOTES
PreOp complete. Patient's wedding band to left ring finger covered with tape. Patient's tele box sent to PACU and patient's halter monitor given to her daughter, Nora.

## 2023-05-30 NOTE — TRANSFER OF CARE
Anesthesia Transfer of Care Note    Patient: Brunilda England    Procedure(s) Performed: Procedure(s) (LRB):  INSERTION, INTRAMEDULLARY TEREZA, FEMUR (Left)    Patient location: PACU    Anesthesia Type: general    Transport from OR: Transported from OR on 6-10 L/min O2 by face mask with adequate spontaneous ventilation    Post pain: adequate analgesia    Post assessment: no apparent anesthetic complications    Post vital signs: stable    Level of consciousness: responds to stimulation    Nausea/Vomiting: no nausea/vomiting    Complications: none    Transfer of care protocol was followed      Last vitals:   Visit Vitals 5/30/23 0953  /70 (BP Location: Left arm, Patient Position: Lying)   Pulse 80   Temp 36.9 °C (98.4 °F) (Temporal)   Resp 20   Wt 76.9 kg (169 lb 8.5 oz)   SpO2 100%   BMI 30.03 kg/m²

## 2023-05-30 NOTE — ADDENDUM NOTE
Addendum  created 05/30/23 1157 by Genaro Jaquez Jr., MD    Child order released for a procedure order, Clinical Note Signed, Intraprocedure Blocks edited, SmartForm saved

## 2023-05-31 ENCOUNTER — TELEPHONE (OUTPATIENT)
Dept: NEUROSURGERY | Facility: CLINIC | Age: 88
End: 2023-05-31
Payer: MEDICARE

## 2023-05-31 DIAGNOSIS — S06.6X0A SUBARACHNOID HEMORRHAGE FOLLOWING INJURY, NO LOSS OF CONSCIOUSNESS, INITIAL ENCOUNTER: Primary | ICD-10-CM

## 2023-05-31 PROBLEM — E83.42 HYPOMAGNESEMIA: Status: ACTIVE | Noted: 2023-05-31

## 2023-05-31 PROBLEM — D62 ACUTE BLOOD LOSS ANEMIA: Status: ACTIVE | Noted: 2023-05-31

## 2023-05-31 LAB
ANION GAP SERPL CALC-SCNC: 12 MMOL/L (ref 8–16)
BASOPHILS # BLD AUTO: 0.01 K/UL (ref 0–0.2)
BASOPHILS NFR BLD: 0.1 % (ref 0–1.9)
BUN SERPL-MCNC: 14 MG/DL (ref 10–30)
CALCIUM SERPL-MCNC: 9.6 MG/DL (ref 8.7–10.5)
CHLORIDE SERPL-SCNC: 107 MMOL/L (ref 95–110)
CO2 SERPL-SCNC: 20 MMOL/L (ref 23–29)
CREAT SERPL-MCNC: 0.9 MG/DL (ref 0.5–1.4)
DIFFERENTIAL METHOD: ABNORMAL
EOSINOPHIL # BLD AUTO: 0 K/UL (ref 0–0.5)
EOSINOPHIL NFR BLD: 0 % (ref 0–8)
ERYTHROCYTE [DISTWIDTH] IN BLOOD BY AUTOMATED COUNT: 16.5 % (ref 11.5–14.5)
EST. GFR  (NO RACE VARIABLE): >60 ML/MIN/1.73 M^2
GLUCOSE SERPL-MCNC: 128 MG/DL (ref 70–110)
HCT VFR BLD AUTO: 24 % (ref 37–48.5)
HGB BLD-MCNC: 8 G/DL (ref 12–16)
IMM GRANULOCYTES # BLD AUTO: 0.04 K/UL (ref 0–0.04)
IMM GRANULOCYTES NFR BLD AUTO: 0.5 % (ref 0–0.5)
LYMPHOCYTES # BLD AUTO: 0.7 K/UL (ref 1–4.8)
LYMPHOCYTES NFR BLD: 9.7 % (ref 18–48)
MAGNESIUM SERPL-MCNC: 1.4 MG/DL (ref 1.6–2.6)
MCH RBC QN AUTO: 29 PG (ref 27–31)
MCHC RBC AUTO-ENTMCNC: 33.3 G/DL (ref 32–36)
MCV RBC AUTO: 87 FL (ref 82–98)
MONOCYTES # BLD AUTO: 0.6 K/UL (ref 0.3–1)
MONOCYTES NFR BLD: 7.8 % (ref 4–15)
NEUTROPHILS # BLD AUTO: 6.3 K/UL (ref 1.8–7.7)
NEUTROPHILS NFR BLD: 81.9 % (ref 38–73)
NRBC BLD-RTO: 0 /100 WBC
PHOSPHATE SERPL-MCNC: 3.1 MG/DL (ref 2.7–4.5)
PLATELET # BLD AUTO: 118 K/UL (ref 150–450)
PMV BLD AUTO: 12.2 FL (ref 9.2–12.9)
POCT GLUCOSE: 133 MG/DL (ref 70–110)
POCT GLUCOSE: 145 MG/DL (ref 70–110)
POCT GLUCOSE: 165 MG/DL (ref 70–110)
POCT GLUCOSE: 174 MG/DL (ref 70–110)
POTASSIUM SERPL-SCNC: 4.6 MMOL/L (ref 3.5–5.1)
RBC # BLD AUTO: 2.76 M/UL (ref 4–5.4)
SODIUM SERPL-SCNC: 139 MMOL/L (ref 136–145)
WBC # BLD AUTO: 7.65 K/UL (ref 3.9–12.7)

## 2023-05-31 PROCEDURE — 25000003 PHARM REV CODE 250: Performed by: HOSPITALIST

## 2023-05-31 PROCEDURE — 51798 US URINE CAPACITY MEASURE: CPT

## 2023-05-31 PROCEDURE — 97530 THERAPEUTIC ACTIVITIES: CPT

## 2023-05-31 PROCEDURE — 99291 CRITICAL CARE FIRST HOUR: CPT | Mod: ,,, | Performed by: HOSPITALIST

## 2023-05-31 PROCEDURE — 64999 LEFT SIFI CATHETER: ICD-10-PCS | Mod: ,,, | Performed by: ANESTHESIOLOGY

## 2023-05-31 PROCEDURE — 99222 PR INITIAL HOSPITAL CARE,LEVL II: ICD-10-PCS | Mod: GC,,, | Performed by: STUDENT IN AN ORGANIZED HEALTH CARE EDUCATION/TRAINING PROGRAM

## 2023-05-31 PROCEDURE — 63600175 PHARM REV CODE 636 W HCPCS: Performed by: ANESTHESIOLOGY

## 2023-05-31 PROCEDURE — 20600001 HC STEP DOWN PRIVATE ROOM

## 2023-05-31 PROCEDURE — 99232 SBSQ HOSP IP/OBS MODERATE 35: CPT | Mod: ,,, | Performed by: PHYSICIAN ASSISTANT

## 2023-05-31 PROCEDURE — 99291 PR CRITICAL CARE, E/M 30-74 MINUTES: ICD-10-PCS | Mod: ,,, | Performed by: HOSPITALIST

## 2023-05-31 PROCEDURE — 99233 PR SUBSEQUENT HOSPITAL CARE,LEVL III: ICD-10-PCS | Mod: 25,,, | Performed by: HOSPITALIST

## 2023-05-31 PROCEDURE — 99231 SBSQ HOSP IP/OBS SF/LOW 25: CPT | Mod: ,,, | Performed by: ANESTHESIOLOGY

## 2023-05-31 PROCEDURE — 83735 ASSAY OF MAGNESIUM: CPT | Performed by: HOSPITALIST

## 2023-05-31 PROCEDURE — 63600175 PHARM REV CODE 636 W HCPCS: Performed by: HOSPITALIST

## 2023-05-31 PROCEDURE — 76942 LEFT SIFI CATHETER: ICD-10-PCS | Mod: 26,,, | Performed by: ANESTHESIOLOGY

## 2023-05-31 PROCEDURE — 25000003 PHARM REV CODE 250

## 2023-05-31 PROCEDURE — 76942 ECHO GUIDE FOR BIOPSY: CPT | Mod: 26,,, | Performed by: ANESTHESIOLOGY

## 2023-05-31 PROCEDURE — 97161 PT EVAL LOW COMPLEX 20 MIN: CPT

## 2023-05-31 PROCEDURE — 99232 PR SUBSEQUENT HOSPITAL CARE,LEVL II: ICD-10-PCS | Mod: ,,, | Performed by: PHYSICIAN ASSISTANT

## 2023-05-31 PROCEDURE — 64999 UNLISTED PX NERVOUS SYSTEM: CPT | Mod: ,,, | Performed by: ANESTHESIOLOGY

## 2023-05-31 PROCEDURE — 99233 SBSQ HOSP IP/OBS HIGH 50: CPT | Mod: 25,,, | Performed by: HOSPITALIST

## 2023-05-31 PROCEDURE — 99231 PR SUBSEQUENT HOSPITAL CARE,LEVL I: ICD-10-PCS | Mod: ,,, | Performed by: ANESTHESIOLOGY

## 2023-05-31 PROCEDURE — 64448 NJX AA&/STRD FEM NRV NFS IMG: CPT | Performed by: STUDENT IN AN ORGANIZED HEALTH CARE EDUCATION/TRAINING PROGRAM

## 2023-05-31 PROCEDURE — 99222 1ST HOSP IP/OBS MODERATE 55: CPT | Mod: GC,,, | Performed by: STUDENT IN AN ORGANIZED HEALTH CARE EDUCATION/TRAINING PROGRAM

## 2023-05-31 PROCEDURE — 97535 SELF CARE MNGMENT TRAINING: CPT

## 2023-05-31 PROCEDURE — 97110 THERAPEUTIC EXERCISES: CPT

## 2023-05-31 PROCEDURE — 25000003 PHARM REV CODE 250: Performed by: STUDENT IN AN ORGANIZED HEALTH CARE EDUCATION/TRAINING PROGRAM

## 2023-05-31 PROCEDURE — 84100 ASSAY OF PHOSPHORUS: CPT | Performed by: HOSPITALIST

## 2023-05-31 PROCEDURE — 36415 COLL VENOUS BLD VENIPUNCTURE: CPT | Performed by: HOSPITALIST

## 2023-05-31 PROCEDURE — 80048 BASIC METABOLIC PNL TOTAL CA: CPT | Performed by: HOSPITALIST

## 2023-05-31 PROCEDURE — 85025 COMPLETE CBC W/AUTO DIFF WBC: CPT | Performed by: HOSPITALIST

## 2023-05-31 PROCEDURE — 97165 OT EVAL LOW COMPLEX 30 MIN: CPT

## 2023-05-31 RX ORDER — METOPROLOL TARTRATE 1 MG/ML
2.5 INJECTION, SOLUTION INTRAVENOUS EVERY 4 HOURS PRN
Status: DISCONTINUED | OUTPATIENT
Start: 2023-05-31 | End: 2023-06-05 | Stop reason: HOSPADM

## 2023-05-31 RX ORDER — MAGNESIUM SULFATE HEPTAHYDRATE 40 MG/ML
2 INJECTION, SOLUTION INTRAVENOUS ONCE
Status: COMPLETED | OUTPATIENT
Start: 2023-05-31 | End: 2023-05-31

## 2023-05-31 RX ORDER — LANOLIN ALCOHOL/MO/W.PET/CERES
400 CREAM (GRAM) TOPICAL 2 TIMES DAILY
Status: COMPLETED | OUTPATIENT
Start: 2023-05-31 | End: 2023-05-31

## 2023-05-31 RX ORDER — METOPROLOL TARTRATE 1 MG/ML
INJECTION, SOLUTION INTRAVENOUS
Status: COMPLETED
Start: 2023-05-31 | End: 2023-05-31

## 2023-05-31 RX ORDER — ROPIVACAINE HYDROCHLORIDE 5 MG/ML
INJECTION, SOLUTION EPIDURAL; INFILTRATION; PERINEURAL
Status: DISCONTINUED | OUTPATIENT
Start: 2023-05-31 | End: 2023-05-31

## 2023-05-31 RX ORDER — HYDRALAZINE HYDROCHLORIDE 25 MG/1
25 TABLET, FILM COATED ORAL EVERY 8 HOURS
Status: DISCONTINUED | OUTPATIENT
Start: 2023-05-31 | End: 2023-06-05 | Stop reason: HOSPADM

## 2023-05-31 RX ORDER — METOPROLOL TARTRATE 1 MG/ML
10 INJECTION, SOLUTION INTRAVENOUS EVERY 4 HOURS PRN
Status: DISCONTINUED | OUTPATIENT
Start: 2023-05-31 | End: 2023-05-31

## 2023-05-31 RX ORDER — HYDRALAZINE HYDROCHLORIDE 50 MG/1
100 TABLET, FILM COATED ORAL EVERY 8 HOURS
Status: DISCONTINUED | OUTPATIENT
Start: 2023-05-31 | End: 2023-05-31

## 2023-05-31 RX ORDER — ENOXAPARIN SODIUM 100 MG/ML
40 INJECTION SUBCUTANEOUS EVERY 24 HOURS
Status: DISCONTINUED | OUTPATIENT
Start: 2023-05-31 | End: 2023-06-02

## 2023-05-31 RX ADMIN — LEVETIRACETAM 500 MG: 500 TABLET, FILM COATED ORAL at 09:05

## 2023-05-31 RX ADMIN — ACETAMINOPHEN 1000 MG: 500 TABLET ORAL at 05:05

## 2023-05-31 RX ADMIN — ROPIVACAINE HYDROCHLORIDE 0.1 ML/HR: 2 INJECTION, SOLUTION EPIDURAL; INFILTRATION at 09:05

## 2023-05-31 RX ADMIN — POLYETHYLENE GLYCOL 3350 17 G: 17 POWDER, FOR SOLUTION ORAL at 09:05

## 2023-05-31 RX ADMIN — CHOLECALCIFEROL TAB 25 MCG (1000 UNIT) 1000 UNITS: 25 TAB at 09:05

## 2023-05-31 RX ADMIN — METOPROLOL TARTRATE 25 MG: 25 TABLET, FILM COATED ORAL at 02:05

## 2023-05-31 RX ADMIN — SENNOSIDES AND DOCUSATE SODIUM 1 TABLET: 50; 8.6 TABLET ORAL at 09:05

## 2023-05-31 RX ADMIN — DULOXETINE 60 MG: 60 CAPSULE, DELAYED RELEASE ORAL at 09:05

## 2023-05-31 RX ADMIN — MUPIROCIN 1 G: 20 OINTMENT TOPICAL at 09:05

## 2023-05-31 RX ADMIN — Medication 400 MG: at 10:05

## 2023-05-31 RX ADMIN — MAGNESIUM SULFATE 2 G: 2 INJECTION INTRAVENOUS at 02:05

## 2023-05-31 RX ADMIN — Medication 400 MG: at 09:05

## 2023-05-31 RX ADMIN — HYDRALAZINE HYDROCHLORIDE 25 MG: 25 TABLET, FILM COATED ORAL at 09:05

## 2023-05-31 RX ADMIN — DONEPEZIL HYDROCHLORIDE 5 MG: 5 TABLET ORAL at 09:05

## 2023-05-31 RX ADMIN — METOPROLOL TARTRATE 5 MG: 5 INJECTION, SOLUTION INTRAVENOUS at 02:05

## 2023-05-31 RX ADMIN — Medication 2 TABLET: at 09:05

## 2023-05-31 RX ADMIN — ROPIVACAINE HYDROCHLORIDE 10 ML: 5 INJECTION EPIDURAL; INFILTRATION; PERINEURAL at 07:05

## 2023-05-31 RX ADMIN — METOPROLOL TARTRATE 5 MG: 5 INJECTION, SOLUTION INTRAVENOUS at 03:05

## 2023-05-31 RX ADMIN — ACETAMINOPHEN 1000 MG: 500 TABLET ORAL at 12:05

## 2023-05-31 RX ADMIN — HYDRALAZINE HYDROCHLORIDE 75 MG: 50 TABLET ORAL at 05:05

## 2023-05-31 RX ADMIN — AMLODIPINE BESYLATE AND BENAZEPRIL HYDROCHLORIDE 1 CAPSULE: 5; 20 CAPSULE ORAL at 10:05

## 2023-05-31 RX ADMIN — ENOXAPARIN SODIUM 40 MG: 40 INJECTION SUBCUTANEOUS at 05:05

## 2023-05-31 NOTE — ANESTHESIA POST-OP PAIN MANAGEMENT
Acute Pain Service Progress Note    Brunilda England is a 89 y.o., female, [id number].    Surgery:  INSERTION, INTRAMEDULLARY TEREZA, FEMUR (Left: Hip)    Post Op Day #: 1    Catheter type: perineural  L SIFI    Infusion type: Ropivacaine 0.2%  15mL q3 basal    Problem List:    Active Hospital Problems    Diagnosis  POA    *Left basicervical femoral neck fracture with extension into the intertroch region [S72.002A]  Yes    Hypomagnesemia [E83.42]  Yes    Acute blood loss anemia [D62]  Yes    Intertrochanteric fracture of left hip, closed, initial encounter [S72.142A]  Unknown    Subarachnoid hemorrhage following injury, no loss of consciousness [S06.6X0A]  Yes    Chronic hypoxemic respiratory failure [J96.11]  Yes    Tachy-juan a syndrome [I49.5]  Yes    Late onset Alzheimer's dementia with behavioral disturbance [G30.1, F02.818]  Yes    Chronic diastolic heart failure [I50.32]  Yes    Chronic renal failure, stage 3a [N18.31]  Yes    Centrilobular emphysema [J43.2]  Yes    Type 2 diabetes mellitus with circulatory disorder, without long-term current use of insulin [E11.59]  Yes    Coronary artery disease involving native coronary artery without angina pectoris [I25.10]  Yes    Essential hypertension [I10]  Yes    History of lung cancer [Z85.118]  Not Applicable      Resolved Hospital Problems   No resolved problems to display.       Subjective:     General appearance of alert, oriented, no complaints   Pain with rest: 1    Faces   Pain with movement: 3    Faces   Side Effects    1. Pruritis No    2. Nausea No    3. Motor Blockade No, 0=Ability to raise lower extremities off bed    4. Sedation No, 1=awake and alert    Objective:     Catheter site clean, dry, intact      Vitals   Vitals:    05/31/23 1301   BP:    Pulse: (!) 161   Resp:    Temp:         Labs    Admission on 05/28/2023   Component Date Value Ref Range Status    HIV 1/2 Ag/Ab 05/28/2023 Non-reactive  Non-reactive Final    Hepatitis C Ab  05/28/2023 Non-reactive  Non-reactive Final    WBC 05/28/2023 8.60  3.90 - 12.70 K/uL Final    RBC 05/28/2023 3.80 (L)  4.00 - 5.40 M/uL Final    Hemoglobin 05/28/2023 10.9 (L)  12.0 - 16.0 g/dL Final    Hematocrit 05/28/2023 32.9 (L)  37.0 - 48.5 % Final    MCV 05/28/2023 87  82 - 98 fL Final    MCH 05/28/2023 28.7  27.0 - 31.0 pg Final    MCHC 05/28/2023 33.1  32.0 - 36.0 g/dL Final    RDW 05/28/2023 17.3 (H)  11.5 - 14.5 % Final    Platelets 05/28/2023 174  150 - 450 K/uL Final    MPV 05/28/2023 13.1 (H)  9.2 - 12.9 fL Final    Immature Granulocytes 05/28/2023 0.3  0.0 - 0.5 % Final    Gran # (ANC) 05/28/2023 6.5  1.8 - 7.7 K/uL Final    Immature Grans (Abs) 05/28/2023 0.03  0.00 - 0.04 K/uL Final    Lymph # 05/28/2023 1.4  1.0 - 4.8 K/uL Final    Mono # 05/28/2023 0.6  0.3 - 1.0 K/uL Final    Eos # 05/28/2023 0.1  0.0 - 0.5 K/uL Final    Baso # 05/28/2023 0.03  0.00 - 0.20 K/uL Final    nRBC 05/28/2023 0  0 /100 WBC Final    Gran % 05/28/2023 75.1 (H)  38.0 - 73.0 % Final    Lymph % 05/28/2023 16.5 (L)  18.0 - 48.0 % Final    Mono % 05/28/2023 7.1  4.0 - 15.0 % Final    Eosinophil % 05/28/2023 0.7  0.0 - 8.0 % Final    Basophil % 05/28/2023 0.3  0.0 - 1.9 % Final    Differential Method 05/28/2023 Automated   Final    Transferrin 05/29/2023 209  200 - 375 mg/dL Final    Prealbumin 05/29/2023 19 (L)  20 - 43 mg/dL Final    Procalcitonin 05/29/2023 0.02  <0.25 ng/mL Final    Magnesium 05/29/2023 1.5 (L)  1.6 - 2.6 mg/dL Final    Phosphorus 05/29/2023 2.5 (L)  2.7 - 4.5 mg/dL Final    Hemoglobin A1C 05/29/2023 6.3 (H)  4.0 - 5.6 % Final    Estimated Avg Glucose 05/29/2023 134 (H)  68 - 131 mg/dL Final    Specimen UA 05/29/2023 Urine, Catheterized   Final    Color, UA 05/29/2023 Yellow  Yellow, Straw, Cass Final    Appearance, UA 05/29/2023 Clear  Clear Final    pH, UA 05/29/2023 6.0  5.0 - 8.0 Final    Specific Gravity, UA 05/29/2023 1.015  1.005 - 1.030 Final    Protein, UA  05/29/2023 Trace (A)  Negative Final    Glucose, UA 05/29/2023 Negative  Negative Final    Ketones, UA 05/29/2023 Negative  Negative Final    Bilirubin (UA) 05/29/2023 Negative  Negative Final    Occult Blood UA 05/29/2023 Negative  Negative Final    Nitrite, UA 05/29/2023 Negative  Negative Final    Leukocytes, UA 05/29/2023 Negative  Negative Final    Vit D, 25-Hydroxy 05/29/2023 49  30 - 96 ng/mL Final    Sodium 05/29/2023 137  136 - 145 mmol/L Final    Potassium 05/29/2023 3.8  3.5 - 5.1 mmol/L Final    Chloride 05/29/2023 102  95 - 110 mmol/L Final    CO2 05/29/2023 24  23 - 29 mmol/L Final    Glucose 05/29/2023 190 (H)  70 - 110 mg/dL Final    BUN 05/29/2023 15  10 - 30 mg/dL Final    Creatinine 05/29/2023 1.2  0.5 - 1.4 mg/dL Final    Calcium 05/29/2023 9.9  8.7 - 10.5 mg/dL Final    Total Protein 05/29/2023 7.5  6.0 - 8.4 g/dL Final    Albumin 05/29/2023 3.5  3.5 - 5.2 g/dL Final    Total Bilirubin 05/29/2023 0.2  0.1 - 1.0 mg/dL Final    Alkaline Phosphatase 05/29/2023 51 (L)  55 - 135 U/L Final    AST 05/29/2023 16  10 - 40 U/L Final    ALT 05/29/2023 11  10 - 44 U/L Final    Anion Gap 05/29/2023 11  8 - 16 mmol/L Final    eGFR 05/29/2023 42.7 (A)  >60 mL/min/1.73 m^2 Final    aPTT 05/29/2023 25.9  21.0 - 32.0 sec Final    POCT Glucose 05/29/2023 154 (H)  70 - 110 mg/dL Final    UNIT NUMBER 05/30/2023 [id number]   Preliminary    Product Code 05/30/2023 [id number]   Preliminary    DISPENSE STATUS 05/30/2023 CROSSMATCHED   Preliminary    CODING SYSTEM 05/30/2023 [id number]   Preliminary    Unit Blood Type Code 05/30/2023 6200   Preliminary    Unit Blood Type 05/30/2023 A POS   Preliminary    Unit Expiration 05/30/2023 [id number]   Preliminary    CROSSMATCH INTERPRETATION 05/30/2023 Compatible   Preliminary    UNIT NUMBER 05/30/2023 [id number]   Preliminary    Product Code 05/30/2023 [id number]   Preliminary    DISPENSE STATUS 05/30/2023 CROSSMATCHED   Preliminary     CODING SYSTEM 05/30/2023 [id number]   Preliminary    Unit Blood Type Code 05/30/2023 6200   Preliminary    Unit Blood Type 05/30/2023 A POS   Preliminary    Unit Expiration 05/30/2023 [id number]   Preliminary    CROSSMATCH INTERPRETATION 05/30/2023 Compatible   Preliminary    Group & Rh 05/30/2023 A POS   Final    Indirect Joseph 05/30/2023 POS (A)   Final    Specimen Outdate 05/30/2023 06/02/2023 23:59   Final    POCT Glucose 05/30/2023 156 (H)  70 - 110 mg/dL Final    WBC 05/30/2023 6.45  3.90 - 12.70 K/uL Final    RBC 05/30/2023 3.24 (L)  4.00 - 5.40 M/uL Final    Hemoglobin 05/30/2023 9.3 (L)  12.0 - 16.0 g/dL Final    Hematocrit 05/30/2023 27.4 (L)  37.0 - 48.5 % Final    MCV 05/30/2023 85  82 - 98 fL Final    MCH 05/30/2023 28.7  27.0 - 31.0 pg Final    MCHC 05/30/2023 33.9  32.0 - 36.0 g/dL Final    RDW 05/30/2023 16.1 (H)  11.5 - 14.5 % Final    Platelets 05/30/2023 154  150 - 450 K/uL Final    MPV 05/30/2023 12.4  9.2 - 12.9 fL Final    Immature Granulocytes 05/30/2023 0.3  0.0 - 0.5 % Final    Gran # (ANC) 05/30/2023 4.6  1.8 - 7.7 K/uL Final    Immature Grans (Abs) 05/30/2023 0.02  0.00 - 0.04 K/uL Final    Lymph # 05/30/2023 1.1  1.0 - 4.8 K/uL Final    Mono # 05/30/2023 0.7  0.3 - 1.0 K/uL Final    Eos # 05/30/2023 0.1  0.0 - 0.5 K/uL Final    Baso # 05/30/2023 0.02  0.00 - 0.20 K/uL Final    nRBC 05/30/2023 0  0 /100 WBC Final    Gran % 05/30/2023 71.0  38.0 - 73.0 % Final    Lymph % 05/30/2023 16.6 (L)  18.0 - 48.0 % Final    Mono % 05/30/2023 10.7  4.0 - 15.0 % Final    Eosinophil % 05/30/2023 1.1  0.0 - 8.0 % Final    Basophil % 05/30/2023 0.3  0.0 - 1.9 % Final    Differential Method 05/30/2023 Automated   Final    Sodium 05/30/2023 136  136 - 145 mmol/L Final    Potassium 05/30/2023 5.7 (H)  3.5 - 5.1 mmol/L Final    Chloride 05/30/2023 103  95 - 110 mmol/L Final    CO2 05/30/2023 24  23 - 29 mmol/L Final    Glucose 05/30/2023 126 (H)  70 - 110 mg/dL Final     BUN 05/30/2023 12  10 - 30 mg/dL Final    Creatinine 05/30/2023 0.9  0.5 - 1.4 mg/dL Final    Calcium 05/30/2023 9.6  8.7 - 10.5 mg/dL Final    Anion Gap 05/30/2023 9  8 - 16 mmol/L Final    eGFR 05/30/2023 >60.0  >60 mL/min/1.73 m^2 Final    Magnesium 05/30/2023 1.5 (L)  1.6 - 2.6 mg/dL Final    Phosphorus 05/30/2023 2.7  2.7 - 4.5 mg/dL Final    Antibody ID 05/30/2023 POS   Final    POCT Glucose 05/30/2023 128 (H)  70 - 110 mg/dL Final    POCT Glucose 05/30/2023 172 (H)  70 - 110 mg/dL Final    POCT Glucose 05/30/2023 208 (H)  70 - 110 mg/dL Final    WBC 05/31/2023 7.65  3.90 - 12.70 K/uL Final    RBC 05/31/2023 2.76 (L)  4.00 - 5.40 M/uL Final    Hemoglobin 05/31/2023 8.0 (L)  12.0 - 16.0 g/dL Final    Hematocrit 05/31/2023 24.0 (L)  37.0 - 48.5 % Final    MCV 05/31/2023 87  82 - 98 fL Final    MCH 05/31/2023 29.0  27.0 - 31.0 pg Final    MCHC 05/31/2023 33.3  32.0 - 36.0 g/dL Final    RDW 05/31/2023 16.5 (H)  11.5 - 14.5 % Final    Platelets 05/31/2023 118 (L)  150 - 450 K/uL Final    MPV 05/31/2023 12.2  9.2 - 12.9 fL Final    Immature Granulocytes 05/31/2023 0.5  0.0 - 0.5 % Final    Gran # (ANC) 05/31/2023 6.3  1.8 - 7.7 K/uL Final    Immature Grans (Abs) 05/31/2023 0.04  0.00 - 0.04 K/uL Final    Lymph # 05/31/2023 0.7 (L)  1.0 - 4.8 K/uL Final    Mono # 05/31/2023 0.6  0.3 - 1.0 K/uL Final    Eos # 05/31/2023 0.0  0.0 - 0.5 K/uL Final    Baso # 05/31/2023 0.01  0.00 - 0.20 K/uL Final    nRBC 05/31/2023 0  0 /100 WBC Final    Gran % 05/31/2023 81.9 (H)  38.0 - 73.0 % Final    Lymph % 05/31/2023 9.7 (L)  18.0 - 48.0 % Final    Mono % 05/31/2023 7.8  4.0 - 15.0 % Final    Eosinophil % 05/31/2023 0.0  0.0 - 8.0 % Final    Basophil % 05/31/2023 0.1  0.0 - 1.9 % Final    Differential Method 05/31/2023 Automated   Final    Sodium 05/31/2023 139  136 - 145 mmol/L Final    Potassium 05/31/2023 4.6  3.5 - 5.1 mmol/L Final    Chloride 05/31/2023 107  95 - 110 mmol/L Final    CO2  05/31/2023 20 (L)  23 - 29 mmol/L Final    Glucose 05/31/2023 128 (H)  70 - 110 mg/dL Final    BUN 05/31/2023 14  10 - 30 mg/dL Final    Creatinine 05/31/2023 0.9  0.5 - 1.4 mg/dL Final    Calcium 05/31/2023 9.6  8.7 - 10.5 mg/dL Final    Anion Gap 05/31/2023 12  8 - 16 mmol/L Final    eGFR 05/31/2023 >60.0  >60 mL/min/1.73 m^2 Final    Magnesium 05/31/2023 1.4 (L)  1.6 - 2.6 mg/dL Final    Phosphorus 05/31/2023 3.1  2.7 - 4.5 mg/dL Final    POCT Glucose 05/31/2023 133 (H)  70 - 110 mg/dL Final    POCT Glucose 05/31/2023 145 (H)  70 - 110 mg/dL Final        Meds   Current Facility-Administered Medications   Medication Dose Route Frequency Provider Last Rate Last Admin    acetaminophen tablet 1,000 mg  1,000 mg Oral Q6H Honey Calixto MD   1,000 mg at 05/31/23 1205    amlodipine-benazepril 5-20 mg per capsule 1 capsule  1 capsule Oral Daily Honey Calixto MD   1 capsule at 05/31/23 1025    bisacodyL suppository 10 mg  10 mg Rectal Daily PRN Honey Calixto MD        calcium-vitamin D3 500 mg-5 mcg (200 unit) per tablet 2 tablet  2 tablet Oral Daily Dale Rojo MD   2 tablet at 05/31/23 0916    dextrose 10% bolus 125 mL 125 mL  12.5 g Intravenous PRN Honey Calixto MD        dextrose 10% bolus 250 mL 250 mL  25 g Intravenous PRN Honey Calixto MD        dextrose 40 % gel 15,000 mg  15 g Oral PRN Honey Calixto MD        dextrose 40 % gel 30,000 mg  30 g Oral PRN Honey Calixto MD        donepeziL tablet 5 mg  5 mg Oral QHS Honey Calixto MD   5 mg at 05/30/23 2050    DULoxetine DR capsule 60 mg  60 mg Oral QAM Honey Calixto MD   60 mg at 05/31/23 0915    glucagon (human recombinant) injection 1 mg  1 mg Intramuscular PRN Honey Calixto MD        hydrALAZINE tablet 100 mg  100 mg Oral Q8H Honey Calixto MD        hydrALAZINE tablet 50 mg  50 mg Oral Q8H PRN Honey Calixto MD   50 mg at 05/30/23 0101    insulin aspart U-100 pen 0-5  Units  0-5 Units Subcutaneous QID (AC + HS) PRN Honey Calixto MD        levETIRAcetam tablet 500 mg  500 mg Oral BID Honey Calixto MD   500 mg at 05/31/23 0915    magnesium oxide tablet 400 mg  400 mg Oral BID Honey Calixto MD   400 mg at 05/31/23 0915    melatonin tablet 6 mg  6 mg Oral Nightly PRN Honey Calixto MD        methocarbamoL tablet 500 mg  500 mg Oral Q6H PRN Honey Calixto MD        metoprolol tartrate (LOPRESSOR) tablet 25 mg  25 mg Oral BID Honey Calixto MD   25 mg at 05/30/23 2050    mupirocin 2 % ointment 1 g  1 g Nasal BID Honey Calixto MD   1 g at 05/31/23 0917    ondansetron injection 4 mg  4 mg Intravenous Q12H PRN Honey Calixto MD        polyethylene glycol packet 17 g  17 g Oral Daily Honey Calixto MD   17 g at 05/31/23 0915    ROPIvacaine (PF) 2 mg/ml (0.2%) solution  0.1 mL/hr Perineural Continuous Honey Calixto MD 0.1 mL/hr at 05/30/23 1001 0.1 mL/hr at 05/30/23 1001    senna-docusate 8.6-50 mg per tablet 1 tablet  1 tablet Oral BID Honey Calixto MD   1 tablet at 05/31/23 0915    sodium chloride 0.9% flush 10 mL  10 mL Intravenous PRN Honey Calixto MD        vitamin D 1000 units tablet 1,000 Units  1,000 Units Oral Daily Honey Calixto MD   1,000 Units at 05/31/23 0916     Facility-Administered Medications Ordered in Other Encounters   Medication Dose Route Frequency Provider Last Rate Last Admin    sodium chloride 0.9% flush 5 mL  5 mL Intravenous PRN Sarah Earl MD             Assessment:     Pain control adequate    Plan:     Patient doing well, continue present treatment.   - C/w ephraim Tylenol 1g q6, duloxetine 60 qAM  - C/w Robaxin 500 q6PRN  - C/w PNC. Will continue to monitor.    Suresh Spain MD  Anesthesia CA-3/PGY-4

## 2023-05-31 NOTE — PROGRESS NOTES
Progress Note  Hospital Medicine       Patient Name: Brunilda England  MRN:  [id number]  [facility] Medicine Team: [facility] [place] [facility]OSP MED  Honey Calixto MD  Date of Admission:  5/28/2023     Principal Problem:  Left displaced femoral neck fracture   Primary Care Physician: Pepper Whitfield MD      Interval history     Seen at bedside with daughter this morning. She reports some pain in her left foot and SCD pumped up with air during exam so may be irritating her foot as pulses intact and warm to the touch with equal sensation and equal strength on both feet bilaterally. Minimal hip pain on exam and bandages clean with dressings intact without exudate seen on them. Labs stable and family reports does soft diet at home but doesn't DM restrict given glucose is good without is so adjusted to soft diet for her now. BP goal 140 systolic and <120 this AM so nursing giving this and holding metoprolol as HR lower 70s now, and if styas on lower end this afternoon can hold scheduled hydralazine if needed but had a few jaunts to 150s yesterday so would trend closely to keep at goal with small SAH. Ortho was talking with NS today re anticoagulation and dr ocampo in communication with them about this, as they report may require holding for prolonged period and they are still talking with their staff about this is what she said they told her this morning about this. They will update once they have a plan as she was asking if heparin may be an option if we have to hold the eliquis given reversal if needed and they are weighing these options for now. I let her daughter know that we were still awaiting neurosurgery recs before resuming this and that she is on the keppra for seizure prophylaxis for 14 days per their recs.        Review of Systems   Constitutional: Negative for chills, fatigue, fever.   HENT: Negative for sore throat, trouble swallowing.    Eyes: Negative for photophobia, visual disturbance.   Respiratory: Negative for cough,  shortness of breath.    Cardiovascular: Negative for chest pain, palpitations, leg swelling.   Gastrointestinal: Negative for abdominal pain, constipation, diarrhea, nausea, vomiting.   Endocrine: Negative for cold intolerance, heat intolerance.   Genitourinary: Negative for dysuria, frequency.   Musculoskeletal: + for arthralgias, myalgias.   Skin: Negative for rash, wound, erythema   Neurological: Negative for dizziness, syncope, weakness, light-headedness.   Psychiatric/Behavioral: Negative for confusion, hallucinations, anxiety  All other systems reviewed and are negative.      Past Medical History: Patient has a past medical history of Anal cancer (1995), Anemia, Cancer (1995), Centrilobular emphysema (7/6/2020), Diabetes mellitus, Lumbar radiculopathy (5/16/2014), Lung cancer (2012), Macular degeneration, Macular hemorrhage of left eye, Neuropathy, Osteoporosis, Osteoporosis, unspecified (11/9/2012), and Pure hypercholesterolemia.    Past Surgical History: Patient has a past surgical history that includes Tonsillectomy; Cholecystectomy; Hysterectomy; Bronchoscopy; Colonoscopy; left leg surgery; heart stent; Fluoroscopic angiography of lower extremity with topical ultrasound (Right, 12/6/2018); Percutaneous transluminal angioplasty (N/A, 12/6/2018); Reconstruction using flap (Right, 12/6/2019); and Incisional biopsy (Right, 12/6/2019).    Social History: Patient reports that she quit smoking about 28 years ago. Her smoking use included cigarettes. She has a 15.00 pack-year smoking history. She has been exposed to tobacco smoke. She has never used smokeless tobacco. She reports that she does not drink alcohol and does not use drugs.    Family History: family history includes Breast cancer in her maternal aunt; Cancer in her father; Stroke in her mother.    Medications: Scheduled Meds:   acetaminophen  1,000 mg Oral Q6H    amlodipine-benazepril 5-20 mg  1 capsule Oral Daily    calcium-vitamin D3  2 tablet Oral  Daily    donepeziL  5 mg Oral QHS    DULoxetine  60 mg Oral QAM    hydrALAZINE  100 mg Oral Q8H    levETIRAcetam  500 mg Oral BID    magnesium oxide  400 mg Oral BID    metoprolol tartrate  25 mg Oral BID    mupirocin  1 g Nasal BID    polyethylene glycol  17 g Oral Daily    senna-docusate 8.6-50 mg  1 tablet Oral BID    vitamin D  1,000 Units Oral Daily     Continuous Infusions:   ROPIvacaine (PF) 2 mg/ml (0.2%) 0.1 mL/hr (05/30/23 1001)     PRN Meds:.bisacodyL, dextrose 10%, dextrose 10%, dextrose, dextrose, glucagon (human recombinant), hydrALAZINE, insulin aspart U-100, melatonin, methocarbamoL, ondansetron, sodium chloride 0.9%    Allergies: Patient is allergic to bextra [valdecoxib] and gabapentin.    Physical Exam:     Vital Signs (Most Recent):  Temp: 97.7 °F (36.5 °C) (05/31/23 1155)  Pulse: (!) 145 (05/31/23 1155)  Resp: 18 (05/31/23 1155)  BP: (!) 110/59 (05/31/23 1155)  SpO2: 100 % (05/31/23 1155) Vital Signs Range (Last 24H):  Temp:  [97.4 °F (36.3 °C)-98.4 °F (36.9 °C)]   Pulse:  []   Resp:  [17-18]   BP: (110-162)/(53-76)   SpO2:  [98 %-100 %]    Body mass index is 30.03 kg/m².     Physical Exam:  Constitutional: Appears well-developed and well-nourished. Pleasant. Daughter at bedside.  Head: Normocephalic and atraumatic.   Mouth/Throat: Oropharynx is clear and moist.   Eyes: EOM are normal. Pupils are equal, round, and reactive to light. No scleral icterus.   Neck: Normal range of motion. Neck supple.   Cardiovascular: Normal rate and regular rhythm.  No murmur heard.  Pulmonary/Chest: Effort normal and breath sounds normal. No respiratory distress. No wheezes, rales, or rhonchi. On home oxygen.  Abdominal: Soft. Bowel sounds are normal.  No distension or tenderness  Musculoskeletal: LLE with bandages in place and ropivicaine. No exudate on bandages. Foot pumps to feet with left one pumped up with air on exam and causing mild pain.  Neurological: Alert and oriented to person, place, and time.    Skin: Skin is warm and dry.   Psychiatric: Normal mood and affect. Behavior is normal.   Vitals reviewed.    Recent Labs   Lab 05/28/23  2231 05/30/23  0200 05/31/23  0316   WBC 8.60 6.45 7.65   HGB 10.9* 9.3* 8.0*   HCT 32.9* 27.4* 24.0*    154 118*       Recent Labs   Lab 05/29/23  0003 05/30/23  0200 05/31/23  0316    136 139   K 3.8 5.7* 4.6    103 107   CO2 24 24 20*   BUN 15 12 14   CREATININE 1.2 0.9 0.9   * 126* 128*   CALCIUM 9.9 9.6 9.6   MG 1.5* 1.5* 1.4*   PHOS 2.5* 2.7 3.1     Recent Labs   Lab 05/29/23  0003   ALKPHOS 51*   ALT 11   AST 16   ALBUMIN 3.5   PROT 7.5   BILITOT 0.2      Recent Labs   Lab 05/30/23  0056 05/30/23  0703 05/30/23  0958 05/30/23  2056 05/31/23  0747 05/31/23  1210   POCTGLUCOSE 156* 128* 172* 208* 133* 145*         Assessment and Plan:     Ms. Brunilda England is a 89 y.o. female who presented to [facility] on 5/28/2023 with     Left femur neck fracture  -sustained in mechanical fall with fx seen on imaging on admit  -OR on 5/30 with ortho and hip fx pathway started on admit  -multimodals for pain with bowel regimen, reports UTD on BMs on admit  -vit D at goal at 49  -EKG with atrial flutter with hx of paroxysmal afib/flutter, converted to NSR post op.  -post op anticoagulation -pending NS recs given SAH on admit- dr ocampo talking with NS today further about this and they reported are still discussing with their staff regarding this/options.  -PT/OT on POD 1 and likely will need SNF- family prefers [facility] for referrals as top choice  -pack out today  -ortho f/u in 1 weeks for bandage removal  -UA neg for infection on admit  -CXR with chronic effusions seen previously      Subarachnoid hemorrhage  -small over left parietal area from fall  -seen by NS and NCC, no neuro deficits  -hold eliquis  - goal- increased BP meds as not at goal at all times overnight and this AM 160s  -keppra BID x 2 weeks  -HOB 30 degrees  Na >135  -will f/u NS recs for  PPX post op (pending today per dr ocampo who was talking to NS about this)    HX of tachy-juan a syndrome  -seen in ER for this earlier this month and cards recs dec metoprolol to 25 BID from 50 BID as HRs ranged 40s-100s  -in atrial flutter now so needs telemetry while here to ensure no further issues that would require further cardiac work up for this  -back in NSR in pacu and still in NSR on exam today    HX of CAD  -[facility] stents at [facility] in 1990 and 2003  -not on asa now, holding statin as not on formularly now    Hx of lung cancer  -s/p chemo and last imaging in 2022 was stable dx. Chronic effusions seen on previous imaging seen on CXR here  -on home oxygen    Hx of chronic respiratory failure  -continue home oxygen    HX of COPD  -no acute issues    Paroxysmal atrial flutter/fibrillation  -atrial flutter on admission HR 90s but now back in NSR in pacu  -given home metoprolol 25 BID and continue  -hold eliquis with SAH and pending NS recs    Chronic diastolic heart failure  -euvolemic on exam, not on diuretics    Stage 3 CKD  -Cr at baseline 1.2 now    Dementia  -continue home meds, alert and oriented to person and place and able to give history  -mild per family     HTN  -keep BP under 140 systolic per NS recs, home meds on board  -hydralazine PRN added, will titrate for goal  -added scheduled for better control with increased. At goal this AM after a few low jaunts last now    Pre DM  -home meds held  -A1C 6.3  -will place SSI + accuchecks               Diet DM  DVT PPx:  hold with sah    Disposition:  NS recs re anticoagulation  PT/OT today    Discharge Planning   DUTCH: 6/2/2023 friday    Code Status: Full Code   Is the patient medically ready for discharge?: No    Reason for patient still in hospital (select all that apply): Patient trending condition

## 2023-05-31 NOTE — PT/OT/SLP EVAL
"Occupational Therapy  Co Evaluation w PT  CoEval performed to optimize pt participation and assessment of full functional capacity.      Name: Brunilda England  MRN: [id number]  Admitting Diagnosis: Left displaced femoral neck fracture  Recent Surgery: Procedure(s) (LRB):  INSERTION, INTRAMEDULLARY TEREZA, FEMUR (Left) 1 Day Post-Op    Recommendations:     Discharge Recommendations: nursing facility, skilled  Discharge Equipment Recommendations:  to be determined by next level of care  Barriers to discharge:       Assessment:     Brunilda England is a 89 y.o. female with a medical diagnosis of Left displaced femoral neck fracture.  She presents with fair tolerance to session on this date w pt mainly limited by pain on this date requiring increased time and assistance for bed mob and sit>stand. Post sit>stand pt w c/o dizziness and having a hard time breathing w pt rerturned to supine and RN notified. Pt reporting improvement of symptoms once supine.  Performance deficits affecting function: weakness, impaired endurance, impaired self care skills, impaired functional mobility, gait instability, impaired balance, pain, decreased lower extremity function, impaired cardiopulmonary response to activity.    Pt motivated to return home however not at baseline for ADL and functional mobility performance in addition to concerns of fall risk and would benefit from continued OT services at this time.    Rehab Prognosis: Good; patient would benefit from acute skilled OT services to address these deficits and reach maximum level of function.       Plan:     Patient to be seen daily to address the above listed problems via self-care/home management, therapeutic activities, therapeutic exercises  Plan of Care Expires: 06/30/23  Plan of Care Reviewed with: patient    Subjective     Chief Complaint: L hip pain  Patient/Family Comments/goals: "it hurt but im maried yarosa elena came"    Occupational Profile:  Living Environment: Lives w daughter in " SSH, WIS and tub but prefers tub  Previous level of function: Mod I w ADLs  Roles and Routines: retired, recently stopped driving  Equipment Used at Home: walker, rolling, cane, straight, shower chair  Assistance upon Discharge: daughter able to assist     Pain/Comfort:  Pain Rating 1:  (did not rate)  Location - Side 1: Left  Location - Orientation 1: generalized  Location 1: hip  Pain Addressed 2: Reposition, Distraction    Patients cultural, spiritual, Adventist conflicts given the current situation: no    Objective:     Communicated with: RN prior to session.  Patient found supine with telemetry, FCD, pulse ox (continuous), pack catheter, perineural catheter upon OT entry to room.    General Precautions: Standard, fall  Orthopedic Precautions: LLE weight bearing as tolerated  Braces: N/A  Respiratory Status: Nasal cannula, flow   L/min    Occupational Performance:    Bed Mobility:    Patient completed Supine to Sit with maximal assistance and 2 persons  Patient completed Sit to Supine with maximal assistance and 2 persons    R scoot requiring Mod A - good clearance of hips  SBA for EOB balance  EOB ~15 min    Functional Mobility/Transfers:  Patient completed Sit <> Stand Transfer with moderate assistance  with  rolling walker   <~15 sec stand w good static standing balance   Functional Mobility: Not completed 2/2 pt w c/o increased pain in L hip and dizziness in addition to stating she was having a hard time breathing     Activities of Daily Living:  Not completed     Cognitive/Visual Perceptual:  Cognitive/Psychosocial Skills:     -       Oriented to: Person, Place, and Situation   -       Follows Commands/attention:Follows multistep  commands  -       Communication: clear/fluent  -       Memory: No Deficits noted  -       Safety awareness/insight to disability: impaired   -       Mood/Affect/Coping skills/emotional control: Cooperative and Pleasant    Physical Exam:  Upper Extremity Range of Motion:     -        Right Upper Extremity: WFL  -       Left Upper Extremity: WFL  Upper Extremity Strength:    -       Right Upper Extremity: WFL  -       Left Upper Extremity: WFL   Strength:    -       Right Upper Extremity: WFL  -       Left Upper Extremity: WFL    AMPAC 6 Click ADL:  AMPAC Total Score: 17    Treatment & Education:  Pt educated on scope of practice and importance of daily functional mobility.   Pt educated on safety precautions during transfers  Pt updated on POC   White board updated to reflect pt status and visual reminder included on board instructing her to call for nurse as needed.      Patient left supine with all lines intact, call button in reach, RN notified, and RN present    GOALS:   Multidisciplinary Problems       Occupational Therapy Goals          Problem: Occupational Therapy    Goal Priority Disciplines Outcome Interventions   Occupational Therapy Goal     OT, PT/OT Ongoing, Progressing    Description: Goals to be met by: 6/14/23     Patient will increase functional independence with ADLs by performing:    UE Dressing with [place].  LE Dressing with Minimal Assistance.  Grooming while standing with Stand-by Assistance.  Toileting from bedside commode with Minimal Assistance for hygiene and clothing management.   Toilet transfer to bedside commode with Minimal Assistance.                         History:     Past Medical History:   Diagnosis Date    Anal cancer 1995    Anemia     Cancer 1995    anal cancer    Centrilobular emphysema 7/6/2020    Diabetes mellitus     With circulatory disorder    Lumbar radiculopathy 5/16/2014    Lung cancer 2012    s/p chemo/radiation    Macular degeneration     Macular hemorrhage of left eye     Neuropathy     Osteoporosis     Osteoporosis, unspecified 11/9/2012    Pure hypercholesterolemia          Past Surgical History:   Procedure Laterality Date    BRONCHOSCOPY      CHOLECYSTECTOMY      COLONOSCOPY      FLUOROSCOPIC ANGIOGRAPHY OF LOWER EXTREMITY  WITH TOPICAL ULTRASOUND Right 12/6/2018    Procedure: ARTERIOGRAM-LEG AND ULTRASOUND;  Surgeon: SEBASTIÁN Mcpherson III, MD;  [facility];  Service: Peripheral Vascular;  Laterality: Right;  16.5 min  1059.42 mGy  59ml Dye  2ml Local    heart stent      HYSTERECTOMY      INCISIONAL BIOPSY Right 12/6/2019    Procedure: INCISIONAL BIOPSY;  Surgeon: Leslie Castillo MD;  Location: [facility] OR [address] [place] [address];  Service: Plastics;  Laterality: Right;    left leg surgery      blockage    PERCUTANEOUS TRANSLUMINAL ANGIOPLASTY N/A 12/6/2018    Procedure: PTA (ANGIOPLASTY, PERCUTANEOUS, TRANSLUMINAL);  Surgeon: SEBASTIÁN Mcpherson III, MD;  Location: [facility] OR [address] [place] [address];  Service: Peripheral Vascular;  Laterality: N/A;    RECONSTRUCTION USING FLAP Right 12/6/2019    Procedure: RECONSTRUCTION USING FLAP/FULL THICKNESS MRESETION AND RECONSTRUCTION RIGHT UPPER EYELID WITH BIOPSY;  Surgeon: Leslie Castillo MD;  Location: [facility] OR [address] [place] [address];  Service: Plastics;  Laterality: Right;    TONSILLECTOMY         Time Tracking:     OT Date of Treatment: 05/31/23  OT Start Time: 0931  OT Stop Time: 1016  OT Total Time (min): 45 min    Billable Minutes:Evaluation 7  Therapeutic Activity 38    5/31/2023

## 2023-05-31 NOTE — ANESTHESIA PROCEDURE NOTES
Left SIFI catheter    Patient location during procedure: pre-op   Block not for primary anesthetic.  Reason for block: at surgeon's request and post-op pain management   Post-op Pain Location: Left hip pain   Start time: 5/30/2023 7:27 AM  Timeout: 5/30/2023 7:26 AM   End time: 5/30/2023 7:40 AM    Staffing  Authorizing Provider: Tarik Fisher MD  Performing Provider: Scotty Lamar MD    Preanesthetic Checklist  Completed: patient identified, IV checked, site marked, risks and benefits discussed, surgical consent, monitors and equipment checked, pre-op evaluation and timeout performed  Peripheral Block  Patient position: supine  Prep: ChloraPrep and site prepped and draped  Patient monitoring: heart rate, cardiac monitor, continuous pulse ox, continuous capnometry and frequent blood pressure checks  Block type: fascia iliaca  Laterality: left  Injection technique: continuous  Needle  Needle type: Tuohy   Needle gauge: 17 G  Needle length: 3.5 in  Needle localization: anatomical landmarks and ultrasound guidance  Catheter type: spring wound  Catheter size: 19 G  Test dose: lidocaine 1.5% with Epi 1-to-200,000 and negative   -ultrasound image captured on disc.  Assessment  Injection assessment: negative aspiration, negative parasthesia and local visualized surrounding nerve  Paresthesia pain: none  Heart rate change: no  Slow fractionated injection: yes  Pain Tolerance: comfortable throughout block and no complaints  Medications:    Medications: ropivacaine (NAROPIN) injection 0.5% - Perineural   10 mL - 5/31/2023 7:35:00 AM    Additional Notes  VSS.  [facility] RN monitoring vitals throughout procedure.  Patient tolerated procedure well.

## 2023-05-31 NOTE — PT/OT/SLP EVAL
"Physical Therapy Co-Evaluation and Co-Treatment    Patient Name:  Brunilda England   MRN:  [id number]  Admit Date: 5/28/2023  Admitting Diagnosis:  Left displaced femoral neck fracture   Length of Stay: 2 days  Recent Surgery: Procedure(s) (LRB):  INSERTION, INTRAMEDULLARY TEREZA, FEMUR (Left) 1 Day Post-Op    Recommendations:     Discharge Recommendations:  nursing facility, skilled   Discharge Equipment Recommendations: to be determined by next level of care   Barriers to discharge:  increased assist needed    Plan:     During this hospitalization, patient to be seen daily to address the identified rehab impairments via gait training, therapeutic activities, therapeutic exercises, neuromuscular re-education and progress towards the established goals.  Plan of Care Expires:  06/30/23  Plan of Care Reviewed with: patient    Assessment:     Brunilda England is a 89 y.o. female admitted with a medical diagnosis of Left displaced femoral neck fracture. Pt tolerated initial evaluation well today. Patient is s/p Lt hip IMN on 5/30/2023 after sustaining fall on Lt hip on 5/29 in the home. Pt demonstrated impaired LE strength with 3+/5 strength noted 2/2 increased pain of LLE and RLE "tightness." Due to increased pain she required increased time and encouragement to complete all functional mobility. She demo'ed good UE strength and was able to initiate scooting as well as clearing glutes from bed to initiate transfers. t with increased post-op pain as well as decreased tolerance to functional activity limiting mobility upon evaluation this date. Despite pain pt was able to tolerate x 1 stand with mod A but reported dizziness, "something swimming in my head" and stating "I can't breathe." Pt was returned to supine immediately and RN notified at bedside. Pt will continue to benefit from skilled PT services during this hospital admit to continue to improve transfer ability and efficiency as well as continue to progress pt's ambulation " distance and cardiopulmonary endurance to maximize pt's functional independence and return to PLOF. Patient currently demonstrates a need for therapy on a daily basis in a skilled nursing facility secondary to a decline in functional status secondary to injury and surgical procedure.    Problem List: weakness, impaired endurance, impaired self care skills, impaired functional mobility, gait instability, impaired balance, decreased upper extremity function, decreased lower extremity function, decreased safety awareness, pain, decreased ROM, impaired coordination, impaired fine motor, impaired skin, impaired cardiopulmonary response to activity, orthopedic precautions.  Rehab Prognosis: Good; patient would benefit from acute skilled PT services to address these deficits and reach maximum level of function.      Goals:   Multidisciplinary Problems       Physical Therapy Goals          Problem: Physical Therapy    Goal Priority Disciplines Outcome Goal Variances Interventions   Physical Therapy Goal     PT, PT/OT Ongoing, Progressing     Description: Goals to be met by: 2023     Patient will increase functional independence with mobility by performin. Sit to stand transfer with Stand-by Assistance  2. Bed to chair transfer with Minimal Assistance using RW or LRAD  3. Gait  x 20 feet with Moderate Assistance using RW or LRAD.   4. Stand for 5 minutes with Contact Guard Assistance using RW or LRAD prn while performing dynamic balance activity to prepare for tasks in the home  5. Lower extremity exercise program x30 reps per handout, with independence                         Subjective     RN notified prior to session. No family/friends present upon PT entrance into room. Patient agreeable to PT evaluation.    Chief Complaint: Fall (Trip and fall at home. Fell on left side, pain in the left hip. Leg is bent up and unable to straighten due to pain. EMS given 75 mcg Fentanyl IV. On Holter monitor with Cardiology  "with [facility] for episode of juan a to tachy.)  Patient/Family Comments/goals: "I hope yall come back tomorrow"  Pain/Comfort:  Pain Rating 1: other (see comments) (not rated)  Location - Side 1: Left  Location - Orientation 1: generalized  Location 1: hip  Pain Addressed 1: Distraction, Reposition  Pain Rating Post-Intervention 1: other (see comments) (did not report change with activity)    Social History:  Residence: lives with their daughter 1-story house with ramp entrance and no HR. Pt's bathroom has a walk in shower and tub..  Support available: family  Equipment Used: walker, rolling, cane, straight, shower chair  Equipment Owned (not using): None  Prior level of function: Pt was Mod I with mobility in the home utilizing her RW, reports using SPC to ambulate from the house to her car  Hand Dominance: right.   Work: Retired.   Drive: no.   Managing Medicines/Managing Home: yes.     Patient reports they will have assistance from dtr upon discharge.    Objective:     Additional staff present: OT; OT for co-evaluation due to suspected patient need for two skilled therapists to appropriately assess patient's functional deficits as well as ensure patient safety, accommodate for limited activity tolerance, and provide appropriate, skilled assistance to maximize functional potential during evaluation.    Patient found HOB elevated with: telemetry, FCD, pulse ox (continuous), pack catheter, perineural catheter, oxygen     General Precautions: Standard, Cardiac fall   Orthopedic Precautions:LLE weight bearing as tolerated   Braces: N/A   Body mass index is 30.03 kg/m².  Oxygen Device: Nasal Cannula 2L  Vitals: BP (!) 111/57 (BP Location: Left arm, Patient Position: Lying)   Pulse (!) 58   Temp 97.7 °F (36.5 °C) (Oral)   Resp 20   Wt 76.9 kg (169 lb 8.5 oz)   SpO2 98%   BMI 30.03 kg/m²     Exams:  Cognition:   Alert and Cooperative  AxOx4  Command following: Follows multistep verbal commands  Fluency: " clear/fluent  Hearing: Intact  Vision:  Intact  Skin Integrity: Visible skin intact  Postural Assessment: slouched posture and rounded shoulders  Physical Exam:    Left UE Left LE Right UE Right LE   Edema absent present absent absent   ROM AROM WFL AAROM WFL AROM WFL AAROM WFL   Strength adequate ROM, adequate strength 3+/5 grossly (pain limited throughout, unable to tolerate overpressure) adequate ROM, adequate strength 3+/5 to 4-/5 grossly   Sensation intact to light touch intact to light touch intact to light touch intact to light touch   Coordination normal not tested due to strength or pain deficits normal not tested due to strength or pain deficits     Outcome Measures:  AM-PAC 6 CLICK MOBILITY  Turning over in bed (including adjusting bedclothes, sheets and blankets)?: 2  Sitting down on and standing up from a chair with arms (e.g., wheelchair, bedside commode, etc.): 2  Moving from lying on back to sitting on the side of the bed?: 2  Moving to and from a bed to a chair (including a wheelchair)?: 2  Need to walk in hospital room?: 1  Climbing 3-5 steps with a railing?: 1  Basic Mobility Total Score: 10     Functional Mobility:    Bed Mobility:   Scooting to HOB via supine bridge: total assistance and 2 persons  Supine to Sit: maximal assistance and 2 persons; from Rt side of bed  Scooting anteriorly to EOB to have both feet planted on floor: moderate assistance  Scooting laterally to the Rt while sitting EOB: moderate assistance  Sit to Supine: maximal assistance and 2 persons; to Rt side of bed    Sitting Balance at Edge of Bed:  Static Sitting Balance: Good- : able to accept minimal resistance  Dynamic Sitting Balance: Good- : able to sit unsupported and weight shift across midline minimally  Assistance Level Required: Stand-by Assistance  Time: 15 minutes  Postural deviations noted: slouched posture and rounded shoulders  Comments: Time EOB focused primarily on tolerance to upright positioning,  cardiopulmonary response and endurance for activities, and strength of postural musculature to perform dynamic sitting to prepare for tasks in the home. Pt with c/o dizziness which resolved while EOB after ~90 seconds.    Transfers:   Sit <> Stand Transfer: moderate assistance with rolling walker   Stand <> Sit Transfer: moderate assistance with rolling walker   [id number] from EOB    Standing Balance:  Static Standing Balance: Poor+: requires moderate assistance and UE support to maintain standing without balance loss  Dynamic Standing Balance: Poor : able to stand with moderate assistance and minimally reach ipsilaterally. Unable to cross midline.  Assistance Level Required: Moderate Assistance  Patient used: rolling walker   Time: ~15 seconds  Comments: tactile cueing provided to encourage full hip extension, vc's given to bring trunk to full upright stand. Pt began to report dizziness and difficulty breathing so was immediately returned to supine.      Gait: Deferred 2/2 dizziness and SOB in standing    Education:  Time provided for education, counseling and discussion of health disposition in regards to patient's current status  All questions answered within PT scope of practice and to patient's satisfaction  PT role in POC to address current functional deficits  Pt educated on proper body mechanics, safety techniques, and energy conservation with PT facilitation and cueing throughout session  Call nursing/pct to transfer to chair/use bathroom. Pt stated understanding.  Whiteboard updated with therapist name and pt's current mobility status documented above  Safe to perform stand pivot or squat pivot transfer to/from chair/bedside commode assist x 2 and HHA prn w/ nursing/PCT present    Patient left HOB elevated with all lines intact, call button in reach, and RN x 2 present.      History:     Past Medical History:   Diagnosis Date    Anal cancer 1995    Anemia     Cancer 1995    anal cancer    Centrilobular  emphysema 7/6/2020    Diabetes mellitus     With circulatory disorder    Lumbar radiculopathy 5/16/2014    Lung cancer 2012    s/p chemo/radiation    Macular degeneration     Macular hemorrhage of left eye     Neuropathy     Osteoporosis     Osteoporosis, unspecified 11/9/2012    Pure hypercholesterolemia        Past Surgical History:   Procedure Laterality Date    BRONCHOSCOPY      CHOLECYSTECTOMY      COLONOSCOPY      FLUOROSCOPIC ANGIOGRAPHY OF LOWER EXTREMITY WITH TOPICAL ULTRASOUND Right 12/6/2018    Procedure: ARTERIOGRAM-LEG AND ULTRASOUND;  Surgeon: SEBASTIÁN Mcpherson III, MD;  Location: [facility] OR [address] [place] [address];  Service: Peripheral Vascular;  Laterality: Right;  16.5 min  1059.42 mGy  59ml Dye  2ml Local    heart stent      HYSTERECTOMY      INCISIONAL BIOPSY Right 12/6/2019    Procedure: INCISIONAL BIOPSY;  Surgeon: Leslie Castillo MD;  Location: [facility] OR [address] [place] [address];  Service: Plastics;  Laterality: Right;    left leg surgery      blockage    PERCUTANEOUS TRANSLUMINAL ANGIOPLASTY N/A 12/6/2018    Procedure: PTA (ANGIOPLASTY, PERCUTANEOUS, TRANSLUMINAL);  Surgeon: SEBASTIÁN Mcpherson III, MD;  Location: [facility] OR [address] [place] [address];  Service: Peripheral Vascular;  Laterality: N/A;    RECONSTRUCTION USING FLAP Right 12/6/2019    Procedure: RECONSTRUCTION USING FLAP/FULL THICKNESS MRESETION AND RECONSTRUCTION RIGHT UPPER EYELID WITH BIOPSY;  Surgeon: Leslie Castillo MD;  Location: [facility] OR [address] [place] [address];  Service: Plastics;  Laterality: Right;    TONSILLECTOMY         Family History   Problem Relation Age of Onset    Stroke Mother     Cancer Father     Breast cancer Maternal Aunt     Ovarian cancer Neg Hx     Amblyopia Neg Hx     Blindness Neg Hx     Cataracts Neg Hx     Glaucoma Neg Hx     Macular degeneration Neg Hx     Retinal detachment Neg Hx     Strabismus Neg Hx        Social History     Socioeconomic History    Marital status:    Tobacco Use    Smoking status: Former     Packs/day: 0.50     Years: 30.00     Pack years: 15.00      Types: Cigarettes     Quit date: 1995     Years since quittin.4     Passive exposure: Past    Smokeless tobacco: Never   Substance and Sexual Activity    Alcohol use: No    Drug use: No    Sexual activity: Not Currently     Birth control/protection: Surgical     Social Determinants of Health     Financial Resource Strain: Medium Risk    Difficulty of Paying Living Expenses: Somewhat hard   Food Insecurity: Food Insecurity Present    Worried About Running Out of Food in the Last Year: Sometimes true    Ran Out of Food in the Last Year: Sometimes true   Transportation Needs: No Transportation Needs    Lack of Transportation (Medical): No    Lack of Transportation (Non-Medical): No   Physical Activity: Inactive    Days of Exercise per Week: 0 days    Minutes of Exercise per Session: 0 min   Stress: Stress Concern Present    Feeling of Stress : To some extent   Social Connections: Unknown    Frequency of Communication with Friends and Family: More than three times a week    Frequency of Social Gatherings with Friends and Family: Once a week    Active Member of Clubs or Organizations: No    Marital Status:    Housing Stability: Low Risk     Unable to Pay for Housing in the Last Year: No    Number of Places Lived in the Last Year: 1    Unstable Housing in the Last Year: No       Time Tracking:     PT Received On: 23  PT Start Time: 931     PT Stop Time: 1016  PT Total Time (min): 45 min     Billable Minutes: Evaluation 7 minutes and Therapeutic Exercise 38 minutes    2023

## 2023-05-31 NOTE — PLAN OF CARE
Discharge Recommendation: SNF.    Evaluation completed today. PT goals appropriate.    Patient is safe to perform sitting EOB with SBA with nursing staff.    Please continue Progressive Mobility Protocol as appropriate.    Peggy Horton, PT, DPT  2023  Pager: [phone]    Problem: Physical Therapy  Goal: Physical Therapy Goal  Description: Goals to be met by: 2023     Patient will increase functional independence with mobility by performin. Sit to stand transfer with Stand-by Assistance  2. Bed to chair transfer with Minimal Assistance using RW or LRAD  3. Gait  x 20 feet with Moderate Assistance using RW or LRAD.   4. Stand for 5 minutes with Contact Guard Assistance using RW or LRAD prn while performing dynamic balance activity to prepare for tasks in the home  5. Lower extremity exercise program x30 reps per handout, with independence    Outcome: Ongoing, Progressing

## 2023-05-31 NOTE — SUBJECTIVE & OBJECTIVE
Principal Problem:Left displaced femoral neck fracture    Principal Orthopedic Problem: s/p L-CMN 5/30    Interval History: Afebrile, VSS, NAEON.  Pain has been reportedly well controlled.  Hb 8.0 from 9.3 this morning.  Denies any nausea/vomiting, chest pain, SOB, numbness/tingling.          Review of patient's allergies indicates:   Allergen Reactions    Bextra [valdecoxib] Swelling    Gabapentin Diarrhea and Nausea Only       Current Facility-Administered Medications   Medication    0.9%  NaCl infusion (for blood administration)    acetaminophen tablet 1,000 mg    amlodipine-benazepril 5-20 mg per capsule 1 capsule    bisacodyL suppository 10 mg    calcium-vitamin D3 500 mg-5 mcg (200 unit) per tablet 2 tablet    dextrose 10% bolus 125 mL 125 mL    dextrose 10% bolus 250 mL 250 mL    dextrose 40 % gel 15,000 mg    dextrose 40 % gel 30,000 mg    donepeziL tablet 5 mg    DULoxetine DR capsule 60 mg    glucagon (human recombinant) injection 1 mg    hydrALAZINE tablet 50 mg    hydrALAZINE tablet 75 mg    insulin aspart U-100 pen 0-5 Units    levETIRAcetam tablet 500 mg    melatonin tablet 6 mg    methocarbamoL tablet 500 mg    methocarbamoL tablet 500 mg    metoprolol tartrate (LOPRESSOR) tablet 25 mg    mupirocin 2 % ointment 1 g    ondansetron injection 4 mg    polyethylene glycol packet 17 g    polyethylene glycol packet 17 g    ROPIvacaine (PF) 2 mg/ml (0.2%) solution    senna-docusate 8.6-50 mg per tablet 1 tablet    sodium chloride 0.9% flush 10 mL    vitamin D 1000 units tablet 1,000 Units     Facility-Administered Medications Ordered in Other Encounters   Medication    sodium chloride 0.9% flush 5 mL     Objective:     Vital Signs (Most Recent):  Temp: 98 °F (36.7 °C) (05/31/23 0528)  Pulse: 92 (05/31/23 0358)  Resp: 18 (05/31/23 0358)  BP: (!) 158/68 (05/31/23 0530)  SpO2: 98 % (05/31/23 0358) Vital Signs (24h Range):  Temp:  [97.3 °F (36.3 °C)-98.4 °F (36.9 °C)] 98 °F (36.7 °C)  Pulse:  [] 92  Resp:   [10-26] 18  SpO2:  [98 %-100 %] 98 %  BP: (123-180)/() 158/68     Weight: 76.9 kg (169 lb 8.5 oz)     Body mass index is 30.03 kg/m².      Intake/Output Summary (Last 24 hours) at 5/31/2023 0635  Last data filed at 5/30/2023 2041  Gross per 24 hour   Intake 1050 ml   Output 1325 ml   Net -275 ml        Ortho/SPM Exam  AAOx4  NAD  Reg rate  No increased WOB    LLE:  Dressing c/d/i  SILT T/SP/DP/Greer/Sa  Motor intact T/SP/DP  WWP extremities  FCDs in place and functioning        Significant Labs: BMP:   Recent Labs   Lab 05/31/23  0316   *      K 4.6      CO2 20*   BUN 14   CREATININE 0.9   CALCIUM 9.6   MG 1.4*     CBC:   Recent Labs   Lab 05/30/23  0200 05/31/23  0316   WBC 6.45 7.65   HGB 9.3* 8.0*   HCT 27.4* 24.0*    118*     All pertinent labs within the past 24 hours have been reviewed.    Significant Imaging: I have reviewed all pertinent imaging results/findings.

## 2023-05-31 NOTE — ASSESSMENT & PLAN NOTE
Patient has evidence of tachycardia and bradycardia on ECG but does not meet criteria for tachy-juan a syndrome. She may need a pacemaker in the future but in the setting of afib, she has 3 -second pause at max on telemetry. She can continue on the metoprolol 25 mg PO BID for now. If need arises, could consider metoprolol 2.5 mg IV push x1 but would favor tachycardia over bradycardia. Appreciate BRADFORD klein discussion in making these recommendations.  - No changes from cardiovascular standpoint at this time  - Would recommend to start Eliquis 5-7 days post the parietal SAH , if permissible by Neurosurgery and if safe to start from patient hemodynamic standpoint

## 2023-05-31 NOTE — CONSULTS
[facility] - Telemetry Stepdown  Cardiology  Consult Note    Patient Name: Brunilda England  MRN: [id number]  Admission Date: 5/28/2023  Hospital Length of Stay: 2 days  Code Status: Partial Code   Attending Provider: Honey Calixto MD   Consulting Provider: Brett Navarro MD  Primary Care Physician: Pepper Whitfield MD  Principal Problem:Left displaced femoral neck fracture    Patient information was obtained from patient, past medical records and ER records.     Inpatient consult to Cardiology  Consult performed by: Brett Navarro MD  Consult ordered by: Honey Calixto MD        Subjective:     Chief Complaint:  Concern for tachy juan a syndrome     HPI:   Ms. England is a 89 y.o. female with past medical history of HTN, paroxysmal atrial fibrillation/flutter COPD on home oxygen, Chronic diastolic HF< CKD 3, dementia, hx of lung/rectal cancer who was admitted to the hospital after a mechanical fall with no LOC which led to L proximal femoral fracture and L parietal subarachnoid hemorrhage. Evaluated by Cardiac Electrophysiology a couple of weeks ago for  tachy-juan a syndrome with Holter monitor placed for monitoring. During hospitalization, she was noted to have a 3 second pause in the setting of afib on telemetry on 05/31/2023 at 3:19 pm. Cardiology was consulted for recommendations on management. Had mechanical falls without any signs of syncope. No reports of dizziness/lightheadedness.      Past Medical History:   Diagnosis Date    Anal cancer 1995    Anemia     Cancer 1995    anal cancer    Centrilobular emphysema 7/6/2020    Diabetes mellitus     With circulatory disorder    Lumbar radiculopathy 5/16/2014    Lung cancer 2012    s/p chemo/radiation    Macular degeneration     Macular hemorrhage of left eye     Neuropathy     Osteoporosis     Osteoporosis, unspecified 11/9/2012    Pure hypercholesterolemia        Past Surgical History:   Procedure Laterality Date    BRONCHOSCOPY       CHOLECYSTECTOMY      COLONOSCOPY      FLUOROSCOPIC ANGIOGRAPHY OF LOWER EXTREMITY WITH TOPICAL ULTRASOUND Right 12/6/2018    Procedure: ARTERIOGRAM-LEG AND ULTRASOUND;  Surgeon: SEBASTIÁN Mcpherson III, MD;  [facility];  Service: Peripheral Vascular;  Laterality: Right;  16.5 min  1059.42 mGy  59ml Dye  2ml Local    heart stent      HYSTERECTOMY      INCISIONAL BIOPSY Right 12/6/2019    Procedure: INCISIONAL BIOPSY;  Surgeon: Leslie Castillo MD;  [facility];  Service: Plastics;  Laterality: Right;    left leg surgery      blockage    PERCUTANEOUS TRANSLUMINAL ANGIOPLASTY N/A 12/6/2018    Procedure: PTA (ANGIOPLASTY, PERCUTANEOUS, TRANSLUMINAL);  Surgeon: SEBASTIÁN Mcpherson III, MD;  [facility];  Service: Peripheral Vascular;  Laterality: N/A;    RECONSTRUCTION USING FLAP Right 12/6/2019    Procedure: RECONSTRUCTION USING FLAP/FULL THICKNESS MRESETION AND RECONSTRUCTION RIGHT UPPER EYELID WITH BIOPSY;  Surgeon: Leslie Castillo MD;  Location: [facility] OR [address] [place] [address];  Service: Plastics;  Laterality: Right;    TONSILLECTOMY         Review of patient's allergies indicates:   Allergen Reactions    Bextra [valdecoxib] Swelling    Gabapentin Diarrhea and Nausea Only       Current Facility-Administered Medications on File Prior to Encounter   Medication    sodium chloride 0.9% flush 5 mL     Current Outpatient Medications on File Prior to Encounter   Medication Sig    albuterol (PROVENTIL) 2.5 mg /3 mL (0.083 %) nebulizer solution Take 3 mLs (2.5 mg total) by nebulization every 6 (six) hours as needed for Wheezing. Rescue    amlodipine-benazepril 5-20 mg (LOTREL) 5-20 mg per capsule Take 1 capsule by mouth once daily.    apixaban (ELIQUIS) 5 mg Tab Take 1 tablet (5 mg total) by mouth 2 (two) times daily.    azelastine (ASTELIN) 137 mcg (0.1 %) nasal spray 1 spray (137 mcg total) by Nasal route 2 (two) times daily.    cyanocobalamin (VITAMIN B-12) 1000 MCG tablet Take 1,000 mcg by  mouth every morning.    donepeziL (ARICEPT) 5 MG tablet Take 1 tablet (5 mg total) by mouth every evening.    DULoxetine (CYMBALTA) 60 MG capsule Take 1 capsule (60 mg total) by mouth every morning.    estradioL (ESTRACE) 0.01 % (0.1 mg/gram) vaginal cream USE ONE-HALF GRAM VAGINALLY TWICE a WEEK    linaGLIPtin (TRADJENTA) 5 mg Tab tablet Take 1 tablet (5 mg total) by mouth once daily.    metoprolol tartrate (LOPRESSOR) 25 MG tablet Take 1 tablet (25 mg total) by mouth 2 (two) times daily.    nitroGLYCERIN (NITROSTAT) 0.4 MG SL tablet Place 1 tablet (0.4 mg total) under the tongue every 5 (five) minutes as needed.    simvastatin (ZOCOR) 80 MG tablet Take 1 tablet (80 mg total) by mouth once daily.    vitamin D (VITAMIN D3) 1000 units Tab Take 1,000 Units by mouth once daily.     Family History       Problem Relation (Age of Onset)    Breast cancer Maternal Aunt    Cancer Father    Stroke Mother          Tobacco Use    Smoking status: Former     Packs/day: 0.50     Years: 30.00     Pack years: 15.00     Types: Cigarettes     Quit date: 1995     Years since quittin.4     Passive exposure: Past    Smokeless tobacco: Never   Substance and Sexual Activity    Alcohol use: No    Drug use: No    Sexual activity: Not Currently     Birth control/protection: Surgical     Review of Systems   Constitutional: Negative for chills and fever.   Cardiovascular:  Negative for chest pain, orthopnea and palpitations.   Respiratory:  Negative for cough and shortness of breath.    Gastrointestinal:  Negative for abdominal pain.   Neurological:  Negative for dizziness, headaches and light-headedness.   Psychiatric/Behavioral:  Negative for altered mental status.    Objective:     Vital Signs (Most Recent):  Temp: 97.8 °F (36.6 °C) (23)  Pulse: (!) 145 (23)  Resp: 18 (23)  BP: 109/60 (23)  SpO2: 100 % (23) Vital Signs (24h Range):  Temp:  [97.4 °F (36.3 °C)-98.4 °F  (36.9 °C)] 97.8 °F (36.6 °C)  Pulse:  [] 145  Resp:  [17-20] 18  SpO2:  [98 %-100 %] 100 %  BP: (109-158)/(53-68) 109/60     Weight: 76.9 kg (169 lb 8.5 oz)  Body mass index is 30.03 kg/m².    SpO2: 100 %         Intake/Output Summary (Last 24 hours) at 5/31/2023 1703  Last data filed at 5/30/2023 2041  Gross per 24 hour   Intake --   Output 350 ml   Net -350 ml       Lines/Drains/Airways       Drain  Duration             Female External Urinary Catheter 05/31/23 1051 <1 day              Epidural Line  Duration                  Perineural Analgesia/Anesthesia Assessment (using dermatomes) 05/30/23  1 day              Peripheral Intravenous Line  Duration                  Peripheral IV - Single Lumen 05/28/23 2100 20 G Anterior;Left Forearm 2 days         Peripheral IV - Single Lumen 05/30/23 0719 18 G Anterior;Right Forearm 1 day                     Physical Exam  Vitals reviewed.   Constitutional:       General: She is not in acute distress.     Appearance: Normal appearance. She is not ill-appearing or toxic-appearing.   HENT:      Head: Normocephalic.      Comments: Traumatic lesion to left side of head     Mouth/Throat:      Mouth: Mucous membranes are moist.   Cardiovascular:      Rate and Rhythm: Normal rate. Rhythm irregular.      Heart sounds: No murmur heard.    No friction rub. No gallop.      Comments: Salvos of tachcyardia to the 130s bpm with periods of bradycardia to the 50s bpm  Pulmonary:      Effort: Pulmonary effort is normal. No respiratory distress.      Breath sounds: Normal breath sounds.   Abdominal:      Palpations: Abdomen is soft.   Musculoskeletal:      Right lower leg: No edema.      Left lower leg: No edema.      Comments: Hip not examined (post surgical)   Skin:     General: Skin is warm.   Neurological:      Mental Status: She is alert. Mental status is at baseline.      Comments: Oriented to person and place        Significant Labs: BMP:   Recent Labs   Lab 05/30/23  0200  05/31/23  0316   * 128*    139   K 5.7* 4.6    107   CO2 24 20*   BUN 12 14   CREATININE 0.9 0.9   CALCIUM 9.6 9.6   MG 1.5* 1.4*   , CMP   Recent Labs   Lab 05/30/23  0200 05/31/23 0316    139   K 5.7* 4.6    107   CO2 24 20*   * 128*   BUN 12 14   CREATININE 0.9 0.9   CALCIUM 9.6 9.6   ANIONGAP 9 12   , CBC   Recent Labs   Lab 05/30/23  0200 05/31/23 0316   WBC 6.45 7.65   HGB 9.3* 8.0*   HCT 27.4* 24.0*    118*   , and All pertinent lab results from the last 24 hours have been reviewed.    Significant Imaging: Echocardiogram: Transthoracic echo (TTE) complete (Cupid Only):   Results for orders placed or performed during the hospital encounter of 05/20/23   Echo   Result Value Ref Range    Ascending aorta 2.90 cm    STJ 2.66 cm    AV mean gradient 6 mmHg    Ao peak ernesto 1.54 m/s    Ao VTI 28.11 cm    IVRT 102.76 msec    IVS 0.82 0.6 - 1.1 cm    LA size 3.35 cm    Left Atrium Major Axis 5.75 cm    Left Atrium Minor Axis 5.25 cm    LVIDd 4.57 3.5 - 6.0 cm    LVIDs 3.76 2.1 - 4.0 cm    LVOT diameter 2.00 cm    LVOT peak VTI 12.52 cm    Posterior Wall 0.87 0.6 - 1.1 cm    MV Peak E Ernesto 1.26 m/s    RA Major Axis 4.42 cm    RA Width 3.51 cm    RVDD 2.60 cm    Sinus 3.03 cm    TAPSE 1.35 cm    TR Max Ernesto 3.08 m/s    LA WIDTH 4.28 cm    LV Diastolic Volume 95.66 mL    LV Systolic Volume 60.57 mL    LVOT peak ernesto 0.78 m/s    TDI LATERAL 0.11 m/s    TDI SEPTAL 0.07 m/s    LA volume (mod) 67.00 cm3    LV LATERAL E/E' RATIO 11.45 m/s    LV SEPTAL E/E' RATIO 18.00 m/s    FS 18 %    LA volume 66.89 cm3    LV mass 125.29 g    Left Ventricle Relative Wall Thickness 0.38 cm    AV valve area 1.40 cm2    AV Velocity Ratio 0.51     AV index (prosthetic) 0.45     Mean e' 0.09 m/s    LVOT area 3.1 cm2    LVOT stroke volume 39.31 cm3    AV peak gradient 9 mmHg    E/E' ratio 14.00 m/s    LV Systolic Volume Index 34.0 mL/m2    LV Diastolic Volume Index 53.74 mL/m2    LA Volume Index 37.6 mL/m2     LV Mass Index 70 g/m2    Triscuspid Valve Regurgitation Peak Gradient 38 mmHg    LA Volume Index (Mod) 37.6 mL/m2    BSA 1.82 m2    Right Atrial Pressure (from IVC) 3 mmHg    EF 53 %    TV rest pulmonary artery pressure 41 mmHg    Narrative    · The left ventricle is normal in size with low normal systolic function.  · The estimated ejection fraction is 53%.  · There are segmental left ventricular wall motion abnormalities.  · There is abnormal septal wall motion.  · Mild left atrial enlargement.  · Indeterminate left ventricular diastolic function.  · Normal right ventricular size with low normal right ventricular systolic   function.  · There is mild-to-moderate aortic valve stenosis.  · Aortic valve area is 1.40 cm2; peak velocity is 1.54 m/s; mean gradient   is 6 mmHg.  · Mild aortic regurgitation.  · Mild mitral regurgitation.  · Mild tricuspid regurgitation.  · Normal central venous pressure (3 mmHg).  · The estimated PA systolic pressure is 41 mmHg.  · There is mild pulmonary hypertension.  · Trivial posterior pericardial effusion. At base and under the RA and   small outside the RV apically.        Assessment and Plan:     Tachy-juan a syndrome  Patient has evidence of tachycardia and bradycardia on ECG but does not meet criteria for tachy-juan a syndrome. She may need a pacemaker in the future but in the setting of afib, she has 3 -second pause at max on telemetry. She can continue on the metoprolol 25 mg PO BID for now. If need arises, could consider metoprolol 2.5 mg IV push x1 but would favor tachycardia over bradycardia. Appreciate BRADFORD klein discussion in making these recommendations.  - No changes from cardiovascular standpoint at this time  - Would recommend to start Eliquis 5-7 days post the parietal SAH , if permissible by Neurosurgery and if safe to start from patient hemodynamic standpoint        VTE Risk Mitigation (From admission, onward)         Ordered     enoxaparin injection 40 mg  Every  24 hours         05/31/23 1557     Place sequential compression device  Until discontinued         05/30/23 0048     Place MERCEDES hose  Until discontinued         05/29/23 0902     Place sequential compression device  Until discontinued         05/29/23 0902            Discussed with staff Dr Barron and EP staff Dr Wiley.    Thank you for your consult. We will sign off. Please contact us if you have any additional questions.    Brett Navarro MD  Cardiology PGY5  [facility] - Telemetry Stepdown

## 2023-05-31 NOTE — ACP (ADVANCE CARE PLANNING)
Advance Care Planning     Date: 05/31/2023    Code Status  In light of the patients advanced and life limiting illness,I engaged the the patient and family in a conversation about the patient's preferences  She does not want chest compressions.  She is okay with pacing given her arrythmias currently. She is okay with debrillation in the case of a pause that would require a shock in the setting of her current arrythmia issues. She is okay with pacemaker placement if she may need it.  She is also okay with intubation if it was in the setting of a shock and then required an intubation.  She really is okay with almost all things she just does not want to have chest compressions.    I spent 15 minutes engaging her in goals.

## 2023-05-31 NOTE — SUBJECTIVE & OBJECTIVE
Interval History: s/p left intramedullary iraida insertion 5/30. Neuro exam stable. AAOx3. Denies headache, n/v, numbness.     Medications:  Continuous Infusions:   ROPIvacaine (PF) 2 mg/ml (0.2%) 0.1 mL/hr (05/30/23 1001)     Scheduled Meds:   acetaminophen  1,000 mg Oral Q6H    amlodipine-benazepril 5-20 mg  1 capsule Oral Daily    calcium-vitamin D3  2 tablet Oral Daily    donepeziL  5 mg Oral QHS    DULoxetine  60 mg Oral QAM    hydrALAZINE  25 mg Oral Q8H    levETIRAcetam  500 mg Oral BID    magnesium oxide  400 mg Oral BID    magnesium sulfate IVPB  2 g Intravenous Once    metoprolol tartrate  25 mg Oral BID    mupirocin  1 g Nasal BID    polyethylene glycol  17 g Oral Daily    senna-docusate 8.6-50 mg  1 tablet Oral BID    vitamin D  1,000 Units Oral Daily     PRN Meds:bisacodyL, dextrose 10%, dextrose 10%, dextrose, dextrose, glucagon (human recombinant), hydrALAZINE, insulin aspart U-100, melatonin, methocarbamoL, metoprolol, ondansetron, sodium chloride 0.9%     Review of Systems  Objective:     Weight: 76.9 kg (169 lb 8.5 oz)  Body mass index is 30.03 kg/m².  Vital Signs (Most Recent):  Temp: 97.7 °F (36.5 °C) (05/31/23 1155)  Pulse: (!) 58 (05/31/23 1503)  Resp: 20 (05/31/23 1400)  BP: (!) 111/57 (05/31/23 1400)  SpO2: 98 % (05/31/23 1400) Vital Signs (24h Range):  Temp:  [97.4 °F (36.3 °C)-98.4 °F (36.9 °C)] 97.7 °F (36.5 °C)  Pulse:  [] 58  Resp:  [17-20] 20  SpO2:  [98 %-100 %] 98 %  BP: (110-162)/(53-76) 111/57                         Female External Urinary Catheter 05/31/23 1051 (Active)          Physical Exam     Neurosurgery Physical Exam    General: well developed, well nourished, no distress.   Head: normocephalic, atraumatic  Neurologic: Alert and oriented. Thought content appropriate.  GCS: Motor: 6/Verbal: 5/Eyes: 4 GCS Total: 15  Mental Status: Awake, Alert, Oriented x 3  Language: No aphasia  Speech: No dysarthria  Cranial nerves: face symmetric, tongue midline, CN II-XII grossly  intact.   Eyes: pupils equal, round, reactive to light with accomodation, EOMI.  Pulmonary: normal respirations, no signs of respiratory distress  Sensory: intact to light touch throughout  Motor Strength: Moves all extremities spontaneously with good tone. Exam limited to LLE 2/2 recent surgery. Antigravity in all other extremities, no focal weakness. No abnormal movements seen.   Pronator Drift: no drift noted  Finger-to-nose: Intact bilaterally  Skin: Skin is warm, dry and intact.      Significant Labs:  Recent Labs   Lab 05/30/23  0200 05/31/23  0316   * 128*    139   K 5.7* 4.6    107   CO2 24 20*   BUN 12 14   CREATININE 0.9 0.9   CALCIUM 9.6 9.6   MG 1.5* 1.4*     Recent Labs   Lab 05/30/23  0200 05/31/23 0316   WBC 6.45 7.65   HGB 9.3* 8.0*   HCT 27.4* 24.0*    118*     No results for input(s): LABPT, INR, APTT in the last 48 hours.  Microbiology Results (last 7 days)       ** No results found for the last 168 hours. **          Recent Lab Results         05/31/23  1210   05/31/23  0747   05/31/23  0316   05/30/23 2056        Anion Gap     12         Baso #     0.01         Basophil %     0.1         BUN     14         Calcium     9.6         Chloride     107         CO2     20         Creatinine     0.9         Differential Method     Automated         eGFR     >60.0         Eos #     0.0         Eosinophil %     0.0         Glucose     128         Gran # (ANC)     6.3         Gran %     81.9         Hematocrit     24.0         Hemoglobin     8.0         Immature Grans (Abs)     0.04  Comment: Mild elevation in immature granulocytes is non specific and   can be seen in a variety of conditions including stress response,   acute inflammation, trauma and pregnancy. Correlation with other   laboratory and clinical findings is essential.           Immature Granulocytes     0.5         Lymph #     0.7         Lymph %     9.7         Magnesium     1.4         MCH     29.0         MCHC      33.3         MCV     87         Mono #     0.6         Mono %     7.8         MPV     12.2         nRBC     0         Phosphorus     3.1         Platelets     118         POCT Glucose 145   133     208       Potassium     4.6         RBC     2.76         RDW     16.5         Sodium     139         WBC     7.65               All pertinent labs from the last 24 hours have been reviewed.    Significant Diagnostics:  I have reviewed all pertinent imaging results/findings within the past 24 hours.

## 2023-05-31 NOTE — SIGNIFICANT EVENT
The patient had a sustained tachycardia and 5 mg metoprolol IV given as well as her 25 mg metoprolol which was held this AM for HRs int he 60s. She quickly went into HRs of the 50s-60s per charge nursing at bedside after this.  I came to see the patient about 30 min after the even to check on her. She is not having any symptoms when her HR is low or when it is elevated.  During discussion with her and family she had another episode of tachycardia with regular complexes and narrow complexes. She was in flutter on admission and was in afib 2 weeks ago on that admit for new afib with tachy/juan a on that admit with recs from cards to dec metoprolol to 25 mg and no pacemaker at that time per recs. Rec to keep K/mG/Ca at goals as well. Mg was low this AM and got oral mag and IV mag was ordered and was running during this exam already.  Unable to get 12 lead as quickly coverted out while I was in room and had a pause when converting out of tachycardia that I witnessed at 3:19 and pictures in plan of care note previously. I was talking to family further and then she had another episode of tachycardia again with both lasting a few 1-3 minutes or so and converting out with HRs 50-60s with no P waves seen when converts out. She was in NSR in pacu yesterday. I went out to talk to charge nursing and find these arrythmias on telemetry and consult cardiology for these and when I came back to update family in <10 minutes she was back in tachycardia again with HRs 140s-150s. Narrow complex and very regular, will get 12 lead stat in interim also to see if can see more information as difficult to say flutter and make sure not svt given narrow complex.   Cards will come to see patient, would hold off on IV metoprolol for now as goes very low when converts out frequently and risk of pauses and going low again with another IV push may be riskier than briefly being in the 150s while we figure out more information in the interim. Updated  charge nurse Erik and will return to floor for monitoring further.    Critical care time spent on the evaluation and treatment of severe organ dysfunction, review of pertinent labs and imaging studies, discussions with consulting providers and discussions with patient/family: 60 minutes.

## 2023-05-31 NOTE — PROGRESS NOTES
[facility] - Telemetry Stepdown  Neurosurgery  Progress Note    Subjective:     History of Present Illness: 89F with h/o CAD, DM, CHF, CKD, Afib on Eliquis BID presenting after ground level fall at home, no LOC, with L femoral neck fracture. CTH with punctate L parietal tSAH, repeat CT pending. She is currently on a Holter monitor for tachy-juan a. She walks with a walker at baseline, lives with her daughter in a one story house.  She denies other areas of pain or injury at this time.  No neck pain, low back pain, no new numbness/tingling/weakness.    Eliquis BID.    Pending OR w/Ortho for L femoral neck fx.         Post-Op Info:  Procedure(s) (LRB):  INSERTION, INTRAMEDULLARY TEREZA, FEMUR (Left)   1 Day Post-Op     Interval History: s/p left intramedullary tereza insertion 5/30. Neuro exam stable. AAOx3. Denies headache, n/v, numbness.     Medications:  Continuous Infusions:   ROPIvacaine (PF) 2 mg/ml (0.2%) 0.1 mL/hr (05/30/23 1001)     Scheduled Meds:   acetaminophen  1,000 mg Oral Q6H    amlodipine-benazepril 5-20 mg  1 capsule Oral Daily    calcium-vitamin D3  2 tablet Oral Daily    donepeziL  5 mg Oral QHS    DULoxetine  60 mg Oral QAM    hydrALAZINE  25 mg Oral Q8H    levETIRAcetam  500 mg Oral BID    magnesium oxide  400 mg Oral BID    magnesium sulfate IVPB  2 g Intravenous Once    metoprolol tartrate  25 mg Oral BID    mupirocin  1 g Nasal BID    polyethylene glycol  17 g Oral Daily    senna-docusate 8.6-50 mg  1 tablet Oral BID    vitamin D  1,000 Units Oral Daily     PRN Meds:bisacodyL, dextrose 10%, dextrose 10%, dextrose, dextrose, glucagon (human recombinant), hydrALAZINE, insulin aspart U-100, melatonin, methocarbamoL, metoprolol, ondansetron, sodium chloride 0.9%     Review of Systems  Objective:     Weight: 76.9 kg (169 lb 8.5 oz)  Body mass index is 30.03 kg/m².  Vital Signs (Most Recent):  Temp: 97.7 °F (36.5 °C) (05/31/23 1155)  Pulse: (!) 58 (05/31/23 1503)  Resp: 20 (05/31/23 1400)  BP:  (!) 111/57 (05/31/23 1400)  SpO2: 98 % (05/31/23 1400) Vital Signs (24h Range):  Temp:  [97.4 °F (36.3 °C)-98.4 °F (36.9 °C)] 97.7 °F (36.5 °C)  Pulse:  [] 58  Resp:  [17-20] 20  SpO2:  [98 %-100 %] 98 %  BP: (110-162)/(53-76) 111/57                         Female External Urinary Catheter 05/31/23 1051 (Active)          Physical Exam     Neurosurgery Physical Exam    General: well developed, well nourished, no distress.   Head: normocephalic, atraumatic  Neurologic: Alert and oriented. Thought content appropriate.  GCS: Motor: 6/Verbal: 5/Eyes: 4 GCS Total: 15  Mental Status: Awake, Alert, Oriented x 3  Language: No aphasia  Speech: No dysarthria  Cranial nerves: face symmetric, tongue midline, CN II-XII grossly intact.   Eyes: pupils equal, round, reactive to light with accomodation, EOMI.  Pulmonary: normal respirations, no signs of respiratory distress  Sensory: intact to light touch throughout  Motor Strength: Moves all extremities spontaneously with good tone. Exam limited to LLE 2/2 recent surgery. Antigravity in all other extremities, no focal weakness. No abnormal movements seen.   Pronator Drift: no drift noted  Finger-to-nose: Intact bilaterally  Skin: Skin is warm, dry and intact.      Significant Labs:  Recent Labs   Lab 05/30/23  0200 05/31/23  0316   * 128*    139   K 5.7* 4.6    107   CO2 24 20*   BUN 12 14   CREATININE 0.9 0.9   CALCIUM 9.6 9.6   MG 1.5* 1.4*     Recent Labs   Lab 05/30/23  0200 05/31/23  0316   WBC 6.45 7.65   HGB 9.3* 8.0*   HCT 27.4* 24.0*    118*     No results for input(s): LABPT, INR, APTT in the last 48 hours.  Microbiology Results (last 7 days)       ** No results found for the last 168 hours. **          Recent Lab Results         05/31/23  1210   05/31/23  0747   05/31/23 0316   05/30/23 2056        Anion Gap     12         Baso #     0.01         Basophil %     0.1         BUN     14         Calcium     9.6         Chloride     107          CO2     20         Creatinine     0.9         Differential Method     Automated         eGFR     >60.0         Eos #     0.0         Eosinophil %     0.0         Glucose     128         Gran # (ANC)     6.3         Gran %     81.9         Hematocrit     24.0         Hemoglobin     8.0         Immature Grans (Abs)     0.04  Comment: Mild elevation in immature granulocytes is non specific and   can be seen in a variety of conditions including stress response,   acute inflammation, trauma and pregnancy. Correlation with other   laboratory and clinical findings is essential.           Immature Granulocytes     0.5         Lymph #     0.7         Lymph %     9.7         Magnesium     1.4         MCH     29.0         MCHC     33.3         MCV     87         Mono #     0.6         Mono %     7.8         MPV     12.2         nRBC     0         Phosphorus     3.1         Platelets     118         POCT Glucose 145   133     208       Potassium     4.6         RBC     2.76         RDW     16.5         Sodium     139         WBC     7.65               All pertinent labs from the last 24 hours have been reviewed.    Significant Diagnostics:  I have reviewed all pertinent imaging results/findings within the past 24 hours.    Assessment/Plan:     * Left basicervical femoral neck fracture with extension into the intertroch region  89F with extensive PMHx including Afib on Eliquis BID presents s/p fall at home w/o LOC found to have L femoral neck fracture and punctate L parietal tSAH:    --No acute neurosurgical intervention  --Follow-up CTH 4-6h scan for stability - remains stable   --Hold Eliquis for 2 weeks. Ok for DVT ppx with standard dose Lovenox 40 mg daily. If higher dosing needed given recent surgery, can start this in 5 days.   --SBP <140  --Na >135  --Keppra 500 BID x14d; can consider lower dose if needed given age  --HOB >30  --F/u in 2 weeks with repeat CTH. If repeat CTH stable, can resume home dose Eliquis. Will  contact the patient with this appointment. NSGY will sign off. Notify NSGY immediately with any changes in neuro status.  --Discussed with Dr. Guadalupe Rader PA-C  Neurosurgery  [facility] - Telemetry Stepdown

## 2023-05-31 NOTE — PLAN OF CARE
Problem: Occupational Therapy  Goal: Occupational Therapy Goal  Description: Goals to be met by: 6/14/23     Patient will increase functional independence with ADLs by performing:    UE Dressing with [place].  LE Dressing with Minimal Assistance.  Grooming while standing with Stand-by Assistance.  Toileting from bedside commode with Minimal Assistance for hygiene and clothing management.   Toilet transfer to bedside commode with Minimal Assistance.    Outcome: Ongoing, Progressing

## 2023-05-31 NOTE — PLAN OF CARE
[facility] - Telemetry Stepdown  Initial Discharge Assessment       Primary Care Provider: ePpper Whitfield MD    Admission Diagnosis: SAH (subarachnoid hemorrhage) [I60.9]  Fall [W19.XXXA]  Left displaced femoral neck fracture [S72.002A]  Intertrochanteric fracture of left hip, closed, initial encounter [S72.142A]    Admission Date: 5/28/2023  Expected Discharge Date: 6/2/2023    Transition of Care Barriers: None    Payor: [employer]ARE / Plan: [employer] / Product Type: Medicare Advantage /     Extended Emergency Contact Information  Primary Emergency Contact: Haley England  [address] [place] [address] [facility]  Home Phone: [phone]  Mobile Phone: [phone]  Relation: Daughter  Secondary Emergency Contact: TomásNora  Mobile Phone: [phone]  Relation: Daughter  Preferred language: English   needed? Yes    Discharge Plan A: Skilled Nursing Facility  Discharge Plan B: Rehab      [facility] - [place], [place] - [address]  [address] [facility] [address]  Phone: [phone] Fax: [phone]    [id number] - [place], [place] - [address]  [address]  [facility] [address]  Phone: [phone] Fax: [phone]    [id number] [place], [place] - [address]  [address] [facility] [address]  Phone: [phone] Fax: [phone]      Initial Assessment (most recent)       Adult Discharge Assessment - 05/31/23 1400          Discharge Assessment    Assessment Type Discharge Planning Assessment     Confirmed/corrected address, phone number and insurance Yes     Confirmed Demographics Correct on Facesheet     Source of Information patient   no family at bedside at time of assessment    Communicated DUTCH with patient/caregiver Date not available/Unable to determine     Reason For Admission Left basicervical femoral neck  fracture with extension into the intertroch region     People in Home child(chidi), adult     Do you expect to return to your current living situation? No   Team anticipates patient needing SNF at [facility]    Do you have help at home or someone to help you manage your care at home? Yes     Who are your caregiver(s) and their phone number(s)? Patient has help from her daughter at home, patient reports she ambulates with a RW at baseline.     Walking or Climbing Stairs ambulation difficulty, requires equipment     Mobility Management RW     Dressing/Bathing dressing difficulty, assistance 1 person     Dressing/Bathing Management Patient reports her daughter helps her put on shoes/socks     Home Accessibility wheelchair accessible     Home Layout Able to live on 1st floor     Equipment Currently Used at Home oxygen;walker, rolling     Readmission within 30 days? Yes     Patient currently being followed by outpatient case management? No     Do you currently have service(s) that help you manage your care at home? No     Do you take prescription medications? Yes     Do you have prescription coverage? Yes     Who is going to help you get home at discharge? PFC     How do you get to doctors appointments? family or friend will provide     Are you on dialysis? No     Do you take coumadin? No     Discharge Plan A Skilled Nursing Facility     Discharge Plan B Rehab     DME Needed Upon Discharge  none     Discharge Plan discussed with: Patient     Transition of Care Barriers None        Financial Resource Strain    How hard is it for you to pay for the very basics like food, housing, medical care, and heating? Not hard at all        Housing Stability    In the last 12 months, was there a time when you were not able to pay the mortgage or rent on time? No     In the last 12 months, was there a time when you did not have a steady place to sleep or slept in a shelter (including now)? No        Transportation Needs    In the past 12 months,  has lack of transportation kept you from medical appointments or from getting medications? No     In the past 12 months, has lack of transportation kept you from meetings, work, or from getting things needed for daily living? No        Food Insecurity    Within the past 12 months, you worried that your food would run out before you got the money to buy more. Never true     Within the past 12 months, the food you bought just didn't last and you didn't have money to get more. Never true        Stress    Do you feel stress - tense, restless, nervous, or anxious, or unable to sleep at night because your mind is troubled all the time - these days? Not at all        Social Connections    In a typical week, how many times do you talk on the phone with family, friends, or neighbors? More than three times a week     How often do you get together with friends or relatives? More than three times a week     How often do you attend Mosque or Church services? More than 4 times per year     Do you belong to any clubs or organizations such as Mosque groups, unions, fraternal or athletic groups, or school groups? No     How often do you attend meetings of the clubs or organizations you belong to? Never     Are you , , , , never , or living with a partner?                  Met with patient to discuss dc planning, charted above. Patient reports she is agreeable to SNF if recommended and states her family prefers [facility]. SW informed that a referral can be sent to [facility] when the recommendations are in and did explain the potential of limited bed availability.    Rama Rivera, BLUE  [facility]  Ext. [phone]

## 2023-05-31 NOTE — ADDENDUM NOTE
Addendum  created 05/31/23 1114 by Scotty Lamar MD    Child order released for a procedure order, Clinical Note Signed, Intraprocedure Blocks edited, SmartForm saved

## 2023-05-31 NOTE — PROGRESS NOTES
[facility] - Telemetry Stepdown  Orthopedics  Progress Note    Patient Name: Brunilda England  MRN: [id number]  Admission Date: 5/28/2023   Hospital Length of Stay: 2 days  Attending Provider: Honey Calixto MD  Primary Care Provider: Pepper Whitfield MD  Follow-up For: Procedure(s) (LRB):  INSERTION, INTRAMEDULLARY TEREZA, FEMUR (Left)    Post-Operative Day: 1 Day Post-Op  Subjective:     Principal Problem:Left displaced femoral neck fracture    Principal Orthopedic Problem: s/p L-CMN 5/30    Interval History: Afebrile, VSS, NAEON.  Pain has been reportedly well controlled.  Hb 8.0 from 9.3 this morning.  Denies any nausea/vomiting, chest pain, SOB, numbness/tingling.          Review of patient's allergies indicates:   Allergen Reactions    Bextra [valdecoxib] Swelling    Gabapentin Diarrhea and Nausea Only       Current Facility-Administered Medications   Medication    0.9%  NaCl infusion (for blood administration)    acetaminophen tablet 1,000 mg    amlodipine-benazepril 5-20 mg per capsule 1 capsule    bisacodyL suppository 10 mg    calcium-vitamin D3 500 mg-5 mcg (200 unit) per tablet 2 tablet    dextrose 10% bolus 125 mL 125 mL    dextrose 10% bolus 250 mL 250 mL    dextrose 40 % gel 15,000 mg    dextrose 40 % gel 30,000 mg    donepeziL tablet 5 mg    DULoxetine DR capsule 60 mg    glucagon (human recombinant) injection 1 mg    hydrALAZINE tablet 50 mg    hydrALAZINE tablet 75 mg    insulin aspart U-100 pen 0-5 Units    levETIRAcetam tablet 500 mg    melatonin tablet 6 mg    methocarbamoL tablet 500 mg    methocarbamoL tablet 500 mg    metoprolol tartrate (LOPRESSOR) tablet 25 mg    mupirocin 2 % ointment 1 g    ondansetron injection 4 mg    polyethylene glycol packet 17 g    polyethylene glycol packet 17 g    ROPIvacaine (PF) 2 mg/ml (0.2%) solution    senna-docusate 8.6-50 mg per tablet 1 tablet    sodium chloride 0.9% flush 10 mL    vitamin D 1000 units tablet 1,000 Units      Facility-Administered Medications Ordered in Other Encounters   Medication    sodium chloride 0.9% flush 5 mL     Objective:     Vital Signs (Most Recent):  Temp: 98 °F (36.7 °C) (05/31/23 0528)  Pulse: 92 (05/31/23 0358)  Resp: 18 (05/31/23 0358)  BP: (!) 158/68 (05/31/23 0530)  SpO2: 98 % (05/31/23 0358) Vital Signs (24h Range):  Temp:  [97.3 °F (36.3 °C)-98.4 °F (36.9 °C)] 98 °F (36.7 °C)  Pulse:  [] 92  Resp:  [10-26] 18  SpO2:  [98 %-100 %] 98 %  BP: (123-180)/() 158/68     Weight: 76.9 kg (169 lb 8.5 oz)     Body mass index is 30.03 kg/m².      Intake/Output Summary (Last 24 hours) at 5/31/2023 0635  Last data filed at 5/30/2023 2041  Gross per 24 hour   Intake 1050 ml   Output 1325 ml   Net -275 ml        Ortho/SPM Exam  AAOx4  NAD  Reg rate  No increased WOB    LLE:  Dressing c/d/i  SILT T/SP/DP/Greer/Sa  Motor intact T/SP/DP  WWP extremities  FCDs in place and functioning        Significant Labs: BMP:   Recent Labs   Lab 05/31/23 0316   *      K 4.6      CO2 20*   BUN 14   CREATININE 0.9   CALCIUM 9.6   MG 1.4*     CBC:   Recent Labs   Lab 05/30/23  0200 05/31/23  0316   WBC 6.45 7.65   HGB 9.3* 8.0*   HCT 27.4* 24.0*    118*     All pertinent labs within the past 24 hours have been reviewed.    Significant Imaging: I have reviewed all pertinent imaging results/findings.    Assessment/Plan:     * Left basicervical femoral neck fracture with extension into the intertroch region  Brunilda England is a 89 y.o. female with a left basicervical femoral neck fracture with extension into the intertroch region, closed, NVI. They take Eliquis 5 b.i.d. anticoagulation at home. They required a walker for ambulatory assistive devices prior to this injury. Also had small SAH on CT head which was stable on repeat imaging.  She is now s/p CMN 5/30.     - WBAT LLE   - AC: per primary; currently holding anticoagulation for SAH precautions per NSGY   - Abx: 24 hr postop Ancef  - Pain:  MM  - danyelle pack today    » Dispo: f/u pt recs           JOAQUÍN Gonzalez MD  Orthopedics  [facility] - Telemetry Stepdown

## 2023-05-31 NOTE — ADDENDUM NOTE
Addendum  created 05/31/23 1347 by Nicolasa Santos MD    Charge Capture section accepted, Cosign clinical note with attestation

## 2023-05-31 NOTE — PLAN OF CARE
Problem: Infection  Goal: Absence of Infection Signs and Symptoms  Outcome: Ongoing, Progressing     Problem: Adult Inpatient Plan of Care  Goal: Plan of Care Review  Outcome: Ongoing, Progressing  Goal: Patient-Specific Goal (Individualized)  Outcome: Ongoing, Progressing  Goal: Absence of Hospital-Acquired Illness or Injury  Outcome: Ongoing, Progressing  Goal: Optimal Comfort and Wellbeing  Outcome: Ongoing, Progressing  Goal: Readiness for Transition of Care  Outcome: Ongoing, Progressing     Problem: Adjustment to Injury (Hip Fracture Medical Management)  Goal: Optimal Coping with Change in Health Status  Outcome: Ongoing, Progressing     Problem: Bleeding (Hip Fracture Medical Management)  Goal: Absence of Bleeding  Outcome: Ongoing, Progressing     Problem: Bowel Elimination Impaired (Hip Fracture Medical Management)  Goal: Effective Bowel Elimination  Outcome: Ongoing, Progressing     Problem: Cognitive Decline Risk (Hip Fracture Medical Management)  Goal: Baseline Cognitive Function Maintained  Outcome: Ongoing, Progressing     Problem: Embolism (Hip Fracture Medical Management)  Goal: Absence of Embolism  Outcome: Ongoing, Progressing     Problem: Fracture Stabilization and Management (Hip Fracture Medical Management)  Goal: Fracture Stability  Outcome: Ongoing, Progressing     Problem: Functional Ability Impaired (Hip Fracture Medical Management)  Goal: Optimal Functional Performance  Outcome: Ongoing, Progressing     Problem: Pain (Hip Fracture Medical Management)  Goal: Acceptable Pain Level  Outcome: Ongoing, Progressing     Problem: Urinary Elimination Impaired (Hip Fracture Medical Management)  Goal: Effective Urinary Elimination  Outcome: Ongoing, Progressing     Problem: Bleeding (Surgery Nonspecified)  Goal: Absence of Bleeding  Outcome: Ongoing, Progressing     Problem: Bowel Motility Impaired (Surgery Nonspecified)  Goal: Effective Bowel Elimination  Outcome: Ongoing, Progressing     Problem:  Fluid and Electrolyte Imbalance (Surgery Nonspecified)  Goal: Fluid and Electrolyte Balance  Outcome: Ongoing, Progressing     Problem: Glycemic Control Impaired (Surgery Nonspecified)  Goal: Blood Glucose Level Within Targeted Range  Outcome: Ongoing, Progressing     Problem: Infection (Surgery Nonspecified)  Goal: Absence of Infection Signs and Symptoms  Outcome: Ongoing, Progressing     Problem: Ongoing Anesthesia Effects (Surgery Nonspecified)  Goal: Anesthesia/Sedation Recovery  Outcome: Ongoing, Progressing     Problem: Pain (Surgery Nonspecified)  Goal: Acceptable Pain Control  Outcome: Ongoing, Progressing     Problem: Postoperative Nausea and Vomiting (Surgery Nonspecified)  Goal: Nausea and Vomiting Relief  Outcome: Ongoing, Progressing     Problem: Postoperative Urinary Retention (Surgery Nonspecified)  Goal: Effective Urinary Elimination  Outcome: Ongoing, Progressing     Problem: Respiratory Compromise (Surgery Nonspecified)  Goal: Effective Oxygenation and Ventilation  Outcome: Ongoing, Progressing     Problem: Device-Related Complication Risk (Anesthesia/Analgesia, Neuraxial)  Goal: Safe Infusion Delivery Completion  Outcome: Ongoing, Progressing     Problem: Infection (Anesthesia/Analgesia, Neuraxial)  Goal: Absence of Infection Signs and Symptoms  Outcome: Ongoing, Progressing     Problem: Nausea and Vomiting (Anesthesia/Analgesia, Neuraxial)  Goal: Nausea and Vomiting Relief  Outcome: Ongoing, Progressing     Problem: Pain (Anesthesia/Analgesia, Neuraxial)  Goal: Effective Pain Control  Outcome: Ongoing, Progressing     Problem: Respiratory Compromise (Anesthesia/Analgesia, Neuraxial)  Goal: Effective Oxygenation and Ventilation  Outcome: Ongoing, Progressing     Problem: Sensorimotor Impairment (Anesthesia/Analgesia, Neuraxial)  Goal: Baseline Motor Function  Outcome: Ongoing, Progressing     Problem: Urinary Retention (Anesthesia/Analgesia, Neuraxial)  Goal: Effective Urinary  Elimination  Outcome: Ongoing, Progressing     Problem: Fall Injury Risk  Goal: Absence of Fall and Fall-Related Injury  Outcome: Ongoing, Progressing     Problem: Diabetes Comorbidity  Goal: Blood Glucose Level Within Targeted Range  Outcome: Ongoing, Progressing     Problem: Fluid Imbalance (Pneumonia)  Goal: Fluid Balance  Outcome: Ongoing, Progressing     Problem: Infection (Pneumonia)  Goal: Resolution of Infection Signs and Symptoms  Outcome: Ongoing, Progressing     Problem: Respiratory Compromise (Pneumonia)  Goal: Effective Oxygenation and Ventilation  Outcome: Ongoing, Progressing     Problem: Skin Injury Risk Increased  Goal: Skin Health and Integrity  Outcome: Ongoing, Progressing     Pt AAOx4. HR Tachy-Marco. Denies pain or discomfort. Routine meds administered, tolerated well. Continuous telemetry monitoring. Joseph Feldman in place. Safety measures maintained. POC reviewed with pt, verbalizes understanding.

## 2023-05-31 NOTE — ASSESSMENT & PLAN NOTE
89F with extensive PMHx including Afib on Eliquis BID presents s/p fall at home w/o LOC found to have L femoral neck fracture and punctate L parietal tSAH:    --No acute neurosurgical intervention  --Follow-up CTH 4-6h scan for stability - remains stable   --Hold Eliquis for 2 weeks. Ok for DVT ppx with standard dose Lovenox 40 mg daily. If higher dosing needed given recent surgery, can start this in 5 days.   --SBP <140  --Na >135  --Keppra 500 BID x14d; can consider lower dose if needed given age  --HOB >30  --F/u in 2 weeks with repeat CTH. If repeat CTH stable, can resume home dose Eliquis. Will contact the patient with this appointment. NSGY will sign off. Notify NSGY immediately with any changes in neuro status.  --Discussed with Dr. Butcher

## 2023-05-31 NOTE — ASSESSMENT & PLAN NOTE
Brunilda England is a 89 y.o. female with a left basicervical femoral neck fracture with extension into the intertroch region, closed, NVI. They take Eliquis 5 b.i.d. anticoagulation at home. They required a walker for ambulatory assistive devices prior to this injury. Also had small SAH on CT head which was stable on repeat imaging.  She is now s/p CMN 5/30.     - WBAT LLE   - AC: per primary; currently holding anticoagulation for SAH precautions per NSGY   - Abx: 24 hr postop Ancef  - Pain: MM  - dc ramone today    » Dispo: f/u pt recs

## 2023-05-31 NOTE — HPI
Ms. England is a 89 y.o. female with past medical history of HTN, paroxysmal atrial fibrillation/flutter COPD on home oxygen, Chronic diastolic HF< CKD 3, dementia, hx of lung/rectal cancer who was admitted to the hospital after a mechanical fall with no LOC which led to L proximal femoral fracture and L parietal subarachnoid hemorrhage. Evaluated by Cardiac Electrophysiology a couple of weeks ago for  tachy-juan a syndrome with Holter monitor placed for monitoring. During hospitalization, she was noted to have a 3 second pause in the setting of afib on telemetry on 05/31/2023 at 3:19 pm. Cardiology was consulted for recommendations on management. Had mechanical falls without any signs of syncope. No reports of dizziness/lightheadedness.

## 2023-05-31 NOTE — SUBJECTIVE & OBJECTIVE
Past Medical History:   Diagnosis Date    Anal cancer 1995    Anemia     Cancer 1995    anal cancer    Centrilobular emphysema 7/6/2020    Diabetes mellitus     With circulatory disorder    Lumbar radiculopathy 5/16/2014    Lung cancer 2012    s/p chemo/radiation    Macular degeneration     Macular hemorrhage of left eye     Neuropathy     Osteoporosis     Osteoporosis, unspecified 11/9/2012    Pure hypercholesterolemia        Past Surgical History:   Procedure Laterality Date    BRONCHOSCOPY      CHOLECYSTECTOMY      COLONOSCOPY      FLUOROSCOPIC ANGIOGRAPHY OF LOWER EXTREMITY WITH TOPICAL ULTRASOUND Right 12/6/2018    Procedure: ARTERIOGRAM-LEG AND ULTRASOUND;  Surgeon: SEBASTIÁN Mcpherson III, MD;  Location: [facility] OR [address] [place] [address];  Service: Peripheral Vascular;  Laterality: Right;  16.5 min  1059.42 mGy  59ml Dye  2ml Local    heart stent      HYSTERECTOMY      INCISIONAL BIOPSY Right 12/6/2019    Procedure: INCISIONAL BIOPSY;  Surgeon: Leslie Castillo MD;  Location: [facility] OR [address] [place] [address];  Service: Plastics;  Laterality: Right;    left leg surgery      blockage    PERCUTANEOUS TRANSLUMINAL ANGIOPLASTY N/A 12/6/2018    Procedure: PTA (ANGIOPLASTY, PERCUTANEOUS, TRANSLUMINAL);  Surgeon: SEBASTIÁN Mcpherson III, MD;  Location: [facility] OR [address] [place] [address];  Service: Peripheral Vascular;  Laterality: N/A;    RECONSTRUCTION USING FLAP Right 12/6/2019    Procedure: RECONSTRUCTION USING FLAP/FULL THICKNESS MRESETION AND RECONSTRUCTION RIGHT UPPER EYELID WITH BIOPSY;  Surgeon: Leslie Castillo MD;  Location: [facility] OR [address] [place] [address];  Service: Plastics;  Laterality: Right;    TONSILLECTOMY         Review of patient's allergies indicates:   Allergen Reactions    Bextra [valdecoxib] Swelling    Gabapentin Diarrhea and Nausea Only       Current Facility-Administered Medications on File Prior to Encounter   Medication    sodium chloride 0.9% flush 5 mL     Current Outpatient Medications on File Prior to Encounter   Medication Sig    albuterol (PROVENTIL) 2.5 mg /3 mL  (0.083 %) nebulizer solution Take 3 mLs (2.5 mg total) by nebulization every 6 (six) hours as needed for Wheezing. Rescue    amlodipine-benazepril 5-20 mg (LOTREL) 5-20 mg per capsule Take 1 capsule by mouth once daily.    apixaban (ELIQUIS) 5 mg Tab Take 1 tablet (5 mg total) by mouth 2 (two) times daily.    azelastine (ASTELIN) 137 mcg (0.1 %) nasal spray 1 spray (137 mcg total) by Nasal route 2 (two) times daily.    cyanocobalamin (VITAMIN B-12) 1000 MCG tablet Take 1,000 mcg by mouth every morning.    donepeziL (ARICEPT) 5 MG tablet Take 1 tablet (5 mg total) by mouth every evening.    DULoxetine (CYMBALTA) 60 MG capsule Take 1 capsule (60 mg total) by mouth every morning.    estradioL (ESTRACE) 0.01 % (0.1 mg/gram) vaginal cream USE ONE-HALF GRAM VAGINALLY TWICE a WEEK    linaGLIPtin (TRADJENTA) 5 mg Tab tablet Take 1 tablet (5 mg total) by mouth once daily.    metoprolol tartrate (LOPRESSOR) 25 MG tablet Take 1 tablet (25 mg total) by mouth 2 (two) times daily.    nitroGLYCERIN (NITROSTAT) 0.4 MG SL tablet Place 1 tablet (0.4 mg total) under the tongue every 5 (five) minutes as needed.    simvastatin (ZOCOR) 80 MG tablet Take 1 tablet (80 mg total) by mouth once daily.    vitamin D (VITAMIN D3) 1000 units Tab Take 1,000 Units by mouth once daily.     Family History       Problem Relation (Age of Onset)    Breast cancer Maternal Aunt    Cancer Father    Stroke Mother          Tobacco Use    Smoking status: Former     Packs/day: 0.50     Years: 30.00     Pack years: 15.00     Types: Cigarettes     Quit date: 1995     Years since quittin.4     Passive exposure: Past    Smokeless tobacco: Never   Substance and Sexual Activity    Alcohol use: No    Drug use: No    Sexual activity: Not Currently     Birth control/protection: Surgical     Review of Systems   Constitutional: Negative for chills and fever.   Cardiovascular:  Negative for chest pain, orthopnea and palpitations.   Respiratory:  Negative for  cough and shortness of breath.    Gastrointestinal:  Negative for abdominal pain.   Neurological:  Negative for dizziness, headaches and light-headedness.   Psychiatric/Behavioral:  Negative for altered mental status.    Objective:     Vital Signs (Most Recent):  Temp: 97.8 °F (36.6 °C) (05/31/23 1557)  Pulse: (!) 145 (05/31/23 1557)  Resp: 18 (05/31/23 1557)  BP: 109/60 (05/31/23 1557)  SpO2: 100 % (05/31/23 1557) Vital Signs (24h Range):  Temp:  [97.4 °F (36.3 °C)-98.4 °F (36.9 °C)] 97.8 °F (36.6 °C)  Pulse:  [] 145  Resp:  [17-20] 18  SpO2:  [98 %-100 %] 100 %  BP: (109-158)/(53-68) 109/60     Weight: 76.9 kg (169 lb 8.5 oz)  Body mass index is 30.03 kg/m².    SpO2: 100 %         Intake/Output Summary (Last 24 hours) at 5/31/2023 1703  Last data filed at 5/30/2023 2041  Gross per 24 hour   Intake --   Output 350 ml   Net -350 ml       Lines/Drains/Airways       Drain  Duration             Female External Urinary Catheter 05/31/23 1051 <1 day              Epidural Line  Duration                  Perineural Analgesia/Anesthesia Assessment (using dermatomes) 05/30/23  1 day              Peripheral Intravenous Line  Duration                  Peripheral IV - Single Lumen 05/28/23 2100 20 G Anterior;Left Forearm 2 days         Peripheral IV - Single Lumen 05/30/23 0719 18 G Anterior;Right Forearm 1 day                     Physical Exam  Vitals reviewed.   Constitutional:       General: She is not in acute distress.     Appearance: Normal appearance. She is not ill-appearing or toxic-appearing.   HENT:      Head: Normocephalic.      Comments: Traumatic lesion to left side of head     Mouth/Throat:      Mouth: Mucous membranes are moist.   Cardiovascular:      Rate and Rhythm: Normal rate. Rhythm irregular.      Heart sounds: No murmur heard.    No friction rub. No gallop.      Comments: Salvos of tachcyardia to the 130s bpm with periods of bradycardia to the 50s bpm  Pulmonary:      Effort: Pulmonary effort is  normal. No respiratory distress.      Breath sounds: Normal breath sounds.   Abdominal:      Palpations: Abdomen is soft.   Musculoskeletal:      Right lower leg: No edema.      Left lower leg: No edema.      Comments: Hip not examined (post surgical)   Skin:     General: Skin is warm.   Neurological:      Mental Status: She is alert. Mental status is at baseline.      Comments: Oriented to person and place        Significant Labs: BMP:   Recent Labs   Lab 05/30/23  0200 05/31/23  0316   * 128*    139   K 5.7* 4.6    107   CO2 24 20*   BUN 12 14   CREATININE 0.9 0.9   CALCIUM 9.6 9.6   MG 1.5* 1.4*   , CMP   Recent Labs   Lab 05/30/23  0200 05/31/23  0316    139   K 5.7* 4.6    107   CO2 24 20*   * 128*   BUN 12 14   CREATININE 0.9 0.9   CALCIUM 9.6 9.6   ANIONGAP 9 12   , CBC   Recent Labs   Lab 05/30/23  0200 05/31/23 0316   WBC 6.45 7.65   HGB 9.3* 8.0*   HCT 27.4* 24.0*    118*   , and All pertinent lab results from the last 24 hours have been reviewed.    Significant Imaging: Echocardiogram: Transthoracic echo (TTE) complete (Cupid Only):   Results for orders placed or performed during the hospital encounter of 05/20/23   Echo   Result Value Ref Range    Ascending aorta 2.90 cm    STJ 2.66 cm    AV mean gradient 6 mmHg    Ao peak ernesto 1.54 m/s    Ao VTI 28.11 cm    IVRT 102.76 msec    IVS 0.82 0.6 - 1.1 cm    LA size 3.35 cm    Left Atrium Major Axis 5.75 cm    Left Atrium Minor Axis 5.25 cm    LVIDd 4.57 3.5 - 6.0 cm    LVIDs 3.76 2.1 - 4.0 cm    LVOT diameter 2.00 cm    LVOT peak VTI 12.52 cm    Posterior Wall 0.87 0.6 - 1.1 cm    MV Peak E Ernesto 1.26 m/s    RA Major Axis 4.42 cm    RA Width 3.51 cm    RVDD 2.60 cm    Sinus 3.03 cm    TAPSE 1.35 cm    TR Max Ernesto 3.08 m/s    LA WIDTH 4.28 cm    LV Diastolic Volume 95.66 mL    LV Systolic Volume 60.57 mL    LVOT peak ernesto 0.78 m/s    TDI LATERAL 0.11 m/s    TDI SEPTAL 0.07 m/s    LA volume (mod) 67.00 cm3    LV LATERAL  E/E' RATIO 11.45 m/s    LV SEPTAL E/E' RATIO 18.00 m/s    FS 18 %    LA volume 66.89 cm3    LV mass 125.29 g    Left Ventricle Relative Wall Thickness 0.38 cm    AV valve area 1.40 cm2    AV Velocity Ratio 0.51     AV index (prosthetic) 0.45     Mean e' 0.09 m/s    LVOT area 3.1 cm2    LVOT stroke volume 39.31 cm3    AV peak gradient 9 mmHg    E/E' ratio 14.00 m/s    LV Systolic Volume Index 34.0 mL/m2    LV Diastolic Volume Index 53.74 mL/m2    LA Volume Index 37.6 mL/m2    LV Mass Index 70 g/m2    Triscuspid Valve Regurgitation Peak Gradient 38 mmHg    LA Volume Index (Mod) 37.6 mL/m2    BSA 1.82 m2    Right Atrial Pressure (from IVC) 3 mmHg    EF 53 %    TV rest pulmonary artery pressure 41 mmHg    Narrative    · The left ventricle is normal in size with low normal systolic function.  · The estimated ejection fraction is 53%.  · There are segmental left ventricular wall motion abnormalities.  · There is abnormal septal wall motion.  · Mild left atrial enlargement.  · Indeterminate left ventricular diastolic function.  · Normal right ventricular size with low normal right ventricular systolic   function.  · There is mild-to-moderate aortic valve stenosis.  · Aortic valve area is 1.40 cm2; peak velocity is 1.54 m/s; mean gradient   is 6 mmHg.  · Mild aortic regurgitation.  · Mild mitral regurgitation.  · Mild tricuspid regurgitation.  · Normal central venous pressure (3 mmHg).  · The estimated PA systolic pressure is 41 mmHg.  · There is mild pulmonary hypertension.  · Trivial posterior pericardial effusion. At base and under the RA and   small outside the RV apically.

## 2023-05-31 NOTE — TELEPHONE ENCOUNTER
----- Message from Adri Radre PA-C sent at 5/31/2023  4:01 PM CDT -----  Can you schedule follow-up for her in 2 weeks with CT head without contrast? She was seen in the hospital for a small tSAH during call week. No surgery was done. CTH is ordered.    Thanks!

## 2023-06-01 ENCOUNTER — PATIENT MESSAGE (OUTPATIENT)
Dept: INTERNAL MEDICINE | Facility: CLINIC | Age: 88
End: 2023-06-01
Payer: MEDICARE

## 2023-06-01 DIAGNOSIS — I50.32 CHRONIC DIASTOLIC CONGESTIVE HEART FAILURE: ICD-10-CM

## 2023-06-01 DIAGNOSIS — C34.90 MALIGNANT NEOPLASM OF LUNG, UNSPECIFIED LATERALITY, UNSPECIFIED PART OF LUNG: Primary | ICD-10-CM

## 2023-06-01 DIAGNOSIS — J44.9 CHRONIC OBSTRUCTIVE PULMONARY DISEASE, UNSPECIFIED COPD TYPE: ICD-10-CM

## 2023-06-01 LAB
ANION GAP SERPL CALC-SCNC: 9 MMOL/L (ref 8–16)
BASOPHILS # BLD AUTO: 0.02 K/UL (ref 0–0.2)
BASOPHILS NFR BLD: 0.3 % (ref 0–1.9)
BUN SERPL-MCNC: 19 MG/DL (ref 10–30)
CALCIUM SERPL-MCNC: 10.3 MG/DL (ref 8.7–10.5)
CHLORIDE SERPL-SCNC: 102 MMOL/L (ref 95–110)
CO2 SERPL-SCNC: 25 MMOL/L (ref 23–29)
CREAT SERPL-MCNC: 1.2 MG/DL (ref 0.5–1.4)
DIFFERENTIAL METHOD: ABNORMAL
EOSINOPHIL # BLD AUTO: 0 K/UL (ref 0–0.5)
EOSINOPHIL NFR BLD: 0.4 % (ref 0–8)
ERYTHROCYTE [DISTWIDTH] IN BLOOD BY AUTOMATED COUNT: 16.6 % (ref 11.5–14.5)
EST. GFR  (NO RACE VARIABLE): 42.7 ML/MIN/1.73 M^2
GLUCOSE SERPL-MCNC: 111 MG/DL (ref 70–110)
HCT VFR BLD AUTO: 23.2 % (ref 37–48.5)
HGB BLD-MCNC: 7.7 G/DL (ref 12–16)
IMM GRANULOCYTES # BLD AUTO: 0.02 K/UL (ref 0–0.04)
IMM GRANULOCYTES NFR BLD AUTO: 0.3 % (ref 0–0.5)
LYMPHOCYTES # BLD AUTO: 1.3 K/UL (ref 1–4.8)
LYMPHOCYTES NFR BLD: 16.9 % (ref 18–48)
MAGNESIUM SERPL-MCNC: 2 MG/DL (ref 1.6–2.6)
MCH RBC QN AUTO: 28.2 PG (ref 27–31)
MCHC RBC AUTO-ENTMCNC: 33.2 G/DL (ref 32–36)
MCV RBC AUTO: 85 FL (ref 82–98)
MONOCYTES # BLD AUTO: 0.7 K/UL (ref 0.3–1)
MONOCYTES NFR BLD: 9.4 % (ref 4–15)
NEUTROPHILS # BLD AUTO: 5.6 K/UL (ref 1.8–7.7)
NEUTROPHILS NFR BLD: 72.7 % (ref 38–73)
NRBC BLD-RTO: 0 /100 WBC
PHOSPHATE SERPL-MCNC: 2.7 MG/DL (ref 2.7–4.5)
PLATELET # BLD AUTO: 154 K/UL (ref 150–450)
PMV BLD AUTO: 12.3 FL (ref 9.2–12.9)
POCT GLUCOSE: 116 MG/DL (ref 70–110)
POCT GLUCOSE: 132 MG/DL (ref 70–110)
POCT GLUCOSE: 171 MG/DL (ref 70–110)
POTASSIUM SERPL-SCNC: 4.5 MMOL/L (ref 3.5–5.1)
RBC # BLD AUTO: 2.73 M/UL (ref 4–5.4)
SODIUM SERPL-SCNC: 136 MMOL/L (ref 136–145)
WBC # BLD AUTO: 7.74 K/UL (ref 3.9–12.7)

## 2023-06-01 PROCEDURE — 99233 PR SUBSEQUENT HOSPITAL CARE,LEVL III: ICD-10-PCS | Mod: ,,, | Performed by: INTERNAL MEDICINE

## 2023-06-01 PROCEDURE — 85025 COMPLETE CBC W/AUTO DIFF WBC: CPT | Performed by: HOSPITALIST

## 2023-06-01 PROCEDURE — 97112 NEUROMUSCULAR REEDUCATION: CPT

## 2023-06-01 PROCEDURE — 99233 SBSQ HOSP IP/OBS HIGH 50: CPT | Mod: ,,, | Performed by: INTERNAL MEDICINE

## 2023-06-01 PROCEDURE — 93010 EKG 12-LEAD: ICD-10-PCS | Mod: ,,, | Performed by: INTERNAL MEDICINE

## 2023-06-01 PROCEDURE — 25000003 PHARM REV CODE 250: Performed by: HOSPITALIST

## 2023-06-01 PROCEDURE — 93010 ELECTROCARDIOGRAM REPORT: CPT | Mod: ,,, | Performed by: INTERNAL MEDICINE

## 2023-06-01 PROCEDURE — 84100 ASSAY OF PHOSPHORUS: CPT | Performed by: HOSPITALIST

## 2023-06-01 PROCEDURE — 25000003 PHARM REV CODE 250: Performed by: STUDENT IN AN ORGANIZED HEALTH CARE EDUCATION/TRAINING PROGRAM

## 2023-06-01 PROCEDURE — 20600001 HC STEP DOWN PRIVATE ROOM

## 2023-06-01 PROCEDURE — 97530 THERAPEUTIC ACTIVITIES: CPT | Mod: CQ

## 2023-06-01 PROCEDURE — 93005 ELECTROCARDIOGRAM TRACING: CPT

## 2023-06-01 PROCEDURE — 36415 COLL VENOUS BLD VENIPUNCTURE: CPT | Performed by: HOSPITALIST

## 2023-06-01 PROCEDURE — 63600175 PHARM REV CODE 636 W HCPCS: Performed by: HOSPITALIST

## 2023-06-01 PROCEDURE — 97112 NEUROMUSCULAR REEDUCATION: CPT | Mod: CQ

## 2023-06-01 PROCEDURE — 83735 ASSAY OF MAGNESIUM: CPT | Performed by: HOSPITALIST

## 2023-06-01 PROCEDURE — 99231 PR SUBSEQUENT HOSPITAL CARE,LEVL I: ICD-10-PCS | Mod: ,,, | Performed by: ANESTHESIOLOGY

## 2023-06-01 PROCEDURE — 97530 THERAPEUTIC ACTIVITIES: CPT

## 2023-06-01 PROCEDURE — 80048 BASIC METABOLIC PNL TOTAL CA: CPT | Performed by: HOSPITALIST

## 2023-06-01 PROCEDURE — 99231 SBSQ HOSP IP/OBS SF/LOW 25: CPT | Mod: ,,, | Performed by: ANESTHESIOLOGY

## 2023-06-01 PROCEDURE — 25000003 PHARM REV CODE 250: Performed by: INTERNAL MEDICINE

## 2023-06-01 RX ORDER — METHOCARBAMOL 500 MG/1
500 TABLET, FILM COATED ORAL 4 TIMES DAILY
Status: DISCONTINUED | OUTPATIENT
Start: 2023-06-01 | End: 2023-06-02

## 2023-06-01 RX ORDER — ACETAMINOPHEN 500 MG
1000 TABLET ORAL EVERY 8 HOURS
Status: DISCONTINUED | OUTPATIENT
Start: 2023-06-01 | End: 2023-06-05 | Stop reason: HOSPADM

## 2023-06-01 RX ORDER — TRAMADOL HYDROCHLORIDE 50 MG/1
50 TABLET ORAL EVERY 6 HOURS PRN
Status: DISCONTINUED | OUTPATIENT
Start: 2023-06-01 | End: 2023-06-02

## 2023-06-01 RX ADMIN — METOPROLOL TARTRATE 2.5 MG: 5 INJECTION, SOLUTION INTRAVENOUS at 09:06

## 2023-06-01 RX ADMIN — ACETAMINOPHEN 1000 MG: 500 TABLET ORAL at 09:06

## 2023-06-01 RX ADMIN — METOPROLOL TARTRATE 25 MG: 25 TABLET, FILM COATED ORAL at 08:06

## 2023-06-01 RX ADMIN — Medication 2 TABLET: at 09:06

## 2023-06-01 RX ADMIN — HYDRALAZINE HYDROCHLORIDE 25 MG: 25 TABLET, FILM COATED ORAL at 09:06

## 2023-06-01 RX ADMIN — METHOCARBAMOL 500 MG: 500 TABLET ORAL at 08:06

## 2023-06-01 RX ADMIN — POLYETHYLENE GLYCOL 3350 17 G: 17 POWDER, FOR SOLUTION ORAL at 09:06

## 2023-06-01 RX ADMIN — AMLODIPINE BESYLATE AND BENAZEPRIL HYDROCHLORIDE 1 CAPSULE: 5; 20 CAPSULE ORAL at 09:06

## 2023-06-01 RX ADMIN — ACETAMINOPHEN 1000 MG: 500 TABLET ORAL at 01:06

## 2023-06-01 RX ADMIN — MUPIROCIN 1 G: 20 OINTMENT TOPICAL at 08:06

## 2023-06-01 RX ADMIN — SENNOSIDES AND DOCUSATE SODIUM 1 TABLET: 50; 8.6 TABLET ORAL at 08:06

## 2023-06-01 RX ADMIN — LEVETIRACETAM 500 MG: 500 TABLET, FILM COATED ORAL at 08:06

## 2023-06-01 RX ADMIN — HYDRALAZINE HYDROCHLORIDE 25 MG: 25 TABLET, FILM COATED ORAL at 06:06

## 2023-06-01 RX ADMIN — SENNOSIDES AND DOCUSATE SODIUM 1 TABLET: 50; 8.6 TABLET ORAL at 09:06

## 2023-06-01 RX ADMIN — METHOCARBAMOL 500 MG: 500 TABLET ORAL at 06:06

## 2023-06-01 RX ADMIN — CHOLECALCIFEROL TAB 25 MCG (1000 UNIT) 1000 UNITS: 25 TAB at 09:06

## 2023-06-01 RX ADMIN — DULOXETINE 60 MG: 60 CAPSULE, DELAYED RELEASE ORAL at 06:06

## 2023-06-01 RX ADMIN — LEVETIRACETAM 500 MG: 500 TABLET, FILM COATED ORAL at 09:06

## 2023-06-01 RX ADMIN — BISACODYL 10 MG: 10 SUPPOSITORY RECTAL at 06:06

## 2023-06-01 RX ADMIN — ENOXAPARIN SODIUM 40 MG: 40 INJECTION SUBCUTANEOUS at 06:06

## 2023-06-01 RX ADMIN — Medication 6 MG: at 11:06

## 2023-06-01 RX ADMIN — HYDRALAZINE HYDROCHLORIDE 50 MG: 50 TABLET ORAL at 08:06

## 2023-06-01 RX ADMIN — METOPROLOL TARTRATE 25 MG: 25 TABLET, FILM COATED ORAL at 09:06

## 2023-06-01 RX ADMIN — DONEPEZIL HYDROCHLORIDE 5 MG: 5 TABLET ORAL at 08:06

## 2023-06-01 RX ADMIN — MUPIROCIN 1 G: 20 OINTMENT TOPICAL at 09:06

## 2023-06-01 NOTE — ASSESSMENT & PLAN NOTE
· Controlled. Patient is at baseline renal function at present.   · Need to avoid any nephrotoxic agents unless necessary while patient is hospitalized.  · Renally adjust medications based on patient's creatinine clearance.   · Will monitor daily BMP to monitor renal function.

## 2023-06-01 NOTE — HOSPITAL COURSE
Patient admitted to [facility] Medicine Team H: Hip Fracture team and started on Hip Fracture Pathway with Orthopedic surgery consult for hip fracture. Patient was seen and evaluated by Orthopedic surgery who recommended operative repair of hip fracture. Patient was medically optimized prior to surgery and was taken to OR after optimization on 5/30/2023. Patient underwent left hip cephalomedullary nail fixation by Dr. Desmond Barker. Post-op patient WBAT to the left lower extremity as per Orthopedics recommendation. Patient placed on Lovenox 40 mg subcutaneous daily and MERCEDES/SCD's for DVT prophylaxis post-op as unable to use patient's home Apixiban post-op for her atrial fibrillation due to ICH as per Neurosurgery but NS okay with Lovenox. Perineural pain catheter placed by Anesthesia Pain Service with continuous infusion of Ropivacaine to help with pain control post-op and Anesthesia Pain Service managing while patient in the hospital. PT/OT consulted post-op and evaluated patient. Patient progressing well with PT and OT and recommended skilled nursing facility placement when medically ready for hospital discharge. Patient having issues with tachy-juan a in hospital. Patient followed by Cardiology as outpatient for A. Fib and intermittent bradycardia and undergoing outpatient evaluation. Cardiology consulted as inpatient and following. Patient having bursts of tachycardia and bradycardia in hospital and Metoprolol dosing adjusted as result. Cardiology states no indication for need for pacemaker at this time and stated they would rather patient be more tachycardiac than bradycardiac. Pain controlled. Patient progressed with PT/OT. PNC removed by Anesthesia. Patient's HR stabilized at around 100-110 and Cardiology was okay with those levels. Patient doing well and discharged in good condition to [facility] on 6/5. Surgical bandages to remain in place until Orthopedic clinic follow-up. Patient discharged on  Lovenox for DVT prophylaxis and to continue to hold Apixiban until cleared by Neurosurgery as outpatient to resume.

## 2023-06-01 NOTE — SUBJECTIVE & OBJECTIVE
Interval History: Appreciate Cardiology assistance on case. HR better today and HR in 50-60 range with no reported intermittent tachycardia so far today but known to have short bursts of tachycardia and Cardiology okay with that and only if sustained for 10-15 minutes then would treat with IV Metoprolol. Patient reporting significant pain with movement of left hip. Patient reports 9/10 pain with movement. Patient was working with PT/OT when I entered room today and her daughterNora was at bedside. Patient able to stand but unable to take steps with therapy today and mainly limited by pain. Will add scheduled Robaxin to her scheduled Tylenol to help with pain and add Tramadol prn for breakthrough pain to help. Daughter requesting to speak with  about discharge planning and I contacted  who is to speak to daughter today. Therapy recommending SNF and daughter in agreement but prefers [facility] if possible. Need to monitor patient in hospital further due to her erratic HRs so will not be medically ready until 6/5 for discharge.     Review of Systems   Constitutional:  Negative for fever.   Respiratory:  Negative for cough and shortness of breath.    Cardiovascular:  Negative for chest pain, palpitations and leg swelling.   Gastrointestinal:  Negative for abdominal pain, nausea and vomiting.   Musculoskeletal:  Positive for arthralgias (Left hip).   Neurological:  Negative for dizziness.   Psychiatric/Behavioral:  Negative for agitation and confusion.    Objective:     Vital Signs (Most Recent):  Temp: 97.7 °F (36.5 °C) (06/01/23 1530)  Pulse: 55 (06/01/23 1530)  Resp: 18 (06/01/23 1530)  BP: 112/58 (06/01/23 1530)  SpO2: 100 % (06/01/23 1530) on room air Vital Signs (24h Range):  Temp:  [97.6 °F (36.4 °C)-98.3 °F (36.8 °C)] 97.7 °F (36.5 °C)  Pulse:  [] 55  Resp:  [17-18] 18  SpO2:  [92 %-100 %] 100 %  BP: (102-150)/(50-63) 112/58     Weight: 76.9 kg (169 lb 8.5 oz)  Body mass index  is 30.03 kg/m².    Intake/Output Summary (Last 24 hours) at 6/1/2023 1639  Last data filed at 6/1/2023 0635  Gross per 24 hour   Intake 600 ml   Output 650 ml   Net -50 ml         Physical Exam  Vitals and nursing note reviewed.   Constitutional:       General: She is awake. She is not in acute distress.     Appearance: Normal appearance. She is not ill-appearing.   Eyes:      Conjunctiva/sclera: Conjunctivae normal.   Cardiovascular:      Rate and Rhythm: Normal rate and regular rhythm.      Heart sounds: Normal heart sounds. No murmur heard.    No friction rub. No gallop.   Pulmonary:      Effort: Pulmonary effort is normal. No respiratory distress.      Breath sounds: Normal breath sounds. No wheezing.   Abdominal:      General: Abdomen is flat. Bowel sounds are normal. There is no distension.      Palpations: Abdomen is soft.      Tenderness: There is no abdominal tenderness. There is no guarding.   Skin:     General: Skin is warm.      Findings: No erythema.      Comments: Surgical dressing noted on left hip. Dressing is clean and dry and intact.   Neurological:      General: No focal deficit present.      Mental Status: She is alert and oriented to person, place, and time.   Psychiatric:         Mood and Affect: Mood normal.         Behavior: Behavior normal. Behavior is cooperative.         Thought Content: Thought content normal.           Significant Labs: CBC:   Recent Labs   Lab 05/31/23  0316 06/01/23  0610   WBC 7.65 7.74   HGB 8.0* 7.7*   HCT 24.0* 23.2*   * 154     CMP:   Recent Labs   Lab 05/31/23  0316 06/01/23  0610    136   K 4.6 4.5    102   CO2 20* 25   * 111*   BUN 14 19   CREATININE 0.9 1.2   CALCIUM 9.6 10.3   ANIONGAP 12 9     POCT Glucose:   Recent Labs   Lab 05/31/23  2119 06/01/23  0817 06/01/23  1529   POCTGLUCOSE 174* 116* 171*       Significant Imaging: I have reviewed all pertinent imaging results/findings within the past 24 hours.

## 2023-06-01 NOTE — ASSESSMENT & PLAN NOTE
Chronic hypoxemic respiratory failure  · Chronic and controlled. Continue home oxygen at 2 liters. Patient with no signs of acute exacerbation.   · Patient on no inhalers at home.

## 2023-06-01 NOTE — PLAN OF CARE
[facility] - Telemetry Stepdown  Discharge Reassessment    Primary Care Provider: Pepper Whitfield MD    Expected Discharge Date: 6/5/2023    Reassessment (most recent)       Discharge Reassessment - 06/01/23 1400          Discharge Reassessment    Assessment Type Discharge Planning Reassessment     Did the patient's condition or plan change since previous assessment? No     Discharge Plan discussed with: Patient;Adult children     Communicated DUTCH with patient/caregiver Yes     Discharge Plan A Skilled Nursing Facility     Discharge Plan B Rehab     DME Needed Upon Discharge  none     Transition of Care Barriers None     Why the patient remains in the hospital Requires continued medical care        Post-Acute Status    Post-Acute Authorization Placement     Post-Acute Placement Status Referrals Sent     Discharge Delays None known at this time                 Met with patient's daughter, Nora, at bedside to review discharge recommendation of SNF and is agreeable to plan    Provided list of facilities in-network with patient's payor plan. Notified that referral sent to below listed facilities from in-network list based on proximity to home/family support:   [facility]    Preferred Facility:  [facility]    If an additional preferred facility not listed above is identified, additional referral to be sent. If above facilities unable to accept, will send additional referrals to in-network providers.    CHW completed LOCET. SW faxed PASRR to the [employer] of Aging to obtain form 142 needed for SNF placement. Awaiting issuance of form 142.    Rama Rivera LCSW  [facility]  Ext. [phone]

## 2023-06-01 NOTE — NURSING
Pt has not urinated since pack was removed in the AM. RN bladder scanned pt with volume of 115. Encouraged to drink more fluids. Notified MD. Per MD recheck bladder scan in 4-6 hours. WCTM.

## 2023-06-01 NOTE — ASSESSMENT & PLAN NOTE
PAF (paroxysmal atrial fibrillation)  · Cardiology consulted as patient having erratic HRs up to 160 and as low as 40 in hospital. Cardiology states has evidence of tachycardia and bradycardia on ECG but does not meet criteria for tachy-juan a syndrome.   · Cardiology recommends to continue low dose Metoprolol 25 mg po BID to help with tachycardia related to atrial fibrillation. No need for pacemaker at this time.   · Per Cardiology, if patient experiences syncope or develops significant conversion pause >5 sec, please alert our team. Per Cardiology, patient to follow-up with her cardiologist, Dr. Roman as outpatient.   · Home Eliquis on hold for long term anticoagulation for atrial fibrillation due to SAH as per Neurosurgery.   · Monitor on telemetry.

## 2023-06-01 NOTE — ASSESSMENT & PLAN NOTE
· Magnesium level improved to 2.0 on 6/1.  · Present on admit. Magnesium low at 1.5 on admit and replaced with IV and oral Magnesium.   · Monitor with daily level with goal of Magnesium > 2.

## 2023-06-01 NOTE — ASSESSMENT & PLAN NOTE
· Chronic and controlled and mild disease. Patient at cognitive baseline.   · Continue home Aricept to treat.  · Delirium precautions.

## 2023-06-01 NOTE — PT/OT/SLP PROGRESS
"Physical Therapy Treatment    Patient Name:  Brunilda England   MRN:  [id number]    Recommendations:     Discharge Recommendations: nursing facility, skilled  Discharge Equipment Recommendations: to be determined by next level of care  Barriers to discharge:  increased assistance required    Assessment:     Brunilda England is a 89 y.o. female admitted with a medical diagnosis of Left displaced femoral neck fracture.  She presents with the following impairments/functional limitations: weakness, impaired endurance, impaired self care skills, impaired functional mobility, gait instability, impaired balance, decreased upper extremity function, decreased lower extremity function, decreased safety awareness, pain, decreased ROM, impaired coordination, impaired fine motor, impaired skin, impaired cardiopulmonary response to activity, orthopedic precautions Pt slow to initiate bed mobility and transfers due to increased pain in the LLE. Pt able to perform STS with Mod A x today with increased fatigue reported following session. Pt will continue to benefit from continued skilled PT to improve upon functional mobility and to progress toward stated goals.    Rehab Prognosis: Good; patient would benefit from acute skilled PT services to address these deficits and reach maximum level of function.    Recent Surgery: Procedure(s) (LRB):  INSERTION, INTRAMEDULLARY TEREZA, FEMUR (Left) 2 Days Post-Op    Plan:     During this hospitalization, patient to be seen daily to address the identified rehab impairments via gait training, therapeutic activities, therapeutic exercises, neuromuscular re-education and progress toward the following goals:    Plan of Care Expires:  06/30/23    Subjective     Chief Complaint: LLE pain.  Patient/Family Comments/goals: "Thank you."  Pain/Comfort:  Pain Rating 1:  (not rated)  Location - Side 1: Left  Location - Orientation 1: generalized  Location 1: hip  Pain Addressed 1: Distraction, " Reposition      Objective:     Communicated with RN prior to session.  Patient found supine with telemetry, pack catheter, oxygen, pressure relief boots, pulse ox (continuous) upon PT entry to room.     General Precautions: Standard, fall  Orthopedic Precautions: LLE weight bearing as tolerated  Braces: N/A  Respiratory Status: Nasal cannula, flow 2 L/min     Functional Mobility:  Bed Mobility:     Supine to Sit: maximal assistance and of 2 persons  Sit to Supine: maximal assistance and of 2 persons  Transfers:     Sit to Stand:  moderate assistance and of 2 persons with rolling walker  Balance: Seated EOB ~15 minutes with SBA and good seated static balance observed.      AM-PAC 6 CLICK MOBILITY  Turning over in bed (including adjusting bedclothes, sheets and blankets)?: 2  Sitting down on and standing up from a chair with arms (e.g., wheelchair, bedside commode, etc.): 2  Moving from lying on back to sitting on the side of the bed?: 2  Moving to and from a bed to a chair (including a wheelchair)?: 2  Need to walk in hospital room?: 1  Climbing 3-5 steps with a railing?: 1  Basic Mobility Total Score: 10     Patient left HOB elevated with all lines intact, call button in reach, and daughter present..    GOALS:   Multidisciplinary Problems       Physical Therapy Goals          Problem: Physical Therapy    Goal Priority Disciplines Outcome Goal Variances Interventions   Physical Therapy Goal     PT, PT/OT Ongoing, Progressing     Description: Goals to be met by: 2023     Patient will increase functional independence with mobility by performin. Sit to stand transfer with Stand-by Assistance  2. Bed to chair transfer with Minimal Assistance using RW or LRAD  3. Gait  x 20 feet with Moderate Assistance using RW or LRAD.   4. Stand for 5 minutes with Contact Guard Assistance using RW or LRAD prn while performing dynamic balance activity to prepare for tasks in the home  5. Lower extremity exercise program x30  reps per handout, with independence                         Time Tracking:     PT Received On: 06/01/23  PT Start Time: 1142     PT Stop Time: 1210  PT Total Time (min): 28 min     Billable Minutes: Therapeutic Activity 12 and Neuromuscular Re-education 12    Treatment Type: Treatment  PT/PTA: PTA     Number of PTA visits since last PT visit: 1 06/01/2023

## 2023-06-01 NOTE — PROGRESS NOTES
[facility] - Telemetry Stepdown  Orthopedics  Progress Note    Patient Name: Brunilda England  MRN: [id number]  Admission Date: 5/28/2023  Hospital Length of Stay: 3 days  Attending Provider: Florence Scanlon MD  Primary Care Provider: Pepper Whitfield MD  Follow-up For: Procedure(s) (LRB):  INSERTION, INTRAMEDULLARY TEREZA, FEMUR (Left)    Post-Operative Day: 2 Days Post-Op  Subjective:     Principal Problem:Left displaced femoral neck fracture    Principal Orthopedic Problem: s/p L-CMN 5/30    Interval History: Afebrile,.  Pain has been reportedly well controlled.  Hb 7.7 from 8.0 this morning.  Denies any nausea/vomiting, chest pain, SOB, numbness/tingling.  Worked with Pt did well     Review of patient's allergies indicates:   Allergen Reactions    Bextra [valdecoxib] Swelling    Gabapentin Diarrhea and Nausea Only       Current Facility-Administered Medications   Medication    amlodipine-benazepril 5-20 mg per capsule 1 capsule    bisacodyL suppository 10 mg    calcium-vitamin D3 500 mg-5 mcg (200 unit) per tablet 2 tablet    dextrose 10% bolus 125 mL 125 mL    dextrose 10% bolus 250 mL 250 mL    dextrose 40 % gel 15,000 mg    dextrose 40 % gel 30,000 mg    donepeziL tablet 5 mg    DULoxetine DR capsule 60 mg    enoxaparin injection 40 mg    glucagon (human recombinant) injection 1 mg    hydrALAZINE tablet 25 mg    hydrALAZINE tablet 50 mg    insulin aspart U-100 pen 0-5 Units    levETIRAcetam tablet 500 mg    melatonin tablet 6 mg    methocarbamoL tablet 500 mg    metoprolol injection 2.5 mg    metoprolol tartrate (LOPRESSOR) tablet 25 mg    mupirocin 2 % ointment 1 g    ondansetron injection 4 mg    polyethylene glycol packet 17 g    ROPIvacaine (PF) 2 mg/ml (0.2%) solution    senna-docusate 8.6-50 mg per tablet 1 tablet    sodium chloride 0.9% flush 10 mL    vitamin D 1000 units tablet 1,000 Units     Facility-Administered Medications Ordered in Other Encounters   Medication    sodium chloride 0.9% flush 5 mL      Objective:     Vital Signs (Most Recent):  Temp: 98.1 °F (36.7 °C) (06/01/23 0815)  Pulse: (!) 55 (06/01/23 0815)  Resp: 18 (06/01/23 0815)  BP: (!) 119/58 (06/01/23 0815)  SpO2: 98 % (06/01/23 0815) Vital Signs (24h Range):  Temp:  [97.6 °F (36.4 °C)-98.3 °F (36.8 °C)] 98.1 °F (36.7 °C)  Pulse:  [] 55  Resp:  [17-20] 18  SpO2:  [92 %-100 %] 98 %  BP: (102-150)/(50-63) 119/58     Weight: 76.9 kg (169 lb 8.5 oz)     Body mass index is 30.03 kg/m².      Intake/Output Summary (Last 24 hours) at 6/1/2023 0851  Last data filed at 6/1/2023 0635  Gross per 24 hour   Intake 600 ml   Output 650 ml   Net -50 ml       Ortho/SPM Exam  AAOx4  NAD  Reg rate  No increased WOB     LLE:  Dressing c/d/i  SILT T/SP/DP/Greer/Sa  Motor intact T/SP/DP  WWP extremities  FCDs in place and functioning       Significant Labs: CBC:   Recent Labs   Lab 05/31/23  0316 06/01/23  0610   WBC 7.65 7.74   HGB 8.0* 7.7*   HCT 24.0* 23.2*   * 154     CMP:   Recent Labs   Lab 05/31/23  0316 06/01/23  0610    136   K 4.6 4.5    102   CO2 20* 25   * 111*   BUN 14 19   CREATININE 0.9 1.2   CALCIUM 9.6 10.3   ANIONGAP 12 9     All pertinent labs within the past 24 hours have been reviewed.    Significant Imaging: I have reviewed all pertinent imaging results/findings.    Assessment/Plan:   * Left basicervical femoral neck fracture with extension into the intertroch region  Brunilda England is a 89 y.o. female with a left basicervical femoral neck fracture with extension into the intertroch region, closed, NVI. They take Eliquis 5 b.i.d. anticoagulation at home. They required a walker for ambulatory assistive devices prior to this injury. Also had small SAH on CT head which was stable on repeat imaging.  She is now s/p CMN 5/30.      - WBAT LLE   - AC: per primary; currently holding anticoagulation for SAH precautions per NSGY   - Abx: 24 hr postop Ancef  - Pain: MM  - dc ramone today     » Dispo: Pt rec SNF       [place]   DAVIDA Delgado  Orthopedics  [facility] - Telemetry Stepdown

## 2023-06-01 NOTE — ADDENDUM NOTE
Addendum  created 06/01/23 1249 by Nicolasa Santos MD    Charge Capture section accepted, Cosign clinical note with attestation

## 2023-06-01 NOTE — PLAN OF CARE
Problem: Adult Inpatient Plan of Care  Goal: Absence of Hospital-Acquired Illness or Injury  Outcome: Ongoing, Progressing  Intervention: Identify and Manage Fall Risk  Flowsheets (Taken 6/1/2023 1862)  Safety Promotion/Fall Prevention:   assistive device/personal item within reach   lighting adjusted   medications reviewed   side rails raised x 2  Plan of care was reviewed with the pt. AAOx4. VSS. Cardiac and glucose monitoring was done. Pain management was reviewed with the pt. PRN pain meds were given. Pt was turned  Q2. Intake and output was documented. No major changes throughout the day. Safety precautions were in placed.

## 2023-06-01 NOTE — ANESTHESIA POST-OP PAIN MANAGEMENT
Acute Pain Service Progress Note    Brunilda England is a 89 y.o., female, [id number].    Surgery:  INSERTION, INTRAMEDULLARY TEREZA, FEMUR (Left: Hip)    Post Op Day #: 2    Catheter type: perineural  L SIFI    Infusion type: Ropivacaine 0.2%  15mL q3 basal    Problem List:    Active Hospital Problems    Diagnosis  POA    *Closed left basicervical femoral neck fracture with extension into the intertrochanteric region s/p IM nail on 5/30/2023 [S72.002A]  Yes    Hypomagnesemia [E83.42]  Yes    Acute blood loss anemia [D62]  Yes    Intertrochanteric fracture of left hip, closed, initial encounter [S72.142A]  Yes    Subarachnoid hemorrhage following injury, no loss of consciousness [S06.6X0A]  Yes    Chronic hypoxemic respiratory failure [J96.11]  Yes    Tachy-juan a syndrome [I49.5]  Yes    Late onset Alzheimer's dementia with behavioral disturbance [G30.1, F02.818]  Yes    Chronic diastolic heart failure [I50.32]  Yes    Stage 3a chronic kidney disease [N18.31]  Yes    Centrilobular emphysema [J43.2]  Yes    Type 2 diabetes mellitus with circulatory disorder, without long-term current use of insulin [E11.59]  Yes    Coronary artery disease involving native coronary artery of native heart without angina pectoris [I25.10]  Yes    Essential hypertension [I10]  Yes    History of lung cancer [Z85.118]  Not Applicable      Resolved Hospital Problems   No resolved problems to display.       Subjective:     General appearance of alert, oriented, no complaints   Pain with rest: 1    Faces   Pain with movement: 3    Faces   Side Effects    1. Pruritis No    2. Nausea No    3. Motor Blockade No, 0=Ability to raise lower extremities off bed    4. Sedation No, 1=awake and alert    Objective:     Catheter site clean, dry, intact      Vitals   Vitals:    06/01/23 1122   BP:    Pulse: (!) 50   Resp:    Temp:         Labs    No results displayed because visit has over 200 results.           Meds   Current Facility-Administered  Medications   Medication Dose Route Frequency Provider Last Rate Last Admin    acetaminophen tablet 1,000 mg  1,000 mg Oral Q8H Suresh Spain MD        amlodipine-benazepril 5-20 mg per capsule 1 capsule  1 capsule Oral Daily Honey Calixto MD   1 capsule at 06/01/23 0915    bisacodyL suppository 10 mg  10 mg Rectal Daily PRN Honey Calixto MD        calcium-vitamin D3 500 mg-5 mcg (200 unit) per tablet 2 tablet  2 tablet Oral Daily Dale Rojo MD   2 tablet at 06/01/23 0942    dextrose 10% bolus 125 mL 125 mL  12.5 g Intravenous PRN Honey Calixto MD        dextrose 10% bolus 250 mL 250 mL  25 g Intravenous PRN Honey Calixto MD        dextrose 40 % gel 15,000 mg  15 g Oral PRN Honey Calixto MD        dextrose 40 % gel 30,000 mg  30 g Oral PRN Honey Calixto MD        donepeziL tablet 5 mg  5 mg Oral QHS Honey Calixto MD   5 mg at 05/31/23 2124    DULoxetine DR capsule 60 mg  60 mg Oral QAM Honey Calixto MD   60 mg at 06/01/23 0624    enoxaparin injection 40 mg  40 mg Subcutaneous Q24H (prophylaxis, 1700) Honey Calixto MD   40 mg at 05/31/23 1726    glucagon (human recombinant) injection 1 mg  1 mg Intramuscular PRN Honey Calixto MD        hydrALAZINE tablet 25 mg  25 mg Oral Q8H Honey Calixto MD   25 mg at 06/01/23 0624    hydrALAZINE tablet 50 mg  50 mg Oral Q8H PRN Honey Calixto MD   50 mg at 05/30/23 0101    insulin aspart U-100 pen 0-5 Units  0-5 Units Subcutaneous QID (AC + HS) PRN Honey Claixto MD        levETIRAcetam tablet 500 mg  500 mg Oral BID Honey Calixto MD   500 mg at 06/01/23 0915    melatonin tablet 6 mg  6 mg Oral Nightly PRN Honey Calixto MD        methocarbamoL tablet 500 mg  500 mg Oral Q6H PRN Honey Calixto MD        metoprolol injection 2.5 mg  2.5 mg Intravenous Q4H PRN Honey Calixto MD        metoprolol tartrate (LOPRESSOR) tablet 25 mg  25 mg Oral BID Honey FIELDS  MD Durga   25 mg at 06/01/23 0915    mupirocin 2 % ointment 1 g  1 g Nasal BID Honey Calixto MD   1 g at 06/01/23 0916    ondansetron injection 4 mg  4 mg Intravenous Q12H PRN Honey Calixto MD        polyethylene glycol packet 17 g  17 g Oral Daily Honey Calixto MD   17 g at 06/01/23 0915    ROPIvacaine (PF) 2 mg/ml (0.2%) solution  0.1 mL/hr Perineural Continuous Honey Calixto MD 0.1 mL/hr at 05/31/23 2120 0.1 mL/hr at 05/31/23 2120    senna-docusate 8.6-50 mg per tablet 1 tablet  1 tablet Oral BID Honey Calixto MD   1 tablet at 06/01/23 0915    sodium chloride 0.9% flush 10 mL  10 mL Intravenous PRN Honey Calixto MD        vitamin D 1000 units tablet 1,000 Units  1,000 Units Oral Daily Honey Calixto MD   1,000 Units at 06/01/23 0915     Facility-Administered Medications Ordered in Other Encounters   Medication Dose Route Frequency Provider Last Rate Last Admin    sodium chloride 0.9% flush 5 mL  5 mL Intravenous PRN Sarah Earl MD             Assessment:     Pain control adequate    Plan:     Patient doing well, continue present treatment.   - C/w ephraim Tylenol 1g q6, duloxetine 60 qAM  - C/w Robaxin 500 q6PRN  - C/w PNC. Plan to pause and pull tomorrow. Will continue to monitor.    Suresh Spain MD  Anesthesia CA-3/PGY-4

## 2023-06-01 NOTE — PT/OT/SLP PROGRESS
"Occupational Therapy   CoTreatment w PT  CoTx performed to optimize pt participation and assessment of full functional capacity.       Name: Brunilda England  MRN: [id number]  Admitting Diagnosis:  Left displaced femoral neck fracture  2 Days Post-Op    Recommendations:     Discharge Recommendations: nursing facility, skilled  Discharge Equipment Recommendations:  to be determined by next level of care  Barriers to discharge:       Assessment:     Brunilda England is a 89 y.o. female with a medical diagnosis of Left displaced femoral neck fracture.  She presents with fair tolerance to session on this date completing sit >stand requiring Mod x 2. Func amb mainly limited by pain w difficulty weight shifting to L side. Pt requiring Mod vcs for posture correction w slight post lean.  Performance deficits affecting function are weakness, impaired endurance, impaired self care skills, impaired functional mobility, gait instability, impaired balance, decreased coordination, decreased lower extremity function, decreased safety awareness, pain, impaired cardiopulmonary response to activity.     Pt motivated to return home however not at baseline for ADL and functional mobility performance in addition to concerns of fall risk and would benefit from continued OT services at this time.      Rehab Prognosis:  Good; patient would benefit from acute skilled OT services to address these deficits and reach maximum level of function.       Plan:     Patient to be seen daily to address the above listed problems via self-care/home management, therapeutic activities, therapeutic exercises, neuromuscular re-education  Plan of Care Expires: 06/30/23  Plan of Care Reviewed with: patient, daughter    Subjective     Chief Complaint: L hip pain   Patient/Family Comments/goals: "im going to do my best"  Pain/Comfort:  Pain Rating 1:  (did not rate)  Location - Side 1: Left  Location - Orientation 1: generalized  Location 1: hip    Objective: "     Communicated with: RN prior to session.  Patient found supine with telemetry, pulse ox (continuous), pressure relief boots, PureWick, perineural catheter, oxygen upon OT entry to room.    General Precautions: Standard, fall    Orthopedic Precautions:LLE weight bearing as tolerated  Braces: N/A  Respiratory Status: Nasal cannula, flow 1.5 L/min     Occupational Performance:     Bed Mobility:    Patient completed Rolling/Turning to Right with maximal assistance  Patient completed Supine to Sit with maximal assistance and 2 persons  Patient completed Sit to Supine with maximal assistance and 2 persons   SBA for EOB balance   EOB ~ 15 min       Functional Mobility/Transfers:  Patient completed Sit <> Stand Transfer with maximal assistance and of 2 persons  with  rolling walker   Functional Mobility: Not completed 2/2 limited standing tolerance     Activities of Daily Living:  Grooming: stand by assistance face wash while seated EOB      [facility] 6 Click ADL: 17    Treatment & Education:  Pt educated on scope of practice and importance of daily functional mobility.   Pt educated on safety precautions during transfers  Pt updated on POC     Patient left right sidelying with all lines intact, call button in reach, RN notified, and daughter present    GOALS:   Multidisciplinary Problems       Occupational Therapy Goals          Problem: Occupational Therapy    Goal Priority Disciplines Outcome Interventions   Occupational Therapy Goal     OT, PT/OT Ongoing, Progressing    Description: Goals to be met by: 6/14/23     Patient will increase functional independence with ADLs by performing:    UE Dressing with [place].  LE Dressing with Minimal Assistance.  Grooming while standing with Stand-by Assistance.  Toileting from bedside commode with Minimal Assistance for hygiene and clothing management.   Toilet transfer to bedside commode with Minimal Assistance.                         Time Tracking:     OT Date of Treatment:  06/01/23  OT Start Time: 1142  OT Stop Time: 1210  OT Total Time (min): 28 min    Billable Minutes:Therapeutic Activity 14  Neuromuscular Re-education 14    OT/CHRIS: OT          6/1/2023

## 2023-06-01 NOTE — HPI
"Ms. Brunilda England is a 89 y.o. female with past medical history of HTN, paroxysmal atrial fibrillation/flutter, COPD on home oxygen, Chronic diastolic HF, CKD 3a, dementia, history of lung/rectal cancer who was in her usual state of health in which she lives at home with her daughter until she got up at night to change from her sneakers into a pair of slippers in the utility room when she turned wrong and fell while doing so and her daughter heard it and came in to find her on the ground and called EMS to bring her to the hospital. She was found to have a closed left basocervical fracture with extension into intertrochanteric region. She was using her walker when this happened which she uses for ambulation. She was also found to have a small left parietal SAH on admit and NS saw her as well as NCC in the ER and recommendations given and stable for the floor at this time. Ortho plans for OR tomorrow given this to monitor in interim. Her other daughter is at bedside now and able to assist with history but patien tis also about to do so. She reports having some mild pain to left leg now and she denies any dizziness, chest pain, palpitations, or dyspnea now. Has occaisonal constipation issues but is UTD on BMs now. Discussed planning for post op PT/OT and likely SNF, patient says "we'll see about that" but daughter reports will need and interested in [facility] and I told her is normal for this post op course to need SNF after a fracture like this.      "

## 2023-06-01 NOTE — ASSESSMENT & PLAN NOTE
· Patient underwent left hip IM nail for basocervical/intertrochanetric fracture by Dr. Desmond Barker on 5/30/2023.   · Patient reports significant pain in left hip with movement.   · Plan is to continue PT/OT for gait training and strengthening and restoration of ADL's. Patient is FWB/WBAT: left lower extremity as per Orthopedic recommendations.   · Plan to continue Lovenox 40 mg subcutaneous daily post-op for DVT prophylaxis after hip fracture surgery as approved by Neurosurgery.   · Orthopedics is following and managing surgical site.   · Perineural pain catheter in place with continuous Ropivacaine infusion for pain control and being managed by Anesthesia Pain Service.   · Patient on multimodal pain management post-op with Tylenol 1000 mg po every 8 hours post-op and will continue but will add Robaxin 500 mg po 4 times daily and Tramadol prn for breakthrough pain as pain not controlled on 6/1.  · PT/OT recommending Skilled nursing facility and  working on discharge planning with patient and family. Needs improvement in HR and pain control prior to discharge.

## 2023-06-01 NOTE — ASSESSMENT & PLAN NOTE
· In remission.   · s/p chemo and last imaging in 2022 was stable disease. Chronic effusions seen on previous imaging seen on CXR here.  · On home oxygen and will continue.

## 2023-06-01 NOTE — NURSING
Pt HR sustaining bradycardia 38-55 HR. Notified MD on call. No new orders placed. Pt asymptomatic. Nightly lopressor dose held. WCTM.

## 2023-06-01 NOTE — ASSESSMENT & PLAN NOTE
Chronic and controlled. Continue Hydralazine 25 mg po TID and Amlodipine/Benzapril 5/20 1 tablet po daily to treat. Goal SBP < 140. Monitor vital signs every 4 hours.

## 2023-06-01 NOTE — ASSESSMENT & PLAN NOTE
· On admission to hospital, patient noted to have Hgb of 10.9. After surgery patient's Hgb level dropped to 8.0 on 5/31 and 7.7 on 6/1 and expected blood loss related to surgery and hip fracture.   · Patient has no signs of active bleeding and hemodynamically stable. Patient is asymptomatic related to anemia.   · Goal is keep Hgb >7 and consider blood transfusion if Hgb < 7 and asymptomatic or < 8 if patient becomes symptomatic.  · Plan is to monitor daily CBC post-operatively.

## 2023-06-01 NOTE — ASSESSMENT & PLAN NOTE
· Patient found on admit to have punctate left parietal SAH that was stable on repeat imaging. Patient neurologically intact.   · Neurosurgery consulted and as per recs:   --No acute neurosurgical intervention  --Hold Eliquis for 2 weeks. Ok for DVT ppx with standard dose Lovenox 40 mg subcutaneous daily. If higher dosing needed given recent surgery, can start this in 5 days.   --SBP <140  --Na >135  --Keppra 500 mg po BID x 14 days.   --HOB >30.  --Follow-up in 2 weeks with repeat CTH. If repeat CTH stable, can resume home dose Eliquis. Will contact the patient with this appointment. Notify NSGY immediately with any changes in neuro status.

## 2023-06-01 NOTE — PROGRESS NOTES
"[facility] - Telemetry [facility] Medicine  Progress Note    Patient Name: Brunilda England  MRN: [id number]  Patient Class: IP- Inpatient   Admission Date: 5/28/2023  Length of Stay: 3 days  Attending Physician: Florence Scanlon MD  Primary Care Provider: Pepper Whitfield MD        Subjective:     Principal Problem:Left displaced femoral neck fracture        HPI:  Ms. Brunilda England is a 89 y.o. female with past medical history of HTN, paroxysmal atrial fibrillation/flutter, COPD on home oxygen, Chronic diastolic HF, CKD 3a, dementia, history of lung/rectal cancer who was in her usual state of health in which she lives at home with her daughter until she got up at night to change from her sneakers into a pair of slippers in the utility room when she turned wrong and fell while doing so and her daughter heard it and came in to find her on the ground and called EMS to bring her to the hospital. She was found to have a closed left basocervical fracture with extension into intertrochanteric region. She was using her walker when this happened which she uses for ambulation. She was also found to have a small left parietal SAH on admit and NS saw her as well as NCC in the ER and recommendations given and stable for the floor at this time. Ortho plans for OR tomorrow given this to monitor in interim. Her other daughter is at bedside now and able to assist with history but patien tis also about to do so. She reports having some mild pain to left leg now and she denies any dizziness, chest pain, palpitations, or dyspnea now. Has occaisonal constipation issues but is UTD on BMs now. Discussed planning for post op PT/OT and likely SNF, patient says "we'll see about that" but daughter reports will need and interested [facility] and I told her is normal for this post op course to need SNF after a fracture like this.          Overview/Hospital Course:  Patient admitted to [facility] Medicine Team H: Hip Fracture team " and started on Hip Fracture Pathway with Orthopedic surgery consult for hip fracture. Patient was seen and evaluated by Orthopedic surgery who recommended operative repair of hip fracture. Patient was medically optimized prior to surgery and was taken to OR after optimization on 5/30/2023. Patient underwent left hip cephalomedullary nail fixation by Dr. Desmond Barker. Post-op patient WBAT to the left lower extremity as per Orthopedics recommendation. Patient placed on Lovenox 40 mg subcutaneous daily and MERCEDES/SCD's for DVT prophylaxis post-op as unable to use patient's home Apixiban post-op for her atrial; fibrillation due to ICH as per Neurosurgery but NS okay with Lovenox. Perineural pain catheter placed by Anesthesia Pain Service with continuous infusion of Ropivacaine to help with pain control post-op and Anesthesia Pain Service managing while patient in the hospital. PT/OT consulted post-op and evaluated patient. Patient progressing well with PT and OT and recommended skilled nursing facility placement when medically ready for hospital discharge. Patient having issues with tachy-juan a in hospital. Patient followed by Cardiology as outpatient for A. Fib and intermittent bradycardia and undergoing outpatient evaluation. Cardiology consulted as inpatient and following. Patient having bursts of tachycardia and bradycardia in hospital and Metoprolol dosing adjusted as result. Cardiology states no indication for need for pacemaker at this time and stated they would rather patient be more tachycardiac than bradycardiac. Pain controlled.       Interval History: Appreciate Cardiology assistance on case. HR better today and HR in 50-60 range with no reported intermittent tachycardia so far today but known to have short bursts of tachycardia and Cardiology okay with that and only if sustained for 10-15 minutes then would treat with IV Metoprolol. Patient reporting significant pain with movement of left hip. Patient  reports 9/10 pain with movement. Patient was working with PT/OT when I entered room today and her daughter, Nora was at bedside. Patient able to stand but unable to take steps with therapy today and mainly limited by pain. Will add scheduled Robaxin to her scheduled Tylenol to help with pain and add Tramadol prn for breakthrough pain to help. Daughter requesting to speak with  about discharge planning and I contacted  who is to speak to daughter today. Therapy recommending SNF and daughter in agreement but prefers [facility] SNF if possible. Need to monitor patient in hospital further due to her erratic HRs so will not be medically ready until 6/5 for discharge.     Review of Systems   Constitutional:  Negative for fever.   Respiratory:  Negative for cough and shortness of breath.    Cardiovascular:  Negative for chest pain, palpitations and leg swelling.   Gastrointestinal:  Negative for abdominal pain, nausea and vomiting.   Musculoskeletal:  Positive for arthralgias (Left hip).   Neurological:  Negative for dizziness.   Psychiatric/Behavioral:  Negative for agitation and confusion.    Objective:     Vital Signs (Most Recent):  Temp: 97.7 °F (36.5 °C) (06/01/23 1530)  Pulse: 55 (06/01/23 1530)  Resp: 18 (06/01/23 1530)  BP: 112/58 (06/01/23 1530)  SpO2: 100 % (06/01/23 1530) on 2 liters of oxygen Vital Signs (24h Range):  Temp:  [97.6 °F (36.4 °C)-98.3 °F (36.8 °C)] 97.7 °F (36.5 °C)  Pulse:  [] 55  Resp:  [17-18] 18  SpO2:  [92 %-100 %] 100 %  BP: (102-150)/(50-63) 112/58     Weight: 76.9 kg (169 lb 8.5 oz)  Body mass index is 30.03 kg/m².    Intake/Output Summary (Last 24 hours) at 6/1/2023 1639  Last data filed at 6/1/2023 0635  Gross per 24 hour   Intake 600 ml   Output 650 ml   Net -50 ml         Physical Exam  Vitals and nursing note reviewed.   Constitutional:       General: She is awake. She is not in acute distress.     Appearance: Normal appearance. She is not ill-appearing.  On nasal cannula.  Eyes:      Conjunctiva/sclera: Conjunctivae normal.   Cardiovascular:      Rate and Rhythm: Normal rate and regular rhythm.      Heart sounds: Normal heart sounds. No murmur heard.    No friction rub. No gallop.   Pulmonary:      Effort: Pulmonary effort is normal. No respiratory distress.      Breath sounds: Normal breath sounds. No wheezing.   Abdominal:      General: Abdomen is flat. Bowel sounds are normal. There is no distension.      Palpations: Abdomen is soft.      Tenderness: There is no abdominal tenderness. There is no guarding.   Skin:     General: Skin is warm.      Findings: No erythema.      Comments: Surgical dressing noted on left hip. Dressing is clean and dry and intact.   Neurological:      General: No focal deficit present.      Mental Status: She is alert and oriented to person, place, and time.   Psychiatric:         Mood and Affect: Mood normal.         Behavior: Behavior normal. Behavior is cooperative.         Thought Content: Thought content normal.           Significant Labs: CBC:   Recent Labs   Lab 05/31/23  0316 06/01/23  0610   WBC 7.65 7.74   HGB 8.0* 7.7*   HCT 24.0* 23.2*   * 154     CMP:   Recent Labs   Lab 05/31/23  0316 06/01/23  0610    136   K 4.6 4.5    102   CO2 20* 25   * 111*   BUN 14 19   CREATININE 0.9 1.2   CALCIUM 9.6 10.3   ANIONGAP 12 9     POCT Glucose:   Recent Labs   Lab 05/31/23  2119 06/01/23  0817 06/01/23  1529   POCTGLUCOSE 174* 116* 171*       Significant Imaging: I have reviewed all pertinent imaging results/findings within the past 24 hours.      Assessment/Plan:      * Closed left basicervical femoral neck fracture with extension into the intertrochanteric region s/p IM nail on 5/30/2023  Patient underwent left hip IM nail for basocervical/intertrochanetric fracture by Dr. Desmond Barker on 5/30/2023.   Patient reports significant pain in left hip with movement.   Plan is to continue PT/OT for gait  training and strengthening and restoration of ADL's. Patient is FWB/WBAT: left lower extremity as per Orthopedic recommendations.   Plan to continue Lovenox 40 mg subcutaneous daily post-op for DVT prophylaxis after hip fracture surgery as approved by Neurosurgery.   Orthopedics is following and managing surgical site.   Perineural pain catheter in place with continuous Ropivacaine infusion for pain control and being managed by Anesthesia Pain Service.   Patient on multimodal pain management post-op with Tylenol 1000 mg po every 8 hours post-op and will continue but will add Robaxin 500 mg po 4 times daily and Tramadol prn for breakthrough pain as pain not controlled on 6/1.  PT/OT recommending Skilled nursing facility and  working on discharge planning with patient and family. Needs improvement in HR and pain control prior to discharge.       Subarachnoid hemorrhage following injury, no loss of consciousness  Patient found on admit to have punctate left parietal SAH that was stable on repeat imaging. Patient neurologically intact.   Neurosurgery consulted and as per recs:   --No acute neurosurgical intervention  --Hold Eliquis for 2 weeks. Ok for DVT ppx with standard dose Lovenox 40 mg subcutaneous daily. If higher dosing needed given recent surgery, can start this in 5 days.   --SBP <140  --Na >135  --Keppra 500 mg po BID x 14 days.   --HOB >30.  --Follow-up in 2 weeks with repeat CTH. If repeat CTH stable, can resume home dose Eliquis. Will contact the patient with this appointment. Notify NSGY immediately with any changes in neuro status.      Acute blood loss anemia  On admission to hospital, patient noted to have Hgb of 10.9. After surgery patient's Hgb level dropped to 8.0 on 5/31 and 7.7 on 6/1 and expected blood loss related to surgery and hip fracture.   Patient has no signs of active bleeding and hemodynamically stable. Patient is asymptomatic related to anemia.   Goal is keep Hgb >7 and  consider blood transfusion if Hgb < 7 and asymptomatic or < 8 if patient becomes symptomatic.  Plan is to monitor daily CBC post-operatively.      Tachy-juan a syndrome  PAF (paroxysmal atrial fibrillation)  Cardiology consulted as patient having erratic HRs up to 160 and as low as 40 in hospital. Cardiology states has evidence of tachycardia and bradycardia on ECG but does not meet criteria for tachy-juan a syndrome.   Cardiology recommends to continue low dose Metoprolol 25 mg po BID to help with tachycardia related to atrial fibrillation. No need for pacemaker at this time.   Per Cardiology, if patient experiences syncope or develops significant conversion pause >5 sec, please alert our team. Per Cardiology, patient to follow-up with her cardiologist, Dr. Roman as outpatient.   Home Eliquis on hold for long term anticoagulation for atrial fibrillation due to SAH as per Neurosurgery.   Monitor on telemetry.     Hypomagnesemia  Magnesium level improved to 2.0 on 6/1.  Present on admit. Magnesium low at 1.5 on admit and replaced with IV and oral Magnesium.   Monitor with daily level with goal of Magnesium > 2.       Essential hypertension  Chronic and controlled. Continue Hydralazine 25 mg po TID and Amlodipine/Benzapril 5/20 1 tablet po daily to treat. Goal SBP < 140. Monitor vital signs every 4 hours.       Coronary artery disease involving native coronary artery of native heart without angina pectoris  Chronic and controlled. Patient with history of remote stents at [facility] in 1990 and 2003.   Patient on Zocor at home but not on formulary so holding in hospital but to resume on discharge.     Type 2 diabetes mellitus with circulatory disorder, without long-term current use of insulin  Patient's FSGs are controlled on current medication regimen. Patient on oral Tradjenta at home to treat.   Last A1c reviewed-   Lab Results   Component Value Date    HGBA1C 6.3 (H) 05/29/2023     Most recent fingerstick glucose  reviewed-   Recent Labs   Lab 05/31/23  2119 06/01/23  0817 06/01/23  1529   POCTGLUCOSE 174* 116* 171*     Current correctional scale  Low  Maintain anti-hyperglycemic dose as follows-   Antihyperglycemics (From admission, onward)      Start     Stop Route Frequency Ordered    05/29/23 1418  insulin aspart U-100 pen 0-5 Units         -- SubQ Before meals & nightly PRN 05/29/23 1318          Hold Oral hypoglycemics while patient is in the hospital.  Target blood sugars 140-180 in hospital.  Diabetic diet.  Monitor blood sugars 4 times daily with meals and at bedtime.     Chronic diastolic heart failure  Patient is identified as having Diastolic (HFpEF) heart failure that is Chronic. CHF is currently controlled. Latest ECHO performed and demonstrates- Results for orders placed during the hospital encounter of 05/20/23    Echo    Interpretation Summary  · The left ventricle is normal in size with low normal systolic function.  · The estimated ejection fraction is 53%.  · There are segmental left ventricular wall motion abnormalities.  · There is abnormal septal wall motion.  · Mild left atrial enlargement.  · Indeterminate left ventricular diastolic function.  · Normal right ventricular size with low normal right ventricular systolic function.  · There is mild-to-moderate aortic valve stenosis.  · Aortic valve area is 1.40 cm2; peak velocity is 1.54 m/s; mean gradient is 6 mmHg.  · Mild aortic regurgitation.  · Mild mitral regurgitation.  · Mild tricuspid regurgitation.  · Normal central venous pressure (3 mmHg).  · The estimated PA systolic pressure is 41 mmHg.  · There is mild pulmonary hypertension.  · Trivial posterior pericardial effusion. At base and under the RA and small outside the RV apically.  Continue Metoprolol and Benazepril and monitor clinical status closely. Monitor on telemetry. Patient is off CHF pathway. Monitor strict Is&Os and daily weights. Continue to stress to patient importance of self  efficacy and  on diet for CHF.    Centrilobular emphysema  Chronic hypoxemic respiratory failure  Chronic and controlled. Continue home oxygen at 2 liters. Patient with no signs of acute exacerbation.   Patient on no inhalers at home.     History of lung cancer  In remission.   s/p chemo and last imaging in 2022 was stable disease. Chronic effusions seen on previous imaging seen on CXR here.  On home oxygen and will continue.        Late onset Alzheimer's dementia with behavioral disturbance  Chronic and controlled and mild disease. Patient at cognitive baseline.   Continue home Aricept to treat.  Delirium precautions.       Stage 3a chronic kidney disease  Controlled. Patient is at baseline renal function at present.   Need to avoid any nephrotoxic agents unless necessary while patient is hospitalized.  Renally adjust medications based on patient's creatinine clearance.   Will monitor daily BMP to monitor renal function.         VTE Risk Mitigation (From admission, onward)           Ordered     enoxaparin injection 40 mg  Every 24 hours         05/31/23 1557     Place sequential compression device  Until discontinued         05/30/23 0048     Place MERCEDES hose  Until discontinued         05/29/23 0902     Place sequential compression device  Until discontinued         05/29/23 0902                    Discharge Planning   DUTCH: 6/5/2023     Code Status: Partial Code   Is the patient medically ready for discharge?: No    Reason for patient still in hospital (select all that apply): Patient trending condition  Discharge Plan A: Skilled Nursing Facility   Discharge Delays: None known at this time        Florence Scanlon MD  Department of Hospital Medicine   [facility] - Telemetry Stepdown

## 2023-06-01 NOTE — ASSESSMENT & PLAN NOTE
Patient is identified as having Diastolic (HFpEF) heart failure that is Chronic. CHF is currently controlled. Latest ECHO performed and demonstrates- Results for orders placed during the hospital encounter of 05/20/23    Echo    Interpretation Summary  · The left ventricle is normal in size with low normal systolic function.  · The estimated ejection fraction is 53%.  · There are segmental left ventricular wall motion abnormalities.  · There is abnormal septal wall motion.  · Mild left atrial enlargement.  · Indeterminate left ventricular diastolic function.  · Normal right ventricular size with low normal right ventricular systolic function.  · There is mild-to-moderate aortic valve stenosis.  · Aortic valve area is 1.40 cm2; peak velocity is 1.54 m/s; mean gradient is 6 mmHg.  · Mild aortic regurgitation.  · Mild mitral regurgitation.  · Mild tricuspid regurgitation.  · Normal central venous pressure (3 mmHg).  · The estimated PA systolic pressure is 41 mmHg.  · There is mild pulmonary hypertension.  · Trivial posterior pericardial effusion. At base and under the RA and small outside the RV apically.  Continue Metoprolol and Benazepril and monitor clinical status closely. Monitor on telemetry. Patient is off CHF pathway. Monitor strict Is&Os and daily weights. Continue to stress to patient importance of self efficacy and  on diet for CHF.

## 2023-06-01 NOTE — ASSESSMENT & PLAN NOTE
Patient's FSGs are controlled on current medication regimen. Patient on oral Tradjenta at home to treat.   Last A1c reviewed-   Lab Results   Component Value Date    HGBA1C 6.3 (H) 05/29/2023     Most recent fingerstick glucose reviewed-   Recent Labs   Lab 05/31/23  2119 06/01/23  0817 06/01/23  1529   POCTGLUCOSE 174* 116* 171*     Current correctional scale  Low  Maintain anti-hyperglycemic dose as follows-   Antihyperglycemics (From admission, onward)    Start     Stop Route Frequency Ordered    05/29/23 1418  insulin aspart U-100 pen 0-5 Units         -- SubQ Before meals & nightly PRN 05/29/23 1318        Hold Oral hypoglycemics while patient is in the hospital.  Target blood sugars 140-180 in hospital.  Diabetic diet.  Monitor blood sugars 4 times daily with meals and at bedtime.

## 2023-06-01 NOTE — ASSESSMENT & PLAN NOTE
· Chronic and controlled. Patient with history of remote stents at [facility] in 1990 and 2003.   · Patient on Zocor at home but not on formulary so holding in hospital but to resume on discharge.

## 2023-06-01 NOTE — PLAN OF CARE
Problem: Infection  Goal: Absence of Infection Signs and Symptoms  Outcome: Ongoing, Progressing     Problem: Adult Inpatient Plan of Care  Goal: Absence of Hospital-Acquired Illness or Injury  Outcome: Ongoing, Progressing     Problem: Adult Inpatient Plan of Care  Goal: Optimal Comfort and Wellbeing  Outcome: Ongoing, Progressing     Pt AAOx4. 2L NC with O2 > 95%. VSS. HR ranging from 36-150s on tele.  BG WNL. Ropivacaine infusing. Pt turned q2hr. Pt urinated at 4am a total of 650cc. Safety maintained. Call bell in reach.

## 2023-06-01 NOTE — TELEPHONE ENCOUNTER
I have pended the O2 tank order but I put in the comments PULSE PURSE I did not know how else to do it.   Once completed please send back to me so I can send to [facility]     Thanks

## 2023-06-02 LAB
ANION GAP SERPL CALC-SCNC: 11 MMOL/L (ref 8–16)
BASOPHILS # BLD AUTO: 0.03 K/UL (ref 0–0.2)
BASOPHILS NFR BLD: 0.4 % (ref 0–1.9)
BUN SERPL-MCNC: 23 MG/DL (ref 10–30)
CALCIUM SERPL-MCNC: 10.2 MG/DL (ref 8.7–10.5)
CHLORIDE SERPL-SCNC: 100 MMOL/L (ref 95–110)
CO2 SERPL-SCNC: 21 MMOL/L (ref 23–29)
CREAT SERPL-MCNC: 1.4 MG/DL (ref 0.5–1.4)
DIFFERENTIAL METHOD: ABNORMAL
EOSINOPHIL # BLD AUTO: 0 K/UL (ref 0–0.5)
EOSINOPHIL NFR BLD: 0.3 % (ref 0–8)
ERYTHROCYTE [DISTWIDTH] IN BLOOD BY AUTOMATED COUNT: 15.9 % (ref 11.5–14.5)
EST. GFR  (NO RACE VARIABLE): 35.5 ML/MIN/1.73 M^2
GLUCOSE SERPL-MCNC: 131 MG/DL (ref 70–110)
HCT VFR BLD AUTO: 24.5 % (ref 37–48.5)
HGB BLD-MCNC: 8 G/DL (ref 12–16)
IMM GRANULOCYTES # BLD AUTO: 0.02 K/UL (ref 0–0.04)
IMM GRANULOCYTES NFR BLD AUTO: 0.3 % (ref 0–0.5)
LYMPHOCYTES # BLD AUTO: 1 K/UL (ref 1–4.8)
LYMPHOCYTES NFR BLD: 13.7 % (ref 18–48)
MAGNESIUM SERPL-MCNC: 1.9 MG/DL (ref 1.6–2.6)
MCH RBC QN AUTO: 28.1 PG (ref 27–31)
MCHC RBC AUTO-ENTMCNC: 32.7 G/DL (ref 32–36)
MCV RBC AUTO: 86 FL (ref 82–98)
MONOCYTES # BLD AUTO: 0.7 K/UL (ref 0.3–1)
MONOCYTES NFR BLD: 9.8 % (ref 4–15)
NEUTROPHILS # BLD AUTO: 5.4 K/UL (ref 1.8–7.7)
NEUTROPHILS NFR BLD: 75.5 % (ref 38–73)
NRBC BLD-RTO: 1 /100 WBC
PHOSPHATE SERPL-MCNC: 3.1 MG/DL (ref 2.7–4.5)
PLATELET # BLD AUTO: 164 K/UL (ref 150–450)
PMV BLD AUTO: 12.2 FL (ref 9.2–12.9)
POCT GLUCOSE: 111 MG/DL (ref 70–110)
POCT GLUCOSE: 129 MG/DL (ref 70–110)
POCT GLUCOSE: 130 MG/DL (ref 70–110)
POTASSIUM SERPL-SCNC: 4.1 MMOL/L (ref 3.5–5.1)
RBC # BLD AUTO: 2.85 M/UL (ref 4–5.4)
SODIUM SERPL-SCNC: 132 MMOL/L (ref 136–145)
WBC # BLD AUTO: 7.14 K/UL (ref 3.9–12.7)

## 2023-06-02 PROCEDURE — 80048 BASIC METABOLIC PNL TOTAL CA: CPT | Performed by: HOSPITALIST

## 2023-06-02 PROCEDURE — 99232 SBSQ HOSP IP/OBS MODERATE 35: CPT | Mod: ,,, | Performed by: INTERNAL MEDICINE

## 2023-06-02 PROCEDURE — 20600001 HC STEP DOWN PRIVATE ROOM

## 2023-06-02 PROCEDURE — 63600175 PHARM REV CODE 636 W HCPCS: Performed by: INTERNAL MEDICINE

## 2023-06-02 PROCEDURE — 97535 SELF CARE MNGMENT TRAINING: CPT

## 2023-06-02 PROCEDURE — 97110 THERAPEUTIC EXERCISES: CPT

## 2023-06-02 PROCEDURE — 36415 COLL VENOUS BLD VENIPUNCTURE: CPT | Performed by: HOSPITALIST

## 2023-06-02 PROCEDURE — 97530 THERAPEUTIC ACTIVITIES: CPT

## 2023-06-02 PROCEDURE — 84100 ASSAY OF PHOSPHORUS: CPT | Performed by: HOSPITALIST

## 2023-06-02 PROCEDURE — 85025 COMPLETE CBC W/AUTO DIFF WBC: CPT | Performed by: HOSPITALIST

## 2023-06-02 PROCEDURE — 25000003 PHARM REV CODE 250: Performed by: STUDENT IN AN ORGANIZED HEALTH CARE EDUCATION/TRAINING PROGRAM

## 2023-06-02 PROCEDURE — 99232 PR SUBSEQUENT HOSPITAL CARE,LEVL II: ICD-10-PCS | Mod: ,,, | Performed by: INTERNAL MEDICINE

## 2023-06-02 PROCEDURE — 25000003 PHARM REV CODE 250: Performed by: INTERNAL MEDICINE

## 2023-06-02 PROCEDURE — 25000003 PHARM REV CODE 250: Performed by: HOSPITALIST

## 2023-06-02 PROCEDURE — 97530 THERAPEUTIC ACTIVITIES: CPT | Mod: CQ

## 2023-06-02 PROCEDURE — 97112 NEUROMUSCULAR REEDUCATION: CPT | Mod: CQ

## 2023-06-02 PROCEDURE — 83735 ASSAY OF MAGNESIUM: CPT | Performed by: HOSPITALIST

## 2023-06-02 PROCEDURE — 97112 NEUROMUSCULAR REEDUCATION: CPT

## 2023-06-02 RX ORDER — ENOXAPARIN SODIUM 100 MG/ML
30 INJECTION SUBCUTANEOUS EVERY 24 HOURS
Status: DISCONTINUED | OUTPATIENT
Start: 2023-06-02 | End: 2023-06-03

## 2023-06-02 RX ORDER — METHOCARBAMOL 500 MG/1
500 TABLET, FILM COATED ORAL EVERY 6 HOURS
Status: DISCONTINUED | OUTPATIENT
Start: 2023-06-02 | End: 2023-06-02

## 2023-06-02 RX ORDER — METHOCARBAMOL 500 MG/1
500 TABLET, FILM COATED ORAL EVERY 8 HOURS PRN
Status: DISCONTINUED | OUTPATIENT
Start: 2023-06-02 | End: 2023-06-05 | Stop reason: HOSPADM

## 2023-06-02 RX ADMIN — ACETAMINOPHEN 1000 MG: 500 TABLET ORAL at 09:06

## 2023-06-02 RX ADMIN — Medication 2 TABLET: at 09:06

## 2023-06-02 RX ADMIN — METOPROLOL TARTRATE 25 MG: 25 TABLET, FILM COATED ORAL at 09:06

## 2023-06-02 RX ADMIN — HYDRALAZINE HYDROCHLORIDE 50 MG: 50 TABLET ORAL at 07:06

## 2023-06-02 RX ADMIN — ACETAMINOPHEN 1000 MG: 500 TABLET ORAL at 02:06

## 2023-06-02 RX ADMIN — LEVETIRACETAM 500 MG: 500 TABLET, FILM COATED ORAL at 09:06

## 2023-06-02 RX ADMIN — CHOLECALCIFEROL TAB 25 MCG (1000 UNIT) 1000 UNITS: 25 TAB at 09:06

## 2023-06-02 RX ADMIN — ACETAMINOPHEN 1000 MG: 500 TABLET ORAL at 06:06

## 2023-06-02 RX ADMIN — HYDRALAZINE HYDROCHLORIDE 25 MG: 25 TABLET, FILM COATED ORAL at 09:06

## 2023-06-02 RX ADMIN — MUPIROCIN 1 G: 20 OINTMENT TOPICAL at 09:06

## 2023-06-02 RX ADMIN — HYDRALAZINE HYDROCHLORIDE 25 MG: 25 TABLET, FILM COATED ORAL at 05:06

## 2023-06-02 RX ADMIN — SODIUM CHLORIDE 500 ML: 9 INJECTION, SOLUTION INTRAVENOUS at 11:06

## 2023-06-02 RX ADMIN — ENOXAPARIN SODIUM 30 MG: 30 INJECTION SUBCUTANEOUS at 04:06

## 2023-06-02 RX ADMIN — DULOXETINE 60 MG: 60 CAPSULE, DELAYED RELEASE ORAL at 08:06

## 2023-06-02 RX ADMIN — SENNOSIDES AND DOCUSATE SODIUM 1 TABLET: 50; 8.6 TABLET ORAL at 09:06

## 2023-06-02 RX ADMIN — DONEPEZIL HYDROCHLORIDE 5 MG: 5 TABLET ORAL at 09:06

## 2023-06-02 RX ADMIN — HYDRALAZINE HYDROCHLORIDE 25 MG: 25 TABLET, FILM COATED ORAL at 02:06

## 2023-06-02 RX ADMIN — AMLODIPINE BESYLATE AND BENAZEPRIL HYDROCHLORIDE 1 CAPSULE: 5; 20 CAPSULE ORAL at 09:06

## 2023-06-02 NOTE — CARE UPDATE
"RAPID RESPONSE NURSE CHART REVIEW        Chart Reviewed: 06/01/2023, 8:37 PM    MRN: [id number]  Bed: 8080/8080 A    Dx: Left displaced femoral neck fracture    Brunilda England has a past medical history of Anal cancer, Anemia, Cancer, Centrilobular emphysema, Diabetes mellitus, Lumbar radiculopathy, Lung cancer, Macular degeneration, Macular hemorrhage of left eye, Neuropathy, Osteoporosis, Osteoporosis, unspecified, and Pure hypercholesterolemia.    Last VS: BP (!) 168/73 (BP Location: Left arm, Patient Position: Lying)   Pulse (!) 133   Temp 97.7 °F (36.5 °C) (Oral)   Resp 16   Wt 76.9 kg (169 lb 8.5 oz)   SpO2 100%   BMI 30.03 kg/m²     24H Vital Sign Range:  Temp:  [97.6 °F (36.4 °C)-98.3 °F (36.8 °C)]   Pulse:  []   Resp:  [16-18]   BP: (102-168)/(50-73)   SpO2:  [92 %-100 %]     Level of Consciousness (AVPU): alert    Recent Labs     05/30/23  0200 05/31/23  0316 06/01/23  0610   WBC 6.45 7.65 7.74   HGB 9.3* 8.0* 7.7*   HCT 27.4* 24.0* 23.2*    118* 154       Recent Labs     05/30/23  0200 05/31/23  0316 06/01/23  0610    139 136   K 5.7* 4.6 4.5    107 102   CO2 24 20* 25   CREATININE 0.9 0.9 1.2   * 128* 111*   PHOS 2.7 3.1 2.7   MG 1.5* 1.4* 2.0        No results for input(s): PH, PCO2, PO2, HCO3, POCSATURATED, BE in the last 72 hours.     OXYGEN:  Flow (L/min): 2          MEWS score: 4    Bedside RNCindy  contacted for hypertension (SBP goal < 140). PRN hydralazine ordered as due for the patient. Patient missed 1400 dose of scheduled hydralazine for "contraindication." Nurse states she plans to give the scheduled hydralazine, recommended PRN dose first, then scheduled dose at 2200 if BP persists > 140, and more frequent monitoring of BP. No additional concerns verbalized at this time. Instructed to call [phone] for further concerns or assistance.    Amber Peñaloza RN        "

## 2023-06-02 NOTE — ASSESSMENT & PLAN NOTE
· Resolved. Magnesium level 1.9 on 6/2.   · Magnesium level improved to 2.0 on 6/1.  · Present on admit. Magnesium low at 1.5 on admit and replaced with IV and oral Magnesium.   · Monitor with daily level.

## 2023-06-02 NOTE — PT/OT/SLP PROGRESS
"Physical Therapy Treatment  Co-Treat with OT    Patient Name:  Brunilda England   MRN:  [id number]    Recommendations:     Discharge Recommendations: nursing facility, skilled  Discharge Equipment Recommendations: to be determined by next level of care  Barriers to discharge:  increased assistance required    Assessment:     Brunilda England is a 89 y.o. female admitted with a medical diagnosis of Left displaced femoral neck fracture.  She presents with the following impairments/functional limitations: weakness, impaired endurance, impaired self care skills, impaired functional mobility, gait instability, impaired balance, decreased coordination, decreased lower extremity function, decreased safety awareness, pain. Pt limited by increased LLE pain and required frequent encouragement to perform functional mobility. Frequent rest periods due to pain. Pt soiled when OT and PT entered room so beginning of session placed emphasis on bed mobility for hygiene purposes. Pt unable to take steps today due to difficulty fully WB onto the LLE to lift RLE. Pt will continue to benefit from continued skilled PT to improve upon functional mobility and to progress toward stated goals.    Rehab Prognosis: Good; patient would benefit from acute skilled PT services to address these deficits and reach maximum level of function.    Recent Surgery: Procedure(s) (LRB):  INSERTION, INTRAMEDULLARY TEREZA, FEMUR (Left) 3 Days Post-Op    Plan:     During this hospitalization, patient to be seen daily to address the identified rehab impairments via gait training, therapeutic activities, therapeutic exercises, neuromuscular re-education and progress toward the following goals:    Plan of Care Expires:  06/30/23    Subjective     Chief Complaint: LLE pain  Patient/Family Comments/goals: "I am trying my best."  Pain/Comfort:  Pain Rating 1:  (not rated)  Location - Side 1: Left  Location - Orientation 1: generalized  Location 1: hip  Pain Addressed 1: " Reposition, Distraction  Pain Rating Post-Intervention 1:  (not rated)      Objective:     Communicated with RN prior to session.  Patient found supine with telemetry, pulse ox (continuous), PureWick, perineural catheter, oxygen upon PT entry to room.     General Precautions: Standard, fall  Orthopedic Precautions: LLE weight bearing as tolerated, N/A, LLE non weight bearing  Braces: N/A  Respiratory Status: Nasal cannula, flow 2 L/min     Functional Mobility:  Bed Mobility:     Rolling Left:  maximal assistance and of 2 persons  Rolling Right: maximal assistance and of 2 persons  Scooting: maximal assistance and of 2 persons  Supine to Sit: maximal assistance and of 2 persons  Sit to Supine: maximal assistance and of 2 persons  Transfers:     Sit to Stand:  maximal assistance and of 2 persons with no AD and rolling walker. 2 reps.  Balance: Seated EOB static balance ~20 minutes with VC and TC to correct R lateral lean and facilitate WB onto the L hip in seated. 2 trials static standing balance ~60 seconds and 90 seconds with Max A x2.    AM-PAC 6 CLICK MOBILITY  Turning over in bed (including adjusting bedclothes, sheets and blankets)?: 2  Sitting down on and standing up from a chair with arms (e.g., wheelchair, bedside commode, etc.): 2  Moving from lying on back to sitting on the side of the bed?: 2  Moving to and from a bed to a chair (including a wheelchair)?: 2  Need to walk in hospital room?: 1  Climbing 3-5 steps with a railing?: 1  Basic Mobility Total Score: 10       Patient left HOB elevated with all lines intact, call button in reach, and daughter present..    GOALS:   Multidisciplinary Problems       Physical Therapy Goals          Problem: Physical Therapy    Goal Priority Disciplines Outcome Goal Variances Interventions   Physical Therapy Goal     PT, PT/OT Ongoing, Progressing     Description: Goals to be met by: 6/14/2023     Patient will increase functional independence with mobility by  performin. Sit to stand transfer with Stand-by Assistance  2. Bed to chair transfer with Minimal Assistance using RW or LRAD  3. Gait  x 20 feet with Moderate Assistance using RW or LRAD.   4. Stand for 5 minutes with Contact Guard Assistance using RW or LRAD prn while performing dynamic balance activity to prepare for tasks in the home  5. Lower extremity exercise program x30 reps per handout, with independence                         Time Tracking:     PT Received On: 23  PT Start Time: 923     PT Stop Time:   PT Total Time (min): 59 min     Billable Minutes: Therapeutic Activity 30 and Neuromuscular Re-education 23    Treatment Type: Treatment  PT/PTA: PTA     Number of PTA visits since last PT visit: 2     2023

## 2023-06-02 NOTE — ANESTHESIA POST-OP PAIN MANAGEMENT
Acute Pain Service Progress Note    Brunilda England is a 89 y.o., female, [id number].    Surgery:  INSERTION, INTRAMEDULLARY TEREZA, FEMUR (Left: Hip)    Post Op Day #: 3    Catheter type: perineural  L SIFI    Infusion type: Ropivacaine 0.2%  15mL q3 basal    Problem List:    Active Hospital Problems    Diagnosis  POA    *Closed left basicervical femoral neck fracture with extension into the intertrochanteric region s/p IM nail on 5/30/2023 [S72.002A]  Yes    Hypomagnesemia [E83.42]  Yes    Acute blood loss anemia [D62]  Yes    Intertrochanteric fracture of left hip, closed, initial encounter [S72.142A]  Yes    Subarachnoid hemorrhage following injury, no loss of consciousness [S06.6X0A]  Yes    Chronic hypoxemic respiratory failure [J96.11]  Yes    Tachy-juan a syndrome [I49.5]  Yes    PAF (paroxysmal atrial fibrillation) [I48.0]  Yes    Late onset Alzheimer's dementia with behavioral disturbance [G30.1, F02.818]  Yes    Chronic diastolic heart failure [I50.32]  Yes    Stage 3a chronic kidney disease [N18.31]  Yes    Centrilobular emphysema [J43.2]  Yes    Type 2 diabetes mellitus with circulatory disorder, without long-term current use of insulin [E11.59]  Yes    Coronary artery disease involving native coronary artery of native heart without angina pectoris [I25.10]  Yes    Essential hypertension [I10]  Yes    History of lung cancer [Z85.118]  Not Applicable      Resolved Hospital Problems   No resolved problems to display.       Subjective:     General appearance of alert, oriented, no complaints   Pain with rest: 1    Faces   Pain with movement: 5    Numbers   Side Effects    1. Pruritis No    2. Nausea No    3. Motor Blockade No, 0=Ability to raise lower extremities off bed    4. Sedation No, 1=awake and alert    Objective:     Catheter site clean, dry, intact      Vitals   Vitals:    06/02/23 1147   BP: (!) 143/67   Pulse: 95   Resp: 18   Temp: 36.4 °C (97.6 °F)        Labs    No results displayed  because visit has over 200 results.           Meds   Current Facility-Administered Medications   Medication Dose Route Frequency Provider Last Rate Last Admin    acetaminophen tablet 1,000 mg  1,000 mg Oral Q8H Suresh pSain MD   1,000 mg at 06/02/23 0600    amlodipine-benazepril 5-20 mg per capsule 1 capsule  1 capsule Oral Daily Honey Calixto MD   1 capsule at 06/02/23 0949    bisacodyL suppository 10 mg  10 mg Rectal Daily PRN Honey Calixto MD   10 mg at 06/01/23 1821    calcium-vitamin D3 500 mg-5 mcg (200 unit) per tablet 2 tablet  2 tablet Oral Daily Dale Rojo MD   2 tablet at 06/02/23 0949    dextrose 10% bolus 125 mL 125 mL  12.5 g Intravenous PRN Honey Calixto MD        dextrose 10% bolus 250 mL 250 mL  25 g Intravenous PRN Honey Calixto MD        dextrose 40 % gel 15,000 mg  15 g Oral PRN Honey Calixto MD        dextrose 40 % gel 30,000 mg  30 g Oral PRN Honey Calixot MD        donepeziL tablet 5 mg  5 mg Oral QHS Honey Calixto MD   5 mg at 06/01/23 2038    DULoxetine DR capsule 60 mg  60 mg Oral QAM Honey Calixto MD   60 mg at 06/02/23 0800    enoxaparin injection 30 mg  30 mg Subcutaneous Q24H (prophylaxis, 1700) Florence Scanlon MD        glucagon (human recombinant) injection 1 mg  1 mg Intramuscular PRN Honey Calixto MD        hydrALAZINE tablet 25 mg  25 mg Oral Q8H Honey Calixto MD   25 mg at 06/02/23 0559    hydrALAZINE tablet 50 mg  50 mg Oral Q8H PRN Honey Calixto MD   50 mg at 06/02/23 0702    insulin aspart U-100 pen 0-5 Units  0-5 Units Subcutaneous QID (AC + HS) PRN Honey Calixto MD        levETIRAcetam tablet 500 mg  500 mg Oral BID Honey Calixto MD   500 mg at 06/02/23 0949    melatonin tablet 6 mg  6 mg Oral Nightly PRN Honey Calixto MD   6 mg at 06/01/23 2333    methocarbamoL tablet 500 mg  500 mg Oral Q8H PRN Kurt Lyon MD        metoprolol injection 2.5 mg  2.5 mg  Intravenous Q4H PRN Honey Calixto MD   2.5 mg at 06/01/23 2153    metoprolol tartrate (LOPRESSOR) tablet 25 mg  25 mg Oral BID Honey Calixto MD   25 mg at 06/02/23 0949    mupirocin 2 % ointment 1 g  1 g Nasal BID Honey Calixto MD   1 g at 06/02/23 0949    ondansetron injection 4 mg  4 mg Intravenous Q12H PRN Honey Calixto MD        polyethylene glycol packet 17 g  17 g Oral Daily Honey Calixto MD   17 g at 06/01/23 0915    senna-docusate 8.6-50 mg per tablet 1 tablet  1 tablet Oral BID Honey Calixto MD   1 tablet at 06/02/23 0949    sodium chloride 0.9% flush 10 mL  10 mL Intravenous PRN Honey Calixto MD        vitamin D 1000 units tablet 1,000 Units  1,000 Units Oral Daily Honey Calixto MD   1,000 Units at 06/02/23 0949     Facility-Administered Medications Ordered in Other Encounters   Medication Dose Route Frequency Provider Last Rate Last Admin    sodium chloride 0.9% flush 5 mL  5 mL Intravenous PRN Sarah Earl MD             Assessment:     Pain control adequate    Plan:   Patient doing well, continue present treatment. PNC paused and pulled today     - C/w ephriam Tylenol 1g q6, duloxetine 60 qAM  - C/w Robaxin 500 q6PRN  - PNC paused and pulled, APS will sign off.    Kurt Lyon MD  Anesthesiology PGY-4

## 2023-06-02 NOTE — ASSESSMENT & PLAN NOTE
Brunilda England is a 89 y.o. female with a left basicervical femoral neck fracture with extension into the intertroch region, closed, NVI. They take Eliquis 5 b.i.d. anticoagulation at home. They required a walker for ambulatory assistive devices prior to this injury. Also had small SAH on CT head which was stable on repeat imaging.  She is now s/p CMN 5/30.     - WBAT LLE   - AC: per primary; now on daily SQ Lovenox 40mg   - Pain: MM    » Dispo: discharge to SNF once medically cleared

## 2023-06-02 NOTE — ASSESSMENT & PLAN NOTE
· Chronic and controlled. Patient with history of remote stents at [facility] in 1990 and 2003.   · Patient on Zocor at home but not on formulary so holding in hospital but to resume on discharge.

## 2023-06-02 NOTE — ASSESSMENT & PLAN NOTE
· Patient underwent left hip IM nail for basocervical/intertrochanetric fracture by Dr. Desmond Barker on 5/30/2023.   · Patient reports pain in left hip improving.   · Plan is to continue PT/OT for gait training and strengthening and restoration of ADL's. Patient is FWB/WBAT: left lower extremity as per Orthopedic recommendations.   · Plan to continue Lovenox 40 mg subcutaneous daily post-op for DVT prophylaxis after hip fracture surgery as approved by Neurosurgery.   · Orthopedics is following and managing surgical site.   · Perineural pain catheter in place with continuous Ropivacaine infusion for pain control and being managed by Anesthesia Pain Service.   · Pain improving to left hip. Patient on multimodal pain management post-op with Tylenol 1000 mg po every 8 hours and Robaxin 500 mg po 4 times daily and Tramadol prn for breakthrough pain and will continue.  · PT/OT recommending Skilled nursing facility and  working on discharge planning with patient and family. Patient accepted to [facility] for 6/5 pending medical stability and insurance auth.

## 2023-06-02 NOTE — CARE UPDATE
RAPID RESPONSE NURSE ROUND       Rounding completed with charge RNMarya for dysrhythmias reports pt stable at this time, monitoring in place. No additional concerns verbalized at this time. Instructed to call [phone] for further concerns or assistance.

## 2023-06-02 NOTE — ADDENDUM NOTE
Addendum  created 06/02/23 0902 by Denise Arriola, RN    Order list changed, Pharmacy for encounter modified

## 2023-06-02 NOTE — ASSESSMENT & PLAN NOTE
Patient's FSGs are controlled on current medication regimen. Patient on oral Tradjenta at home to treat.   Last A1c reviewed-   Lab Results   Component Value Date    HGBA1C 6.3 (H) 05/29/2023     Most recent fingerstick glucose reviewed-   Recent Labs   Lab 06/01/23  1529 06/01/23  2034 06/02/23  0808 06/02/23  1146   POCTGLUCOSE 171* 132* 130* 129*     Current correctional scale  Low  Maintain anti-hyperglycemic dose as follows-   Antihyperglycemics (From admission, onward)    Start     Stop Route Frequency Ordered    05/29/23 1418  insulin aspart U-100 pen 0-5 Units         -- SubQ Before meals & nightly PRN 05/29/23 1318        Hold Oral hypoglycemics while patient is in the hospital.  Target blood sugars 140-180 in hospital.  Diabetic diet.  Monitor blood sugars 4 times daily with meals and at bedtime.

## 2023-06-02 NOTE — ASSESSMENT & PLAN NOTE
PAF (paroxysmal atrial fibrillation)  · Controlled. Continue to monitor.   · Cardiology consulted as patient having erratic HRs up to 160 and as low as 40 in hospital. Cardiology states has evidence of tachycardia and bradycardia on ECG but does not meet criteria for tachy-juan a syndrome.   · Cardiology recommends to continue low dose Metoprolol 25 mg po BID to help with tachycardia related to atrial fibrillation. No need for pacemaker at this time.   · Per Cardiology, if patient experiences syncope or develops significant conversion pause >5 sec, please alert our team. Per Cardiology, patient to follow-up with her cardiologist, Dr. Roman as outpatient.   · Home Eliquis on hold for long term anticoagulation for atrial fibrillation due to SAH as per Neurosurgery.   · Monitor on telemetry.

## 2023-06-02 NOTE — ADDENDUM NOTE
Addendum  created 06/02/23 0811 by Denise Arriola, RN    Order list changed, Pharmacy for encounter modified

## 2023-06-02 NOTE — SUBJECTIVE & OBJECTIVE
Principal Problem:Left displaced femoral neck fracture    Principal Orthopedic Problem: s/p L-CMN 5/30    Interval History: POD3.  Afebrile, VSS.  EKG yesterday showing Afib w/RVR; starting lovenox today.  Hb stable at 8.  Pain reportedly well controlled; PNC paused this morning for possible removal today.  Able to perform sit to stand transitions with PT yesterday with minimal assistance.  PT recommending SNF.  Denies any nausea/vomiting, chest pain, SOB, numbness/tingling.          Review of patient's allergies indicates:   Allergen Reactions    Bextra [valdecoxib] Swelling    Gabapentin Diarrhea and Nausea Only       Current Facility-Administered Medications   Medication    acetaminophen tablet 1,000 mg    amlodipine-benazepril 5-20 mg per capsule 1 capsule    bisacodyL suppository 10 mg    calcium-vitamin D3 500 mg-5 mcg (200 unit) per tablet 2 tablet    dextrose 10% bolus 125 mL 125 mL    dextrose 10% bolus 250 mL 250 mL    dextrose 40 % gel 15,000 mg    dextrose 40 % gel 30,000 mg    donepeziL tablet 5 mg    DULoxetine DR capsule 60 mg    enoxaparin injection 30 mg    glucagon (human recombinant) injection 1 mg    hydrALAZINE tablet 25 mg    hydrALAZINE tablet 50 mg    insulin aspart U-100 pen 0-5 Units    levETIRAcetam tablet 500 mg    melatonin tablet 6 mg    methocarbamoL tablet 500 mg    metoprolol injection 2.5 mg    metoprolol tartrate (LOPRESSOR) tablet 25 mg    mupirocin 2 % ointment 1 g    ondansetron injection 4 mg    polyethylene glycol packet 17 g    senna-docusate 8.6-50 mg per tablet 1 tablet    sodium chloride 0.9% bolus 500 mL 500 mL    sodium chloride 0.9% flush 10 mL    vitamin D 1000 units tablet 1,000 Units     Facility-Administered Medications Ordered in Other Encounters   Medication    sodium chloride 0.9% flush 5 mL     Objective:     Vital Signs (Most Recent):  Temp: 97 °F (36.1 °C) (06/02/23 0927)  Pulse: 96 (06/02/23 0927)  Resp: 18 (06/02/23 0927)  BP: (!) 145/63 (06/02/23  0927)  SpO2: 99 % (06/02/23 0927) Vital Signs (24h Range):  Temp:  [97 °F (36.1 °C)-98.1 °F (36.7 °C)] 97 °F (36.1 °C)  Pulse:  [] 96  Resp:  [16-18] 18  SpO2:  [98 %-100 %] 99 %  BP: (112-168)/(57-82) 145/63     Weight: 76.9 kg (169 lb 8.5 oz)     Body mass index is 30.03 kg/m².      Intake/Output Summary (Last 24 hours) at 6/2/2023 1022  Last data filed at 6/2/2023 0955  Gross per 24 hour   Intake 1065 ml   Output 1500 ml   Net -435 ml          Ortho/SPM Exam  AAOx4  NAD  Reg rate  No increased WOB    LLE:  Dressing c/d/i  SILT T/SP/DP/Greer/Sa  Motor intact T/SP/DP  WWP extremities  FCDs in place and functioning        Significant Labs: BMP:   Recent Labs   Lab 06/02/23  0457   *   *   K 4.1      CO2 21*   BUN 23   CREATININE 1.4   CALCIUM 10.2   MG 1.9       CBC:   Recent Labs   Lab 06/01/23  0610 06/02/23  0457   WBC 7.74 7.14   HGB 7.7* 8.0*   HCT 23.2* 24.5*    164       All pertinent labs within the past 24 hours have been reviewed.    Significant Imaging: I have reviewed all pertinent imaging results/findings.

## 2023-06-02 NOTE — SUBJECTIVE & OBJECTIVE
Interval History: Patient still having some intermittent bursts of tachycardia and bradycardia but stable. Patient asymptomatic and BP stable. Patient's daughter, Nora at bedside and states patient was confused overnight and did not sleep much. I told daughter likely some sundowning that can concur in hospital. We discussed non-pharmacologic strategies to help. Daughter has been staying the night with patient to help out. Anesthesia pulled patient's perineural catheter today and patient reports pain doing okay to left hip area. Daughter states patient able to stand and take a few steps with therapy today. Discussed with daughter an should be medically ready for hospital discharge on 6/5. Patient has been accepted to [facility] for 6/5 pending insurance auth. Hgb stable at 8.     Review of Systems   Constitutional:  Negative for fever.   Respiratory:  Negative for cough and shortness of breath.    Cardiovascular:  Negative for chest pain, palpitations and leg swelling.   Gastrointestinal:  Negative for abdominal pain, nausea and vomiting.   Musculoskeletal:  Positive for arthralgias (Left hip).   Neurological:  Negative for dizziness.   Psychiatric/Behavioral:  Negative for agitation and confusion.    Objective:     Vital Signs (Most Recent):  Temp: 97.6 °F (36.4 °C) (06/02/23 1147)  Pulse: 95 (06/02/23 1147)  Resp: 18 (06/02/23 1147)  BP: 143/67 (06/02/23 1147)  SpO2: 99 % (06/02/23 1147) on room air Vital Signs (24h Range):  Temp:  [97 °F (36.1 °C)-98.1 °F (36.7 °C)] 97.6 °F (36.4 °C)  Pulse:  [] 95  Resp:  [16-18] 18  SpO2:  [98 %-100 %] 99 %  BP: (112-168)/(58-82) 143/67     Weight: 76.9 kg (169 lb 8.5 oz)  Body mass index is 30.03 kg/m².    Intake/Output Summary (Last 24 hours) at 6/2/2023 1403  Last data filed at 6/2/2023 0955  Gross per 24 hour   Intake 930 ml   Output 1500 ml   Net -570 ml         Physical Exam        Significant Labs: CBC:   Recent Labs   Lab 06/01/23  0610 06/02/23  0457   WBC 7.74  7.14   HGB 7.7* 8.0*   HCT 23.2* 24.5*    164     CMP:   Recent Labs   Lab 06/01/23  0610 06/02/23  0457    132*   K 4.5 4.1    100   CO2 25 21*   * 131*   BUN 19 23   CREATININE 1.2 1.4   CALCIUM 10.3 10.2   ANIONGAP 9 11       Significant Imaging: I have reviewed all pertinent imaging results/findings within the past 24 hours.

## 2023-06-02 NOTE — PLAN OF CARE
Problem: Glycemic Control Impaired (Surgery Nonspecified)  Goal: Blood Glucose Level Within Targeted Range  Outcome: Ongoing, Progressing  Intervention: Optimize Glycemic Control  Flowsheets (Taken 6/2/2023 4829)  Glycemic Management: blood glucose monitored    Plan of care was reviewed with the pt. Reid. VSS. Cardiac and glucose monitoring was done. Pain management was reviewed with the pt. Pt was turned  Q2. Intake and output was documented. No major changes throughout the day. Safety precautions were in placed.

## 2023-06-02 NOTE — PROGRESS NOTES
[facility] - Telemetry Stepdown  Orthopedics  Progress Note    Patient Name: Brunilda England  MRN: [id number]  Admission Date: 5/28/2023  Hospital Length of Stay: 4 days  Attending Provider: Florence Scanlon MD  Primary Care Provider: Pepper Whitfield MD  Follow-up For: Procedure(s) (LRB):  INSERTION, INTRAMEDULLARY TEREZA, FEMUR (Left)    Post-Operative Day: 3 Days Post-Op  Subjective:     Principal Problem:Left displaced femoral neck fracture    Principal Orthopedic Problem: s/p L-CMN 5/30    Interval History: POD3.  Afebrile, VSS.  EKG yesterday showing Afib w/RVR; starting lovenox today.  Hb stable at 8.  Pain reportedly well controlled; PNC paused this morning for possible removal today.  Able to perform sit to stand transitions with PT yesterday with minimal assistance.  PT recommending SNF.  Denies any nausea/vomiting, chest pain, SOB, numbness/tingling.          Review of patient's allergies indicates:   Allergen Reactions    Bextra [valdecoxib] Swelling    Gabapentin Diarrhea and Nausea Only       Current Facility-Administered Medications   Medication    acetaminophen tablet 1,000 mg    amlodipine-benazepril 5-20 mg per capsule 1 capsule    bisacodyL suppository 10 mg    calcium-vitamin D3 500 mg-5 mcg (200 unit) per tablet 2 tablet    dextrose 10% bolus 125 mL 125 mL    dextrose 10% bolus 250 mL 250 mL    dextrose 40 % gel 15,000 mg    dextrose 40 % gel 30,000 mg    donepeziL tablet 5 mg    DULoxetine DR capsule 60 mg    enoxaparin injection 30 mg    glucagon (human recombinant) injection 1 mg    hydrALAZINE tablet 25 mg    hydrALAZINE tablet 50 mg    insulin aspart U-100 pen 0-5 Units    levETIRAcetam tablet 500 mg    melatonin tablet 6 mg    methocarbamoL tablet 500 mg    metoprolol injection 2.5 mg    metoprolol tartrate (LOPRESSOR) tablet 25 mg    mupirocin 2 % ointment 1 g    ondansetron injection 4 mg    polyethylene glycol packet 17 g    senna-docusate 8.6-50 mg per tablet 1  tablet    sodium chloride 0.9% bolus 500 mL 500 mL    sodium chloride 0.9% flush 10 mL    vitamin D 1000 units tablet 1,000 Units     Facility-Administered Medications Ordered in Other Encounters   Medication    sodium chloride 0.9% flush 5 mL     Objective:     Vital Signs (Most Recent):  Temp: 97 °F (36.1 °C) (06/02/23 0927)  Pulse: 96 (06/02/23 0927)  Resp: 18 (06/02/23 0927)  BP: (!) 145/63 (06/02/23 0927)  SpO2: 99 % (06/02/23 0927) Vital Signs (24h Range):  Temp:  [97 °F (36.1 °C)-98.1 °F (36.7 °C)] 97 °F (36.1 °C)  Pulse:  [] 96  Resp:  [16-18] 18  SpO2:  [98 %-100 %] 99 %  BP: (112-168)/(57-82) 145/63     Weight: 76.9 kg (169 lb 8.5 oz)     Body mass index is 30.03 kg/m².      Intake/Output Summary (Last 24 hours) at 6/2/2023 1022  Last data filed at 6/2/2023 0955  Gross per 24 hour   Intake 1065 ml   Output 1500 ml   Net -435 ml         Ortho/SPM Exam  AAOx4  NAD  Reg rate  No increased WOB    LLE:  Dressing c/d/i  SILT T/SP/DP/Greer/Sa  Motor intact T/SP/DP  WWP extremities  FCDs in place and functioning        Significant Labs: BMP:   Recent Labs   Lab 06/02/23 0457   *   *   K 4.1      CO2 21*   BUN 23   CREATININE 1.4   CALCIUM 10.2   MG 1.9       CBC:   Recent Labs   Lab 06/01/23  0610 06/02/23  0457   WBC 7.74 7.14   HGB 7.7* 8.0*   HCT 23.2* 24.5*    164       All pertinent labs within the past 24 hours have been reviewed.    Significant Imaging: I have reviewed all pertinent imaging results/findings.    Assessment/Plan:     * Closed left basicervical femoral neck fracture with extension into the intertrochanteric region s/p IM nail on 5/30/2023  Brunilda England is a 89 y.o. female with a left basicervical femoral neck fracture with extension into the intertroch region, closed, NVI. They take Eliquis 5 b.i.d. anticoagulation at home. They required a walker for ambulatory assistive devices prior to this injury. Also had small SAH on CT head which was stable on repeat  imaging.  She is now s/p CMN 5/30.     - WBAT LLE   - AC: per primary; now on daily SQ Lovenox 40mg   - Pain: MM    » Dispo: discharge to SNF once medically cleared          JOAQUÍN Gonzalez MD  Orthopedics  [facility] - Telemetry Stepdown

## 2023-06-02 NOTE — PROGRESS NOTES
"[facility] - Tele[facility] Medicine  Progress Note    Patient Name: Brunilda England  MRN: [id number]  Patient Class: IP- Inpatient   Admission Date: 5/28/2023  Length of Stay: 4 days  Attending Physician: Florence Scanlon MD  Primary Care Provider: Pepper Whitfield MD        Subjective:     Principal Problem:Left displaced femoral neck fracture        HPI:  Ms. Brunilda England is a 89 y.o. female with past medical history of HTN, paroxysmal atrial fibrillation/flutter, COPD on home oxygen, Chronic diastolic HF, CKD 3a, dementia, history of lung/rectal cancer who was in her usual state of health in which she lives at home with her daughter until she got up at night to change from her sneakers into a pair of slippers in the utility room when she turned wrong and fell while doing so and her daughter heard it and came in to find her on the ground and called EMS to bring her to the hospital. She was found to have a closed left basocervical fracture with extension into intertrochanteric region. She was using her walker when this happened which she uses for ambulation. She was also found to have a small left parietal SAH on admit and NS saw her as well as NCC in the ER and recommendations given and stable for the floor at this time. Ortho plans for OR tomorrow given this to monitor in interim. Her other daughter is at bedside now and able to assist with history but patien tis also about to do so. She reports having some mild pain to left leg now and she denies any dizziness, chest pain, palpitations, or dyspnea now. Has occaisonal constipation issues but is UTD on BMs now. Discussed planning for post op PT/OT and likely SNF, patient says "we'll see about that" but daughter reports will need and [facility] and I told her is normal for this post op course to need SNF after a fracture like this.          Overview/Hospital Course:  Patient admitted to [facility] Medicine Team H: Hip Fracture team " and started on Hip Fracture Pathway with Orthopedic surgery consult for hip fracture. Patient was seen and evaluated by Orthopedic surgery who recommended operative repair of hip fracture. Patient was medically optimized prior to surgery and was taken to OR after optimization on 5/30/2023. Patient underwent left hip cephalomedullary nail fixation by Dr. Desmond Barker. Post-op patient WBAT to the left lower extremity as per Orthopedics recommendation. Patient placed on Lovenox 40 mg subcutaneous daily and MERCEDES/SCD's for DVT prophylaxis post-op as unable to use patient's home Apixiban post-op for her atrial; fibrillation due to ICH as per Neurosurgery but NS okay with Lovenox. Perineural pain catheter placed by Anesthesia Pain Service with continuous infusion of Ropivacaine to help with pain control post-op and Anesthesia Pain Service managing while patient in the hospital. PT/OT consulted post-op and evaluated patient. Patient progressing well with PT and OT and recommended skilled nursing facility placement when medically ready for hospital discharge. Patient having issues with tachy-juan a in hospital. Patient followed by Cardiology as outpatient for A. Fib and intermittent bradycardia and undergoing outpatient evaluation. Cardiology consulted as inpatient and following. Patient having bursts of tachycardia and bradycardia in hospital and Metoprolol dosing adjusted as result. Cardiology states no indication for need for pacemaker at this time and stated they would rather patient be more tachycardiac than bradycardiac. Pain controlled.       Interval History: Patient still having some intermittent bursts of tachycardia and bradycardia but stable. Patient asymptomatic and BP stable. Patient's daughter, Nora at bedside and states patient was confused overnight and did not sleep much. I told daughter likely some sundowning that can concur in hospital. We discussed non-pharmacologic strategies to help. Daughter has  been staying the night with patient to help out. Anesthesia pulled patient's perineural catheter today and patient reports pain doing okay to left hip area. Daughter states patient able to stand and take a few steps with therapy today. Discussed with daughter an should be medically ready for hospital discharge on 6/5. Patient has been accepted to [facility] for 6/5 pending insurance auth. Hgb stable at 8.     Review of Systems   Constitutional:  Negative for fever.   Respiratory:  Negative for cough and shortness of breath.    Cardiovascular:  Negative for chest pain, palpitations and leg swelling.   Gastrointestinal:  Negative for abdominal pain, nausea and vomiting.   Musculoskeletal:  Positive for arthralgias (Left hip).   Neurological:  Negative for dizziness.   Psychiatric/Behavioral:  Negative for agitation and confusion.    Objective:     Vital Signs (Most Recent):  Temp: 97.6 °F (36.4 °C) (06/02/23 1147)  Pulse: 95 (06/02/23 1147)  Resp: 18 (06/02/23 1147)  BP: 143/67 (06/02/23 1147)  SpO2: 99 % (06/02/23 1147) on room air Vital Signs (24h Range):  Temp:  [97 °F (36.1 °C)-98.1 °F (36.7 °C)] 97.6 °F (36.4 °C)  Pulse:  [] 95  Resp:  [16-18] 18  SpO2:  [98 %-100 %] 99 %  BP: (112-168)/(58-82) 143/67     Weight: 76.9 kg (169 lb 8.5 oz)  Body mass index is 30.03 kg/m².    Intake/Output Summary (Last 24 hours) at 6/2/2023 1403  Last data filed at 6/2/2023 0955  Gross per 24 hour   Intake 930 ml   Output 1500 ml   Net -570 ml         Physical Exam  Vitals and nursing note reviewed.   Constitutional:       General: She is awake. She is not in acute distress.     Appearance: Normal appearance. She is not ill-appearing. On nasal cannula.  Eyes:      Conjunctiva/sclera: Conjunctivae normal.   Cardiovascular:      Rate and Rhythm: Normal rate and regular rhythm.      Heart sounds: Normal heart sounds. No murmur heard.    No friction rub. No gallop.   Pulmonary:      Effort: Pulmonary effort is normal. No  respiratory distress.      Breath sounds: Normal breath sounds. No wheezing.   Abdominal:      General: Abdomen is flat. Bowel sounds are normal. There is no distension.      Palpations: Abdomen is soft.      Tenderness: There is no abdominal tenderness. There is no guarding.   Skin:     General: Skin is warm.      Findings: No erythema.      Comments: Surgical dressings noted on left hip. Dressings are clean and dry and intact.   Neurological:      General: No focal deficit present.      Mental Status: She is alert and oriented to person, place, and time.   Psychiatric:         Mood and Affect: Mood normal.         Behavior: Behavior normal. Behavior is cooperative.         Thought Content: Thought content normal.         Significant Labs: CBC:   Recent Labs   Lab 06/01/23  0610 06/02/23  0457   WBC 7.74 7.14   HGB 7.7* 8.0*   HCT 23.2* 24.5*    164     CMP:   Recent Labs   Lab 06/01/23  0610 06/02/23  0457    132*   K 4.5 4.1    100   CO2 25 21*   * 131*   BUN 19 23   CREATININE 1.2 1.4   CALCIUM 10.3 10.2   ANIONGAP 9 11       Significant Imaging: I have reviewed all pertinent imaging results/findings within the past 24 hours.      Assessment/Plan:      * Closed left basicervical femoral neck fracture with extension into the intertrochanteric region s/p IM nail on 5/30/2023  Patient underwent left hip IM nail for basocervical/intertrochanetric fracture by Dr. Desmond Barker on 5/30/2023.   Patient reports pain in left hip improving.   Plan is to continue PT/OT for gait training and strengthening and restoration of ADL's. Patient is FWB/WBAT: left lower extremity as per Orthopedic recommendations.   Plan to continue Lovenox 40 mg subcutaneous daily post-op for DVT prophylaxis after hip fracture surgery as approved by Neurosurgery.   Orthopedics is following and managing surgical site.   Perineural pain catheter in place with continuous Ropivacaine infusion for pain control and being  managed by Anesthesia Pain Service.   Pain improving to left hip. Patient on multimodal pain management post-op with Tylenol 1000 mg po every 8 hours and Robaxin 500 mg po 4 times daily and Tramadol prn for breakthrough pain and will continue.  PT/OT recommending Skilled nursing facility and  working on discharge planning with patient and family. Patient accepted to [facility] for 6/5 pending medical stability and insurance auth.       Subarachnoid hemorrhage following injury, no loss of consciousness  Controlled. Patient found on admit to have punctate left parietal SAH that was stable on repeat imaging. Patient neurologically intact.   Neurosurgery consulted and as per recs:   --No acute neurosurgical intervention  --Hold Eliquis for 2 weeks. Ok for DVT ppx with standard dose Lovenox 40 mg subcutaneous daily. If higher dosing needed given recent surgery, can start this in 5 days.   --SBP <140  --Na >135  --Keppra 500 mg po BID x 14 days.   --HOB >30.  --Follow-up in 2 weeks with repeat CTH. If repeat CTH stable, can resume home dose Eliquis. Will contact the patient with this appointment. Notify NSGY immediately with any changes in neuro status.      Acute blood loss anemia  Controlled. Hgb 8 on 6/2.   On admission to hospital, patient noted to have Hgb of 10.9. After surgery patient's Hgb level dropped to 8.0 on 5/31 and 7.7 on 6/1 and expected blood loss related to surgery and hip fracture.   Patient has no signs of active bleeding and hemodynamically stable. Patient is asymptomatic related to anemia.   Goal is keep Hgb >7 and consider blood transfusion if Hgb < 7 and asymptomatic or < 8 if patient becomes symptomatic.  Plan is to monitor daily CBC post-operatively.      Tachy-juan a syndrome  PAF (paroxysmal atrial fibrillation)  Controlled. Continue to monitor.   Cardiology consulted as patient having erratic HRs up to 160 and as low as 40 in hospital. Cardiology states has evidence of tachycardia  and bradycardia on ECG but does not meet criteria for tachy-juan a syndrome.   Cardiology recommends to continue low dose Metoprolol 25 mg po BID to help with tachycardia related to atrial fibrillation. No need for pacemaker at this time.   Per Cardiology, if patient experiences syncope or develops significant conversion pause >5 sec, please alert our team. Per Cardiology, patient to follow-up with her cardiologist, Dr. Roman as outpatient.   Home Eliquis on hold for long term anticoagulation for atrial fibrillation due to SAH as per Neurosurgery.   Monitor on telemetry.     Hypomagnesemia  Resolved. Magnesium level 1.9 on 6/2.   Magnesium level improved to 2.0 on 6/1.  Present on admit. Magnesium low at 1.5 on admit and replaced with IV and oral Magnesium.   Monitor with daily level.      Essential hypertension  Chronic and controlled. Continue Hydralazine 25 mg po TID and Amlodipine/Benzapril 5/20 1 tablet po daily to treat. Goal SBP < 140. Monitor vital signs every 4 hours.       Coronary artery disease involving native coronary artery of native heart without angina pectoris  Chronic and controlled. Patient with history of remote stents at [facility] in 1990 and 2003.   Patient on Zocor at home but not on formulary so holding in hospital but to resume on discharge.     Type 2 diabetes mellitus with circulatory disorder, without long-term current use of insulin  Patient's FSGs are controlled on current medication regimen. Patient on oral Tradjenta at home to treat.   Last A1c reviewed-   Lab Results   Component Value Date    HGBA1C 6.3 (H) 05/29/2023     Most recent fingerstick glucose reviewed-   Recent Labs   Lab 06/01/23  1529 06/01/23  2034 06/02/23  0808 06/02/23  1146   POCTGLUCOSE 171* 132* 130* 129*     Current correctional scale  Low  Maintain anti-hyperglycemic dose as follows-   Antihyperglycemics (From admission, onward)      Start     Stop Route Frequency Ordered    05/29/23 1418  insulin aspart U-100  pen 0-5 Units         -- SubQ Before meals & nightly PRN 05/29/23 1318          Hold Oral hypoglycemics while patient is in the hospital.  Target blood sugars 140-180 in hospital.  Diabetic diet.  Monitor blood sugars 4 times daily with meals and at bedtime.     Chronic diastolic heart failure  Patient is identified as having Diastolic (HFpEF) heart failure that is Chronic. CHF is currently controlled. Latest ECHO performed and demonstrates- Results for orders placed during the hospital encounter of 05/20/23    Echo    Interpretation Summary  · The left ventricle is normal in size with low normal systolic function.  · The estimated ejection fraction is 53%.  · There are segmental left ventricular wall motion abnormalities.  · There is abnormal septal wall motion.  · Mild left atrial enlargement.  · Indeterminate left ventricular diastolic function.  · Normal right ventricular size with low normal right ventricular systolic function.  · There is mild-to-moderate aortic valve stenosis.  · Aortic valve area is 1.40 cm2; peak velocity is 1.54 m/s; mean gradient is 6 mmHg.  · Mild aortic regurgitation.  · Mild mitral regurgitation.  · Mild tricuspid regurgitation.  · Normal central venous pressure (3 mmHg).  · The estimated PA systolic pressure is 41 mmHg.  · There is mild pulmonary hypertension.  · Trivial posterior pericardial effusion. At base and under the RA and small outside the RV apically.  Continue Metoprolol and Benazepril and monitor clinical status closely. Monitor on telemetry. Patient is off CHF pathway. Monitor strict Is&Os and daily weights. Continue to stress to patient importance of self efficacy and  on diet for CHF.    Centrilobular emphysema  Chronic hypoxemic respiratory failure  Chronic and controlled. Continue home oxygen at 2 liters. Patient with no signs of acute exacerbation.   Patient on no inhalers at home.     History of lung cancer  In remission.   s/p chemo and last imaging in 2022  was stable disease. Chronic effusions seen on previous imaging seen on CXR here.  On home oxygen and will continue.        Late onset Alzheimer's dementia with behavioral disturbance  Chronic and controlled and mild disease. Patient at cognitive baseline.   Continue home Aricept to treat.  Delirium precautions.       Stage 3a chronic kidney disease  Controlled. Patient is at baseline renal function at present.   Need to avoid any nephrotoxic agents unless necessary while patient is hospitalized.  Renally adjust medications based on patient's creatinine clearance.   Will monitor daily BMP to monitor renal function.         VTE Risk Mitigation (From admission, onward)           Ordered     enoxaparin injection 30 mg  Every 24 hours         06/02/23 0836     Place sequential compression device  Until discontinued         05/30/23 0048     Place MERCEDES hose  Until discontinued         05/29/23 0902     Place sequential compression device  Until discontinued         05/29/23 0902                    Discharge Planning   DUTCH: 6/5/2023     Code Status: Partial Code   Is the patient medically ready for discharge?: No    Reason for patient still in hospital (select all that apply): Patient trending condition  Discharge Plan A: Skilled Nursing Facility ([facility]) for 6/5 pending insurance authorization and medical stability.   Discharge Delays: None known at this time        Florence Scanlon MD  Department of Hospital Medicine   [facility] - Telemetry Stepdown

## 2023-06-02 NOTE — PLAN OF CARE
Problem: Infection  Goal: Absence of Infection Signs and Symptoms  Outcome: Ongoing, Progressing     Problem: Adult Inpatient Plan of Care  Goal: Plan of Care Review  Outcome: Ongoing, Progressing     Problem: Adult Inpatient Plan of Care  Goal: Absence of Hospital-Acquired Illness or Injury  Outcome: Ongoing, Progressing     Problem: Adult Inpatient Plan of Care  Goal: Optimal Comfort and Wellbeing  Outcome: Ongoing, Progressing     Pt AAOx4. Can be forgetful at times but easily re oriented. Pt sundowning during the night and barely slept. HR ranging from 35-160s. IV lopressor given x1. PRN hydralazine given for BP. Pt asymptomatic with changes in HR. 2L NC. Incision CDI. BG WNL. 1 large BM. U/o adequate. Safety maintained. Call bell in reach. Bed alarm in place.   Daughter concerned with sundowning. Made MD aware. MD said will alert day team regarding concerns.

## 2023-06-02 NOTE — ASSESSMENT & PLAN NOTE
· Controlled. Hgb 8 on 6/2.   · On admission to hospital, patient noted to have Hgb of 10.9. After surgery patient's Hgb level dropped to 8.0 on 5/31 and 7.7 on 6/1 and expected blood loss related to surgery and hip fracture.   · Patient has no signs of active bleeding and hemodynamically stable. Patient is asymptomatic related to anemia.   · Goal is keep Hgb >7 and consider blood transfusion if Hgb < 7 and asymptomatic or < 8 if patient becomes symptomatic.  · Plan is to monitor daily CBC post-operatively.

## 2023-06-02 NOTE — ADDENDUM NOTE
Addendum  created 06/02/23 1433 by Kurt Lyon MD    Clinical Note Signed, Intraprocedure Event edited

## 2023-06-02 NOTE — PROGRESS NOTES
Pt resting comfortably.  Left SIFl PNC has been paused. Dressing CDI.  Catheter discontinued, tip intact.  Pt tolerated well.  Educated regarding continued pain management.  Understanding verbalized.

## 2023-06-02 NOTE — ASSESSMENT & PLAN NOTE
· Controlled. Patient found on admit to have punctate left parietal SAH that was stable on repeat imaging. Patient neurologically intact.   · Neurosurgery consulted and as per recs:   --No acute neurosurgical intervention  --Hold Eliquis for 2 weeks. Ok for DVT ppx with standard dose Lovenox 40 mg subcutaneous daily. If higher dosing needed given recent surgery, can start this in 5 days.   --SBP <140  --Na >135  --Keppra 500 mg po BID x 14 days.   --HOB >30.  --Follow-up in 2 weeks with repeat CTH. If repeat CTH stable, can resume home dose Eliquis. Will contact the patient with this appointment. Notify NSGY immediately with any changes in neuro status.

## 2023-06-02 NOTE — CARE UPDATE
PNC paused this AM in preparation for pulling during formal rounds.    Kurt Lyon MD  Anesthesiology PGY-4

## 2023-06-02 NOTE — PT/OT/SLP PROGRESS
"Occupational Therapy   CoTreatment w PT  CoTx performed to optimize pt participation and assessment of full functional capacity.       Name: Brunilda England  MRN: [id number]  Admitting Diagnosis:  Left displaced femoral neck fracture  3 Days Post-Op    Recommendations:     Discharge Recommendations: nursing facility, skilled  Discharge Equipment Recommendations:  to be determined by next level of care  Barriers to discharge:       Assessment:     Brunilda England is a 89 y.o. female with a medical diagnosis of Left displaced femoral neck fracture.  She presents with with fair tolerance to session on this date completing 2 trials of sit >stand requiring Max x 2 and able to mainitain static standing for ~ 1 min. Pt continues to require increased assistance for ADL completion 2/2 pain however motivated to complete BSC transfer. Pt noted to have R lateral lean w improvement post L lateral weightbearing through forearm.. Performance deficits affecting function are weakness, impaired endurance, impaired self care skills, impaired functional mobility, gait instability, impaired balance, decreased coordination, decreased lower extremity function, decreased safety awareness, pain.   Pt motivated to return home however not at baseline for ADL and functional mobility performance in addition to concerns of fall risk and would benefit from continued OT services at this time.  Rehab Prognosis:  Good; patient would benefit from acute skilled OT services to address these deficits and reach maximum level of function.       Plan:     Patient to be seen 4 x/week to address the above listed problems via self-care/home management, therapeutic activities, therapeutic exercises, neuromuscular re-education  Plan of Care Expires: 06/30/23  Plan of Care Reviewed with: patient, daughter    Subjective     Chief Complaint:  L hip pain   Patient/Family Comments/goals: "im going to do my best"  Pain/Comfort:  Pain Rating 1:  (did not rate)  Location - " Side 1: Left  Location - Orientation 1: generalized  Location 1: hip  Pain Addressed 1: Reposition, Distraction    Objective:     Communicated with: RN prior to session.  Patient found supine with telemetry, pulse ox (continuous), pressure relief boots, PureWick, perineural catheter, oxygen upon OT entry to room.    General Precautions: Standard, fall    Orthopedic Precautions:LLE weight bearing as tolerated  Braces: N/A  Respiratory Status: Nasal cannula, flow 1.5 L/min     Occupational Performance:     Bed Mobility:    Patient completed Rolling/Turning to Left with  maximal assistance  Patient completed Rolling/Turning to Right with maximal assistance  Patient completed Supine to Sit with maximal assistance and 2 persons  Patient completed Sit to Supine with maximal assistance and 2 persons   SBA for EOB balance w R lateral lean noted     Functional Mobility/Transfers:  Patient completed Sit <> Stand Transfer with maximal assistance and of 2 persons  with  hand-held assist and rolling walker   2nd trial sit>stand requiring   Functional Mobility: Attempted L lateral steps w pt able to move heel of R foot however not able to fully weightshift on L side     Activities of Daily Living:  Toileting: maximal assistance pericare and clothing management from suipine using log roll technique      [facility] 6 Click ADL: 15    Treatment & Education:  Completed wayne bearing through L forearm to assist w R lateral lean correction w good tolerance      Pt educated on scope of practice and importance of daily functional mobility.   Pt educated on safety precautions during transfers  Pt updated on POC     Patient left right sidelying with all lines intact, call button in reach, RN notified, and daughter present    GOALS:   Multidisciplinary Problems       Occupational Therapy Goals          Problem: Occupational Therapy    Goal Priority Disciplines Outcome Interventions   Occupational Therapy Goal     OT, PT/OT Ongoing, Progressing     Description: Goals to be met by: 6/14/23     Patient will increase functional independence with ADLs by performing:    UE Dressing with [place].  LE Dressing with Minimal Assistance.  Grooming while standing with Stand-by Assistance.  Toileting from bedside commode with Minimal Assistance for hygiene and clothing management.   Toilet transfer to bedside commode with Minimal Assistance.                         Time Tracking:     OT Date of Treatment: 06/02/23  OT Start Time: 0923  OT Stop Time: 1022  OT Total Time (min): 59 min    Billable Minutes:Self Care/Home Management 23  Therapeutic Activity 26  Neuromuscular Re-education 10    OT/CHRIS: OT          6/2/2023

## 2023-06-02 NOTE — CARE UPDATE
RAPID RESPONSE NURSE FOLLOW-UP NOTE       Followed up with patient for  mews alert . Pt received PRN hydralazine and RN currently administering PRN metoprolol IVP upon arrival to bedside for sustained tachycardia. Pt will also be receiving PM dose of scheduled hydralazine for /78.  No acute issues at this time. Reviewed plan of care with Bedside RNCindy .   Team will continue to follow.  Please call Rapid Response RN, Amber Peñaloza RN with any questions or concerns at [phone].

## 2023-06-03 PROBLEM — E83.42 HYPOMAGNESEMIA: Status: RESOLVED | Noted: 2023-05-31 | Resolved: 2023-06-03

## 2023-06-03 LAB
ANION GAP SERPL CALC-SCNC: 8 MMOL/L (ref 8–16)
BASOPHILS # BLD AUTO: 0.03 K/UL (ref 0–0.2)
BASOPHILS NFR BLD: 0.6 % (ref 0–1.9)
BLD PROD TYP BPU: NORMAL
BLD PROD TYP BPU: NORMAL
BLOOD UNIT EXPIRATION DATE: NORMAL
BLOOD UNIT EXPIRATION DATE: NORMAL
BLOOD UNIT TYPE CODE: 6200
BLOOD UNIT TYPE CODE: 6200
BLOOD UNIT TYPE: NORMAL
BLOOD UNIT TYPE: NORMAL
BUN SERPL-MCNC: 15 MG/DL (ref 10–30)
CALCIUM SERPL-MCNC: 9.9 MG/DL (ref 8.7–10.5)
CHLORIDE SERPL-SCNC: 103 MMOL/L (ref 95–110)
CO2 SERPL-SCNC: 22 MMOL/L (ref 23–29)
CODING SYSTEM: NORMAL
CODING SYSTEM: NORMAL
CREAT SERPL-MCNC: 0.9 MG/DL (ref 0.5–1.4)
CROSSMATCH INTERPRETATION: NORMAL
CROSSMATCH INTERPRETATION: NORMAL
DIFFERENTIAL METHOD: ABNORMAL
DISPENSE STATUS: NORMAL
DISPENSE STATUS: NORMAL
EOSINOPHIL # BLD AUTO: 0.1 K/UL (ref 0–0.5)
EOSINOPHIL NFR BLD: 1.2 % (ref 0–8)
ERYTHROCYTE [DISTWIDTH] IN BLOOD BY AUTOMATED COUNT: 16.3 % (ref 11.5–14.5)
EST. GFR  (NO RACE VARIABLE): >60 ML/MIN/1.73 M^2
GLUCOSE SERPL-MCNC: 97 MG/DL (ref 70–110)
HCT VFR BLD AUTO: 24.9 % (ref 37–48.5)
HGB BLD-MCNC: 8.1 G/DL (ref 12–16)
IMM GRANULOCYTES # BLD AUTO: 0.02 K/UL (ref 0–0.04)
IMM GRANULOCYTES NFR BLD AUTO: 0.4 % (ref 0–0.5)
LYMPHOCYTES # BLD AUTO: 0.7 K/UL (ref 1–4.8)
LYMPHOCYTES NFR BLD: 14.5 % (ref 18–48)
MAGNESIUM SERPL-MCNC: 1.6 MG/DL (ref 1.6–2.6)
MCH RBC QN AUTO: 28.5 PG (ref 27–31)
MCHC RBC AUTO-ENTMCNC: 32.5 G/DL (ref 32–36)
MCV RBC AUTO: 88 FL (ref 82–98)
MONOCYTES # BLD AUTO: 0.6 K/UL (ref 0.3–1)
MONOCYTES NFR BLD: 11.2 % (ref 4–15)
NEUTROPHILS # BLD AUTO: 3.6 K/UL (ref 1.8–7.7)
NEUTROPHILS NFR BLD: 72.1 % (ref 38–73)
NRBC BLD-RTO: 0 /100 WBC
NUM UNITS TRANS PACKED RBC: NORMAL
NUM UNITS TRANS PACKED RBC: NORMAL
PLATELET # BLD AUTO: 179 K/UL (ref 150–450)
PMV BLD AUTO: 11.6 FL (ref 9.2–12.9)
POCT GLUCOSE: 129 MG/DL (ref 70–110)
POCT GLUCOSE: 140 MG/DL (ref 70–110)
POCT GLUCOSE: 142 MG/DL (ref 70–110)
POTASSIUM SERPL-SCNC: 3.7 MMOL/L (ref 3.5–5.1)
RBC # BLD AUTO: 2.84 M/UL (ref 4–5.4)
SODIUM SERPL-SCNC: 133 MMOL/L (ref 136–145)
WBC # BLD AUTO: 4.98 K/UL (ref 3.9–12.7)

## 2023-06-03 PROCEDURE — 25000003 PHARM REV CODE 250: Performed by: STUDENT IN AN ORGANIZED HEALTH CARE EDUCATION/TRAINING PROGRAM

## 2023-06-03 PROCEDURE — 20600001 HC STEP DOWN PRIVATE ROOM

## 2023-06-03 PROCEDURE — 25000003 PHARM REV CODE 250: Performed by: HOSPITALIST

## 2023-06-03 PROCEDURE — 36415 COLL VENOUS BLD VENIPUNCTURE: CPT | Performed by: HOSPITALIST

## 2023-06-03 PROCEDURE — 85025 COMPLETE CBC W/AUTO DIFF WBC: CPT | Performed by: HOSPITALIST

## 2023-06-03 PROCEDURE — 99233 PR SUBSEQUENT HOSPITAL CARE,LEVL III: ICD-10-PCS | Mod: ,,, | Performed by: INTERNAL MEDICINE

## 2023-06-03 PROCEDURE — 25000003 PHARM REV CODE 250: Performed by: INTERNAL MEDICINE

## 2023-06-03 PROCEDURE — 63600175 PHARM REV CODE 636 W HCPCS: Performed by: INTERNAL MEDICINE

## 2023-06-03 PROCEDURE — 99233 SBSQ HOSP IP/OBS HIGH 50: CPT | Mod: ,,, | Performed by: INTERNAL MEDICINE

## 2023-06-03 PROCEDURE — 80048 BASIC METABOLIC PNL TOTAL CA: CPT | Performed by: HOSPITALIST

## 2023-06-03 PROCEDURE — 83735 ASSAY OF MAGNESIUM: CPT | Performed by: HOSPITALIST

## 2023-06-03 RX ORDER — LOPERAMIDE HYDROCHLORIDE 2 MG/1
2 CAPSULE ORAL 4 TIMES DAILY PRN
Status: DISCONTINUED | OUTPATIENT
Start: 2023-06-03 | End: 2023-06-05 | Stop reason: HOSPADM

## 2023-06-03 RX ORDER — MAGNESIUM SULFATE HEPTAHYDRATE 40 MG/ML
2 INJECTION, SOLUTION INTRAVENOUS ONCE
Status: COMPLETED | OUTPATIENT
Start: 2023-06-03 | End: 2023-06-03

## 2023-06-03 RX ORDER — ENOXAPARIN SODIUM 100 MG/ML
40 INJECTION SUBCUTANEOUS EVERY 24 HOURS
Status: DISCONTINUED | OUTPATIENT
Start: 2023-06-03 | End: 2023-06-05 | Stop reason: HOSPADM

## 2023-06-03 RX ADMIN — METHOCARBAMOL 500 MG: 500 TABLET ORAL at 09:06

## 2023-06-03 RX ADMIN — METOPROLOL TARTRATE 25 MG: 25 TABLET, FILM COATED ORAL at 10:06

## 2023-06-03 RX ADMIN — METOPROLOL TARTRATE 25 MG: 25 TABLET, FILM COATED ORAL at 09:06

## 2023-06-03 RX ADMIN — MAGNESIUM SULFATE 2 G: 2 INJECTION INTRAVENOUS at 10:06

## 2023-06-03 RX ADMIN — LEVETIRACETAM 500 MG: 500 TABLET, FILM COATED ORAL at 10:06

## 2023-06-03 RX ADMIN — Medication 6 MG: at 11:06

## 2023-06-03 RX ADMIN — HYDRALAZINE HYDROCHLORIDE 25 MG: 25 TABLET, FILM COATED ORAL at 09:06

## 2023-06-03 RX ADMIN — ENOXAPARIN SODIUM 40 MG: 40 INJECTION SUBCUTANEOUS at 05:06

## 2023-06-03 RX ADMIN — MUPIROCIN 1 G: 20 OINTMENT TOPICAL at 10:06

## 2023-06-03 RX ADMIN — AMLODIPINE BESYLATE AND BENAZEPRIL HYDROCHLORIDE 1 CAPSULE: 5; 20 CAPSULE ORAL at 10:06

## 2023-06-03 RX ADMIN — DULOXETINE 60 MG: 60 CAPSULE, DELAYED RELEASE ORAL at 10:06

## 2023-06-03 RX ADMIN — HYDRALAZINE HYDROCHLORIDE 25 MG: 25 TABLET, FILM COATED ORAL at 06:06

## 2023-06-03 RX ADMIN — LOPERAMIDE HYDROCHLORIDE 2 MG: 2 CAPSULE ORAL at 09:06

## 2023-06-03 RX ADMIN — DONEPEZIL HYDROCHLORIDE 5 MG: 5 TABLET ORAL at 09:06

## 2023-06-03 RX ADMIN — CHOLECALCIFEROL TAB 25 MCG (1000 UNIT) 1000 UNITS: 25 TAB at 10:06

## 2023-06-03 RX ADMIN — HYDRALAZINE HYDROCHLORIDE 25 MG: 25 TABLET, FILM COATED ORAL at 01:06

## 2023-06-03 RX ADMIN — Medication 2 TABLET: at 10:06

## 2023-06-03 RX ADMIN — ACETAMINOPHEN 1000 MG: 500 TABLET ORAL at 06:06

## 2023-06-03 RX ADMIN — ACETAMINOPHEN 1000 MG: 500 TABLET ORAL at 01:06

## 2023-06-03 RX ADMIN — ACETAMINOPHEN 1000 MG: 500 TABLET ORAL at 09:06

## 2023-06-03 RX ADMIN — LEVETIRACETAM 500 MG: 500 TABLET, FILM COATED ORAL at 09:06

## 2023-06-03 RX ADMIN — SENNOSIDES AND DOCUSATE SODIUM 1 TABLET: 50; 8.6 TABLET ORAL at 10:06

## 2023-06-03 NOTE — SUBJECTIVE & OBJECTIVE
Interval History: Patient's HR stabilized. Patient with HRs in  range and cardiology stated okay for HR to be on slightly tachycardiac side to try and prevent significant bradycardia. Daughter, Nora at bedside and reports patient had a much better night last night and slept most of night and was not confused or agitated. Patient awake and alert this am. Patient reporting 3/10 pain to left hip. Patient to work with therapy today. Labs reviewed and Hgb stable at 8.1 and was 8.0 yesterday. Creatinine improved form 1.4 yesterday to 0.9 today. Patient eating and drinking better according to daughter. Daughter has been doing some bed exercises to left leg with patient. Patient on track for discharge to [facility] for 6/5.     Review of Systems   Constitutional:  Negative for fever.   Respiratory:  Negative for cough and shortness of breath.    Cardiovascular:  Negative for chest pain, palpitations and leg swelling.   Gastrointestinal:  Negative for abdominal pain, nausea and vomiting.   Musculoskeletal:  Positive for arthralgias (Left hip).   Neurological:  Negative for dizziness.   Psychiatric/Behavioral:  Negative for agitation and confusion.    Objective:     Vital Signs (Most Recent):  Temp: 97.9 °F (36.6 °C) (06/03/23 1129)  Pulse: 108 (06/03/23 1146)  Resp: 18 (06/03/23 1129)  BP: 154/74 (06/03/23 1129)  SpO2: 99 % (06/03/23 1129) on room air Vital Signs (24h Range):  Temp:  [97.6 °F (36.4 °C)-98.7 °F (37.1 °C)] 97.9 °F (36.6 °C)  Pulse:  [100-109] 108  Resp:  [18] 18  SpO2:  [87 %-100 %] 99 %  BP: (133-160)/(61-74) 154/74     Weight: 76.9 kg (169 lb 8.5 oz)  Body mass index is 30.03 kg/m².    Intake/Output Summary (Last 24 hours) at 6/3/2023 1214  Last data filed at 6/3/2023 0651  Gross per 24 hour   Intake 360 ml   Output 1150 ml   Net -790 ml         Physical Exam  Vitals and nursing note reviewed.   Constitutional:       General: She is awake. She is not in acute distress.     Appearance: Normal  appearance. She is not ill-appearing.   Eyes:      Conjunctiva/sclera: Conjunctivae normal.   Cardiovascular:      Rate and Rhythm: Regular rhythm. Tachycardia present.      Heart sounds: Normal heart sounds. No murmur heard.    No friction rub. No gallop.   Pulmonary:      Effort: Pulmonary effort is normal. No respiratory distress.      Breath sounds: Normal breath sounds. No wheezing.   Abdominal:      General: Abdomen is flat. Bowel sounds are normal. There is no distension.      Palpations: Abdomen is soft.      Tenderness: There is no abdominal tenderness. There is no guarding.   Skin:     General: Skin is warm.      Findings: No erythema.      Comments: Surgical dressing noted on left hip. Dressing is clean and dry and intact.   Neurological:      General: No focal deficit present.      Mental Status: She is alert and oriented to person, place, and time.   Psychiatric:         Mood and Affect: Mood normal.         Behavior: Behavior normal. Behavior is cooperative.         Thought Content: Thought content normal.           Significant Labs: CBC:   Recent Labs   Lab 06/02/23  0457 06/03/23  0521   WBC 7.14 4.98   HGB 8.0* 8.1*   HCT 24.5* 24.9*    179     CMP:   Recent Labs   Lab 06/02/23  0457 06/03/23  0521   * 133*   K 4.1 3.7    103   CO2 21* 22*   * 97   BUN 23 15   CREATININE 1.4 0.9   CALCIUM 10.2 9.9   ANIONGAP 11 8       Significant Imaging: I have reviewed all pertinent imaging results/findings within the past 24 hours.

## 2023-06-03 NOTE — PROGRESS NOTES
Pharmacist Renal Dose Adjustment Note    Brunilda England is a 89 y.o. female being treated with the medication enoxaparin    Patient Data:    Vital Signs (Most Recent):  Temp: 98.6 °F (37 °C) (06/03/23 0400)  Pulse: 106 (06/03/23 0400)  Resp: 18 (06/02/23 2303)  BP: (!) 159/66 (06/03/23 0400)  SpO2: 100 % (06/03/23 0400) Vital Signs (72h Range):  Temp:  [97 °F (36.1 °C)-98.6 °F (37 °C)]   Pulse:  []   Resp:  [16-20]   BP: (102-168)/(50-82)   SpO2:  [92 %-100 %]      Recent Labs   Lab 06/01/23  0610 06/02/23  0457 06/03/23  0521   CREATININE 1.2 1.4 0.9     Serum creatinine: 0.9 mg/dL 06/03/23 0521  Estimated creatinine clearance: 40 mL/min    Enoxaparin 30 mg SQ q24h will be changed to enoxaparin 40 mg SQ q24h    Pharmacist's Name: William Linares, PharmD, BCPS   Pharmacist's Extension: [id number]

## 2023-06-03 NOTE — ASSESSMENT & PLAN NOTE
Brunilda England is a 89 y.o. female with a left basicervical femoral neck fracture with extension into the intertroch region, closed, NVI. They take Eliquis 5 b.i.d. anticoagulation at home. They required a walker for ambulatory assistive devices prior to this injury. Also had small SAH on CT head which was stable on repeat imaging.  She is now s/p CMN 5/30.     - WBAT LLE   - AC: per primary; now on daily SQ Lovenox 30mg   - Pain: MM    » Dispo: discharge to SNF once medically cleared

## 2023-06-03 NOTE — PROGRESS NOTES
[facility] - Telemetry Stepdown  Orthopedics  Progress Note    Patient Name: Brunilda England  MRN: [id number]  Admission Date: 5/28/2023  Hospital Length of Stay: 5 days  Attending Provider: Florence Scanlon MD  Primary Care Provider: Pepper Whitfield MD  Follow-up For: Procedure(s) (LRB):  INSERTION, INTRAMEDULLARY TEREZA, FEMUR (Left)    Post-Operative Day: 4 Days Post-Op  Subjective:     Principal Problem:Left displaced femoral neck fracture    Principal Orthopedic Problem: s/p L-CMN 5/30    Interval History: Afebrile, VSS.  Pain reportedly well controlled; PNC removed.  Hgb 8.1. Accepted to [facility] 6/5/23.  Denies any nausea/vomiting, chest pain, SOB, numbness/tingling. Family at bedside.         Review of patient's allergies indicates:   Allergen Reactions    Bextra [valdecoxib] Swelling    Gabapentin Diarrhea and Nausea Only       Current Facility-Administered Medications   Medication    acetaminophen tablet 1,000 mg    amlodipine-benazepril 5-20 mg per capsule 1 capsule    bisacodyL suppository 10 mg    calcium-vitamin D3 500 mg-5 mcg (200 unit) per tablet 2 tablet    dextrose 10% bolus 125 mL 125 mL    dextrose 10% bolus 250 mL 250 mL    dextrose 40 % gel 15,000 mg    dextrose 40 % gel 30,000 mg    donepeziL tablet 5 mg    DULoxetine DR capsule 60 mg    enoxaparin injection 30 mg    glucagon (human recombinant) injection 1 mg    hydrALAZINE tablet 25 mg    hydrALAZINE tablet 50 mg    insulin aspart U-100 pen 0-5 Units    levETIRAcetam tablet 500 mg    melatonin tablet 6 mg    methocarbamoL tablet 500 mg    metoprolol injection 2.5 mg    metoprolol tartrate (LOPRESSOR) tablet 25 mg    mupirocin 2 % ointment 1 g    ondansetron injection 4 mg    polyethylene glycol packet 17 g    senna-docusate 8.6-50 mg per tablet 1 tablet    sodium chloride 0.9% flush 10 mL    vitamin D 1000 units tablet 1,000 Units     Facility-Administered Medications Ordered in Other Encounters   Medication    sodium  chloride 0.9% flush 5 mL     Objective:     Vital Signs (Most Recent):  Temp: 98.6 °F (37 °C) (06/03/23 0400)  Pulse: 106 (06/03/23 0400)  Resp: 18 (06/02/23 2303)  BP: (!) 159/66 (06/03/23 0400)  SpO2: 100 % (06/03/23 0400) Vital Signs (24h Range):  Temp:  [97 °F (36.1 °C)-98.6 °F (37 °C)] 98.6 °F (37 °C)  Pulse:  [] 106  Resp:  [18] 18  SpO2:  [98 %-100 %] 100 %  BP: (135-164)/(61-73) 159/66     Weight: 76.9 kg (169 lb 8.5 oz)     Body mass index is 30.03 kg/m².      Intake/Output Summary (Last 24 hours) at 6/3/2023 0649  Last data filed at 6/2/2023 1656  Gross per 24 hour   Intake 360 ml   Output 1200 ml   Net -840 ml         Ortho/SPM Exam  AAOx4  NAD  Reg rate  No increased WOB    LLE:  Dressing c/d/i  SILT T/SP/DP/Greer/Sa  Motor intact T/SP/DP  WWP extremities  FCDs in place and functioning        Significant Labs: BMP:   Recent Labs   Lab 06/03/23  0521   GLU 97   *   K 3.7      CO2 22*   BUN 15   CREATININE 0.9   CALCIUM 9.9   MG 1.6       CBC:   Recent Labs   Lab 06/02/23  0457 06/03/23  0521   WBC 7.14 4.98   HGB 8.0* 8.1*   HCT 24.5* 24.9*    179       All pertinent labs within the past 24 hours have been reviewed.    Significant Imaging: I have reviewed all pertinent imaging results/findings.    Assessment/Plan:     * Closed left basicervical femoral neck fracture with extension into the intertrochanteric region s/p IM nail on 5/30/2023  Brunilda England is a 89 y.o. female with a left basicervical femoral neck fracture with extension into the intertroch region, closed, NVI. They take Eliquis 5 b.i.d. anticoagulation at home. They required a walker for ambulatory assistive devices prior to this injury. Also had small SAH on CT head which was stable on repeat imaging.  She is now s/p CMN 5/30.     - WBAT LLE   - AC: per primary; now on daily SQ Lovenox 30mg   - Pain: MM    » Dispo: discharge to SNF once medically cleared          Nael Woods MD  Orthopedics  [facility] - Telemetry  Stepdown

## 2023-06-03 NOTE — PROGRESS NOTES
"[facility] - Telemetry [facility] Medicine  Progress Note    Patient Name: Brunilda England  MRN: [id number]  Patient Class: IP- Inpatient   Admission Date: 5/28/2023  Length of Stay: 5 days  Attending Physician: Florence Scanlon MD  Primary Care Provider: Pepper Whitfield MD        Subjective:     Principal Problem:Left displaced femoral neck fracture        HPI:  Ms. Brunilda England is a 89 y.o. female with past medical history of HTN, paroxysmal atrial fibrillation/flutter, COPD on home oxygen, Chronic diastolic HF, CKD 3a, dementia, history of lung/rectal cancer who was in her usual state of health in which she lives at home with her daughter until she got up at night to change from her sneakers into a pair of slippers in the utility room when she turned wrong and fell while doing so and her daughter heard it and came in to find her on the ground and called EMS to bring her to the hospital. She was found to have a closed left basocervical fracture with extension into intertrochanteric region. She was using her walker when this happened which she uses for ambulation. She was also found to have a small left parietal SAH on admit and NS saw her as well as NCC in the ER and recommendations given and stable for the floor at this time. Ortho plans for OR tomorrow given this to monitor in interim. Her other daughter is at bedside now and able to assist with history but patien tis also about to do so. She reports having some mild pain to left leg now and she denies any dizziness, chest pain, palpitations, or dyspnea now. Has occaisonal constipation issues but is UTD on BMs now. Discussed planning for post op PT/OT and likely SNF, patient says "we'll see about that" but daughter reports will need and [facility] and I told her is normal for this post op course to need SNF after a fracture like this.          Overview/Hospital Course:  Patient admitted to [facility] Medicine Team H: Hip Fracture team " and started on Hip Fracture Pathway with Orthopedic surgery consult for hip fracture. Patient was seen and evaluated by Orthopedic surgery who recommended operative repair of hip fracture. Patient was medically optimized prior to surgery and was taken to OR after optimization on 5/30/2023. Patient underwent left hip cephalomedullary nail fixation by Dr. Desmond Barker. Post-op patient WBAT to the left lower extremity as per Orthopedics recommendation. Patient placed on Lovenox 40 mg subcutaneous daily and MERCEDES/SCD's for DVT prophylaxis post-op as unable to use patient's home Apixiban post-op for her atrial; fibrillation due to ICH as per Neurosurgery but NS okay with Lovenox. Perineural pain catheter placed by Anesthesia Pain Service with continuous infusion of Ropivacaine to help with pain control post-op and Anesthesia Pain Service managing while patient in the hospital. PT/OT consulted post-op and evaluated patient. Patient progressing well with PT and OT and recommended skilled nursing facility placement when medically ready for hospital discharge. Patient having issues with tachy-juan a in hospital. Patient followed by Cardiology as outpatient for A. Fib and intermittent bradycardia and undergoing outpatient evaluation. Cardiology consulted as inpatient and following. Patient having bursts of tachycardia and bradycardia in hospital and Metoprolol dosing adjusted as result. Cardiology states no indication for need for pacemaker at this time and stated they would rather patient be more tachycardiac than bradycardiac. Pain controlled.       Interval History: Patient's HR stabilized. Patient with HRs in  range and cardiology stated okay for HR to be on slightly tachycardiac side to try and prevent significant bradycardia. Daughter, Nora at bedside and reports patient had a much better night last night and slept most of night and was not confused or agitated. Patient awake and alert this am. Patient  reporting 3/10 pain to left hip. Patient to work with therapy today. Labs reviewed and Hgb stable at 8.1 and was 8.0 yesterday. Creatinine improved form 1.4 yesterday to 0.9 today. Patient eating and drinking better according to daughter. Daughter has been doing some bed exercises to left leg with patient. Patient on track for discharge to [facility] for 6/5.     Review of Systems   Constitutional:  Negative for fever.   Respiratory:  Negative for cough and shortness of breath.    Cardiovascular:  Negative for chest pain, palpitations and leg swelling.   Gastrointestinal:  Negative for abdominal pain, nausea and vomiting.   Musculoskeletal:  Positive for arthralgias (Left hip).   Neurological:  Negative for dizziness.   Psychiatric/Behavioral:  Negative for agitation and confusion.    Objective:     Vital Signs (Most Recent):  Temp: 97.9 °F (36.6 °C) (06/03/23 1129)  Pulse: 108 (06/03/23 1146)  Resp: 18 (06/03/23 1129)  BP: 154/74 (06/03/23 1129)  SpO2: 99 % (06/03/23 1129) on room air Vital Signs (24h Range):  Temp:  [97.6 °F (36.4 °C)-98.7 °F (37.1 °C)] 97.9 °F (36.6 °C)  Pulse:  [100-109] 108  Resp:  [18] 18  SpO2:  [87 %-100 %] 99 %  BP: (133-160)/(61-74) 154/74     Weight: 76.9 kg (169 lb 8.5 oz)  Body mass index is 30.03 kg/m².    Intake/Output Summary (Last 24 hours) at 6/3/2023 1214  Last data filed at 6/3/2023 0651  Gross per 24 hour   Intake 360 ml   Output 1150 ml   Net -790 ml         Physical Exam  Vitals and nursing note reviewed.   Constitutional:       General: She is awake. She is not in acute distress.     Appearance: Normal appearance. She is not ill-appearing.   Eyes:      Conjunctiva/sclera: Conjunctivae normal.   Cardiovascular:      Rate and Rhythm: Regular rhythm. Tachycardia present.      Heart sounds: Normal heart sounds. No murmur heard.    No friction rub. No gallop.   Pulmonary:      Effort: Pulmonary effort is normal. No respiratory distress.      Breath sounds: Normal breath sounds.  No wheezing.   Abdominal:      General: Abdomen is flat. Bowel sounds are normal. There is no distension.      Palpations: Abdomen is soft.      Tenderness: There is no abdominal tenderness. There is no guarding.   Skin:     General: Skin is warm.      Findings: No erythema.      Comments: Surgical dressing noted on left hip. Dressing is clean and dry and intact.   Neurological:      General: No focal deficit present.      Mental Status: She is alert and oriented to person, place, and time.   Psychiatric:         Mood and Affect: Mood normal.         Behavior: Behavior normal. Behavior is cooperative.         Thought Content: Thought content normal.           Significant Labs: CBC:   Recent Labs   Lab 06/02/23 0457 06/03/23 0521   WBC 7.14 4.98   HGB 8.0* 8.1*   HCT 24.5* 24.9*    179     CMP:   Recent Labs   Lab 06/02/23 0457 06/03/23 0521   * 133*   K 4.1 3.7    103   CO2 21* 22*   * 97   BUN 23 15   CREATININE 1.4 0.9   CALCIUM 10.2 9.9   ANIONGAP 11 8       Significant Imaging: I have reviewed all pertinent imaging results/findings within the past 24 hours.      Assessment/Plan:      * Closed left basicervical femoral neck fracture with extension into the intertrochanteric region s/p IM nail on 5/30/2023  · Patient underwent left hip IM nail for basocervical/intertrochanetric fracture by Dr. Desmond Barker on 5/30/2023.   · Patient reports pain in left hip improving and controlled.   · Patient to continue PT/OT for gait training and strengthening and restoration of ADL's. Patient is FWB/WBAT: left lower extremity as per Orthopedic recommendations.   · Patient to continue Lovenox 40 mg subcutaneous daily post-op for DVT prophylaxis after hip fracture surgery as approved by Neurosurgery.   · Orthopedics is following and managing surgical site.   · Perineural pain catheter removed by Anesthesia on 6/2.   · Pain improving to left hip. Patient on multimodal pain management post-op  with Tylenol 1000 mg po every 8 hours and Robaxin 500 mg po 4 times daily and Tramadol prn for breakthrough pain and will continue.  · PT/OT recommending Skilled nursing facility and  working on discharge planning with patient and family. Patient accepted to [facility] for 6/5 pending medical stability and insurance auth.       Subarachnoid hemorrhage following injury, no loss of consciousness  · Controlled. Patient found on admit to have punctate left parietal SAH that was stable on repeat imaging. Patient neurologically intact.   · Neurosurgery consulted and as per recs:   --No acute neurosurgical intervention  --Hold Eliquis for 2 weeks. Ok for DVT ppx with standard dose Lovenox 40 mg subcutaneous daily. If higher dosing needed given recent surgery, can start this in 5 days.   --SBP <140  --Na >135  --Keppra 500 mg po BID x 14 days.   --HOB >30.  --Follow-up in 2 weeks with repeat CTH. If repeat CTH stable, can resume home dose Eliquis. Will contact the patient with this appointment. Notify NSGY immediately with any changes in neuro status.      Acute blood loss anemia  · Controlled. Hgb 8 on 6/2 and 8.1 on 6/3.   · On admission to hospital, patient noted to have Hgb of 10.9. After surgery patient's Hgb level dropped to 8.0 on 5/31 and 7.7 on 6/1 and expected blood loss related to surgery and hip fracture.   · Patient has no signs of active bleeding and hemodynamically stable. Patient is asymptomatic related to anemia.   · Goal is keep Hgb >7 and consider blood transfusion if Hgb < 7 and asymptomatic or < 8 if patient becomes symptomatic.  · Plan is to monitor daily CBC post-operatively.      Tachy-juan a syndrome  PAF (paroxysmal atrial fibrillation)  · Controlled. Continue to monitor.   · Cardiology consulted as patient having erratic HRs up to 160 and as low as 40 in hospital. Cardiology states has evidence of tachycardia and bradycardia on ECG but does not meet criteria for tachy-juan a syndrome.    · Cardiology recommends to continue low dose Metoprolol 25 mg po BID to help with tachycardia related to atrial fibrillation. No need for pacemaker at this time.   · Per Cardiology, if patient experiences syncope or develops significant conversion pause >5 sec, please alert our team. Per Cardiology, patient to follow-up with her cardiologist, Dr. Roman as outpatient.   · Home Eliquis on hold for long term anticoagulation for atrial fibrillation due to SAH as per Neurosurgery.   · Monitor on telemetry.     Essential hypertension  Chronic and controlled. Continue Hydralazine 25 mg po TID and Amlodipine/Benzapril 5/20 1 tablet po daily to treat. Goal SBP < 140. Monitor vital signs every 4 hours.       Coronary artery disease involving native coronary artery of native heart without angina pectoris  · Chronic and controlled. Patient with history of remote st[facility] at [facility] in 1990 and 2003.   · Patient on Zocor at home but not on formulary so holding in hospital but to resume on discharge.     Type 2 diabetes mellitus with circulatory disorder, without long-term current use of insulin  Patient's FSGs are controlled on current medication regimen. Patient on oral Tradjenta at home to treat.   Last A1c reviewed-   Lab Results   Component Value Date    HGBA1C 6.3 (H) 05/29/2023     Most recent fingerstick glucose reviewed-   Recent Labs   Lab 06/02/23  1958 06/03/23  1131   POCTGLUCOSE 111* 140*     Current correctional scale  Low  Maintain anti-hyperglycemic dose as follows-   Antihyperglycemics (From admission, onward)    Start     Stop Route Frequency Ordered    05/29/23 1418  insulin aspart U-100 pen 0-5 Units         -- SubQ Before meals & nightly PRN 05/29/23 1318        Hold Oral hypoglycemics while patient is in the hospital.  Target blood sugars 140-180 in hospital.  Diabetic diet.  Monitor blood sugars 4 times daily with meals and at bedtime.     Chronic diastolic heart failure  Patient is identified as having  Diastolic (HFpEF) heart failure that is Chronic. CHF is currently controlled. Latest ECHO performed and demonstrates- Results for orders placed during the hospital encounter of 05/20/23    Echo    Interpretation Summary  · The left ventricle is normal in size with low normal systolic function.  · The estimated ejection fraction is 53%.  · There are segmental left ventricular wall motion abnormalities.  · There is abnormal septal wall motion.  · Mild left atrial enlargement.  · Indeterminate left ventricular diastolic function.  · Normal right ventricular size with low normal right ventricular systolic function.  · There is mild-to-moderate aortic valve stenosis.  · Aortic valve area is 1.40 cm2; peak velocity is 1.54 m/s; mean gradient is 6 mmHg.  · Mild aortic regurgitation.  · Mild mitral regurgitation.  · Mild tricuspid regurgitation.  · Normal central venous pressure (3 mmHg).  · The estimated PA systolic pressure is 41 mmHg.  · There is mild pulmonary hypertension.  · Trivial posterior pericardial effusion. At base and under the RA and small outside the RV apically.  Continue Metoprolol and Benazepril and monitor clinical status closely. Monitor on telemetry. Patient is off CHF pathway. Monitor strict Is&Os and daily weights. Continue to stress to patient importance of self efficacy and  on diet for CHF.    Centrilobular emphysema  Chronic hypoxemic respiratory failure  · Chronic and controlled. Continue home oxygen at 2 liters. Patient with no signs of acute exacerbation.   · Patient on no inhalers at home.     History of lung cancer  · In remission.   · s/p chemo and last imaging in 2022 was stable disease. Chronic effusions seen on previous imaging seen on CXR here.  · On home oxygen and will continue.        Late onset Alzheimer's dementia with behavioral disturbance  · Chronic and controlled and mild disease. Patient at cognitive baseline.   · Continue home Aricept to treat.  · Delirium precautions.        Stage 3a chronic kidney disease  · Controlled. Patient is at baseline renal function at present.   · Need to avoid any nephrotoxic agents unless necessary while patient is hospitalized.  · Renally adjust medications based on patient's creatinine clearance.   · Will monitor daily BMP to monitor renal function.         VTE Risk Mitigation (From admission, onward)         Ordered     enoxaparin injection 40 mg  Every 24 hours         06/03/23 0759     Place sequential compression device  Until discontinued         05/30/23 0048     Place MERCEDES hose  Until discontinued         05/29/23 0902     Place sequential compression device  Until discontinued         05/29/23 0902                Discharge Planning   DUTCH: 6/5/2023     Code Status: Partial Code   Is the patient medically ready for discharge?: No    Reason for patient still in hospital (select all that apply): Patient trending condition  Discharge Plan A: Skilled Nursing Facility ([facility]) for 6/5.   Discharge Delays: None known at this time      Florence Scanlon MD  Department of Hospital Medicine   [facility] - Tele[facility]

## 2023-06-03 NOTE — ASSESSMENT & PLAN NOTE
· Patient underwent left hip IM nail for basocervical/intertrochanetric fracture by Dr. Desmond Barker on 5/30/2023.   · Patient reports pain in left hip improving and controlled.   · Patient to continue PT/OT for gait training and strengthening and restoration of ADL's. Patient is FWB/WBAT: left lower extremity as per Orthopedic recommendations.   · Patient to continue Lovenox 40 mg subcutaneous daily post-op for DVT prophylaxis after hip fracture surgery as approved by Neurosurgery.   · Orthopedics is following and managing surgical site.   · Perineural pain catheter removed by Anesthesia on 6/2.   · Pain improving to left hip. Patient on multimodal pain management post-op with Tylenol 1000 mg po every 8 hours and Robaxin 500 mg po 4 times daily and Tramadol prn for breakthrough pain and will continue.  · PT/OT recommending Skilled nursing facility and  working on discharge planning with patient and family. Patient accepted to [facility] for 6/5 pending medical stability and insurance auth.

## 2023-06-03 NOTE — ASSESSMENT & PLAN NOTE
Patient's FSGs are controlled on current medication regimen. Patient on oral Tradjenta at home to treat.   Last A1c reviewed-   Lab Results   Component Value Date    HGBA1C 6.3 (H) 05/29/2023     Most recent fingerstick glucose reviewed-   Recent Labs   Lab 06/02/23 1958 06/03/23  1131   POCTGLUCOSE 111* 140*     Current correctional scale  Low  Maintain anti-hyperglycemic dose as follows-   Antihyperglycemics (From admission, onward)    Start     Stop Route Frequency Ordered    05/29/23 1418  insulin aspart U-100 pen 0-5 Units         -- SubQ Before meals & nightly PRN 05/29/23 1318        Hold Oral hypoglycemics while patient is in the hospital.  Target blood sugars 140-180 in hospital.  Diabetic diet.  Monitor blood sugars 4 times daily with meals and at bedtime.

## 2023-06-03 NOTE — SUBJECTIVE & OBJECTIVE
Principal Problem:Left displaced femoral neck fracture    Principal Orthopedic Problem: s/p L-CMN 5/30    Interval History: Afebrile, VSS.  Pain reportedly well controlled; PNC removed.  Hgb 8.1. Accepted to [facility] 6/5/23.  Denies any nausea/vomiting, chest pain, SOB, numbness/tingling. Family at bedside.         Review of patient's allergies indicates:   Allergen Reactions    Bextra [valdecoxib] Swelling    Gabapentin Diarrhea and Nausea Only       Current Facility-Administered Medications   Medication    acetaminophen tablet 1,000 mg    amlodipine-benazepril 5-20 mg per capsule 1 capsule    bisacodyL suppository 10 mg    calcium-vitamin D3 500 mg-5 mcg (200 unit) per tablet 2 tablet    dextrose 10% bolus 125 mL 125 mL    dextrose 10% bolus 250 mL 250 mL    dextrose 40 % gel 15,000 mg    dextrose 40 % gel 30,000 mg    donepeziL tablet 5 mg    DULoxetine DR capsule 60 mg    enoxaparin injection 30 mg    glucagon (human recombinant) injection 1 mg    hydrALAZINE tablet 25 mg    hydrALAZINE tablet 50 mg    insulin aspart U-100 pen 0-5 Units    levETIRAcetam tablet 500 mg    melatonin tablet 6 mg    methocarbamoL tablet 500 mg    metoprolol injection 2.5 mg    metoprolol tartrate (LOPRESSOR) tablet 25 mg    mupirocin 2 % ointment 1 g    ondansetron injection 4 mg    polyethylene glycol packet 17 g    senna-docusate 8.6-50 mg per tablet 1 tablet    sodium chloride 0.9% flush 10 mL    vitamin D 1000 units tablet 1,000 Units     Facility-Administered Medications Ordered in Other Encounters   Medication    sodium chloride 0.9% flush 5 mL     Objective:     Vital Signs (Most Recent):  Temp: 98.6 °F (37 °C) (06/03/23 0400)  Pulse: 106 (06/03/23 0400)  Resp: 18 (06/02/23 2303)  BP: (!) 159/66 (06/03/23 0400)  SpO2: 100 % (06/03/23 0400) Vital Signs (24h Range):  Temp:  [97 °F (36.1 °C)-98.6 °F (37 °C)] 98.6 °F (37 °C)  Pulse:  [] 106  Resp:  [18] 18  SpO2:  [98 %-100 %] 100 %  BP: (135-164)/(61-73) 159/66     Weight: 76.9  kg (169 lb 8.5 oz)     Body mass index is 30.03 kg/m².      Intake/Output Summary (Last 24 hours) at 6/3/2023 0649  Last data filed at 6/2/2023 1656  Gross per 24 hour   Intake 360 ml   Output 1200 ml   Net -840 ml          Ortho/SPM Exam  AAOx4  NAD  Reg rate  No increased WOB    LLE:  Dressing c/d/i  SILT T/SP/DP/Greer/Sa  Motor intact T/SP/DP  WWP extremities  FCDs in place and functioning        Significant Labs: BMP:   Recent Labs   Lab 06/03/23  0521   GLU 97   *   K 3.7      CO2 22*   BUN 15   CREATININE 0.9   CALCIUM 9.9   MG 1.6       CBC:   Recent Labs   Lab 06/02/23  0457 06/03/23  0521   WBC 7.14 4.98   HGB 8.0* 8.1*   HCT 24.5* 24.9*    179       All pertinent labs within the past 24 hours have been reviewed.    Significant Imaging: I have reviewed all pertinent imaging results/findings.

## 2023-06-03 NOTE — ASSESSMENT & PLAN NOTE
· Controlled. Hgb 8 on 6/2 and 8.1 on 6/3.   · On admission to hospital, patient noted to have Hgb of 10.9. After surgery patient's Hgb level dropped to 8.0 on 5/31 and 7.7 on 6/1 and expected blood loss related to surgery and hip fracture.   · Patient has no signs of active bleeding and hemodynamically stable. Patient is asymptomatic related to anemia.   · Goal is keep Hgb >7 and consider blood transfusion if Hgb < 7 and asymptomatic or < 8 if patient becomes symptomatic.  · Plan is to monitor daily CBC post-operatively.

## 2023-06-04 LAB
ANION GAP SERPL CALC-SCNC: 8 MMOL/L (ref 8–16)
BASOPHILS # BLD AUTO: 0.02 K/UL (ref 0–0.2)
BASOPHILS NFR BLD: 0.4 % (ref 0–1.9)
BUN SERPL-MCNC: 10 MG/DL (ref 10–30)
CALCIUM SERPL-MCNC: 9.3 MG/DL (ref 8.7–10.5)
CHLORIDE SERPL-SCNC: 103 MMOL/L (ref 95–110)
CO2 SERPL-SCNC: 25 MMOL/L (ref 23–29)
CREAT SERPL-MCNC: 0.7 MG/DL (ref 0.5–1.4)
DIFFERENTIAL METHOD: ABNORMAL
EOSINOPHIL # BLD AUTO: 0.1 K/UL (ref 0–0.5)
EOSINOPHIL NFR BLD: 2.2 % (ref 0–8)
ERYTHROCYTE [DISTWIDTH] IN BLOOD BY AUTOMATED COUNT: 16.1 % (ref 11.5–14.5)
EST. GFR  (NO RACE VARIABLE): >60 ML/MIN/1.73 M^2
GLUCOSE SERPL-MCNC: 102 MG/DL (ref 70–110)
HCT VFR BLD AUTO: 23.5 % (ref 37–48.5)
HGB BLD-MCNC: 7.7 G/DL (ref 12–16)
IMM GRANULOCYTES # BLD AUTO: 0.02 K/UL (ref 0–0.04)
IMM GRANULOCYTES NFR BLD AUTO: 0.4 % (ref 0–0.5)
LYMPHOCYTES # BLD AUTO: 0.9 K/UL (ref 1–4.8)
LYMPHOCYTES NFR BLD: 19 % (ref 18–48)
MAGNESIUM SERPL-MCNC: 1.7 MG/DL (ref 1.6–2.6)
MCH RBC QN AUTO: 28.2 PG (ref 27–31)
MCHC RBC AUTO-ENTMCNC: 32.8 G/DL (ref 32–36)
MCV RBC AUTO: 86 FL (ref 82–98)
MONOCYTES # BLD AUTO: 0.6 K/UL (ref 0.3–1)
MONOCYTES NFR BLD: 13.3 % (ref 4–15)
NEUTROPHILS # BLD AUTO: 3 K/UL (ref 1.8–7.7)
NEUTROPHILS NFR BLD: 64.7 % (ref 38–73)
NRBC BLD-RTO: 0 /100 WBC
PLATELET # BLD AUTO: 208 K/UL (ref 150–450)
PMV BLD AUTO: 11.7 FL (ref 9.2–12.9)
POCT GLUCOSE: 120 MG/DL (ref 70–110)
POCT GLUCOSE: 130 MG/DL (ref 70–110)
POTASSIUM SERPL-SCNC: 3.3 MMOL/L (ref 3.5–5.1)
RBC # BLD AUTO: 2.73 M/UL (ref 4–5.4)
SODIUM SERPL-SCNC: 136 MMOL/L (ref 136–145)
WBC # BLD AUTO: 4.57 K/UL (ref 3.9–12.7)

## 2023-06-04 PROCEDURE — 25000003 PHARM REV CODE 250: Performed by: STUDENT IN AN ORGANIZED HEALTH CARE EDUCATION/TRAINING PROGRAM

## 2023-06-04 PROCEDURE — 85025 COMPLETE CBC W/AUTO DIFF WBC: CPT | Performed by: HOSPITALIST

## 2023-06-04 PROCEDURE — 36415 COLL VENOUS BLD VENIPUNCTURE: CPT | Performed by: HOSPITALIST

## 2023-06-04 PROCEDURE — 25000003 PHARM REV CODE 250: Performed by: HOSPITALIST

## 2023-06-04 PROCEDURE — 99232 SBSQ HOSP IP/OBS MODERATE 35: CPT | Mod: ,,, | Performed by: INTERNAL MEDICINE

## 2023-06-04 PROCEDURE — 11000001 HC ACUTE MED/SURG PRIVATE ROOM

## 2023-06-04 PROCEDURE — 97530 THERAPEUTIC ACTIVITIES: CPT

## 2023-06-04 PROCEDURE — 63600175 PHARM REV CODE 636 W HCPCS: Performed by: INTERNAL MEDICINE

## 2023-06-04 PROCEDURE — 97112 NEUROMUSCULAR REEDUCATION: CPT | Mod: CQ

## 2023-06-04 PROCEDURE — 25000003 PHARM REV CODE 250: Performed by: INTERNAL MEDICINE

## 2023-06-04 PROCEDURE — 83735 ASSAY OF MAGNESIUM: CPT | Performed by: HOSPITALIST

## 2023-06-04 PROCEDURE — 99232 PR SUBSEQUENT HOSPITAL CARE,LEVL II: ICD-10-PCS | Mod: ,,, | Performed by: INTERNAL MEDICINE

## 2023-06-04 PROCEDURE — 80048 BASIC METABOLIC PNL TOTAL CA: CPT | Performed by: HOSPITALIST

## 2023-06-04 RX ADMIN — ACETAMINOPHEN 1000 MG: 500 TABLET ORAL at 07:06

## 2023-06-04 RX ADMIN — ACETAMINOPHEN 1000 MG: 500 TABLET ORAL at 09:06

## 2023-06-04 RX ADMIN — LEVETIRACETAM 500 MG: 500 TABLET, FILM COATED ORAL at 09:06

## 2023-06-04 RX ADMIN — ENOXAPARIN SODIUM 40 MG: 40 INJECTION SUBCUTANEOUS at 04:06

## 2023-06-04 RX ADMIN — HYDRALAZINE HYDROCHLORIDE 25 MG: 25 TABLET, FILM COATED ORAL at 02:06

## 2023-06-04 RX ADMIN — METOPROLOL TARTRATE 25 MG: 25 TABLET, FILM COATED ORAL at 09:06

## 2023-06-04 RX ADMIN — DULOXETINE 60 MG: 60 CAPSULE, DELAYED RELEASE ORAL at 07:06

## 2023-06-04 RX ADMIN — HYDRALAZINE HYDROCHLORIDE 25 MG: 25 TABLET, FILM COATED ORAL at 07:06

## 2023-06-04 RX ADMIN — ACETAMINOPHEN 1000 MG: 500 TABLET ORAL at 02:06

## 2023-06-04 RX ADMIN — AMLODIPINE BESYLATE AND BENAZEPRIL HYDROCHLORIDE 1 CAPSULE: 5; 20 CAPSULE ORAL at 09:06

## 2023-06-04 RX ADMIN — DONEPEZIL HYDROCHLORIDE 5 MG: 5 TABLET ORAL at 09:06

## 2023-06-04 RX ADMIN — HYDRALAZINE HYDROCHLORIDE 25 MG: 25 TABLET, FILM COATED ORAL at 09:06

## 2023-06-04 RX ADMIN — CHOLECALCIFEROL TAB 25 MCG (1000 UNIT) 1000 UNITS: 25 TAB at 09:06

## 2023-06-04 RX ADMIN — POTASSIUM BICARBONATE 50 MEQ: 978 TABLET, EFFERVESCENT ORAL at 09:06

## 2023-06-04 RX ADMIN — Medication 2 TABLET: at 09:06

## 2023-06-04 NOTE — ASSESSMENT & PLAN NOTE
Patient's FSGs are controlled on current medication regimen. Patient on oral Tradjenta at home to treat.   Last A1c reviewed-   Lab Results   Component Value Date    HGBA1C 6.3 (H) 05/29/2023     Most recent fingerstick glucose reviewed-   Recent Labs   Lab 06/03/23  1131 06/03/23  1532 06/03/23  2138   POCTGLUCOSE 140* 142* 129*     Current correctional scale  Low  Maintain anti-hyperglycemic dose as follows-   Antihyperglycemics (From admission, onward)    Start     Stop Route Frequency Ordered    05/29/23 1418  insulin aspart U-100 pen 0-5 Units         -- SubQ Before meals & nightly PRN 05/29/23 1318        Hold Oral hypoglycemics while patient is in the hospital.  Target blood sugars 140-180 in hospital.  Diabetic diet.  Monitor blood sugars 4 times daily with meals and at bedtime.

## 2023-06-04 NOTE — ASSESSMENT & PLAN NOTE
· Slight decrease in Hgb to 7.7 on 6/4. No signs of bleeding. BP stable. Will monitor.   · Controlled. Hgb 8 on 6/2 and 8.1 on 6/3.   · On admission to hospital, patient noted to have Hgb of 10.9. After surgery patient's Hgb level dropped to 8.0 on 5/31 and 7.7 on 6/1 and expected blood loss related to surgery and hip fracture.   · Patient has no signs of active bleeding and hemodynamically stable. Patient is asymptomatic related to anemia.   · Goal is keep Hgb >7 and consider blood transfusion if Hgb < 7 and asymptomatic or < 8 if patient becomes symptomatic.  · Plan is to monitor daily CBC post-operatively.

## 2023-06-04 NOTE — ASSESSMENT & PLAN NOTE
· Chronic and controlled. Patient with history of remote stents at [facility] in 1990 and 2003.   · Patient on Zocor at home but not on formulary so holding in hospital but to resume on discharge.

## 2023-06-04 NOTE — PLAN OF CARE
Problem: Adult Inpatient Plan of Care  Goal: Plan of Care Review  Outcome: Ongoing, Progressing  Goal: Patient-Specific Goal (Individualized)  Outcome: Ongoing, Progressing  Goal: Absence of Hospital-Acquired Illness or Injury  Outcome: Ongoing, Progressing  Goal: Optimal Comfort and Wellbeing  Outcome: Ongoing, Progressing     Problem: Bleeding (Surgery Nonspecified)  Goal: Absence of Bleeding  Outcome: Ongoing, Progressing     Problem: Bowel Motility Impaired (Surgery Nonspecified)  Goal: Effective Bowel Elimination  Outcome: Ongoing, Progressing     Problem: Fluid and Electrolyte Imbalance (Surgery Nonspecified)  Goal: Fluid and Electrolyte Balance  Outcome: Ongoing, Progressing     Problem: Glycemic Control Impaired (Surgery Nonspecified)  Goal: Blood Glucose Level Within Targeted Range  Outcome: Ongoing, Progressing     Problem: Infection (Surgery Nonspecified)  Goal: Absence of Infection Signs and Symptoms  Outcome: Ongoing, Progressing     Problem: Pain (Surgery Nonspecified)  Goal: Acceptable Pain Control  Outcome: Ongoing, Progressing     Problem: Postoperative Urinary Retention (Surgery Nonspecified)  Goal: Effective Urinary Elimination  Outcome: Ongoing, Progressing     Problem: Respiratory Compromise (Surgery Nonspecified)  Goal: Effective Oxygenation and Ventilation  Outcome: Ongoing, Progressing     Problem: Fall Injury Risk  Goal: Absence of Fall and Fall-Related Injury  Outcome: Ongoing, Progressing

## 2023-06-04 NOTE — PLAN OF CARE
Problem: Infection  Goal: Absence of Infection Signs and Symptoms  Outcome: Ongoing, Progressing     Problem: Adult Inpatient Plan of Care  Goal: Plan of Care Review  Outcome: Ongoing, Progressing  Goal: Patient-Specific Goal (Individualized)  Outcome: Ongoing, Progressing  Goal: Absence of Hospital-Acquired Illness or Injury  Outcome: Ongoing, Progressing  Goal: Optimal Comfort and Wellbeing  Outcome: Ongoing, Progressing  Goal: Readiness for Transition of Care  Outcome: Ongoing, Progressing     Problem: Adjustment to Injury (Hip Fracture Medical Management)  Goal: Optimal Coping with Change in Health Status  Outcome: Ongoing, Progressing   AAOX4. 1 BM today. Safety interventions maintained. Poc reviewed. Questions and concern answered.

## 2023-06-04 NOTE — SUBJECTIVE & OBJECTIVE
Interval History: Daughter, Nora at bedside. [facility] security also in room and taking a report as this am one of the ceiling tiles fell to the ground in patient's room. Luckily the ceiling tile was not near patient's bed and no one was hit or hurt. Daughter states she was in the shower in the room and heard a loud bang and when she came out of the bathroom that was when she noted ceiling tile had come down and was on the ground. Maintenance has been notified. There is an apparent water leak as ceiling tile I saw in trash can when I entered room that had fallen down was saturated with water and there were other ceiling tiles nearby with wet spots. Patient being moved down to 5th floor to room 544 from 8080 due to water leak in the room. Patient reports she is feeling fine. She had some loose stools overnight but given Imodium and now no further loose stools. Patient reports 3/10 pain to left hip. Daughter states they have been doing bed exercises to get strength back in her left leg. -111 and in acceptable range as per Cardiology who prefers HR on higher side as has issues with bradycardia on higher doses of Metoprolol. Patient denies any palpations and BP stable. Patient to work with PT/OT today. Anticipate discharge to [facility] tomorrow, 6/5. Labs reviewed. Potassium low at 3.3 today so will replace with oral potassium. Hg 7.7 today and slightly decreased from 8.1 yesterday. No signs of active bleeding noted. Surgical bandages to left leg are clean and dry and intact.     Review of Systems   Constitutional:  Negative for fever.   Respiratory:  Negative for cough and shortness of breath.    Cardiovascular:  Negative for chest pain, palpitations and leg swelling.   Gastrointestinal:  Negative for abdominal pain, nausea and vomiting.   Musculoskeletal:  Positive for arthralgias (Left hip).   Neurological:  Negative for dizziness.   Psychiatric/Behavioral:  Negative for agitation and confusion.    Objective:      Vital Signs (Most Recent):  Temp: 97.9 °F (36.6 °C) (06/04/23 0738)  Pulse: 111 (06/04/23 0738)  Resp: 16 (06/04/23 0738)  BP: 156/72 (06/04/23 0738)  SpO2: 99 % (06/04/23 0738) on 2 liters of oxygen Vital Signs (24h Range):  Temp:  [97.9 °F (36.6 °C)-98.7 °F (37.1 °C)] 97.9 °F (36.6 °C)  Pulse:  [] 111  Resp:  [16-19] 16  SpO2:  [98 %-100 %] 99 %  BP: (134-162)/(61-74) 156/72     Weight: 76.9 kg (169 lb 8.5 oz)  Body mass index is 30.03 kg/m².  No intake or output data in the 24 hours ending 06/04/23 1054      Physical Exam  Vitals and nursing note reviewed.   Constitutional:       General: She is awake. She is not in acute distress.     Appearance: Normal appearance. She is not ill-appearing.   Eyes:      Conjunctiva/sclera: Conjunctivae normal.   Cardiovascular:      Rate and Rhythm: Regular rhythm. Tachycardia present.      Heart sounds: Normal heart sounds. No murmur heard.    No friction rub. No gallop.   Pulmonary:      Effort: Pulmonary effort is normal. No respiratory distress.      Breath sounds: Normal breath sounds. No wheezing.   Abdominal:      General: Abdomen is flat. Bowel sounds are normal. There is no distension.      Palpations: Abdomen is soft.      Tenderness: There is no abdominal tenderness. There is no guarding.   Skin:     General: Skin is warm.      Findings: No erythema.      Comments: Surgical dressing noted on left hip. Dressing is clean and dry and intact.   Neurological:      General: No focal deficit present.      Mental Status: She is alert and oriented to person, place, and time.   Psychiatric:         Mood and Affect: Mood normal.         Behavior: Behavior normal. Behavior is cooperative.         Thought Content: Thought content normal.           Significant Labs: CBC:   Recent Labs   Lab 06/03/23  0521 06/04/23  0613   WBC 4.98 4.57   HGB 8.1* 7.7*   HCT 24.9* 23.5*    208     CMP:   Recent Labs   Lab 06/03/23  0521 06/04/23  0613   * 136   K 3.7 3.3*   CL  103 103   CO2 22* 25   GLU 97 102   BUN 15 10   CREATININE 0.9 0.7   CALCIUM 9.9 9.3   ANIONGAP 8 8     Magnesium:   Recent Labs   Lab 06/03/23  0521 06/04/23  0613   MG 1.6 1.7       Significant Imaging: I have reviewed all pertinent imaging results/findings within the past 24 hours.

## 2023-06-04 NOTE — PT/OT/SLP PROGRESS
Occupational Therapy   Co-Treatment    Name: Brunilda England  MRN: [id number]  Admitting Diagnosis:  Left displaced femoral neck fracture  5 Days Post-Op    Recommendations:     Discharge Recommendations: nursing facility, skilled  Discharge Equipment Recommendations:  to be determined by next level of care  Barriers to discharge:  None    Assessment:     Brunilda England is a 89 y.o. female with a medical diagnosis of Left displaced femoral neck fracture.  She presents with significant pain during sit>stand transfers and functional mobility, limiting activity tolerance. Performance deficits affecting function are weakness, impaired self care skills, impaired functional mobility, impaired cognition, decreased safety awareness, decreased lower extremity function, pain.     Rehab Prognosis:  Good; patient would benefit from acute skilled OT services to address these deficits and reach maximum level of function.       Plan:     Patient to be seen 4 x/week to address the above listed problems via self-care/home management, therapeutic exercises, therapeutic activities, neuromuscular re-education  Plan of Care Expires: 06/30/23  Plan of Care Reviewed with: patient, daughter, friend    Subjective     Chief Complaint: patient pleasant and cooperative but with significant pain when moving  Patient/Family Comments/goals: walk  Pain/Comfort:  Pain Rating 1: 0/10    Objective:     Communicated with: nursing prior to session.  Patient found HOB elevated with telemetry, Clover, SCD upon OT entry to room. Patient's daughter and neighbor present in room throughout session providing consistent encouragement to therapeutic activities.     General Precautions: Standard, fall    Orthopedic Precautions:LLE weight bearing as tolerated  Braces: N/A  Respiratory Status: Nasal cannula, flow 1 L/min     Occupational Performance:     Bed Mobility:    Patient completed Scooting/Bridging with total assistance and 2 persons  Patient completed  Supine to Sit with maximal assistance and 2 persons  Patient completed Sit to Supine with maximal assistance and 2 persons   Patient sat EOB for ~10 minutes total throughout session with stand by to contact guard assistance     Functional Mobility/Transfers:  Patient completed Sit <> Stand Transfer with moderate assistance and of 2 persons  with  rolling walker x3 attempts   Static standing with minimal assistance and of 2 persons and rolling walker  Functional Mobility: Patient able to take 4 small steps L then R along EOB and 2 steps backwards towards the bed with moderate assistance and 2 persons with rolling walker to improve activity tolerance and positioning for bed mobility.     Activities of Daily Living:  Grooming: contact guard assistance to brush hair while sitting EOB  Upper extremity dressing: minimal assistance to don/doff gown      [facility] 6 Click ADL: 16    Treatment & Education:  Patient educated on WB status and  techniques to reduce pain with sit<>stand transfer. Patient required significant cueing throughout session due to dementia.      Patient educated on:   -purpose of OT and OT POC  -facilitation and education on proper body mechanics, energy conservation, and safety  -importance of early mobility and out of bed activities with staff assist  -overall benefits of therapy     Co-treatment with PT performed due to need for education and facilitation of treatment from two skilled therapy disciplines    Patient left HOB elevated with all lines intact, call button in reach, and family/friend present    GOALS:   Multidisciplinary Problems       Occupational Therapy Goals          Problem: Occupational Therapy    Goal Priority Disciplines Outcome Interventions   Occupational Therapy Goal     OT, PT/OT Ongoing, Progressing    Description: Goals to be met by: 6/14/23     Patient will increase functional independence with ADLs by performing:    UE Dressing with Hilton.  LE Dressing with  Minimal Assistance.  Grooming while standing with Stand-by Assistance.  Toileting from bedside commode with Minimal Assistance for hygiene and clothing management.   Toilet transfer to bedside commode with Minimal Assistance.                         Time Tracking:     OT Date of Treatment: 06/04/23  OT Start Time: 1402  OT Stop Time: 1435  OT Total Time (min): 33 min    Billable Minutes:Therapeutic Activity 33    OT/CHRIS: OT          6/4/2023

## 2023-06-04 NOTE — PT/OT/SLP PROGRESS
Physical Therapy Treatment/Co-Treatment with O.T.    Patient Name:  Brunilda England   MRN:  [id number]    Recommendations:     Discharge Recommendations: nursing facility, skilled  Discharge Equipment Recommendations:  (TBD at next level of care)  Barriers to discharge: Inaccessible home    Assessment:     Brunilda England is a 89 y.o. female admitted with a medical diagnosis of Left displaced femoral neck fracture.  She presents with the following impairments/functional limitations: weakness, impaired endurance, impaired self care skills, impaired functional mobility, gait instability, impaired balance, decreased coordination, decreased lower extremity function, decreased safety awareness, pain, decreased ROM, impaired skin, edema, impaired cardiopulmonary response to activity, orthopedic precautions . Patient's mobility continues to be limited by pain and decreased L LE strength. Decreased weight shifting ability through the L LE also limits patient ability to take steps.    Rehab Prognosis: Fair; patient would benefit from acute skilled PT services to address these deficits and reach maximum level of function.    Recent Surgery: Procedure(s) (LRB):  INSERTION, INTRAMEDULLARY TEREZA, FEMUR (Left) 5 Days Post-Op    Plan:     During this hospitalization, patient to be seen 4 x/week to address the identified rehab impairments via gait training, therapeutic activities, therapeutic exercises, neuromuscular re-education and progress toward the following goals:    Plan of Care Expires:  06/30/23    Subjective     Chief Complaint: pain only when moving  Patient/Family Comments/goals: to get stronger and to walk again.  Pain/Comfort:  Pain Rating 1: 0/10  Location - Side 1: Left  Location - Orientation 1: generalized  Location 1: hip  Pain Addressed 1: Pre-medicate for activity, Reposition, Distraction, Cessation of Activity  Pain Rating Post-Intervention 1:  (Did not rate, but pain only noted with movement)      Objective:      Communicated with NSG prior to session.  Patient found HOB elevated with telemetry, PureWick, FCD upon PT entry to room.     General Precautions: Standard, fall  Orthopedic Precautions: LLE weight bearing as tolerated  Braces: N/A  Respiratory Status: Room air     Functional Mobility:  Bed Mobility:     Scooting: total assistance and of 2 persons  Supine to Sit: maximal assistance and of 2 persons  Sit to Supine: maximal assistance and of 2 persons  Transfers:     Sit to Stand:  moderate assistance and of 2 persons with rolling walker  Gait: 4 small shuffling steps R<>L along the edge of the bed and 2 steps back towards the bed with RW and mod of 2, with L LE WBAT.      AM-PAC 6 CLICK MOBILITY  Turning over in bed (including adjusting bedclothes, sheets and blankets)?: 2  Sitting down on and standing up from a chair with arms (e.g., wheelchair, bedside commode, etc.): 2  Moving from lying on back to sitting on the side of the bed?: 2  Moving to and from a bed to a chair (including a wheelchair)?: 2  Need to walk in hospital room?: 2  Climbing 3-5 steps with a railing?: 1  Basic Mobility Total Score: 11       Treatment & Education:  Co-Treatment with O.T. Donned/Doffed a gown. Patient assisted to sitting at EOB. Static and weight shifting activity in standing 3 x 60 to 90 secs with RW for support and min of 2, with cues to correct posture. Transferred back to supine and repositioned in bed. Donned FCDs after treatment.    Patient left HOB elevated with all lines intact, call button in reach, bed alarm on, and daughter and friend present.    GOALS:   Multidisciplinary Problems       Physical Therapy Goals          Problem: Physical Therapy    Goal Priority Disciplines Outcome Goal Variances Interventions   Physical Therapy Goal     PT, PT/OT Ongoing, Progressing     Description: Goals to be met by: 2023     Patient will increase functional independence with mobility by performin. Sit to stand transfer  with Stand-by Assistance  2. Bed to chair transfer with Minimal Assistance using RW or LRAD  3. Gait  x 20 feet with Moderate Assistance using RW or LRAD.   4. Stand for 5 minutes with Contact Guard Assistance using RW or LRAD prn while performing dynamic balance activity to prepare for tasks in the home  5. Lower extremity exercise program x30 reps per handout, with independence                         Time Tracking:     PT Received On: 06/04/23  PT Start Time: 1400     PT Stop Time: 1435  PT Total Time (min): 35 min     Billable Minutes: Neuromuscular Re-education 35    Treatment Type: Treatment  PT/PTA: PTA     Number of PTA visits since last PT visit: 3     06/04/2023

## 2023-06-04 NOTE — SUBJECTIVE & OBJECTIVE
Principal Problem:Left displaced femoral neck fracture    Principal Orthopedic Problem: s/p L-CMN 5/30    Interval History: Afebrile, VSS.  Pain reportedly well controlled; PNC removed.  Hgb 8.1. Accepted to [facility] 6/5/23.  Denies any nausea/vomiting, chest pain, SOB, numbness/tingling. Family at bedside.         Review of patient's allergies indicates:   Allergen Reactions    Bextra [valdecoxib] Swelling    Gabapentin Diarrhea and Nausea Only       Current Facility-Administered Medications   Medication    acetaminophen tablet 1,000 mg    amlodipine-benazepril 5-20 mg per capsule 1 capsule    bisacodyL suppository 10 mg    calcium-vitamin D3 500 mg-5 mcg (200 unit) per tablet 2 tablet    dextrose 10% bolus 125 mL 125 mL    dextrose 10% bolus 250 mL 250 mL    dextrose 40 % gel 15,000 mg    dextrose 40 % gel 30,000 mg    donepeziL tablet 5 mg    DULoxetine DR capsule 60 mg    enoxaparin injection 40 mg    glucagon (human recombinant) injection 1 mg    hydrALAZINE tablet 25 mg    hydrALAZINE tablet 50 mg    insulin aspart U-100 pen 0-5 Units    levETIRAcetam tablet 500 mg    loperamide capsule 2 mg    melatonin tablet 6 mg    methocarbamoL tablet 500 mg    metoprolol injection 2.5 mg    metoprolol tartrate (LOPRESSOR) tablet 25 mg    ondansetron injection 4 mg    sodium chloride 0.9% flush 10 mL    vitamin D 1000 units tablet 1,000 Units     Facility-Administered Medications Ordered in Other Encounters   Medication    sodium chloride 0.9% flush 5 mL     Objective:     Vital Signs (Most Recent):  Temp: 98.7 °F (37.1 °C) (06/04/23 0324)  Pulse: (!) 112 (06/04/23 0518)  Resp: 19 (06/04/23 0324)  BP: 134/61 (06/04/23 0324)  SpO2: 98 % (06/04/23 0500) Vital Signs (24h Range):  Temp:  [97.9 °F (36.6 °C)-98.7 °F (37.1 °C)] 98.7 °F (37.1 °C)  Pulse:  [] 112  Resp:  [16-19] 19  SpO2:  [87 %-100 %] 98 %  BP: (133-162)/(61-74) 134/61     Weight: 76.9 kg (169 lb 8.5 oz)     Body mass index is 30.03 kg/m².    No intake or  output data in the 24 hours ending 06/04/23 0733       Ortho/SPM Exam  AAOx4  NAD  Reg rate  No increased WOB    LLE:  Dressing c/d/i  SILT T/SP/DP/Greer/Sa  Motor intact T/SP/DP  WWP extremities  FCDs in place and functioning        Significant Labs: BMP:   Recent Labs   Lab 06/03/23  0521   GLU 97   *   K 3.7      CO2 22*   BUN 15   CREATININE 0.9   CALCIUM 9.9   MG 1.6       CBC:   Recent Labs   Lab 06/03/23  0521   WBC 4.98   HGB 8.1*   HCT 24.9*          All pertinent labs within the past 24 hours have been reviewed.    Significant Imaging: I have reviewed all pertinent imaging results/findings.

## 2023-06-04 NOTE — ASSESSMENT & PLAN NOTE
· Patient underwent left hip IM nail for basocervical/intertrochanetric fracture by Dr. Desmond Barker on 5/30/2023.   · Patient reports pain in left hip improving and controlled.   · Patient to continue PT/OT for gait training and strengthening and restoration of ADL's. Patient is FWB/WBAT: left lower extremity as per Orthopedic recommendations.   · Patient to continue Lovenox 40 mg subcutaneous daily post-op for DVT prophylaxis after hip fracture surgery as approved by Neurosurgery.   · Orthopedics is following and managing surgical site.   · Perineural pain catheter removed by Anesthesia on 6/2.   · Pain improving to left hip. Patient on multimodal pain management post-op with Tylenol 1000 mg po every 8 hours and Robaxin 500 mg po 4 times daily and Tramadol prn for breakthrough pain and will continue.  · PT/OT recommending Skilled nursing facility and  working on discharge planning with patient and family. Patient accepted to [facility] for 6/5 pending insurance auth and should be medically stable.

## 2023-06-04 NOTE — ASSESSMENT & PLAN NOTE
PAF (paroxysmal atrial fibrillation)  · Controlled. Continue to monitor. Cardiology wants HR on higher side and okay with slight tachycardia as she has issues with bradycardia on higher doses of Metoprolol.   · Cardiology consulted as patient having erratic HRs up to 160 and as low as 40 in hospital. Cardiology states has evidence of tachycardia and bradycardia on ECG but does not meet criteria for tachy-juan a syndrome.   · Cardiology recommends to continue low dose Metoprolol 25 mg po BID to help with tachycardia related to atrial fibrillation. No need for pacemaker at this time.   · Per Cardiology, if patient experiences syncope or develops significant conversion pause >5 sec, please alert our team. Per Cardiology, patient to follow-up with her cardiologist, Dr. Roman as outpatient.   · Home Eliquis on hold for long term anticoagulation for atrial fibrillation due to SAH as per Neurosurgery.   · Monitor on telemetry.

## 2023-06-04 NOTE — PLAN OF CARE
Problem: Infection  Goal: Absence of Infection Signs and Symptoms  Outcome: Ongoing, Progressing     Problem: Adult Inpatient Plan of Care  Goal: Plan of Care Review  Outcome: Ongoing, Progressing  Goal: Patient-Specific Goal (Individualized)  Outcome: Ongoing, Progressing  Goal: Absence of Hospital-Acquired Illness or Injury  Outcome: Ongoing, Progressing  Goal: Optimal Comfort and Wellbeing  Outcome: Ongoing, Progressing  Goal: Readiness for Transition of Care  Outcome: Ongoing, Progressing     Problem: Adjustment to Injury (Hip Fracture Medical Management)  Goal: Optimal Coping with Change in Health Status  Outcome: Ongoing, Progressing     Problem: Bleeding (Hip Fracture Medical Management)  Goal: Absence of Bleeding  Outcome: Ongoing, Progressing     Problem: Bowel Elimination Impaired (Hip Fracture Medical Management)  Goal: Effective Bowel Elimination  Outcome: Ongoing, Progressing     Problem: Cognitive Decline Risk (Hip Fracture Medical Management)  Goal: Baseline Cognitive Function Maintained  Outcome: Ongoing, Progressing     Problem: Embolism (Hip Fracture Medical Management)  Goal: Absence of Embolism  Outcome: Ongoing, Progressing     Problem: Fracture Stabilization and Management (Hip Fracture Medical Management)  Goal: Fracture Stability  Outcome: Ongoing, Progressing     Problem: Functional Ability Impaired (Hip Fracture Medical Management)  Goal: Optimal Functional Performance  Outcome: Ongoing, Progressing     Problem: Pain (Hip Fracture Medical Management)  Goal: Acceptable Pain Level  Outcome: Ongoing, Progressing     Problem: Urinary Elimination Impaired (Hip Fracture Medical Management)  Goal: Effective Urinary Elimination  Outcome: Ongoing, Progressing     Problem: Bleeding (Surgery Nonspecified)  Goal: Absence of Bleeding  Outcome: Ongoing, Progressing     Problem: Bowel Motility Impaired (Surgery Nonspecified)  Goal: Effective Bowel Elimination  Outcome: Ongoing, Progressing     Problem:  Fluid and Electrolyte Imbalance (Surgery Nonspecified)  Goal: Fluid and Electrolyte Balance  Outcome: Ongoing, Progressing     Problem: Glycemic Control Impaired (Surgery Nonspecified)  Goal: Blood Glucose Level Within Targeted Range  Outcome: Ongoing, Progressing     Problem: Infection (Surgery Nonspecified)  Goal: Absence of Infection Signs and Symptoms  Outcome: Ongoing, Progressing     Problem: Ongoing Anesthesia Effects (Surgery Nonspecified)  Goal: Anesthesia/Sedation Recovery  Outcome: Ongoing, Progressing     Problem: Pain (Surgery Nonspecified)  Goal: Acceptable Pain Control  Outcome: Ongoing, Progressing     Problem: Postoperative Nausea and Vomiting (Surgery Nonspecified)  Goal: Nausea and Vomiting Relief  Outcome: Ongoing, Progressing     Problem: Postoperative Urinary Retention (Surgery Nonspecified)  Goal: Effective Urinary Elimination  Outcome: Ongoing, Progressing     Problem: Postoperative Urinary Retention (Surgery Nonspecified)  Goal: Effective Urinary Elimination  Outcome: Ongoing, Progressing     Problem: Fall Injury Risk  Goal: Absence of Fall and Fall-Related Injury  Outcome: Ongoing, Progressing     Problem: Diabetes Comorbidity  Goal: Blood Glucose Level Within Targeted Range  Outcome: Ongoing, Progressing     Problem: Fluid Imbalance (Pneumonia)  Goal: Fluid Balance  Outcome: Ongoing, Progressing     Problem: Infection (Pneumonia)  Goal: Resolution of Infection Signs and Symptoms  Outcome: Ongoing, Progressing     Problem: Respiratory Compromise (Pneumonia)  Goal: Effective Oxygenation and Ventilation  Outcome: Ongoing, Progressing     Problem: Skin Injury Risk Increased  Goal: Skin Health and Integrity  Outcome: Ongoing, Progressing

## 2023-06-04 NOTE — PROGRESS NOTES
"[facility] - Telemetry [facility] Medicine  Progress Note    Patient Name: Brunilda England  MRN: [id number]  Patient Class: IP- Inpatient   Admission Date: 5/28/2023  Length of Stay: 6 days  Attending Physician: Florence Scanlon MD  Primary Care Provider: Pepper Whitfield MD        Subjective:     Principal Problem:Left displaced femoral neck fracture        HPI:  Ms. Brunilda England is a 89 y.o. female with past medical history of HTN, paroxysmal atrial fibrillation/flutter, COPD on home oxygen, Chronic diastolic HF, CKD 3a, dementia, history of lung/rectal cancer who was in her usual state of health in which she lives at home with her daughter until she got up at night to change from her sneakers into a pair of slippers in the utility room when she turned wrong and fell while doing so and her daughter heard it and came in to find her on the ground and called EMS to bring her to the hospital. She was found to have a closed left basocervical fracture with extension into intertrochanteric region. She was using her walker when this happened which she uses for ambulation. She was also found to have a small left parietal SAH on admit and NS saw her as well as NCC in the ER and recommendations given and stable for the floor at this time. Ortho plans for OR tomorrow given this to monitor in interim. Her other daughter is at bedside now and able to assist with history but patien tis also about to do so. She reports having some mild pain to left leg now and she denies any dizziness, chest pain, palpitations, or dyspnea now. Has occaisonal constipation issues but is UTD on BMs now. Discussed planning for post op PT/OT and likely SNF, patient says "we'll see about that" but daughter reports [facility] and I told her is normal for this post op course to need SNF after a fracture like this.          Overview/Hospital Course:  Patient admitted to [facility] Medicine Team H: Hip Fracture team " and started on Hip Fracture Pathway with Orthopedic surgery consult for hip fracture. Patient was seen and evaluated by Orthopedic surgery who recommended operative repair of hip fracture. Patient was medically optimized prior to surgery and was taken to OR after optimization on 5/30/2023. Patient underwent left hip cephalomedullary nail fixation by Dr. Desmond Barker. Post-op patient WBAT to the left lower extremity as per Orthopedics recommendation. Patient placed on Lovenox 40 mg subcutaneous daily and MERCEDES/SCD's for DVT prophylaxis post-op as unable to use patient's home Apixiban post-op for her atrial; fibrillation due to ICH as per Neurosurgery but NS okay with Lovenox. Perineural pain catheter placed by Anesthesia Pain Service with continuous infusion of Ropivacaine to help with pain control post-op and Anesthesia Pain Service managing while patient in the hospital. PT/OT consulted post-op and evaluated patient. Patient progressing well with PT and OT and recommended skilled nursing facility placement when medically ready for hospital discharge. Patient having issues with tachy-juan a in hospital. Patient followed by Cardiology as outpatient for A. Fib and intermittent bradycardia and undergoing outpatient evaluation. Cardiology consulted as inpatient and following. Patient having bursts of tachycardia and bradycardia in hospital and Metoprolol dosing adjusted as result. Cardiology states no indication for need for pacemaker at this time and stated they would rather patient be more tachycardiac than bradycardiac. Pain controlled.       Interval History: DaughterNora at bedside. [facility] also in room and taking a report as this am one of the ceiling tiles fell to the ground in patient's room. Luckily the ceiling tile was not near patient's bed and no one was hit or hurt. Daughter states she was in the shower in the room and heard a loud bang and when she came out of the bathroom that was when she  noted ceiling tile had come down and was on the ground. Maintenance has been notified. There is an apparent water leak as ceiling tile I saw in trash can when I entered room that had fallen down was saturated with water and there were other ceiling tiles nearby with wet spots. Patient being moved down to 5th floor to room 544 from 8080 due to water leak in the room. Patient reports she is feeling fine. She had some loose stools overnight but given Imodium and now no further loose stools. Patient reports 3/10 pain to left hip. Daughter states they have been doing bed exercises to get strength back in her left leg. -111 and in acceptable range as per Cardiology who prefers HR on higher side as has issues with bradycardia on higher doses of Metoprolol. Patient denies any palpations and BP stable. Patient to work with PT/OT today. Anticipate discharge to [facility] tomorrow, 6/5. Labs reviewed. Potassium low at 3.3 today so will replace with oral potassium. Hg 7.7 today and slightly decreased from 8.1 yesterday. No signs of active bleeding noted. Surgical bandages to left leg are clean and dry and intact.     Review of Systems   Constitutional:  Negative for fever.   Respiratory:  Negative for cough and shortness of breath.    Cardiovascular:  Negative for chest pain, palpitations and leg swelling.   Gastrointestinal:  Negative for abdominal pain, nausea and vomiting.   Musculoskeletal:  Positive for arthralgias (Left hip).   Neurological:  Negative for dizziness.   Psychiatric/Behavioral:  Negative for agitation and confusion.    Objective:     Vital Signs (Most Recent):  Temp: 97.9 °F (36.6 °C) (06/04/23 0738)  Pulse: 111 (06/04/23 0738)  Resp: 16 (06/04/23 0738)  BP: 156/72 (06/04/23 0738)  SpO2: 99 % (06/04/23 0738) on 2 liters of oxygen Vital Signs (24h Range):  Temp:  [97.9 °F (36.6 °C)-98.7 °F (37.1 °C)] 97.9 °F (36.6 °C)  Pulse:  [] 111  Resp:  [16-19] 16  SpO2:  [98 %-100 %] 99 %  BP:  (134-162)/(61-74) 156/72     Weight: 76.9 kg (169 lb 8.5 oz)  Body mass index is 30.03 kg/m².  No intake or output data in the 24 hours ending 06/04/23 1054      Physical Exam  Vitals and nursing note reviewed.   Constitutional:       General: She is awake. She is not in acute distress.     Appearance: Normal appearance. She is not ill-appearing.   Eyes:      Conjunctiva/sclera: Conjunctivae normal.   Cardiovascular:      Rate and Rhythm: Regular rhythm. Tachycardia present.      Heart sounds: Normal heart sounds. No murmur heard.    No friction rub. No gallop.   Pulmonary:      Effort: Pulmonary effort is normal. No respiratory distress.      Breath sounds: Normal breath sounds. No wheezing.   Abdominal:      General: Abdomen is flat. Bowel sounds are normal. There is no distension.      Palpations: Abdomen is soft.      Tenderness: There is no abdominal tenderness. There is no guarding.   Skin:     General: Skin is warm.      Findings: No erythema.      Comments: Surgical dressing noted on left hip. Dressing is clean and dry and intact.   Neurological:      General: No focal deficit present.      Mental Status: She is alert and oriented to person, place, and time.   Psychiatric:         Mood and Affect: Mood normal.         Behavior: Behavior normal. Behavior is cooperative.         Thought Content: Thought content normal.           Significant Labs: CBC:   Recent Labs   Lab 06/03/23  0521 06/04/23  0613   WBC 4.98 4.57   HGB 8.1* 7.7*   HCT 24.9* 23.5*    208     CMP:   Recent Labs   Lab 06/03/23  0521 06/04/23  0613   * 136   K 3.7 3.3*    103   CO2 22* 25   GLU 97 102   BUN 15 10   CREATININE 0.9 0.7   CALCIUM 9.9 9.3   ANIONGAP 8 8     Magnesium:   Recent Labs   Lab 06/03/23  0521 06/04/23  0613   MG 1.6 1.7       Significant Imaging: I have reviewed all pertinent imaging results/findings within the past 24 hours.      Assessment/Plan:      * Closed left basicervical femoral neck fracture  with extension into the intertrochanteric region s/p IM nail on 5/30/2023  · Patient underwent left hip IM nail for basocervical/intertrochanetric fracture by Dr. Desmond Barker on 5/30/2023.   · Patient reports pain in left hip improving and controlled.   · Patient to continue PT/OT for gait training and strengthening and restoration of ADL's. Patient is FWB/WBAT: left lower extremity as per Orthopedic recommendations.   · Patient to continue Lovenox 40 mg subcutaneous daily post-op for DVT prophylaxis after hip fracture surgery as approved by Neurosurgery.   · Orthopedics is following and managing surgical site.   · Perineural pain catheter removed by Anesthesia on 6/2.   · Pain improving to left hip. Patient on multimodal pain management post-op with Tylenol 1000 mg po every 8 hours and Robaxin 500 mg po 4 times daily and Tramadol prn for breakthrough pain and will continue.  · PT/OT recommending Skilled nursing facility and  working on discharge planning with patient and family. Patient accepted to [facility] for 6/5 pending insurance auth and should be medically stable.       Subarachnoid hemorrhage following injury, no loss of consciousness  · Controlled. Patient found on admit to have punctate left parietal SAH that was stable on repeat imaging. Patient neurologically intact.   · Neurosurgery consulted and as per recs:   --No acute neurosurgical intervention  --Hold Eliquis for 2 weeks. Ok for DVT ppx with standard dose Lovenox 40 mg subcutaneous daily. If higher dosing needed given recent surgery, can start this in 5 days.   --SBP <140  --Na >135  --Keppra 500 mg po BID x 14 days.   --HOB >30.  --Follow-up in 2 weeks with repeat CTH. If repeat CTH stable, can resume home dose Eliquis. Will contact the patient with this appointment. Notify NSGY immediately with any changes in neuro status.      Acute blood loss anemia  · Slight decrease in Hgb to 7.7 on 6/4. No signs of bleeding. BP  stable. Will monitor.   · Controlled. Hgb 8 on 6/2 and 8.1 on 6/3.   · On admission to hospital, patient noted to have Hgb of 10.9. After surgery patient's Hgb level dropped to 8.0 on 5/31 and 7.7 on 6/1 and expected blood loss related to surgery and hip fracture.   · Patient has no signs of active bleeding and hemodynamically stable. Patient is asymptomatic related to anemia.   · Goal is keep Hgb >7 and consider blood transfusion if Hgb < 7 and asymptomatic or < 8 if patient becomes symptomatic.  · Plan is to monitor daily CBC post-operatively.      Tachy-juan a syndrome  PAF (paroxysmal atrial fibrillation)  · Controlled. Continue to monitor. Cardiology wants HR on higher side and okay with slight tachycardia as she has issues with bradycardia on higher doses of Metoprolol.   · Cardiology consulted as patient having erratic HRs up to 160 and as low as 40 in hospital. Cardiology states has evidence of tachycardia and bradycardia on ECG but does not meet criteria for tachy-juan a syndrome.   · Cardiology recommends to continue low dose Metoprolol 25 mg po BID to help with tachycardia related to atrial fibrillation. No need for pacemaker at this time.   · Per Cardiology, if patient experiences syncope or develops significant conversion pause >5 sec, please alert our team. Per Cardiology, patient to follow-up with her cardiologist, Dr. Roman as outpatient.   · Home Eliquis on hold for long term anticoagulation for atrial fibrillation due to SAH as per Neurosurgery.   · Monitor on telemetry.     Essential hypertension  Chronic and controlled. Continue Hydralazine 25 mg po TID and Amlodipine/Benzapril 5/20 1 tablet po daily to treat. Goal SBP < 140. Monitor vital signs every 4 hours.       Coronary artery disease involving native coronary artery of native heart without angina pectoris  · Chronic and controlled. Patient with history of remote stents at [facility] in 1990 and 2003.   · Patient on Zocor at home but not on  formulary so holding in hospital but to resume on discharge.     Type 2 diabetes mellitus with circulatory disorder, without long-term current use of insulin  Patient's FSGs are controlled on current medication regimen. Patient on oral Tradjenta at home to treat.   Last A1c reviewed-   Lab Results   Component Value Date    HGBA1C 6.3 (H) 05/29/2023     Most recent fingerstick glucose reviewed-   Recent Labs   Lab 06/03/23  1131 06/03/23  1532 06/03/23  2138   POCTGLUCOSE 140* 142* 129*     Current correctional scale  Low  Maintain anti-hyperglycemic dose as follows-   Antihyperglycemics (From admission, onward)    Start     Stop Route Frequency Ordered    05/29/23 1418  insulin aspart U-100 pen 0-5 Units         -- SubQ Before meals & nightly PRN 05/29/23 1318        Hold Oral hypoglycemics while patient is in the hospital.  Target blood sugars 140-180 in hospital.  Diabetic diet.  Monitor blood sugars 4 times daily with meals and at bedtime.     Chronic diastolic heart failure  Patient is identified as having Diastolic (HFpEF) heart failure that is Chronic. CHF is currently controlled. Latest ECHO performed and demonstrates- Results for orders placed during the hospital encounter of 05/20/23    Echo    Interpretation Summary  · The left ventricle is normal in size with low normal systolic function.  · The estimated ejection fraction is 53%.  · There are segmental left ventricular wall motion abnormalities.  · There is abnormal septal wall motion.  · Mild left atrial enlargement.  · Indeterminate left ventricular diastolic function.  · Normal right ventricular size with low normal right ventricular systolic function.  · There is mild-to-moderate aortic valve stenosis.  · Aortic valve area is 1.40 cm2; peak velocity is 1.54 m/s; mean gradient is 6 mmHg.  · Mild aortic regurgitation.  · Mild mitral regurgitation.  · Mild tricuspid regurgitation.  · Normal central venous pressure (3 mmHg).  · The estimated PA systolic  pressure is 41 mmHg.  · There is mild pulmonary hypertension.  · Trivial posterior pericardial effusion. At base and under the RA and small outside the RV apically.  Continue Metoprolol and Benazepril and monitor clinical status closely. Monitor on telemetry. Patient is off CHF pathway. Monitor strict Is&Os and daily weights. Continue to stress to patient importance of self efficacy and  on diet for CHF.    Centrilobular emphysema  Chronic hypoxemic respiratory failure  · Chronic and controlled. Continue home oxygen at 2 liters. Patient with no signs of acute exacerbation.   · Patient on no inhalers at home.     History of lung cancer  · In remission.   · s/p chemo and last imaging in 2022 was stable disease. Chronic effusions seen on previous imaging seen on CXR here.  · On home oxygen and will continue.        Late onset Alzheimer's dementia with behavioral disturbance  · Chronic and controlled and mild disease. Patient at cognitive baseline.   · Continue home Aricept to treat.  · Delirium precautions.       Stage 3a chronic kidney disease  · Controlled. Patient is at baseline renal function at present.   · Need to avoid any nephrotoxic agents unless necessary while patient is hospitalized.  · Renally adjust medications based on patient's creatinine clearance.   · Will monitor daily BMP to monitor renal function.         VTE Risk Mitigation (From admission, onward)         Ordered     enoxaparin injection 40 mg  Every 24 hours         06/03/23 0759     Place MERCEDES hose  Until discontinued         05/29/23 0902                Discharge Planning   DUTCH: 6/5/2023     Code Status: Partial Code   Is the patient medically ready for discharge?: No    Reason for patient still in hospital (select all that apply): Patient trending condition  Discharge Plan A: Skilled Nursing Facility ([facility]) for 6/5.   Discharge Delays: None known at this time      Florence Scanlon MD  Department of Hospital Medicine   [facility]  Telemetry Stepdown

## 2023-06-04 NOTE — PROGRESS NOTES
[facility] - Telemetry Stepdown  Orthopedics  Progress Note    Patient Name: Brunilda England  MRN: [id number]  Admission Date: 5/28/2023  Hospital Length of Stay: 6 days  Attending Provider: Florence Scanlon MD  Primary Care Provider: Pepper Whitfield MD  Follow-up For: Procedure(s) (LRB):  INSERTION, INTRAMEDULLARY TEREZA, FEMUR (Left)    Post-Operative Day: 5 Days Post-Op  Subjective:     Principal Problem:Left displaced femoral neck fracture    Principal Orthopedic Problem: s/p L-CMN 5/30    Interval History: Afebrile, VSS.  Pain reportedly well controlled; PNC removed.  Hgb 8.1. Accepted to [facility] 6/5/23.  Denies any nausea/vomiting, chest pain, SOB, numbness/tingling. Family at bedside.         Review of patient's allergies indicates:   Allergen Reactions    Bextra [valdecoxib] Swelling    Gabapentin Diarrhea and Nausea Only       Current Facility-Administered Medications   Medication    acetaminophen tablet 1,000 mg    amlodipine-benazepril 5-20 mg per capsule 1 capsule    bisacodyL suppository 10 mg    calcium-vitamin D3 500 mg-5 mcg (200 unit) per tablet 2 tablet    dextrose 10% bolus 125 mL 125 mL    dextrose 10% bolus 250 mL 250 mL    dextrose 40 % gel 15,000 mg    dextrose 40 % gel 30,000 mg    donepeziL tablet 5 mg    DULoxetine DR capsule 60 mg    enoxaparin injection 40 mg    glucagon (human recombinant) injection 1 mg    hydrALAZINE tablet 25 mg    hydrALAZINE tablet 50 mg    insulin aspart U-100 pen 0-5 Units    levETIRAcetam tablet 500 mg    loperamide capsule 2 mg    melatonin tablet 6 mg    methocarbamoL tablet 500 mg    metoprolol injection 2.5 mg    metoprolol tartrate (LOPRESSOR) tablet 25 mg    ondansetron injection 4 mg    sodium chloride 0.9% flush 10 mL    vitamin D 1000 units tablet 1,000 Units     Facility-Administered Medications Ordered in Other Encounters   Medication    sodium chloride 0.9% flush 5 mL     Objective:     Vital Signs (Most Recent):  Temp: 98.7 °F  (37.1 °C) (06/04/23 0324)  Pulse: (!) 112 (06/04/23 0518)  Resp: 19 (06/04/23 0324)  BP: 134/61 (06/04/23 0324)  SpO2: 98 % (06/04/23 0500) Vital Signs (24h Range):  Temp:  [97.9 °F (36.6 °C)-98.7 °F (37.1 °C)] 98.7 °F (37.1 °C)  Pulse:  [] 112  Resp:  [16-19] 19  SpO2:  [87 %-100 %] 98 %  BP: (133-162)/(61-74) 134/61     Weight: 76.9 kg (169 lb 8.5 oz)     Body mass index is 30.03 kg/m².    No intake or output data in the 24 hours ending 06/04/23 0733      Ortho/SPM Exam  AAOx4  NAD  Reg rate  No increased WOB    LLE:  Dressing c/d/i  SILT T/SP/DP/Greer/Sa  Motor intact T/SP/DP  WWP extremities  FCDs in place and functioning        Significant Labs: BMP:   Recent Labs   Lab 06/03/23  0521   GLU 97   *   K 3.7      CO2 22*   BUN 15   CREATININE 0.9   CALCIUM 9.9   MG 1.6       CBC:   Recent Labs   Lab 06/03/23  0521   WBC 4.98   HGB 8.1*   HCT 24.9*          All pertinent labs within the past 24 hours have been reviewed.    Significant Imaging: I have reviewed all pertinent imaging results/findings.    Assessment/Plan:     * Closed left basicervical femoral neck fracture with extension into the intertrochanteric region s/p IM nail on 5/30/2023  Brunilda England is a 89 y.o. female with a left basicervical femoral neck fracture with extension into the intertroch region, closed, NVI. They take Eliquis 5 b.i.d. anticoagulation at home. They required a walker for ambulatory assistive devices prior to this injury. Also had small SAH on CT head which was stable on repeat imaging.  She is now s/p CMN 5/30.     - WBAT LLE   - AC: per primary; now on daily SQ Lovenox 30mg   - Pain: MM    » Dispo: discharge to SNF once medically cleared          Nael Peggy, MD  Orthopedics  [facility] - Telemetry Stepdown

## 2023-06-05 ENCOUNTER — HOSPITAL ENCOUNTER (INPATIENT)
Facility: HOSPITAL | Age: 88
LOS: 17 days | Discharge: HOME HEALTH | DRG: 560 | End: 2023-06-22
Attending: HOSPITALIST | Admitting: HOSPITALIST
Payer: MEDICARE

## 2023-06-05 ENCOUNTER — TELEPHONE (OUTPATIENT)
Dept: INTERNAL MEDICINE | Facility: CLINIC | Age: 88
End: 2023-06-05
Payer: MEDICARE

## 2023-06-05 VITALS
DIASTOLIC BLOOD PRESSURE: 61 MMHG | TEMPERATURE: 97 F | RESPIRATION RATE: 17 BRPM | HEART RATE: 114 BPM | BODY MASS INDEX: 30.03 KG/M2 | OXYGEN SATURATION: 98 % | SYSTOLIC BLOOD PRESSURE: 131 MMHG | WEIGHT: 169.56 LBS

## 2023-06-05 DIAGNOSIS — S72.002A LEFT DISPLACED FEMORAL NECK FRACTURE: ICD-10-CM

## 2023-06-05 DIAGNOSIS — F02.818 LATE ONSET ALZHEIMER'S DEMENTIA WITH BEHAVIORAL DISTURBANCE: ICD-10-CM

## 2023-06-05 DIAGNOSIS — S72.002A LEFT DISPLACED FEMORAL NECK FRACTURE: Primary | ICD-10-CM

## 2023-06-05 DIAGNOSIS — S72.142A INTERTROCHANTERIC FRACTURE OF LEFT HIP, CLOSED, INITIAL ENCOUNTER: ICD-10-CM

## 2023-06-05 DIAGNOSIS — J96.11 CHRONIC HYPOXEMIC RESPIRATORY FAILURE: ICD-10-CM

## 2023-06-05 DIAGNOSIS — G30.1 LATE ONSET ALZHEIMER'S DEMENTIA WITH BEHAVIORAL DISTURBANCE: ICD-10-CM

## 2023-06-05 DIAGNOSIS — S72.002A HIP FRACTURE, LEFT: ICD-10-CM

## 2023-06-05 LAB
ANION GAP SERPL CALC-SCNC: 8 MMOL/L (ref 8–16)
BASOPHILS # BLD AUTO: 0.02 K/UL (ref 0–0.2)
BASOPHILS NFR BLD: 0.5 % (ref 0–1.9)
BUN SERPL-MCNC: 9 MG/DL (ref 10–30)
CALCIUM SERPL-MCNC: 9.8 MG/DL (ref 8.7–10.5)
CHLORIDE SERPL-SCNC: 103 MMOL/L (ref 95–110)
CO2 SERPL-SCNC: 25 MMOL/L (ref 23–29)
CREAT SERPL-MCNC: 0.7 MG/DL (ref 0.5–1.4)
DIFFERENTIAL METHOD: ABNORMAL
EOSINOPHIL # BLD AUTO: 0.1 K/UL (ref 0–0.5)
EOSINOPHIL NFR BLD: 2.5 % (ref 0–8)
ERYTHROCYTE [DISTWIDTH] IN BLOOD BY AUTOMATED COUNT: 16.1 % (ref 11.5–14.5)
EST. GFR  (NO RACE VARIABLE): >60 ML/MIN/1.73 M^2
GLUCOSE SERPL-MCNC: 90 MG/DL (ref 70–110)
HCT VFR BLD AUTO: 25.3 % (ref 37–48.5)
HGB BLD-MCNC: 8.7 G/DL (ref 12–16)
IMM GRANULOCYTES # BLD AUTO: 0.01 K/UL (ref 0–0.04)
IMM GRANULOCYTES NFR BLD AUTO: 0.2 % (ref 0–0.5)
LYMPHOCYTES # BLD AUTO: 0.9 K/UL (ref 1–4.8)
LYMPHOCYTES NFR BLD: 21.6 % (ref 18–48)
MCH RBC QN AUTO: 29.2 PG (ref 27–31)
MCHC RBC AUTO-ENTMCNC: 34.4 G/DL (ref 32–36)
MCV RBC AUTO: 85 FL (ref 82–98)
MONOCYTES # BLD AUTO: 0.7 K/UL (ref 0.3–1)
MONOCYTES NFR BLD: 16.8 % (ref 4–15)
NEUTROPHILS # BLD AUTO: 2.5 K/UL (ref 1.8–7.7)
NEUTROPHILS NFR BLD: 58.4 % (ref 38–73)
NRBC BLD-RTO: 0 /100 WBC
PLATELET # BLD AUTO: 196 K/UL (ref 150–450)
PMV BLD AUTO: 11.6 FL (ref 9.2–12.9)
POCT GLUCOSE: 109 MG/DL (ref 70–110)
POCT GLUCOSE: 119 MG/DL (ref 70–110)
POTASSIUM SERPL-SCNC: 3.5 MMOL/L (ref 3.5–5.1)
RBC # BLD AUTO: 2.98 M/UL (ref 4–5.4)
SARS-COV-2 RNA RESP QL NAA+PROBE: NOT DETECTED
SODIUM SERPL-SCNC: 136 MMOL/L (ref 136–145)
WBC # BLD AUTO: 4.35 K/UL (ref 3.9–12.7)

## 2023-06-05 PROCEDURE — 36415 COLL VENOUS BLD VENIPUNCTURE: CPT | Performed by: HOSPITALIST

## 2023-06-05 PROCEDURE — 25000003 PHARM REV CODE 250: Performed by: STUDENT IN AN ORGANIZED HEALTH CARE EDUCATION/TRAINING PROGRAM

## 2023-06-05 PROCEDURE — 99239 HOSP IP/OBS DSCHRG MGMT >30: CPT | Mod: ,,, | Performed by: INTERNAL MEDICINE

## 2023-06-05 PROCEDURE — 99239 PR HOSPITAL DISCHARGE DAY,>30 MIN: ICD-10-PCS | Mod: ,,, | Performed by: INTERNAL MEDICINE

## 2023-06-05 PROCEDURE — 11000004 HC SNF PRIVATE

## 2023-06-05 PROCEDURE — 87635 SARS-COV-2 COVID-19 AMP PRB: CPT | Performed by: INTERNAL MEDICINE

## 2023-06-05 PROCEDURE — 80048 BASIC METABOLIC PNL TOTAL CA: CPT | Performed by: HOSPITALIST

## 2023-06-05 PROCEDURE — 94761 N-INVAS EAR/PLS OXIMETRY MLT: CPT

## 2023-06-05 PROCEDURE — 25000003 PHARM REV CODE 250: Performed by: HOSPITALIST

## 2023-06-05 PROCEDURE — 99900035 HC TECH TIME PER 15 MIN (STAT)

## 2023-06-05 PROCEDURE — 63600175 PHARM REV CODE 636 W HCPCS: Performed by: INTERNAL MEDICINE

## 2023-06-05 PROCEDURE — 85025 COMPLETE CBC W/AUTO DIFF WBC: CPT | Performed by: HOSPITALIST

## 2023-06-05 RX ORDER — METHOCARBAMOL 500 MG/1
500 TABLET, FILM COATED ORAL EVERY 8 HOURS PRN
Status: DISCONTINUED | OUTPATIENT
Start: 2023-06-05 | End: 2023-06-22 | Stop reason: HOSPADM

## 2023-06-05 RX ORDER — HYDRALAZINE HYDROCHLORIDE 25 MG/1
25 TABLET, FILM COATED ORAL EVERY 8 HOURS
Status: DISCONTINUED | OUTPATIENT
Start: 2023-06-05 | End: 2023-06-22 | Stop reason: HOSPADM

## 2023-06-05 RX ORDER — ACETAMINOPHEN 325 MG/1
650 TABLET ORAL EVERY 6 HOURS PRN
Status: DISCONTINUED | OUTPATIENT
Start: 2023-06-05 | End: 2023-06-22 | Stop reason: HOSPADM

## 2023-06-05 RX ORDER — DEXTROSE 40 %
15 GEL (GRAM) ORAL
Status: DISCONTINUED | OUTPATIENT
Start: 2023-06-05 | End: 2023-06-22 | Stop reason: HOSPADM

## 2023-06-05 RX ORDER — GLUCAGON 1 MG
1 KIT INJECTION
Status: DISCONTINUED | OUTPATIENT
Start: 2023-06-05 | End: 2023-06-22 | Stop reason: HOSPADM

## 2023-06-05 RX ORDER — ENOXAPARIN SODIUM 100 MG/ML
40 INJECTION SUBCUTANEOUS EVERY 24 HOURS
Status: DISCONTINUED | OUTPATIENT
Start: 2023-06-06 | End: 2023-06-22 | Stop reason: HOSPADM

## 2023-06-05 RX ORDER — METHOCARBAMOL 500 MG/1
500 TABLET, FILM COATED ORAL EVERY 8 HOURS PRN
Status: ON HOLD
Start: 2023-06-05 | End: 2023-06-21 | Stop reason: HOSPADM

## 2023-06-05 RX ORDER — DEXTROSE 40 %
30 GEL (GRAM) ORAL
Status: DISCONTINUED | OUTPATIENT
Start: 2023-06-05 | End: 2023-06-22 | Stop reason: HOSPADM

## 2023-06-05 RX ORDER — HYDRALAZINE HYDROCHLORIDE 25 MG/1
25 TABLET, FILM COATED ORAL EVERY 8 HOURS
Qty: 90 TABLET | Refills: 11 | Status: SHIPPED | OUTPATIENT
Start: 2023-06-05 | End: 2024-06-04

## 2023-06-05 RX ORDER — FERROUS SULFATE, DRIED 160(50) MG
2 TABLET, EXTENDED RELEASE ORAL DAILY
Status: DISCONTINUED | OUTPATIENT
Start: 2023-06-06 | End: 2023-06-22 | Stop reason: HOSPADM

## 2023-06-05 RX ORDER — FERROUS SULFATE, DRIED 160(50) MG
2 TABLET, EXTENDED RELEASE ORAL DAILY
COMMUNITY
Start: 2023-06-06

## 2023-06-05 RX ORDER — INSULIN ASPART 100 [IU]/ML
0-5 INJECTION, SOLUTION INTRAVENOUS; SUBCUTANEOUS
Status: DISCONTINUED | OUTPATIENT
Start: 2023-06-05 | End: 2023-06-22 | Stop reason: HOSPADM

## 2023-06-05 RX ORDER — ACETAMINOPHEN 500 MG
1000 TABLET ORAL EVERY 8 HOURS
Status: DISCONTINUED | OUTPATIENT
Start: 2023-06-05 | End: 2023-06-22 | Stop reason: HOSPADM

## 2023-06-05 RX ORDER — CALCIUM CARBONATE 200(500)MG
500 TABLET,CHEWABLE ORAL 2 TIMES DAILY PRN
Status: DISCONTINUED | OUTPATIENT
Start: 2023-06-05 | End: 2023-06-22 | Stop reason: HOSPADM

## 2023-06-05 RX ORDER — LEVETIRACETAM 500 MG/1
500 TABLET ORAL 2 TIMES DAILY
Status: ON HOLD
Start: 2023-05-29 | End: 2023-06-21 | Stop reason: HOSPADM

## 2023-06-05 RX ORDER — CHOLECALCIFEROL (VITAMIN D3) 25 MCG
1000 TABLET ORAL DAILY
Status: DISCONTINUED | OUTPATIENT
Start: 2023-06-06 | End: 2023-06-22 | Stop reason: HOSPADM

## 2023-06-05 RX ORDER — ENOXAPARIN SODIUM 100 MG/ML
40 INJECTION SUBCUTANEOUS EVERY 24 HOURS
Status: ON HOLD
Start: 2023-06-05 | End: 2023-06-21 | Stop reason: HOSPADM

## 2023-06-05 RX ORDER — TALC
6 POWDER (GRAM) TOPICAL NIGHTLY PRN
Status: DISCONTINUED | OUTPATIENT
Start: 2023-06-05 | End: 2023-06-22 | Stop reason: HOSPADM

## 2023-06-05 RX ORDER — IPRATROPIUM BROMIDE AND ALBUTEROL SULFATE 2.5; .5 MG/3ML; MG/3ML
3 SOLUTION RESPIRATORY (INHALATION) EVERY 6 HOURS PRN
Status: DISCONTINUED | OUTPATIENT
Start: 2023-06-05 | End: 2023-06-22 | Stop reason: HOSPADM

## 2023-06-05 RX ORDER — AMOXICILLIN 250 MG
1 CAPSULE ORAL 2 TIMES DAILY
Status: DISCONTINUED | OUTPATIENT
Start: 2023-06-05 | End: 2023-06-07

## 2023-06-05 RX ORDER — LANOLIN ALCOHOL/MO/W.PET/CERES
1000 CREAM (GRAM) TOPICAL DAILY
Status: DISCONTINUED | OUTPATIENT
Start: 2023-06-06 | End: 2023-06-22 | Stop reason: HOSPADM

## 2023-06-05 RX ORDER — DONEPEZIL HYDROCHLORIDE 5 MG/1
5 TABLET, FILM COATED ORAL NIGHTLY
Status: DISCONTINUED | OUTPATIENT
Start: 2023-06-05 | End: 2023-06-22 | Stop reason: HOSPADM

## 2023-06-05 RX ORDER — ACETAMINOPHEN 500 MG
1000 TABLET ORAL EVERY 8 HOURS
Refills: 0 | Status: ON HOLD
Start: 2023-06-05 | End: 2023-06-21 | Stop reason: HOSPADM

## 2023-06-05 RX ORDER — DULOXETIN HYDROCHLORIDE 60 MG/1
60 CAPSULE, DELAYED RELEASE ORAL DAILY
Status: DISCONTINUED | OUTPATIENT
Start: 2023-06-06 | End: 2023-06-22 | Stop reason: HOSPADM

## 2023-06-05 RX ORDER — AMLODIPINE AND BENAZEPRIL HYDROCHLORIDE 5; 20 MG/1; MG/1
1 CAPSULE ORAL DAILY
Status: DISCONTINUED | OUTPATIENT
Start: 2023-06-06 | End: 2023-06-22 | Stop reason: HOSPADM

## 2023-06-05 RX ORDER — LEVETIRACETAM 500 MG/1
500 TABLET ORAL 2 TIMES DAILY
Status: COMPLETED | OUTPATIENT
Start: 2023-06-05 | End: 2023-06-13

## 2023-06-05 RX ORDER — ATORVASTATIN CALCIUM 40 MG/1
40 TABLET, FILM COATED ORAL DAILY
Status: DISCONTINUED | OUTPATIENT
Start: 2023-06-06 | End: 2023-06-22 | Stop reason: HOSPADM

## 2023-06-05 RX ORDER — METOPROLOL TARTRATE 25 MG/1
25 TABLET, FILM COATED ORAL 2 TIMES DAILY
Status: DISCONTINUED | OUTPATIENT
Start: 2023-06-05 | End: 2023-06-22 | Stop reason: HOSPADM

## 2023-06-05 RX ADMIN — AMLODIPINE BESYLATE AND BENAZEPRIL HYDROCHLORIDE 1 CAPSULE: 5; 20 CAPSULE ORAL at 08:06

## 2023-06-05 RX ADMIN — METOPROLOL TARTRATE 25 MG: 25 TABLET, FILM COATED ORAL at 08:06

## 2023-06-05 RX ADMIN — LEVETIRACETAM 500 MG: 500 TABLET, FILM COATED ORAL at 10:06

## 2023-06-05 RX ADMIN — Medication 2 TABLET: at 08:06

## 2023-06-05 RX ADMIN — DONEPEZIL HYDROCHLORIDE 5 MG: 5 TABLET ORAL at 10:06

## 2023-06-05 RX ADMIN — ENOXAPARIN SODIUM 40 MG: 40 INJECTION SUBCUTANEOUS at 04:06

## 2023-06-05 RX ADMIN — METOPROLOL TARTRATE 25 MG: 25 TABLET, FILM COATED ORAL at 10:06

## 2023-06-05 RX ADMIN — ACETAMINOPHEN 1000 MG: 500 TABLET ORAL at 06:06

## 2023-06-05 RX ADMIN — LEVETIRACETAM 500 MG: 500 TABLET, FILM COATED ORAL at 08:06

## 2023-06-05 RX ADMIN — Medication 6 MG: at 12:06

## 2023-06-05 RX ADMIN — HYDRALAZINE HYDROCHLORIDE 25 MG: 25 TABLET, FILM COATED ORAL at 10:06

## 2023-06-05 RX ADMIN — CHOLECALCIFEROL TAB 25 MCG (1000 UNIT) 1000 UNITS: 25 TAB at 08:06

## 2023-06-05 RX ADMIN — ACETAMINOPHEN 1000 MG: 500 TABLET ORAL at 10:06

## 2023-06-05 RX ADMIN — ACETAMINOPHEN 1000 MG: 500 TABLET ORAL at 01:06

## 2023-06-05 RX ADMIN — HYDRALAZINE HYDROCHLORIDE 25 MG: 25 TABLET, FILM COATED ORAL at 06:06

## 2023-06-05 RX ADMIN — DULOXETINE 60 MG: 60 CAPSULE, DELAYED RELEASE ORAL at 08:06

## 2023-06-05 RX ADMIN — HYDRALAZINE HYDROCHLORIDE 25 MG: 25 TABLET, FILM COATED ORAL at 01:06

## 2023-06-05 RX ADMIN — METHOCARBAMOL 500 MG: 500 TABLET ORAL at 10:06

## 2023-06-05 NOTE — PLAN OF CARE
06/05/23 1508   Post-Acute Status   Post-Acute Authorization Placement   Post-Acute Placement Status Set-up Complete/Auth obtained     Patient's set-up has been completed. LILLIAM scheduled d/c transportation to [facility]  through [facility]. Patient is scheduled to be picked up at 3:30 PM. LILLIAM provided patient's nurse with report number #[phone]; ask for the nurse for pt. Requested  time does not guarantee arrival time.      AMY Brian informed the pt and pt's daughter at bedside.     Eri Bergeron LMSW  Case Management   [facility]   Ext. [phone]

## 2023-06-05 NOTE — PLAN OF CARE
CHW met with patient/family at bedside. Patient experience rounding completed and reviewed the following.     Do you know your discharge plan? Yes        If yes, what is the plan? SNF    Have you discussed your needs and preferences with your SW/CM? Yes    Assigned SW/CM notified of any patient/family needs or concerns.

## 2023-06-05 NOTE — PLAN OF CARE
06/05/23 1050   Post-Acute Status   Post-Acute Authorization Placement   Post-Acute Placement Status   (Pending Covid-19 results)   Discharge Plan   Discharge Plan A Skilled Nursing Facility     Patient Accepted to [facility], pending Covid-19 results. Authorization obtained.    Eri Bergeron LMSW  Case Management   [facility]   Ext. [phone]

## 2023-06-05 NOTE — PLAN OF CARE
[facility]     Department of Hospital Medicine     [address] [place] [address]     [phone] after hours  [phone] fax       NURSING HOME ORDERS    06/05/2023    Admit to [facility]:  Skilled Bed                                            Diagnoses:  Active Hospital Problems    Diagnosis  POA    *Closed left basicervical femoral neck fracture with extension into the intertrochanteric region s/p IM nail on 5/30/2023 [S72.002A]  Yes     Priority: 1 - High    Subarachnoid hemorrhage following injury, no loss of consciousness [S06.6X0A]  Yes     Priority: 2     Acute blood loss anemia [D62]  Yes     Priority: 3     Tachy-juan a syndrome [I49.5]  Yes     Priority: 4     Essential hypertension [I10]  Yes     Priority: 6     Coronary artery disease involving native coronary artery of native heart without angina pectoris [I25.10]  Yes     Priority: 7     Type 2 diabetes mellitus with circulatory disorder, without long-term current use of insulin [E11.59]  Yes     Priority: 8     Chronic diastolic heart failure [I50.32]  Yes     Priority: 9     Centrilobular emphysema [J43.2]  Yes     Priority: 10     History of lung cancer [Z85.118]  Not Applicable     Priority: 10     Late onset Alzheimer's dementia with behavioral disturbance [G30.1, F02.818]  Yes     Priority: 11     Stage 3a chronic kidney disease [N18.31]  Yes     Priority: 12     Intertrochanteric fracture of left hip, closed, initial encounter [S72.142A]  Yes    Chronic hypoxemic respiratory failure [J96.11]  Yes    PAF (paroxysmal atrial fibrillation) [I48.0]  Yes      Resolved Hospital Problems    Diagnosis Date Resolved POA    Hypomagnesemia [E83.42] 06/03/2023 Yes     Priority: 5        Allergies:  Review of patient's allergies indicates:   Allergen Reactions    Bextra [valdecoxib] Swelling    Gabapentin Diarrhea and Nausea Only       Vitals: Every shift (Skilled Nursing patients)        Code  Status: Partial Code (No chest compressions)    Diet: diabetic diet: 2000 calorie      Supplement: House supplement one can by mouth with meals                             Activities:   - Up in a chair each morning as tolerated   - Ambulate with assistance to bathroom   - May use walker, cane, or self-propelled wheelchair   - Weight bearing: FWB/WBAT: left lower extremity    LABS: Per facility protocol      Nursing: Out of bed BID, Up with assistance    Nursing Precautions:          - Fall precautions per nursing home protocol      CONSULTS:       Physical Therapy to evaluate and treat 5 times a week     Occupational Therapy to evaluate and treat 5 times a week        MISCELLANEOUS CARE:  Routine Skin for Bedridden Patients: Instruct patient/caregiver to apply moisture barrier cream to all skin folds and wet areas in perineal area daily and after baths and all bowel movements.  Home Oxygen:  Oxygen at 2 L/min nasal canula to be used:  Continuously., Assess oxygen saturation via pulse oximeter as needed for increase in SOB., and Notify physician if oxygen saturation less than 88%      WOUND CARE ORDERS  Keep surgical dressings in place that was applied post-op and leave on left hip and do not remove until Orthopedic clinic follow-up. Assess surgical dressing with each treatment. Call MD if any drainage reaches border to border of dressing horizontally, signs or symptoms of infection, temp >101 F, induration, swelling or redness.                DIABETES CARE:   SN to perform and educate Diabetic management with blood glucose monitoring:, Fingerstick blood sugar before meals and at bedtime, and Report CBG < 60 or > 350 to physician.                                          Insulin Sliding Scale          Glucose  Novolog Insulin Subcutaneous        0 - 60   Orange juice or glucose tablet, hold insulin      No insulin   201-250  2 units   251-300  4 units   301-350  6 units   351-400  8 units   >400   10 units then  call physician      Medications:        Medication List        START taking these medications      acetaminophen 500 MG tablet  Commonly known as: TYLENOL  Take 2 tablets (1,000 mg total) by mouth every 8 (eight) hours.     calcium-vitamin D3 500 mg-5 mcg (200 unit) per tablet  Commonly known as: OS-ERIC 500 + D3  Take 2 tablets by mouth once daily.     enoxaparin 40 mg/0.4 mL Syrg  Commonly known as: LOVENOX  Inject 0.4 mLs (40 mg total) into the skin Q24H. DVT prophylaxis after hip fracture surgery.     hydrALAZINE 25 MG tablet  Commonly known as: APRESOLINE  Take 1 tablet (25 mg total) by mouth every 8 (eight) hours.     levETIRAcetam 500 MG Tab  Commonly known as: KEPPRA  Take 1 tablet (500 mg total) by mouth 2 (two) times daily. End date 6/12/2023 for seizure prophylaxis after small SAH. for 14 days     methocarbamoL 500 MG Tab  Commonly known as: ROBAXIN  Take 1 tablet (500 mg total) by mouth every 8 (eight) hours as needed (Muscle spasms).            CHANGE how you take these medications      apixaban 5 mg Tab  Commonly known as: ELIQUIS  Take 1 tablet (5 mg total) by mouth 2 (two) times daily. Hold this medication until cleared by Neurosurgery to resume. Do not take.           CONTINUE taking these medications      albuterol 2.5 mg /3 mL (0.083 %) nebulizer solution  Commonly known as: PROVENTIL  Take 3 mLs (2.5 mg total) by nebulization every 6 (six) hours as needed for Wheezing. Rescue     amlodipine-benazepril 5-20 mg 5-20 mg per capsule  Commonly known as: LOTREL  Take 1 capsule by mouth once daily.     cyanocobalamin 1000 MCG tablet  Commonly known as: VITAMIN B-12  Take 1,000 mcg by mouth every morning.     donepeziL 5 MG tablet  Commonly known as: ARICEPT  Take 1 tablet (5 mg total) by mouth every evening.     DULoxetine 60 MG capsule  Commonly known as: CYMBALTA  Take 1 capsule (60 mg total) by mouth every morning.     metoprolol tartrate 25 MG tablet  Commonly known as: LOPRESSOR  Take 1 tablet (25  mg total) by mouth 2 (two) times daily.     nitroGLYCERIN 0.4 MG SL tablet  Commonly known as: NITROSTAT  Place 1 tablet (0.4 mg total) under the tongue every 5 (five) minutes as needed for chest pain.     simvastatin 80 MG tablet  Commonly known as: ZOCOR  Take 1 tablet (80 mg total) by mouth once daily. Okay to hold this medication if not on formulary at [facility].      TRADJENTA 5 mg Tab tablet  Generic drug: linaGLIPtin  Take 1 tablet (5 mg total) by mouth once daily. Okay to hold this medication if not on formulary at [facility].      vitamin D 1000 units Tab  Commonly known as: VITAMIN D3  Take 1,000 Units by mouth once daily.                Follow-up:   Future Appointments   Date Time Provider Department Center   6/8/2023  2:30 PM DAVIDA Jones [facility]   6/13/2023  2:45 PM FARIHA Hutton   6/14/2023  1:00 PM MD GABBIE Le   6/29/2023  2:00 PM DAVIDA Jones [facility]   7/11/2023 10:45 AM FARIHA Hutton       _________________________________  Florence Scanlon MD  06/05/2023

## 2023-06-05 NOTE — TELEPHONE ENCOUNTER
Called and left a detailed message for pt   Not sure what is needed for the 02 tank  She is in the ED as well

## 2023-06-05 NOTE — PLAN OF CARE
Problem: Infection  Goal: Absence of Infection Signs and Symptoms  Outcome: Ongoing, Progressing     Problem: Adult Inpatient Plan of Care  Goal: Plan of Care Review  Outcome: Ongoing, Progressing  Goal: Patient-Specific Goal (Individualized)  Outcome: Ongoing, Progressing  Goal: Absence of Hospital-Acquired Illness or Injury  Outcome: Ongoing, Progressing  Goal: Optimal Comfort and Wellbeing  Outcome: Ongoing, Progressing  Goal: Readiness for Transition of Care  Outcome: Ongoing, Progressing     Problem: Adjustment to Injury (Hip Fracture Medical Management)  Goal: Optimal Coping with Change in Health Status  Outcome: Ongoing, Progressing     Problem: Bleeding (Hip Fracture Medical Management)  Goal: Absence of Bleeding  Outcome: Ongoing, Progressing     Problem: Bowel Elimination Impaired (Hip Fracture Medical Management)  Goal: Effective Bowel Elimination  Outcome: Ongoing, Progressing     Problem: Cognitive Decline Risk (Hip Fracture Medical Management)  Goal: Baseline Cognitive Function Maintained  Outcome: Ongoing, Progressing     Problem: Embolism (Hip Fracture Medical Management)  Goal: Absence of Embolism  Outcome: Ongoing, Progressing     Problem: Fracture Stabilization and Management (Hip Fracture Medical Management)  Goal: Fracture Stability  Outcome: Ongoing, Progressing     Problem: Functional Ability Impaired (Hip Fracture Medical Management)  Goal: Optimal Functional Performance  Outcome: Ongoing, Progressing     Problem: Pain (Hip Fracture Medical Management)  Goal: Acceptable Pain Level  Outcome: Ongoing, Progressing     Problem: Urinary Elimination Impaired (Hip Fracture Medical Management)  Goal: Effective Urinary Elimination  Outcome: Ongoing, Progressing     Problem: Bleeding (Surgery Nonspecified)  Goal: Absence of Bleeding  Outcome: Ongoing, Progressing     Problem: Bowel Motility Impaired (Surgery Nonspecified)  Goal: Effective Bowel Elimination  Outcome: Ongoing, Progressing     Problem:  Fluid and Electrolyte Imbalance (Surgery Nonspecified)  Goal: Fluid and Electrolyte Balance  Outcome: Ongoing, Progressing     Problem: Glycemic Control Impaired (Surgery Nonspecified)  Goal: Blood Glucose Level Within Targeted Range  Outcome: Ongoing, Progressing     Problem: Infection (Surgery Nonspecified)  Goal: Absence of Infection Signs and Symptoms  Outcome: Ongoing, Progressing     Problem: Ongoing Anesthesia Effects (Surgery Nonspecified)  Goal: Anesthesia/Sedation Recovery  Outcome: Ongoing, Progressing     Problem: Pain (Surgery Nonspecified)  Goal: Acceptable Pain Control  Outcome: Ongoing, Progressing     Problem: Postoperative Nausea and Vomiting (Surgery Nonspecified)  Goal: Nausea and Vomiting Relief  Outcome: Ongoing, Progressing     Problem: Postoperative Urinary Retention (Surgery Nonspecified)  Goal: Effective Urinary Elimination  Outcome: Ongoing, Progressing     Problem: Respiratory Compromise (Surgery Nonspecified)  Goal: Effective Oxygenation and Ventilation  Outcome: Ongoing, Progressing     Problem: Device-Related Complication Risk (Anesthesia/Analgesia, Neuraxial)  Goal: Safe Infusion Delivery Completion  Outcome: Ongoing, Progressing     Problem: Infection (Anesthesia/Analgesia, Neuraxial)  Goal: Absence of Infection Signs and Symptoms  Outcome: Ongoing, Progressing     Problem: Nausea and Vomiting (Anesthesia/Analgesia, Neuraxial)  Goal: Nausea and Vomiting Relief  Outcome: Ongoing, Progressing     Problem: Pain (Anesthesia/Analgesia, Neuraxial)  Goal: Effective Pain Control  Outcome: Ongoing, Progressing     Problem: Respiratory Compromise (Anesthesia/Analgesia, Neuraxial)  Goal: Effective Oxygenation and Ventilation  Outcome: Ongoing, Progressing     Problem: Sensorimotor Impairment (Anesthesia/Analgesia, Neuraxial)  Goal: Baseline Motor Function  Outcome: Ongoing, Progressing     Problem: Urinary Retention (Anesthesia/Analgesia, Neuraxial)  Goal: Effective Urinary  Elimination  Outcome: Ongoing, Progressing     Problem: Fall Injury Risk  Goal: Absence of Fall and Fall-Related Injury  Outcome: Ongoing, Progressing     Problem: Diabetes Comorbidity  Goal: Blood Glucose Level Within Targeted Range  Outcome: Ongoing, Progressing     Problem: Fluid Imbalance (Pneumonia)  Goal: Fluid Balance  Outcome: Ongoing, Progressing     Problem: Infection (Pneumonia)  Goal: Resolution of Infection Signs and Symptoms  Outcome: Ongoing, Progressing     Problem: Respiratory Compromise (Pneumonia)  Goal: Effective Oxygenation and Ventilation  Outcome: Ongoing, Progressing     Problem: Skin Injury Risk Increased  Goal: Skin Health and Integrity  Outcome: Ongoing, Progressing

## 2023-06-05 NOTE — TELEPHONE ENCOUNTER
----- Message from Ruthie Maurice sent at 6/5/2023  3:22 PM CDT -----  Contact: Hamlet Walker [facility] Phone# [phone], fax# [phone]  MsDeanna Walker from [facility] is calling in regards to her saying that she would like for you to put in an updated order for oxygen for the patient please.

## 2023-06-06 LAB
POCT GLUCOSE: 111 MG/DL (ref 70–110)
POCT GLUCOSE: 130 MG/DL (ref 70–110)
POCT GLUCOSE: 95 MG/DL (ref 70–110)
POCT GLUCOSE: 99 MG/DL (ref 70–110)

## 2023-06-06 PROCEDURE — 25000003 PHARM REV CODE 250: Performed by: FAMILY MEDICINE

## 2023-06-06 PROCEDURE — 11000004 HC SNF PRIVATE

## 2023-06-06 PROCEDURE — 94761 N-INVAS EAR/PLS OXIMETRY MLT: CPT

## 2023-06-06 PROCEDURE — 25000003 PHARM REV CODE 250: Performed by: HOSPITALIST

## 2023-06-06 PROCEDURE — 97165 OT EVAL LOW COMPLEX 30 MIN: CPT

## 2023-06-06 PROCEDURE — 97162 PT EVAL MOD COMPLEX 30 MIN: CPT

## 2023-06-06 PROCEDURE — 27000221 HC OXYGEN, UP TO 24 HOURS

## 2023-06-06 PROCEDURE — 99900035 HC TECH TIME PER 15 MIN (STAT)

## 2023-06-06 PROCEDURE — 63600175 PHARM REV CODE 636 W HCPCS: Performed by: HOSPITALIST

## 2023-06-06 PROCEDURE — 97535 SELF CARE MNGMENT TRAINING: CPT

## 2023-06-06 RX ORDER — LOPERAMIDE HYDROCHLORIDE 2 MG/1
2 CAPSULE ORAL 3 TIMES DAILY PRN
Status: DISCONTINUED | OUTPATIENT
Start: 2023-06-06 | End: 2023-06-08

## 2023-06-06 RX ADMIN — LEVETIRACETAM 500 MG: 500 TABLET, FILM COATED ORAL at 09:06

## 2023-06-06 RX ADMIN — METHOCARBAMOL 500 MG: 500 TABLET ORAL at 01:06

## 2023-06-06 RX ADMIN — CYANOCOBALAMIN TAB 1000 MCG 1000 MCG: 1000 TAB at 09:06

## 2023-06-06 RX ADMIN — CHOLECALCIFEROL TAB 25 MCG (1000 UNIT) 1000 UNITS: 25 TAB at 09:06

## 2023-06-06 RX ADMIN — AMLODIPINE BESYLATE AND BENAZEPRIL HYDROCHLORIDE 1 CAPSULE: 5; 20 CAPSULE ORAL at 12:06

## 2023-06-06 RX ADMIN — SENNOSIDES AND DOCUSATE SODIUM 1 TABLET: 50; 8.6 TABLET ORAL at 09:06

## 2023-06-06 RX ADMIN — DONEPEZIL HYDROCHLORIDE 5 MG: 5 TABLET ORAL at 09:06

## 2023-06-06 RX ADMIN — HYDRALAZINE HYDROCHLORIDE 25 MG: 25 TABLET, FILM COATED ORAL at 06:06

## 2023-06-06 RX ADMIN — ACETAMINOPHEN 1000 MG: 500 TABLET ORAL at 06:06

## 2023-06-06 RX ADMIN — METOPROLOL TARTRATE 25 MG: 25 TABLET, FILM COATED ORAL at 09:06

## 2023-06-06 RX ADMIN — HYDRALAZINE HYDROCHLORIDE 25 MG: 25 TABLET, FILM COATED ORAL at 09:06

## 2023-06-06 RX ADMIN — ENOXAPARIN SODIUM 40 MG: 40 INJECTION SUBCUTANEOUS at 04:06

## 2023-06-06 RX ADMIN — Medication 2 TABLET: at 09:06

## 2023-06-06 RX ADMIN — ACETAMINOPHEN 1000 MG: 500 TABLET ORAL at 09:06

## 2023-06-06 RX ADMIN — ATORVASTATIN CALCIUM 40 MG: 40 TABLET, FILM COATED ORAL at 09:06

## 2023-06-06 RX ADMIN — HYDRALAZINE HYDROCHLORIDE 25 MG: 25 TABLET, FILM COATED ORAL at 01:06

## 2023-06-06 RX ADMIN — LOPERAMIDE HYDROCHLORIDE 2 MG: 2 CAPSULE ORAL at 07:06

## 2023-06-06 RX ADMIN — ACETAMINOPHEN 1000 MG: 500 TABLET ORAL at 01:06

## 2023-06-06 RX ADMIN — DULOXETINE 60 MG: 60 CAPSULE, DELAYED RELEASE ORAL at 09:06

## 2023-06-06 NOTE — ASSESSMENT & PLAN NOTE
· Controlled. Patient is at baseline renal function at present.   · Need to avoid any nephrotoxic agents unless necessary while patient is hospitalized.  · Renally adjust medications based on patient's creatinine clearance.

## 2023-06-06 NOTE — PROGRESS NOTES
[facility]                                  Skilled Nursing Facility                   Progress Note     Patient Name: Brunilda England  YOB: 1931  MRN: [id number]  Room: PRBP911/KHPB921 A     Admit Date: 6/5/2023   DUTCH:  TBD  Code status: Partial (No chest compressions)    Principal Problem: Left displaced femoral neck fracture    HPI obtained from patient interview and chart review     Chief Complaint:   Establish Care/ Initial Visit    HPI:   Ms. Brunilda England is a 89 y.o. female with past medical history of HTN, paroxysmal atrial fibrillation/flutter, COPD on home oxygen, Chronic diastolic HF, CKD 3a, dementia, history of lung/rectal cancer who suffered fall at home while changing her shoes. She was found to have a closed left basocervical fracture with extension into intertrochanteric region. She was also found to have a small left parietal SAH on admit.  Patient underwent left hip cephalomedullary nail fixation by Dr. Desmond Barker 5/30/2023. Post-op patient WBAT to the left lower extremity as per Orthopedics recommendation. Patient placed on Lovenox 40 mg subcutaneous daily and MERCEDES/SCD's for DVT prophylaxis post-op as unable to use patient's home Apixiban post-op for her atrial fibrillation due to ICH as per Neurosurgery but NS okay with Lovenox.  Of note patient had issues with tachy-juan a in hospital. Patient followed by Cardiology as outpatient for A. Fib and intermittent bradycardia and undergoing outpatient evaluation. Cardiology consulted as inpatient states no indication for need for pacemaker at this time and stated they would rather patient be more tachycardiac than bradycardiac. PT/OT recommended SNF placement.    Today she reports pain to her left leg and foot, relieved with elevation. No edema or skin changes to lower extremity. Family is requested home 30 day Holter monitor be put on.     Patient will  be treated at [facility] with PT and OT to improve functional status and ability to perform ADLs.       Past Medical History: Patient has a past medical history of Anal cancer (1995), Anemia, Cancer (1995), Centrilobular emphysema (7/6/2020), Diabetes mellitus, Lumbar radiculopathy (5/16/2014), Lung cancer (2012), Macular degeneration, Macular hemorrhage of left eye, Neuropathy, Osteoporosis, Osteoporosis, unspecified (11/9/2012), and Pure hypercholesterolemia.    Past Surgical History: Patient has a past surgical history that includes Tonsillectomy; Cholecystectomy; Hysterectomy; Bronchoscopy; Colonoscopy; left leg surgery; heart stent; Fluoroscopic angiography of lower extremity with topical ultrasound (Right, 12/6/2018); Percutaneous transluminal angioplasty (N/A, 12/6/2018); Reconstruction using flap (Right, 12/6/2019); Incisional biopsy (Right, 12/6/2019); and Intramedullary rodding of femur (Left, 5/30/2023).    Social History: Patient reports that she quit smoking about 28 years ago. Her smoking use included cigarettes. She has a 15.00 pack-year smoking history. She has been exposed to tobacco smoke. She has never used smokeless tobacco. She reports that she does not drink alcohol and does not use drugs.    Family History:  family history includes Breast cancer in her maternal aunt; Cancer in her father; Stroke in her mother.    Allergies: Patient is allergic to bextra [valdecoxib] and gabapentin.    ROS    Constitutional:  Negative for fever.   Respiratory:  Negative for cough and shortness of breath.    Cardiovascular:  Negative for chest pain, palpitations and leg swelling.   Gastrointestinal:  Negative for abdominal pain, nausea and vomiting.   Musculoskeletal:  Positive for arthralgias (Left hip and LE) and weakness.   Neurological:  Negative for dizziness.   Psychiatric/Behavioral:  Negative for agitation and confusion.    24 hour Vital Sign Range   Temp:  [97.4 °F (36.3 °C)-98.4 °F (36.9 °C)]   Pulse:   []   Resp:  [17]   BP: (131-142)/(61-82)   SpO2:  [98 %-100 %]       Physical Exam  Vitals and nursing note reviewed.   Constitutional:       General: She is awake. She is not in acute distress. Debilitated     Appearance: Normal appearance. She is not ill-appearing.   Eyes:      Conjunctiva/sclera: Conjunctivae normal.   Cardiovascular:      Rate and Rhythm: Regular rhythm. Tachycardia present. No peripheral edema     Heart sounds: Normal heart sounds. No murmur heard.    No friction rub. No gallop.   Pulmonary:      Effort: Pulmonary effort is normal. No respiratory distress.      Breath sounds: Normal breath sounds. No wheezing.   Abdominal:      General: Abdomen is flat. Bowel sounds are normal. There is no distension.      Palpations: Abdomen is soft.      Tenderness: There is no abdominal tenderness. There is no guarding.   Skin:     General: Skin is warm.      Findings: No erythema.      Comments: Surgical dressing noted on left hip.   Neurological:      General: No focal deficit present.      Mental Status: She is alert and oriented to person, place, and time.   Psychiatric:         Mood and Affect: Mood normal.         Behavior: Behavior normal. Behavior is cooperative.         Thought Content: Thought content normal.   : Incontinent            Incision/Site 05/30/23 0856 Left Hip (Active)   05/30/23 0856   Present Prior to Hospital Arrival?:    Side: Left   Location: Hip   Orientation:    Incision Type:    Closure Method:    Additional Comments:    Removal Indication and Assessment:    Wound Outcome:    Removal Indications:    Incision WDL ex 06/06/23 0845   Dressing Appearance Intact;Clean;Dry 06/06/23 0845   Drainage Amount None 06/06/23 0845   Drainage Characteristics/Odor No odor 06/06/23 0845   Appearance Dressing in place, unable to visualize 06/06/23 0845   Dressing Gauze;Transparent film 06/06/23 0845   Number of days: 7           Labs:  Recent Labs   Lab 06/03/23  0521 06/04/23  0613  06/05/23  0456   WBC 4.98 4.57 4.35   HGB 8.1* 7.7* 8.7*   HCT 24.9* 23.5* 25.3*    208 196     Recent Labs   Lab 05/31/23  0316 06/01/23  0610 06/02/23  0457 06/03/23  0521 06/04/23  0613 06/05/23  0456    136 132* 133* 136 136   K 4.6 4.5 4.1 3.7 3.3* 3.5    102 100 103 103 103   CO2 20* 25 21* 22* 25 25   BUN 14 19 23 15 10 9*   CREATININE 0.9 1.2 1.4 0.9 0.7 0.7   * 111* 131* 97 102 90   CALCIUM 9.6 10.3 10.2 9.9 9.3 9.8   MG 1.4* 2.0 1.9 1.6 1.7  --    PHOS 3.1 2.7 3.1  --   --   --      No results for input(s): ALKPHOS, ALT, AST, ALBUMIN, PROT, BILITOT, INR in the last 168 hours.    Recent Labs     06/03/23  1532 06/03/23  2138 06/04/23  1141 06/04/23  1630 06/05/23  0642 06/05/23  1123 06/06/23  0702 06/06/23  1112   POCTGLUCOSE 142* 129* 130* 120* 109 119* 99 111*       Meds Scheduled:   acetaminophen  1,000 mg Oral Q8H    amlodipine-benazepril 5-20 mg  1 capsule Oral Daily    atorvastatin  40 mg Oral Daily    calcium-vitamin D3  2 tablet Oral Daily    cyanocobalamin  1,000 mcg Oral Daily    donepeziL  5 mg Oral QHS    DULoxetine  60 mg Oral Daily    enoxparin  40 mg Subcutaneous Q24H (prophylaxis, 1700)    hydrALAZINE  25 mg Oral Q8H    levETIRAcetam  500 mg Oral BID    metoprolol tartrate  25 mg Oral BID    senna-docusate 8.6-50 mg  1 tablet Oral BID    vitamin D  1,000 Units Oral Daily       PRN:   acetaminophen, albuterol-ipratropium, calcium carbonate, dextrose 10%, dextrose 10%, dextrose, dextrose, glucagon (human recombinant), insulin aspart U-100, melatonin, methocarbamoL      Assessment and Plan:    Closed left basicervical femoral neck fracture with extension into the intertrochanteric region s/p IM nail on 5/30/2023  Patient to continue PT/OT for gait training and strengthening and restoration of ADL's. Patient is FWB/WBAT: left lower extremity as per Orthopedic recommendations.   Patient to continue Lovenox 40 mg subcutaneous daily post-op for DVT prophylaxis after hip  fracture surgery as approved by Neurosurgery.   Orthopedics is following and managing surgical site.   Perineural pain catheter removed by Anesthesia on 6/2.   Pain improving to left hip. Patient on multimodal pain management post-op with Tylenol 1000 mg po every 8 hours and Robaxin 500 mg po 4 times daily and Tramadol prn for breakthrough pain and will continue.  Ortho DAVIDE to see patient weekly SNF  - maintain surgical dressings until removed by Orthopedics  - follow-up with Ortho after discharge  - stable, continue PT/OT     Subarachnoid hemorrhage following injury, no loss of consciousness  Controlled. Patient found on admit to have punctate left parietal SAH that was stable on repeat imaging. Patient neurologically intact.   Neurosurgery consulted and as per recs:   --No acute neurosurgical intervention  --Hold Eliquis for 2 weeks. Ok for DVT ppx with standard dose Lovenox 40 mg subcutaneous daily.  --SBP <140  --Na >135  --Keppra 500 mg po BID x 14 days.   --HOB >30.  --Follow-up in 2 weeks with repeat CTH. If repeat CTH stable, can resume home dose Eliquis. Notify NSGY immediately with any changes in neuro status.     Acute blood loss anemia  On admission to hospital, patient noted to have Hgb of 10.9. After surgery patient's Hgb level dropped to 8.0 on 5/31 and 7.7 on 6/1 and expected blood loss related to surgery and hip fracture.   Patient has no signs of active bleeding and hemodynamically stable. Patient is asymptomatic related to anemia.   Goal is keep Hgb >7 and consider blood transfusion if Hgb < 7 and asymptomatic or < 8 if patient becomes symptomatic.  Monitor routine labs current Hgb 8.7     Tachy-juan a syndrome  PAF (paroxysmal atrial fibrillation)  Controlled. Continue to monitor. Cardiology wants HR on higher side and okay with slight tachycardia as she has issues with bradycardia on higher doses of Metoprolol.   Cardiology consulted as patient having erratic HRs up to 160 and as low as 40 in  hospital. Cardiology states has evidence of tachycardia and bradycardia on ECG but does not meet criteria for tachy-juan a syndrome. Had 30 day Holter monitor in progress on admission with outpatient Cardiologist Dr. Roman  Cardiology recommends to continue low dose Metoprolol 25 mg po BID to help with tachycardia related to atrial fibrillation. No need for pacemaker at this time.   Per Cardiology, if patient experiences syncope or develops significant conversion pause >5 sec, please alert our team. Per Cardiology, patient to follow-up with her cardiologist, Dr. Roman as outpatient.   Home Eliquis on hold for long term anticoagulation for atrial fibrillation due to SAH as per Neurosurgery. Follow-up in 2 weeks with repeat CTH. If repeat CTH stable, can resume home dose Eliquis.    Essential hypertension  Chronic and controlled. Continue Hydralazine 25 mg po TID and Amlodipine/Benzapril 5/20 1 tablet po daily to treat. Goal SBP < 140.       Coronary artery disease involving native coronary artery of native heart without angina pectoris  Patient with history of remote stents at [facility] in 1990 and 2003.   Patient on Zocor at home will give Lipitor 40mg inpatient     Type 2 diabetes mellitus with circulatory disorder, without long-term current use of insulin  Patient on oral Tradjenta at home to treat.   Accucheck AC/HS with SSI    Chronic diastolic heart failure  Continue Metoprolol and Benazepril    Monitor Is&Os and daily weights.      Centrilobular emphysema  Chronic hypoxemic respiratory failure  History of lung cancer in remission  Chronic and controlled. Continue home oxygen at 2 liters. Patient with no signs of acute exacerbation.   Patient on no inhalers at home.       Late onset Alzheimer's dementia with behavioral disturbance  Patient at cognitive baseline oriented x3  Continue home Aricept to treat.  Delirium precautions.      Stage 3a chronic kidney disease  Patient is at baseline renal function at  present.   Need to avoid any nephrotoxic agents unless necessary   Renally adjust medications   Monitor routine labs     Anticipate disposition:    Home with home health      Follow-up needed during SNF admission:   [facility] in 2 weeks ~6/14    Follow-up needed after discharge from SNF:   - PCP within 1-2 weeks, Ortho  - See appt scheduled below     Future Appointments   Date Time Provider Department Center   6/8/2023  2:30 PM DAVIDA JonesC [address]   6/13/2023  2:45 PM FARIHA Hutton ORTHO [address] [facility]   6/14/2023  1:00 PM MD GABBIE Le IM [address] [facility]   6/29/2023  2:00 PM DAVIDA JonesC NEUR[facility]   7/11/2023 10:45 AM FARIHA Hutton ORTHO [address] [facility]         I certify that SNF services are required to be given on an inpatient basis because Brunilda England needs for skilled nursing care and/or skilled rehabilitation are required on a daily basis and such services can only practically be provided in a skilled nursing facility setting and are for an ongoing condition for which she received inpatient care in the hospital.       Extended Visit:   Total time spent: 129 minutes  Non Face to Face Time: 75 minutes   Description of Time: counseling provided on clinical condition, therapies provided, plan of care, emotional support, coordinating patient care with other care team members, reviewing and interpreting labs and imaging, collaboration with physician, initiating new orders, chart review, and documentation. See interval hx.         DIEGO CARRENO  Department of Hospital Medicine   [facility]     DOS: 6/6/2023       Patient note was created using MModal Dictation.  Any errors in syntax or even information may not have been identified and edited on initial review prior to signing this note.

## 2023-06-06 NOTE — PT/OT/SLP EVAL
Physical Therapy Evaluation    Patient Name:  Brunilda England   MRN:  [id number]  Admit Date: 6/5/2023  Recent Surgeries: INSERTION, INTRAMEDULLARY TEREZA, FEMUR (Left)     General Precautions: Standard, fall   Orthopedic Precautions: LLE weight bearing as tolerated   Braces: N/A    Recommendations:     Discharge Recommendations: home health PT   Level of Assistance Recommended: 24 hours significant assistance  Discharge Equipment Recommendations: bath bench, bedside commode, walker, rolling, wheelchair   Barriers to discharge: None    Assessment:     Brunilda England is a 89 y.o. female admitted with a medical diagnosis of Left displaced femoral neck fracture .  Performance deficits affecting function  weakness, impaired endurance, impaired self care skills, impaired functional mobility, gait instability, impaired balance, decreased upper extremity function, decreased lower extremity function, decreased ROM, impaired cardiopulmonary response to activity, orthopedic precautions.pt with increase pain and generalized weakness today limiting her eval to be completed in her room. Pt required MaxA x 2people for bed mobility, transfers and taking 2 side steps to the HOB.pt was Mod I with all functional mobility PTA and will therefore benefit from continued SNF rehab.    Rehab Potential is good     Activity Tolerance: Good    Plan:     Patient to be seen 5 x/week to address the above listed problems via gait training, therapeutic activities, therapeutic exercises, wheelchair management/training     Plan of Care Expires: 07/06/23  Plan of Care Reviewed with: patient    Subjective     Chief Complaint: weakness and pain  Patient/Family Comments/goals: gain (I)  Pain/Comfort:  Pain Rating 1: 10/10  Location - Side 1: Left  Location - Orientation 1: generalized  Location 1: hip  Pain Addressed 1: Pre-medicate for activity, Reposition  Pain Rating Post-Intervention 1: 10/10    Living Environment:   Pt. lives with their daughter 1-story  house with ramp entrance and no HR. Pt's bathroom has a walk in shower and tub..  Support available: family  Equipment Used: walker, rolling, cane, straight, shower chair  Equipment Owned (not using): None  Prior level of function: Pt was Mod I with mobility in the home utilizing her RW, reports using SPC to ambulate from the house to her car     Patient reports they will have assistance from dtr upon discharge.    Patients cultural, spiritual, [employer] conflicts given the current situation: no    Objective:     Communicated with pt's nurse prior to session.  Patient found HOB elevated with oxygen  upon PT entry to room.    Exams:  Cognitive Exam:  Patient is oriented to Person, Place, Time, and Situation  Gross Motor Coordination:  WFL  Sensation:    -       Intact  Skin Integrity/Edema:      -       Skin integrity: Visible skin intact  RLE ROM: WFL  RLE Strength: grossly 3/5  LLE ROM: WFL except hip N/T  LLE Strength: ankle WFLs, knee 3-/5 and Hip N/T d.t surgery and pain    Functional Mobility:     06/06/23 1454   Prior Functioning: Everyday Activities   Self Care Independent   Indoor Mobility (Ambulation) Independent   Stairs Independent   Functional Cognition Independent   Prior Device Use Walker   Roll Left and Right   Physical Assistance Level Total assistance   Comment HOB flat and no rail   CARE Score - Roll Left and Right 1   Sit to Lying   Physical Assistance Level Total assistance   Comment HOB flat and no rail   CARE Score - Sit to Lying 1   Lying to Sitting on Side of Bed   Physical Assistance Level Total assistance   Comment HOB flat and no rail   CARE Score - Lying to Sitting on Side of Bed 1   Sit to Stand   Physical Assistance Level Total assistance   Comment MaxA x 2people with RW   CARE Score - Sit to Stand 1   Chair/Bed-to-Chair Transfer   Physical Assistance Level Total assistance   Comment MaxA x 2 people with RW   CARE Score - Chair/Bed-to-Chair Transfer 1   Car Transfer   Reason if not  Attempted Environmental limitations   CARE Score - Car Transfer 10   Walk 10 Feet   Comment pt only able to take 2 side steps to the HOB with RW matteot generalized weakness and LLE pain   Reason if not Attempted Safety concerns   CARE Score - Walk 10 Feet 88   Walk 50 Feet with Two Turns   Reason if not Attempted Safety concerns   CARE Score - Walk 50 Feet with Two Turns 88   Walk 150 Feet   Reason if not Attempted Safety concerns   CARE Score - Walk 150 Feet 88   Walking 10 Feet on Uneven Surfaces   Reason if not Attempted Safety concerns   CARE Score - Walking 10 Feet on Uneven Surfaces 88   1 Step (Curb)   Reason if not Attempted Safety concerns   CARE Score - 1 Step (Curb) 88   4 Steps   Reason if not Attempted Safety concerns   CARE Score - 4 Steps 88   12 Steps   Reason if not Attempted Safety concerns   CARE Score - 12 Steps 88   Picking Up Object   Reason if not Attempted Safety concerns   CARE Score - Picking Up Object 88   Uses a Wheelchair/Scooter?   Uses a Wheelchair/Scooter? Yes   Wheel 50 Feet with Two Turns   Comment pt was too fatigued   Reason if not Attempted Safety concerns   CARE Score - Wheel 50 Feet with Two Turns 88   Type of Wheelchair/Scooter Manual   Wheel 150 Feet   Reason if not Attempted Safety concerns   CARE Score - Wheel 150 Feet 88   Type of Wheelchair/Scooter Manual       Education:  Patient and Family member provided with daily orientation and goals of this PT session.  Patient educated to transfer to bedside chair/bedside commode/bathroom with RN/PCT present.   Patient and Family member educated on Fall risk and plan of care by explanation.   Patient and Family member Verbalized Understanding.  Time provided for therapeutic counseling and discussion of current health disposition. All questions answered to satisfaction, within scope of PT practice; voiced no other concerns. White board updated in patient's room, RN notified of session.     AM-PAC 6 CLICK MOBILITY  Total  Score:10     Patient left HOB elevated with all lines intact and call button in reach.    GOALS:   Multidisciplinary Problems       Physical Therapy Goals          Problem: Physical Therapy    Goal Priority Disciplines Outcome Goal Variances Interventions   Physical Therapy Goal     PT, PT/OT Ongoing, Progressing     Description: Goals to be met by: 7/6/23     Patient will increase functional independence with mobility by performing:    . Supine to sit with MInimal Assistance  . Sit to supine with MInimal Assistance  . Rolling to Left and Right with Minimal Assistance.  . Sit to stand transfer with Minimal Assistance  . Bed to chair transfer with Minimal Assistance using Rolling Walker  . Gait  x 30 feet with Moderate Assistance using Rolling Walker.   . Wheelchair propulsion x50 feet with Minimal Assistance using bilateral uppper extremities                         History:     Past Medical History:   Diagnosis Date    Anal cancer 1995    Anemia     Cancer 1995    anal cancer    Centrilobular emphysema 7/6/2020    Diabetes mellitus     With circulatory disorder    Lumbar radiculopathy 5/16/2014    Lung cancer 2012    s/p chemo/radiation    Macular degeneration     Macular hemorrhage of left eye     Neuropathy     Osteoporosis     Osteoporosis, unspecified 11/9/2012    Pure hypercholesterolemia        Past Surgical History:   Procedure Laterality Date    BRONCHOSCOPY      CHOLECYSTECTOMY      COLONOSCOPY      FLUOROSCOPIC ANGIOGRAPHY OF LOWER EXTREMITY WITH TOPICAL ULTRASOUND Right 12/6/2018    Procedure: ARTERIOGRAM-LEG AND ULTRASOUND;  Surgeon: SEBASTIÁN Mcpherson III, MD;  Location: [facility] OR [address] [place] [address];  Service: Peripheral Vascular;  Laterality: Right;  16.5 min  1059.42 mGy  59ml Dye  2ml Local    heart stent      HYSTERECTOMY      INCISIONAL BIOPSY Right 12/6/2019    Procedure: INCISIONAL BIOPSY;  Surgeon: Leslie Castillo MD;  Location: [facility] OR [address] [place] [address];  Service: Plastics;  Laterality: Right;    INTRAMEDULLARY RODDING  OF FEMUR Left 5/30/2023    Procedure: INSERTION, INTRAMEDULLARY TEREZA, FEMUR;  Surgeon: Desmond Barker MD;  Location: [facility] OR 2ND FLR;  Service: Orthopedics;  Laterality: Left;    left leg surgery      blockage    PERCUTANEOUS TRANSLUMINAL ANGIOPLASTY N/A 12/6/2018    Procedure: PTA (ANGIOPLASTY, PERCUTANEOUS, TRANSLUMINAL);  Surgeon: SEBASTIÁN Mcpherson III, MD;  [facility];  Service: Peripheral Vascular;  Laterality: N/A;    RECONSTRUCTION USING FLAP Right 12/6/2019    Procedure: RECONSTRUCTION USING FLAP/FULL THICKNESS MRESETION AND RECONSTRUCTION RIGHT UPPER EYELID WITH BIOPSY;  Surgeon: Leslie Castillo MD;  Location: [facility] OR 1ST FLR;  Service: Plastics;  Laterality: Right;    TONSILLECTOMY         Time Tracking:     PT Received On: 06/06/23  PT Start Time: 1408     PT Stop Time: 1427  PT Total Time (min): 19 min     Billable Minutes: Evaluation 19mins      06/06/2023

## 2023-06-06 NOTE — PLAN OF CARE
Problem: Occupational Therapy  Goal: Occupational Therapy Goal  Description: Goals to be met by: 6/27/23     Patient will increase functional independence with ADLs by performing:    UE Dressing with Set-up Assistance.  LE Dressing with Stand-by Assistance.  Grooming while seated at sink with Modified [place].  Toileting from toilet with Stand-by Assistance for hygiene and clothing management.   Bathing from W/C SEATED AT SINK with Minimal Assistance.  Toilet transfer to toilet with Contact Guard Assistance.    Outcome: Ongoing, Progressing   Evaluation completed, POC established

## 2023-06-06 NOTE — ASSESSMENT & PLAN NOTE
PAF (paroxysmal atrial fibrillation)  · Controlled. Continue to monitor. Cardiology wants HR on higher side and okay with slight tachycardia as she has issues with bradycardia on higher doses of Metoprolol. Patient discharged on Metoprolol 25 mg po BID. Hold Apixiban on discharge until approved by Neurosurgery as outpatient to resume.   · Cardiology consulted as patient having erratic HRs up to 160 and as low as 40 in hospital. Cardiology states has evidence of tachycardia and bradycardia on ECG but does not meet criteria for tachy-juan a syndrome.   · Cardiology recommends to continue low dose Metoprolol 25 mg po BID to help with tachycardia related to atrial fibrillation. No need for pacemaker at this time.   · Per Cardiology, if patient experiences syncope or develops significant conversion pause >5 sec, please alert our team. Per Cardiology, patient to follow-up with her cardiologist, Dr. Roman as outpatient.   · Home Eliquis on hold for long term anticoagulation for atrial fibrillation due to SAH as per Neurosurgery.

## 2023-06-06 NOTE — ASSESSMENT & PLAN NOTE
· In remission.   · s/p chemo and last imaging in 2022 was stable disease. Chronic effusions seen on previous imaging seen on CXR here.  · On home oxygen and will continue on discharge.

## 2023-06-06 NOTE — ASSESSMENT & PLAN NOTE
Chronic and controlled. Continue Hydralazine 25 mg po TID and Amlodipine/Benzapril 5/20 1 tablet po daily to treat on discharge. Goal SBP < 140.

## 2023-06-06 NOTE — ASSESSMENT & PLAN NOTE
Patient is identified as having Diastolic (HFpEF) heart failure that is Chronic. CHF is currently controlled. Latest ECHO performed and demonstrates- Results for orders placed during the hospital encounter of 05/20/23    Echo    Interpretation Summary  · The left ventricle is normal in size with low normal systolic function.  · The estimated ejection fraction is 53%.  · There are segmental left ventricular wall motion abnormalities.  · There is abnormal septal wall motion.  · Mild left atrial enlargement.  · Indeterminate left ventricular diastolic function.  · Normal right ventricular size with low normal right ventricular systolic function.  · There is mild-to-moderate aortic valve stenosis.  · Aortic valve area is 1.40 cm2; peak velocity is 1.54 m/s; mean gradient is 6 mmHg.  · Mild aortic regurgitation.  · Mild mitral regurgitation.  · Mild tricuspid regurgitation.  · Normal central venous pressure (3 mmHg).  · The estimated PA systolic pressure is 41 mmHg.  · There is mild pulmonary hypertension.  · Trivial posterior pericardial effusion. At base and under the RA and small outside the RV apically.  Continue Metoprolol and Benazepril to treat on discharge.

## 2023-06-06 NOTE — PLAN OF CARE
Problem: Adult Inpatient Plan of Care  Goal: Plan of Care Review  6/6/2023 1151 by Karissa Aguayo LPN  Outcome: Ongoing, Progressing  6/6/2023 1139 by Karissa Aguayo LPN  Outcome: Ongoing, Progressing  Goal: Patient-Specific Goal (Individualized)  6/6/2023 1151 by Karissa Aguayo LPN  Outcome: Ongoing, Progressing  6/6/2023 1139 by Karissa Aguayo LPN  Outcome: Ongoing, Progressing  Goal: Absence of Hospital-Acquired Illness or Injury  6/6/2023 1151 by Karissa Aguayo LPN  Outcome: Ongoing, Progressing  6/6/2023 1139 by Karissa Aguayo LPN  Outcome: Ongoing, Progressing  Goal: Optimal Comfort and Wellbeing  6/6/2023 1151 by Karissa Aguayo LPN  Outcome: Ongoing, Progressing  6/6/2023 1139 by Karissa Aguayo LPN  Outcome: Ongoing, Progressing  Goal: Readiness for Transition of Care  6/6/2023 1151 by Karissa Aguayo LPN  Outcome: Ongoing, Progressing  6/6/2023 1139 by Karissa Aguayo LPN  Outcome: Ongoing, Progressing     Problem: Diabetes Comorbidity  Goal: Blood Glucose Level Within Targeted Range  6/6/2023 1151 by Karissa Aguayo LPN  Outcome: Ongoing, Progressing  6/6/2023 1139 by Karissa Aguayo LPN  Outcome: Ongoing, Progressing     Problem: Fluid Imbalance (Pneumonia)  Goal: Fluid Balance  6/6/2023 1151 by Karissa Aguayo LPN  Outcome: Ongoing, Progressing  6/6/2023 1139 by Karissa Aguayo LPN  Outcome: Ongoing, Progressing     Problem: Infection (Pneumonia)  Goal: Resolution of Infection Signs and Symptoms  6/6/2023 1151 by Karissa Aguayo LPN  Outcome: Ongoing, Progressing  6/6/2023 1139 by Karissa Aguayo LPN  Outcome: Ongoing, Progressing     Problem: Respiratory Compromise (Pneumonia)  Goal: Effective Oxygenation and Ventilation  6/6/2023 1151 by Karissa Aguayo LPN  Outcome: Ongoing, Progressing  6/6/2023 1139 by Karissa Aguayo LPN  Outcome: Ongoing, Progressing     Problem: Skin Injury Risk  Increased  Goal: Skin Health and Integrity  6/6/2023 1151 by Karissa Aguayo LPN  Outcome: Ongoing, Progressing  6/6/2023 1139 by Karissa Aguayo LPN  Outcome: Ongoing, Progressing     Problem: Fall Injury Risk  Goal: Absence of Fall and Fall-Related Injury  Outcome: Ongoing, Progressing

## 2023-06-06 NOTE — ASSESSMENT & PLAN NOTE
· Controlled. Patient found on admit to have punctate left parietal SAH that was stable on repeat imaging. Patient neurologically intact.   · Neurosurgery consulted and as per recs:   --No acute neurosurgical intervention  --Hold Eliquis for 2 weeks. Ok for DVT ppx with standard dose Lovenox 40 mg subcutaneous daily.  --SBP <140  --Na >135  --Keppra 500 mg po BID x 14 days.   --HOB >30.  --Follow-up in 2 weeks with repeat CTH. If repeat CTH stable, can resume home dose Eliquis. Will contact the patient with this appointment.

## 2023-06-06 NOTE — ASSESSMENT & PLAN NOTE
Patient's FSGs are controlled on current medication regimen. Patient on oral Tradjenta at home to treat and resume on discharge.   Last A1c reviewed-   Lab Results   Component Value Date    HGBA1C 6.3 (H) 05/29/2023     Most recent fingerstick glucose reviewed-   Recent Labs   Lab 06/05/23  0642 06/05/23  1123   POCTGLUCOSE 109 119*     Current correctional scale  Low  Maintain anti-hyperglycemic dose as follows-   Antihyperglycemics (From admission, onward)    Start     Stop Route Frequency Ordered    05/29/23 1418  insulin aspart U-100 pen 0-5 Units         -- SubQ Before meals & nightly PRN 05/29/23 1318        Diabetic diet.  Monitor blood sugars 4 times daily with meals and at bedtime.

## 2023-06-06 NOTE — ASSESSMENT & PLAN NOTE
· Patient underwent left hip IM nail for basocervical/intertrochanetric fracture by Dr. Desmond Barker on 5/30/2023.   · Patient reports pain in left hip improving and controlled on discharge.   · Patient to continue PT/OT for gait training and strengthening and restoration of ADL's on discharge to [facility]. Patient is FWB/WBAT: left lower extremity as per Orthopedic recommendations on discharge.   · Patient to continue Lovenox 40 mg subcutaneous daily post-op for DVT prophylaxis after hip fracture surgery as approved by Neurosurgery on discharge.   · Perineural pain catheter removed by Anesthesia on 6/2.   · Pain improving to left hip. Patient on multimodal pain management post-op with Tylenol 1000 mg po every 8 hours and Robaxin 500 mg po 4 times daily and Tramadol prn for breakthrough pain and will continue on discharge.  · Surgical bandages to remain in place to left hip until Orthopedic clinic follow-up.

## 2023-06-06 NOTE — ASSESSMENT & PLAN NOTE
· Hgb stable on discharge at 8.7 on 6/5.   · Slight decrease in Hgb to 7.7 on 6/4. No signs of bleeding. BP stable. Will monitor.   · Controlled. Hgb 8 on 6/2 and 8.1 on 6/3.   · On admission to hospital, patient noted to have Hgb of 10.9. After surgery patient's Hgb level dropped to 8.0 on 5/31 and 7.7 on 6/1 and expected blood loss related to surgery and hip fracture.   · Patient has no signs of active bleeding and hemodynamically stable. Patient is asymptomatic related to anemia.   · Goal is keep Hgb >7 and consider blood transfusion if Hgb < 7 and asymptomatic or < 8 if patient becomes symptomatic.

## 2023-06-06 NOTE — DISCHARGE SUMMARY
"[facility] Medicine  Discharge Summary      Patient Name: Brunilda England  MRN: [id number]  EMY: [id number]  Patient Class: IP- Inpatient  Admission Date: 5/28/2023  Hospital Length of Stay: 7 days  Discharge Date and Time: 6/5/2023  7:37 PM  Attending Physician: Florence Scanlon MD   Discharging Provider: Florence Scanlon MD  Primary Care Provider: Pepper Whitfield MD  [facility] Medicine Team: [facility]  Florence Scanlon MD  Primary Care Team: [facility]    HPI:   Ms. Brunilda England is a 89 y.o. female with past medical history of HTN, paroxysmal atrial fibrillation/flutter, COPD on home oxygen, Chronic diastolic HF, CKD 3a, dementia, history of lung/rectal cancer who was in her usual state of health in which she lives at home with her daughter until she got up at night to change from her sneakers into a pair of slippers in the utility room when she turned wrong and fell while doing so and her daughter heard it and came in to find her on the ground and called EMS to bring her to the hospital. She was found to have a closed left basocervical fracture with extension into intertrochanteric region. She was using her walker when this happened which she uses for ambulation. She was also found to have a small left parietal SAH on admit and NS saw her as well as NCC in the ER and recommendations given and stable for the floor at this time. Ortho plans for OR tomorrow given this to monitor in interim. Her other daughter is at bedside now and able to assist with history but patien tis also about to do so. She reports having some mild pain to left leg now and she denies any dizziness, chest pain, palpitations, or dyspnea now. Has occaisonal constipation issues but is UTD on BMs now. Discussed planning for post op PT/OT and likely SNF, patient says "we'll see about that" but daughter reports will need and interested in [facility] and I told her is normal for this post op course to need SNF " after a fracture like this.        5/30/2023  Procedure(s) (LRB):  INSERTION, INTRAMEDULLARY TEREZA, FEMUR (Left)    Surgeon(s):  MD JOAQUÍN Skelton MD      Hospital Course:   Patient admitted to [facility] Medicine Team H: Hip Fracture team and started on Hip Fracture Pathway with Orthopedic surgery consult for hip fracture. Patient was seen and evaluated by Orthopedic surgery who recommended operative repair of hip fracture. Patient was medically optimized prior to surgery and was taken to OR after optimization on 5/30/2023. Patient underwent left hip cephalomedullary nail fixation by Dr. Desmond Barker. Post-op patient WBAT to the left lower extremity as per Orthopedics recommendation. Patient placed on Lovenox 40 mg subcutaneous daily and MERCEDES/SCD's for DVT prophylaxis post-op as unable to use patient's home Apixiban post-op for her atrial fibrillation due to ICH as per Neurosurgery but NS okay with Lovenox. Perineural pain catheter placed by Anesthesia Pain Service with continuous infusion of Ropivacaine to help with pain control post-op and Anesthesia Pain Service managing while patient in the hospital. PT/OT consulted post-op and evaluated patient. Patient progressing well with PT and OT and recommended skilled nursing facility placement when medically ready for hospital discharge. Patient having issues with tachy-juan a in hospital. Patient followed by Cardiology as outpatient for A. Fib and intermittent bradycardia and undergoing outpatient evaluation. Cardiology consulted as inpatient and following. Patient having bursts of tachycardia and bradycardia in hospital and Metoprolol dosing adjusted as result. Cardiology states no indication for need for pacemaker at this time and stated they would rather patient be more tachycardiac than bradycardiac. Pain controlled. Patient progressed with PT/OT. PNC removed by Anesthesia. Patient's HR stabilized at around 100-110 and Cardiology  was okay with those levels. Patient doing well and discharged in good condition to [facility] on 6/5. Surgical bandages to remain in place until Orthopedic clinic follow-up. Patient discharged on Lovenox for DVT prophylaxis and to continue to hold Apixiban until cleared by Neurosurgery as outpatient to resume.        Goals of Care Treatment Preferences:  Code Status: Partial Code      Consults:   Consults (From admission, onward)          Status Ordering Provider     Inpatient consult to Cardiology  Once        Provider:  (Not yet assigned)    Completed KIKI MART     Inpatient consult to Social Work/Case Management  Once        Provider:  (Not yet assigned)    Acknowledged KIKI MART     Inpatient consult to Social Work/Case Management  Once        Provider:  (Not yet assigned)    Acknowledged KIKI MART     Inpatient consult to Neurosurgery  Once        Provider:  (Not yet assigned)    Completed VASU SALDIVAR     Inpatient consult to Orthopedic Surgery  Once        Provider:  (Not yet assigned)    Completed YANNICK GARCIA  Late onset Alzheimer's dementia with behavioral disturbance  Chronic and controlled and mild disease. Patient at cognitive baseline.   Continue home Aricept to treat on discharge.  Delirium precautions.       Subarachnoid hemorrhage following injury, no loss of consciousness  Controlled. Patient found on admit to have punctate left parietal SAH that was stable on repeat imaging. Patient neurologically intact.   Neurosurgery consulted and as per recs:   --No acute neurosurgical intervention  --Hold Eliquis for 2 weeks. Ok for DVT ppx with standard dose Lovenox 40 mg subcutaneous daily.  --SBP <140  --Na >135  --Keppra 500 mg po BID x 14 days.   --HOB >30.  --Follow-up in 2 weeks with repeat CTH. If repeat CTH stable, can resume home dose Eliquis. Will contact the patient with this appointment.       Pulmonary  Centrilobular emphysema  Chronic  hypoxemic respiratory failure  Chronic and controlled. Continue home oxygen at 2 liters on discharge. Patient with no signs of acute exacerbation.   Patient on no inhalers at home.     Cardiac/Vascular  Chronic diastolic heart failure  Patient is identified as having Diastolic (HFpEF) heart failure that is Chronic. CHF is currently controlled. Latest ECHO performed and demonstrates- Results for orders placed during the hospital encounter of 05/20/23    Echo    Interpretation Summary  · The left ventricle is normal in size with low normal systolic function.  · The estimated ejection fraction is 53%.  · There are segmental left ventricular wall motion abnormalities.  · There is abnormal septal wall motion.  · Mild left atrial enlargement.  · Indeterminate left ventricular diastolic function.  · Normal right ventricular size with low normal right ventricular systolic function.  · There is mild-to-moderate aortic valve stenosis.  · Aortic valve area is 1.40 cm2; peak velocity is 1.54 m/s; mean gradient is 6 mmHg.  · Mild aortic regurgitation.  · Mild mitral regurgitation.  · Mild tricuspid regurgitation.  · Normal central venous pressure (3 mmHg).  · The estimated PA systolic pressure is 41 mmHg.  · There is mild pulmonary hypertension.  · Trivial posterior pericardial effusion. At base and under the RA and small outside the RV apically.  Continue Metoprolol and Benazepril to treat on discharge.    Coronary artery disease involving native coronary artery of native heart without angina pectoris  Chronic and controlled. Patient with history of remote stents at [facility] in 1990 and 2003.   Patient on Zocor at home but not on formulary so holding in hospital but to resume on discharge.     Essential hypertension  Chronic and controlled. Continue Hydralazine 25 mg po TID and Amlodipine/Benzapril 5/20 1 tablet po daily to treat on discharge. Goal SBP < 140.      Tachy-juan a syndrome  PAF (paroxysmal atrial  fibrillation)  Controlled. Continue to monitor. Cardiology wants HR on higher side and okay with slight tachycardia as she has issues with bradycardia on higher doses of Metoprolol. Patient discharged on Metoprolol 25 mg po BID. Hold Apixiban on discharge until approved by Neurosurgery as outpatient to resume.   Cardiology consulted as patient having erratic HRs up to 160 and as low as 40 in hospital. Cardiology states has evidence of tachycardia and bradycardia on ECG but does not meet criteria for tachy-juan a syndrome.   Cardiology recommends to continue low dose Metoprolol 25 mg po BID to help with tachycardia related to atrial fibrillation. No need for pacemaker at this time.   Per Cardiology, if patient experiences syncope or develops significant conversion pause >5 sec, please alert our team. Per Cardiology, patient to follow-up with her cardiologist, Dr. Roman as outpatient.   Home Eliquis on hold for long term anticoagulation for atrial fibrillation due to SAH as per Neurosurgery.       Renal/  Stage 3a chronic kidney disease  Controlled. Patient is at baseline renal function at present.   Need to avoid any nephrotoxic agents unless necessary while patient is hospitalized.  Renally adjust medications based on patient's creatinine clearance.       Hypomagnesemia-resolved as of 6/3/2023  Resolved. Magnesium level 1.9 on 6/2.   Magnesium level improved to 2.0 on 6/1.  Present on admit. Magnesium low at 1.5 on admit and replaced with IV and oral Magnesium.   Monitor with daily level.      Oncology  History of lung cancer  In remission.   s/p chemo and last imaging in 2022 was stable disease. Chronic effusions seen on previous imaging seen on CXR here.  On home oxygen and will continue on discharge.        Acute blood loss anemia  Hgb stable on discharge at 8.7 on 6/5.   Slight decrease in Hgb to 7.7 on 6/4. No signs of bleeding. BP stable. Will monitor.   Controlled. Hgb 8 on 6/2 and 8.1 on 6/3.   On  admission to hospital, patient noted to have Hgb of 10.9. After surgery patient's Hgb level dropped to 8.0 on 5/31 and 7.7 on 6/1 and expected blood loss related to surgery and hip fracture.   Patient has no signs of active bleeding and hemodynamically stable. Patient is asymptomatic related to anemia.   Goal is keep Hgb >7 and consider blood transfusion if Hgb < 7 and asymptomatic or < 8 if patient becomes symptomatic.      Endocrine  Type 2 diabetes mellitus with circulatory disorder, without long-term current use of insulin  Patient's FSGs are controlled on current medication regimen. Patient on oral Tradjenta at home to treat and resume on discharge.   Last A1c reviewed-   Lab Results   Component Value Date    HGBA1C 6.3 (H) 05/29/2023     Most recent fingerstick glucose reviewed-   Recent Labs   Lab 06/05/23  0642 06/05/23  1123   POCTGLUCOSE 109 119*     Current correctional scale  Low  Maintain anti-hyperglycemic dose as follows-   Antihyperglycemics (From admission, onward)      Start     Stop Route Frequency Ordered    05/29/23 1418  insulin aspart U-100 pen 0-5 Units         -- SubQ Before meals & nightly PRN 05/29/23 1318          Diabetic diet.  Monitor blood sugars 4 times daily with meals and at bedtime.     Orthopedic  * Closed left basicervical femoral neck fracture with extension into the intertrochanteric region s/p IM nail on 5/30/2023  Patient underwent left hip IM nail for basocervical/intertrochanetric fracture by Dr. Desmond Barker on 5/30/2023.   Patient reports pain in left hip improving and controlled on discharge.   Patient to continue PT/OT for gait training and strengthening and restoration of ADL's on discharge to [facility]. Patient is FWB/WBAT: left lower extremity as per Orthopedic recommendations on discharge.   Patient to continue Lovenox 40 mg subcutaneous daily post-op for DVT prophylaxis after hip fracture surgery as approved by Neurosurgery on discharge.   Perineural  pain catheter removed by Anesthesia on 6/2.   Pain improving to left hip. Patient on multimodal pain management post-op with Tylenol 1000 mg po every 8 hours and Robaxin 500 mg po 4 times daily and Tramadol prn for breakthrough pain and will continue on discharge.  Surgical bandages to remain in place to left hip until Orthopedic clinic follow-up.         Final Active Diagnoses:    Diagnosis Date Noted POA    PRINCIPAL PROBLEM:  Closed left basicervical femoral neck fracture with extension into the intertrochanteric region s/p IM nail on 5/30/2023 [S72.002A] 05/28/2023 Yes    Subarachnoid hemorrhage following injury, no loss of consciousness [S06.6X0A] 05/29/2023 Yes    Acute blood loss anemia [D62] 05/31/2023 Yes    Tachy-juan a syndrome [I49.5] 05/29/2023 Yes    Essential hypertension [I10] 06/29/2012 Yes    Coronary artery disease involving native coronary artery of native heart without angina pectoris [I25.10] 01/28/2017 Yes    Type 2 diabetes mellitus with circulatory disorder, without long-term current use of insulin [E11.59] 03/21/2017 Yes    Chronic diastolic heart failure [I50.32] 08/04/2022 Yes    Centrilobular emphysema [J43.2] 07/06/2020 Yes    History of lung cancer [Z85.118] 06/29/2012 Not Applicable    Late onset Alzheimer's dementia with behavioral disturbance [G30.1, F02.818] 04/03/2023 Yes    Stage 3a chronic kidney disease [N18.31] 08/04/2022 Yes    Intertrochanteric fracture of left hip, closed, initial encounter [S72.142A] 05/30/2023 Yes    Chronic hypoxemic respiratory failure [J96.11] 05/29/2023 Yes    PAF (paroxysmal atrial fibrillation) [I48.0] 05/20/2023 Yes      Problems Resolved During this Admission:    Diagnosis Date Noted Date Resolved POA    Hypomagnesemia [E83.42] 05/31/2023 06/03/2023 Yes       Discharged Condition: good    Disposition: Skilled Nursing Facility ([facility])    Follow Up:     Future Appointments   Date Time Provider Department Center   6/8/2023  2:30 PM Adri Rader  [facility]   6/13/2023  2:45 PM Amauri Henry, FARIHA [facility] ORTHO [address]   6/14/2023  1:00 PM Pepper Whitfield MD [facility] IM [address] [facility]   6/29/2023  2:00 PM DAVIDA Jones NEUROS[address]   7/11/2023 10:45 AM Amauri Henry, FARIHA [facility] ORTHO [address]       Patient Instructions:      Ambulatory referral/consult to Orthopedics Fracture Care   Standing Status: Future   Referral Priority: Routine Referral Type: Consultation   Requested Specialty: Orthopedic Surgery   Number of Visits Requested: 1     Diet diabetic     Leave dressing on - Keep it clean, dry, and intact until clinic visit     Notify your health care provider if you experience any of the following:  temperature >100.4     Notify your health care provider if you experience any of the following:  persistent nausea and vomiting or diarrhea     Notify your health care provider if you experience any of the following:  severe uncontrolled pain     Notify your health care provider if you experience any of the following:  redness, tenderness, or signs of infection (pain, swelling, redness, odor or green/yellow discharge around incision site)     Notify your health care provider if you experience any of the following:  difficulty breathing or increased cough     Notify your health care provider if you experience any of the following:  severe persistent headache     Notify your health care provider if you experience any of the following:  worsening rash     Notify your health care provider if you experience any of the following:  persistent dizziness, light-headedness, or visual disturbances     Notify your health care provider if you experience any of the following:  increased confusion or weakness     Activity as tolerated     Weight bearing restrictions (specify):   Order Comments: Weight bear as tolerated to left lower extremity       Significant Diagnostic Studies: Labs:   CMP   Recent Labs   Lab 06/04/23  0613  06/05/23  0456    136   K 3.3* 3.5    103   CO2 25 25    90   BUN 10 9*   CREATININE 0.7 0.7   CALCIUM 9.3 9.8   ANIONGAP 8 8    and CBC   Recent Labs   Lab 06/04/23  0613 06/05/23  0456   WBC 4.57 4.35   HGB 7.7* 8.7*   HCT 23.5* 25.3*    196       Pending Diagnostic Studies:       None           Medications:  Reconciled Home Medications:      Medication List        START taking these medications      acetaminophen 500 MG tablet  Commonly known as: TYLENOL  Take 2 tablets (1,000 mg total) by mouth every 8 (eight) hours.     calcium-vitamin D3 500 mg-5 mcg (200 unit) per tablet  Commonly known as: OS-ERIC 500 + D3  Take 2 tablets by mouth once daily.  Start taking on: June 6, 2023     enoxaparin 40 mg/0.4 mL Syrg  Commonly known as: LOVENOX  Inject 0.4 mLs (40 mg total) into the skin Q24H (prophylaxis, 1700). DVT prophylaxis after hip fracture surgery.     hydrALAZINE 25 MG tablet  Commonly known as: APRESOLINE  Take 1 tablet (25 mg total) by mouth every 8 (eight) hours.     levETIRAcetam 500 MG Tab  Commonly known as: KEPPRA  Take 1 tablet (500 mg total) by mouth 2 (two) times daily. End date 6/12/2023 for seizure prophylaxis after small SAH. for 14 days     methocarbamoL 500 MG Tab  Commonly known as: ROBAXIN  Take 1 tablet (500 mg total) by mouth every 8 (eight) hours as needed (Muscle spasms).            CHANGE how you take these medications      apixaban 5 mg Tab  Commonly known as: ELIQUIS  Take 1 tablet (5 mg total) by mouth 2 (two) times daily. Hold this medication until cleared by Neurosurgery to resume.  What changed: additional instructions            CONTINUE taking these medications      albuterol 2.5 mg /3 mL (0.083 %) nebulizer solution  Commonly known as: PROVENTIL  Take 3 mLs (2.5 mg total) by nebulization every 6 (six) hours as needed for Wheezing. Rescue     amlodipine-benazepril 5-20 mg 5-20 mg per capsule  Commonly known as: LOTREL  Take 1 capsule by mouth once daily.      azelastine 137 mcg (0.1 %) nasal spray  Commonly known as: ASTELIN  1 spray (137 mcg total) by Nasal route 2 (two) times daily.     cyanocobalamin 1000 MCG tablet  Commonly known as: VITAMIN B-12  Take 1,000 mcg by mouth every morning.     donepeziL 5 MG tablet  Commonly known as: ARICEPT  Take 1 tablet (5 mg total) by mouth every evening.     DULoxetine 60 MG capsule  Commonly known as: CYMBALTA  Take 1 capsule (60 mg total) by mouth every morning.     estradioL 0.01 % (0.1 mg/gram) vaginal cream  Commonly known as: ESTRACE  USE ONE-HALF GRAM VAGINALLY TWICE a WEEK     metoprolol tartrate 25 MG tablet  Commonly known as: LOPRESSOR  Take 1 tablet (25 mg total) by mouth 2 (two) times daily.     nitroGLYCERIN 0.4 MG SL tablet  Commonly known as: NITROSTAT  Place 1 tablet (0.4 mg total) under the tongue every 5 (five) minutes as needed.     simvastatin 80 MG tablet  Commonly known as: ZOCOR  Take 1 tablet (80 mg total) by mouth once daily.     TRADJENTA 5 mg Tab tablet  Generic drug: linaGLIPtin  Take 1 tablet (5 mg total) by mouth once daily.     vitamin D 1000 units Tab  Commonly known as: VITAMIN D3  Take 1,000 Units by mouth once daily.              Indwelling Lines/Drains at time of discharge:   Lines/Drains/Airways       None                   Time spent on the discharge of patient: 31 minutes         Florence Scanlon MD  Department of Hospital Medicine  [facility] - Surgery

## 2023-06-06 NOTE — ASSESSMENT & PLAN NOTE
Chronic hypoxemic respiratory failure  · Chronic and controlled. Continue home oxygen at 2 liters on discharge. Patient with no signs of acute exacerbation.   · Patient on no inhalers at home.

## 2023-06-06 NOTE — PLAN OF CARE
Problem: Physical Therapy  Goal: Physical Therapy Goal  Description: Goals to be met by: 7/6/23     Patient will increase functional independence with mobility by performing:    . Supine to sit with MInimal Assistance  . Sit to supine with MInimal Assistance  . Rolling to Left and Right with Minimal Assistance.  . Sit to stand transfer with Minimal Assistance  . Bed to chair transfer with Minimal Assistance using Rolling Walker  . Gait  x 30 feet with Moderate Assistance using Rolling Walker.   . Wheelchair propulsion x50 feet with Minimal Assistance using bilateral uppper extremities    Outcome: Ongoing, Progressing

## 2023-06-06 NOTE — ASSESSMENT & PLAN NOTE
· Chronic and controlled and mild disease. Patient at cognitive baseline.   · Continue home Aricept to treat on discharge.  · Delirium precautions.

## 2023-06-06 NOTE — PT/OT/SLP EVAL
Occupational Therapy   Evaluation / Treatment    Name: Brunilda England  MRN: [id number]  Admit Date: 6/5/2023  Recent Surgeries: INSERTION, INTRAMEDULLARY TEREZA, FEMUR (Left)   Admitting Diagnosis:  Left displaced femoral neck fracture    General Precautions: Standard, fall  Orthopedic Precautions:LLE weight bearing as tolerated   Braces: N/A    Recommendations:     Discharge Recommendations: home health OT  Level of Assistance Recommended: 24 hours significant assistance  Discharge Equipment Recommendations:     Barriers to discharge:  None    Assessment:     Brunilda England is a 89 y.o. female with a medical diagnosis of Left displaced femoral neck fracture .  She presents with performance deficits affecting function: weakness, impaired endurance, impaired self care skills, impaired functional mobility, gait instability, impaired balance, decreased lower extremity function, decreased safety awareness, pain, decreased ROM, orthopedic precautions.      Pt agreeable to OT evaluation and therapy session focusing on self care. Pt tolerated well but needed frequent rest periods 2/2 to severe pain with L LE movement, decreased overall strength and poor endurance. Pt wore NC set 2/ L O2 for most of session. O2 stats 98% on room air resting. She remains limited in ADLs needing supervision to total assist and max assist for functional bed mobility, and functional squat pivot transfers. Pt functioning well below PLOF of mod (I) to (I) for self care and mod (I) Pt would continue to benefit from skilled OT services to maximize functional independence with ADLs and functional mobility, reduce caregiver burden, and facilitate safe discharge in the least restrictive environment. OT recommending __ once medically appropriate for discharge.      Rehab Potential is good    Activity Tolerance: Good    Plan:     Patient to be seen 5 x/week to address the above listed problems via self-care/home management, therapeutic activities,  therapeutic exercises  Plan of Care Expires: 06/27/23  Plan of Care Reviewed with: patient    Subjective     Chief Complaint: Pain in L LE  Patient/Family Comments/goals: Gain (I)    Occupational Profile:  Residence: lives with their daughter 1-story house with ramp entrance and no HR. Pt's bathroom has a walk in shower and tub..  Support available: family  Equipment Used: walker, rolling, cane, straight, shower chair  Equipment Owned (not using): None  Prior level of function: Pt was Mod I with mobility in the home utilizing her RW, reports using SPC to ambulate from the house to her car  Hand Dominance: right.   Work: Retired.   Drive: no.   Managing Medicines/Managing Home: yes.      Pain/Comfort:  Pain Rating 1: 0/10 (SEVERE PAIN WITH MOVEMENT)  Location - Side 1: Left  Location - Orientation 1: generalized  Location 1: hip  Pain Addressed 1: Pre-medicate for activity, Cessation of Activity  Pain Rating Post-Intervention 1: 10/10    Patients cultural, spiritual, Confucianism conflicts given the current situation: no    Objective:     Communicated with: nurse prior to session.  Patient found supine , head of bed elevated with oxygen upon OT entry to room.    Occupational Performance:        06/06/23 1317   Eating   Assistance Needed Set-up / clean-up;Supervision   CARE Score - Eating 4   Eating Discharge Goal   Discharge Goal 6   Oral Hygiene   Assistance Needed Set-up / clean-up;Supervision   Comment wears dentures, pt's daughter provided assistance   CARE Score - Oral Hygiene 4   Oral Hygiene Discharge Goal   Discharge Goal 6   Toileting Hygiene   Assistance Needed Physical assistance   Physical Assistance Level 76% or more   Comment seated on raised toilet   CARE Score - Toileting Hygiene 2   Toileting Hygiene Discharge Goal   Discharge Goal 6   Shower/Bathe Self   Assistance Needed Physical assistance   Physical Assistance Level 76% or more   Comment sponge bath seated at sink   CARE Score - Shower/Bathe Self 2    Shower/Bathe Self Discharge Goal   Discharge Goal 4   Upper Body Dressing   Assistance Needed Physical assistance   Physical Assistance Level 25% or less   CARE Score - Upper Body Dressing 3   Upper Body Dressing Discharge Goal   Discharge Goal 6   Lower Body Dressing   Assistance Needed Physical assistance   Physical Assistance Level Total assistance   CARE Score - Lower Body Dressing 1   Lower Body Dressing Discharge Goal   Discharge Goal 4   Putting On/Taking Off Footwear   Assistance Needed Physical assistance   Physical Assistance Level Total assistance   CARE Score - Putting On/Taking Off Footwear 1   Putting On/Taking Off Footwear Discharge Goal   Discharge Goal 3   Lying to Sitting on Side of Bed   Assistance Needed Physical assistance   Physical Assistance Level 76% or more   CARE Score - Lying to Sitting on Side of Bed 2   Sit to Stand   Assistance Needed Physical assistance   Physical Assistance Level 76% or more   CARE Score - Sit to Stand 2   Chair/Bed-to-Chair Transfer   Assistance Needed Physical assistance   Physical Assistance Level 76% or more   Comment stand pivot   CARE Score - Chair/Bed-to-Chair Transfer 2   Chair/Bed-to-Chair Transfer Discharge Goal   Discharge Goal 4   Toilet Transfer   Assistance Needed Physical assistance   Physical Assistance Level 76% or more   Comment stand pivot using grab rail   CARE Score - Toilet Transfer 2   Toilet Transfer Discharge Goal   Discharge Goal 4     Cognitive/Visual Perceptual:  Cognitive/Psychosocial Skills:     -       Oriented to: Person, Place, Time, and Situation   -       Follows Commands/attention:Follows one-step commands  -       Communication: clear/fluent  -       Memory: No Deficits noted  -       Safety awareness/insight to disability: impaired     Physical Exam:  Balance: -       supervision for static sitting balance edge of bed    Upper Extremity Range of Motion:     -       Right Upper Extremity: WFL  -       Left Upper Extremity:  WFL  Upper Extremity Strength:    -       Right Upper Extremity: WFL  -       Left Upper Extremity: WFL   Strength:    -       Right Upper Extremity: WFL  -       Left Upper Extremity: WFL     AMPAC 6 Click ADL:  AMPAC Total Score: 16    Treatment & Education:  -Education on importance of OOB activity to improve overall activity tolerance and promote recovery  -Pt educated to call for assistance and to transfer with hospital staff only  -Provided education regarding role of OT, POC, & discharge recommendations with pt verbalizing understanding.  Pt had no further questions & when asked whether there were any concerns pt reported none.     Patient left up in chair with call button in reach    GOALS:   Multidisciplinary Problems       Occupational Therapy Goals          Problem: Occupational Therapy    Goal Priority Disciplines Outcome Interventions   Occupational Therapy Goal     OT, PT/OT Ongoing, Progressing    Description: Goals to be met by: 6/27/23     Patient will increase functional independence with ADLs by performing:    UE Dressing with Set-up Assistance.  LE Dressing with Stand-by Assistance.  Grooming while seated at sink with Modified [place].  Toileting from toilet with Stand-by Assistance for hygiene and clothing management.   Bathing from W/C SEATED AT SINK with Minimal Assistance.  Toilet transfer to toilet with Contact Guard Assistance.                         History:     Past Medical History:   Diagnosis Date    Anal cancer 1995    Anemia     Cancer 1995    anal cancer    Centrilobular emphysema 7/6/2020    Diabetes mellitus     With circulatory disorder    Lumbar radiculopathy 5/16/2014    Lung cancer 2012    s/p chemo/radiation    Macular degeneration     Macular hemorrhage of left eye     Neuropathy     Osteoporosis     Osteoporosis, unspecified 11/9/2012    Pure hypercholesterolemia          Past Surgical History:   Procedure Laterality Date    BRONCHOSCOPY      CHOLECYSTECTOMY       COLONOSCOPY      FLUOROSCOPIC ANGIOGRAPHY OF LOWER EXTREMITY WITH TOPICAL ULTRASOUND Right 12/6/2018    Procedure: ARTERIOGRAM-LEG AND ULTRASOUND;  Surgeon: SEBASTIÁN Mcpherson III, MD;  [facility];  Service: Peripheral Vascular;  Laterality: Right;  16.5 min  1059.42 mGy  59ml Dye  2ml Local    heart stent      HYSTERECTOMY      INCISIONAL BIOPSY Right 12/6/2019    Procedure: INCISIONAL BIOPSY;  Surgeon: Leslie Castillo MD;  [facility];  Service: Plastics;  Laterality: Right;    INTRAMEDULLARY RODDING OF FEMUR Left 5/30/2023    Procedure: INSERTION, INTRAMEDULLARY TEREZA, FEMUR;  Surgeon: Desmond Barker MD;  [facility];  Service: Orthopedics;  Laterality: Left;    left leg surgery      blockage    PERCUTANEOUS TRANSLUMINAL ANGIOPLASTY N/A 12/6/2018    Procedure: PTA (ANGIOPLASTY, PERCUTANEOUS, TRANSLUMINAL);  Surgeon: SEBASTIÁN Mcpherson III, MD;  [facility];  Service: Peripheral Vascular;  Laterality: N/A;    RECONSTRUCTION USING FLAP Right 12/6/2019    Procedure: RECONSTRUCTION USING FLAP/FULL THICKNESS MRESETION AND RECONSTRUCTION RIGHT UPPER EYELID WITH BIOPSY;  Surgeon: Leslie Castillo MD;  [facility];  Service: Plastics;  Laterality: Right;    TONSILLECTOMY         Time Tracking:     OT Date of Treatment: 06/06/23  OT Start Time: 1000  OT Stop Time: 1103  OT Total Time (min): 63 min    Billable Minutes:Evaluation 15  Self Care/Home Management 48    6/6/2023

## 2023-06-06 NOTE — ASSESSMENT & PLAN NOTE
· Chronic and controlled. Patient with history of remote stents at [facility] in 1990 and 2003.   · Patient on Zocor at home but not on formulary so holding in hospital but to resume on discharge.

## 2023-06-07 LAB
POCT GLUCOSE: 100 MG/DL (ref 70–110)
POCT GLUCOSE: 101 MG/DL (ref 70–110)
POCT GLUCOSE: 105 MG/DL (ref 70–110)
POCT GLUCOSE: 107 MG/DL (ref 70–110)
POCT GLUCOSE: 120 MG/DL (ref 70–110)

## 2023-06-07 PROCEDURE — 97110 THERAPEUTIC EXERCISES: CPT

## 2023-06-07 PROCEDURE — 25000003 PHARM REV CODE 250: Performed by: HOSPITALIST

## 2023-06-07 PROCEDURE — 63600175 PHARM REV CODE 636 W HCPCS: Performed by: HOSPITALIST

## 2023-06-07 PROCEDURE — 97535 SELF CARE MNGMENT TRAINING: CPT

## 2023-06-07 PROCEDURE — 99306 PR NURSING FACILITY CARE, INIT, HIGH SEVERITY: ICD-10-PCS | Mod: AI,,, | Performed by: HOSPITALIST

## 2023-06-07 PROCEDURE — 27000221 HC OXYGEN, UP TO 24 HOURS

## 2023-06-07 PROCEDURE — 94761 N-INVAS EAR/PLS OXIMETRY MLT: CPT

## 2023-06-07 PROCEDURE — 97530 THERAPEUTIC ACTIVITIES: CPT

## 2023-06-07 PROCEDURE — 11000004 HC SNF PRIVATE

## 2023-06-07 PROCEDURE — 99900035 HC TECH TIME PER 15 MIN (STAT)

## 2023-06-07 PROCEDURE — 97116 GAIT TRAINING THERAPY: CPT

## 2023-06-07 PROCEDURE — 99306 1ST NF CARE HIGH MDM 50: CPT | Mod: AI,,, | Performed by: HOSPITALIST

## 2023-06-07 RX ADMIN — DULOXETINE 60 MG: 60 CAPSULE, DELAYED RELEASE ORAL at 08:06

## 2023-06-07 RX ADMIN — LEVETIRACETAM 500 MG: 500 TABLET, FILM COATED ORAL at 08:06

## 2023-06-07 RX ADMIN — DONEPEZIL HYDROCHLORIDE 5 MG: 5 TABLET ORAL at 08:06

## 2023-06-07 RX ADMIN — Medication 2 TABLET: at 08:06

## 2023-06-07 RX ADMIN — HYDRALAZINE HYDROCHLORIDE 25 MG: 25 TABLET, FILM COATED ORAL at 05:06

## 2023-06-07 RX ADMIN — ACETAMINOPHEN 1000 MG: 500 TABLET ORAL at 05:06

## 2023-06-07 RX ADMIN — METHOCARBAMOL 500 MG: 500 TABLET ORAL at 12:06

## 2023-06-07 RX ADMIN — ACETAMINOPHEN 1000 MG: 500 TABLET ORAL at 09:06

## 2023-06-07 RX ADMIN — CYANOCOBALAMIN TAB 1000 MCG 1000 MCG: 1000 TAB at 08:06

## 2023-06-07 RX ADMIN — CHOLECALCIFEROL TAB 25 MCG (1000 UNIT) 1000 UNITS: 25 TAB at 08:06

## 2023-06-07 RX ADMIN — AMLODIPINE BESYLATE AND BENAZEPRIL HYDROCHLORIDE 1 CAPSULE: 5; 20 CAPSULE ORAL at 08:06

## 2023-06-07 RX ADMIN — SENNOSIDES AND DOCUSATE SODIUM 1 TABLET: 50; 8.6 TABLET ORAL at 08:06

## 2023-06-07 RX ADMIN — ENOXAPARIN SODIUM 40 MG: 40 INJECTION SUBCUTANEOUS at 04:06

## 2023-06-07 RX ADMIN — METOPROLOL TARTRATE 25 MG: 25 TABLET, FILM COATED ORAL at 08:06

## 2023-06-07 RX ADMIN — ATORVASTATIN CALCIUM 40 MG: 40 TABLET, FILM COATED ORAL at 08:06

## 2023-06-07 RX ADMIN — HYDRALAZINE HYDROCHLORIDE 25 MG: 25 TABLET, FILM COATED ORAL at 01:06

## 2023-06-07 RX ADMIN — ACETAMINOPHEN 1000 MG: 500 TABLET ORAL at 01:06

## 2023-06-07 RX ADMIN — HYDRALAZINE HYDROCHLORIDE 25 MG: 25 TABLET, FILM COATED ORAL at 09:06

## 2023-06-07 NOTE — PLAN OF CARE
Problem: Adult Inpatient Plan of Care  Goal: Plan of Care Review  Outcome: Ongoing, Progressing  Goal: Patient-Specific Goal (Individualized)  Outcome: Ongoing, Progressing     Problem: Diabetes Comorbidity  Goal: Blood Glucose Level Within Targeted Range  Outcome: Ongoing, Progressing     Problem: Fluid Imbalance (Pneumonia)  Goal: Fluid Balance  Outcome: Ongoing, Progressing     Problem: Skin Injury Risk Increased  Goal: Skin Health and Integrity  Outcome: Ongoing, Progressing     Problem: Fall Injury Risk  Goal: Absence of Fall and Fall-Related Injury  Outcome: Ongoing, Progressing

## 2023-06-07 NOTE — PT/OT/SLP PROGRESS
Occupational Therapy   Treatment    Name: Brunilda England  MRN: [id number]  Admit Date: 6/5/2023  Admitting Diagnosis:  Left displaced femoral neck fracture    General Precautions: Standard, fall   Orthopedic Precautions: LLE weight bearing as tolerated   Braces: N/A    Recommendations:     Discharge Recommendations:  home health OT  Level of Assistance Recommended at Discharge: 24 hours physical assistance for all ADL's and home management tasks  Discharge Equipment Recommendations: bath bench, bedside commode, hip kit, walker, rolling, wheelchair  Barriers to discharge:  None    Assessment:     Brunilda England is a 89 y.o. female with a medical diagnosis of Left displaced femoral neck fracture .  She presents with performance deficits affecting function: weakness, impaired endurance, impaired self care skills, impaired functional mobility, gait instability, impaired balance, decreased lower extremity function, pain, decreased safety awareness, decreased ROM, orthopedic precautions.     Pt agreeable to therapy focusing on self care and tolerated fairly well with frequent rest periods and vc's to take deep breathes 2/2 to increased pain with L LE movement. Pt demonstrated small progress lifting L LE while supine in bed but unable to sit on edge of bed without max assist.    Pt also remains limited in ADLs, functional bed mobility, and functional transfers and is currently not performing tasks at [facility]. Pt's daughter whom she lives with at bedside today and assisted pt and OT during self care and bed mobility tasks for pt's comfort and increased anxiety with movement.     Pt would continue to benefit from skilled OT services to maximize functional independence with ADLs and functional mobility, reduce caregiver burden, and facilitate safe discharge in the least restrictive environment. OT recommending home health once medically appropriate for discharge.      Rehab Potential is good    Activity tolerance:  Fair    Plan:  "    Patient to be seen 5 x/week to address the above listed problems via self-care/home management, therapeutic activities, therapeutic exercises    Plan of Care Expires: 06/27/23  Plan of Care Reviewed with: patient, daughter    Subjective     Communicated with: nursing prior to session. Pt's daughter present. Pt stated, " [place]."  Pt on room air upon Ot's arrival. Seated / resting 92%  Left pt, direct hand off to physical therapy wearing NC set on 2/L    Pain/Comfort:  Pain Rating 1: other (see comments) (pain with movment)  Location - Side 1: Right  Location 1: leg  Pain Addressed 1: Pre-medicate for activity, Nurse notified, Cessation of Activity, Reposition  Pain Rating Post-Intervention 1: 0/10    Patient's cultural, spiritual, Scientology conflicts given the current situation:  no    Objective:     Patient found supine with head of bed elevated and PureWick, Other (comments) (Afib monitor, NC removed, OT handed pt wearing NC set on 2/L oxygen) upon OT entry to room.    Bed Mobility:    Patient completed Supine to Sit with maximal assistance   Rolling side to side - mod assistance  Bridging up with right hip - mod assist with vc's for sequencing    Functional Mobility/Transfers:  Patient completed Bed <> Chair Transfer using Squat Pivot technique with maximal assistance with no assistive device and vc's for sequencing steps ([place] assist to release left hand grasp) transfer to the right.      Activities of Daily Living:  Grooming: modified independence and supervision washed face with vc's for initiation  Lower Body Dressing: maximal assistance x 2 -for brief and pants, pt's daughter assisted, pt attempted to bridge hips but completed task rolling side to side to dull up over rear  Toileting: maximal assistance incontinent BM in brief. Pt assisted with anterior don-hygiene    AMPAC 6 Click ADL: 16    OT Exercises: UE Ergometer for 3 min then needed rest break  Pt sat edge of bed with supervision for ` 5 min " demonstrating good static sitting balance.    Treatment & Education:  -Education on importance of OOB activity to improve overall activity tolerance and promote recovery  -Pt educated to call for assistance and to transfer with hospital staff only  -Provided education regarding role of OT, POC, & discharge recommendations with pt  verbalizing understanding.  Pt had no further questions & when asked whether there were any concerns pt reported none.     Patient left up in chair with  physical therapy    GOALS:   Multidisciplinary Problems       Occupational Therapy Goals          Problem: Occupational Therapy    Goal Priority Disciplines Outcome Interventions   Occupational Therapy Goal     OT, PT/OT Ongoing, Progressing    Description: Goals to be met by: 6/27/23     Patient will increase functional independence with ADLs by performing:    UE Dressing with Set-up Assistance.  LE Dressing with Stand-by Assistance.  Grooming while seated at sink with Modified [place].  Toileting from toilet with Stand-by Assistance for hygiene and clothing management.   Bathing from W/C SEATED AT SINK with Minimal Assistance.  Toilet transfer to toilet with Contact Guard Assistance.                         Time Tracking:     OT Date of Treatment: 06/07/23  OT Start Time: 1010    OT Stop Time: 1057  OT Total Time (min): 47 min    Billable Minutes:Self Care/Home Management 37  Therapeutic Activity 10    6/7/2023

## 2023-06-07 NOTE — PT/OT/SLP PROGRESS
Physical Therapy Treatment    Patient Name:  Brunilda England   MRN:  [id number]  Admit Date: 6/5/2023  Admitting Diagnosis: Left displaced femoral neck fracture  Recent Surgeries: INSERTION, INTRAMEDULLARY TEREZA, FEMUR (Left)        General Precautions: Standard, fall  Orthopedic Precautions: LLE weight bearing as tolerated  Braces: N/A    Recommendations:     Discharge Recommendations: home health PT  Level of Assistance Recommended at Discharge: 24 hours significant assistance  Discharge Equipment Recommendations: bath bench, bedside commode, walker, rolling, wheelchair  Barriers to discharge: None    Assessment:     Brunilda England is a 89 y.o. female admitted with a medical diagnosis of Left displaced femoral neck fracture . Pt was able to ambulate 4 stepsx 2 trials in the // bar with MaxA. Pt needed max encouragement d.t her pain and fear of falling. Pt however stated that she wants to improve and would like to d/c home when she improves functionally.    Performance deficits affecting function: weakness, impaired endurance, impaired self care skills, impaired functional mobility, gait instability, impaired balance, decreased upper extremity function, decreased lower extremity function, decreased ROM, pain, impaired cardiopulmonary response to activity, orthopedic precautions.    Rehab Potential is good    Activity Tolerance: Fair    Plan:     Patient to be seen 5 x/week to address the above listed problems via gait training, therapeutic activities, therapeutic exercises, wheelchair management/training    Plan of Care Expires: 07/06/23  Plan of Care Reviewed with: patient    Subjective     Pt. Agreeable to work with PT.     Pain/Comfort:  Pain Rating 1:  (pt did not rate pain)  Pain Addressed 1:  (pt did not rate pain)    Patient's cultural, spiritual, Congregational conflicts given the current situation:  no    Objective:     Communicated with pt's nurse prior to session.  Patient found up in chair with oxygen upon PT  entry to room.     Therapeutic Activities and Exercises: B l/E seated therex x 20reps: AP, LAQs, Hip Flex    Functional Mobility:  Transfers:     Sit to Stand:  maximal assistance with // bars  Gait: 4 steps x 2 trials in the // bars with MaxA and w/c following closely by Rehab tech    AM-PAC 6 CLICK MOBILITY  10    Patient left up in chair with call button in reach.    GOALS:   Multidisciplinary Problems       Physical Therapy Goals          Problem: Physical Therapy    Goal Priority Disciplines Outcome Goal Variances Interventions   Physical Therapy Goal     PT, PT/OT Ongoing, Progressing     Description: Goals to be met by: 7/6/23     Patient will increase functional independence with mobility by performing:    . Supine to sit with MInimal Assistance  . Sit to supine with MInimal Assistance  . Rolling to Left and Right with Minimal Assistance.  . Sit to stand transfer with Minimal Assistance  . Bed to chair transfer with Minimal Assistance using Rolling Walker  . Gait  x 30 feet with Moderate Assistance using Rolling Walker.   . Wheelchair propulsion x50 feet with Minimal Assistance using bilateral uppper extremities                         Time Tracking:     PT Received On: 06/07/23  PT Start Time: 1102  PT Stop Time: 1133  PT Total Time (min): 31 min    Billable Minutes: Gait Training 20 and Therapeutic Exercise 10    Treatment Type: Treatment  PT/PTA: PT     Number of PTA visits since last PT visit: 0     06/07/2023

## 2023-06-07 NOTE — H&P
"Hospital Medicine  Skilled Nursing Facility   History and Physical Exam      Date of Service: 06/07/2023      Patient Name: Brunilda England  MRN: [id number]  Admission Date: 6/5/2023  Attending Physician: Rickie Cabrera MD  Primary Care Provider: Pepper Whitfield MD  Code Status: Partial Code      Principal problem:  Left displaced femoral neck fracture      Chief Complaint:   Chief Complaint   Patient presents with    Fall     Admitted to OS for rehabilitation following hospital stay following a fall at home resulting in subarachnoid hemorrhage and left basocervical femur fracture s/p IMN.           HPI:   "Ms. Brunilad England is a 89 y.o. female with past medical history of HTN, paroxysmal atrial fibrillation/flutter, COPD on home oxygen, Chronic diastolic HF, CKD 3a, dementia, history of lung/rectal cancer who suffered fall at home while changing her shoes. She was found to have a closed left basocervical fracture with extension into intertrochanteric region. She was also found to have a small left parietal SAH on admit.  Patient underwent left hip cephalomedullary nail fixation by Dr. Desmond Barker 5/30/2023. Post-op patient WBAT to the left lower extremity as per Orthopedics recommendation. Patient placed on Lovenox 40 mg subcutaneous daily and MERCEDES/SCD's for DVT prophylaxis post-op as unable to use patient's home Apixiban post-op for her atrial fibrillation due to ICH as per Neurosurgery but NS okay with Lovenox.  Of note patient had issues with tachy-juan a in hospital. Patient followed by Cardiology as outpatient for A. Fib and intermittent bradycardia and undergoing outpatient evaluation. Cardiology consulted as inpatient states no indication for need for pacemaker at this time and stated they would rather patient be more tachycardiac than bradycardiac. PT/OT recommended SNF placement.     Today she reports pain to her left leg and foot, relieved with elevation. No edema or skin changes to lower " "extremity. Family is requested home 30 day Holter monitor be put on." Per Mila Gaines, NP on 6/6/23.     She is working more with therapy. She says that her pain is controlled with the medications. She denies any chest pain or shortness of breath. She denies any palpitations.     Patient admitted with skilled services with PT and OT to improve functional status and ability to perform ADLs.       Past Medical History:   Past Medical History:   Diagnosis Date    Anal cancer 1995    Anemia     Cancer 1995    anal cancer    Centrilobular emphysema 7/6/2020    Diabetes mellitus     With circulatory disorder    Lumbar radiculopathy 5/16/2014    Lung cancer 2012    s/p chemo/radiation    Macular degeneration     Macular hemorrhage of left eye     Neuropathy     Osteoporosis     Osteoporosis, unspecified 11/9/2012    Pure hypercholesterolemia        Past Surgical History:   Past Surgical History:   Procedure Laterality Date    BRONCHOSCOPY      CHOLECYSTECTOMY      COLONOSCOPY      FLUOROSCOPIC ANGIOGRAPHY OF LOWER EXTREMITY WITH TOPICAL ULTRASOUND Right 12/6/2018    Procedure: ARTERIOGRAM-LEG AND ULTRASOUND;  Surgeon: SEBASTIÁN Mcpherson III, MD;  Location: [facility] OR [address] [place] [address];  Service: Peripheral Vascular;  Laterality: Right;  16.5 min  1059.42 mGy  59ml Dye  2ml Local    heart stent      HYSTERECTOMY      INCISIONAL BIOPSY Right 12/6/2019    Procedure: INCISIONAL BIOPSY;  Surgeon: Leslie Castillo MD;  [facility];  Service: Plastics;  Laterality: Right;    INTRAMEDULLARY RODDING OF FEMUR Left 5/30/2023    Procedure: INSERTION, INTRAMEDULLARY TEREZA, FEMUR;  Surgeon: Desmond Barker MD;  [facility];  Service: Orthopedics;  Laterality: Left;    left leg surgery      blockage    PERCUTANEOUS TRANSLUMINAL ANGIOPLASTY N/A 12/6/2018    Procedure: PTA (ANGIOPLASTY, PERCUTANEOUS, TRANSLUMINAL);  Surgeon: SEBASTIÁN Mcpherson III, MD;  Location: [facility] OR [address] [place] [address];  Service: Peripheral Vascular;  Laterality: " N/A;    RECONSTRUCTION USING FLAP Right 2019    Procedure: RECONSTRUCTION USING FLAP/FULL THICKNESS MRESETION AND RECONSTRUCTION RIGHT UPPER EYELID WITH BIOPSY;  Surgeon: Leslie Castillo MD;  Location: [facility] OR [address] [place] [address];  Service: Plastics;  Laterality: Right;    TONSILLECTOMY         Social History:   Tobacco Use    Smoking status: Former     Packs/day: 0.50     Years: 30.00     Pack years: 15.00     Types: Cigarettes     Quit date: 1995     Years since quittin.4     Passive exposure: Past    Smokeless tobacco: Never   Substance and Sexual Activity    Alcohol use: No    Drug use: No    Sexual activity: Not Currently     Birth control/protection: Surgical       Family History:   Family History       Problem Relation (Age of Onset)    Breast cancer Maternal Aunt    Cancer Father    Stroke Mother            Current Facility-Administered Medications on File Prior to Encounter   Medication    sodium chloride 0.9% flush 5 mL     Current Outpatient Medications on File Prior to Encounter   Medication Sig    acetaminophen (TYLENOL) 500 MG tablet Take 2 tablets (1,000 mg total) by mouth every 8 (eight) hours.    albuterol (PROVENTIL) 2.5 mg /3 mL (0.083 %) nebulizer solution Take 3 mLs (2.5 mg total) by nebulization every 6 (six) hours as needed for Wheezing. Rescue    amlodipine-benazepril 5-20 mg (LOTREL) 5-20 mg per capsule Take 1 capsule by mouth once daily.    apixaban (ELIQUIS) 5 mg Tab Take 1 tablet (5 mg total) by mouth 2 (two) times daily. Hold this medication until cleared by Neurosurgery to resume.    azelastine (ASTELIN) 137 mcg (0.1 %) nasal spray 1 spray (137 mcg total) by Nasal route 2 (two) times daily.    calcium-vitamin D3 (OS-ERIC 500 + D3) 500 mg-5 mcg (200 unit) per tablet Take 2 tablets by mouth once daily.    cyanocobalamin (VITAMIN B-12) 1000 MCG tablet Take 1,000 mcg by mouth every morning.    donepeziL (ARICEPT) 5 MG tablet Take 1 tablet (5 mg total) by mouth every evening.    DULoxetine  (CYMBALTA) 60 MG capsule Take 1 capsule (60 mg total) by mouth every morning.    enoxaparin (LOVENOX) 40 mg/0.4 mL Syrg Inject 0.4 mLs (40 mg total) into the skin Q24H (prophylaxis, 1700). DVT prophylaxis after hip fracture surgery.    estradioL (ESTRACE) 0.01 % (0.1 mg/gram) vaginal cream USE ONE-HALF GRAM VAGINALLY TWICE a WEEK    hydrALAZINE (APRESOLINE) 25 MG tablet Take 1 tablet (25 mg total) by mouth every 8 (eight) hours.    levETIRAcetam (KEPPRA) 500 MG Tab Take 1 tablet (500 mg total) by mouth 2 (two) times daily. End date 6/12/2023 for seizure prophylaxis after small SAH. for 14 days    linaGLIPtin (TRADJENTA) 5 mg Tab tablet Take 1 tablet (5 mg total) by mouth once daily.    methocarbamoL (ROBAXIN) 500 MG Tab Take 1 tablet (500 mg total) by mouth every 8 (eight) hours as needed (Muscle spasms).    metoprolol tartrate (LOPRESSOR) 25 MG tablet Take 1 tablet (25 mg total) by mouth 2 (two) times daily.    nitroGLYCERIN (NITROSTAT) 0.4 MG SL tablet Place 1 tablet (0.4 mg total) under the tongue every 5 (five) minutes as needed.    simvastatin (ZOCOR) 80 MG tablet Take 1 tablet (80 mg total) by mouth once daily.    vitamin D (VITAMIN D3) 1000 units Tab Take 1,000 Units by mouth once daily.       Allergies:   Review of patient's allergies indicates:   Allergen Reactions    Bextra [valdecoxib] Swelling    Gabapentin Diarrhea and Nausea Only       ROS:  Review of Systems   Constitutional:  Negative for appetite change and fever.   Respiratory:  Negative for cough and shortness of breath.    Cardiovascular:  Negative for chest pain and palpitations.   Gastrointestinal:  Negative for abdominal pain, nausea and vomiting.   Genitourinary:  Negative for difficulty urinating and dysuria.   Musculoskeletal:  Positive for arthralgias and gait problem. Negative for back pain.   Neurological:  Positive for weakness. Negative for dizziness and light-headedness.   Psychiatric/Behavioral:  Negative for sleep disturbance. The  patient is not nervous/anxious.        Objective:  Temp:  [98.6 °F (37 °C)-98.8 °F (37.1 °C)]   Pulse:  [112-113]   Resp:  [17-18]   BP: (136-147)/(63-67)   SpO2:  [98 %]     Body mass index is 30.03 kg/m².      Physical Exam  Vitals and nursing note reviewed.   Constitutional:       General: She is not in acute distress.     Appearance: She is well-developed.      Interventions: Nasal cannula in place.   Cardiovascular:      Rate and Rhythm: Normal rate. Rhythm irregularly irregular.      Heart sounds: S1 normal and S2 normal. No murmur heard.  Pulmonary:      Effort: Pulmonary effort is normal. No respiratory distress.      Breath sounds: Normal breath sounds. No wheezing or rales.   Abdominal:      General: Bowel sounds are normal. There is no distension.      Palpations: Abdomen is soft.      Tenderness: There is no abdominal tenderness.   Musculoskeletal:      Left upper leg: Tenderness present.      Right lower leg: No edema.      Left lower leg: No edema.   Skin:     General: Skin is warm and dry.      Findings: Bruising present.   Neurological:      Mental Status: She is alert and oriented to person, place, and time.   Psychiatric:         Mood and Affect: Mood normal.         Behavior: Behavior normal. Behavior is cooperative.         Thought Content: Thought content normal.       Significant Labs:   A1C:   Recent Labs   Lab 04/03/23  1316 05/29/23  0003   HGBA1C 6.5* 6.3*     TSH:   Recent Labs   Lab 05/20/23  1309   TSH 2.410     CBC:  Recent Labs   Lab 06/05/23  0456   WBC 4.35   HGB 8.7*   HCT 25.3*      MCV 85     BMP:  Recent Labs   Lab 06/05/23  0456   GLU 90      K 3.5      CO2 25   BUN 9*   CREATININE 0.7   CALCIUM 9.8       Significant Imaging: I have reviewed all pertinent imaging results/findings completed during prior hospitalization.      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] 05/29/2023 Yes    Subarachnoid hemorrhage following injury, no loss of consciousness [S06.6X0A] 05/29/2023 Yes    PAF (paroxysmal atrial fibrillation) [I48.0] 05/20/2023 Yes    Pulmonary hypertension [I27.20] 05/20/2023 Yes    Late onset Alzheimer's dementia with behavioral disturbance [G30.1, F02.818] 04/03/2023 Yes    Chronic diastolic heart failure [I50.32] 08/04/2022 Yes    Stage 3a chronic kidney disease [N18.31] 08/04/2022 Yes    Centrilobular emphysema [J43.2] 07/06/2020 Yes    Type 2 diabetes mellitus with circulatory disorder, without long-term current use of insulin [E11.59] 03/21/2017 Yes    Coronary artery disease involving native coronary artery of native heart without angina pectoris [I25.10] 01/28/2017 Yes    PVD (peripheral vascular disease) with claudication [I73.9] 05/16/2014 Yes    Pure hypercholesterolemia [E78.00] 10/18/2013 Yes    Essential hypertension [I10] 06/29/2012 Yes    History of lung cancer [Z85.118] 06/29/2012 Not Applicable      Problems Resolved During this Admission:       Closed left basicervical femoral neck fracture with extension into the intertrochanteric region s/p IM nail on 5/30/2023  Patient to continue PT/OT  Patient to continue Lovenox 40 mg subcutaneous daily post-op for DVT prophylaxis after hip fracture surgery as approved by Neurosurgery.   Orthopedics is following and managing surgical site.   Continue multimodal pain management post-op with acetaminophen 1000 mg TID and methocarbamol 500 mg 4 times daily and Tramadol prn for breakthrough pain and will continue.  Ortho DAVIDE to see patient weekly SNF  Maintain surgical dressings until removed by Orthopedics     Subarachnoid hemorrhage following injury, no loss of  consciousness  Patient found on admit to have punctate left parietal SAH that was stable on repeat imaging. Patient neurologically intact.   Neurosurgery recommended to hold Eliquis for 2 weeks. Ok for DVT ppx with standard dose Lovenox 40 mg subcutaneous daily.  Keppra 500 mg po BID x 14 days.   Follow-up in 2 weeks with repeat CTH. If repeat CTH stable, can resume home dose Eliquis. Notify NSGY immediately with any changes in neuro status.     Acute blood loss anemia  On admission to hospital, patient noted to have Hgb of 10.9. After surgery patient's Hgb level dropped to 8.0 on 5/31 and 7.7 on 6/1 and expected blood loss related to surgery and hip fracture.   Patient has no signs of active bleeding and hemodynamically stable. Patient is asymptomatic related to anemia.   Goal is keep Hgb >7 and consider blood transfusion if Hgb < 7 and asymptomatic or < 8 if patient becomes symptomatic.     Tachy-juan a syndrome  PAF (paroxysmal atrial fibrillation)  Continue to monitor. Cardiology wants HR on higher side and okay with slight tachycardia as she has issues with bradycardia on higher doses of Metoprolol.   Had 30 day Holter monitor in progress on admission with outpatient Cardiologist Dr. Roman  Cardiology recommends to continue low dose Metoprolol 25 mg po BID to help with tachycardia related to atrial fibrillation. No need for pacemaker at this time.   Per Cardiology, if patient experiences syncope or develops significant conversion pause >5 sec, please alert our team. Per Cardiology, patient to follow-up with her cardiologist, Dr. Roman as outpatient.   Home Eliquis on hold for long term anticoagulation for atrial fibrillation due to SAH as per Neurosurgery. Follow-up in 2 weeks with repeat CTH. If repeat CTH stable, can resume home dose Eliquis.     Essential hypertension  Continue Hydralazine 25 mg po TID and Amlodipine/Benzapril 5/20 1 tablet po daily to treat.     Coronary artery disease involving native  coronary artery of native heart without angina pectoris  Patient with history of remote st[facility] at [facility] in [facility] and 2003.   Patient on Zocor at home will give Lipitor 40mg inpatient     Type 2 diabetes mellitus with circulatory disorder, without long-term current use of insulin  Lab Results   Component Value Date    HGBA1C 6.3 (H) 05/29/2023   Patient on oral Tradjenta at home to treat.   Accucheck AC/HS with SSI     Chronic diastolic heart failure  Continue Metoprolol and Benazepril    Monitor Is&Os and daily weights.      Centrilobular emphysema  Chronic hypoxemic respiratory failure  History of lung cancer in remission  Continue home oxygen at 2 liters. Patient with no signs of acute exacerbation.   Patient on no inhalers at home.       Late onset Alzheimer's dementia with behavioral disturbance  Patient at cognitive baseline oriented x3  Continue home Aricept to treat.  Delirium precautions.      Stage 3a chronic kidney disease  Patient is at baseline renal function at present.   Need to avoid any nephrotoxic agents unless necessary   Renally adjust medications   Monitor routine labs       Anticipated Disposition:   Home with home health      Future Appointments   Date Time Provider Department Center   6/13/2023  2:45 PM Amauri Henry NP [facility] ORTHO [address]   6/14/2023  1:00 PM Pepper Whitfield MD [facility] IM [address] [place] [facility]   6/29/2023  2:00 PM Adri Rader PA-C [facility] NEUROS[address]   7/11/2023 10:45 AM Amauri Henry NP [facility] [place]       I certify that SNF services are required to be given on an inpatient basis because Brunilda England needs for skilled nursing care and/or skilled rehabilitation are required on a daily basis and such services can only practically be provided in a skilled nursing facility setting and are for an ongoing condition for which she received inpatient care in the hospital.       Rickie Cabrera MD  Department of Hospital Medicine   [facility]  Nursing

## 2023-06-07 NOTE — PLAN OF CARE
Problem: Adult Inpatient Plan of Care  Goal: Absence of Hospital-Acquired Illness or Injury  Outcome: Ongoing, Progressing     Problem: Adult Inpatient Plan of Care  Goal: Optimal Comfort and Wellbeing  Outcome: Ongoing, Progressing     Problem: Adult Inpatient Plan of Care  Goal: Readiness for Transition of Care  Outcome: Ongoing, Progressing     Problem: Diabetes Comorbidity  Goal: Blood Glucose Level Within Targeted Range  Outcome: Ongoing, Progressing     Problem: Fluid Imbalance (Pneumonia)  Goal: Fluid Balance  Outcome: Ongoing, Progressing

## 2023-06-08 ENCOUNTER — TELEPHONE (OUTPATIENT)
Dept: INTERNAL MEDICINE | Facility: CLINIC | Age: 88
End: 2023-06-08
Payer: MEDICARE

## 2023-06-08 LAB
ANION GAP SERPL CALC-SCNC: 6 MMOL/L (ref 8–16)
BASOPHILS # BLD AUTO: 0.02 K/UL (ref 0–0.2)
BASOPHILS NFR BLD: 0.5 % (ref 0–1.9)
BUN SERPL-MCNC: 8 MG/DL (ref 10–30)
CALCIUM SERPL-MCNC: 9.5 MG/DL (ref 8.7–10.5)
CHLORIDE SERPL-SCNC: 103 MMOL/L (ref 95–110)
CO2 SERPL-SCNC: 27 MMOL/L (ref 23–29)
CREAT SERPL-MCNC: 0.7 MG/DL (ref 0.5–1.4)
DIFFERENTIAL METHOD: ABNORMAL
EOSINOPHIL # BLD AUTO: 0.1 K/UL (ref 0–0.5)
EOSINOPHIL NFR BLD: 1.5 % (ref 0–8)
ERYTHROCYTE [DISTWIDTH] IN BLOOD BY AUTOMATED COUNT: 17 % (ref 11.5–14.5)
EST. GFR  (NO RACE VARIABLE): >60 ML/MIN/1.73 M^2
GLUCOSE SERPL-MCNC: 79 MG/DL (ref 70–110)
HCT VFR BLD AUTO: 21.8 % (ref 37–48.5)
HGB BLD-MCNC: 7.4 G/DL (ref 12–16)
IMM GRANULOCYTES # BLD AUTO: 0.02 K/UL (ref 0–0.04)
IMM GRANULOCYTES NFR BLD AUTO: 0.5 % (ref 0–0.5)
LYMPHOCYTES # BLD AUTO: 0.8 K/UL (ref 1–4.8)
LYMPHOCYTES NFR BLD: 19.3 % (ref 18–48)
MAGNESIUM SERPL-MCNC: 1.5 MG/DL (ref 1.6–2.6)
MCH RBC QN AUTO: 28.7 PG (ref 27–31)
MCHC RBC AUTO-ENTMCNC: 33.9 G/DL (ref 32–36)
MCV RBC AUTO: 85 FL (ref 82–98)
MONOCYTES # BLD AUTO: 0.5 K/UL (ref 0.3–1)
MONOCYTES NFR BLD: 12.8 % (ref 4–15)
NEUTROPHILS # BLD AUTO: 2.6 K/UL (ref 1.8–7.7)
NEUTROPHILS NFR BLD: 65.4 % (ref 38–73)
NRBC BLD-RTO: 0 /100 WBC
PHOSPHATE SERPL-MCNC: 3 MG/DL (ref 2.7–4.5)
PLATELET # BLD AUTO: 230 K/UL (ref 150–450)
PMV BLD AUTO: 10.5 FL (ref 9.2–12.9)
POCT GLUCOSE: 78 MG/DL (ref 70–110)
POCT GLUCOSE: 84 MG/DL (ref 70–110)
POCT GLUCOSE: 94 MG/DL (ref 70–110)
POCT GLUCOSE: 98 MG/DL (ref 70–110)
POTASSIUM SERPL-SCNC: 3.3 MMOL/L (ref 3.5–5.1)
RBC # BLD AUTO: 2.58 M/UL (ref 4–5.4)
SODIUM SERPL-SCNC: 136 MMOL/L (ref 136–145)
WBC # BLD AUTO: 4 K/UL (ref 3.9–12.7)

## 2023-06-08 PROCEDURE — 25000003 PHARM REV CODE 250: Performed by: FAMILY MEDICINE

## 2023-06-08 PROCEDURE — 97535 SELF CARE MNGMENT TRAINING: CPT | Mod: CO

## 2023-06-08 PROCEDURE — 83735 ASSAY OF MAGNESIUM: CPT | Performed by: HOSPITALIST

## 2023-06-08 PROCEDURE — 84100 ASSAY OF PHOSPHORUS: CPT | Performed by: HOSPITALIST

## 2023-06-08 PROCEDURE — 11000004 HC SNF PRIVATE

## 2023-06-08 PROCEDURE — 63600175 PHARM REV CODE 636 W HCPCS: Performed by: HOSPITALIST

## 2023-06-08 PROCEDURE — 94761 N-INVAS EAR/PLS OXIMETRY MLT: CPT

## 2023-06-08 PROCEDURE — 36415 COLL VENOUS BLD VENIPUNCTURE: CPT | Performed by: HOSPITALIST

## 2023-06-08 PROCEDURE — 99900035 HC TECH TIME PER 15 MIN (STAT)

## 2023-06-08 PROCEDURE — 80048 BASIC METABOLIC PNL TOTAL CA: CPT | Performed by: HOSPITALIST

## 2023-06-08 PROCEDURE — 97116 GAIT TRAINING THERAPY: CPT

## 2023-06-08 PROCEDURE — 27000221 HC OXYGEN, UP TO 24 HOURS

## 2023-06-08 PROCEDURE — 97110 THERAPEUTIC EXERCISES: CPT | Mod: CO

## 2023-06-08 PROCEDURE — 85025 COMPLETE CBC W/AUTO DIFF WBC: CPT | Performed by: HOSPITALIST

## 2023-06-08 PROCEDURE — 97110 THERAPEUTIC EXERCISES: CPT

## 2023-06-08 PROCEDURE — 25000003 PHARM REV CODE 250: Performed by: HOSPITALIST

## 2023-06-08 RX ORDER — FAMOTIDINE 20 MG/1
20 TABLET, FILM COATED ORAL 2 TIMES DAILY
Status: DISCONTINUED | OUTPATIENT
Start: 2023-06-08 | End: 2023-06-12

## 2023-06-08 RX ORDER — AMOXICILLIN 250 MG
1 CAPSULE ORAL NIGHTLY
Status: DISCONTINUED | OUTPATIENT
Start: 2023-06-08 | End: 2023-06-16

## 2023-06-08 RX ORDER — POTASSIUM CHLORIDE 20 MEQ/1
40 TABLET, EXTENDED RELEASE ORAL 2 TIMES DAILY
Status: COMPLETED | OUTPATIENT
Start: 2023-06-08 | End: 2023-06-08

## 2023-06-08 RX ADMIN — DULOXETINE 60 MG: 60 CAPSULE, DELAYED RELEASE ORAL at 09:06

## 2023-06-08 RX ADMIN — METOPROLOL TARTRATE 25 MG: 25 TABLET, FILM COATED ORAL at 09:06

## 2023-06-08 RX ADMIN — ACETAMINOPHEN 650 MG: 325 TABLET ORAL at 09:06

## 2023-06-08 RX ADMIN — AMLODIPINE BESYLATE AND BENAZEPRIL HYDROCHLORIDE 1 CAPSULE: 5; 20 CAPSULE ORAL at 09:06

## 2023-06-08 RX ADMIN — FAMOTIDINE 20 MG: 20 TABLET, FILM COATED ORAL at 10:06

## 2023-06-08 RX ADMIN — Medication 6 MG: at 10:06

## 2023-06-08 RX ADMIN — HYDRALAZINE HYDROCHLORIDE 25 MG: 25 TABLET, FILM COATED ORAL at 02:06

## 2023-06-08 RX ADMIN — CHOLECALCIFEROL TAB 25 MCG (1000 UNIT) 1000 UNITS: 25 TAB at 09:06

## 2023-06-08 RX ADMIN — HYDRALAZINE HYDROCHLORIDE 25 MG: 25 TABLET, FILM COATED ORAL at 05:06

## 2023-06-08 RX ADMIN — METOPROLOL TARTRATE 25 MG: 25 TABLET, FILM COATED ORAL at 10:06

## 2023-06-08 RX ADMIN — LEVETIRACETAM 500 MG: 500 TABLET, FILM COATED ORAL at 09:06

## 2023-06-08 RX ADMIN — ACETAMINOPHEN 1000 MG: 500 TABLET ORAL at 05:06

## 2023-06-08 RX ADMIN — ENOXAPARIN SODIUM 40 MG: 40 INJECTION SUBCUTANEOUS at 05:06

## 2023-06-08 RX ADMIN — HYDRALAZINE HYDROCHLORIDE 25 MG: 25 TABLET, FILM COATED ORAL at 10:06

## 2023-06-08 RX ADMIN — DONEPEZIL HYDROCHLORIDE 5 MG: 5 TABLET ORAL at 10:06

## 2023-06-08 RX ADMIN — CYANOCOBALAMIN TAB 1000 MCG 1000 MCG: 1000 TAB at 09:06

## 2023-06-08 RX ADMIN — POTASSIUM CHLORIDE 40 MEQ: 1500 TABLET, EXTENDED RELEASE ORAL at 01:06

## 2023-06-08 RX ADMIN — LEVETIRACETAM 500 MG: 500 TABLET, FILM COATED ORAL at 10:06

## 2023-06-08 RX ADMIN — METHOCARBAMOL 500 MG: 500 TABLET ORAL at 10:06

## 2023-06-08 RX ADMIN — ATORVASTATIN CALCIUM 40 MG: 40 TABLET, FILM COATED ORAL at 09:06

## 2023-06-08 RX ADMIN — Medication 2 TABLET: at 09:06

## 2023-06-08 RX ADMIN — ACETAMINOPHEN 1000 MG: 500 TABLET ORAL at 10:06

## 2023-06-08 RX ADMIN — POTASSIUM CHLORIDE 40 MEQ: 1500 TABLET, EXTENDED RELEASE ORAL at 10:06

## 2023-06-08 RX ADMIN — SENNOSIDES AND DOCUSATE SODIUM 1 TABLET: 50; 8.6 TABLET ORAL at 10:06

## 2023-06-08 NOTE — TELEPHONE ENCOUNTER
----- Message from Bert Lopez sent at 6/8/2023  2:25 PM CDT -----  Contact: [phone] @ [facility]  Good afternoon [facility] would like a call back about patient. Patient is in patient and they need to know what to do with the out side order. Please give [facility] a call

## 2023-06-08 NOTE — PLAN OF CARE
Problem: Adult Inpatient Plan of Care  Goal: Patient-Specific Goal (Individualized)  Outcome: Ongoing, Progressing     Problem: Adult Inpatient Plan of Care  Goal: Absence of Hospital-Acquired Illness or Injury  Outcome: Ongoing, Progressing     Problem: Adult Inpatient Plan of Care  Goal: Optimal Comfort and Wellbeing  Outcome: Ongoing, Progressing     Problem: Diabetes Comorbidity  Goal: Blood Glucose Level Within Targeted Range  Outcome: Ongoing, Progressing     Problem: Fluid Imbalance (Pneumonia)  Goal: Fluid Balance  Outcome: Ongoing, Progressing

## 2023-06-08 NOTE — PLAN OF CARE
Problem: Occupational Therapy  Goal: Occupational Therapy Goal  Description: Goals to be met by: 6/27/23     Patient will increase functional independence with ADLs by performing:    UE Dressing with Set-up Assistance.  LE Dressing with Stand-by Assistance.  Grooming while seated at sink with Modified [place].  Toileting from toilet with Stand-by Assistance for hygiene and clothing management.   Bathing from W/C SEATED AT SINK with Minimal Assistance.  Toilet transfer to toilet with Contact Guard Assistance.    Outcome: Ongoing, Progressing

## 2023-06-08 NOTE — PT/OT/SLP PROGRESS
Physical Therapy Treatment    Patient Name:  Brunilda England   MRN:  [id number]  Admit Date: 6/5/2023  Admitting Diagnosis: Left displaced femoral neck fracture  Recent Surgeries: INSERTION, INTRAMEDULLARY TEREZA, FEMUR (Left)     General Precautions: Standard, fall  Orthopedic Precautions: LLE weight bearing as tolerated  Braces: N/A    Recommendations:     Discharge Recommendations: home health PT  Level of Assistance Recommended at Discharge: 24 hours significant assistance  Discharge Equipment Recommendations: bath bench, bedside commode, walker, rolling, wheelchair  Barriers to discharge: None    Assessment:     Brunilda England is a 89 y.o. female admitted with a medical diagnosis of Left displaced femoral neck fracture . Pt able to increase her GT distance to 2 x length of the //bars with Mod A compared to yesterday where she was only able to tolerate 4 stepsx 2 trials. Pt will benefit form continued SNF rehab.    Performance deficits affecting function: weakness, impaired endurance, impaired self care skills, impaired functional mobility, gait instability, impaired balance, decreased upper extremity function, decreased lower extremity function, impaired cardiopulmonary response to activity, orthopedic precautions, decreased ROM.    Rehab Potential is good    Activity Tolerance: Fair    Plan:     Patient to be seen 5 x/week to address the above listed problems via gait training, therapeutic activities, therapeutic exercises, wheelchair management/training    Plan of Care Expires: 07/06/23  Plan of Care Reviewed with: patient    Subjective     Pt. Agreeable to work with PT.     Pain/Comfort:  Pain Rating 1: 2/10  Location - Side 1: Right  Location - Orientation 1: generalized  Location 1: leg  Pain Addressed 1: Pre-medicate for activity, Reposition, Distraction  Pain Rating Post-Intervention 1: 2/10    Patient's cultural, spiritual, Adventist conflicts given the current situation:  no    Objective:     Communicated  with pt's nurse prior to session.  Patient found up in chair with oxygen upon PT entry to room.     Therapeutic Activities and Exercises: B L/E seated therex x 30reps: AP, LAQ, Hip flex    Functional Mobility:  Transfers:     Sit to Stand:  moderate assistance and maximal assistance with // bars  Bed to Chair: moderate assistance and maximal assistance with  no AD  using  Stand Pivot  Gait: 2 x length of the // bars with Mod A and w/c followed closely by rehab tech    AM-PAC 6 CLICK MOBILITY  10    Patient left up in chair with call button in reach.    GOALS:   Multidisciplinary Problems       Physical Therapy Goals          Problem: Physical Therapy    Goal Priority Disciplines Outcome Goal Variances Interventions   Physical Therapy Goal     PT, PT/OT Ongoing, Progressing     Description: Goals to be met by: 7/6/23     Patient will increase functional independence with mobility by performing:    . Supine to sit with MInimal Assistance  . Sit to supine with MInimal Assistance  . Rolling to Left and Right with Minimal Assistance.  . Sit to stand transfer with Minimal Assistance  . Bed to chair transfer with Minimal Assistance using Rolling Walker  . Gait  x 30 feet with Moderate Assistance using Rolling Walker.   . Wheelchair propulsion x50 feet with Minimal Assistance using bilateral uppper extremities                         Time Tracking:     PT Received On: 06/08/23  PT Start Time: 1056  PT Stop Time: 1125  PT Total Time (min): 29 min    Billable Minutes: Gait Training 20 and Therapeutic Activity 9    Treatment Type: Treatment  PT/PTA: PT     Number of PTA visits since last PT visit: 0     06/08/2023

## 2023-06-08 NOTE — PROGRESS NOTES
[facility]                                  Skilled Nursing Facility                   Progress Note     Patient Name: Brunilda England  YOB: 1931  MRN: [id number]  Room: AAAT461/LBTT005 A     Admit Date: 6/5/2023   DUTCH:  TBD  Code status: Partial (No chest compressions)    Principal Problem: Left displaced femoral neck fracture    Chief Complaint: Re-evaluation of medical treatment and therapy status, lab review      HPI:   Ms. Brunilda England is a 89 y.o. female with past medical history of HTN, paroxysmal atrial fibrillation/flutter, COPD on home oxygen, Chronic diastolic HF, CKD 3a, dementia, history of lung/rectal cancer who suffered fall at home while changing her shoes. She was found to have a closed left basocervical fracture with extension into intertrochanteric region. She was also found to have a small left parietal SAH on admit.  Patient underwent left hip cephalomedullary nail fixation by Dr. Desmond Barker 5/30/2023. Post-op patient WBAT to the left lower extremity as per Orthopedics recommendation. Patient placed on Lovenox 40 mg subcutaneous daily and MERCEDES/SCD's for DVT prophylaxis post-op as unable to use patient's home Apixiban post-op for her atrial fibrillation due to ICH as per Neurosurgery but NS okay with Lovenox.  Of note patient had issues with tachy-juan a in hospital. Patient followed by Cardiology as outpatient for A. Fib and intermittent bradycardia and undergoing outpatient evaluation. Cardiology consulted as inpatient states no indication for need for pacemaker at this time and stated they would rather patient be more tachycardiac than bradycardiac. PT/OT recommended SNF placement.    Today she reports pain to her left leg and foot, relieved with elevation. No edema or skin changes to lower extremity. Family is requested home 30 day Holter monitor be put on.     Patient will be treated at  [facility] with PT and OT to improve functional status and ability to perform ADLs.     Interval history:  24 hr vital sign ranges listed below. Labs reviewed and listed below. K 3.3 today likely from diarrhea, replace with KCL. Hgb 7.4 from 8.7 on lovenox, will initiate pepcid. Patient denies shortness of breath, abdominal discomfort, nausea, or vomiting.  Patient reports an adequate appetite ate 100% breakfast.  Patient denies dysuria.  Patient reports no BM x2 days after receiving immodium, will restart gentle laxative. Home 30 day holter monitor remains in place.  Patient progessing with PT/OT-  Gait: 4 steps x 2 trials in the // bars with MaxA and w/c following closely by Rehab tech. Continuing to follow and treat all acute and chronic conditions.      Past Medical History: Patient has a past medical history of Anal cancer (1995), Anemia, Cancer (1995), Centrilobular emphysema (7/6/2020), Diabetes mellitus, Lumbar radiculopathy (5/16/2014), Lung cancer (2012), Macular degeneration, Macular hemorrhage of left eye, Neuropathy, Osteoporosis, Osteoporosis, unspecified (11/9/2012), and Pure hypercholesterolemia.    Past Surgical History: Patient has a past surgical history that includes Tonsillectomy; Cholecystectomy; Hysterectomy; Bronchoscopy; Colonoscopy; left leg surgery; heart stent; Fluoroscopic angiography of lower extremity with topical ultrasound (Right, 12/6/2018); Percutaneous transluminal angioplasty (N/A, 12/6/2018); Reconstruction using flap (Right, 12/6/2019); Incisional biopsy (Right, 12/6/2019); and Intramedullary rodding of femur (Left, 5/30/2023).    Social History: Patient reports that she quit smoking about 28 years ago. Her smoking use included cigarettes. She has a 15.00 pack-year smoking history. She has been exposed to tobacco smoke. She has never used smokeless tobacco. She reports that she does not drink alcohol and does not use drugs.    Family History:  family history includes Breast  cancer in her maternal aunt; Cancer in her father; Stroke in her mother.    Allergies: Patient is allergic to bextra [valdecoxib] and gabapentin.    ROS    Constitutional:  Negative for fever. Positive decreased appetite  Respiratory:  Negative for cough and shortness of breath.    Cardiovascular:  Negative for chest pain, palpitations and leg swelling.   Gastrointestinal:  Negative for abdominal pain, nausea and vomiting.   Musculoskeletal:  Positive for arthralgias (Left hip and LE) and weakness.   Neurological:  Negative for dizziness.   Psychiatric/Behavioral:  Negative for agitation and confusion.    24 hour Vital Sign Range   Temp:  [98.2 °F (36.8 °C)-98.4 °F (36.9 °C)]   Pulse:  [107-112]   Resp:  [18-20]   BP: (134-139)/(63-65)   SpO2:  [98 %-99 %]       Physical Exam  Vitals and nursing note reviewed.   Constitutional:       General: She is awake. She is not in acute distress. Debilitated     Appearance: Normal appearance. She is not ill-appearing.   Eyes:      Conjunctiva/sclera: Conjunctivae normal.   Cardiovascular:      Rate and Rhythm: Regular rhythm. Tachycardia present. No peripheral edema     Heart sounds: Normal heart sounds. No murmur heard.    No friction rub. No gallop.   Pulmonary:      Effort: Pulmonary effort is normal. No respiratory distress.      Breath sounds: Normal breath sounds. No wheezing.   Abdominal:      General: Abdomen is flat. Bowel sounds are normal. There is no distension.      Palpations: Abdomen is soft.      Tenderness: There is no abdominal tenderness. There is no guarding.   Skin:     General: Skin is warm.      Findings: No erythema.      Comments: Surgical dressing noted on left hip.   Neurological:      General: No focal deficit present.      Mental Status: She is alert and oriented to person, place, and time.   Psychiatric:         Mood and Affect: Mood normal.         Behavior: Behavior normal. Behavior is cooperative.         Thought Content: Thought content  normal.   : Incontinent            Incision/Site 05/30/23 0856 Left Hip (Active)   05/30/23 0856   Present Prior to Hospital Arrival?:    Side: Left   Location: Hip   Orientation:    Incision Type:    Closure Method:    Additional Comments:    Removal Indication and Assessment:    Wound Outcome:    Removal Indications:    Incision WDL ex 06/06/23 0845   Dressing Appearance Intact;Clean;Dry 06/06/23 0845   Drainage Amount None 06/06/23 0845   Drainage Characteristics/Odor No odor 06/06/23 0845   Appearance Dressing in place, unable to visualize 06/06/23 0845   Dressing Gauze;Transparent film 06/06/23 0845   Number of days: 7           Labs:  Recent Labs   Lab 06/04/23  0613 06/05/23 0456 06/08/23  0411   WBC 4.57 4.35 4.00   HGB 7.7* 8.7* 7.4*   HCT 23.5* 25.3* 21.8*    196 230       Recent Labs   Lab 06/02/23 0457 06/03/23  0521 06/04/23 0613 06/05/23 0456 06/08/23  0411   * 133* 136 136 136   K 4.1 3.7 3.3* 3.5 3.3*    103 103 103 103   CO2 21* 22* 25 25 27   BUN 23 15 10 9* 8*   CREATININE 1.4 0.9 0.7 0.7 0.7   * 97 102 90 79   CALCIUM 10.2 9.9 9.3 9.8 9.5   MG 1.9 1.6 1.7  --  1.5*   PHOS 3.1  --   --   --  3.0       No results for input(s): ALKPHOS, ALT, AST, ALBUMIN, PROT, BILITOT, INR in the last 168 hours.    Recent Labs     06/06/23  1607 06/06/23  2110 06/07/23  0703 06/07/23  1134 06/07/23  1614 06/07/23 2053 06/08/23  0716 06/08/23  1136   POCTGLUCOSE 130* 95 101 107 105 100 78 98         Meds Scheduled:   acetaminophen  1,000 mg Oral Q8H    amlodipine-benazepril 5-20 mg  1 capsule Oral Daily    atorvastatin  40 mg Oral Daily    calcium-vitamin D3  2 tablet Oral Daily    cyanocobalamin  1,000 mcg Oral Daily    donepeziL  5 mg Oral QHS    DULoxetine  60 mg Oral Daily    enoxparin  40 mg Subcutaneous Q24H (prophylaxis, 1700)    hydrALAZINE  25 mg Oral Q8H    levETIRAcetam  500 mg Oral BID    metoprolol tartrate  25 mg Oral BID    potassium chloride  40 mEq Oral BID     vitamin D  1,000 Units Oral Daily       PRN:   acetaminophen, albuterol-ipratropium, calcium carbonate, dextrose 10%, dextrose 10%, dextrose, dextrose, glucagon (human recombinant), insulin aspart U-100, loperamide, melatonin, methocarbamoL      Assessment and Plan:    Closed left basicervical femoral neck fracture with extension into the intertrochanteric region s/p IM nail on 5/30/2023  Patient to continue PT/OT for gait training and strengthening and restoration of ADL's. Patient is FWB/WBAT: left lower extremity as per Orthopedic recommendations.   Patient to continue Lovenox 40 mg subcutaneous daily post-op for DVT prophylaxis after hip fracture surgery as approved by Neurosurgery.   Orthopedics is following and managing surgical site.   Perineural pain catheter removed by Anesthesia on 6/2.   Pain improving to left hip. Patient on multimodal pain management post-op with Tylenol 1000 mg po every 8 hours and Robaxin 500 mg po 4 times daily and Tramadol prn for breakthrough pain and will continue.  Ortho DAVIDE to see patient weekly SNF  - maintain surgical dressings until removed by Orthopedics  - follow-up with Ortho after discharge  - stable, continue PT/OT     Subarachnoid hemorrhage following injury, no loss of consciousness  Controlled. Patient found on admit to have punctate left parietal SAH that was stable on repeat imaging. Patient neurologically intact.   Neurosurgery consulted and as per recs:   --No acute neurosurgical intervention  --Hold Eliquis for 2 weeks. Ok for DVT ppx with standard dose Lovenox 40 mg subcutaneous daily.  --SBP <140  --Na >135  --Keppra 500 mg po BID x 14 days.   --HOB >30.  --Follow-up in 2 weeks with repeat CTH, case management aware. If repeat CTH stable, can resume home dose Eliquis. Notify NSGY immediately with any changes in neuro status.     Acute blood loss anemia  On admission to hospital, patient noted to have Hgb of 10.9. After surgery patient's Hgb level dropped to 8.0  on 5/31 and 7.7 on 6/1 and expected blood loss related to surgery and hip fracture.   Patient has no signs of active bleeding and hemodynamically stable. Patient is asymptomatic related to anemia.   Goal is keep Hgb >7 and consider blood transfusion if Hgb < 7 and asymptomatic or < 8 if patient becomes symptomatic.  Monitor routine labs current Hgb 7.4 from 8.7 on lovenox, initiate pepcid     Tachy-juan a syndrome  PAF (paroxysmal atrial fibrillation)  Controlled. Continue to monitor. Cardiology wants HR on higher side and okay with slight tachycardia as she has issues with bradycardia on higher doses of Metoprolol.   Cardiology consulted as patient having erratic HRs up to 160 and as low as 40 in hospital. Cardiology states has evidence of tachycardia and bradycardia on ECG but does not meet criteria for tachy-juan a syndrome.   Cardiology recommends to continue low dose Metoprolol 25 mg po BID to help with tachycardia related to atrial fibrillation. No need for pacemaker at this time.   Per Cardiology, if patient experiences syncope or develops significant conversion pause >5 sec, please alert our team. Per Cardiology, patient to follow-up with her cardiologist, Dr. Roman as outpatient.   Home Eliquis on hold for long term anticoagulation for atrial fibrillation due to SAH as per Neurosurgery. Follow-up in 2 weeks with repeat CTH. If repeat CTH stable, can resume home dose Eliquis.  Had 30 day Holter monitor in progress on admission with outpatient Cardiologist Dr. Roman end date 6/21 no mention in cards note, will continue here    Essential hypertension  Chronic and controlled. Continue Hydralazine 25 mg po TID and Amlodipine/Benzapril 5/20 1 tablet po daily to treat. Goal SBP < 140.       Coronary artery disease involving native coronary artery of native heart without angina pectoris  Patient with history of remote stents at [facility] in 1990 and 2003.   Patient on Zocor at home will give Lipitor 40mg inpatient      Type 2 diabetes mellitus with circulatory disorder, without long-term current use of insulin  Patient on oral Tradjenta at home to treat.   Accucheck AC/HS with SSI    Chronic diastolic heart failure  Continue Metoprolol and Benazepril    Monitor Is&Os and daily weights.      Centrilobular emphysema  Chronic hypoxemic respiratory failure  History of lung cancer in remission  Chronic and controlled. Continue home oxygen at 2 liters. Patient with no signs of acute exacerbation.   Patient on no inhalers at home.       Late onset Alzheimer's dementia with behavioral disturbance  Patient at cognitive baseline oriented x3  Continue home Aricept to treat.  Delirium precautions.      Stage 3a chronic kidney disease  Patient is at baseline renal function at present.   Need to avoid any nephrotoxic agents unless necessary   Renally adjust medications   Monitor routine labs     Anticipate disposition:    Home with home health      Follow-up needed during SNF admission:   [facility] in 2 weeks ~6/14    Follow-up needed after discharge from SNF:   - PCP within 1-2 weeks, Ortho  - See appt scheduled below     Future Appointments   Date Time Provider Department Center   6/13/2023  2:45 PM Amauri Henry NP [facility] ORTHO [address] [place]   6/14/2023  1:00 PM Pepper Whitfield MD [facility] IM [address] [facility]   6/29/2023  2:00 PM DAVIDA Jones NEUROS[address] [place]   7/11/2023 10:45 AM Amauri Henry NP [facility] ORTHO [address] [place]         I certify that SNF services are required to be given on an inpatient basis because Brunilda England needs for skilled nursing care and/or skilled rehabilitation are required on a daily basis and such services can only practically be provided in a skilled nursing facility setting and are for an ongoing condition for which she received inpatient care in the hospital.       Total time spent: 85 minutes  Description of Time: counseling provided on clinical condition, therapies provided, plan of care, emotional  support, coordinating patient care with other care team members, reviewing and interpreting labs and imaging, collaboration with physician, initiating new orders, chart review, and documentation. See interval hx.         DIEGO CARRENO  Department of Hospital Medicine   [facility]     DOS: 6/8/2023       Patient note was created using MModal Dictation.  Any errors in syntax or even information may not have been identified and edited on initial review prior to signing this note.

## 2023-06-08 NOTE — PLAN OF CARE
Problem: Adult Inpatient Plan of Care  Goal: Plan of Care Review  Outcome: Ongoing, Progressing  Goal: Patient-Specific Goal (Individualized)  Outcome: Ongoing, Progressing     Problem: Diabetes Comorbidity  Goal: Blood Glucose Level Within Targeted Range  Outcome: Ongoing, Progressing     Problem: Infection (Pneumonia)  Goal: Resolution of Infection Signs and Symptoms  Outcome: Ongoing, Progressing     Problem: Respiratory Compromise (Pneumonia)  Goal: Effective Oxygenation and Ventilation  Outcome: Ongoing, Progressing     Problem: Skin Injury Risk Increased  Goal: Skin Health and Integrity  Outcome: Ongoing, Progressing     Problem: Fall Injury Risk  Goal: Absence of Fall and Fall-Related Injury  Outcome: Ongoing, Progressing

## 2023-06-08 NOTE — PT/OT/SLP PROGRESS
Occupational Therapy   Treatment    Name: Brunilda England  MRN: [id number]  Admit Date: 6/5/2023  Admitting Diagnosis:  Left displaced femoral neck fracture    General Precautions: Standard, fall   Orthopedic Precautions: LLE weight bearing as tolerated   Braces: N/A    Recommendations:     Discharge Recommendations:  home health OT  Level of Assistance Recommended at Discharge: 24 hours physical assistance for all ADL's and home management tasks  Discharge Equipment Recommendations: bath bench, bedside commode, hip kit, walker, rolling, wheelchair  Barriers to discharge:  None    Assessment:     Brunilda England is a 89 y.o. female with a medical diagnosis of Left displaced femoral neck fracture.  She presents with  Performance deficits affecting function are weakness, impaired endurance, impaired self care skills, impaired functional mobility, gait instability, impaired balance, decreased lower extremity function, pain, decreased safety awareness, decreased ROM, orthopedic precautions.     Pt. Was cooperative and participated well with session on this day. Pt. Still continued to require increase (A) for selfcare and t/f's task. Pt  continues to demonstrate levels of physical deficits with  functional indep with daily management activities tasks, selfcare skills with balance,  functional mobility, UB strength and endurance. Pt. Will continue to benefit from continued OT to progress towards goals      Rehab Potential is fair    Activity tolerance:  Fair    Plan:     Patient to be seen 5 x/week to address the above listed problems via self-care/home management, therapeutic activities, therapeutic exercises    Plan of Care Expires: 06/27/23  Plan of Care Reviewed with: patient, daughter    Subjective     Communicated with: nsg and Pt.  prior to session. This leg hurts when I move it     Pain/Comfort:  Pain Rating 1: 2/10  Location - Side 1: Right  Location - Orientation 1: generalized  Location 1: leg  Pain Addressed 1:  Pre-medicate for activity, Reposition, Distraction  Pain Rating Post-Intervention 1: 2/10    Patient's cultural, spiritual, Oriental orthodox conflicts given the current situation:  no    Objective:     Patient found HOB elevated with oxygen upon OT entry to room.    Bed Mobility:    Patient completed Rolling/Turning to Left with  moderate assistance and 2 persons  Patient completed Rolling/Turning to Right with moderate assistance and 2 persons  Patient completed Scooting/Bridging with moderate assistance and 2 persons  Patient completed Supine to Sit with moderate assistance and 2 persons     Functional Mobility/Transfers:  Patient completed Bed <> Chair Transfer using Squat Pivot technique with moderate assistance and of 2 persons with no assistive device    Activities of Daily Living:  Grooming: stand by assistance to wipe face  Upper Body Dressing: maximal assistance to yogesh pull over shirt EOB level  Lower Body Dressing: total assistance and of 2 persons to yogesh pants supine and to manage over hips   Toileting: total assistance and of 2 persons with cleaning and diaper management     [facility] [address] [facility] 6 Click ADL: 16    OT Exercises: UE Ergometer 10 min    Treatment & Education:  Pt edu on role of OT, POC, safety when performing self care tasks , benefit of performing OOB activity, and safety when performing functional transfers and mobility management for preparation with goals to progress towards next level of care     Patient left up in chair with  PT  present    GOALS:   Multidisciplinary Problems       Occupational Therapy Goals          Problem: Occupational Therapy    Goal Priority Disciplines Outcome Interventions   Occupational Therapy Goal     OT, PT/OT Ongoing, Progressing    Description: Goals to be met by: 6/27/23     Patient will increase functional independence with ADLs by performing:    UE Dressing with Set-up Assistance.  LE Dressing with Stand-by Assistance.  Grooming while seated at sink with Modified  [place].  Toileting from toilet with Stand-by Assistance for hygiene and clothing management.   Bathing from W/C SEATED AT SINK with Minimal Assistance.  Toilet transfer to toilet with Contact Guard Assistance.                         Time Tracking:     OT Date of Treatment: 06/08/23  OT Start Time: 1005    OT Stop Time: 1040  OT Total Time (min): 35 min    Billable Minutes:Self Care/Home Management 25  Therapeutic Exercise 10    6/8/2023  A client care conference was performed between the GALR and BINA, prior to treatment to discuss the patient's status, treatment plan and established goals.

## 2023-06-08 NOTE — PLAN OF CARE
Recommendations  Continue diabetic diet, suggest 1500 calories, reduce boost glucose to daily, RD following to encourage increased intake.   Goals: PO to meet 75% of EEN/EPN by next RD follow up  Nutrition Goal Status: new  Communication of RD Recs: reviewed with physician     Assessment and Plan  Inadequate oral intake in the presence of increased needs related to wound healing as evidenced by post op IM nailing L hip

## 2023-06-08 NOTE — CONSULTS
[facility] [place] - Skilled Nursing  Adult Nutrition  Consult Note    SUMMARY   Recommendations  Continue diabetic diet, suggest 1500 calories, reduce boost glucose to daily, RD following to encourage increased intake.   Goals: PO to meet 75% of EEN/EPN by next RD follow up  Nutrition Goal Status: new  Communication of RD Recs: reviewed with physician    Assessment and Plan  Inadequate oral intake in the presence of increased needs related to wound healing as evidenced by post op IM nailing L hip.  New    Plan  Commercial beverage ( protein) boost glucose daily  Collaboration with other providers  Carbohydrate modified diet-  Vitamin supplement therapy- Vit B12, Vit D, 3  Mineral supplement therapy- Calcium     Malnutrition Assessment 6/8     Skin (Micronutrient): wounds unhealed  Hair/Scalp (Micronutrient): dry  Eyes (Micronutrient): conjunctiva dull  Teeth (Micronutrient): edentulous  Neck/Chest (Micronutrient): muscle wasting  Musculoskeletal/Lower Extremities: muscle wasting       Energy Intake (Malnutrition): less than or equal to 50% for greater than or equal to 5 days   Orbital Region (Subcutaneous Fat Loss): mild depletion  Upper Arm Region (Subcutaneous Fat Loss): well nourished  Thoracic and Lumbar Region: well nourished   Sabianism Region (Muscle Loss): mild depletion  Clavicle Bone Region (Muscle Loss): moderate depletion  Clavicle and Acromion Bone Region (Muscle Loss): moderate depletion  Patellar Region (Muscle Loss): moderate depletion  Anterior Thigh Region (Muscle Loss): mild depletion  Posterior Calf Region (Muscle Loss): mild depletion                 Reason for Assessment    Reason For Assessment: consult  Diagnosis:  (Closed left basicervical femoral neck fracture with extension into the intertrochanteric region s/p IM nail on 5/30/2023,[facility])  Relevant Medical History: DM, CAD, AFIB, HLD, HTN, HF, CKD 3, cancer, anemia, dementia, COPD, O2 at home, Osteoporosis  Interdisciplinary Rounds: did not  "attend  General Information Comments: Lives with daughter, has no appetite, post op, is not taking boost supplement per daughter, sister taking it home, patient encouraged to eat more and she said she would try harder, will reduce boost glucose order to daily until she starts drinking, Family has brought in outside food but they still cannot tempt her to eat. PO 50-70%. NFPE showing age related losses, recent cancer treatment weight has been stable over six months but 5% wt gain since surgery.  Patient does not test her glucose at home and does not believe that her diabetes"Is that bad"  Nutrition Discharge Planning: DC on diabetic hearth healthy diet    Nutrition/Diet History    Patient Reported Diet/Restrictions/Preferences: general  Typical Food/Fluid Intake: 2 meal, rarely 3, she does suffer from occasional low glucose with symptoms which is why daughter states that neurologists suggests  juice first thing in am before even coffee  Food Preferences: coffee  Spiritual, Cultural Beliefs, Roman Catholic Practices, Values that Affect Care: no  Food Allergies: NKFA  Factors Affecting Nutritional Intake: decreased appetite    Anthropometrics    Temp: 98.2 °F (36.8 °C)  Height: 5' 3" (160 cm)  Height (inches): 63 in  Weight Method: Bed Scale  Weight: 76.9 kg (169 lb 8.5 oz)  Weight (lb): 169.54 lb  Ideal Body Weight (IBW), Female: 115 lb  % Ideal Body Weight, Female (lb): 147.43 %  BMI (Calculated): 30  BMI Grade: 30 - 34.9- obesity - grade I  Usual Body Weight (UBW), k.7 kg  % Usual Body Weight: 99.18  % Weight Change From Usual Weight: -1.03 %       Lab/Procedures/Meds    Pertinent Labs Reviewed: reviewed  Pertinent Labs Comments: HgA1c 6.3, PAB 19, Hg 7.4, Hct 21.8, K 3.3, Mg 1.5,  Pertinent Medications Reviewed: reviewed  Pertinent Medications Comments: statin, Lovenox, KCl, Vit D, Ca/Vit D3, duloxetine, Vit B12,    Estimated/Assessed Needs    Weight Used For Calorie Calculations: 76.9 kg (169 lb 8.5 oz)  Energy " Calorie Requirements (kcal): 1498  Energy Need Method: [place]-St Jeor (x 1.3(PAL))  Protein Requirements: 78g  Weight Used For Protein Calculations: 77.7 kg (171 lb 4.8 oz) (UBW kg x 1.0(CKD))  Fluid Requirements (mL): 1500 or per MD  Estimated Fluid Requirement Method: RDA Method  RDA Method (mL): 1498  CHO Requirement: 188g      Nutrition Prescription Ordered    Current Diet Order: 2000 diabetic  Nutrition Order Comments: PO 50-75%  Oral Nutrition Supplement: boost glucose daily    Evaluation of Received Nutrient/Fluid Intake    I/O: no date  Energy Calories Required: not meeting needs  Protein Required: not meeting needs  Fluid Required: meeting needs  Tolerance: tolerating  % Intake of Estimated Energy Needs: 50 - 75 %  % Meal Intake: 50 - 75 %    Nutrition Risk    Level of Risk/Frequency of Follow-up: low (one time per week)       Monitor and Evaluation    Food and Nutrient Intake: food and beverage intake  Food and Nutrient Adminstration: diet order  Knowledge/Beliefs/Attitudes: food and nutrition knowledge/skill  Physical Activity and Function: nutrition-related ADLs and IADLs  Anthropometric Measurements: weight change  Biochemical Data, Medical Tests and Procedures: gastrointestinal profile, glucose/endocrine profile, inflammatory profile, electrolyte and renal panel  Nutrition-Focused Physical Findings: overall appearance, skin       Nutrition Follow-Up    RD Follow-up?: Yes

## 2023-06-08 NOTE — TELEPHONE ENCOUNTER
Called and spoke to PHN   She is in patient now transferred to [facility]   So this will have to come from them once it's completed and she is discharged   She did say if they do not give it to her then   \we can rewrite the order for her

## 2023-06-09 LAB
POCT GLUCOSE: 100 MG/DL (ref 70–110)
POCT GLUCOSE: 106 MG/DL (ref 70–110)
POCT GLUCOSE: 144 MG/DL (ref 70–110)
POCT GLUCOSE: 84 MG/DL (ref 70–110)

## 2023-06-09 PROCEDURE — 94761 N-INVAS EAR/PLS OXIMETRY MLT: CPT

## 2023-06-09 PROCEDURE — 11000004 HC SNF PRIVATE

## 2023-06-09 PROCEDURE — 99900035 HC TECH TIME PER 15 MIN (STAT)

## 2023-06-09 PROCEDURE — 97116 GAIT TRAINING THERAPY: CPT

## 2023-06-09 PROCEDURE — 97535 SELF CARE MNGMENT TRAINING: CPT | Mod: CO

## 2023-06-09 PROCEDURE — 63600175 PHARM REV CODE 636 W HCPCS: Performed by: HOSPITALIST

## 2023-06-09 PROCEDURE — 27000221 HC OXYGEN, UP TO 24 HOURS

## 2023-06-09 PROCEDURE — 97530 THERAPEUTIC ACTIVITIES: CPT | Mod: CO

## 2023-06-09 PROCEDURE — 25000003 PHARM REV CODE 250: Performed by: FAMILY MEDICINE

## 2023-06-09 PROCEDURE — 25000003 PHARM REV CODE 250: Performed by: HOSPITALIST

## 2023-06-09 PROCEDURE — 97110 THERAPEUTIC EXERCISES: CPT

## 2023-06-09 RX ORDER — ONDANSETRON 4 MG/1
4 TABLET, ORALLY DISINTEGRATING ORAL EVERY 8 HOURS PRN
Status: DISCONTINUED | OUTPATIENT
Start: 2023-06-09 | End: 2023-06-22 | Stop reason: HOSPADM

## 2023-06-09 RX ORDER — TRAMADOL HYDROCHLORIDE 50 MG/1
50 TABLET ORAL EVERY 6 HOURS PRN
Status: DISCONTINUED | OUTPATIENT
Start: 2023-06-09 | End: 2023-06-22 | Stop reason: HOSPADM

## 2023-06-09 RX ADMIN — ACETAMINOPHEN 1000 MG: 500 TABLET ORAL at 02:06

## 2023-06-09 RX ADMIN — CHOLECALCIFEROL TAB 25 MCG (1000 UNIT) 1000 UNITS: 25 TAB at 09:06

## 2023-06-09 RX ADMIN — CYANOCOBALAMIN TAB 1000 MCG 1000 MCG: 1000 TAB at 09:06

## 2023-06-09 RX ADMIN — AMLODIPINE BESYLATE AND BENAZEPRIL HYDROCHLORIDE 1 CAPSULE: 5; 20 CAPSULE ORAL at 10:06

## 2023-06-09 RX ADMIN — ATORVASTATIN CALCIUM 40 MG: 40 TABLET, FILM COATED ORAL at 09:06

## 2023-06-09 RX ADMIN — FAMOTIDINE 20 MG: 20 TABLET, FILM COATED ORAL at 09:06

## 2023-06-09 RX ADMIN — HYDRALAZINE HYDROCHLORIDE 25 MG: 25 TABLET, FILM COATED ORAL at 02:06

## 2023-06-09 RX ADMIN — METOPROLOL TARTRATE 25 MG: 25 TABLET, FILM COATED ORAL at 09:06

## 2023-06-09 RX ADMIN — DONEPEZIL HYDROCHLORIDE 5 MG: 5 TABLET ORAL at 09:06

## 2023-06-09 RX ADMIN — LEVETIRACETAM 500 MG: 500 TABLET, FILM COATED ORAL at 09:06

## 2023-06-09 RX ADMIN — ACETAMINOPHEN 1000 MG: 500 TABLET ORAL at 09:06

## 2023-06-09 RX ADMIN — METOPROLOL TARTRATE 25 MG: 25 TABLET, FILM COATED ORAL at 10:06

## 2023-06-09 RX ADMIN — DULOXETINE 60 MG: 60 CAPSULE, DELAYED RELEASE ORAL at 09:06

## 2023-06-09 RX ADMIN — Medication 2 TABLET: at 09:06

## 2023-06-09 RX ADMIN — HYDRALAZINE HYDROCHLORIDE 25 MG: 25 TABLET, FILM COATED ORAL at 10:06

## 2023-06-09 RX ADMIN — METHOCARBAMOL 500 MG: 500 TABLET ORAL at 11:06

## 2023-06-09 RX ADMIN — ENOXAPARIN SODIUM 40 MG: 40 INJECTION SUBCUTANEOUS at 06:06

## 2023-06-09 RX ADMIN — HYDRALAZINE HYDROCHLORIDE 25 MG: 25 TABLET, FILM COATED ORAL at 05:06

## 2023-06-09 NOTE — PLAN OF CARE
Problem: Adult Inpatient Plan of Care  Goal: Plan of Care Review  Outcome: Ongoing, Progressing  Goal: Patient-Specific Goal (Individualized)  Outcome: Ongoing, Progressing  Goal: Absence of Hospital-Acquired Illness or Injury  Outcome: Ongoing, Progressing  Goal: Optimal Comfort and Wellbeing  Outcome: Ongoing, Progressing  Goal: Readiness for Transition of Care  Outcome: Ongoing, Progressing     Problem: Diabetes Comorbidity  Goal: Blood Glucose Level Within Targeted Range  Outcome: Ongoing, Progressing     Problem: Fluid Imbalance (Pneumonia)  Goal: Fluid Balance  Outcome: Ongoing, Progressing     Problem: Infection (Pneumonia)  Goal: Resolution of Infection Signs and Symptoms  Outcome: Ongoing, Progressing     Problem: Respiratory Compromise (Pneumonia)  Goal: Effective Oxygenation and Ventilation  Outcome: Ongoing, Progressing     Problem: Skin Injury Risk Increased  Goal: Skin Health and Integrity  Outcome: Ongoing, Progressing     Problem: Fall Injury Risk  Goal: Absence of Fall and Fall-Related Injury  Outcome: Ongoing, Progressing

## 2023-06-09 NOTE — PT/OT/SLP PROGRESS
Physical Therapy Treatment    Patient Name:  Brunilda England   MRN:  [id number]  Admit Date: 6/5/2023  Admitting Diagnosis: Left displaced femoral neck fracture  Recent Surgeries: Left displaced femoral neck fracture  Recent Surgeries: INSERTION, INTRAMEDULLARY TEREZA, FEMUR (Left)     General Precautions: Standard, fall  Orthopedic Precautions: LLE weight bearing as tolerated  Braces: N/A    Recommendations:     Discharge Recommendations: home health PT  Level of Assistance Recommended at Discharge: 24 hours significant assistance  Discharge Equipment Recommendations: bath bench, bedside commode, walker, rolling, wheelchair  Barriers to discharge: None    Assessment:     Brunilda England is a 89 y.o. female admitted with a medical diagnosis of Left displaced femoral neck fracture .   Performance deficits affecting function: weakness, impaired endurance, impaired self care skills, impaired functional mobility, gait instability, impaired balance, decreased upper extremity function, decreased lower extremity function, impaired cardiopulmonary response to activity, orthopedic precautions, pain, decreased ROM.    Rehab Potential is good    Activity Tolerance: Fair    Plan:     Patient to be seen 5 x/week to address the above listed problems via gait training, therapeutic activities, therapeutic exercises, wheelchair management/training    Plan of Care Expires: 07/06/23  Plan of Care Reviewed with: patient    Subjective     Pt. Agreeable to work with PT.     Pain/Comfort:  Pain Rating 1:  (pt not able to rate her pain)  Pain Rating Post-Intervention 1: other (see comments) (pt not able to rate her pain)    Patient's cultural, spiritual, Evangelical conflicts given the current situation:  no    Objective:     Communicated with pt's nurse prior to session.  Patient found up in chair with oxygen upon PT entry to room.     Therapeutic Activities and Exercises: B l/E seated active/assist therex x 20reps: AP, LAQs, Hip  Flex    Functional Mobility:  Transfers:     Sit to Stand:  maximal assistance with rolling walker  Gait: 3ft x 2 trials with RW and Mod A with w/c followed closely by rehab tech.    AM-PAC 6 CLICK MOBILITY  10    Patient left up in chair with all lines intact and call button in reach.    GOALS:   Multidisciplinary Problems       Physical Therapy Goals          Problem: Physical Therapy    Goal Priority Disciplines Outcome Goal Variances Interventions   Physical Therapy Goal     PT, PT/OT Ongoing, Progressing     Description: Goals to be met by: 7/6/23     Patient will increase functional independence with mobility by performing:    . Supine to sit with MInimal Assistance  . Sit to supine with MInimal Assistance  . Rolling to Left and Right with Minimal Assistance.  . Sit to stand transfer with Minimal Assistance  . Bed to chair transfer with Minimal Assistance using Rolling Walker  . Gait  x 30 feet with Moderate Assistance using Rolling Walker.   . Wheelchair propulsion x50 feet with Minimal Assistance using bilateral uppper extremities                         Time Tracking:     PT Received On: 06/09/23  PT Start Time: 1200  PT Stop Time: 1225  PT Total Time (min): 25 min    Billable Minutes: Gait Training 15 and Therapeutic Exercise 10    Treatment Type: Treatment  PT/PTA: PT     Number of PTA visits since last PT visit: 0     06/09/2023

## 2023-06-09 NOTE — PT/OT/SLP PROGRESS
"Occupational Therapy   Treatment    Name: Brunilda England  MRN: [id number]  Admit Date: 6/5/2023  Admitting Diagnosis:  Left displaced femoral neck fracture    General Precautions: Standard, fall   Orthopedic Precautions: LLE weight bearing as tolerated   Braces: N/A    Recommendations:     Discharge Recommendations:  home health OT  Level of Assistance Recommended at Discharge: 24 hours physical assistance for all ADL's and home management tasks  Discharge Equipment Recommendations: bath bench, bedside commode, hip kit, walker, rolling, wheelchair  Barriers to discharge:  None    Assessment:     Brunilda England is a 89 y.o. female with a medical diagnosis of Left displaced femoral neck fracture .  She presents with performance deficits affecting function are weakness, impaired endurance, impaired self care skills, impaired functional mobility, gait instability, impaired balance, decreased lower extremity function, pain, decreased safety awareness, decreased ROM, orthopedic precautions.   Pt was cooperative participated well during today's session. Pt c/o of severe pain throughout session,nsg notified. Pt required max encouragement and increased time throughout session.   Pt is making progress, however, continues to demonstrate deficits with self care skills, balance, functional mobility, UB strength and endurance. Pt will benefit from continued OT services to progress towards goals.     Rehab Potential is fair    Activity tolerance:  Fair    Plan:     Patient to be seen 5 x/week to address the above listed problems via self-care/home management, therapeutic activities, therapeutic exercises    Plan of Care Expires: 06/27/23  Plan of Care Reviewed with: patient    Subjective   "I don't know honey, I'm in so much pain"  Communicated with: Sameer prior to session.  .    Pain/Comfort:  Pain Rating 1: 10/10  Location - Side 1: Right  Location - Orientation 1: generalized  Location 1: leg  Pain Addressed 1: Pre-medicate for " activity, Nurse notified, Reposition, Distraction  Pain Rating Post-Intervention 1: 8/10    Patient's cultural, spiritual, Uatsdin conflicts given the current situation:  no    Objective:     Patient found supine with oxygen upon OT entry to room.    Bed Mobility:    Patient completed Rolling/Turning to Left with  moderate assistance and 2 persons  Patient completed Rolling/Turning to Right with moderate assistance and 2 persons  Patient completed Scooting/Bridging with moderate assistance and 2 persons  Patient completed Supine to Sit with moderate assistance and 2 persons     Functional Mobility/Transfers:  Patient completed Sit <> Stand Transfer x 2 trials with maximal assistance and of 2 persons  with  rolling walker   Patient completed Bed <> Chair Transfer using Stand Pivot technique with maximal assistance and of 2 persons with no assistive device    Activities of Daily Living:  Upper Body Dressing: maximal assistance to doff/don gown and pullover shirt seated EOB.  Lower Body Dressing: maximal assistance and of 2 persons to thread B LE into pants. Patient attempt to bridge hips to menage pants over hips but unsuccessful. (A) provided in stance to manage pants over hips.      [facility] 6 Click ADL: 16    Treatment & Education:  Pt educated on:  - role of OT  - level of assistance  - energy conservation and task modification to maximized independence with ADL's and mobility   - Orthopedic precautions and importance to adhering   -  safety while performing functional transfers and self care tasks  - progress towards OT goals     Patient left up in chair with call button in reach    GOALS:   Multidisciplinary Problems       Occupational Therapy Goals          Problem: Occupational Therapy    Goal Priority Disciplines Outcome Interventions   Occupational Therapy Goal     OT, PT/OT Ongoing, Progressing    Description: Goals to be met by: 6/27/23     Patient will increase functional independence with ADLs by  performing:    UE Dressing with Set-up Assistance.  LE Dressing with Stand-by Assistance.  Grooming while seated at sink with Modified [place].  Toileting from toilet with Stand-by Assistance for hygiene and clothing management.   Bathing from W/C SEATED AT SINK with Minimal Assistance.  Toilet transfer to toilet with Contact Guard Assistance.                         Time Tracking:     OT Date of Treatment: 06/09/23  OT Start Time: 1111    OT Stop Time: 1149  OT Total Time (min): 38 min    Billable Minutes:Self Care/Home Management 22  Therapeutic Activity 16    6/9/2023  A client care conference was performed between the LOTR and CURRAN, prior to treatment by CURRAN, to discuss the patient's status, treatment plan and established goals.

## 2023-06-09 NOTE — PLAN OF CARE
Problem: Adult Inpatient Plan of Care  Goal: Plan of Care Review  Outcome: Ongoing, Progressing  Goal: Patient-Specific Goal (Individualized)  Outcome: Ongoing, Progressing  Goal: Absence of Hospital-Acquired Illness or Injury  Outcome: Ongoing, Progressing  Goal: Optimal Comfort and Wellbeing  Outcome: Ongoing, Progressing  Goal: Readiness for Transition of Care  Outcome: Ongoing, Progressing     Problem: Diabetes Comorbidity  Goal: Blood Glucose Level Within Targeted Range  Outcome: Ongoing, Progressing     Problem: Infection (Pneumonia)  Goal: Resolution of Infection Signs and Symptoms  Outcome: Ongoing, Progressing     Problem: Respiratory Compromise (Pneumonia)  Goal: Effective Oxygenation and Ventilation  Outcome: Ongoing, Progressing     Problem: Skin Injury Risk Increased  Goal: Skin Health and Integrity  Outcome: Ongoing, Progressing     Problem: Fall Injury Risk  Goal: Absence of Fall and Fall-Related Injury  Outcome: Ongoing, Progressing

## 2023-06-09 NOTE — PLAN OF CARE
Problem: Occupational Therapy  Goal: Occupational Therapy Goal  Description: Goals to be met by: 6/27/23     Patient will increase functional independence with ADLs by performing:    UE Dressing with Set-up Assistance.  LE Dressing with Stand-by Assistance.  Grooming while seated at sink with Modified [place].  Toileting from toilet with Stand-by Assistance for hygiene and clothing management.   Bathing from W/C SEATED AT SINK with Minimal Assistance.  Toilet transfer to toilet with Contact Guard Assistance.    Outcome: Ongoing, Progressing

## 2023-06-10 LAB
POCT GLUCOSE: 123 MG/DL (ref 70–110)
POCT GLUCOSE: 139 MG/DL (ref 70–110)
POCT GLUCOSE: 142 MG/DL (ref 70–110)
POCT GLUCOSE: 159 MG/DL (ref 70–110)

## 2023-06-10 PROCEDURE — 25000003 PHARM REV CODE 250: Performed by: HOSPITALIST

## 2023-06-10 PROCEDURE — 63600175 PHARM REV CODE 636 W HCPCS: Performed by: HOSPITALIST

## 2023-06-10 PROCEDURE — 94761 N-INVAS EAR/PLS OXIMETRY MLT: CPT

## 2023-06-10 PROCEDURE — 25000003 PHARM REV CODE 250: Performed by: FAMILY MEDICINE

## 2023-06-10 PROCEDURE — 99900035 HC TECH TIME PER 15 MIN (STAT)

## 2023-06-10 PROCEDURE — 27000221 HC OXYGEN, UP TO 24 HOURS

## 2023-06-10 PROCEDURE — 11000004 HC SNF PRIVATE

## 2023-06-10 RX ADMIN — LEVETIRACETAM 500 MG: 500 TABLET, FILM COATED ORAL at 10:06

## 2023-06-10 RX ADMIN — ACETAMINOPHEN 1000 MG: 500 TABLET ORAL at 09:06

## 2023-06-10 RX ADMIN — METOPROLOL TARTRATE 25 MG: 25 TABLET, FILM COATED ORAL at 09:06

## 2023-06-10 RX ADMIN — FAMOTIDINE 20 MG: 20 TABLET, FILM COATED ORAL at 10:06

## 2023-06-10 RX ADMIN — FAMOTIDINE 20 MG: 20 TABLET, FILM COATED ORAL at 09:06

## 2023-06-10 RX ADMIN — AMLODIPINE BESYLATE AND BENAZEPRIL HYDROCHLORIDE 1 CAPSULE: 5; 20 CAPSULE ORAL at 10:06

## 2023-06-10 RX ADMIN — ENOXAPARIN SODIUM 40 MG: 40 INJECTION SUBCUTANEOUS at 05:06

## 2023-06-10 RX ADMIN — DONEPEZIL HYDROCHLORIDE 5 MG: 5 TABLET ORAL at 09:06

## 2023-06-10 RX ADMIN — CYANOCOBALAMIN TAB 1000 MCG 1000 MCG: 1000 TAB at 10:06

## 2023-06-10 RX ADMIN — Medication 2 TABLET: at 10:06

## 2023-06-10 RX ADMIN — ACETAMINOPHEN 1000 MG: 500 TABLET ORAL at 05:06

## 2023-06-10 RX ADMIN — METOPROLOL TARTRATE 25 MG: 25 TABLET, FILM COATED ORAL at 10:06

## 2023-06-10 RX ADMIN — HYDRALAZINE HYDROCHLORIDE 25 MG: 25 TABLET, FILM COATED ORAL at 09:06

## 2023-06-10 RX ADMIN — DULOXETINE 60 MG: 60 CAPSULE, DELAYED RELEASE ORAL at 10:06

## 2023-06-10 RX ADMIN — HYDRALAZINE HYDROCHLORIDE 25 MG: 25 TABLET, FILM COATED ORAL at 03:06

## 2023-06-10 RX ADMIN — ATORVASTATIN CALCIUM 40 MG: 40 TABLET, FILM COATED ORAL at 10:06

## 2023-06-10 RX ADMIN — LEVETIRACETAM 500 MG: 500 TABLET, FILM COATED ORAL at 09:06

## 2023-06-10 RX ADMIN — HYDRALAZINE HYDROCHLORIDE 25 MG: 25 TABLET, FILM COATED ORAL at 05:06

## 2023-06-10 RX ADMIN — CHOLECALCIFEROL TAB 25 MCG (1000 UNIT) 1000 UNITS: 25 TAB at 10:06

## 2023-06-10 RX ADMIN — ACETAMINOPHEN 1000 MG: 500 TABLET ORAL at 03:06

## 2023-06-10 NOTE — PLAN OF CARE
Problem: Adult Inpatient Plan of Care  Goal: Plan of Care Review  Outcome: Ongoing, Progressing  Goal: Patient-Specific Goal (Individualized)  Outcome: Ongoing, Progressing  Goal: Absence of Hospital-Acquired Illness or Injury  Outcome: Ongoing, Progressing  Goal: Optimal Comfort and Wellbeing  Outcome: Ongoing, Progressing     Problem: Diabetes Comorbidity  Goal: Blood Glucose Level Within Targeted Range  Outcome: Ongoing, Progressing     Problem: Fluid Imbalance (Pneumonia)  Goal: Fluid Balance  Outcome: Ongoing, Progressing     Problem: Infection (Pneumonia)  Goal: Resolution of Infection Signs and Symptoms  Outcome: Ongoing, Progressing     Problem: Respiratory Compromise (Pneumonia)  Goal: Effective Oxygenation and Ventilation  Outcome: Ongoing, Progressing     Problem: Skin Injury Risk Increased  Goal: Skin Health and Integrity  Outcome: Ongoing, Progressing     Problem: Fall Injury Risk  Goal: Absence of Fall and Fall-Related Injury  Outcome: Ongoing, Progressing

## 2023-06-10 NOTE — PROGRESS NOTES
[facility]                                  Skilled Nursing Facility                   Progress Note     Patient Name: Brunilda England  YOB: 1931  MRN: [id number]  Room: [id number] A     Admit Date: 6/5/2023   DUTCH:  TBD  Code status: Partial (No chest compressions)    Principal Problem: Left displaced femoral neck fracture    Chief Complaint: Re-evaluation of medical treatment and therapy status, vomiting, pain      HPI:   Ms. Brunilda England is a 89 y.o. female with past medical history of HTN, paroxysmal atrial fibrillation/flutter, COPD on home oxygen, Chronic diastolic HF, CKD 3a, dementia, history of lung/rectal cancer who suffered fall at home while changing her shoes. She was found to have a closed left basocervical fracture with extension into intertrochanteric region. She was also found to have a small left parietal SAH on admit.  Patient underwent left hip cephalomedullary nail fixation by Dr. Desmond Barker 5/30/2023. Post-op patient WBAT to the left lower extremity as per Orthopedics recommendation. Patient placed on Lovenox 40 mg subcutaneous daily and MERCEDES/SCD's for DVT prophylaxis post-op as unable to use patient's home Apixiban post-op for her atrial fibrillation due to ICH as per Neurosurgery but NS okay with Lovenox.  Of note patient had issues with tachy-juan a in hospital. Patient followed by Cardiology as outpatient for A. Fib and intermittent bradycardia and undergoing outpatient evaluation. Cardiology consulted as inpatient states no indication for need for pacemaker at this time and stated they would rather patient be more tachycardiac than bradycardiac. PT/OT recommended SNF placement.    Today she reports pain to her left leg and foot, relieved with elevation. No edema or skin changes to lower extremity. Family is requested home 30 day Holter monitor be put on.     Patient will be treated at  [facility] with PT and OT to improve functional status and ability to perform ADLs.     Interval history:  24 hr vital sign ranges listed below. Emesis x1 with undigested food, patient reports no nausea. Last BM today. Abdominal XR ordered, decreased appetite today. Had been eating 50-75% today only 25% also reports pain unrelieved by tylenol and prn robaxin. Initiate tramadol which she received inpatient. Home 30 day holter monitor remains in place.  Patient progessing with PT/OT-  Gait: 3ft x 2 trials with RW and Mod A with w/c followed closely by rehab tech.. Continuing to follow and treat all acute and chronic conditions.      Past Medical History: Patient has a past medical history of Anal cancer (1995), Anemia, Cancer (1995), Centrilobular emphysema (7/6/2020), Diabetes mellitus, Lumbar radiculopathy (5/16/2014), Lung cancer (2012), Macular degeneration, Macular hemorrhage of left eye, Neuropathy, Osteoporosis, Osteoporosis, unspecified (11/9/2012), and Pure hypercholesterolemia.    Past Surgical History: Patient has a past surgical history that includes Tonsillectomy; Cholecystectomy; Hysterectomy; Bronchoscopy; Colonoscopy; left leg surgery; heart stent; Fluoroscopic angiography of lower extremity with topical ultrasound (Right, 12/6/2018); Percutaneous transluminal angioplasty (N/A, 12/6/2018); Reconstruction using flap (Right, 12/6/2019); Incisional biopsy (Right, 12/6/2019); and Intramedullary rodding of femur (Left, 5/30/2023).    Social History: Patient reports that she quit smoking about 28 years ago. Her smoking use included cigarettes. She has a 15.00 pack-year smoking history. She has been exposed to tobacco smoke. She has never used smokeless tobacco. She reports that she does not drink alcohol and does not use drugs.    Family History:  family history includes Breast cancer in her maternal aunt; Cancer in her father; Stroke in her mother.    Allergies: Patient is allergic to bextra [valdecoxib]  and gabapentin.    ROS    Constitutional:  Negative for fever. Positive decreased appetite  Respiratory:  Negative for cough and shortness of breath.    Cardiovascular:  Negative for chest pain, palpitations and leg swelling.   Gastrointestinal:  Negative for abdominal pain, nausea Positive vomiting.   Musculoskeletal:  Positive for arthralgias (Left hip and LE) and weakness.   Neurological:  Negative for dizziness.   Psychiatric/Behavioral:  Negative for agitation and confusion.    24 hour Vital Sign Range   Temp:  [98.5 °F (36.9 °C)-98.6 °F (37 °C)]   Pulse:  []   Resp:  [16-18]   BP: (120-166)/(58-67)   SpO2:  [96 %-100 %]       Physical Exam  Vitals and nursing note reviewed.   Constitutional:       General: She is awake. She is not in acute distress. Debilitated     Appearance: Normal appearance. She is not ill-appearing.   Eyes:      Conjunctiva/sclera: Conjunctivae normal.   Cardiovascular:      Rate and Rhythm: Regular rhythm.  No peripheral edema     Heart sounds: Normal heart sounds. No murmur heard.    No friction rub. No gallop.   Pulmonary:      Effort: Pulmonary effort is normal. No respiratory distress.      Breath sounds: Normal breath sounds. No wheezing.   Abdominal:      General: Abdomen is flat. Bowel sounds are normal. There is no distension.      Palpations: Abdomen is soft.      Tenderness: There is no abdominal tenderness. There is no guarding.   Skin:     General: Skin is warm.      Findings: No erythema.      Comments: Surgical dressing noted on left hip.   Neurological:      General: No focal deficit present.      Mental Status: She is alert and oriented to person, place, and time.   Psychiatric:         Mood and Affect: Mood normal.         Behavior: Behavior normal. Behavior is cooperative.         Thought Content: Thought content normal.   : Incontinent            Incision/Site 05/30/23 0856 Left Hip (Active)   05/30/23 0856   Present Prior to Hospital Arrival?:    Side: Left    Location: Hip   Orientation:    Incision Type:    Closure Method:    Additional Comments:    Removal Indication and Assessment:    Wound Outcome:    Removal Indications:    Incision WDL ex 06/06/23 0845   Dressing Appearance Intact;Clean;Dry 06/06/23 0845   Drainage Amount None 06/06/23 0845   Drainage Characteristics/Odor No odor 06/06/23 0845   Appearance Dressing in place, unable to visualize 06/06/23 0845   Dressing Gauze;Transparent film 06/06/23 0845   Number of days: 7           Labs:  Recent Labs   Lab 06/04/23 0613 06/05/23 0456 06/08/23  0411   WBC 4.57 4.35 4.00   HGB 7.7* 8.7* 7.4*   HCT 23.5* 25.3* 21.8*    196 230       Recent Labs   Lab 06/03/23  0521 06/04/23 0613 06/05/23 0456 06/08/23  0411   * 136 136 136   K 3.7 3.3* 3.5 3.3*    103 103 103   CO2 22* 25 25 27   BUN 15 10 9* 8*   CREATININE 0.9 0.7 0.7 0.7   GLU 97 102 90 79   CALCIUM 9.9 9.3 9.8 9.5   MG 1.6 1.7  --  1.5*   PHOS  --   --   --  3.0       No results for input(s): ALKPHOS, ALT, AST, ALBUMIN, PROT, BILITOT, INR in the last 168 hours.    Recent Labs     06/07/23 2053 06/08/23  0716 06/08/23  1136 06/08/23  1634 06/08/23  1935/23  0725 06/09/23  1118 06/09/23  1559   POCTGLUCOSE 100 78 98 84 94 84 100 144*         Meds Scheduled:   acetaminophen  1,000 mg Oral Q8H    amlodipine-benazepril 5-20 mg  1 capsule Oral Daily    atorvastatin  40 mg Oral Daily    calcium-vitamin D3  2 tablet Oral Daily    cyanocobalamin  1,000 mcg Oral Daily    donepeziL  5 mg Oral QHS    DULoxetine  60 mg Oral Daily    enoxparin  40 mg Subcutaneous Q24H (prophylaxis, 1700)    famotidine  20 mg Oral BID    hydrALAZINE  25 mg Oral Q8H    levETIRAcetam  500 mg Oral BID    metoprolol tartrate  25 mg Oral BID    senna-docusate 8.6-50 mg  1 tablet Oral QHS    vitamin D  1,000 Units Oral Daily       PRN:   acetaminophen, albuterol-ipratropium, calcium carbonate, dextrose 10%, dextrose 10%, dextrose, dextrose, glucagon (human  recombinant), insulin aspart U-100, melatonin, methocarbamoL, ondansetron, traMADoL      Assessment and Plan:    Closed left basicervical femoral neck fracture with extension into the intertrochanteric region s/p IM nail on 5/30/2023  Patient to continue PT/OT for gait training and strengthening and restoration of ADL's. Patient is FWB/WBAT: left lower extremity as per Orthopedic recommendations.   Patient to continue Lovenox 40 mg subcutaneous daily post-op for DVT prophylaxis after hip fracture surgery as approved by Neurosurgery.   Orthopedics is following and managing surgical site.   Perineural pain catheter removed by Anesthesia on 6/2.   Pain improving to left hip. Patient on multimodal pain management post-op with Tylenol 1000 mg po every 8 hours and Robaxin 500 mg po 4 times daily and Tramadol prn for breakthrough pain and will continue.  Ortho DAVIDE to see patient weekly SNF  - maintain surgical dressings until removed by Orthopedics  - follow-up with Ortho after discharge  - stable, continue PT/OT  6/9 - tramadol prn initiated     Subarachnoid hemorrhage following injury, no loss of consciousness  Controlled. Patient found on admit to have punctate left parietal SAH that was stable on repeat imaging. Patient neurologically intact.   Neurosurgery consulted and as per recs:   --No acute neurosurgical intervention  --Hold Eliquis for 2 weeks. Ok for DVT ppx with standard dose Lovenox 40 mg subcutaneous daily.  --SBP <140  --Na >135  --Keppra 500 mg po BID x 14 days.   --HOB >30.  --Follow-up in 2 weeks with repeat CTH, case management aware. If repeat CTH stable, can resume home dose Eliquis. Notify NSGY immediately with any changes in neuro status.    Vomiting  PRN zofran added  Abdominal XR ordered  Boost breeze ordered prefers no dairy      Acute blood loss anemia  On admission to hospital, patient noted to have Hgb of 10.9. After surgery patient's Hgb level dropped to 8.0 on 5/31 and 7.7 on 6/1 and  expected blood loss related to surgery and hip fracture.   Patient has no signs of active bleeding and hemodynamically stable. Patient is asymptomatic related to anemia.   Goal is keep Hgb >7 and consider blood transfusion if Hgb < 7 and asymptomatic or < 8 if patient becomes symptomatic.  Monitor routine labs current Hgb 7.4 from 8.7 on lovenox, initiate pepcid     Tachy-juan a syndrome  PAF (paroxysmal atrial fibrillation)  Controlled. Continue to monitor. Cardiology wants HR on higher side and okay with slight tachycardia as she has issues with bradycardia on higher doses of Metoprolol.   Cardiology consulted as patient having erratic HRs up to 160 and as low as 40 in hospital. Cardiology states has evidence of tachycardia and bradycardia on ECG but does not meet criteria for tachy-juan a syndrome.   Cardiology recommends to continue low dose Metoprolol 25 mg po BID to help with tachycardia related to atrial fibrillation. No need for pacemaker at this time.   Per Cardiology, if patient experiences syncope or develops significant conversion pause >5 sec, please alert our team. Per Cardiology, patient to follow-up with her cardiologist, Dr. Roman as outpatient.   Home Eliquis on hold for long term anticoagulation for atrial fibrillation due to SAH as per Neurosurgery. Follow-up in 2 weeks with repeat CTH. If repeat CTH stable, can resume home dose Eliquis.  Had 30 day Holter monitor in progress on admission with outpatient Cardiologist Dr. Roman end date 6/21 no mention in cards note, will continue here    Essential hypertension  Chronic and controlled. Continue Hydralazine 25 mg po TID and Amlodipine/Benzapril 5/20 1 tablet po daily to treat. Goal SBP < 140.       Coronary artery disease involving native coronary artery of native heart without angina pectoris  Patient with history of remote stents at [facility] in 1990 and 2003.   Patient on Zocor at home will give Lipitor 40mg inpatient     Type 2 diabetes mellitus  with circulatory disorder, without long-term current use of insulin  Patient on oral Tradjenta at home to treat.   Accucheck AC/HS with SSI    Chronic diastolic heart failure  Continue Metoprolol and Benazepril    Monitor Is&Os and daily weights.      Centrilobular emphysema  Chronic hypoxemic respiratory failure  History of lung cancer in remission  Chronic and controlled. Continue home oxygen at 2 liters. Patient with no signs of acute exacerbation.   Patient on no inhalers at home.       Late onset Alzheimer's dementia with behavioral disturbance  Patient at cognitive baseline oriented x3  Continue home Aricept to treat.  Delirium precautions.      Stage 3a chronic kidney disease  Patient is at baseline renal function at present.   Need to avoid any nephrotoxic agents unless necessary   Renally adjust medications   Monitor routine labs     Anticipate disposition:    Home with home health      Follow-up needed during SNF admission:   [facility] in 2 weeks ~6/14    Follow-up needed after discharge from SNF:   - PCP within 1-2 weeks, Ortho  - See appt scheduled below     Future Appointments   Date Time Provider Department Center   6/13/2023  2:45 PM Amauri Henry NP [facility] ORTHO [address]   6/14/2023  1:00 PM Pepper Whitfield MD [facility] IM [address]   6/29/2023  2:00 PM Adri Rader PA-C [facility] [id number]   7/11/2023 10:45 AM Amauri Henry NP [facility] ORTHO [address] [place]         I certify that SNF services are required to be given on an inpatient basis because Brunilda England needs for skilled nursing care and/or skilled rehabilitation are required on a daily basis and such services can only practically be provided in a skilled nursing facility setting and are for an ongoing condition for which she received inpatient care in the hospital.       Total time spent: >45 minutes  Description of Time: counseling provided on clinical condition, therapies provided, plan of care, emotional support, coordinating  patient care with other care team members, reviewing and interpreting labs and imaging, collaboration with physician, initiating new orders, chart review, and documentation. See interval hx.         DIEGO CARRENO  Department of Hospital Medicine   [facility]     DOS: 6/9/2023       Patient note was created using MModal Dictation.  Any errors in syntax or even information may not have been identified and edited on initial review prior to signing this note.

## 2023-06-11 LAB
POCT GLUCOSE: 101 MG/DL (ref 70–110)
POCT GLUCOSE: 112 MG/DL (ref 70–110)
POCT GLUCOSE: 126 MG/DL (ref 70–110)
POCT GLUCOSE: 98 MG/DL (ref 70–110)

## 2023-06-11 PROCEDURE — 99900035 HC TECH TIME PER 15 MIN (STAT)

## 2023-06-11 PROCEDURE — 25000003 PHARM REV CODE 250: Performed by: FAMILY MEDICINE

## 2023-06-11 PROCEDURE — 11000004 HC SNF PRIVATE

## 2023-06-11 PROCEDURE — 97530 THERAPEUTIC ACTIVITIES: CPT | Mod: CO

## 2023-06-11 PROCEDURE — 63600175 PHARM REV CODE 636 W HCPCS: Performed by: HOSPITALIST

## 2023-06-11 PROCEDURE — 27000221 HC OXYGEN, UP TO 24 HOURS

## 2023-06-11 PROCEDURE — 25000003 PHARM REV CODE 250: Performed by: HOSPITALIST

## 2023-06-11 PROCEDURE — 94761 N-INVAS EAR/PLS OXIMETRY MLT: CPT

## 2023-06-11 RX ADMIN — METOPROLOL TARTRATE 25 MG: 25 TABLET, FILM COATED ORAL at 09:06

## 2023-06-11 RX ADMIN — METHOCARBAMOL 500 MG: 500 TABLET ORAL at 10:06

## 2023-06-11 RX ADMIN — HYDRALAZINE HYDROCHLORIDE 25 MG: 25 TABLET, FILM COATED ORAL at 09:06

## 2023-06-11 RX ADMIN — CHOLECALCIFEROL TAB 25 MCG (1000 UNIT) 1000 UNITS: 25 TAB at 11:06

## 2023-06-11 RX ADMIN — ATORVASTATIN CALCIUM 40 MG: 40 TABLET, FILM COATED ORAL at 11:06

## 2023-06-11 RX ADMIN — LEVETIRACETAM 500 MG: 500 TABLET, FILM COATED ORAL at 11:06

## 2023-06-11 RX ADMIN — ENOXAPARIN SODIUM 40 MG: 40 INJECTION SUBCUTANEOUS at 05:06

## 2023-06-11 RX ADMIN — HYDRALAZINE HYDROCHLORIDE 25 MG: 25 TABLET, FILM COATED ORAL at 06:06

## 2023-06-11 RX ADMIN — CYANOCOBALAMIN TAB 1000 MCG 1000 MCG: 1000 TAB at 11:06

## 2023-06-11 RX ADMIN — DULOXETINE 60 MG: 60 CAPSULE, DELAYED RELEASE ORAL at 11:06

## 2023-06-11 RX ADMIN — METOPROLOL TARTRATE 25 MG: 25 TABLET, FILM COATED ORAL at 11:06

## 2023-06-11 RX ADMIN — ACETAMINOPHEN 1000 MG: 500 TABLET ORAL at 09:06

## 2023-06-11 RX ADMIN — Medication 2 TABLET: at 11:06

## 2023-06-11 RX ADMIN — ACETAMINOPHEN 1000 MG: 500 TABLET ORAL at 06:06

## 2023-06-11 RX ADMIN — AMLODIPINE BESYLATE AND BENAZEPRIL HYDROCHLORIDE 1 CAPSULE: 5; 20 CAPSULE ORAL at 11:06

## 2023-06-11 RX ADMIN — FAMOTIDINE 20 MG: 20 TABLET, FILM COATED ORAL at 11:06

## 2023-06-11 RX ADMIN — DONEPEZIL HYDROCHLORIDE 5 MG: 5 TABLET ORAL at 09:06

## 2023-06-11 RX ADMIN — ACETAMINOPHEN 1000 MG: 500 TABLET ORAL at 03:06

## 2023-06-11 RX ADMIN — HYDRALAZINE HYDROCHLORIDE 25 MG: 25 TABLET, FILM COATED ORAL at 03:06

## 2023-06-11 RX ADMIN — LEVETIRACETAM 500 MG: 500 TABLET, FILM COATED ORAL at 09:06

## 2023-06-11 RX ADMIN — FAMOTIDINE 20 MG: 20 TABLET, FILM COATED ORAL at 09:06

## 2023-06-11 NOTE — PT/OT/SLP PROGRESS
Occupational Therapy   Treatment    Name: Brunilda England  MRN: [id number]  Admit Date: 6/5/2023  Admitting Diagnosis:  Left displaced femoral neck fracture    General Precautions: Standard, fall   Orthopedic Precautions: LLE weight bearing as tolerated   Braces: N/A    Recommendations:     Discharge Recommendations:  home with home health  Level of Assistance Recommended at Discharge: 24 hours physical assistance for all ADL's and home management tasks  Discharge Equipment Recommendations: bath bench, bedside commode, hip kit, wheelchair  Barriers to discharge:  None    Assessment:     Brunilda England is a 89 y.o. female with a medical diagnosis of Left displaced femoral neck fracture .  She presents with performance deficits affecting function are weakness, impaired endurance, impaired self care skills, impaired functional mobility, gait instability, impaired balance, decreased upper extremity function, decreased lower extremity function, impaired cardiopulmonary response to activity, orthopedic precautions.  Pt required increased time to perform bed mobility due to L heel pain. Pt required max verbal cues for following safe transfer instructions. Pt very reluctant to follow therapist instruction throughout session. Therapist reassured patient multiple times throughout session. Pt completed two chair transfers during session due to discomfort after first transfer. Pt with decreased anxiety and thankful at end of session for assistance with safe transfers. Pt would continue to benefit from skilled OT services at [facility] to maximize her gains in functional independence.      Rehab Potential is fair    Activity tolerance:  Fair    Plan:     Patient to be seen 5 x/week to address the above listed problems via self-care/home management, therapeutic activities, therapeutic exercises    Plan of Care Expires: 06/27/23  Plan of Care Reviewed with: patient    Subjective     Communicated with: pt agreeable to session.       Pain/Comfort:  Pain Rating 1: 10/10  Location - Side 1: Left  Location 1: heel  Pain Addressed 1: Reposition, Cessation of Activity, Distraction  Pain Rating Post-Intervention 1: 9/10  Location - Side 2: Left  Location 2: heel  Pain Addressed 2: Distraction, Reposition, Cessation of Activity    Patient's cultural, spiritual, Yazidism conflicts given the current situation:  no    Objective:     Patient found supine with oxygen, PureWick upon OT entry to room.    Bed Mobility:    Patient completed Rolling/Turning to Right with moderate assistance  Patient completed Scooting/Bridging with moderate assistance  Patient completed Supine to Sit with moderate assistance     Functional Mobility/Transfers:  Patient completed Bed <> Chair Transfer using Squat Pivot technique with moderate assistance with no assistive device  Pt completed chair transfer twice    Activities of Daily Living:  Grooming: pt performed hair grooming with Min A    [facility] 6 Click ADL: 16    Treatment & Education:  Bed mobility, transfer training, safety awareness, OT roles and goals    Patient left up in chair with all lines intact, call button in reach, and daughter present    GOALS:   Multidisciplinary Problems       Occupational Therapy Goals          Problem: Occupational Therapy    Goal Priority Disciplines Outcome Interventions   Occupational Therapy Goal     OT, PT/OT Ongoing, Progressing    Description: Goals to be met by: 6/27/23     Patient will increase functional independence with ADLs by performing:    UE Dressing with Set-up Assistance.  LE Dressing with Stand-by Assistance.  Grooming while seated at sink with Modified [place].  Toileting from toilet with Stand-by Assistance for hygiene and clothing management.   Bathing from W/C SEATED AT SINK with Minimal Assistance.  Toilet transfer to toilet with Contact Guard Assistance.                         Time Tracking:     OT Date of Treatment: 06/11/23  OT Start Time: 0939    OT Stop  Time: 1010  OT Total Time (min): 31 min    Billable Minutes:Therapeutic Activity 31 6/11/2023

## 2023-06-11 NOTE — PLAN OF CARE
Problem: Adult Inpatient Plan of Care  Goal: Plan of Care Review  Outcome: Ongoing, Progressing  Goal: Patient-Specific Goal (Individualized)  Outcome: Ongoing, Progressing  Goal: Absence of Hospital-Acquired Illness or Injury  Outcome: Ongoing, Progressing  Goal: Optimal Comfort and Wellbeing  Outcome: Ongoing, Progressing     Problem: Diabetes Comorbidity  Goal: Blood Glucose Level Within Targeted Range  Outcome: Ongoing, Progressing     Problem: Fluid Imbalance (Pneumonia)  Goal: Fluid Balance  Outcome: Ongoing, Progressing     Problem: Skin Injury Risk Increased  Goal: Skin Health and Integrity  Outcome: Ongoing, Progressing     Problem: Fall Injury Risk  Goal: Absence of Fall and Fall-Related Injury  Outcome: Ongoing, Progressing

## 2023-06-12 ENCOUNTER — PATIENT MESSAGE (OUTPATIENT)
Dept: INTERNAL MEDICINE | Facility: CLINIC | Age: 88
End: 2023-06-12
Payer: MEDICARE

## 2023-06-12 ENCOUNTER — TELEPHONE (OUTPATIENT)
Dept: CARDIOLOGY | Facility: HOSPITAL | Age: 88
End: 2023-06-12
Payer: MEDICARE

## 2023-06-12 ENCOUNTER — DOCUMENTATION ONLY (OUTPATIENT)
Dept: CARDIOLOGY | Facility: HOSPITAL | Age: 88
End: 2023-06-12
Payer: MEDICARE

## 2023-06-12 LAB
ANION GAP SERPL CALC-SCNC: 8 MMOL/L (ref 8–16)
BASOPHILS # BLD AUTO: 0.02 K/UL (ref 0–0.2)
BASOPHILS NFR BLD: 0.4 % (ref 0–1.9)
BUN SERPL-MCNC: 8 MG/DL (ref 10–30)
CALCIUM SERPL-MCNC: 9.3 MG/DL (ref 8.7–10.5)
CHLORIDE SERPL-SCNC: 104 MMOL/L (ref 95–110)
CO2 SERPL-SCNC: 24 MMOL/L (ref 23–29)
CREAT SERPL-MCNC: 0.8 MG/DL (ref 0.5–1.4)
DIFFERENTIAL METHOD: ABNORMAL
EOSINOPHIL # BLD AUTO: 0.1 K/UL (ref 0–0.5)
EOSINOPHIL NFR BLD: 1.3 % (ref 0–8)
ERYTHROCYTE [DISTWIDTH] IN BLOOD BY AUTOMATED COUNT: 17.2 % (ref 11.5–14.5)
EST. GFR  (NO RACE VARIABLE): >60 ML/MIN/1.73 M^2
GLUCOSE SERPL-MCNC: 73 MG/DL (ref 70–110)
HCT VFR BLD AUTO: 23.3 % (ref 37–48.5)
HGB BLD-MCNC: 7.8 G/DL (ref 12–16)
IMM GRANULOCYTES # BLD AUTO: 0.01 K/UL (ref 0–0.04)
IMM GRANULOCYTES NFR BLD AUTO: 0.2 % (ref 0–0.5)
LYMPHOCYTES # BLD AUTO: 0.9 K/UL (ref 1–4.8)
LYMPHOCYTES NFR BLD: 18.8 % (ref 18–48)
MAGNESIUM SERPL-MCNC: 1.4 MG/DL (ref 1.6–2.6)
MCH RBC QN AUTO: 29.1 PG (ref 27–31)
MCHC RBC AUTO-ENTMCNC: 33.5 G/DL (ref 32–36)
MCV RBC AUTO: 87 FL (ref 82–98)
MONOCYTES # BLD AUTO: 0.5 K/UL (ref 0.3–1)
MONOCYTES NFR BLD: 11.6 % (ref 4–15)
NEUTROPHILS # BLD AUTO: 3.1 K/UL (ref 1.8–7.7)
NEUTROPHILS NFR BLD: 67.7 % (ref 38–73)
NRBC BLD-RTO: 0 /100 WBC
PHOSPHATE SERPL-MCNC: 2.7 MG/DL (ref 2.7–4.5)
PLATELET # BLD AUTO: 244 K/UL (ref 150–450)
PMV BLD AUTO: 11.5 FL (ref 9.2–12.9)
POCT GLUCOSE: 111 MG/DL (ref 70–110)
POCT GLUCOSE: 121 MG/DL (ref 70–110)
POCT GLUCOSE: 78 MG/DL (ref 70–110)
POCT GLUCOSE: 95 MG/DL (ref 70–110)
POTASSIUM SERPL-SCNC: 3.9 MMOL/L (ref 3.5–5.1)
RBC # BLD AUTO: 2.68 M/UL (ref 4–5.4)
SODIUM SERPL-SCNC: 136 MMOL/L (ref 136–145)
WBC # BLD AUTO: 4.58 K/UL (ref 3.9–12.7)

## 2023-06-12 PROCEDURE — 97116 GAIT TRAINING THERAPY: CPT | Mod: CQ

## 2023-06-12 PROCEDURE — 25000003 PHARM REV CODE 250: Performed by: NURSE PRACTITIONER

## 2023-06-12 PROCEDURE — 97110 THERAPEUTIC EXERCISES: CPT | Mod: CQ

## 2023-06-12 PROCEDURE — 27000221 HC OXYGEN, UP TO 24 HOURS

## 2023-06-12 PROCEDURE — 36415 COLL VENOUS BLD VENIPUNCTURE: CPT | Performed by: HOSPITALIST

## 2023-06-12 PROCEDURE — 63600175 PHARM REV CODE 636 W HCPCS: Performed by: HOSPITALIST

## 2023-06-12 PROCEDURE — 97110 THERAPEUTIC EXERCISES: CPT | Mod: CO

## 2023-06-12 PROCEDURE — 83735 ASSAY OF MAGNESIUM: CPT | Performed by: HOSPITALIST

## 2023-06-12 PROCEDURE — 25000003 PHARM REV CODE 250: Performed by: HOSPITALIST

## 2023-06-12 PROCEDURE — 25000003 PHARM REV CODE 250: Performed by: FAMILY MEDICINE

## 2023-06-12 PROCEDURE — 85025 COMPLETE CBC W/AUTO DIFF WBC: CPT | Performed by: HOSPITALIST

## 2023-06-12 PROCEDURE — 11000004 HC SNF PRIVATE

## 2023-06-12 PROCEDURE — 80048 BASIC METABOLIC PNL TOTAL CA: CPT | Performed by: HOSPITALIST

## 2023-06-12 PROCEDURE — 97530 THERAPEUTIC ACTIVITIES: CPT | Mod: CO

## 2023-06-12 PROCEDURE — 97530 THERAPEUTIC ACTIVITIES: CPT | Mod: CQ

## 2023-06-12 PROCEDURE — 84100 ASSAY OF PHOSPHORUS: CPT | Performed by: HOSPITALIST

## 2023-06-12 RX ORDER — LANOLIN ALCOHOL/MO/W.PET/CERES
400 CREAM (GRAM) TOPICAL 2 TIMES DAILY
Status: COMPLETED | OUTPATIENT
Start: 2023-06-12 | End: 2023-06-13

## 2023-06-12 RX ORDER — FAMOTIDINE 20 MG/1
20 TABLET, FILM COATED ORAL DAILY
Status: DISCONTINUED | OUTPATIENT
Start: 2023-06-13 | End: 2023-06-22 | Stop reason: HOSPADM

## 2023-06-12 RX ORDER — LANOLIN ALCOHOL/MO/W.PET/CERES
1 CREAM (GRAM) TOPICAL DAILY
Status: DISCONTINUED | OUTPATIENT
Start: 2023-06-12 | End: 2023-06-22 | Stop reason: HOSPADM

## 2023-06-12 RX ADMIN — HYDRALAZINE HYDROCHLORIDE 25 MG: 25 TABLET, FILM COATED ORAL at 03:06

## 2023-06-12 RX ADMIN — CYANOCOBALAMIN TAB 1000 MCG 1000 MCG: 1000 TAB at 09:06

## 2023-06-12 RX ADMIN — ATORVASTATIN CALCIUM 40 MG: 40 TABLET, FILM COATED ORAL at 09:06

## 2023-06-12 RX ADMIN — ACETAMINOPHEN 1000 MG: 500 TABLET ORAL at 10:06

## 2023-06-12 RX ADMIN — FERROUS SULFATE TAB 325 MG (65 MG ELEMENTAL FE) 1 EACH: 325 (65 FE) TAB at 12:06

## 2023-06-12 RX ADMIN — DULOXETINE 60 MG: 60 CAPSULE, DELAYED RELEASE ORAL at 09:06

## 2023-06-12 RX ADMIN — Medication 2 TABLET: at 09:06

## 2023-06-12 RX ADMIN — Medication 400 MG: at 12:06

## 2023-06-12 RX ADMIN — LEVETIRACETAM 500 MG: 500 TABLET, FILM COATED ORAL at 09:06

## 2023-06-12 RX ADMIN — Medication 400 MG: at 09:06

## 2023-06-12 RX ADMIN — DONEPEZIL HYDROCHLORIDE 5 MG: 5 TABLET ORAL at 09:06

## 2023-06-12 RX ADMIN — HYDRALAZINE HYDROCHLORIDE 25 MG: 25 TABLET, FILM COATED ORAL at 05:06

## 2023-06-12 RX ADMIN — METOPROLOL TARTRATE 25 MG: 25 TABLET, FILM COATED ORAL at 09:06

## 2023-06-12 RX ADMIN — HYDRALAZINE HYDROCHLORIDE 25 MG: 25 TABLET, FILM COATED ORAL at 09:06

## 2023-06-12 RX ADMIN — METHOCARBAMOL 500 MG: 500 TABLET ORAL at 09:06

## 2023-06-12 RX ADMIN — FAMOTIDINE 20 MG: 20 TABLET, FILM COATED ORAL at 09:06

## 2023-06-12 RX ADMIN — AMLODIPINE BESYLATE AND BENAZEPRIL HYDROCHLORIDE 1 CAPSULE: 5; 20 CAPSULE ORAL at 09:06

## 2023-06-12 RX ADMIN — ENOXAPARIN SODIUM 40 MG: 40 INJECTION SUBCUTANEOUS at 04:06

## 2023-06-12 RX ADMIN — CHOLECALCIFEROL TAB 25 MCG (1000 UNIT) 1000 UNITS: 25 TAB at 09:06

## 2023-06-12 RX ADMIN — ACETAMINOPHEN 1000 MG: 500 TABLET ORAL at 05:06

## 2023-06-12 NOTE — CLINICAL REVIEW
Clinical Pharmacy Chart Review Note      Admit Date: 6/5/2023   LOS: 7 days       Brunilda England is a 89 y.o. female admitted to [facility]F for PT/OT after hospitalization for closed left basicervical femoral neck fracture with extension into the intertrochanteric region s/p IM nail on 5/30/2023.    Active Hospital Problems    Diagnosis  POA    *Closed left basicervical femoral neck fracture with extension into the intertrochanteric region s/p IM nail on 5/30/2023 [S72.002A]  Yes    Acute blood loss anemia [D62]  Yes    Chronic hypoxemic respiratory failure [J96.11]  Yes    Tachy-juan a syndrome [I49.5]  Yes    Subarachnoid hemorrhage following injury, no loss of consciousness [S06.6X0A]  Yes    PAF (paroxysmal atrial fibrillation) [I48.0]  Yes    Pulmonary hypertension [I27.20]  Yes    Late onset Alzheimer's dementia with behavioral disturbance [G30.1, F02.818]  Yes    Chronic diastolic heart failure [I50.32]  Yes    Stage 3a chronic kidney disease [N18.31]  Yes    Centrilobular emphysema [J43.2]  Yes    Type 2 diabetes mellitus with circulatory disorder, without long-term current use of insulin [E11.59]  Yes    Coronary artery disease involving native coronary artery of native heart without angina pectoris [I25.10]  Yes    PVD (peripheral vascular disease) with claudication [I73.9]  Yes    Pure hypercholesterolemia [E78.00]  Yes    Essential hypertension [I10]  Yes    History of lung cancer [Z85.118]  Not Applicable      Resolved Hospital Problems   No resolved problems to display.     Review of patient's allergies indicates:   Allergen Reactions    Bextra [valdecoxib] Swelling    Gabapentin Diarrhea and Nausea Only     Patient Active Problem List    Diagnosis Date Noted    Acute blood loss anemia 05/31/2023    Intertrochanteric fracture of left hip, closed, initial encounter 05/30/2023    Subarachnoid hemorrhage following injury, no loss of consciousness 05/29/2023    Chronic hypoxemic respiratory failure 05/29/2023     Tachy-juan a syndrome 05/29/2023    Closed left basicervical femoral neck fracture with extension into the intertrochanteric region s/p IM nail on 5/30/2023 05/28/2023    Paroxysmal atrial flutter 05/20/2023    PAF (paroxysmal atrial fibrillation) 05/20/2023    Pneumonia 05/20/2023    Fatigue 05/20/2023    Tachycardia 05/20/2023    Bradycardia 05/20/2023    Confusion 05/20/2023    Pulmonary hypertension 05/20/2023    Aortic valve regurgitation 05/20/2023    Late onset Alzheimer's dementia with behavioral disturbance 04/03/2023    Atherosclerosis of native coronary artery of native heart with angina pectoris 08/30/2022    Chronic diastolic heart failure 08/04/2022    Stage 3a chronic kidney disease 08/04/2022    Aortic atherosclerosis 08/04/2022    Centrilobular emphysema 07/06/2020    Chalazion of right eye 12/06/2019    Mood disorder 02/18/2019    Atherosclerotic peripheral vascular disease with rest pain 11/20/2018    Dementia 09/05/2018    Gait abnormality 07/05/2018    Balance problem 07/05/2018    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 08/14/2017    Type 2 diabetes mellitus with circulatory disorder, without long-term current use of insulin 03/21/2017    Pernicious anemia 01/28/2017    Coronary artery disease involving native coronary artery of native heart without angina pectoris 01/28/2017    Atrophy of macula lutea 11/18/2016    Macular hemorrhage of left eye 11/01/2016    Bilateral exudative age-related macular degeneration 08/07/2015    Hypertensive retinopathy of both eyes 08/07/2015    Posterior vitreous detachment, both eyes 08/07/2015    Radiation vaginitis 07/01/2014    Chronic back pain 05/16/2014    Lumbar radiculopathy 05/16/2014    Neuropathy of left lower extremity 05/16/2014    PVD (peripheral vascular disease) with claudication 05/16/2014    Malignant neoplasm of lung 02/26/2014    Anemia 02/25/2014    Pure hypercholesterolemia  10/18/2013    History of rectal or anal cancer 03/27/2013    PMB (postmenopausal bleeding) 03/27/2013    Osteoporosis 11/09/2012    History of lung cancer 06/29/2012    Essential hypertension 06/29/2012    Neuropathy 06/29/2012       Scheduled Meds:    acetaminophen  1,000 mg Oral Q8H    amlodipine-benazepril 5-20 mg  1 capsule Oral Daily    atorvastatin  40 mg Oral Daily    calcium-vitamin D3  2 tablet Oral Daily    cyanocobalamin  1,000 mcg Oral Daily    donepeziL  5 mg Oral QHS    DULoxetine  60 mg Oral Daily    enoxparin  40 mg Subcutaneous Q24H (prophylaxis, 1700)    famotidine  20 mg Oral BID    hydrALAZINE  25 mg Oral Q8H    levETIRAcetam  500 mg Oral BID    metoprolol tartrate  25 mg Oral BID    senna-docusate 8.6-50 mg  1 tablet Oral QHS    vitamin D  1,000 Units Oral Daily     Continuous Infusions:   PRN Meds: acetaminophen, albuterol-ipratropium, calcium carbonate, dextrose 10%, dextrose 10%, dextrose, dextrose, glucagon (human recombinant), insulin aspart U-100, melatonin, methocarbamoL, ondansetron, traMADoL    OBJECTIVE:     Vital Signs (Last 24H)  Temp:  [97.4 °F (36.3 °C)-97.8 °F (36.6 °C)]   Pulse:  [100-109]   Resp:  [18]   BP: (136-146)/(58-67)   SpO2:  [98 %-100 %]     Laboratory:  CBC:   Recent Labs   Lab 06/08/23 0411   WBC 4.00   RBC 2.58*   HGB 7.4*   HCT 21.8*      MCV 85   MCH 28.7   MCHC 33.9     BMP:   Recent Labs   Lab 06/08/23 0411   GLU 79      K 3.3*      CO2 27   BUN 8*   CREATININE 0.7   CALCIUM 9.5   MG 1.5*     CMP:   Recent Labs   Lab 06/08/23 0411   GLU 79   CALCIUM 9.5      K 3.3*   CO2 27      BUN 8*   CREATININE 0.7     Lab Results   Component Value Date    ALT 11 05/29/2023    AST 16 05/29/2023    ALKPHOS 51 (L) 05/29/2023    BILITOT 0.2 05/29/2023     Lab Results   Component Value Date    HGBA1C 6.3 (H) 05/29/2023           ASSESSMENT/PLAN:     I have reviewed the medications in compliance with CMS Regulation F756 of the [employer]. Based on  information gathered, the following items may need to be addressed:    **According to PMH and home medication list, patient takes the following medications at home. These medications are not currently ordered at [facility]:  Apixaban 5 mg twice daily (on hold until cleared by neuro)  Estradiol 0.01% vaginal cream 0.5 gm twice weekly  Linagliptin 5 mg daily (non-formulary)   Simvastatin 80 mg daily (non-formulary, currently taking atorvastatin)    **Patient is taking duloxetine (home med) for mood disorder. A gradual dose reduction is not recommended at this time. Patient to follow-up with prescribing MD as outpatient.  Monitor: mental status for depression, suicide ideation, anxiety, serotonin syndrome, hyponatremia, dizziness, drowsiness, somnolence    Medications reviewed by PharmD, please re-consult if needed.      Evelyne Carranza, Pharm. D.  Clinical Pharmacist  [facility]

## 2023-06-12 NOTE — PT/OT/SLP PROGRESS
"Physical Therapy Treatment    Patient Name:  Brunilda England   MRN:  [id number]  Admit Date: 6/5/2023  Admitting Diagnosis: Left displaced femoral neck fracture  Recent Surgeries:     General Precautions: Standard, fall  Orthopedic Precautions: LLE weight bearing as tolerated  Braces: N/A    Recommendations:     Discharge Recommendations: home health PT  Level of Assistance Recommended at Discharge: 24 hours significant assistance  Discharge Equipment Recommendations: bath bench, bedside commode, walker, rolling, wheelchair  Barriers to discharge: None    Assessment:     Brunilda England is a 89 y.o. female admitted with a medical diagnosis of Left displaced femoral neck fracture . Pt tolerated well, pt is very pleasant, demo good effort, little sleepy, sat up for a while today, moves slowly guarding LLE,  pt would continue to benefit from skilled PT services to improve overall functional mobility, strength and endurance.  .      Performance deficits affecting function: weakness, impaired endurance, impaired self care skills, impaired functional mobility, gait instability, impaired balance, decreased upper extremity function, decreased lower extremity function, impaired cardiopulmonary response to activity, orthopedic precautions, pain, decreased ROM.    Rehab Potential is good    Activity Tolerance: Fair    Plan:     Patient to be seen 5 x/week to address the above listed problems via gait training, therapeutic activities, therapeutic exercises, wheelchair management/training    Plan of Care Expires: 07/06/23  Plan of Care Reviewed with: patient    Subjective     "Doing pretty good" " little sleepy" agreeable to therapy.     Pain/Comfort:  Pain Rating 1: 8/10  Location - Side 1: Left  Location - Orientation 1: generalized  Location 1: heel (and left knee "hip is fine")  Pain Addressed 1: Pre-medicate for activity, Reposition, Distraction, Cessation of Activity, Nurse notified  Pain Rating Post-Intervention 1: 8/10 (inc " with mobility, dec with rest)    Patient's cultural, spiritual, Gnosticism conflicts given the current situation:  no    Objective:       Patient found  with oxygen upon PT entry to room.     Therapeutic Activities and Exercises: 2x10 reps AP,LAQ, hip flex A/AA LLE within tolerable limited ROM    Functional Mobility:  Bed Mobility:     Sit to Supine: moderate assistance and of 2 persons with trunk support and BLE mgmt  Scooting to HOB while sitting EOB vcs for tech min A x 3 reps  Transfers:     Sit to Stand:  moderate assistance with grab bars and in II bars vcs for tech,positioning, moves slowly  Bed to Chair: moderate assistance and of 2 persons with  wc arm rest and bed rail   using  Stand Pivot and vcs for tech/sequencing  Gait: amb in II bars ~ 6 ft min A vcs for sequencing BLE and BUE along bars with inc support to off load LEs    AM-PAC 6 CLICK MOBILITY  10    Patient left supine with call button in reach, nsg notified, and nsg/family present. LLE heel protector and RLE elev on pillow    GOALS:   Multidisciplinary Problems       Physical Therapy Goals          Problem: Physical Therapy    Goal Priority Disciplines Outcome Goal Variances Interventions   Physical Therapy Goal     PT, PT/OT Ongoing, Progressing     Description: Goals to be met by: 7/6/23     Patient will increase functional independence with mobility by performing:    . Supine to sit with MInimal Assistance  . Sit to supine with MInimal Assistance  . Rolling to Left and Right with Minimal Assistance.  . Sit to stand transfer with Minimal Assistance  . Bed to chair transfer with Minimal Assistance using Rolling Walker  . Gait  x 30 feet with Moderate Assistance using Rolling Walker.   . Wheelchair propulsion x50 feet with Minimal Assistance using bilateral uppper extremities                         Time Tracking:     PT Received On: 06/12/23  PT Start Time: 1435  PT Stop Time: 1515  PT Total Time (min): 40 min    Billable Minutes: Gait Training  10, Therapeutic Activity 18, and Therapeutic Exercise 12    Treatment Type: Treatment  PT/PTA: PTA     Number of PTA visits since last PT visit: 1 06/12/2023

## 2023-06-12 NOTE — TREATMENT PLAN
"Rehab Services' DME recommendations    Brunilda England  MRN: [id number]       [x] Walker Camron (4'4"-5'7")    Accessories N/A    Wheels Yes    [x] Wheelchair  Number of hours up in a wheelchair per day 8+        Style Light weight        Justification for light weight w/c: chronic complications and psychosocial/healthy coping    Seat Width 18 (Standard adult)    Seat Depth 18    Back Height Standard    Leg Support Elevated leg rest and Swing Away    Arm Height Full, Swing Away, and Adjustable Height    Lap Belt Buckle    Accessories Front Brakes, Anti-tippers, and Safety belt    Cushion Gel    Justification for Cushion pressure relief     Justification for wheelchair order: (Please select all that apply) Caregiver is capable and willing to operate wheelchair safely, Patient's upper body strength is sufficient for propulsion, The patient has a cast, brace, or musculoskeletal condition which prevents 90 degree flexion of the knee, The patient has significant edema of the lower extremities that requires elevating leg rest, A reclining back is ordered, The patient requires the use of a wheelchair for ADLs within the home, and Patient mobility limitations cannot be sufficiently resolved by the use of other ambulatory therapies      [x] 3 in 1 commode Standard    [x] Tub bench Standard (unpadded)     [x] Hip Kit Standard/Short    [x] Home health PT, OT, and Aide      CHRIS Talbot 6/12/2023      "

## 2023-06-12 NOTE — ADDENDUM NOTE
Addendum  created 06/12/23 1103 by Nicolasa Santos MD    Cosign clinical note with attestation

## 2023-06-12 NOTE — TELEPHONE ENCOUNTER
Patient wearing 30 day event monitor for diagnosis Syncope and collapse     Received auto-triggered alert notification for - Atrial Fibrillation/Flutter, 3.3 second pause, Sinus Bradycardia onset. PAC on 6/10/23.  Ecg c/w AF/3.3 pause/junctional rhythm.     Pt currently admitted to [facility]    Will cont to monitor until  6/21/2023    EP consulted during hospital stay for AF/RVR alternating with junctional rhythm. will escalate for HR < 35 bpm or pauses > 5 secs.    See link below or media tab for full episode

## 2023-06-12 NOTE — PROGRESS NOTES
Pharmacist Renal Dose Adjustment Note    Brunilda England is a 89 y.o. female being treated with the medication famotidine    Patient Data:    Vital Signs (Most Recent):  Temp: 97.4 °F (36.3 °C) (06/12/23 0857)  Pulse: 109 (06/12/23 0857)  Resp: 18 (06/12/23 0857)  BP: (!) 146/64 (06/12/23 0857)  SpO2: 100 % (06/12/23 0857) Vital Signs (72h Range):  Temp:  [97.4 °F (36.3 °C)-99.6 °F (37.6 °C)]   Pulse:  []   Resp:  [16-18]   BP: (120-160)/(58-84)   SpO2:  [93 %-100 %]      Recent Labs   Lab 06/08/23  0411 06/12/23  0429   CREATININE 0.7 0.8     Serum creatinine: 0.8 mg/dL 06/12/23 0429  Estimated creatinine clearance: 43.3 mL/min    Famotidine PO 20 mg  BID will be changed to Famotidine  PO 20 mg daily    Pharmacist's Name: Isabella Funes  Pharmacist's Extension: [phone]

## 2023-06-12 NOTE — PROGRESS NOTES
[facility]                                  Skilled Nursing Facility                   Progress Note     Admit Date: 6/5/2023  DUTCH 6/22/2023  Principal Problem:  Left displaced femoral neck fracture   HPI obtained from patient interview and chart review     Chief Complaint: Re-evaluation of medical treatment and therapy status: Lab review    HPI:   Ms. England is a 89 year old female with PMHx of HTN, paroxysmal atrial fibrillation/flutter, COPD on home oxygen, Chronic diastolic HF, CKD 3a, dementia, history of lung/rectal cancer who presents to SNF following hospitalization for left displaced femoral neck fracture s/p ORIF with intramedullary nail on 05/30/2023 with Dr. Barker.  Admission to SNF for secondary weakness and debility.     suffered fall at home while changing her shoes. She was found to have a closed left basocervical fracture with extension into intertrochanteric region. She was also found to have a small left parietal SAH on admit.  Patient underwent left hip cephalomedullary nail fixation by Dr. Desmond Barker 5/30/2023. Post-op patient WBAT to the left lower extremity as per Orthopedics recommendation. Patient placed on Lovenox 40 mg subcutaneous daily and MERCEDES/SCD's for DVT prophylaxis post-op as unable to use patient's home Apixiban post-op for her atrial fibrillation due to ICH as per Neurosurgery but NS okay with Lovenox.  Of note patient had issues with tachy-juan a in hospital. Patient followed by Cardiology as outpatient for A. Fib and intermittent bradycardia and undergoing outpatient evaluation. Cardiology consulted as inpatient states no indication for need for pacemaker at this time and stated they would rather patient be more tachycardiac than bradycardiac. PT/OT recommended SNF placement.    Interval history: All of today's labs reviewed and are listed below.  Mag 1.4.  H &H low but stable.  24 hr vital  sign ranges listed below.  Patient denies shortness of breath, abdominal discomfort, nausea, or vomiting.  Patient reports an inadequate appetite.  Patient denies dysuria.  Patient reports having regular bowel movements.  Patient progessing with PT/OT- Gait: 3ft x 2 trials with RW and Mod A with w/c followed closely by rehab tech. Continuing to follow and treat all acute and chronic conditions.      Past Medical History: Patient has a past medical history of Anal cancer (1995), Anemia, Cancer (1995), Centrilobular emphysema (7/6/2020), Diabetes mellitus, Lumbar radiculopathy (5/16/2014), Lung cancer (2012), Macular degeneration, Macular hemorrhage of left eye, Neuropathy, Osteoporosis, Osteoporosis, unspecified (11/9/2012), and Pure hypercholesterolemia.    Past Surgical History: Patient has a past surgical history that includes Tonsillectomy; Cholecystectomy; Hysterectomy; Bronchoscopy; Colonoscopy; left leg surgery; heart stent; Fluoroscopic angiography of lower extremity with topical ultrasound (Right, 12/6/2018); Percutaneous transluminal angioplasty (N/A, 12/6/2018); Reconstruction using flap (Right, 12/6/2019); Incisional biopsy (Right, 12/6/2019); and Intramedullary rodding of femur (Left, 5/30/2023).    Social History: Patient reports that she quit smoking about 28 years ago. Her smoking use included cigarettes. She has a 15.00 pack-year smoking history. She has been exposed to tobacco smoke. She has never used smokeless tobacco. She reports that she does not drink alcohol and does not use drugs.    Family History: family history includes Breast cancer in her maternal aunt; Cancer in her father; Stroke in her mother.    Allergies: Patient is allergic to bextra [valdecoxib] and gabapentin.    ROS  Constitutional: Negative for fever   Eyes: Negative for blurred vision, double vision   Respiratory: Negative for cough, shortness of breath   Cardiovascular: Negative for chest pain, palpitations, and leg swelling.    Gastrointestinal: Negative for abdominal pain, constipation, diarrhea, nausea, vomiting.   Genitourinary: Negative for dysuria, frequency   Musculoskeletal:  + generalized weakness.  Intermittent LLE pain  Skin: Negative for itching and rash.   Neurological: Negative for dizziness, headaches.   Psychiatric/Behavioral: Negative for depression. The patient is not nervous/anxious.      24 hour Vital Sign Range   Temp:  [97.4 °F (36.3 °C)-97.8 °F (36.6 °C)]   Pulse:  [100-109]   Resp:  [18]   BP: (136-146)/(58-67)   SpO2:  [98 %-100 %]     Current BMI: Body mass index is 27.77 kg/m².    PEx  Constitutional: Patient appears debilitated.  No distress noted  HENT:   Head: Normocephalic and atraumatic.   Eyes: Pupils are equal, round  Neck: Normal range of motion. Neck supple.   Cardiovascular: Normal rate, regular rhythm and normal heart sounds.    Pulmonary/Chest: Effort normal and breath sounds are clear.  Supplemental oxygen in progress  Abdominal: Soft. Bowel sounds are normal.   Musculoskeletal: Normal range of motion.   Neurological: Alert and oriented to person, place, and time.  Impaired higher level thinking  Psychiatric: Normal mood and affect. Behavior is normal.   Skin: Skin is warm and dry.  Surgical dressing to LLE only to be removed by Orthopedics    Recent Labs   Lab 06/12/23  0429      K 3.9      CO2 24   BUN 8*   CREATININE 0.8   MG 1.4*       Recent Labs   Lab 06/12/23  0429   WBC 4.58   RBC 2.68*   HGB 7.8*   HCT 23.3*      MCV 87   MCH 29.1   MCHC 33.5       Recent Labs   Lab 06/11/23  0727 06/11/23  1107 06/11/23  1608 06/11/23  2104 06/12/23  0734 06/12/23  1128   POCTGLUCOSE 101 126* 112* 98 78 95        Assessment and Plan:    Hypomagnesemia  - initiated magnesium oxide 400 mg BID x2 days    Closed left basicervical femoral neck fracture with extension into the intertrochanteric region   s/p IM nail on 5/30/2023  - PT/OT, WBAT  - DVT PPX with Lovenox 40 mg daily, okayed by  NSx  - Ortho DAVIDE to see patient weekly SNF  - maintain surgical dressings until removed by Orthopedics  - follow-up with Ortho after discharge    Acute postoperative pain  - continue acetaminophen 1000 mg q.8 hours, methocarbamol 500 mg q.8 hours PRN, tramadol 50 mg q.6 hours PRN.  Bowel regimen in place    Subarachnoid hemorrhage following injury, no loss of consciousness  - Patient found on admit to have punctate left parietal SAH that was stable on repeat imaging. Patient neurologically intact.   - Neurosurgery consulted and as per recs:   - No acute neurosurgical intervention  - Hold Eliquis for 2 weeks. Ok for DVT ppx with standard dose Lovenox 40 mg subcutaneous daily.  - SBP <140, Na >135, HOB >30  - Keppra 500 mg po BID x 14 days, end 6/13  - Follow-up in 2 weeks with repeat CTH. If repeat CTH stable, can resume home dose Eliquis.... 6/12- message sent to Neurosurgery that follow-up appointment was made but repeat head CT was not ordered/scheduled     Vomiting  - PRN zofran   - 6/12 Abdominal XR ordered 6/9- no acute findings noted     Acute blood loss anemia  - continue monitor twice weekly CBCs  - transfuse for hemoglobin < 7  - initiated ferrous sulfate daily     Tachy-juan a syndrome  PAF (paroxysmal atrial fibrillation)  - Cardiology wants HR on higher side and okay with slight tachycardia as she has issues with bradycardia on higher doses of Metoprolol.   - Cardiology consulted at [facility] as patient having erratic HRs up to 160 and as low as 40 in hospital. Cardiology states has evidence of tachycardia and bradycardia on ECG but does not meet criteria for tachy-juan a syndrome.   - Cardiology recommends to continue low dose Metoprolol 25 mg po BID to help with tachycardia related to atrial fibrillation. No need for pacemaker at this time.   - Per Cardiology, if patient experiences syncope or develops significant conversion pause >5 sec, send to ED for eval   - patient to follow-up with her cardiologist,   Abel as outpatient.   - Home Eliquis on hold for long term anticoagulation for atrial fibrillation due to SAH as per Neurosurgery. Follow-up in 2 weeks with repeat CTH. If repeat CTH stable, can resume home dose Eliquis.  - Had 30 day Holter monitor in progress on admission with outpatient Cardiologist Dr. Roman end date 6/21 no mention in cards note, will continue here     Essential hypertension  - stable, Continue Hydralazine 25 mg TID and Amlodipine/Benzapril 5/20 1 tablet daily  - Goal SBP < 140.       Coronary artery disease involving native coronary artery of native heart without angina pectoris  - Patient with history of remote stents at [facility] in 1990 and 2003.   - continue Lipitor 40 mg daily in place of home non formulary Zocor    Stage 3a chronic kidney disease  - continue to monitor twice weekly BMPs, avoid nephrotoxic agents, renally dose medications when appropriate     Type 2 diabetes mellitus with circulatory disorder, without long-term current use of insulin  - Accu-Cheks AC/HS, diabetic diet  - home regimen with oral Tradjenta  - continue low-dose SSI prn     Chronic diastolic heart failure  - Continue Metoprolol 25 mg BID and Benazepril    - Monitor Is&Os and daily weights.      Centrilobular emphysema  Chronic hypoxemic respiratory failure  History of lung cancer in remission  - Chronic and controlled. Continue home oxygen at 2 liters. Patient with no signs of acute exacerbation.   - Patient not on any inhalers at home.     Neuropathic pain  - continue home duloxetine 60 mg daily      Late onset Alzheimer's dementia with behavioral disturbance  - Patient at cognitive baseline oriented x3  - Continue home Aricept to treat.  - Delirium precautions  - Avoid antihistamines, anticholinergics, hypnotics, and minimize opiates while controlling for pain as these medications may exacerbate delirium. Cues for day/night will assist with keeping patient calm and oriented - during daytime, please keep  adequate light in room (open windows, lights on) and please keep room dim at night-time to encourage normal sleep-wake cycles. Continuing to have nursing and family reorient the patient and encourage family to visit    Debility   - Continue with PT/OT for gait training and strengthening and restoration of ADL's   - Encourage mobility, OOB in chair, and early ambulation as appropriate  - Fall precautions   - Monitor for bowel and bladder dysfunction  - Monitor for and prevent skin breakdown and pressure ulcers  - Continue DVT prophylaxis with Lovenox 40 mg daily        Anticipate disposition:  Home with home health      Follow-up needed during SNF stay- NSx 6/29    Appointment not to send patient to- PCP 6/14    Follow-up needed after discharge from SNF: PCP,     Future Appointments   Date Time Provider Department Center   6/14/2023  1:00 PM Pepper Whitfield MD [facility] IM [place] PCW   6/29/2023  2:00 PM Adri Rader PA-C [facility] NEUROS8 [place]   7/11/2023 10:45 AM Amauri Henry NP [facility]       I spent 49 minutes reviewing patient records, examining, and counseling the patient/ patient's family with greater than 50% of the time spent in direct patient care and coordination.        Sarah Liang NP  Department of Hospital Medicine   [facility]     DOS: 6/12/2023       Patient note was created using MModal Dictation.  Any errors in syntax or even information may not have been identified and edited on initial review prior to signing this note.

## 2023-06-12 NOTE — PT/OT/SLP PROGRESS
"Occupational Therapy   Treatment    Name: Brunilda England  MRN: [id number]  Admit Date: 6/5/2023  Admitting Diagnosis:  Left displaced femoral neck fracture    General Precautions: Standard, fall   Orthopedic Precautions: LLE weight bearing as tolerated   Braces: N/A    Recommendations:     Discharge Recommendations:  home with home health  Level of Assistance Recommended at Discharge: 24 hours physical assistance for all ADL's and home management tasks  Discharge Equipment Recommendations: bath bench, bedside commode, hip kit, wheelchair  Barriers to discharge:  None    Assessment:     Brunilda England is a 89 y.o. female with a medical diagnosis of Left displaced femoral neck fracture .  She presents with performance deficits affecting function are weakness, impaired endurance, impaired self care skills, impaired functional mobility, gait instability, impaired balance, decreased upper extremity function, decreased lower extremity function, impaired cardiopulmonary response to activity, orthopedic precautions.     Pt participated well during today's session. Pt c/o of discomfort in L heel, nsg aware. Pt tolerated tx session without incident and is making progress, however, continues to demonstrate deficits with self care skills, balance, functional mobility, UB strength and endurance. Pt will benefit from continued OT services to progress towards goals.     Rehab Potential is good    Activity tolerance:  Fair    Plan:     Patient to be seen 5 x/week to address the above listed problems via self-care/home management, therapeutic activities, therapeutic exercises    Plan of Care Expires: 06/27/23  Plan of Care Reviewed with: patient, daughter    Subjective   "I'm feeling pretty good today" "I'll try my best" - regarding therapy session.   Communicated with: Sameer prior to session.  .    Pain/Comfort:  Pain Rating 1: 0/10  Pain Rating Post-Intervention 1: 0/10    Patient's cultural, spiritual, Zoroastrianism conflicts given the " current situation:  no    Objective:     Patient found up in chair with oxygen with daughter present upon OT entry to room.    Activities of Daily Living:  Upper Body Dressing: minimum assistance to don robe seated in wheelchair.     [facility] 6 Click ADL: 16    OT Exercises: UE Ergometer 6 min with minimum resistance. Cessation of activity secondary to low endurance.   AROM x 2 sets of 10 with #2 dowel. Pt perform shoulder flex/ext, forward flex motion and bicep curls.   Pt with increased time and various rest breaks throughout.      Therex performed to improve overall endurance, ROM and UB strength required for functional transfers, ADL's and w/c propulsion.     Treatment & Education:  Pt educated on:  - role of OT  - level of assistance  - energy conservation and task modification to maximized independence with ADL's and mobility   - Orthopedic precautions and importance to adhering   -  safety while performing functional transfers and self care tasks  - progress towards OT goals     Patient left up in chair with call button in reach    GOALS:   Multidisciplinary Problems       Occupational Therapy Goals          Problem: Occupational Therapy    Goal Priority Disciplines Outcome Interventions   Occupational Therapy Goal     OT, PT/OT Ongoing, Progressing    Description: Goals to be met by: 6/27/23     Patient will increase functional independence with ADLs by performing:    UE Dressing with Set-up Assistance.  LE Dressing with Stand-by Assistance.  Grooming while seated at sink with Modified [place].  Toileting from toilet with Stand-by Assistance for hygiene and clothing management.   Bathing from W/C SEATED AT SINK with Minimal Assistance.  Toilet transfer to toilet with Contact Guard Assistance.                         Time Tracking:     OT Date of Treatment: 06/12/23  OT Start Time: 1045    OT Stop Time: 1120  OT Total Time (min): 35 min    Billable Minutes:Therapeutic Activity 15  Therapeutic Exercise  20    6/12/2023

## 2023-06-13 LAB
POCT GLUCOSE: 100 MG/DL (ref 70–110)
POCT GLUCOSE: 108 MG/DL (ref 70–110)
POCT GLUCOSE: 94 MG/DL (ref 70–110)
POCT GLUCOSE: 97 MG/DL (ref 70–110)

## 2023-06-13 PROCEDURE — 11000004 HC SNF PRIVATE

## 2023-06-13 PROCEDURE — 97110 THERAPEUTIC EXERCISES: CPT | Mod: CQ

## 2023-06-13 PROCEDURE — 25000003 PHARM REV CODE 250: Performed by: NURSE PRACTITIONER

## 2023-06-13 PROCEDURE — 97535 SELF CARE MNGMENT TRAINING: CPT | Mod: CO

## 2023-06-13 PROCEDURE — 25000003 PHARM REV CODE 250: Performed by: HOSPITALIST

## 2023-06-13 PROCEDURE — 97530 THERAPEUTIC ACTIVITIES: CPT | Mod: CO

## 2023-06-13 PROCEDURE — 63600175 PHARM REV CODE 636 W HCPCS: Performed by: HOSPITALIST

## 2023-06-13 PROCEDURE — 25000003 PHARM REV CODE 250: Performed by: FAMILY MEDICINE

## 2023-06-13 PROCEDURE — 94761 N-INVAS EAR/PLS OXIMETRY MLT: CPT

## 2023-06-13 RX ADMIN — Medication 400 MG: at 08:06

## 2023-06-13 RX ADMIN — CYANOCOBALAMIN TAB 1000 MCG 1000 MCG: 1000 TAB at 08:06

## 2023-06-13 RX ADMIN — HYDRALAZINE HYDROCHLORIDE 25 MG: 25 TABLET, FILM COATED ORAL at 01:06

## 2023-06-13 RX ADMIN — HYDRALAZINE HYDROCHLORIDE 25 MG: 25 TABLET, FILM COATED ORAL at 05:06

## 2023-06-13 RX ADMIN — FERROUS SULFATE TAB 325 MG (65 MG ELEMENTAL FE) 1 EACH: 325 (65 FE) TAB at 08:06

## 2023-06-13 RX ADMIN — AMLODIPINE BESYLATE AND BENAZEPRIL HYDROCHLORIDE 1 CAPSULE: 5; 20 CAPSULE ORAL at 08:06

## 2023-06-13 RX ADMIN — METOPROLOL TARTRATE 25 MG: 25 TABLET, FILM COATED ORAL at 08:06

## 2023-06-13 RX ADMIN — ACETAMINOPHEN 1000 MG: 500 TABLET ORAL at 09:06

## 2023-06-13 RX ADMIN — METOPROLOL TARTRATE 25 MG: 25 TABLET, FILM COATED ORAL at 09:06

## 2023-06-13 RX ADMIN — ACETAMINOPHEN 1000 MG: 500 TABLET ORAL at 01:06

## 2023-06-13 RX ADMIN — Medication 2 TABLET: at 08:06

## 2023-06-13 RX ADMIN — SENNOSIDES AND DOCUSATE SODIUM 1 TABLET: 50; 8.6 TABLET ORAL at 09:06

## 2023-06-13 RX ADMIN — LEVETIRACETAM 500 MG: 500 TABLET, FILM COATED ORAL at 08:06

## 2023-06-13 RX ADMIN — ACETAMINOPHEN 1000 MG: 500 TABLET ORAL at 05:06

## 2023-06-13 RX ADMIN — FAMOTIDINE 20 MG: 20 TABLET, FILM COATED ORAL at 08:06

## 2023-06-13 RX ADMIN — DULOXETINE 60 MG: 60 CAPSULE, DELAYED RELEASE ORAL at 08:06

## 2023-06-13 RX ADMIN — Medication 400 MG: at 09:06

## 2023-06-13 RX ADMIN — ENOXAPARIN SODIUM 40 MG: 40 INJECTION SUBCUTANEOUS at 04:06

## 2023-06-13 RX ADMIN — DONEPEZIL HYDROCHLORIDE 5 MG: 5 TABLET ORAL at 09:06

## 2023-06-13 RX ADMIN — HYDRALAZINE HYDROCHLORIDE 25 MG: 25 TABLET, FILM COATED ORAL at 09:06

## 2023-06-13 RX ADMIN — ATORVASTATIN CALCIUM 40 MG: 40 TABLET, FILM COATED ORAL at 08:06

## 2023-06-13 RX ADMIN — METHOCARBAMOL 500 MG: 500 TABLET ORAL at 08:06

## 2023-06-13 RX ADMIN — CHOLECALCIFEROL TAB 25 MCG (1000 UNIT) 1000 UNITS: 25 TAB at 08:06

## 2023-06-13 NOTE — PT/OT/SLP PROGRESS
"Physical Therapy Treatment    Patient Name:  Brunilda England   MRN:  [id number]  Admit Date: 6/5/2023  Admitting Diagnosis: Left displaced femoral neck fracture  Recent Surgeries:     General Precautions: Standard, fall  Orthopedic Precautions: LLE weight bearing as tolerated  Braces: N/A    Recommendations:     Discharge Recommendations: home health PT  Level of Assistance Recommended at Discharge: 24 hours significant assistance  Discharge Equipment Recommendations: bath bench, bedside commode, walker, rolling, wheelchair  Barriers to discharge: None    Assessment:     Brunilda England is a 89 y.o. female admitted with a medical diagnosis of Left displaced femoral neck fracture . Pt tolerated well, pt would continue to benefit from skilled PT services to improve overall functional mobility, strength and endurance.  .      Performance deficits affecting function: weakness, impaired endurance, impaired self care skills, impaired functional mobility, gait instability, impaired balance, decreased upper extremity function, decreased lower extremity function, impaired cardiopulmonary response to activity, orthopedic precautions, pain, decreased ROM.    Rehab Potential is good    Activity Tolerance: Fair    Plan:     Patient to be seen 5 x/week to address the above listed problems via gait training, therapeutic activities, therapeutic exercises, wheelchair management/training    Plan of Care Expires: 07/06/23  Plan of Care Reviewed with: patient    Subjective     "Just so sleepy", "slept good" " cant't walk Ms" agreeable to therex.     Pain/Comfort:  Pain Rating 1: 0/10  Pain Rating Post-Intervention 1: 0/10    Patient's cultural, spiritual, Hindu conflicts given the current situation:  no    Objective:     Communicated with nsg Karissa re pt c/o of feeling "so sleepy", per nsg ,pt was given muscle relaxer,  /61 HR 48-50 02 sats 100% on 2L  Patient found with oxygen upon PT entry to room.     Therapeutic Activities " and Exercises: 2x10 reps A/AA LLE within tolerable ROM AP,LAQ, hip flex, row/press/bicep curls x 15 reps #1 dowel/YTB    Functional Mobility:  Transfers:  deferred safety concerns 2* to dec alertness  Gait: deferred safety concerns 2* to dec alertness    AM-PAC 6 CLICK MOBILITY  10    Patient left up in chair with call button in reach and belonging in reach, pt requesting coffee post session, nsg notified of all the above .    GOALS:   Multidisciplinary Problems       Physical Therapy Goals          Problem: Physical Therapy    Goal Priority Disciplines Outcome Goal Variances Interventions   Physical Therapy Goal     PT, PT/OT Ongoing, Progressing     Description: Goals to be met by: 7/6/23     Patient will increase functional independence with mobility by performing:    . Supine to sit with MInimal Assistance  . Sit to supine with MInimal Assistance  . Rolling to Left and Right with Minimal Assistance.  . Sit to stand transfer with Minimal Assistance  . Bed to chair transfer with Minimal Assistance using Rolling Walker  . Gait  x 30 feet with Moderate Assistance using Rolling Walker.   . Wheelchair propulsion x50 feet with Minimal Assistance using bilateral uppper extremities                         Time Tracking:     PT Received On: 06/13/23  PT Start Time: 1050  PT Stop Time: 1115  PT Total Time (min): 25 min    Billable Minutes: Therapeutic Exercise 25    Treatment Type: Treatment  PT/PTA: PTA     Number of PTA visits since last PT visit: 2     06/13/2023

## 2023-06-13 NOTE — PROGRESS NOTES
[facility]                                  Skilled Nursing Facility                   Progress Note     Admit Date: 6/5/2023  DUTCH 6/22/2023  Principal Problem:  Left displaced femoral neck fracture   HPI obtained from patient interview and chart review     Chief Complaint: Re-evaluation of medical treatment and therapy status: Lab review    HPI:   Ms. England is a 89 year old female with PMHx of HTN, paroxysmal atrial fibrillation/flutter, COPD on home oxygen, Chronic diastolic HF, CKD 3a, dementia, history of lung/rectal cancer who presents to SNF following hospitalization for left displaced femoral neck fracture s/p ORIF with intramedullary nail on 05/30/2023 with Dr. Barker.  Admission to SNF for secondary weakness and debility.    Interval history:  24 hr vital sign ranges listed below.  Patient states she was having trouble staying awake this morning during therapy session.  Patient had received muscle relaxer an hour before.  We discussed discontinuing muscle relaxers as she does not like the way they make her feel.  We discussed the possibility of an appetite stimulant per the family's request, I explained to patient that they carry a risk of confusion, she does not wish to trial wanted this time.  Patient denies shortness of breath, abdominal discomfort, nausea, or vomiting.  Patient reports an inadequate appetite.  Patient denies dysuria.  Patient reports having regular bowel movements.  Patient progessing with PT/OT- Gait: 3ft x 2 trials with RW and Mod A with w/c followed closely by rehab tech. Continuing to follow and treat all acute and chronic conditions.      Past Medical History: Patient has a past medical history of Anal cancer (1995), Anemia, Cancer (1995), Centrilobular emphysema (7/6/2020), Diabetes mellitus, Lumbar radiculopathy (5/16/2014), Lung cancer (2012), Macular degeneration, Macular hemorrhage of left eye,  Neuropathy, Osteoporosis, Osteoporosis, unspecified (11/9/2012), and Pure hypercholesterolemia.    Past Surgical History: Patient has a past surgical history that includes Tonsillectomy; Cholecystectomy; Hysterectomy; Bronchoscopy; Colonoscopy; left leg surgery; heart stent; Fluoroscopic angiography of lower extremity with topical ultrasound (Right, 12/6/2018); Percutaneous transluminal angioplasty (N/A, 12/6/2018); Reconstruction using flap (Right, 12/6/2019); Incisional biopsy (Right, 12/6/2019); and Intramedullary rodding of femur (Left, 5/30/2023).    Social History: Patient reports that she quit smoking about 28 years ago. Her smoking use included cigarettes. She has a 15.00 pack-year smoking history. She has been exposed to tobacco smoke. She has never used smokeless tobacco. She reports that she does not drink alcohol and does not use drugs.    Family History: family history includes Breast cancer in her maternal aunt; Cancer in her father; Stroke in her mother.    Allergies: Patient is allergic to bextra [valdecoxib] and gabapentin.    ROS  Constitutional: Negative for fever   Eyes: Negative for blurred vision, double vision   Respiratory: Negative for cough, shortness of breath   Cardiovascular: Negative for chest pain, palpitations, and leg swelling.   Gastrointestinal: Negative for abdominal pain, constipation, diarrhea, nausea, vomiting.   Genitourinary: Negative for dysuria, frequency   Musculoskeletal:  + generalized weakness.  Intermittent LLE pain  Skin: Negative for itching and rash.   Neurological: Negative for dizziness, headaches.   Psychiatric/Behavioral: Negative for depression. The patient is not nervous/anxious.      24 hour Vital Sign Range   Temp:  [97.7 °F (36.5 °C)-97.9 °F (36.6 °C)]   Pulse:  []   Resp:  [14-18]   BP: (122-136)/(60-67)   SpO2:  [96 %-100 %]     Current BMI: Body mass index is 26.99 kg/m².    PEx  Constitutional: Patient appears debilitated.  No distress  noted  HENT:   Head: Normocephalic and atraumatic.   Eyes: Pupils are equal, round  Neck: Normal range of motion. Neck supple.   Cardiovascular: Normal rate, regular rhythm and normal heart sounds.    Pulmonary/Chest: Effort normal and breath sounds are clear.  Supplemental oxygen in progress  Abdominal: Soft. Bowel sounds are normal.   Musculoskeletal: Normal range of motion.   Neurological: Alert and oriented to person, place, and time.  Impaired higher level thinking  Psychiatric: Normal mood and affect. Behavior is normal.   Skin: Skin is warm and dry.  Surgical dressing to LLE only to be removed by Orthopedics    No results for input(s): GLUCOSE, NA, K, CL, CO2, BUN, CREATININE, MG in the last 24 hours.    Invalid input(s):  CALCIUM      No results for input(s): WBC, RBC, HGB, HCT, PLT, MCV, MCH, MCHC in the last 24 hours.      Recent Labs   Lab 06/12/23  1128 06/12/23  1610 06/12/23  1957 06/13/23  0724 06/13/23  1201 06/13/23  1617   POCTGLUCOSE 95 121* 111* 97 100 94        Assessment and Plan:    Closed left basicervical femoral neck fracture with extension into the intertrochanteric region   s/p IM nail on 5/30/2023  - PT/OT, WBAT  - DVT PPX with Lovenox 40 mg daily, okayed by NSx  - Ortho DAVIDE to see patient weekly SNF  - maintain surgical dressings until removed by Orthopedics  - follow-up with Ortho after discharge    Acute postoperative pain  - discontinue methocarbamol, continue acetaminophen 1000 mg q.8 hours, tramadol 50 mg q.6 hours PRN.  Bowel regimen in place    Subarachnoid hemorrhage following injury, no loss of consciousness  - Patient found on admit to have punctate left parietal SAH that was stable on repeat imaging. Patient neurologically intact.   - Neurosurgery consulted and as per recs:   - No acute neurosurgical intervention  - Hold Eliquis for 2 weeks. Ok for DVT ppx with standard dose Lovenox 40 mg subcutaneous daily.  - SBP <140, Na >135, HOB >30  - Keppra 500 mg po BID x 14 days, end  6/13  - Follow-up in 2 weeks with repeat CTH. If repeat CTH stable, can resume home dose Eliquis.... 6/12- message sent to Neurosurgery that follow-up appointment was made but repeat head CT was not ordered/scheduled     Vomiting  - PRN zofran   - 6/12 Abdominal XR ordered 6/9- no acute findings noted     Acute blood loss anemia  - continue monitor twice weekly CBCs  - transfuse for hemoglobin < 7  - initiated ferrous sulfate daily     Tachy-juan a syndrome  PAF (paroxysmal atrial fibrillation)  - Cardiology wants HR on higher side and okay with slight tachycardia as she has issues with bradycardia on higher doses of Metoprolol.   - [place] [facility] [place] erratic HRs up to 160 and as low as 40 in hospital. Cardiology states has evidence of tachycardia and bradycardia on ECG but does not meet criteria for tachy-juan a syndrome.   - Cardiology recommends to continue low dose Metoprolol 25 mg po BID to help with tachycardia related to atrial fibrillation. No need for pacemaker at this time.   - Per Cardiology, if patient experiences syncope or develops significant conversion pause >5 sec, send to ED for eval   - patient to follow-up with her cardiologist, Dr. Roman as outpatient.   - Home Eliquis on hold for long term anticoagulation for atrial fibrillation due to SAH as per Neurosurgery. Follow-up in 2 weeks with repeat CTH. If repeat CTH stable, can resume home dose Eliquis.  - Had 30 day Holter monitor in progress on admission with outpatient Cardiologist Dr. Roman end date 6/21 no mention in cards note, will continue here     Essential hypertension  - stable, Continue Hydralazine 25 mg TID and Amlodipine/Benzapril 5/20 1 tablet daily  - Goal SBP < 140.       Coronary artery disease involving native coronary artery of native heart without angina pectoris  - Patient with history of remote stents at [facility] in 1990 and 2003.   - continue Lipitor 40 mg daily in place of home non formulary  Zocor    Stage 3a chronic kidney disease  - continue to monitor twice weekly BMPs, avoid nephrotoxic agents, renally dose medications when appropriate     Type 2 diabetes mellitus with circulatory disorder, without long-term current use of insulin  - Accu-Cheks AC/HS, diabetic diet  - home regimen with oral Tradjenta  - continue low-dose SSI prn     Chronic diastolic heart failure  - Continue Metoprolol 25 mg BID and Benazepril    - Monitor Is&Os and daily weights.      Centrilobular emphysema  Chronic hypoxemic respiratory failure  History of lung cancer in remission  - Chronic and controlled. Continue home oxygen at 2 liters. Patient with no signs of acute exacerbation.   - Patient not on any inhalers at home.     Neuropathic pain  - continue home duloxetine 60 mg daily      Late onset Alzheimer's dementia with behavioral disturbance  - Patient at cognitive baseline oriented x3  - Continue home Aricept to treat.  - Delirium precautions  - Avoid antihistamines, anticholinergics, hypnotics, and minimize opiates while controlling for pain as these medications may exacerbate delirium. Cues for day/night will assist with keeping patient calm and oriented - during daytime, please keep adequate light in room (open windows, lights on) and please keep room dim at night-time to encourage normal sleep-wake cycles. Continuing to have nursing and family reorient the patient and encourage family to visit    Debility   - Continue with PT/OT for gait training and strengthening and restoration of ADL's   - Encourage mobility, OOB in chair, and early ambulation as appropriate  - Fall precautions   - Monitor for bowel and bladder dysfunction  - Monitor for and prevent skin breakdown and pressure ulcers  - Continue DVT prophylaxis with Lovenox 40 mg daily        Anticipate disposition:  Home with home health      Follow-up needed during SNF stay- NSx 6/29    Appointment not to send patient to- PCP 6/14    Follow-up needed after  discharge from SNF: PCP,     Future Appointments   Date Time Provider Department Center   6/22/2023  1:30 PM Pepper Whitfield MD [facility] IM [facility] PCW   6/29/2023 12:45 PM [facility] OIC-CT1 500 LB LIMIT [facility]   6/29/2023  2:00 PM Adri Rader PA-C [facility] NEUROS8 [facility]   7/11/2023 10:45 AM Amauri Henry NP [facility] ORTHO [facility] Matt Liang NP  Department of Hospital Medicine   [facility]     DOS: 6/13/2023       Patient note was created using MModal Dictation.  Any errors in syntax or even information may not have been identified and edited on initial review prior to signing this note.

## 2023-06-13 NOTE — PT/OT/SLP PROGRESS
"Occupational Therapy   Treatment    Name: Brunilda England  MRN: [id number]  Admit Date: 6/5/2023  Admitting Diagnosis:  Left displaced femoral neck fracture    General Precautions: Standard, fall   Orthopedic Precautions: LLE weight bearing as tolerated   Braces: N/A    Recommendations:     Discharge Recommendations:  home with home health  Level of Assistance Recommended at Discharge: 24 hours physical assistance for all ADL's and home management tasks  Discharge Equipment Recommendations: bath bench, bedside commode, hip kit, wheelchair  Barriers to discharge:  None    Assessment:     rBunilda England is a 89 y.o. female with a medical diagnosis of Left displaced femoral neck fracture .  She presents with performance deficits affecting function are weakness, impaired endurance, impaired self care skills, impaired functional mobility, gait instability, impaired balance, decreased upper extremity function, decreased lower extremity function, impaired cardiopulmonary response to activity, orthopedic precautions.    Pt participated well during today's session. Patient c/o pain and tenderness in L foot and toes, as well as appearing to be very sleepy and drowsy throughout session, NP notified.   Pt tolerated tx session without incident and is making progress, however, continues to demonstrate deficits with self care skills, balance, functional mobility, UB strength and endurance. Pt will benefit from continued OT services to progress towards goals.     Rehab Potential is good    Activity tolerance:  Fair    Plan:     Patient to be seen 5 x/week to address the above listed problems via self-care/home management, therapeutic activities, therapeutic exercises    Plan of Care Expires: 06/27/23  Plan of Care Reviewed with: patient    Subjective   "Oh, I'm going too try my best"  Communicated with: Sameer prior to session.  .    Pain/Comfort:  Pain Rating 1: 0/10  Pain Rating Post-Intervention 1: 0/10    Patient's cultural, " spiritual, Congregation conflicts given the current situation:  no    Objective:     Patient found supine with oxygen upon OT entry to room.    Bed Mobility:    Patient completed Rolling/Turning to Left with  moderate assistance and with side rail  Patient completed Rolling/Turning to Right with moderate assistance and with side rail  Patient completed Scooting/Bridging with stand by assistance  Patient completed Supine to Sit with moderate assistance to manage B LE.     Functional Mobility/Transfers:  Patient completed Bed <> Chair Transfer using Squat Pivot technique with moderate assistance and of 2 persons with no assistive device and hand-held assist    Activities of Daily Living:  Grooming: modified independence to perform hair brushing seated in wheelchair.   Upper Body Dressing: minimum assistance to doff gown and don pullover shirt and sweater.   Lower Body Dressing: total assistance to thread B LE and manage over in hips.   Toileting: total assistance to perform rear and frontal hygiene and brief management at bed level     [facility] 6 Click ADL: 16    Treatment & Education:  Pt educated on:  - role of OT  - level of assistance  - energy conservation and task modification to maximized independence with ADL's and mobility   - Orthopedic precautions and importance to adhering   -  safety while performing functional transfers and self care tasks  - progress towards OT goals     Patient left up in chair with call button in reach    GOALS:   Multidisciplinary Problems       Occupational Therapy Goals          Problem: Occupational Therapy    Goal Priority Disciplines Outcome Interventions   Occupational Therapy Goal     OT, PT/OT Ongoing, Progressing    Description: Goals to be met by: 6/27/23     Patient will increase functional independence with ADLs by performing:    UE Dressing with Set-up Assistance.  LE Dressing with Stand-by Assistance.  Grooming while seated at sink with Modified [place].  Toileting from  toilet with Stand-by Assistance for hygiene and clothing management.   Bathing from W/C SEATED AT SINK with Minimal Assistance.  Toilet transfer to toilet with Contact Guard Assistance.                         Time Tracking:     OT Date of Treatment: 06/13/23  OT Start Time: 0941    OT Stop Time: 1028  OT Total Time (min): 47 min    Billable Minutes:Self Care/Home Management 30  Therapeutic Activity 17    6/13/2023

## 2023-06-14 LAB
POCT GLUCOSE: 100 MG/DL (ref 70–110)
POCT GLUCOSE: 98 MG/DL (ref 70–110)
POCT GLUCOSE: 99 MG/DL (ref 70–110)
POCT GLUCOSE: 99 MG/DL (ref 70–110)

## 2023-06-14 PROCEDURE — 97530 THERAPEUTIC ACTIVITIES: CPT

## 2023-06-14 PROCEDURE — 27000221 HC OXYGEN, UP TO 24 HOURS

## 2023-06-14 PROCEDURE — 63600175 PHARM REV CODE 636 W HCPCS: Performed by: HOSPITALIST

## 2023-06-14 PROCEDURE — 25000003 PHARM REV CODE 250: Performed by: NURSE PRACTITIONER

## 2023-06-14 PROCEDURE — 97116 GAIT TRAINING THERAPY: CPT

## 2023-06-14 PROCEDURE — 99900035 HC TECH TIME PER 15 MIN (STAT)

## 2023-06-14 PROCEDURE — 25000003 PHARM REV CODE 250: Performed by: HOSPITALIST

## 2023-06-14 PROCEDURE — 94761 N-INVAS EAR/PLS OXIMETRY MLT: CPT

## 2023-06-14 PROCEDURE — 11000004 HC SNF PRIVATE

## 2023-06-14 PROCEDURE — 97112 NEUROMUSCULAR REEDUCATION: CPT

## 2023-06-14 RX ADMIN — ACETAMINOPHEN 1000 MG: 500 TABLET ORAL at 09:06

## 2023-06-14 RX ADMIN — CYANOCOBALAMIN TAB 1000 MCG 1000 MCG: 1000 TAB at 08:06

## 2023-06-14 RX ADMIN — METOPROLOL TARTRATE 25 MG: 25 TABLET, FILM COATED ORAL at 08:06

## 2023-06-14 RX ADMIN — METOPROLOL TARTRATE 25 MG: 25 TABLET, FILM COATED ORAL at 09:06

## 2023-06-14 RX ADMIN — HYDRALAZINE HYDROCHLORIDE 25 MG: 25 TABLET, FILM COATED ORAL at 02:06

## 2023-06-14 RX ADMIN — Medication 2 TABLET: at 08:06

## 2023-06-14 RX ADMIN — HYDRALAZINE HYDROCHLORIDE 25 MG: 25 TABLET, FILM COATED ORAL at 09:06

## 2023-06-14 RX ADMIN — DULOXETINE 60 MG: 60 CAPSULE, DELAYED RELEASE ORAL at 08:06

## 2023-06-14 RX ADMIN — DONEPEZIL HYDROCHLORIDE 5 MG: 5 TABLET ORAL at 09:06

## 2023-06-14 RX ADMIN — AMLODIPINE BESYLATE AND BENAZEPRIL HYDROCHLORIDE 1 CAPSULE: 5; 20 CAPSULE ORAL at 08:06

## 2023-06-14 RX ADMIN — FAMOTIDINE 20 MG: 20 TABLET, FILM COATED ORAL at 08:06

## 2023-06-14 RX ADMIN — ACETAMINOPHEN 1000 MG: 500 TABLET ORAL at 05:06

## 2023-06-14 RX ADMIN — ENOXAPARIN SODIUM 40 MG: 40 INJECTION SUBCUTANEOUS at 05:06

## 2023-06-14 RX ADMIN — FERROUS SULFATE TAB 325 MG (65 MG ELEMENTAL FE) 1 EACH: 325 (65 FE) TAB at 08:06

## 2023-06-14 RX ADMIN — ATORVASTATIN CALCIUM 40 MG: 40 TABLET, FILM COATED ORAL at 08:06

## 2023-06-14 RX ADMIN — CHOLECALCIFEROL TAB 25 MCG (1000 UNIT) 1000 UNITS: 25 TAB at 08:06

## 2023-06-14 RX ADMIN — HYDRALAZINE HYDROCHLORIDE 25 MG: 25 TABLET, FILM COATED ORAL at 05:06

## 2023-06-14 NOTE — PT/OT/SLP PROGRESS
Occupational Therapy   Treatment    Name: Brunilda England  MRN: [id number]  Admit Date: 6/5/2023  Admitting Diagnosis:  Left displaced femoral neck fracture    General Precautions: Standard, fall   Orthopedic Precautions: LLE weight bearing as tolerated   Braces: N/A    Recommendations:     Discharge Recommendations:  home health OT  Level of Assistance Recommended at Discharge: 24 hours physical assistance for all ADL's and home management tasks  Discharge Equipment Recommendations: bath bench, bedside commode, hip kit, wheelchair  Barriers to discharge:  None    Assessment:     Brunilda England is a 89 y.o. female with a medical diagnosis of Left displaced femoral neck fracture.  She presents with performance deficits affecting function are weakness, impaired endurance, impaired self care skills, impaired functional mobility, gait instability, decreased upper extremity function, decreased lower extremity function, pain, decreased ROM, impaired cardiopulmonary response to activity, orthopedic precautions. Pt agreeable to OT session. Pt worked on bed mobility, transfers, standing tolerance and UE strength with fairly good tolerance. Pt participates well and is motivated to regain functional independence in mobility and self care.      Rehab Potential is good    Activity tolerance:  Good    Plan:     Patient to be seen 5 x/week to address the above listed problems via self-care/home management, therapeutic activities, therapeutic exercises    Plan of Care Expires: 06/27/23  Plan of Care Reviewed with: patient    Subjective     Communicated with: RN prior to session.     Pain/Comfort:  Pain Rating 1: 0/10  Location - Side 1: Left  Location - Orientation 1: generalized  Location 1: hip  Pain Addressed 1: Reposition, Distraction, Cessation of Activity  Pain Rating Post-Intervention 1: 0/10    Patient's cultural, spiritual, Jehovah's witness conflicts given the current situation:  no    Objective:     Patient found HOB elevated with  oxygen upon OT entry to room.    Bed Mobility:    Patient completed Supine to Sit with minimum assistance  Patient completed Sit to Supine with moderate assistance     Functional Mobility/Transfers:  Patient completed Sit <> Stand Transfer with moderate assistance  with  no assistive device   Patient completed Bed <> Chair Transfer using Step Transfer technique with maximal assistance with no assistive device  Functional Mobility: did not occur    Activities of Daily Living:       Pt declined    AMPAC 6 Click ADL: 16    OT Exercises: UE Ergometer x8 minutes with a rest breaks detention for about 2 minutes    Treatment & Education:  Pt edu re OT role, POC and safety.  Pt performed the above mobility and declined ADLs.  Pt worked on mobility with increased time for LLE movement; and required rest breaks during activities.    Patient left HOB elevated with call button in reach and family present    GOALS:   Multidisciplinary Problems       Occupational Therapy Goals          Problem: Occupational Therapy    Goal Priority Disciplines Outcome Interventions   Occupational Therapy Goal     OT, PT/OT Ongoing, Progressing    Description: Goals to be met by: 6/27/23     Patient will increase functional independence with ADLs by performing:    UE Dressing with Set-up Assistance.  LE Dressing with Stand-by Assistance.  Grooming while seated at sink with Modified [place].  Toileting from toilet with Stand-by Assistance for hygiene and clothing management.   Bathing from W/C SEATED AT SINK with Minimal Assistance.  Toilet transfer to toilet with Contact Guard Assistance.                         Time Tracking:     OT Date of Treatment: 06/14/23  OT Start Time: 1426    OT Stop Time: 1510  OT Total Time (min): 44 min    Billable Minutes:Therapeutic Activity 44 minutes    ALISE Grimm  6/14/2023  Pager: [phone]

## 2023-06-14 NOTE — PT/OT/SLP PROGRESS
"Physical Therapy Treatment    Patient Name:  Brunilda England   MRN:  [id number]  Admit Date: 6/5/2023  Admitting Diagnosis: Left displaced femoral neck fracture  Recent Surgeries: Open reduction internal fixation left intertrochanteric hip fracture with intramedullary nail    General Precautions: Standard, fall  Orthopedic Precautions: LLE weight bearing as tolerated  Braces: N/A    Recommendations:     Discharge Recommendations: home with home health  Level of Assistance Recommended at Discharge: 24 hours significant assistance  Discharge Equipment Recommendations: bath bench, bedside commode, walker, rolling, wheelchair  Barriers to discharge: None    Assessment:     Brunilda England is a 89 y.o. female admitted with a medical diagnosis of Left displaced femoral neck fracture .     Pt reluctantly agreeable to therapy with daughter's encouragement. Pt sits EOB from supine with mod A, stands from EOB with mod A and step transfers to wheelchair with mod A, ambulates in parallel bars with min A, max A to ambulate at RW, limited by fatigue. Standing balance tolerance and training performed in parallel bars. Additional time taken in room for patient self-care and comfort. Continue to maximize gait and independence to facilitate safe discharge home.      Performance deficits affecting function: weakness, impaired endurance, impaired self care skills, impaired functional mobility, gait instability, impaired balance, pain, decreased safety awareness, decreased lower extremity function, orthopedic precautions, decreased ROM, impaired cognition.    Rehab Potential is fair    Activity Tolerance: Fair    Plan:     Patient to be seen 5 x/week to address the above listed problems via therapeutic activities, therapeutic exercises, gait training, neuromuscular re-education    Plan of Care Expires: 07/06/23  Plan of Care Reviewed with: patient, daughter    Subjective     "I could do better if it wasn't so cold in here." "     Pain/Comfort:  Pain Rating 1: 1/10  Location - Side 1: Left  Location - Orientation 1: generalized  Location 1: first toe  Pain Addressed 1: Reposition, Distraction    Patient's cultural, spiritual, Amish conflicts given the current situation:  no    Objective:     Communicated with Karissa prior to session.  Patient found supine with oxygen upon PT entry to room.     Therapeutic Activities and Exercises:   Standing balance training and activity tolerance practiced in parallel bars  Min A for upright stance and posture    Functional Mobility:  Bed Mobility:     Supine to Sit: moderate assistance  Transfers:     Sit to Stand:  minimum assistance (PB) to maximal assistance (RW) with rolling walker and parallel bars  Bed to Chair: moderate assistance with rolling walker using  Step Transfer  3 steps and turn with RW and mod A for direction, walker management and balance  Gait: 5 ft + 3 ft, RW and mod to max A  Decreased weight shift, decreased gait speed, decreased stride length  Balance: min to mod A throughout, better in parallel bars    AM-PAC 6 CLICK MOBILITY  11    Patient left up in chair with call button in reach and daughter present.    GOALS:   Multidisciplinary Problems       Physical Therapy Goals          Problem: Physical Therapy    Goal Priority Disciplines Outcome Goal Variances Interventions   Physical Therapy Goal     PT, PT/OT Ongoing, Progressing     Description: Goals to be met by: 7/6/23     Patient will increase functional independence with mobility by performing:    . Supine to sit with MInimal Assistance  . Sit to supine with MInimal Assistance  . Rolling to Left and Right with Minimal Assistance.  . Sit to stand transfer with Minimal Assistance  . Bed to chair transfer with Minimal Assistance using Rolling Walker  . Gait  x 30 feet with Moderate Assistance using Rolling Walker.   . Wheelchair propulsion x50 feet with Minimal Assistance using bilateral uppper extremities                          Time Tracking:     PT Received On: 06/14/23  PT Start Time: 1129 (840)  PT Stop Time: 1215 (850)  PT Total Time (min): 46 min    Billable Minutes: Gait Training 15 min, Therapeutic Activity 20 min, and Neuromuscular Re-education 10 min    Treatment Type: Treatment  PT/PTA: PT     Number of PTA visits since last PT visit: 0     06/14/2023

## 2023-06-14 NOTE — PLAN OF CARE
Problem: Occupational Therapy  Goal: Occupational Therapy Goal  Description: Goals to be met by: 6/27/23     Patient will increase functional independence with ADLs by performing:    UE Dressing with Set-up Assistance.  LE Dressing with Stand-by Assistance.  Grooming while seated at sink with Modified [place].  Toileting from toilet with Stand-by Assistance for hygiene and clothing management.   Bathing from W/C SEATED AT SINK with Minimal Assistance.  Toilet transfer to toilet with Contact Guard Assistance.    Outcome: Ongoing, Progressing     Goals remain appropriate. Cont POC.

## 2023-06-14 NOTE — PROGRESS NOTES
"                                                        [facility]                                  Skilled Nursing Facility                   Progress Note     Admit Date: 6/5/2023  DUTCH 6/22/2023  Principal Problem:  Left displaced femoral neck fracture   HPI obtained from patient interview and chart review     Chief Complaint: Re-evaluation of medical treatment and therapy status:  Labile heart rate    HPI:   Ms. England is a 89 year old female with PMHx of HTN, paroxysmal atrial fibrillation/flutter, COPD on home oxygen, Chronic diastolic HF, CKD 3a, dementia, history of lung/rectal cancer who presents to SNF following hospitalization for left displaced femoral neck fracture s/p ORIF with intramedullary nail on 05/30/2023 with Dr. Barker.  Admission to SNF for secondary weakness and debility.    Interval history:  24 hr vital sign ranges listed below.  Heart rates are labile with 24 hour range of .  Upon chart review, notation made from arrhythmia department,  Received auto-triggered alert notification for - Atrial Fibrillation/Flutter, 3.3 second pause, Sinus Bradycardia onset. PAC on 6/10/23.  Ecg c/w AF/3.3 pause/junctional rhythm." Patient in use to wear her Holter monitor and will follow-up with EP after discharge.  Patient denies shortness of breath, abdominal discomfort, nausea, or vomiting.  Patient reports an inadequate appetite.  Patient denies dysuria.  Patient reports having regular bowel movements.  Patient progessing with PT/OT. Continuing to follow and treat all acute and chronic conditions.        Past Medical History: Patient has a past medical history of Anal cancer (1995), Anemia, Cancer (1995), Centrilobular emphysema (7/6/2020), Diabetes mellitus, Lumbar radiculopathy (5/16/2014), Lung cancer (2012), Macular degeneration, Macular hemorrhage of left eye, Neuropathy, Osteoporosis, Osteoporosis, unspecified (11/9/2012), and Pure hypercholesterolemia.    Past Surgical " History: Patient has a past surgical history that includes Tonsillectomy; Cholecystectomy; Hysterectomy; Bronchoscopy; Colonoscopy; left leg surgery; heart stent; Fluoroscopic angiography of lower extremity with topical ultrasound (Right, 12/6/2018); Percutaneous transluminal angioplasty (N/A, 12/6/2018); Reconstruction using flap (Right, 12/6/2019); Incisional biopsy (Right, 12/6/2019); and Intramedullary rodding of femur (Left, 5/30/2023).    Social History: Patient reports that she quit smoking about 28 years ago. Her smoking use included cigarettes. She has a 15.00 pack-year smoking history. She has been exposed to tobacco smoke. She has never used smokeless tobacco. She reports that she does not drink alcohol and does not use drugs.    Family History: family history includes Breast cancer in her maternal aunt; Cancer in her father; Stroke in her mother.    Allergies: Patient is allergic to bextra [valdecoxib] and gabapentin.    ROS  Constitutional: Negative for fever   Eyes: Negative for blurred vision, double vision   Respiratory: Negative for cough, shortness of breath   Cardiovascular: Negative for chest pain, palpitations, and leg swelling.   Gastrointestinal: Negative for abdominal pain, constipation, diarrhea, nausea, vomiting.   Genitourinary: Negative for dysuria, frequency   Musculoskeletal:  + generalized weakness.  Intermittent LLE pain  Skin: Negative for itching and rash.   Neurological: Negative for dizziness, headaches.   Psychiatric/Behavioral: Negative for depression. The patient is not nervous/anxious.      24 hour Vital Sign Range   Temp:  [97.7 °F (36.5 °C)]   Pulse:  []   Resp:  [14-17]   BP: (122-162)/(60-81)   SpO2:  [93 %-97 %]     Current BMI: Body mass index is 26.4 kg/m².    PEx  Constitutional: Patient appears debilitated.  No distress noted  HENT:   Head: Normocephalic and atraumatic.   Eyes: Pupils are equal, round  Neck: Normal range of motion. Neck supple.   Cardiovascular:  Normal rate, regular rhythm and normal heart sounds.    Pulmonary/Chest: Effort normal and breath sounds are clear.  Supplemental oxygen in progress  Abdominal: Soft. Bowel sounds are normal.   Musculoskeletal: Normal range of motion.   Neurological: Alert and oriented to person, place, and time.  Impaired higher level thinking  Psychiatric: Normal mood and affect. Behavior is normal.   Skin: Skin is warm and dry.  Surgical dressing to LLE only to be removed by Orthopedics    No results for input(s): GLUCOSE, NA, K, CL, CO2, BUN, CREATININE, MG in the last 24 hours.    Invalid input(s):  CALCIUM      No results for input(s): WBC, RBC, HGB, HCT, PLT, MCV, MCH, MCHC in the last 24 hours.      Recent Labs   Lab 06/12/23  1957 06/13/23  0724 06/13/23  1201 06/13/23  1617 06/13/23 2057 06/14/23  0701   POCTGLUCOSE 111* 97 100 94 108 100        Assessment and Plan:       Tachy-juan a syndrome  PAF (paroxysmal atrial fibrillation)  - Cardiology wants HR on higher side and okay with slight tachycardia as she has issues with bradycardia on higher doses of Metoprolol.   - Cardiology consulted at [facility] as patient having erratic HRs up to 160 and as low as 40 in hospital. Cardiology states has evidence of tachycardia and bradycardia on ECG but does not meet criteria for tachy-juan a syndrome.   - Cardiology recommends to continue low dose Metoprolol 25 mg po BID to help with tachycardia related to atrial fibrillation. No need for pacemaker at this time.   - Per Cardiology, if patient experiences syncope or develops significant conversion pause >5 sec, send to ED for eval   - patient to follow-up with her cardiologist, Dr. Roman as outpatient.   - Home Eliquis on hold for long term anticoagulation for atrial fibrillation due to SAH as per Neurosurgery. Follow-up in 2 weeks with repeat CTH. If repeat CTH stable, can resume home dose Eliquis.  - Had 30 day Holter monitor in progress on admission with outpatient Cardiologist   Abel end date 6/21 no mention in cards note, will continue here  - 6/14 Received auto-triggered alert notification for - Atrial Fibrillation/Flutter, 3.3 second pause, Sinus Bradycardia onset. PAC on 6/10/23.  Ecg c/w AF/3.3 pause/junctional rhythm.  Message sent to  to schedule patient to follow-up in EP with Dr. Rincon    Closed left basicervical femoral neck fracture with extension into the intertrochanteric region   s/p IM nail on 5/30/2023  - PT/OT, WBAT  - DVT PPX with Lovenox 40 mg daily, okayed by NSx  - Ortho DAVIDE to see patient weekly SNF  - maintain surgical dressings until removed by Orthopedics  - follow-up with Ortho after discharge    Acute postoperative pain  - stable, continue to hold methocarbamol, continue acetaminophen 1000 mg q.8 hours, tramadol 50 mg q.6 hours PRN.  Bowel regimen in place    Subarachnoid hemorrhage following injury, no loss of consciousness  - Patient found on admit to have punctate left parietal SAH that was stable on repeat imaging. Patient neurologically intact.   - Neurosurgery consulted and as per recs:   - No acute neurosurgical intervention  - Hold Eliquis for 2 weeks. Ok for DVT ppx with standard dose Lovenox 40 mg subcutaneous daily.  - SBP <140, Na >135, HOB >30  - Keppra 500 mg po BID x 14 days, end 6/13  - Follow-up in 2 weeks with repeat CTH. If repeat CTH stable, can resume home dose Eliquis.... 6/12- message sent to Neurosurgery that follow-up appointment was made but repeat head CT was not ordered/scheduled  - 6/14 CT now scheduled for 6/29 along with neurosurgery follow-up appointment    Acute blood loss anemia  - continue monitor twice weekly CBCs  - transfuse for hemoglobin < 7  - continue ferrous sulfate daily     Essential hypertension  - stable, Continue Hydralazine 25 mg TID and Amlodipine/Benzapril 5/20 1 tablet daily  - Goal SBP < 140.       Coronary artery disease involving native coronary artery of native heart without angina pectoris  -  Patient with history of remote stents at [facility] in 1990 and 2003.   - continue Lipitor 40 mg daily in place of home non formulary Zocor    Stage 3a chronic kidney disease  - stable, continue to monitor twice weekly BMPs, avoid nephrotoxic agents, renally dose medications when appropriate     Type 2 diabetes mellitus with circulatory disorder, without long-term current use of insulin  - Accu-Cheks AC/HS, diabetic diet  - home regimen with oral Tradjenta  - stable, continue low-dose SSI prn     Chronic diastolic heart failure  - stable, Continue Metoprolol 25 mg BID and Benazepril    - Monitor Is&Os and daily weights.      Centrilobular emphysema  Chronic hypoxemic respiratory failure  History of lung cancer in remission  - stable, Chronic and controlled. Continue home oxygen at 2 liters. Patient with no signs of acute exacerbation.   - Patient not on any inhalers at home.     Neuropathic pain  - continue home duloxetine 60 mg daily      Late onset Alzheimer's dementia with behavioral disturbance  - Patient at cognitive baseline oriented x3  - Continue home Aricept to treat.  - Delirium precautions  - Avoid antihistamines, anticholinergics, hypnotics, and minimize opiates while controlling for pain as these medications may exacerbate delirium. Cues for day/night will assist with keeping patient calm and oriented - during daytime, please keep adequate light in room (open windows, lights on) and please keep room dim at night-time to encourage normal sleep-wake cycles. Continuing to have nursing and family reorient the patient and encourage family to visit    Debility   - Continue with PT/OT for gait training and strengthening and restoration of ADL's   - Encourage mobility, OOB in chair, and early ambulation as appropriate  - Fall precautions   - Monitor for bowel and bladder dysfunction  - Monitor for and prevent skin breakdown and pressure ulcers  - Continue DVT prophylaxis with Lovenox 40 mg daily        Anticipate  disposition:  Home with home health      Follow-up needed during SNF stay-     Appointment not to send patient to- PCP 6/14, 6/22    Follow-up needed after discharge from SNF: PCP, EP, needs to be scheduled, neurosurgery (6/29), orthopedics (6/11)    Future Appointments   Date Time Provider Department Center   6/22/2023  1:30 PM Pepper Whitfield MD [facility] IM [place] PCW   6/29/2023 12:45 PM [facility] OIC-CT1 500 LB LIMIT [facility] IC Imaging Ctr   6/29/2023  2:00 PM Adri Rader PA-C [facility] NEUROS8 [place]   7/11/2023 10:45 AM Amauri Henry NP [facility] ORTHO [place] Liang NP  Department of Hospital Medicine   [facility]     DOS: 6/14/2023       Patient note was created using MModal Dictation.  Any errors in syntax or even information may not have been identified and edited on initial review prior to signing this note.

## 2023-06-14 NOTE — PLAN OF CARE
Problem: Adult Inpatient Plan of Care  Goal: Plan of Care Review  Outcome: Ongoing, Progressing  Goal: Patient-Specific Goal (Individualized)  Outcome: Ongoing, Progressing     Problem: Diabetes Comorbidity  Goal: Blood Glucose Level Within Targeted Range  Outcome: Ongoing, Progressing     Problem: Fluid Imbalance (Pneumonia)  Goal: Fluid Balance  Outcome: Ongoing, Progressing     Problem: Infection (Pneumonia)  Goal: Resolution of Infection Signs and Symptoms  Outcome: Ongoing, Progressing     Problem: Respiratory Compromise (Pneumonia)  Goal: Effective Oxygenation and Ventilation  Outcome: Ongoing, Progressing     Problem: Skin Injury Risk Increased  Goal: Skin Health and Integrity  Outcome: Ongoing, Progressing     Problem: Fall Injury Risk  Goal: Absence of Fall and Fall-Related Injury  Outcome: Ongoing, Progressing

## 2023-06-15 DIAGNOSIS — I48.0 PAF (PAROXYSMAL ATRIAL FIBRILLATION): Primary | ICD-10-CM

## 2023-06-15 LAB
ANION GAP SERPL CALC-SCNC: 9 MMOL/L (ref 8–16)
BASOPHILS # BLD AUTO: 0.02 K/UL (ref 0–0.2)
BASOPHILS NFR BLD: 0.5 % (ref 0–1.9)
BUN SERPL-MCNC: 8 MG/DL (ref 10–30)
CALCIUM SERPL-MCNC: 10.1 MG/DL (ref 8.7–10.5)
CHLORIDE SERPL-SCNC: 105 MMOL/L (ref 95–110)
CO2 SERPL-SCNC: 27 MMOL/L (ref 23–29)
CREAT SERPL-MCNC: 0.8 MG/DL (ref 0.5–1.4)
DIFFERENTIAL METHOD: ABNORMAL
EOSINOPHIL # BLD AUTO: 0 K/UL (ref 0–0.5)
EOSINOPHIL NFR BLD: 1 % (ref 0–8)
ERYTHROCYTE [DISTWIDTH] IN BLOOD BY AUTOMATED COUNT: 17.7 % (ref 11.5–14.5)
EST. GFR  (NO RACE VARIABLE): >60 ML/MIN/1.73 M^2
GLUCOSE SERPL-MCNC: 91 MG/DL (ref 70–110)
HCT VFR BLD AUTO: 26.7 % (ref 37–48.5)
HGB BLD-MCNC: 9 G/DL (ref 12–16)
IMM GRANULOCYTES # BLD AUTO: 0.01 K/UL (ref 0–0.04)
IMM GRANULOCYTES NFR BLD AUTO: 0.3 % (ref 0–0.5)
LYMPHOCYTES # BLD AUTO: 0.9 K/UL (ref 1–4.8)
LYMPHOCYTES NFR BLD: 23.6 % (ref 18–48)
MAGNESIUM SERPL-MCNC: 1.6 MG/DL (ref 1.6–2.6)
MCH RBC QN AUTO: 29.3 PG (ref 27–31)
MCHC RBC AUTO-ENTMCNC: 33.7 G/DL (ref 32–36)
MCV RBC AUTO: 87 FL (ref 82–98)
MONOCYTES # BLD AUTO: 0.5 K/UL (ref 0.3–1)
MONOCYTES NFR BLD: 12.2 % (ref 4–15)
NEUTROPHILS # BLD AUTO: 2.4 K/UL (ref 1.8–7.7)
NEUTROPHILS NFR BLD: 62.4 % (ref 38–73)
NRBC BLD-RTO: 0 /100 WBC
PHOSPHATE SERPL-MCNC: 3.1 MG/DL (ref 2.7–4.5)
PLATELET # BLD AUTO: 278 K/UL (ref 150–450)
PMV BLD AUTO: 11.2 FL (ref 9.2–12.9)
POCT GLUCOSE: 104 MG/DL (ref 70–110)
POCT GLUCOSE: 105 MG/DL (ref 70–110)
POCT GLUCOSE: 144 MG/DL (ref 70–110)
POCT GLUCOSE: 98 MG/DL (ref 70–110)
POTASSIUM SERPL-SCNC: 4.2 MMOL/L (ref 3.5–5.1)
RBC # BLD AUTO: 3.07 M/UL (ref 4–5.4)
SODIUM SERPL-SCNC: 141 MMOL/L (ref 136–145)
WBC # BLD AUTO: 3.86 K/UL (ref 3.9–12.7)

## 2023-06-15 PROCEDURE — 25000003 PHARM REV CODE 250: Performed by: FAMILY MEDICINE

## 2023-06-15 PROCEDURE — 97110 THERAPEUTIC EXERCISES: CPT | Mod: CQ

## 2023-06-15 PROCEDURE — 84100 ASSAY OF PHOSPHORUS: CPT | Performed by: HOSPITALIST

## 2023-06-15 PROCEDURE — 85025 COMPLETE CBC W/AUTO DIFF WBC: CPT | Performed by: HOSPITALIST

## 2023-06-15 PROCEDURE — 36415 COLL VENOUS BLD VENIPUNCTURE: CPT | Performed by: HOSPITALIST

## 2023-06-15 PROCEDURE — 83735 ASSAY OF MAGNESIUM: CPT | Performed by: HOSPITALIST

## 2023-06-15 PROCEDURE — 25000003 PHARM REV CODE 250: Performed by: HOSPITALIST

## 2023-06-15 PROCEDURE — 80048 BASIC METABOLIC PNL TOTAL CA: CPT | Performed by: HOSPITALIST

## 2023-06-15 PROCEDURE — 63600175 PHARM REV CODE 636 W HCPCS: Performed by: HOSPITALIST

## 2023-06-15 PROCEDURE — 25000003 PHARM REV CODE 250: Performed by: NURSE PRACTITIONER

## 2023-06-15 PROCEDURE — 11000004 HC SNF PRIVATE

## 2023-06-15 PROCEDURE — 97116 GAIT TRAINING THERAPY: CPT | Mod: CQ

## 2023-06-15 PROCEDURE — 97530 THERAPEUTIC ACTIVITIES: CPT | Mod: CQ

## 2023-06-15 PROCEDURE — 97535 SELF CARE MNGMENT TRAINING: CPT

## 2023-06-15 RX ORDER — LANOLIN ALCOHOL/MO/W.PET/CERES
400 CREAM (GRAM) TOPICAL 2 TIMES DAILY
Status: COMPLETED | OUTPATIENT
Start: 2023-06-15 | End: 2023-06-16

## 2023-06-15 RX ADMIN — ACETAMINOPHEN 1000 MG: 500 TABLET ORAL at 09:06

## 2023-06-15 RX ADMIN — HYDRALAZINE HYDROCHLORIDE 25 MG: 25 TABLET, FILM COATED ORAL at 05:06

## 2023-06-15 RX ADMIN — HYDRALAZINE HYDROCHLORIDE 25 MG: 25 TABLET, FILM COATED ORAL at 02:06

## 2023-06-15 RX ADMIN — HYDRALAZINE HYDROCHLORIDE 25 MG: 25 TABLET, FILM COATED ORAL at 09:06

## 2023-06-15 RX ADMIN — ATORVASTATIN CALCIUM 40 MG: 40 TABLET, FILM COATED ORAL at 09:06

## 2023-06-15 RX ADMIN — CYANOCOBALAMIN TAB 1000 MCG 1000 MCG: 1000 TAB at 09:06

## 2023-06-15 RX ADMIN — DONEPEZIL HYDROCHLORIDE 5 MG: 5 TABLET ORAL at 09:06

## 2023-06-15 RX ADMIN — METOPROLOL TARTRATE 25 MG: 25 TABLET, FILM COATED ORAL at 09:06

## 2023-06-15 RX ADMIN — AMLODIPINE BESYLATE AND BENAZEPRIL HYDROCHLORIDE 1 CAPSULE: 5; 20 CAPSULE ORAL at 09:06

## 2023-06-15 RX ADMIN — FERROUS SULFATE TAB 325 MG (65 MG ELEMENTAL FE) 1 EACH: 325 (65 FE) TAB at 09:06

## 2023-06-15 RX ADMIN — ENOXAPARIN SODIUM 40 MG: 40 INJECTION SUBCUTANEOUS at 05:06

## 2023-06-15 RX ADMIN — ACETAMINOPHEN 1000 MG: 500 TABLET ORAL at 05:06

## 2023-06-15 RX ADMIN — Medication 2 TABLET: at 09:06

## 2023-06-15 RX ADMIN — Medication 400 MG: at 09:06

## 2023-06-15 RX ADMIN — SENNOSIDES AND DOCUSATE SODIUM 1 TABLET: 50; 8.6 TABLET ORAL at 09:06

## 2023-06-15 RX ADMIN — Medication 400 MG: at 12:06

## 2023-06-15 RX ADMIN — DULOXETINE 60 MG: 60 CAPSULE, DELAYED RELEASE ORAL at 09:06

## 2023-06-15 RX ADMIN — ACETAMINOPHEN 1000 MG: 500 TABLET ORAL at 02:06

## 2023-06-15 RX ADMIN — FAMOTIDINE 20 MG: 20 TABLET, FILM COATED ORAL at 09:06

## 2023-06-15 RX ADMIN — CHOLECALCIFEROL TAB 25 MCG (1000 UNIT) 1000 UNITS: 25 TAB at 09:06

## 2023-06-15 NOTE — PROGRESS NOTES
[facility]                                  Skilled Nursing Facility                   Progress Note     Admit Date: 6/5/2023  DUTCH 6/22/2023  Principal Problem:  Left displaced femoral neck fracture   HPI obtained from patient interview and chart review     Chief Complaint: Re-evaluation of medical treatment and therapy status: Lab review    HPI:   Ms. England is a 89 year old female with PMHx of HTN, paroxysmal atrial fibrillation/flutter, COPD on home oxygen, Chronic diastolic HF, CKD 3a, dementia, history of lung/rectal cancer who presents to SNF following hospitalization for left displaced femoral neck fracture s/p ORIF with intramedullary nail on 05/30/2023 with Dr. Barker.  Admission to SNF for secondary weakness and debility.    Interval history:  All of today's labs reviewed and are listed below.  24 hr vital sign ranges listed below.  Heart rate less labile over last 24 hours, although on the higher side at 109.  Patient denies shortness of breath, abdominal discomfort, nausea, or vomiting.  Patient reports an inadequate appetite.  Patient denies dysuria.  Patient reports having regular bowel movements.  Patient progessing with PT/OT- ambulated ~22+38ft with moderate assistance (maxA to turn) and rolling walker. Rehab tech following with wheelchair and oxygen towed. Continuing to follow and treat all acute and chronic conditions.    Past Medical History: Patient has a past medical history of Anal cancer (1995), Anemia, Cancer (1995), Centrilobular emphysema (7/6/2020), Diabetes mellitus, Lumbar radiculopathy (5/16/2014), Lung cancer (2012), Macular degeneration, Macular hemorrhage of left eye, Neuropathy, Osteoporosis, Osteoporosis, unspecified (11/9/2012), and Pure hypercholesterolemia.    Past Surgical History: Patient has a past surgical history that includes Tonsillectomy; Cholecystectomy; Hysterectomy; Bronchoscopy;  Colonoscopy; left leg surgery; heart stent; Fluoroscopic angiography of lower extremity with topical ultrasound (Right, 12/6/2018); Percutaneous transluminal angioplasty (N/A, 12/6/2018); Reconstruction using flap (Right, 12/6/2019); Incisional biopsy (Right, 12/6/2019); and Intramedullary rodding of femur (Left, 5/30/2023).    Social History: Patient reports that she quit smoking about 28 years ago. Her smoking use included cigarettes. She has a 15.00 pack-year smoking history. She has been exposed to tobacco smoke. She has never used smokeless tobacco. She reports that she does not drink alcohol and does not use drugs.    Family History: family history includes Breast cancer in her maternal aunt; Cancer in her father; Stroke in her mother.    Allergies: Patient is allergic to bextra [valdecoxib] and gabapentin.    ROS  Constitutional: Negative for fever   Eyes: Negative for blurred vision, double vision   Respiratory: Negative for cough, shortness of breath   Cardiovascular: Negative for chest pain, palpitations, and leg swelling.   Gastrointestinal: Negative for abdominal pain, constipation, diarrhea, nausea, vomiting.   Genitourinary: Negative for dysuria, frequency   Musculoskeletal:  + generalized weakness.  Intermittent LLE pain  Skin: Negative for itching and rash.   Neurological: Negative for dizziness, headaches.   Psychiatric/Behavioral: Negative for depression. The patient is not nervous/anxious.      24 hour Vital Sign Range   Temp:  [97.3 °F (36.3 °C)-98 °F (36.7 °C)]   Pulse:  []   Resp:  [16-18]   BP: (130-163)/(60-69)   SpO2:  [95 %-98 %]     Current BMI: Body mass index is 26.4 kg/m².    PEx  Constitutional: Patient appears debilitated.  No distress noted  HENT:   Head: Normocephalic and atraumatic.   Eyes: Pupils are equal, round  Neck: Normal range of motion. Neck supple.   Cardiovascular: Normal rate, regular rhythm and normal heart sounds.    Pulmonary/Chest: Effort normal and breath  sounds are clear.  Supplemental oxygen in progress  Abdominal: Soft. Bowel sounds are normal.   Musculoskeletal: Normal range of motion.   Neurological: Alert and oriented to person, place, and time.  Impaired higher level thinking  Psychiatric: Normal mood and affect. Behavior is normal.   Skin: Skin is warm and dry.  Surgical dressing to LLE only to be removed by Orthopedics    Recent Labs   Lab 06/15/23  0545      K 4.2      CO2 27   BUN 8*   CREATININE 0.8   MG 1.6         Recent Labs   Lab 06/15/23  0545   WBC 3.86*   RBC 3.07*   HGB 9.0*   HCT 26.7*      MCV 87   MCH 29.3   MCHC 33.7         Recent Labs   Lab 06/14/23  0701 06/14/23  1132 06/14/23  1615 06/14/23  1953 06/15/23  0706 06/15/23  1100   POCTGLUCOSE 100 99 99 98 98 105        Assessment and Plan:    Hypomagnesemia  - initiated magnesium oxide 400 mg BID x2 days    Tachy-juan a syndrome  PAF (paroxysmal atrial fibrillation)  - Cardiology wants HR on higher side and okay with slight tachycardia as she has issues with bradycardia on higher doses of Metoprolol.   - [facility] as patient having erratic HRs up to 160 and as low as 40 in hospital. Cardiology states has evidence of tachycardia and bradycardia on ECG but does not meet criteria for tachy-juan a syndrome.   - Cardiology recommends to continue low dose Metoprolol 25 mg po BID to help with tachycardia related to atrial fibrillation. No need for pacemaker at this time.   - Per Cardiology, if patient experiences syncope or develops significant conversion pause >5 sec, send to ED for eval   - patient to follow-up with her cardiologist, Dr. Roman as outpatient.   - Home Eliquis on hold for long term anticoagulation for atrial fibrillation due to SAH as per Neurosurgery. Follow-up in 2 weeks with repeat CTH. If repeat CTH stable, can resume home dose Eliquis.  - Had 30 day Holter monitor in progress on admission with outpatient Cardiologist Dr. Roman end date  6/21 no mention in cards note, will continue here  - 6/14 Received auto-triggered alert notification for - Atrial Fibrillation/Flutter, 3.3 second pause, Sinus Bradycardia onset. PAC on 6/10/23.  Ecg c/w AF/3.3 pause/junctional rhythm.  Message sent to  to schedule patient to follow-up in EP with Dr. Rincon    Closed left basicervical femoral neck fracture with extension into the intertrochanteric region   s/p IM nail on 5/30/2023  - PT/OT, WBAT  - DVT PPX with Lovenox 40 mg daily, okayed by NSx  - Ortho DAVIDE to see patient weekly SNF  - maintain surgical dressings until removed by Orthopedics  - follow-up with Ortho after discharge  - dressing removed today    Acute postoperative pain  - stable, continue to hold methocarbamol, continue acetaminophen 1000 mg q.8 hours, tramadol 50 mg q.6 hours PRN. Bowel regimen in place    Subarachnoid hemorrhage following injury, no loss of consciousness  - Patient found on admit to have punctate left parietal SAH that was stable on repeat imaging. Patient neurologically intact.   - Neurosurgery consulted and as per recs:   - No acute neurosurgical intervention  - Hold Eliquis for 2 weeks. Ok for DVT ppx with standard dose Lovenox 40 mg subcutaneous daily.  - SBP <140, Na >135, HOB >30  - Keppra 500 mg po BID x 14 days, end 6/13  - Follow-up in 2 weeks with repeat CTH. If repeat CTH stable, can resume home dose Eliquis.... 6/12- message sent to Neurosurgery that follow-up appointment was made but repeat head CT was not ordered/scheduled  - 6/14 CT now scheduled for 6/29 along with neurosurgery follow-up appointment    Acute blood loss anemia  - continue monitor twice weekly CBCs  - transfuse for hemoglobin < 7  - continue ferrous sulfate daily     Essential hypertension  - stable, Continue Hydralazine 25 mg TID and Amlodipine/Benzapril 5/20 1 tablet daily  - Goal SBP < 140.       Coronary artery disease involving native coronary artery of native heart without angina  pectoris  - Patient with history of remote stents at [facility] in 1990 and 2003.   - continue Lipitor 40 mg daily in place of home non formulary Zocor    Stage 3a chronic kidney disease  - stable, continue to monitor twice weekly BMPs, avoid nephrotoxic agents, renally dose medications when appropriate     Type 2 diabetes mellitus with circulatory disorder, without long-term current use of insulin  - Accu-Cheks AC/HS, diabetic diet  - home regimen with oral Tradjenta  - stable, continue low-dose SSI prn     Chronic diastolic heart failure  - stable, Continue Metoprolol 25 mg BID and Benazepril    - Monitor Is&Os and daily weights.      Centrilobular emphysema  Chronic hypoxemic respiratory failure  History of lung cancer in remission  - stable, Chronic and controlled. Continue home oxygen at 2 liters. Patient with no signs of acute exacerbation.   - Patient not on any inhalers at home.     Neuropathic pain  - continue home duloxetine 60 mg daily      Late onset Alzheimer's dementia with behavioral disturbance  - Patient at cognitive baseline oriented x3  - Continue home Aricept to treat.  - Delirium precautions  - Avoid antihistamines, anticholinergics, hypnotics, and minimize opiates while controlling for pain as these medications may exacerbate delirium. Cues for day/night will assist with keeping patient calm and oriented - during daytime, please keep adequate light in room (open windows, lights on) and please keep room dim at night-time to encourage normal sleep-wake cycles. Continuing to have nursing and family reorient the patient and encourage family to visit    Debility   - Continue with PT/OT for gait training and strengthening and restoration of ADL's   - Encourage mobility, OOB in chair, and early ambulation as appropriate  - Fall precautions   - Monitor for bowel and bladder dysfunction  - Monitor for and prevent skin breakdown and pressure ulcers  - Continue DVT prophylaxis with Lovenox 40 mg  daily        Anticipate disposition:  Home with home health      Follow-up needed during SNF stay-     Appointment not to send patient to- PCP 6/14, 6/22    Follow-up needed after discharge from SNF: PCP, EP, (7/11), neurosurgery (6/29), orthopedics (6/11)    Future Appointments   Date Time Provider Department Center   6/22/2023  1:30 PM Pepper Whitfield MD [facility] IM [facility] PCW   6/29/2023 12:45 PM [facility] OIC-CT1 500 LB LIMIT [facility] IC Imaging Ctr   6/29/2023  2:00 PM Adri Rader PA-C [facility] NEUROS8 [facility]   7/11/2023 10:45 AM Amauri Henry NP [facility] ORTHO [facility] Ort   7/11/2023  2:45 PM EKG, APPT [facility] EKG [facility]   7/11/2023  3:20 PM Giuseppe Roman MD [facility] ARRHYTH [facility]       Sarah Liang NP  Department of Hospital Medicine   [facility]     DOS: 6/15/2023       Patient note was created using MModal Dictation.  Any errors in syntax or even information may not have been identified and edited on initial review prior to signing this note.

## 2023-06-15 NOTE — PT/OT/SLP PROGRESS
"Occupational Therapy   Treatment    Name: Brunilda England  MRN: [id number]  Admit Date: 6/5/2023  Admitting Diagnosis:  Left displaced femoral neck fracture    General Precautions: Standard, fall   Orthopedic Precautions: LLE weight bearing as tolerated   Braces: N/A    Recommendations:     Discharge Recommendations:  home health OT  Level of Assistance Recommended at Discharge: 24 hours physical assistance for all ADL's and home management tasks  Discharge Equipment Recommendations: bath bench, bedside commode, hip kit, walker, rolling  Barriers to discharge:  None    Assessment:     Brunilda England is a 89 y.o. female with a medical diagnosis of Left displaced femoral neck fracture .  She presents with performance deficits affecting function are weakness, impaired endurance, impaired self care skills, impaired functional mobility, gait instability, impaired balance, impaired cognition, decreased lower extremity function, decreased safety awareness, pain, impaired cardiopulmonary response to activity, orthopedic precautions.     Pt required emotional support upon Ot's arrival. Pt upset with how the day has gone and unhappy with lunch selection. But she responded well to different food option, extra time, and repositioning.   Rehab Potential is fair    Activity tolerance:  Fair    Plan:     Patient to be seen 5 x/week to address the above listed problems via self-care/home management, therapeutic activities, therapeutic exercises    Plan of Care Expires: 06/22/23  Plan of Care Reviewed with: patient    Subjective     Communicated with: Nurse prior to session. Pt stated, " I don't want a hamburger, I want vegetable soup !" .    Pain/Comfort:  Pain Rating 1:  (pain with movement, pt did not rate)  Location - Side 1: Left  Location - Orientation 1: generalized  Location 1: hip  Pain Addressed 1: Reposition, Distraction, Cessation of Activity, Nurse notified  Pain Rating Post-Intervention 1: 0/10    Patient's cultural, " spiritual, Yazidi conflicts given the current situation:  no    Objective:     Patient found supine with head of bed elevated oxygen upon OT entry to room.    Bed Mobility:    Patient completed Supine to Sit with moderate assistance head of bed elevated, pt also utilized grab bar    Functional Mobility/Transfers:  Patient completed Sit <> Stand Transfer with moderate assistance  with  no assistive device   Patient completed Bedside Commode <> WheelChair Transfer to the right using Stand Pivot and Step Transfer technique with maximal assistance with no assistive device  Patient completed Bed > BSC Transfer Stand Pivot and Step Transfer technique to the right with maximal assistance with  no AD      Activities of Daily Living:  Feeding:  set -up pt sitting in w/c at table  Toileting: maximal assistance using BSC at bedside, pt able to assist with anterior don-hygiene    AMPAC 6 Click ADL: 16        Treatment & Education:y  -Education on importance of OOB activity to improve overall activity tolerance and promote recovery  -Pt educated to call for assistance and to transfer with hospital staff only  -Provided education regarding role of OT, POC, & discharge recommendations with pt  verbalizing understanding.  Pt had no further questions & when asked whether there were any concerns pt reported none.     Patient left up in chair with  family  present    GOALS:   Multidisciplinary Problems       Occupational Therapy Goals          Problem: Occupational Therapy    Goal Priority Disciplines Outcome Interventions   Occupational Therapy Goal     OT, PT/OT Ongoing, Progressing    Description: Goals to be met by: 6/27/23     Patient will increase functional independence with ADLs by performing:    UE Dressing with Set-up Assistance.  LE Dressing with Stand-by Assistance.  Grooming while seated at sink with Modified [place].  Toileting from toilet with Stand-by Assistance for hygiene and clothing management.   Bathing  from W/C SEATED AT SINK with Minimal Assistance.  Toilet transfer to toilet with Contact Guard Assistance.                         Time Tracking:     OT Date of Treatment: 06/15/23  OT Start Time: 1225    OT Stop Time: 1303  OT Total Time (min): 38 min    Billable Minutes:Self Care/Home Management 38    6/15/2023

## 2023-06-15 NOTE — PROGRESS NOTES
[facility] - Skilled Nursing  Adult Nutrition  Progress Note    SUMMARY   Recommendations  Continue diabetic diet, dc boost glucose and beneprotein as pt refuses.Expect weight loss in this post op period.  Goals: PO to meet 75% of EEN/EPN by next RD follow up  Nutrition Goal Status: goal not met  Communication of RD Recs: other (comment) (POC)    Assessment and Plan  Continue diabetic diet, encourage PO intake, assist with set up, RD following    Goals: PO to meet 75% of EEN/EPN by next RD follow up  Nutrition Goal Status: new  Communication of RD Recs: reviewed with physician     Assessment and Plan  Inadequate oral intake in the presence of increased needs related to wound healing as evidenced by post op IM nailing L hip    Continues    Plan  Assist with set up  Carbohydrate modified diet  Collaboration with other providers  Mineral supplement therapy- Mg, Fe      Malnutrition Assessment   6/8     Skin (Micronutrient): wounds unhealed  Hair/Scalp (Micronutrient): dry  Eyes (Micronutrient): conjunctiva dull  Teeth (Micronutrient): edentulous  Neck/Chest (Micronutrient): muscle wasting  Musculoskeletal/Lower Extremities: muscle wasting       Energy Intake (Malnutrition): less than or equal to 50% for greater than or equal to 5 days   Orbital Region (Subcutaneous Fat Loss): mild depletion  Upper Arm Region (Subcutaneous Fat Loss): well nourished  Thoracic and Lumbar Region: well nourished   Orthodoxy Region (Muscle Loss): mild depletion  Clavicle Bone Region (Muscle Loss): moderate depletion  Clavicle and Acromion Bone Region (Muscle Loss): moderate depletion  Patellar Region (Muscle Loss): moderate depletion  Anterior Thigh Region (Muscle Loss): mild depletion  Posterior Calf Region (Muscle Loss): mild depletion                 Reason for Assessment    Reason For Assessment: RD follow-up  Diagnosis:  (Closed left basicervical femoral neck fracture with extension into the intertrochanteric region s/p IM nail on  "2023,[facility])  Relevant Medical History: DM, CAD, AFIB, HLD, HTN, HF, CKD 3, cancer, anemia, dementia, COPD, O2 at home, Osteoporosis  Interdisciplinary Rounds: attended  General Information Comments: patient is not taking the ice cream and boost and making a shake daily as planned. She states tonght that she just does not like it. Order cancelled. Family not in room currently  Nutrition Discharge Planning: DC on diabetic hearth healthy diet    Nutrition/Diet History    Patient Reported Diet/Restrictions/Preferences: general  Typical Food/Fluid Intake: 2 meal, rarely 3, she does suffer from occasional low glucose with symptoms which is why daughter states that neurologists suggests  juice first thing in am before even coffee  Food Preferences: coffee  Spiritual, Cultural Beliefs, Jain Practices, Values that Affect Care: no  Food Allergies: NKFA  Factors Affecting Nutritional Intake: decreased appetite    Anthropometrics    Temp: 97.3 °F (36.3 °C)  Height: 5' 3" (160 cm)  Height (inches): 63 in  Weight Method: Bed Scale  Weight: 67.6 kg (149 lb 0.5 oz)  Weight (lb): 149.03 lb  Ideal Body Weight (IBW), Female: 115 lb  % Ideal Body Weight, Female (lb): 147.43 %  BMI (Calculated): 26.4  BMI Grade: 30 - 34.9- obesity - grade I  Usual Body Weight (UBW), k.7 kg  % Usual Body Weight: 99.18  % Weight Change From Usual Weight: -1.03 %       Lab/Procedures/Meds    Pertinent Labs Reviewed: reviewed  Pertinent Labs Comments: Hg 9.0, Hct 26.7  Pertinent Medications Reviewed: reviewed  Pertinent Medications Comments: famotidine, Fe, Mg    Estimated/Assessed Needs    Weight Used For Calorie Calculations: 76.9 kg (169 lb 8.5 oz)  Energy Calorie Requirements (kcal): 1498  Energy Need Method: [place]-St Jeor (x 1.3(PAL))  Protein Requirements: 78g  Weight Used For Protein Calculations: 77.7 kg (171 lb 4.8 oz) (UBW kg x 1.0(CKD))  Fluid Requirements (mL): 1500 or per MD  Estimated Fluid Requirement Method: RDA Method  RDA " Method (mL): 1498  CHO Requirement: 188g      Nutrition Prescription Ordered    Current Diet Order: 2000 diabetic  Nutrition Order Comments: PO 25%  Oral Nutrition Supplement: refused    Evaluation of Received Nutrient/Fluid Intake    I/O: no data  Energy Calories Required: not meeting needs  Protein Required: not meeting needs  Fluid Required: meeting needs  Comments: family states patient is very stubborn and will only do what she wishes including eating  Tolerance: tolerating  % Intake of Estimated Energy Needs: 25 - 50 %  % Meal Intake: 25 - 50 %    Nutrition Risk    Level of Risk/Frequency of Follow-up: low     Monitor and Evaluation    Food and Nutrient Intake: food and beverage intake  Food and Nutrient Adminstration: diet order  Knowledge/Beliefs/Attitudes: food and nutrition knowledge/skill  Physical Activity and Function: nutrition-related ADLs and IADLs  Anthropometric Measurements: weight change  Biochemical Data, Medical Tests and Procedures: gastrointestinal profile, glucose/endocrine profile, inflammatory profile, electrolyte and renal panel  Nutrition-Focused Physical Findings: overall appearance, skin     Nutrition Follow-Up    RD Follow-up?: Yes

## 2023-06-15 NOTE — PROGRESS NOTES
Ms. England was seen at  today for a post-operative visit after undergoing Open reduction internal fixation left intertrochanteric hip fracture with intramedullary nail by Dr. Cantu on 5/30/2023.      Interval History:  She reports that she is doing well.  Pain is controlled.  She is taking pain medication.    She denies fever, chills, and sweats since the time of the surgery.  She is participating in her therapy sessions.     Physical exam:  Dressing taken down.  Incision is clean, dry and intact.  Closed stratifix and dermabond She has tactile stimulation to their lower leg, she denies calf pain, there is no leg edema and their pedal pulse is palpable x 2.     RADS: none done today: ORDERED     Assessment:  Post-op visit (2 weeks)    Plan:  Current care, treatment plan, precautions, activity level/ modifications, limitations, rehabilitation exercises and proposed future treatment were discussed with the patient. We discussed the need to monitor for changes in symptoms and condition and report them to the physician.  Discussed importance of compliance with all appointments and follow up examinations.     WOUND CARE ORDERS  - The patient was advised to keep the incision clean and dry for the next 24 hours after which she may wash the area with antibacterial soap in the shower. Will not submerge until the incision is completely healed  -Patient was advised to monitor wound closely and multiple times daily for any problems. Call clinic immediately or report to ED for immediate medical attention for any complications including reopening of wound, drainage, purulence, redness, streaking, odor, pain out of proportion, fever, chills, etc.   -- If there are signs of infection, please call Ortho Clinic [phone] for further instructions and to make appt to be seen.           PHYSICAL THERAPY:    - Continue therapy as ordered.        - weight bearing as tolerated         - range of motion as tolerated.      PAIN  MEDICATION:               - Pain medication: refill was not needed,               - Pain medication refill policy provided to patient for review, yes      DVT PROPHYLAXIS:               - eliquis    OTHER:   Ordered x-ray left hip        FOLLOW UP:   - Patient will continue to be seen on SNF weekly until their discharge then he is to return to clinic for follow up.  - Future Appointments:   Future Appointments   Date Time Provider Department Center   6/22/2023  1:30 PM Pepper Whitfield MD [facility] IM [place] PCW   6/29/2023 12:45 PM [facility] OIC-CT1 500 LB LIMIT [facility] IC [facility]   6/29/2023  2:00 PM Adri Rader PA-C [facility] NEUROS8 [place]   7/11/2023 10:45 AM Amauri Henry NP [facility] ORTHO [place] Ort   7/11/2023  3:20 PM Giuseppe Roman MD [facility] ARRHYTH [place]           If there are any questions prior to scheduled follow up, the patient was instructed to contact the office

## 2023-06-15 NOTE — PLAN OF CARE
Continue diabetic diet, encourage PO intake, assist with set up, RD following     Goals: PO to meet 75% of EEN/EPN by next RD follow up  Nutrition Goal Status: new  Communication of RD Recs: reviewed with physician     Assessment and Plan  Inadequate oral intake in the presence of increased needs related to wound healing as evidenced by post op IM nailing L hip     Continues     Plan  Assist with set up  Carbohydrate modified diet  Collaboration with other providers  Mineral supplement therapy- Mg, Fe

## 2023-06-15 NOTE — PT/OT/SLP PROGRESS
"Physical Therapy Treatment    Patient Name:  Brunilda England   MRN:  [id number]  Admit Date: 6/5/2023  Admitting Diagnosis: Left displaced femoral neck fracture  Recent Surgeries: Open reduction internal fixation left intertrochanteric hip fracture with intramedullary nail    General Precautions: Standard, fall  Orthopedic Precautions: LLE weight bearing as tolerated  Braces: N/A    Recommendations:     Discharge Recommendations: home with home health  Level of Assistance Recommended at Discharge: 24 hours significant assistance  Discharge Equipment Recommendations: bath bench, bedside commode, walker, rolling, wheelchair  Barriers to discharge: None    Assessment:     Brunilda England is a 89 y.o. female admitted with a medical diagnosis of Left displaced femoral neck fracture .    Pt was agreeable with mild encouragement. Pt increase total gait distance with RW today, but continues to required increase assistance for safety. Pt requires the most with RW management as pt tends to stand too close/within RW with gait and when turning. Pt is progressing and continues to benefit from therapy to improve functional endurance, strength, and mobility.     Performance deficits affecting function: weakness, impaired endurance, impaired self care skills, impaired functional mobility, gait instability, impaired balance, pain, decreased safety awareness, decreased lower extremity function, orthopedic precautions, decreased ROM, impaired cognition.    Rehab Potential is fair    Activity Tolerance: Fair    Plan:     Patient to be seen 5 x/week to address the above listed problems via therapeutic activities, therapeutic exercises, gait training, neuromuscular re-education    Plan of Care Expires: 07/06/23  Plan of Care Reviewed with: patient, daughter    Subjective     "can you get me some more crackers".     Pain/Comfort:  Pain Rating 1:  (did not rate)  Location - Side 1: Left  Location - Orientation 1: generalized  Location 1: " hip  Pain Addressed 1: Reposition  Pain Rating Post-Intervention 1: 0/10 (at rest)    Patient's cultural, spiritual, Episcopal conflicts given the current situation:  no    Objective:     Patient found  up in wheelchair  with oxygen (2L) upon PT entry to room.     Therapeutic Activities and Exercises:   Seated BLE therex x10 reps: ankle DF/PF, LAQ, marching  Patient educated on role of therapy, goals of session, and benefits of out of bed mobility.   Instructed on use of call button and importance of calling nursing staff for assistance with mobility   Questions/concerns addressed within PTA scope of practice  Pt verbalized understanding.    Functional Mobility:  Bed Mobility:   Sit to Supine: minimum assistance and 2 persons  Assisted with trunk guidance and LE management   Transfers:   Sit <> Stand Transfer: moderate assistance and maximal assistance with rolling walker   Pt did better with BUE pushing from wheelchair  1st/3rd, modA (BUE on wheelchair) and 2nd maxA (RUE only on wheelchair)  Bed < Chair Transfer: moderate assistance with rolling walker using Step Transfer technique (~2ft)  Gait:  Pt ambulated ~22+38ft with moderate assistance (maxA to turn) and rolling walker. Rehab tech following with wheelchair and oxygen towed  1 seated rest breaks & no overt LOB  Gait Deviation(s): occasional unsteady gait with flexed posture, decrease halina, decrease step length, decrease heel strike, and decrease RW management (tends to stand too close/within RW)  Verbal/tactile cues for RW management (to push forward and turning), sequencing, and weight shifting    AM-PAC 6 CLICK MOBILITY  11    Patient left HOB elevated with call button in reach and nurse notified.    GOALS:   Multidisciplinary Problems       Physical Therapy Goals          Problem: Physical Therapy    Goal Priority Disciplines Outcome Goal Variances Interventions   Physical Therapy Goal     PT, PT/OT Ongoing, Progressing     Description: Goals to be met  by: 7/6/23     Patient will increase functional independence with mobility by performing:    . Supine to sit with MInimal Assistance  . Sit to supine with MInimal Assistance  . Rolling to Left and Right with Minimal Assistance.  . Sit to stand transfer with Minimal Assistance  . Bed to chair transfer with Minimal Assistance using Rolling Walker  . Gait  x 30 feet with Moderate Assistance using Rolling Walker.   . Wheelchair propulsion x50 feet with Minimal Assistance using bilateral uppper extremities                         Time Tracking:     PT Received On: 06/15/23  PT Start Time: 1417  PT Stop Time: 1457  PT Total Time (min): 40 min    Billable Minutes: Gait Training 15, Therapeutic Activity 15, and Therapeutic Exercise 10    Treatment Type: Treatment  PT/PTA: PTA     Number of PTA visits since last PT visit: 1     06/15/2023

## 2023-06-16 LAB
POCT GLUCOSE: 112 MG/DL (ref 70–110)
POCT GLUCOSE: 126 MG/DL (ref 70–110)
POCT GLUCOSE: 89 MG/DL (ref 70–110)
POCT GLUCOSE: 98 MG/DL (ref 70–110)

## 2023-06-16 PROCEDURE — 63600175 PHARM REV CODE 636 W HCPCS: Performed by: HOSPITALIST

## 2023-06-16 PROCEDURE — 25000003 PHARM REV CODE 250: Performed by: FAMILY MEDICINE

## 2023-06-16 PROCEDURE — 11000004 HC SNF PRIVATE

## 2023-06-16 PROCEDURE — 25000003 PHARM REV CODE 250: Performed by: HOSPITALIST

## 2023-06-16 PROCEDURE — 97530 THERAPEUTIC ACTIVITIES: CPT

## 2023-06-16 PROCEDURE — 25000003 PHARM REV CODE 250: Performed by: NURSE PRACTITIONER

## 2023-06-16 PROCEDURE — 97535 SELF CARE MNGMENT TRAINING: CPT

## 2023-06-16 RX ORDER — POLYETHYLENE GLYCOL 3350 17 G/17G
17 POWDER, FOR SOLUTION ORAL 2 TIMES DAILY PRN
Status: DISCONTINUED | OUTPATIENT
Start: 2023-06-16 | End: 2023-06-22 | Stop reason: HOSPADM

## 2023-06-16 RX ORDER — AMOXICILLIN 250 MG
1 CAPSULE ORAL 2 TIMES DAILY
Status: DISCONTINUED | OUTPATIENT
Start: 2023-06-16 | End: 2023-06-22 | Stop reason: HOSPADM

## 2023-06-16 RX ADMIN — FERROUS SULFATE TAB 325 MG (65 MG ELEMENTAL FE) 1 EACH: 325 (65 FE) TAB at 09:06

## 2023-06-16 RX ADMIN — Medication 400 MG: at 09:06

## 2023-06-16 RX ADMIN — DONEPEZIL HYDROCHLORIDE 5 MG: 5 TABLET ORAL at 09:06

## 2023-06-16 RX ADMIN — TRAMADOL HYDROCHLORIDE 50 MG: 50 TABLET, COATED ORAL at 02:06

## 2023-06-16 RX ADMIN — ACETAMINOPHEN 1000 MG: 500 TABLET ORAL at 02:06

## 2023-06-16 RX ADMIN — HYDRALAZINE HYDROCHLORIDE 25 MG: 25 TABLET, FILM COATED ORAL at 09:06

## 2023-06-16 RX ADMIN — HYDRALAZINE HYDROCHLORIDE 25 MG: 25 TABLET, FILM COATED ORAL at 02:06

## 2023-06-16 RX ADMIN — METHOCARBAMOL 500 MG: 500 TABLET ORAL at 10:06

## 2023-06-16 RX ADMIN — ACETAMINOPHEN 1000 MG: 500 TABLET ORAL at 09:06

## 2023-06-16 RX ADMIN — ENOXAPARIN SODIUM 40 MG: 40 INJECTION SUBCUTANEOUS at 04:06

## 2023-06-16 RX ADMIN — TRAMADOL HYDROCHLORIDE 50 MG: 50 TABLET, COATED ORAL at 09:06

## 2023-06-16 RX ADMIN — ACETAMINOPHEN 1000 MG: 500 TABLET ORAL at 05:06

## 2023-06-16 RX ADMIN — DULOXETINE 60 MG: 60 CAPSULE, DELAYED RELEASE ORAL at 09:06

## 2023-06-16 RX ADMIN — CYANOCOBALAMIN TAB 1000 MCG 1000 MCG: 1000 TAB at 09:06

## 2023-06-16 RX ADMIN — FAMOTIDINE 20 MG: 20 TABLET, FILM COATED ORAL at 09:06

## 2023-06-16 RX ADMIN — ATORVASTATIN CALCIUM 40 MG: 40 TABLET, FILM COATED ORAL at 09:06

## 2023-06-16 RX ADMIN — METOPROLOL TARTRATE 25 MG: 25 TABLET, FILM COATED ORAL at 09:06

## 2023-06-16 RX ADMIN — HYDRALAZINE HYDROCHLORIDE 25 MG: 25 TABLET, FILM COATED ORAL at 05:06

## 2023-06-16 RX ADMIN — CHOLECALCIFEROL TAB 25 MCG (1000 UNIT) 1000 UNITS: 25 TAB at 09:06

## 2023-06-16 RX ADMIN — Medication 2 TABLET: at 09:06

## 2023-06-16 RX ADMIN — SENNOSIDES AND DOCUSATE SODIUM 1 TABLET: 50; 8.6 TABLET ORAL at 09:06

## 2023-06-16 RX ADMIN — AMLODIPINE BESYLATE AND BENAZEPRIL HYDROCHLORIDE 1 CAPSULE: 5; 20 CAPSULE ORAL at 09:06

## 2023-06-16 NOTE — PROGRESS NOTES
[facility]                                  Skilled Nursing Facility                   Progress Note     Admit Date: 6/5/2023  DUTCH 6/22/2023  Principal Problem:  Left displaced femoral neck fracture   HPI obtained from patient interview and chart review     Chief Complaint: Re-evaluation of medical treatment and therapy status    HPI:   Ms. England is a 89 year old female with PMHx of HTN, paroxysmal atrial fibrillation/flutter, COPD on home oxygen, Chronic diastolic HF, CKD 3a, dementia, history of lung/rectal cancer who presents to SNF following hospitalization for left displaced femoral neck fracture s/p ORIF with intramedullary nail on 05/30/2023 with Dr. Barker.  Admission to SNF for secondary weakness and debility.    Interval history:  24 hr vital sign ranges listed below.  Heart rate improved over last 24 hours to .  Patient denies shortness of breath, abdominal discomfort, nausea, or vomiting.  Patient reports an inadequate appetite.  Patient denies dysuria.  Patient's last bowel movement on 06/14, patient states she feels like she is starting to get constipated, will increase bowel regimen.  Patient progessing slowly with PT/OT- ambulated ~22+38ft with moderate assistance (maxA to turn) and rolling walker. Rehab tech following with wheelchair and oxygen towed. Continuing to follow and treat all acute and chronic conditions.    Past Medical History: Patient has a past medical history of Anal cancer (1995), Anemia, Cancer (1995), Centrilobular emphysema (7/6/2020), Diabetes mellitus, Lumbar radiculopathy (5/16/2014), Lung cancer (2012), Macular degeneration, Macular hemorrhage of left eye, Neuropathy, Osteoporosis, Osteoporosis, unspecified (11/9/2012), and Pure hypercholesterolemia.    Past Surgical History: Patient has a past surgical history that includes Tonsillectomy; Cholecystectomy; Hysterectomy; Bronchoscopy;  Colonoscopy; left leg surgery; heart stent; Fluoroscopic angiography of lower extremity with topical ultrasound (Right, 12/6/2018); Percutaneous transluminal angioplasty (N/A, 12/6/2018); Reconstruction using flap (Right, 12/6/2019); Incisional biopsy (Right, 12/6/2019); and Intramedullary rodding of femur (Left, 5/30/2023).    Social History: Patient reports that she quit smoking about 28 years ago. Her smoking use included cigarettes. She has a 15.00 pack-year smoking history. She has been exposed to tobacco smoke. She has never used smokeless tobacco. She reports that she does not drink alcohol and does not use drugs.    Family History: family history includes Breast cancer in her maternal aunt; Cancer in her father; Stroke in her mother.    Allergies: Patient is allergic to bextra [valdecoxib] and gabapentin.    ROS  Constitutional: Negative for fever   Eyes: Negative for blurred vision, double vision   Respiratory: Negative for cough, shortness of breath   Cardiovascular: Negative for chest pain, palpitations, and leg swelling.   Gastrointestinal: Negative for abdominal pain, diarrhea, nausea, vomiting.  +constipation  Genitourinary: Negative for dysuria, frequency   Musculoskeletal:  + generalized weakness.  Intermittent LLE pain  Skin: Negative for itching and rash.   Neurological: Negative for dizziness, headaches.   Psychiatric/Behavioral: Negative for depression. The patient is not nervous/anxious.      24 hour Vital Sign Range   Temp:  [98.1 °F (36.7 °C)]   Pulse:  []   Resp:  [18]   BP: (119-158)/(60-71)   SpO2:  [100 %]     Current BMI: Body mass index is 26.99 kg/m².    PEx  Constitutional: Patient appears debilitated.  No distress noted  HENT:   Head: Normocephalic and atraumatic.   Eyes: Pupils are equal, round  Neck: Normal range of motion. Neck supple.   Cardiovascular: Normal rate, regular rhythm and normal heart sounds.    Pulmonary/Chest: Effort normal and breath sounds are clear.   Supplemental oxygen in progress  Abdominal: Soft. Bowel sounds are normal.   Musculoskeletal: Normal range of motion.   Neurological: Alert and oriented to person, place, and time.  Impaired higher level thinking  Psychiatric: Normal mood and affect. Behavior is normal.   Skin: Skin is warm and dry.  Surgical dressing to LLE only to be removed by Orthopedics    No results for input(s): GLUCOSE, NA, K, CL, CO2, BUN, CREATININE, MG in the last 24 hours.    Invalid input(s):  CALCIUM        No results for input(s): WBC, RBC, HGB, HCT, PLT, MCV, MCH, MCHC in the last 24 hours.        Recent Labs   Lab 06/15/23  0706 06/15/23  1100 06/15/23  1655 06/15/23  2037 06/16/23  0719 06/16/23  1117   POCTGLUCOSE 98 105 144* 104 89 126*        Assessment and Plan:    Constipation  - increase senna docusate to 1 tab BID, initiate MiraLax BID PRN    Tachy-juan a syndrome  PAF (paroxysmal atrial fibrillation)  - Cardiology wants HR on higher side and okay with slight tachycardia as she has issues with bradycardia on higher doses of Metoprolol.   - Cardiology consulted at [facility] as patient having erratic HRs up to 160 and as low as 40 in hospital. Cardiology states has evidence of tachycardia and bradycardia on ECG but does not meet criteria for tachy-juan a syndrome.   - Cardiology recommends to continue low dose Metoprolol 25 mg po BID to help with tachycardia related to atrial fibrillation. No need for pacemaker at this time.   - Per Cardiology, if patient experiences syncope or develops significant conversion pause >5 sec, send to ED for eval   - patient to follow-up with her cardiologist, Dr. Roman as outpatient.   - Home Eliquis on hold for long term anticoagulation for atrial fibrillation due to SAH as per Neurosurgery. Follow-up in 2 weeks with repeat CTH. If repeat CTH stable, can resume home dose Eliquis.  - Had 30 day Holter monitor in progress on admission with outpatient Cardiologist Dr. Roman end date 6/21 no mention in  cards note, will continue here  - 6/14 Received auto-triggered alert notification for - Atrial Fibrillation/Flutter, 3.3 second pause, Sinus Bradycardia onset. PAC on 6/10/23.  Ecg c/w AF/3.3 pause/junctional rhythm.  Message sent to  to schedule patient to follow-up in EP with Dr. Rincon  - 6/16 HR less labile today    Closed left basicervical femoral neck fracture with extension into the intertrochanteric region   s/p IM nail on 5/30/2023  - PT/OT, WBAT  - DVT PPX with Lovenox 40 mg daily, okayed by NSx  - Ortho DAVIDE to see patient weekly SNF  - maintain surgical dressings until removed by Orthopedics  - follow-up with Ortho after discharge  - dressing removed today    Acute postoperative pain  - stable, continue to hold methocarbamol, continue acetaminophen 1000 mg q.8 hours, tramadol 50 mg q.6 hours PRN. Bowel regimen in place    Subarachnoid hemorrhage following injury, no loss of consciousness  - Patient found on admit to have punctate left parietal SAH that was stable on repeat imaging. Patient neurologically intact.   - Neurosurgery consulted and as per recs:   - No acute neurosurgical intervention  - Hold Eliquis for 2 weeks. Ok for DVT ppx with standard dose Lovenox 40 mg subcutaneous daily.  - SBP <140, Na >135, HOB >30  - Keppra 500 mg po BID x 14 days, end 6/13  - Follow-up in 2 weeks with repeat CTH. If repeat CTH stable, can resume home dose Eliquis.... 6/12- message sent to Neurosurgery that follow-up appointment was made but repeat head CT was not ordered/scheduled  - 6/14 CT now scheduled for 6/29 along with neurosurgery follow-up appointment    Acute blood loss anemia  - continue monitor twice weekly CBCs  - transfuse for hemoglobin < 7  - stable, continue ferrous sulfate daily     Essential hypertension  - stable, Continue Hydralazine 25 mg TID and Amlodipine/Benzapril 5/20 1 tablet daily  - Goal SBP < 140.       Coronary artery disease involving native coronary artery of native heart  without angina pectoris  - Patient with history of remote stents at [facility] in 1990 and 2003.   - stable, continue Lipitor 40 mg daily in place of home non formulary Zocor    Stage 3a chronic kidney disease  - stable, continue to monitor twice weekly BMPs, avoid nephrotoxic agents, renally dose medications when appropriate     Type 2 diabetes mellitus with circulatory disorder, without long-term current use of insulin  - Accu-Cheks AC/HS, diabetic diet  - home regimen with oral Tradjenta  - stable, continue low-dose SSI prn     Chronic diastolic heart failure  - stable, Continue Metoprolol 25 mg BID and Benazepril    - Monitor Is&Os and daily weights.      Centrilobular emphysema  Chronic hypoxemic respiratory failure  History of lung cancer in remission  - stable, Chronic and controlled. Continue home oxygen at 2 liters. Patient with no signs of acute exacerbation.   - Patient not on any inhalers at home.     Neuropathic pain  - continue home duloxetine 60 mg daily      Late onset Alzheimer's dementia with behavioral disturbance  - Patient at cognitive baseline oriented x3  - Continue home Aricept to treat.  - Delirium precautions  - Avoid antihistamines, anticholinergics, hypnotics, and minimize opiates while controlling for pain as these medications may exacerbate delirium. Cues for day/night will assist with keeping patient calm and oriented - during daytime, please keep adequate light in room (open windows, lights on) and please keep room dim at night-time to encourage normal sleep-wake cycles. Continuing to have nursing and family reorient the patient and encourage family to visit    Debility   - Continue with PT/OT for gait training and strengthening and restoration of ADL's   - Encourage mobility, OOB in chair, and early ambulation as appropriate  - Fall precautions   - Monitor for bowel and bladder dysfunction  - Monitor for and prevent skin breakdown and pressure ulcers  - Continue DVT prophylaxis with  Lovenox 40 mg daily        Anticipate disposition:  Home with home health      Follow-up needed during SNF stay-     Appointment not to send patient to- PCP 6/14, 6/22    Follow-up needed after discharge from SNF: PCP, EP, (7/11), neurosurgery (6/29), orthopedics (6/11)    Future Appointments   Date Time Provider Department Center   6/22/2023  1:30 PM Pepper Whitfield MD [facility] IM [facility] PCW   6/29/2023 12:45 PM [facility] OIC-CT1 500 LB LIMIT [facility] IC [facility]   6/29/2023  2:00 PM Adri Rader PA-C [facility] NEUROS8 [facility]   7/11/2023 10:45 AM Amauri Henry NP [facility] ORTHO [facility] Ort   7/11/2023  2:45 PM EKG, APPT [facility] EKG [facility]   7/11/2023  3:20 PM Giuseppe Roman MD [facility] ARRHYTH [facility]       Sarah Liang NP  Department of Hospital Medicine   [facility]     DOS: 6/16/2023       Patient note was created using MModal Dictation.  Any errors in syntax or even information may not have been identified and edited on initial review prior to signing this note.

## 2023-06-16 NOTE — PLAN OF CARE
Problem: Adult Inpatient Plan of Care  Goal: Plan of Care Review  Outcome: Ongoing, Progressing     Problem: Adult Inpatient Plan of Care  Goal: Readiness for Transition of Care  Outcome: Ongoing, Progressing     Problem: Adult Inpatient Plan of Care  Goal: Optimal Comfort and Wellbeing  Outcome: Ongoing, Progressing     Problem: Adult Inpatient Plan of Care  Goal: Absence of Hospital-Acquired Illness or Injury  Outcome: Ongoing, Progressing

## 2023-06-16 NOTE — PLAN OF CARE
Family Training     Patient Name:  Brunilda England   MRN:  [id number]  Admit Date: 6/5/2023      Rehab tech called to schedule family training this date. Pt's family/caregiver agreeable to attend training  Monday, 6/19/2023 at 1:30pm with daughter.     6/16/2023

## 2023-06-16 NOTE — PLAN OF CARE
Problem: Occupational Therapy  Goal: Occupational Therapy Goal  Description: Goals to be met by: 6/27/23     Patient will increase functional independence with ADLs by performing:    UE Dressing with Set-up Assistance.  LE Dressing with Stand-by Assistance.  Grooming while seated at sink with Modified [place].  Toileting from toilet with Stand-by Assistance for hygiene and clothing management.   Bathing from W/C SEATED AT SINK with Minimal Assistance.  Toilet transfer to toilet with Contact Guard Assistance.    Outcome: Ongoing, Progressing     Goals remain appropriate. Cont POC.

## 2023-06-16 NOTE — PT/OT/SLP PROGRESS
Occupational Therapy   Treatment    Name: Brunilda England  MRN: [id number]  Admit Date: 6/5/2023  Admitting Diagnosis:  Left displaced femoral neck fracture    General Precautions: Standard, fall   Orthopedic Precautions: LLE weight bearing as tolerated   Braces: N/A    Recommendations:     Discharge Recommendations:  home health OT  Level of Assistance Recommended at Discharge: 24 hours physical assistance for all ADL's and home management tasks  Discharge Equipment Recommendations: bath bench, bedside commode, hip kit, walker, rolling  Barriers to discharge:  None    Assessment:     Brunilda England is a 89 y.o. female with a medical diagnosis of Left displaced femoral neck fracture.  She presents with performance deficits affecting function are weakness, impaired endurance, impaired self care skills, impaired functional mobility, gait instability, impaired balance, decreased upper extremity function, decreased lower extremity function, decreased safety awareness, pain, decreased ROM, orthopedic precautions. Pt agreeable to OT treatment. Pt worked on overall activity tolerance by performing bed mobility, multiple transfers, toileting, bathing and dressing tasks. Pt requiring multiple rest breaks after transfers and mobility. Pt participates well and is motivated to regain functional independence in mobility and self care.      Rehab Potential is good    Activity tolerance:  Good    Plan:     Patient to be seen 5 x/week to address the above listed problems via self-care/home management, therapeutic activities, therapeutic exercises    Plan of Care Expires: 06/22/23  Plan of Care Reviewed with: patient    Subjective     Communicated with: RN prior to session.     Pain/Comfort:  Pain Rating 1:  (did not rate)  Location - Side 1: Left  Location - Orientation 1: generalized  Location 1: hip  Pain Addressed 1: Reposition, Distraction  Pain Rating Post-Intervention 1:  (did not rate; also c/o L calf pain, RN  notified)    Patient's cultural, spiritual, Restorationist conflicts given the current situation:  no    Objective:     Patient found HOB elevated with oxygen upon OT entry to room.    Bed Mobility:    Patient completed Supine to Sit with moderate assistance     Functional Mobility/Transfers:  Patient completed Sit <> Stand Transfer with maximal assistance  with  no assistive device   Patient completed Bed <> Chair Transfer using Step Transfer technique with maximal assistance with no assistive device  Patient completed Toilet Transfer Step Transfer technique with maximal assistance with  no AD and BSC over toilet  Functional Mobility: did not occur    Activities of Daily Living:  Grooming: stand by assistance sitting in w/c at sink, washing face  Bathing: maximal assistance bathing, seated on BSC over toilet; most assist with backside and lower legs  Upper Body Dressing: stand-by assistance scrub top  Lower Body Dressing: maximal assistance brief, underwear, pants, slip on shoes  Toileting: minimum assistance urination at BSC over toilet    AMPAC 6 Click ADL: 16    Treatment & Education:  Pt edu re OT role, POC and safety.  Pt performed the above ADLs.  Pt performed sit to stand multiple times: from EOB to w/c, w/c to BSC over toilet; from BSC x5 during toileting, bathing and dressing tasks; from BSC to w/c = total 8 STS t/f's.    Patient left  sitting in w/c  with call button in reach    GOALS:   Multidisciplinary Problems       Occupational Therapy Goals          Problem: Occupational Therapy    Goal Priority Disciplines Outcome Interventions   Occupational Therapy Goal     OT, PT/OT Ongoing, Progressing    Description: Goals to be met by: 6/27/23     Patient will increase functional independence with ADLs by performing:    UE Dressing with Set-up Assistance.  LE Dressing with Stand-by Assistance.  Grooming while seated at sink with Modified [place].  Toileting from toilet with Stand-by Assistance for hygiene and  clothing management.   Bathing from W/C SEATED AT SINK with Minimal Assistance.  Toilet transfer to toilet with Contact Guard Assistance.                         Time Tracking:     OT Date of Treatment: 06/16/23  OT Start Time: 0907    OT Stop Time: 1008  OT Total Time (min): 61 min    Billable Minutes:Self Care/Home Management 40 minutes  Therapeutic Activity 21 minutes    ALISE Grimm  6/16/2023  Pager: [phone]

## 2023-06-17 LAB
POCT GLUCOSE: 109 MG/DL (ref 70–110)
POCT GLUCOSE: 123 MG/DL (ref 70–110)
POCT GLUCOSE: 89 MG/DL (ref 70–110)
POCT GLUCOSE: 98 MG/DL (ref 70–110)

## 2023-06-17 PROCEDURE — 25000003 PHARM REV CODE 250: Performed by: NURSE PRACTITIONER

## 2023-06-17 PROCEDURE — 25000003 PHARM REV CODE 250: Performed by: FAMILY MEDICINE

## 2023-06-17 PROCEDURE — 25000003 PHARM REV CODE 250: Performed by: HOSPITALIST

## 2023-06-17 PROCEDURE — 11000004 HC SNF PRIVATE

## 2023-06-17 PROCEDURE — 63600175 PHARM REV CODE 636 W HCPCS: Performed by: HOSPITALIST

## 2023-06-17 RX ADMIN — HYDRALAZINE HYDROCHLORIDE 25 MG: 25 TABLET, FILM COATED ORAL at 06:06

## 2023-06-17 RX ADMIN — ACETAMINOPHEN 1000 MG: 500 TABLET ORAL at 05:06

## 2023-06-17 RX ADMIN — ACETAMINOPHEN 1000 MG: 500 TABLET ORAL at 01:06

## 2023-06-17 RX ADMIN — CHOLECALCIFEROL TAB 25 MCG (1000 UNIT) 1000 UNITS: 25 TAB at 09:06

## 2023-06-17 RX ADMIN — ATORVASTATIN CALCIUM 40 MG: 40 TABLET, FILM COATED ORAL at 09:06

## 2023-06-17 RX ADMIN — HYDRALAZINE HYDROCHLORIDE 25 MG: 25 TABLET, FILM COATED ORAL at 10:06

## 2023-06-17 RX ADMIN — ENOXAPARIN SODIUM 40 MG: 40 INJECTION SUBCUTANEOUS at 04:06

## 2023-06-17 RX ADMIN — AMLODIPINE BESYLATE AND BENAZEPRIL HYDROCHLORIDE 1 CAPSULE: 5; 20 CAPSULE ORAL at 09:06

## 2023-06-17 RX ADMIN — DULOXETINE 60 MG: 60 CAPSULE, DELAYED RELEASE ORAL at 09:06

## 2023-06-17 RX ADMIN — Medication 2 TABLET: at 09:06

## 2023-06-17 RX ADMIN — DONEPEZIL HYDROCHLORIDE 5 MG: 5 TABLET ORAL at 10:06

## 2023-06-17 RX ADMIN — HYDRALAZINE HYDROCHLORIDE 25 MG: 25 TABLET, FILM COATED ORAL at 01:06

## 2023-06-17 RX ADMIN — METOPROLOL TARTRATE 25 MG: 25 TABLET, FILM COATED ORAL at 09:06

## 2023-06-17 RX ADMIN — SENNOSIDES AND DOCUSATE SODIUM 1 TABLET: 50; 8.6 TABLET ORAL at 09:06

## 2023-06-17 RX ADMIN — FERROUS SULFATE TAB 325 MG (65 MG ELEMENTAL FE) 1 EACH: 325 (65 FE) TAB at 09:06

## 2023-06-17 RX ADMIN — ACETAMINOPHEN 1000 MG: 500 TABLET ORAL at 10:06

## 2023-06-17 RX ADMIN — FAMOTIDINE 20 MG: 20 TABLET, FILM COATED ORAL at 09:06

## 2023-06-17 RX ADMIN — METOPROLOL TARTRATE 25 MG: 25 TABLET, FILM COATED ORAL at 10:06

## 2023-06-17 RX ADMIN — CYANOCOBALAMIN TAB 1000 MCG 1000 MCG: 1000 TAB at 09:06

## 2023-06-17 RX ADMIN — SENNOSIDES AND DOCUSATE SODIUM 1 TABLET: 50; 8.6 TABLET ORAL at 10:06

## 2023-06-17 NOTE — PLAN OF CARE
Problem: Adult Inpatient Plan of Care  Goal: Plan of Care Review  Outcome: Ongoing, Progressing  Flowsheets (Taken 6/17/2023 1304)  Plan of Care Reviewed With: patient     Problem: Diabetes Comorbidity  Goal: Blood Glucose Level Within Targeted Range  Outcome: Ongoing, Progressing     Problem: Fluid Imbalance (Pneumonia)  Goal: Fluid Balance  Outcome: Ongoing, Progressing     Problem: Infection (Pneumonia)  Goal: Resolution of Infection Signs and Symptoms  Outcome: Ongoing, Progressing     Problem: Respiratory Compromise (Pneumonia)  Goal: Effective Oxygenation and Ventilation  Outcome: Ongoing, Progressing     Problem: Skin Injury Risk Increased  Goal: Skin Health and Integrity  Outcome: Ongoing, Progressing     Problem: Fall Injury Risk  Goal: Absence of Fall and Fall-Related Injury  Outcome: Ongoing, Progressing     Ms Tomás remains AAO; pt is sitting up in bed with HOB elevated. Pt is eating a poboy that was supplied per family. No distress noted. Oxygen therapy is in progress at 1 L/min via nasal cannula. Safety measures maintained. Call light is within reach. Will continue with plan of care.

## 2023-06-17 NOTE — NURSING
Supper delivered to bedside. Ms England stated that she did not feel like eating at this time; she was still full from lunch. Pt informed that her tray will be held and to let staff know when she wanted her food reheated. Will continue to monitor.

## 2023-06-17 NOTE — NURSING
Ms England received verbal teaching and demonstration on how to administer Lovenox injection. Procedure tolerated well.

## 2023-06-18 LAB
POCT GLUCOSE: 110 MG/DL (ref 70–110)
POCT GLUCOSE: 127 MG/DL (ref 70–110)
POCT GLUCOSE: 85 MG/DL (ref 70–110)
POCT GLUCOSE: 98 MG/DL (ref 70–110)

## 2023-06-18 PROCEDURE — 97116 GAIT TRAINING THERAPY: CPT | Mod: CQ

## 2023-06-18 PROCEDURE — 97110 THERAPEUTIC EXERCISES: CPT | Mod: CQ

## 2023-06-18 PROCEDURE — 25000003 PHARM REV CODE 250: Performed by: NURSE PRACTITIONER

## 2023-06-18 PROCEDURE — 63600175 PHARM REV CODE 636 W HCPCS: Performed by: HOSPITALIST

## 2023-06-18 PROCEDURE — 94761 N-INVAS EAR/PLS OXIMETRY MLT: CPT

## 2023-06-18 PROCEDURE — 11000004 HC SNF PRIVATE

## 2023-06-18 PROCEDURE — 25000003 PHARM REV CODE 250: Performed by: HOSPITALIST

## 2023-06-18 PROCEDURE — 99900035 HC TECH TIME PER 15 MIN (STAT)

## 2023-06-18 PROCEDURE — 27000221 HC OXYGEN, UP TO 24 HOURS

## 2023-06-18 PROCEDURE — 97530 THERAPEUTIC ACTIVITIES: CPT | Mod: CQ

## 2023-06-18 RX ADMIN — METOPROLOL TARTRATE 25 MG: 25 TABLET, FILM COATED ORAL at 09:06

## 2023-06-18 RX ADMIN — Medication 6 MG: at 10:06

## 2023-06-18 RX ADMIN — FERROUS SULFATE TAB 325 MG (65 MG ELEMENTAL FE) 1 EACH: 325 (65 FE) TAB at 09:06

## 2023-06-18 RX ADMIN — HYDRALAZINE HYDROCHLORIDE 25 MG: 25 TABLET, FILM COATED ORAL at 01:06

## 2023-06-18 RX ADMIN — ATORVASTATIN CALCIUM 40 MG: 40 TABLET, FILM COATED ORAL at 09:06

## 2023-06-18 RX ADMIN — HYDRALAZINE HYDROCHLORIDE 25 MG: 25 TABLET, FILM COATED ORAL at 10:06

## 2023-06-18 RX ADMIN — Medication 2 TABLET: at 09:06

## 2023-06-18 RX ADMIN — DONEPEZIL HYDROCHLORIDE 5 MG: 5 TABLET ORAL at 10:06

## 2023-06-18 RX ADMIN — ACETAMINOPHEN 1000 MG: 500 TABLET ORAL at 01:06

## 2023-06-18 RX ADMIN — CYANOCOBALAMIN TAB 1000 MCG 1000 MCG: 1000 TAB at 09:06

## 2023-06-18 RX ADMIN — HYDRALAZINE HYDROCHLORIDE 25 MG: 25 TABLET, FILM COATED ORAL at 05:06

## 2023-06-18 RX ADMIN — CHOLECALCIFEROL TAB 25 MCG (1000 UNIT) 1000 UNITS: 25 TAB at 09:06

## 2023-06-18 RX ADMIN — DULOXETINE 60 MG: 60 CAPSULE, DELAYED RELEASE ORAL at 09:06

## 2023-06-18 RX ADMIN — ENOXAPARIN SODIUM 40 MG: 40 INJECTION SUBCUTANEOUS at 04:06

## 2023-06-18 RX ADMIN — ACETAMINOPHEN 1000 MG: 500 TABLET ORAL at 05:06

## 2023-06-18 RX ADMIN — HYPROMELLOSE 2910 1 DROP: 5 SOLUTION/ DROPS OPHTHALMIC at 12:06

## 2023-06-18 RX ADMIN — ACETAMINOPHEN 1000 MG: 500 TABLET ORAL at 09:06

## 2023-06-18 RX ADMIN — SENNOSIDES AND DOCUSATE SODIUM 1 TABLET: 50; 8.6 TABLET ORAL at 09:06

## 2023-06-18 RX ADMIN — AMLODIPINE BESYLATE AND BENAZEPRIL HYDROCHLORIDE 1 CAPSULE: 5; 20 CAPSULE ORAL at 09:06

## 2023-06-18 RX ADMIN — FAMOTIDINE 20 MG: 20 TABLET, FILM COATED ORAL at 09:06

## 2023-06-18 NOTE — PT/OT/SLP PROGRESS
"Physical Therapy Treatment    Patient Name:  Brunilda England   MRN:  [id number]  Admit Date: 6/5/2023  Admitting Diagnosis: Left displaced femoral neck fracture  Recent Surgeries:     General Precautions: Standard, fall  Orthopedic Precautions: LLE weight bearing as tolerated  Braces: N/A    Recommendations:     Discharge Recommendations: home with home health  Level of Assistance Recommended at Discharge: 24 hours significant assistance  Discharge Equipment Recommendations: bedside commode, bath bench, hip kit, walker, rolling  Barriers to discharge: None    Assessment:     Brunilda England is a 89 y.o. female admitted with a medical diagnosis of Left displaced femoral neck fracture . Patient showed inconsistent ability to transfer and assistance during gait training. Patient ambulates with decreased L LE stance, base of support and clearance.      Performance deficits affecting function: weakness, impaired endurance, impaired self care skills, impaired functional mobility, gait instability, impaired balance, decreased lower extremity function, decreased safety awareness, pain, decreased ROM, impaired skin, edema, impaired cardiopulmonary response to activity, orthopedic precautions.    Rehab Potential is good    Activity Tolerance: Fair    Plan:     Patient to be seen 5 x/week to address the above listed problems via gait training, therapeutic activities, therapeutic exercises, neuromuscular re-education    Plan of Care Expires: 07/06/23  Plan of Care Reviewed with: patient, daughter    Subjective     Patient states " I think tomorrow they are going to let me go home".     Pain/Comfort:  Pain Rating 1:  (Did not rate)  Location - Side 1: Left  Location - Orientation 1: generalized  Location 1: hip  Pain Addressed 1: Reposition, Distraction  Pain Rating Post-Intervention 1:  (Did not rate)    Patient's cultural, spiritual, Mu-ism conflicts given the current situation:  no    Objective:     Communicated with [facility] prior " to session.  Patient found HOB elevated with oxygen upon PT entry to room.     Therapeutic Activities and Exercises: Patient assisted getting out of bed and transferring from bed to wheelchair with RW and min assistance. LE Ergometer x 12 minutes with rest breaks.    Functional Mobility:  Bed Mobility:     Rolling Right: minimum assistance  Scooting: minimum assistance  Supine to Sit: minimum assistance  Transfers:     Sit to Stand:  minimum assistance and moderate assistance with rolling walker  Bed to Chair: minimum assistance with  rolling walker  using  Stand Pivot  Gait: 42 ft and 10 ft with RW and min to mod assistance with RW, with L LE WBAT, 2L O2 and w/c follow by tech.  Wheelchair Propulsion:  Pt propelled Standard wheelchair x 12 feet on Level tile with  Right upper extremity and Left upper extremity with Minimal Assistance.     AM-PAC 6 CLICK MOBILITY  14    Patient left up in chair with all lines intact, call button in reach, and daughter present.    GOALS:   Multidisciplinary Problems       Physical Therapy Goals          Problem: Physical Therapy    Goal Priority Disciplines Outcome Goal Variances Interventions   Physical Therapy Goal     PT, PT/OT Ongoing, Progressing     Description: Goals to be met by: 7/6/23     Patient will increase functional independence with mobility by performing:    . Supine to sit with MInimal Assistance  . Sit to supine with MInimal Assistance  . Rolling to Left and Right with Minimal Assistance.  . Sit to stand transfer with Minimal Assistance  . Bed to chair transfer with Minimal Assistance using Rolling Walker  . Gait  x 30 feet with Moderate Assistance using Rolling Walker.   . Wheelchair propulsion x50 feet with Minimal Assistance using bilateral uppper extremities                         Time Tracking:     PT Received On: 06/18/23  PT Start Time: 1116  PT Stop Time: 1210  PT Total Time (min): 54 min    Billable Minutes: Gait Training 15, Therapeutic Activity 24, and  Therapeutic Exercise 15    Treatment Type: Treatment  PT/PTA: PTA     Number of PTA visits since last PT visit: 2     06/18/2023

## 2023-06-18 NOTE — PLAN OF CARE
Problem: Adult Inpatient Plan of Care  Goal: Plan of Care Review  Outcome: Ongoing, Progressing  Flowsheets (Taken 6/18/2023 1763)  Plan of Care Reviewed With:   patient   daughter     Problem: Diabetes Comorbidity  Goal: Blood Glucose Level Within Targeted Range  Outcome: Ongoing, Progressing     Problem: Fluid Imbalance (Pneumonia)  Goal: Fluid Balance  Outcome: Ongoing, Progressing     Problem: Infection (Pneumonia)  Goal: Resolution of Infection Signs and Symptoms  Outcome: Ongoing, Progressing     Problem: Respiratory Compromise (Pneumonia)  Goal: Effective Oxygenation and Ventilation  Outcome: Ongoing, Progressing     Problem: Skin Injury Risk Increased  Goal: Skin Health and Integrity  Outcome: Ongoing, Progressing     Problem: Fall Injury Risk  Goal: Absence of Fall and Fall-Related Injury  Outcome: Ongoing, Progressing

## 2023-06-19 LAB
ANION GAP SERPL CALC-SCNC: 9 MMOL/L (ref 8–16)
BACTERIA #/AREA URNS AUTO: ABNORMAL /HPF
BASOPHILS # BLD AUTO: 0.03 K/UL (ref 0–0.2)
BASOPHILS NFR BLD: 0.9 % (ref 0–1.9)
BILIRUB UR QL STRIP: NEGATIVE
BUN SERPL-MCNC: 10 MG/DL (ref 10–30)
CALCIUM SERPL-MCNC: 9.8 MG/DL (ref 8.7–10.5)
CHLORIDE SERPL-SCNC: 103 MMOL/L (ref 95–110)
CLARITY UR REFRACT.AUTO: ABNORMAL
CO2 SERPL-SCNC: 25 MMOL/L (ref 23–29)
COLOR UR AUTO: YELLOW
CREAT SERPL-MCNC: 0.8 MG/DL (ref 0.5–1.4)
DIFFERENTIAL METHOD: ABNORMAL
EOSINOPHIL # BLD AUTO: 0 K/UL (ref 0–0.5)
EOSINOPHIL NFR BLD: 0.9 % (ref 0–8)
ERYTHROCYTE [DISTWIDTH] IN BLOOD BY AUTOMATED COUNT: 17.4 % (ref 11.5–14.5)
EST. GFR  (NO RACE VARIABLE): >60 ML/MIN/1.73 M^2
GLUCOSE SERPL-MCNC: 92 MG/DL (ref 70–110)
GLUCOSE UR QL STRIP: NEGATIVE
HCT VFR BLD AUTO: 25.6 % (ref 37–48.5)
HGB BLD-MCNC: 8.6 G/DL (ref 12–16)
HGB UR QL STRIP: NEGATIVE
IMM GRANULOCYTES # BLD AUTO: 0.02 K/UL (ref 0–0.04)
IMM GRANULOCYTES NFR BLD AUTO: 0.6 % (ref 0–0.5)
KETONES UR QL STRIP: ABNORMAL
LEUKOCYTE ESTERASE UR QL STRIP: ABNORMAL
LYMPHOCYTES # BLD AUTO: 0.9 K/UL (ref 1–4.8)
LYMPHOCYTES NFR BLD: 26.8 % (ref 18–48)
MAGNESIUM SERPL-MCNC: 1.6 MG/DL (ref 1.6–2.6)
MCH RBC QN AUTO: 29.1 PG (ref 27–31)
MCHC RBC AUTO-ENTMCNC: 33.6 G/DL (ref 32–36)
MCV RBC AUTO: 87 FL (ref 82–98)
MICROSCOPIC COMMENT: ABNORMAL
MONOCYTES # BLD AUTO: 0.5 K/UL (ref 0.3–1)
MONOCYTES NFR BLD: 13.5 % (ref 4–15)
NEUTROPHILS # BLD AUTO: 2 K/UL (ref 1.8–7.7)
NEUTROPHILS NFR BLD: 57.3 % (ref 38–73)
NITRITE UR QL STRIP: NEGATIVE
NRBC BLD-RTO: 0 /100 WBC
PH UR STRIP: 6 [PH] (ref 5–8)
PHOSPHATE SERPL-MCNC: 2.8 MG/DL (ref 2.7–4.5)
PLATELET # BLD AUTO: 236 K/UL (ref 150–450)
PMV BLD AUTO: 11.7 FL (ref 9.2–12.9)
POCT GLUCOSE: 101 MG/DL (ref 70–110)
POCT GLUCOSE: 123 MG/DL (ref 70–110)
POCT GLUCOSE: 83 MG/DL (ref 70–110)
POCT GLUCOSE: 92 MG/DL (ref 70–110)
POTASSIUM SERPL-SCNC: 3.7 MMOL/L (ref 3.5–5.1)
PROT UR QL STRIP: ABNORMAL
RBC # BLD AUTO: 2.96 M/UL (ref 4–5.4)
RBC #/AREA URNS AUTO: 4 /HPF (ref 0–4)
SODIUM SERPL-SCNC: 137 MMOL/L (ref 136–145)
SP GR UR STRIP: 1.01 (ref 1–1.03)
SQUAMOUS #/AREA URNS AUTO: 7 /HPF
URN SPEC COLLECT METH UR: ABNORMAL
WBC # BLD AUTO: 3.4 K/UL (ref 3.9–12.7)
WBC #/AREA URNS AUTO: 27 /HPF (ref 0–5)

## 2023-06-19 PROCEDURE — 83735 ASSAY OF MAGNESIUM: CPT | Performed by: HOSPITALIST

## 2023-06-19 PROCEDURE — 80048 BASIC METABOLIC PNL TOTAL CA: CPT | Performed by: HOSPITALIST

## 2023-06-19 PROCEDURE — 25000003 PHARM REV CODE 250: Performed by: NURSE PRACTITIONER

## 2023-06-19 PROCEDURE — 97535 SELF CARE MNGMENT TRAINING: CPT | Mod: CO

## 2023-06-19 PROCEDURE — 25000003 PHARM REV CODE 250: Performed by: HOSPITALIST

## 2023-06-19 PROCEDURE — 87186 SC STD MICRODIL/AGAR DIL: CPT | Performed by: NURSE PRACTITIONER

## 2023-06-19 PROCEDURE — 87077 CULTURE AEROBIC IDENTIFY: CPT | Performed by: NURSE PRACTITIONER

## 2023-06-19 PROCEDURE — 63600175 PHARM REV CODE 636 W HCPCS: Performed by: HOSPITALIST

## 2023-06-19 PROCEDURE — 97530 THERAPEUTIC ACTIVITIES: CPT | Mod: CQ

## 2023-06-19 PROCEDURE — 97530 THERAPEUTIC ACTIVITIES: CPT | Mod: CO

## 2023-06-19 PROCEDURE — 94761 N-INVAS EAR/PLS OXIMETRY MLT: CPT

## 2023-06-19 PROCEDURE — 25000003 PHARM REV CODE 250: Performed by: INTERNAL MEDICINE

## 2023-06-19 PROCEDURE — 36415 COLL VENOUS BLD VENIPUNCTURE: CPT | Performed by: HOSPITALIST

## 2023-06-19 PROCEDURE — 85025 COMPLETE CBC W/AUTO DIFF WBC: CPT | Performed by: HOSPITALIST

## 2023-06-19 PROCEDURE — 11000004 HC SNF PRIVATE

## 2023-06-19 PROCEDURE — 87086 URINE CULTURE/COLONY COUNT: CPT | Performed by: NURSE PRACTITIONER

## 2023-06-19 PROCEDURE — 84100 ASSAY OF PHOSPHORUS: CPT | Performed by: HOSPITALIST

## 2023-06-19 PROCEDURE — 87088 URINE BACTERIA CULTURE: CPT | Performed by: NURSE PRACTITIONER

## 2023-06-19 PROCEDURE — 81001 URINALYSIS AUTO W/SCOPE: CPT | Performed by: NURSE PRACTITIONER

## 2023-06-19 RX ORDER — CEFPODOXIME PROXETIL 200 MG/1
200 TABLET, FILM COATED ORAL EVERY 12 HOURS
Status: DISCONTINUED | OUTPATIENT
Start: 2023-06-19 | End: 2023-06-22 | Stop reason: HOSPADM

## 2023-06-19 RX ORDER — QUETIAPINE FUMARATE 25 MG/1
25 TABLET, FILM COATED ORAL NIGHTLY PRN
Status: DISCONTINUED | OUTPATIENT
Start: 2023-06-19 | End: 2023-06-20

## 2023-06-19 RX ADMIN — HYDRALAZINE HYDROCHLORIDE 25 MG: 25 TABLET, FILM COATED ORAL at 02:06

## 2023-06-19 RX ADMIN — HYDRALAZINE HYDROCHLORIDE 25 MG: 25 TABLET, FILM COATED ORAL at 09:06

## 2023-06-19 RX ADMIN — ACETAMINOPHEN 1000 MG: 500 TABLET ORAL at 02:06

## 2023-06-19 RX ADMIN — ACETAMINOPHEN 1000 MG: 500 TABLET ORAL at 05:06

## 2023-06-19 RX ADMIN — QUETIAPINE FUMARATE 25 MG: 25 TABLET ORAL at 09:06

## 2023-06-19 RX ADMIN — ENOXAPARIN SODIUM 40 MG: 40 INJECTION SUBCUTANEOUS at 05:06

## 2023-06-19 RX ADMIN — Medication 6 MG: at 09:06

## 2023-06-19 RX ADMIN — FAMOTIDINE 20 MG: 20 TABLET, FILM COATED ORAL at 09:06

## 2023-06-19 RX ADMIN — DULOXETINE 60 MG: 60 CAPSULE, DELAYED RELEASE ORAL at 09:06

## 2023-06-19 RX ADMIN — SENNOSIDES AND DOCUSATE SODIUM 1 TABLET: 50; 8.6 TABLET ORAL at 09:06

## 2023-06-19 RX ADMIN — CHOLECALCIFEROL TAB 25 MCG (1000 UNIT) 1000 UNITS: 25 TAB at 09:06

## 2023-06-19 RX ADMIN — FERROUS SULFATE TAB 325 MG (65 MG ELEMENTAL FE) 1 EACH: 325 (65 FE) TAB at 09:06

## 2023-06-19 RX ADMIN — ACETAMINOPHEN 1000 MG: 500 TABLET ORAL at 09:06

## 2023-06-19 RX ADMIN — ATORVASTATIN CALCIUM 40 MG: 40 TABLET, FILM COATED ORAL at 09:06

## 2023-06-19 RX ADMIN — HYPROMELLOSE 2910 1 DROP: 5 SOLUTION/ DROPS OPHTHALMIC at 09:06

## 2023-06-19 RX ADMIN — METOPROLOL TARTRATE 25 MG: 25 TABLET, FILM COATED ORAL at 09:06

## 2023-06-19 RX ADMIN — AMLODIPINE BESYLATE AND BENAZEPRIL HYDROCHLORIDE 1 CAPSULE: 5; 20 CAPSULE ORAL at 09:06

## 2023-06-19 RX ADMIN — Medication 2 TABLET: at 09:06

## 2023-06-19 RX ADMIN — CEFPODOXIME PROXETIL 200 MG: 200 TABLET, FILM COATED ORAL at 09:06

## 2023-06-19 RX ADMIN — DONEPEZIL HYDROCHLORIDE 5 MG: 5 TABLET ORAL at 09:06

## 2023-06-19 RX ADMIN — HYDRALAZINE HYDROCHLORIDE 25 MG: 25 TABLET, FILM COATED ORAL at 05:06

## 2023-06-19 RX ADMIN — CYANOCOBALAMIN TAB 1000 MCG 1000 MCG: 1000 TAB at 09:06

## 2023-06-19 NOTE — PT/OT/SLP PROGRESS
"Physical Therapy Treatment    Patient Name:  Brunilda England   MRN:  [id number]  Admit Date: 6/5/2023  Admitting Diagnosis: Left displaced femoral neck fracture  Recent Surgeries:     General Precautions: Standard, fall  Orthopedic Precautions: LLE weight bearing as tolerated  Braces: N/A    Recommendations:     Discharge Recommendations: home with home health  Level of Assistance Recommended at Discharge: 24 hours significant assistance  Discharge Equipment Recommendations: bedside commode, bath bench, hip kit, walker, rolling  Barriers to discharge: None    Assessment:     Brunilda England is a 89 y.o. female admitted with a medical diagnosis of Left displaced femoral neck fracture . Pt tolerated well, family trng today with 2 daughters on safety/tech with level of A required upon d/c,pt would continue to benefit from skilled PT services to improve overall functional mobility, strength and endurance.  .      Performance deficits affecting function: weakness, impaired endurance, impaired self care skills, impaired functional mobility, gait instability, impaired balance, decreased lower extremity function, decreased safety awareness, pain, decreased ROM, impaired skin, edema, impaired cardiopulmonary response to activity, orthopedic precautions.    Rehab Potential is good    Activity Tolerance: Fair    Plan:     Patient to be seen 5 x/week to address the above listed problems via gait training, therapeutic activities, therapeutic exercises, neuromuscular re-education    Plan of Care Expires: 07/06/23  Plan of Care Reviewed with: patient, daughter    Subjective     "OK, little tired" "I'm gonna do what I can" agreeable to therapy.     Pain/Comfort:  Pain Rating 1:  (did not rate 0 at rest, inc with mobility)  Location - Side 1: Left  Location - Orientation 1: generalized  Location 1: hip  Pain Addressed 1: Reposition, Distraction, Cessation of Activity (un sure if pre med)  Pain Rating Post-Intervention 1:  (did not rate " , dec with rest)    Patient's cultural, spiritual, Methodist conflicts given the current situation:  no    Objective:     .  Patient found with oxygen (2 daug presnt for trng) upon PT entry to room.     Therapeutic Activities and Exercises: ed/demo seated therex x 10 reps   Patient and family educated on safety/proper tech with overall functional mobility, to include bed mob with OT, trfs, gait, therex, pain free ROM, WC mechanics/mobility,   fall precautions,  level of A/S required upon D/C for patient care, also level of assistance may fluctuate pending environment/fatigue/pain,alertness, etc.. Discussed DME, HHPT. All questions answered within PTA scope of practice, all verbalized understanding, Deferred discharge questions and medical questions to SW/NP/MD.      Functional Mobility:  Bed Mobility: performed with OT mod A  Transfers:     Sit to Stand:  moderate assistance with rolling walker and vcs for safety/tech/positioning  Gait: amb with RW min/mod R lat lean to favor LLE (also in sitting posture, able to correct with A and ed on good sitting/standing posture)  ~ 25 ft /02/wc follow  Wheelchair Propulsion: ~ 25 ft SBA/CGA vcs for tech BUE    AM-PAC 6 CLICK MOBILITY  11    Patient left up in chair with all lines intact, call button in reach, daug present, and belonging sin reach .    GOALS:   Multidisciplinary Problems       Physical Therapy Goals          Problem: Physical Therapy    Goal Priority Disciplines Outcome Goal Variances Interventions   Physical Therapy Goal     PT, PT/OT Ongoing, Progressing     Description: Goals to be met by: 7/6/23     Patient will increase functional independence with mobility by performing:    . Supine to sit with MInimal Assistance  . Sit to supine with MInimal Assistance  . Rolling to Left and Right with Minimal Assistance.  . Sit to stand transfer with Minimal Assistance  . Bed to chair transfer with Minimal Assistance using Rolling Walker  . Gait  x 30 feet with Moderate  Assistance using Rolling Walker.   . Wheelchair propulsion x50 feet with Minimal Assistance using bilateral uppper extremities                         Time Tracking:     PT Received On: 06/19/23  PT Start Time: 1445  PT Stop Time: 1512  PT Total Time (min): 27 min    Billable Minutes: Therapeutic Activity 27    Treatment Type: Treatment  PT/PTA: PTA     Number of PTA visits since last PT visit: 3     06/19/2023

## 2023-06-19 NOTE — PT/OT/SLP PROGRESS
"Occupational Therapy   Treatment/Family Training    Name: Brunilda England  MRN: [id number]  Admit Date: 6/5/2023  Admitting Diagnosis:  Left displaced femoral neck fracture    General Precautions: Standard, fall   Orthopedic Precautions: LLE weight bearing as tolerated   Braces: N/A    Recommendations:     Discharge Recommendations:  home health OT  Level of Assistance Recommended at Discharge: 24 hours physical assistance for all ADL's and home management tasks  Discharge Equipment Recommendations: bath bench, bedside commode, hip kit, walker, rolling  Barriers to discharge:  None    Assessment:     Brunilda England is a 89 y.o. female with a medical diagnosis of Left displaced femoral neck fracture .  She presents with performance deficits affecting function are weakness, impaired endurance, impaired self care skills, impaired functional mobility, gait instability, impaired balance, decreased upper extremity function, decreased lower extremity function, decreased safety awareness, pain, decreased ROM, orthopedic precautions.   Pt and daughters participated in family training session on today. Pt requires increased time to perform ADL's and functional transfers. Pt tolerated tx session without incident and is making progress, however, continues to demonstrate deficits with self care skills, balance, functional mobility, UB strength and endurance. Pt will benefit from continued OT services to progress towards goals.     Rehab Potential is good    Activity tolerance:  Fair    Plan:     Patient to be seen 5 x/week to address the above listed problems via self-care/home management, therapeutic activities, therapeutic exercises    Plan of Care Expires: 06/22/23  Plan of Care Reviewed with: patient, daughter    Subjective   "I will travis my best"  Communicated with: Sameer prior to session.  .    Pain/Comfort:  Pain Rating 1: 0/10  Pain Rating Post-Intervention 1: 0/10    Patient's cultural, spiritual, Cheondoism conflicts given " the current situation:  no    Objective:     Patient found up in chair with oxygen with daughters present upon OT entry to room.    Bed Mobility:    Patient completed Rolling/Turning to Left with  stand by assistance  Patient completed Rolling/Turning to Right with stand by assistance  Patient completed Scooting/Bridging with contact guard assistance  Patient completed Supine to Sit with minimum assistance to mange B LE.   Patient completed Sit to Supine with minimum assistance to manage B LE.     Functional Mobility/Transfers:  Patient completed Sit <> Stand Transfer with minimum assistance  with  rolling walker   Patient completed Bed <> Chair Transfer using Step Transfer technique with minimum assistance with rolling walker. Required increased time and v/c's to complete.     Activities of Daily Living:  Lower Body Dressing: maximal assistance to doff/don B socks with use of AE seated in w/c. Required v/c's to complete.     [facility] 6 Click ADL: 16    Treatment & Education:  Pt and family educated on:  - role of OT  - level of assistance  - DME recommendations   - adaptive equipment  -  safety while performing functional transfers and self care tasks,   - orthopedic precautions and importance to adhering to precautions  - progress towards OT goals.     Patient left up in chair with  PTA and daughters present.    GOALS:   Multidisciplinary Problems       Occupational Therapy Goals          Problem: Occupational Therapy    Goal Priority Disciplines Outcome Interventions   Occupational Therapy Goal     OT, PT/OT Ongoing, Progressing    Description: Goals to be met by: 6/27/23     Patient will increase functional independence with ADLs by performing:    UE Dressing with Set-up Assistance.  LE Dressing with Stand-by Assistance.  Grooming while seated at sink with Modified [place].  Toileting from toilet with Stand-by Assistance for hygiene and clothing management.   Bathing from W/C SEATED AT SINK with Minimal  Assistance.  Toilet transfer to toilet with Contact Guard Assistance.                         Time Tracking:     OT Date of Treatment: 06/19/23  OT Start Time: 1335    OT Stop Time: 1440  OT Total Time (min): 65 min    Billable Minutes:Self Care/Home Management 35  Therapeutic Activity 30    6/19/2023

## 2023-06-20 LAB
POCT GLUCOSE: 100 MG/DL (ref 70–110)
POCT GLUCOSE: 122 MG/DL (ref 70–110)
POCT GLUCOSE: 85 MG/DL (ref 70–110)
POCT GLUCOSE: 88 MG/DL (ref 70–110)

## 2023-06-20 PROCEDURE — 25000003 PHARM REV CODE 250: Performed by: NURSE PRACTITIONER

## 2023-06-20 PROCEDURE — 11000004 HC SNF PRIVATE

## 2023-06-20 PROCEDURE — 25000003 PHARM REV CODE 250: Performed by: HOSPITALIST

## 2023-06-20 PROCEDURE — 63600175 PHARM REV CODE 636 W HCPCS: Performed by: HOSPITALIST

## 2023-06-20 PROCEDURE — 97535 SELF CARE MNGMENT TRAINING: CPT | Mod: CO

## 2023-06-20 PROCEDURE — 25000003 PHARM REV CODE 250: Performed by: FAMILY MEDICINE

## 2023-06-20 PROCEDURE — 97530 THERAPEUTIC ACTIVITIES: CPT | Mod: CO

## 2023-06-20 RX ADMIN — ACETAMINOPHEN 1000 MG: 500 TABLET ORAL at 09:06

## 2023-06-20 RX ADMIN — CYANOCOBALAMIN TAB 1000 MCG 1000 MCG: 1000 TAB at 09:06

## 2023-06-20 RX ADMIN — CEFPODOXIME PROXETIL 200 MG: 200 TABLET, FILM COATED ORAL at 09:06

## 2023-06-20 RX ADMIN — AMLODIPINE BESYLATE AND BENAZEPRIL HYDROCHLORIDE 1 CAPSULE: 5; 20 CAPSULE ORAL at 09:06

## 2023-06-20 RX ADMIN — CHOLECALCIFEROL TAB 25 MCG (1000 UNIT) 1000 UNITS: 25 TAB at 09:06

## 2023-06-20 RX ADMIN — TRAMADOL HYDROCHLORIDE 50 MG: 50 TABLET, COATED ORAL at 09:06

## 2023-06-20 RX ADMIN — SENNOSIDES AND DOCUSATE SODIUM 1 TABLET: 50; 8.6 TABLET ORAL at 09:06

## 2023-06-20 RX ADMIN — ATORVASTATIN CALCIUM 40 MG: 40 TABLET, FILM COATED ORAL at 09:06

## 2023-06-20 RX ADMIN — HYDRALAZINE HYDROCHLORIDE 25 MG: 25 TABLET, FILM COATED ORAL at 02:06

## 2023-06-20 RX ADMIN — ACETAMINOPHEN 1000 MG: 500 TABLET ORAL at 06:06

## 2023-06-20 RX ADMIN — FAMOTIDINE 20 MG: 20 TABLET, FILM COATED ORAL at 09:06

## 2023-06-20 RX ADMIN — HYDRALAZINE HYDROCHLORIDE 25 MG: 25 TABLET, FILM COATED ORAL at 06:06

## 2023-06-20 RX ADMIN — FERROUS SULFATE TAB 325 MG (65 MG ELEMENTAL FE) 1 EACH: 325 (65 FE) TAB at 09:06

## 2023-06-20 RX ADMIN — Medication 2 TABLET: at 09:06

## 2023-06-20 RX ADMIN — Medication 6 MG: at 09:06

## 2023-06-20 RX ADMIN — ENOXAPARIN SODIUM 40 MG: 40 INJECTION SUBCUTANEOUS at 05:06

## 2023-06-20 RX ADMIN — DULOXETINE 60 MG: 60 CAPSULE, DELAYED RELEASE ORAL at 09:06

## 2023-06-20 RX ADMIN — METOPROLOL TARTRATE 25 MG: 25 TABLET, FILM COATED ORAL at 09:06

## 2023-06-20 RX ADMIN — ACETAMINOPHEN 1000 MG: 500 TABLET ORAL at 02:06

## 2023-06-20 RX ADMIN — HYDRALAZINE HYDROCHLORIDE 25 MG: 25 TABLET, FILM COATED ORAL at 09:06

## 2023-06-20 RX ADMIN — DONEPEZIL HYDROCHLORIDE 5 MG: 5 TABLET ORAL at 09:06

## 2023-06-20 NOTE — PLAN OF CARE
[facility] Skilled Nursing      HOME HEALTH ORDERS  FACE TO FACE ENCOUNTER    Patient Name: Brunilda England  YOB: 1931    PCP: Pepper Whitfield MD   PCP Address: [address] / [place] [address]  PCP Phone Number: [phone]  PCP Fax: [phone]    Encounter Date: 6/5/23    Admit to Home Health    Diagnoses:  Active Hospital Problems    Diagnosis  POA    *Closed left basicervical femoral neck fracture with extension into the intertrochanteric region s/p IM nail on 5/30/2023 [S72.002A]  Yes    Acute blood loss anemia [D62]  Yes    Chronic hypoxemic respiratory failure [J96.11]  Yes    Tachy-juan a syndrome [I49.5]  Yes    Subarachnoid hemorrhage following injury, no loss of consciousness [S06.6X0A]  Yes    PAF (paroxysmal atrial fibrillation) [I48.0]  Yes    Pulmonary hypertension [I27.20]  Yes    Late onset Alzheimer's dementia with behavioral disturbance [G30.1, F02.818]  Yes    Chronic diastolic heart failure [I50.32]  Yes    Stage 3a chronic kidney disease [N18.31]  Yes    Centrilobular emphysema [J43.2]  Yes    Type 2 diabetes mellitus with circulatory disorder, without long-term current use of insulin [E11.59]  Yes    Coronary artery disease involving native coronary artery of native heart without angina pectoris [I25.10]  Yes    PVD (peripheral vascular disease) with claudication [I73.9]  Yes    Pure hypercholesterolemia [E78.00]  Yes    Essential hypertension [I10]  Yes    History of lung cancer [Z85.118]  Not Applicable      Resolved Hospital Problems   No resolved problems to display.       Follow Up Appointments:  Future Appointments   Date Time Provider Department Center   6/22/2023  1:30 PM Pepper Whitfield MD [facility] IM [facility] PCW   6/29/2023 12:45 PM [facility] OIC-CT1 500 LB LIMIT [facility] IC [facility]   6/29/2023  2:00 PM Adri Rader PA-C [facility] NEUROS8 [facility]   7/11/2023 10:45 AM Amauri Henry NP N[facility] ORTHO [facility] Ort   7/11/2023  2:45 PM EKG, APPT [facility] EKG  [facility] Anuja   7/11/2023  3:20 PM Giuseppe Roman MD [facility] ARRHYTH [place]       Allergies:  Review of patient's allergies indicates:   Allergen Reactions    Bextra [valdecoxib] Swelling    Gabapentin Diarrhea and Nausea Only       Medications: Review discharge medications with patient and family and provide education.      I have seen and examined this patient within the last 30 days. My clinical findings that support the need for the home health skilled services and home bound status are the following:no   Weakness/numbness causing balance and gait disturbance due to Surgery making it taxing to leave home.     Referrals/ Consults  Physical Therapy to evaluate and treat. Evaluate for home safety and equipment needs; Establish/upgrade home exercise program. Perform / instruct on therapeutic exercises, gait training, transfer training, and Range of Motion.  Occupational Therapy to evaluate and treat. Evaluate home environment for safety and equipment needs. Perform/Instruct on transfers, ADL training, ROM, and therapeutic exercises.  Aide to provide assistance with personal care, ADLs, and vital signs.    Activities:   activity as tolerated    Nursing:   Agency to admit patient within 24 hours of hospital discharge unless specified on physician order or at patient request    SN to complete comprehensive assessment including routine vital signs. Instruct on disease process and s/s of complications to report to MD. Review/verify medication list sent home with the patient at time of discharge  and instruct patient/caregiver as needed. Frequency may be adjusted depending on start of care date.     Skilled nurse to perform up to 3 visits PRN for symptoms related to diagnosis    Notify MD if SBP > 160 or < 90; DBP > 90 or < 50; HR > 120 or < 50; Temp > 101; O2 < 88%    Ok to schedule additional visits based on staff availability and patient request on consecutive days within the home health episode.    Miscellaneous    Heart Failure:      SN to instruct on the following:    Instruct on the definition of CHF.   Instruct on the signs/sympoms of CHF to be reported.   Instruct on and monitor daily weights.   Instruct on factors that cause exacerbation.   Instruct on action, dose, schedule, and side effects of medications.   Instruct on diet as prescribed.   Instruct on activity allowed.   Instruct on life-style modifications for life long management of CHF   SN to assess compliance with daily weights, diet, medications, fluid retention,    safety precautions, activities permitted and life-style modifications.   Additional 1-2 SN visits per week as needed for signs and symptoms     of CHF exacerbation.      For Weight Gain > 2-3 lbs in 1 day or 4-6 lbs over 1 week notify PCP    Home Oxygen:  No change    Home Health Aide:  Nursing Three times weekly, Physical Therapy Three times weekly, Occupational Therapy Three times weekly, and Home Health Aide Three times weekly    Wound Care Orders    Keep incision clean and dry.  Cleanse with soap and water daily, pat dry.  Do not submerge under water until cleared by Ortho.       I certify that this patient is confined to her home and needs intermittent skilled nursing care, physical therapy, and occupational therapy.

## 2023-06-20 NOTE — PROGRESS NOTES
[facility]                                  Skilled Nursing Facility                   Progress Note     Admit Date: 6/5/2023  DUTCH 6/22/2023  Principal Problem:  Left displaced femoral neck fracture   HPI obtained from patient interview and chart review     Chief Complaint: Re-evaluation of medical treatment and therapy status    HPI:   Ms. England is a 89 year old female with PMHx of HTN, paroxysmal atrial fibrillation/flutter, COPD on home oxygen, Chronic diastolic HF, CKD 3a, dementia, history of lung/rectal cancer who presents to SNF following hospitalization for left displaced femoral neck fracture s/p ORIF with intramedullary nail on 05/30/2023 with Dr. Barker.  Admission to SNF for secondary weakness and debility.    Interval history:    24 hr vital sign ranges listed below.  Urine Culture and sensitivity still pending.  Patient became acutely agitated last night, she received 1 dose of 25 mg of Seroquel.  Patient seems to back to her baseline today, discontinuing Seroquel.  Patient denies shortness of breath, abdominal discomfort, nausea, or vomiting.  Patient reports an adequate appetite.  Patient denies dysuria.  Patient reports having regular bowel movements.  Patient progessing with PT/OT-Gait: amb with RW min/mod R lat lean to favor LLE (also in sitting posture, able to correct with A and ed on good sitting/standing posture)  ~ 25 ft /02/wc follow. Continuing to follow and treat all acute and chronic conditions.    Past Medical History: Patient has a past medical history of Anal cancer (1995), Anemia, Cancer (1995), Centrilobular emphysema (7/6/2020), Diabetes mellitus, Lumbar radiculopathy (5/16/2014), Lung cancer (2012), Macular degeneration, Macular hemorrhage of left eye, Neuropathy, Osteoporosis, Osteoporosis, unspecified (11/9/2012), and Pure hypercholesterolemia.    Past Surgical History: Patient has a past surgical  history that includes Tonsillectomy; Cholecystectomy; Hysterectomy; Bronchoscopy; Colonoscopy; left leg surgery; heart stent; Fluoroscopic angiography of lower extremity with topical ultrasound (Right, 12/6/2018); Percutaneous transluminal angioplasty (N/A, 12/6/2018); Reconstruction using flap (Right, 12/6/2019); Incisional biopsy (Right, 12/6/2019); and Intramedullary rodding of femur (Left, 5/30/2023).    Social History: Patient reports that she quit smoking about 28 years ago. Her smoking use included cigarettes. She has a 15.00 pack-year smoking history. She has been exposed to tobacco smoke. She has never used smokeless tobacco. She reports that she does not drink alcohol and does not use drugs.    Family History: family history includes Breast cancer in her maternal aunt; Cancer in her father; Stroke in her mother.    Allergies: Patient is allergic to bextra [valdecoxib] and gabapentin.    ROS  Constitutional: Negative for fever   Eyes: Negative for blurred vision, double vision   Respiratory: Negative for cough, shortness of breath   Cardiovascular: Negative for chest pain, palpitations, and leg swelling.   Gastrointestinal: Negative for abdominal pain, diarrhea, nausea, vomiting, constipation  Genitourinary: Negative for dysuria, frequency   Musculoskeletal:  + generalized weakness.  Intermittent LLE pain  Skin: Negative for itching and rash.   Neurological: Negative for dizziness, headaches.   Psychiatric/Behavioral: Negative for depression. The patient is not nervous/anxious.      24 hour Vital Sign Range   Temp:  [97.1 °F (36.2 °C)]   Pulse:  []   Resp:  [18]   BP: (126-145)/(61-79)   SpO2:  [100 %]     Current BMI: Body mass index is 27.14 kg/m².    PEx  Constitutional: Patient appears debilitated.  No distress noted  HENT:   Head: Normocephalic and atraumatic.   Eyes: Pupils are equal, round  Neck: Normal range of motion. Neck supple.   Cardiovascular: Normal rate, regular rhythm and normal heart  sounds.    Pulmonary/Chest: Effort normal and breath sounds are clear.  Supplemental oxygen in progress  Abdominal: Soft. Bowel sounds are normal.   Musculoskeletal: Normal range of motion.   Neurological: Alert and oriented to person, place, and time.  Impaired higher level thinking  Psychiatric: Normal mood and affect. Behavior is normal.   Skin: Skin is warm and dry.  Surgical dressing to LLE only to be removed by Orthopedics    No results for input(s): GLUCOSE, NA, K, CL, CO2, BUN, CREATININE, MG in the last 24 hours.    Invalid input(s):  CALCIUM        No results for input(s): WBC, RBC, HGB, HCT, PLT, MCV, MCH, MCHC in the last 24 hours.        Recent Labs   Lab 06/19/23  0707 06/19/23  1125 06/19/23  1631 06/19/23  2050 06/20/23  0718 06/20/23  1103   POCTGLUCOSE 92 83 123* 101 85 88        Assessment and Plan:    UTI  -  + UA  -  continue empiric treatment with cefpodoxime 200 mg BID x5 days, will tailor antibiotic therapy once culture and sensitivity results    Constipation  -  improved, continue senna docusate to 1 tab BID, initiate MiraLax BID PRN    Tachy-juan a syndrome  PAF (paroxysmal atrial fibrillation)  - Cardiology wants HR on higher side and okay with slight tachycardia as she has issues with bradycardia on higher doses of Metoprolol.   - Cardiology consulted at [facility] as patient having erratic HRs up to 160 and as low as 40 in hospital. Cardiology states has evidence of tachycardia and bradycardia on ECG but does not meet criteria for tachy-juan a syndrome.   - Cardiology recommends to continue low dose Metoprolol 25 mg po BID to help with tachycardia related to atrial fibrillation. No need for pacemaker at this time.   - Per Cardiology, if patient experiences syncope or develops significant conversion pause >5 sec, send to ED for eval   - patient to follow-up with her cardiologist, Dr. Roman as outpatient.   - Home Eliquis on hold for long term anticoagulation for atrial fibrillation due to SAH as  per Neurosurgery. Follow-up in 2 weeks with repeat CTH. If repeat CTH stable, can resume home dose Eliquis.  - Had 30 day Holter monitor in progress on admission with outpatient Cardiologist Dr. Roman end date 6/21 no mention in cards note, will continue here  - 6/14 Received auto-triggered alert notification for - Atrial Fibrillation/Flutter, 3.3 second pause, Sinus Bradycardia onset. PAC on 6/10/23.  Ecg c/w AF/3.3 pause/junctional rhythm.  Message sent to  to schedule patient to follow-up in EP with Dr. Rincon    Closed left basicervical femoral neck fracture with extension into the intertrochanteric region   s/p IM nail on 5/30/2023  - PT/OT, WBAT  - DVT PPX with Lovenox 40 mg daily, okayed by NSx  - Ortho DAVIDE to see patient weekly SNF  - maintain surgical dressings until removed by Orthopedics  - follow-up with Ortho after discharge  - dressing removed today    Acute postoperative pain  - stable, continue to hold methocarbamol, continue acetaminophen 1000 mg q.8 hours, tramadol 50 mg q.6 hours PRN. Bowel regimen in place    Subarachnoid hemorrhage following injury, no loss of consciousness  - Patient found on admit to have punctate left parietal SAH that was stable on repeat imaging. Patient neurologically intact.   - Neurosurgery consulted and as per recs:   - No acute neurosurgical intervention  - Hold Eliquis for 2 weeks. Ok for DVT ppx with standard dose Lovenox 40 mg subcutaneous daily.  - SBP <140, Na >135, HOB >30  - Keppra 500 mg po BID x 14 days, end 6/13  - Follow-up in 2 weeks with repeat CTH. If repeat CTH stable, can resume home dose Eliquis.... 6/12- message sent to Neurosurgery that follow-up appointment was made but repeat head CT was not ordered/scheduled  - 6/14 CT now scheduled for 6/29 along with neurosurgery follow-up appointment    Acute blood loss anemia  - continue monitor twice weekly CBCs  - transfuse for hemoglobin < 7  - stable, continue ferrous sulfate daily      Essential hypertension  - stable, Continue Hydralazine 25 mg TID and Amlodipine/Benzapril 5/20 1 tablet daily  - Goal SBP < 140.       Coronary artery disease involving native coronary artery of native heart without angina pectoris  - Patient with history of remote st[facility] at [facility] in 1990 and 2003.   - stable, continue Lipitor 40 mg daily in place of home non formulary Zocor    Stage 3a chronic kidney disease  - stable, continue to monitor twice weekly BMPs, avoid nephrotoxic agents, renally dose medications when appropriate     Type 2 diabetes mellitus with circulatory disorder, without long-term current use of insulin  - Accu-Cheks AC/HS, diabetic diet  - home regimen with oral Tradjenta  - stable, continue low-dose SSI prn     Chronic diastolic heart failure  - stable, Continue Metoprolol 25 mg BID and Benazepril    - Monitor Is&Os and daily weights.      Centrilobular emphysema  Chronic hypoxemic respiratory failure  History of lung cancer in remission  - stable, Chronic and controlled. Continue home oxygen at 2 liters. Patient with no signs of acute exacerbation.   - Patient not on any inhalers at home.     Neuropathic pain  - continue home duloxetine 60 mg daily      Late onset Alzheimer's dementia with behavioral disturbance  - Patient at cognitive baseline oriented x3  - Continue home Aricept to treat.  - Delirium precautions  - Avoid antihistamines, anticholinergics, hypnotics, and minimize opiates while controlling for pain as these medications may exacerbate delirium. Cues for day/night will assist with keeping patient calm and oriented - during daytime, please keep adequate light in room (open windows, lights on) and please keep room dim at night-time to encourage normal sleep-wake cycles. Continuing to have nursing and family reorient the patient and encourage family to visit    Debility   - Continue with PT/OT for gait training and strengthening and restoration of ADL's   - Encourage mobility, OOB  in chair, and early ambulation as appropriate  - Fall precautions   - Monitor for bowel and bladder dysfunction  - Monitor for and prevent skin breakdown and pressure ulcers  - Continue DVT prophylaxis with Lovenox 40 mg daily        Anticipate disposition:  Home with home health      Follow-up needed during SNF stay-     Appointment not to send patient to- PCP 6/14, 6/22    Follow-up needed after discharge from SNF: PCP, EP, (7/11), neurosurgery (6/29), orthopedics (6/11)    Future Appointments   Date Time Provider Department Center   6/22/2023  1:30 PM Pepper Whitfield MD [facility] IM [facility] PCW   6/29/2023 12:45 PM [facility] [id number] 500 LB LIMIT [facility]   6/29/2023  2:00 PM Adri Rader PA-C [facility] [id number] [facility]   7/11/2023 10:45 AM Amauri Henry NP [facility] ORTHO [facility] Ort   7/11/2023  2:45 PM EKG, APPT [facility] EKG [facility]   7/11/2023  3:20 PM Giuseppe Roman MD [facility] ARRHYTH [facility]       Sarah Liang NP  Department of Hospital Medicine   [facility]     DOS: 6/20/2023       Patient note was created using MModal Dictation.  Any errors in syntax or even information may not have been identified and edited on initial review prior to signing this note.

## 2023-06-20 NOTE — PT/OT/SLP PROGRESS
"Occupational Therapy   Treatment    Name: Brunilda England  MRN: [id number]  Admit Date: 6/5/2023  Admitting Diagnosis:  Left displaced femoral neck fracture    General Precautions: Standard, fall   Orthopedic Precautions: LLE weight bearing as tolerated   Braces: N/A    Recommendations:     Discharge Recommendations:  home health OT  Level of Assistance Recommended at Discharge: 24 hours physical assistance for all ADL's and home management tasks  Discharge Equipment Recommendations: bath bench, bedside commode, hip kit, walker, rolling  Barriers to discharge:  None    Assessment:     Brunilda England is a 89 y.o. female with a medical diagnosis of Left displaced femoral neck fracture .  She presents with performance deficits affecting function are weakness, impaired endurance, impaired self care skills, impaired functional mobility, gait instability, impaired balance, decreased upper extremity function, decreased lower extremity function, decreased safety awareness, pain, decreased ROM, orthopedic precautions.     Pt participated well during today's session. Patient with poor participation secondary to fatigue, required tactile stimulation to remain alert.  Pt daughter stated patient did not have a good night, is very sleepy. Pt tolerated tx session without incident and is making progress, however, continues to demonstrate deficits with self care skills, balance, functional mobility, UB strength and endurance. Pt will benefit from continued OT services to progress towards goals.     Rehab Potential is good    Activity tolerance:  Fair    Plan:     Patient to be seen 5 x/week to address the above listed problems via self-care/home management, therapeutic activities, therapeutic exercises    Plan of Care Expires: 06/22/23  Plan of Care Reviewed with: patient, daughter    Subjective   "I can't" "I'll try"  Communicated with: Sameer prior to session.  .    Pain/Comfort:  Pain Rating 1:  (pt did not rate)  Location - Side 1: " Left  Location - Orientation 1: generalized  Location 1: hip  Pain Addressed 1: Pre-medicate for activity, Reposition, Distraction  Pain Rating Post-Intervention 1:  (pt did not rate)    Patient's cultural, spiritual, Anglican conflicts given the current situation:  no    Objective:     Patient found supine with daughter present with oxygen upon OT entry to room.    Bed Mobility:    Patient completed Rolling/Turning to Left with  contact guard assistance  Patient completed Rolling/Turning to Right with contact guard assistance  Patient completed Scooting/Bridging with contact guard assistance  Patient completed Supine to Sit with minimum assistance     Functional Mobility/Transfers:  Patient completed Sit <> Stand Transfer with minimum assistance  with  hand-held assist   Patient completed Bed <> Chair Transfer using Stand Pivot technique with moderate assistance and of 2 persons with no assistive device and hand-held assist    Activities of Daily Living:  Bathing: maximal assistance to perform sponge bath at bed level.   Upper Body Dressing: maximal assistance to doff/don pullover shirt seated EOB.   Lower Body Dressing: maximal assistance to thread B LE into pants and manage over hips at bed level. Pt attempt too manage pants by bridging hips.   Toileting: total assistance to perform don hygiene and brief and PureWick management.     [facility] 6 Click ADL: 16      Treatment & Education:  Pt educated on:  - role of OT  - level of assistance  - energy conservation and task modification to maximized independence with ADL's and mobility   - orthopedic precautions  -  safety while performing functional transfers and self care tasks  - progress towards OT goals     Patient left up in chair with call button in reach and daughter present.    GOALS:   Multidisciplinary Problems       Occupational Therapy Goals          Problem: Occupational Therapy    Goal Priority Disciplines Outcome Interventions   Occupational Therapy  Goal     OT, PT/OT Ongoing, Progressing    Description: Goals to be met by: 6/27/23     Patient will increase functional independence with ADLs by performing:    UE Dressing with Set-up Assistance.  LE Dressing with Stand-by Assistance.  Grooming while seated at sink with Modified [place].  Toileting from toilet with Stand-by Assistance for hygiene and clothing management.   Bathing from W/C SEATED AT SINK with Minimal Assistance.  Toilet transfer to toilet with Contact Guard Assistance.                         Time Tracking:     OT Date of Treatment: 06/20/23  OT Start Time: 1319    OT Stop Time: 1400  OT Total Time (min): 41 min    Billable Minutes:Self Care/Home Management 26  Therapeutic Activity 15    6/20/2023

## 2023-06-20 NOTE — PROGRESS NOTES
[facility]                                  Skilled Nursing Facility                   Progress Note     Admit Date: 6/5/2023  DUTCH 6/22/2023  Principal Problem:  Left displaced femoral neck fracture   HPI obtained from patient interview and chart review     Chief Complaint: Re-evaluation of medical treatment and therapy status,confusion, abx management for new UTI, lab review    HPI:   Ms. England is a 89 year old female with PMHx of HTN, paroxysmal atrial fibrillation/flutter, COPD on home oxygen, Chronic diastolic HF, CKD 3a, dementia, history of lung/rectal cancer who presents to SNF following hospitalization for left displaced femoral neck fracture s/p ORIF with intramedullary nail on 05/30/2023 with Dr. Barker.  Admission to SNF for secondary weakness and debility.    Interval history:    Daughter at bedside. Concerned patient has UTI because she is confused today. Says patient gets confused when she has had UTI's in the past.   Patient denies dysuria or flank pain   24 hr vital sign stable, afebrile  Labs reviewed no leukocytosis  UA reveals + leukocytes, - nitrites   Order Cefpodoxime 200mg every 12 hours x 5 days for UTI  Progressing with therapy     Past Medical History: Patient has a past medical history of Anal cancer (1995), Anemia, Cancer (1995), Centrilobular emphysema (7/6/2020), Diabetes mellitus, Lumbar radiculopathy (5/16/2014), Lung cancer (2012), Macular degeneration, Macular hemorrhage of left eye, Neuropathy, Osteoporosis, Osteoporosis, unspecified (11/9/2012), and Pure hypercholesterolemia.    Past Surgical History: Patient has a past surgical history that includes Tonsillectomy; Cholecystectomy; Hysterectomy; Bronchoscopy; Colonoscopy; left leg surgery; heart stent; Fluoroscopic angiography of lower extremity with topical ultrasound (Right, 12/6/2018); Percutaneous transluminal angioplasty (N/A, 12/6/2018);  Reconstruction using flap (Right, 12/6/2019); Incisional biopsy (Right, 12/6/2019); and Intramedullary rodding of femur (Left, 5/30/2023).    Social History: Patient reports that she quit smoking about 28 years ago. Her smoking use included cigarettes. She has a 15.00 pack-year smoking history. She has been exposed to tobacco smoke. She has never used smokeless tobacco. She reports that she does not drink alcohol and does not use drugs.    Family History: family history includes Breast cancer in her maternal aunt; Cancer in her father; Stroke in her mother.    Allergies: Patient is allergic to bextra [valdecoxib] and gabapentin.    ROS  Constitutional: Negative for fever. + confusion  Respiratory: Negative for cough, shortness of breath   Cardiovascular: Negative for chest pain, palpitations, and leg swelling.   Gastrointestinal: Negative for abdominal pain, diarrhea, nausea, vomiting.  +constipation  Genitourinary: Negative for dysuria, frequency   Musculoskeletal:  + generalized weakness.  Intermittent LLE pain  Skin: Negative for itching and rash.   Neurological: Negative for dizziness, headaches.     24 hour Vital Sign Range   Temp:  [97.1 °F (36.2 °C)-98.7 °F (37.1 °C)]   Pulse:  []   Resp:  [18]   BP: (126-145)/(58-79)   SpO2:  [97 %-100 %]     Current BMI: Body mass index is 25.97 kg/m².    PEx  Constitutional: Patient appears debilitated.  No distress noted  Cardiovascular: Normal rate, regular rhythm and normal heart sounds.    Pulmonary/Chest: Effort normal and breath sounds are clear.  Supplemental oxygen in progress  Abdominal: Soft. Bowel sounds are normal.   Musculoskeletal: Normal range of motion.   Neurological: Alert and oriented to person, place, and time.  Impaired higher level thinking  Psychiatric: Normal mood and affect. Behavior is normal.   Skin: Skin is warm and dry.  Surgical dressing to LLE only to be removed by Orthopedics    Recent Labs   Lab 06/19/23  0439      K 3.7   CL  103   CO2 25   BUN 10   CREATININE 0.8   MG 1.6           Recent Labs   Lab 06/19/23  0439   WBC 3.40*   RBC 2.96*   HGB 8.6*   HCT 25.6*      MCV 87   MCH 29.1   MCHC 33.6           Recent Labs   Lab 06/18/23  1620 06/18/23  2111 06/19/23  0707 06/19/23  1125 06/19/23  1631 06/19/23  2050   POCTGLUCOSE 110 98 92 83 123* 101          Assessment and Plan:    Constipation  - increase senna docusate to 1 tab BID, initiate MiraLax BID PRN  Last BM 6/19    Tachy-juan a syndrome  PAF (paroxysmal atrial fibrillation)  - Cardiology wants HR on higher side and okay with slight tachycardia as she has issues with bradycardia on higher doses of Metoprolol.   - Cardiology consulted at [facility] as patient having erratic HRs up to 160 and as low as 40 in hospital. Cardiology states has evidence of tachycardia and bradycardia on ECG but does not meet criteria for tachy-juan a syndrome.   - Cardiology recommends to continue low dose Metoprolol 25 mg po BID to help with tachycardia related to atrial fibrillation. No need for pacemaker at this time.   - Per Cardiology, if patient experiences syncope or develops significant conversion pause >5 sec, send to ED for eval   - patient to follow-up with her cardiologist, Dr. Roman as outpatient.   - Home Eliquis on hold for long term anticoagulation for atrial fibrillation due to SAH as per Neurosurgery. Follow-up in 2 weeks with repeat CTH. If repeat CTH stable, can resume home dose Eliquis.  - Had 30 day Holter monitor in progress on admission with outpatient Cardiologist Dr. Roman end date 6/21 no mention in cards note, will continue here  - 6/14 Received auto-triggered alert notification for - Atrial Fibrillation/Flutter, 3.3 second pause, Sinus Bradycardia onset. PAC on 6/10/23.  Ecg c/w AF/3.3 pause/junctional rhythm.  Message sent to  to schedule patient to follow-up in EP with Dr. Rincon  - 6/16 HR less labile today  6/19  HR range     Acute UTI, suspected,  6/19/23  UA reveals + leukocytes, - nitrites   Order Cefpodoxime 200mg every 12 hours x 5 days for UTI    Closed left basicervical femoral neck fracture with extension into the intertrochanteric region   s/p IM nail on 5/30/2023  - PT/OT, WBAT  - DVT PPX with Lovenox 40 mg daily, okayed by NSx  - Ortho DAVIDE to see patient weekly SNF  - maintain surgical dressings until removed by Orthopedics  - follow-up with Ortho after discharge    Acute postoperative pain  - stable, continue to hold methocarbamol, continue acetaminophen 1000 mg q.8 hours, tramadol 50 mg q.6 hours PRN. Bowel regimen in place    Subarachnoid hemorrhage following injury, no loss of consciousness  - Patient found on admit to have punctate left parietal SAH that was stable on repeat imaging. Patient neurologically intact.   - Neurosurgery consulted and as per recs:   - No acute neurosurgical intervention  - Hold Eliquis for 2 weeks. Ok for DVT ppx with standard dose Lovenox 40 mg subcutaneous daily.  - SBP <140, Na >135, HOB >30  - Keppra 500 mg po BID x 14 days, end 6/13  - Follow-up in 2 weeks with repeat CTH. If repeat CTH stable, can resume home dose Eliquis.... 6/12- message sent to Neurosurgery that follow-up appointment was made but repeat head CT was not ordered/scheduled  - 6/14 CT now scheduled for 6/29 along with neurosurgery follow-up appointment    Acute blood loss anemia  - continue monitor twice weekly CBCs  - transfuse for hemoglobin < 7  - stable, continue ferrous sulfate daily     Essential hypertension  - stable, Continue Hydralazine 25 mg TID and Amlodipine/Benzapril 5/20 1 tablet daily  - Goal SBP < 140.       Coronary artery disease involving native coronary artery of native heart without angina pectoris  - Patient with history of remote stents at [facility] in 1990 and 2003.   - stable, continue Lipitor 40 mg daily in place of home non formulary Zocor    Stage 3a chronic kidney disease  - stable, continue to monitor twice weekly  BMPs, avoid nephrotoxic agents, renally dose medications when appropriate     Type 2 diabetes mellitus with circulatory disorder, without long-term current use of insulin  - Accu-Cheks AC/HS, diabetic diet  - home regimen with oral Tradjenta  - stable, continue low-dose SSI prn     Chronic diastolic heart failure  - stable, Continue Metoprolol 25 mg BID and Benazepril    - Monitor Is&Os and daily weights.      Centrilobular emphysema  Chronic hypoxemic respiratory failure  History of lung cancer in remission  - stable, Chronic and controlled. Continue home oxygen at 2 liters. Patient with no signs of acute exacerbation.   - Patient not on any inhalers at home.     Neuropathic pain  - continue home duloxetine 60 mg daily      Late onset Alzheimer's dementia with behavioral disturbance  - Patient at cognitive baseline oriented x3  - Continue home Aricept to treat.  - Delirium precautions  - Avoid antihistamines, anticholinergics, hypnotics, and minimize opiates while controlling for pain as these medications may exacerbate delirium. Cues for day/night will assist with keeping patient calm and oriented - during daytime, please keep adequate light in room (open windows, lights on) and please keep room dim at night-time to encourage normal sleep-wake cycles. Continuing to have nursing and family reorient the patient and encourage family to visit    Debility   - Continue with PT/OT for gait training and strengthening and restoration of ADL's   - Encourage mobility, OOB in chair, and early ambulation as appropriate  - Fall precautions   - Monitor for bowel and bladder dysfunction  - Monitor for and prevent skin breakdown and pressure ulcers  - Continue DVT prophylaxis with Lovenox 40 mg daily        Anticipate disposition:  Home with home health      Follow-up needed during SNF stay-     Appointment not to send patient to- PCP 6/14, 6/22    Follow-up needed after discharge from SNF: PCP, EP, (7/11), neurosurgery (6/29),  orthopedics (6/11)    Future Appointments   Date Time Provider Department Center   6/22/2023  1:30 PM Pepper Whitfield MD [facility] IM [facility] PCW   6/29/2023 12:45 PM [facility] OIC-CT1 500 LB LIMIT [facility] IC Imaging Ctr   6/29/2023  2:00 PM Adri Rader PA-C [facility] NEUROS8 [facility]   7/11/2023 10:45 AM Amauri Henry NP [facility] ORTHO [facility] Ort   7/11/2023  2:45 PM EKG, APPT [facility] EKG [facility]   7/11/2023  3:20 PM Giuseppe Roman MD [facility] ARRHYTH [facility]       Cindy Bangura NP  Department of [facility] Medicine   [facility] Nursing [facility]     DOS: 6/19/2023       Patient note was created using MModal Dictation.  Any errors in syntax or even information may not have been identified and edited on initial review prior to signing this note.

## 2023-06-20 NOTE — PROGRESS NOTES
Ms. England was seen at [facility] today for a post-operative visit after undergoing Open reduction internal fixation left intertrochanteric hip fracture with intramedullary nail by Dr. Cantu on 5/30/2023.      Interval History:  She reports that she is doing well.  Pain is controlled.  She is taking pain medication.  She is currently on Tylenol, tramadol, and Robaxin.  She denies any falls or injuries since his surgery.  She denies fever, chills, and sweats since the time of the surgery.  She is participating in her therapy sessions.  Patient is making good progress towards her discharge goals but has fair activity tolerance.  She requires moderate assistance using a rolling walker for transfers.  She is ambulating with a rolling walker with Min to mod assist up to 25 ft.  She is able to propel herself in a wheelchair up to 25 ft with standby assistance contact guard assistance.    Physical exam:  A left leg incision is open air and healing.  No signs of infection noted.  She has tactile stimulation to their lower leg, she denies calf pain, there is no leg edema and their pedal pulse is palpable x 2.     RADS:  X-ray of her left hip dated Ciara 15, 2023 was personally reviewed by me.  Her IM nailing appears to be in good position alignment.  Her fracture to her into her trochanteric area appears be in good position and alignment and still seen.  Minimal callus formation noted.  No new fracture or hardware failure seen.    Assessment:  Post-op visit (3 weeks)    Plan:  Current care, treatment plan, precautions, activity level/ modifications, limitations, rehabilitation exercises and proposed future treatment were discussed with the patient. We discussed the need to monitor for changes in symptoms and condition and report them to the physician.  Discussed importance of compliance with all appointments and follow up examinations.      PHYSICAL THERAPY:    - Continue therapy as ordered.        - weight bearing as tolerated          - range of motion as tolerated.      PAIN MEDICATION:               - Pain medication: refill was not needed,               - Pain medication refill policy provided to patient for review, yes      DVT PROPHYLAXIS:               - eliquis    FOLLOW UP:   - Patient will continue to be seen on SNF weekly until their discharge then he is to return to clinic for follow up.  - Future Appointments:   Future Appointments   Date Time Provider Department Center   6/22/2023  1:30 PM Pepper Whitfield MD [facility] IM [place] PCW   6/29/2023 12:45 PM [facility] OIC-CT1 500 LB LIMIT [facility] IC Imaging Ctr   6/29/2023  2:00 PM Adri Rader PA-C [facility] NEUROS8 [facility]   7/11/2023 10:45 AM Amauri Henry NP [facility] ORTHO [place] Ort   7/11/2023  2:45 PM EKG, APPT [facility] EKG [facility]   7/11/2023  3:20 PM Giuseppe Roman MD [facility] ARRHYTH [facility]           If there are any questions prior to scheduled follow up, the patient was instructed to contact the office

## 2023-06-20 NOTE — PT/OT/SLP PROGRESS
"Physical Therapy      Patient Name:  Brunilda England   MRN:  [id number]    Patient not seen today secondary to pt appeared to get more and more anxious/worked up/agitated " I'm just so so cold" multiple blankets on  " I just can't, I'm sorry" nsg notified and assessed. Will follow-up next PT session..    "

## 2023-06-21 LAB
BACTERIA UR CULT: ABNORMAL
POCT GLUCOSE: 118 MG/DL (ref 70–110)
POCT GLUCOSE: 122 MG/DL (ref 70–110)
POCT GLUCOSE: 127 MG/DL (ref 70–110)
POCT GLUCOSE: 138 MG/DL (ref 70–110)

## 2023-06-21 PROCEDURE — 11000004 HC SNF PRIVATE

## 2023-06-21 PROCEDURE — 63600175 PHARM REV CODE 636 W HCPCS: Performed by: HOSPITALIST

## 2023-06-21 PROCEDURE — 97530 THERAPEUTIC ACTIVITIES: CPT

## 2023-06-21 PROCEDURE — 97110 THERAPEUTIC EXERCISES: CPT

## 2023-06-21 PROCEDURE — 25000003 PHARM REV CODE 250: Performed by: HOSPITALIST

## 2023-06-21 PROCEDURE — 25000003 PHARM REV CODE 250: Performed by: NURSE PRACTITIONER

## 2023-06-21 PROCEDURE — 25000003 PHARM REV CODE 250: Performed by: FAMILY MEDICINE

## 2023-06-21 RX ORDER — ACETAMINOPHEN 500 MG
1000 TABLET ORAL EVERY 8 HOURS PRN
Refills: 0 | COMMUNITY
Start: 2023-06-21

## 2023-06-21 RX ORDER — CEFPODOXIME PROXETIL 200 MG/1
200 TABLET, FILM COATED ORAL EVERY 12 HOURS
Qty: 4 TABLET | Refills: 0 | Status: SHIPPED | OUTPATIENT
Start: 2023-06-22 | End: 2023-06-24

## 2023-06-21 RX ORDER — TALC
6 POWDER (GRAM) TOPICAL NIGHTLY PRN
Refills: 0 | COMMUNITY
Start: 2023-06-21

## 2023-06-21 RX ORDER — FERROUS SULFATE 325(65) MG
325 TABLET, DELAYED RELEASE (ENTERIC COATED) ORAL DAILY
Qty: 30 TABLET | Refills: 2 | Status: SHIPPED | OUTPATIENT
Start: 2023-06-21 | End: 2023-11-16 | Stop reason: ALTCHOICE

## 2023-06-21 RX ADMIN — CEFPODOXIME PROXETIL 200 MG: 200 TABLET, FILM COATED ORAL at 09:06

## 2023-06-21 RX ADMIN — FERROUS SULFATE TAB 325 MG (65 MG ELEMENTAL FE) 1 EACH: 325 (65 FE) TAB at 09:06

## 2023-06-21 RX ADMIN — CEFPODOXIME PROXETIL 200 MG: 200 TABLET, FILM COATED ORAL at 08:06

## 2023-06-21 RX ADMIN — HYDRALAZINE HYDROCHLORIDE 25 MG: 25 TABLET, FILM COATED ORAL at 02:06

## 2023-06-21 RX ADMIN — ACETAMINOPHEN 1000 MG: 500 TABLET ORAL at 10:06

## 2023-06-21 RX ADMIN — AMLODIPINE BESYLATE AND BENAZEPRIL HYDROCHLORIDE 1 CAPSULE: 5; 20 CAPSULE ORAL at 09:06

## 2023-06-21 RX ADMIN — CHOLECALCIFEROL TAB 25 MCG (1000 UNIT) 1000 UNITS: 25 TAB at 09:06

## 2023-06-21 RX ADMIN — METOPROLOL TARTRATE 25 MG: 25 TABLET, FILM COATED ORAL at 09:06

## 2023-06-21 RX ADMIN — HYDRALAZINE HYDROCHLORIDE 25 MG: 25 TABLET, FILM COATED ORAL at 10:06

## 2023-06-21 RX ADMIN — SENNOSIDES AND DOCUSATE SODIUM 1 TABLET: 50; 8.6 TABLET ORAL at 09:06

## 2023-06-21 RX ADMIN — ENOXAPARIN SODIUM 40 MG: 40 INJECTION SUBCUTANEOUS at 05:06

## 2023-06-21 RX ADMIN — DULOXETINE 60 MG: 60 CAPSULE, DELAYED RELEASE ORAL at 09:06

## 2023-06-21 RX ADMIN — Medication 2 TABLET: at 09:06

## 2023-06-21 RX ADMIN — FAMOTIDINE 20 MG: 20 TABLET, FILM COATED ORAL at 09:06

## 2023-06-21 RX ADMIN — Medication 6 MG: at 08:06

## 2023-06-21 RX ADMIN — ACETAMINOPHEN 1000 MG: 500 TABLET ORAL at 05:06

## 2023-06-21 RX ADMIN — HYDRALAZINE HYDROCHLORIDE 25 MG: 25 TABLET, FILM COATED ORAL at 05:06

## 2023-06-21 RX ADMIN — ATORVASTATIN CALCIUM 40 MG: 40 TABLET, FILM COATED ORAL at 09:06

## 2023-06-21 RX ADMIN — ACETAMINOPHEN 1000 MG: 500 TABLET ORAL at 02:06

## 2023-06-21 RX ADMIN — TRAMADOL HYDROCHLORIDE 50 MG: 50 TABLET, COATED ORAL at 08:06

## 2023-06-21 RX ADMIN — SENNOSIDES AND DOCUSATE SODIUM 1 TABLET: 50; 8.6 TABLET ORAL at 08:06

## 2023-06-21 RX ADMIN — DONEPEZIL HYDROCHLORIDE 5 MG: 5 TABLET ORAL at 08:06

## 2023-06-21 NOTE — PROGRESS NOTES
[facility]                                  Skilled Nursing Facility                   Progress Note     Admit Date: 6/5/2023  DUTCH 6/22/2023  Principal Problem:  Left displaced femoral neck fracture   HPI obtained from patient interview and chart review     Chief Complaint: Re-evaluation of medical treatment and therapy status    HPI:   Ms. England is a 89 year old female with PMHx of HTN, paroxysmal atrial fibrillation/flutter, COPD on home oxygen, Chronic diastolic HF, CKD 3a, dementia, history of lung/rectal cancer who presents to SNF following hospitalization for left displaced femoral neck fracture s/p ORIF with intramedullary nail on 05/30/2023 with Dr. Barker.  Admission to SNF for secondary weakness and debility.    Interval history:   Urine culture finalized with pansensitive E coli, patient will continue treatment with cefpodoxime.  24 hr vital sign ranges listed below.  Patient is more alert today, she appears to be back to her mental baseline.  Patient denies shortness of breath, abdominal discomfort, nausea, or vomiting.  Patient reports an adequate appetite.  Patient denies dysuria.  Patient reports having regular bowel movements.  Patient progessing with PT/OT. Continuing to follow and treat all acute and chronic conditions.      Past Medical History: Patient has a past medical history of Anal cancer (1995), Anemia, Cancer (1995), Centrilobular emphysema (7/6/2020), Diabetes mellitus, Lumbar radiculopathy (5/16/2014), Lung cancer (2012), Macular degeneration, Macular hemorrhage of left eye, Neuropathy, Osteoporosis, Osteoporosis, unspecified (11/9/2012), and Pure hypercholesterolemia.    Past Surgical History: Patient has a past surgical history that includes Tonsillectomy; Cholecystectomy; Hysterectomy; Bronchoscopy; Colonoscopy; left leg surgery; heart stent; Fluoroscopic angiography of lower extremity with topical  ultrasound (Right, 12/6/2018); Percutaneous transluminal angioplasty (N/A, 12/6/2018); Reconstruction using flap (Right, 12/6/2019); Incisional biopsy (Right, 12/6/2019); and Intramedullary rodding of femur (Left, 5/30/2023).    Social History: Patient reports that she quit smoking about 28 years ago. Her smoking use included cigarettes. She has a 15.00 pack-year smoking history. She has been exposed to tobacco smoke. She has never used smokeless tobacco. She reports that she does not drink alcohol and does not use drugs.    Family History: family history includes Breast cancer in her maternal aunt; Cancer in her father; Stroke in her mother.    Allergies: Patient is allergic to bextra [valdecoxib] and gabapentin.    ROS  Constitutional: Negative for fever   Eyes: Negative for blurred vision, double vision   Respiratory: Negative for cough, shortness of breath   Cardiovascular: Negative for chest pain, palpitations, and leg swelling.   Gastrointestinal: Negative for abdominal pain, diarrhea, nausea, vomiting, constipation  Genitourinary: Negative for dysuria, frequency   Musculoskeletal:  + generalized weakness.  Intermittent LLE pain  Skin: Negative for itching and rash.   Neurological: Negative for dizziness, headaches.   Psychiatric/Behavioral: Negative for depression. The patient is not nervous/anxious.      24 hour Vital Sign Range   Temp:  [97.8 °F (36.6 °C)-98 °F (36.7 °C)]   Pulse:  []   Resp:  [18]   BP: (118-152)/(64-75)   SpO2:  [96 %]     Current BMI: Body mass index is 26.67 kg/m².    PEx  Constitutional: Patient appears debilitated.  No distress noted  HENT:   Head: Normocephalic and atraumatic.   Eyes: Pupils are equal, round  Neck: Normal range of motion. Neck supple.   Cardiovascular: Normal rate, regular rhythm and normal heart sounds.    Pulmonary/Chest: Effort normal and breath sounds are clear.  Supplemental oxygen in progress  Abdominal: Soft. Bowel sounds are normal.   Musculoskeletal:  Normal range of motion.   Neurological: Alert and oriented to person, place, and time.  Impaired higher level thinking  Psychiatric: Normal mood and affect. Behavior is normal.   Skin: Skin is warm and dry.  Surgical dressing to LLE only to be removed by Orthopedics    No results for input(s): GLUCOSE, NA, K, CL, CO2, BUN, CREATININE, MG in the last 24 hours.    Invalid input(s):  CALCIUM        No results for input(s): WBC, RBC, HGB, HCT, PLT, MCV, MCH, MCHC in the last 24 hours.        Recent Labs   Lab 06/20/23  0718 06/20/23  1103 06/20/23  1645 06/20/23  2106 06/21/23  0708 06/21/23  1059   POCTGLUCOSE 85 88 122* 100 118* 122*        Assessment and Plan:    E coli UTI  -  + UA  -  6/21 culture and sensitivity has finalized.  Patient will complete treatment with cefpodoxime 200 mg BID for total of 5 days    Constipation  -  improved, continue senna docusate to 1 tab BID, initiate MiraLax BID PRN    Tachy-juan a syndrome  PAF (paroxysmal atrial fibrillation)  - Cardiology wants HR on higher side and okay with slight tachycardia as she has issues with bradycardia on higher doses of Metoprolol.   - Cardiology consulted at [facility] as patient having erratic HRs up to 160 and as low as 40 in hospital. Cardiology states has evidence of tachycardia and bradycardia on ECG but does not meet criteria for tachy-juan a syndrome.   - Cardiology recommends to continue low dose Metoprolol 25 mg po BID to help with tachycardia related to atrial fibrillation. No need for pacemaker at this time.   - Per Cardiology, if patient experiences syncope or develops significant conversion pause >5 sec, send to ED for eval   - patient to follow-up with her cardiologist, Dr. Roman as outpatient.   - Home Eliquis on hold for long term anticoagulation for atrial fibrillation due to SAH as per Neurosurgery. Follow-up in 2 weeks with repeat CTH. If repeat CTH stable, can resume home dose Eliquis.  - Had 30 day Holter monitor in progress on admission  with outpatient Cardiologist Dr. Roman end date 6/21 no mention in cards note, will continue here  - 6/14 Received auto-triggered alert notification for - Atrial Fibrillation/Flutter, 3.3 second pause, Sinus Bradycardia onset. PAC on 6/10/23.  Ecg c/w AF/3.3 pause/junctional rhythm.  Message sent to  to schedule patient to follow-up in EP with Dr. Rincon    Closed left basicervical femoral neck fracture with extension into the intertrochanteric region   s/p IM nail on 5/30/2023  - PT/OT, WBAT  - DVT PPX with Lovenox 40 mg daily, okayed by NSx  - Ortho DAVIDE to see patient weekly SNF  - maintain surgical dressings until removed by Orthopedics  - follow-up with Ortho after discharge  - dressing removed today    Acute postoperative pain  - stable, continue to hold methocarbamol, continue acetaminophen 1000 mg q.8 hours, tramadol 50 mg q.6 hours PRN. Bowel regimen in place    Subarachnoid hemorrhage following injury, no loss of consciousness  - Patient found on admit to have punctate left parietal SAH that was stable on repeat imaging. Patient neurologically intact.   - Neurosurgery consulted and as per recs:   - No acute neurosurgical intervention  - Hold Eliquis for 2 weeks. Ok for DVT ppx with standard dose Lovenox 40 mg subcutaneous daily.  - SBP <140, Na >135, HOB >30  - Keppra 500 mg po BID x 14 days, end 6/13  - Follow-up in 2 weeks with repeat CTH. If repeat CTH stable, can resume home dose Eliquis.... 6/12- message sent to Neurosurgery that follow-up appointment was made but repeat head CT was not ordered/scheduled  - 6/14 CT now scheduled for 6/29 along with neurosurgery follow-up appointment    Acute blood loss anemia  - continue monitor twice weekly CBCs  - transfuse for hemoglobin < 7  - stable, continue ferrous sulfate daily     Essential hypertension  - stable, Continue Hydralazine 25 mg TID and Amlodipine/Benzapril 5/20 1 tablet daily  - Goal SBP < 140.       Coronary artery disease involving  native coronary artery of native heart without angina pectoris  - Patient with history of remote stents at [facility] in 1990 and 2003.   - stable, continue Lipitor 40 mg daily in place of home non formulary Zocor    Stage 3a chronic kidney disease  - stable, continue to monitor twice weekly BMPs, avoid nephrotoxic agents, renally dose medications when appropriate     Type 2 diabetes mellitus with circulatory disorder, without long-term current use of insulin  - Accu-Cheks AC/HS, diabetic diet  - home regimen with oral Tradjenta  - stable, continue low-dose SSI prn     Chronic diastolic heart failure  - stable, Continue Metoprolol 25 mg BID and Benazepril    - Monitor Is&Os and daily weights.      Centrilobular emphysema  Chronic hypoxemic respiratory failure  History of lung cancer in remission  - stable, Chronic and controlled. Continue home oxygen at 2 liters. Patient with no signs of acute exacerbation.   - Patient not on any inhalers at home.     Neuropathic pain  - continue home duloxetine 60 mg daily      Late onset Alzheimer's dementia with behavioral disturbance  - Patient at cognitive baseline oriented x3  - Continue home Aricept to treat.  - Delirium precautions  - Avoid antihistamines, anticholinergics, hypnotics, and minimize opiates while controlling for pain as these medications may exacerbate delirium. Cues for day/night will assist with keeping patient calm and oriented - during daytime, please keep adequate light in room (open windows, lights on) and please keep room dim at night-time to encourage normal sleep-wake cycles. Continuing to have nursing and family reorient the patient and encourage family to visit    Debility   - Continue with PT/OT for gait training and strengthening and restoration of ADL's   - Encourage mobility, OOB in chair, and early ambulation as appropriate  - Fall precautions   - Monitor for bowel and bladder dysfunction  - Monitor for and prevent skin breakdown and pressure  ulcers  - Continue DVT prophylaxis with Lovenox 40 mg daily        Anticipate disposition:  Home with home health      Follow-up needed during SNF stay-     Appointment not to send patient to- PCP 6/14, 6/22    Follow-up needed after discharge from SNF: PCP, EP, (7/11), neurosurgery (6/29), orthopedics (6/11)    Future Appointments   Date Time Provider Department Center   6/29/2023 12:45 PM [facility] OIC-CT1 500 LB LIMIT [facility] CTS [facility]   6/29/2023  2:00 PM Adri Rader PA-C [facility] NEUROS8 [facility]   7/11/2023 10:45 AM Amauri Henry NP [facility] ORTHO [facility] Ort   7/11/2023  2:45 PM EKG, APPT [facility] EKG [facility]   7/11/2023  3:20 PM Giuseppe Roman MD [facility] ARRHYTH [facility]       Sarah Liang NP  Department of Hospital Medicine   [facility]     DOS: 6/21/2023       Patient note was created using MModal Dictation.  Any errors in syntax or even information may not have been identified and edited on initial review prior to signing this note.

## 2023-06-21 NOTE — PLAN OF CARE
"CHW delivered DME equipment (18" wheelchair, Gel cushion, BSC, TB, elevated leg rest) to patients room. Patient daughter signed and a copy was given.   "

## 2023-06-21 NOTE — PT/OT/SLP PROGRESS
Occupational Therapy   Treatment/Discharge    Name: Brunilda England  MRN: [id number]  Admit Date: 6/5/2023  Admitting Diagnosis:  Left displaced femoral neck fracture    General Precautions: Standard, fall   Orthopedic Precautions: LLE weight bearing as tolerated   Braces: N/A    Recommendations:     Discharge Recommendations:  home with home health  Level of Assistance Recommended at Discharge: 24 hours physical assistance for all ADL's and home management tasks  Discharge Equipment Recommendations: bedside commode, bath bench, hip kit, walker, rolling  Barriers to discharge:  None    Assessment:     Brunilda England is a 89 y.o. female with a medical diagnosis of Left displaced femoral neck fracture.  Performance deficits affecting function are weakness, impaired endurance, impaired functional mobility, impaired self care skills, decreased upper extremity function, decreased lower extremity function, decreased safety awareness, decreased coordination.     Rehab Potential is good    Activity tolerance:  Fair    Plan:     Patient to be seen 5 x/week to address the above listed problems via self-care/home management, therapeutic activities, therapeutic exercises    Plan of Care Expires: 06/22/23  Plan of Care Reviewed with: patient    Subjective     Communicated with: Nurse prior to session .    Pain/Comfort:  Pain Rating 1: 0/10  Pain Rating Post-Intervention 1: 0/10    Patient's cultural, spiritual, Jehovah's witness conflicts given the current situation:  no    Objective:     Patient found up in chair with  (no lines) upon OT entry to room.    Bed Mobility:    Pt found/left in chair.     Functional Mobility/Transfers:  Patient completed Sit <> Stand Transfer with minimum assistance  with  rolling walker     Activities of Daily Living:  No ADL needs at this time.     [facility] 6 Click ADL: 16    OT Exercises: Pt performed the following BUE AROM w/ use of 3L dowel iraida to increase UB strength and endurance for safe performance w/  ADLs and functional mobility; pt required  frequent breaks as needed in between sets due to fatigue:   -forward chest press    -elbow flexion/ext    -overhead press    -forward rows    -reverse rows     Treatment & Education:  -Pt was able to stand 2:43 min for participating in table top activity to increase strength, endurance and balance for safe performance w/ ADLs and functional mobility. Pt used unilateral support on RW for stability.   -Pt required frequent encouragement for participation.   -Pt educated on role of OT and POC.   -Questions and concerns addressed within scope.       Patient left up in chair with  PT present    GOALS:   Multidisciplinary Problems       Occupational Therapy Goals          Problem: Occupational Therapy    Goal Priority Disciplines Outcome Interventions   Occupational Therapy Goal     OT, PT/OT Ongoing, Progressing    Description: Goals to be met by: 6/27/23     Patient will increase functional independence with ADLs by performing:    UE Dressing with Set-up Assistance.  LE Dressing with Stand-by Assistance.  Grooming while seated at sink with Modified [place].  Toileting from toilet with Stand-by Assistance for hygiene and clothing management.   Bathing from W/C SEATED AT SINK with Minimal Assistance.  Toilet transfer to toilet with Contact Guard Assistance.                         Time Tracking:     OT Date of Treatment: 06/21/23  OT Start Time: 1104    OT Stop Time: 1132  OT Total Time (min): 28 min    Billable Minutes:Therapeutic Activity 14  Therapeutic Exercise 14    6/21/2023

## 2023-06-22 ENCOUNTER — TELEPHONE (OUTPATIENT)
Dept: INTERNAL MEDICINE | Facility: CLINIC | Age: 88
End: 2023-06-22
Payer: MEDICARE

## 2023-06-22 VITALS
RESPIRATION RATE: 14 BRPM | TEMPERATURE: 98 F | SYSTOLIC BLOOD PRESSURE: 133 MMHG | HEART RATE: 66 BPM | WEIGHT: 147.94 LBS | DIASTOLIC BLOOD PRESSURE: 63 MMHG | BODY MASS INDEX: 26.21 KG/M2 | HEIGHT: 63 IN | OXYGEN SATURATION: 95 %

## 2023-06-22 LAB — POCT GLUCOSE: 128 MG/DL (ref 70–110)

## 2023-06-22 PROCEDURE — 25000003 PHARM REV CODE 250: Performed by: NURSE PRACTITIONER

## 2023-06-22 PROCEDURE — 25000003 PHARM REV CODE 250: Performed by: HOSPITALIST

## 2023-06-22 PROCEDURE — 25000003 PHARM REV CODE 250: Performed by: FAMILY MEDICINE

## 2023-06-22 RX ADMIN — CEFPODOXIME PROXETIL 200 MG: 200 TABLET, FILM COATED ORAL at 08:06

## 2023-06-22 RX ADMIN — ATORVASTATIN CALCIUM 40 MG: 40 TABLET, FILM COATED ORAL at 08:06

## 2023-06-22 RX ADMIN — FAMOTIDINE 20 MG: 20 TABLET, FILM COATED ORAL at 08:06

## 2023-06-22 RX ADMIN — CYANOCOBALAMIN TAB 1000 MCG 1000 MCG: 1000 TAB at 08:06

## 2023-06-22 RX ADMIN — CHOLECALCIFEROL TAB 25 MCG (1000 UNIT) 1000 UNITS: 25 TAB at 08:06

## 2023-06-22 RX ADMIN — DULOXETINE 60 MG: 60 CAPSULE, DELAYED RELEASE ORAL at 08:06

## 2023-06-22 RX ADMIN — HYDRALAZINE HYDROCHLORIDE 25 MG: 25 TABLET, FILM COATED ORAL at 05:06

## 2023-06-22 RX ADMIN — ONDANSETRON 4 MG: 4 TABLET, ORALLY DISINTEGRATING ORAL at 11:06

## 2023-06-22 RX ADMIN — AMLODIPINE BESYLATE AND BENAZEPRIL HYDROCHLORIDE 1 CAPSULE: 5; 20 CAPSULE ORAL at 08:06

## 2023-06-22 RX ADMIN — Medication 2 TABLET: at 08:06

## 2023-06-22 RX ADMIN — SENNOSIDES AND DOCUSATE SODIUM 1 TABLET: 50; 8.6 TABLET ORAL at 08:06

## 2023-06-22 RX ADMIN — ACETAMINOPHEN 1000 MG: 500 TABLET ORAL at 05:06

## 2023-06-22 RX ADMIN — METOPROLOL TARTRATE 25 MG: 25 TABLET, FILM COATED ORAL at 08:06

## 2023-06-22 RX ADMIN — FERROUS SULFATE TAB 325 MG (65 MG ELEMENTAL FE) 1 EACH: 325 (65 FE) TAB at 08:06

## 2023-06-22 NOTE — PT/OT/SLP PROGRESS
Physical Therapy Treatment/ Discharge Summary    Patient Name:  Brunilda England   MRN:  [id number]  Admit Date: 6/5/2023  Admitting Diagnosis: Left displaced femoral neck fracture  Recent Surgeries: INSERTION, INTRAMEDULLARY TEREZA, FEMUR (Left)     General Precautions: Standard, fall  Orthopedic Precautions: LLE weight bearing as tolerated  Braces: N/A    Recommendations:     Discharge Recommendations: home with home health  Level of Assistance Recommended at Discharge: 24 hours significant assistance  Discharge Equipment Recommendations: bedside commode, bath bench, hip kit, walker, rolling  Barriers to discharge: None    Assessment:     Brunilda England is a 89 y.o. female admitted with a medical diagnosis of Left displaced femoral neck fracture . Pt is appropriate for d/c tomorrow.      Performance deficits affecting function: impaired endurance, weakness, impaired self care skills, impaired functional mobility, gait instability, impaired balance, decreased upper extremity function, decreased lower extremity function, decreased ROM, impaired cardiopulmonary response to activity, orthopedic precautions.    Rehab Potential is good    Activity Tolerance: Fair    Plan:     Patient to be seen 5 x/week to address the above listed problems via gait training, therapeutic activities, therapeutic exercises, neuromuscular re-education    Plan of Care Expires: 07/06/23  Plan of Care Reviewed with: patient    Subjective     Pt. Agreeable to work with PT.     Pain/Comfort:      06/21/23 1133   Pain/Comfort   Pain Rating 1   (pt not able to rate pain)   Location - Side 1 Left   Location - Orientation 1 generalized   Location 1 leg   Pain Addressed 1 Pre-medicate for activity;Reposition;Distraction;Cessation of Activity;Nurse notified   Pain Rating Post-Intervention 1   (pt not able to rate pain)       Patient's cultural, spiritual, Rastafarian conflicts given the current situation:  no    Objective:     Communicated with pt's nurse  prior to session.  Patient found up in chair with oxygen upon PT entry to room.     Therapeutic Activities and Exercises: pt only able to tolerate 5mins on LBE d.t increase L L/E pain    Functional Mobility:  Bed Mobility:     Sit to Supine: maximal assistance  Transfers:     Sit to Stand:  moderate assistance with no AD  Bed to Chair: moderate assistance with  no AD  using  Stand Pivot    AM-PAC 6 CLICK MOBILITY       Patient left HOB elevated with all lines intact and call button in reach.    GOALS:   Multidisciplinary Problems       Physical Therapy Goals          Problem: Physical Therapy    Goal Priority Disciplines Outcome Goal Variances Interventions   Physical Therapy Goal     PT, PT/OT Ongoing, Progressing     Description: Goals to be met by: 7/6/23     Patient will increase functional independence with mobility by performing:    . Supine to sit with MInimal Assistance  . Sit to supine with MInimal Assistance  . Rolling to Left and Right with Minimal Assistance.  . Sit to stand transfer with Minimal Assistance  . Bed to chair transfer with Minimal Assistance using Rolling Walker  . Gait  x 30 feet with Moderate Assistance using Rolling Walker.   . Wheelchair propulsion x50 feet with Minimal Assistance using bilateral uppper extremities                         Time Tracking:     PT Received On: 06/21/23  PT Start Time: 1133  PT Stop Time: 1150  PT Total Time (min): 17 min    Billable Minutes: Therapeutic Activity 17    Treatment Type: Treatment  PT/PTA: PT     Number of PTA visits since last PT visit: 0     06/22/2023

## 2023-06-22 NOTE — PLAN OF CARE
Patient is discharging today 6/22/23 at 11am to home with family via personal car. DME was delivered to room, daughters request for hospital bed as been send to NP for review. Resident is [email or web address], as of this writing home health company is not known.

## 2023-06-22 NOTE — PLAN OF CARE
Discharge instructions given to patient.  Patient verbalized understanding and had no further questions.  Patient left floor on wheelchair with PCT, and family with DME.

## 2023-06-22 NOTE — PT/OT/SLP DISCHARGE
Physical Therapy Discharge Summary    Name: Brunilda England  MRN: [id number]   Principal Problem: Left displaced femoral neck fracture     Patient Discharged from acute Physical Therapy on 6/22/23.  Please refer to prior PT noted date on 6/21/23 for functional status.     Assessment:     Patient appropriate for care in another setting.    Objective:     GOALS:   Multidisciplinary Problems       Physical Therapy Goals          Problem: Physical Therapy    Goal Priority Disciplines Outcome Goal Variances Interventions   Physical Therapy Goal     PT, PT/OT Ongoing, Progressing     Description: Goals to be met by: 7/6/23     Patient will increase functional independence with mobility by performing:    . Supine to sit with MInimal Assistance  . Sit to supine with MInimal Assistance  . Rolling to Left and Right with Minimal Assistance.  . Sit to stand transfer with Minimal Assistance  . Bed to chair transfer with Minimal Assistance using Rolling Walker  . Gait  x 30 feet with Moderate Assistance using Rolling Walker.   . Wheelchair propulsion x50 feet with Minimal Assistance using bilateral uppper extremities                         Reasons for Discontinuation of Therapy Services  Transfer to alternate level of care.      Plan:     Patient Discharged to: Home with Home Health Service.      6/22/2023

## 2023-06-22 NOTE — DISCHARGE SUMMARY
[facility] Medicine  Discharge Summary      Patient Name: Brunilda England  MRN: [id number]  EMY: [id number]  Patient Class: IP- SNF  Admission Date: 6/5/2023  Hospital Length of Stay: 17 days  Discharge Date and Time: 6/22/2023 11:25 AM  Attending Physician: No att. providers found   Discharging Provider: Sarah Liang NP  Primary Care Provider: Pepper Whitfield MD    Primary Care Team: LT[facility] 8 SARAH LIANG     HPI:   Ms. England is a 89 year old female with PMHx of HTN, paroxysmal atrial fibrillation/flutter, COPD on home oxygen, Chronic diastolic HF, CKD 3a, dementia, history of lung/rectal cancer who presents to SNF following hospitalization for left displaced femoral neck fracture s/p ORIF with intramedullary nail on 05/30/2023 with Dr. Barker.  Admission to SNF for secondary weakness and debility.      suffered fall at home while changing her shoes. She was found to have a closed left basocervical fracture with extension into intertrochanteric region. She was also found to have a small left parietal SAH on admit.  Patient underwent left hip cephalomedullary nail fixation by Dr. Desmond Barker 5/30/2023. Post-op patient WBAT to the left lower extremity as per Orthopedics recommendation. Patient placed on Lovenox 40 mg subcutaneous daily and MERCEDES/SCD's for DVT prophylaxis post-op as unable to use patient's home Apixiban post-op for her atrial fibrillation due to ICH as per Neurosurgery but NS okay with Lovenox.  Of note patient had issues with tachy-juan a in hospital. Patient followed by Cardiology as outpatient for A. Fib and intermittent bradycardia and undergoing outpatient evaluation. Cardiology consulted as inpatient states no indication for need for pacemaker at this time and stated they would rather patient be more tachycardiac than bradycardiac. PT/OT recommended SNF placement.       Hospital Course:   Patient progressed well with PT and OT- last PT note states that  patient ambulated with RW min/mod R lat lean to favor LLE (also in sitting posture, able to correct with A and ed on good sitting/standing posture)  ~ 25 ft /02/wc follow. Patient had no significant events during their stay at SNF.  Patient treated for UTI at the end of her stay.  Home health was set up. DME was ordered if needed. Follow up appointment to be made by patient within one week. All prescriptions and discharge instructions were ordered to be given to the patient prior to discharge.     PEx  Constitutional: Patient appears debilitated.  No distress noted  HENT:   Head: Normocephalic and atraumatic.   Eyes: Pupils are equal, round  Neck: Normal range of motion. Neck supple.   Cardiovascular: Normal rate, regular rhythm and normal heart sounds.    Pulmonary/Chest: Effort normal and breath sounds are clear.  Supplemental oxygen in progress  Abdominal: Soft. Bowel sounds are normal.   Musculoskeletal: Normal range of motion.   Neurological: Alert and oriented to person, place, and time.  Impaired higher level thinking  Psychiatric: Normal mood and affect. Behavior is normal.   Skin: Skin is warm and dry.  Surgical incision to LLE.  Fully approximated, no drainage noted, no signs of infection       Goals of Care Treatment Preferences:  Code Status: Partial Code      Consults:   Consults (From admission, onward)        Status Ordering Provider     Inpatient consult to Registered Dietitian/Nutritionist  Once        Provider:  (Not yet assigned)    SASKIA Teresa          No new Assessment & Plan notes have been filed under this hospital service since the last note was generated.  Service: Hospital Medicine    Final Active Diagnoses:    Diagnosis Date Noted POA    PRINCIPAL PROBLEM:  Closed left basicervical femoral neck fracture with extension into the intertrochanteric region s/p IM nail on 5/30/2023 [S72.002A] 05/28/2023 Yes    Acute blood loss anemia [D62] 05/31/2023 Yes    Chronic hypoxemic  "respiratory failure [J96.11] 05/29/2023 Yes    Tachy-juan a syndrome [I49.5] 05/29/2023 Yes    Subarachnoid hemorrhage following injury, no loss of consciousness [S06.6X0A] 05/29/2023 Yes    PAF (paroxysmal atrial fibrillation) [I48.0] 05/20/2023 Yes    Pulmonary hypertension [I27.20] 05/20/2023 Yes    Late onset Alzheimer's dementia with behavioral disturbance [G30.1, F02.818] 04/03/2023 Yes    Chronic diastolic heart failure [I50.32] 08/04/2022 Yes    Stage 3a chronic kidney disease [N18.31] 08/04/2022 Yes    Centrilobular emphysema [J43.2] 07/06/2020 Yes    Type 2 diabetes mellitus with circulatory disorder, without long-term current use of insulin [E11.59] 03/21/2017 Yes    Coronary artery disease involving native coronary artery of native heart without angina pectoris [I25.10] 01/28/2017 Yes    PVD (peripheral vascular disease) with claudication [I73.9] 05/16/2014 Yes    Pure hypercholesterolemia [E78.00] 10/18/2013 Yes    Essential hypertension [I10] 06/29/2012 Yes    History of lung cancer [Z85.118] 06/29/2012 Not Applicable      Problems Resolved During this Admission:       Discharged Condition: good    [place] [facility]    [place] Up:    Patient Instructions:      WALKER FOR HOME USE     Order Specific Question Answer Comments   Type of Walker: Camron (4'4"-5'6")    With wheels? Yes    Height: 5' 3" (1.6 m)    Weight: 69.5 kg (153 lb 3.5 oz)    Length of need (1-99 months): 99    Does patient have medical equipment at home? cane, straight    Please check all that apply: Patient's condition impairs ambulation.    Please check all that apply: Walker will be used for gait training.    Please check all that apply: Patient is unable to safely ambulate without equipment.      WHEELCHAIR FOR HOME USE     Order Specific Question Answer Comments   Hours in W/C per day: 8    Type of Wheelchair: Lightweight    Patient unable to propel in Standard wheelchair? Yes    Size(Width): 18"(STD " "adult)    Leg Support: STD footrests    Leg Support: Swing Away    Arm Height: Full length    Arm Height: Swing away    Arm Height: Adjust height    Lap Belt: Buckle    Accessories: Anti-tippers    Accessories: Front brakes    Accessories: Safety belt    Cushion: Basic    Justification for cushion: Prevent pressure ulcers    Reclining Back No    Height: 5' 3" (1.6 m)    Weight: 69.5 kg (153 lb 3.5 oz)    Does patient have medical equipment at home? cane, straight    Length of need (1-99 months): 99    Please check all that apply: Caregiver is capable and willing to operate wheelchair safely.    Please check all that apply: The patient requires the use of a w/c for activities of daily living within the Home.    Please check all that apply: Patient mobility limitations cannot be sufficiently resolved by the use of other ambulatory therapies.      3 IN 1 COMMODE FOR HOME USE     Order Specific Question Answer Comments   Type: Standard    Height: 5' 3" (1.6 m)    Weight: 69.5 kg (153 lb 3.5 oz)    Does patient have medical equipment at home? cane, straight    Length of need (1-99 months): 99      TRANSFER TUB BENCH FOR HOME USE     Order Specific Question Answer Comments   Type of Transfer Tub Bench: Unpadded    Height: 5' 3" (1.6 m)    Weight: 69.5 kg (153 lb 3.5 oz)    Does patient have medical equipment at home? cane, straight    Length of need (1-99 months): 99    Patient notified - Not covered by insurance considered a convenience item Yes    Discussed financial responsibility with responsible party Yes      HIP KIT FOR HOME USE     Order Specific Question Answer Comments   Height: 5' 3" (1.6 m)    Weight: 69.5 kg (153 lb 3.5 oz)    Does patient have medical equipment at home? cane, straight    Length of need (1-99 months): 99    Type: Short Horn Hip Kit      HOSPITAL BED FOR HOME USE     Order Specific Question Answer Comments   Type: Semi-electric    Length of need (1-99 months): 99    Does patient have medical " "equipment at home? cane, straight    Height: 5' 3" (1.6 m)    Weight: 67.1 kg (147 lb 14.9 oz)    Please check all that apply: Patient requires a bed height different than a fixed height hospital bed to permit transfers to chair, wheelchair, or standing.      No driving until:   Order Comments: Cleared by PCP     Notify your health care provider if you experience any of the following:  temperature >100.4     Notify your health care provider if you experience any of the following:  persistent nausea and vomiting or diarrhea     Notify your health care provider if you experience any of the following:  severe uncontrolled pain     Notify your health care provider if you experience any of the following:  redness, tenderness, or signs of infection (pain, swelling, redness, odor or green/yellow discharge around incision site)     Notify your health care provider if you experience any of the following:  difficulty breathing or increased cough     Notify your health care provider if you experience any of the following:  severe persistent headache     Notify your health care provider if you experience any of the following:  worsening rash     Notify your health care provider if you experience any of the following:  persistent dizziness, light-headedness, or visual disturbances     Notify your health care provider if you experience any of the following:  increased confusion or weakness     Activity as tolerated       Significant Diagnostic Studies: N/A    Pending Diagnostic Studies:     None         Medications:  Reconciled Home Medications:      Medication List      START taking these medications    artificial tears 0.5 % ophthalmic solution  Commonly known as: ISOPTO TEARS  Place 1 drop into both eyes as needed.     cefpodoxime 200 MG tablet  Commonly known as: VANTIN  Take 1 tablet (200 mg total) by mouth every 12 (twelve) hours. for 2 days     ferrous sulfate 325 (65 FE) MG EC tablet  Take 1 tablet (325 mg total) by mouth " once daily.     melatonin 3 mg tablet  Commonly known as: MELATIN  Take 2 tablets (6 mg total) by mouth nightly as needed for Insomnia.        CHANGE how you take these medications    acetaminophen 500 MG tablet  Commonly known as: TYLENOL  Take 2 tablets (1,000 mg total) by mouth every 8 (eight) hours as needed for Pain.  What changed:   · when to take this  · reasons to take this        CONTINUE taking these medications    albuterol 2.5 mg /3 mL (0.083 %) nebulizer solution  Commonly known as: PROVENTIL  Take 3 mLs (2.5 mg total) by nebulization every 6 (six) hours as needed for Wheezing. Rescue     amlodipine-benazepril 5-20 mg 5-20 mg per capsule  Commonly known as: LOTREL  Take 1 capsule by mouth once daily.     apixaban 5 mg Tab  Commonly known as: ELIQUIS  Take 1 tablet (5 mg total) by mouth 2 (two) times daily. Hold this medication until cleared by Neurosurgery to resume.     azelastine 137 mcg (0.1 %) nasal spray  Commonly known as: ASTELIN  1 spray (137 mcg total) by Nasal route 2 (two) times daily.     calcium-vitamin D3 500 mg-5 mcg (200 unit) per tablet  Commonly known as: OS-ERIC 500 + D3  Take 2 tablets by mouth once daily.     cyanocobalamin 1000 MCG tablet  Commonly known as: VITAMIN B-12  Take 1,000 mcg by mouth every morning.     donepeziL 5 MG tablet  Commonly known as: ARICEPT  Take 1 tablet (5 mg total) by mouth every evening.     DULoxetine 60 MG capsule  Commonly known as: CYMBALTA  Take 1 capsule (60 mg total) by mouth every morning.     estradioL 0.01 % (0.1 mg/gram) vaginal cream  Commonly known as: ESTRACE  USE ONE-HALF GRAM VAGINALLY TWICE a WEEK     hydrALAZINE 25 MG tablet  Commonly known as: APRESOLINE  Take 1 tablet (25 mg total) by mouth every 8 (eight) hours.     metoprolol tartrate 25 MG tablet  Commonly known as: LOPRESSOR  Take 1 tablet (25 mg total) by mouth 2 (two) times daily.     nitroGLYCERIN 0.4 MG SL tablet  Commonly known as: NITROSTAT  Place 1 tablet (0.4 mg total) under  the tongue every 5 (five) minutes as needed.     simvastatin 80 MG tablet  Commonly known as: ZOCOR  Take 1 tablet (80 mg total) by mouth once daily.     TRADJENTA 5 mg Tab tablet  Generic drug: linaGLIPtin  Take 1 tablet (5 mg total) by mouth once daily.     vitamin D 1000 units Tab  Commonly known as: VITAMIN D3  Take 1,000 Units by mouth once daily.        STOP taking these medications    enoxaparin 40 mg/0.4 mL Syrg  Commonly known as: LOVENOX     levETIRAcetam 500 MG Tab  Commonly known as: KEPPRA     methocarbamoL 500 MG Tab  Commonly known as: ROBAXIN            Indwelling Lines/Drains at time of discharge:   Lines/Drains/Airways     Drain  Duration           Female External Urinary Catheter 06/06/23 0700 16 days                Time spent on the discharge of patient: 38 minutes         Sarah Liang NP  Department of [facility] Medicine  [facility]

## 2023-06-22 NOTE — TELEPHONE ENCOUNTER
----- Message from Charmaine Franklin sent at 6/22/2023 11:29 AM CDT -----  Contact: Pt [phone]  The patient is being discharged today from [facility], please call her to schedule a hosp/f/u in 7 to 10 days.    Thank you

## 2023-06-22 NOTE — HOSPITAL COURSE
Patient progressed well with PT and OT- last PT note states that patient ambulated***. Patient had no significant events during their stay at SNF. Home health was set up. DME was ordered if needed. Follow up appointment to be made by patient within one week. All prescriptions and discharge instructions were ordered to be given to the patient prior to discharge.     PEx  Constitutional: Patient appears debilitated.  No distress noted  HENT:   Head: Normocephalic and atraumatic.   Eyes: Pupils are equal, round  Neck: Normal range of motion. Neck supple.   Cardiovascular: Normal rate, regular rhythm and normal heart sounds.    Pulmonary/Chest: Effort normal and breath sounds are clear.  Supplemental oxygen in progress  Abdominal: Soft. Bowel sounds are normal.   Musculoskeletal: Normal range of motion.   Neurological: Alert and oriented to person, place, and time.  Impaired higher level thinking  Psychiatric: Normal mood and affect. Behavior is normal.   Skin: Skin is warm and dry.  Surgical incision to LLE.  Fully approximated, no drainage noted, no signs of infection

## 2023-06-23 ENCOUNTER — PATIENT OUTREACH (OUTPATIENT)
Dept: ADMINISTRATIVE | Facility: CLINIC | Age: 88
End: 2023-06-23
Payer: MEDICARE

## 2023-06-24 NOTE — PROGRESS NOTES
C3 nurse attempted to contact Brunilda England  for a TCC post hospital discharge follow up call. No answer. Left voicemail with callback information. The patient has a scheduled HOSFU appointment with Princess Ibarra  on 6/27/23 @ 1200 hrs.

## 2023-06-26 ENCOUNTER — TELEPHONE (OUTPATIENT)
Dept: ADMINISTRATIVE | Facility: CLINIC | Age: 88
End: 2023-06-26
Payer: MEDICARE

## 2023-06-26 ENCOUNTER — TELEPHONE (OUTPATIENT)
Dept: INTERNAL MEDICINE | Facility: CLINIC | Age: 88
End: 2023-06-26
Payer: MEDICARE

## 2023-06-26 ENCOUNTER — PATIENT MESSAGE (OUTPATIENT)
Dept: INTERNAL MEDICINE | Facility: CLINIC | Age: 88
End: 2023-06-26
Payer: MEDICARE

## 2023-06-26 PROCEDURE — G0180 MD CERTIFICATION HHA PATIENT: HCPCS | Mod: ,,, | Performed by: NURSE PRACTITIONER

## 2023-06-26 PROCEDURE — G0180 PR HOME HEALTH MD CERTIFICATION: ICD-10-PCS | Mod: ,,, | Performed by: NURSE PRACTITIONER

## 2023-06-26 RX ORDER — METHOCARBAMOL 500 MG/1
500 TABLET, FILM COATED ORAL 4 TIMES DAILY PRN
Qty: 120 TABLET | Refills: 0 | Status: SHIPPED | OUTPATIENT
Start: 2023-06-26 | End: 2023-07-26

## 2023-06-26 NOTE — PROGRESS NOTES
C3 nurse spoke with Brunilda England   for a TCC post hospital discharge follow up call. The patient does not have a scheduled HOSFU appointment with Pepper Whitfield MD   within 5-7 days post hospital discharge date 06/22/2023. C3 nurse was unable to schedule HOSFU appointment in [facility].She had apt and cancelled apt she needs to reschedule says her mother was in too much pain to go to apt she has Neuro apt for Thursday 06/29/2023    Message sent to PCP staff requesting they contact patient and schedule follow up appointment.

## 2023-06-26 NOTE — PROGRESS NOTES
"Phoned patient in response to reply of "2" to post-discharge texting tracker. Ms. Haley England, daughter and caretaker of Ms. England, states that her mother is in tremendous pain and is in need of the muscle relaxer she was receiving while in the facility. It appears she was receiving methocarbamol 500 mg q8h prn, but the medication was discontinued prior to discharge. She also would like to discuss her Mother's multiple blood pressure medications, as she is concerned that she may not need to be taking them all. Sent a secure chat message to the discharging provided to advise on how to proceed. FARIHA Liang is sending methocarbamol to [facility] and replied that patient should f/u with PCP regarding BP medication continuation, changes or adjustments. Informed Ms. Haley England that RX will be sent to [facility] and that she should direct all BP medication questions or concerns to PCP, Dr. Whitfield. Offered to send a message to Dr. Whitfield and office staff to request a call back to address her Mother's BP medications. Ms. Haley England accepted and verbalized understanding. Message sent to Dr. Whitfield on patient's behalf. Methocarbamol RX transmitted to [facility] successfully on 6/26 at 9:41 AM.    "

## 2023-06-26 NOTE — TELEPHONE ENCOUNTER
"Returned call to pt daughter. Pt daughter is concerned about her mothers BP meds. Ms. England was discharged on 6/22 from SNF with instructions to continue to take Lotrel 5-20 mg daily, hydralazine 25 mg q8h and metoprolol tartrate 25 mg BID. Ms. Haley England is concerned that this medication regimen will cause her mother's blood pressure to "bottom out". She states she has not been giving her the Lotrel, only the Metoprolol and Hydralazine. States pt BP was elevated while she was hospitalized but has no way to check it at all. She wants to know what PCP recommends.   "

## 2023-06-26 NOTE — TELEPHONE ENCOUNTER
"----- Message from Keely Ley PharmD sent at 6/26/2023  9:11 AM CDT -----  Regarding: Medication Questions  Good morning. I'm with the post-discharge team.    Ms. Haley England, daughter and caretaker of Ms. Brunilda England, is requesting a callback to discuss her mother's blood pressure medications. Ms. England was discharged on 6/22 from SNF with instructions to continue to take Lotrel 5-20 mg daily, hydralazine 25 mg q8h and metoprolol tartrate 25 mg BID. Ms. Haley England is concerned that this medication regimen will cause her mother's blood pressure to "bottom out". She may be reached directly at [phone] to discuss further.    Please note, this message was sent on the patient's behalf as a courtesy. Please do not reply to this message as this mailbox is not routinely monitored. Thanks.     Keely"

## 2023-06-26 NOTE — TELEPHONE ENCOUNTER
Please see what is going on and see how we can help her. Maybe she needs the nurse practoiner to go to the home.

## 2023-06-27 ENCOUNTER — PATIENT MESSAGE (OUTPATIENT)
Dept: INTERNAL MEDICINE | Facility: CLINIC | Age: 88
End: 2023-06-27
Payer: MEDICARE

## 2023-06-29 ENCOUNTER — OFFICE VISIT (OUTPATIENT)
Dept: NEUROSURGERY | Facility: CLINIC | Age: 88
End: 2023-06-29
Payer: MEDICARE

## 2023-06-29 ENCOUNTER — HOSPITAL ENCOUNTER (OUTPATIENT)
Dept: RADIOLOGY | Facility: HOSPITAL | Age: 88
Discharge: HOME | End: 2023-06-29
Attending: PHYSICIAN ASSISTANT
Payer: MEDICARE

## 2023-06-29 VITALS
DIASTOLIC BLOOD PRESSURE: 67 MMHG | HEIGHT: 63 IN | HEART RATE: 90 BPM | SYSTOLIC BLOOD PRESSURE: 117 MMHG | WEIGHT: 147 LBS | BODY MASS INDEX: 26.05 KG/M2

## 2023-06-29 DIAGNOSIS — S06.6X0A SUBARACHNOID HEMORRHAGE FOLLOWING INJURY, NO LOSS OF CONSCIOUSNESS, INITIAL ENCOUNTER: Primary | ICD-10-CM

## 2023-06-29 DIAGNOSIS — S06.6X0A SUBARACHNOID HEMORRHAGE FOLLOWING INJURY, NO LOSS OF CONSCIOUSNESS, INITIAL ENCOUNTER: ICD-10-CM

## 2023-06-29 PROCEDURE — 1125F PR PAIN SEVERITY QUANTIFIED, PAIN PRESENT: ICD-10-PCS | Mod: CPTII,S$GLB,, | Performed by: PHYSICIAN ASSISTANT

## 2023-06-29 PROCEDURE — 1101F PT FALLS ASSESS-DOCD LE1/YR: CPT | Mod: CPTII,S$GLB,, | Performed by: PHYSICIAN ASSISTANT

## 2023-06-29 PROCEDURE — 3288F PR FALLS RISK ASSESSMENT DOCUMENTED: ICD-10-PCS | Mod: CPTII,S$GLB,, | Performed by: PHYSICIAN ASSISTANT

## 2023-06-29 PROCEDURE — 1159F MED LIST DOCD IN RCRD: CPT | Mod: CPTII,S$GLB,, | Performed by: PHYSICIAN ASSISTANT

## 2023-06-29 PROCEDURE — 70450 CT HEAD WITHOUT CONTRAST: ICD-10-PCS | Mod: 26,,, | Performed by: RADIOLOGY

## 2023-06-29 PROCEDURE — 1125F AMNT PAIN NOTED PAIN PRSNT: CPT | Mod: CPTII,S$GLB,, | Performed by: PHYSICIAN ASSISTANT

## 2023-06-29 PROCEDURE — 1101F PR PT FALLS ASSESS DOC 0-1 FALLS W/OUT INJ PAST YR: ICD-10-PCS | Mod: CPTII,S$GLB,, | Performed by: PHYSICIAN ASSISTANT

## 2023-06-29 PROCEDURE — 1159F PR MEDICATION LIST DOCUMENTED IN MEDICAL RECORD: ICD-10-PCS | Mod: CPTII,S$GLB,, | Performed by: PHYSICIAN ASSISTANT

## 2023-06-29 PROCEDURE — 99214 OFFICE O/P EST MOD 30 MIN: CPT | Mod: S$GLB,,, | Performed by: PHYSICIAN ASSISTANT

## 2023-06-29 PROCEDURE — 70450 CT HEAD/BRAIN W/O DYE: CPT | Mod: 26,,, | Performed by: RADIOLOGY

## 2023-06-29 PROCEDURE — 99214 PR OFFICE/OUTPT VISIT, EST, LEVL IV, 30-39 MIN: ICD-10-PCS | Mod: S$GLB,,, | Performed by: PHYSICIAN ASSISTANT

## 2023-06-29 PROCEDURE — 1111F DSCHRG MED/CURRENT MED MERGE: CPT | Mod: CPTII,S$GLB,, | Performed by: PHYSICIAN ASSISTANT

## 2023-06-29 PROCEDURE — 99999 PR PBB SHADOW E&M-EST. PATIENT-LVL III: ICD-10-PCS | Mod: PBBFAC,,, | Performed by: PHYSICIAN ASSISTANT

## 2023-06-29 PROCEDURE — 3288F FALL RISK ASSESSMENT DOCD: CPT | Mod: CPTII,S$GLB,, | Performed by: PHYSICIAN ASSISTANT

## 2023-06-29 PROCEDURE — 99999 PR PBB SHADOW E&M-EST. PATIENT-LVL III: CPT | Mod: PBBFAC,,, | Performed by: PHYSICIAN ASSISTANT

## 2023-06-29 PROCEDURE — 1111F PR DISCHARGE MEDS RECONCILED W/ CURRENT OUTPATIENT MED LIST: ICD-10-PCS | Mod: CPTII,S$GLB,, | Performed by: PHYSICIAN ASSISTANT

## 2023-06-29 PROCEDURE — 70450 CT HEAD/BRAIN W/O DYE: CPT | Mod: TC

## 2023-06-30 ENCOUNTER — DOCUMENT SCAN (OUTPATIENT)
Dept: HOME HEALTH SERVICES | Facility: HOSPITAL | Age: 88
End: 2023-06-30
Payer: MEDICARE

## 2023-06-30 ENCOUNTER — PATIENT MESSAGE (OUTPATIENT)
Dept: INTERNAL MEDICINE | Facility: CLINIC | Age: 88
End: 2023-06-30

## 2023-06-30 ENCOUNTER — OFFICE VISIT (OUTPATIENT)
Dept: INTERNAL MEDICINE | Facility: CLINIC | Age: 88
End: 2023-06-30
Payer: MEDICARE

## 2023-06-30 DIAGNOSIS — I48.0 PAF (PAROXYSMAL ATRIAL FIBRILLATION): ICD-10-CM

## 2023-06-30 DIAGNOSIS — S72.002A LEFT DISPLACED FEMORAL NECK FRACTURE: Primary | ICD-10-CM

## 2023-06-30 DIAGNOSIS — I49.5 TACHY-BRADY SYNDROME: ICD-10-CM

## 2023-06-30 DIAGNOSIS — F02.818 LATE ONSET ALZHEIMER'S DEMENTIA WITH BEHAVIORAL DISTURBANCE: ICD-10-CM

## 2023-06-30 DIAGNOSIS — G30.1 LATE ONSET ALZHEIMER'S DEMENTIA WITH BEHAVIORAL DISTURBANCE: ICD-10-CM

## 2023-06-30 DIAGNOSIS — E11.8 CONTROLLED TYPE 2 DIABETES MELLITUS WITH COMPLICATION, WITHOUT LONG-TERM CURRENT USE OF INSULIN: ICD-10-CM

## 2023-06-30 DIAGNOSIS — F39 MOOD DISORDER: ICD-10-CM

## 2023-06-30 DIAGNOSIS — I10 ESSENTIAL HYPERTENSION: ICD-10-CM

## 2023-06-30 DIAGNOSIS — M81.0 OSTEOPOROSIS, UNSPECIFIED OSTEOPOROSIS TYPE, UNSPECIFIED PATHOLOGICAL FRACTURE PRESENCE: ICD-10-CM

## 2023-06-30 DIAGNOSIS — S06.6X0D SUBARACHNOID HEMORRHAGE FOLLOWING INJURY, NO LOSS OF CONSCIOUSNESS, SUBSEQUENT ENCOUNTER: ICD-10-CM

## 2023-06-30 DIAGNOSIS — J44.9 CHRONIC OBSTRUCTIVE PULMONARY DISEASE, UNSPECIFIED COPD TYPE: ICD-10-CM

## 2023-06-30 PROCEDURE — 1111F PR DISCHARGE MEDS RECONCILED W/ CURRENT OUTPATIENT MED LIST: ICD-10-PCS | Mod: CPTII,95,, | Performed by: INTERNAL MEDICINE

## 2023-06-30 PROCEDURE — 99215 PR OFFICE/OUTPT VISIT, EST, LEVL V, 40-54 MIN: ICD-10-PCS | Mod: 95,,, | Performed by: INTERNAL MEDICINE

## 2023-06-30 PROCEDURE — 99215 OFFICE O/P EST HI 40 MIN: CPT | Mod: 95,,, | Performed by: INTERNAL MEDICINE

## 2023-06-30 PROCEDURE — 1111F DSCHRG MED/CURRENT MED MERGE: CPT | Mod: CPTII,95,, | Performed by: INTERNAL MEDICINE

## 2023-06-30 RX ORDER — SIMVASTATIN 80 MG/1
80 TABLET, FILM COATED ORAL DAILY
Qty: 90 TABLET | Refills: 4 | Status: SHIPPED | OUTPATIENT
Start: 2023-06-30

## 2023-06-30 RX ORDER — DULOXETIN HYDROCHLORIDE 60 MG/1
60 CAPSULE, DELAYED RELEASE ORAL EVERY MORNING
Qty: 90 CAPSULE | Refills: 3 | Status: SHIPPED | OUTPATIENT
Start: 2023-06-30 | End: 2023-11-09 | Stop reason: SDUPTHER

## 2023-06-30 NOTE — PROGRESS NOTES
The patient location is: home  The chief complaint leading to consultation is: hospital follow up    Visit type: audiovisual    Face to Face time with patient: 25  42 minutes of total time spent on the encounter, which includes face to face time and non-face to face time preparing to see the patient (eg, review of tests), Obtaining and/or reviewing separately obtained history, Documenting clinical information in the electronic or other health record, Independently interpreting results (not separately reported) and communicating results to the patient/family/caregiver, or Care coordination (not separately reported).         Each patient to whom he or she provides medical services by telemedicine is:  (1) informed of the relationship between the physician and patient and the respective role of any other health care provider with respect to management of the patient; and (2) notified that he or she may decline to receive medical services by telemedicine and may withdraw from such care at any time.    CHIEF COMPLAINT: hospital follow up    HISTORY OF PRESENT ILLNESS: This is a 89-year-old woman who [facility]    He fell and fractured left hip on 5/28/23- ORIF on 5/30/23.  She went to SNF from 6/5/23 to 6/22/23.    She has soreness on the left hip, mars after physical therapy. Home health physical therpay is coming 2 times a week and occupational therapy is coming once a week. She is feeling stronger. She is able to walk from the bed to the bathroom.   She is taking tylenol 500 mg 2 tablets twice a day which is controlling her pain.   She is taking methocarbamol 500 mg 2-3 times a week when she has more pain. Methocarbamol does help.    She saw neurosurgery yesterday and left parietal subarachnoid hemorrhage has resolved.   No headaches.     Mood is good    BP has been doing better - 148/72 and 128/62 today. She is taking amlodopine benazepril 5/20 once daily,  hydralaxine 25 mg three times daily and metoprolol tartrate 25 mg twice daily.  She will follow up with EP cardiology on 7/11/23 as pulse was erratic in the hospital. Cardiology did not feel that she needed a pacemaker at that time.  She continues to take ELiquis 5 mg twice daily.      She is taking donepezil 5 mg once daily for her memory. She feels that here mood and memory are ok.  SHe continues to take duloxetine 60 mg daily.  Neuropathic pain in the legs is better controlled with the duloxetine.       No cough.   She has some dyspnea on exertion which is stable. CT chest was stable. She has some nasal congestion. No nausea, vomiting, constipation, diarrhea.           Her non small cell lung cancer is stable. She saw Dr Weir 10/4/2021 and scans were stable. She is 11 years out from her diagnosis     She is now prescribed Trajenta 5 mg daily for her diabetes.     She continues to take simvastatin 80 mg at bedtime for her hyperlipidemia. No joint pain or muscle pain.        She is off Fosamax once week for her bones.  No melana, bloody stools, constipation.        She takes vitamin B12 1000 mcg daily for vitamin B12 deficiency - anemia is stable        SHe has macular degeneration of the eyes and was followed by Dr Tresa Coreas. She got injections in her eyes every 6 weeks in Spring 2015. She now sees Dr Ruelas at [facility] (last saw him 8/18- no need for injections at that time- yearly follow up). Vision has been stable. She is taking a Eye vitamins for her eyes.       PAST MEDICAL HISTORY:    1. Stage III non-small cell lung cancer, completed chemoradiation with carboplatin and Taxol on 6/1/12    2. Hypertension.   3. Diabetes mellitus.   4. Hyperlipidemia.   5. Chronic diastolic dysfunction.   6. Coronary artery disease status post MI in 1990 and 2003. Stenting was   done at 2003 at [facility].   7. Cholecystectomy, December 2006.   8. Cataract removal.   9. Benign growth removed from her throat.    10. Left common femoral endarterectomy, July 2007.   11. History of anal cancer in 1996 status post radiation and chemotherapy.   12. Carpal tunnel syndrome.   13. Osteoporosis on BMD 12/11    14. Macular degeneration    SOCIAL HISTORY: She quit smoking in 1995, denies any alcohol use. She is etired.     REVIEW OF SYSTEMS: She denies fevers, chills, night sweats, fatigue, visual change, hearing loss, sinus congestion, sore throat, dysuria, hematuria, joint pain, muscle pain, polydipsia, and polyuria.     PHYSICAL EXAM:             GENERAL: She is alert and oriented. No apparent distress. Affect within normal limits.   Conjunctivae anicteric.  NECK: Supple.   Respiratory effort normal. Speech normal       ASSESSMENT AND PLAN:    Left intertrochanteric hip fracture- s/p- Open reduction internal fixation left intertrochanteric hip fracture with intramedullary nail. Continue home health PT and OT. Pain is managed   left parietal subarachnoid hemorrhage- resolved  Paroxysmal A fib with tachy-juan a in the hospital - to see EP cardiology on 7/11/23. Back on eliquis  Diastolic dysfunction with history of lung cancer and COPD - now with hypoxic respiratory failure - oxygen 2 liters nasal cannula  Diabetes  continue the Tradjenta.   Hypertension- stable  PVD - stable  Mood disorder - stable  Memory issues/alzheimers- follow up with Dr Alanis. off memantine  Stage III non-small cell lung cancer, completed chemoradiation with carboplatin and Taxol on 6/1/12 - doing well. No signs of recurrence - CT Chest 1/3/2022  Hyperlipidemia-lipids controlled  Coronary artery disease modifying risk factors.   History of anal cancer-had a normal colonoscopy, November 2008; due 2015. Declined repeat  Pernicious anemia - on counter vitamin B12 1000 mcg daily  Osteoporosis - took alendroante for 7 years - 2012 to 2019.  Currently on a drug holiday.  BMD 5/2022 slightly worse. Now with fracture.  Will need to discuss therapy with her at next  visit. Will refer to endocrine   Macular degeneration - stable   VItamin D deficiency -  vitamin D 2000 units daily.   Vaginal bleeding - asx currently  Allergic rhinitis - astelin nasal spray  Screening mammogram was 12/19 - declines      I will see her back in 6 weeks sooner if problems arise    Answers submitted by the patient for this visit:  Review of Systems Questionnaire (Submitted on 6/30/2023)  activity change: Yes  unexpected weight change: Yes  neck pain: No  hearing loss: No  rhinorrhea: No  trouble swallowing: No  eye discharge: No  visual disturbance: No  chest tightness: No  wheezing: No  chest pain: No  palpitations: Yes  blood in stool: No  constipation: Yes  vomiting: No  diarrhea: Yes  polydipsia: No  polyuria: No  difficulty urinating: No  hematuria: No  menstrual problem: No  dysuria: No  joint swelling: Yes  arthralgias: Yes  headaches: No  weakness: Yes  confusion: Yes  dysphoric mood: No

## 2023-07-03 ENCOUNTER — PATIENT MESSAGE (OUTPATIENT)
Dept: INTERNAL MEDICINE | Facility: CLINIC | Age: 88
End: 2023-07-03
Payer: MEDICARE

## 2023-07-03 NOTE — PROGRESS NOTES
[facility] 8th Fl  Neurosurgery    SUBJECTIVE:     History of Present Illness:  Brunilda England is a 89 y.o. female with h/o CAD, DM, CHF, CKD, Afib on Eliquis BID presented to the ED 5/30 after ground level fall at home, no LOC, with L femoral neck fracture. CTH with punctate L parietal tSAH. NSGY consulted for evaluation. Repeat CTH stable. Denies neck pain. CT C spine without acute fracture. No neurosurgical intervention warranted.  Presents today for follow-up. Doing well. C/o minimal LLE pain 2/2 recent surgery. Denies headache, acute visual changes, numbness, paresthesias, b/b incontinence. Working with therapy. Denies recent falls.          Review of patient's allergies indicates:   Allergen Reactions    Bextra [valdecoxib] Swelling    Gabapentin Diarrhea and Nausea Only       Past Medical History:   Diagnosis Date    Anal cancer 1995    Anemia     Cancer 1995    anal cancer    Centrilobular emphysema 7/6/2020    Diabetes mellitus     With circulatory disorder    Lumbar radiculopathy 5/16/2014    Lung cancer 2012    s/p chemo/radiation    Macular degeneration     Macular hemorrhage of left eye     Neuropathy     Osteoporosis     Osteoporosis, unspecified 11/9/2012    Pure hypercholesterolemia        Past Surgical History:   Procedure Laterality Date    BRONCHOSCOPY      CHOLECYSTECTOMY      COLONOSCOPY      FLUOROSCOPIC ANGIOGRAPHY OF LOWER EXTREMITY WITH TOPICAL ULTRASOUND Right 12/6/2018    Procedure: ARTERIOGRAM-LEG AND ULTRASOUND;  Surgeon: SEBASTIÁN Mcpherson III, MD;  Location: [facility] OR [address] [place] [address];  Service: Peripheral Vascular;  Laterality: Right;  16.5 min  1059.42 mGy  59ml Dye  2ml Local    heart stent      HYSTERECTOMY      INCISIONAL BIOPSY Right 12/6/2019    Procedure: INCISIONAL BIOPSY;  Surgeon: Leslie Castillo MD;  Location: [facility] OR [address] [place] [address];  Service: Plastics;  Laterality: Right;    INTRAMEDULLARY RODDING OF FEMUR Left 5/30/2023    Procedure: INSERTION, INTRAMEDULLARY TEREZA, FEMUR;   Surgeon: Desmond Barker MD;  [facility];  Service: Orthopedics;  Laterality: Left;    left leg surgery      blockage    PERCUTANEOUS TRANSLUMINAL ANGIOPLASTY N/A 2018    Procedure: PTA (ANGIOPLASTY, PERCUTANEOUS, TRANSLUMINAL);  Surgeon: SEBASTIÁN Mcpherson III, MD;  [facility];  Service: Peripheral Vascular;  Laterality: N/A;    RECONSTRUCTION USING FLAP Right 2019    Procedure: RECONSTRUCTION USING FLAP/FULL THICKNESS MRESETION AND RECONSTRUCTION RIGHT UPPER EYELID WITH BIOPSY;  Surgeon: Leslie Castillo MD;  [facility];  Service: Plastics;  Laterality: Right;    TONSILLECTOMY          Family History   Problem Relation Age of Onset    Stroke Mother     Cancer Father     Breast cancer Maternal Aunt     Ovarian cancer Neg Hx     Amblyopia Neg Hx     Blindness Neg Hx     Cataracts Neg Hx     Glaucoma Neg Hx     Macular degeneration Neg Hx     Retinal detachment Neg Hx     Strabismus Neg Hx        Social History     Socioeconomic History    Marital status:    Tobacco Use    Smoking status: Former     Packs/day: 0.50     Years: 30.00     Pack years: 15.00     Types: Cigarettes     Quit date: 1995     Years since quittin.5     Passive exposure: Past    Smokeless tobacco: Never   Substance and Sexual Activity    Alcohol use: No    Drug use: No    Sexual activity: Not Currently     Birth control/protection: Surgical     Social Determinants of Health     Financial Resource Strain: Low Risk     Difficulty of Paying Living Expenses: Not hard at all   Food Insecurity: No Food Insecurity    Worried About Running Out of Food in the Last Year: Never true    Ran Out of Food in the Last Year: Never true   Transportation Needs: No Transportation Needs    Lack of Transportation (Medical): No    Lack of Transportation (Non-Medical): No   Physical Activity: Inactive    Days of Exercise per Week: 0 days    Minutes of Exercise per Session: 0 min   Stress: No  "Stress Concern Present    Feeling of Stress : Not at all   Social Connections: Moderately Isolated    Frequency of Communication with Friends and Family: More than three times a week    Frequency of Social Gatherings with Friends and Family: More than three times a week    Attends Mandaen Services: More than 4 times per year    Active Member of Clubs or Organizations: No    Attends Club or Organization Meetings: Never    Marital Status:    Housing Stability: Low Risk     Unable to Pay for Housing in the Last Year: No    Number of Places Lived in the Last Year: 1    Unstable Housing in the Last Year: No       Review of Systems:  Per HPI    OBJECTIVE:     Vital Signs (Most Recent):  Vitals:    06/29/23 1328   BP: 117/67   Pulse: 90   Weight: 66.7 kg (147 lb)   Height: 5' 3" (1.6 m)       Physical Exam:  General: well developed, well nourished, no distress.   Head: normocephalic, atraumatic  Neurologic: Alert and oriented. Thought content appropriate.  GCS: Motor: 6/Verbal: 5/Eyes: 4 GCS Total: 15  Mental Status: Awake, Alert, Oriented x 4  Language: No aphasia  Speech: No dysarthria  Cranial nerves: face symmetric, tongue midline, CN II-XII grossly intact.   Eyes: pupils equal, round, reactive to light with accomodation, EOMI.  Pulmonary: normal respirations, no signs of respiratory distress  Abdomen: soft, non-distended, not tender to palpation  Sensory: intact to light touch throughout  Motor Strength: Moves all extremities spontaneously with good tone. Grossly full strength upper and lower extremities. Limited ROM LLE proximally otherwise full strength. No abnormal movements seen.   Pronator Drift: no drift noted  Finger-to-nose: Intact bilaterally  Gann: absent  Clonus: absent  Skin: Skin is warm, dry and intact.  No midline tenderness to palpation. No surrounding paraspinal muscle tenderness.    Diagnostic Results:  I have personally reviewed all pertinent imaging.    [facility] 6/29:  - resolution prior " left parietal SAH, no acute abnormalities    ASSESSMENT/PLAN:     Brunilda England is a 89 y.o. female who presents for follow-up of punctate left parietal SAH after a fall. Neurologically intact. CTH with resolution of SAH. Ok to resume eliquis. F/u PRN. We discussed concerning signs and symptoms that would prompt return to clinic or urgent medical attention, patient v/u. All questions answered. Encouraged to call the clinic with questions/concerns prior to the next visit.      Adri Rader PA-C  Neurosurgery      Note dictated with voice recognition software, please excuse any grammatical errors.

## 2023-07-04 ENCOUNTER — PATIENT MESSAGE (OUTPATIENT)
Dept: INTERNAL MEDICINE | Facility: CLINIC | Age: 88
End: 2023-07-04
Payer: MEDICARE

## 2023-07-06 ENCOUNTER — PATIENT MESSAGE (OUTPATIENT)
Dept: INTERNAL MEDICINE | Facility: CLINIC | Age: 88
End: 2023-07-06
Payer: MEDICARE

## 2023-07-06 DIAGNOSIS — C34.90 MALIGNANT NEOPLASM OF LUNG, UNSPECIFIED LATERALITY, UNSPECIFIED PART OF LUNG: ICD-10-CM

## 2023-07-06 DIAGNOSIS — J44.9 CHRONIC OBSTRUCTIVE PULMONARY DISEASE, UNSPECIFIED COPD TYPE: Primary | ICD-10-CM

## 2023-07-07 ENCOUNTER — DOCUMENT SCAN (OUTPATIENT)
Dept: HOME HEALTH SERVICES | Facility: HOSPITAL | Age: 88
End: 2023-07-07
Payer: MEDICARE

## 2023-07-11 ENCOUNTER — HOSPITAL ENCOUNTER (OUTPATIENT)
Dept: RADIOLOGY | Facility: HOSPITAL | Age: 88
Discharge: HOME | End: 2023-07-11
Attending: NURSE PRACTITIONER
Payer: MEDICARE

## 2023-07-11 ENCOUNTER — OFFICE VISIT (OUTPATIENT)
Dept: ORTHOPEDICS | Facility: CLINIC | Age: 88
End: 2023-07-11
Payer: MEDICARE

## 2023-07-11 DIAGNOSIS — Z98.890 POST-OPERATIVE STATE: ICD-10-CM

## 2023-07-11 DIAGNOSIS — R52 PAIN: ICD-10-CM

## 2023-07-11 DIAGNOSIS — S72.002A LEFT DISPLACED FEMORAL NECK FRACTURE: Primary | ICD-10-CM

## 2023-07-11 PROCEDURE — 1125F AMNT PAIN NOTED PAIN PRSNT: CPT | Mod: CPTII,S$GLB,, | Performed by: NURSE PRACTITIONER

## 2023-07-11 PROCEDURE — 73502 XR HIP WITH PELVIS WHEN PERFORMED, 2 OR 3 VIEWS LEFT: ICD-10-PCS | Mod: 26,LT,, | Performed by: RADIOLOGY

## 2023-07-11 PROCEDURE — 73502 X-RAY EXAM HIP UNI 2-3 VIEWS: CPT | Mod: TC,LT

## 2023-07-11 PROCEDURE — 1159F MED LIST DOCD IN RCRD: CPT | Mod: CPTII,S$GLB,, | Performed by: NURSE PRACTITIONER

## 2023-07-11 PROCEDURE — 73502 X-RAY EXAM HIP UNI 2-3 VIEWS: CPT | Mod: 26,LT,, | Performed by: RADIOLOGY

## 2023-07-11 PROCEDURE — 99999 PR PBB SHADOW E&M-EST. PATIENT-LVL III: ICD-10-PCS | Mod: PBBFAC,,, | Performed by: NURSE PRACTITIONER

## 2023-07-11 PROCEDURE — 1159F PR MEDICATION LIST DOCUMENTED IN MEDICAL RECORD: ICD-10-PCS | Mod: CPTII,S$GLB,, | Performed by: NURSE PRACTITIONER

## 2023-07-11 PROCEDURE — 99024 PR POST-OP FOLLOW-UP VISIT: ICD-10-PCS | Mod: S$GLB,,, | Performed by: NURSE PRACTITIONER

## 2023-07-11 PROCEDURE — 99999 PR PBB SHADOW E&M-EST. PATIENT-LVL III: CPT | Mod: PBBFAC,,, | Performed by: NURSE PRACTITIONER

## 2023-07-11 PROCEDURE — 1160F RVW MEDS BY RX/DR IN RCRD: CPT | Mod: CPTII,S$GLB,, | Performed by: NURSE PRACTITIONER

## 2023-07-11 PROCEDURE — 1160F PR REVIEW ALL MEDS BY PRESCRIBER/CLIN PHARMACIST DOCUMENTED: ICD-10-PCS | Mod: CPTII,S$GLB,, | Performed by: NURSE PRACTITIONER

## 2023-07-11 PROCEDURE — 99024 POSTOP FOLLOW-UP VISIT: CPT | Mod: S$GLB,,, | Performed by: NURSE PRACTITIONER

## 2023-07-11 PROCEDURE — 1125F PR PAIN SEVERITY QUANTIFIED, PAIN PRESENT: ICD-10-PCS | Mod: CPTII,S$GLB,, | Performed by: NURSE PRACTITIONER

## 2023-07-11 NOTE — PROGRESS NOTES
Ms. England is here today for a post-operative visit after undergoing an ORIF for her left intertrochanteric hip fracture with IM nail by Dr. Barker on 5/30/2023.    Interval History:  She reports that she is doing ok.  Pain is minimal.  She is not taking pain medication.  Patient previously seen at SNF when admitted there.  She is now home getting home health PT.  She denies falls since her discharge from SNF.  She denies fever, chills, and sweats since the time of the surgery.     Physical exam:  Incision is open to air and healed.  No signs of infection noted.    She has tactile stimulation to their lower leg, she denies calf pain, there is no leg edema and their pedal pulse is palpable x 2.  ROM of her hip is 0-90 degrees.  She is able to stand with min assistance.    RADS: X-ray of her left hip was personally reviewed by me.  Her IM nailing appears to be in good position alignment.  Her fracture to her into her trochanteric area appears be in good position and alignment and still seen.  Minimal callus formation noted when compared to prior xray.  No new fracture or hardware failure seen.    Assessment:  Post-op visit (6 weeks)    Plan:    ICD-10-CM ICD-9-CM   1. Closed left basicervical femoral neck fracture with extension into the intertrochanteric region s/p IM nail on 5/30/2023  S72.002A 820.8   2. Post-operative state  Z98.890 V45.89     Current care, treatment plan, precautions, activity level/ modifications, limitations, rehabilitation exercises and proposed future treatment were discussed with the patient. We discussed the need to monitor for changes in symptoms and condition and report them to the physician.  Discussed importance of compliance with all appointments and follow up examinations.       PHYSICAL THERAPY:   - Continue therapy as ordered.  - Weight bearing as tolerated   - Range of motion as tolerated.      PAIN MEDICATION:   - Pain medication: refill was not needed  - Pain medication refill  policy provided to patient for review, yes.    - Patient was informed a multi-modal approach is used to treat their pain.     DVT PROPHYLAXIS:   -  Eliquis long-term    FRAGILITY:  - Refer to fracture clinic     FOLLOW UP:   - Patient will follow up in the clinic in 6 weeks.  - X-ray of her left hip is needed.    - Future Appointments:   Future Appointments   Date Time Provider Department Center   8/1/2023  2:30 PM Pepper Whitfield MD [facility] IM [facility]   8/22/2023 10:30 AM Amauri Henry NP [facility] ORTHO [facility] [place]         If there are any questions prior to scheduled follow up, the patient was instructed to contact the office

## 2023-07-12 ENCOUNTER — PATIENT MESSAGE (OUTPATIENT)
Dept: INTERNAL MEDICINE | Facility: CLINIC | Age: 88
End: 2023-07-12
Payer: MEDICARE

## 2023-07-13 ENCOUNTER — PATIENT MESSAGE (OUTPATIENT)
Dept: INTERNAL MEDICINE | Facility: CLINIC | Age: 88
End: 2023-07-13
Payer: MEDICARE

## 2023-07-13 DIAGNOSIS — I10 ESSENTIAL HYPERTENSION: ICD-10-CM

## 2023-07-14 ENCOUNTER — TELEPHONE (OUTPATIENT)
Dept: INTERNAL MEDICINE | Facility: CLINIC | Age: 88
End: 2023-07-14

## 2023-07-14 NOTE — TELEPHONE ENCOUNTER
----- Message from Anai Lei sent at 7/14/2023 10:04 AM CDT -----  Contact: Shalini teran/[employer] [phone]  Shalini is calling in regards to getting a clarified order for pt's oxygen. Please call.          Thank you

## 2023-07-17 ENCOUNTER — TELEPHONE (OUTPATIENT)
Dept: INTERNAL MEDICINE | Facility: CLINIC | Age: 88
End: 2023-07-17
Payer: MEDICARE

## 2023-07-17 DIAGNOSIS — J44.9 CHRONIC OBSTRUCTIVE PULMONARY DISEASE, UNSPECIFIED COPD TYPE: Primary | ICD-10-CM

## 2023-07-17 RX ORDER — AMLODIPINE AND BENAZEPRIL HYDROCHLORIDE 5; 20 MG/1; MG/1
1 CAPSULE ORAL DAILY
Qty: 90 CAPSULE | Refills: 3 | Status: SHIPPED | OUTPATIENT
Start: 2023-07-17 | End: 2024-03-12

## 2023-07-17 NOTE — TELEPHONE ENCOUNTER
I called and spoke with Jen at [facility]   And she sd it has to say on the order PORTABLE OXYGEN CONCENTRATOR PULSE DOSE   So I have pended the new order send back to me once signed so the I can refax it to [facility]

## 2023-07-17 NOTE — TELEPHONE ENCOUNTER
Care Due:                  Date            Visit Type   Department     Provider  --------------------------------------------------------------------------------                                ESTABLISHED                              PATIENT -    [facility] INTERNAL  Last Visit: 06-      VIRTUAL      MEDICINE       GERRI ADORNO                              EP -                              PRIMARY      [facility] INTERNAL  Next Visit: 08-      CARE ([facility])   MEDICINE       GERRI ADORNO                                                            Last  Test          Frequency    Reason                     Performed    Due Date  --------------------------------------------------------------------------------    Lipid Panel.  12 months..  simvastatin..............  04- 04-    [facility] Embedded Care Due Messages. Reference number: [id number].   7/17/2023 10:57:33 AM CDT

## 2023-07-19 ENCOUNTER — TELEPHONE (OUTPATIENT)
Dept: INTERNAL MEDICINE | Facility: CLINIC | Age: 88
End: 2023-07-19
Payer: MEDICARE

## 2023-07-19 ENCOUNTER — PATIENT MESSAGE (OUTPATIENT)
Dept: INTERNAL MEDICINE | Facility: CLINIC | Age: 88
End: 2023-07-19
Payer: MEDICARE

## 2023-07-19 NOTE — TELEPHONE ENCOUNTER
I called and spoke to pt's daughter  She will call back with more info   Once she calls back we roger send info   To fax   Just stating she is her mothers care taker until the rest of the yr to extend her tag for her car

## 2023-07-19 NOTE — LETTER
July 20, 2023    Brunilda England  [address] [facility] [place] [address]             [facility]  [address]  [facility] [address]  Phone: [phone]  Fax: [phone] To Whom It May Concern:    Mrs England is ill and requires 24 hour care. Her daughter, Haley Irwin is her sole caregiver and is unable return to [place] renew her vehicle license.         Sincerely,        Pepper Whitfield MD   u

## 2023-07-19 NOTE — TELEPHONE ENCOUNTER
----- Message from Devon Aguayo MA sent at 7/19/2023 10:24 AM CDT -----  Contact: [phone]  Haley (Patient daughter) called                In regards to returning a phone call to speak back with Ms. Paige.            Call back [phone]

## 2023-07-19 NOTE — TELEPHONE ENCOUNTER
The daughter needs a letter just stating that she is the caregiver of her mother and she does not know when she will be returning back to [place] at this time.     This is all do to he tag that is about to  on her car that is from [place] so that she can get an extension   Once completed I will call pt and she will come by to

## 2023-07-19 NOTE — TELEPHONE ENCOUNTER
----- Message from Devon Aguayo MA sent at 7/19/2023 10:24 AM CDT -----  Contact: [phone]  Haley (Patient daughter) called                In regards to returning a phone call to speak back with Ms. Paige.            Call back [phone]

## 2023-07-20 ENCOUNTER — PATIENT MESSAGE (OUTPATIENT)
Dept: INTERNAL MEDICINE | Facility: CLINIC | Age: 88
End: 2023-07-20
Payer: MEDICARE

## 2023-07-20 DIAGNOSIS — I50.32 CHRONIC DIASTOLIC HEART FAILURE: Primary | ICD-10-CM

## 2023-07-20 DIAGNOSIS — S72.002A LEFT DISPLACED FEMORAL NECK FRACTURE: ICD-10-CM

## 2023-07-20 NOTE — TELEPHONE ENCOUNTER
Name: Haley England     Letter needs to say    Haley Englandis the sole caregiver of her mother and is taking care of her at this time. She does not know at this time when she will be returning back to [place]. So she will need an extension on her tag for her vehicle.

## 2023-07-20 NOTE — TELEPHONE ENCOUNTER
Called and spoke to daughter she will come by to    Also she just found out that bathroom had a leak in it  came by   They have mold and ecoli in the bathroom   Wants to know if that could be bc of her respiratory issue?

## 2023-07-20 NOTE — TELEPHONE ENCOUNTER
Please get daughter's full name and what ever identification information that needs to be in the letter and I will write the letter

## 2023-07-21 ENCOUNTER — PATIENT MESSAGE (OUTPATIENT)
Dept: INTERNAL MEDICINE | Facility: CLINIC | Age: 88
End: 2023-07-21
Payer: MEDICARE

## 2023-07-21 NOTE — TELEPHONE ENCOUNTER
DME order placed for raised toilet seat.  Please fax to [facility]

## 2023-07-24 ENCOUNTER — PATIENT MESSAGE (OUTPATIENT)
Dept: INTERNAL MEDICINE | Facility: CLINIC | Age: 88
End: 2023-07-24
Payer: MEDICARE

## 2023-07-26 ENCOUNTER — EXTERNAL HOME HEALTH (OUTPATIENT)
Dept: HOME HEALTH SERVICES | Facility: HOSPITAL | Age: 88
End: 2023-07-26
Payer: MEDICARE

## 2023-07-27 NOTE — ASSESSMENT & PLAN NOTE
Per PCP progress note in 9/2019, pt stable on Cymbalta 60mg.    - Continue home Cymbalta      Statement Selected

## 2023-08-01 ENCOUNTER — OFFICE VISIT (OUTPATIENT)
Dept: INTERNAL MEDICINE | Facility: CLINIC | Age: 88
End: 2023-08-01
Payer: MEDICARE

## 2023-08-01 VITALS
DIASTOLIC BLOOD PRESSURE: 50 MMHG | HEART RATE: 89 BPM | SYSTOLIC BLOOD PRESSURE: 118 MMHG | WEIGHT: 151.38 LBS | BODY MASS INDEX: 26.82 KG/M2 | OXYGEN SATURATION: 95 % | HEIGHT: 63 IN

## 2023-08-01 DIAGNOSIS — I25.119 ATHEROSCLEROSIS OF NATIVE CORONARY ARTERY OF NATIVE HEART WITH ANGINA PECTORIS: ICD-10-CM

## 2023-08-01 DIAGNOSIS — G30.1 LATE ONSET ALZHEIMER'S DEMENTIA WITH BEHAVIORAL DISTURBANCE: ICD-10-CM

## 2023-08-01 DIAGNOSIS — S72.142A INTERTROCHANTERIC FRACTURE OF LEFT HIP, CLOSED, INITIAL ENCOUNTER: Primary | ICD-10-CM

## 2023-08-01 DIAGNOSIS — F02.818 LATE ONSET ALZHEIMER'S DEMENTIA WITH BEHAVIORAL DISTURBANCE: ICD-10-CM

## 2023-08-01 DIAGNOSIS — I10 ESSENTIAL HYPERTENSION: ICD-10-CM

## 2023-08-01 DIAGNOSIS — E11.51 TYPE 2 DIABETES MELLITUS WITH DIABETIC PERIPHERAL ANGIOPATHY WITHOUT GANGRENE, WITHOUT LONG-TERM CURRENT USE OF INSULIN: ICD-10-CM

## 2023-08-01 DIAGNOSIS — C34.90 MALIGNANT NEOPLASM OF LUNG, UNSPECIFIED LATERALITY, UNSPECIFIED PART OF LUNG: ICD-10-CM

## 2023-08-01 DIAGNOSIS — I48.0 PAF (PAROXYSMAL ATRIAL FIBRILLATION): ICD-10-CM

## 2023-08-01 DIAGNOSIS — I25.10 CORONARY ARTERY DISEASE INVOLVING NATIVE CORONARY ARTERY OF NATIVE HEART WITHOUT ANGINA PECTORIS: ICD-10-CM

## 2023-08-01 PROCEDURE — 1101F PT FALLS ASSESS-DOCD LE1/YR: CPT | Mod: CPTII,S$GLB,, | Performed by: INTERNAL MEDICINE

## 2023-08-01 PROCEDURE — 99214 OFFICE O/P EST MOD 30 MIN: CPT | Mod: S$GLB,,, | Performed by: INTERNAL MEDICINE

## 2023-08-01 PROCEDURE — 1159F PR MEDICATION LIST DOCUMENTED IN MEDICAL RECORD: ICD-10-PCS | Mod: CPTII,S$GLB,, | Performed by: INTERNAL MEDICINE

## 2023-08-01 PROCEDURE — 1126F PR PAIN SEVERITY QUANTIFIED, NO PAIN PRESENT: ICD-10-PCS | Mod: CPTII,S$GLB,, | Performed by: INTERNAL MEDICINE

## 2023-08-01 PROCEDURE — 99999 PR PBB SHADOW E&M-EST. PATIENT-LVL IV: CPT | Mod: PBBFAC,,, | Performed by: INTERNAL MEDICINE

## 2023-08-01 PROCEDURE — 3288F PR FALLS RISK ASSESSMENT DOCUMENTED: ICD-10-PCS | Mod: CPTII,S$GLB,, | Performed by: INTERNAL MEDICINE

## 2023-08-01 PROCEDURE — 3288F FALL RISK ASSESSMENT DOCD: CPT | Mod: CPTII,S$GLB,, | Performed by: INTERNAL MEDICINE

## 2023-08-01 PROCEDURE — 99999 PR PBB SHADOW E&M-EST. PATIENT-LVL IV: ICD-10-PCS | Mod: PBBFAC,,, | Performed by: INTERNAL MEDICINE

## 2023-08-01 PROCEDURE — 1159F MED LIST DOCD IN RCRD: CPT | Mod: CPTII,S$GLB,, | Performed by: INTERNAL MEDICINE

## 2023-08-01 PROCEDURE — 1126F AMNT PAIN NOTED NONE PRSNT: CPT | Mod: CPTII,S$GLB,, | Performed by: INTERNAL MEDICINE

## 2023-08-01 PROCEDURE — 1101F PR PT FALLS ASSESS DOC 0-1 FALLS W/OUT INJ PAST YR: ICD-10-PCS | Mod: CPTII,S$GLB,, | Performed by: INTERNAL MEDICINE

## 2023-08-01 PROCEDURE — 99214 PR OFFICE/OUTPT VISIT, EST, LEVL IV, 30-39 MIN: ICD-10-PCS | Mod: S$GLB,,, | Performed by: INTERNAL MEDICINE

## 2023-08-01 RX ORDER — METOPROLOL TARTRATE 25 MG/1
25 TABLET, FILM COATED ORAL 2 TIMES DAILY
Qty: 60 TABLET | Refills: 11 | Status: SHIPPED | OUTPATIENT
Start: 2023-08-01 | End: 2023-09-12 | Stop reason: SDUPTHER

## 2023-08-01 NOTE — PROGRESS NOTES
CHIEF COMPLAINT:  follow up multiple issues    HISTORY OF PRESENT ILLNESS: This is a 89-year-old woman who presents with her daughter follow up of multiple issues     He fell and fractured left hip on 5/28/23- ORIF on 5/30/23.  She went to SNF from 6/5/23 to 6/22/23. Left hip is much improved. She takes tylenol 500 mg 2 tablets only several times a week. She is taking methocarbamol 500 mg once daily which helps with some spasm.         Mood is good     BP at home has been doing well.  110-130/60-70. Pulse 80-90. She is taking amlodopine benazepril 5/20 once daily, hydralaxine 25 mg three times daily and metoprolol tartrate 25 mg twice daily.  She will follow up with EP cardiology soon  as pulse was erratic in the hospital. Cardiology did not feel that she needed a pacemaker at that time.  She continues to take ELiquis 5 mg twice daily.      She is taking donepezil 5 mg once daily for her memory. She feels that here mood and memory are ok.  SHe continues to take duloxetine 60 mg daily.  Neuropathic pain in the legs is better controlled with the duloxetine.       No cough.   She has some dyspnea on exertion which is stable. CT chest was stable. She has some nasal congestion. No nausea, vomiting, constipation, diarrhea.            Her non small cell lung cancer is stable. She saw Dr Weir 10/4/2021 and scans were stable. She is 11 years out from her diagnosis.   She is using her oxygen at night.      She is now prescribed Trajenta 5 mg daily for her diabetes.     She continues to take simvastatin 80 mg at bedtime for her hyperlipidemia. No joint pain or muscle pain.        She is off Fosamax once week for her bones.  No melana, bloody stools, constipation.        She takes vitamin B12 1000 mcg daily for vitamin B12 deficiency - anemia is stable        SHe has macular degeneration of the eyes and was followed by Dr Tresa Coreas. She got injections in her eyes every 6 weeks in Spring 2015. She now sees Dr Ruelas at  "[facility] (last saw him 8/18- no need for injections at that time- yearly follow up). Vision has been stable. She is taking a Eye vitamins for her eyes.       PAST MEDICAL HISTORY:    1. Stage III non-small cell lung cancer, completed chemoradiation with carboplatin and Taxol on 6/1/12    2. Hypertension.   3. Diabetes mellitus.   4. Hyperlipidemia.   5. Chronic diastolic dysfunction.   6. Coronary artery disease status post MI in 1990 and 2003. Stenting was   done at 2003 at [facility].   7. Cholecystectomy, December 2006.   8. Cataract removal.   9. Benign growth removed from her throat.   10. Left common femoral endarterectomy, July 2007.   11. History of anal cancer in 1996 status post radiation and chemotherapy.   12. Carpal tunnel syndrome.   13. Osteoporosis on BMD 12/11    14. Macular degeneration    SOCIAL HISTORY: She quit smoking in 1995, denies any alcohol use. She is etired.     REVIEW OF SYSTEMS: She denies fevers, chills, night sweats, fatigue, visual change, hearing loss, sinus congestion, sore throat, dysuria, hematuria, joint pain, muscle pain, polydipsia, and polyuria.     PHYSICAL EXAM:           BP (!) 118/50   Pulse 89   Ht 5' 3" (1.6 m)   SpO2 95%   BMI 26.04 kg/m²     GENERAL: She is alert and oriented. No apparent distress. Affect within normal limits.   Conjunctivae anicteric.  TM clear  NECK: Supple.   Respiratory effort normal. Lungs clear to auscultation.   HEART: Regular rate and rhythm without murmurs, gallops, or rubs. Trace lower   extremity edema.        ASSESSMENT AND PLAN:    Left intertrochanteric hip fracture- s/p- Open reduction internal fixation left intertrochanteric hip fracture with intramedullary nail. Continue home health PT and OT. Pain is managed   left parietal subarachnoid hemorrhage- resolved  Paroxysmal A fib with tachy-juan a in the hospital - to see EP cardiology.  on eliquis  Diastolic dysfunction with history of lung cancer and COPD - now with " hypoxic respiratory failure - oxygen 2 liters nasal cannula at night  Diabetes  continue the Tradjenta.   Hypertension- stable  PVD - stable  Mood disorder - stable  Memory issues/alzheimers- follow up with Dr Alanis. off memantine  Stage III non-small cell lung cancer, completed chemoradiation with carboplatin and Taxol on 6/1/12 - doing well. No signs of recurrence - CT Chest 1/3/2022  Hyperlipidemia-lipids controlled  Coronary artery disease modifying risk factors.   History of anal cancer-had a normal colonoscopy, November 2008; due 2015. Declined repeat  Pernicious anemia - on counter vitamin B12 1000 mcg daily  Osteoporosis - took alendroante for 7 years - 2012 to 2019.  Currently on a drug holiday.  BMD 5/2022 slightly worse. Now with fracture.  To see the fracture clinic.    Macular degeneration - stable   VItamin D deficiency -  vitamin D 2000 units daily.   Vaginal bleeding - asx currently  Allergic rhinitis - astelin nasal spray  Screening mammogram was 12/19 - declines      I will see her back in 16 weeks sooner if problems arise

## 2023-08-04 ENCOUNTER — DOCUMENT SCAN (OUTPATIENT)
Dept: HOME HEALTH SERVICES | Facility: HOSPITAL | Age: 88
End: 2023-08-04
Payer: MEDICARE

## 2023-08-08 ENCOUNTER — OFFICE VISIT (OUTPATIENT)
Dept: CARDIOLOGY | Facility: CLINIC | Age: 88
End: 2023-08-08
Payer: MEDICARE

## 2023-08-08 VITALS
DIASTOLIC BLOOD PRESSURE: 60 MMHG | HEART RATE: 106 BPM | SYSTOLIC BLOOD PRESSURE: 134 MMHG | HEIGHT: 66 IN | WEIGHT: 155 LBS | BODY MASS INDEX: 24.91 KG/M2

## 2023-08-08 DIAGNOSIS — I50.33 ACUTE ON CHRONIC DIASTOLIC CHF (CONGESTIVE HEART FAILURE), NYHA CLASS 3: Primary | ICD-10-CM

## 2023-08-08 DIAGNOSIS — I10 ESSENTIAL HYPERTENSION: ICD-10-CM

## 2023-08-08 DIAGNOSIS — I48.0 PAROXYSMAL ATRIAL FIBRILLATION: ICD-10-CM

## 2023-08-08 DIAGNOSIS — I49.5 SSS (SICK SINUS SYNDROME): ICD-10-CM

## 2023-08-08 DIAGNOSIS — E78.00 PURE HYPERCHOLESTEROLEMIA: ICD-10-CM

## 2023-08-08 PROCEDURE — 1159F PR MEDICATION LIST DOCUMENTED IN MEDICAL RECORD: ICD-10-PCS | Mod: CPTII,GC,S$GLB, | Performed by: INTERNAL MEDICINE

## 2023-08-08 PROCEDURE — 99999 PR PBB SHADOW E&M-EST. PATIENT-LVL V: CPT | Mod: PBBFAC,GC,, | Performed by: STUDENT IN AN ORGANIZED HEALTH CARE EDUCATION/TRAINING PROGRAM

## 2023-08-08 PROCEDURE — 1126F PR PAIN SEVERITY QUANTIFIED, NO PAIN PRESENT: ICD-10-PCS | Mod: CPTII,GC,S$GLB, | Performed by: INTERNAL MEDICINE

## 2023-08-08 PROCEDURE — 93000 EKG 12-LEAD: ICD-10-PCS | Mod: S$GLB,,, | Performed by: INTERNAL MEDICINE

## 2023-08-08 PROCEDURE — 1159F MED LIST DOCD IN RCRD: CPT | Mod: CPTII,GC,S$GLB, | Performed by: INTERNAL MEDICINE

## 2023-08-08 PROCEDURE — 1126F AMNT PAIN NOTED NONE PRSNT: CPT | Mod: CPTII,GC,S$GLB, | Performed by: INTERNAL MEDICINE

## 2023-08-08 PROCEDURE — 99214 PR OFFICE/OUTPT VISIT, EST, LEVL IV, 30-39 MIN: ICD-10-PCS | Mod: 25,GC,S$GLB, | Performed by: INTERNAL MEDICINE

## 2023-08-08 PROCEDURE — 93000 ELECTROCARDIOGRAM COMPLETE: CPT | Mod: S$GLB,,, | Performed by: INTERNAL MEDICINE

## 2023-08-08 PROCEDURE — 99214 OFFICE O/P EST MOD 30 MIN: CPT | Mod: 25,GC,S$GLB, | Performed by: INTERNAL MEDICINE

## 2023-08-08 PROCEDURE — 99999 PR PBB SHADOW E&M-EST. PATIENT-LVL V: ICD-10-PCS | Mod: PBBFAC,GC,, | Performed by: STUDENT IN AN ORGANIZED HEALTH CARE EDUCATION/TRAINING PROGRAM

## 2023-08-08 RX ORDER — FUROSEMIDE 20 MG/1
20 TABLET ORAL DAILY
Qty: 60 TABLET | Refills: 2 | Status: SHIPPED | OUTPATIENT
Start: 2023-08-08 | End: 2023-11-16 | Stop reason: ALTCHOICE

## 2023-08-08 NOTE — PROGRESS NOTES
Subjective:    Patient ID:  Brunilda Engladn is a 89 y.o. female who presents for evaluation of atrial fibrillation and sinus pause      Referring physician: Dr. Ying Norman     HPI  Ms. England is a pleasant 89 y/o PMHx HTN, paroxysmal atrial fibrillation/flutter COPD on home oxygen, Chronic diastolic HF, CKD 3, dementia, hx of lung/rectal cancer, COPD, on home O2 -2L at night, hx mechanical fall which led to L proximal femoral fracture and L parietal subarachnoid hemorrhage back in June 2023. Given questionable syncope patient was sent home on a 30 day monitor which revealed paroxysmal afib/aflutter and sinus pause of up to 3.3 sec. At the time cardiology had seen her in the hospital and was told to follow up with EP an/or cardiology as an outpatient. Patient has not established care with EP- Dr. Roman yet.     Patient lives with her daughter who is present today. She uses a walker at the house. Last Saturday patient bump her head on her closet after her hand slipped from grabbing the doorknob. Patient and daughter say that she did not loose conciousness or had a traumatic hit to the head. She did admit of some minor headache that went away the next day. Denied any focal deficits.      In general admits dyspnea on exertion with minimal activity and orthopnea. She has to sleep with 2 pillows. Denies any other complaints such as chest pain, palpitations, dizziness, syncope, edema and such.     TTE 5/21/23: LVEF 53%, segmental LV WMA, abnormal septal wall motion, mild-mod AS (FATOU 1.40/ PV 1.54/ MG 6    Past Medical History:   Diagnosis Date    Anal cancer 1995    Anemia     Cancer 1995    anal cancer    Centrilobular emphysema 7/6/2020    Diabetes mellitus     With circulatory disorder    Lumbar radiculopathy 5/16/2014    Lung cancer 2012    s/p chemo/radiation    Macular degeneration     Macular hemorrhage of left eye     Neuropathy     Osteoporosis     Osteoporosis, unspecified 11/9/2012    Pure  hypercholesterolemia        Past Surgical History:   Procedure Laterality Date    BRONCHOSCOPY      CHOLECYSTECTOMY      COLONOSCOPY      FLUOROSCOPIC ANGIOGRAPHY OF LOWER EXTREMITY WITH TOPICAL ULTRASOUND Right 12/6/2018    Procedure: ARTERIOGRAM-LEG AND ULTRASOUND;  Surgeon: SEBASTIÁN Mcpherson III, MD;  Location: [facility] OR [address] [place] [address];  Service: Peripheral Vascular;  Laterality: Right;  16.5 min  1059.42 mGy  59ml Dye  2ml Local    heart stent      HYSTERECTOMY      INCISIONAL BIOPSY Right 12/6/2019    Procedure: INCISIONAL BIOPSY;  Surgeon: Leslie Castillo MD;  Location: [facility] OR [address] [place] [address];  Service: Plastics;  Laterality: Right;    INTRAMEDULLARY RODDING OF FEMUR Left 5/30/2023    Procedure: INSERTION, INTRAMEDULLARY TEREZA, FEMUR;  Surgeon: Desmond Barker MD;  Location: [facility] OR [address] [place] [address];  Service: Orthopedics;  Laterality: Left;    left leg surgery      blockage    PERCUTANEOUS TRANSLUMINAL ANGIOPLASTY N/A 12/6/2018    Procedure: PTA (ANGIOPLASTY, PERCUTANEOUS, TRANSLUMINAL);  Surgeon: SEBASTIÁN Mcpherson III, MD;  Location: [facility] OR [address] [place] [address];  Service: Peripheral Vascular;  Laterality: N/A;    RECONSTRUCTION USING FLAP Right 12/6/2019    Procedure: RECONSTRUCTION USING FLAP/FULL THICKNESS MRESETION AND RECONSTRUCTION RIGHT UPPER EYELID WITH BIOPSY;  Surgeon: Leslie Castillo MD;  Location: [facility] OR [address] [place] [address];  Service: Plastics;  Laterality: Right;    TONSILLECTOMY         Current Outpatient Medications on File Prior to Visit   Medication Sig Dispense Refill    acetaminophen (TYLENOL) 500 MG tablet Take 2 tablets (1,000 mg total) by mouth every 8 (eight) hours as needed for Pain.  0    albuterol (PROVENTIL) 2.5 mg /3 mL (0.083 %) nebulizer solution Take 3 mLs (2.5 mg total) by nebulization every 6 (six) hours as needed for Wheezing. Rescue 90 each 3    amlodipine-benazepril 5-20 mg (LOTREL) 5-20 mg per capsule Take 1 capsule by mouth once daily. 90 capsule 3    azelastine (ASTELIN) 137 mcg (0.1 %) nasal spray 1 spray (137 mcg  total) by Nasal route 2 (two) times daily. 30 mL 3    calcium-vitamin D3 (OS-ERIC 500 + D3) 500 mg-5 mcg (200 unit) per tablet Take 2 tablets by mouth once daily.      cyanocobalamin (VITAMIN B-12) 1000 MCG tablet Take 1,000 mcg by mouth every morning.      donepeziL (ARICEPT) 5 MG tablet Take 1 tablet (5 mg total) by mouth every evening. 90 tablet 4    DULoxetine (CYMBALTA) 60 MG capsule Take 1 capsule (60 mg total) by mouth every morning. 90 capsule 3    ferrous sulfate 325 (65 FE) MG EC tablet Take 1 tablet (325 mg total) by mouth once daily. 30 tablet 2    hydrALAZINE (APRESOLINE) 25 MG tablet Take 1 tablet (25 mg total) by mouth every 8 (eight) hours. 90 tablet 11    linaGLIPtin (TRADJENTA) 5 mg Tab tablet Take 1 tablet (5 mg total) by mouth once daily. 90 tablet 3    melatonin (MELATIN) 3 mg tablet Take 2 tablets (6 mg total) by mouth nightly as needed for Insomnia.  0    metoprolol tartrate (LOPRESSOR) 25 MG tablet Take 1 tablet (25 mg total) by mouth 2 (two) times daily. 60 tablet 11    nitroGLYCERIN (NITROSTAT) 0.4 MG SL tablet Place 1 tablet (0.4 mg total) under the tongue every 5 (five) minutes as needed. 25 tablet 0    simvastatin (ZOCOR) 80 MG tablet Take 1 tablet (80 mg total) by mouth once daily. 90 tablet 4    vitamin D (VITAMIN D3) 1000 units Tab Take 1,000 Units by mouth once daily.      apixaban (ELIQUIS) 5 mg Tab Take 1 tablet (5 mg total) by mouth 2 (two) times daily. Hold this medication until cleared by Neurosurgery to resume. 60 tablet 6    artificial tears (ISOPTO TEARS) 0.5 % ophthalmic solution Place 1 drop into both eyes as needed.      estradioL (ESTRACE) 0.01 % (0.1 mg/gram) vaginal cream USE ONE-HALF GRAM VAGINALLY TWICE a WEEK 42.5 g 3     Current Facility-Administered Medications on File Prior to Visit   Medication Dose Route Frequency Provider Last Rate Last Admin    sodium chloride 0.9% flush 5 mL  5 mL Intravenous PRN Sarah Earl MD           Review of patient's  "allergies indicates:   Allergen Reactions    Bextra [valdecoxib] Swelling    Gabapentin Diarrhea and Nausea Only       Social History     Tobacco Use    Smoking status: Former     Current packs/day: 0.00     Average packs/day: 0.5 packs/day for 30.0 years (15.0 ttl pk-yrs)     Types: Cigarettes     Start date: 1965     Quit date: 1995     Years since quittin.6     Passive exposure: Past    Smokeless tobacco: Never   Substance Use Topics    Alcohol use: No    Drug use: No       Family History   Problem Relation Age of Onset    Stroke Mother     Cancer Father     Breast cancer Maternal Aunt     Ovarian cancer Neg Hx     Amblyopia Neg Hx     Blindness Neg Hx     Cataracts Neg Hx     Glaucoma Neg Hx     Macular degeneration Neg Hx     Retinal detachment Neg Hx     Strabismus Neg Hx          Review of Systems   Constitutional: Negative for chills, diaphoresis and fever.   HENT:  Negative for sore throat.    Cardiovascular:  Positive for dyspnea on exertion and orthopnea. Negative for claudication, irregular heartbeat, palpitations and paroxysmal nocturnal dyspnea.   Respiratory:  Negative for cough and shortness of breath.    Musculoskeletal:  Negative for muscle cramps.   Gastrointestinal:  Negative for abdominal pain, nausea and vomiting.   Genitourinary:  Negative for dysuria.   Neurological:  Negative for dizziness, light-headedness and weakness.   Psychiatric/Behavioral:  The patient is not nervous/anxious.    All other systems reviewed and are negative.       Objective:     Vitals:    23 1309   BP: 134/60   Pulse: 106   Weight: 70.3 kg (154 lb 15.7 oz)   Height: 5' 6" (1.676 m)        Physical Exam  Vitals and nursing note reviewed.   Constitutional:       General: She is not in acute distress.     Appearance: Normal appearance. She is normal weight. She is not ill-appearing, toxic-appearing or diaphoretic.   HENT:      Head: Normocephalic and atraumatic.      Mouth/Throat:      Mouth: Mucous " membranes are moist.      Pharynx: Oropharynx is clear.   Eyes:      Extraocular Movements: Extraocular movements intact.      Conjunctiva/sclera: Conjunctivae normal.   Neck:      Vascular: No carotid bruit.   Cardiovascular:      Rate and Rhythm: Normal rate and regular rhythm.      Pulses: Normal pulses.      Heart sounds: No murmur heard.     Comments: No JVD  Pulmonary:      Effort: Pulmonary effort is normal. No respiratory distress.      Breath sounds: Normal breath sounds.   Musculoskeletal:         General: Tenderness present. No swelling or deformity.      Cervical back: Normal range of motion.   Skin:     General: Skin is warm and dry.      Capillary Refill: Capillary refill takes less than 2 seconds.   Neurological:      General: No focal deficit present.      Mental Status: She is alert and oriented to person, place, and time.   Psychiatric:         Mood and Affect: Mood normal.         Thought Content: Thought content normal.         Assessment:       1. Acute on chronic diastolic CHF (congestive heart failure), NYHA class 3    2. Atrial fibrillation    3. SSS (sick sinus syndrome)    4. Essential hypertension    5. Pure hypercholesterolemia         Plan:       Acute on chronic diastolic HF: euvolemic on exam, given c/o DONALDSON and orthopnea will start a low dose of lasix 20mg qdaily. Will check a BMP and a BNP in 1 week. Advised to eat some high K foods while on a diuretic.   PAF: ECG today with NSR. She will continue metoprolol tartrate 25mg BID until she follows up with EP. She will continue eliquis 5mg BID. We will refer to Dr. Romna.   SSS: based on event monitor. 3sec paused. Referral to EP sent.   HTN: controlled she will continue her regular home meds.   Pure hypercholesterolemia: continue simvastatin 80mg qd.         Discussed with Dr. De Los Santos. Can follow up with us in 3-6months.       Ry Cuello MD  Cardiology fellow

## 2023-08-15 ENCOUNTER — TELEPHONE (OUTPATIENT)
Dept: ELECTROPHYSIOLOGY | Facility: CLINIC | Age: 88
End: 2023-08-15
Payer: MEDICARE

## 2023-08-15 NOTE — TELEPHONE ENCOUNTER
Spoke with daughter concerning appointment on 8/17 with ALDO Jose which needed to be moved. Daughter accepted appointment to see Dr. Roman on 8/22/23. Voices understanding. Slip mailed/

## 2023-08-17 ENCOUNTER — LAB VISIT (OUTPATIENT)
Dept: LAB | Facility: HOSPITAL | Age: 88
End: 2023-08-17
Payer: MEDICARE

## 2023-08-17 DIAGNOSIS — I48.0 PAROXYSMAL ATRIAL FIBRILLATION: ICD-10-CM

## 2023-08-17 DIAGNOSIS — I50.33 ACUTE ON CHRONIC DIASTOLIC CHF (CONGESTIVE HEART FAILURE), NYHA CLASS 3: ICD-10-CM

## 2023-08-17 LAB
ANION GAP SERPL CALC-SCNC: 12 MMOL/L (ref 8–16)
BNP SERPL-MCNC: 347 PG/ML (ref 0–99)
BUN SERPL-MCNC: 10 MG/DL (ref 10–30)
CALCIUM SERPL-MCNC: 9.8 MG/DL (ref 8.7–10.5)
CHLORIDE SERPL-SCNC: 105 MMOL/L (ref 95–110)
CHOLEST SERPL-MCNC: 160 MG/DL (ref 120–199)
CHOLEST/HDLC SERPL: 2.7 {RATIO} (ref 2–5)
CO2 SERPL-SCNC: 26 MMOL/L (ref 23–29)
CREAT SERPL-MCNC: 0.9 MG/DL (ref 0.5–1.4)
EST. GFR  (NO RACE VARIABLE): >60 ML/MIN/1.73 M^2
GLUCOSE SERPL-MCNC: 134 MG/DL (ref 70–110)
HDLC SERPL-MCNC: 59 MG/DL (ref 40–75)
HDLC SERPL: 36.9 % (ref 20–50)
LDLC SERPL CALC-MCNC: 66.4 MG/DL (ref 63–159)
NONHDLC SERPL-MCNC: 101 MG/DL
POTASSIUM SERPL-SCNC: 4.4 MMOL/L (ref 3.5–5.1)
SODIUM SERPL-SCNC: 143 MMOL/L (ref 136–145)
TRIGL SERPL-MCNC: 173 MG/DL (ref 30–150)

## 2023-08-17 PROCEDURE — 80048 BASIC METABOLIC PNL TOTAL CA: CPT | Performed by: STUDENT IN AN ORGANIZED HEALTH CARE EDUCATION/TRAINING PROGRAM

## 2023-08-17 PROCEDURE — 80061 LIPID PANEL: CPT | Performed by: STUDENT IN AN ORGANIZED HEALTH CARE EDUCATION/TRAINING PROGRAM

## 2023-08-17 PROCEDURE — 83880 ASSAY OF NATRIURETIC PEPTIDE: CPT | Performed by: STUDENT IN AN ORGANIZED HEALTH CARE EDUCATION/TRAINING PROGRAM

## 2023-08-17 PROCEDURE — 36415 COLL VENOUS BLD VENIPUNCTURE: CPT | Performed by: STUDENT IN AN ORGANIZED HEALTH CARE EDUCATION/TRAINING PROGRAM

## 2023-08-18 ENCOUNTER — OFFICE VISIT (OUTPATIENT)
Dept: OPHTHALMOLOGY | Facility: CLINIC | Age: 88
End: 2023-08-18
Payer: MEDICARE

## 2023-08-18 DIAGNOSIS — H43.813 POSTERIOR VITREOUS DETACHMENT, BOTH EYES: ICD-10-CM

## 2023-08-18 DIAGNOSIS — H35.033 HYPERTENSIVE RETINOPATHY OF BOTH EYES: ICD-10-CM

## 2023-08-18 DIAGNOSIS — H35.3232 EXUDATIVE AGE-RELATED MACULAR DEGENERATION OF BOTH EYES WITH INACTIVE CHOROIDAL NEOVASCULARIZATION: Primary | ICD-10-CM

## 2023-08-18 PROCEDURE — 1100F PTFALLS ASSESS-DOCD GE2>/YR: CPT | Mod: CPTII,S$GLB,, | Performed by: OPHTHALMOLOGY

## 2023-08-18 PROCEDURE — 3288F PR FALLS RISK ASSESSMENT DOCUMENTED: ICD-10-PCS | Mod: CPTII,S$GLB,, | Performed by: OPHTHALMOLOGY

## 2023-08-18 PROCEDURE — 99999 PR PBB SHADOW E&M-EST. PATIENT-LVL IV: ICD-10-PCS | Mod: PBBFAC,,, | Performed by: OPHTHALMOLOGY

## 2023-08-18 PROCEDURE — 1160F RVW MEDS BY RX/DR IN RCRD: CPT | Mod: CPTII,S$GLB,, | Performed by: OPHTHALMOLOGY

## 2023-08-18 PROCEDURE — 1100F PR PT FALLS ASSESS DOC 2+ FALLS/FALL W/INJURY/YR: ICD-10-PCS | Mod: CPTII,S$GLB,, | Performed by: OPHTHALMOLOGY

## 2023-08-18 PROCEDURE — 92134 CPTRZ OPH DX IMG PST SGM RTA: CPT | Mod: S$GLB,,, | Performed by: OPHTHALMOLOGY

## 2023-08-18 PROCEDURE — 92014 PR EYE EXAM, EST PATIENT,COMPREHESV: ICD-10-PCS | Mod: S$GLB,,, | Performed by: OPHTHALMOLOGY

## 2023-08-18 PROCEDURE — 99999 PR PBB SHADOW E&M-EST. PATIENT-LVL IV: CPT | Mod: PBBFAC,,, | Performed by: OPHTHALMOLOGY

## 2023-08-18 PROCEDURE — 92201 OPSCPY EXTND RTA DRAW UNI/BI: CPT | Mod: S$GLB,,, | Performed by: OPHTHALMOLOGY

## 2023-08-18 PROCEDURE — 1126F PR PAIN SEVERITY QUANTIFIED, NO PAIN PRESENT: ICD-10-PCS | Mod: CPTII,S$GLB,, | Performed by: OPHTHALMOLOGY

## 2023-08-18 PROCEDURE — 1126F AMNT PAIN NOTED NONE PRSNT: CPT | Mod: CPTII,S$GLB,, | Performed by: OPHTHALMOLOGY

## 2023-08-18 PROCEDURE — 1159F PR MEDICATION LIST DOCUMENTED IN MEDICAL RECORD: ICD-10-PCS | Mod: CPTII,S$GLB,, | Performed by: OPHTHALMOLOGY

## 2023-08-18 PROCEDURE — 92014 COMPRE OPH EXAM EST PT 1/>: CPT | Mod: S$GLB,,, | Performed by: OPHTHALMOLOGY

## 2023-08-18 PROCEDURE — 1160F PR REVIEW ALL MEDS BY PRESCRIBER/CLIN PHARMACIST DOCUMENTED: ICD-10-PCS | Mod: CPTII,S$GLB,, | Performed by: OPHTHALMOLOGY

## 2023-08-18 PROCEDURE — 1159F MED LIST DOCD IN RCRD: CPT | Mod: CPTII,S$GLB,, | Performed by: OPHTHALMOLOGY

## 2023-08-18 PROCEDURE — 92134 POSTERIOR SEGMENT OCT RETINA (OCULAR COHERENCE TOMOGRAPHY)-BOTH EYES: ICD-10-PCS | Mod: S$GLB,,, | Performed by: OPHTHALMOLOGY

## 2023-08-18 PROCEDURE — 3288F FALL RISK ASSESSMENT DOCD: CPT | Mod: CPTII,S$GLB,, | Performed by: OPHTHALMOLOGY

## 2023-08-18 PROCEDURE — 92201 PR OPHTHALMOSCOPY, EXT, W/RET DRAW/SCLERAL DEPR, I&R, UNI/BI: ICD-10-PCS | Mod: S$GLB,,, | Performed by: OPHTHALMOLOGY

## 2023-08-21 PROBLEM — J18.9 PNEUMONIA: Status: RESOLVED | Noted: 2023-05-20 | Resolved: 2023-08-21

## 2023-08-22 ENCOUNTER — OFFICE VISIT (OUTPATIENT)
Dept: ELECTROPHYSIOLOGY | Facility: CLINIC | Age: 88
End: 2023-08-22
Payer: MEDICARE

## 2023-08-22 ENCOUNTER — OFFICE VISIT (OUTPATIENT)
Dept: ORTHOPEDICS | Facility: CLINIC | Age: 88
End: 2023-08-22
Payer: MEDICARE

## 2023-08-22 ENCOUNTER — HOSPITAL ENCOUNTER (OUTPATIENT)
Dept: RADIOLOGY | Facility: HOSPITAL | Age: 88
Discharge: HOME | End: 2023-08-22
Attending: NURSE PRACTITIONER
Payer: MEDICARE

## 2023-08-22 VITALS — BODY MASS INDEX: 24.91 KG/M2 | WEIGHT: 155 LBS | HEIGHT: 66 IN

## 2023-08-22 VITALS
DIASTOLIC BLOOD PRESSURE: 50 MMHG | SYSTOLIC BLOOD PRESSURE: 118 MMHG | HEART RATE: 93 BPM | HEIGHT: 66 IN | BODY MASS INDEX: 24.91 KG/M2 | WEIGHT: 155 LBS

## 2023-08-22 DIAGNOSIS — M81.6 LOCALIZED OSTEOPOROSIS OF LEQUESNE: ICD-10-CM

## 2023-08-22 DIAGNOSIS — M81.0 OSTEOPOROSIS, UNSPECIFIED OSTEOPOROSIS TYPE, UNSPECIFIED PATHOLOGICAL FRACTURE PRESENCE: ICD-10-CM

## 2023-08-22 DIAGNOSIS — I48.0 PAF (PAROXYSMAL ATRIAL FIBRILLATION): ICD-10-CM

## 2023-08-22 DIAGNOSIS — I48.0 PAROXYSMAL ATRIAL FIBRILLATION: ICD-10-CM

## 2023-08-22 DIAGNOSIS — S72.002A LEFT DISPLACED FEMORAL NECK FRACTURE: Primary | ICD-10-CM

## 2023-08-22 DIAGNOSIS — Z98.890 POST-OPERATIVE STATE: ICD-10-CM

## 2023-08-22 DIAGNOSIS — I49.5 SSS (SICK SINUS SYNDROME): ICD-10-CM

## 2023-08-22 DIAGNOSIS — Z98.890 POST-OPERATIVE STATE: Primary | ICD-10-CM

## 2023-08-22 DIAGNOSIS — M80.80XA PATHOLOGICAL FRACTURE DUE TO OSTEOPOROSIS, UNSPECIFIED FRACTURE SITE, UNSPECIFIED OSTEOPOROSIS TYPE, INITIAL ENCOUNTER: Primary | ICD-10-CM

## 2023-08-22 PROCEDURE — 1159F MED LIST DOCD IN RCRD: CPT | Mod: CPTII,S$GLB,, | Performed by: INTERNAL MEDICINE

## 2023-08-22 PROCEDURE — 1159F PR MEDICATION LIST DOCUMENTED IN MEDICAL RECORD: ICD-10-PCS | Mod: CPTII,S$GLB,, | Performed by: NURSE PRACTITIONER

## 2023-08-22 PROCEDURE — 99024 PR POST-OP FOLLOW-UP VISIT: ICD-10-PCS | Mod: S$GLB,,, | Performed by: NURSE PRACTITIONER

## 2023-08-22 PROCEDURE — 99205 PR OFFICE/OUTPT VISIT, NEW, LEVL V, 60-74 MIN: ICD-10-PCS | Mod: S$GLB,,, | Performed by: INTERNAL MEDICINE

## 2023-08-22 PROCEDURE — 99214 OFFICE O/P EST MOD 30 MIN: CPT | Mod: 25,S$GLB,, | Performed by: PHYSICIAN ASSISTANT

## 2023-08-22 PROCEDURE — 99999 PR PBB SHADOW E&M-EST. PATIENT-LVL III: CPT | Mod: PBBFAC,,, | Performed by: PHYSICIAN ASSISTANT

## 2023-08-22 PROCEDURE — 73502 XR HIP WITH PELVIS WHEN PERFORMED, 2 OR 3 VIEWS LEFT: ICD-10-PCS | Mod: 26,LT,, | Performed by: RADIOLOGY

## 2023-08-22 PROCEDURE — 1126F PR PAIN SEVERITY QUANTIFIED, NO PAIN PRESENT: ICD-10-PCS | Mod: CPTII,S$GLB,, | Performed by: NURSE PRACTITIONER

## 2023-08-22 PROCEDURE — 1100F PR PT FALLS ASSESS DOC 2+ FALLS/FALL W/INJURY/YR: ICD-10-PCS | Mod: CPTII,S$GLB,, | Performed by: INTERNAL MEDICINE

## 2023-08-22 PROCEDURE — 99999 PR PBB SHADOW E&M-EST. PATIENT-LVL IV: CPT | Mod: PBBFAC,,, | Performed by: INTERNAL MEDICINE

## 2023-08-22 PROCEDURE — 1100F PTFALLS ASSESS-DOCD GE2>/YR: CPT | Mod: CPTII,S$GLB,, | Performed by: INTERNAL MEDICINE

## 2023-08-22 PROCEDURE — 1159F PR MEDICATION LIST DOCUMENTED IN MEDICAL RECORD: ICD-10-PCS | Mod: CPTII,S$GLB,, | Performed by: PHYSICIAN ASSISTANT

## 2023-08-22 PROCEDURE — 93005 ELECTROCARDIOGRAM TRACING: CPT | Mod: S$GLB,,, | Performed by: INTERNAL MEDICINE

## 2023-08-22 PROCEDURE — 1126F AMNT PAIN NOTED NONE PRSNT: CPT | Mod: CPTII,S$GLB,, | Performed by: INTERNAL MEDICINE

## 2023-08-22 PROCEDURE — 99024 POSTOP FOLLOW-UP VISIT: CPT | Mod: S$GLB,,, | Performed by: NURSE PRACTITIONER

## 2023-08-22 PROCEDURE — 1160F RVW MEDS BY RX/DR IN RCRD: CPT | Mod: CPTII,S$GLB,, | Performed by: PHYSICIAN ASSISTANT

## 2023-08-22 PROCEDURE — 1160F PR REVIEW ALL MEDS BY PRESCRIBER/CLIN PHARMACIST DOCUMENTED: ICD-10-PCS | Mod: CPTII,S$GLB,, | Performed by: NURSE PRACTITIONER

## 2023-08-22 PROCEDURE — 3288F FALL RISK ASSESSMENT DOCD: CPT | Mod: CPTII,S$GLB,, | Performed by: INTERNAL MEDICINE

## 2023-08-22 PROCEDURE — 1126F PR PAIN SEVERITY QUANTIFIED, NO PAIN PRESENT: ICD-10-PCS | Mod: CPTII,S$GLB,, | Performed by: INTERNAL MEDICINE

## 2023-08-22 PROCEDURE — 3288F PR FALLS RISK ASSESSMENT DOCUMENTED: ICD-10-PCS | Mod: CPTII,S$GLB,, | Performed by: INTERNAL MEDICINE

## 2023-08-22 PROCEDURE — 3288F PR FALLS RISK ASSESSMENT DOCUMENTED: ICD-10-PCS | Mod: CPTII,S$GLB,, | Performed by: NURSE PRACTITIONER

## 2023-08-22 PROCEDURE — 73502 X-RAY EXAM HIP UNI 2-3 VIEWS: CPT | Mod: 26,LT,, | Performed by: RADIOLOGY

## 2023-08-22 PROCEDURE — 93010 ELECTROCARDIOGRAM REPORT: CPT | Mod: S$GLB,,, | Performed by: INTERNAL MEDICINE

## 2023-08-22 PROCEDURE — 99214 PR OFFICE/OUTPT VISIT, EST, LEVL IV, 30-39 MIN: ICD-10-PCS | Mod: 25,S$GLB,, | Performed by: PHYSICIAN ASSISTANT

## 2023-08-22 PROCEDURE — 99999 PR PBB SHADOW E&M-EST. PATIENT-LVL IV: ICD-10-PCS | Mod: PBBFAC,,, | Performed by: INTERNAL MEDICINE

## 2023-08-22 PROCEDURE — 1160F PR REVIEW ALL MEDS BY PRESCRIBER/CLIN PHARMACIST DOCUMENTED: ICD-10-PCS | Mod: CPTII,S$GLB,, | Performed by: PHYSICIAN ASSISTANT

## 2023-08-22 PROCEDURE — 1159F MED LIST DOCD IN RCRD: CPT | Mod: CPTII,S$GLB,, | Performed by: NURSE PRACTITIONER

## 2023-08-22 PROCEDURE — 1101F PR PT FALLS ASSESS DOC 0-1 FALLS W/OUT INJ PAST YR: ICD-10-PCS | Mod: CPTII,S$GLB,, | Performed by: NURSE PRACTITIONER

## 2023-08-22 PROCEDURE — 1159F MED LIST DOCD IN RCRD: CPT | Mod: CPTII,S$GLB,, | Performed by: PHYSICIAN ASSISTANT

## 2023-08-22 PROCEDURE — 3288F FALL RISK ASSESSMENT DOCD: CPT | Mod: CPTII,S$GLB,, | Performed by: NURSE PRACTITIONER

## 2023-08-22 PROCEDURE — 99205 OFFICE O/P NEW HI 60 MIN: CPT | Mod: S$GLB,,, | Performed by: INTERNAL MEDICINE

## 2023-08-22 PROCEDURE — 1160F RVW MEDS BY RX/DR IN RCRD: CPT | Mod: CPTII,S$GLB,, | Performed by: NURSE PRACTITIONER

## 2023-08-22 PROCEDURE — 1126F AMNT PAIN NOTED NONE PRSNT: CPT | Mod: CPTII,S$GLB,, | Performed by: NURSE PRACTITIONER

## 2023-08-22 PROCEDURE — 99999 PR PBB SHADOW E&M-EST. PATIENT-LVL III: CPT | Mod: PBBFAC,,, | Performed by: NURSE PRACTITIONER

## 2023-08-22 PROCEDURE — 99999 PR PBB SHADOW E&M-EST. PATIENT-LVL III: ICD-10-PCS | Mod: PBBFAC,,, | Performed by: NURSE PRACTITIONER

## 2023-08-22 PROCEDURE — 93010 RHYTHM STRIP: ICD-10-PCS | Mod: S$GLB,,, | Performed by: INTERNAL MEDICINE

## 2023-08-22 PROCEDURE — 1159F PR MEDICATION LIST DOCUMENTED IN MEDICAL RECORD: ICD-10-PCS | Mod: CPTII,S$GLB,, | Performed by: INTERNAL MEDICINE

## 2023-08-22 PROCEDURE — 1101F PT FALLS ASSESS-DOCD LE1/YR: CPT | Mod: CPTII,S$GLB,, | Performed by: NURSE PRACTITIONER

## 2023-08-22 PROCEDURE — 99999 PR PBB SHADOW E&M-EST. PATIENT-LVL III: ICD-10-PCS | Mod: PBBFAC,,, | Performed by: PHYSICIAN ASSISTANT

## 2023-08-22 PROCEDURE — 1160F PR REVIEW ALL MEDS BY PRESCRIBER/CLIN PHARMACIST DOCUMENTED: ICD-10-PCS | Mod: CPTII,S$GLB,, | Performed by: INTERNAL MEDICINE

## 2023-08-22 PROCEDURE — 93005 RHYTHM STRIP: ICD-10-PCS | Mod: S$GLB,,, | Performed by: INTERNAL MEDICINE

## 2023-08-22 PROCEDURE — 73502 X-RAY EXAM HIP UNI 2-3 VIEWS: CPT | Mod: TC,LT

## 2023-08-22 PROCEDURE — 1160F RVW MEDS BY RX/DR IN RCRD: CPT | Mod: CPTII,S$GLB,, | Performed by: INTERNAL MEDICINE

## 2023-08-22 NOTE — PROGRESS NOTES
SUBJECTIVE:     Chief Complaint & History of Present Illness:  Brunilda England is a new patient 89 y.o. female who is seen here today for a bone health evaluation for osteoporosis, fracture prevention, and risk evaluation for future fractures.   she is accompanied by daughter  today who is helpful with her history.     she was appropriately identified and referred by Amauri Henry NP due to concerns for compromised bone quality, and risk of future fractures.  This visit is medically necessary to identify risk, investigate and initiative treatment as appropriate to improve bone quality and strength for the reduction of secondary fractures.     her medical history, medications and fracture history will be reviewed and summarized      Review of patient's allergies indicates:   Allergen Reactions    Bextra [valdecoxib] Swelling    Gabapentin Diarrhea and Nausea Only         Current Outpatient Medications   Medication Sig Dispense Refill    acetaminophen (TYLENOL) 500 MG tablet Take 2 tablets (1,000 mg total) by mouth every 8 (eight) hours as needed for Pain.  0    albuterol (PROVENTIL) 2.5 mg /3 mL (0.083 %) nebulizer solution Take 3 mLs (2.5 mg total) by nebulization every 6 (six) hours as needed for Wheezing. Rescue 90 each 3    amlodipine-benazepril 5-20 mg (LOTREL) 5-20 mg per capsule Take 1 capsule by mouth once daily. 90 capsule 3    apixaban (ELIQUIS) 5 mg Tab Take 1 tablet (5 mg total) by mouth 2 (two) times daily. Hold this medication until cleared by Neurosurgery to resume. 60 tablet 6    artificial tears (ISOPTO TEARS) 0.5 % ophthalmic solution Place 1 drop into both eyes as needed.      azelastine (ASTELIN) 137 mcg (0.1 %) nasal spray 1 spray (137 mcg total) by Nasal route 2 (two) times daily. 30 mL 3    calcium-vitamin D3 (OS-ERIC 500 + D3) 500 mg-5 mcg (200 unit) per tablet Take 2 tablets by mouth once daily. (Patient not taking: Reported on 8/22/2023)      cyanocobalamin (VITAMIN B-12) 1000 MCG  tablet Take 1,000 mcg by mouth every morning.      donepeziL (ARICEPT) 5 MG tablet Take 1 tablet (5 mg total) by mouth every evening. 90 tablet 4    DULoxetine (CYMBALTA) 60 MG capsule Take 1 capsule (60 mg total) by mouth every morning. 90 capsule 3    estradioL (ESTRACE) 0.01 % (0.1 mg/gram) vaginal cream USE ONE-HALF GRAM VAGINALLY TWICE a WEEK 42.5 g 3    ferrous sulfate 325 (65 FE) MG EC tablet Take 1 tablet (325 mg total) by mouth once daily. 30 tablet 2    furosemide (LASIX) 20 MG tablet Take 1 tablet (20 mg total) by mouth once daily. 60 tablet 2    hydrALAZINE (APRESOLINE) 25 MG tablet Take 1 tablet (25 mg total) by mouth every 8 (eight) hours. 90 tablet 11    linaGLIPtin (TRADJENTA) 5 mg Tab tablet Take 1 tablet (5 mg total) by mouth once daily. 90 tablet 3    melatonin (MELATIN) 3 mg tablet Take 2 tablets (6 mg total) by mouth nightly as needed for Insomnia.  0    metoprolol tartrate (LOPRESSOR) 25 MG tablet Take 1 tablet (25 mg total) by mouth 2 (two) times daily. 60 tablet 11    nitroGLYCERIN (NITROSTAT) 0.4 MG SL tablet Place 1 tablet (0.4 mg total) under the tongue every 5 (five) minutes as needed. 25 tablet 0    simvastatin (ZOCOR) 80 MG tablet Take 1 tablet (80 mg total) by mouth once daily. 90 tablet 4    vitamin D (VITAMIN D3) 1000 units Tab Take 1,000 Units by mouth once daily.       No current facility-administered medications for this visit.     Facility-Administered Medications Ordered in Other Visits   Medication Dose Route Frequency Provider Last Rate Last Admin    sodium chloride 0.9% flush 5 mL  5 mL Intravenous PRN Sarah Earl MD           Past Medical History:   Diagnosis Date    Anal cancer 1995    Anemia     Cancer 1995    anal cancer    Centrilobular emphysema 7/6/2020    Diabetes mellitus     With circulatory disorder    Lumbar radiculopathy 5/16/2014    Lung cancer 2012    s/p chemo/radiation    Macular degeneration     Macular hemorrhage of left eye     Neuropathy      Osteoporosis     Osteoporosis, unspecified 11/9/2012    Pure hypercholesterolemia        Past Surgical History:   Procedure Laterality Date    BRONCHOSCOPY      CHOLECYSTECTOMY      COLONOSCOPY      FLUOROSCOPIC ANGIOGRAPHY OF LOWER EXTREMITY WITH TOPICAL ULTRASOUND Right 12/6/2018    Procedure: ARTERIOGRAM-LEG AND ULTRASOUND;  Surgeon: SEBASTIÁN Mcpherson III, MD;  Location: [facility] OR [address] [place] [address];  Service: Peripheral Vascular;  Laterality: Right;  16.5 min  1059.42 mGy  59ml Dye  2ml Local    heart stent      HYSTERECTOMY      INCISIONAL BIOPSY Right 12/6/2019    Procedure: INCISIONAL BIOPSY;  Surgeon: Leslie Castillo MD;  [facility];  Service: Plastics;  Laterality: Right;    INTRAMEDULLARY RODDING OF FEMUR Left 5/30/2023    Procedure: INSERTION, INTRAMEDULLARY TEREZA, FEMUR;  Surgeon: Desmond Barker MD;  [facility];  Service: Orthopedics;  Laterality: Left;    left leg surgery      blockage    PERCUTANEOUS TRANSLUMINAL ANGIOPLASTY N/A 12/6/2018    Procedure: PTA (ANGIOPLASTY, PERCUTANEOUS, TRANSLUMINAL);  Surgeon: SEBASTIÁN Mcpherson III, MD;  Location: [facility] OR [address] [place] [address];  Service: Peripheral Vascular;  Laterality: N/A;    RECONSTRUCTION USING FLAP Right 12/6/2019    Procedure: RECONSTRUCTION USING FLAP/FULL THICKNESS MRESETION AND RECONSTRUCTION RIGHT UPPER EYELID WITH BIOPSY;  Surgeon: Leslie Castillo MD;  Location: [facility] OR [address] [place] [address];  Service: Plastics;  Laterality: Right;    TONSILLECTOMY         Lab Results   Component Value Date     08/17/2023    K 4.4 08/17/2023     08/17/2023    CO2 26 08/17/2023     (H) 08/17/2023    BUN 10 08/17/2023    CREATININE 0.9 08/17/2023    CALCIUM 9.8 08/17/2023    PROT 7.5 05/29/2023    ALBUMIN 3.5 05/29/2023    BILITOT 0.2 05/29/2023    ALKPHOS 51 (L) 05/29/2023    AST 16 05/29/2023    ALT 11 05/29/2023    ANIONGAP 12 08/17/2023    ESTGFRAFRICA 51.1 (A) 07/09/2022    EGFRNONAA 44.3 (A) 07/09/2022      Lab Results   Component Value Date     "WBC 3.40 (L) 06/19/2023    RBC 2.96 (L) 06/19/2023    HGB 8.6 (L) 06/19/2023    HCT 25.6 (L) 06/19/2023    MCV 87 06/19/2023    MCH 29.1 06/19/2023    MCHC 33.6 06/19/2023    RDW 17.4 (H) 06/19/2023     06/19/2023    MPV 11.7 06/19/2023    GRAN 2.0 06/19/2023    GRAN 57.3 06/19/2023    LYMPH 0.9 (L) 06/19/2023    LYMPH 26.8 06/19/2023    MONO 0.5 06/19/2023    MONO 13.5 06/19/2023    EOS 0.0 06/19/2023    BASO 0.03 06/19/2023    EOSINOPHIL 0.9 06/19/2023    BASOPHIL 0.9 06/19/2023    DIFFMETHOD Automated 06/19/2023      Lab Results   Component Value Date    MG 1.6 06/19/2023      Lab Results   Component Value Date    PHOS 2.8 06/19/2023      Lab Results   Component Value Date    [id number] 49 05/29/2023      No results found for: "PTH"   Lab Results   Component Value Date    TSH 2.410 05/20/2023      Lab Results   Component Value Date    FREET4 1.22 12/15/2011      Lab Results   Component Value Date    HGBA1C 6.3 (H) 05/29/2023    ESTIMATEDAVG 134 (H) 05/29/2023      No results found for: "FREETESTOSTE"         Vital Signs (Most Recent)  There were no vitals filed for this visit.      Today's Visit and Bone Health History     Have you had any loss of height or gotten shorter since your 20's?  0   Are you postmenopausal?  Yes   Please indicate how menopause occured. Surgical hysterectomy with ovaries removed   Please indicate at what age you became postmenopausal. Don't remember   Have you ever taken any type of hormone replacement therapy? No   Do you currently smoke? No   Have you ever smoked in the past? Yes   If yes, how many years? 30+   How many alcoholic beverages do you drink per day? 0   How many caffeinated beverages do you drink per day? 1 to 3   Have you had 2 or more falls in the past 12 months? Yes   How active have you been in the past 12 months? Not active ( in the house only )   Did either of your parents have a hip fracture after the age of 50? No   Have you ever been diagnosed with any of " the following? COPD    Fracture   Do you currently have a fracture? Yes   If you currently have a fracture please indicate where and when the fracture occured. Left hip/ May 28th 2023   Have you broken any other bones since you turned 50 or older? No   Have you ever had a bone density test or DXA scan? Yes   Are you currently taking or have you ever taken any of the following medications? Anticonvulsants (Gabapentin, Lyrica)    Chemotherapy Medications    Steroids (Prednisone, Cortisone)   Do you take Calcium? Yes   If you take Calcium what dose? 80mg   Do you take Vitamin D? Yes   If you take Vitamin D what dose? 1000 IU/ 25mcg   Have you ever been diagnosed with cancer? Yes   Please indicate what type of cancer and when you were diagnosed. Rectal- 1996 and Lung - 2000's   Have you ever been treated for cancer with high beam radiation or had radioactive implants? Yes   If you have been treated for cancer with high beam radiation or had radioactive implants please indicate what type. Don't remember      she has medical history and medication use known to be associated with bone loss and osteoporosis to include pathological hip fracture history of rectal and lung cancer with high beam radiation chemotherapy COPD anticonvulsant steroids.     The last DXA was performed in 04/25/0222.          T-Score Hip: -2.7     T-Score Spine: -0.3      FRAX:      Major Fx.: 7.2%         Hip Fx.: 2.5%      Review of Systems:  ROS:  Constitutional: no fever or chills, balance and gait instabilities  Eyes: no visual changes  ENT: no nasal congestion or sore throat  Respiratory: no respiratory symptoms and positive COPD, chronic hypoxemia  Centrilobular emphysema  Cardiovascular:  Tachy-juan a syndrome, peripheral vascular disease chronic AFib coronary artery disease chronic diastolic heart failure aortic valve regurgitation aortic atherosclerosis   Gastrointestinal: no nausea or vomiting, tolerating diet  Genitourinary: no hematuria or  dysuria, CKD stage IIIA, radiation vaginitis  Integument/Breast: no rash or pruritis  Hematologic/Lymphatic: no easy bruising or lymphadenopathy, malignant neoplasm of the lung, pernicious anemia, secondary malignant neoplasm lymph nodes  positive history of rectal and anal cancer  Musculoskeletal: no arthralgias or myalgias, pathological femur fracture chronic low back pain  Neurological: no seizures or tremors, positive subarachnoid hemorrhage, neuropathy lower extremities, lumbar radiculopathy, late onset Alzheimer's dementia  Behavioral/Psych: no auditory or visual hallucinations, positive mood disorder, adjustment disorder  Endocrine: no heat or cold intolerance, diabetes type 2 osteoporosis      OBJECTIVE:     PHYSICAL EXAM:     ,                  General: no acute distress  Behavioral/Psych: normal mood/affect  Skin: no rash  Head/Neck: atraumatic, normocephalic, without obvious abnormality  Respiratory: normal respiratory effort  Cardiac: regular rate and rhythm  Vascular: pulses present  Abdomen: soft, non-tender  Musculoskeletal: no joint tenderness, deformity or swelling               ASSESSMENT/PLAN:     Assessment:    Osteoporosis, at high risk for future fractures, history of pathological femur fracture.    Plan:   30-45 minutes spent in face-to-face consultation with patient and her family members today, discussing the disease management of osteoporosis, for the reduction of future fractures.  I have explained that bone strength is equal to bone quality. A bone density / DEXA scan is important to complement her care since her fracture was by definition a fragility fracture/traumatic fracture.  Over half of the encounter was spent (50%) counseling the patient on the disease of osteoporosis evidence-based and best practice treatment options available as well as recommendations for improvement of bone quality, the importance of nutritional supplements to include:  Calcium 9824-7100 mg daily in divided  doses   Vitamin D3  0429-2536 units daily in divided doses.   Fall prevention and personal safety for the reduction of future fractures.    Clinicians Guidelines for the treatment of Osteoporosis 2020 as part of the National Osteoporosis Foundation were utilized as the evidence-based information provided.    Discussed pharmaceutical treatment options to include Biphosphatases, Denosumab, Abaloparatide and Teriparatide. Information to include indications for therapy, risks and benefits to treatment, and important safety information related to these treatments was provided and discussed.  Handouts were given to the patient for her review on osteoporosis and other pharmacological treatment information related to other available treatment options.    The importance of diet, impact exercise, and core strengthening with gait and balance exercise, through  both formal physical therapy programs and home exercise programs was discussed.     Abaloparatide 80mcg Sub Q daily

## 2023-08-22 NOTE — H&P (VIEW-ONLY)
PCP - Pepper Whitfield MD  Subjective:     I had the pleasure today of seeing Brunilda England (89 y.o. female) at the request of Dr. Ry Tran and Dr. Mackenzie De Los Santos for  AF and conversion pauses .    History:  pAF/AFL  HTN  SAH following a fall  Left hip fracture following a fall (s/p ORIF 2023)  COPD on home O2  HFpEF  CKD3  Right sided lung/rectal CA s/p chemoradiation     Background:  Mechanical fall which led to L proximal femoral fracture and L parietal subarachnoid hemorrhage back in 2023. She has questionable syncope at that time as well leading to 30 day event monitor which showed AF/AFL with RVR and conversion pauses over 3 seconds which were consecutive with few intervening junctional and sinus beats.    In clinic today:  Feels well overall. Denies any recurrent falls. Denies clear symptoms while wearing her monitor. Does have a history of chest wall radiation to the right, and previous MediPort on the left sided.    Today's ECG/rhythm strip shows NSR with normal intervals    History:     Social History     Tobacco Use    Smoking status: Former     Current packs/day: 0.00     Average packs/day: 0.5 packs/day for 30.0 years (15.0 ttl pk-yrs)     Types: Cigarettes     Start date: 1965     Quit date: 1995     Years since quittin.6     Passive exposure: Past    Smokeless tobacco: Never   Substance Use Topics    Alcohol use: No     Family History   Problem Relation Age of Onset    Stroke Mother     Cancer Father     Breast cancer Maternal Aunt     Ovarian cancer Neg Hx     Amblyopia Neg Hx     Blindness Neg Hx     Cataracts Neg Hx     Glaucoma Neg Hx     Macular degeneration Neg Hx     Retinal detachment Neg Hx     Strabismus Neg Hx        Meds:     Review of patient's allergies indicates:   Allergen Reactions    Bextra [valdecoxib] Swelling    Gabapentin Diarrhea and Nausea Only       Current Outpatient Medications:     acetaminophen (TYLENOL) 500 MG tablet, Take 2 tablets  (1,000 mg total) by mouth every 8 (eight) hours as needed for Pain., Disp: , Rfl: 0    albuterol (PROVENTIL) 2.5 mg /3 mL (0.083 %) nebulizer solution, Take 3 mLs (2.5 mg total) by nebulization every 6 (six) hours as needed for Wheezing. Rescue, Disp: 90 each, Rfl: 3    amlodipine-benazepril 5-20 mg (LOTREL) 5-20 mg per capsule, Take 1 capsule by mouth once daily., Disp: 90 capsule, Rfl: 3    apixaban (ELIQUIS) 5 mg Tab, Take 1 tablet (5 mg total) by mouth 2 (two) times daily. Hold this medication until cleared by Neurosurgery to resume., Disp: 60 tablet, Rfl: 6    artificial tears (ISOPTO TEARS) 0.5 % ophthalmic solution, Place 1 drop into both eyes as needed., Disp: , Rfl:     azelastine (ASTELIN) 137 mcg (0.1 %) nasal spray, 1 spray (137 mcg total) by Nasal route 2 (two) times daily., Disp: 30 mL, Rfl: 3    cyanocobalamin (VITAMIN B-12) 1000 MCG tablet, Take 1,000 mcg by mouth every morning., Disp: , Rfl:     donepeziL (ARICEPT) 5 MG tablet, Take 1 tablet (5 mg total) by mouth every evening., Disp: 90 tablet, Rfl: 4    DULoxetine (CYMBALTA) 60 MG capsule, Take 1 capsule (60 mg total) by mouth every morning., Disp: 90 capsule, Rfl: 3    estradioL (ESTRACE) 0.01 % (0.1 mg/gram) vaginal cream, USE ONE-HALF GRAM VAGINALLY TWICE a WEEK, Disp: 42.5 g, Rfl: 3    ferrous sulfate 325 (65 FE) MG EC tablet, Take 1 tablet (325 mg total) by mouth once daily., Disp: 30 tablet, Rfl: 2    furosemide (LASIX) 20 MG tablet, Take 1 tablet (20 mg total) by mouth once daily., Disp: 60 tablet, Rfl: 2    hydrALAZINE (APRESOLINE) 25 MG tablet, Take 1 tablet (25 mg total) by mouth every 8 (eight) hours., Disp: 90 tablet, Rfl: 11    linaGLIPtin (TRADJENTA) 5 mg Tab tablet, Take 1 tablet (5 mg total) by mouth once daily., Disp: 90 tablet, Rfl: 3    melatonin (MELATIN) 3 mg tablet, Take 2 tablets (6 mg total) by mouth nightly as needed for Insomnia., Disp: , Rfl: 0    metoprolol tartrate (LOPRESSOR) 25 MG tablet, Take 1 tablet (25 mg total)  "by mouth 2 (two) times daily., Disp: 60 tablet, Rfl: 11    nitroGLYCERIN (NITROSTAT) 0.4 MG SL tablet, Place 1 tablet (0.4 mg total) under the tongue every 5 (five) minutes as needed., Disp: 25 tablet, Rfl: 0    simvastatin (ZOCOR) 80 MG tablet, Take 1 tablet (80 mg total) by mouth once daily., Disp: 90 tablet, Rfl: 4    vitamin D (VITAMIN D3) 1000 units Tab, Take 1,000 Units by mouth once daily., Disp: , Rfl:     calcium-vitamin D3 (OS-ERIC 500 + D3) 500 mg-5 mcg (200 unit) per tablet, Take 2 tablets by mouth once daily. (Patient not taking: Reported on 8/22/2023), Disp: , Rfl:   No current facility-administered medications for this visit.    Facility-Administered Medications Ordered in Other Visits:     sodium chloride 0.9% flush 5 mL, 5 mL, Intravenous, PRN, Sarah Earl MD    Constitution: Negative for fever or chills. Negative for weight loss or gain.   HENT: Negative for sore throat or headaches. Negative for rhinorrhea.  Eyes: Negative for blurred or double vision.   Cardiovascular: See above  Pulmonary: Negative for SOB. Negative for cough.   Gastrointestinal: Negative for abdominal pain. Negative for nausea/ vomiting. Negative for diarrhea.   : Negative for dysuria.   Neurological: Negative for focal weakness or sensory changes.    Objective:   BP (!) 118/50   Pulse 93   Ht 5' 6" (1.676 m)   Wt 70.3 kg (154 lb 15.7 oz)   BMI 25.01 kg/m²     General: NAD. AAO.  HENT: No scleral icterus. Extraocular movements intact.  Neck: No JVD  Cardiovascular: Regular heart rate and rhythm. S1/S2 appreciated.  Respiratory: CTAB. No increased work of breathing.  Extremities: Warm. No edema.  Skin: no ulceration or wounds present    Labs & Imaging   Reviewed in clinic today    Assessment:   Brunilda England is a 89 y.o. y.o. female who presented today for evaluation of tachy-juan a syndrome. Previous left-sided port, and right sided chest wall XRT 2012. EF normal. Tolerating OAC. Discussed treatment of TBS " with left-sided PPM.     1. Paroxysmal atrial fibrillation  Ambulatory referral/consult to Cardiac Electrophysiology      2. SSS (sick sinus syndrome)  Ambulatory referral/consult to Cardiac Electrophysiology           Plan:     - Left-sided dual chamber PPM  - Hold OAC 3 days prior to procedure. Will resume 5 days after implant    Lc Park MD, PGY8  Electrophysiology

## 2023-08-22 NOTE — PROGRESS NOTES
Ms. England is here today for a post-operative visit after undergoing an ORIF for her left intertrochanteric hip fracture with IM nail by Dr. Barker on 5/30/2023.    Interval History:  She reports that she is doing ok.  Patient is currently using Tylenol OTC p.r.n..  She denies any falls or injuries since last seen in clinic.  She denies any hip or groin pain.  She does endorse some tenderness to her posterior left ankle.  She states the therapist has massage the area which alleviates her symptoms.  Denies any leg, foot, or toes numbness or tingling sensation.  Patient reports home health physical therapy just discharged her.  She is walking with a walker at home and feels that she can progress on her own with home exercise program.   She denies fever, chills, and sweats since the time of the surgery.     Physical exam:  Incision is open to air and healed.  No signs of infection noted.    She has tactile stimulation to their lower leg, she denies calf pain, there is no leg edema and their pedal pulse is palpable x 2.  ROM of her hip is 0-90 degrees.  She is able to stand with min assistance.    RADS: X-ray of her left hip was personally reviewed by me.  Her IM nailing appears to be in good position alignment.  Her fracture to her into her trochanteric area appears be in good position and alignment.  There is callus formation present.  No new fracture or hardware failure seen.    Assessment:  Post-op visit (11 weeks)    Plan:    ICD-10-CM ICD-9-CM   1. Closed left basicervical femoral neck fracture with extension into the intertrochanteric region s/p IM nail on 5/30/2023  S72.002A 820.8   2. Post-operative state  Z98.890 V45.89       Current care, treatment plan, precautions, activity level/ modifications, limitations, rehabilitation exercises and proposed future treatment were discussed with the patient. We discussed the need to monitor for changes in symptoms and condition and report them to the physician.   Discussed importance of compliance with all appointments and follow up examinations.       PHYSICAL THERAPY:   - HEP, she understands if she needs additional therapy she can call and will refer her to outpatient PT.  - Weight bearing as tolerated   - Range of motion as tolerated.      PAIN MEDICATION:   - Pain medication: refill was not needed  - Pain medication refill policy provided to patient for review, yes.    - Patient was informed a multi-modal approach is used to treat their pain.     DVT PROPHYLAXIS:   -  Eliquis long-term    FRAGILITY:  - Seen in fracture clinic today.     FOLLOW UP:   - Patient will follow up in the clinic in 12 weeks.  - X-ray of her left hip is needed.    - Future Appointments:   Future Appointments   Date Time Provider Department Center   11/22/2023  9:30 AM Amauri Henry NP [facility] ORTHO [address]   11/28/2023  2:30 PM Pepper Whitfield MD [facility] IM [address] [facility]         If there are any questions prior to scheduled follow up, the patient was instructed to contact the office

## 2023-08-25 DIAGNOSIS — I49.5 SSS (SICK SINUS SYNDROME): Primary | ICD-10-CM

## 2023-08-25 DIAGNOSIS — I49.9 CARDIAC ARRHYTHMIA, UNSPECIFIED CARDIAC ARRHYTHMIA TYPE: ICD-10-CM

## 2023-08-25 PROCEDURE — G0179 PR HOME HEALTH MD RECERTIFICATION: ICD-10-PCS | Mod: ,,, | Performed by: INTERNAL MEDICINE

## 2023-08-25 PROCEDURE — G0179 MD RECERTIFICATION HHA PT: HCPCS | Mod: ,,, | Performed by: INTERNAL MEDICINE

## 2023-08-29 ENCOUNTER — TELEPHONE (OUTPATIENT)
Dept: ENDOCRINOLOGY | Facility: CLINIC | Age: 88
End: 2023-08-29
Payer: MEDICARE

## 2023-08-29 NOTE — TELEPHONE ENCOUNTER
----- Message from Hawa Morillo sent at 8/29/2023 12:06 PM CDT -----  Regarding: Ambulatory referral/consult to Endocrinology  8/29/2023         Ms. TANVI Bonilla DOROTHY M [[id number]] called to schedule a Endocrinology referral. No appointments are available. Please assist with scheduling. She can be reached at [phone] or [phone].         Thank you,   Hawa ABARCA   Access Navigator

## 2023-08-30 ENCOUNTER — TELEPHONE (OUTPATIENT)
Dept: INTERNAL MEDICINE | Facility: CLINIC | Age: 88
End: 2023-08-30
Payer: MEDICARE

## 2023-08-30 NOTE — TELEPHONE ENCOUNTER
Called and spoke to Edgard   We did not receive anything so I asked her to refax it   And she sd she would

## 2023-09-01 ENCOUNTER — PATIENT MESSAGE (OUTPATIENT)
Dept: ELECTROPHYSIOLOGY | Facility: CLINIC | Age: 88
End: 2023-09-01
Payer: MEDICARE

## 2023-09-11 PROBLEM — J96.11 CHRONIC HYPOXEMIC RESPIRATORY FAILURE: Status: RESOLVED | Noted: 2023-05-29 | Resolved: 2023-09-11

## 2023-09-12 ENCOUNTER — TELEPHONE (OUTPATIENT)
Dept: ORTHOPEDICS | Facility: CLINIC | Age: 88
End: 2023-09-12
Payer: MEDICARE

## 2023-09-12 ENCOUNTER — PATIENT MESSAGE (OUTPATIENT)
Dept: INTERNAL MEDICINE | Facility: CLINIC | Age: 88
End: 2023-09-12
Payer: MEDICARE

## 2023-09-12 RX ORDER — METOPROLOL TARTRATE 25 MG/1
25 TABLET, FILM COATED ORAL 2 TIMES DAILY
Qty: 60 TABLET | Refills: 11 | Status: SHIPPED | OUTPATIENT
Start: 2023-09-12 | End: 2024-01-23

## 2023-09-12 NOTE — TELEPHONE ENCOUNTER
----- Message from Cherri Cope sent at 9/12/2023  7:54 AM CDT -----  Regarding: pt advice  Contact: [phone] or [phone]  Brunilda England daughter calling regarding Patient Advice (message) for #requesting a call back regarding that her mom don't want to do the injections in her stomach. Please call

## 2023-09-12 NOTE — TELEPHONE ENCOUNTER
Care Due:                  Date            Visit Type   Department     Provider  --------------------------------------------------------------------------------                                EP -                              PRIMARY      [facility] INTERNAL  Last Visit: 08-      CARE ([facility])   MEDICINE       GERRI ADORNO                              EP                               PRIMARY      [facility] INTERNAL  Next Visit: 11-      CARE ([facility])   MEDICINE       GERRI ADORNO                                                            Last  Test          Frequency    Reason                     Performed    Due Date  --------------------------------------------------------------------------------    HBA1C.......  6 months...  linaGLIPtin..............  05- 11-    [facility] Embedded Care Due Messages. Reference number: [id number].   9/12/2023 10:12:19 AM CDT

## 2023-09-12 NOTE — TELEPHONE ENCOUNTER
Spoke with Ms. England Brunilda daughter regarding osteoporosis injection . Daughter stated no her mother is of age an she,s not going to do stomach injections

## 2023-09-14 ENCOUNTER — LAB VISIT (OUTPATIENT)
Dept: LAB | Facility: HOSPITAL | Age: 88
End: 2023-09-14
Attending: INTERNAL MEDICINE
Payer: MEDICARE

## 2023-09-14 DIAGNOSIS — I49.9 CARDIAC ARRHYTHMIA, UNSPECIFIED CARDIAC ARRHYTHMIA TYPE: ICD-10-CM

## 2023-09-14 DIAGNOSIS — I49.5 SSS (SICK SINUS SYNDROME): ICD-10-CM

## 2023-09-14 LAB
ANION GAP SERPL CALC-SCNC: 13 MMOL/L (ref 8–16)
APTT PPP: 28.5 SEC (ref 21–32)
BUN SERPL-MCNC: 7 MG/DL (ref 10–30)
CALCIUM SERPL-MCNC: 9.7 MG/DL (ref 8.7–10.5)
CHLORIDE SERPL-SCNC: 106 MMOL/L (ref 95–110)
CO2 SERPL-SCNC: 23 MMOL/L (ref 23–29)
CREAT SERPL-MCNC: 0.8 MG/DL (ref 0.5–1.4)
ERYTHROCYTE [DISTWIDTH] IN BLOOD BY AUTOMATED COUNT: 18 % (ref 11.5–14.5)
EST. GFR  (NO RACE VARIABLE): >60 ML/MIN/1.73 M^2
GLUCOSE SERPL-MCNC: 177 MG/DL (ref 70–110)
HCT VFR BLD AUTO: 30.9 % (ref 37–48.5)
HGB BLD-MCNC: 9.8 G/DL (ref 12–16)
INR PPP: 1 (ref 0.8–1.2)
MCH RBC QN AUTO: 28.8 PG (ref 27–31)
MCHC RBC AUTO-ENTMCNC: 31.7 G/DL (ref 32–36)
MCV RBC AUTO: 91 FL (ref 82–98)
PLATELET # BLD AUTO: 214 K/UL (ref 150–450)
PMV BLD AUTO: 11.8 FL (ref 9.2–12.9)
POTASSIUM SERPL-SCNC: 3.5 MMOL/L (ref 3.5–5.1)
PROTHROMBIN TIME: 11.1 SEC (ref 9–12.5)
RBC # BLD AUTO: 3.4 M/UL (ref 4–5.4)
SODIUM SERPL-SCNC: 142 MMOL/L (ref 136–145)
WBC # BLD AUTO: 4.07 K/UL (ref 3.9–12.7)

## 2023-09-14 PROCEDURE — 85730 THROMBOPLASTIN TIME PARTIAL: CPT | Performed by: INTERNAL MEDICINE

## 2023-09-14 PROCEDURE — 85027 COMPLETE CBC AUTOMATED: CPT | Performed by: INTERNAL MEDICINE

## 2023-09-14 PROCEDURE — 80048 BASIC METABOLIC PNL TOTAL CA: CPT | Performed by: INTERNAL MEDICINE

## 2023-09-14 PROCEDURE — 36415 COLL VENOUS BLD VENIPUNCTURE: CPT | Mod: PO | Performed by: INTERNAL MEDICINE

## 2023-09-14 PROCEDURE — 85610 PROTHROMBIN TIME: CPT | Performed by: INTERNAL MEDICINE

## 2023-09-19 ENCOUNTER — OFFICE VISIT (OUTPATIENT)
Dept: ENDOCRINOLOGY | Facility: CLINIC | Age: 88
End: 2023-09-19
Payer: MEDICARE

## 2023-09-19 ENCOUNTER — TELEPHONE (OUTPATIENT)
Dept: ELECTROPHYSIOLOGY | Facility: CLINIC | Age: 88
End: 2023-09-19
Payer: MEDICARE

## 2023-09-19 VITALS
DIASTOLIC BLOOD PRESSURE: 60 MMHG | SYSTOLIC BLOOD PRESSURE: 150 MMHG | BODY MASS INDEX: 25.37 KG/M2 | HEIGHT: 66 IN | WEIGHT: 157.88 LBS

## 2023-09-19 DIAGNOSIS — R26.89 BALANCE PROBLEM: ICD-10-CM

## 2023-09-19 DIAGNOSIS — E11.51 TYPE 2 DIABETES MELLITUS WITH DIABETIC PERIPHERAL ANGIOPATHY WITHOUT GANGRENE, WITHOUT LONG-TERM CURRENT USE OF INSULIN: ICD-10-CM

## 2023-09-19 DIAGNOSIS — M81.0 OSTEOPOROSIS, UNSPECIFIED OSTEOPOROSIS TYPE, UNSPECIFIED PATHOLOGICAL FRACTURE PRESENCE: Primary | ICD-10-CM

## 2023-09-19 PROCEDURE — 1101F PR PT FALLS ASSESS DOC 0-1 FALLS W/OUT INJ PAST YR: ICD-10-PCS | Mod: CPTII,S$GLB,, | Performed by: INTERNAL MEDICINE

## 2023-09-19 PROCEDURE — 1126F AMNT PAIN NOTED NONE PRSNT: CPT | Mod: CPTII,S$GLB,, | Performed by: INTERNAL MEDICINE

## 2023-09-19 PROCEDURE — 99214 OFFICE O/P EST MOD 30 MIN: CPT | Mod: S$GLB,,, | Performed by: INTERNAL MEDICINE

## 2023-09-19 PROCEDURE — 3288F PR FALLS RISK ASSESSMENT DOCUMENTED: ICD-10-PCS | Mod: CPTII,S$GLB,, | Performed by: INTERNAL MEDICINE

## 2023-09-19 PROCEDURE — 1126F PR PAIN SEVERITY QUANTIFIED, NO PAIN PRESENT: ICD-10-PCS | Mod: CPTII,S$GLB,, | Performed by: INTERNAL MEDICINE

## 2023-09-19 PROCEDURE — 3288F FALL RISK ASSESSMENT DOCD: CPT | Mod: CPTII,S$GLB,, | Performed by: INTERNAL MEDICINE

## 2023-09-19 PROCEDURE — 1159F PR MEDICATION LIST DOCUMENTED IN MEDICAL RECORD: ICD-10-PCS | Mod: CPTII,S$GLB,, | Performed by: INTERNAL MEDICINE

## 2023-09-19 PROCEDURE — 1160F RVW MEDS BY RX/DR IN RCRD: CPT | Mod: CPTII,S$GLB,, | Performed by: INTERNAL MEDICINE

## 2023-09-19 PROCEDURE — 99214 PR OFFICE/OUTPT VISIT, EST, LEVL IV, 30-39 MIN: ICD-10-PCS | Mod: S$GLB,,, | Performed by: INTERNAL MEDICINE

## 2023-09-19 PROCEDURE — 1160F PR REVIEW ALL MEDS BY PRESCRIBER/CLIN PHARMACIST DOCUMENTED: ICD-10-PCS | Mod: CPTII,S$GLB,, | Performed by: INTERNAL MEDICINE

## 2023-09-19 PROCEDURE — 99999 PR PBB SHADOW E&M-EST. PATIENT-LVL V: CPT | Mod: PBBFAC,,, | Performed by: INTERNAL MEDICINE

## 2023-09-19 PROCEDURE — 99999 PR PBB SHADOW E&M-EST. PATIENT-LVL V: ICD-10-PCS | Mod: PBBFAC,,, | Performed by: INTERNAL MEDICINE

## 2023-09-19 PROCEDURE — 1159F MED LIST DOCD IN RCRD: CPT | Mod: CPTII,S$GLB,, | Performed by: INTERNAL MEDICINE

## 2023-09-19 PROCEDURE — 1101F PT FALLS ASSESS-DOCD LE1/YR: CPT | Mod: CPTII,S$GLB,, | Performed by: INTERNAL MEDICINE

## 2023-09-19 RX ORDER — ZOLEDRONIC ACID 5 MG/100ML
5 INJECTION, SOLUTION INTRAVENOUS
Status: CANCELLED | OUTPATIENT
Start: 2023-09-19

## 2023-09-19 RX ORDER — ACETAMINOPHEN 500 MG
500 TABLET ORAL
Status: CANCELLED | OUTPATIENT
Start: 2023-09-19

## 2023-09-19 RX ORDER — SODIUM CHLORIDE 0.9 % (FLUSH) 0.9 %
10 SYRINGE (ML) INJECTION
Status: CANCELLED | OUTPATIENT
Start: 2023-09-19

## 2023-09-19 RX ORDER — HEPARIN 100 UNIT/ML
500 SYRINGE INTRAVENOUS
Status: CANCELLED | OUTPATIENT
Start: 2023-09-19

## 2023-09-19 NOTE — ASSESSMENT & PLAN NOTE
--Patient with osteoporosis  --With hip fracture after fall in 5/2023 which underwent IM iraida  --Risk factors include age, post menopause, former smoker  --Given recent fracture an anabolic would be preferred but she does not want an daily or monthly injection  --Next best option would be Reclast or Prolia  --She has agreed to yearly Reclast and will order now  --Continue OTC calcium and vit D supplementation  --Cautioned on falls avoidance and importance of using walker while ambulating  --Repeat BMD in 5/2024

## 2023-09-19 NOTE — TELEPHONE ENCOUNTER
Spoke to Ms. Tucker, patient's daughter    CONFIRMED procedure arrival time of 0700 9/21    Reiterated instructions including:  -Directions to check in desk  -NPO after midnight night prior to procedure  -High importance of HOLDING Tradjenta am of procedure, Eliquis (last dose 9/19 am)  -Pre-procedure LABS done, no alerts  -Confirmed no fever, cough, or shortness of breath  -Bathe night prior and morning prior to procedure with Hibiclens solution or an antibacterial soap    Patient verbalized understanding of above and appreciated the call.

## 2023-09-19 NOTE — PROGRESS NOTES
Subjective:      Patient ID: Brunilda England is a 89 y.o. female.    Chief Complaint:  Osteoporosis      History of Present Illness  Ms. England presents for evaluation and management of osteoporosis. Last visit with Dr. Grullon in 4/2022 for hypoglycemia.     Has active history of DMII, CAD, dementia, osteopororosis, HTN, AFib and CKD 3.     First diagnosed with osteoporosis around 2012.     It was recommended she start Tymlos but she does not want a daily injection.     Current osteoporosis regimen:  Takes OTC calcium plus vitamin D+    Osteoporosis medication history:  Alendronate from at least 2012 to 2019 (then on drug holiday)      Risk factors for osteoporosis  -Personal history of fracture: May 2023 left hip fracture after a fall  -Family history of hip fracture or osteoporosis: Daughter with osteoporosis  -Premature/Early menopause: late 40's, no HRT  -Chronic steroid therapy: none  -History of rheumatoid arthritis: none  -Smoking History:  15-20 pack year former smoker  -Current EtOH use: none  -Fall Risk: high, uses walker    Risk for Secondary Osteoporosis:  -TSH: normal  -Calcium: intermittently elevated in the past but was on thiazide for many years  -Vitamin D: recent level normal  -Anemia and renal insufficiency: yes, anemia  -Signs/symptoms of malabsorption:  none  -PPI use: none  -Diabetes: yes  -Chronic liver disease: none  -Hx gastric bypass surgery: none    Height loss:  Half-inch to 1 inch    Exercise:  None formally    Pending dental work:  None    Last Bone Density Scan dated  5/2022:  COMPARISON:  Comparison study done on 09/06/2019.     Lumbar spine:    BMD 1.026 g/cm2 and T-score -0.2.     Total Hip:           BMD 0.708 g/cm2 and T-score -1.9.     Distal 1/3 radius: Not applicable     FINDINGS:  Lumbar spine (L1-L4):              BMD is 1.019 g/cm2, T-score is -0.3, and Z-score is .     Total hip:                                BMD is 0.674 g/cm2, T-score is -2.2, and Z-score is .     Femoral  neck:                          BMD is 0.549 g/cm2, T-score is -2.7, and Z-score is .     Distal 1/3 radius:                      Not applicable     FRAX:     7.2% risk of a major osteoporotic fracture in the next 10 years.     2.5% risk of hip fracture in the next 10 years.         Ms. Hardin had been having episodes of shakiness and irritability in the morning which her daughter thinks it may be due to low blood sugars since she gives her juice and has noted improvement of her symptoms.  They had not checked FS glucose during episodes because patient did not want to prick her fingers.  Denies blurry vision, diaphoresis or confusion during episodes.          With regards to Hypoglycemia:   -Whipple's triad: NOT FULL FILLED AS THERE ARE NO DOCUMENTED LOW BLOOD SUGARS   -Glucose levels:  WNL   No evidence of hypoglycemias on EMR     Latest Reference Range & Units 08/05/20 13:16 09/08/20 11:24 04/12/21 15:49 04/13/21 11:38 08/03/21 14:28 09/29/21 13:06 12/07/21 07:52   Glucose 70 - 110 mg/dL 214 (H) 154 (H) 153 (H) 145 (H) 184 (H) 134 (H) 99       Dr. Grullon had a low suspicion for insulin-mediated hypoglycemia. The patient underwent a 5 day CGM in 5/2022 that showed mild hypoglycemia on only one morning during the study. He switched for from Actos to Tradjenta after this and has had no further hypoglycemia symptoms.     Review of Systems   Constitutional:  Negative for chills and fever.   Gastrointestinal:  Negative for nausea.       Objective:   Physical Exam  Vitals and nursing note reviewed.       BP Readings from Last 3 Encounters:   09/19/23 (!) 150/60   08/22/23 (!) 118/50   08/08/23 134/60     Wt Readings from Last 1 Encounters:   09/19/23 0832 71.6 kg (157 lb 13.6 oz)       Body mass index is 25.48 kg/m².    Lab Review:   Lab Results   Component Value Date    HGBA1C 6.3 (H) 05/29/2023     Lab Results   Component Value Date    CHOL 160 08/17/2023    HDL 59 08/17/2023    LDLCALC 66.4 08/17/2023    TRIG 173 (H)  08/17/2023    CHOLHDL 36.9 08/17/2023     Lab Results   Component Value Date     09/14/2023    K 3.5 09/14/2023     09/14/2023    CO2 23 09/14/2023     (H) 09/14/2023    BUN 7 (L) 09/14/2023    CREATININE 0.8 09/14/2023    CALCIUM 9.7 09/14/2023    PROT 7.5 05/29/2023    ALBUMIN 3.5 05/29/2023    BILITOT 0.2 05/29/2023    ALKPHOS 51 (L) 05/29/2023    AST 16 05/29/2023    ALT 11 05/29/2023    ANIONGAP 13 09/14/2023    ESTGFRAFRICA 51.1 (A) 07/09/2022    EGFRNONAA 44.3 (A) 07/09/2022    TSH 2.410 05/20/2023         Assessment and Plan     Osteoporosis  --Patient with osteoporosis  --With hip fracture after fall in 5/2023 which underwent IM iraida  --Risk factors include age, post menopause, former smoker  --Given recent fracture an anabolic would be preferred but she does not want an daily or monthly injection  --Next best option would be Reclast or Prolia  --She has agreed to yearly Reclast and will order now  --Continue OTC calcium and vit D supplementation  --Cautioned on falls avoidance and importance of using walker while ambulating  --Repeat BMD in 5/2024    Type 2 diabetes mellitus with circulatory disorder, without long-term current use of insulin  --On Tradjenta  --No further hypoglycemia episodes  --Last A1c at goal    Balance problem  --Encouraged to use walker when ambulating to decrease risk of falls      Needs follow up in 5/2024 with bone density at [facility] prior to appt      Obey Singh M.D. Staff Endocrinology

## 2023-09-21 ENCOUNTER — HOSPITAL ENCOUNTER (OUTPATIENT)
Facility: HOSPITAL | Age: 88
Discharge: HOME | End: 2023-09-21
Attending: INTERNAL MEDICINE | Admitting: INTERNAL MEDICINE
Payer: MEDICARE

## 2023-09-21 ENCOUNTER — ANESTHESIA EVENT (OUTPATIENT)
Dept: MEDSURG UNIT | Facility: HOSPITAL | Age: 88
End: 2023-09-21
Payer: MEDICARE

## 2023-09-21 ENCOUNTER — ANESTHESIA (OUTPATIENT)
Dept: MEDSURG UNIT | Facility: HOSPITAL | Age: 88
End: 2023-09-21
Payer: MEDICARE

## 2023-09-21 VITALS
HEIGHT: 66 IN | TEMPERATURE: 99 F | OXYGEN SATURATION: 95 % | WEIGHT: 157 LBS | BODY MASS INDEX: 25.23 KG/M2 | HEART RATE: 114 BPM | SYSTOLIC BLOOD PRESSURE: 176 MMHG | DIASTOLIC BLOOD PRESSURE: 80 MMHG | RESPIRATION RATE: 20 BRPM

## 2023-09-21 DIAGNOSIS — Z95.0 S/P PLACEMENT OF CARDIAC PACEMAKER: ICD-10-CM

## 2023-09-21 DIAGNOSIS — I49.5 SSS (SICK SINUS SYNDROME): ICD-10-CM

## 2023-09-21 DIAGNOSIS — Z95.9 CARDIAC DEVICE IN SITU: ICD-10-CM

## 2023-09-21 DIAGNOSIS — I49.9 ARRHYTHMIA: ICD-10-CM

## 2023-09-21 LAB — POCT GLUCOSE: 125 MG/DL (ref 70–110)

## 2023-09-21 PROCEDURE — 93005 ELECTROCARDIOGRAM TRACING: CPT

## 2023-09-21 PROCEDURE — 63600175 PHARM REV CODE 636 W HCPCS: Performed by: ANESTHESIOLOGY

## 2023-09-21 PROCEDURE — 93010 ELECTROCARDIOGRAM REPORT: CPT | Mod: ,,, | Performed by: INTERNAL MEDICINE

## 2023-09-21 PROCEDURE — 37000008 HC ANESTHESIA 1ST 15 MINUTES: Performed by: INTERNAL MEDICINE

## 2023-09-21 PROCEDURE — 25500020 PHARM REV CODE 255: Performed by: NURSE ANESTHETIST, CERTIFIED REGISTERED

## 2023-09-21 PROCEDURE — C1898 LEAD, PMKR, OTHER THAN TRANS: HCPCS | Performed by: INTERNAL MEDICINE

## 2023-09-21 PROCEDURE — 63600175 PHARM REV CODE 636 W HCPCS: Performed by: NURSE ANESTHETIST, CERTIFIED REGISTERED

## 2023-09-21 PROCEDURE — 93010 EKG 12-LEAD: ICD-10-PCS | Mod: ,,, | Performed by: INTERNAL MEDICINE

## 2023-09-21 PROCEDURE — 93005 ELECTROCARDIOGRAM TRACING: CPT | Mod: 59

## 2023-09-21 PROCEDURE — 25000003 PHARM REV CODE 250: Performed by: NURSE ANESTHETIST, CERTIFIED REGISTERED

## 2023-09-21 PROCEDURE — 82962 GLUCOSE BLOOD TEST: CPT | Mod: 91 | Performed by: INTERNAL MEDICINE

## 2023-09-21 PROCEDURE — 33208 INSRT HEART PM ATRIAL & VENT: CPT | Mod: KX,,, | Performed by: INTERNAL MEDICINE

## 2023-09-21 PROCEDURE — 25000003 PHARM REV CODE 250: Performed by: INTERNAL MEDICINE

## 2023-09-21 PROCEDURE — D9220A PRA ANESTHESIA: Mod: CRNA,,, | Performed by: NURSE ANESTHETIST, CERTIFIED REGISTERED

## 2023-09-21 PROCEDURE — 82962 GLUCOSE BLOOD TEST: CPT | Performed by: INTERNAL MEDICINE

## 2023-09-21 PROCEDURE — D9220A PRA ANESTHESIA: Mod: ANES,,, | Performed by: ANESTHESIOLOGY

## 2023-09-21 PROCEDURE — 63600175 PHARM REV CODE 636 W HCPCS: Performed by: INTERNAL MEDICINE

## 2023-09-21 PROCEDURE — C1785 PMKR, DUAL, RATE-RESP: HCPCS | Performed by: INTERNAL MEDICINE

## 2023-09-21 PROCEDURE — 37000009 HC ANESTHESIA EA ADD 15 MINS: Performed by: INTERNAL MEDICINE

## 2023-09-21 PROCEDURE — 33208 INSRT HEART PM ATRIAL & VENT: CPT | Mod: KX | Performed by: INTERNAL MEDICINE

## 2023-09-21 PROCEDURE — 63600175 PHARM REV CODE 636 W HCPCS: Performed by: NURSE PRACTITIONER

## 2023-09-21 PROCEDURE — 33208 PR INSER HART PACER XVENOUS ATR/VENTR: ICD-10-PCS | Mod: KX,,, | Performed by: INTERNAL MEDICINE

## 2023-09-21 PROCEDURE — D9220A PRA ANESTHESIA: ICD-10-PCS | Mod: CRNA,,, | Performed by: NURSE ANESTHETIST, CERTIFIED REGISTERED

## 2023-09-21 PROCEDURE — C1894 INTRO/SHEATH, NON-LASER: HCPCS | Performed by: INTERNAL MEDICINE

## 2023-09-21 PROCEDURE — 25000003 PHARM REV CODE 250: Performed by: NURSE PRACTITIONER

## 2023-09-21 PROCEDURE — D9220A PRA ANESTHESIA: ICD-10-PCS | Mod: ANES,,, | Performed by: ANESTHESIOLOGY

## 2023-09-21 PROCEDURE — C1893 INTRO/SHEATH, FIXED,NON-PEEL: HCPCS | Performed by: INTERNAL MEDICINE

## 2023-09-21 DEVICE — IMPLANTABLE DEVICE
Type: IMPLANTABLE DEVICE | Site: HEART | Status: FUNCTIONAL
Brand: SOLIA

## 2023-09-21 DEVICE — IMPLANTABLE DEVICE
Type: IMPLANTABLE DEVICE | Site: CHEST  WALL | Status: FUNCTIONAL
Brand: EDORA 8 DR-T

## 2023-09-21 RX ORDER — BUPIVACAINE HYDROCHLORIDE 2.5 MG/ML
INJECTION, SOLUTION EPIDURAL; INFILTRATION; INTRACAUDAL
Status: DISCONTINUED | OUTPATIENT
Start: 2023-09-21 | End: 2023-09-21 | Stop reason: HOSPADM

## 2023-09-21 RX ORDER — ACETAMINOPHEN 325 MG/1
650 TABLET ORAL EVERY 4 HOURS PRN
Status: DISCONTINUED | OUTPATIENT
Start: 2023-09-21 | End: 2023-09-21 | Stop reason: HOSPADM

## 2023-09-21 RX ORDER — PROPOFOL 10 MG/ML
VIAL (ML) INTRAVENOUS CONTINUOUS PRN
Status: DISCONTINUED | OUTPATIENT
Start: 2023-09-21 | End: 2023-09-21

## 2023-09-21 RX ORDER — HYDROCODONE BITARTRATE AND ACETAMINOPHEN 500; 5 MG/1; MG/1
TABLET ORAL ONCE
Status: CANCELLED | OUTPATIENT
Start: 2023-09-21 | End: 2023-09-21

## 2023-09-21 RX ORDER — VANCOMYCIN HYDROCHLORIDE 1 G/20ML
INJECTION, POWDER, LYOPHILIZED, FOR SOLUTION INTRAVENOUS
Status: DISCONTINUED | OUTPATIENT
Start: 2023-09-21 | End: 2023-09-21 | Stop reason: HOSPADM

## 2023-09-21 RX ORDER — FENTANYL CITRATE 50 UG/ML
25 INJECTION, SOLUTION INTRAMUSCULAR; INTRAVENOUS EVERY 5 MIN PRN
Status: DISCONTINUED | OUTPATIENT
Start: 2023-09-21 | End: 2023-09-21 | Stop reason: HOSPADM

## 2023-09-21 RX ORDER — DOXYCYCLINE 100 MG/1
100 CAPSULE ORAL EVERY 12 HOURS
Qty: 10 CAPSULE | Refills: 0 | Status: SHIPPED | OUTPATIENT
Start: 2023-09-21 | End: 2023-09-26

## 2023-09-21 RX ORDER — ONDANSETRON 2 MG/ML
4 INJECTION INTRAMUSCULAR; INTRAVENOUS DAILY PRN
Status: DISCONTINUED | OUTPATIENT
Start: 2023-09-21 | End: 2023-09-21 | Stop reason: HOSPADM

## 2023-09-21 RX ORDER — HYDROCODONE BITARTRATE AND ACETAMINOPHEN 500; 5 MG/1; MG/1
TABLET ORAL
Status: CANCELLED | OUTPATIENT
Start: 2023-09-21

## 2023-09-21 RX ORDER — IODIXANOL 320 MG/ML
INJECTION, SOLUTION INTRAVASCULAR
Status: DISCONTINUED | OUTPATIENT
Start: 2023-09-21 | End: 2023-09-21

## 2023-09-21 RX ORDER — SODIUM CHLORIDE 9 MG/ML
INJECTION, SOLUTION INTRAMUSCULAR; INTRAVENOUS; SUBCUTANEOUS
Status: DISCONTINUED | OUTPATIENT
Start: 2023-09-21 | End: 2023-09-21 | Stop reason: HOSPADM

## 2023-09-21 RX ORDER — LIDOCAINE HYDROCHLORIDE 20 MG/ML
INJECTION, SOLUTION INFILTRATION; PERINEURAL
Status: DISCONTINUED | OUTPATIENT
Start: 2023-09-21 | End: 2023-09-21 | Stop reason: HOSPADM

## 2023-09-21 RX ORDER — LIDOCAINE HYDROCHLORIDE 20 MG/ML
INJECTION INTRAVENOUS
Status: DISCONTINUED | OUTPATIENT
Start: 2023-09-21 | End: 2023-09-21

## 2023-09-21 RX ADMIN — SODIUM CHLORIDE: 9 INJECTION, SOLUTION INTRAVENOUS at 09:09

## 2023-09-21 RX ADMIN — CEFAZOLIN 2 G: 2 INJECTION, POWDER, FOR SOLUTION INTRAMUSCULAR; INTRAVENOUS at 09:09

## 2023-09-21 RX ADMIN — ACETAMINOPHEN 650 MG: 325 TABLET ORAL at 01:09

## 2023-09-21 RX ADMIN — PROPOFOL 150 MCG/KG/MIN: 10 INJECTION, EMULSION INTRAVENOUS at 09:09

## 2023-09-21 RX ADMIN — IODIXANOL 10 ML: 320 INJECTION, SOLUTION INTRAVASCULAR at 09:09

## 2023-09-21 RX ADMIN — FENTANYL CITRATE 25 MCG: 50 INJECTION INTRAMUSCULAR; INTRAVENOUS at 11:09

## 2023-09-21 RX ADMIN — LIDOCAINE HYDROCHLORIDE 100 MG: 20 INJECTION INTRAVENOUS at 09:09

## 2023-09-21 NOTE — NURSING TRANSFER
Nursing Transfer Note      9/21/2023   1:12 PM    Nurse giving handoff:genna whitlock   Nurse receiving handoff:babita lutz     Reason patient is being transferred: d/c critieria met     Transfer To: [facility]    Transfer via stretcher    Transfer with jean/barby matthews met     Transported by reyna rn     Transfer Vital Signs:  Blood Pressure:see epic   Heart Rate:see epic   O2:room air   Temperature:see epic   Respirations:see epic     Telemetry: placed in [facility]   Order for Tele Monitor? Yes    Additional Lines: none     4eyes on Skin: yes in ep recovery     Medicines sent: none     Any special needs or follow-up needed: monitor left chest site     Patient belongings transferred with patient:  n/a    Chart send with patient: Yes    Notified: reported to babita lutz rn     Patient reassessed at: see epic  (date, time)  1  Upon arrival to floor: to room no complaints no distress noted.

## 2023-09-21 NOTE — ANESTHESIA PREPROCEDURE EVALUATION
09/21/2023  Brunilda England is a 89 y.o., female.  Pre-operative evaluation for Procedure(s) (LRB):  INSERTION, CARDIAC PACEMAKER, DUAL CHAMBER (Left)    Brunilda England is a 89 y.o. female   On 2L NC qHS and 2L NC PRN; currently on it    Patient Active Problem List   Diagnosis    History of lung cancer    Essential hypertension    Neuropathy    Osteoporosis    History of rectal or anal cancer    PMB (postmenopausal bleeding)    Pure hypercholesterolemia    Anemia    Malignant neoplasm of lung    Chronic back pain    Lumbar radiculopathy    Neuropathy of left lower extremity    PVD (peripheral vascular disease) with claudication    Radiation vaginitis    Bilateral exudative age-related macular degeneration    Hypertensive retinopathy of both eyes    Posterior vitreous detachment, both eyes    Macular hemorrhage of left eye    Atrophy of macula lutea    Pernicious anemia    Coronary artery disease involving native coronary artery of native heart without angina pectoris    Type 2 diabetes mellitus with circulatory disorder, without long-term current use of insulin    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    Gait abnormality    Balance problem    Dementia    Atherosclerotic peripheral vascular disease with rest pain    Mood disorder    Chalazion of right eye    Centrilobular emphysema    Chronic diastolic heart failure    Stage 3a chronic kidney disease    Aortic atherosclerosis    Atherosclerosis of native coronary artery of native heart with angina pectoris    Late onset Alzheimer's dementia with behavioral disturbance    Paroxysmal atrial flutter    PAF (paroxysmal atrial fibrillation)    Fatigue    Tachycardia    Bradycardia    Confusion    Pulmonary hypertension    Aortic valve regurgitation    Closed  left basicervical femoral neck fracture with extension into the intertrochanteric region s/p IM nail on 5/30/2023    Subarachnoid hemorrhage following injury, no loss of consciousness    Tachy-juan a syndrome    Intertrochanteric fracture of left hip, closed, initial encounter    Acute blood loss anemia       Past Surgical History:   Procedure Laterality Date    BRONCHOSCOPY      CHOLECYSTECTOMY      COLONOSCOPY      FLUOROSCOPIC ANGIOGRAPHY OF LOWER EXTREMITY WITH TOPICAL ULTRASOUND Right 12/6/2018    Procedure: ARTERIOGRAM-LEG AND ULTRASOUND;  Surgeon: SEBASTIÁN Mcpherson III, MD;  [facility];  Service: Peripheral Vascular;  Laterality: Right;  16.5 min  1059.42 mGy  59ml Dye  2ml Local    heart stent      HYSTERECTOMY      INCISIONAL BIOPSY Right 12/6/2019    Procedure: INCISIONAL BIOPSY;  Surgeon: Leslie Castillo MD;  [facility];  Service: Plastics;  Laterality: Right;    INTRAMEDULLARY RODDING OF FEMUR Left 5/30/2023    Procedure: INSERTION, INTRAMEDULLARY TEREZA, FEMUR;  Surgeon: Desmond Barker MD;  [facility];  Service: Orthopedics;  Laterality: Left;    left leg surgery      blockage    PERCUTANEOUS TRANSLUMINAL ANGIOPLASTY N/A 12/6/2018    Procedure: PTA (ANGIOPLASTY, PERCUTANEOUS, TRANSLUMINAL);  Surgeon: SEBASTIÁN Mcpherson III, MD;  [facility];  Service: Peripheral Vascular;  Laterality: N/A;    RECONSTRUCTION USING FLAP Right 12/6/2019    Procedure: RECONSTRUCTION USING FLAP/FULL THICKNESS MRESETION AND RECONSTRUCTION RIGHT UPPER EYELID WITH BIOPSY;  Surgeon: Leslie Castillo MD;  Location: [facility] OR [address] [place] [address];  Service: Plastics;  Laterality: Right;    TONSILLECTOMY         Social History     Socioeconomic History    Marital status:    Tobacco Use    Smoking status: Former     Current packs/day: 0.00     Average packs/day: 0.5 packs/day for 30.0 years (15.0 ttl pk-yrs)     Types: Cigarettes     Start date: 1/1/1965     Quit  date: 1995     Years since quittin.7     Passive exposure: Past    Smokeless tobacco: Never   Substance and Sexual Activity    Alcohol use: No    Drug use: No    Sexual activity: Not Currently     Birth control/protection: Surgical     Social Determinants of Health     Financial Resource Strain: Low Risk  (2023)    Overall Financial Resource Strain (CARDIA)     Difficulty of Paying Living Expenses: Not hard at all   Recent Concern: Financial Resource Strain - Medium Risk (2023)    Overall Financial Resource Strain (CARDIA)     Difficulty of Paying Living Expenses: Somewhat hard   Food Insecurity: No Food Insecurity (2023)    Hunger Vital Sign     Worried About Running Out of Food in the Last Year: Never true     Ran Out of Food in the Last Year: Never true   Recent Concern: Food Insecurity - Food Insecurity Present (2023)    Hunger Vital Sign     Worried About Running Out of Food in the Last Year: Sometimes true     Ran Out of Food in the Last Year: Sometimes true   Transportation Needs: No Transportation Needs (2023)    PRAPARE - Transportation     Lack of Transportation (Medical): No     Lack of Transportation (Non-Medical): No   Physical Activity: Inactive (2023)    Exercise Vital Sign     Days of Exercise per Week: 0 days     Minutes of Exercise per Session: 0 min   Stress: No Stress Concern Present (2023)    [place] [place] - Occupational Stress Questionnaire     Feeling of Stress : Not at all   Recent Concern: Stress - Stress Concern Present (2023)    [place] [employer] - Occupational Stress Questionnaire     Feeling of Stress : To some extent   Social Connections: Moderately Isolated (2023)    Social Connection and Isolation Panel [NHANES]     Frequency of Communication with Friends and Family: More than three times a week     Frequency of Social Gatherings with Friends and Family: More than  three times a week     Attends Voodoo Services: More than 4 times per year     Active Member of Clubs or Organizations: No     Attends Club or Organization Meetings: Never     Marital Status:    Housing Stability: Low Risk  (5/31/2023)    Housing Stability Vital Sign     Unable to Pay for Housing in the Last Year: No     Number of Places Lived in the Last Year: 1     Unstable Housing in the Last Year: No       Current Facility-Administered Medications on File Prior to Encounter   Medication Dose Route Frequency Provider Last Rate Last Admin    sodium chloride 0.9% flush 5 mL  5 mL Intravenous PRN Sarah Earl MD         Current Outpatient Medications on File Prior to Encounter   Medication Sig Dispense Refill    acetaminophen (TYLENOL) 500 MG tablet Take 2 tablets (1,000 mg total) by mouth every 8 (eight) hours as needed for Pain.  0    albuterol (PROVENTIL) 2.5 mg /3 mL (0.083 %) nebulizer solution Take 3 mLs (2.5 mg total) by nebulization every 6 (six) hours as needed for Wheezing. Rescue (Patient not taking: Reported on 9/19/2023) 90 each 3    amlodipine-benazepril 5-20 mg (LOTREL) 5-20 mg per capsule Take 1 capsule by mouth once daily. 90 capsule 3    apixaban (ELIQUIS) 5 mg Tab Take 1 tablet (5 mg total) by mouth 2 (two) times daily. Hold this medication until cleared by Neurosurgery to resume. 60 tablet 6    artificial tears (ISOPTO TEARS) 0.5 % ophthalmic solution Place 1 drop into both eyes as needed.      azelastine (ASTELIN) 137 mcg (0.1 %) nasal spray 1 spray (137 mcg total) by Nasal route 2 (two) times daily. (Patient not taking: Reported on 9/19/2023) 30 mL 3    calcium-vitamin D3 (OS-ERIC 500 + D3) 500 mg-5 mcg (200 unit) per tablet Take 2 tablets by mouth once daily.      cyanocobalamin (VITAMIN B-12) 1000 MCG tablet Take 1,000 mcg by mouth every morning.      donepeziL (ARICEPT) 5 MG tablet Take 1 tablet (5 mg total) by mouth every evening. 90 tablet 4     "DULoxetine (CYMBALTA) 60 MG capsule Take 1 capsule (60 mg total) by mouth every morning. 90 capsule 3    estradioL (ESTRACE) 0.01 % (0.1 mg/gram) vaginal cream USE ONE-HALF GRAM VAGINALLY TWICE a WEEK 42.5 g 3    ferrous sulfate 325 (65 FE) MG EC tablet Take 1 tablet (325 mg total) by mouth once daily. 30 tablet 2    furosemide (LASIX) 20 MG tablet Take 1 tablet (20 mg total) by mouth once daily. 60 tablet 2    hydrALAZINE (APRESOLINE) 25 MG tablet Take 1 tablet (25 mg total) by mouth every 8 (eight) hours. 90 tablet 11    linaGLIPtin (TRADJENTA) 5 mg Tab tablet Take 1 tablet (5 mg total) by mouth once daily. 90 tablet 3    melatonin (MELATIN) 3 mg tablet Take 2 tablets (6 mg total) by mouth nightly as needed for Insomnia.  0    nitroGLYCERIN (NITROSTAT) 0.4 MG SL tablet Place 1 tablet (0.4 mg total) under the tongue every 5 (five) minutes as needed. 25 tablet 0    simvastatin (ZOCOR) 80 MG tablet Take 1 tablet (80 mg total) by mouth once daily. 90 tablet 4    vitamin D (VITAMIN D3) 1000 units Tab Take 1,000 Units by mouth once daily.         Review of patient's allergies indicates:   Allergen Reactions    Bextra [valdecoxib] Swelling    Gabapentin Diarrhea and Nausea Only         CBC: No results for input(s): "WBC", "RBC", "HGB", "HCT", "PLT", "MCV", "MCH", "MCHC" in the last 72 hours.    CMP: No results for input(s): "NA", "K", "CL", "CO2", "BUN", "CREATININE", "GLU", "MG", "PHOS", "CALCIUM", "ALBUMIN", "PROT", "ALKPHOS", "ALT", "AST", "BILITOT" in the last 72 hours.    INR  No results for input(s): "PT", "INR", "PROTIME", "APTT" in the last 72 hours.      Diagnostic Studies:    EKG:   Results for orders placed or performed in visit on 08/08/23   IN OFFICE EKG 12-LEAD (to [place])    Collection Time: 08/08/23  1:24 PM    Narrative    Test Reason : I48.91,I49.5,    Vent. Rate : 100 BPM     Atrial Rate : 100 BPM     P-R Int : 130 ms          QRS Dur : 082 ms      QT Int : 338 ms       P-R-T Axes : 054 020 " 010 degrees     QTc Int : 436 ms    Normal sinus rhythm  Nonspecific ST and/or T wave abnormalities  Abnormal ECG  When compared with ECG of 01-JUN-2023 03:44,  Sinus rhythm has replaced Atrial fibrillation  Confirmed by Tara De Los Santos MD ([id number]) on 8/8/2023 4:39:16 PM    Referred By: LIZABETH CASTILLO           Confirmed By:Tara De Los Santos MD       TTE:  Results for orders placed or performed during the hospital encounter of 05/20/23   Echo   Result Value Ref Range    Ascending aorta 2.90 cm    STJ 2.66 cm    AV mean gradient 6 mmHg    Ao peak ernesto 1.54 m/s    Ao VTI 28.11 cm    IVRT 102.76 msec    IVS 0.82 0.6 - 1.1 cm    LA size 3.35 cm    Left Atrium Major Axis 5.75 cm    Left Atrium Minor Axis 5.25 cm    LVIDd 4.57 3.5 - 6.0 cm    LVIDs 3.76 2.1 - 4.0 cm    LVOT diameter 2.00 cm    LVOT peak VTI 12.52 cm    Posterior Wall 0.87 0.6 - 1.1 cm    MV Peak E Ernesto 1.26 m/s    RA Major Axis 4.42 cm    RA Width 3.51 cm    RVDD 2.60 cm    Sinus 3.03 cm    TAPSE 1.35 cm    TR Max Ernesto 3.08 m/s    LA WIDTH 4.28 cm    LV Diastolic Volume 95.66 mL    LV Systolic Volume 60.57 mL    LVOT peak ernesto 0.78 m/s    TDI LATERAL 0.11 m/s    TDI SEPTAL 0.07 m/s    LA volume (mod) 67.00 cm3    LV LATERAL E/E' RATIO 11.45 m/s    LV SEPTAL E/E' RATIO 18.00 m/s    FS 18 %    LA volume 66.89 cm3    LV mass 125.29 g    Left Ventricle Relative Wall Thickness 0.38 cm    AV valve area 1.40 cm2    AV Velocity Ratio 0.51     AV index (prosthetic) 0.45     Mean e' 0.09 m/s    LVOT area 3.1 cm2    LVOT stroke volume 39.31 cm3    AV peak gradient 9 mmHg    E/E' ratio 14.00 m/s    LV Systolic Volume Index 34.0 mL/m2    LV Diastolic Volume Index 53.74 mL/m2    LA Volume Index 37.6 mL/m2    LV Mass Index 70 g/m2    Triscuspid Valve Regurgitation Peak Gradient 38 mmHg    LA Volume Index (Mod) 37.6 mL/m2    BSA 1.82 m2    Right Atrial Pressure (from IVC) 3 mmHg    EF 53 %    TV resting pulmonary artery pressure 41 mmHg    Narrative    · The left ventricle is normal in  "size with low normal systolic function.  · The estimated ejection fraction is 53%.  · There are segmental left ventricular wall motion abnormalities.  · There is abnormal septal wall motion.  · Mild left atrial enlargement.  · Indeterminate left ventricular diastolic function.  · Normal right ventricular size with low normal right ventricular systolic   function.  · There is mild-to-moderate aortic valve stenosis.  · Aortic valve area is 1.40 cm2; peak velocity is 1.54 m/s; mean gradient   is 6 mmHg.  · Mild aortic regurgitation.  · Mild mitral regurgitation.  · Mild tricuspid regurgitation.  · Normal central venous pressure (3 mmHg).  · The estimated PA systolic pressure is 41 mmHg.  · There is mild pulmonary hypertension.  · Trivial posterior pericardial effusion. At base and under the RA and   small outside the RV apically.        EF   Date Value Ref Range Status   05/21/2023 53 % Final   07/27/2022 55 % Final      No results found. However, due to the size of the patient record, not all encounters were searched. Please check Results Review for a complete set of results.    ELIZABETH:  No results found. However, due to the size of the patient record, not all encounters were searched. Please check Results Review for a complete set of results.    Stress Test:  No results found for this or any previous visit.       LHC:  No results found for this or any previous visit.       PFT:  No results found for: "FEV1", "FVC", "MJM9EOO", "TLC", "DLCO"     ALLERGIES:     Review of patient's allergies indicates:   Allergen Reactions    Bextra [valdecoxib] Swelling    Gabapentin Diarrhea and Nausea Only     LDA:          Lines/Drains/Airways     None                Anesthesia Evaluation      Airway   Mallampati: II  TM distance: > 6 cm  Neck ROM: Normal ROM  Dental    (+) Edentulous    Pulmonary    (+) COPD,   Cardiovascular   (+) hypertension, dysrhythmias,     Rate: Normal    Neuro/Psych      GI/Hepatic/Renal      Endo/Other  "   (+) diabetes mellitus,   Abdominal                       Pre-op Assessment    I have reviewed the Patient Summary Reports.     I have reviewed the Nursing Notes. I have reviewed the NPO Status.   I have reviewed the Medications.     Review of Systems  Anesthesia Hx:  No problems with previous Anesthesia  Denies Family Hx of Anesthesia complications.   Denies Personal Hx of Anesthesia complications.   Cardiovascular:   Hypertension Dysrhythmias    Pulmonary:   COPD    Renal/:  Renal/ Normal     Hepatic/GI:  Hepatic/GI Normal    Neurological:  Neurology Normal    Endocrine:   Diabetes        Physical Exam  General: Well nourished    Airway:  Mallampati: II   Mouth Opening: Normal  TM Distance: > 6 cm  Tongue: Normal  Neck ROM: Normal ROM    Dental:  Edentulous    Chest/Lungs:  Normal Respiratory Rate    Heart:  Rate: Normal        Anesthesia Plan  Type of Anesthesia, risks & benefits discussed:    Anesthesia Type: Gen Natural Airway, MAC  Intra-op Monitoring Plan: Standard ASA Monitors  Post Op Pain Control Plan: multimodal analgesia and IV/PO Opioids PRN  Induction:  IV  Airway Plan: Direct, Post-Induction  Informed Consent: Informed consent signed with the Patient and all parties understand the risks and agree with anesthesia plan.  All questions answered. Patient consented to blood products? Yes  ASA Score: 3  Day of Surgery Review of History & Physical: H&P Update referred to the surgeon/provider.    Ready For Surgery From Anesthesia Perspective.     .

## 2023-09-21 NOTE — INTERVAL H&P NOTE
The patient has been examined and the H&P has been reviewed:  Pt here for dcPPM. Held OAC appropriately. ECG shows Mobitz 1 with periods of 2:1 AVB    I concur with the findings and no changes have occurred since H&P was written.    Procedure risks, benefits and alternative options discussed and understood by patient/family.          There are no hospital problems to display for this patient.

## 2023-09-21 NOTE — TRANSFER OF CARE
"Anesthesia Transfer of Care Note    Patient: Brunilda England    Procedure(s) Performed: Procedure(s) (LRB):  INSERTION, CARDIAC PACEMAKER, DUAL CHAMBER (Left)    Patient location: PACU    Anesthesia Type: general    Transport from OR: Transported from OR on 6-10 L/min O2 by face mask with adequate spontaneous ventilation    Post pain: adequate analgesia    Post assessment: no apparent anesthetic complications    Post vital signs: stable    Level of consciousness: awake    Nausea/Vomiting: no nausea/vomiting    Complications: none    Transfer of care protocol was followed      Last vitals:   Visit Vitals  BP (!) 156/71   Pulse 88   Temp 36.3 °C (97.4 °F) (Temporal)   Resp 19   Ht 5' 6" (1.676 m)   Wt 71.2 kg (157 lb)   SpO2 98%   Breastfeeding No   BMI 25.34 kg/m²     "

## 2023-09-21 NOTE — DISCHARGE INSTRUCTIONS
Medication instructions:    Complete your 5 days of antibiotics  If you are on a blood thinner (examples: apixiban [Eliquis], rivaroxaban [Xarelto], dabigatran [Pradaxa], or enoxaparin [Lovenox]), do not take your blood thinner for the next 5 days after your procedure. You can resume after 5 days post-procedure (i.e., when you complete your antibiotics). If you are on Coumadin you can continue to take it the day of your procedure  Pain control: you can take up to Tylenol 1000 mg every 6 hours as needed or (if you do not have kidney disease) ibuprofen 600 mg every 6 hours as needed as needed for pain control (if you do not have kidney disease). If unsure, please contact your primary care physician for recommendations.      Activity restrictions & precautions:    Sling & arm instructions:  Wear your sling for 48 hours. After 48 hours, you can take your arm out of your sling.   You must wear your sling at night when you sleep for 6 weeks after your procedure  Do not lift >5 lbs for 2 weeks.  Do not raise your elbow above your shoulder on the same side as your device for 6 weeks.     Surgical site   Dressing (see images below)  **Note: these are general rules to follow unless instructed otherwise** - see images below  If you have a brown rectangular gel bandage (A.K.A. Aquacel Ag dressing) over your surgical incision do not remove it. This will be removed at your device clinic follow up appointment in 1 week.  If you have a square light brown bandage (A.K.A Mepilex dressing) you should remove in 48 hours after your procedure.  If you have a pressure dressing (white elastic tape with gauze underneath or a very large bandage that covers your left chest and is shaped like a triangle) over your surgical site, this should be removed the morning after your procedure unless instructed otherwise.  Do not place any creams or ointments over the surgical site. This could effect the integrity of the Dermabond glue.  You can shower  after 24 hours post-procedure, but do not let the jet directly hit your pocket site for at least 2 weeks. Recommend showering with your back to the shower head.   Do not reach your arm (on the same side as your device) around your back during showering for 6 weeks.  Do not submerge your surgical incision site under water (swimming, bathing if you submerge the actual surgical site) for at least 6 week. It is imperative that the surgical site is completely healed prior to doing so    Follow up in device clinic in 1 week to check your incision and device function  Please contact the electrophysiology clinic if you have any questions or if you experience: potential surgical/pocket site complications (pain, swelling, bleeding, drainage), fevers, chest pain/shortness of breath, or for any other concerns.

## 2023-09-21 NOTE — ANESTHESIA POSTPROCEDURE EVALUATION
Anesthesia Post Evaluation    Patient: Brunilda England    Procedure(s) Performed: Procedure(s) (LRB):  INSERTION, CARDIAC PACEMAKER, DUAL CHAMBER (Left)    Final Anesthesia Type: general      Patient location during evaluation: PACU  Patient participation: Yes- Able to Participate  Level of consciousness: awake and alert  Post-procedure vital signs: reviewed and stable  Pain management: adequate  Airway patency: patent    PONV status at discharge: No PONV  Anesthetic complications: no      Cardiovascular status: hemodynamically stable  Respiratory status: spontaneous ventilation  Follow-up not needed.          Vitals Value Taken Time   /72 09/21/23 1202   Temp 37 °C (98.6 °F) 09/21/23 1109   Pulse 101 09/21/23 1204   Resp 20 09/21/23 1204   SpO2 93 % 09/21/23 1204   Vitals shown include unvalidated device data.      No case tracking events are documented in the log.      Pain/Shelia Score: Pain Rating Prior to Med Admin: 7 (9/21/2023 11:35 AM)  Shelia Score: 7 (9/21/2023 11:15 AM)

## 2023-09-22 LAB — POCT GLUCOSE: 146 MG/DL (ref 70–110)

## 2023-09-24 ENCOUNTER — PATIENT MESSAGE (OUTPATIENT)
Dept: ELECTROPHYSIOLOGY | Facility: CLINIC | Age: 88
End: 2023-09-24
Payer: MEDICARE

## 2023-09-25 ENCOUNTER — TELEPHONE (OUTPATIENT)
Dept: ELECTROPHYSIOLOGY | Facility: CLINIC | Age: 88
End: 2023-09-25
Payer: MEDICARE

## 2023-09-25 DIAGNOSIS — R60.0 EDEMA OF HAND: Primary | ICD-10-CM

## 2023-09-25 NOTE — TELEPHONE ENCOUNTER
I spoke with Ms. Welch, patient's daughter, and scheduled her to come in tomorrow for an ultrasound. She also had questions about removing the dressing and I let her know that she could remove the dressing after 48 hours as listed on her discharge instructions. She verbalized understanding and appreciated the call.

## 2023-09-25 NOTE — DISCHARGE SUMMARY
[facility] - Short Stay Cardiac Unit  Cardiac Electrophysiology  Discharge Summary      Patient Name: Brunilda England  MRN: [id number]  Admission Date: 9/21/2023  Hospital Length of Stay: 0 days  Discharge Date and Time: 9/21/2023  2:30 PM  Attending Physician: No att. 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 No immediate complications. Doxy x 5 days.      Goals of Care Treatment Preferences:  Code Status: Full Code      Consults:     Significant Diagnostic Studies: Labs: All labs within the past 24 hours have been reviewed    Pending Diagnostic Studies:     None          There are no hospital problems to display for this patient.    No new Assessment & Plan notes have been filed under this hospital service since the last note was generated.  Service: Arrhythmia      Discharged Condition: stable    Disposition: Home or Self Care    Follow Up:   Follow-up Information     Giuseppe Roman MD Follow up in 4 month(s).    Specialties: Electrophysiology, Cardiovascular Disease, Cardiology  Contact information:  [facility]  [facility] [address] [phone]             DEVICE CHECK CLINIC Follow up in 1 week(s).    Why: For wound and device checks                     Patient Instructions:   No discharge procedures on file.  Medications:  Reconciled Home Medications:      Medication List      START taking these medications    doxycycline 100 MG Cap  Commonly known as: VIBRAMYCIN  Take 1 capsule (100 mg total) by mouth every 12 (twelve) hours. for 5 days        CONTINUE taking these medications    acetaminophen 500 MG tablet  Commonly known as: TYLENOL  Take 2 tablets (1,000 mg total) by mouth every  8 (eight) hours as needed for Pain.     amlodipine-benazepril 5-20 mg 5-20 mg per capsule  Commonly known as: LOTREL  Take 1 capsule by mouth once daily.     apixaban 5 mg Tab  Commonly known as: ELIQUIS  Take 1 tablet (5 mg total) by mouth 2 (two) times daily. Hold this medication until cleared by Neurosurgery to resume.     artificial tears 0.5 % ophthalmic solution  Commonly known as: ISOPTO TEARS  Place 1 drop into both eyes as needed.     calcium-vitamin D3 500 mg-5 mcg (200 unit) per tablet  Commonly known as: OS-ERIC 500 + D3  Take 2 tablets by mouth once daily.     cyanocobalamin 1000 MCG tablet  Commonly known as: VITAMIN B-12  Take 1,000 mcg by mouth every morning.     donepeziL 5 MG tablet  Commonly known as: ARICEPT  Take 1 tablet (5 mg total) by mouth every evening.     DULoxetine 60 MG capsule  Commonly known as: CYMBALTA  Take 1 capsule (60 mg total) by mouth every morning.     estradioL 0.01 % (0.1 mg/gram) vaginal cream  Commonly known as: ESTRACE  USE ONE-HALF GRAM VAGINALLY TWICE a WEEK     ferrous sulfate 325 (65 FE) MG EC tablet  Take 1 tablet (325 mg total) by mouth once daily.     furosemide 20 MG tablet  Commonly known as: LASIX  Take 1 tablet (20 mg total) by mouth once daily.     hydrALAZINE 25 MG tablet  Commonly known as: APRESOLINE  Take 1 tablet (25 mg total) by mouth every 8 (eight) hours.     melatonin 3 mg tablet  Commonly known as: MELATIN  Take 2 tablets (6 mg total) by mouth nightly as needed for Insomnia.     metoprolol tartrate 25 MG tablet  Commonly known as: LOPRESSOR  Take 1 tablet (25 mg total) by mouth 2 (two) times daily.     nitroGLYCERIN 0.4 MG SL tablet  Commonly known as: NITROSTAT  Place 1 tablet (0.4 mg total) under the tongue every 5 (five) minutes as needed.     simvastatin 80 MG tablet  Commonly known as: ZOCOR  Take 1 tablet (80 mg total) by mouth once daily.     TRADJENTA 5 mg Tab tablet  Generic drug: linaGLIPtin  Take 1 tablet (5 mg total) by mouth once  daily.     vitamin D 1000 units Tab  Commonly known as: VITAMIN D3  Take 1,000 Units by mouth once daily.        STOP taking these medications    albuterol 2.5 mg /3 mL (0.083 %) nebulizer solution  Commonly known as: PROVENTIL        ASK your doctor about these medications    azelastine 137 mcg (0.1 %) nasal spray  Commonly known as: ASTELIN  1 spray (137 mcg total) by Nasal route 2 (two) times daily.     FLUAD QUAD 2023-24(65Y UP)(PF) 60 mcg (15 mcg x 4)/0.5 mL Syrg  Generic drug: flu vac 2023 65up-qeaCY72R(PF)  Inject 0.5 mLs into the muscle once. for 1 dose  Ask about: Should I take this medication?            Time spent on the discharge of patient: 15 minutes    William Park MD  Cardiac Electrophysiology  [facility]

## 2023-09-26 ENCOUNTER — CLINICAL SUPPORT (OUTPATIENT)
Dept: CARDIOLOGY | Facility: HOSPITAL | Age: 88
End: 2023-09-26
Attending: INTERNAL MEDICINE
Payer: MEDICARE

## 2023-09-26 ENCOUNTER — OFFICE VISIT (OUTPATIENT)
Dept: ELECTROPHYSIOLOGY | Facility: CLINIC | Age: 88
End: 2023-09-26
Payer: MEDICARE

## 2023-09-26 ENCOUNTER — HOSPITAL ENCOUNTER (OUTPATIENT)
Dept: CARDIOLOGY | Facility: HOSPITAL | Age: 88
Discharge: HOME | End: 2023-09-26
Attending: INTERNAL MEDICINE
Payer: MEDICARE

## 2023-09-26 VITALS — BODY MASS INDEX: 25.22 KG/M2 | WEIGHT: 156.94 LBS | HEIGHT: 66 IN

## 2023-09-26 DIAGNOSIS — R60.0 EDEMA OF HAND: ICD-10-CM

## 2023-09-26 DIAGNOSIS — I48.0 PAF (PAROXYSMAL ATRIAL FIBRILLATION): ICD-10-CM

## 2023-09-26 DIAGNOSIS — I49.5 SSS (SICK SINUS SYNDROME): ICD-10-CM

## 2023-09-26 DIAGNOSIS — I49.5 TACHY-BRADY SYNDROME: Primary | ICD-10-CM

## 2023-09-26 PROCEDURE — 93280 CARDIAC DEVICE CHECK - IN CLINIC & HOSPITAL: ICD-10-PCS | Mod: 26,,, | Performed by: INTERNAL MEDICINE

## 2023-09-26 PROCEDURE — 99024 PR POST-OP FOLLOW-UP VISIT: ICD-10-PCS | Mod: S$GLB,,, | Performed by: INTERNAL MEDICINE

## 2023-09-26 PROCEDURE — 93010 ELECTROCARDIOGRAM REPORT: CPT | Mod: S$GLB,,, | Performed by: INTERNAL MEDICINE

## 2023-09-26 PROCEDURE — 3288F PR FALLS RISK ASSESSMENT DOCUMENTED: ICD-10-PCS | Mod: CPTII,S$GLB,, | Performed by: INTERNAL MEDICINE

## 2023-09-26 PROCEDURE — 93010 RHYTHM STRIP: ICD-10-PCS | Mod: S$GLB,,, | Performed by: INTERNAL MEDICINE

## 2023-09-26 PROCEDURE — 93971 EXTREMITY STUDY: CPT | Mod: 26,LT,, | Performed by: INTERNAL MEDICINE

## 2023-09-26 PROCEDURE — 1126F AMNT PAIN NOTED NONE PRSNT: CPT | Mod: CPTII,S$GLB,, | Performed by: INTERNAL MEDICINE

## 2023-09-26 PROCEDURE — 99024 POSTOP FOLLOW-UP VISIT: CPT | Mod: S$GLB,,, | Performed by: INTERNAL MEDICINE

## 2023-09-26 PROCEDURE — 93005 RHYTHM STRIP: ICD-10-PCS | Mod: S$GLB,,, | Performed by: INTERNAL MEDICINE

## 2023-09-26 PROCEDURE — 93971 CV US DOPPLER VENOUS ARM LEFT (CUPID ONLY): ICD-10-PCS | Mod: 26,LT,, | Performed by: INTERNAL MEDICINE

## 2023-09-26 PROCEDURE — 1159F PR MEDICATION LIST DOCUMENTED IN MEDICAL RECORD: ICD-10-PCS | Mod: CPTII,S$GLB,, | Performed by: INTERNAL MEDICINE

## 2023-09-26 PROCEDURE — 1159F MED LIST DOCD IN RCRD: CPT | Mod: CPTII,S$GLB,, | Performed by: INTERNAL MEDICINE

## 2023-09-26 PROCEDURE — 93005 ELECTROCARDIOGRAM TRACING: CPT | Mod: S$GLB,,, | Performed by: INTERNAL MEDICINE

## 2023-09-26 PROCEDURE — 1101F PT FALLS ASSESS-DOCD LE1/YR: CPT | Mod: CPTII,S$GLB,, | Performed by: INTERNAL MEDICINE

## 2023-09-26 PROCEDURE — 99999 PR PBB SHADOW E&M-EST. PATIENT-LVL III: ICD-10-PCS | Mod: PBBFAC,,, | Performed by: INTERNAL MEDICINE

## 2023-09-26 PROCEDURE — 1101F PR PT FALLS ASSESS DOC 0-1 FALLS W/OUT INJ PAST YR: ICD-10-PCS | Mod: CPTII,S$GLB,, | Performed by: INTERNAL MEDICINE

## 2023-09-26 PROCEDURE — 93280 PM DEVICE PROGR EVAL DUAL: CPT

## 2023-09-26 PROCEDURE — 93971 EXTREMITY STUDY: CPT | Mod: LT

## 2023-09-26 PROCEDURE — 1126F PR PAIN SEVERITY QUANTIFIED, NO PAIN PRESENT: ICD-10-PCS | Mod: CPTII,S$GLB,, | Performed by: INTERNAL MEDICINE

## 2023-09-26 PROCEDURE — 3288F FALL RISK ASSESSMENT DOCD: CPT | Mod: CPTII,S$GLB,, | Performed by: INTERNAL MEDICINE

## 2023-09-26 PROCEDURE — 93280 PM DEVICE PROGR EVAL DUAL: CPT | Mod: 26,,, | Performed by: INTERNAL MEDICINE

## 2023-09-26 PROCEDURE — 99999 PR PBB SHADOW E&M-EST. PATIENT-LVL III: CPT | Mod: PBBFAC,,, | Performed by: INTERNAL MEDICINE

## 2023-09-26 NOTE — PROGRESS NOTES
Subjective:   Patient ID:  Brunilda England is a 89 y.o. female who presents for follow-up of No chief complaint on file.      89 yoF SSS s/p DC PM here off schedule for L arm/hand swelling within one week after device implant. US with no DVT. She had a compression bandage over her antecubital area left from the day of her procedure.     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  No date: Neuropathy  No date: Osteoporosis  11/9/2012: Osteoporosis, unspecified  No date: Pure hypercholesterolemia    Past Surgical History:  9/21/2023: A-V CARDIAC PACEMAKER INSERTION; Left      Comment:  Procedure: INSERTION, CARDIAC PACEMAKER, DUAL CHAMBER;                 Surgeon: Giuseppe Roman MD;  Location: [facility] EP LAB;               Service: Cardiology;  Laterality: Left;  SSS, Dual                PPM,BIO, anes, MB, 3 Prep  No date: BRONCHOSCOPY  No date: CHOLECYSTECTOMY  No date: COLONOSCOPY  12/6/2018: FLUOROSCOPIC ANGIOGRAPHY OF LOWER EXTREMITY WITH TOPICAL   ULTRASOUND; Right      Comment:  Procedure: ARTERIOGRAM-LEG AND ULTRASOUND;  Surgeon: SEBASTIÁN Mcpherson III, MD;  Location: [facility] OR 2ND FLR;                 Service: Peripheral Vascular;  Laterality: Right;  16.5                [id number] [id number] Dye2ml Local  No date: heart stent  No date: HYSTERECTOMY  12/6/2019: INCISIONAL BIOPSY; Right      Comment:  Procedure: INCISIONAL BIOPSY;  Surgeon: Leslie Castillo MD;  [facility];  Service: Plastics;                 Laterality: Right;  5/30/2023: INTRAMEDULLARY RODDING OF FEMUR; Left      Comment:  Procedure: INSERTION, INTRAMEDULLARY TEREZA, FEMUR;                  Surgeon: Desmond Barker MD;  Location: [facility] OR                2ND FLR;  Service: Orthopedics;  Laterality: Left;  No date: left leg surgery      Comment:  blockage  2018: PERCUTANEOUS TRANSLUMINAL ANGIOPLASTY; N/A      Comment:  Procedure: PTA (ANGIOPLASTY, PERCUTANEOUS,                TRANSLUMINAL);  Surgeon: SEBASTIÁN Mcpherson III, MD;                 Location: [facility] OR 2ND FLR;  Service: Peripheral Vascular;               Laterality: N/A;  2019: RECONSTRUCTION USING FLAP; Right      Comment:  Procedure: RECONSTRUCTION USING FLAP/FULL THICKNESS                MRESETION AND RECONSTRUCTION RIGHT UPPER EYELID WITH                BIOPSY;  Surgeon: Leslie Castillo MD;  Location: [facility] OR                1ST FLR;  Service: Plastics;  Laterality: Right;  No date: TONSILLECTOMY    Social History    Socioeconomic History      Marital status:     Tobacco Use      Smoking status: Former        Packs/day: 0.00        Years: 0.5 packs/day for 30.0 years (15.0 ttl pk-yrs)        Types: Cigarettes        Start date: 1965        Quit date: 1995        Years since quittin.7        Passive exposure: Past      Smokeless tobacco: Never    Substance and Sexual Activity      Alcohol use: No      Drug use: No      Sexual activity: Not Currently        Birth control/protection: Surgical    Social Determinants of Health  Financial Resource Strain: Low Risk  (2023)      Overall Financial Resource Strain (CARDIA)          Difficulty of Paying Living Expenses: Not hard at all  Recent Concern: Financial Resource Strain - Medium Risk (2023)      Overall Financial Resource Strain (CARDIA)          Difficulty of Paying Living Expenses: Somewhat hard  Food Insecurity: No Food Insecurity (2023)      Hunger Vital Sign          Worried About Running Out of Food in the Last Year: Never true          Ran Out of Food in the Last Year: Never true  Recent Concern: Food Insecurity - Food Insecurity Present  (5/21/2023)      Hunger Vital Sign          Worried About Running Out of Food in the Last Year: Sometimes true          Ran Out of Food in the Last Year: Sometimes true  Transportation Needs: No Transportation Needs (5/31/2023)      PRAPARE - Transportation          Lack of Transportation (Medical): No          Lack of Transportation (Non-Medical): No  Physical Activity: Inactive (5/21/2023)      Exercise Vital Sign          Days of Exercise per Week: 0 days          Minutes of Exercise per Session: 0 min  Stress: No Stress Concern Present (5/31/2023)      Citizen of [place] of Occupational Health - Occupational Stress Questionnaire          Feeling of Stress : Not at all  Recent Concern: Stress - Stress Concern Present (5/21/2023)      Citizen of [place] of Occupational Health - Occupational Stress Questionnaire          Feeling of Stress : To some extent  Social Connections: Moderately Isolated (5/31/2023)      Social Connection and Isolation Panel [NHANES]          Frequency of Communication with Friends and Family: More than three times a week          Frequency of Social Gatherings with Friends and Family: More than three times a week          Attends Confucianism Services: More than 4 times per year          Active Member of Clubs or Organizations: No          Attends Club or Organization Meetings: Never          Marital Status:   Housing Stability: Low Risk  (5/31/2023)      Housing Stability Vital Sign          Unable to Pay for Housing in the Last Year: No          Number of Places Lived in the Last Year: 1          Unstable Housing in the Last Year: No    Review of patient's family history indicates:  Problem: Stroke      Relation: Mother          Age of Onset: (Not Specified)  Problem: Cancer      Relation: Father          Age of Onset: (Not Specified)  Problem: Breast cancer      Relation: Maternal Aunt          Age of Onset: (Not Specified)  Problem: Ovarian cancer      Relation: Neg Hx          Age  of Onset: (Not Specified)  Problem: Amblyopia      Relation: Neg Hx          Age of Onset: (Not Specified)  Problem: Blindness      Relation: Neg Hx          Age of Onset: (Not Specified)  Problem: Cataracts      Relation: Neg Hx          Age of Onset: (Not Specified)  Problem: Glaucoma      Relation: Neg Hx          Age of Onset: (Not Specified)  Problem: Macular degeneration      Relation: Neg Hx          Age of Onset: (Not Specified)  Problem: Retinal detachment      Relation: Neg Hx          Age of Onset: (Not Specified)  Problem: Strabismus      Relation: Neg Hx          Age of Onset: (Not Specified)      Current Outpatient Medications:  acetaminophen (TYLENOL) 500 MG tablet, Take 2 tablets (1,000 mg total) by mouth every 8 (eight) hours as needed for Pain., Disp: , Rfl: 0  amlodipine-benazepril 5-20 mg (LOTREL) 5-20 mg per capsule, Take 1 capsule by mouth once daily., Disp: 90 capsule, Rfl: 3  apixaban (ELIQUIS) 5 mg Tab, Take 1 tablet (5 mg total) by mouth 2 (two) times daily. Hold this medication until cleared by Neurosurgery to resume., Disp: 60 tablet, Rfl: 6  artificial tears (ISOPTO TEARS) 0.5 % ophthalmic solution, Place 1 drop into both eyes as needed., Disp: , Rfl:   calcium-vitamin D3 (OS-ERIC 500 + D3) 500 mg-5 mcg (200 unit) per tablet, Take 2 tablets by mouth once daily., Disp: , Rfl:   cyanocobalamin (VITAMIN B-12) 1000 MCG tablet, Take 1,000 mcg by mouth every morning., Disp: , Rfl:   donepeziL (ARICEPT) 5 MG tablet, Take 1 tablet (5 mg total) by mouth every evening., Disp: 90 tablet, Rfl: 4  doxycycline (VIBRAMYCIN) 100 MG Cap, Take 1 capsule (100 mg total) by mouth every 12 (twelve) hours. for 5 days, Disp: 10 capsule, Rfl: 0  DULoxetine (CYMBALTA) 60 MG capsule, Take 1 capsule (60 mg total) by mouth every morning., Disp: 90 capsule, Rfl: 3  estradioL (ESTRACE) 0.01 % (0.1 mg/gram) vaginal cream, USE ONE-HALF GRAM VAGINALLY TWICE a WEEK, Disp: 42.5 g, Rfl: 3  ferrous sulfate 325 (65 FE) MG EC  tablet, Take 1 tablet (325 mg total) by mouth once daily., Disp: 30 tablet, Rfl: 2  furosemide (LASIX) 20 MG tablet, Take 1 tablet (20 mg total) by mouth once daily., Disp: 60 tablet, Rfl: 2  hydrALAZINE (APRESOLINE) 25 MG tablet, Take 1 tablet (25 mg total) by mouth every 8 (eight) hours., Disp: 90 tablet, Rfl: 11  linaGLIPtin (TRADJENTA) 5 mg Tab tablet, Take 1 tablet (5 mg total) by mouth once daily., Disp: 90 tablet, Rfl: 3  melatonin (MELATIN) 3 mg tablet, Take 2 tablets (6 mg total) by mouth nightly as needed for Insomnia., Disp: , Rfl: 0  metoprolol tartrate (LOPRESSOR) 25 MG tablet, Take 1 tablet (25 mg total) by mouth 2 (two) times daily., Disp: 60 tablet, Rfl: 11  nitroGLYCERIN (NITROSTAT) 0.4 MG SL tablet, Place 1 tablet (0.4 mg total) under the tongue every 5 (five) minutes as needed., Disp: 25 tablet, Rfl: 0  simvastatin (ZOCOR) 80 MG tablet, Take 1 tablet (80 mg total) by mouth once daily., Disp: 90 tablet, Rfl: 4  vitamin D (VITAMIN D3) 1000 units Tab, Take 1,000 Units by mouth once daily., Disp: , Rfl:   azelastine (ASTELIN) 137 mcg (0.1 %) nasal spray, 1 spray (137 mcg total) by Nasal route 2 (two) times daily. (Patient not taking: Reported on 9/19/2023), Disp: 30 mL, Rfl: 3    No current facility-administered medications for this visit.  Facility-Administered Medications Ordered in Other Visits:  sodium chloride 0.9% flush 5 mL, 5 mL, Intravenous, PRN, Sarah Earl MD                Review of Systems   Constitutional: Negative.   HENT: Negative.     Eyes: Negative.    Cardiovascular:  Negative for chest pain, dyspnea on exertion, leg swelling, near-syncope, palpitations and syncope.   Respiratory: Negative.  Negative for shortness of breath.    Endocrine: Negative.    Hematologic/Lymphatic: Negative.    Skin: Negative.    Musculoskeletal: Negative.    Gastrointestinal: Negative.    Genitourinary: Negative.    Neurological: Negative.  Negative for dizziness and light-headedness.    Psychiatric/Behavioral: Negative.     Allergic/Immunologic: Negative.        Objective:   Physical Exam  Musculoskeletal:      Comments: L arm and hand swelling         Assessment:      1. Tachy-juan a syndrome        Plan:     89 yoF here off schedule for L forearm and hand swelling. This was due to a bandage left over her antecubital area since the day of implant. Bandage removed. I suspect it will improve. Normal device function.

## 2023-09-27 ENCOUNTER — DOCUMENTATION ONLY (OUTPATIENT)
Dept: ELECTROPHYSIOLOGY | Facility: CLINIC | Age: 88
End: 2023-09-27
Payer: MEDICARE

## 2023-09-27 NOTE — PROGRESS NOTES
Wound and device check done on 9/26/23.  Incision CDI with dermabond intact.  Some redness noted at proximal end of incision.   Cleansed site with Hibiclens and applied Mepelex dressing  and instructed patient and family member to remove tomorrow and clean daily with soap and water.

## 2023-10-03 ENCOUNTER — EXTERNAL HOME HEALTH (OUTPATIENT)
Dept: HOME HEALTH SERVICES | Facility: HOSPITAL | Age: 88
End: 2023-10-03
Payer: MEDICARE

## 2023-10-03 ENCOUNTER — TELEPHONE (OUTPATIENT)
Dept: INTERNAL MEDICINE | Facility: CLINIC | Age: 88
End: 2023-10-03
Payer: MEDICARE

## 2023-10-03 NOTE — TELEPHONE ENCOUNTER
----- Message from Lilly Daniel sent at 10/3/2023  3:09 PM CDT -----  Contact: [phone] [facility]  Patient would like to get medical advice.  Symptoms (please be specific):   Cindy is following up on a plan of care faxed on 0925/2023 to   How long have you had these symptoms: N/A  Would you like a call back, or a response through your [facility] portal?:  call back   Pharmacy name and phone # (copy from chart):   N/A  Comments:

## 2023-10-06 ENCOUNTER — PATIENT MESSAGE (OUTPATIENT)
Dept: INTERNAL MEDICINE | Facility: CLINIC | Age: 88
End: 2023-10-06
Payer: MEDICARE

## 2023-10-06 DIAGNOSIS — N95.0 POST-MENOPAUSAL BLEEDING: Primary | ICD-10-CM

## 2023-10-20 ENCOUNTER — TELEPHONE (OUTPATIENT)
Dept: ELECTROPHYSIOLOGY | Facility: CLINIC | Age: 88
End: 2023-10-20
Payer: MEDICARE

## 2023-10-20 ENCOUNTER — PATIENT MESSAGE (OUTPATIENT)
Dept: NEUROLOGY | Facility: CLINIC | Age: 88
End: 2023-10-20
Payer: MEDICARE

## 2023-10-20 NOTE — TELEPHONE ENCOUNTER
----- Message from Mona Redman MA sent at 10/20/2023  3:48 PM CDT -----  Contact: Haley- daughter  [phone]    ----- Message -----  From: Vaishali Martinez MA  Sent: 10/20/2023   3:26 PM CDT  To: [facility] Staff    Daughter is calling about her mother who has left hand pain on tips of her fingers,very painful,has no feeling and it is getting worse. Please call,

## 2023-10-25 ENCOUNTER — TELEPHONE (OUTPATIENT)
Dept: INTERNAL MEDICINE | Facility: CLINIC | Age: 88
End: 2023-10-25
Payer: MEDICARE

## 2023-10-25 NOTE — TELEPHONE ENCOUNTER
----- Message from Annelise Lei sent at 10/25/2023 10:21 AM CDT -----  Contact: Edgard with [facility]/ [phone]  Patient is calling for Medical Advice regarding: Edgard with [facility] calling about a physician order that was faxed on 10/6/23 , please fax to

## 2023-10-26 ENCOUNTER — CLINICAL SUPPORT (OUTPATIENT)
Dept: CARDIOLOGY | Facility: HOSPITAL | Age: 88
End: 2023-10-26
Attending: INTERNAL MEDICINE
Payer: MEDICARE

## 2023-10-26 PROCEDURE — 93294 CARDIAC DEVICE CHECK CHECK - REMOTE ALERT: ICD-10-PCS | Mod: ,,, | Performed by: INTERNAL MEDICINE

## 2023-10-26 PROCEDURE — 93296 REM INTERROG EVL PM/IDS: CPT | Performed by: INTERNAL MEDICINE

## 2023-10-26 PROCEDURE — 93294 REM INTERROG EVL PM/LDLS PM: CPT | Mod: ,,, | Performed by: INTERNAL MEDICINE

## 2023-10-30 ENCOUNTER — TELEPHONE (OUTPATIENT)
Dept: INTERNAL MEDICINE | Facility: CLINIC | Age: 88
End: 2023-10-30
Payer: MEDICARE

## 2023-10-30 ENCOUNTER — PATIENT MESSAGE (OUTPATIENT)
Dept: NEUROLOGY | Facility: CLINIC | Age: 88
End: 2023-10-30
Payer: MEDICARE

## 2023-10-30 LAB
OHS CV AF BURDEN PERCENT: < 1
OHS CV DC REMOTE DEVICE TYPE: NORMAL
OHS CV ICD SHOCK: NO
OHS CV RV PACING PERCENT: 0 %

## 2023-10-30 NOTE — TELEPHONE ENCOUNTER
Called pt's daughter per Dr Alanis's request to apologize and to let her know he has reached out to cardio to follow up with her    She expressed gratitude and said she was concerned that there was a compression wrap on pt's arm that she wasn't aware of so it sat for days and there was swelling going on in her hand  She brought pt to see cardiologist who advised no issue w/ pacemaker (did US) and advised heat and tylenol for redness and swelling in arm from compression wrap    Since then pt has had pain running up and down left arm, elbow is improved and less red      I informed her I will make sure Dr Alanis is aware of all of this today and I gave her the 24/7 nurse hotline as well in case there's any future instances she cannot reach us after hours and has urgent concerns  She verbalized understanding and had no further concerns or questions.

## 2023-10-30 NOTE — TELEPHONE ENCOUNTER
----- Message from Perri Mendoza sent at 10/30/2023  2:44 PM CDT -----  Contact: [phone]  [email or web address]     Patient is calling for Medical Advice regarding: [employer] with [facility]  is calling to verify a fax was received on 10/12 and 10/25     Would like response via Pops:  ##call back     Comments:

## 2023-11-01 ENCOUNTER — PATIENT MESSAGE (OUTPATIENT)
Dept: ELECTROPHYSIOLOGY | Facility: CLINIC | Age: 88
End: 2023-11-01
Payer: MEDICARE

## 2023-11-02 ENCOUNTER — TELEPHONE (OUTPATIENT)
Dept: ELECTROPHYSIOLOGY | Facility: CLINIC | Age: 88
End: 2023-11-02
Payer: MEDICARE

## 2023-11-02 DIAGNOSIS — G62.9 NEUROPATHY: Primary | ICD-10-CM

## 2023-11-02 NOTE — TELEPHONE ENCOUNTER
I spoke with Ms. Keegan and let her know that we would like for Ms. England to be seen in the Neuromuscular clinic and that I would see that a referral be put in for this. I advised that if the pain can not be controlled at home in the meantime she should be seen in urgent care or the ED. She verbalized understanding and appreciated the call.     Message sent to Dr. Alanis's office as well.

## 2023-11-02 NOTE — TELEPHONE ENCOUNTER
Sarah Lei RN  You 2 minutes ago (1:58 PM)     CRYSTAL Smith,     Please see Dr. Roman's message below. As Dr. Alanis offered, we would like her to see someone in the Neuromuscular clinic. I am unable to put in a referral to them. If you could assist with this I would appreciate it. I spoke with the daughter and let her know that if the pain is severe and can not be controlled at home they should go to urgent care or the ED.     Thanks,   CAROLE Smith Michael L., MD Jones, Katie P., RN 1 hour ago (12:34 PM)     She needs to see Neurology. If her pain is that bad and there are no Neurologist willing to see her urgently in the clinic, I would send her to the ER. Thanks     She had a compression bandage around her antecubital fossa for one week after her PM. Distal to this her forearm was swollen. If this was PM related the swelling would have been likely been above the bandage.              Sarah Lei RN Bernard, Michael L., MD 3 hours ago (10:39 AM)     CRYSTAL  Can you take a look at this note from Dr. Alanis and let me know what you think please?     ThanksSarah

## 2023-11-09 DIAGNOSIS — F39 MOOD DISORDER: ICD-10-CM

## 2023-11-09 RX ORDER — DULOXETIN HYDROCHLORIDE 60 MG/1
60 CAPSULE, DELAYED RELEASE ORAL EVERY MORNING
Qty: 90 CAPSULE | Refills: 3 | Status: SHIPPED | OUTPATIENT
Start: 2023-11-09

## 2023-11-09 NOTE — TELEPHONE ENCOUNTER
No care due was identified.  [facility] Embedded Care Due Messages. Reference number: [id number].   11/09/2023 7:44:37 AM CST

## 2023-11-14 NOTE — TELEPHONE ENCOUNTER
Routing to staff again since pt's daughter msgd me today saying no one has reached out   Routing again as HP

## 2023-11-16 ENCOUNTER — OFFICE VISIT (OUTPATIENT)
Dept: NEUROLOGY | Facility: CLINIC | Age: 88
End: 2023-11-16
Payer: MEDICARE

## 2023-11-16 VITALS
DIASTOLIC BLOOD PRESSURE: 60 MMHG | BODY MASS INDEX: 27.18 KG/M2 | WEIGHT: 163.13 LBS | HEART RATE: 93 BPM | HEIGHT: 65 IN | SYSTOLIC BLOOD PRESSURE: 124 MMHG

## 2023-11-16 DIAGNOSIS — M54.12 CERVICAL RADICULOPATHY: ICD-10-CM

## 2023-11-16 DIAGNOSIS — M79.609 PARESTHESIA AND PAIN OF LEFT EXTREMITY: Primary | ICD-10-CM

## 2023-11-16 DIAGNOSIS — G62.9 NEUROPATHY: ICD-10-CM

## 2023-11-16 DIAGNOSIS — E11.51 TYPE 2 DIABETES MELLITUS WITH DIABETIC PERIPHERAL ANGIOPATHY WITHOUT GANGRENE, WITHOUT LONG-TERM CURRENT USE OF INSULIN: ICD-10-CM

## 2023-11-16 DIAGNOSIS — R20.2 PARESTHESIA AND PAIN OF LEFT EXTREMITY: Primary | ICD-10-CM

## 2023-11-16 PROCEDURE — 1160F PR REVIEW ALL MEDS BY PRESCRIBER/CLIN PHARMACIST DOCUMENTED: ICD-10-PCS | Mod: CPTII,S$GLB,, | Performed by: PSYCHIATRY & NEUROLOGY

## 2023-11-16 PROCEDURE — 1125F PR PAIN SEVERITY QUANTIFIED, PAIN PRESENT: ICD-10-PCS | Mod: CPTII,S$GLB,, | Performed by: PSYCHIATRY & NEUROLOGY

## 2023-11-16 PROCEDURE — 3288F PR FALLS RISK ASSESSMENT DOCUMENTED: ICD-10-PCS | Mod: CPTII,S$GLB,, | Performed by: PSYCHIATRY & NEUROLOGY

## 2023-11-16 PROCEDURE — 1159F MED LIST DOCD IN RCRD: CPT | Mod: CPTII,S$GLB,, | Performed by: PSYCHIATRY & NEUROLOGY

## 2023-11-16 PROCEDURE — 1125F AMNT PAIN NOTED PAIN PRSNT: CPT | Mod: CPTII,S$GLB,, | Performed by: PSYCHIATRY & NEUROLOGY

## 2023-11-16 PROCEDURE — 1101F PT FALLS ASSESS-DOCD LE1/YR: CPT | Mod: CPTII,S$GLB,, | Performed by: PSYCHIATRY & NEUROLOGY

## 2023-11-16 PROCEDURE — 3288F FALL RISK ASSESSMENT DOCD: CPT | Mod: CPTII,S$GLB,, | Performed by: PSYCHIATRY & NEUROLOGY

## 2023-11-16 PROCEDURE — 99999 PR PBB SHADOW E&M-EST. PATIENT-LVL III: ICD-10-PCS | Mod: PBBFAC,,, | Performed by: PSYCHIATRY & NEUROLOGY

## 2023-11-16 PROCEDURE — 1160F RVW MEDS BY RX/DR IN RCRD: CPT | Mod: CPTII,S$GLB,, | Performed by: PSYCHIATRY & NEUROLOGY

## 2023-11-16 PROCEDURE — 99417 PR PROLONGED SVC, OUTPT, W/WO DIRECT PT CONTACT,  EA ADDTL 15 MIN: ICD-10-PCS | Mod: S$GLB,,, | Performed by: PSYCHIATRY & NEUROLOGY

## 2023-11-16 PROCEDURE — 1101F PR PT FALLS ASSESS DOC 0-1 FALLS W/OUT INJ PAST YR: ICD-10-PCS | Mod: CPTII,S$GLB,, | Performed by: PSYCHIATRY & NEUROLOGY

## 2023-11-16 PROCEDURE — 99215 PR OFFICE/OUTPT VISIT, EST, LEVL V, 40-54 MIN: ICD-10-PCS | Mod: S$GLB,,, | Performed by: PSYCHIATRY & NEUROLOGY

## 2023-11-16 PROCEDURE — 1159F PR MEDICATION LIST DOCUMENTED IN MEDICAL RECORD: ICD-10-PCS | Mod: CPTII,S$GLB,, | Performed by: PSYCHIATRY & NEUROLOGY

## 2023-11-16 PROCEDURE — 99999 PR PBB SHADOW E&M-EST. PATIENT-LVL III: CPT | Mod: PBBFAC,,, | Performed by: PSYCHIATRY & NEUROLOGY

## 2023-11-16 PROCEDURE — 99417 PROLNG OP E/M EACH 15 MIN: CPT | Mod: S$GLB,,, | Performed by: PSYCHIATRY & NEUROLOGY

## 2023-11-16 PROCEDURE — 99215 OFFICE O/P EST HI 40 MIN: CPT | Mod: S$GLB,,, | Performed by: PSYCHIATRY & NEUROLOGY

## 2023-11-16 RX ORDER — LORAZEPAM 1 MG/1
1 TABLET ORAL EVERY 6 HOURS PRN
Qty: 2 TABLET | Refills: 0 | Status: SHIPPED | OUTPATIENT
Start: 2023-11-16 | End: 2024-03-12

## 2023-11-16 RX ORDER — PREGABALIN 25 MG/1
25 CAPSULE ORAL NIGHTLY
Qty: 30 CAPSULE | Refills: 2 | Status: SHIPPED | OUTPATIENT
Start: 2023-11-16 | End: 2023-12-11 | Stop reason: SDUPTHER

## 2023-11-16 NOTE — PROGRESS NOTES
[facility] - NEUROLOGY 7TH FL [facility], [place]    Date: 11/16/23  Patient Name: Brunilda England   MRN: [id number]   Referring Provider: Rubio Alanis MD    Thank you so much Rubio Alanis MD for your patient referral to Neuromuscular team at [facility]. We take pride in our care coordination and look forward to your feedback and questions.    Assessment:   Brunilda England is a 89 y.o. 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  Her pain could be related with cervical radiculopathy, carpal tunnel syndrome, compartment syndrome.  I guided the family to use low-dose pregabalin and note for side effects and if there are no side effects then frequency with can be increased to twice daily in future.  NCS/EMG can be done for further evaluation beside MRI of the cervical spine.    I discussed about fall precautions and need for Physiotherapy. I addressed her complaints. I provided information about fall precautions and healthy lifestyle. I would wish her very best for improvement/recovery in her condition.    Future direction based on feedback:    Plan:     Problem List Items Addressed This Visit          Neuro    Neuropathy    Relevant Medications    pregabalin (LYRICA) 25 MG capsule    Other Relevant Orders    EMG W/ ULTRASOUND AND NERVE CONDUCTION TEST 2 Extremities       Endocrine    Type 2 diabetes mellitus with circulatory disorder, without long-term current use of insulin    Relevant Medications    pregabalin (LYRICA) 25 MG capsule    Other Relevant Orders    EMG W/ ULTRASOUND AND NERVE CONDUCTION TEST 2 Extremities       Orthopedic    Paresthesia and pain of left extremity - Primary    Relevant Medications    pregabalin (LYRICA) 25 MG capsule    Other Relevant Orders    EMG W/ ULTRASOUND AND NERVE  CONDUCTION TEST 2 Extremities    MRI Cervical Spine Without Contrast     Other Visit Diagnoses       Cervical radiculopathy        Relevant Medications    pregabalin (LYRICA) 25 MG capsule    LORazepam (ATIVAN) 1 MG tablet    Other Relevant Orders    EMG W/ ULTRASOUND AND NERVE CONDUCTION TEST 2 Extremities    MRI Cervical Spine Without Contrast            Braxton Recio MD    This evaluation was completed in >80  Minutes over 50% of the time spent on education & counseling. This includes face to face time and non-face to face time preparing to see the patient (eg, review of tests), obtaining and/or reviewing separately obtained history, documenting clinical information in the electronic or other health record, independently interpreting results and communicating results to the patient/family/caregiver, or care coordinator.    Patient note was created using MModal Dictation.  Any errors in syntax or even information may not have been identified and edited on initial review prior to signing this note.    Details provided by:    Patient  Family- Daughter    Reason for visit:    HISTORY OF PRESENT ILLNESS   Ms. Brunilda England is a 89 y.o. female presenting for evaluation of left hand paresthesia.  She has PMH of stage III non-small cell lung cancer (diagnosed in 2012), completed chemoradiation with carboplatin and Taxol on 6/1/12, Late-Onset Alzheimer's Dementia, SSS s/p DC PM with L arm/hand swelling within one week after device implant. She had US with no DVT.     The detail was confirmed with patient and her daughter who mentioned that in September 2023 she had pacemaker placement followed by sling for left arm for 2 weeks.  Family did not realize that she had a compression gauze around the left elbow for almost 7 days unnoticed but once that was taken off she started complaining pain in the left hand.  She does not feel weak in the left hand but there is intermittent severe spasm like pain on touching.  She is  unable to use her left hand due to pain.  Family tried Tylenol with no help.  They also tried is bandage which helped somewhat.  Patient feels rubbing her left hand helps the symptoms.  There is no history of urinary incontinence.      He previously tried gabapentin but she was not coherent according to family.  She walks with a walker at home      There is no history of smoking or alcohol use.     Chart Review:  Numbness and pain in her hand and fingers that has been ongoing for around 2 weeks but that this is a separate incident than the issue she had post procedure in September    CBC with hemoglobin 9.8, hematocrit 30, MCV 91   Basic metabolic panel with blood sugar level 177   Hemoglobin A1c 6.3 highest has been 6.5.    HIV 1 and 2 antibody negative   TSH 2.4   CK 42   Vitamin-D level 46   B12 level 793    Pertinent work up based on chart review for current condition:      Review of Systems:  12 system review of systems is negative except for the symptoms mentioned in HPI.       Past Medical History Social History   Past Medical History:   Diagnosis Date    Anal cancer     Anemia     Cancer     anal cancer    Centrilobular emphysema 2020    Diabetes mellitus     With circulatory disorder    Lumbar radiculopathy 2014    Lung cancer 2012    s/p chemo/radiation    Macular degeneration     Macular hemorrhage of left eye     Neuropathy     Osteoporosis     Osteoporosis, unspecified 2012    Pure hypercholesterolemia       Social History     Socioeconomic History    Marital status:    Tobacco Use    Smoking status: Former     Current packs/day: 0.00     Average packs/day: 0.5 packs/day for 30.0 years (15.0 ttl pk-yrs)     Types: Cigarettes     Start date: 1965     Quit date: 1995     Years since quittin.8     Passive exposure: Past    Smokeless tobacco: Never   Substance and Sexual Activity    Alcohol use: No    Drug use: No    Sexual activity: Not Currently     Birth  control/protection: Surgical     Social Determinants of Health     Financial Resource Strain: Medium Risk (11/15/2023)    Overall Financial Resource Strain (CARDIA)     Difficulty of Paying Living Expenses: Somewhat hard   Food Insecurity: Food Insecurity Present (11/15/2023)    Hunger Vital Sign     Worried About Running Out of Food in the Last Year: Sometimes true     Ran Out of Food in the Last Year: Never true   Transportation Needs: No Transportation Needs (11/15/2023)    PRAPARE - Transportation     Lack of Transportation (Medical): No     Lack of Transportation (Non-Medical): No   Physical Activity: Inactive (11/15/2023)    Exercise Vital Sign     Days of Exercise per Week: 0 days     Minutes of Exercise per Session: 0 min   Stress: Stress Concern Present (11/15/2023)    [employer] - Occupational Stress Questionnaire     Feeling of Stress : To some extent   Social Connections: Moderately Isolated (11/15/2023)    Social Connection and Isolation Panel [NHANES]     Frequency of Communication with Friends and Family: More than three times a week     Frequency of Social Gatherings with Friends and Family: Twice a week     Attends [employer] Services: More than 4 times per year     Active Member of Clubs or Organizations: No     Attends Club or Organization Meetings: Never     Marital Status:    Housing Stability: Low Risk  (11/15/2023)    Housing Stability Vital Sign     Unable to Pay for Housing in the Last Year: No     Number of Places Lived in the Last Year: 1     Unstable Housing in the Last Year: No        Family History Past Surgical History   Family History   Problem Relation Age of Onset    Stroke Mother     Cancer Father     Breast cancer Maternal Aunt     Ovarian cancer Neg Hx     Amblyopia Neg Hx     Blindness Neg Hx     Cataracts Neg Hx     Glaucoma Neg Hx     Macular degeneration Neg Hx     Retinal detachment Neg Hx     Strabismus Neg Hx      Past Surgical History:    Procedure Laterality Date    A-V CARDIAC PACEMAKER INSERTION Left 9/21/2023    Procedure: INSERTION, CARDIAC PACEMAKER, DUAL CHAMBER;  Surgeon: Giuseppe Roman MD;  Location: [facility] EP [facility];  Service: Cardiology;  Laterality: Left;  SSS, Dual PPM,BIO, anes, MB, 3 Prep    BRONCHOSCOPY      CHOLECYSTECTOMY      COLONOSCOPY      FLUOROSCOPIC ANGIOGRAPHY OF LOWER EXTREMITY WITH TOPICAL ULTRASOUND Right 12/6/2018    Procedure: ARTERIOGRAM-LEG AND ULTRASOUND;  Surgeon: SEBASTIÁN Mcpherson III, MD;  [facility];  Service: Peripheral Vascular;  Laterality: Right;  16.5 min  1059.42 mGy  59ml Dye  2ml Local    heart stent      HYSTERECTOMY      INCISIONAL BIOPSY Right 12/6/2019    Procedure: INCISIONAL BIOPSY;  Surgeon: Leslie Castillo MD;  Location: [facility] OR [address] [place] [address];  Service: Plastics;  Laterality: Right;    INTRAMEDULLARY RODDING OF FEMUR Left 5/30/2023    Procedure: INSERTION, INTRAMEDULLARY TEREZA, FEMUR;  Surgeon: Desmond Barker MD;  Location: [facility] OR [address] [place] [address];  Service: Orthopedics;  Laterality: Left;    left leg surgery      blockage    PERCUTANEOUS TRANSLUMINAL ANGIOPLASTY N/A 12/6/2018    Procedure: PTA (ANGIOPLASTY, PERCUTANEOUS, TRANSLUMINAL);  Surgeon: SEBASTIÁN Mcpherson III, MD;  [facility];  Service: Peripheral Vascular;  Laterality: N/A;    RECONSTRUCTION USING FLAP Right 12/6/2019    Procedure: RECONSTRUCTION USING FLAP/FULL THICKNESS MRESETION AND RECONSTRUCTION RIGHT UPPER EYELID WITH BIOPSY;  Surgeon: Leslie Castillo MD;  Location: [address];  Service: Plastics;  Laterality: Right;    TONSILLECTOMY          Allergies    Review of patient's allergies indicates:   Allergen Reactions    Bextra [valdecoxib] Swelling    Gabapentin Diarrhea and Nausea Only            Medications     Current Outpatient Medications   Medication Sig Dispense Refill    acetaminophen (TYLENOL) 500 MG tablet Take 2 tablets (1,000 mg total) by mouth every 8 (eight) hours as needed for Pain.  0     amlodipine-benazepril 5-20 mg (LOTREL) 5-20 mg per capsule Take 1 capsule by mouth once daily. 90 capsule 3    apixaban (ELIQUIS) 5 mg Tab Take 1 tablet (5 mg total) by mouth 2 (two) times daily. Hold this medication until cleared by Neurosurgery to resume. 60 tablet 6    artificial tears (ISOPTO TEARS) 0.5 % ophthalmic solution Place 1 drop into both eyes as needed.      azelastine (ASTELIN) 137 mcg (0.1 %) nasal spray 1 spray (137 mcg total) by Nasal route 2 (two) times daily. (Patient not taking: Reported on 9/19/2023) 30 mL 3    calcium-vitamin D3 (OS-ERIC 500 + D3) 500 mg-5 mcg (200 unit) per tablet Take 2 tablets by mouth once daily.      cyanocobalamin (VITAMIN B-12) 1000 MCG tablet Take 1,000 mcg by mouth every morning.      donepeziL (ARICEPT) 5 MG tablet Take 1 tablet (5 mg total) by mouth every evening. 90 tablet 4    DULoxetine (CYMBALTA) 60 MG capsule Take 1 capsule (60 mg total) by mouth every morning. 90 capsule 3    estradioL (ESTRACE) 0.01 % (0.1 mg/gram) vaginal cream USE ONE-HALF GRAM VAGINALLY TWICE a WEEK 42.5 g 3    ferrous sulfate 325 (65 FE) MG EC tablet Take 1 tablet (325 mg total) by mouth once daily. 30 tablet 2    furosemide (LASIX) 20 MG tablet Take 1 tablet (20 mg total) by mouth once daily. 60 tablet 2    hydrALAZINE (APRESOLINE) 25 MG tablet Take 1 tablet (25 mg total) by mouth every 8 (eight) hours. 90 tablet 11    linaGLIPtin (TRADJENTA) 5 mg Tab tablet Take 1 tablet (5 mg total) by mouth once daily. 90 tablet 3    melatonin (MELATIN) 3 mg tablet Take 2 tablets (6 mg total) by mouth nightly as needed for Insomnia.  0    metoprolol tartrate (LOPRESSOR) 25 MG tablet Take 1 tablet (25 mg total) by mouth 2 (two) times daily. 60 tablet 11    nitroGLYCERIN (NITROSTAT) 0.4 MG SL tablet Place 1 tablet (0.4 mg total) under the tongue every 5 (five) minutes as needed. 25 tablet 0    simvastatin (ZOCOR) 80 MG tablet Take 1 tablet (80 mg total) by mouth once daily. 90 tablet 4    vitamin D  "(VITAMIN D3) 1000 units Tab Take 1,000 Units by mouth once daily.       No current facility-administered medications for this visit.     Facility-Administered Medications Ordered in Other Visits   Medication Dose Route Frequency Provider Last Rate Last Admin    sodium chloride 0.9% flush 5 mL  5 mL Intravenous PRN Sarah Earl MD             LABS   Last CBC Results:   Lab Results   Component Value Date    WBC 4.07 09/14/2023    HGB 9.8 (L) 09/14/2023    HCT 30.9 (L) 09/14/2023     09/14/2023       Last CMP Results  Lab Results   Component Value Date     09/14/2023    K 3.5 09/14/2023     09/14/2023    CO2 23 09/14/2023    BUN 7 (L) 09/14/2023    CREATININE 0.8 09/14/2023    CALCIUM 9.7 09/14/2023    ALBUMIN 3.5 05/29/2023    AST 16 05/29/2023    ALT 11 05/29/2023       Last Lipids  Lab Results   Component Value Date    CHOL 160 08/17/2023    TRIG 173 (H) 08/17/2023    HDL 59 08/17/2023    LDLCALC 66.4 08/17/2023       Last A1C  Lab Results   Component Value Date    HGBA1C 6.3 (H) 05/29/2023       Last TSH  Lab Results   Component Value Date    TSH 2.410 05/20/2023         PHYSICAL EXAMINATION     Vitals:    11/16/23 0927   BP: 124/60   Pulse: 93   Weight: 74 kg (163 lb 2.3 oz)   Height: 5' 5" (1.651 m)     Wt Readings from Last 3 Encounters:   09/26/23 1633 71.2 kg (156 lb 15.5 oz)   09/21/23 0725 71.2 kg (157 lb)   09/19/23 0832 71.6 kg (157 lb 13.6 oz)     Body mass index is 27.15 kg/m².     General: No acute distress, calm & cooperative  Head: Atraumatic, normocephalic  HEENT: PERRLA, EOMI, Oral mucosa moist   Neck: Supple, trachea midline  Cardiovascular: Regular rate and rhythm.  Pulmonary: CTAB, no increased work of breathing, no rhonchi or wheezing  Extremities: Warm, well-perfused, no significant edema  Psychiatric: Normal mood & affect; behavior normal & appropriate  Skin: No jaundice, rashes    GENERAL/CONSTITUTIONAL:    -Well appearing; well nourished    HIGHER INTEGRATIVE " FUNCTIONS:   -Attention & concentration: Normal   -Orientation: Oriented to person, place & time  -Memory: Normal  -Language: Normal   -Fund of Knowledge: Normal     CRANIAL NERVES:   -CN 2: Left eye central vision intact but right eye vision is normal.    -CN 2,3: PERRL  -CN 3,4,6: EOMI  -CN 5: Facial sensation intact bilaterally  -CN 7: Facial strength/movement intact bilaterally  -CN 8: Hearing normal bilaterally  -CN 9,10: Palate elevates symmetrically  -CN 11: Normal shoulder shrug and head turn  -CN 12: Tongue protrudes midline     MOTOR:   -Tone: normal in upper and lower extremities  -UE/LE motor:       Upper Ext Right Left Lower Ext Right Left   Shoulder Abd 5 5 Hip flexion 5 5   Elbow flexion   Knee extension 5 5   Elbow extension   Knee flexion 5 5   Fingers abduction 5 5 Ankle dorsiflexion 5 5    5 2 Ankle plantar flexion     Wrist flexion   Great toe dorsiflexion     Finger extension   Thigh adduction     Finger flexion   Thigh abduction     Thumb abduction            REFLEXES:      R L  R L   Triceps 2 2 Knee 0 2   Biceps 2 2 Ankle 0 1   BR 2 2        -Flexor plantar reflex bilaterally       SENSATION:   -Pain on touching the left hand.    -Vibration is impaired up to anterior superior iliac spine bilaterally.  Pinprick sensation is normal.     COORDINATION:   -FNF normal bilaterally    GAIT:   -Normal casual and tandem gait. Able to stand on heels and toes without difficulty.    Scheduled Follow-up :  Future Appointments   Date Time Provider Department Center   11/16/2023  9:00 AM Braxton Recio MD [facility] NEURO [facility]   11/28/2023  2:30 PM Pepper Whitfield MD [facility] IM [facility] PCW   12/4/2023  3:30 PM Amauri Henry NP [facility] ORTHO [facility]   12/8/2023  9:30 AM Rubio Alanis MD [facility] NEURO8 [facility]   12/15/2023 11:30 AM INFUSION, CHAIR 5 [facility]   12/21/2023 10:00 AM HOME MONITOR DEVICE CHECK, [facility] ARRHPRO [facility]   12/21/2023  2:30 PM Rubio Alanis MD [facility]  NEURO8 [facility]   1/9/2024  1:45 PM Moira Duvall MD [facility] OBGYN50 [facility]   1/23/2024  1:30 PM Evelyne Yates NP [facility] ARRHYTH [place]       After Visit Medication List :     Medication List            Accurate as of November 16, 2023  8:11 AM. If you have any questions, ask your nurse or doctor.                CONTINUE taking these medications      acetaminophen 500 MG tablet  Commonly known as: TYLENOL  Take 2 tablets (1,000 mg total) by mouth every 8 (eight) hours as needed for Pain.     amlodipine-benazepril 5-20 mg 5-20 mg per capsule  Commonly known as: LOTREL  Take 1 capsule by mouth once daily.     apixaban 5 mg Tab  Commonly known as: ELIQUIS  Take 1 tablet (5 mg total) by mouth 2 (two) times daily. Hold this medication until cleared by Neurosurgery to resume.     artificial tears 0.5 % ophthalmic solution  Commonly known as: ISOPTO TEARS  Place 1 drop into both eyes as needed.     azelastine 137 mcg (0.1 %) nasal spray  Commonly known as: ASTELIN  1 spray (137 mcg total) by Nasal route 2 (two) times daily.     calcium-vitamin D3 500 mg-5 mcg (200 unit) per tablet  Commonly known as: OS-ERIC 500 + D3  Take 2 tablets by mouth once daily.     cyanocobalamin 1000 MCG tablet  Commonly known as: VITAMIN B-12     donepeziL 5 MG tablet  Commonly known as: ARICEPT  Take 1 tablet (5 mg total) by mouth every evening.     DULoxetine 60 MG capsule  Commonly known as: CYMBALTA  Take 1 capsule (60 mg total) by mouth every morning.     estradioL 0.01 % (0.1 mg/gram) vaginal cream  Commonly known as: ESTRACE  USE ONE-HALF GRAM VAGINALLY TWICE a WEEK     ferrous sulfate 325 (65 FE) MG EC tablet  Take 1 tablet (325 mg total) by mouth once daily.     furosemide 20 MG tablet  Commonly known as: LASIX  Take 1 tablet (20 mg total) by mouth once daily.     hydrALAZINE 25 MG tablet  Commonly known as: APRESOLINE  Take 1 tablet (25 mg total) by mouth every 8 (eight) hours.     melatonin 3 mg tablet  Commonly known  as: MELATIN  Take 2 tablets (6 mg total) by mouth nightly as needed for Insomnia.     metoprolol tartrate 25 MG tablet  Commonly known as: LOPRESSOR  Take 1 tablet (25 mg total) by mouth 2 (two) times daily.     nitroGLYCERIN 0.4 MG SL tablet  Commonly known as: NITROSTAT  Place 1 tablet (0.4 mg total) under the tongue every 5 (five) minutes as needed.     simvastatin 80 MG tablet  Commonly known as: ZOCOR  Take 1 tablet (80 mg total) by mouth once daily.     TRADJENTA 5 mg Tab tablet  Generic drug: linaGLIPtin  Take 1 tablet (5 mg total) by mouth once daily.     vitamin D 1000 units Tab  Commonly known as: VITAMIN D3              Signing Physician:          Braxton Recio MD  , [employer] [facility] [employer]  Neurology Consultant. [facility].   [address] [address] [place] [address].

## 2023-11-16 NOTE — PATIENT INSTRUCTIONS
Walk and exercise  Physiotherapy of left hand  Massage therapy  In case of any question, plz contact through [facility].

## 2023-11-24 ENCOUNTER — PATIENT MESSAGE (OUTPATIENT)
Dept: NEUROLOGY | Facility: CLINIC | Age: 88
End: 2023-11-24
Payer: MEDICARE

## 2023-11-28 ENCOUNTER — PATIENT MESSAGE (OUTPATIENT)
Dept: INTERNAL MEDICINE | Facility: CLINIC | Age: 88
End: 2023-11-28
Payer: MEDICARE

## 2023-12-04 ENCOUNTER — HOSPITAL ENCOUNTER (OUTPATIENT)
Dept: RADIOLOGY | Facility: HOSPITAL | Age: 88
Discharge: HOME | End: 2023-12-04
Attending: NURSE PRACTITIONER
Payer: MEDICARE

## 2023-12-04 ENCOUNTER — OFFICE VISIT (OUTPATIENT)
Dept: ORTHOPEDICS | Facility: CLINIC | Age: 88
End: 2023-12-04
Payer: MEDICARE

## 2023-12-04 VITALS — HEIGHT: 65 IN | BODY MASS INDEX: 27.18 KG/M2 | WEIGHT: 163.13 LBS

## 2023-12-04 DIAGNOSIS — M79.672 PAIN OF LEFT HEEL: ICD-10-CM

## 2023-12-04 DIAGNOSIS — M79.672 PAIN OF LEFT HEEL: Primary | ICD-10-CM

## 2023-12-04 DIAGNOSIS — M81.0 OSTEOPOROSIS, UNSPECIFIED OSTEOPOROSIS TYPE, UNSPECIFIED PATHOLOGICAL FRACTURE PRESENCE: ICD-10-CM

## 2023-12-04 DIAGNOSIS — M80.80XA PATHOLOGICAL FRACTURE DUE TO OSTEOPOROSIS, UNSPECIFIED FRACTURE SITE, UNSPECIFIED OSTEOPOROSIS TYPE, INITIAL ENCOUNTER: ICD-10-CM

## 2023-12-04 DIAGNOSIS — M25.552 LEFT HIP PAIN: Primary | ICD-10-CM

## 2023-12-04 DIAGNOSIS — M25.552 LEFT HIP PAIN: ICD-10-CM

## 2023-12-04 DIAGNOSIS — S72.002A LEFT DISPLACED FEMORAL NECK FRACTURE: Primary | ICD-10-CM

## 2023-12-04 PROCEDURE — 73502 X-RAY EXAM HIP UNI 2-3 VIEWS: CPT | Mod: TC,LT

## 2023-12-04 PROCEDURE — 1160F RVW MEDS BY RX/DR IN RCRD: CPT | Mod: CPTII,S$GLB,, | Performed by: NURSE PRACTITIONER

## 2023-12-04 PROCEDURE — 1159F MED LIST DOCD IN RCRD: CPT | Mod: CPTII,S$GLB,, | Performed by: NURSE PRACTITIONER

## 2023-12-04 PROCEDURE — 99999 PR PBB SHADOW E&M-EST. PATIENT-LVL III: CPT | Mod: PBBFAC,,, | Performed by: NURSE PRACTITIONER

## 2023-12-04 PROCEDURE — 1126F PR PAIN SEVERITY QUANTIFIED, NO PAIN PRESENT: ICD-10-PCS | Mod: CPTII,S$GLB,, | Performed by: NURSE PRACTITIONER

## 2023-12-04 PROCEDURE — 3288F FALL RISK ASSESSMENT DOCD: CPT | Mod: CPTII,S$GLB,, | Performed by: NURSE PRACTITIONER

## 2023-12-04 PROCEDURE — 1126F AMNT PAIN NOTED NONE PRSNT: CPT | Mod: CPTII,S$GLB,, | Performed by: NURSE PRACTITIONER

## 2023-12-04 PROCEDURE — 99214 PR OFFICE/OUTPT VISIT, EST, LEVL IV, 30-39 MIN: ICD-10-PCS | Mod: S$GLB,,, | Performed by: NURSE PRACTITIONER

## 2023-12-04 PROCEDURE — 1101F PT FALLS ASSESS-DOCD LE1/YR: CPT | Mod: CPTII,S$GLB,, | Performed by: NURSE PRACTITIONER

## 2023-12-04 PROCEDURE — 73650 X-RAY EXAM OF HEEL: CPT | Mod: 26,LT,, | Performed by: RADIOLOGY

## 2023-12-04 PROCEDURE — 99999 PR PBB SHADOW E&M-EST. PATIENT-LVL III: ICD-10-PCS | Mod: PBBFAC,,, | Performed by: NURSE PRACTITIONER

## 2023-12-04 PROCEDURE — 99214 OFFICE O/P EST MOD 30 MIN: CPT | Mod: S$GLB,,, | Performed by: NURSE PRACTITIONER

## 2023-12-04 PROCEDURE — 1159F PR MEDICATION LIST DOCUMENTED IN MEDICAL RECORD: ICD-10-PCS | Mod: CPTII,S$GLB,, | Performed by: NURSE PRACTITIONER

## 2023-12-04 PROCEDURE — 1160F PR REVIEW ALL MEDS BY PRESCRIBER/CLIN PHARMACIST DOCUMENTED: ICD-10-PCS | Mod: CPTII,S$GLB,, | Performed by: NURSE PRACTITIONER

## 2023-12-04 PROCEDURE — 73650 X-RAY EXAM OF HEEL: CPT | Mod: TC,LT

## 2023-12-04 PROCEDURE — 73502 XR HIP WITH PELVIS WHEN PERFORMED, 2 OR 3 VIEWS LEFT: ICD-10-PCS | Mod: 26,LT,, | Performed by: RADIOLOGY

## 2023-12-04 PROCEDURE — 3288F PR FALLS RISK ASSESSMENT DOCUMENTED: ICD-10-PCS | Mod: CPTII,S$GLB,, | Performed by: NURSE PRACTITIONER

## 2023-12-04 PROCEDURE — 73650 XR CALCANEUS 2 VIEW LEFT: ICD-10-PCS | Mod: 26,LT,, | Performed by: RADIOLOGY

## 2023-12-04 PROCEDURE — 73502 X-RAY EXAM HIP UNI 2-3 VIEWS: CPT | Mod: 26,LT,, | Performed by: RADIOLOGY

## 2023-12-04 PROCEDURE — 1101F PR PT FALLS ASSESS DOC 0-1 FALLS W/OUT INJ PAST YR: ICD-10-PCS | Mod: CPTII,S$GLB,, | Performed by: NURSE PRACTITIONER

## 2023-12-04 RX ORDER — ABALOPARATIDE 2000 UG/ML
80 INJECTION, SOLUTION SUBCUTANEOUS DAILY
Qty: 1 EACH | Refills: 11 | Status: ACTIVE | OUTPATIENT
Start: 2023-12-04 | End: 2023-12-06

## 2023-12-04 RX ORDER — METHOCARBAMOL 500 MG/1
500 TABLET, FILM COATED ORAL 4 TIMES DAILY
Qty: 40 TABLET | Refills: 0 | Status: SHIPPED | OUTPATIENT
Start: 2023-12-04 | End: 2023-12-14

## 2023-12-04 NOTE — PROGRESS NOTES
Ms. England is here today for a follow up visit after undergoing an ORIF for her left intertrochanteric hip fracture with IM nail by Dr. Barker on 5/30/2023.    Interval History:  She reports that she is doing ok.  Patient reports she missed her step yesterday and twisted and lost her balance.  She was able to get up and walk without any complications afterwards.  She is home walking with a walker for long distance otherwise she does walk without an assistive device for short distance only.  She did not start her Tymlos injections as previously recommended for osteoporosis as she was fearful of sticking herself in the stomach every day.    Her other complaint is intermittent left heel pain.  Denies any numbness or tingling sensation to the foot or toes.    Physical exam:  Incision is open to air and healed.  No signs of infection noted.    She has tactile stimulation to their lower leg, she denies calf pain, there is no leg edema and their pedal pulse is palpable x 2.  ROM of her hip is 0-90 degrees.  Her left leg muscle strength is 4/5.  Range of motion of the knee is 0-120 degrees.  She has normal range of motion of the ankle.  She has mild tenderness to the calcaneus no pain to the left hip with palpation.    RADS: X-ray of her left hip and left calcaneus was personally reviewed by me.  Her IM nailing appears to be in good position alignment.  Her fracture to her into her trochanteric area is still noted with minimum callus formation.  Left heel shows no acute fractures.  She does have what appears to be degenerative joint disease of the ankle.  No new fractures or hardware failure seen.    Assessment:  6 month follow up    Plan:    ICD-10-CM ICD-9-CM   1. Closed left basicervical femoral neck fracture with extension into the intertrochanteric region s/p IM nail on 5/30/2023  S72.002A 820.8   2. Pain of left heel  M79.672 729.5   3. Pathological fracture due to osteoporosis, unspecified fracture site, unspecified  osteoporosis type, initial encounter  M80.80XA 733.10     733.00   4. Osteoporosis, unspecified osteoporosis type, unspecified pathological fracture presence  M81.0 733.00     Current care, treatment plan, precautions, activity level/ modifications, limitations, rehabilitation exercises and proposed future treatment were discussed with the patient. We discussed the need to monitor for changes in symptoms and condition and report them to the physician.  Discussed importance of compliance with all appointments and follow up examinations.     Patient is a 89-year-old female who is 6 months status post left femoral neck fracture status post gamma nailing placement.  Regards to her hip she is progressing at a reasonable rate.  I discussed the medication use for osteoporosis.  Her T-score is-2.7 of the hip.  It was recommended by a fracture clinic provider that she start Tymlos 80 mcg subQ daily.  Patient is willing to give this a try.  Her daughter is a diabetic and is familiar with given subcutaneous injection and will assist her mother with medication administration.    I will send orders for Tymlos 80 mcg to [facility] for filling.  It is recommended she follow up in our fracture clinic, I will message the provider to see when he would like to see her.    I will see the patient back in 6 months for repeat x-rays of her left hip.    In regards to her left heel is likely mild case of plantar fasciitis.  Recommend conservative management at this point.      Future Appointments   Date Time Provider Department Center   12/6/2023  1:30 PM Pepper Whitfield MD [facility] IM [facility] PC[facility]   12/8/2023  9:30 AM Rubio Alanis MD [facility] NEURO [facility]   12/15/2023 11:30 AM INFUSION, CHAIR 5 [facility]   12/21/2023 10:00 AM HOME MONITOR DEVICE CHECK, [facility] ARRHPRO [facility]   12/21/2023  2:30 PM Rubio Alanis MD [facility] NEURO [facility]   1/9/2024  1:45 PM Moira Duvall MD [facility] OBGYN50 [facility]  Clin   1/23/2024  1:30 PM [facility]ates, [facility] [place]          If there are any questions prior to scheduled follow up, the patient was instructed to contact the office

## 2023-12-06 ENCOUNTER — LAB VISIT (OUTPATIENT)
Dept: LAB | Facility: HOSPITAL | Age: 88
End: 2023-12-06
Attending: INTERNAL MEDICINE
Payer: MEDICARE

## 2023-12-06 ENCOUNTER — OFFICE VISIT (OUTPATIENT)
Dept: INTERNAL MEDICINE | Facility: CLINIC | Age: 88
End: 2023-12-06
Payer: MEDICARE

## 2023-12-06 VITALS
BODY MASS INDEX: 25.66 KG/M2 | OXYGEN SATURATION: 96 % | WEIGHT: 154 LBS | SYSTOLIC BLOOD PRESSURE: 130 MMHG | HEIGHT: 65 IN | DIASTOLIC BLOOD PRESSURE: 52 MMHG | HEART RATE: 63 BPM

## 2023-12-06 DIAGNOSIS — E11.51 TYPE 2 DIABETES MELLITUS WITH DIABETIC PERIPHERAL ANGIOPATHY WITHOUT GANGRENE, WITHOUT LONG-TERM CURRENT USE OF INSULIN: ICD-10-CM

## 2023-12-06 DIAGNOSIS — Z00.00 ROUTINE GENERAL MEDICAL EXAMINATION AT A HEALTH CARE FACILITY: Primary | ICD-10-CM

## 2023-12-06 DIAGNOSIS — I10 ESSENTIAL HYPERTENSION: ICD-10-CM

## 2023-12-06 DIAGNOSIS — I49.5 TACHY-BRADY SYNDROME: ICD-10-CM

## 2023-12-06 DIAGNOSIS — M81.0 OSTEOPOROSIS, UNSPECIFIED OSTEOPOROSIS TYPE, UNSPECIFIED PATHOLOGICAL FRACTURE PRESENCE: ICD-10-CM

## 2023-12-06 LAB
ALBUMIN SERPL BCP-MCNC: 3.4 G/DL (ref 3.5–5.2)
ALP SERPL-CCNC: 61 U/L (ref 55–135)
ALT SERPL W/O P-5'-P-CCNC: 6 U/L (ref 10–44)
ANION GAP SERPL CALC-SCNC: 14 MMOL/L (ref 8–16)
AST SERPL-CCNC: 12 U/L (ref 10–40)
BASOPHILS # BLD AUTO: 0.02 K/UL (ref 0–0.2)
BASOPHILS NFR BLD: 0.6 % (ref 0–1.9)
BILIRUB SERPL-MCNC: 0.3 MG/DL (ref 0.1–1)
BUN SERPL-MCNC: 11 MG/DL (ref 10–30)
CALCIUM SERPL-MCNC: 9.9 MG/DL (ref 8.7–10.5)
CHLORIDE SERPL-SCNC: 104 MMOL/L (ref 95–110)
CO2 SERPL-SCNC: 25 MMOL/L (ref 23–29)
CREAT SERPL-MCNC: 1 MG/DL (ref 0.5–1.4)
DIFFERENTIAL METHOD: ABNORMAL
EOSINOPHIL # BLD AUTO: 0 K/UL (ref 0–0.5)
EOSINOPHIL NFR BLD: 0.9 % (ref 0–8)
ERYTHROCYTE [DISTWIDTH] IN BLOOD BY AUTOMATED COUNT: 18.2 % (ref 11.5–14.5)
EST. GFR  (NO RACE VARIABLE): 52.9 ML/MIN/1.73 M^2
ESTIMATED AVG GLUCOSE: 117 MG/DL (ref 68–131)
GLUCOSE SERPL-MCNC: 152 MG/DL (ref 70–110)
HBA1C MFR BLD: 5.7 % (ref 4–5.6)
HCT VFR BLD AUTO: 31.4 % (ref 37–48.5)
HGB BLD-MCNC: 10.2 G/DL (ref 12–16)
IMM GRANULOCYTES # BLD AUTO: 0.01 K/UL (ref 0–0.04)
IMM GRANULOCYTES NFR BLD AUTO: 0.3 % (ref 0–0.5)
LYMPHOCYTES # BLD AUTO: 0.8 K/UL (ref 1–4.8)
LYMPHOCYTES NFR BLD: 24.1 % (ref 18–48)
MCH RBC QN AUTO: 28.1 PG (ref 27–31)
MCHC RBC AUTO-ENTMCNC: 32.5 G/DL (ref 32–36)
MCV RBC AUTO: 87 FL (ref 82–98)
MONOCYTES # BLD AUTO: 0.3 K/UL (ref 0.3–1)
MONOCYTES NFR BLD: 10.1 % (ref 4–15)
NEUTROPHILS # BLD AUTO: 2.1 K/UL (ref 1.8–7.7)
NEUTROPHILS NFR BLD: 64 % (ref 38–73)
NRBC BLD-RTO: 0 /100 WBC
PLATELET # BLD AUTO: 160 K/UL (ref 150–450)
PMV BLD AUTO: 11.7 FL (ref 9.2–12.9)
POTASSIUM SERPL-SCNC: 3.9 MMOL/L (ref 3.5–5.1)
PROT SERPL-MCNC: 7.6 G/DL (ref 6–8.4)
RBC # BLD AUTO: 3.63 M/UL (ref 4–5.4)
SODIUM SERPL-SCNC: 143 MMOL/L (ref 136–145)
TSH SERPL DL<=0.005 MIU/L-ACNC: 2.98 UIU/ML (ref 0.4–4)
WBC # BLD AUTO: 3.28 K/UL (ref 3.9–12.7)

## 2023-12-06 PROCEDURE — 83036 HEMOGLOBIN GLYCOSYLATED A1C: CPT | Performed by: INTERNAL MEDICINE

## 2023-12-06 PROCEDURE — 99999 PR PBB SHADOW E&M-EST. PATIENT-LVL IV: CPT | Mod: PBBFAC,,, | Performed by: INTERNAL MEDICINE

## 2023-12-06 PROCEDURE — 36415 COLL VENOUS BLD VENIPUNCTURE: CPT | Performed by: INTERNAL MEDICINE

## 2023-12-06 PROCEDURE — 99499 UNLISTED E&M SERVICE: CPT | Mod: S$GLB,,, | Performed by: INTERNAL MEDICINE

## 2023-12-06 PROCEDURE — 99397 PER PM REEVAL EST PAT 65+ YR: CPT | Mod: GZ,S$GLB,, | Performed by: INTERNAL MEDICINE

## 2023-12-06 PROCEDURE — 1159F MED LIST DOCD IN RCRD: CPT | Mod: CPTII,S$GLB,, | Performed by: INTERNAL MEDICINE

## 2023-12-06 PROCEDURE — 85025 COMPLETE CBC W/AUTO DIFF WBC: CPT | Performed by: INTERNAL MEDICINE

## 2023-12-06 PROCEDURE — 99397 PR PREVENTIVE VISIT,EST,65 & OVER: ICD-10-PCS | Mod: GZ,S$GLB,, | Performed by: INTERNAL MEDICINE

## 2023-12-06 PROCEDURE — 1159F PR MEDICATION LIST DOCUMENTED IN MEDICAL RECORD: ICD-10-PCS | Mod: CPTII,S$GLB,, | Performed by: INTERNAL MEDICINE

## 2023-12-06 PROCEDURE — 99999 PR PBB SHADOW E&M-EST. PATIENT-LVL IV: ICD-10-PCS | Mod: PBBFAC,,, | Performed by: INTERNAL MEDICINE

## 2023-12-06 PROCEDURE — 1126F AMNT PAIN NOTED NONE PRSNT: CPT | Mod: CPTII,S$GLB,, | Performed by: INTERNAL MEDICINE

## 2023-12-06 PROCEDURE — 3288F FALL RISK ASSESSMENT DOCD: CPT | Mod: CPTII,S$GLB,, | Performed by: INTERNAL MEDICINE

## 2023-12-06 PROCEDURE — 3288F PR FALLS RISK ASSESSMENT DOCUMENTED: ICD-10-PCS | Mod: CPTII,S$GLB,, | Performed by: INTERNAL MEDICINE

## 2023-12-06 PROCEDURE — 1101F PR PT FALLS ASSESS DOC 0-1 FALLS W/OUT INJ PAST YR: ICD-10-PCS | Mod: CPTII,S$GLB,, | Performed by: INTERNAL MEDICINE

## 2023-12-06 PROCEDURE — 1101F PT FALLS ASSESS-DOCD LE1/YR: CPT | Mod: CPTII,S$GLB,, | Performed by: INTERNAL MEDICINE

## 2023-12-06 PROCEDURE — 1126F PR PAIN SEVERITY QUANTIFIED, NO PAIN PRESENT: ICD-10-PCS | Mod: CPTII,S$GLB,, | Performed by: INTERNAL MEDICINE

## 2023-12-06 PROCEDURE — 80053 COMPREHEN METABOLIC PANEL: CPT | Performed by: INTERNAL MEDICINE

## 2023-12-06 PROCEDURE — 84443 ASSAY THYROID STIM HORMONE: CPT | Performed by: INTERNAL MEDICINE

## 2023-12-06 NOTE — PROGRESS NOTES
CHIEF COMPLAINT:  Annual exam and follow up multiple issues    HISTORY OF PRESENT ILLNESS: This is a 89-year-old woman who presents with her daughter follow up of multiple issues    Pacemaker was placed on 9/21/23 due to sick sinus syndrome.  Koban was placed on the left antecubital area at discharge and wound up staying on the arm for about 6 days.  She had swelling of the left hand and had a superficial clot.   Since then, she has has had intermittent stinging pain in the left hand and forearm.   Wearing a glove or ace bandage helps the stinging. Massaging the area helps. Swelling has resolved.  Pacemaker incision has healed.      He fell and fractured left hip on 5/28/23- ORIF on 5/30/23.  She went to [facility] from 6/5/23 to 6/22/23. No pain in the left hip.    She continues to use oxygen intermittently since the hospitalization from the hip fracture in  May. Intermittently her oxygen levels will drop and she needs the oxygen.         Mood is good     BP at home has been doing well.  110-130/60-70. Pulse 80-90. She is taking amlodopine benazepril 5/20 once daily, hydralaxine 25 mg three times daily and metoprolol tartrate 25 mg twice daily.  She will follow up with EP cardiology soon  as pulse was erratic in the hospital. Cardiology did not feel that she needed a pacemaker at that time.  She continues to take ELiquis 5 mg twice daily.      She is taking donepezil 5 mg once daily for her memory. She feels that here mood and memory are ok.  SHe continues to take duloxetine 60 mg daily.  Neuropathic pain in the legs is better controlled with the duloxetine.       No cough.   She has some dyspnea on exertion which is stable. CT chest was stable. She has some nasal congestion. No nausea, vomiting, constipation, diarrhea.            Her non small cell lung cancer is stable. She saw Dr Weir 10/4/2021 and scans were stable. She is 11 years out from her diagnosis.   She is using her oxygen at night.      She is now prescribed  Trajenta 5 mg daily for her diabetes.     She continues to take simvastatin 80 mg at bedtime for her hyperlipidemia. No joint pain or muscle pain.        She is off Fosamax once week for her bones.  No melana, bloody stools, constipation.   She saw Endocrine who recommended Reclast infusion - to be done 12/15/23.  She just saw ortho on 12/4/23 who recommended TYmlos injections     She takes vitamin B12 1000 mcg daily for vitamin B12 deficiency - anemia is stable        SHe has macular degeneration of the eyes and was followed by Dr Tresa Coreas. She got injections in her eyes every 6 weeks in Spring 2015. She now sees Dr Ruelas at [facility] (last saw him 8/18- no need for injections at that time- yearly follow up). Vision has been stable. She is taking a Eye vitamins for her eyes.       PAST MEDICAL HISTORY:    1. Stage III non-small cell lung cancer, completed chemoradiation with carboplatin and Taxol on 6/1/12    2. Hypertension.   3. Diabetes mellitus.   4. Hyperlipidemia.   5. Chronic diastolic dysfunction.   6. Coronary artery disease status post MI in 1990 and 2003. Stenting was   done at 2003 at [facility].   7. Cholecystectomy, December 2006.   8. Cataract removal.   9. Benign growth removed from her throat.   10. Left common femoral endarterectomy, July 2007.   11. History of anal cancer in 1996 status post radiation and chemotherapy.   12. Carpal tunnel syndrome.   13. Osteoporosis on BMD 12/11    14. Macular degeneration  15. fractured left hip on 5/28/23- ORIF on 5/30/23  16. Pacemaker was placed on 9/21/23 due to sick sinus syndrome.     SOCIAL HISTORY: She quit smoking in 1995, denies any alcohol use. She is etired.     REVIEW OF SYSTEMS: She denies fevers, chills, night sweats, fatigue, visual change, hearing loss, sinus congestion, sore throat, dysuria, hematuria, joint pain, muscle pain, polydipsia, and polyuria.     PHYSICAL EXAM:       BP (!) 130/52 (BP Location: Right arm, Patient  "Position: Sitting, BP Method: Large (Manual))   Pulse 63   Ht 5' 5" (1.651 m)   Wt 69.9 kg (154 lb)   SpO2 96%   BMI 25.63 kg/m²          GENERAL: She is alert and oriented. No apparent distress. Affect within normal limits.   Conjunctivae anicteric.  TM clear  NECK: Supple.   Respiratory effort normal. Lungs clear to auscultation.   HEART: Regular rate and rhythm without murmurs, gallops, or rubs. NO lower   extremity edema.         ASSESSMENT AND PLAN:      Annual exam - discussed diet, exercise and safety issues.    Left intertrochanteric hip fracture- s/p- Open reduction internal fixation left intertrochanteric hip fracture with intramedullary nail. Continue home health PT and OT. Pain is managed  Paroxysmal A fib with tachy-juan a in the hospital -now with pacemaker due to sick sinus syndrome - on eliquis  Diastolic dysfunction with history of lung cancer and COPD - now with hypoxic respiratory failure - oxygen 2 liters nasal cannula at night  Diabetes  continue the Tradjenta. Labs today   Hypertension- stable  PVD - stable  Mood disorder - stable  Memory issues/alzheimers- follow up with Dr Alanis. off memantine  Stage III non-small cell lung cancer, completed chemoradiation with carboplatin and Taxol on 6/1/12 - doing well. No signs of recurrence - CT Chest  11/3/2022  Hyperlipidemia-lipids controlled  Coronary artery disease modifying risk factors.   History of anal cancer-had a normal colonoscopy, November 2008; due 2015. Declined repeat  Pernicious anemia - on counter vitamin B12 1000 mcg daily  Osteoporosis - took alendroante for 7 years - 2012 to 2019.  Currently on a drug holiday.  BMD 5/2022 slightly worse. Now with fracture.  Saw Endocrine 9/2023 - to have Reclast infusion - scheduled for 12/15/23.  Discontinued Tymlos as worried about side effects and cost.    Macular degeneration - stable   VItamin D deficiency -  vitamin D 2000 units daily.   Vaginal bleeding - asx currently  Allergic rhinitis - " astelin nasal spray  Screening mammogram was 12/19 - declines      I will see her back in 16 weeks sooner if problems arise

## 2023-12-06 NOTE — Clinical Note
Seeing patient today. I see that you saw her on 12/4/23 and recommended Tymlos injections.  She saw Dr Singh in Endocrine on 9/19/23 who recommended an infusion of Reclast which is scheduled for 12/15/23.  She is not going to be able to afford Tymlos and I am worried about the side effects of Tymlos.   Please let me know y our thougths. I would prefer her to have the infusion of reclast Pepper Whitfield M.D.

## 2023-12-08 ENCOUNTER — OFFICE VISIT (OUTPATIENT)
Dept: NEUROLOGY | Facility: CLINIC | Age: 88
End: 2023-12-08
Payer: MEDICARE

## 2023-12-08 ENCOUNTER — TELEPHONE (OUTPATIENT)
Dept: NEUROLOGY | Facility: CLINIC | Age: 88
End: 2023-12-08
Payer: MEDICARE

## 2023-12-08 DIAGNOSIS — F02.A18 MILD LATE ONSET ALZHEIMER'S DEMENTIA WITH OTHER BEHAVIORAL DISTURBANCE: Primary | ICD-10-CM

## 2023-12-08 DIAGNOSIS — G30.1 MILD LATE ONSET ALZHEIMER'S DEMENTIA WITH OTHER BEHAVIORAL DISTURBANCE: Primary | ICD-10-CM

## 2023-12-08 DIAGNOSIS — F02.818 LATE ONSET ALZHEIMER'S DEMENTIA WITH BEHAVIORAL DISTURBANCE: ICD-10-CM

## 2023-12-08 DIAGNOSIS — M79.2 ARM NEURALGIA: ICD-10-CM

## 2023-12-08 DIAGNOSIS — G62.9 NEUROPATHY: ICD-10-CM

## 2023-12-08 DIAGNOSIS — G30.1 LATE ONSET ALZHEIMER'S DEMENTIA WITH BEHAVIORAL DISTURBANCE: ICD-10-CM

## 2023-12-08 PROCEDURE — 1159F PR MEDICATION LIST DOCUMENTED IN MEDICAL RECORD: ICD-10-PCS | Mod: CPTII,95,, | Performed by: PSYCHIATRY & NEUROLOGY

## 2023-12-08 PROCEDURE — 99215 PR OFFICE/OUTPT VISIT, EST, LEVL V, 40-54 MIN: ICD-10-PCS | Mod: 95,,, | Performed by: PSYCHIATRY & NEUROLOGY

## 2023-12-08 PROCEDURE — 1160F PR REVIEW ALL MEDS BY PRESCRIBER/CLIN PHARMACIST DOCUMENTED: ICD-10-PCS | Mod: CPTII,95,, | Performed by: PSYCHIATRY & NEUROLOGY

## 2023-12-08 PROCEDURE — 1160F RVW MEDS BY RX/DR IN RCRD: CPT | Mod: CPTII,95,, | Performed by: PSYCHIATRY & NEUROLOGY

## 2023-12-08 PROCEDURE — 96116 PR NEUROBEHAVIORAL STATUS EXAM BY PSYCH/PHYS: ICD-10-PCS | Mod: 95,59,, | Performed by: PSYCHIATRY & NEUROLOGY

## 2023-12-08 PROCEDURE — 1159F MED LIST DOCD IN RCRD: CPT | Mod: CPTII,95,, | Performed by: PSYCHIATRY & NEUROLOGY

## 2023-12-08 PROCEDURE — 99215 OFFICE O/P EST HI 40 MIN: CPT | Mod: 95,,, | Performed by: PSYCHIATRY & NEUROLOGY

## 2023-12-08 PROCEDURE — 96116 NUBHVL XM PHYS/QHP 1ST HR: CPT | Mod: 95,59,, | Performed by: PSYCHIATRY & NEUROLOGY

## 2023-12-08 NOTE — PROGRESS NOTES
[facility]     PATIENT: Brunilda England  VISIT DATE: 2023  MRN: [id number]  PRIMARY PROVIDER: Pepper Whitfield MD  : 10/15/1931       Chief complaint: Progressive Cognitive Impairment     History of present illness:      The patient is a 89-year-old right-handed female who presents today to the [facility] due to concerns related to Progressive Cognitive Impairment.  The patient is accompanied by the daughter who participates in providing history.  Additional information is obtained by reviewing available medical records.     Relevant Background/Context  Known Relevant Family history:  No Known Relevant Family History.  Neurocognitive Disorder:  The family denies a history of early/late onset cognitive impairment.  Movement Disorder:  The family denies a history of movement disorder (PD, PDD, tremor, etc).  Motorneuron Disorder:  The family denies a history of motor neuron disease (ALS).  Developmental Disorder:  The family denies a history of developmental learning disorder (Dyslexia, ADHD, ASD, etc.).  Psychiatric Disorder:  The family denies a history of mood/substance abuse disorder (MDD, PIPER, Schizophrenia, etc.).  Known Relevant Genetics:  There is no known relevant genetic testing available.  Developmental Milestones:  The patient/family report no known birth complications or early life problems. The patient met all developmental milestones.  Education/Learning Capacity:  The patient/family report no signs or symptoms suggestive of developmental learning disorder.  HS.  Estimated Educational Experience: 12 years of formal education.  Social History:  Her  passed away on 2017. daughter and primary caregiver moved in to her home within the last 3 months.  Career/Skill Reserve:  Housewife  Retired/Quit: 0  Relevant Medical History:  Stage III non-small cell lung cancer, completed  chemoradiation with carboplatin and Taxol on 6/1/12    Hypertension.  Diabetes mellitus.  Hyperlipidemia.  Chronic diastolic dysfunction.  Coronary artery disease status post MI in 1990 and 2003. Stenting was  done at 2003 at [facility].  Cholecystectomy, December 2006  Osteoporosis on BMD 12/11    Macular degeneration     Neurocognitive Disorder Features  Onset/Duration:  Feb 2017 (~6-year)  First Symptom:  Memory impairment  Progression:  Gradually Progressive  Clinical Course:  Neurologist (09/05/2018)  Type: Chart Review. This is an 86-year-old female was referred for evaluation of memory difficulties. The patient does admit that she has occasional difficulty with retention of recent information or repeating herself however she is otherwise quite independent. Her daughter was present today and collaborated with the history. The patient lives alone and her daughter lives across the street. The patient is able to take care of her day-to-day needs at home without any problems including managing her medications, cooking, taking care of the finances and house work. She continues to drive in the neighborhood going to the local grocery, to the bank or the head dresser. Her daughter helps her with her appointments. She denies any headaches or visual difficulties and is not hearing impaired. She has had no falls. She does report that her memory transiently got worse after the death of her . Her  passed away on August 26, 2017. They were  67 years. She misses him. She feels that she is coping well as he was sick for a very long time and she was his primary care giver. She does report that she has gradually improved since she has been on Cymbalta. She also reports that her memory also improved specially since Aricept was started. She is presently on 5 mg in the morning daily. Her daughter did admit that she keeps fairly active and has not noted any significant memory changes otherwise.  Vascular risk factors include diabetes, hypertension and coronary artery disease. In addition she has history non small cell lung cancer, which is stable. She saw Dr Weir in August 2018 and scans were stable. She is to follow up annually with CT scan chest.   She is breathing well and denied chest pain or shortness of breath. Discussed with the patient and her daughter. She had minimal cognitive difficulties that may be age related or may represent mild cognitive impairment on a vascular basis given her multiple risk factors. She is otherwise quite independent, living alone. History of an adjustment disorder with depression may also be a contributing factor but this has improved with Cymbalta. She is advised to continue Aricept 5 mg daily in the morning. B12 OTC supplementation. Continue present level of activities. The patient counseled about getting her immunization shots and is given a prescription for this. Follow-up in 1 year if stable.  Neurologist (06/29/2021)  Type: Chart Review. Significant issues in the patient's past medical history is that of non-small cell lung cancer that was diagnosed in 2012. The patient was treated in 2012, and is followed by Oncology on a yearly basis, with no report of recurrence. She was treated with chemotherapy and radiation therapy. Additional medical issues include hypertension, coronary artery disease, and atherosclerotic peripheral vascular disease. The patient was seen in the ER at [facility] for lightheadedness for several days on 4-13-21. Symptoms appear to have abated in the ER. The patient reports that she continues to operate a motor vehicle. This is of concern to her family. Noted by her family has been occasions in which she left her house and forgot to lock it. The family is concerned as well that the patient is appears generally weaker than in the past, and may not be able to take care of herself when she exits her house to go to the street. I reviewed a prior CT  scan of the head from 3-. Noted on my reading of the scan is mild cortical atrophy with ventricular enlargement. The patient manifests cognitive impairment. I am not certain as to its severity, based on my testing today. The patient will be re-examined by me in several months. It would be useful to view an MRI scan of the brain for possible presence of vascular type lesions. My belief at this time is that I am observing an early dementia.  Primary Care Provider (08/06/2021)  Type: Chart Review. No more lightheadedness. She is taking metoprolol 25 mg 2 tablets in am and 1 tablet at night (sometimes skips the evening dose).  NO   chest pain, palpitations.  She continues to take amlodipine - benazepril 5/20 one tablet daliy.  . She has been having sinus congestion that comes and goes. She uses flonase 1 spray in each nostril once daily. No fever, chills, sore throat, nausea, vomiting. SHe continues to take duloxetine 60 mg daily and citalopram 10 mg daily.  Mood has been good.   Neuropathic pain in the legs is better controlled with the duloxetine.   I encouraged her to get out of the house.  She has not needed  xanax 0. 5 mg since April.  [facility] - Rubio Alanis MD. Neurologist (03/03/2022)  Type: Chart Review. The patient presents with 2 her daughters. Records indicate the patient has been describing cognitive impairment as early as 2018 however the patient was the primary her caregiver of her  with late onset dementia who passed in 2017. As such is unclear whether not the patient was having any memory deficits as early as 2017 or prior to this. Regardless the patient was diagnosed with mild cognitive impairment in 2018. Over the last 3-4 years her family reported gradual progression in patient's cognitive deficits. The patient is largely stable however her family does report new bothersome and progressive behavior. Over the last calendar year her family report the patient often is  "confused and disoriented her house. She moves objects frequently throughout her house often forgetting where she has placed them. The patient is able to maintain her household responsibilities include lutein cooking, cleaning, and handling simple bills, her family has reported that her cooking has declined and she tends to add abnormal ingredients to her meals. her family's primary concern today include worsening irritability and agitation. Per the report of the family, the patient has always been a very strong-willed woman. The patient has always wanted do things "her way "and has had somewhat OCD tendencies throughout her life. These prior tendencies have magnified. her family reports frequent irritability more often during the morning prior to and during breakfast. The patient has diabetes which appears to be somewhat poorly controlled. The patient refuses frequent glucose checks as such is uncertain whether not the patient is hyperglycemic in the morning. Patient's mood tends to improve after eating her morning meal. her family report frequent bouts of irritability and often jessica agitation and borderline violence if frustrated during the morning time however can not the patient can be irritable and easily frustrated throughout the day. The patient is currently taking citalopram 10 mg q. Day. We discussed potential interventions which include but are not limited to simple over-the-counter juice/sugar water in the morning prior to her coffee and sweetener in order to potentially a counterbalance hypo glycemia. We discussed potential management by Endocrinology. Patient's primary her caregiver current her daughter recently moved from [place] into her current home to facilitate care. her caregiver reports burnout over the last 3 months due to frequent irritability and frustrating behavior by the patient. her caregiver does support patient's independence however does seem to be worn down by the experience. We " discussed social work opportunities for which the patient's her caregiver is open. The patient continues to take Xanax however very infrequently often less than once per month for agitation. The patient denies any bothersome insomnia, hallucinations, or any other bothersome behavior at this time. We have discussed opportunities for social engagement however per the family, the patient has there been a very social individual in never been part of any community groups. The patient does not have any meaningful hobbies. As such patient's primary activities include wandering the house cleaning up. The observations made above were discussed with the patient her family. The patient presents reporting a minimal 4 year history of progressive cognitive impairment however deficits likely preceded this. Per the report of the family, memory symptoms were 1st time have gradually progressed to general cognitive impairment. The patient maintained most instrumental activities of daily living however is having more difficulty shopping and cooking. The patient has a strong support network and her daughter recently moved in to help support her general well-being. her family report declines in appetite and recent months with mild weight loss. Primary concerns at this time include agitation. The patient is taking citalopram 10 mg. Recommend starting Namenda titrating up to 10 mg b. I. D. Following this we will consider increasing citalopram or adding Seroquel. Patient's morning irritability might be due to hypoglycemia. Recommended over-the-counter strategies for managing hypoglycemia. Recommend referral to endocrinology. her caregiver reports burnout. Recommend referral to social Work for care management and additional resources.  Electronic Medical Record (03/28/2022)  Type: Chart Review. Thank you for the clarification. It would appear there is more going on than the simple change in Namenda.  . I recommend the following changes. 1)  Switch the Namenda to 5 mg at night - I sent a new prescription to your pharmacy. 2) Switch the Donepzil 10mg to the morning (this may help with the urination at night). 3) Try to consolidate the majority of oral intake of water to before 4-6 PM. Do not limit water intake just attempt to have her consume it in the earlier part of the day.  . My MA will set up a telephone visit next week to see if medication adjustments helped and if not we can work on consolidating sleep at night so she has more energy during the day. Type: Chart Review. Since following your directives, cutting back on the medication, my mom has not been as sleepy during late evenings, however she has continued to be somewhat sleepy in the late evenings. I filled her medication tray for this week and she has no more of Namenda and there are no refills. Please advise the next steps. Thank you. Thank you for the clarification. It would appear there is more going on than the simple change in Namenda. I recommend the following changes. 1) Switch the Namenda to 5 mg at night - I sent a new prescription to your pharmacy. 2) Switch the Donepzil 10mg to the morning (this may help with the urination at night). 3) Try to consolidate the majority of oral intake of water to before 4-6 PM. Do not limit water intake just attempt to have her consume it in the earlier part of the day. Type: Chart Review. Good morning, needed to make Dr. Alansi aware of my mother's condition. Since starting the medicine that Dr. Alanis has her on, for the last maybe 2 to 3 weeks she becomes very sleepy around the middle part of the day & has to lay down. She goes to sleep & sleeps for hours. She tells me she doesn't like how she's feeling & strongly feels that it's the medicine & doesn't want to continue taking it. I feel that she's not eating enough for all of the medicine that she takes in the morning & night. Recently she tells me she doesn't have an appetite in the evening, so she  won't allow me to give her food, the next morning she awakes & is very weak,disoriented & sleepy. Let Dr. Alanis know & let me know what to do. Thank you. Like before, I agree with you that many of Brunilda's symptoms begin with adequate nutrition and hypoglycemia. We started Namenda for the irritability/agitation so hearing that she has become less hyperactive and more sleepy is not entirely unexpected. As it has been three weeks since your appointment, I imagine she is taking Namenda 10 mg in AM and 5 mg in PM at this point. I recommend decreasing the Namenda back to 5 mg in AM and seeing if the fatigue and appetite improve. After adjusting the medication, please send me a message in 1 week to see if there has been a change and/or improvement.  Electronic Medical Record (04/18/2022)  Type: Chart Review. Yes she is sleepy late evenings, her appetite is still the same and she is out of the medication! For the past 2weeks she has been sleeping very late ! 12noon to 1pm. I have been waking her up because that's too late to be sleeping, she says it's because she's up & down all during the night going to the bathroom.  [facility] - Rubio Alanis MD. Neurologist (04/26/2022)  Type: Chart Review. Since last time seen patient's her family has completed medication changes as requested. Namenda was decreased to 5 mg at night. Donepezil 10 mg was which the morning. Increase p. O. Water intake and morning juice for irritability. Since adjustments patient's irritability and frustration has improved. Patient's appetite is improved. The patient's morning daytime irritability likely due to hypoglycemia improved with PO juice. Addition of donepezil Namenda have curved aggressive outbursts. The patient continues to have mild daytime fatigue however does not interfere with diet at this time. No recent falls. At this time there are no major concerns reported by her family. The observations made above, were discussed with  the patient and her family. Since last time seen minor adjustments to Namenda donepezil and diet have improved daytime aggression / agitation. The patient has mild sedation however is tolerable by the patient and her family. The patient appears to have low threshold for medication sensitivity. Will refrain from significant medication adjustments moving forward. Previous referrals have been to social work and Endocrinology which the patient has not yet followed up on. At this time her family does not have any other major medical concerns requiring adjustment. Follow-up as needed.     Current Presentation  Recent/Interim History:  The patient has experienced several complications since their last visit, including an iatrogenic nerve injury following cardiac catheterization. They were referred to the Neurology Clinic to assess neuropathy. The diagnostic process did not yield definitive results, leaving possibilities such as cervical radiculopathy, carpal tunnel syndrome, and compartment syndrome. A nerve conduction study was planned, and the patient began pregabalin treatment. However, this study has not yet been completed. her family members report a decrease in acute pain within a week after surgery. Pain appears to be linked to the area where the compression bandage was applied, indicating potential swelling or pressure on a peripheral nerve branch, leading to paresthesias and chronic pain. The patient has not scheduled an MRI or EMG and reports no substantial relief from Lyrica, though there is minor improvement with Robaxin and warm compresses. It is recommended to increase Lyrica dosage from 25 to 50 mg in the evening, continue regular application of warm compresses, and consider increasing Robaxin dosage or combining it with ibuprofen in the evening. The patient's clinical presentation and the timing of symptoms strongly suggest entrapment of a peripheral nerve, possibly exacerbated by postoperative bandage or  wound management. It is important to proceed with the advised diagnostic tests for further evaluation.  Unresolved Concern(s) reported by patient/family:  Agitation/Irritability - continue citalopram, Continue low-dose Namenda 5 mg the morning. High-dose 10-15 mg Namenda caused bothersome sedation.  Caregiver Burnout - referral to social work  Symptomatic hypoglycemia - referral to endocrinology     Review of cognitive, visuospatial, motor, sensory, and behavioral systems:     Memory:   The patient's memory has worsened in the past few years.  She does repeat statements or asks the same question repeatedly.  She does have difficulty remembering recent important conversations.  She does have difficulty remembering recent events.  She does not forget information within minutes.  Her recent retrograde memory is intact.  Her remote memory is intact.  Attention:   The patient's attention and concentration are impaired.  She does not have attentional fluctuations.  She does not have difficulty maintaining selective attention.  She does become easily distracted.  She does not have difficulty dividing their attention.  Executive:   The patient's cognitive processing speed is slower.  She does have difficulty with working memory.  She does misplace personal items (e.g., keys, cell phone, wallet) more frequently.  She does not have difficulty keeping track of her medications.  She does have difficulty with planning/organizing/completing multistep tasks.  She does have difficulty with executive attention.  She does not have difficulty with flexible thinking.  She does not difficulty with self control.  She is exhibiting new symptoms that suggest they have become more impulsive, rash and/or careless.  Her judgment is intact.  Language:   The patient's speech is affected.  She does forget people's names more frequently.  She does have word-finding difficulties.  Her speech is fluent and non-effortful.  Her speech is  grammatically intact.  She does not make word substitutions.  She does not have difficulty reading.  She does not appear to have impaired comprehension.  Visuospatial:   The patient has new visuospatial problems.  She does not have difficulty recognizing objects or faces.  Motor/Coordination:   The patient does have difficulty with walking.  She does feel imbalanced.  She has fallen.  She does not appear to have new muscle weakness.  She does not have difficulty buttoning shirts, operating zippers, or manipulating tools/utensils.  Her handwriting has not become micrographic.  She does not have a resting tremor.  She denies having any new involuntary movements and/or muscle jerking.  She does not have swallowing difficulty.  She denies new muscle cramps and twitching.  Sensory:   The patient denies new numbness, tingling, paresthesias, or pain.  The patient denies a loss of vision, blurry vision, or double vision.  The patient denies new loss of hearing or worsening tinnitus.  The patient denies anosmia.  Sleep:   The patient denies difficulty sleeping.  The patient does not have difficulty going to sleep.  The patient denies difficulty staying asleep or frequently awakening at night.  The patient does not snore or have witnessed apneas while sleeping.  When she wakes up in the morning, she does feel well-rested.  She denies dream-enactment behavior.  She denies symptoms suggestive of restless leg syndrome.  Behavior:   The patient's personality has not changed.  She does not have symptoms of disinhibition and social inappropriateness.  She does not have symptoms to suggest a loss of manners or decorum.  She does not appear apathetic or has decreased motivation.  She does not appear to have a change in inertia.  There is no report that The patient has had a change in their emotional expression.  She does not have emotional blunting or lability.  She does have symptoms of irritability and mood lability.  She has been  reported to have new symptoms of agitation, aggression, or violent outbursts.  Her insight into his disease and situation is impaired.  Her personal hygiene is intact.  She is not exhibiting a diminished response to other people's needs and feelings.  She is not exhibiting a diminished social interest, interrelatedness, or personal warmth.  She denies restlessness.  She denies new and/or worsening simple repetitive behaviors.  Her speech has not become simplified or become repetitive/stereotyped.  She reports symptoms that are suggestive of new/worsening complex repetitive/ritualistic compulsions and behaviors. Comment: baseline  She does not have symptoms of hyper-religiosity or dogmatism.  Her interests/pleasures have not become restrictive, simplified, interrupting, or repetitive.  She denies a change of self-stimulating behavior.  She denies any changes in eating behavior.  She denies increased consumption of food or substances.  She denies oral exploration or consumption of inedible objects.  Psychiatric:   She does not feel depressed.  She is exhibiting symptoms of social withdrawal/indifference.  She denies anxiety.  She does not exhibit cycling behavior.  She does not exhibit hyperactive behavior.  She is not exhibited symptoms of paranoia.  She does not have delusions.  She does not have hallucinations.  She does not have a history of sensitivity to neuroleptic/psychotropic medications.  Medical Review of Systems:   The patient does not have constipation.  The patient does not have urinary incontinence.  The patient denies orthostatic lightheadedness.  The patient's weight is stable.  Functional status:  Difficulty performing the following Instrumental ADLs:  Housekeeping: No  Food Preparation: Yes  Shopping: Yes  Ability to Handle Finances: No  Transportation/Driving: Yes  Household Appliances/Stove: No  Laundry: No  Difficulty performing the following Basic ADLs:  Dressing: No  Bathing: No  Toileting:  No  Personal hygiene and grooming: No  Feeding: No  Care Management:  Patient/Family Safety Concerns:  Medication Adherence: No  Home Safety: No  Wandered: No  Firearms: No  Fall Risk: Yes  Home Alone: No          Past Medical History:   Diagnosis Date    Anal cancer 1995    Anemia     Cancer 1995    anal cancer    Centrilobular emphysema 7/6/2020    Diabetes mellitus     With circulatory disorder    Lumbar radiculopathy 5/16/2014    Lung cancer 2012    s/p chemo/radiation    Macular degeneration     Macular hemorrhage of left eye     Neuropathy     Osteoporosis     Osteoporosis, unspecified 11/9/2012    Pure hypercholesterolemia        Past Surgical History:   Procedure Laterality Date    A-V CARDIAC PACEMAKER INSERTION Left 9/21/2023    Procedure: INSERTION, CARDIAC PACEMAKER, DUAL CHAMBER;  Surgeon: Giuseppe Roman MD;  Location: [facility] EP LAB;  Service: Cardiology;  Laterality: Left;  SSS, Dual PPM,BIO, anes, MB, 3 Prep    BRONCHOSCOPY      CHOLECYSTECTOMY      COLONOSCOPY      FLUOROSCOPIC ANGIOGRAPHY OF LOWER EXTREMITY WITH TOPICAL ULTRASOUND Right 12/6/2018    Procedure: ARTERIOGRAM-LEG AND ULTRASOUND;  Surgeon: SEBASTIÁN Mcpherson III, MD;  Location: [facility] OR [address] [place] [address];  Service: Peripheral Vascular;  Laterality: Right;  16.5 min  1059.42 mGy  59ml Dye  2ml Local    heart stent      HYSTERECTOMY      INCISIONAL BIOPSY Right 12/6/2019    Procedure: INCISIONAL BIOPSY;  Surgeon: Leslie Castillo MD;  Location: [facility] OR [address] [place] [address];  Service: Plastics;  Laterality: Right;    INTRAMEDULLARY RODDING OF FEMUR Left 5/30/2023    Procedure: INSERTION, INTRAMEDULLARY TEREZA, FEMUR;  Surgeon: eDsmond Barker MD;  Location: [facility] OR [address] [place] [address];  Service: Orthopedics;  Laterality: Left;    left leg surgery      blockage    PERCUTANEOUS TRANSLUMINAL ANGIOPLASTY N/A 12/6/2018    Procedure: PTA (ANGIOPLASTY, PERCUTANEOUS, TRANSLUMINAL);  Surgeon: SEBASTIÁN Mcpherson III, MD;  Location: [facility] OR [address] [place] [address];  Service: Peripheral Vascular;   Laterality: N/A;    RECONSTRUCTION USING FLAP Right 2019    Procedure: RECONSTRUCTION USING FLAP/FULL THICKNESS MRESETION AND RECONSTRUCTION RIGHT UPPER EYELID WITH BIOPSY;  Surgeon: Leslie Castillo MD;  Location: [facility] OR [address] [place] [address];  Service: Plastics;  Laterality: Right;    TONSILLECTOMY         Family History   Problem Relation Age of Onset    Stroke Mother     Cancer Father     Breast cancer Maternal Aunt     Ovarian cancer Neg Hx     Amblyopia Neg Hx     Blindness Neg Hx     Cataracts Neg Hx     Glaucoma Neg Hx     Macular degeneration Neg Hx     Retinal detachment Neg Hx     Strabismus Neg Hx        Social History     Socioeconomic History    Marital status:    Tobacco Use    Smoking status: Former     Current packs/day: 0.00     Average packs/day: 0.5 packs/day for 30.0 years (15.0 ttl pk-yrs)     Types: Cigarettes     Start date: 1965     Quit date: 1995     Years since quittin.9     Passive exposure: Past    Smokeless tobacco: Never   Substance and Sexual Activity    Alcohol use: No    Drug use: No    Sexual activity: Not Currently     Birth control/protection: Surgical     Social Determinants of Health     Financial Resource Strain: Medium Risk (11/15/2023)    Overall Financial Resource Strain (CARDIA)     Difficulty of Paying Living Expenses: Somewhat hard   Food Insecurity: Food Insecurity Present (11/15/2023)    Hunger Vital Sign     Worried About Running Out of Food in the Last Year: Sometimes true     Ran Out of Food in the Last Year: Never true   Transportation Needs: No Transportation Needs (11/15/2023)    PRAPARE - Transportation     Lack of Transportation (Medical): No     Lack of Transportation (Non-Medical): No   Physical Activity: Inactive (11/15/2023)    Exercise Vital Sign     Days of Exercise per Week: 0 days     Minutes of Exercise per Session: 0 min   Stress: Stress Concern Present (11/15/2023)    [employer] - Occupational Stress  Questionnaire     Feeling of Stress : To some extent   Social Connections: Moderately Isolated (11/15/2023)    Social Connection and Isolation Panel [NHANES]     Frequency of Communication with Friends and Family: More than three times a week     Frequency of Social Gatherings with Friends and Family: Twice a week     Attends Bahai Services: More than 4 times per year     Active Member of Clubs or Organizations: No     Attends Club or Organization Meetings: Never     Marital Status:    Housing Stability: Low Risk  (11/15/2023)    Housing Stability Vital Sign     Unable to Pay for Housing in the Last Year: No     Number of Places Lived in the Last Year: 1     Unstable Housing in the Last Year: No       Medication:     Current Outpatient Medications on File Prior to Visit   Medication Sig Dispense Refill    acetaminophen (TYLENOL) 500 MG tablet Take 2 tablets (1,000 mg total) by mouth every 8 (eight) hours as needed for Pain.  0    amlodipine-benazepril 5-20 mg (LOTREL) 5-20 mg per capsule Take 1 capsule by mouth once daily. 90 capsule 3    apixaban (ELIQUIS) 5 mg Tab Take 1 tablet (5 mg total) by mouth 2 (two) times daily. Hold this medication until cleared by Neurosurgery to resume. 60 tablet 6    artificial tears (ISOPTO TEARS) 0.5 % ophthalmic solution Place 1 drop into both eyes as needed.      azelastine (ASTELIN) 137 mcg (0.1 %) nasal spray 1 spray (137 mcg total) by Nasal route 2 (two) times daily. 30 mL 3    calcium-vitamin D3 (OS-ERIC 500 + D3) 500 mg-5 mcg (200 unit) per tablet Take 2 tablets by mouth once daily.      cyanocobalamin (VITAMIN B-12) 1000 MCG tablet Take 1,000 mcg by mouth every morning.      donepeziL (ARICEPT) 5 MG tablet Take 1 tablet (5 mg total) by mouth every evening. 90 tablet 4    DULoxetine (CYMBALTA) 60 MG capsule Take 1 capsule (60 mg total) by mouth every morning. 90 capsule 3    estradioL (ESTRACE) 0.01 % (0.1 mg/gram) vaginal cream USE ONE-HALF GRAM VAGINALLY TWICE a  WEEK 42.5 g 3    hydrALAZINE (APRESOLINE) 25 MG tablet Take 1 tablet (25 mg total) by mouth every 8 (eight) hours. 90 tablet 11    linaGLIPtin (TRADJENTA) 5 mg Tab tablet Take 1 tablet (5 mg total) by mouth once daily. 90 tablet 3    LORazepam (ATIVAN) 1 MG tablet Take 1 tablet (1 mg total) by mouth every 6 (six) hours as needed for Anxiety. 2 tablet 0    melatonin (MELATIN) 3 mg tablet Take 2 tablets (6 mg total) by mouth nightly as needed for Insomnia.  0    methocarbamoL (ROBAXIN) 500 MG Tab Take 1 tablet (500 mg total) by mouth 4 (four) times daily. for 10 days 40 tablet 0    metoprolol tartrate (LOPRESSOR) 25 MG tablet Take 1 tablet (25 mg total) by mouth 2 (two) times daily. 60 tablet 11    nitroGLYCERIN (NITROSTAT) 0.4 MG SL tablet Place 1 tablet (0.4 mg total) under the tongue every 5 (five) minutes as needed. 25 tablet 0    pregabalin (LYRICA) 25 MG capsule Take 1 capsule (25 mg total) by mouth nightly. 30 capsule 2    [] sars-cov-2, covid-19, (SPIKEVAX, MODERNA,, 12YRS AND UP ,) 50 mcg/0.5 mL injection Inject 0.5 mLs into the muscle once. Inject 0.5 mL into the muscle once. for 1 dose 0.5 mL 0    simvastatin (ZOCOR) 80 MG tablet Take 1 tablet (80 mg total) by mouth once daily. 90 tablet 4    vitamin D (VITAMIN D3) 1000 units Tab Take 1,000 Units by mouth once daily.       Current Facility-Administered Medications on File Prior to Visit   Medication Dose Route Frequency Provider Last Rate Last Admin    sodium chloride 0.9% flush 5 mL  5 mL Intravenous PRN Sarah Earl MD            Review of patient's allergies indicates:   Allergen Reactions    Bextra [valdecoxib] Swelling    Gabapentin Diarrhea and Nausea Only       Medications Reconciliation:   I have reconciled the patient's home medications and discharge medications with the patient/family. I have updated all changes.  Refer to After-Visit Medication List.    Objective:  Vital Signs:  There were no vitals filed for this  "visit.  Wt Readings from Last 3 Encounters:   12/06/23 1352 69.9 kg (154 lb)   12/04/23 1526 74 kg (163 lb 2.3 oz)   11/16/23 0927 74 kg (163 lb 2.3 oz)     There is no height or weight on file to calculate BMI.         Neurological examination:  Mental Status:   Her appearance is normal (hygiene is appropriate; attire is proper and clean).  Throughout the interview, she is cooperative, her eye contact is appropriate.  The patient is awake; Her energy level appeared normal.  She has appropriate attention/concentration (GULSHAN/BARBIE forwards and backward).  She can complete three-step commands.  Her thought process is logical and goal-oriented.  She demonstrated appropriate insight based on actions, awareness of her illness, plans for the future.  She demonstrated good judgment based on actions and plans for the future.  She has no evidence of hallucinations (auditory, visual, olfactory).  She has no evidence of delusions (paranoid, grandiose, bizarre).  Cranial Nerves:   Her pupils were normal.  Her visual fields were full to confrontation in all quadrants.  Her ocular pursuit in the horizontal and vertical plane was complete.  Her saccadic initiation, velocity, and amplitude are normal.  She demonstrated no square-wave jerks.  Her eyelid assessment showed no apraxia. There was no eyelid dysfunction, retraction, or torres sign.  Her facial strength was normal.  Her facial expression was symmetric and appropriate to the context.  Her tongue showed no evidence of scalloping.  She tongue movement with normal.  She had no significant evidence of anterocollis or retrocollis.  Speech/Language:   The patient's speech was fluent, non-effortful, and her rate was appropriate to the context.  Her speech volume is within normal range and appropriate to the context.  Her speech rate is normal.  She made articulation (segmental features) errors.  She has no speech dysdiadochokinesia with repetition of syllables such as "/PA/, /TA/, " "/KA/, /OM/".  She made errors during the repetition of rapid syllables and or words. Comment: 'caterpillar' '', and 'huckleberry'; 'caterpillar' '', and 'huckleberry'  She made errors during the repetition of rapid sequences of consonants. Comment: 'Worship [employer]' or '[employer]'.; 'Worship Judaism' or '[employer]'.  She has no prosody (suprasegmental features) errors.  Her stress assessment showed no repetition errors in linguistically complex words, including multisyllabic words ("planetarium," "questionable," "accomplishment," "phonetic.  The patient's speech is not dysarthric.  The patient's speech was without evidence of anomia.  She showed no evidence of anomia during spontaneous speech.  She makes no phonological loop errors.  She makes no errors during the repetition of gibberish words (e.g., "Supercalifragilisticexpialidocious," "Pigglywiggly," "Woospiedoo," "Zowzy," "Bazinga").  She can comprehend commands that cross the midline (e.g., with your left thumb, touch your right ear).  Motor:   The patient's bilateral upper extremity muscle bulk is appropriate.  The patient's upper extremity muscle tone is increased. Comment: mainly paratonia with very mild rigidity b/l; mainly paratonia with very mild rigidity b/l  The patient's bilateral upper extremity muscle tone does not suggest spasticity.  There is evidence of rigidity/cogwheeling. Comment: Muscle tone is increased and there is evidence of rigidity/cogwheeling.; Muscle tone is increased and there is evidence of rigidity/cogwheeling.  There is evidence of paratonia. Comment: Muscle tone is increased and there is evidence of paratonia.; Muscle tone is increased and there is evidence of paratonia.  Assessment of motor strength showed evidence of abnormal weakness. Comment: mild b/l LE proximal weakness; mild b/l LE proximal weakness  There is no pronator or downward drift.  There is no upward " drift.  There is no outward/diagonal drift.  There is no myoclonus observed in The patient's bilateral upper and lower extremities.  There are no fasciculations observed in The patient's bilateral upper and lower extremities.  Coordination:   She has no bilateral upper extremity limb dysmetria or past pointing on finger-nose-finger bilaterally.  She has no limb dysdiadochokinesia of the upper extremity on the pronation/supination test and screwing in a light bulb or lower extremity during tapping ball of each foot bilaterally.  She has no visible tremor.  She has evidence of interhemispheric motor control deficits.  She demonstrates evidence of motor overflow.  The patient's upper extremity fine motor coordination was abnormal. Comment: mild L>R; mild L>R  The patient's upper extremity fine motor coordination was not slow. Comment: finger tapping, pronation/supination, and the open-close fist was slow.; finger tapping, pronation/supination, and the open-close fist was slow.  The patient's upper extremity fine motor coordination was not hypometric. Comment: finger tapping, pronation/supination, and the open-close fist showed hypometria.; finger tapping, pronation/supination, and the open-close fist showed hypometria.  The patient's upper extremity fine motor coordination was not dysrhythmic. Comment: finger tapping, pronation/supination, and the open-close fist showed dysrhythmia.; finger tapping, pronation/supination, and the open-close fist showed dysrhythmia.  Higher Cortical Function:   The patient showed evidence of apraxia.  She showed no evidence of ideomotor apraxia performing tool-use pantomimes (e.g., use a hammer, use a screwdriver, use a comb, flip a coin, waving goodbye).  She showed no evidence of ideational apraxia (e.g., taking off and putting on shoes, folding paper into an envelope).  She showed no evidence of limb-motor apraxia while mimicking complex bimanual hand shapes.  She showed no evidence  of buccofacial apraxia (e.g., blow out a candle, puff out cheeks, and whistle).  She showed no dysexecutive behavior.  She has no perseverative or stereotyped behavior.  Sensory:   Her cortical sensory assessment demonstrated no neglect bilaterally.  Reflexes:   Reflexes were symmetric and 2+ at biceps, 2+ triceps, and 2+ brachioradialis, 2+ at the knees bilaterally, there was no cross-abductor sign, 2+ in the bilateral ankles.  Gait:   She has normal posture sitting unaided.  She is unable to rise from a chair and sit back down without using their arms.  Her gait was abnormal.  Her posture while walking is normal.  Her gait initiation/inhibition was normal.  Her stance while walking is abnormal. Comment: narrow; narrow  Her gait speed was abnormal (70-80 F 1.13 m/s M 1.26 m/s, >80 F 0.94 m/sec, M 0.97 m/sec). Comment: slow; slow  Her stride (gait cycle) was abnormal. Comment: decreased step-time, step-width, and step-length.; decreased step-time, step-width, and step-length.  Her arms swing is symmetric and of normal amplitude.  She takes turns in <4 steps.  When attempting to walk abnormally (heels, tiptoes, tandem), she makes errors.  She has no evidence of posture/balance impairment.  She has no evidence of a specific gait disorder.  Neuropsychological Evaluation Summary:  Prior Neurocognitive/Neurobehavioral Evaluation(s)  No Prior Testing Available  2022-03-03:  No testing completed today - prior assessment consistent with amnestic multidomain major cognitive impairment   BEHAV5+ 3/6: See ROS section for a full description  Neurocognitive/Neurobehavioral Evaluation completed on 2023-12-08    Neuropsychiatric/Behavioral Focused Evaluation Assessment    BEHAV5+ 3/6 See ROS section for a full description   Laboratories:     Lab Date Value [Reference]   Metabolic Screening           Hemoglobin A1C External 2021, Dec-07    6.1 (H) [4.0 - 5.6 %]      Lipase 04/13/2021  42 [4 - 60 U/L]      TSH 01/12/2018  2.923  [0.400 - 4.000 uIU/mL]      Cholesterol 2021, Apr-13    160 [120 - 199 mg/dL]      HDL 2021, Apr-13    53 [40 - 75 mg/dL]      Non-HDL Cholesterol 2021, Apr-13    107 [mg/dL]      Triglycerides 2021, Apr-13    175 (H) [30 - 150 mg/dL]      Vit D, 25-Hydroxy 04/13/2021  24 (L) [30 - 96 ng/mL]      Vitamin B-12 01/12/2018  793 [210 - 950 pg/mL]      Coagulopathy Screening   INR 2019, Dec-06    1.0 [0.8 - 1.2]      Protime 08/05/2020  10.2 [9.0 - 12.5 sec]      Neuroendocrine/Electrolyte Screening   Magnesium 12/06/2019  1.8 [1.6 - 2.6 mg/dL]      Phosphorus 2019, Dec-07    3.5 [2.7 - 4.5 mg/dL]      Infectious Disease/Immunocompromised Screening   SARS-CoV-2 RNA, Amplification, Qual 2022, Feb-07    Negative      SARS-CoV2 (COVID-19) Qualitative PCR 2020, Dec-18    Not Detected [Not Detected]      Standard Hematology Screen   Hematocrit 2021, Sep-29    30.3 (L) [37.0 - 48.5 %]      Hemoglobin 2021, Sep-29    10.1 (L) [12.0 - 16.0 g/dL]      MCV 2021, Sep-29    88 [82 - 98 fL]      Platelets 2021, Sep-29    179 [150 - 450 K/uL]           Neuroimaging:    MRI brain/head without contrast on 7/7/2021  Formal interpretation by Radiology:  No acute intracranial process. Changes of chronic small vessel ischemic disease and cerebral volume loss.  Independently reviewed radiological imaging by Rubio Garner MD. MPH. Behavioral Neurologist  T1: moderate degree P>F D>V cortical atrophy with severe widening of marginal sulci - multi-infarct subcortical WMD with cystic cavitation, relative sparing of subcortical/hippocampus  T2/FLAIR: Compared to 2012, there are some progression of the T2/FLAIR signal hyperintensities within the periventricular and subcortical white matter.  DWI/ADC: No Significant DWI hyperintensities/hypointensities. No ADC correlation.  SWI/GRE: No Significant hypointensities to suggest cortical/subcortical hemosiderin deposition.  Impression: : moderate degree WMD and cortical atrophy, given age likely  VCID and ADRP     Procedures:    Electrocardiogram on 4/12/2021  Formal interpretation:  Vent. Rate : 093 BPM     Atrial Rate : 093 BPM    P-R Int : 168 ms          QRS Dur : 084 ms     QT Int : 350 ms       P-R-T Axes : 071 045 034 degrees    QTc Int : 435 ms Normal sinus rhythm Normal ECG  Independently reviewed Electrocardiogram by Rubio Garner MD. MPH. Behavioral Neurologist  Impression: : Received ECG has no evidence of sinus node disease. HR (>=50-60). Prolonged TN interval (>0.22 s). Broad QRS complex (> 0.12 s).     Clinical Summary:     The patient is a 89-year-old right-handed female with a relevant past medical history of HTN, HLD, CAD, CHF, MD, Lung cancer s/p chemo/rad, who presents reporting a 6-year history of gradually progressive neurocognitive impairment.       The clinical history is suggestive of:  Memory Impairment: STM encoding impairment, LTM encoding-retrieval impairment  Attention Impairment: Attention, Sustained attention  Executive Impairment: Energization, Working Memory, Set-Shifting, Response Inhibition  Language Impairment: Language Dysfunction  Motor/Coordination Impairment: Sensory motor integration  Behavior Impairment: Emotional Regulation, Self-Preservation Dysregulation, Sensorimotor Dysregulation  Psychiatric Impairment: Social Coherence  iADL Impairment: Phelps Instrumental Activities of Daily Living Scale  The neurological examination is significant for:  Cortical Transcallosal Disconnection: interhemispheric motor control (interhemispheric motor control ), motor efference (motor overflow)  Movement Disorder (Gait): strength (difficulty rising), abnormal features (stance, speed, stride/cycle)  Movement Disorder (Hypokinetic): parkinsonism (tone, bradykinesia), dyskinesia (slowing, hypometria, dysrhythmia)  Movement Disorder (Speech): abnormal vocal features (articulation), AMR/Dysdiadochokinesia (multisyllabic, consonants)  The neurocognitive battery is significant  (based on age and education) for:  No testing completed today - prior assessment consistent with amnestic multidomain major cognitive impairment      BEHAV5+ 3/6: See ROS section for a full description  The neurologically relevant imaging is significant for  MRI brain/head without contrast (7/7/2021): moderate degree WMD and cortical atrophy, given age likely VCID and ADRP        Assessment:        The patient's clinical presentation is 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     The patient's clinical presentation meets the criteria for Dementia (Dimas, et al. 2011 Alzheimer's & Dementia).     Concern regarding an intraindividual change in cognition diagnosed through a combination of history and objective cognitive assessment  Impairment in two or more cognitive domains  Interference with independence in functional abilities.  Represents a decline from previous levels of functioning.  Not explained by delirium or major psychiatric disorder     The patient's clinical syndrome is best described as Late-Onset Alzheimer's Dementia (LOAD) (Urbanoann et al. 2011).  Meets criteria for dementia.  Insidious onset over months to years.  Clear-cut history of worsening cognition by report or observation.  Amnestic presentation: impairment in learning and recall.        At present, all neurodegenerative diseases can only be diagnosed with 100% certainty through a brain autopsy. The suspected neuropathology underlying the patient's neurocognitive impairment is likely a mixture of pathologies (Alzheimer's Disease Related Pathology, Vascular Contributions to Cognitive Impairment and Dementia).  There are no plasma protein biomarkers available on record.  There are no CSF protein biomarkers available on record.  There are no dermatological protein biomarkers available on record.  There is no known  relevant genetic testing available.     The patient presents with a six-year history of gradually worsening amnestic-predominant, multi-domain major cognitive impairment, which significantly interferes with their ability to perform instrumental activities of daily living. The clinical presentation aligns with Alzheimer's disease-related dementia. Additionally, the patient is experiencing complications related to post-cardiac catheterization wound management, including complex bandaging that has led to nerve entrapment and neuropathy.     The observations made above, were discussed with the patient and their supporting historian(s) (daughter). We have discussed the additional diagnostic(s) and/or managenent below.     Care Management Plan:    #Optimize Neurocognitive Impairment and Quality  We have discussed the MIND Diet and other lifestyle behavior that may help maintain brain health.  We have provided written/digital reading material  Continue donepezil 5 mg in the morning  Continue B12 1000 mcg q.day  #Optimize Behavioral Management and Quality.  No indication for memantine at this time  #Optimize Sleep Hygiene and Quality  We discussed and recommended additional diagnostic/management of sleep disorder to optimize brain health and longevity.  continue melatonin 3 mg q.h.s.  #Optimize Cerebrovascular Health.  The patient has a documented history of hyperlipidemia and/or hypercholesteremia with long-term complications such as cerebrovascular disease, peripheral vascular disease, and/or aortic atherosclerosis. Collectively these risk factors may contribute to cerebral atherosclerosis, and cerebral hypoperfusion compounded neurocognitive disorder. We discussed maximizing cerebrovascular-related medical therapy, including but not limited to cholesterol medications and antiplatelet agents. We have discussed the value of aggressively controlling vascular risk factors like hypertension, hyperlipidemia, and Diabetes  SBP<130, LDL<100, and A1C<7.0. We discussed the need to optimize lifestyle choices, including a heart-healthy diet (e.g., Mediterranean or DASH), increased cardiovascular exercise (goal 150 minutes of moderate-intensity per week), and staying cognitively and socially active.  continue Eliquis 5 mg q.day  #Optimize Neuralgia Management  continue Lyrica 25 mg increased to 50 mg HS  Continue Cymbalta 60 mg  Apply warm compress the area of injury at elbow  Continue Robaxin q.h.s.  May take ibuprofen / Advil 600 mg q.h.s. for pain  Follow-up with previously ordered neuromuscular evaluation including C-spine MRI and nerve conduction study  #Coordination of Care Management Planning.  We have recommended additional care management through social work support.  We have referred to [facility] neurocognitive social work team  #Behavioral/Environmental Strategies  We recommend engaging in activities that stimulate cognitively and socially while avoiding excessive stimulation and fatigue in overwhelmingly complex situations.  We recommend integrating routine and schedule into your daily life. https://www.alzheimersproject.org/news/the-importance-of-routine-and-familiarity-to-persons-with-dementia/  #Health Maintenance/Lifestyle Advice  We have discussed the value in aggressively controlling vascular risk factors like hypertension, hyperlipidemia, and Diabetes SBP<130, LDL<100, A1C<7.0.  We discussed the need to optimize lifestyle choices including a heart-healthy diet (e.g., Mediterranean or DASH), increased cardiovascular exercise (goal 150 minutes of moderate-intensity per week), and stay cognitively and socially active.  #Support  We all need support sometimes. Get easy access to local resources, community programs, and services. https://www.communityresourcefinder.org/  Learn more about Cognitive Impairment in [place]:  "https://www.alz.org/professionals/public-health/state-overview/[place]  #Safety  [place] has no laws against driving with dementia specifically but obviously has laws about medical conditions which impact a person's ability to drive safely. If you believe your loved one's driving capacity has diminished, please reach out to either your primary care physician or our office to discuss driving restrictions or revocation of their license. To learn more: https://www.dementiacarecentral.com/caregiverinfo/driving-problems/  The [employer] administers the nationwide "Safe Return" program with identification bracelets, necklaces, or clothing tags and 24-hour assistance. More information is available online at https://www.alz.org/help-support/caregiving/safety/medicalert-with-24-7-wandering-support  #Follow up:  Follow-up as needed.    Thank you for allowing us to participate in the care of your patient. Please do not hesitate to contact us with any questions or concerns.     It was a pleasure seeing The patient and we look forward to seeing them at their follow-up visit.     This note is dictated on M*Modal Fluency Direct word recognition program. There are word recognition mistakes that are occasionally missed on review.         Scheduled Follow-up :  Future Appointments   Date Time Provider Department Center   12/15/2023 11:30 AM INFUSION, CHAIR 5 [facility] AMB INF [facility]   12/21/2023 10:00 AM HOME MONITOR DEVICE CHECK, [facility] ARRHPRO [facility]   12/21/2023  2:30 PM Rubio Alanis MD [facility] [id number] [facility]   1/9/2024  1:45 PM Moira Duvall MD [facility] [id number] [facility]   1/23/2024  1:30 PM Evelyne Yates NP [facility] ARRHYTH [facility]   3/26/2024  2:00 PM Pepper Whitfield MD [facility] PCW       After Visit Medication List :     Medication List            Accurate as of December 8, 2023  9:37 AM. If you have any questions, ask your nurse or doctor.                CONTINUE taking these " medications      acetaminophen 500 MG tablet  Commonly known as: TYLENOL  Take 2 tablets (1,000 mg total) by mouth every 8 (eight) hours as needed for Pain.     amlodipine-benazepril 5-20 mg 5-20 mg per capsule  Commonly known as: LOTREL  Take 1 capsule by mouth once daily.     apixaban 5 mg Tab  Commonly known as: ELIQUIS  Take 1 tablet (5 mg total) by mouth 2 (two) times daily. Hold this medication until cleared by Neurosurgery to resume.     artificial tears 0.5 % ophthalmic solution  Commonly known as: ISOPTO TEARS  Place 1 drop into both eyes as needed.     azelastine 137 mcg (0.1 %) nasal spray  Commonly known as: ASTELIN  1 spray (137 mcg total) by Nasal route 2 (two) times daily.     calcium-vitamin D3 500 mg-5 mcg (200 unit) per tablet  Commonly known as: OS-ERIC 500 + D3  Take 2 tablets by mouth once daily.     cyanocobalamin 1000 MCG tablet  Commonly known as: VITAMIN B-12     donepeziL 5 MG tablet  Commonly known as: ARICEPT  Take 1 tablet (5 mg total) by mouth every evening.     DULoxetine 60 MG capsule  Commonly known as: CYMBALTA  Take 1 capsule (60 mg total) by mouth every morning.     estradioL 0.01 % (0.1 mg/gram) vaginal cream  Commonly known as: ESTRACE  USE ONE-HALF GRAM VAGINALLY TWICE a WEEK     hydrALAZINE 25 MG tablet  Commonly known as: APRESOLINE  Take 1 tablet (25 mg total) by mouth every 8 (eight) hours.     LORazepam 1 MG tablet  Commonly known as: ATIVAN  Take 1 tablet (1 mg total) by mouth every 6 (six) hours as needed for Anxiety.     melatonin 3 mg tablet  Commonly known as: MELATIN  Take 2 tablets (6 mg total) by mouth nightly as needed for Insomnia.     methocarbamoL 500 MG Tab  Commonly known as: ROBAXIN  Take 1 tablet (500 mg total) by mouth 4 (four) times daily. for 10 days     metoprolol tartrate 25 MG tablet  Commonly known as: LOPRESSOR  Take 1 tablet (25 mg total) by mouth 2 (two) times daily.     nitroGLYCERIN 0.4 MG SL tablet  Commonly known as: NITROSTAT  Place 1 tablet  (0.4 mg total) under the tongue every 5 (five) minutes as needed.     pregabalin 25 MG capsule  Commonly known as: LYRICA  Take 1 capsule (25 mg total) by mouth nightly.     simvastatin 80 MG tablet  Commonly known as: ZOCOR  Take 1 tablet (80 mg total) by mouth once daily.     TRADJENTA 5 mg Tab tablet  Generic drug: linaGLIPtin  Take 1 tablet (5 mg total) by mouth once daily.     vitamin D 1000 units Tab  Commonly known as: VITAMIN D3              Signing Physician:  Rubio Alanis MD    Billing:        -----------------------------------------------------------------------------    I performed this consultation using real-time Telehealth tools, including a live video connection between my location and the patient's location (their home [facility] Prior to initiating the consultation, I obtained informed verbal consent to perform this consultation using Telehealth tools and answered all the questions about the Telehealth interaction. The participants understand that only a limited neurological exam and limited neuropsychological testing can be performed using Telehealth tools.    I spent a total of 45 minutes (time-in: 09:30 AM; time-out: 10:15 AM) on 2023-12-08, virtually face-to-face with the patient and caregiver(s), >50% of that time was spent counseling regarding the symptoms, treatment plan, risks, therapeutic options, lifestyle modifications, and/or safety issues for the diagnoses above.    10/14 Review of Systems completed and is negative except as stated above in HPI (Systems reviewed: Const, Eyes, ENT, Resp, CV, GI, , MSK, Skin, Neuro)    I reviewed the summation of records from outside physicians for a total of 10 minutes on 2023-12-08. Reviewed and summation of records from an outside physician was performed as summarized above in HPI    I performed a neurobehavioral status examination that included a clinical assessment of thinking, reasoning, and judgment to ensure a  comprehensive approach in managing the complex and evolving needs of the patient's neurocognitive condition. Please see above HPI and ROS for full details. This exam was performed on 2023-12-08 and included 12 minutes spent on direct face-to-face clinical observation and interview with the patient and 21 minutes spent interpreting test results and preparing the report. The total time of 33 minutes spent on the neurobehavioral status examination is not included in the time spent on evaluation and management coding.    Total Billing time spent on encounter/documentation for this patient's evaluation and management, not including the neurobehavioral status examination: 43 minutes.

## 2023-12-11 DIAGNOSIS — M54.12 CERVICAL RADICULOPATHY: ICD-10-CM

## 2023-12-11 DIAGNOSIS — M79.609 PARESTHESIA AND PAIN OF LEFT EXTREMITY: ICD-10-CM

## 2023-12-11 DIAGNOSIS — E11.51 TYPE 2 DIABETES MELLITUS WITH DIABETIC PERIPHERAL ANGIOPATHY WITHOUT GANGRENE, WITHOUT LONG-TERM CURRENT USE OF INSULIN: ICD-10-CM

## 2023-12-11 DIAGNOSIS — G62.9 NEUROPATHY: ICD-10-CM

## 2023-12-11 DIAGNOSIS — R20.2 PARESTHESIA AND PAIN OF LEFT EXTREMITY: ICD-10-CM

## 2023-12-11 RX ORDER — PREGABALIN 50 MG/1
50 CAPSULE ORAL NIGHTLY
Qty: 60 CAPSULE | Refills: 2 | Status: SHIPPED | OUTPATIENT
Start: 2023-12-11

## 2023-12-11 NOTE — TELEPHONE ENCOUNTER
"From Dr Alanis's LOV note:  "The patient is taking citalopram 10 mg. Recommend starting Namenda titrating up to 10 mg b. I. D. Following this we will consider increasing citalopram or adding Seroquel."  "

## 2023-12-11 NOTE — TELEPHONE ENCOUNTER
Called radiology to ask them to schedule pt's MRI due to pacemaker   Ran for ten minutes. Hung up  Providing pt w/ number

## 2023-12-11 NOTE — TELEPHONE ENCOUNTER
Rubio Alanis MD   to Me   JR    12/8/23 10:07 AM  Yes - cancel, make sure they have f/u with dee dee  Me   to Rubio Alanis MD   AT      12/8/23  9:56 AM  Pt scheduled later this month for another appt w/ you. Should we cancel or keep?

## 2023-12-12 NOTE — TELEPHONE ENCOUNTER
Called and left a mssg      Alert and oriented to person, place and time/Patient baseline mental status/Awake/Symptoms improved/Dressing clean and dry/No adverse reaction to first time med in ED

## 2023-12-19 NOTE — PLAN OF CARE
Patient arrived to room. PIV placed, one in each arm.  Admit assessment completed. Plan of care discussed with patient. Will monitor. Call light within reach, instructed in use.   POC   Mepilex placed on stevenson heels and sacrum  
c/o also of headache

## 2023-12-24 RX ORDER — DONEPEZIL HYDROCHLORIDE 5 MG/1
5 TABLET, FILM COATED ORAL NIGHTLY
Qty: 90 TABLET | Refills: 4 | Status: SHIPPED | OUTPATIENT
Start: 2023-12-24 | End: 2024-12-23

## 2023-12-28 ENCOUNTER — PATIENT MESSAGE (OUTPATIENT)
Dept: INTERNAL MEDICINE | Facility: CLINIC | Age: 88
End: 2023-12-28
Payer: MEDICARE

## 2023-12-29 ENCOUNTER — OFFICE VISIT (OUTPATIENT)
Dept: INTERNAL MEDICINE | Facility: CLINIC | Age: 88
End: 2023-12-29
Payer: MEDICARE

## 2023-12-29 ENCOUNTER — INFUSION (OUTPATIENT)
Dept: INFECTIOUS DISEASES | Facility: HOSPITAL | Age: 88
End: 2023-12-29
Attending: INTERNAL MEDICINE
Payer: MEDICARE

## 2023-12-29 VITALS
RESPIRATION RATE: 16 BRPM | SYSTOLIC BLOOD PRESSURE: 142 MMHG | BODY MASS INDEX: 25.71 KG/M2 | OXYGEN SATURATION: 96 % | HEART RATE: 98 BPM | HEIGHT: 66 IN | DIASTOLIC BLOOD PRESSURE: 65 MMHG | TEMPERATURE: 98 F | WEIGHT: 160 LBS

## 2023-12-29 VITALS
WEIGHT: 160 LBS | HEART RATE: 97 BPM | DIASTOLIC BLOOD PRESSURE: 68 MMHG | BODY MASS INDEX: 25.71 KG/M2 | SYSTOLIC BLOOD PRESSURE: 150 MMHG | OXYGEN SATURATION: 96 % | HEIGHT: 66 IN

## 2023-12-29 DIAGNOSIS — J00 ACUTE NASOPHARYNGITIS: Primary | ICD-10-CM

## 2023-12-29 DIAGNOSIS — M81.0 OSTEOPOROSIS, UNSPECIFIED OSTEOPOROSIS TYPE, UNSPECIFIED PATHOLOGICAL FRACTURE PRESENCE: Primary | ICD-10-CM

## 2023-12-29 DIAGNOSIS — R05.1 ACUTE COUGH: ICD-10-CM

## 2023-12-29 LAB
CTP QC/QA: YES
CTP QC/QA: YES
FLUAV AG NPH QL: NEGATIVE
FLUBV AG NPH QL: NEGATIVE
SARS-COV-2 RDRP RESP QL NAA+PROBE: NEGATIVE

## 2023-12-29 PROCEDURE — 99213 OFFICE O/P EST LOW 20 MIN: CPT | Mod: S$GLB,,, | Performed by: STUDENT IN AN ORGANIZED HEALTH CARE EDUCATION/TRAINING PROGRAM

## 2023-12-29 PROCEDURE — 1126F AMNT PAIN NOTED NONE PRSNT: CPT | Mod: CPTII,S$GLB,, | Performed by: STUDENT IN AN ORGANIZED HEALTH CARE EDUCATION/TRAINING PROGRAM

## 2023-12-29 PROCEDURE — 1159F PR MEDICATION LIST DOCUMENTED IN MEDICAL RECORD: ICD-10-PCS | Mod: CPTII,S$GLB,, | Performed by: STUDENT IN AN ORGANIZED HEALTH CARE EDUCATION/TRAINING PROGRAM

## 2023-12-29 PROCEDURE — 3288F PR FALLS RISK ASSESSMENT DOCUMENTED: ICD-10-PCS | Mod: CPTII,S$GLB,, | Performed by: STUDENT IN AN ORGANIZED HEALTH CARE EDUCATION/TRAINING PROGRAM

## 2023-12-29 PROCEDURE — 87804 POCT INFLUENZA A/B: ICD-10-PCS | Mod: 59,QW,S$GLB, | Performed by: STUDENT IN AN ORGANIZED HEALTH CARE EDUCATION/TRAINING PROGRAM

## 2023-12-29 PROCEDURE — 1160F RVW MEDS BY RX/DR IN RCRD: CPT | Mod: CPTII,S$GLB,, | Performed by: STUDENT IN AN ORGANIZED HEALTH CARE EDUCATION/TRAINING PROGRAM

## 2023-12-29 PROCEDURE — 99213 PR OFFICE/OUTPT VISIT, EST, LEVL III, 20-29 MIN: ICD-10-PCS | Mod: S$GLB,,, | Performed by: STUDENT IN AN ORGANIZED HEALTH CARE EDUCATION/TRAINING PROGRAM

## 2023-12-29 PROCEDURE — 87804 INFLUENZA ASSAY W/OPTIC: CPT | Mod: 59,QW,S$GLB, | Performed by: STUDENT IN AN ORGANIZED HEALTH CARE EDUCATION/TRAINING PROGRAM

## 2023-12-29 PROCEDURE — 1101F PT FALLS ASSESS-DOCD LE1/YR: CPT | Mod: CPTII,S$GLB,, | Performed by: STUDENT IN AN ORGANIZED HEALTH CARE EDUCATION/TRAINING PROGRAM

## 2023-12-29 PROCEDURE — 99999 PR PBB SHADOW E&M-EST. PATIENT-LVL V: ICD-10-PCS | Mod: PBBFAC,,, | Performed by: STUDENT IN AN ORGANIZED HEALTH CARE EDUCATION/TRAINING PROGRAM

## 2023-12-29 PROCEDURE — 63600175 PHARM REV CODE 636 W HCPCS: Performed by: INTERNAL MEDICINE

## 2023-12-29 PROCEDURE — 1159F MED LIST DOCD IN RCRD: CPT | Mod: CPTII,S$GLB,, | Performed by: STUDENT IN AN ORGANIZED HEALTH CARE EDUCATION/TRAINING PROGRAM

## 2023-12-29 PROCEDURE — 99999 PR PBB SHADOW E&M-EST. PATIENT-LVL V: CPT | Mod: PBBFAC,,, | Performed by: STUDENT IN AN ORGANIZED HEALTH CARE EDUCATION/TRAINING PROGRAM

## 2023-12-29 PROCEDURE — 1126F PR PAIN SEVERITY QUANTIFIED, NO PAIN PRESENT: ICD-10-PCS | Mod: CPTII,S$GLB,, | Performed by: STUDENT IN AN ORGANIZED HEALTH CARE EDUCATION/TRAINING PROGRAM

## 2023-12-29 PROCEDURE — 1160F PR REVIEW ALL MEDS BY PRESCRIBER/CLIN PHARMACIST DOCUMENTED: ICD-10-PCS | Mod: CPTII,S$GLB,, | Performed by: STUDENT IN AN ORGANIZED HEALTH CARE EDUCATION/TRAINING PROGRAM

## 2023-12-29 PROCEDURE — 96365 THER/PROPH/DIAG IV INF INIT: CPT

## 2023-12-29 PROCEDURE — 1101F PR PT FALLS ASSESS DOC 0-1 FALLS W/OUT INJ PAST YR: ICD-10-PCS | Mod: CPTII,S$GLB,, | Performed by: STUDENT IN AN ORGANIZED HEALTH CARE EDUCATION/TRAINING PROGRAM

## 2023-12-29 PROCEDURE — 25000003 PHARM REV CODE 250: Performed by: INTERNAL MEDICINE

## 2023-12-29 PROCEDURE — 3288F FALL RISK ASSESSMENT DOCD: CPT | Mod: CPTII,S$GLB,, | Performed by: STUDENT IN AN ORGANIZED HEALTH CARE EDUCATION/TRAINING PROGRAM

## 2023-12-29 PROCEDURE — 87635: ICD-10-PCS | Mod: QW,S$GLB,, | Performed by: STUDENT IN AN ORGANIZED HEALTH CARE EDUCATION/TRAINING PROGRAM

## 2023-12-29 PROCEDURE — 87635 SARS-COV-2 COVID-19 AMP PRB: CPT | Mod: QW,S$GLB,, | Performed by: STUDENT IN AN ORGANIZED HEALTH CARE EDUCATION/TRAINING PROGRAM

## 2023-12-29 RX ORDER — HEPARIN 100 UNIT/ML
500 SYRINGE INTRAVENOUS
OUTPATIENT
Start: 2023-12-29

## 2023-12-29 RX ORDER — SODIUM CHLORIDE 0.9 % (FLUSH) 0.9 %
10 SYRINGE (ML) INJECTION
OUTPATIENT
Start: 2023-12-29

## 2023-12-29 RX ORDER — PREGABALIN 25 MG/1
25 CAPSULE ORAL NIGHTLY
Qty: 30 CAPSULE | Refills: 2 | OUTPATIENT
Start: 2023-12-29 | End: 2024-06-28

## 2023-12-29 RX ORDER — BENZONATATE 200 MG/1
200 CAPSULE ORAL 3 TIMES DAILY PRN
Qty: 30 CAPSULE | Refills: 0 | Status: SHIPPED | OUTPATIENT
Start: 2023-12-29 | End: 2024-01-08

## 2023-12-29 RX ORDER — ZOLEDRONIC ACID 5 MG/100ML
5 INJECTION, SOLUTION INTRAVENOUS
OUTPATIENT
Start: 2023-12-29

## 2023-12-29 RX ORDER — ACETAMINOPHEN 500 MG
500 TABLET ORAL
OUTPATIENT
Start: 2023-12-29

## 2023-12-29 RX ORDER — ACETAMINOPHEN 500 MG
500 TABLET ORAL
Status: DISCONTINUED | OUTPATIENT
Start: 2023-12-29 | End: 2023-12-29 | Stop reason: HOSPADM

## 2023-12-29 RX ORDER — ZOLEDRONIC ACID 5 MG/100ML
5 INJECTION, SOLUTION INTRAVENOUS
Status: COMPLETED | OUTPATIENT
Start: 2023-12-29 | End: 2023-12-29

## 2023-12-29 RX ADMIN — SODIUM CHLORIDE: 900 INJECTION INTRAVENOUS at 02:12

## 2023-12-29 RX ADMIN — ZOLEDRONIC ACID 5 MG: 5 INJECTION, SOLUTION INTRAVENOUS at 02:12

## 2023-12-29 NOTE — PROGRESS NOTES
SUBJECTIVE     Chief Complaint   Patient presents with    Cough       HPI  Brunilda England is a 89 y.o. female with  HTN, HLD, PVD, CAD, chronic diastolic heart failure, pAFib/FL, pulmonary HTN, tachy-juan a syndrome, AV regurgitation, CKD3a, h/o lung cancer, h/o anal cancer, pernicious anemia, T2DM, osteoporosis, Alzheimer's dementia, h/o subarachnoid hemorrhage, neuropathy, lumbar radiculopathy  that presents for same day urgent care evaluation of cough.     This is a new patient to me but established to [facility]. PCP is Pepper Whitfield MD.     Accompanied by her daughter who helps provide some of the history.     Symptoms starting 4 days ago with sneezing, coughing, nasal congestion, decreased appetite, increased sleep. Cough is productive. Other symptoms including feeling warm at night. Symptoms worse in the evenings.     Negative for COVID-19, negative for Influenza A/B in clinic today.     PAST MEDICAL HISTORY:  Past Medical History:   Diagnosis Date    Anal cancer 1995    Anemia     Cancer 1995    anal cancer    Centrilobular emphysema 7/6/2020    Diabetes mellitus     With circulatory disorder    Lumbar radiculopathy 5/16/2014    Lung cancer 2012    s/p chemo/radiation    Macular degeneration     Macular hemorrhage of left eye     Neuropathy     Osteoporosis     Osteoporosis, unspecified 11/9/2012    Pure hypercholesterolemia        PAST SURGICAL HISTORY:  Past Surgical History:   Procedure Laterality Date    A-V CARDIAC PACEMAKER INSERTION Left 9/21/2023    Procedure: INSERTION, CARDIAC PACEMAKER, DUAL CHAMBER;  Surgeon: Giuseppe Roman MD;  Location: [facility] EP LAB;  Service: Cardiology;  Laterality: Left;  SSS, Dual PPM,BIO, anes, MB, 3 Prep    BRONCHOSCOPY      CHOLECYSTECTOMY      COLONOSCOPY      FLUOROSCOPIC ANGIOGRAPHY OF LOWER EXTREMITY WITH TOPICAL ULTRASOUND Right 12/6/2018    Procedure: ARTERIOGRAM-LEG AND ULTRASOUND;  Surgeon: SEBASTIÁN Mcpherson III, MD;  Location: [facility] OR [address] [place] [address];  Service:  Peripheral Vascular;  Laterality: Right;  16.5 min  1059.42 mGy  59ml Dye  2ml Local    heart stent      HYSTERECTOMY      INCISIONAL BIOPSY Right 12/6/2019    Procedure: INCISIONAL BIOPSY;  Surgeon: Leslie Castillo MD;  Location: [facility] OR [address] [place] [address];  Service: Plastics;  Laterality: Right;    INTRAMEDULLARY RODDING OF FEMUR Left 5/30/2023    Procedure: INSERTION, INTRAMEDULLARY TEREZA, FEMUR;  Surgeon: Desmond Barker MD;  [facility];  Service: Orthopedics;  Laterality: Left;    left leg surgery      blockage    PERCUTANEOUS TRANSLUMINAL ANGIOPLASTY N/A 12/6/2018    Procedure: PTA (ANGIOPLASTY, PERCUTANEOUS, TRANSLUMINAL);  Surgeon: SEBASTIÁN Mcpherson III, MD;  Location: [facility] OR [address] [place] [address];  Service: Peripheral Vascular;  Laterality: N/A;    RECONSTRUCTION USING FLAP Right 12/6/2019    Procedure: RECONSTRUCTION USING FLAP/FULL THICKNESS MRESETION AND RECONSTRUCTION RIGHT UPPER EYELID WITH BIOPSY;  Surgeon: Leslie Castillo MD;  Location: [facility] OR [address] [place] [address];  Service: Plastics;  Laterality: Right;    TONSILLECTOMY         FAMILY HISTORY:  Family History   Problem Relation Age of Onset    Stroke Mother     Cancer Father     Breast cancer Maternal Aunt     Ovarian cancer Neg Hx     Amblyopia Neg Hx     Blindness Neg Hx     Cataracts Neg Hx     Glaucoma Neg Hx     Macular degeneration Neg Hx     Retinal detachment Neg Hx     Strabismus Neg Hx        ALLERGIES AND MEDICATIONS: updated and reviewed.  Review of patient's allergies indicates:   Allergen Reactions    Bextra [valdecoxib] Swelling    Gabapentin Diarrhea and Nausea Only     Current Outpatient Medications   Medication Sig Dispense Refill    acetaminophen (TYLENOL) 500 MG tablet Take 2 tablets (1,000 mg total) by mouth every 8 (eight) hours as needed for Pain.  0    amlodipine-benazepril 5-20 mg (LOTREL) 5-20 mg per capsule Take 1 capsule by mouth once daily. 90 capsule 3    apixaban (ELIQUIS) 5 mg Tab Take 1 tablet (5 mg total) by mouth 2 (two) times  daily. Hold this medication until cleared by Neurosurgery to resume. 60 tablet 6    artificial tears (ISOPTO TEARS) 0.5 % ophthalmic solution Place 1 drop into both eyes as needed.      azelastine (ASTELIN) 137 mcg (0.1 %) nasal spray 1 spray (137 mcg total) by Nasal route 2 (two) times daily. 30 mL 3    calcium-vitamin D3 (OS-ERIC 500 + D3) 500 mg-5 mcg (200 unit) per tablet Take 2 tablets by mouth once daily.      cyanocobalamin (VITAMIN B-12) 1000 MCG tablet Take 1,000 mcg by mouth every morning.      donepeziL (ARICEPT) 5 MG tablet Take 1 tablet (5 mg total) by mouth every evening. 90 tablet 4    donepeziL (ARICEPT) 5 MG tablet Take 1 tablet (5 mg total) by mouth every evening. 90 tablet 4    DULoxetine (CYMBALTA) 60 MG capsule Take 1 capsule (60 mg total) by mouth every morning. 90 capsule 3    estradioL (ESTRACE) 0.01 % (0.1 mg/gram) vaginal cream USE ONE-HALF GRAM VAGINALLY TWICE a WEEK 42.5 g 3    hydrALAZINE (APRESOLINE) 25 MG tablet Take 1 tablet (25 mg total) by mouth every 8 (eight) hours. 90 tablet 11    linaGLIPtin (TRADJENTA) 5 mg Tab tablet Take 1 tablet (5 mg total) by mouth once daily. 90 tablet 3    melatonin (MELATIN) 3 mg tablet Take 2 tablets (6 mg total) by mouth nightly as needed for Insomnia.  0    metoprolol tartrate (LOPRESSOR) 25 MG tablet Take 1 tablet (25 mg total) by mouth 2 (two) times daily. 60 tablet 11    nitroGLYCERIN (NITROSTAT) 0.4 MG SL tablet Place 1 tablet (0.4 mg total) under the tongue every 5 (five) minutes as needed. 25 tablet 0    pregabalin (LYRICA) 50 MG capsule Take 1 capsule (50 mg total) by mouth nightly. 60 capsule 2    simvastatin (ZOCOR) 80 MG tablet Take 1 tablet (80 mg total) by mouth once daily. 90 tablet 4    vitamin D (VITAMIN D3) 1000 units Tab Take 1,000 Units by mouth once daily.      LORazepam (ATIVAN) 1 MG tablet Take 1 tablet (1 mg total) by mouth every 6 (six) hours as needed for Anxiety. 2 tablet 0     No current facility-administered medications for  "this visit.     Facility-Administered Medications Ordered in Other Visits   Medication Dose Route Frequency Provider Last Rate Last Admin    sodium chloride 0.9% flush 5 mL  5 mL Intravenous PRN Sarah Earl MD           ROS  Review of Systems   Constitutional:  Positive for chills. Negative for fatigue and fever.   HENT:  Positive for congestion, postnasal drip and rhinorrhea. Negative for sinus pressure, sinus pain, sneezing and sore throat.    Eyes: Negative.    Respiratory:  Positive for cough. Negative for shortness of breath.    Cardiovascular:  Negative for chest pain and palpitations.   Gastrointestinal:  Negative for abdominal pain, constipation, diarrhea, nausea and vomiting.   Endocrine: Negative.    Genitourinary: Negative.    Musculoskeletal:  Negative for arthralgias and myalgias.   Skin: Negative.    Allergic/Immunologic: Negative.    Neurological: Negative.    Hematological: Negative.          OBJECTIVE     Physical Exam  Vitals:    12/29/23 1605   BP: (!) 150/68   Pulse: 97    Body mass index is 25.82 kg/m².  Weight: 72.6 kg (160 lb)   Height: 5' 6" (167.6 cm)     Physical Exam  Vitals reviewed.   Constitutional:       General: She is not in acute distress.     Appearance: Normal appearance.   HENT:      Head: Normocephalic and atraumatic.      Right Ear: Tympanic membrane, ear canal and external ear normal.      Left Ear: Tympanic membrane, ear canal and external ear normal.      Nose: Mucosal edema, congestion and rhinorrhea present. Rhinorrhea is clear.      Mouth/Throat:      Mouth: Mucous membranes are moist.      Pharynx: Oropharynx is clear. Uvula midline. No posterior oropharyngeal erythema.      Tonsils: No tonsillar exudate or tonsillar abscesses.   Eyes:      Extraocular Movements: Extraocular movements intact.      Conjunctiva/sclera: Conjunctivae normal.      Pupils: Pupils are equal, round, and reactive to light.   Cardiovascular:      Rate and Rhythm: Normal rate and " regular rhythm.      Pulses: Normal pulses.      Heart sounds: Normal heart sounds.   Pulmonary:      Effort: Pulmonary effort is normal.      Breath sounds: Normal breath sounds.   Abdominal:      General: There is no distension.   Musculoskeletal:         General: Normal range of motion.      Cervical back: Normal range of motion and neck supple. No rigidity or tenderness.   Lymphadenopathy:      Cervical: No cervical adenopathy.   Skin:     General: Skin is warm and dry.   Neurological:      General: No focal deficit present.      Mental Status: She is alert.           Health Maintenance         Date Due Completion Date    RSV Vaccine (Age 60+ and Pregnant patients) (1 - 1-dose 60+ series) Never done ---    Diabetes Urine Screening 11/15/2017 11/15/2016    DEXA Scan 05/11/2024 5/11/2022    Hemoglobin A1c 06/06/2024 12/6/2023    Lipid Panel 08/17/2024 8/17/2023    Eye Exam 08/18/2024 8/18/2023    TETANUS VACCINE 09/05/2028 9/5/2018              ASSESSMENT     89 y.o. female with     1. Acute nasopharyngitis    2. Acute cough        PLAN:     1. Acute nasopharyngitis  - Counseled on symptomatic treatment. Given copy of recommendations.   - Return precautions given.     2. Acute cough  - benzonatate (TESSALON) 200 MG capsule; Take 1 capsule (200 mg total) by mouth 3 (three) times daily as needed for Cough.  Dispense: 30 capsule; Refill: 0  - POCT COVID-19 Rapid Screening -- NEGATIVE  - POCT Influenza A/B Rapid Antigen -- NEGATIVE        RTC PRN       Guido Roth MD  Family Medicine  [facility]  12/29/2023    This document was created using voice recognition software (M*Modal Fluency Direct). Although it may be edited, this document may contain errors related to incorrect recognition of the spoken word. Please call the physician if clarification is needed.       No follow-ups on file.

## 2023-12-29 NOTE — PROGRESS NOTES
Pt arrived to clinic for Reclast infusion to be given over 30 minutes, per order. NS running concurrently at 25 mL/hr. Pt tolerationg well. No pre-meds given. Discharge instructions given. Limited head-to-toe assessment due to privacy issues and visit reason though the opportunity was given for patient to express any concerns

## 2023-12-29 NOTE — PATIENT INSTRUCTIONS
Both your COVID-19 screen and your Influenza screen were negative in clinic today.     Sore Throat: Warm salt water gargles to alleviate sore throat or lozenges. I think that the best treatment is Chloraseptic Spray. You spray the back of your throat with it, hold it for 15 seconds, then spit it out. You can repeat this every 2 hours as needed for sore throat.       Nasal/Sinus congestion: Take an antihistamine daily (allegra/ zyrtec/ xyzal/ or claritin). This will also help with runny nose, sneezing, watery eyes. Use a nasal steroid spray such as Flonase (fluticasone) or Nasacort (triamcinolone) daily. This will also help with ear pressure/fluid.Generic medications are okay. You can also consider Afrin nasal spray if congestion is severe, but avoid using for more than 3 days in a row due to risk of dependence.      Cough: Dextromethorphan (DM, delsym) will help to suppress coughing. Do not drive if drowsy.You can add guaifenesin (mucinex) to help breath up thick sputum- make sure to drink plenty of water in order to help thin the mucus. I will also send you some Benzonatate (Tessalon Pearls) for cough. You can take these three times a day as needed for cough.      NSAIDs (aleve, advil) and tylenol can help with pain, aches and fever.     To avoid spreading symptoms: wash hands or use hand  frequently. Cover face when coughing. Do not share utensils, etc.      Because the symptoms started two days ago, the odds are this is likely a virus like those that cause the cold. Antibiotics are not useful for a viral infection, we must treat symptoms instead. Unnecessary antibiotics could cause diarrhea or make you prone to infections caused by resistant organisms. If you develop a new fever, please call our office. Also, let us know if you have symptoms after 7-10 days, this would be the time to use an antibiotic.

## 2024-01-10 ENCOUNTER — PATIENT MESSAGE (OUTPATIENT)
Dept: INTERNAL MEDICINE | Facility: CLINIC | Age: 89
End: 2024-01-10
Payer: MEDICARE

## 2024-01-11 ENCOUNTER — TELEPHONE (OUTPATIENT)
Dept: URGENT CARE | Facility: CLINIC | Age: 89
End: 2024-01-11

## 2024-01-11 ENCOUNTER — OFFICE VISIT (OUTPATIENT)
Dept: URGENT CARE | Facility: CLINIC | Age: 89
End: 2024-01-11
Payer: MEDICARE

## 2024-01-11 VITALS
TEMPERATURE: 99 F | SYSTOLIC BLOOD PRESSURE: 143 MMHG | OXYGEN SATURATION: 100 % | HEIGHT: 66 IN | DIASTOLIC BLOOD PRESSURE: 69 MMHG | RESPIRATION RATE: 20 BRPM | WEIGHT: 160 LBS | HEART RATE: 104 BPM | BODY MASS INDEX: 25.71 KG/M2

## 2024-01-11 DIAGNOSIS — R35.0 URINARY FREQUENCY: Primary | ICD-10-CM

## 2024-01-11 LAB
BILIRUB UR QL STRIP: NEGATIVE
GLUCOSE UR QL STRIP: NEGATIVE
KETONES UR QL STRIP: NEGATIVE
LEUKOCYTE ESTERASE UR QL STRIP: NEGATIVE
PH, POC UA: 5.5 (ref 5–8)
POC BLOOD, URINE: NEGATIVE
POC NITRATES, URINE: NEGATIVE
PROT UR QL STRIP: NEGATIVE
SP GR UR STRIP: 1 (ref 1–1.03)
UROBILINOGEN UR STRIP-ACNC: NORMAL (ref 0.1–1.1)

## 2024-01-11 PROCEDURE — 87086 URINE CULTURE/COLONY COUNT: CPT | Performed by: PHYSICIAN ASSISTANT

## 2024-01-11 PROCEDURE — 81003 URINALYSIS AUTO W/O SCOPE: CPT | Mod: QW,S$GLB,, | Performed by: PHYSICIAN ASSISTANT

## 2024-01-11 PROCEDURE — 99213 OFFICE O/P EST LOW 20 MIN: CPT | Mod: S$GLB,,, | Performed by: PHYSICIAN ASSISTANT

## 2024-01-11 RX ORDER — CEPHALEXIN 500 MG/1
500 CAPSULE ORAL EVERY 12 HOURS
Qty: 10 CAPSULE | Refills: 0 | Status: SHIPPED | OUTPATIENT
Start: 2024-01-11 | End: 2024-01-16

## 2024-01-11 NOTE — PATIENT INSTRUCTIONS
Please review attached instructions.    You must understand that you've received an Urgent Care treatment only and that you may be released before all your medical problems are known or treated. You, the patient, will arrange for follow up care as instructed.  Follow up with your PCP or specialty clinic as directed in the next 1-2 weeks if not improved or as needed.  You may call [phone] to schedule an appointment with the appropriate provider.  If your condition worsens we recommend that you receive another evaluation at the emergency room immediately or contact your primary medical clinics after hours call service to discuss your concerns.    If you were prescribed a narcotic or controlled medication, do not drive or operate heavy equipment or machinery while taking these medications.    If you smoke, please stop smoking.

## 2024-01-11 NOTE — PROGRESS NOTES
"Subjective:      Patient ID: Brunilda England is a 89 y.o. female.    Vitals:  height is 5' 6" (1.676 m) and weight is 72.6 kg (160 lb). Her oral temperature is 98.7 °F (37.1 °C). Her blood pressure is 143/69 (abnormal) and her pulse is 104. Her respiration is 20 and oxygen saturation is 100%.     Chief Complaint: Urinary Frequency    Pt states that she is coming in for frequent urination. Pt syms started 2 days ago. Pt pain level when using the rest room is a 3. Pt didn't self treat.     Provider note:  89-year-old female presents with urinary frequency that began 2 days ago.  She denies dysuria, flank pain, nausea, vomiting, abdominal pain, fever or chills.  Says she has some hematuria that has been chronic due to a "vaginal issue".  Says She had some low back pain last night that was relieved with a heating pad.  MEB    Urinary Frequency   This is a new problem. Episode onset: 2 days ago. The problem occurs every urination. The problem has been unchanged. The quality of the pain is described as aching. The pain is at a severity of 3/10. The pain is mild. She is Not sexually active. There is No history of pyelonephritis. Associated symptoms include frequency and hematuria. Pertinent negatives include no flank pain or urgency. She has tried nothing for the symptoms.       Constitution: Negative for fever.   Gastrointestinal:  Negative for abdominal pain.   Genitourinary:  Positive for frequency and hematuria. Negative for dysuria, urgency, urine decreased and flank pain.   Musculoskeletal:  Positive for back pain. Negative for muscle ache.      Objective:     Physical Exam   Constitutional: She is oriented to person, place, and time. She appears well-developed. She is cooperative.  Non-toxic appearance. She does not appear ill. No distress.   HENT:   Head: Normocephalic and atraumatic.   Ears:   Right Ear: Hearing and external ear normal.   Left Ear: Hearing and external ear normal.   Nose: Nose normal.   Eyes: " Conjunctivae are normal. No scleral icterus.   Cardiovascular: Regular rhythm and normal heart sounds. Tachycardia present.   Pulmonary/Chest: Effort normal and breath sounds normal. No respiratory distress.   Abdominal: Soft. There is no abdominal tenderness. There is no left CVA tenderness and no right CVA tenderness.   Musculoskeletal: Normal range of motion.         General: Normal range of motion.   Neurological: She is alert and oriented to person, place, and time. Gait normal. Gait normal.   Skin: Skin is warm, dry and not diaphoretic.   Psychiatric: Her behavior is normal. Judgment and thought content normal.   Nursing note and vitals reviewed.      Results for orders placed or performed in visit on 01/11/24   POCT Urinalysis, Dipstick, Automated, W/O Scope   Result Value Ref Range    POC Blood, Urine Negative Negative, Positive Slide, Positive Tube    POC Bilirubin, Urine Negative Negative, Positive Slide, Positive Tube    POC Urobilinogen, Urine NORMAL 0.1 - 1.1    POC Ketones, Urine Negative Negative, Positive Slide, Positive Tube    POC Protein, Urine Negative Negative, Positive Slide, Positive Tube    POC Nitrates, Urine Negative Negative, Positive Slide, Positive Tube    POC Glucose, Urine Negative Negative, Positive Slide, Positive Tube    pH, UA 5.5 5 - 8    POC Specific Gravity, Urine 1.005 1.003 - 1.029    POC Leukocytes, Urine Negative Negative, Positive Slide, Positive Tube     *Note: Due to a large number of results and/or encounters for the requested time period, some results have not been displayed. A complete set of results can be found in Results Review.         Assessment:     1. Urinary frequency        Plan:       Urinary frequency  -     POCT Urinalysis, Dipstick, Automated, W/O Scope  -     cephALEXin (KEFLEX) 500 MG capsule; Take 1 capsule (500 mg total) by mouth every 12 (twelve) hours. for 5 days  Dispense: 10 capsule; Refill: 0  -     Urine culture      Patient Instructions   Please  review attached instructions.    You must understand that you've received an Urgent Care treatment only and that you may be released before all your medical problems are known or treated. You, the patient, will arrange for follow up care as instructed.  Follow up with your PCP or specialty clinic as directed in the next 1-2 weeks if not improved or as needed.  You may call [phone] to schedule an appointment with the appropriate provider.  If your condition worsens we recommend that you receive another evaluation at the emergency room immediately or contact your primary medical clinics after hours call service to discuss your concerns.    If you were prescribed a narcotic or controlled medication, do not drive or operate heavy equipment or machinery while taking these medications.    If you smoke, please stop smoking.

## 2024-01-12 LAB
BACTERIA UR CULT: NORMAL
BACTERIA UR CULT: NORMAL

## 2024-01-19 PROBLEM — Z79.01 ON ANTICOAGULANT THERAPY: Status: ACTIVE | Noted: 2024-01-19

## 2024-01-19 PROBLEM — Z95.0 CARDIAC PACEMAKER IN SITU: Status: ACTIVE | Noted: 2024-01-19

## 2024-01-19 NOTE — PROGRESS NOTES
Ms. England is a patient of Dr. Roman and was last seen in clinic 9/26/2023.      Subjective:   Patient ID:  Brunilda England is a 89 y.o. female who presents for follow up of Pacemaker Check and Atrial Fibrillation  .     HPI:    Ms. England is a 89 y.o. female with pAF/AFL, HTN, SAH, COPD, HFpEF, CKD, TBS here for follow up after PPM (LBBA pacing lead) implantation.    Background:    pAF/AFL  HTN  SAH following a fall  Left hip fracture following a fall (s/p ORIF 5/2023)  COPD on home O2  HFpEF  CKD3  Right sided lung/rectal CA s/p chemoradiation 2012    Background:  Mechanical fall which led to L proximal femoral fracture and L parietal subarachnoid hemorrhage back in June 2023. She has questionable syncope at that time as well leading to 30 day event monitor which showed AF/AFL with RVR and conversion pauses over 3 seconds which were consecutive with few intervening junctional and sinus beats. Left-sided dual chamber PPM    9/21/2023: Successful implantation of PPM Dual, LBAP lead for SSS/TBS.     9/6/2023: here off schedule for L arm/hand swelling within one week after device implant. US with no DVT. She had a compression bandage over her antecubital area left from the day of her procedure. off schedule for L forearm and hand swelling. This was due to a bandage left over her antecubital area since the day of implant. Bandage removed. I suspect it will improve. Normal device function.     Update (01/23/2024):    1/11/2024: Pt with dementia, had been with increased agitation so UCX completed and pt tx for suspected uti. Pt started abx just yesterday and daughter states she has seemed less agitated and sleeping better. No reported urinary symptoms or fevers. We will continue current plan with current antibiotic.    Today she says she is recovering from her UTI treated last week. No palpitations, CP, notable SOB. Does have some episodic LH. No syncope. Some fatigue. She continues to have some pain and numbness in her  left hand/arm but persistent edema has resolved.    She is currently taking eliquis 5mg BID for stroke prophylaxis and denies significant bleeding episodes. She is currently being treated with lopressor 25mg BID for HR control.  Kidney function is stable, with a creatinine of 1 on 12/6/2023.    Device Interrogation (1/23/2024) reveals an intrinsic SR with stable lead and device function.   12 lead EKGs performed during interrogation w/ LBBAP lead in both bipolar and unipolar configurations w/ the following thresholds and impedance:  Unipolar: 0.8V@0.4ms; 331 Ohms  Bipolar (programmed): 1.1V@0.4ms; 507 Ohms  LVAT ~80ms.  AMS 7%. MAX duration seen 20 hours 7 mins on 1/16/24 w/ a mean VR of 133  She paces 9% in the RA and 2% in the RV. Estimated battery longevity 7.8 years.     I have personally reviewed the patient's EKG today, which shows SR at 99bpm. GA interval is 156. QRS is 90. QTc is 428.      Relevant Cardiac Test Results:    2D Echo (5/21/2023):  The left ventricle is normal in size with low normal systolic function.  The estimated ejection fraction is 53%.  There are segmental left ventricular wall motion abnormalities.  There is abnormal septal wall motion.  Mild left atrial enlargement.  Indeterminate left ventricular diastolic function.  Normal right ventricular size with low normal right ventricular systolic function.  There is mild-to-moderate aortic valve stenosis.  Aortic valve area is 1.40 cm2; peak velocity is 1.54 m/s; mean gradient is 6 mmHg.  Mild aortic regurgitation.  Mild mitral regurgitation.  Mild tricuspid regurgitation.  Normal central venous pressure (3 mmHg).  The estimated PA systolic pressure is 41 mmHg.  There is mild pulmonary hypertension.  Trivial posterior pericardial effusion. At base and under the RA and small outside the RV apically.    Current Outpatient Medications   Medication Sig    acetaminophen (TYLENOL) 500 MG tablet Take 2 tablets (1,000 mg total) by mouth every 8  (eight) hours as needed for Pain.    amlodipine-benazepril 5-20 mg (LOTREL) 5-20 mg per capsule Take 1 capsule by mouth once daily.    apixaban (ELIQUIS) 5 mg Tab Take 1 tablet (5 mg total) by mouth 2 (two) times daily. Hold this medication until cleared by Neurosurgery to resume.    artificial tears (ISOPTO TEARS) 0.5 % ophthalmic solution Place 1 drop into both eyes as needed.    azelastine (ASTELIN) 137 mcg (0.1 %) nasal spray 1 spray (137 mcg total) by Nasal route 2 (two) times daily.    calcium-vitamin D3 (OS-ERIC 500 + D3) 500 mg-5 mcg (200 unit) per tablet Take 2 tablets by mouth once daily.    cyanocobalamin (VITAMIN B-12) 1000 MCG tablet Take 1,000 mcg by mouth every morning.    donepeziL (ARICEPT) 5 MG tablet Take 1 tablet (5 mg total) by mouth every evening.    donepeziL (ARICEPT) 5 MG tablet Take 1 tablet (5 mg total) by mouth every evening.    DULoxetine (CYMBALTA) 60 MG capsule Take 1 capsule (60 mg total) by mouth every morning.    estradioL (ESTRACE) 0.01 % (0.1 mg/gram) vaginal cream USE ONE-HALF GRAM VAGINALLY TWICE a WEEK    hydrALAZINE (APRESOLINE) 25 MG tablet Take 1 tablet (25 mg total) by mouth every 8 (eight) hours.    linaGLIPtin (TRADJENTA) 5 mg Tab tablet Take 1 tablet (5 mg total) by mouth once daily.    LORazepam (ATIVAN) 1 MG tablet Take 1 tablet (1 mg total) by mouth every 6 (six) hours as needed for Anxiety.    melatonin (MELATIN) 3 mg tablet Take 2 tablets (6 mg total) by mouth nightly as needed for Insomnia.    metoprolol tartrate (LOPRESSOR) 25 MG tablet Take 1 tablet (25 mg total) by mouth 2 (two) times daily.    nitroGLYCERIN (NITROSTAT) 0.4 MG SL tablet Place 1 tablet (0.4 mg total) under the tongue every 5 (five) minutes as needed.    pregabalin (LYRICA) 50 MG capsule Take 1 capsule (50 mg total) by mouth nightly.    simvastatin (ZOCOR) 80 MG tablet Take 1 tablet (80 mg total) by mouth once daily.    vitamin D (VITAMIN D3) 1000 units Tab Take 1,000 Units by mouth once daily.  "    No current facility-administered medications for this visit.     Facility-Administered Medications Ordered in Other Visits   Medication    sodium chloride 0.9% flush 5 mL       Review of Systems   Constitutional: Positive for malaise/fatigue.   Cardiovascular:  Negative for chest pain, dyspnea on exertion, irregular heartbeat, leg swelling and palpitations.   Respiratory:  Negative for shortness of breath.    Hematologic/Lymphatic: Negative for bleeding problem.   Skin:  Negative for rash.   Musculoskeletal:  Positive for joint pain and myalgias.   Gastrointestinal:  Negative for hematemesis, hematochezia and nausea.   Genitourinary:  Negative for hematuria.   Neurological:  Positive for focal weakness, light-headedness and numbness.   Psychiatric/Behavioral:  Negative for altered mental status.    Allergic/Immunologic: Negative for persistent infections.       Objective:          BP (!) 154/67   Pulse 92   Ht 5' 6" (1.676 m)   Wt 72.6 kg (160 lb)   BMI 25.82 kg/m²     Physical Exam  Vitals and nursing note reviewed.   Constitutional:       Appearance: Normal appearance. She is well-developed.   HENT:      Head: Normocephalic.      Nose: Nose normal.   Eyes:      Pupils: Pupils are equal, round, and reactive to light.   Cardiovascular:      Rate and Rhythm: Normal rate and regular rhythm.   Pulmonary:      Effort: No respiratory distress.      Breath sounds: Normal breath sounds.   Chest:      Comments: Device to LUCW. Incision and pocket in good repair.    Musculoskeletal:         General: Normal range of motion.   Skin:     General: Skin is warm and dry.      Findings: No erythema.   Neurological:      Mental Status: She is alert and oriented to person, place, and time.   Psychiatric:         Speech: Speech normal.         Behavior: Behavior normal.         Lab Results   Component Value Date     12/06/2023    K 3.9 12/06/2023    MG 1.6 06/19/2023    BUN 11 12/06/2023    CREATININE 1.0 12/06/2023    " ALT 6 (L) 12/06/2023    AST 12 12/06/2023    HGB 10.2 (L) 12/06/2023    HCT 31.4 (L) 12/06/2023    HCT 32 (L) 12/06/2019    TSH 2.976 12/06/2023    LDLCALC 66.4 08/17/2023       Recent Labs   Lab 09/14/23  1129   INR 1.0       Assessment:     1. Cardiac pacemaker in situ - LBBA pacing lead    2. Tachy-juan a syndrome    3. PAF (paroxysmal atrial fibrillation)    4. Essential hypertension    5. On anticoagulant therapy      Plan:     In summary, Ms. England is a 89 y.o. female with pAF/AFL, HTN, SAH, COPD, HFpEF, CKD, TBS here for follow up after PPM (LBBA pacing) implantation.  She is 4 mo s/p implantation of PPM for TBS. Ms. England is doing well from a device perspective with stable lead and device function. LBBA pacing lead tested at unipolar and bipolar configurations at high and low outputs. ECGs stable. Thresholds and impedence stable from prior device check. LVAT ~80ms.   AMS 7%. V rates not controlled. On eliquis. No palpitations but she does have some episodic fatigue/LH. Increase metoprolol tartrate to 50mg BID for more rate control. Minimal RV pacing. No CHF symptoms. No syncope since implant.    Increase lopressor to 50mg BID  Continue current medication regimen and device settings.   Follow up in device clinic as scheduled.   Follow up in EP clinic in 6 mo, sooner as needed.     *A copy of this note has been sent to Dr. Roman*    Follow up in about 6 months (around 7/23/2024).    ------------------------------------------------------------------    DIEGO Araya, NP-C  Cardiac Electrophysiology

## 2024-01-23 ENCOUNTER — OFFICE VISIT (OUTPATIENT)
Dept: ELECTROPHYSIOLOGY | Facility: CLINIC | Age: 89
End: 2024-01-23
Payer: MEDICARE

## 2024-01-23 ENCOUNTER — CLINICAL SUPPORT (OUTPATIENT)
Dept: CARDIOLOGY | Facility: HOSPITAL | Age: 89
End: 2024-01-23
Attending: INTERNAL MEDICINE
Payer: MEDICARE

## 2024-01-23 ENCOUNTER — HOSPITAL ENCOUNTER (OUTPATIENT)
Dept: CARDIOLOGY | Facility: CLINIC | Age: 89
Discharge: HOME | End: 2024-01-23
Payer: MEDICARE

## 2024-01-23 VITALS
DIASTOLIC BLOOD PRESSURE: 67 MMHG | HEART RATE: 92 BPM | BODY MASS INDEX: 25.71 KG/M2 | HEIGHT: 66 IN | WEIGHT: 160 LBS | SYSTOLIC BLOOD PRESSURE: 154 MMHG

## 2024-01-23 DIAGNOSIS — I10 ESSENTIAL HYPERTENSION: ICD-10-CM

## 2024-01-23 DIAGNOSIS — Z79.01 ON ANTICOAGULANT THERAPY: ICD-10-CM

## 2024-01-23 DIAGNOSIS — I48.0 PAF (PAROXYSMAL ATRIAL FIBRILLATION): ICD-10-CM

## 2024-01-23 DIAGNOSIS — I49.5 SICK SINUS SYNDROME: ICD-10-CM

## 2024-01-23 DIAGNOSIS — I49.5 TACHY-BRADY SYNDROME: ICD-10-CM

## 2024-01-23 DIAGNOSIS — I49.5 SSS (SICK SINUS SYNDROME): ICD-10-CM

## 2024-01-23 DIAGNOSIS — Z95.0 CARDIAC PACEMAKER IN SITU: Primary | ICD-10-CM

## 2024-01-23 DIAGNOSIS — Z95.0 PRESENCE OF CARDIAC PACEMAKER: ICD-10-CM

## 2024-01-23 PROCEDURE — 93280 PM DEVICE PROGR EVAL DUAL: CPT | Mod: 26,,, | Performed by: INTERNAL MEDICINE

## 2024-01-23 PROCEDURE — 99214 OFFICE O/P EST MOD 30 MIN: CPT | Mod: S$GLB,,, | Performed by: NURSE PRACTITIONER

## 2024-01-23 PROCEDURE — 93005 ELECTROCARDIOGRAM TRACING: CPT | Mod: S$GLB,,, | Performed by: INTERNAL MEDICINE

## 2024-01-23 PROCEDURE — 93010 ELECTROCARDIOGRAM REPORT: CPT | Mod: S$GLB,,, | Performed by: INTERNAL MEDICINE

## 2024-01-23 PROCEDURE — 93280 PM DEVICE PROGR EVAL DUAL: CPT

## 2024-01-23 PROCEDURE — 93005 ELECTROCARDIOGRAM TRACING: CPT | Mod: 76,S$GLB,, | Performed by: INTERNAL MEDICINE

## 2024-01-23 PROCEDURE — 99999 PR PBB SHADOW E&M-EST. PATIENT-LVL IV: CPT | Mod: PBBFAC,,, | Performed by: NURSE PRACTITIONER

## 2024-01-23 RX ORDER — METOPROLOL TARTRATE 50 MG/1
50 TABLET ORAL 2 TIMES DAILY
Qty: 60 TABLET | Refills: 11 | Status: SHIPPED | OUTPATIENT
Start: 2024-01-23

## 2024-01-25 ENCOUNTER — CLINICAL SUPPORT (OUTPATIENT)
Dept: CARDIOLOGY | Facility: HOSPITAL | Age: 89
End: 2024-01-25
Attending: INTERNAL MEDICINE
Payer: MEDICARE

## 2024-01-25 DIAGNOSIS — I49.5 SICK SINUS SYNDROME: ICD-10-CM

## 2024-01-25 DIAGNOSIS — Z95.0 PRESENCE OF CARDIAC PACEMAKER: ICD-10-CM

## 2024-01-25 PROCEDURE — 93296 REM INTERROG EVL PM/IDS: CPT | Performed by: INTERNAL MEDICINE

## 2024-01-25 PROCEDURE — 93294 REM INTERROG EVL PM/LDLS PM: CPT | Mod: ,,, | Performed by: INTERNAL MEDICINE

## 2024-02-23 LAB
OHS CV AF BURDEN PERCENT: < 1
OHS CV DC REMOTE DEVICE TYPE: NORMAL
OHS CV DC REMOTE DEVICE TYPE: NORMAL
OHS CV RV PACING PERCENT: 0 %
OHS CV RV PACING PERCENT: 2 %

## 2024-02-26 ENCOUNTER — PATIENT MESSAGE (OUTPATIENT)
Dept: INTERNAL MEDICINE | Facility: CLINIC | Age: 89
End: 2024-02-26
Payer: MEDICARE

## 2024-02-27 ENCOUNTER — PATIENT MESSAGE (OUTPATIENT)
Dept: INTERNAL MEDICINE | Facility: CLINIC | Age: 89
End: 2024-02-27
Payer: MEDICARE

## 2024-02-28 RX ORDER — DOXYCYCLINE HYCLATE 100 MG
100 TABLET ORAL 2 TIMES DAILY
Qty: 20 TABLET | Refills: 0 | Status: SHIPPED | OUTPATIENT
Start: 2024-02-28 | End: 2024-03-12

## 2024-02-28 NOTE — TELEPHONE ENCOUNTER
Spoke with patient.     She is having nasal congestion. She blows her nose then coughs up thick white phlegm from the back of her throat.  Occurs through out the day. Has been going on for weeks.  She is not taking anything for the cough    NO shortness of breath. No fever or chills.     Doxycycline 100 mg twice daily for 10 days.  Mucinex twice a daily  Drink plenty of fluids.

## 2024-03-12 ENCOUNTER — OFFICE VISIT (OUTPATIENT)
Dept: CARDIOLOGY | Facility: CLINIC | Age: 89
End: 2024-03-12
Payer: MEDICARE

## 2024-03-12 VITALS
SYSTOLIC BLOOD PRESSURE: 150 MMHG | HEIGHT: 65 IN | BODY MASS INDEX: 24.94 KG/M2 | HEART RATE: 95 BPM | DIASTOLIC BLOOD PRESSURE: 65 MMHG | WEIGHT: 149.69 LBS

## 2024-03-12 DIAGNOSIS — I10 ESSENTIAL HYPERTENSION: Primary | ICD-10-CM

## 2024-03-12 DIAGNOSIS — I49.5 SSS (SICK SINUS SYNDROME): ICD-10-CM

## 2024-03-12 DIAGNOSIS — Z95.0 PRESENCE OF CARDIAC PACEMAKER: ICD-10-CM

## 2024-03-12 DIAGNOSIS — I48.0 PAF (PAROXYSMAL ATRIAL FIBRILLATION): ICD-10-CM

## 2024-03-12 PROCEDURE — 99214 OFFICE O/P EST MOD 30 MIN: CPT | Mod: GC,S$GLB,, | Performed by: STUDENT IN AN ORGANIZED HEALTH CARE EDUCATION/TRAINING PROGRAM

## 2024-03-12 PROCEDURE — 99999 PR PBB SHADOW E&M-EST. PATIENT-LVL V: CPT | Mod: PBBFAC,GC,, | Performed by: STUDENT IN AN ORGANIZED HEALTH CARE EDUCATION/TRAINING PROGRAM

## 2024-03-12 RX ORDER — NITROGLYCERIN 0.4 MG/1
0.4 TABLET SUBLINGUAL EVERY 5 MIN PRN
Qty: 25 TABLET | Refills: 0 | Status: SHIPPED | OUTPATIENT
Start: 2024-03-12

## 2024-03-12 RX ORDER — AMLODIPINE AND BENAZEPRIL HYDROCHLORIDE 5; 20 MG/1; MG/1
2 CAPSULE ORAL DAILY
Qty: 180 CAPSULE | Refills: 3 | Status: SHIPPED | OUTPATIENT
Start: 2024-03-12 | End: 2024-05-22

## 2024-03-12 NOTE — PROGRESS NOTES
PCP - Pepper Whitfield MD    Subjective:   Patient ID:  Brunilda England is a 89 y.o. y.o. female who presents for regular follow up for pAF and HTN.     PMH  pAF/AFL  2023: Successful implantation of PPM Dual, LBAP lead for SSS/TBS.  HTN  SAH following a fall  Left hip fracture following a fall (s/p ORIF 2023)  COPD on home O2  HFpEF  CKD3  Normocytic anemia  Right sided lung/rectal CA s/p chemoradiation     Ms. England is accompanied by one of her daughters today and underwent successfully a pacemaker implantation with Dr. Roman after she was last seen in clinic. Patient denies any palpitations, chest pain, syncope, falls, sob or any other concerning symptoms. Blood pressure elevated on visit from 140-150/65 but has not been monitored regularly at home. Per daughter and patient she takes all her medications as directed.     History:     Social History     Tobacco Use    Smoking status: Former     Current packs/day: 0.00     Average packs/day: 0.5 packs/day for 30.0 years (15.0 ttl pk-yrs)     Types: Cigarettes     Start date: 1965     Quit date: 1995     Years since quittin.2     Passive exposure: Past    Smokeless tobacco: Never   Substance Use Topics    Alcohol use: No     Family History   Problem Relation Age of Onset    Stroke Mother     Cancer Father     Breast cancer Maternal Aunt     Ovarian cancer Neg Hx     Amblyopia Neg Hx     Blindness Neg Hx     Cataracts Neg Hx     Glaucoma Neg Hx     Macular degeneration Neg Hx     Retinal detachment Neg Hx     Strabismus Neg Hx        Meds:     Review of patient's allergies indicates:   Allergen Reactions    Bextra [valdecoxib] Swelling    Gabapentin Diarrhea and Nausea Only       Current Outpatient Medications:     acetaminophen (TYLENOL) 500 MG tablet, Take 2 tablets (1,000 mg total) by mouth every 8 (eight) hours as needed for Pain., Disp: , Rfl: 0    apixaban (ELIQUIS) 5 mg Tab, Take 1 tablet (5 mg total) by mouth 2 (two) times  daily. Hold this medication until cleared by Neurosurgery to resume., Disp: 60 tablet, Rfl: 6    azelastine (ASTELIN) 137 mcg (0.1 %) nasal spray, 1 spray (137 mcg total) by Nasal route 2 (two) times daily., Disp: 30 mL, Rfl: 3    calcium-vitamin D3 (OS-ERIC 500 + D3) 500 mg-5 mcg (200 unit) per tablet, Take 2 tablets by mouth once daily., Disp: , Rfl:     cyanocobalamin (VITAMIN B-12) 1000 MCG tablet, Take 1,000 mcg by mouth every morning., Disp: , Rfl:     donepeziL (ARICEPT) 5 MG tablet, Take 1 tablet (5 mg total) by mouth every evening., Disp: 90 tablet, Rfl: 4    donepeziL (ARICEPT) 5 MG tablet, Take 1 tablet (5 mg total) by mouth every evening., Disp: 90 tablet, Rfl: 4    DULoxetine (CYMBALTA) 60 MG capsule, Take 1 capsule (60 mg total) by mouth every morning., Disp: 90 capsule, Rfl: 3    hydrALAZINE (APRESOLINE) 25 MG tablet, Take 1 tablet (25 mg total) by mouth every 8 (eight) hours., Disp: 90 tablet, Rfl: 11    linaGLIPtin (TRADJENTA) 5 mg Tab tablet, Take 1 tablet (5 mg total) by mouth once daily., Disp: 90 tablet, Rfl: 3    melatonin (MELATIN) 3 mg tablet, Take 2 tablets (6 mg total) by mouth nightly as needed for Insomnia., Disp: , Rfl: 0    metoprolol tartrate (LOPRESSOR) 50 MG tablet, Take 1 tablet (50 mg total) by mouth 2 (two) times daily., Disp: 60 tablet, Rfl: 11    pregabalin (LYRICA) 50 MG capsule, Take 1 capsule (50 mg total) by mouth nightly., Disp: 60 capsule, Rfl: 2    simvastatin (ZOCOR) 80 MG tablet, Take 1 tablet (80 mg total) by mouth once daily., Disp: 90 tablet, Rfl: 4    vitamin D (VITAMIN D3) 1000 units Tab, Take 1,000 Units by mouth once daily., Disp: , Rfl:     amlodipine-benazepril 5-20 mg (LOTREL) 5-20 mg per capsule, Take 2 capsules by mouth once daily., Disp: 180 capsule, Rfl: 3    artificial tears (ISOPTO TEARS) 0.5 % ophthalmic solution, Place 1 drop into both eyes as needed., Disp: , Rfl:     estradioL (ESTRACE) 0.01 % (0.1 mg/gram) vaginal cream, USE ONE-HALF GRAM VAGINALLY  "TWICE a WEEK, Disp: 42.5 g, Rfl: 3    nitroGLYCERIN (NITROSTAT) 0.4 MG SL tablet, Place 1 tablet (0.4 mg total) under the tongue every 5 (five) minutes as needed for Chest pain., Disp: 25 tablet, Rfl: 0  No current facility-administered medications for this visit.    Facility-Administered Medications Ordered in Other Visits:     sodium chloride 0.9% flush 5 mL, 5 mL, Intravenous, PRN, Sarah Earl MD    Review of Systems   Cardiovascular:  Negative for orthopnea, claudication and leg swelling.   Neurological:  Negative for dizziness, focal weakness and weakness.   All other systems reviewed and are negative.      Objective:   BP (!) 150/65   Pulse 95   Ht 5' 5" (1.651 m)   Wt 67.9 kg (149 lb 11.1 oz)   BMI 24.91 kg/m²   Physical Exam  Vitals and nursing note reviewed.   Constitutional:       General: She is not in acute distress.     Appearance: Normal appearance. She is normal weight. She is not ill-appearing, toxic-appearing or diaphoretic.   HENT:      Head: Normocephalic and atraumatic.      Mouth/Throat:      Mouth: Mucous membranes are moist.      Pharynx: Oropharynx is clear.   Eyes:      Extraocular Movements: Extraocular movements intact.      Conjunctiva/sclera: Conjunctivae normal.   Neck:      Vascular: No carotid bruit.   Cardiovascular:      Rate and Rhythm: Normal rate and regular rhythm.      Pulses: Normal pulses.      Heart sounds: No murmur heard.     Comments: No JVD  Pulmonary:      Effort: Pulmonary effort is normal. No respiratory distress.      Breath sounds: Normal breath sounds.   Musculoskeletal:         General: No swelling or deformity.      Cervical back: Normal range of motion.      On a wheelchair  Skin:     General: Skin is warm and dry.      Capillary Refill: Capillary refill takes less than 2 seconds.   Neurological:      General: No focal deficit present.      Mental Status: She is alert and oriented to person, place, and time.   Psychiatric:         Mood and " Affect: Mood normal.         Thought Content: Thought content normal.          Labs:     Lab Results   Component Value Date     12/06/2023    K 3.9 12/06/2023     12/06/2023    CO2 25 12/06/2023    BUN 11 12/06/2023    CREATININE 1.0 12/06/2023    ANIONGAP 14 12/06/2023     Lab Results   Component Value Date    HGBA1C 5.7 (H) 12/06/2023     Lab Results   Component Value Date     (H) 08/17/2023     (H) 08/27/2022     (H) 07/09/2022       Lab Results   Component Value Date    WBC 3.28 (L) 12/06/2023    HGB 10.2 (L) 12/06/2023    HCT 31.4 (L) 12/06/2023    HCT 32 (L) 12/06/2019     12/06/2023    GRAN 2.1 12/06/2023    GRAN 64.0 12/06/2023     Lab Results   Component Value Date    CHOL 160 08/17/2023    HDL 59 08/17/2023    LDLCALC 66.4 08/17/2023    TRIG 173 (H) 08/17/2023       Lab Results   Component Value Date     12/06/2023    K 3.9 12/06/2023     12/06/2023    CO2 25 12/06/2023    BUN 11 12/06/2023    CREATININE 1.0 12/06/2023    ANIONGAP 14 12/06/2023     Lab Results   Component Value Date    HGBA1C 5.7 (H) 12/06/2023     Lab Results   Component Value Date     (H) 08/17/2023     (H) 08/27/2022     (H) 07/09/2022    Lab Results   Component Value Date    WBC 3.28 (L) 12/06/2023    HGB 10.2 (L) 12/06/2023    HCT 31.4 (L) 12/06/2023    HCT 32 (L) 12/06/2019     12/06/2023    GRAN 2.1 12/06/2023    GRAN 64.0 12/06/2023     Lab Results   Component Value Date    CHOL 160 08/17/2023    HDL 59 08/17/2023    LDLCALC 66.4 08/17/2023    TRIG 173 (H) 08/17/2023                Cardiovascular Imaging:     Echo:   EF   Date Value Ref Range Status   05/21/2023 53 % Final   07/27/2022 55 % Final       Assessment & Plan:     1. Essential hypertension    2. Presence of cardiac pacemaker    3. PAF (paroxysmal atrial fibrillation)    4. SSS (sick sinus syndrome)      Ms. England is a pleasant 89 y/o PMHx HTN, paroxysmal atrial fibrillation/flutter, SSS s/p  PPM,  COPD on home oxygen, Chronic diastolic HF, CKD 3, dementia, hx of lung/rectal cancer who presents for follow up for hypertension management.     Patient was interested in the digital hypertension clinic for better monitoring of her blood pressure while at home but unfortunately program is not covered by her medical insurance. We encouraged to buy a blood pressure cuff at home and keep a log book of her numbers.   Due to uncontrolled hypertension will increase her amlodipine-benazepril to 10mg/40mg a day.   PAF, SSS s/p ppm: will continue regular follow up with EP.     Plan discussed with Dr. De Los Santos. Would like to see patient back in 1-2 months for blood pressure monitoring after medication adjustments.       Signed:  Ry Cuello M.D.  Cardiovascular Fellow  [facility]

## 2024-03-26 ENCOUNTER — TELEPHONE (OUTPATIENT)
Dept: INTERNAL MEDICINE | Facility: CLINIC | Age: 89
End: 2024-03-26

## 2024-03-26 ENCOUNTER — OFFICE VISIT (OUTPATIENT)
Dept: INTERNAL MEDICINE | Facility: CLINIC | Age: 89
End: 2024-03-26
Payer: MEDICARE

## 2024-03-26 VITALS
BODY MASS INDEX: 26.35 KG/M2 | HEART RATE: 93 BPM | WEIGHT: 158.19 LBS | DIASTOLIC BLOOD PRESSURE: 56 MMHG | SYSTOLIC BLOOD PRESSURE: 114 MMHG | HEIGHT: 65 IN | OXYGEN SATURATION: 96 %

## 2024-03-26 DIAGNOSIS — J43.2 CENTRILOBULAR EMPHYSEMA: ICD-10-CM

## 2024-03-26 DIAGNOSIS — G30.1 LATE ONSET ALZHEIMER'S DEMENTIA WITH BEHAVIORAL DISTURBANCE: ICD-10-CM

## 2024-03-26 DIAGNOSIS — C34.90 MALIGNANT NEOPLASM OF LUNG, UNSPECIFIED LATERALITY, UNSPECIFIED PART OF LUNG: ICD-10-CM

## 2024-03-26 DIAGNOSIS — I25.119 ATHEROSCLEROSIS OF NATIVE CORONARY ARTERY OF NATIVE HEART WITH ANGINA PECTORIS: ICD-10-CM

## 2024-03-26 DIAGNOSIS — H35.3232 EXUDATIVE AGE-RELATED MACULAR DEGENERATION OF BOTH EYES WITH INACTIVE CHOROIDAL NEOVASCULARIZATION: ICD-10-CM

## 2024-03-26 DIAGNOSIS — I70.229 ATHEROSCLEROTIC PERIPHERAL VASCULAR DISEASE WITH REST PAIN: ICD-10-CM

## 2024-03-26 DIAGNOSIS — I27.20 PULMONARY HYPERTENSION: ICD-10-CM

## 2024-03-26 DIAGNOSIS — E11.51 TYPE 2 DIABETES MELLITUS WITH DIABETIC PERIPHERAL ANGIOPATHY WITHOUT GANGRENE, WITHOUT LONG-TERM CURRENT USE OF INSULIN: ICD-10-CM

## 2024-03-26 DIAGNOSIS — F02.818 LATE ONSET ALZHEIMER'S DEMENTIA WITH BEHAVIORAL DISTURBANCE: ICD-10-CM

## 2024-03-26 DIAGNOSIS — F39 MOOD DISORDER: ICD-10-CM

## 2024-03-26 DIAGNOSIS — I10 ESSENTIAL HYPERTENSION: Primary | ICD-10-CM

## 2024-03-26 DIAGNOSIS — N18.31 STAGE 3A CHRONIC KIDNEY DISEASE: ICD-10-CM

## 2024-03-26 DIAGNOSIS — D69.2 OTHER NONTHROMBOCYTOPENIC PURPURA: ICD-10-CM

## 2024-03-26 PROCEDURE — 99999 PR PBB SHADOW E&M-EST. PATIENT-LVL V: CPT | Mod: PBBFAC,,, | Performed by: INTERNAL MEDICINE

## 2024-03-26 PROCEDURE — 99214 OFFICE O/P EST MOD 30 MIN: CPT | Mod: S$GLB,,, | Performed by: INTERNAL MEDICINE

## 2024-03-26 RX ORDER — ACETAMINOPHEN 500 MG
TABLET ORAL
Qty: 1 EACH | Refills: 0 | Status: SHIPPED | OUTPATIENT
Start: 2024-03-26

## 2024-03-26 RX ORDER — QUETIAPINE FUMARATE 25 MG/1
TABLET, FILM COATED ORAL
Qty: 30 TABLET | Refills: 1 | Status: SHIPPED | OUTPATIENT
Start: 2024-03-26

## 2024-03-26 NOTE — PROGRESS NOTES
CHIEF COMPLAINT:  follow up multiple issues    HISTORY OF PRESENT ILLNESS: This is a 89-year-old woman who presents with her daughter follow up of multiple issues     Pacemaker was placed on 9/21/23 due to sick sinus syndrome.  Koban was placed on the left antecubital area at discharge and wound up staying on the arm for about 6 days.  She had swelling of the left hand and had a superficial clot.   Since then, she has has had intermittent stinging pain in the left hand and forearm.   Wearing a glove or ace bandage helps the stinging. Massaging the area helps. Swelling has resolved.       He fell and fractured left hip on 5/28/23- ORIF on 5/30/23.  She went to SNF from 6/5/23 to 6/22/23. No pain in the left hip.     She continues to use oxygen intermittently since the hospitalization from the hip fracture in  May. Intermittently her oxygen levels will drop and she needs the oxygen.   She will fight her daughter to wear the oxygen.        Mood is good     BP at home has been doing well.  110-130/60-70. Pulse 80-90. She is taking amlodopine benazepril 5/20 2 tablets once daily, hydralaxine 25 mg three times daily and metoprolol tartrate 50 mg twice daily.  She continues to take ELiquis 5 mg twice daily.      She is taking donepezil 5 mg once daily for her memory. She feels that here mood and memory are ok.  SHe continues to take duloxetine 60 mg daily.  Neuropathic pain in the legs is better controlled with the duloxetine.       No cough.   She has some dyspnea on exertion which is stable. CT chest was stable. She has some nasal congestion. No nausea, vomiting, constipation, diarrhea.            Her non small cell lung cancer is stable. She saw Dr Weir 10/4/2021. Had a CT Scan 10/14/22   She is using her oxygen at night.      She is now prescribed Trajenta 5 mg daily for her diabetes.     She continues to take simvastatin 80 mg at bedtime for her hyperlipidemia. No joint pain or muscle pain.        She is off Fosamax  "once week for her bones.  No melana, bloody stools, constipation.   She saw Endocrine who recommended Reclast infusion -  done 12/15/23.      She takes vitamin B12 1000 mcg daily for vitamin B12 deficiency - anemia is stable        SHe has macular degeneration of the eyes and was followed by Dr Tresa Coreas. She got injections in her eyes every 6 weeks in Spring 2015. She now sees Dr Ruelas at [facility] (last saw him 8/18- no need for injections at that time- yearly follow up). Vision has been stable. She is taking a Eye vitamins for her eyes.       PAST MEDICAL HISTORY:    1. Stage III non-small cell lung cancer, completed chemoradiation with carboplatin and Taxol on 6/1/12    2. Hypertension.   3. Diabetes mellitus.   4. Hyperlipidemia.   5. Chronic diastolic dysfunction.   6. Coronary artery disease status post MI in 1990 and 2003. Stenting was   done at 2003 at [facility].   7. Cholecystectomy, December 2006.   8. Cataract removal.   9. Benign growth removed from her throat.   10. Left common femoral endarterectomy, July 2007.   11. History of anal cancer in 1996 status post radiation and chemotherapy.   12. Carpal tunnel syndrome.   13. Osteoporosis on BMD 12/11    14. Macular degeneration  15. fractured left hip on 5/28/23- ORIF on 5/30/23  16. Pacemaker was placed on 9/21/23 due to sick sinus syndrome.     SOCIAL HISTORY: She quit smoking in 1995, denies any alcohol use. She is etired.     REVIEW OF SYSTEMS: She denies fevers, chills, night sweats, fatigue, visual change, hearing loss, sinus congestion, sore throat, dysuria, hematuria, joint pain, muscle pain, polydipsia, and polyuria.     PHYSICAL EXAM:       BP (!) 114/56 (BP Location: Left arm, Patient Position: Sitting, BP Method: Large (Manual))   Pulse 93   Ht 5' 5" (1.651 m)   Wt 71.7 kg (158 lb 2.9 oz)   SpO2 96%   BMI 26.32 kg/m²        GENERAL: She is alert and oriented. No apparent distress. Affect within normal limits. "   Conjunctivae anicteric.  TM clear  NECK: Supple.   Respiratory effort normal. Lungs clear to auscultation.   HEART: Regular rate and rhythm without murmurs, gallops, or rubs. NO lower   extremity edema.         ASSESSMENT AND PLAN:        Left intertrochanteric hip fracture- s/p- Open reduction internal fixation left intertrochanteric hip fracture with intramedullary nail. Healed  Paroxysmal A fib with tachy-juan a in the hospital -s/p pacemaker due to sick sinus syndrome - on eliquis  Diastolic dysfunction with history of lung cancer and COPD and pulmonary htn on echo 5/21/23 - now with hypoxic respiratory failure - oxygen 2 liters nasal cannula at night  Diabetes  continue the Tradjenta.    Hypertension- stable  PVD - stable  Mood disorder with dementia - on cymbalta - add seroquel 25 mg 1/2-1 every 8 hours as needed for agitation  Memory issues/alzheimers- follow up with Dr Alanis. off memantine  Stage III non-small cell lung cancer, completed chemoradiation with carboplatin and Taxol on 6/1/12 - doing well. No signs of recurrence - CT Chest  11/3/2022  Hyperlipidemia-lipids controlled  Coronary artery disease modifying risk factors.   History of anal cancer-had a normal colonoscopy, November 2008; due 2015. Declined repeat  Pernicious anemia - on counter vitamin B12 1000 mcg daily  Osteoporosis - took alendroante for 7 years - 2012 to 2019.  Saw Endocrine 9/2023 - had Reclast infusion 12/15/23.     Macular degeneration - stable   VItamin D deficiency -  vitamin D 2000 units daily.   Vaginal bleeding - asx currently  Allergic rhinitis - astelin nasal spray  Stage 3A chronic kidney disease - stable  Screening mammogram was 12/19 - declines      I will see her back in 16 weeks sooner if problems arise

## 2024-03-28 ENCOUNTER — TELEPHONE (OUTPATIENT)
Dept: INTERNAL MEDICINE | Facility: CLINIC | Age: 89
End: 2024-03-28
Payer: MEDICARE

## 2024-03-28 NOTE — TELEPHONE ENCOUNTER
States patient can only get BP kit if she is getting dialysis? States she can get it out the over the counter catalogue from her insurance she would have to call member services. Request for BP kit has been canceled by angélica.

## 2024-03-28 NOTE — TELEPHONE ENCOUNTER
----- Message from Denise Pena sent at 3/28/2024  2:52 PM CDT -----  Contact: [facility]/Denise/  [email or web address]     Patient is calling for Medical Advice regarding:need information     Would like response via [employer]: Would like a return call     Comments:need additional information on a request for a blood pressure kit for patient. Please advise

## 2024-04-01 ENCOUNTER — TELEPHONE (OUTPATIENT)
Dept: INTERNAL MEDICINE | Facility: CLINIC | Age: 89
End: 2024-04-01
Payer: MEDICARE

## 2024-04-01 NOTE — TELEPHONE ENCOUNTER
Pt insurance will not cover bp machine however pt does get a card a month of 249.00 that she can purchase the bp machine with.  Request will be canceled

## 2024-04-15 ENCOUNTER — OFFICE VISIT (OUTPATIENT)
Dept: URGENT CARE | Facility: CLINIC | Age: 89
End: 2024-04-15
Payer: MEDICARE

## 2024-04-15 VITALS
DIASTOLIC BLOOD PRESSURE: 59 MMHG | HEIGHT: 65 IN | SYSTOLIC BLOOD PRESSURE: 155 MMHG | OXYGEN SATURATION: 96 % | WEIGHT: 158.06 LBS | RESPIRATION RATE: 17 BRPM | HEART RATE: 74 BPM | TEMPERATURE: 98 F | BODY MASS INDEX: 26.33 KG/M2

## 2024-04-15 DIAGNOSIS — R19.7 DIARRHEA, UNSPECIFIED TYPE: Primary | ICD-10-CM

## 2024-04-15 LAB
BILIRUB UR QL STRIP: NEGATIVE
GLUCOSE UR QL STRIP: NEGATIVE
KETONES UR QL STRIP: NEGATIVE
LEUKOCYTE ESTERASE UR QL STRIP: NEGATIVE
PH, POC UA: 6 (ref 5–8)
POC BLOOD, URINE: POSITIVE
POC NITRATES, URINE: NEGATIVE
PROT UR QL STRIP: POSITIVE
SP GR UR STRIP: 1.01 (ref 1–1.03)
UROBILINOGEN UR STRIP-ACNC: ABNORMAL (ref 0.1–1.1)

## 2024-04-15 PROCEDURE — 99214 OFFICE O/P EST MOD 30 MIN: CPT | Mod: S$GLB,,, | Performed by: NURSE PRACTITIONER

## 2024-04-15 PROCEDURE — 87086 URINE CULTURE/COLONY COUNT: CPT | Performed by: NURSE PRACTITIONER

## 2024-04-15 PROCEDURE — 81003 URINALYSIS AUTO W/O SCOPE: CPT | Mod: QW,S$GLB,, | Performed by: NURSE PRACTITIONER

## 2024-04-15 NOTE — PROGRESS NOTES
"Subjective:      Patient ID: Brunilda England is a 89 y.o. female.    Vitals:  height is 5' 5" (1.651 m) and weight is 71.7 kg (158 lb 1.1 oz). Her temperature is 98 °F (36.7 °C). Her blood pressure is 155/59 (abnormal) and her pulse is 74. Her respiration is 17 and oxygen saturation is 96%.     Chief Complaint: Urinary Tract Infection    Pt is a 89 yo female w/ c/o diarrhea for 2 days. Pt's daughter reports she just is not acting like herself and pt even stated to her "I just don't feel like myself". Denies dysuria, urinary frequency, back pain, fever. She has been taking pepto bismol for her symptoms with no relief. Pt alert and interactive during visit, answers questions appropriately.     Diarrhea   This is a new problem. The current episode started yesterday. The problem has been unchanged. Associated symptoms include coughing. She has tried bismuth subsalicylate and electrolyte solution (pepto) for the symptoms. The treatment provided no relief.       Respiratory:  Positive for cough.    Gastrointestinal:  Positive for diarrhea.      Objective:     Physical Exam   Constitutional: She is oriented to person, place, and time.   HENT:   Head: Normocephalic.   Ears:   Right Ear: External ear normal.   Left Ear: External ear normal.   Nose: Nose normal.   Mouth/Throat: Mucous membranes are moist.   Eyes: Conjunctivae are normal.   Cardiovascular: Normal rate, regular rhythm and normal heart sounds.   Pulmonary/Chest: Effort normal and breath sounds normal.   Abdominal: There is no left CVA tenderness and no right CVA tenderness.   Musculoskeletal: Normal range of motion.         General: Normal range of motion.   Neurological: She is alert and oriented to person, place, and time.   Skin: Skin is dry.   Psychiatric: Her behavior is normal.   Nursing note and vitals reviewed.    Results for orders placed or performed in visit on 04/15/24   POCT Urinalysis, Dipstick, Automated, W/O Scope   Result Value Ref Range    POC " Blood, Urine Positive (A) Negative    POC Bilirubin, Urine Negative Negative    POC Urobilinogen, Urine norm 0.1 - 1.1    POC Ketones, Urine Negative Negative    POC Protein, Urine Positive (A) Negative    POC Nitrates, Urine Negative Negative    POC Glucose, Urine Negative Negative    pH, UA 6.0 5 - 8    POC Specific Gravity, Urine 1.015 1.003 - 1.029    POC Leukocytes, Urine Negative Negative     *Note: Due to a large number of results and/or encounters for the requested time period, some results have not been displayed. A complete set of results can be found in Results Review.       Assessment:     1. Diarrhea, unspecified type        Plan:       Diarrhea, unspecified type  -     POCT Urinalysis, Dipstick, Automated, W/O Scope  -     CULTURE, URINE      Patient Instructions   - You must understand that you have received an Urgent Care treatment only and that you may be released before all of your medical problems are known or treated.   - You, the patient, will arrange for follow up care as instructed.   - If your condition worsens or fails to improve we recommend that you receive another evaluation at the ER immediately or contact your PCP to discuss your concerns.   - You can call [phone] or [phone] to help schedule an appointment with the appropriate provider.      Drink plenty of fluids  Strict ER precautions for new or worsening symptoms  Follow up with Primary Care as needed  We will call you with the results of your urine culture

## 2024-04-15 NOTE — PATIENT INSTRUCTIONS
- You must understand that you have received an Urgent Care treatment only and that you may be released before all of your medical problems are known or treated.   - You, the patient, will arrange for follow up care as instructed.   - If your condition worsens or fails to improve we recommend that you receive another evaluation at the ER immediately or contact your PCP to discuss your concerns.   - You can call [phone] or [phone] to help schedule an appointment with the appropriate provider.      Drink plenty of fluids  Strict ER precautions for new or worsening symptoms  Follow up with Primary Care as needed  We will call you with the results of your urine culture

## 2024-04-16 LAB
BACTERIA UR CULT: NORMAL
BACTERIA UR CULT: NORMAL

## 2024-04-25 ENCOUNTER — CLINICAL SUPPORT (OUTPATIENT)
Dept: CARDIOLOGY | Facility: HOSPITAL | Age: 89
End: 2024-04-25
Payer: MEDICARE

## 2024-04-25 ENCOUNTER — CLINICAL SUPPORT (OUTPATIENT)
Dept: CARDIOLOGY | Facility: HOSPITAL | Age: 89
End: 2024-04-25
Attending: INTERNAL MEDICINE
Payer: MEDICARE

## 2024-04-25 DIAGNOSIS — Z95.0 PRESENCE OF CARDIAC PACEMAKER: ICD-10-CM

## 2024-04-25 DIAGNOSIS — I49.5 SICK SINUS SYNDROME: ICD-10-CM

## 2024-04-25 PROCEDURE — 93294 REM INTERROG EVL PM/LDLS PM: CPT | Mod: ,,, | Performed by: INTERNAL MEDICINE

## 2024-04-25 PROCEDURE — 93296 REM INTERROG EVL PM/IDS: CPT | Performed by: INTERNAL MEDICINE

## 2024-04-26 LAB
OHS CV AF BURDEN PERCENT: 3
OHS CV DC REMOTE DEVICE TYPE: NORMAL
OHS CV RV PACING PERCENT: 2 %

## 2024-05-20 ENCOUNTER — PATIENT MESSAGE (OUTPATIENT)
Dept: INTERNAL MEDICINE | Facility: CLINIC | Age: 89
End: 2024-05-20
Payer: MEDICARE

## 2024-05-20 RX ORDER — AMLODIPINE AND BENAZEPRIL HYDROCHLORIDE 5; 20 MG/1; MG/1
2 CAPSULE ORAL DAILY
Qty: 180 CAPSULE | Refills: 3 | Status: CANCELLED | OUTPATIENT
Start: 2024-05-20 | End: 2025-05-20

## 2024-05-22 ENCOUNTER — TELEPHONE (OUTPATIENT)
Dept: CARDIOLOGY | Facility: CLINIC | Age: 89
End: 2024-05-22
Payer: MEDICARE

## 2024-05-22 ENCOUNTER — PATIENT MESSAGE (OUTPATIENT)
Dept: ADMINISTRATIVE | Facility: HOSPITAL | Age: 89
End: 2024-05-22
Payer: MEDICARE

## 2024-05-22 RX ORDER — AMLODIPINE AND BENAZEPRIL HYDROCHLORIDE 10; 40 MG/1; MG/1
1 CAPSULE ORAL DAILY
Qty: 90 CAPSULE | Refills: 3 | Status: SHIPPED | OUTPATIENT
Start: 2024-05-22 | End: 2025-05-22

## 2024-05-22 NOTE — TELEPHONE ENCOUNTER
----- Message from Tara De Los Santos MD sent at 5/22/2024  4:30 PM CDT -----  Regarding: RE: problems w. prescription  Prescription for amlodipine-benazepril 10/ 40 mg once daily sent to [facility].  ----- Message -----  From: Cecily Miranda RN  Sent: 5/22/2024   3:38 PM CDT  To: Tara De Los Santos MD; #  Subject: FW: problems w. prescription                     Dr Tran is on leave, see message below.    I did not enter a prescription because I don't know if she should be changed to separate meds bid or 10/40 qd.    Please advise,  ----- Message -----  From: Cecily Miranda RN  Sent: 5/20/2024  12:54 PM CDT  To: Cecily Miranda RN; #  Subject: problems w. prescription                         Spoke w. daughter and pharmacy. Pt was changed to amlodipine benazepril 5-20 mg for 2 pills a day. They have actually been taking one pill bid. Per pharmacy, insurance won't pay for 2 pills a day and want it changed to 10-40 mg instead.    Please advise in view of taking it bid.  ----- Message -----  From: Salud La  Sent: 5/20/2024  12:38 PM CDT  To: Cecily Miranda RN  Subject: Refill                                           Patient daughter Haley calling for a refill and need authorization for amlodipine-benazepril 5-20 mg (LOTREL) 5-20 mg per capsule. Please call back @ [phone]. Thank you Salud

## 2024-06-10 ENCOUNTER — PATIENT MESSAGE (OUTPATIENT)
Dept: INTERNAL MEDICINE | Facility: CLINIC | Age: 89
End: 2024-06-10
Payer: MEDICARE

## 2024-06-11 DIAGNOSIS — E11.51 TYPE 2 DIABETES MELLITUS WITH DIABETIC PERIPHERAL ANGIOPATHY WITHOUT GANGRENE, WITHOUT LONG-TERM CURRENT USE OF INSULIN: ICD-10-CM

## 2024-06-11 RX ORDER — LINAGLIPTIN 5 MG/1
5 TABLET, FILM COATED ORAL DAILY
Qty: 90 TABLET | Refills: 0 | Status: SHIPPED | OUTPATIENT
Start: 2024-06-11 | End: 2025-06-11

## 2024-06-11 NOTE — TELEPHONE ENCOUNTER
----- Message from Lilly Daniel sent at 6/11/2024  8:56 AM CDT -----  Contact: [phone] Patient Daughter  Requesting an RX refill or new RX.  Is this a refill or new RX: refill  RX name and strength (copy/paste from chart):  apixaban (ELIQUIS) 5 mg Tab     Is this a 30 day or 90 day RX:   Pharmacy name and phone # (copy/paste from chart):  [address] [address]  Phone: [phone] [phone] Fax: [phone]      Requesting an RX refill or new RX.  Is this a refill or new RX: refill  RX name and strength (copy/paste from chart):  metoprolol tartrate (LOPRESSOR) 50 MG tablet  Is this a 30 day or 90 day RX:   Pharmacy name and phone # (copy/paste from chart):    [address] [address]  Phone: [phone] Fax: [phone]     The doctors have asked that we provide their patients with the following 2 reminders -- prescription refills can take up to 72 hours, and a friendly reminder that in the future you can use your [facility] account to request refills: no

## 2024-06-11 NOTE — TELEPHONE ENCOUNTER
Refill Routing Note   Medication(s) are not appropriate for processing by [facility] for the following reason(s):        Outside of protocol  Required labs outdated    [facility] action(s):  Defer  Route               Appointments  past 12m or future 3m with PCP    Date Provider   Last Visit   3/26/2024 Pepper Whitfield MD   Next Visit   7/26/2024 Pepper Whitfield MD   ED visits in past 90 days: 0        Note composed:10:44 AM 06/11/2024

## 2024-06-11 NOTE — TELEPHONE ENCOUNTER
Care Due:                  Date            Visit Type   Department     Provider  --------------------------------------------------------------------------------                                EP -                              PRIMARY      [facility] INTERNAL  Last Visit: 03-      CARE ([facility])   MEDICINE       GERRI ADORNO                              [facility]                              PRIMARY      [facility] INTERNAL  Next Visit: 07-      CARE ([facility])   MEDICINE       GERRI ADORNO                                                            Last  Test          Frequency    Reason                     Performed    Due Date  --------------------------------------------------------------------------------    HBA1C.......  6 months...  linaGLIPtin..............  12- 06-    Lipid Panel.  12 months..  simvastatin..............  08- 08-    [facility] Embedded Care Due Messages. Reference number: [id number].   6/11/2024 10:01:09 AM CDT

## 2024-06-11 NOTE — TELEPHONE ENCOUNTER
----- Message from Lilly Daniel sent at 6/11/2024  8:56 AM CDT -----  Contact: [phone] Patient Daughter  Requesting an RX refill or new RX.  Is this a refill or new RX: new  RX name and strength (copy/paste from chart):  linaGLIPtin (TRADJENTA) 5 mg Tab tablet  Is this a 30 day or 90 day RX:   Pharmacy name and phone # (copy/paste from chart):    [address] [address]  Phone: [phone] [phone] Fax: [phone]     The doctors have asked that we provide their patients with the following 2 reminders -- prescription refills can take up to 72 hours, and a friendly reminder that in the future you can use your [facility] account to request refills: yes   Keep wound clean and do not let wound become covered with clots. Apply bacitracin twice a day. Return with any concerns. Thank you for choosing the emergency department at [facility]. We appreciated the opportunity and privilege to address your healthcare needs. We remain available to you should you require additional evaluation or assistance. We value your feedback and would appreciate the opportunity to address anything you identified as an opportunity to improve or where we excelled. If there are colleagues who deserve special recognition, please let us know! We hope you are feeling better soon! Please also note that sometimes there are subtle abnormalities in your lab values that you may observe when you access your record online. These are frequently not worrisome and if they are of concern we will have discussed them with you. However, we always encourage that you discuss any concerns you may have or observe on your record with your primary care provider. Please also be aware that voice transcription will occasionally recognize words or grammar differently than what was spoken.

## 2024-06-11 NOTE — TELEPHONE ENCOUNTER
----- Message from Lilly Daniel sent at 6/11/2024  8:56 AM CDT -----  Contact: [phone] Patient Daughter  Requesting an RX refill or new RX.  Is this a refill or new RX: new  RX name and strength (copy/paste from chart):  linaGLIPtin (TRADJENTA) 5 mg Tab tablet  Is this a 30 day or 90 day RX:   Pharmacy name and phone # (copy/paste from chart):    [address] [address]  Phone: [phone] [phone] Fax: [phone]     The doctors have asked that we provide their patients with the following 2 reminders -- prescription refills can take up to 72 hours, and a friendly reminder that in the future you can use your [facility] account to request refills: yes

## 2024-07-17 ENCOUNTER — LAB VISIT (OUTPATIENT)
Dept: LAB | Facility: HOSPITAL | Age: 89
End: 2024-07-17
Payer: MEDICARE

## 2024-07-17 ENCOUNTER — OFFICE VISIT (OUTPATIENT)
Dept: INTERNAL MEDICINE | Facility: CLINIC | Age: 89
End: 2024-07-17
Payer: MEDICARE

## 2024-07-17 VITALS
WEIGHT: 151.44 LBS | SYSTOLIC BLOOD PRESSURE: 122 MMHG | HEIGHT: 65 IN | DIASTOLIC BLOOD PRESSURE: 56 MMHG | OXYGEN SATURATION: 99 % | BODY MASS INDEX: 25.23 KG/M2 | HEART RATE: 75 BPM

## 2024-07-17 DIAGNOSIS — N18.31 STAGE 3A CHRONIC KIDNEY DISEASE: ICD-10-CM

## 2024-07-17 DIAGNOSIS — G30.1 LATE ONSET ALZHEIMER'S DEMENTIA WITH BEHAVIORAL DISTURBANCE: ICD-10-CM

## 2024-07-17 DIAGNOSIS — E11.51 TYPE 2 DIABETES MELLITUS WITH DIABETIC PERIPHERAL ANGIOPATHY WITHOUT GANGRENE, WITHOUT LONG-TERM CURRENT USE OF INSULIN: ICD-10-CM

## 2024-07-17 DIAGNOSIS — F02.818 LATE ONSET ALZHEIMER'S DEMENTIA WITH BEHAVIORAL DISTURBANCE: ICD-10-CM

## 2024-07-17 DIAGNOSIS — I10 ESSENTIAL HYPERTENSION: ICD-10-CM

## 2024-07-17 DIAGNOSIS — R35.0 URINARY FREQUENCY: ICD-10-CM

## 2024-07-17 DIAGNOSIS — I10 ESSENTIAL HYPERTENSION: Primary | ICD-10-CM

## 2024-07-17 LAB
ALBUMIN SERPL BCP-MCNC: 3.3 G/DL (ref 3.5–5.2)
ALP SERPL-CCNC: 54 U/L (ref 55–135)
ALT SERPL W/O P-5'-P-CCNC: 5 U/L (ref 10–44)
ANION GAP SERPL CALC-SCNC: 9 MMOL/L (ref 8–16)
AST SERPL-CCNC: 10 U/L (ref 10–40)
BASOPHILS # BLD AUTO: 0.03 K/UL (ref 0–0.2)
BASOPHILS NFR BLD: 0.8 % (ref 0–1.9)
BILIRUB SERPL-MCNC: 0.2 MG/DL (ref 0.1–1)
BUN SERPL-MCNC: 9 MG/DL (ref 10–30)
CALCIUM SERPL-MCNC: 9.6 MG/DL (ref 8.7–10.5)
CHLORIDE SERPL-SCNC: 106 MMOL/L (ref 95–110)
CO2 SERPL-SCNC: 26 MMOL/L (ref 23–29)
CREAT SERPL-MCNC: 1.2 MG/DL (ref 0.5–1.4)
DIFFERENTIAL METHOD BLD: ABNORMAL
EOSINOPHIL # BLD AUTO: 0 K/UL (ref 0–0.5)
EOSINOPHIL NFR BLD: 1 % (ref 0–8)
ERYTHROCYTE [DISTWIDTH] IN BLOOD BY AUTOMATED COUNT: 18.4 % (ref 11.5–14.5)
EST. GFR  (NO RACE VARIABLE): 42.5 ML/MIN/1.73 M^2
ESTIMATED AVG GLUCOSE: 123 MG/DL (ref 68–131)
GLUCOSE SERPL-MCNC: 149 MG/DL (ref 70–110)
HBA1C MFR BLD: 5.9 % (ref 4–5.6)
HCT VFR BLD AUTO: 29 % (ref 37–48.5)
HGB BLD-MCNC: 9.5 G/DL (ref 12–16)
IMM GRANULOCYTES # BLD AUTO: 0.01 K/UL (ref 0–0.04)
IMM GRANULOCYTES NFR BLD AUTO: 0.3 % (ref 0–0.5)
LYMPHOCYTES # BLD AUTO: 1 K/UL (ref 1–4.8)
LYMPHOCYTES NFR BLD: 25.2 % (ref 18–48)
MCH RBC QN AUTO: 28.8 PG (ref 27–31)
MCHC RBC AUTO-ENTMCNC: 32.8 G/DL (ref 32–36)
MCV RBC AUTO: 88 FL (ref 82–98)
MONOCYTES # BLD AUTO: 0.4 K/UL (ref 0.3–1)
MONOCYTES NFR BLD: 10.6 % (ref 4–15)
NEUTROPHILS # BLD AUTO: 2.5 K/UL (ref 1.8–7.7)
NEUTROPHILS NFR BLD: 62.1 % (ref 38–73)
NRBC BLD-RTO: 0 /100 WBC
PLATELET # BLD AUTO: 212 K/UL (ref 150–450)
PMV BLD AUTO: 12.1 FL (ref 9.2–12.9)
POTASSIUM SERPL-SCNC: 4.2 MMOL/L (ref 3.5–5.1)
PROT SERPL-MCNC: 7.5 G/DL (ref 6–8.4)
RBC # BLD AUTO: 3.3 M/UL (ref 4–5.4)
SODIUM SERPL-SCNC: 141 MMOL/L (ref 136–145)
TSH SERPL DL<=0.005 MIU/L-ACNC: 3.63 UIU/ML (ref 0.4–4)
WBC # BLD AUTO: 3.97 K/UL (ref 3.9–12.7)

## 2024-07-17 PROCEDURE — 3288F FALL RISK ASSESSMENT DOCD: CPT | Mod: CPTII,S$GLB,, | Performed by: PHYSICIAN ASSISTANT

## 2024-07-17 PROCEDURE — 99214 OFFICE O/P EST MOD 30 MIN: CPT | Mod: S$GLB,,, | Performed by: PHYSICIAN ASSISTANT

## 2024-07-17 PROCEDURE — 1160F RVW MEDS BY RX/DR IN RCRD: CPT | Mod: CPTII,S$GLB,, | Performed by: PHYSICIAN ASSISTANT

## 2024-07-17 PROCEDURE — 1101F PT FALLS ASSESS-DOCD LE1/YR: CPT | Mod: CPTII,S$GLB,, | Performed by: PHYSICIAN ASSISTANT

## 2024-07-17 PROCEDURE — 84443 ASSAY THYROID STIM HORMONE: CPT | Performed by: PHYSICIAN ASSISTANT

## 2024-07-17 PROCEDURE — 36415 COLL VENOUS BLD VENIPUNCTURE: CPT | Performed by: PHYSICIAN ASSISTANT

## 2024-07-17 PROCEDURE — 1126F AMNT PAIN NOTED NONE PRSNT: CPT | Mod: CPTII,S$GLB,, | Performed by: PHYSICIAN ASSISTANT

## 2024-07-17 PROCEDURE — 80053 COMPREHEN METABOLIC PANEL: CPT | Performed by: PHYSICIAN ASSISTANT

## 2024-07-17 PROCEDURE — 1159F MED LIST DOCD IN RCRD: CPT | Mod: CPTII,S$GLB,, | Performed by: PHYSICIAN ASSISTANT

## 2024-07-17 PROCEDURE — 83036 HEMOGLOBIN GLYCOSYLATED A1C: CPT | Performed by: PHYSICIAN ASSISTANT

## 2024-07-17 PROCEDURE — 85025 COMPLETE CBC W/AUTO DIFF WBC: CPT | Performed by: PHYSICIAN ASSISTANT

## 2024-07-17 PROCEDURE — 99999 PR PBB SHADOW E&M-EST. PATIENT-LVL V: CPT | Mod: PBBFAC,,, | Performed by: PHYSICIAN ASSISTANT

## 2024-07-17 RX ORDER — HYDRALAZINE HYDROCHLORIDE 25 MG/1
25 TABLET, FILM COATED ORAL EVERY 8 HOURS
Qty: 90 TABLET | Refills: 11 | Status: SHIPPED | OUTPATIENT
Start: 2024-07-17 | End: 2025-07-17

## 2024-07-17 NOTE — PROGRESS NOTES
Subjective:       Patient ID: Brunilda England is a 89 y.o. female.        Chief Complaint: Fatigue and Dementia    Brunilda England is an established patient of Pepper Whitfield MD here today for urgent care visit.    Two daughters, Yuri and Nora are here today with her.  She lives with Yuri.    Dementia -   Progression of dementia past 6-12 months  Forgetting phone numbers, how to use phone  Talks to family members that are no longer here  No acute change but gradually worsening  Primarily at night sx are more noticeable  Lives with daughter Yuri and it keeps her up at night  Has seroquel but does not really use it  She follows with Dr. Alanis in neurology, last visit 12/2023    In past memantine and citalopram d/c due to lethargy, donepezil was reduced, energy level improved with these changes    She has chronic intermittent cough that previously resolved with prednisone and doxy.  She had f/u chest CT that was stable compared to prior.       Heart rate stable with resuming metoprolol 25 mg BID.  Pacemaker was placed on 9/21/23 due to sick sinus syndrome.  She was released by cardiology.       She continues to take duloxetine 60 mg daily.  Neuropathic pain in the legs is better controlled with the duloxetine.      Her non small cell lung cancer is stable. She follows with Dr. Weir.       She is now taking Tradjenta 5 mg daily for her diabetes.     She continues to take simvastatin 80 mg at bedtime for her hyperlipidemia. No joint pain or muscle pain.       She takes vitamin B12 1000 mcg daily for vitamin B12 deficiency - anemia is stable       She fell and fractured left hip on 5/28/23- ORIF on 5/30/23.  She went to SNF from 6/5/23 to 6/22/23. No pain in the left hip.     She continues to use oxygen intermittently since the hospitalization from the hip fracture in  May. Intermittently her oxygen levels will drop and she needs the oxygen.   She will fight her daughter to wear the oxygen.                   Review of Systems   Constitutional:  Negative for activity change, chills, diaphoresis, fatigue, fever and unexpected weight change.   HENT:  Positive for rhinorrhea. Negative for congestion, hearing loss, sore throat and trouble swallowing.    Eyes:  Positive for visual disturbance. Negative for discharge.   Respiratory:  Negative for cough, chest tightness, shortness of breath and wheezing.    Cardiovascular:  Negative for chest pain, palpitations and leg swelling.   Gastrointestinal:  Positive for constipation and diarrhea. Negative for abdominal pain, blood in stool, nausea and vomiting.   Endocrine: Negative for polydipsia and polyuria.   Genitourinary:  Negative for difficulty urinating, dysuria, frequency, hematuria, menstrual problem and urgency.   Musculoskeletal:  Positive for arthralgias. Negative for back pain, joint swelling and neck pain.   Skin:  Negative for rash.   Neurological:  Positive for weakness. Negative for dizziness, syncope and headaches.   Psychiatric/Behavioral:  Positive for confusion and dysphoric mood. Negative for sleep disturbance. The patient is not nervous/anxious.        Objective:      Physical Exam  Vitals and nursing note reviewed.   Constitutional:       Appearance: Normal appearance. She is well-developed.      Comments: Talkative, sitting in wheelchair   HENT:      Head: Normocephalic.      Right Ear: External ear normal.      Left Ear: External ear normal.   Eyes:      Pupils: Pupils are equal, round, and reactive to light.   Cardiovascular:      Rate and Rhythm: Normal rate and regular rhythm.      Heart sounds: Normal heart sounds. No murmur heard.     No friction rub. No gallop.   Pulmonary:      Effort: Pulmonary effort is normal. No respiratory distress.      Breath sounds: Normal breath sounds.   Abdominal:      Palpations: Abdomen is soft.      Tenderness: There is no abdominal tenderness.   Skin:     General: Skin is warm and dry.   Neurological:      Mental  "Status: She is alert.         Assessment:       1. Essential hypertension    2. Type 2 diabetes mellitus with diabetic peripheral angiopathy without gangrene, without long-term current use of insulin    3. Late onset Alzheimer's dementia with behavioral disturbance    4. Stage 3a chronic kidney disease    5. Urinary frequency        Plan:       Brunilda was seen today for fatigue and dementia.    Diagnoses and all orders for this visit:    Essential hypertension - stable and controlled, continue current regimen, /56  -     TSH; Future    Type 2 diabetes mellitus with diabetic peripheral angiopathy without gangrene, without long-term current use of insulin  -     Hemoglobin A1C; Future  Lab Results   Component Value Date    HGBA1C 5.9 (H) 07/17/2024    HGBA1C 5.7 (H) 12/06/2023    HGBA1C 6.3 (H) 05/29/2023     Late onset Alzheimer's dementia with behavioral disturbance - okay to use seroquel in the evenings, call to schedule f/u with Dr. Alanis    Stage 3a chronic kidney disease  -     CBC Auto Differential; Future  -     Comprehensive Metabolic Panel; Future    Urinary frequency - possibly some increased urinary frequency, will check urine studies  -     Urinalysis; Future  -     Urine culture; Future    Other orders  -     apixaban (ELIQUIS) 5 mg Tab; Take 1 tablet (5 mg total) by mouth 2 (two) times daily.  -     hydrALAZINE (APRESOLINE) 25 MG tablet; Take 1 tablet (25 mg total) by mouth every 8 (eight) hours.    Labs today  Refill medications  Check urine studies  F/u with Dr. Alanis  Use seroquel at night    Pt has been given instructions populated from patient instructions database and has verbalized understanding of the after visit summary and information contained wherein.    Follow up with a primary care provider. May go to ER for acute shortness of breath, lightheadedness, fever, or any other emergent complaints or changes in condition.    "This note will be shared with the patient"    Future Appointments "   Date Time Provider Department [place]   7/26/2024  2:00 PM [facility]

## 2024-07-18 ENCOUNTER — LAB VISIT (OUTPATIENT)
Dept: LAB | Facility: HOSPITAL | Age: 89
End: 2024-07-18
Payer: MEDICARE

## 2024-07-18 DIAGNOSIS — R35.0 URINARY FREQUENCY: ICD-10-CM

## 2024-07-18 LAB
BILIRUB UR QL STRIP: NEGATIVE
CLARITY UR REFRACT.AUTO: CLEAR
COLOR UR AUTO: YELLOW
GLUCOSE UR QL STRIP: NEGATIVE
HGB UR QL STRIP: NEGATIVE
KETONES UR QL STRIP: NEGATIVE
LEUKOCYTE ESTERASE UR QL STRIP: NEGATIVE
NITRITE UR QL STRIP: NEGATIVE
PH UR STRIP: 6 [PH] (ref 5–8)
PROT UR QL STRIP: NEGATIVE
SP GR UR STRIP: 1.01 (ref 1–1.03)
URN SPEC COLLECT METH UR: NORMAL

## 2024-07-18 PROCEDURE — 81003 URINALYSIS AUTO W/O SCOPE: CPT | Performed by: PHYSICIAN ASSISTANT

## 2024-07-18 PROCEDURE — 87086 URINE CULTURE/COLONY COUNT: CPT | Performed by: PHYSICIAN ASSISTANT

## 2024-07-19 ENCOUNTER — PATIENT MESSAGE (OUTPATIENT)
Dept: INTERNAL MEDICINE | Facility: CLINIC | Age: 89
End: 2024-07-19
Payer: MEDICARE

## 2024-07-19 LAB
BACTERIA UR CULT: NORMAL
BACTERIA UR CULT: NORMAL

## 2024-07-19 NOTE — TELEPHONE ENCOUNTER
Pt just saw Princess earlier this week   Should she still keep her appointment with you this coming up week

## 2024-07-25 ENCOUNTER — CLINICAL SUPPORT (OUTPATIENT)
Dept: CARDIOLOGY | Facility: HOSPITAL | Age: 89
End: 2024-07-25
Payer: MEDICARE

## 2024-07-25 ENCOUNTER — CLINICAL SUPPORT (OUTPATIENT)
Dept: CARDIOLOGY | Facility: HOSPITAL | Age: 89
End: 2024-07-25
Attending: INTERNAL MEDICINE
Payer: MEDICARE

## 2024-07-25 DIAGNOSIS — Z95.0 PRESENCE OF CARDIAC PACEMAKER: ICD-10-CM

## 2024-07-25 DIAGNOSIS — I49.5 SICK SINUS SYNDROME: ICD-10-CM

## 2024-07-25 PROCEDURE — 93294 REM INTERROG EVL PM/LDLS PM: CPT | Mod: ,,, | Performed by: INTERNAL MEDICINE

## 2024-07-25 PROCEDURE — 93296 REM INTERROG EVL PM/IDS: CPT | Performed by: INTERNAL MEDICINE

## 2024-07-26 ENCOUNTER — OFFICE VISIT (OUTPATIENT)
Dept: INTERNAL MEDICINE | Facility: CLINIC | Age: 89
End: 2024-07-26
Payer: MEDICARE

## 2024-07-26 VITALS
BODY MASS INDEX: 23.09 KG/M2 | DIASTOLIC BLOOD PRESSURE: 52 MMHG | SYSTOLIC BLOOD PRESSURE: 128 MMHG | HEART RATE: 95 BPM | RESPIRATION RATE: 18 BRPM | OXYGEN SATURATION: 94 % | HEIGHT: 65 IN | WEIGHT: 138.56 LBS

## 2024-07-26 DIAGNOSIS — J43.2 CENTRILOBULAR EMPHYSEMA: ICD-10-CM

## 2024-07-26 DIAGNOSIS — N18.32 STAGE 3B CHRONIC KIDNEY DISEASE: Primary | ICD-10-CM

## 2024-07-26 DIAGNOSIS — I10 ESSENTIAL HYPERTENSION: ICD-10-CM

## 2024-07-26 DIAGNOSIS — Z85.118 HISTORY OF LUNG CANCER: ICD-10-CM

## 2024-07-26 DIAGNOSIS — I25.10 CORONARY ARTERY DISEASE INVOLVING NATIVE CORONARY ARTERY OF NATIVE HEART WITHOUT ANGINA PECTORIS: ICD-10-CM

## 2024-07-26 DIAGNOSIS — I50.32 CHRONIC DIASTOLIC HEART FAILURE: ICD-10-CM

## 2024-07-26 LAB
OHS CV AF BURDEN PERCENT: 6
OHS CV DC REMOTE DEVICE TYPE: NORMAL
OHS CV RV PACING PERCENT: 2 %

## 2024-07-26 PROCEDURE — 99999 PR PBB SHADOW E&M-EST. PATIENT-LVL IV: CPT | Mod: PBBFAC,,, | Performed by: INTERNAL MEDICINE

## 2024-07-26 RX ORDER — SIMVASTATIN 40 MG/1
40 TABLET, FILM COATED ORAL DAILY
Qty: 90 TABLET | Refills: 4 | Status: SHIPPED | OUTPATIENT
Start: 2024-07-26

## 2024-07-26 NOTE — PROGRESS NOTES
CHIEF COMPLAINT:  follow up multiple issues    HISTORY OF PRESENT ILLNESS: This is a 89-year-old woman who presents with her daughter follow up of multiple issues. Her daughter, Nora is with her today.  Yuri is on the phone    She was not sleeping when she saw Princess Mojica 9 days ago on 7/17/24.     Sleep has been better.  Seh has not been getting up at night. She sleeps during the day as well. Appetite has been lower.  No nausea or vomiting. She had some loose stools from okra.      Pacemaker was placed on 9/21/23 due to sick sinus syndrome.  Koban was placed on the left antecubital area at discharge and wound up staying on the arm for about 6 days.  She had swelling of the left hand and had a superficial clot.   Since then, she has has had intermittent stinging pain in the left hand and forearm.   Wearing a glove or ace bandage helps the stinging. Massaging the area helps. Swelling has resolved.       He fell and fractured left hip on 5/28/23- ORIF on 5/30/23.  She went to SNF from 6/5/23 to 6/22/23. Occasional left hip pain.  Uses biofreeze or aspercreme which helps.      She continues to use oxygen intermittently since the hospitalization from the hip fracture in  May. Intermittently her oxygen levels will drop and she needs the oxygen.   She sometimes gives her  daughter a hard time wearing the oxygen.     Memory loss has gotten worse. She can hallucinate at night at times. She gets irritable often.      BP at home has been doing well.  110-130/60-70. Pulse 80-90. She is taking amlodopine benazepril 5/20 2 tablets once daily, hydralaxine 25 mg three times daily and metoprolol tartrate 50 mg twice daily.  She continues to take ELiquis 5 mg twice daily.      She is taking donepezil 5 mg once daily for her memory.   SHe continues to take duloxetine 60 mg daily.  Neuropathic pain in the legs is better controlled with the duloxetine.       No cough.   She has some dyspnea on exertion which is stable. CT chest  was stable. She has some nasal congestion. No nausea, vomiting, constipation, diarrhea.            Her non small cell lung cancer is stable. She saw Dr Weir 10/4/2021. Had a CT Scan 10/14/22   She is using her oxygen at night.      She is now prescribed Trajenta 5 mg daily for her diabetes.     She continues to take simvastatin 80 mg at bedtime for her hyperlipidemia. No joint pain or muscle pain.        She is off Fosamax once week for her bones.  No melana, bloody stools, constipation.   She saw Endocrine who recommended Reclast infusion -  done 12/15/23.       She takes vitamin B12 1000 mcg daily for vitamin B12 deficiency - anemia is stable        SHe has macular degeneration of the eyes and was followed by Dr Tresa Coreas. She got injections in her eyes every 6 weeks in Spring 2015. She now sees Dr Ruelas at [facility] (last saw him 8/18- no need for injections at that time- yearly follow up). Vision has been stable. She is taking a Eye vitamins for her eyes.       PAST MEDICAL HISTORY:    1. Stage III non-small cell lung cancer, completed chemoradiation with carboplatin and Taxol on 6/1/12    2. Hypertension.   3. Diabetes mellitus.   4. Hyperlipidemia.   5. Chronic diastolic dysfunction.   6. Coronary artery disease status post MI in 1990 and 2003. Stenting was   done at 2003 at [facility].   7. Cholecystectomy, December 2006.   8. Cataract removal.   9. Benign growth removed from her throat.   10. Left common femoral endarterectomy, July 2007.   11. History of anal cancer in 1996 status post radiation and chemotherapy.   12. Carpal tunnel syndrome.   13. Osteoporosis on BMD 12/11    14. Macular degeneration  15. fractured left hip on 5/28/23- ORIF on 5/30/23  16. Pacemaker was placed on 9/21/23 due to sick sinus syndrome.     SOCIAL HISTORY: She quit smoking in 1995, denies any alcohol use. She is etired.     REVIEW OF SYSTEMS: She denies fevers, chills, night sweats, fatigue, visual change,  hearing loss, sinus congestion, sore throat, dysuria, hematuria, joint pain, muscle pain, polydipsia, and polyuria.     PHYSICAL EXAM:           GENERAL: She is alert and oriented. No apparent distress. Affect within normal limits.   Conjunctivae anicteric.  TM clear  NECK: Supple.   Respiratory effort normal. Lungs clear to auscultation.   HEART: Regular rate and rhythm without murmurs, gallops, or rubs. NO lower   extremity edema.         ASSESSMENT AND PLAN:        Left intertrochanteric hip fracture- s/p- Open reduction internal fixation left intertrochanteric hip fracture with intramedullary nail. Healed  Paroxysmal A fib with tachy-juan a in the hospital -s/p pacemaker due to sick sinus syndrome - on eliquis  Diastolic dysfunction with history of lung cancer and COPD and pulmonary htn on echo 5/21/23 - now with hypoxic respiratory failure - oxygen 2 liters nasal cannula at night  Diabetes  - stop Tradjenta due to weight loss and loss of appetite.    Hypertension- stable  PVD - stable  Mood disorder with dementia - on cymbalta - add seroquel 25 mg 1/2-1 every 8 hours as needed for agitation  Memory issues/alzheimers- follow up with Dr Alanis. off memantine  Stage III non-small cell lung cancer, completed chemoradiation with carboplatin and Taxol on 6/1/12 - doing well. No signs of recurrence - CT Chest  11/3/2022  Hyperlipidemia-lipids controlled  Coronary artery disease - decrease simvastatin 40 mg 1 tablet daily.   History of anal cancer-had a normal colonoscopy, November 2008; due 2015. Declined repeat  Pernicious anemia - on counter vitamin B12 1000 mcg daily  Osteoporosis - took alendroante for 7 years - 2012 to 2019.  Saw Endocrine 9/2023 - had Reclast infusion 12/15/23.     Macular degeneration - stable   VItamin D deficiency -  vitamin D 2000 units daily.   Vaginal bleeding - asx currently  Allergic rhinitis - astelin nasal spray  Stage 3A chronic kidney disease - stable  Screening mammogram was 12/19 -  declines      I will see her back in 16 weeks sooner if problems arise

## 2024-07-26 NOTE — PATIENT INSTRUCTIONS
STOP Tradjenta.     decrease simvastatin to 40 mg one tablet daily.     Seroquel (quetiapine) 25 mg 1/2-1 every 8 hours as needed for agitation

## 2024-08-20 ENCOUNTER — PATIENT MESSAGE (OUTPATIENT)
Dept: INTERNAL MEDICINE | Facility: CLINIC | Age: 89
End: 2024-08-20
Payer: MEDICARE

## 2024-09-05 ENCOUNTER — E-VISIT (OUTPATIENT)
Dept: INTERNAL MEDICINE | Facility: CLINIC | Age: 89
End: 2024-09-05
Payer: MEDICARE

## 2024-09-05 DIAGNOSIS — Z85.118 HISTORY OF LUNG CANCER: ICD-10-CM

## 2024-09-05 DIAGNOSIS — F03.90 DEMENTIA, UNSPECIFIED DEMENTIA SEVERITY, UNSPECIFIED DEMENTIA TYPE, UNSPECIFIED WHETHER BEHAVIORAL, PSYCHOTIC, OR MOOD DISTURBANCE OR ANXIETY: Primary | ICD-10-CM

## 2024-09-05 DIAGNOSIS — J43.2 CENTRILOBULAR EMPHYSEMA: ICD-10-CM

## 2024-09-05 DIAGNOSIS — J96.11 CHRONIC RESPIRATORY FAILURE WITH HYPOXIA: ICD-10-CM

## 2024-09-05 DIAGNOSIS — N39.0 URINARY TRACT INFECTION WITHOUT HEMATURIA, SITE UNSPECIFIED: ICD-10-CM

## 2024-09-06 RX ORDER — DOXYCYCLINE HYCLATE 100 MG
100 TABLET ORAL 2 TIMES DAILY
Qty: 14 TABLET | Refills: 0 | Status: SHIPPED | OUTPATIENT
Start: 2024-09-06 | End: 2024-09-13

## 2024-09-06 NOTE — PROGRESS NOTES
Spoke with daughter  She has had insomnia and delerious the last several days.  Not eating very much.  Dementia is progressing.   Seroquel is not working. Has been giving her seroquel 25 mg when agitated - she is worked her up - does not help    She had a fall yesterday on her side - no lumps or bruises- no injury - she then slept  6 hours    She is NOT taking pregabalin.    Will epirically treat for a UTI with doxycyline 100 mg twice a day.    Give seroquel 25 mg nightly routinely    She has lost weight over the last 2 months.    Daughter is interested in hospice care -

## 2024-09-06 NOTE — PROGRESS NOTES
Patient ID: Brunilda England is a 89 y.o. female.    Chief Complaint: General Illness (Entered automatically based on patient selection in Jiglu.)    The patient initiated a request through Jiglu on 2024 for evaluation and management with a chief complaint of General Illness (Entered automatically based on patient selection in Jiglu.)     I evaluated the questionnaire submission on 24.    [facility]    2024  9:35 AM CDT - Filed by Patient   What do you need help with? Insomnia   Do you agree to participate in an E-Visit? Yes   If you have any of the following symptoms, please present to your local emergency room or call 911:  I acknowledge   What is the main issue you would like addressed today? Delirium, Insomnia, lite appetite   Please describe your symptoms Talking out loud to  family members, taking 15 to 20 minute naps, confusion, lite appetite   Where is your problem located? Mental   How severe are your symptoms? Moderate   Have you had these symptoms before? Yes   How long have you been having these symptoms? For more than a month   Please list any medications or treatments you have used for your condition and indicate if it was effective or not. Seroquel. This medication did not settle her down,she became more talkative & restless.   What makes this feel better? Music   What makes this feel worse? Loud noise, stress,anxiety.   Are these symptoms related to a condition that you currently have? Yes   What is the condition? Dementia   When were you last seen for this condition?    Please describe any probable cause for these symptoms They come and go   Provide any additional information you feel is important. She needs to be seen by her neurologist, but cannot get an appointment.   Please attach any relevant images or files    Are you able to take your vital signs? No         Encounter Diagnoses   Name Primary?    Dementia, unspecified dementia severity,  unspecified dementia type, unspecified whether behavioral, psychotic, or mood disturbance or anxiety Yes    Centrilobular emphysema     History of lung cancer     Chronic respiratory failure with hypoxia     Urinary tract infection without hematuria, site unspecified         No orders of the defined types were placed in this encounter.     Medications Ordered This Encounter   Medications    doxycycline (VIBRA-TABS) 100 MG tablet     Sig: Take 1 tablet (100 mg total) by mouth 2 (two) times daily. for 7 days     Dispense:  14 tablet     Refill:  0        No follow-ups on file.      E-Visit Time Tracking:       15 minutes

## 2024-09-10 ENCOUNTER — HOSPITAL ENCOUNTER (EMERGENCY)
Facility: HOSPITAL | Age: 89
Discharge: HOME | End: 2024-09-10
Attending: EMERGENCY MEDICINE
Payer: MEDICARE

## 2024-09-10 ENCOUNTER — NURSE TRIAGE (OUTPATIENT)
Dept: ADMINISTRATIVE | Facility: CLINIC | Age: 89
End: 2024-09-10
Payer: MEDICARE

## 2024-09-10 VITALS
HEIGHT: 65 IN | RESPIRATION RATE: 19 BRPM | TEMPERATURE: 98 F | BODY MASS INDEX: 23.32 KG/M2 | HEART RATE: 107 BPM | WEIGHT: 140 LBS | SYSTOLIC BLOOD PRESSURE: 182 MMHG | OXYGEN SATURATION: 97 % | DIASTOLIC BLOOD PRESSURE: 76 MMHG

## 2024-09-10 DIAGNOSIS — N39.0 URINARY TRACT INFECTION WITHOUT HEMATURIA, SITE UNSPECIFIED: Primary | ICD-10-CM

## 2024-09-10 DIAGNOSIS — R53.1 WEAKNESS: ICD-10-CM

## 2024-09-10 DIAGNOSIS — R41.82 AMS (ALTERED MENTAL STATUS): ICD-10-CM

## 2024-09-10 LAB
ALBUMIN SERPL BCP-MCNC: 3.8 G/DL (ref 3.5–5.2)
ALP SERPL-CCNC: 61 U/L (ref 55–135)
ALT SERPL W/O P-5'-P-CCNC: 6 U/L (ref 10–44)
ANION GAP SERPL CALC-SCNC: 12 MMOL/L (ref 8–16)
AST SERPL-CCNC: 18 U/L (ref 10–40)
BACTERIA #/AREA URNS AUTO: ABNORMAL /HPF
BASOPHILS # BLD AUTO: 0.03 K/UL (ref 0–0.2)
BASOPHILS NFR BLD: 0.7 % (ref 0–1.9)
BILIRUB SERPL-MCNC: 0.3 MG/DL (ref 0.1–1)
BILIRUB UR QL STRIP: NEGATIVE
BUN SERPL-MCNC: 10 MG/DL (ref 10–30)
CALCIUM SERPL-MCNC: 10.3 MG/DL (ref 8.7–10.5)
CHLORIDE SERPL-SCNC: 103 MMOL/L (ref 95–110)
CLARITY UR REFRACT.AUTO: ABNORMAL
CO2 SERPL-SCNC: 21 MMOL/L (ref 23–29)
COLOR UR AUTO: YELLOW
CREAT SERPL-MCNC: 1 MG/DL (ref 0.5–1.4)
DIFFERENTIAL METHOD BLD: ABNORMAL
EOSINOPHIL # BLD AUTO: 0 K/UL (ref 0–0.5)
EOSINOPHIL NFR BLD: 0.7 % (ref 0–8)
ERYTHROCYTE [DISTWIDTH] IN BLOOD BY AUTOMATED COUNT: 18.9 % (ref 11.5–14.5)
EST. GFR  (NO RACE VARIABLE): 52.9 ML/MIN/1.73 M^2
GLUCOSE SERPL-MCNC: 137 MG/DL (ref 70–110)
GLUCOSE UR QL STRIP: NEGATIVE
HCT VFR BLD AUTO: 29.2 % (ref 37–48.5)
HGB BLD-MCNC: 9.8 G/DL (ref 12–16)
HGB UR QL STRIP: ABNORMAL
IMM GRANULOCYTES # BLD AUTO: 0.01 K/UL (ref 0–0.04)
IMM GRANULOCYTES NFR BLD AUTO: 0.2 % (ref 0–0.5)
KETONES UR QL STRIP: NEGATIVE
LEUKOCYTE ESTERASE UR QL STRIP: ABNORMAL
LYMPHOCYTES # BLD AUTO: 1.4 K/UL (ref 1–4.8)
LYMPHOCYTES NFR BLD: 33.3 % (ref 18–48)
MCH RBC QN AUTO: 28.4 PG (ref 27–31)
MCHC RBC AUTO-ENTMCNC: 33.6 G/DL (ref 32–36)
MCV RBC AUTO: 85 FL (ref 82–98)
MICROSCOPIC COMMENT: ABNORMAL
MONOCYTES # BLD AUTO: 0.4 K/UL (ref 0.3–1)
MONOCYTES NFR BLD: 8.9 % (ref 4–15)
NEUTROPHILS # BLD AUTO: 2.4 K/UL (ref 1.8–7.7)
NEUTROPHILS NFR BLD: 56.2 % (ref 38–73)
NITRITE UR QL STRIP: NEGATIVE
NRBC BLD-RTO: 0 /100 WBC
OHS QRS DURATION: 88 MS
OHS QTC CALCULATION: 443 MS
PH UR STRIP: 7 [PH] (ref 5–8)
PLATELET # BLD AUTO: 235 K/UL (ref 150–450)
PMV BLD AUTO: 12.3 FL (ref 9.2–12.9)
POCT GLUCOSE: 149 MG/DL (ref 70–110)
POTASSIUM SERPL-SCNC: 4.1 MMOL/L (ref 3.5–5.1)
PROT SERPL-MCNC: 8.9 G/DL (ref 6–8.4)
PROT UR QL STRIP: ABNORMAL
RBC # BLD AUTO: 3.45 M/UL (ref 4–5.4)
RBC #/AREA URNS AUTO: 16 /HPF (ref 0–4)
SODIUM SERPL-SCNC: 136 MMOL/L (ref 136–145)
SP GR UR STRIP: 1.01 (ref 1–1.03)
SQUAMOUS #/AREA URNS AUTO: 1 /HPF
TROPONIN I SERPL DL<=0.01 NG/ML-MCNC: <0.006 NG/ML (ref 0–0.03)
URN SPEC COLLECT METH UR: ABNORMAL
WBC # BLD AUTO: 4.26 K/UL (ref 3.9–12.7)
WBC #/AREA URNS AUTO: 12 /HPF (ref 0–5)

## 2024-09-10 PROCEDURE — 85025 COMPLETE CBC W/AUTO DIFF WBC: CPT | Performed by: NURSE PRACTITIONER

## 2024-09-10 PROCEDURE — 93010 ELECTROCARDIOGRAM REPORT: CPT | Mod: ,,, | Performed by: INTERNAL MEDICINE

## 2024-09-10 PROCEDURE — 93005 ELECTROCARDIOGRAM TRACING: CPT

## 2024-09-10 PROCEDURE — 84484 ASSAY OF TROPONIN QUANT: CPT | Performed by: NURSE PRACTITIONER

## 2024-09-10 PROCEDURE — 80053 COMPREHEN METABOLIC PANEL: CPT | Performed by: NURSE PRACTITIONER

## 2024-09-10 PROCEDURE — 81001 URINALYSIS AUTO W/SCOPE: CPT | Performed by: NURSE PRACTITIONER

## 2024-09-10 PROCEDURE — 99285 EMERGENCY DEPT VISIT HI MDM: CPT | Mod: 25

## 2024-09-10 PROCEDURE — 87086 URINE CULTURE/COLONY COUNT: CPT | Performed by: NURSE PRACTITIONER

## 2024-09-10 PROCEDURE — 82962 GLUCOSE BLOOD TEST: CPT

## 2024-09-10 RX ORDER — CEPHALEXIN 500 MG/1
500 CAPSULE ORAL 4 TIMES DAILY
Qty: 20 CAPSULE | Refills: 0 | Status: SHIPPED | OUTPATIENT
Start: 2024-09-10 | End: 2024-09-15

## 2024-09-10 NOTE — DISCHARGE INSTRUCTIONS
Stop taking doxycycline and use Keflex instead  Use Seroquel 1 pill at night.  May give an additional dose 2 hours afterwards  Home health should contact you  Please follow-up with Dr. Whitfield  Return here if not doing well

## 2024-09-10 NOTE — TELEPHONE ENCOUNTER
Pts daughter calling with c/o mom talking out her head. Pts mom is suffering with Dementia and has gotten worse over the last couple of weeks.  is treating her for possible UTI but she hasn't slept in a few days and is out of control and seeing things. Pts daughter told to hang up and call 911 per protocol. Daughter told to call back if any other questions or concerns              Reason for Disposition   [1] Difficult to awaken or acting confused (e.g., disoriented, slurred speech) AND [2] present now AND [3] new-onset    Additional Information   Negative: [1] Difficult to awaken or acting confused (e.g., disoriented, slurred speech) AND [2] present now AND [3] new-onset AND [4] has diabetes (diabetes mellitus)    Protocols used: Dementia Symptoms and [id number]-PJ

## 2024-09-10 NOTE — ED PROVIDER NOTES
Encounter Date: 9/10/2024       History     Chief Complaint   Patient presents with    Altered Mental Status     Family states pt is altered and anxious x 1 week. Pt hx of dementia.      89-year-old female with history of dementia presents with episodes of agitation at night for the past week.  She does well during the day except for some time she has visual hallucinations where she sees children outside.  She also has conversations with her  .  Nighttime has been harder.  She seems to be up and at times agitated.  They are working with their primary care doctor.  They started doxycycline empirically for potential UTI.  They started giving her Seroquel 25 mg each night.  This was 4 days ago.  The 1st 2 nights it worked quite well.  Two nights ago she was a little agitated and last night she was up all night.  It has been hard on the family.  There has been no fever or vomiting.  Denies headache or injury.  Denies chest pain abdominal pain or dysuria.  She is not hearing any suicidal auditory hallucinations or not being told to do anything dangerous by these hallucinations.  She is here with 2 daughters who are giving me history.  They also state that they have had a visit from hospice who felt that she was not eligible yet        Review of patient's allergies indicates:   Allergen Reactions    Bextra [valdecoxib] Swelling    Gabapentin Diarrhea and Nausea Only     Past Medical History:   Diagnosis Date    Anal cancer     Anemia     Cancer     anal cancer    Centrilobular emphysema 2020    Diabetes mellitus     With circulatory disorder    Lumbar radiculopathy 2014    Lung cancer 2012    s/p chemo/radiation    Macular degeneration     Macular hemorrhage of left eye     Neuropathy     Osteoporosis     Osteoporosis, unspecified 2012    Pure hypercholesterolemia      Past Surgical History:   Procedure Laterality Date    A-V CARDIAC PACEMAKER INSERTION Left 2023    Procedure:  INSERTION, CARDIAC PACEMAKER, DUAL CHAMBER;  Surgeon: Giuseppe Roman MD;  Location: [facility] EP LAB;  Service: Cardiology;  Laterality: Left;  SSS, Dual PPM,BIO, anes, MB, 3 Prep    BRONCHOSCOPY      CHOLECYSTECTOMY      COLONOSCOPY      FLUOROSCOPIC ANGIOGRAPHY OF LOWER EXTREMITY WITH TOPICAL ULTRASOUND Right 12/6/2018    Procedure: ARTERIOGRAM-LEG AND ULTRASOUND;  Surgeon: SEBASTIÁN Mcpherson III, MD;  Location: [facility] OR [address] [place] [address];  Service: Peripheral Vascular;  Laterality: Right;  16.5 min  1059.42 mGy  59ml Dye  2ml Local    heart stent      HYSTERECTOMY      INCISIONAL BIOPSY Right 12/6/2019    Procedure: INCISIONAL BIOPSY;  Surgeon: Leslie Castillo MD;  Location: [facility] OR [address] [place] [address];  Service: Plastics;  Laterality: Right;    INTRAMEDULLARY RODDING OF FEMUR Left 5/30/2023    Procedure: INSERTION, INTRAMEDULLARY TEREZA, FEMUR;  Surgeon: Desmond Barker MD;  Location: [facility] OR [address] [place] [address];  Service: Orthopedics;  Laterality: Left;    left leg surgery      blockage    PERCUTANEOUS TRANSLUMINAL ANGIOPLASTY N/A 12/6/2018    Procedure: PTA (ANGIOPLASTY, PERCUTANEOUS, TRANSLUMINAL);  Surgeon: SEBASTIÁN Mcpherson III, MD;  Location: [facility] OR [address] [place] [address];  Service: Peripheral Vascular;  Laterality: N/A;    RECONSTRUCTION USING FLAP Right 12/6/2019    Procedure: RECONSTRUCTION USING FLAP/FULL THICKNESS MRESETION AND RECONSTRUCTION RIGHT UPPER EYELID WITH BIOPSY;  Surgeon: Leslie Castillo MD;  Location: [address];  Service: Plastics;  Laterality: Right;    TONSILLECTOMY       Family History   Problem Relation Name Age of Onset    Stroke Mother      Cancer Father      Breast cancer Maternal Aunt      Ovarian cancer Neg Hx      Amblyopia Neg Hx      Blindness Neg Hx      Cataracts Neg Hx      Glaucoma Neg Hx      Macular degeneration Neg Hx      Retinal detachment Neg Hx      Strabismus Neg Hx       Social History     Tobacco Use    Smoking status: Former     Current packs/day: 0.00     Average packs/day: 0.5 packs/day for 30.0 years  (15.0 ttl pk-yrs)     Types: Cigarettes     Start date: 1965     Quit date: 1995     Years since quittin.7     Passive exposure: Past    Smokeless tobacco: Never   Substance Use Topics    Alcohol use: No    Drug use: No     Review of Systems    Physical Exam     Initial Vitals [09/10/24 1033]   BP Pulse Resp Temp SpO2   (!) 177/79 80 18 98.2 °F (36.8 °C) 97 %      MAP       --         Physical Exam    Constitutional: She appears well-developed and well-nourished.   HENT:   Head: Normocephalic.   Eyes: Conjunctivae are normal.   Neck: Neck supple.   Normal range of motion.  Cardiovascular:  Normal rate, regular rhythm and normal heart sounds.           Pulmonary/Chest: Breath sounds normal. No respiratory distress. She has no wheezes. She has no rhonchi. She has no rales.   Abdominal: Abdomen is soft. Bowel sounds are normal. She exhibits no distension. There is no abdominal tenderness. There is no rebound.   Musculoskeletal:         General: No edema. Normal range of motion.      Cervical back: Normal range of motion and neck supple.     Neurological: She is alert. She has normal strength. No cranial nerve deficit or sensory deficit.   Psychiatric:   Patient is calm and cooperative.  She provides a very good history.  She has insight into these hallucinations that they are not real.         ED Course   Procedures  Labs Reviewed   CBC W/ AUTO DIFFERENTIAL - Abnormal       Result Value    WBC 4.26      RBC 3.45 (*)     Hemoglobin 9.8 (*)     Hematocrit 29.2 (*)     MCV 85      MCH 28.4      MCHC 33.6      RDW 18.9 (*)     Platelets 235      MPV 12.3      Immature Granulocytes 0.2      Gran # (ANC) 2.4      Immature Grans (Abs) 0.01      Lymph # 1.4      Mono # 0.4      Eos # 0.0      Baso # 0.03      nRBC 0      Gran % 56.2      Lymph % 33.3      Mono % 8.9      Eosinophil % 0.7      Basophil % 0.7      Differential Method Automated     COMPREHENSIVE METABOLIC PANEL - Abnormal    Sodium 136      Potassium  4.1      Chloride 103      CO2 21 (*)     Glucose 137 (*)     BUN 10      Creatinine 1.0      Calcium 10.3      Total Protein 8.9 (*)     Albumin 3.8      Total Bilirubin 0.3      Alkaline Phosphatase 61      AST 18      ALT 6 (*)     eGFR 52.9 (*)     Anion Gap 12     URINALYSIS, REFLEX TO URINE CULTURE - Abnormal    Specimen UA Urine, Clean Catch      Color, UA Yellow      Appearance, UA Hazy (*)     pH, UA 7.0      Specific Gravity, UA 1.010      Protein, UA Trace (*)     Glucose, UA Negative      Ketones, UA Negative      Bilirubin (UA) Negative      Occult Blood UA 2+ (*)     Nitrite, UA Negative      Leukocytes, UA 1+ (*)     Narrative:     Specimen Source->Urine   URINALYSIS MICROSCOPIC - Abnormal    RBC, UA 16 (*)     WBC, UA 12 (*)     Bacteria Occasional      Squam Epithel, UA 1      Microscopic Comment SEE COMMENT      Narrative:     Specimen Source->Urine   POCT GLUCOSE - Abnormal    POCT Glucose 149 (*)    CULTURE, URINE   TROPONIN I    Troponin I <0.006     POCT GLUCOSE MONITORING CONTINUOUS        ECG Results              EKG 12-lead (Final result)        Collection Time Result Time QRS Duration O[facility] QTC Calculation    09/10/24 12:17:06 09/10/24 14:34:53 88 443                     Final result by Interface, Lab In [facility] (09/10/24 14:34:58)                   Narrative:    Test Reason : R41.82,    Vent. Rate : 106 BPM     Atrial Rate : 106 BPM     P-R Int : 142 ms          QRS Dur : 088 ms      QT Int : 334 ms       P-R-T Axes : 040 021 -69 degrees     QTc Int : 443 ms    Sinus tachycardia with Premature supraventricular complexes  Nonspecific ST and/or T wave abnormalities  Abnormal ECG  When compared with ECG of 23-JAN-2024 13:11,  ventricular paced rhythm no longer present  Confirmed by Tara De Los Santos MD ([id number]) on 9/10/2024 2:34:52 PM    Referred By: [email or web address]   SELF           Confirmed By:Tara De Los Santos MD                                  Imaging Results              CT Head Without Contrast (Final  result)  Result time 09/10/24 13:39:21      Final result by Trever Carcamo DO (09/10/24 13:39:21)                   Impression:      No acute intracranial findings as detailed above specifically without evidence for acute intracranial hemorrhage or new abnormal parenchymal attenuation    Clinical correlation and further evaluation with MRI as warranted if patient compatible.    See above for additional details.      Electronically signed by: Trever Carcamo DO  Date:    09/10/2024  Time:    13:39               Narrative:    EXAMINATION:  CT HEAD WITHOUT CONTRAST    CLINICAL HISTORY:  Mental status change, unknown cause;    TECHNIQUE:  Multiple sequential 5 mm axial images of the head without contrast.  Coronal and sagittal reformatted imaging from the axial acquisition.    COMPARISON:  06/29/2023    FINDINGS:  Generalized cerebral volume loss with compensatory enlargement ventricles sulci and cisterns.  Study is distorted by motion artifacts.  Allowing for artifacts there is no evidence for acute intracranial hemorrhage or new abnormal parenchymal attenuation.  Hypoattenuation bilateral basal ganglia greater on the left suggestive for remote lacunar-type infarcts    Continued empty sella.  Slight prominence of the extra-axial space overlying the frontal parietal lobes at the vertex similar to prior.  No significant opacification visualized paranasal sinuses or mastoid air cells..                                       Medications - No data to display  Medical Decision Making  Patient with dementia and sundowning.  She seems well here.  We did labs which were reassuring except for still signs of UTI.  Will change doxycycline to Keflex.  I offered inpatient admission but family is very motivated to be at home.  She does poorly in the hospital.  This seems reasonable.  She does not appear septic.  She did have some transient tachycardia that resolved.  Head CT was negative.  I worked with her PCP.  We can using 2nd  dose of Seroquel 2 hours after the initial dose.  Family was excited for this tool.  Worked with social work here.  Will arrange for home health for support at home.    Amount and/or Complexity of Data Reviewed  Radiology: ordered.    Risk  Prescription drug management.                                      Clinical Impression:  Final diagnoses:  [R41.82] AMS (altered mental status)  [R53.1] Weakness  [N39.0] Urinary tract infection without hematuria, site unspecified (Primary)          ED Disposition Condition    Discharge Stable          ED Prescriptions       Medication Sig Dispense Start Date End Date Auth. Provider    cephALEXin (KEFLEX) 500 MG capsule Take 1 capsule (500 mg total) by mouth 4 (four) times daily. for 5 days 20 capsule 9/10/2024 9/15/2024 Rios Arizmendi MD          Follow-up Information       Follow up With Specialties Details Why Contact Info    Pepper Whitfield MD Internal Medicine   [address]  [place] [address]  [phone]      [facility] - Emergency Dept Emergency Medicine  If symptoms worsen [address] [facility] [id number] [phone]             Rios Arizmendi MD  09/10/24 1946

## 2024-09-10 NOTE — PLAN OF CARE
SW provided pt and daughter Haley at bedside resources for sitters and the State of [place] Waiver program      Jaimie Luevano, DEIDRE, MSW, LMSW, RSW   Case Management  [facility]  Email: [email or web address]

## 2024-09-10 NOTE — PROVIDER PROGRESS NOTES - EMERGENCY DEPT.
Encounter Date: 9/10/2024    ED Physician Progress Notes            [facility] - Emergency Dept      HOME HEALTH ORDERS  FACE TO FACE ENCOUNTER    Patient Name: Brunilda England  YOB: 1931    PCP: Pepper Whitfield MD   PC[facility] [address] [facility] [address]  PCP Phone Number: [phone]  PCP Fax: [phone]    Encounter Date: 9/10/24    Admit to Home Health    Diagnoses:  There are no hospital problems to display for this patient.      Follow Up Appointments:  Future Appointments   Date Time Provider Department Center   9/26/2024  1:00 PM EKG, APPT [facility] EKG [facility]   9/26/2024  1:20 PM PACEMAKER, ICD [facility] ARRHPRO [facility]   9/26/2024  2:00 PM Evelyne Yates NP [facility] ARRHYTH [facility]   10/28/2024  1:30 PM Pepper Whitfield MD [facility] IM [facility] PCW       Allergies:  Review of patient's allergies indicates:   Allergen Reactions    Bextra [valdecoxib] Swelling    Gabapentin Diarrhea and Nausea Only       Medications: Review discharge medications with patient and family and provide education.    No current facility-administered medications for this encounter.     Current Outpatient Medications   Medication Sig Dispense Refill    acetaminophen (TYLENOL) 500 MG tablet Take 2 tablets (1,000 mg total) by mouth every 8 (eight) hours as needed for Pain.  0    amLODIPine-benazepriL (LOTREL) 10-40 mg per capsule Take 1 capsule by mouth once daily. 90 capsule 3    apixaban (ELIQUIS) 5 mg Tab Take 1 tablet (5 mg total) by mouth 2 (two) times daily. 60 tablet 6    artificial tears (ISOPTO TEARS) 0.5 % ophthalmic solution Place 1 drop into both eyes as needed.      blood pressure monitor Kit Use daily to monitor blood pressure 1 each 0    calcium-vitamin D3 (OS-ERIC 500 + D3) 500 mg-5 mcg (200 unit) per tablet Take 2 tablets by mouth once daily.      cyanocobalamin (VITAMIN B-12) 1000 MCG tablet Take 1,000 mcg by mouth every morning.      donepeziL (ARICEPT) 5 MG tablet Take 1 tablet (5 mg  total) by mouth every evening. 90 tablet 4    doxycycline (VIBRA-TABS) 100 MG tablet Take 1 tablet (100 mg total) by mouth 2 (two) times daily. for 7 days 14 tablet 0    DULoxetine (CYMBALTA) 60 MG capsule Take 1 capsule (60 mg total) by mouth every morning. 90 capsule 3    estradioL (ESTRACE) 0.01 % (0.1 mg/gram) vaginal cream USE ONE-HALF GRAM VAGINALLY TWICE a WEEK 42.5 g 3    hydrALAZINE (APRESOLINE) 25 MG tablet Take 1 tablet (25 mg total) by mouth every 8 (eight) hours. 90 tablet 11    melatonin (MELATIN) 3 mg tablet Take 2 tablets (6 mg total) by mouth nightly as needed for Insomnia.  0    metoprolol tartrate (LOPRESSOR) 50 MG tablet Take 1 tablet (50 mg total) by mouth 2 (two) times daily. 60 tablet 11    nitroGLYCERIN (NITROSTAT) 0.4 MG SL tablet Place 1 tablet (0.4 mg total) under the tongue every 5 (five) minutes as needed for Chest pain. 25 tablet 0    pregabalin (LYRICA) 50 MG capsule Take 1 capsule (50 mg total) by mouth nightly. 60 capsule 2    QUEtiapine (SEROQUEL) 25 MG Tab 1/2-1 tablet every 8 hours as needed for agitation 30 tablet 1    simvastatin (ZOCOR) 40 MG tablet Take 1 tablet (40 mg total) by mouth once daily. 90 tablet 4    vitamin D (VITAMIN D3) 1000 units Tab Take 1,000 Units by mouth once daily.      azelastine (ASTELIN) 137 mcg (0.1 %) nasal spray 1 spray (137 mcg total) by Nasal route 2 (two) times daily. 30 mL 3    cephALEXin (KEFLEX) 500 MG capsule Take 1 capsule (500 mg total) by mouth 4 (four) times daily. for 5 days 20 capsule 0     Facility-Administered Medications Ordered in Other Encounters   Medication Dose Route Frequency Provider Last Rate Last Admin    sodium chloride 0.9% flush 5 mL  5 mL Intravenous PRN Sarah Earl MD            Medication List        START taking these medications      cephALEXin 500 MG capsule  Commonly known as: KEFLEX  Take 1 capsule (500 mg total) by mouth 4 (four) times daily. for 5 days            ASK your doctor about these  medications      acetaminophen 500 MG tablet  Commonly known as: TYLENOL  Take 2 tablets (1,000 mg total) by mouth every 8 (eight) hours as needed for Pain.     amLODIPine-benazepriL 10-40 mg per capsule  Commonly known as: LOTREL  Take 1 capsule by mouth once daily.     apixaban 5 mg Tab  Commonly known as: ELIQUIS  Take 1 tablet (5 mg total) by mouth 2 (two) times daily.     artificial tears 0.5 % ophthalmic solution  Commonly known as: ISOPTO TEARS  Place 1 drop into both eyes as needed.     azelastine 137 mcg (0.1 %) nasal spray  Commonly known as: ASTELIN  1 spray (137 mcg total) by Nasal route 2 (two) times daily.     blood pressure monitor Kit  Use daily to monitor blood pressure     calcium-vitamin D3 500 mg-5 mcg (200 unit) per tablet  Commonly known as: OS-ERIC 500 + D3  Take 2 tablets by mouth once daily.     cyanocobalamin 1000 MCG tablet  Commonly known as: VITAMIN B-12  Take 1,000 mcg by mouth every morning.     donepeziL 5 MG tablet  Commonly known as: ARICEPT  Take 1 tablet (5 mg total) by mouth every evening.     doxycycline 100 MG tablet  Commonly known as: VIBRA-TABS  Take 1 tablet (100 mg total) by mouth 2 (two) times daily. for 7 days     DULoxetine 60 MG capsule  Commonly known as: CYMBALTA  Take 1 capsule (60 mg total) by mouth every morning.     estradioL 0.01 % (0.1 mg/gram) vaginal cream  Commonly known as: ESTRACE  USE ONE-HALF GRAM VAGINALLY TWICE a WEEK     hydrALAZINE 25 MG tablet  Commonly known as: APRESOLINE  Take 1 tablet (25 mg total) by mouth every 8 (eight) hours.     melatonin 3 mg tablet  Commonly known as: MELATIN  Take 2 tablets (6 mg total) by mouth nightly as needed for Insomnia.     metoprolol tartrate 50 MG tablet  Commonly known as: LOPRESSOR  Take 1 tablet (50 mg total) by mouth 2 (two) times daily.     nitroGLYCERIN 0.4 MG SL tablet  Commonly known as: NITROSTAT  Place 1 tablet (0.4 mg total) under the tongue every 5 (five) minutes as needed for Chest pain.      pregabalin 50 MG capsule  Commonly known as: LYRICA  Take 1 capsule (50 mg total) by mouth nightly.     QUEtiapine 25 MG Tab  Commonly known as: SEROQUEL  1/2-1 tablet every 8 hours as needed for agitation     simvastatin 40 MG tablet  Commonly known as: ZOCOR  Take 1 tablet (40 mg total) by mouth once daily.     vitamin D 1000 units Tab  Commonly known as: VITAMIN D3  Take 1,000 Units by mouth once daily.                I have seen and examined this patient within the last 30 days. My clinical findings that support the need for the home health skilled services and home bound status are the following:no   Mental confusion making it unsafe for patient to leave home alone due to  Dementia.     Diet:   regular diet    Activities:   activity as tolerated    Nursing:   Agency to admit patient within 24 hours of hospital discharge unless specified on physician order or at patient request    SN to complete comprehensive assessment including routine vital signs. Instruct on disease process and s/s of complications to report to MD. Review/verify medication list sent home with the patient at time of discharge  and instruct patient/caregiver as needed. Frequency may be adjusted depending on start of care date.     Skilled nurse to perform up to 3 visits PRN for symptoms related to diagnosis    Notify MD if SBP > 160 or < 90; DBP > 90 or < 50; HR > 120 or < 50; Temp > 101; O2 < 88%;     Ok to schedule additional visits based on staff availability and patient request on consecutive days within the home health episode.    When multiple disciplines ordered:    Start of Care occurs on Sunday - Wednesday schedule remaining discipline evaluations as ordered on separate consecutive days following the start of care.    Thursday SOC -schedule subsequent evaluations Friday and Monday the following week.     Friday - Saturday SOC - schedule subsequent discipline evaluations on consecutive days starting Monday of the following  week.    For all post-discharge communication and subsequent orders please contact patient's primary care physician. If unable to reach primary care physician or do not receive response within 30 minutes, please contact Dr Whitfield for clinical staff order clarification      I certify that this patient is confined to her home

## 2024-09-10 NOTE — PLAN OF CARE
As per [facility] unable to admit pt to home health services.  LILLIAM [facility]     09/10/24 1420   Post-Acute Status   Post-Acute Authorization Home Health   Home Health Status Referrals Sent   Discharge Delays None known at this time   Discharge Plan   Discharge Plan A Home Health     SW spoke with pt and daughter about home health services.  SW sent referral to [facility] per daughter and pt.     SW/CM to send more referrals is pt is not admitted to [facility]    SW/CM to follow      Discharge Plan A and Plan B have been determined by review of patient's clinical status, future medical and therapeutic needs, and coverage/benefits for post-acute care in coordination with multidisciplinary team members.    Jaimie Luevano, DEIDRE, MSW, LMSW, RSW   Case Management  [facility]  Email: [email or web address]

## 2024-09-10 NOTE — FIRST PROVIDER EVALUATION
" Emergency Department TeleTriage Encounter Note      CHIEF COMPLAINT    Chief Complaint   Patient presents with    Altered Mental Status     Family states pt is altered and anxious x 1 week. Pt hx of dementia.        VITAL SIGNS   Initial Vitals [09/10/24 1033]   BP Pulse Resp Temp SpO2   (!) 177/79 80 18 98.2 °F (36.8 °C) 97 %      MAP       --            ALLERGIES    Review of patient's allergies indicates:   Allergen Reactions    Bextra [valdecoxib] Swelling    Gabapentin Diarrhea and Nausea Only       PROVIDER TRIAGE NOTE  This is a teletriage evaluation of a 89 y.o. female presenting to the ED complaining of AMS for one week. States, "she's been talking out of her head." No fever or trauma. Recently started on abx for presumed UTI but no improvement.     Alert, no distress.     Initial orders will be placed and care will be transferred to an alternate provider when patient is roomed for a full evaluation. Any additional orders and the final disposition will be determined by that provider.         ORDERS  Labs Reviewed   CBC W/ AUTO DIFFERENTIAL   COMPREHENSIVE METABOLIC PANEL   TROPONIN I   URINALYSIS, REFLEX TO URINE CULTURE   POCT GLUCOSE MONITORING CONTINUOUS       ED Orders (720h ago, onward)      Start Ordered     Status Ordering Provider    09/10/24 1200 09/10/24 1130  Vital Signs  Every 2 hours         Ordered JOSEPH HOGAN N.    09/10/24 1131 09/10/24 1130  CBC auto differential  STAT         Ordered JOSEPH HOGAN N.    09/10/24 1131 09/10/24 1130  Comprehensive metabolic panel  STAT         Ordered JOSEPH HOGAN N.    09/10/24 1131 09/10/24 1130  Insert Saline lock IV  Once         Ordered JOSEPH HOGAN N.    09/10/24 1131 09/10/24 1130  EKG 12-lead  Once         Ordered JOSEPH HOGAN N.    09/10/24 1131 09/10/24 1130  POCT glucose  Once         Ordered JOSEPH HOGAN N.    09/10/24 1131 09/10/24 1130  Cardiac Monitoring - Adult  Continuous      "   Comments: Notify Physician If:    Ordered JOSEPH HOGAN N.    09/10/24 1131 09/10/24 1130  Troponin I  STAT         Ordered JOSEPH HOGAN N.    09/10/24 1131 09/10/24 1130  Urinalysis, Reflex to Urine Culture Urine, Clean Catch  STAT         Ordered JOSEPH HOGAN.              Virtual Visit Note: The provider triage portion of this emergency department evaluation and documentation was performed via MtoV, a HIPAA-compliant telemedicine application, in concert with a tele-presenter in the room. A face to face patient evaluation with one of my colleagues will occur once the patient is placed in an emergency department room.      DISCLAIMER: This note was prepared with [employer] voice recognition transcription software. Garbled syntax, mangled pronouns, and other bizarre constructions may be attributed to that software system.

## 2024-09-11 ENCOUNTER — TELEPHONE (OUTPATIENT)
Dept: NEUROLOGY | Facility: CLINIC | Age: 89
End: 2024-09-11
Payer: MEDICARE

## 2024-09-11 ENCOUNTER — PATIENT OUTREACH (OUTPATIENT)
Dept: EMERGENCY MEDICINE | Facility: HOSPITAL | Age: 89
End: 2024-09-11
Payer: MEDICARE

## 2024-09-11 NOTE — PROGRESS NOTES
ED Navigator f/u regarding her recent ED encounter. Pt daughter states that she is doing ok. She states that her mom is doing a lot better since her visit and is taking her antibiotics as ordered. She does not want to schedule any other appt's at this time and encouraged to reach out as the need arises via [phone]. ED Navigator will assist as needed.

## 2024-09-12 LAB
BACTERIA UR CULT: NORMAL
BACTERIA UR CULT: NORMAL

## 2024-09-15 ENCOUNTER — PATIENT MESSAGE (OUTPATIENT)
Dept: INTERNAL MEDICINE | Facility: CLINIC | Age: 89
End: 2024-09-15
Payer: MEDICARE

## 2024-09-24 NOTE — PROGRESS NOTES
Ms. England is a patient of Dr. Roman and was last seen in clinic 1/23/2024.      Subjective:   Patient ID:  Brunilda England is a 89 y.o. female who presents for follow up of Pacemaker Check and Atrial Fibrillation  .     HPI:    Ms. England is a 89 y.o. female with pAF/AFL, HTN, SAH, COPD, HFpEF, CKD, TBS, PPM (LBBA lead) here for follow up.     Background:    pAF/AFL  HTN  SAH following a fall  Left hip fracture following a fall (s/p ORIF 5/2023)  COPD on home O2  HFpEF  CKD3  Right sided lung/rectal CA s/p chemoradiation 2012    Background:  Mechanical fall which led to L proximal femoral fracture and L parietal subarachnoid hemorrhage back in June 2023. She has questionable syncope at that time as well leading to 30 day event monitor which showed AF/AFL with RVR and conversion pauses over 3 seconds which were consecutive with few intervening junctional and sinus beats. Left-sided dual chamber PPM    9/21/2023: Successful implantation of PPM Dual, LBAP lead for SSS/TBS.     9/6/2023: here off schedule for L arm/hand swelling within one week after device implant. US with no DVT. She had a compression bandage over her antecubital area left from the day of her procedure. off schedule for L forearm and hand swelling. This was due to a bandage left over her antecubital area since the day of implant. Bandage removed. I suspect it will improve. Normal device function.     1/11/2024: Pt with dementia, had been with increased agitation so UCX completed and pt tx for suspected uti. Pt started abx just yesterday and daughter states she has seemed less agitated and sleeping better. No reported urinary symptoms or fevers. We will continue current plan with current antibiotic.    1/23/2024: She is 4 mo s/p implantation of PPM for TBS. Ms. England is doing well from a device perspective with stable lead and device function. LBBA pacing lead tested at unipolar and bipolar configurations at high and low outputs. ECGs stable.  Thresholds and impedence stable from prior device check. LVAT ~80ms.   AMS 7%. V rates not controlled. On eliquis. No palpitations but she does have some episodic fatigue/LH. Increase metoprolol tartrate to 50mg BID for more rate control. Minimal RV pacing. No CHF symptoms. No syncope since implant. Increase lopressor to 50mg BID      Update (09/26/2024):    9/10/2024: AMS and UTI    Today she says she has no new cardiac complaints. No worsening DONALDSON, CP, palps, LH, syncope reported. Will sometimes feel her device shift a bit but no pain. Daughter confirms no new cardiac symptoms.  Pt is working with PCP on BP and dementia.    She is currently taking eliquis 5mg BID for stroke prophylaxis and denies significant bleeding episodes. She is currently being treated with lopressor 50mg BID for HR control.  Kidney function is stable, with a creatinine of 1 on 9/10/2024.    Device Interrogation (9/26/2024) reveals an intrinsic AFL with stable lead and device function. AMS x 6,247, MAX duration one day. HVR episodes c/w RVR- MAX duration 8 mins but overall VR's controlled. AMS 6%. She paces 15% in the RA and 3% in the RV. Estimated battery longevity 7 years 10 mo. Device changes today: AMS rate changed from 160bpm-->140bpm. Auto PVARP turned off and set to 250 ms.    I have personally reviewed the patient's EKG today, which shows sinus rhythm at 87bpm. ME interval is 192. QRS is 90. QTc is 425.    Relevant Cardiac Test Results:    2D Echo (5/21/2023):  The left ventricle is normal in size with low normal systolic function.  The estimated ejection fraction is 53%.  There are segmental left ventricular wall motion abnormalities.  There is abnormal septal wall motion.  Mild left atrial enlargement.  Indeterminate left ventricular diastolic function.  Normal right ventricular size with low normal right ventricular systolic function.  There is mild-to-moderate aortic valve stenosis.  Aortic valve area is 1.40 cm2; peak velocity is  1.54 m/s; mean gradient is 6 mmHg.  Mild aortic regurgitation.  Mild mitral regurgitation.  Mild tricuspid regurgitation.  Normal central venous pressure (3 mmHg).  The estimated PA systolic pressure is 41 mmHg.  There is mild pulmonary hypertension.  Trivial posterior pericardial effusion. At base and under the RA and small outside the RV apically.    Current Outpatient Medications   Medication Sig    acetaminophen (TYLENOL) 500 MG tablet Take 2 tablets (1,000 mg total) by mouth every 8 (eight) hours as needed for Pain.    amLODIPine-benazepriL (LOTREL) 10-40 mg per capsule Take 1 capsule by mouth once daily.    apixaban (ELIQUIS) 5 mg Tab Take 1 tablet (5 mg total) by mouth 2 (two) times daily.    artificial tears (ISOPTO TEARS) 0.5 % ophthalmic solution Place 1 drop into both eyes as needed.    azelastine (ASTELIN) 137 mcg (0.1 %) nasal spray 1 spray (137 mcg total) by Nasal route 2 (two) times daily.    blood pressure monitor Kit Use daily to monitor blood pressure    calcium-vitamin D3 (OS-ERIC 500 + D3) 500 mg-5 mcg (200 unit) per tablet Take 2 tablets by mouth once daily.    cyanocobalamin (VITAMIN B-12) 1000 MCG tablet Take 1,000 mcg by mouth every morning.    donepeziL (ARICEPT) 5 MG tablet Take 1 tablet (5 mg total) by mouth every evening.    doxycycline (VIBRA-TABS) 100 MG tablet Take 1 tablet (100 mg total) by mouth 2 (two) times daily. for 10 days    DULoxetine (CYMBALTA) 60 MG capsule Take 1 capsule (60 mg total) by mouth every morning.    estradioL (ESTRACE) 0.01 % (0.1 mg/gram) vaginal cream USE ONE-HALF GRAM VAGINALLY TWICE a WEEK    hydrALAZINE (APRESOLINE) 25 MG tablet Take 1 tablet (25 mg total) by mouth every 8 (eight) hours.    melatonin (MELATIN) 3 mg tablet Take 2 tablets (6 mg total) by mouth nightly as needed for Insomnia.    metoprolol tartrate (LOPRESSOR) 50 MG tablet Take 1 tablet (50 mg total) by mouth 2 (two) times daily.    nitroGLYCERIN (NITROSTAT) 0.4 MG SL tablet Place 1 tablet (0.4  "mg total) under the tongue every 5 (five) minutes as needed for Chest pain.    pregabalin (LYRICA) 50 MG capsule Take 1 capsule (50 mg total) by mouth nightly.    QUEtiapine (SEROQUEL) 25 MG Tab 1/2-1 tablet every 8 hours as needed for agitation    simvastatin (ZOCOR) 40 MG tablet Take 1 tablet (40 mg total) by mouth once daily.    vitamin D (VITAMIN D3) 1000 units Tab Take 1,000 Units by mouth once daily.     No current facility-administered medications for this visit.     Facility-Administered Medications Ordered in Other Visits   Medication    sodium chloride 0.9% flush 5 mL       Review of Systems   Constitutional: Negative for malaise/fatigue.   Cardiovascular:  Negative for chest pain, dyspnea on exertion, irregular heartbeat, leg swelling and palpitations.   Respiratory:  Negative for shortness of breath.    Hematologic/Lymphatic: Negative for bleeding problem.   Skin:  Negative for rash.   Musculoskeletal:  Negative for myalgias.   Gastrointestinal:  Negative for hematemesis, hematochezia and nausea.   Genitourinary:  Negative for hematuria.   Neurological:  Negative for light-headedness.   Psychiatric/Behavioral:  Positive for altered mental status (at night).    Allergic/Immunologic: Negative for persistent infections.       Objective:          BP (!) 176/60   Pulse 87   Ht 5' 5" (1.651 m)   BMI 23.30 kg/m²     Physical Exam  Vitals and nursing note reviewed.   Constitutional:       Appearance: Normal appearance. She is well-developed.   HENT:      Head: Normocephalic.      Nose: Nose normal.   Eyes:      Pupils: Pupils are equal, round, and reactive to light.   Cardiovascular:      Rate and Rhythm: Normal rate and regular rhythm.   Pulmonary:      Effort: No respiratory distress.   Chest:      Comments: Device to LUCW. Incision and pocket in good repair.    Musculoskeletal:         General: Normal range of motion.   Skin:     General: Skin is warm and dry.      Findings: No erythema.   Neurological:    "   Mental Status: She is alert and oriented to person, place, and time.   Psychiatric:         Speech: Speech normal.         Behavior: Behavior normal.           Lab Results   Component Value Date     09/10/2024    K 4.1 09/10/2024    MG 1.6 06/19/2023    BUN 10 09/10/2024    CREATININE 1.0 09/10/2024    ALT 6 (L) 09/10/2024    AST 18 09/10/2024    HGB 9.8 (L) 09/10/2024    HCT 29.2 (L) 09/10/2024    HCT 32 (L) 12/06/2019    TSH 3.631 07/17/2024    LDLCALC 66.4 08/17/2023       Recent Labs   Lab 09/14/23  1129   INR 1.0       Assessment:     1. Cardiac pacemaker in situ - LBBA pacing lead    2. Chronic diastolic heart failure    3. Essential hypertension    4. Paroxysmal atrial flutter    5. On anticoagulant therapy      Plan:     In summary, Ms. England is a 89 y.o. female with pAF/AFL, HTN, SAH, COPD, HFpEF, CKD, TBS, PPM (LBBA lead) here for follow up.   Ms. England is doing well from a device perspective with stable lead and device function. Stable LBBA parameters. 3% LBBA pacing. No CHF symptoms.  AF/AFL 6%, rates overall controlled on increased metoprolol dose. On eliquis at appropriate dose. Device changes as reported above.   She is working closely with PCP on BP. Will make no med changes today.     Continue current medication regimen and device settings.   Follow up in device clinic as scheduled.   Follow up in EP clinic in 1 year, sooner as needed.     *A copy of this note has been sent to Dr. Roman*    Follow up in about 1 year (around 9/26/2025).    ------------------------------------------------------------------    Evelyne Yates, DIEGO, NP-C  Cardiac Electrophysiology

## 2024-09-26 ENCOUNTER — HOSPITAL ENCOUNTER (OUTPATIENT)
Dept: CARDIOLOGY | Facility: CLINIC | Age: 89
Discharge: HOME | End: 2024-09-26
Attending: INTERNAL MEDICINE
Payer: MEDICARE

## 2024-09-26 ENCOUNTER — CLINICAL SUPPORT (OUTPATIENT)
Dept: CARDIOLOGY | Facility: HOSPITAL | Age: 89
End: 2024-09-26
Attending: INTERNAL MEDICINE
Payer: MEDICARE

## 2024-09-26 ENCOUNTER — OFFICE VISIT (OUTPATIENT)
Dept: ELECTROPHYSIOLOGY | Facility: CLINIC | Age: 89
End: 2024-09-26
Payer: MEDICARE

## 2024-09-26 VITALS
HEIGHT: 65 IN | HEART RATE: 87 BPM | BODY MASS INDEX: 23.3 KG/M2 | DIASTOLIC BLOOD PRESSURE: 60 MMHG | SYSTOLIC BLOOD PRESSURE: 176 MMHG

## 2024-09-26 DIAGNOSIS — I50.32 CHRONIC DIASTOLIC HEART FAILURE: ICD-10-CM

## 2024-09-26 DIAGNOSIS — I10 ESSENTIAL HYPERTENSION: ICD-10-CM

## 2024-09-26 DIAGNOSIS — I49.5 SSS (SICK SINUS SYNDROME): ICD-10-CM

## 2024-09-26 DIAGNOSIS — Z95.0 CARDIAC PACEMAKER IN SITU: Primary | ICD-10-CM

## 2024-09-26 DIAGNOSIS — I48.0 PAF (PAROXYSMAL ATRIAL FIBRILLATION): ICD-10-CM

## 2024-09-26 DIAGNOSIS — I48.92 PAROXYSMAL ATRIAL FLUTTER: ICD-10-CM

## 2024-09-26 DIAGNOSIS — Z79.01 ON ANTICOAGULANT THERAPY: ICD-10-CM

## 2024-09-26 PROCEDURE — 93280 PM DEVICE PROGR EVAL DUAL: CPT | Mod: 26,,, | Performed by: INTERNAL MEDICINE

## 2024-09-26 PROCEDURE — 1101F PT FALLS ASSESS-DOCD LE1/YR: CPT | Mod: CPTII,S$GLB,, | Performed by: NURSE PRACTITIONER

## 2024-09-26 PROCEDURE — 1159F MED LIST DOCD IN RCRD: CPT | Mod: CPTII,S$GLB,, | Performed by: NURSE PRACTITIONER

## 2024-09-26 PROCEDURE — 1160F RVW MEDS BY RX/DR IN RCRD: CPT | Mod: CPTII,S$GLB,, | Performed by: NURSE PRACTITIONER

## 2024-09-26 PROCEDURE — 1126F AMNT PAIN NOTED NONE PRSNT: CPT | Mod: CPTII,S$GLB,, | Performed by: NURSE PRACTITIONER

## 2024-09-26 PROCEDURE — 99999 PR PBB SHADOW E&M-EST. PATIENT-LVL IV: CPT | Mod: PBBFAC,,, | Performed by: NURSE PRACTITIONER

## 2024-09-26 PROCEDURE — 3288F FALL RISK ASSESSMENT DOCD: CPT | Mod: CPTII,S$GLB,, | Performed by: NURSE PRACTITIONER

## 2024-09-26 PROCEDURE — 93280 PM DEVICE PROGR EVAL DUAL: CPT

## 2024-09-26 PROCEDURE — 99214 OFFICE O/P EST MOD 30 MIN: CPT | Mod: S$GLB,,, | Performed by: NURSE PRACTITIONER

## 2024-09-27 LAB — OHS CV DC REMOTE DEVICE TYPE: NORMAL

## 2024-10-07 ENCOUNTER — EXTERNAL HOME HEALTH (OUTPATIENT)
Dept: HOME HEALTH SERVICES | Facility: HOSPITAL | Age: 89
End: 2024-10-07
Payer: MEDICARE

## 2024-10-16 NOTE — PROGRESS NOTES
"Name: Brunilda England  MRN: [id number]   CSN: [id number]      Date: 10-17-24      Referring physician:  No referring provider defined for this encounter.    Subjective:          Chief Complaint: memory loss     History of Present Illness (HPI):    Brunilda England is a 89 y.o. right-handed female with  HTN, CKD3, bradycardia, PPM, aortic atherosclerosis, PVD, CHF, CAD, emphysema, macular degeneration, chronic back pain, hx of lung cancer (chemo/radiation)  presents today for a follow-up evaluation but new to the [facility] multi-d memory clinic for  LOAD. mild  and is accompanied by daughters (Genevieve and Haley). She has been followed by Dr. Alanis and prior to this Dr. Ureña and Dr. Howard all for memory with earliest visit in 2018.  Dr. Alanis last saw her 12-8-23.    Haley has POA.       Started seroquel last month. She was having hallucinations at night. Ended up having to take to ED and dx'd with UTI. PCP gave Seroquel at night. This has made a big difference.   Dreams a lot. She will call out to her  and son who have passed. It is not as bad since UTI is gone but still happening.  Dtr hears her on monitor.     "Where is that monitor at? I needs to know."      Saw turkey coming out of closet during UTI. She has done this even without UTI. Not sure if macular degeneration.     Haley is compensated for 8 hours of sitter service.       Memory   Short term memory is gradually worsening over the past 6-7 years at least.  She feels her memory is pretty good.  Haley says she has trouble with word find and forgetfulness.       Smoke:  Quit 1996    Does not drink alcohol.      Living Environment:  Lives with adult child (Haley). Haley moved back here.   Has nurse that comes to check her meds and VS.     iADLs  Driving:  No longer drive.  "Stopped when one of those crazy [facility] doctors said I couldn't see well."  This was about 3 years ago.     Meal Prep/Planning:  no longer cooks    Managing Finances:  Daughters " "manage.   Some fears of scam. Sometimes she will hang up on them but sometimes will talk to them.     Medication Administration:  Daughter sets up her meds and reminds her.     Managing Appointments:  Daughters manage.     Housekeeping/ Laundry:   Does not do any household chores.        ADLs  Bathing: Independent  Does not need reminders.   Dtr helps her with legs and feet.   She is able to put her deodorant on.     Dressing: Needs assistance      Toileting: wears depends but independent    Eating: Independent        Mood: Depressed often. Worry.   Can't do things I like to.   She has never had a filter but may be getting worse per daughters.     Behavior: Paranoid  "I get mad easy."  No repetitive behaviors.       Hallucinations: Visual      Swallowing:  Sometimes seems to grimace like she is having trouble getting food down.   Appetite dropped off past 60-90 days. Used to love to eat.   Has lost weight.       Tremor: No     Ambulation: with walker since fractured femur about a year ago  Fell once last month during UTI.   Fractured femur was from a fall in house when she fractured her femur.       Sleep: No problems and Talks in sleep  May take a little nap on occasion.     Has trigger finger L long finger. Wears glove that seems to help.     Had 4 girls and one boy.   Have two daughters living.       Review of Systems   HENT:  Negative for hearing loss.    Eyes:  Positive for visual disturbance (macular degeneration, mars L eye).   Genitourinary:         Had 3 UTIs the past year.    Neurological:  Negative for tremors and seizures.        No stroke.       Past Medical History: The patient  has a past medical history of Anal cancer (1995), Anemia, Cancer (1995), Centrilobular emphysema (7/6/2020), Diabetes mellitus, Lumbar radiculopathy (5/16/2014), Lung cancer (2012), Macular degeneration, Macular hemorrhage of left eye, Neuropathy, Osteoporosis, Osteoporosis, unspecified (11/9/2012), and Pure " hypercholesterolemia.    Social History: The patient  reports that she quit smoking about 29 years ago. Her smoking use included cigarettes. She started smoking about 59 years ago. She has a 15 pack-year smoking history. She has been exposed to tobacco smoke. She has never used smokeless tobacco. She reports that she does not drink alcohol and does not use drugs.    Family History: Their family history includes Breast cancer in her maternal aunt; Cancer in her father; Stroke in her mother.    Allergies: Bextra [valdecoxib] and Gabapentin     Meds:   Current Outpatient Medications on File Prior to Visit   Medication Sig Dispense Refill    acetaminophen (TYLENOL) 500 MG tablet Take 2 tablets (1,000 mg total) by mouth every 8 (eight) hours as needed for Pain.  0    amLODIPine-benazepriL (LOTREL) 10-40 mg per capsule Take 1 capsule by mouth once daily. 90 capsule 3    apixaban (ELIQUIS) 5 mg Tab Take 1 tablet (5 mg total) by mouth 2 (two) times daily. 60 tablet 6    artificial tears (ISOPTO TEARS) 0.5 % ophthalmic solution Place 1 drop into both eyes as needed.      blood pressure monitor Kit Use daily to monitor blood pressure 1 each 0    calcium-vitamin D3 (OS-ERIC 500 + D3) 500 mg-5 mcg (200 unit) per tablet Take 2 tablets by mouth once daily.      cyanocobalamin (VITAMIN B-12) 1000 MCG tablet Take 1,000 mcg by mouth every morning.      donepeziL (ARICEPT) 5 MG tablet Take 1 tablet (5 mg total) by mouth every evening. 90 tablet 4    DULoxetine (CYMBALTA) 60 MG capsule Take 1 capsule (60 mg total) by mouth every morning. 90 capsule 3    estradioL (ESTRACE) 0.01 % (0.1 mg/gram) vaginal cream USE ONE-HALF GRAM VAGINALLY TWICE a WEEK 42.5 g 3    hydrALAZINE (APRESOLINE) 25 MG tablet Take 1 tablet (25 mg total) by mouth every 8 (eight) hours. 90 tablet 11    melatonin (MELATIN) 3 mg tablet Take 2 tablets (6 mg total) by mouth nightly as needed for Insomnia.  0    metoprolol tartrate (LOPRESSOR) 50 MG tablet Take 1 tablet  "(50 mg total) by mouth 2 (two) times daily. 60 tablet 11    nitroGLYCERIN (NITROSTAT) 0.4 MG SL tablet Place 1 tablet (0.4 mg total) under the tongue every 5 (five) minutes as needed for Chest pain. 25 tablet 0    pregabalin (LYRICA) 50 MG capsule Take 1 capsule (50 mg total) by mouth nightly. 60 capsule 2    QUEtiapine (SEROQUEL) 25 MG Tab 1/2-1 tablet every 8 hours as needed for agitation 30 tablet 1    simvastatin (ZOCOR) 40 MG tablet Take 1 tablet (40 mg total) by mouth once daily. 90 tablet 4    vitamin D (VITAMIN D3) 1000 units Tab Take 1,000 Units by mouth once daily.      azelastine (ASTELIN) 137 mcg (0.1 %) nasal spray 1 spray (137 mcg total) by Nasal route 2 (two) times daily. 30 mL 3     Current Facility-Administered Medications on File Prior to Visit   Medication Dose Route Frequency Provider Last Rate Last Admin    sodium chloride 0.9% flush 5 mL  5 mL Intravenous PRN Sarah Earl MD           Objective:     Physical Exam:    Vitals:    10/17/24 1326   BP: (!) 133/59   BP Location: Right arm   Patient Position: Sitting   Pulse: 72   Weight: 69.9 kg (154 lb 1.6 oz)   Height: 5' 5" (1.651 m)     Body mass index is 25.64 kg/m².    Constitutional  Well-developed, well-nourished, appears stated age   Cardiovascular  no LE edema bilaterally     ..  Neurological    * Mental status    - Orientation Oriented to: person, October, , age, president  Not oriented to:  (), day (Wednesday)     - Memory     Impaired     - Attention/concentration  Decreased     - Language  Spontaneous, fluent     - Fund of knowledge  Impaired     - Executive  Impaired     - Other       ..  * Cranial nerves       - CN II  PERRL, visual fields full to confrontation     - CN III, IV, VI  Extraocular movements full, normal pursuits and saccades     - CN V  Sensation V1 - V3 intact     - CN VII  Face strong and symmetric bilaterally     - CN VIII  Hearing intact bilaterally     - CN IX, X  Palate raises midline and " symmetric     - CN XI  SCM and trapezius 5/5 bilaterally     - CN XII  Tongue midline       ..  * Specialized movement exam  No hypophonic speech.    No facial masking.   Cogwheel rigidity-> paratonia.     HALI are bradykinetic but without decrementing.   No tremor with rest, posture, kinesis, or intention.    No other dystonia, chorea, athetosis, myoclonus, or tics.  Arises from seated position with push.    No motor impersistence.   Bit narrow based gait.   Shortened stride length.   No abnormal arm swing.    No stopped posture.                  Independent review of images:               Imaging:   Results for orders placed or performed during the hospital encounter of 09/10/24   CT Head Without Contrast    Narrative    EXAMINATION:  CT HEAD WITHOUT CONTRAST    CLINICAL HISTORY:  Mental status change, unknown cause;    TECHNIQUE:  Multiple sequential 5 mm axial images of the head without contrast.  Coronal and sagittal reformatted imaging from the axial acquisition.    COMPARISON:  06/29/2023    FINDINGS:  Generalized cerebral volume loss with compensatory enlargement ventricles sulci and cisterns.  Study is distorted by motion artifacts.  Allowing for artifacts there is no evidence for acute intracranial hemorrhage or new abnormal parenchymal attenuation.  Hypoattenuation bilateral basal ganglia greater on the left suggestive for remote lacunar-type infarcts    Continued empty sella.  Slight prominence of the extra-axial space overlying the frontal parietal lobes at the vertex similar to prior.  No significant opacification visualized paranasal sinuses or mastoid air cells..      Impression    No acute intracranial findings as detailed above specifically without evidence for acute intracranial hemorrhage or new abnormal parenchymal attenuation    Clinical correlation and further evaluation with MRI as warranted if patient compatible.    See above for additional details.      Electronically signed by: Trever  DO Carlos Alberto  Date:    09/10/2024  Time:    13:39   Results for orders placed or performed during the hospital encounter of 07/07/21   MRI Brain Without Contrast    Narrative    EXAMINATION:  MRI BRAIN WITHOUT CONTRAST    CLINICAL HISTORY:  memory loss; Other amnesia    TECHNIQUE:  Multiplanar multisequence MR imaging of the brain was performed without contrast.  There are some motion limitations to the examination.    COMPARISON:  CT scan of the head dated 03/13/2013 and MRI brain dated 04/10/2012.    FINDINGS:  The craniocervical junction is intact.  There is empty appearance of the sella.  The intracranial flow voids are within normal limits.    No diffusion-weighted signal abnormality is present.  There are some progression of the T2/FLAIR signal hyperintensities within the periventricular and subcortical white matter.  The ventricles and sulci are prominent, consistent with cerebral volume loss.  There are no extra-axial fluid collections.  There is no evidence of intracranial hemorrhage.  There is no evidence of mass effect.    There are postoperative changes in the orbits.  The paranasal sinuses unremarkable.  The mastoid air cells are clear.  The calvarium is intact.      Impression    No acute intracranial process.    Changes of chronic small vessel ischemic disease and cerebral volume loss.      Electronically signed by: Kehinde Ryder MD  Date:    07/07/2021  Time:    23:58     *Note: Due to a large number of results and/or encounters for the requested time period, some results have not been displayed. A complete set of results can be found in Results Review.           EKG 9-10-24:  Narrative  Performed by: GEMUSE  Test Reason : R41.82,    Vent. Rate : 106 BPM     Atrial Rate : 106 BPM     P-R Int : 142 ms          QRS Dur : 088 ms      QT Int : 334 ms       P-R-T Axes : 040 021 -69 degrees     QTc Int : 443 ms    Sinus tachycardia with Premature supraventricular complexes  Nonspecific ST and/or T wave  abnormalities  Abnormal ECG  When compared with ECG of 23-JAN-2024 13:11,  ventricular paced rhythm no longer present  Confirmed by Tara De Los Santos MD ([id number]) on 9/10/2024 2:34:52 PM       Assessment and Plan     Late onset Alzheimer's dementia with behavioral disturbance        Medical Decision Making:    No pdism.    Medications  She is prescribed donepezil 5 mg qhs.         ..ACB score 5 (3 or > increases risk of confusion, dizziness, falls and increased mortality)  Hydralazine-1  Azelastine- 1  Quetiapine-3    Care Management -completed care eco    Follow up in memory clinic 6 mos.                ..Total time: 60 minutes spent on the encounter, which includes face to face time and non-face to face time preparing to see the patient (eg, review of tests), Obtaining and/or reviewing separately obtained history, Documenting clinical information in the electronic or other health record, Independently interpreting results (not separately reported) and communicating results to the patient/family/caregiver, or Care coordination (not separately reported).       ..      Latia Arriaza DNP, NP-C  Division of Movement and Memory Disorders  [facility]  [phone]

## 2024-10-17 ENCOUNTER — OFFICE VISIT (OUTPATIENT)
Dept: NEUROLOGY | Facility: CLINIC | Age: 89
End: 2024-10-17
Payer: MEDICARE

## 2024-10-17 VITALS
DIASTOLIC BLOOD PRESSURE: 59 MMHG | BODY MASS INDEX: 25.68 KG/M2 | WEIGHT: 154.13 LBS | HEART RATE: 72 BPM | HEIGHT: 65 IN | SYSTOLIC BLOOD PRESSURE: 133 MMHG

## 2024-10-17 DIAGNOSIS — F02.818 LATE ONSET ALZHEIMER'S DEMENTIA WITH BEHAVIORAL DISTURBANCE: Primary | ICD-10-CM

## 2024-10-17 DIAGNOSIS — Z91.81 AT RISK FOR FALLS: ICD-10-CM

## 2024-10-17 DIAGNOSIS — G30.1 LATE ONSET ALZHEIMER'S DEMENTIA WITH BEHAVIORAL DISTURBANCE: Primary | ICD-10-CM

## 2024-10-17 DIAGNOSIS — R63.0 POOR APPETITE: ICD-10-CM

## 2024-10-17 PROCEDURE — 96116 NUBHVL XM PHYS/QHP 1ST HR: CPT | Mod: S$GLB,,, | Performed by: PSYCHIATRY & NEUROLOGY

## 2024-10-17 PROCEDURE — 96139 PSYCL/NRPSYC TST TECH EA: CPT | Mod: S$GLB,,, | Performed by: PSYCHIATRY & NEUROLOGY

## 2024-10-17 PROCEDURE — 96138 PSYCL/NRPSYC TECH 1ST: CPT | Mod: S$GLB,,, | Performed by: PSYCHIATRY & NEUROLOGY

## 2024-10-17 PROCEDURE — G2211 COMPLEX E/M VISIT ADD ON: HCPCS | Mod: S$GLB,,, | Performed by: NURSE PRACTITIONER

## 2024-10-17 PROCEDURE — 96133 NRPSYC TST EVAL PHYS/QHP EA: CPT | Mod: S$GLB,,, | Performed by: PSYCHIATRY & NEUROLOGY

## 2024-10-17 PROCEDURE — 96132 NRPSYC TST EVAL PHYS/QHP 1ST: CPT | Mod: S$GLB,,, | Performed by: PSYCHIATRY & NEUROLOGY

## 2024-10-17 PROCEDURE — 99497 ADVNCD CARE PLAN 30 MIN: CPT | Mod: S$GLB,,, | Performed by: STUDENT IN AN ORGANIZED HEALTH CARE EDUCATION/TRAINING PROGRAM

## 2024-10-17 PROCEDURE — 99215 OFFICE O/P EST HI 40 MIN: CPT | Mod: S$GLB,,, | Performed by: NURSE PRACTITIONER

## 2024-10-17 PROCEDURE — 99999 PR PBB SHADOW E&M-EST. PATIENT-LVL III: CPT | Mod: PBBFAC,,,

## 2024-10-17 PROCEDURE — 99499 UNLISTED E&M SERVICE: CPT | Mod: S$GLB,,, | Performed by: PSYCHIATRY & NEUROLOGY

## 2024-10-17 PROCEDURE — 99205 OFFICE O/P NEW HI 60 MIN: CPT | Mod: S$GLB,,, | Performed by: STUDENT IN AN ORGANIZED HEALTH CARE EDUCATION/TRAINING PROGRAM

## 2024-10-17 NOTE — PROGRESS NOTES
Palliative and Geriatric Medicine Evaluation  [facility]      Patient Name: Brunilda England     Date: 10/17/2024      Primary Care Physician:  Pepper Whitfield MD    Consult Requested By:  No ref. provider found    Reason for Consult: Advance care planning and symptom management in the setting of memory loss    Providers:  Marixa Lucas PsyD (Neuropsychology); Latia Arriaza DNP (Neurology); Opal Sampson MD (Palliative Medicine)    Assessment/Plan     Plan/Recommendations:  Diagnoses and all orders for this visit:    Late onset Alzheimer's dementia with behavioral disturbance      Mentation: Cognition and Mood   -Seen by Neurology and Neuropsychology   -Undergoing Neuropsych testing today  - Patient presents with progressive memory decline since at least 2017, with diagnosis of dementia in 2018.  - Assessing need for increased dementia medication (Aricept) based on cognitive testing results.  - Explained the multidisciplinary approach of the memory clinic, including cognitive testing, physical exam, and medication review.  - Cognitive testing to be performed during visit.  - Follow up for feedback appointment to discuss cognitive testing results.  - Recent UTI resulted in increased confusion and hallucinations, treated with antibiotics.  - Consider referral to urology for recurrent UTIs if they persist.  - Has Seroquel 25mg at night for nighttime hallucinations and agitation, which has shown improvement.  - Patient to maintain current level of supervision, allowing brief periods alone when necessary.      Mobility  - Evaluating fall risk and mobility, considering prescription of rollator walker to improve safety and maintain ambulation ability.  - Discussed the importance of maintaining physical activity and mobility to preserve function.  - Educated on the potential benefits of a rollator walker for improved stability and safety.  - Contact [facility] office ([phone]) to  inquire about walker repair or replacement.      Medications  -Medication reconciliation performed   -DULoxetine - 60 MG  pregabalin - 50 MG  QUEtiapine Tab - 25 MG  - Continued Cymbalta 60mg, Seroquel 25mg at night, and Aricept.    Multi-Complexity:  -Frailty yes  -BMI: Body mass index is 25.64 kg/m².   -Weight loss: yes  - Patient to consume high-calorie, nutrient-dense foods to maintain weight, including ice cream if desired.  - Patient to be offered food regularly, even if not expressing hunger.  - Recommend continuing to monitor for signs of choking or difficulty swallowing.        Palliative Care Encounter / Advance Care Planning      Advance Care Planning     Advance Directives:   Living Will: No    [place]: Yes    Do Not Resuscitate Status: Yes    Medical Power of : Yes    Agent's Name:  Haley England   Agent's Contact Number:  [phone]    Decision Making:  Patient answered questions and Family answered questions  Goals of Care: Advance Care Planning    Date: 10/17/2024  During this visit, I engaged the patient and family  in the voluntary advance care planning process.  The patient and I reviewed the role for advance directives and their purpose in directing future healthcare if the patient's unable to speak for him/herself.  We discussed different extreme health states that she could experience, and reviewed what kind of medical care she would want in those situations.  The patient and family communicated that if she were comatose and had little chance of a meaningful recovery, she would not want machines/life-sustaining treatments used. They shared that patient's Code status is Do Not Resuscitate (DNR). Completed a [place] form stating DNR, SELECTIVE TX  (allowing for hospitalization and IV antibiotics if needed) and  TRIAL OF ARTIFICIALLY ADMINISTERED NUTRITION (to regain strength if necessary).  She also named Haley as power of  over everything, including medical decisions. The  family endorses that what is most important right now is to focus on spending time at home. Family's goal is to maintain the patient's quality of life and current level of functioning for as long as possible  Accordingly, we have decided that the best plan to meet the patient's goals includes continuing with treatment             - Use MyChart for any medication changes or questions between appointments.    Subjective:     Informant: pt and 2 daughters Nora and Haley    Chief Complaint: No chief complaint on file.      History of Present Illness:  Brunilda England is a 89 y.o. female presenting with cognitive impairment.    SSS s/p PPM   Referred to Geriatric and Palliative Care for evaluation and management of physical symptoms, advance care planning,, and additional support..       Patient, a 89-year-old female, is seen for evaluation of progressive memory issues. Her daughters report worsening memory problems over the past two years, with noticeable decline since at least . Patient experiences difficulty remembering recent events and conversations, trouble finding words, increased irritability, and a shorter temper.     She reports feeling sad and anxious about things she can no longer do, such as cooking due to vision problems and grocery shopping. Patient has been having hallucinations, particularly at night, calling out names of  family members in her sleep. These symptoms worsened during a recent UTI, which required an emergency room visit. Dr. Whitfield started her on Seroquel at night to address the hallucinations, resulting in some improvement. Patient uses a walker due to a previous hip fracture and has experienced a few falls in recent months, including one during the recent UTI episode. Her appetite has decreased over the past 60-90 days, leading to some weight loss. Patient's daughters report that she is very picky with food and often refuses to eat what is prepared for her.    Patient  denies trouble remembering recent events or conversations, feeling sad most days, and any seizures or strokes.    - Uses a walker to ambulate at home and in public  - Has had falls, including one last month during a UTI episode and another when trying to put a chair back to the table  - Daughter assists with bathing, specifically helping with legs and feet; patient can bathe upper body independently  - Daughter helps patient get dressed  - Needs assistance with meal preparation and grocery shopping  - Experiences occasional urinary incontinence and wears pull-ups and pads  - Has difficulty finding words and experiences progressive memory issues  - Spends most of her day watching TV and talking on the phone  - Appetite has decreased significantly over the past 60-90 days  - Experiences hallucinations, particularly during UTI episodes  - Has difficulty sleeping, often yelling out and having vivid dreams  - Has macular degeneration, affecting her vision significantly    Daughter Samantha   Was caregiver for  since , memory issues since 2018 agitated   LOAD possible  [facility] sitters     Lives w/ Chissell, only alone if caregiver goes to store  RN weekly to check vitals   Chissell will be POA    No insight,   Short term memory loss, word finding difficulty, forgetfulness over 2 yrs, since     Oriented to self, not date    Irritable, crying more, sad, anxious, on citalopram and seroquel for hallucinations at night and not sleeping, seeing things coming out of the TV (turkey)  Recent UTI and delirium  Dreams of her dad and  son, and family members, screaming their names while dreaming at night, improved after UTI  Cymbalta 60, seroquel 25  Daughter has monitor, she is able to see her at night, waking up at night  Paranoid accusing daughters   Worse disinhibition  Bathing herself- except for leg and feet  Dressing herself, needs reminders and help  Daughter managing finances   No repetitive  behaviors, only w/ UTI  Possible difficulty with swallowing pills, no coughing or choking  Occasional accidents, wearing pullups and pads   1 fall last month, Isolated, during the UTI,   Femur fx after a fall  Some weight loss, now picky eater, sometimes eating with her   Has trigger finger   Ok hearing, yes vision loss, macular degeneration  Haley helps with meds   Was told to stop driving 3 yrs ago  Not worried about wondering  Wants to cook and talk to people on the phone, sometimes she hangs up the phone   Stubborn     Not open to senior centers    Palliative Exam     Review of Symptoms      Symptom Assessment (ESAS 0-10 Scale)  Pain:  0  Dyspnea:  0  Anxiety:  0  Nausea:  0  Depression:  0  Anorexia:  2  Fatigue:  0  Insomnia:  0  Restlessness:  0  Agitation:  3     CAM / Delirium:  Negative  Constipation:  Negative  Diarrhea:  Negative      Functional Assessment Scale (FAST):  6d    Living Arrangements:  Lives with family    Psychosocial/Cultural:   See Palliative Psychosocial Note: No   had 5 children, 3 , 2 daughters alive    was a vet, gets vet assist, 8hrs a week, 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   Watching TV during the day and tend to garden,   **Primary  to Follow**  Palliative Care  Consult: No    Spiritual:  F - Anali and Belief:  Hinduism        Geriatric Evaluation     4Ms for Medical Decision-Making in Older Adults    Last Completed EAWV: 2022    MOBILITY:  Get Up and Go:      2022     9:05 AM   Get Up and Go   Trial 1 0 seconds     Activities of Daily Living:      10/17/2024     1:59 PM   Activities of Daily Living   Ambulation Assistance Required   Ambulation Assistance Walker (standard)   Dressing Assistance Required   Dressing Assistance Minimum   Transfers Independent   Toileting Incontinent of bladder   Toileting Assistance Wears Briefs   Feeding Independent   Cleaning home/Chores Assistance Required   Telephone use Assistance Required   Shopping  Assistance Required   Paying bills Assistance Required   Taking meds Assistance Required   If required, who assists the patient with ADLs? Regan Renee     Whisper Test:      4/25/2022     9:06 AM   Whisper Test   Whisper Test Normal     Disability Status:      10/17/2024     2:19 PM   Disability Status   Are you deaf or do you have serious difficulty hearing? N   Are you blind or do you have serious difficulty seeing, even when wearing glasses? Y   Because of a physical, mental, or emotional condition, do you have serious difficulty concentrating, remembering, or making decisions? Y   Do you have serious difficulty walking or climbing stairs? Y   Do you have difficulty dressing or bathing? Y   Because of a physical, mental, or emotional condition, do you have difficulty doing errands alone such as visiting a doctor's office or shopping? Y     Nutrition Screening:      10/17/2024     2:20 PM   Nutrition Screening   Has food intake declined over the past three months due to loss of appetite, digestive problems, chewing or swallowing difficulties? Moderate decrease in food intake   Involuntary weight loss during the last 3 months? Does not know   Mobility? Able to get out of bed/chair, but does not go out   Has the patient suffered psychological stress or acute disease in the past three months? No   Neuropsychological problems? Mild dementia   Body Mass Index (BMI)?  BMI 23 or greater   Screening Score 9   Interpretation At risk of malnutrition    Screening Score: 0-7 Malnourished, 8-11 At Risk, 12-14 Normal  Fall Risk:      10/17/2024     1:30 PM 9/26/2024     2:00 PM 7/26/2024     2:00 PM   Fall Risk Assessment - Outpatient   Mobility Status Ambulatory w/ assistance Ambulatory w/ assistance Ambulatory w/ assistance   Number of falls 1 1 0   Identified as fall risk True True True           MENTATION:   Depression Patient Health Questionnaire:      3/26/2024     1:41 PM   Depression Patient Health  Questionnaire   Over the last two weeks how often have you been bothered by little interest or pleasure in doing things Not at all   Over the last two weeks how often have you been bothered by feeling down, depressed or hopeless Not at all   PHQ-2 Total Score 0     Has Dementia Dx: Yes    Has Anxiety Dx: No    Cognitive Function Screenin/25/2022     9:06 AM   Cognitive Function Screening   Clock Drawing Test 0   Mini-Cog 3 Minute Recall 0   Cognitive Function Screening 0       MEDICATIONS:  High Risk Medications:  Total Active Medications: 3  DULoxetine - 60 MG  pregabalin - 50 MG  QUEtiapine Tab - 25 MG      Past Medical History:   Diagnosis Date    Anal cancer     Anemia     Cancer     anal cancer    Centrilobular emphysema 2020    Diabetes mellitus     With circulatory disorder    Lumbar radiculopathy 2014    Lung cancer     s/p chemo/radiation    Macular degeneration     Macular hemorrhage of left eye     Neuropathy     Osteoporosis     Osteoporosis, unspecified 2012    Pure hypercholesterolemia        Review of patient's allergies indicates:   Allergen Reactions    Bextra [valdecoxib] Swelling    Gabapentin Diarrhea and Nausea Only       Medications:    Current Outpatient Medications:     acetaminophen (TYLENOL) 500 MG tablet, Take 2 tablets (1,000 mg total) by mouth every 8 (eight) hours as needed for Pain., Disp: , Rfl: 0    amLODIPine-benazepriL (LOTREL) 10-40 mg per capsule, Take 1 capsule by mouth once daily., Disp: 90 capsule, Rfl: 3    apixaban (ELIQUIS) 5 mg Tab, Take 1 tablet (5 mg total) by mouth 2 (two) times daily., Disp: 60 tablet, Rfl: 6    artificial tears (ISOPTO TEARS) 0.5 % ophthalmic solution, Place 1 drop into both eyes as needed., Disp: , Rfl:     blood pressure monitor Kit, Use daily to monitor blood pressure, Disp: 1 each, Rfl: 0    calcium-vitamin D3 (OS-ERIC 500 + D3) 500 mg-5 mcg (200 unit) per tablet, Take 2 tablets by mouth once daily., Disp: , Rfl:      cyanocobalamin (VITAMIN B-12) 1000 MCG tablet, Take 1,000 mcg by mouth every morning., Disp: , Rfl:     donepeziL (ARICEPT) 5 MG tablet, Take 1 tablet (5 mg total) by mouth every evening., Disp: 90 tablet, Rfl: 4    DULoxetine (CYMBALTA) 60 MG capsule, Take 1 capsule (60 mg total) by mouth every morning., Disp: 90 capsule, Rfl: 3    estradioL (ESTRACE) 0.01 % (0.1 mg/gram) vaginal cream, USE ONE-HALF GRAM VAGINALLY TWICE a WEEK, Disp: 42.5 g, Rfl: 3    hydrALAZINE (APRESOLINE) 25 MG tablet, Take 1 tablet (25 mg total) by mouth every 8 (eight) hours., Disp: 90 tablet, Rfl: 11    melatonin (MELATIN) 3 mg tablet, Take 2 tablets (6 mg total) by mouth nightly as needed for Insomnia., Disp: , Rfl: 0    metoprolol tartrate (LOPRESSOR) 50 MG tablet, Take 1 tablet (50 mg total) by mouth 2 (two) times daily., Disp: 60 tablet, Rfl: 11    nitroGLYCERIN (NITROSTAT) 0.4 MG SL tablet, Place 1 tablet (0.4 mg total) under the tongue every 5 (five) minutes as needed for Chest pain., Disp: 25 tablet, Rfl: 0    pregabalin (LYRICA) 50 MG capsule, Take 1 capsule (50 mg total) by mouth nightly., Disp: 60 capsule, Rfl: 2    QUEtiapine (SEROQUEL) 25 MG Tab, 1/2-1 tablet every 8 hours as needed for agitation, Disp: 30 tablet, Rfl: 1    simvastatin (ZOCOR) 40 MG tablet, Take 1 tablet (40 mg total) by mouth once daily., Disp: 90 tablet, Rfl: 4    vitamin D (VITAMIN D3) 1000 units Tab, Take 1,000 Units by mouth once daily., Disp: , Rfl:     azelastine (ASTELIN) 137 mcg (0.1 %) nasal spray, 1 spray (137 mcg total) by Nasal route 2 (two) times daily., Disp: 30 mL, Rfl: 3  No current facility-administered medications for this visit.    Facility-Administered Medications Ordered in Other Visits:     sodium chloride 0.9% flush 5 mL, 5 mL, Intravenous, PRN, McMann, Sarah Holton, MD         Objective:   Physical Exam:  Vitals: Pulse: 72 (10/17/24 1326)  BP: (!) 133/59 (10/17/24 1326)  Physical Exam  Constitutional:       General: She is not in  acute distress.     Appearance: She is normal weight.   HENT:      Head: Normocephalic and atraumatic.      Mouth/Throat:      Mouth: Mucous membranes are moist.   Eyes:      General: No scleral icterus.  Pulmonary:      Effort: Pulmonary effort is normal. No respiratory distress.   Abdominal:      General: There is no distension.   Musculoskeletal:      Cervical back: Neck supple.      Comments: Sitting in wheelchair   Skin:     Coloration: Skin is not pale.   Neurological:      Mental Status: She is alert. Mental status is at baseline. She is disoriented.   Psychiatric:         Mood and Affect: Mood normal. Affect is labile.         I spent a total of 60 minutes on the day of the visit. This includes face to face time in discussion of  symptom assessment, coordination of care and emotional support.  This also includes non-face to face time preparing to see the patient (eg, review of tests/imaging), obtaining and/or reviewing separately obtained history, documenting clinical information in the electronic or other health record, independently interpreting results and communicating results to the patient/family/caregiver, or care coordinator. Discussing with [facility] multidisciplinary team that includes, Neuropsychology, neurology, LCSW and .     Additional 18 minutes spent on advance care planning, goals of care discussion, emotional support, formulating and communicating prognosis and goals of care, exploring burden/benefit of various approaches of treatment. This discussion was voluntary with the consent of the patient and/or family.      This note was generated with the assistance of ambient listening technology. Verbal consent was obtained by the patient and accompanying visitor(s) for the recording of patient appointment to facilitate this note. I attest to having reviewed and edited the generated note for accuracy, though some syntax or spelling errors may persist. Please contact the  author of this note for any clarification.        Follow up:    Signature: Opal Sampson MD

## 2024-10-17 NOTE — PROGRESS NOTES
NEUROPSYCHOLOGY   [facility]  Intake    Referring Provider: No ref. provider found   Medical Necessity: Ms. Brunilda England is an 89 y.o. female followed in [facility] for cognitive evaluation, supportive therapy, treatment planning, and treatment management in the setting of dementia  Billing: See billing table at the end of this note  Consent: The patient expressed an understanding of the purpose of the evaluation and consented to all procedures.  Providers: Marixa Lucas PsyD (Neuropsychology); Latia Arriaza DNP (Neurology); Opal Sampson MD (Palliative Medicine)       ASSESSMENT:   Ms. Brunilda England is an 89 y.o. female who has a past medical history of Anal cancer (1995), Anemia, Cancer (1995), Centrilobular emphysema (7/6/2020), Diabetes mellitus, Lumbar radiculopathy (5/16/2014), Lung cancer (2012), Macular degeneration, Macular hemorrhage of left eye, Neuropathy, Osteoporosis, Osteoporosis, unspecified (11/9/2012), and Pure hypercholesterolemia. She is here for cognitive evaluation. Family reports cognitive decline since about 2017, now living with daughter. They report worsening mood, paranoia, disinhibition. Also had VH in the context of recent UTI. Has some difficulty with swallowing. Wears pads for incontinence. Daughter describes some dream enactment. She has fallen several times, fractured her femur last year. Appetite has dropped recently. She is dependent for IADLs and requires oversight of some ADLs.     Cognitive Testing:   - Retained simple attention but otherwise global impairment (poor memory, executive functioning, processing speed, visuospatial, and language)  - Visuospatial skills are impaired beyond what can be explained by visual impairment  - She endorsed clinically significant depression on a self-report screener   - Family endorses clinically significant behavioral symptoms on self-report screeners     No parkinsonism on exam with Dr Arriaza today.     Taken  together, she continues to meet criteria for dementia. Agree that Alzheimer's is likely the primary pathology, also wonder about synuclein co-pathology though physical exam is reassuring for this. Possibly some contribution from recent UTI, though attention was reasonable today, no obvious waxing and waning. Safety concerns include regular falls, poor appetite. They previously worked with our care ecosystem program, will reconnect for behavioral management strategies. She is on seroquel for agitation, which has helped. Discussed choking risk, strategies to increase food intake.       1. Late onset Alzheimer's dementia with behavioral disturbance        2. Poor appetite        3. At risk for falls              PLAN:     RTC 6 months  Caregiver following with our care management program        Thank you for allowing me to participate in Ms. England's care.  If you have any questions, please contact me.    Marixa Lucas PsyD  Licensed Clinical Neuropsychologist  [facility] - Department of Neurology          HISTORY:     HPI: Ms. Brunilda England is an 89 y.o. female who  has a past medical history of Anal cancer (1995), Anemia, Cancer (1995), Centrilobular emphysema (7/6/2020), Diabetes mellitus, Lumbar radiculopathy (5/16/2014), Lung cancer (2012), Macular degeneration, Macular hemorrhage of left eye, Neuropathy, Osteoporosis, Osteoporosis, unspecified (11/9/2012), and Pure hypercholesterolemia.  Pt was referred for evaluation of cognitive changes. Pt is accompanied today by daughters, Nora and Haley.     Initially seen by neurology for memory in 2018, but was primary cg for  with dementia until his passing in 2017, so possible she was having issues prior to 2018. Was living alone, daughter across the . By 2022, family repotring pt was disoriented in her house, dependent for IADLs, agitated and irritable. She was still taking Xanax infrequently for agitation. Put on citalopram, donepezil and  "namenda which helped with outbursts, but made her very sleepy.     Suspected LOAD    Was in [facility] until . She was [facility] for [facility] in home sitters at one point, they also discussed placement for long term planning.     Recent VH, appetite and sleep issues. Dtr requesting sitter services, PCP did consult to hospice.     Was having serious VH at night and was up all night. They had to bring her to the ED, dx with UTI. They started giving her seroquel at night. Still calls out in her sleep, dreaming a lot. Will call out family members' names, sometimes screaming.     Resides: with daughter   Caregiver: daughters  Level of supervision: rarely alone, if dtr has to run to the store   Sitter/HHA: Yes - nurse comes once per week to take vitals, verifies medications.   POA: Yes - Chissell  Medications for cognition: donepezil, seroquel    Current Cognitive Symptoms:  Symptoms: Pt denies forgetfulness, endorses some word finding issues.  Family has noticed STM loss that is worsening.   Orientation: Oct 19, 1997. Knows her age and . Knows current president.   Course: Gradual onset around  at last, slowly worsening     Current Neurobehavioral Symptoms:  Depression: Yes - feeling sad and down, "dennis often."    Anxiety: Yes - she is a worrier, hard to relax. Dtr notes she worries about "everything." Frustrated when she can't do things she wants to do. Not getting worse. Not getting worse. On citalopram, also started giving her seroquel in the last month.   Paranoia/Delusions: Yes - accuses daughter of taking her salad spinner, accused other dtr of spending her cash.   Hallucinations: Yes - in the context of recent UTI. Sees things coming out of her closet and the TV. But also possibly having some hallucinations outside of UTI, possibly related to macular degeneration.   Agitation: Yes - shorter fuse, more irritable.   Apathy/Indifference: No   Disinhibition/Impulsivity: Yes - maybe a little more disinhibited "   Motor/repetitive behaviors: No  Substance Use: No      Current Physical Symptoms: See Dr. Arriaza's note for physical and neuro exam  Swallowing: Pt denies problems but dtr notes when she is eating she seems to have to have a little trouble getting food and pills down.   Incontinence/Constipation: Occasional accidents but not frequent. Wears pullups and pads, sometimes has a little vaginal bleeding residual from chemo/rad  Ambulation: Uses a walker at home. Fractured her femur over a year ago after a fall   Falls: Yes - falling every few months. Once during UTI.    Sleep: REM sleep behavior and naps during the day occasionally. She is yelling and talking in her sleep, at one point was rolling out of bed while sleeping last year. No rails on the bed.   Appetite change: Yes - dropped considerably over the last 3 to 4 months. Eating small portions. She is losing weight. She is picky, not always getting protein. Sometimes eating alone. Not clear that she is eating more if daughter eats with her.    Movement Sx: Trigger finger which bothers her.   Repeat UTI/Delirium: Yes - has had a few, maybe 3 in the last year or so    Hearing/vision loss: Yes - macular degeneration. Hearing is ok.      ADL:  Bathing/Grooming: Requires assistance, Uses a bath bench. Daughter helps her get legs and feet but otherwise does fine, manages her hygiene.   Feeding: Independent  Dressing: Requires assistance  Toileting: Independent    IADL:  Finances: Dependent, daughters manage for years. Pt is very upset about this.   Medications: Dependent, daughter fixes meds and reminds her to take it. Will fight her on night medications.   Driving: Does not participate in this activity stopped driving 3 yrs ago due to macular degeneration.   Household: Dependent        10/22/2024     2:43 PM   IADL   Ability to Use Telephone Answers telephone, but does not dial   Shopping Completely unable to shop   Food Preparation Needs to have meals prepared and  "served   Housekeeping Does not participate in any housekeeping tasks   Laundry All laundry must be done by others   Mode of Transportation Travel limited to taxi or automobile with assistance of another   Responsibility for Own Medications Is not capable of dispensing own medication   Ability to Handle Finances Incapable of handling money      Safety Concerns:  Hygiene: No  Nutrition: Yes - losing weight, poor appetite   Falls: Yes - few times per year, has broken femur   Wandering: No  Financial scams: Still wants to talk to scammers on the phone  Medication management: No but does fight dtr on some evening doses  Driving: No  Cooking: No  Medical decision making: No  Physical aggression:No    Neurologic History:  TBI: No  Seizures: No  Stroke: No  Recent Hospitalizations/surgeries: Yes - recent UTI     Social History:  Family Status:  since 2017, 5 children, 2 still living   Daily Activities: watch TV, "fool with my flowers" outside, talks on the phone   Educational Level: 12  Occupational Status and History: housewife     PMH:   Ms. Brunilda England  has a past medical history of Anal cancer (1995), Anemia, Cancer (1995), Centrilobular emphysema (7/6/2020), Diabetes mellitus, Lumbar radiculopathy (5/16/2014), Lung cancer (2012), Macular degeneration, Macular hemorrhage of left eye, Neuropathy, Osteoporosis, Osteoporosis, unspecified (11/9/2012), and Pure hypercholesterolemia.      Current Outpatient Medications:     acetaminophen (TYLENOL) 500 MG tablet, Take 2 tablets (1,000 mg total) by mouth every 8 (eight) hours as needed for Pain., Disp: , Rfl: 0    amLODIPine-benazepriL (LOTREL) 10-40 mg per capsule, Take 1 capsule by mouth once daily., Disp: 90 capsule, Rfl: 3    apixaban (ELIQUIS) 5 mg Tab, Take 1 tablet (5 mg total) by mouth 2 (two) times daily., Disp: 60 tablet, Rfl: 6    artificial tears (ISOPTO TEARS) 0.5 % ophthalmic solution, Place 1 drop into both eyes as needed., Disp: , Rfl:     blood " pressure monitor Kit, Use daily to monitor blood pressure, Disp: 1 each, Rfl: 0    calcium-vitamin D3 (OS-ERIC 500 + D3) 500 mg-5 mcg (200 unit) per tablet, Take 2 tablets by mouth once daily., Disp: , Rfl:     cyanocobalamin (VITAMIN B-12) 1000 MCG tablet, Take 1,000 mcg by mouth every morning., Disp: , Rfl:     donepeziL (ARICEPT) 5 MG tablet, Take 1 tablet (5 mg total) by mouth every evening., Disp: 90 tablet, Rfl: 4    DULoxetine (CYMBALTA) 60 MG capsule, Take 1 capsule (60 mg total) by mouth every morning., Disp: 90 capsule, Rfl: 3    estradioL (ESTRACE) 0.01 % (0.1 mg/gram) vaginal cream, USE ONE-HALF GRAM VAGINALLY TWICE a WEEK, Disp: 42.5 g, Rfl: 3    hydrALAZINE (APRESOLINE) 25 MG tablet, Take 1 tablet (25 mg total) by mouth every 8 (eight) hours., Disp: 90 tablet, Rfl: 11    melatonin (MELATIN) 3 mg tablet, Take 2 tablets (6 mg total) by mouth nightly as needed for Insomnia., Disp: , Rfl: 0    metoprolol tartrate (LOPRESSOR) 50 MG tablet, Take 1 tablet (50 mg total) by mouth 2 (two) times daily., Disp: 60 tablet, Rfl: 11    nitroGLYCERIN (NITROSTAT) 0.4 MG SL tablet, Place 1 tablet (0.4 mg total) under the tongue every 5 (five) minutes as needed for Chest pain., Disp: 25 tablet, Rfl: 0    pregabalin (LYRICA) 50 MG capsule, Take 1 capsule (50 mg total) by mouth nightly., Disp: 60 capsule, Rfl: 2    QUEtiapine (SEROQUEL) 25 MG Tab, 1/2-1 tablet every 8 hours as needed for agitation, Disp: 30 tablet, Rfl: 1    simvastatin (ZOCOR) 40 MG tablet, Take 1 tablet (40 mg total) by mouth once daily., Disp: 90 tablet, Rfl: 4    vitamin D (VITAMIN D3) 1000 units Tab, Take 1,000 Units by mouth once daily., Disp: , Rfl:     azelastine (ASTELIN) 137 mcg (0.1 %) nasal spray, 1 spray (137 mcg total) by Nasal route 2 (two) times daily., Disp: 30 mL, Rfl: 3  No current facility-administered medications for this visit.    Facility-Administered Medications Ordered in Other Visits:     sodium chloride 0.9% flush 5 mL, 5 mL,  Intravenous, PRN, Sarah Earl MD    Results for orders placed or performed during the hospital encounter of 09/10/24   CT Head Without Contrast    Narrative    EXAMINATION:  CT HEAD WITHOUT CONTRAST    CLINICAL HISTORY:  Mental status change, unknown cause;    TECHNIQUE:  Multiple sequential 5 mm axial images of the head without contrast.  Coronal and sagittal reformatted imaging from the axial acquisition.    COMPARISON:  06/29/2023    FINDINGS:  Generalized cerebral volume loss with compensatory enlargement ventricles sulci and cisterns.  Study is distorted by motion artifacts.  Allowing for artifacts there is no evidence for acute intracranial hemorrhage or new abnormal parenchymal attenuation.  Hypoattenuation bilateral basal ganglia greater on the left suggestive for remote lacunar-type infarcts    Continued empty sella.  Slight prominence of the extra-axial space overlying the frontal parietal lobes at the vertex similar to prior.  No significant opacification visualized paranasal sinuses or mastoid air cells..      Impression    No acute intracranial findings as detailed above specifically without evidence for acute intracranial hemorrhage or new abnormal parenchymal attenuation    Clinical correlation and further evaluation with MRI as warranted if patient compatible.    See above for additional details.      Electronically signed by: Trever Carcamo DO  Date:    09/10/2024  Time:    13:39   Results for orders placed or performed during the hospital encounter of 07/07/21   MRI Brain Without Contrast    Narrative    EXAMINATION:  MRI BRAIN WITHOUT CONTRAST    CLINICAL HISTORY:  memory loss; Other amnesia    TECHNIQUE:  Multiplanar multisequence MR imaging of the brain was performed without contrast.  There are some motion limitations to the examination.    COMPARISON:  CT scan of the head dated 03/13/2013 and MRI brain dated 04/10/2012.    FINDINGS:  The craniocervical junction is intact.  There  "is empty appearance of the sella.  The intracranial flow voids are within normal limits.    No diffusion-weighted signal abnormality is present.  There are some progression of the T2/FLAIR signal hyperintensities within the periventricular and subcortical white matter.  The ventricles and sulci are prominent, consistent with cerebral volume loss.  There are no extra-axial fluid collections.  There is no evidence of intracranial hemorrhage.  There is no evidence of mass effect.    There are postoperative changes in the orbits.  The paranasal sinuses unremarkable.  The mastoid air cells are clear.  The calvarium is intact.      Impression    No acute intracranial process.    Changes of chronic small vessel ischemic disease and cerebral volume loss.      Electronically signed by: Kehinde Ryder MD  Date:    07/07/2021  Time:    23:58     *Note: Due to a large number of results and/or encounters for the requested time period, some results have not been displayed. A complete set of results can be found in Results Review.       Pertinent Labs:  Lab Results   Component Value Date    [id number] 793 04/13/2021     No results found for: "VITAMINB1"  No components found for: "VITAMIND"  No results found for: "RPR"  No results found for: "FOLATE"  Lab Results   Component Value Date    TSH 3.631 07/17/2024    E0KQPOG 7.8 05/20/2010     Lab Results   Component Value Date    CALCIUM 10.3 09/10/2024    PHOS 2.8 06/19/2023      Lab Results   Component Value Date    HGBA1C 5.9 (H) 07/17/2024     Lab Results   Component Value Date    [id number] Non-reactive 05/28/2023         OBJECTIVE:     MENTAL STATUS AND BEHAVIORAL OBSERVATIONS:  Appearance:  Casually dressed and Well groomed  Behavior:   alert, calm, adequate rapport can be established, guarded, and reluctantly cooperative  Orientation:   Disoriented to date  Sensory:   Poor vision 2/2 macular degeneration   Gait:   not observed; pt in wheelchair  Psychomotor:  Within Normal " "Limits  Speech:  Fluent and spontaneous. Normal volume, rate, pitch, tone, and prosody.  Language:  Receptive and expressive language appear intact. Comprehends conversational speech., No evidence of word-finding difficulties in conversational speech.  Mood:   irritable  Affect:   mood-congruent  Thought Process: goal directed and linear  Thought Content: Denied current SI/HI. and No evidence of psychotic symptoms.  Memory:  limited historian  Attn/Concentration:  Grossly intact  Judgment/Insight: Limited  Validity:   Performance on standalone and/or embedded performance validity measures all suggested adequate engagement. However, vision is poor, which resulted in modification of some testing procedures. As a result, some results may underestimate the pt's actual abilities.   Test Taking Bx:         Per psychometrist observations: The pt arrived to the appt with her two daughters. The pt had visual issues throughout testing. The pt was squinting her left eye and reporting  "I can't see." TMT was not administered due to this issue. As testing went on the pt seemed to get very tired. The pt stated "we can quit now" and "that's enough."     PROCEDURES/TESTS ADMINISTERED:  In addition to performing a review of pertinent medical records, reviewing limits to confidentiality, conducting a clinical interview, and explaining procedures, the following measures were administered and scored using normative data and administration instructions from Rashmi et al, 2019:   Mini Mental Status Exam (MMSE); Modified Mini Mental Status Exam (3MS); Animal Fluency; Letter F Fluency; [place] Verbal Learning Test - Short Form (CVLT-SF); Clock Drawing; Consortium to Establish a Registry for Alzheimer's Disease (CERAD) - Constructional Praxis; [place] Naming Test - 15 Item; Wechsler Adult Intelligence Scale - Third Edition, Digit Span subtest; and Geriatric Depression Scale - 15 item. CERAD Praxis Recall was scored using norms from " Sonia et al, 2011. Pt was also administered the Jose Rafael Juanpablo IADL scale and caregiver filled out the Neuropsychiatric Inventory Questionnaire (NPI-Q).       NEUROPSYCHOLOGICAL ASSESSMENT RESULTS:  The following should not be interpreted in isolation from the neuropsychological evaluation report.  Scores on stand-alone and/or embedded performance validity measures were wnl.      Raw Score Score Type Standardized Score Percentile/CP Descriptor   ACS RDS 7 - - - -   CVLT-II Short FC 9        COGNITIVE SCREENING Raw Score Score Type Standardized Score Percentile/CP Descriptor   MMSE 22 Tscore 19 0 Exceptionally Low    Orientation - Place 4/5 - - - -   Orientation - Date 3/5 - - - -   3MS  63 z-score <13 <0.1 Exceptionally Low    LANGUAGE FUNCTIONING Raw Score Score Type Standardized Score Percentile/CP Descriptor   Letter F  5 Tscore 34 5 Below Average   Animal Naming 14 Tscore 49 48 Average   VISUOSPATIAL FUNCTIONING Raw Score Score Type Standardized Score Percentile/CP Descriptor   Clock Total 2 Tscore 26 1 Exceptionally Low    CERAD Constructional Praxis 5 Tscore 22 0 Exceptionally Low    LEARNING & MEMORY Raw Score Score Type Standardized Score Percentile/CP Descriptor   CVLT-II Short   N/A      Trials 1-4  17 zscore -2.3 - Exceptionally Low    Trial 1 2 zscore -1.9 3 Below Average   Trial 4 6 zscore -1.6 6 Below Average   SDFR 4 zscore -2.5 1 Exceptionally Low    LDFR 0 zscore -3.8 0 Exceptionally Low    LDCR 2 zscore -2.9 0 Exceptionally Low    Recognition Hits 6 zscore -2.3 1 Exceptionally Low    False Positives 2 zscore -0.6 27 Average   Forced Choice 9 - - - -   Praxis Recall 1 Tscore 30 2 Below Average   ATTENTION/WORKING MEMORY Raw Score Score Type Standardized Score Percentile/CP Descriptor   WAIS-III Digit Span 11 Tscore 35 7 Below Average         DS Forward 8 Tscore 45 30 Average         DS Backward 3 Tscore 21 0 Exceptionally Low    EXECUTIVE FUNCTIONING Raw Score Score Type Standardized Score  Percentile/CP Descriptor   EZEQUIEL 5 zscore -4.88 <0.1 Exceptionally Low    MOOD & PERSONALITY Raw Score Score Type Standardized Score Percentile/CP Descriptor   GDS-15 9 - - - Significant   ss = scaled score (mean = 10, SD = 3); SS = standard score (mean = 100, SD = 15); Tscore mean = 50, SD = 10; zscore (mean = 0.00, SD = 1)           10/22/2024     3:04 PM 5/4/2023    11:52 AM 3/31/2022    11:00 AM   NPIQ RFS   WHO IS FILLING OUT FORM? Caregiver Caregiver Caregiver   Does this patient have false beliefs, such as thinking that others are stealing from him/her or planning to harm him/her in some way? Yes Yes Yes   Delusions Severity 2 1 1   Delusion Distress 5 6 5   Does this patient have hallucinations such as false visions or voices? Li she/he seem to hear or see things that are not present? Yes No Yes   Hallucination Severity 3  1   Hallucination Distress 5  5   Is the patient resistive to help from others at times, or hard to handle? Yes Yes Yes   Agitation Agression Severity 2 2 2   Agitation/Agression Distress 5 5 6   Does the patient seem sad or say that he/she is depressed? Yes Yes Yes   Depression/Dysphoria Severity 1 2 2   Depression/Dysphoria Distress 4 4 4   Does the patient become upset when  from you? Does he/she have any other signs of nervousness such as shortness of breath, sighing, being unable tor elax, or feeling excessively tense? Yes No No   Anxiety Severity 2     Anxiety Distress 4     Does the patient appear to feel good or act excessively happy? No No No   Does this patient seem less interested in his/her usual activities or in the activities and plans of others? No Yes No   Apathy/Indifference Severity  1    Apathy/Indifference Distress  3    Does this patient seem to act cumpolsively, for example, talking to strangers as if she/he knows them, or saying things that may hurt people's feelings? Yes Yes Yes   Dis-inhibition Severity 2 2 2   Dis-inhibition Distress 3 5 6   Is the patient  impatient and cranky? Does he/she have difficulty coping with delays or waiting for planned activities? Yes Yes Yes   Irritability/Liability Severity 3 2 2   Irritability/Liability Distress 6 4 6   Does the patient engage in repetitive activities such as pacing around the house, handling buttons, wrapping string, or doing other things repeatedly? No No No   Does this patient awaken you during the night, rise too early in the morning, or take excessive naps during the day? No Yes Yes   Nightime Behavior Severity  2 1   Nightime Behavior Distress  4 4   Has the patient lost or gained weight, or had a change in the type of food he/she likes? Yes Yes Yes   Apetitie/Eating Severity 2 1 2   Apetite/Eating Distress 5 1 5   NPI Total Severity Score 17 13 13   NPI Total Distress Score 37 32 41             Billing/Services Summary          Neurobehavioral Status Exam Base Code (15153)  Total Units: 1 Add-On (48851)  Total Units: 0   Face-to-face Total Time: 45 min.    Report Writing Total Time: 0 min         Professional Neuropsychological Testing Evaluation Services Base Code (47547)   Total Units: 1 Add-on (98669)  Total Units: 2   Referral review/initial test selection 15 min.    Intra-session Clinical Decision-Making 0 min.         Tech consult/test review/modification 0 min.         Patient behavior management 0 min.    Face-to-face Interpretive Feedback 60 min.    Record Review/Integration/Report Generation 120 min.     Total Time: 195 min.         Test Administration by Psychologist Base Code (23582)   Total Units: 0 Add-on (83770)  Total Units: 0   Testing 0 min.    Scoring 0 min.     Total Time: 0 min.         Test Administration by Technician  Technician Name: BRIA Mo Base Code (06958)   Total Units: 1 Add-on (45375)  Total Units: 3   Face-to-Face Testin min.    Scoring 48 min.     Total Time: 107 min.    DOS is the date of the evaluation unless specified

## 2024-10-24 ENCOUNTER — CLINICAL SUPPORT (OUTPATIENT)
Dept: CARDIOLOGY | Facility: HOSPITAL | Age: 89
End: 2024-10-24
Attending: INTERNAL MEDICINE
Payer: MEDICARE

## 2024-10-24 DIAGNOSIS — Z95.0 PRESENCE OF CARDIAC PACEMAKER: ICD-10-CM

## 2024-10-24 DIAGNOSIS — I49.5 SICK SINUS SYNDROME: ICD-10-CM

## 2024-10-24 PROCEDURE — 93294 REM INTERROG EVL PM/LDLS PM: CPT | Mod: ,,, | Performed by: INTERNAL MEDICINE

## 2024-10-24 PROCEDURE — 93296 REM INTERROG EVL PM/IDS: CPT | Performed by: INTERNAL MEDICINE

## 2024-10-25 ENCOUNTER — PATIENT OUTREACH (OUTPATIENT)
Dept: EMERGENCY MEDICINE | Facility: HOSPITAL | Age: 89
End: 2024-10-25
Payer: MEDICARE

## 2024-10-25 NOTE — PROGRESS NOTES
Spoke with Pt's daughter to f/u and remind of her upcoming appt.She states that her mom is doing ok and just having some problems with her sinus, pain under her heels and trigger finger. She denies any oher concerns at this time. Daughter encouraged to reach out with any concerns at they arise.

## 2024-10-25 NOTE — PROGRESS NOTES
ED Navigator called to remind Pt of appt with, [facility] on 10-28-24 at 1:30. Daughter verbalized understanding.

## 2024-10-28 ENCOUNTER — IMMUNIZATION (OUTPATIENT)
Dept: INTERNAL MEDICINE | Facility: CLINIC | Age: 89
End: 2024-10-28
Payer: MEDICARE

## 2024-10-28 ENCOUNTER — HOSPITAL ENCOUNTER (OUTPATIENT)
Dept: RADIOLOGY | Facility: HOSPITAL | Age: 89
Discharge: HOME | End: 2024-10-28
Attending: INTERNAL MEDICINE
Payer: MEDICARE

## 2024-10-28 ENCOUNTER — OFFICE VISIT (OUTPATIENT)
Dept: INTERNAL MEDICINE | Facility: CLINIC | Age: 89
End: 2024-10-28
Payer: MEDICARE

## 2024-10-28 VITALS
OXYGEN SATURATION: 92 % | HEIGHT: 65 IN | DIASTOLIC BLOOD PRESSURE: 74 MMHG | HEART RATE: 88 BPM | SYSTOLIC BLOOD PRESSURE: 120 MMHG | WEIGHT: 150 LBS | BODY MASS INDEX: 24.99 KG/M2

## 2024-10-28 DIAGNOSIS — I50.32 CHRONIC DIASTOLIC HEART FAILURE: ICD-10-CM

## 2024-10-28 DIAGNOSIS — I10 ESSENTIAL HYPERTENSION: ICD-10-CM

## 2024-10-28 DIAGNOSIS — M65.332 TRIGGER MIDDLE FINGER OF LEFT HAND: ICD-10-CM

## 2024-10-28 DIAGNOSIS — J96.11 CHRONIC RESPIRATORY FAILURE WITH HYPOXIA: ICD-10-CM

## 2024-10-28 DIAGNOSIS — F03.90 DEMENTIA, UNSPECIFIED DEMENTIA SEVERITY, UNSPECIFIED DEMENTIA TYPE, UNSPECIFIED WHETHER BEHAVIORAL, PSYCHOTIC, OR MOOD DISTURBANCE OR ANXIETY: ICD-10-CM

## 2024-10-28 DIAGNOSIS — Z23 NEED FOR VACCINATION: Primary | ICD-10-CM

## 2024-10-28 DIAGNOSIS — Z00.00 ROUTINE GENERAL MEDICAL EXAMINATION AT A HEALTH CARE FACILITY: Primary | ICD-10-CM

## 2024-10-28 PROCEDURE — G0008 ADMIN INFLUENZA VIRUS VAC: HCPCS | Mod: S$GLB,,, | Performed by: INTERNAL MEDICINE

## 2024-10-28 PROCEDURE — 99397 PER PM REEVAL EST PAT 65+ YR: CPT | Mod: S$GLB,,, | Performed by: INTERNAL MEDICINE

## 2024-10-28 PROCEDURE — 99999 PR PBB SHADOW E&M-EST. PATIENT-LVL IV: CPT | Mod: PBBFAC,,, | Performed by: INTERNAL MEDICINE

## 2024-10-28 PROCEDURE — 90653 IIV ADJUVANT VACCINE IM: CPT | Mod: S$GLB,,, | Performed by: INTERNAL MEDICINE

## 2024-10-28 PROCEDURE — 3288F FALL RISK ASSESSMENT DOCD: CPT | Mod: CPTII,S$GLB,, | Performed by: INTERNAL MEDICINE

## 2024-10-28 PROCEDURE — 1101F PT FALLS ASSESS-DOCD LE1/YR: CPT | Mod: CPTII,S$GLB,, | Performed by: INTERNAL MEDICINE

## 2024-10-28 PROCEDURE — 73130 X-RAY EXAM OF HAND: CPT | Mod: TC,LT

## 2024-10-28 PROCEDURE — 1159F MED LIST DOCD IN RCRD: CPT | Mod: CPTII,S$GLB,, | Performed by: INTERNAL MEDICINE

## 2024-10-28 PROCEDURE — 1157F ADVNC CARE PLAN IN RCRD: CPT | Mod: CPTII,S$GLB,, | Performed by: INTERNAL MEDICINE

## 2024-10-28 PROCEDURE — 73130 X-RAY EXAM OF HAND: CPT | Mod: 26,LT,, | Performed by: RADIOLOGY

## 2024-10-28 RX ORDER — QUETIAPINE FUMARATE 25 MG/1
25 TABLET, FILM COATED ORAL NIGHTLY
Qty: 90 TABLET | Refills: 1 | Status: SHIPPED | OUTPATIENT
Start: 2024-10-28

## 2024-10-29 ENCOUNTER — CLINICAL SUPPORT (OUTPATIENT)
Dept: OPHTHALMOLOGY | Facility: CLINIC | Age: 89
End: 2024-10-29
Payer: MEDICARE

## 2024-10-29 ENCOUNTER — OFFICE VISIT (OUTPATIENT)
Dept: OPHTHALMOLOGY | Facility: CLINIC | Age: 89
End: 2024-10-29
Payer: MEDICARE

## 2024-10-29 DIAGNOSIS — H35.033 HYPERTENSIVE RETINOPATHY OF BOTH EYES: ICD-10-CM

## 2024-10-29 DIAGNOSIS — H43.813 POSTERIOR VITREOUS DETACHMENT, BOTH EYES: ICD-10-CM

## 2024-10-29 DIAGNOSIS — H35.3232 EXUDATIVE AGE-RELATED MACULAR DEGENERATION OF BOTH EYES WITH INACTIVE CHOROIDAL NEOVASCULARIZATION: Primary | ICD-10-CM

## 2024-10-29 PROCEDURE — 99999 PR PBB SHADOW E&M-EST. PATIENT-LVL III: CPT | Mod: PBBFAC,,, | Performed by: OPHTHALMOLOGY

## 2024-10-29 PROCEDURE — 3288F FALL RISK ASSESSMENT DOCD: CPT | Mod: CPTII,S$GLB,, | Performed by: OPHTHALMOLOGY

## 2024-10-29 PROCEDURE — 1159F MED LIST DOCD IN RCRD: CPT | Mod: CPTII,S$GLB,, | Performed by: OPHTHALMOLOGY

## 2024-10-29 PROCEDURE — 1160F RVW MEDS BY RX/DR IN RCRD: CPT | Mod: CPTII,S$GLB,, | Performed by: OPHTHALMOLOGY

## 2024-10-29 PROCEDURE — 92201 OPSCPY EXTND RTA DRAW UNI/BI: CPT | Mod: S$GLB,,, | Performed by: OPHTHALMOLOGY

## 2024-10-29 PROCEDURE — 1126F AMNT PAIN NOTED NONE PRSNT: CPT | Mod: CPTII,S$GLB,, | Performed by: OPHTHALMOLOGY

## 2024-10-29 PROCEDURE — 1101F PT FALLS ASSESS-DOCD LE1/YR: CPT | Mod: CPTII,S$GLB,, | Performed by: OPHTHALMOLOGY

## 2024-10-29 PROCEDURE — 92014 COMPRE OPH EXAM EST PT 1/>: CPT | Mod: S$GLB,,, | Performed by: OPHTHALMOLOGY

## 2024-10-29 PROCEDURE — 1157F ADVNC CARE PLAN IN RCRD: CPT | Mod: CPTII,S$GLB,, | Performed by: OPHTHALMOLOGY

## 2024-10-29 PROCEDURE — 92134 CPTRZ OPH DX IMG PST SGM RTA: CPT | Mod: S$GLB,,, | Performed by: OPHTHALMOLOGY

## 2024-10-30 ENCOUNTER — PATIENT MESSAGE (OUTPATIENT)
Dept: NEUROLOGY | Facility: CLINIC | Age: 89
End: 2024-10-30

## 2024-10-30 ENCOUNTER — OFFICE VISIT (OUTPATIENT)
Dept: NEUROLOGY | Facility: CLINIC | Age: 89
End: 2024-10-30
Payer: MEDICARE

## 2024-10-30 DIAGNOSIS — G30.1 LATE ONSET ALZHEIMER'S DEMENTIA WITH BEHAVIORAL DISTURBANCE: Primary | ICD-10-CM

## 2024-10-30 DIAGNOSIS — F02.818 LATE ONSET ALZHEIMER'S DEMENTIA WITH BEHAVIORAL DISTURBANCE: Primary | ICD-10-CM

## 2024-11-12 ENCOUNTER — HOSPITAL ENCOUNTER (EMERGENCY)
Facility: HOSPITAL | Age: 89
Discharge: HOME | End: 2024-11-12
Attending: EMERGENCY MEDICINE
Payer: MEDICARE

## 2024-11-12 VITALS
HEIGHT: 67 IN | HEART RATE: 98 BPM | TEMPERATURE: 98 F | OXYGEN SATURATION: 95 % | SYSTOLIC BLOOD PRESSURE: 140 MMHG | RESPIRATION RATE: 16 BRPM | DIASTOLIC BLOOD PRESSURE: 70 MMHG | BODY MASS INDEX: 23.54 KG/M2 | WEIGHT: 150 LBS

## 2024-11-12 DIAGNOSIS — S09.90XA INJURY OF HEAD, INITIAL ENCOUNTER: Primary | ICD-10-CM

## 2024-11-12 PROCEDURE — 99284 EMERGENCY DEPT VISIT MOD MDM: CPT | Mod: 25

## 2024-11-12 PROCEDURE — 25000003 PHARM REV CODE 250: Performed by: EMERGENCY MEDICINE

## 2024-11-12 RX ORDER — METOPROLOL TARTRATE 50 MG/1
50 TABLET ORAL
Status: COMPLETED | OUTPATIENT
Start: 2024-11-12 | End: 2024-11-12

## 2024-11-12 RX ADMIN — METOPROLOL TARTRATE 50 MG: 50 TABLET, FILM COATED ORAL at 09:11

## 2024-11-12 NOTE — ED TRIAGE NOTES
Brunilda England, a 89 y.o. female presents to the ED w/ complaint of patient hit the right side of her  head on her bath tube while bending over to  a pin. Patient was sitting down on the side of the  bath tub. Denies any LOC. Headache, dizziness, blurred vision. Patient is on blood thinners     Triage note:  Chief Complaint   Patient presents with    Fall     Hit her head on the tub she was leaning over to  something off the floor, no loc, got up and went to bed.. No blood thinners and no complaints, The patient said her grand daughter made her come      Review of patient's allergies indicates:   Allergen Reactions    Bextra [valdecoxib] Swelling    Gabapentin Diarrhea and Nausea Only     Past Medical History:   Diagnosis Date    Anal cancer 1995    Anemia     Cancer 1995    anal cancer    Centrilobular emphysema 7/6/2020    Diabetes mellitus     With circulatory disorder    Lumbar radiculopathy 5/16/2014    Lung cancer 2012    s/p chemo/radiation    Macular degeneration     Macular hemorrhage of left eye     Neuropathy     Osteoporosis     Osteoporosis, unspecified 11/9/2012    Pure hypercholesterolemia

## 2024-11-12 NOTE — ED PROVIDER NOTES
Encounter Date: 11/12/2024       History     Chief Complaint   Patient presents with    Fall     Hit her head on the tub she was leaning over to  something off the floor, no loc, got up and went to bed.. No blood thinners and no complaints, The patient said her grand daughter made her come      89-year-old male on Eliquis presents with a minor head injury.  She was in the bathroom and was bending over to  a object when she struck the right side of her head on the sink.  There was no loss of consciousness.  She does not have a headache.  No change in her vision.  She is acting normal.  She talked to a family who is in the medical profession who recommend that she be evaluated.  She is here with her daughter        Review of patient's allergies indicates:   Allergen Reactions    Bextra [valdecoxib] Swelling    Gabapentin Diarrhea and Nausea Only     Past Medical History:   Diagnosis Date    Anal cancer 1995    Anemia     Cancer 1995    anal cancer    Centrilobular emphysema 7/6/2020    Diabetes mellitus     With circulatory disorder    Lumbar radiculopathy 5/16/2014    Lung cancer 2012    s/p chemo/radiation    Macular degeneration     Macular hemorrhage of left eye     Neuropathy     Osteoporosis     Osteoporosis, unspecified 11/9/2012    Pure hypercholesterolemia      Past Surgical History:   Procedure Laterality Date    A-V CARDIAC PACEMAKER INSERTION Left 9/21/2023    Procedure: INSERTION, CARDIAC PACEMAKER, DUAL CHAMBER;  Surgeon: Giuseppe Roman MD;  Location: [facility] EP LAB;  Service: Cardiology;  Laterality: Left;  SSS, Dual PPM,BIO, anes, MB, 3 Prep    BRONCHOSCOPY      CHOLECYSTECTOMY      COLONOSCOPY      FLUOROSCOPIC ANGIOGRAPHY OF LOWER EXTREMITY WITH TOPICAL ULTRASOUND Right 12/6/2018    Procedure: ARTERIOGRAM-LEG AND ULTRASOUND;  Surgeon: SEBASTIÁN Mcpherson III, MD;  [facility];  Service: Peripheral Vascular;  Laterality: Right;  16.5 min  1059.42 mGy  59ml Dye  2ml Local     heart stent      HYSTERECTOMY      INCISIONAL BIOPSY Right 2019    Procedure: INCISIONAL BIOPSY;  Surgeon: Leslie Castillo MD;  [facility];  Service: Plastics;  Laterality: Right;    INTRAMEDULLARY RODDING OF FEMUR Left 2023    Procedure: INSERTION, INTRAMEDULLARY TEREZA, FEMUR;  Surgeon: Desmond Barker MD;  [facility];  Service: Orthopedics;  Laterality: Left;    left leg surgery      blockage    PERCUTANEOUS TRANSLUMINAL ANGIOPLASTY N/A 2018    Procedure: PTA (ANGIOPLASTY, PERCUTANEOUS, TRANSLUMINAL);  Surgeon: SEBASTIÁN Mcpherson III, MD;  [facility];  Service: Peripheral Vascular;  Laterality: N/A;    RECONSTRUCTION USING FLAP Right 2019    Procedure: RECONSTRUCTION USING FLAP/FULL THICKNESS MRESETION AND RECONSTRUCTION RIGHT UPPER EYELID WITH BIOPSY;  Surgeon: Leslie Castillo MD;  Location: [facility] OR [address] [place] [address];  Service: Plastics;  Laterality: Right;    TONSILLECTOMY       Family History   Problem Relation Name Age of Onset    Stroke Mother      Cancer Father      Breast cancer Maternal Aunt      Ovarian cancer Neg Hx      Amblyopia Neg Hx      Blindness Neg Hx      Cataracts Neg Hx      Glaucoma Neg Hx      Macular degeneration Neg Hx      Retinal detachment Neg Hx      Strabismus Neg Hx       Social History     Tobacco Use    Smoking status: Former     Current packs/day: 0.00     Average packs/day: 0.5 packs/day for 30.0 years (15.0 ttl pk-yrs)     Types: Cigarettes     Start date: 1965     Quit date: 1995     Years since quittin.8     Passive exposure: Past    Smokeless tobacco: Never   Substance Use Topics    Alcohol use: No    Drug use: No     Review of Systems    Physical Exam     Initial Vitals [24 0924]   BP Pulse Resp Temp SpO2   (!) 150/67 (!) 111 16 98.3 °F (36.8 °C) (!) 94 %      MAP       --         Physical Exam    Constitutional:   Patient is alert and conversant   HENT:   Mild swelling to the right temple.  No  ecchymosis   Eyes: EOM are normal. Pupils are equal, round, and reactive to light.   Neck: Neck supple.   Normal range of motion.  Musculoskeletal:      Cervical back: Normal range of motion and neck supple.     Neurological: She is alert. She has normal strength. No cranial nerve deficit or sensory deficit. GCS score is 15. GCS eye subscore is 4. GCS verbal subscore is 5. GCS motor subscore is 6.   Psychiatric: She has a normal mood and affect.         ED Course   Procedures  Labs Reviewed - No data to display       Imaging Results              CT Head Without Contrast (Final result)  Result time 11/12/24 10:50:00      Final result by Trever Carcamo DO (11/12/24 10:50:00)                   Impression:      No acute intracranial findings specifically without evidence for acute intracranial hemorrhage or significant new abnormal parenchymal attenuation    Clinical correlation and further evaluation as warranted.    Electronically signed by resident: Kimani Calderon  Date:    11/12/2024  Time:    10:20    Electronically signed by: Trever Carcamo DO  Date:    11/12/2024  Time:    10:50               Narrative:    EXAMINATION:  CT HEAD WITHOUT CONTRAST    CLINICAL HISTORY:  Head trauma, minor (Age >= 65y);    TECHNIQUE:  Low dose axial CT images obtained throughout the head without the use of intravenous contrast.  Axial, sagittal and coronal reconstructions were performed.    COMPARISON:  09/10/2024    FINDINGS:  Generalized cerebral volume loss with compensatory enlargement ventricles sulci and cisterns.    No evidence for acute intracranial hemorrhage or new abnormal parenchymal.    Continued ill-defined decreased attenuation supratentorial white matter with focal hypodensity left basal ganglia.    Unchanged prominence of the extra-axial space overlying the frontal parietal lobes at the vertex.    Partially empty sella.    Paranasal sinus mastoid air cells are essentially clear.                                        Medications   metoprolol tartrate (LOPRESSOR) tablet 50 mg (50 mg Oral Given 11/12/24 0952)     Medical Decision Making  89-year-old female on Eliquis presents with a minor head injury and no symptoms.  Will obtain a head CT    Amount and/or Complexity of Data Reviewed  Radiology: ordered.                                      Clinical Impression:  Final diagnoses:  [S09.90XA] Injury of head, initial encounter (Primary)          ED Disposition Condition    Discharge Stable          ED Prescriptions    None       Follow-up Information       Follow up With Specialties Details Why Contact Info    Pepper Whitfield MD Internal Medicine   [address]  [place] [address]  [phone]      [facility] - Emergency Dept Emergency Medicine  If symptoms worsen [address] [facility] [address] [phone]             Rios Arizmendi MD  11/12/24 0375

## 2024-11-13 ENCOUNTER — PATIENT OUTREACH (OUTPATIENT)
Dept: EMERGENCY MEDICINE | Facility: HOSPITAL | Age: 89
End: 2024-11-13
Payer: MEDICARE

## 2024-11-25 ENCOUNTER — PATIENT MESSAGE (OUTPATIENT)
Dept: INTERNAL MEDICINE | Facility: CLINIC | Age: 89
End: 2024-11-25
Payer: MEDICARE

## 2024-11-25 DIAGNOSIS — F39 MOOD DISORDER: ICD-10-CM

## 2024-11-25 RX ORDER — DULOXETIN HYDROCHLORIDE 60 MG/1
60 CAPSULE, DELAYED RELEASE ORAL EVERY MORNING
Qty: 90 CAPSULE | Refills: 3 | Status: SHIPPED | OUTPATIENT
Start: 2024-11-25

## 2024-11-25 NOTE — TELEPHONE ENCOUNTER
Care Due:                  Date            Visit Type   Department     Provider  --------------------------------------------------------------------------------                                EP -                              PRIMARY      [facility] INTERNAL  Last Visit: 10-      CARE ([facility])   MEDICINE       GERRI ADORNO                              EP                               PRIMARY      [facility] INTERNAL  Next Visit: 02-      CARE ([facility])   MEDICINE       GERRI ADORNO                                                            Last  Test          Frequency    Reason                     Performed    Due Date  --------------------------------------------------------------------------------    Lipid Panel.  12 months..  simvastatin..............  08- 08-    [facility] Embedded Care Due Messages. Reference number: [id number].   11/25/2024 8:15:06 AM CST

## 2024-11-27 LAB
OHS CV AF BURDEN PERCENT: 11
OHS CV DC REMOTE DEVICE TYPE: NORMAL
OHS CV ICD SHOCK: NO
OHS CV RV PACING PERCENT: 8 %

## 2024-12-14 NOTE — ED NOTES
Pt requesting pain medication. Physician notified, will continue to monitor.    C A R D I O L O G Y  *********************    HISTORY OF PRESENT ILLNESS: HPI: Pt is a 63 yr old male with a pmh of HTN, HLD, NICM/HFrEF (per prior notes normal coronaries by cath [address] [place] [address]),  refused ICD multiple times in the past, asthma, gout who presents with SOB that started yesterday evening with associated lightheadedness with ambulation and feeling as if under water in his ears.  He reports sinus congestion and ringing in his ears, Reports he ran out of his meds 7 days ago including. He ran out of his lasix ~7 days ago.  Denies  headache, dizziness, chest pain, palpitations abdominal pain, joint pain, diarrhea/constipation, urinary symptoms.   DATE OF SERVICE: 12-14-24  resting in bed, no new complaints.    PAST MEDICAL & SURGICAL HISTORY:  HTN (hypertension)  Gout  HLD (hyperlipidemia)  Chronic systolic heart failure  Asthma  Elective surgery R Ankle reconstructive surgery with internal fixation 2013  H/O total knee replacement, left    allopurinol 100 milliGRAM(s) Oral daily  aluminum hydroxide/magnesium hydroxide/simethicone Suspension 30 milliLiter(s) Oral every 4 hours PRN  aspirin enteric coated 81 milliGRAM(s) Oral daily  carvedilol 6.25 milliGRAM(s) Oral every 12 hours  fluticasone propionate/ salmeterol 250-50 MICROgram(s) Diskus 1 Dose(s) Inhalation two times a day  furosemide   Injectable 40 milliGRAM(s) IV Push every 12 hours  heparin   Injectable 5000 Unit(s) SubCutaneous every 8 hours  influenza   Vaccine 0.5 milliLiter(s) IntraMuscular once  losartan 25 milliGRAM(s) Oral daily  melatonin 3 milliGRAM(s) Oral at bedtime PRN  ondansetron Injectable 4 milliGRAM(s) IV Push every 8 hours PRN  polyethylene glycol 3350 17 Gram(s) Oral daily PRN  rosuvastatin 20 milliGRAM(s) Oral at bedtime  senna 2 Tablet(s) Oral at bedtime PRN  spironolactone 25 milliGRAM(s) Oral daily                            15.6   4.62  )-----------( 219      ( 14 Dec 2024 06:05 )             51.4       12-14    141  |  102  |  41[H]  ----------------------------<  95  3.9   |  27  |  1.52[H]    Ca    8.8      14 Dec 2024 06:05  Phos  3.8     12-14  Mg     2.40     12-14    TPro  6.6  /  Alb  4.0  /  TBili  2.1[H]  /  DBili  0.4[H]  /  AST  41[H]  /  ALT  45[H]  /  AlkPhos  131[H]  12-13    T(C): 36.7 (12-14-24 @ 11:32), Max: 36.7 (12-13-24 @ 17:25)  HR: 53 (12-14-24 @ 11:32) (51 - 54)  BP: 129/98 (12-14-24 @ 11:32) (110/84 - 129/98)  RR: 18 (12-14-24 @ 11:32) (18 - 18)  SpO2: 95% (12-14-24 @ 11:32) (95% - 100%)  Wt(kg): --    I&O's Summary    13 Dec 2024 07:01  -  14 Dec 2024 07:00  --------------------------------------------------------  IN: 525 mL / OUT: 1520 mL / NET: -995 mL    14 Dec 2024 07:01  -  14 Dec 2024 15:13  --------------------------------------------------------  IN: 236 mL / OUT: 700 mL / NET: -464 mL      General:  Alert and Oriented * 3.   Head: Normocephalic and atraumatic.   Neck: + JVD. No bruits. Supple. Does not appear to be enlarged.   Cardiovascular: + S1,S2 ; RRR Soft systolic murmur at the left lower sternal border. No rubs noted.    Lungs: decreased sounds  Abdomen: distended  Extremities: No clubbing/cyanosis/edema.   Neurologic: Moves all four extremities. Full range of motion.   Skin: Warm and moist. The patient's skin has normal elasticity and good skin turgor.   Psychiatric: Appropriate mood and affect.  Musculoskeletal: Normal range of motion, normal strength     TELEMETRY: 	Sinus nikki. NSVT      ECG:  	Sinus nikki,    RADIOLOGY:         CXR:   < from: Xray Chest 2 Views PA/Lat (12.11.24 @ 15:13) >  FINDINGS:  Cardiomegaly.  There are no focal consolidations.  Possible trace effusions but no vascular congestion to indicate CHF.  There is no pneumothorax.    IMPRESSION: Clear lungs without focal consolidations.    < end of copied text >      < from: TTE W or WO Ultrasound Enhancing Agent (04.11.24 @ 09:03) >  CONCLUSIONS:      1. Left ventricular systolic function is severely decreased with an ejection fraction of 29 % by Newton's method of disks.   2. Mid and apical inferior septum and basal and mid inferolateral wall are abnormal.   3. The left atrium is normal.   4. Trace mitral regurgitation.   5. The right atrium is normal in size.   6. Trace tricuspid regurgitation.   7. Trace pulmonic regurgitation.   8. No pericardial effusion seen.    < end of copied text >    ASSESSMENT/PLAN:  Pt is a 63 yr old male with a pmh of HTN, HLD, NICM/HFrEF (per prior notes normal coronaries by cath 2019 [facility]),  refused ICD multiple times in the past, asthma, gout who presents with SOB that started yesterday evening with associated lightheadedness with ambulation and feeling as if under water in his ears.  He reports sinus congestion and ringing in his ears, Reports he ran out of his meds 7 days ago including. He ran out of his lasix ~7 days ago.      Acuter on Chronic HFrEF/HTN/HLD  - suspect exacerbated on setting of possible URI and not taking meds for 7 days  - stomach is distended- most of this is hepatomegaly and gut congestion.  minimal ascites on abdominal ultrasound.  - continue Lasix 40 mg IV BID  - c/w coreg was previously on 6.25 mg in April now on 25 mg BID per records, can give as long as systolic above 90 and heart rate above 50, might need dose reduction  - suspect QUIN is cardiorenal, c/w losartan and Aldactone, hold for systolic < 90  - c/w ASA and Statin  - cna restart SGLTi upon discharge  - daily weights, strict I/O    Checo Cheung M.D.  Cardiac Electrophysiology  [phone]

## 2025-01-14 NOTE — NURSING
Discharge instructions and medlist given.  Instructions on sling and swathe.  Family verbalized understanding.  Off unit via wheelchair with PCT and family who will drive her home.  
Pt transferred from recovery via stretcher.  Vss.  Post op orders and assessment initiated.  Pt aao, tolerating po.  Family called to bs.  Pt in no acute distress and verbalizes no complaints. Call bell within reach.   
Xray Tibia + Fibula 2 Views, Bilateral

## 2025-01-17 ENCOUNTER — HOSPITAL ENCOUNTER (EMERGENCY)
Facility: HOSPITAL | Age: OVER 89
Discharge: HOME | End: 2025-01-17
Attending: EMERGENCY MEDICINE
Payer: MEDICARE

## 2025-01-17 VITALS
SYSTOLIC BLOOD PRESSURE: 162 MMHG | OXYGEN SATURATION: 96 % | HEIGHT: 67 IN | HEART RATE: 97 BPM | DIASTOLIC BLOOD PRESSURE: 63 MMHG | RESPIRATION RATE: 18 BRPM | TEMPERATURE: 98 F | WEIGHT: 150 LBS | BODY MASS INDEX: 23.54 KG/M2

## 2025-01-17 DIAGNOSIS — D64.9 ANEMIA, UNSPECIFIED TYPE: ICD-10-CM

## 2025-01-17 DIAGNOSIS — R93.89 ABNORMAL CHEST X-RAY: ICD-10-CM

## 2025-01-17 DIAGNOSIS — F03.90 DEMENTIA, UNSPECIFIED DEMENTIA SEVERITY, UNSPECIFIED DEMENTIA TYPE, UNSPECIFIED WHETHER BEHAVIORAL, PSYCHOTIC, OR MOOD DISTURBANCE OR ANXIETY: Primary | ICD-10-CM

## 2025-01-17 DIAGNOSIS — E83.42 HYPOMAGNESEMIA: ICD-10-CM

## 2025-01-17 DIAGNOSIS — R45.1 AGITATION: ICD-10-CM

## 2025-01-17 DIAGNOSIS — R00.0 TACHYCARDIA: ICD-10-CM

## 2025-01-17 LAB
ALBUMIN SERPL BCP-MCNC: 3.4 G/DL (ref 3.5–5.2)
ALP SERPL-CCNC: 54 U/L (ref 40–150)
ALT SERPL W/O P-5'-P-CCNC: 5 U/L (ref 10–44)
ANION GAP SERPL CALC-SCNC: 10 MMOL/L (ref 8–16)
AST SERPL-CCNC: 12 U/L (ref 10–40)
BASOPHILS # BLD AUTO: 0.03 K/UL (ref 0–0.2)
BASOPHILS NFR BLD: 0.9 % (ref 0–1.9)
BILIRUB SERPL-MCNC: 0.3 MG/DL (ref 0.1–1)
BILIRUB UR QL STRIP: NEGATIVE
BNP SERPL-MCNC: 208 PG/ML (ref 0–99)
BUN SERPL-MCNC: 7 MG/DL (ref 10–30)
CALCIUM SERPL-MCNC: 9.7 MG/DL (ref 8.7–10.5)
CHLORIDE SERPL-SCNC: 107 MMOL/L (ref 95–110)
CLARITY UR REFRACT.AUTO: CLEAR
CO2 SERPL-SCNC: 23 MMOL/L (ref 23–29)
COLOR UR AUTO: YELLOW
CREAT SERPL-MCNC: 0.8 MG/DL (ref 0.5–1.4)
DIFFERENTIAL METHOD BLD: ABNORMAL
EOSINOPHIL # BLD AUTO: 0 K/UL (ref 0–0.5)
EOSINOPHIL NFR BLD: 1.2 % (ref 0–8)
ERYTHROCYTE [DISTWIDTH] IN BLOOD BY AUTOMATED COUNT: 19.5 % (ref 11.5–14.5)
EST. GFR  (NO RACE VARIABLE): >60 ML/MIN/1.73 M^2
GLUCOSE SERPL-MCNC: 104 MG/DL (ref 70–110)
GLUCOSE UR QL STRIP: NEGATIVE
HCT VFR BLD AUTO: 26.3 % (ref 37–48.5)
HGB BLD-MCNC: 8.8 G/DL (ref 12–16)
HGB UR QL STRIP: NEGATIVE
IMM GRANULOCYTES # BLD AUTO: 0.01 K/UL (ref 0–0.04)
IMM GRANULOCYTES NFR BLD AUTO: 0.3 % (ref 0–0.5)
KETONES UR QL STRIP: NEGATIVE
LACTATE SERPL-SCNC: 1.7 MMOL/L (ref 0.5–2.2)
LEUKOCYTE ESTERASE UR QL STRIP: NEGATIVE
LYMPHOCYTES # BLD AUTO: 1 K/UL (ref 1–4.8)
LYMPHOCYTES NFR BLD: 32.2 % (ref 18–48)
MAGNESIUM SERPL-MCNC: 1.5 MG/DL (ref 1.6–2.6)
MCH RBC QN AUTO: 28.9 PG (ref 27–31)
MCHC RBC AUTO-ENTMCNC: 33.5 G/DL (ref 32–36)
MCV RBC AUTO: 86 FL (ref 82–98)
MONOCYTES # BLD AUTO: 0.3 K/UL (ref 0.3–1)
MONOCYTES NFR BLD: 10.2 % (ref 4–15)
NEUTROPHILS # BLD AUTO: 1.8 K/UL (ref 1.8–7.7)
NEUTROPHILS NFR BLD: 55.2 % (ref 38–73)
NITRITE UR QL STRIP: NEGATIVE
NRBC BLD-RTO: 0 /100 WBC
OHS QRS DURATION: 82 MS
OHS QTC CALCULATION: 414 MS
PH UR STRIP: 7 [PH] (ref 5–8)
PLATELET # BLD AUTO: 196 K/UL (ref 150–450)
PMV BLD AUTO: 11.6 FL (ref 9.2–12.9)
POTASSIUM SERPL-SCNC: 3.7 MMOL/L (ref 3.5–5.1)
PROCALCITONIN SERPL IA-MCNC: 0.02 NG/ML
PROT SERPL-MCNC: 8.1 G/DL (ref 6–8.4)
PROT UR QL STRIP: NEGATIVE
RBC # BLD AUTO: 3.05 M/UL (ref 4–5.4)
SODIUM SERPL-SCNC: 140 MMOL/L (ref 136–145)
SP GR UR STRIP: 1.01 (ref 1–1.03)
TROPONIN I SERPL DL<=0.01 NG/ML-MCNC: 7 NG/L (ref 0–14)
URN SPEC COLLECT METH UR: NORMAL
WBC # BLD AUTO: 3.23 K/UL (ref 3.9–12.7)

## 2025-01-17 PROCEDURE — 96366 THER/PROPH/DIAG IV INF ADDON: CPT

## 2025-01-17 PROCEDURE — 84484 ASSAY OF TROPONIN QUANT: CPT | Performed by: PHYSICIAN ASSISTANT

## 2025-01-17 PROCEDURE — 96361 HYDRATE IV INFUSION ADD-ON: CPT

## 2025-01-17 PROCEDURE — 96365 THER/PROPH/DIAG IV INF INIT: CPT

## 2025-01-17 PROCEDURE — 83880 ASSAY OF NATRIURETIC PEPTIDE: CPT | Performed by: PHYSICIAN ASSISTANT

## 2025-01-17 PROCEDURE — 83605 ASSAY OF LACTIC ACID: CPT | Performed by: PHYSICIAN ASSISTANT

## 2025-01-17 PROCEDURE — P9612 CATHETERIZE FOR URINE SPEC: HCPCS

## 2025-01-17 PROCEDURE — 84145 PROCALCITONIN (PCT): CPT | Performed by: PHYSICIAN ASSISTANT

## 2025-01-17 PROCEDURE — 83735 ASSAY OF MAGNESIUM: CPT | Performed by: EMERGENCY MEDICINE

## 2025-01-17 PROCEDURE — 93010 ELECTROCARDIOGRAM REPORT: CPT | Mod: ,,, | Performed by: INTERNAL MEDICINE

## 2025-01-17 PROCEDURE — 85025 COMPLETE CBC W/AUTO DIFF WBC: CPT | Performed by: PHYSICIAN ASSISTANT

## 2025-01-17 PROCEDURE — 99285 EMERGENCY DEPT VISIT HI MDM: CPT | Mod: 25

## 2025-01-17 PROCEDURE — 81003 URINALYSIS AUTO W/O SCOPE: CPT | Performed by: PHYSICIAN ASSISTANT

## 2025-01-17 PROCEDURE — 93005 ELECTROCARDIOGRAM TRACING: CPT

## 2025-01-17 PROCEDURE — 63600175 PHARM REV CODE 636 W HCPCS: Performed by: PHYSICIAN ASSISTANT

## 2025-01-17 PROCEDURE — 80053 COMPREHEN METABOLIC PANEL: CPT | Performed by: EMERGENCY MEDICINE

## 2025-01-17 RX ORDER — MAGNESIUM SULFATE HEPTAHYDRATE 40 MG/ML
2 INJECTION, SOLUTION INTRAVENOUS
Status: COMPLETED | OUTPATIENT
Start: 2025-01-17 | End: 2025-01-17

## 2025-01-17 RX ADMIN — SODIUM CHLORIDE, SODIUM LACTATE, POTASSIUM CHLORIDE, AND CALCIUM CHLORIDE 500 ML: .6; .31; .03; .02 INJECTION, SOLUTION INTRAVENOUS at 01:01

## 2025-01-17 RX ADMIN — MAGNESIUM SULFATE HEPTAHYDRATE 2 G: 40 INJECTION, SOLUTION INTRAVENOUS at 03:01

## 2025-01-17 NOTE — ED TRIAGE NOTES
Pt's family reporting possible UTI, state pt has been having auditory and visual hallucinations along with insomnia x1 week.

## 2025-01-17 NOTE — ED PROVIDER NOTES
Encounter Date: 2025       History     Chief Complaint   Patient presents with    Urinary Tract Infection     Pt arrived to ED via pov from home with complaint of possible uti. Pts family states that pt has been having visual and verbal hallucinations and believe that pt may have uti. Pt denies any burning, frequency and urgency at this time. Pt has hx of alzheimers but is AAO x 4 upon arrival.      The history is provided by the patient and medical records. No  was used.     Brunilda England is a 89 y.o. female with medical history of dementia, HTN, DM, Diastolic HF, PAFib on Eliquis, SSS s/p pacemaker, CKD presenting to the ED with the chief complaint of agitation.     Arrives from home with her daughter. Patient has been experiencing hallucinations over the past week and having difficulty sleeping at night. Patient has been having conversations with family members who are . She is only getting 1-2 hours of sleep at night. Daughter reports patient has UTIs whenever this happens. She takes Seroquel at night and has been compliant with this. Daughter gave her Xanax last night which helped her sleep but her PCP told her not to do this on a regular basis. Patient was ambulate to the car by herself today. Patient oriented to person, place, and situation. Denies having any pain including headache, chest pain, abdominal pain, pelvic pain. NO recent falls or trauma. Daughter feels comfortable bringing her home.     Review of patient's allergies indicates:   Allergen Reactions    Bextra [valdecoxib] Swelling    Gabapentin Diarrhea and Nausea Only     Past Medical History:   Diagnosis Date    Alzheimer's disease, unspecified (CODE)     Anal cancer     Anemia     Cancer     anal cancer    Centrilobular emphysema 2020    Diabetes mellitus     With circulatory disorder    Lumbar radiculopathy 2014    Lung cancer 2012    s/p chemo/radiation    Macular degeneration     Macular  hemorrhage of left eye     Neuropathy     Osteoporosis     Osteoporosis, unspecified 11/09/2012    Pure hypercholesterolemia      Past Surgical History:   Procedure Laterality Date    A-V CARDIAC PACEMAKER INSERTION Left 9/21/2023    Procedure: INSERTION, CARDIAC PACEMAKER, DUAL CHAMBER;  Surgeon: Giuseppe Roman MD;  Location: [facility];  Service: Cardiology;  Laterality: Left;  SSS, Dual PPM,BIO, anes, MB, 3 Prep    BRONCHOSCOPY      CHOLECYSTECTOMY      COLONOSCOPY      FLUOROSCOPIC ANGIOGRAPHY OF LOWER EXTREMITY WITH TOPICAL ULTRASOUND Right 12/6/2018    Procedure: ARTERIOGRAM-LEG AND ULTRASOUND;  Surgeon: SEBASTIÁN Mcpherson III, MD;  Location: [facility] OR [address] [place] [address];  Service: Peripheral Vascular;  Laterality: Right;  16.5 min  1059.42 mGy  59ml Dye  2ml Local    heart stent      HYSTERECTOMY      INCISIONAL BIOPSY Right 12/6/2019    Procedure: INCISIONAL BIOPSY;  Surgeon: Leslie Castillo MD;  Location: [address];  Service: Plastics;  Laterality: Right;    INTRAMEDULLARY RODDING OF FEMUR Left 5/30/2023    Procedure: INSERTION, INTRAMEDULLARY TEREZA, FEMUR;  Surgeon: Desmond Barker MD;  Location: [address];  Service: Orthopedics;  Laterality: Left;    left leg surgery      blockage    PERCUTANEOUS TRANSLUMINAL ANGIOPLASTY N/A 12/6/2018    Procedure: PTA (ANGIOPLASTY, PERCUTANEOUS, TRANSLUMINAL);  Surgeon: SEBASTIÁN Mcpherson III, MD;  Location: [address];  Service: Peripheral Vascular;  Laterality: N/A;    RECONSTRUCTION USING FLAP Right 12/6/2019    Procedure: RECONSTRUCTION USING FLAP/FULL THICKNESS MRESETION AND RECONSTRUCTION RIGHT UPPER EYELID WITH BIOPSY;  Surgeon: Leslie Castillo MD;  Location: [address];  Service: Plastics;  Laterality: Right;    TONSILLECTOMY       Family History   Problem Relation Name Age of Onset    Stroke Mother      Cancer Father      Breast cancer Maternal Aunt      Ovarian cancer Neg Hx      Amblyopia Neg Hx      Blindness Neg Hx      Cataracts Neg Hx       Glaucoma Neg Hx      Macular degeneration Neg Hx      Retinal detachment Neg Hx      Strabismus Neg Hx       Social History     Tobacco Use    Smoking status: Former     Current packs/day: 0.00     Average packs/day: 0.5 packs/day for 30.0 years (15.0 ttl pk-yrs)     Types: Cigarettes     Start date: 1965     Quit date: 1995     Years since quittin.0     Passive exposure: Past    Smokeless tobacco: Never   Substance Use Topics    Alcohol use: No    Drug use: No     Review of Systems   Constitutional:  Negative for fever.       Physical Exam     Initial Vitals [25 1214]   BP Pulse Resp Temp SpO2   (!) 141/68 (!) 112 16 97.7 °F (36.5 °C) 95 %      MAP       --         Physical Exam    Constitutional: She appears well-developed and well-nourished. She is not diaphoretic. No distress.   HENT:   Head: Normocephalic and atraumatic. Mouth/Throat: Oropharynx is clear and moist. No oropharyngeal exudate.   Eyes: Conjunctivae and EOM are normal. Pupils are equal, round, and reactive to light. No scleral icterus.   Neck: Neck supple.   Normal range of motion.  Cardiovascular:  An irregularly irregular rhythm present.   Tachycardia present.         Pulmonary/Chest: Breath sounds normal. No respiratory distress. She has no wheezes.   Abdominal: Abdomen is soft. She exhibits no distension. There is no abdominal tenderness. There is no rebound.   Musculoskeletal:         General: No tenderness or edema. Normal range of motion.      Cervical back: Normal range of motion and neck supple.     Neurological: She is alert. She has normal strength. No sensory deficit.   Oriented to person, place, situation. Follows commands appropriately   Skin: Skin is warm and dry. No rash noted. No erythema.   Psychiatric: She has a normal mood and affect.       ED Course   Procedures  Labs Reviewed   CBC W/ AUTO DIFFERENTIAL - Abnormal       Result Value    WBC 3.23 (*)     RBC 3.05 (*)     Hemoglobin 8.8 (*)     Hematocrit 26.3  (*)     MCV 86      MCH 28.9      MCHC 33.5      RDW 19.5 (*)     Platelets 196      MPV 11.6      Immature Granulocytes 0.3      Gran # (ANC) 1.8      Immature Grans (Abs) 0.01      Lymph # 1.0      Mono # 0.3      Eos # 0.0      Baso # 0.03      nRBC 0      Gran % 55.2      Lymph % 32.2      Mono % 10.2      Eosinophil % 1.2      Basophil % 0.9      Differential Method Automated     COMPREHENSIVE METABOLIC PANEL - Abnormal    Sodium 140      Potassium 3.7      Chloride 107      CO2 23      Glucose 104      BUN 7 (*)     Creatinine 0.8      Calcium 9.7      Total Protein 8.1      Albumin 3.4 (*)     Total Bilirubin 0.3      Alkaline Phosphatase 54      AST 12      ALT 5 (*)     eGFR >60.0      Anion Gap 10     MAGNESIUM - Abnormal    Magnesium 1.5 (*)    B-TYPE NATRIURETIC PEPTIDE - Abnormal     (*)     Narrative:     Add-on BNP per Charity Ren PA-C/order#[id number] @16:07    01/17/2025.     ADD ON HS TROP PER CHARITY REN PA-C ORDER# [id number] @    01/17/2025  14:22    URINALYSIS, REFLEX TO URINE CULTURE    Specimen UA Urine, Catheterized      Color, UA Yellow      Appearance, UA Clear      pH, UA 7.0      Specific Gravity, UA 1.010      Protein, UA Negative      Glucose, UA Negative      Ketones, UA Negative      Bilirubin (UA) Negative      Occult Blood UA Negative      Nitrite, UA Negative      Leukocytes, UA Negative      Narrative:     Specimen Source->Urine   TROPONIN I HIGH SENSITIVITY   B-TYPE NATRIURETIC PEPTIDE   TROPONIN I HIGH SENSITIVITY    Troponin I High Sensitivity 7      Narrative:     ADD ON HS TROP PER CHARITY REN PA-C ORDER# [id number] @    01/17/2025  14:22    PROCALCITONIN    Procalcitonin 0.02     LACTIC ACID, PLASMA    Lactate (Lactic Acid) 1.7          ECG Results              EKG 12-lead (Final result)        Collection Time Result Time QRS Duration OHS QTC Calculation    01/17/25 13:09:41 01/17/25 14:53:49 82 414                     Final result by Interface, Lab In  [facility] (01/17/25 14:53:52)                   Narrative:    Test Reason : R00.0,    Vent. Rate : 101 BPM     Atrial Rate : 101 BPM     P-R Int : 160 ms          QRS Dur :  82 ms      QT Int : 320 ms       P-R-T Axes :  59  34  34 degrees    QTcB Int : 414 ms    Sinus tachycardia  Low septal forces  Nonspecific ST-t wave abnormality Less prominent than previously  Abnormal ECG  When compared with ECG of 26-Sep-2024 13:30,  Electronic ventricular pacemaker not seen  Confirmed by Domingo Payne ([id number]) on 1/17/2025 2:53:46 PM    Referred By: AAAREFERRAL SELF           Confirmed By: Domingo Payne                                  Imaging Results              CT Head Without Contrast (Final result)  Result time 01/17/25 16:16:19      Final result by Gilberto Mitchell MD (01/17/25 16:16:19)                   Impression:      Chronic ischemic and cerebral volume loss, similar to prior.    No evidence of acute hemorrhage or major vascular distribution infarct.    Electronically signed by resident: Kimani Calderon  Date:    01/17/2025  Time:    15:55    Electronically signed by: Gilberto Mitchell MD  Date:    01/17/2025  Time:    16:16               Narrative:    EXAMINATION:  CT HEAD WITHOUT CONTRAST    CLINICAL HISTORY:  Mental status change, unknown cause;    TECHNIQUE:  Low dose axial CT images obtained throughout the head without the use of intravenous contrast.  Axial, sagittal and coronal reconstructions were performed.    COMPARISON:  .    CT head without 11/12/2024, 09/10/2024    FINDINGS:  Intracranial compartment:    Ventricles are stable in size.  Pattern of cerebral volume loss,, without evidence of hydrocephalus.    Chronic small vessel ischemic change with few remote-appearing lacunar type infarcts.  No new parenchymal hemorrhage, edema, mass effect or major vascular distribution infarct.    No new extra-axial blood or fluid collections.    Skull/extracranial contents (limited evaluation):    No displaced calvarial  fracture.    The mastoid air cells and visualized paranasal sinuses are essentially clear.    Scattered atherosclerotic plaque particularly prominent the carotid bifurcations.    Bilateral pseudophakia.                                       X-Ray Chest AP Portable (Final result)  Result time 01/17/25 13:57:43      Final result by Francis Hunter MD (01/17/25 13:57:43)                   Impression:      As above.      Electronically signed by: Francis Hunter  Date:    01/17/2025  Time:    13:57               Narrative:    EXAMINATION:  XR CHEST AP PORTABLE    CLINICAL HISTORY:  Restlessness and agitation    TECHNIQUE:  Single frontal view of the chest was performed.    COMPARISON:  Chest CT 11/03/2022; chest radiograph 09/21/2023    FINDINGS:  Lines and tubes: Left chest wall dual chamber pacemaker.    Heart and mediastinum: The right heart border is obscured.  Calcifications of the thoracic aorta.    Pleura: Moderate right pleural effusion with probable loculated components.  No pneumothorax.    Lungs: Volume loss in the right lung.  Unchanged masslike opacity with spiculated margins in the right perihilar region, compatible with posttreatment changes for lung cancer.  There are bandlike opacities in the right mid and lower lung zones suggesting atelectasis or scarring.  No consolidation on the left.    Soft tissue/bone: Osteopenia and degenerative changes.  Cholecystectomy clips.                                       Medications   lactated ringers bolus 500 mL (0 mLs Intravenous Stopped 1/17/25 1459)   magnesium sulfate 2g in water 50mL IVPB (premix) (0 g Intravenous Stopped 1/17/25 1756)     Medical Decision Making   89 y.o. female with medical history of dementia, HTN, DM, Diastolic HF, PAFib on Eliquis, SSS s/p pacemaker, CKD presenting to the ED c/o agitation at home. Daughter is concerned for UTI.     DDx includes but not limited to dementia manifestation, metabolic encephalopathy, UTI, pneumonia, electrolyte  disturbance, SEA, polypharmacy, traumatic brain injury.     Amount and/or Complexity of Data Reviewed  External Data Reviewed: labs and notes.  Labs: ordered. Decision-making details documented in ED Course.  Radiology: ordered and independent interpretation performed.  ECG/medicine tests: ordered and independent interpretation performed.    Risk  Prescription drug management.               ED Course as of 01/18/25 0704   Fri Jan 17, 2025   1546 WBC(!): 3.23  Chronic leukopenia [BA]   1546 Hemoglobin(!): 8.8 [BA]   1546 Hematocrit(!): 26.3 [BA]   1546 Baseline anemia [BA]   1546 UA obtained via straight cath that is negative for UTI [BA]   1546 Troponin I High Sensitivity: 7 [BA]   1546 Magnesium (!): 1.5  Replaced IV [BA]   1547 Potassium: 3.7 [BA]   1547 Creatinine: 0.8 [BA]   1548 CXR showing R sided pleural effusion similar to previous. No focal consolidations. She denies SOB. She is prescribed home O2 PRN. [BA]   1549 Patient signed out to my colleague at the end of my shift pending CT head, Lactate, Procal results and final disposition. I discussed plan of care with patient's daughter who would like to take the patient home if possible which I feel is appropriate if her pending work-up does not show significant findings. Patient has had similar hallucinations in the past and I suspect her symptoms could be from her dementia. I have discussed the care of this patient with my supervising physician.  [BA]      ED Course User Index  [BA] Joao Ren, DAVIDA                           Clinical Impression:  Final diagnoses:  [R00.0] Tachycardia  [R45.1] Agitation  [F03.90] Dementia, unspecified dementia severity, unspecified dementia type, unspecified whether behavioral, psychotic, or mood disturbance or anxiety (Primary)  [E83.42] Hypomagnesemia  [D64.9] Anemia, unspecified type  [R93.89] Abnormal chest x-ray          ED Disposition Condition    Discharge           ED Prescriptions    None       Follow-up  Information       Follow up With Specialties Details Why Contact Info    [facility] - Emergency Dept Emergency Medicine  If symptoms worsen [address]  [facility] [id number] [phone]    Pepper Whitfield MD Internal Medicine Schedule an appointment as soon as possible for a visit   [address]  [facility] [address]  [phone]               Joao Ren PA-C  01/18/25 0704

## 2025-01-17 NOTE — DISCHARGE INSTRUCTIONS
Please follow up with your primary care provider for hospital visit follow up as soon as possible    I suspect patient's symptoms are secondary to dementia.  No acute findings on her workup that may explain patient's hallucinations    Patient's magnesium level was low today.  Replacement was given    Unchanged masslike opacity of right lung noted on chest xray.  I suspect this is chronic from prior lung cancer. Please follow up with your primary care provider for this finding    Strict ED precautions given to return immediately for new, worsening, or concerning symptoms

## 2025-01-17 NOTE — PROVIDER PROGRESS NOTES - EMERGENCY DEPT.
Encounter Date: 1/17/2025    ED Physician Progress Notes        Physician Note:   I received signout of this patient due to shift change from  ALDO Ren PA-C.    89 y.o. female with a medical history of dementia, HTN, DM, Diastolic HF, PAFib on Eliquis, SSS s/p pacemaker, CKD presenting to the ED with the chief complaint of agitation.     Pending CT head, BNP and procal at the time of sign out    Vitals with hypertension and mild tachycardia     Workup so far in the ED with leukopenia(Chronic). Chronic anemia.  Low magnesium which was replaced by initial provider. UA negative. CXR negative for acute findings. Normal trop. Negative lactate    CT head negative.  BNP is elevated though consistent with prior levels.  Negative procalcitonin.    On my assessment patient is well-appearing.  She is alert.  She responds appropriately to questions. She is tolerating p.o. tachycardia resolved    Shared decision-making was used regarding her disposition. I offered admission given change in mental status. Patient would like to be discharged home.  Her daughter who is also her caregiver feels comfortable bringing her home. Her mother has known dementia.  She is sometimes has hallucinations which has been a sign of a urinary tract infection in the past.  This is not always the case.  I advised close follow up with primary care as soon as possible.  Patient and family are agreeable to this. Strict ED precautions given to return immediately for new, worsening, or concerning symptoms

## 2025-01-22 ENCOUNTER — CLINICAL SUPPORT (OUTPATIENT)
Dept: CARDIOLOGY | Facility: HOSPITAL | Age: OVER 89
End: 2025-01-22
Payer: MEDICARE

## 2025-01-22 DIAGNOSIS — I49.5 SICK SINUS SYNDROME: ICD-10-CM

## 2025-01-22 DIAGNOSIS — Z95.0 PRESENCE OF CARDIAC PACEMAKER: ICD-10-CM

## 2025-01-23 ENCOUNTER — CLINICAL SUPPORT (OUTPATIENT)
Dept: CARDIOLOGY | Facility: HOSPITAL | Age: OVER 89
End: 2025-01-23
Attending: INTERNAL MEDICINE
Payer: MEDICARE

## 2025-01-23 DIAGNOSIS — Z95.0 PRESENCE OF CARDIAC PACEMAKER: ICD-10-CM

## 2025-01-23 DIAGNOSIS — I49.5 SICK SINUS SYNDROME: ICD-10-CM

## 2025-01-23 PROCEDURE — 93296 REM INTERROG EVL PM/IDS: CPT | Performed by: INTERNAL MEDICINE

## 2025-01-27 ENCOUNTER — PATIENT MESSAGE (OUTPATIENT)
Dept: INTERNAL MEDICINE | Facility: CLINIC | Age: OVER 89
End: 2025-01-27
Payer: MEDICARE

## 2025-01-28 RX ORDER — QUETIAPINE FUMARATE 25 MG/1
50 TABLET, FILM COATED ORAL NIGHTLY
Start: 2025-01-28

## 2025-01-28 NOTE — TELEPHONE ENCOUNTER
Dr. Marquez - can you assist on this Whitfield patient?    She has dementia and not sleeping/getting agitated at night, having hallucinations    She was seen in the ED 1/17/25    She is using seroquel 25 mg nightly but it's not helping    Should we increase seroquel?

## 2025-01-29 RX ORDER — DONEPEZIL HYDROCHLORIDE 5 MG/1
5 TABLET, FILM COATED ORAL NIGHTLY
Qty: 90 TABLET | Refills: 4 | Status: SHIPPED | OUTPATIENT
Start: 2025-01-29 | End: 2026-01-29

## 2025-01-29 NOTE — TELEPHONE ENCOUNTER
Refill Routing Note   Medication(s) are not appropriate for processing by [facility] for the following reason(s):        Outside of protocol    [facility] action(s):  Route             Appointments  past 12m or future 3m with PCP    Date Provider   Last Visit   10/28/2024 Pepper Whitfield MD   Next Visit   2/25/2025 Pepper Whitfield MD   ED visits in past 90 days: 2        Note composed:4:14 PM 01/29/2025

## 2025-02-06 RX ORDER — METOPROLOL TARTRATE 50 MG/1
50 TABLET ORAL 2 TIMES DAILY
Qty: 60 TABLET | Refills: 11 | Status: SHIPPED | OUTPATIENT
Start: 2025-02-06

## 2025-02-06 NOTE — TELEPHONE ENCOUNTER
Care Due:                  Date            Visit Type   Department     Provider  --------------------------------------------------------------------------------                                EP -                              PRIMARY      [facility] INTERNAL  Last Visit: 10-      CARE ([facility])   MEDICINE       GERRI ADORNO                              EP                               PRIMARY      [facility] INTERNAL  Next Visit: 02-      CARE ([facility])   MEDICINE       GERRI ADORNO                                                            Last  Test          Frequency    Reason                     Performed    Due Date  --------------------------------------------------------------------------------    Lipid Panel.  12 months..  simvastatin..............  08- 08-    Health [facility] Embedded Care Due Messages. Reference number: [id number].   2/06/2025 1:20:24 PM CST

## 2025-02-17 LAB
OHS CV AF BURDEN PERCENT: 13
OHS CV DC REMOTE DEVICE TYPE: NORMAL
OHS CV RV PACING PERCENT: 5 %

## 2025-02-25 ENCOUNTER — LAB VISIT (OUTPATIENT)
Dept: LAB | Facility: HOSPITAL | Age: OVER 89
End: 2025-02-25
Attending: INTERNAL MEDICINE
Payer: MEDICARE

## 2025-02-25 ENCOUNTER — OFFICE VISIT (OUTPATIENT)
Dept: INTERNAL MEDICINE | Facility: CLINIC | Age: OVER 89
End: 2025-02-25
Payer: MEDICARE

## 2025-02-25 VITALS
HEIGHT: 67 IN | BODY MASS INDEX: 23.18 KG/M2 | DIASTOLIC BLOOD PRESSURE: 60 MMHG | SYSTOLIC BLOOD PRESSURE: 126 MMHG | OXYGEN SATURATION: 94 % | HEART RATE: 107 BPM | WEIGHT: 147.69 LBS

## 2025-02-25 DIAGNOSIS — I50.32 CHRONIC DIASTOLIC HEART FAILURE: ICD-10-CM

## 2025-02-25 DIAGNOSIS — F39 MOOD DISORDER: ICD-10-CM

## 2025-02-25 DIAGNOSIS — F03.90 DEMENTIA, UNSPECIFIED DEMENTIA SEVERITY, UNSPECIFIED DEMENTIA TYPE, UNSPECIFIED WHETHER BEHAVIORAL, PSYCHOTIC, OR MOOD DISTURBANCE OR ANXIETY: ICD-10-CM

## 2025-02-25 DIAGNOSIS — I10 ESSENTIAL HYPERTENSION: ICD-10-CM

## 2025-02-25 DIAGNOSIS — E11.51 TYPE 2 DIABETES MELLITUS WITH DIABETIC PERIPHERAL ANGIOPATHY WITHOUT GANGRENE, WITHOUT LONG-TERM CURRENT USE OF INSULIN: ICD-10-CM

## 2025-02-25 DIAGNOSIS — N95.2 ATROPHIC VAGINITIS: ICD-10-CM

## 2025-02-25 DIAGNOSIS — H35.3232 EXUDATIVE AGE-RELATED MACULAR DEGENERATION OF BOTH EYES WITH INACTIVE CHOROIDAL NEOVASCULARIZATION: ICD-10-CM

## 2025-02-25 DIAGNOSIS — J43.2 CENTRILOBULAR EMPHYSEMA: ICD-10-CM

## 2025-02-25 DIAGNOSIS — M81.0 OSTEOPOROSIS, UNSPECIFIED OSTEOPOROSIS TYPE, UNSPECIFIED PATHOLOGICAL FRACTURE PRESENCE: ICD-10-CM

## 2025-02-25 DIAGNOSIS — I48.92 PAROXYSMAL ATRIAL FLUTTER: ICD-10-CM

## 2025-02-25 DIAGNOSIS — J96.11 CHRONIC RESPIRATORY FAILURE WITH HYPOXIA: ICD-10-CM

## 2025-02-25 DIAGNOSIS — F02.818 LATE ONSET ALZHEIMER'S DEMENTIA WITH BEHAVIORAL DISTURBANCE: ICD-10-CM

## 2025-02-25 DIAGNOSIS — Z85.118 HISTORY OF LUNG CANCER: Primary | ICD-10-CM

## 2025-02-25 DIAGNOSIS — G30.1 LATE ONSET ALZHEIMER'S DEMENTIA WITH BEHAVIORAL DISTURBANCE: ICD-10-CM

## 2025-02-25 DIAGNOSIS — C34.90 MALIGNANT NEOPLASM OF LUNG, UNSPECIFIED LATERALITY, UNSPECIFIED PART OF LUNG: ICD-10-CM

## 2025-02-25 DIAGNOSIS — I70.229 ATHEROSCLEROTIC PERIPHERAL VASCULAR DISEASE WITH REST PAIN: ICD-10-CM

## 2025-02-25 DIAGNOSIS — N18.32 STAGE 3B CHRONIC KIDNEY DISEASE: ICD-10-CM

## 2025-02-25 LAB
ALBUMIN SERPL BCP-MCNC: 3.4 G/DL (ref 3.5–5.2)
ALP SERPL-CCNC: 56 U/L (ref 40–150)
ALT SERPL W/O P-5'-P-CCNC: 10 U/L (ref 10–44)
ANION GAP SERPL CALC-SCNC: 12 MMOL/L (ref 8–16)
AST SERPL-CCNC: 20 U/L (ref 10–40)
BILIRUB SERPL-MCNC: 0.2 MG/DL (ref 0.1–1)
BUN SERPL-MCNC: 9 MG/DL (ref 10–30)
CALCIUM SERPL-MCNC: 9.4 MG/DL (ref 8.7–10.5)
CHLORIDE SERPL-SCNC: 107 MMOL/L (ref 95–110)
CO2 SERPL-SCNC: 23 MMOL/L (ref 23–29)
CREAT SERPL-MCNC: 1.1 MG/DL (ref 0.5–1.4)
EST. GFR  (NO RACE VARIABLE): 46.9 ML/MIN/1.73 M^2
ESTIMATED AVG GLUCOSE: 120 MG/DL (ref 68–131)
GLUCOSE SERPL-MCNC: 192 MG/DL (ref 70–110)
HBA1C MFR BLD: 5.8 % (ref 4–5.6)
POTASSIUM SERPL-SCNC: 3.5 MMOL/L (ref 3.5–5.1)
PROT SERPL-MCNC: 7.9 G/DL (ref 6–8.4)
SODIUM SERPL-SCNC: 142 MMOL/L (ref 136–145)

## 2025-02-25 PROCEDURE — 84443 ASSAY THYROID STIM HORMONE: CPT | Performed by: INTERNAL MEDICINE

## 2025-02-25 PROCEDURE — 99999 PR PBB SHADOW E&M-EST. PATIENT-LVL III: CPT | Mod: PBBFAC,,, | Performed by: INTERNAL MEDICINE

## 2025-02-25 PROCEDURE — 83036 HEMOGLOBIN GLYCOSYLATED A1C: CPT | Performed by: INTERNAL MEDICINE

## 2025-02-25 PROCEDURE — 80053 COMPREHEN METABOLIC PANEL: CPT | Performed by: INTERNAL MEDICINE

## 2025-02-25 PROCEDURE — 85025 COMPLETE CBC W/AUTO DIFF WBC: CPT | Performed by: INTERNAL MEDICINE

## 2025-02-25 PROCEDURE — 36415 COLL VENOUS BLD VENIPUNCTURE: CPT | Performed by: INTERNAL MEDICINE

## 2025-02-25 RX ORDER — ESTRADIOL 0.1 MG/G
CREAM VAGINAL
Qty: 42.5 G | Refills: 3 | Status: SHIPPED | OUTPATIENT
Start: 2025-02-25

## 2025-02-25 RX ORDER — NITROGLYCERIN 0.4 MG/1
0.4 TABLET SUBLINGUAL EVERY 5 MIN PRN
Qty: 25 TABLET | Refills: 0 | Status: SHIPPED | OUTPATIENT
Start: 2025-02-25

## 2025-02-25 RX ORDER — AMLODIPINE AND BENAZEPRIL HYDROCHLORIDE 10; 40 MG/1; MG/1
1 CAPSULE ORAL DAILY
Qty: 90 CAPSULE | Refills: 3 | Status: SHIPPED | OUTPATIENT
Start: 2025-02-25 | End: 2026-02-25

## 2025-02-25 NOTE — PROGRESS NOTES
CHIEF COMPLAINT:  follow up multiple issues    HISTORY OF PRESENT ILLNESS: This is a 89-year-old woman who presents with her daughter follow up of multiple issues.   Her daughter, Yuri is with her today     She still has trigger finger of the left middle finger- catches and causes pain with certain  motions. She does not want to see the hand doctor.       No recent delerium. She is currently sleeping well.  Right now, she does not need quetiapine. If she needs the medication, her daughter gives her 1.5 tablets of seroquel 25 mg .  Two tablets over sedates her.      Pacemaker was placed on 9/21/23 due to sick sinus syndrome.  Koban was placed on the left antecubital area at discharge and wound up staying on the arm for about 6 days.  She had swelling of the left hand and had a superficial clot.   Swelling and pain in the left forearm has resolved.      He fell and fractured left hip on 5/28/23- ORIF on 5/30/23.  She went to SNF from 6/5/23 to 6/22/23. Occasional left hip pain.  Uses biofreeze or aspercreme which helps.      She continues to use oxygen intermittently since the hospitalization from the hip fracture in  May. Intermittently her oxygen levels will drop and she needs the oxygen.   She sometimes gives her  daughter a hard time wearing the oxygen.     Memory loss has gotten worse. She can hallucinate at night at times. She gets irritable often.      BP at home has been doing well.  110-130/60-70. Pulse 80-90. She is taking amlodopine benazepril 10/40 1  tablets once daily, hydralaxine 25 mg twice daily and metoprolol tartrate 50 mg twice daily.  She continues to take ELiquis 5 mg twice daily.      She is taking donepezil 5 mg once daily for her memory.   SHe continues to take duloxetine 60 mg daily.  Neuropathic pain in the legs is better controlled with the duloxetine.       She has had a dry cough the last several days. She has not been sick.   She has some dyspnea on exertion which is stable. CT chest  was stable. She has some nasal congestion. No nausea, vomiting, constipation, diarrhea.            Her non small cell lung cancer is stable. She saw Dr eWir 10/4/2021. Had a CT Scan 10/14/22   She is using her oxygen at night as needed.       She continues to take simvastatin 40 mg at bedtime for her hyperlipidemia. No joint pain or muscle pain.        She is off Fosamax once week for her bones.  No melana, bloody stools, constipation.   She saw Endocrine who recommended Reclast infusion -  done 12/15/23.       She takes vitamin B12 1000 mcg daily for vitamin B12 deficiency - anemia is stable        SHe has macular degeneration of the eyes and was followed by Dr Tresa Coreas. She got injections in her eyes every 6 weeks in Spring 2015. She now sees Dr Ruelas at [facility] - last seen 10/2024.  Vision has been stable. She is taking a Eye vitamins for her eyes.       PAST MEDICAL HISTORY:    1. Stage III non-small cell lung cancer, completed chemoradiation with carboplatin and Taxol on 6/1/12    2. Hypertension.   3. Diabetes mellitus.   4. Hyperlipidemia.   5. Chronic diastolic dysfunction.   6. Coronary artery disease status post MI in 1990 and 2003. Stenting was   done at 2003 at [facility] of [facility].   7. Cholecystectomy, December 2006.   8. Cataract removal.   9. Benign growth removed from her throat.   10. Left common femoral endarterectomy, July 2007.   11. History of anal cancer in 1996 status post radiation and chemotherapy.   12. Carpal tunnel syndrome.   13. Osteoporosis on BMD 12/11    14. Macular degeneration  15. fractured left hip on 5/28/23- ORIF on 5/30/23  16. Pacemaker was placed on 9/21/23 due to sick sinus syndrome.     SOCIAL HISTORY: She quit smoking in 1995, denies any alcohol use. She is etired.     REVIEW OF SYSTEMS: She denies fevers, chills, night sweats, fatigue, visual change, hearing loss, sinus congestion, sore throat, dysuria, hematuria, joint pain, muscle pain, polydipsia, and  "polyuria.     PHYSICAL EXAM:         /60 (BP Location: Right arm, Patient Position: Sitting)   Pulse 107   Ht 5' 7" (1.702 m)   Wt 67 kg (147 lb 11.3 oz)   SpO2 (!) 94%   BMI 23.13 kg/m²     GENERAL: She is alert and oriented. No apparent distress. Affect within normal limits.   Conjunctivae anicteric.  TM clear  NECK: Supple.   Respiratory effort normal. Lungs clear to auscultation.   HEART: Regular rate and rhythm without murmurs, gallops, or rubs. NO lower   extremity edema.  Trigger finger of the left middle finger  Tenderness and bruising on the medial aspect of the left heel            ASSESSMENT AND PLAN:        Trigger finger left middle finger - xray and to ortho  Left intertrochanteric hip fracture- s/p- Open reduction internal fixation left intertrochanteric hip fracture with intramedullary nail. Healed  Paroxysmal A fib with tachy-juan a in the hospital -s/p pacemaker due to sick sinus syndrome - on eliquis  Diastolic dysfunction with history of lung cancer and COPD and pulmonary htn on echo 5/21/23 - now with hypoxic respiratory failure - oxygen 2 liters nasal cannula at night. CXR done January 2025  Diabetes  - off Tradjenta due to weight loss and loss of appetite.    Hypertension- stable  PVD - stable  Mood disorder with dementia - on cymbalta - and seroquel 25 mg 1 every at bedtime  Memory issues/alzheimers- follow up with Dr Alanis. off memantine  Stage III non-small cell lung cancer, completed chemoradiation with carboplatin and Taxol on 6/1/12 - doing well. No signs of recurrence - CT Chest  11/3/2022- has declined CT scan in the past.  Daughter would like to get done. CT scheduled  Hyperlipidemia-lipids controlled  Coronary artery disease - on simvastatin 40 mg 1 tablet daily.   History of anal cancer-had a normal colonoscopy, November 2008; due 2015. Declined repeat  Pernicious anemia - on counter vitamin B12 1000 mcg daily  Osteoporosis - took alendroante for 7 years - 2012 to 2019.  " Saw Endocrine 9/2023 - had Reclast infusion 12/15/23.     Macular degeneration - has poor vision.    VItamin D deficiency -  vitamin D 2000 units daily.   Vaginal bleeding - asx currently  Allergic rhinitis - astelin nasal spray  Stage 3A chronic kidney disease - stable  Screening mammogram was 12/19 - declines      I will see her back in 16 weeks sooner if problems arise    Visit today included increased complexity associated with the care of the episodic problem trigger finger addressed and managing the longitudinal care of the patient due to the serious and/or complex managed problem(s) dementia, copd, history of lung cancer, macular degeneration, vitamin D deficiency, anemia, stage 3a chronic kidney disease, osteoporosis, .

## 2025-02-25 NOTE — TELEPHONE ENCOUNTER
No care due was identified.  [facility] Embedded Care Due Messages. Reference number: [id number].   2/25/2025 5:59:00 PM CST

## 2025-02-26 LAB
BASOPHILS # BLD AUTO: 0.04 K/UL (ref 0–0.2)
BASOPHILS NFR BLD: 1 % (ref 0–1.9)
DIFFERENTIAL METHOD BLD: ABNORMAL
EOSINOPHIL # BLD AUTO: 0 K/UL (ref 0–0.5)
EOSINOPHIL NFR BLD: 1 % (ref 0–8)
ERYTHROCYTE [DISTWIDTH] IN BLOOD BY AUTOMATED COUNT: 20.2 % (ref 11.5–14.5)
HCT VFR BLD AUTO: 27.7 % (ref 37–48.5)
HGB BLD-MCNC: 8.8 G/DL (ref 12–16)
IMM GRANULOCYTES # BLD AUTO: 0.02 K/UL (ref 0–0.04)
IMM GRANULOCYTES NFR BLD AUTO: 0.5 % (ref 0–0.5)
LYMPHOCYTES # BLD AUTO: 0.9 K/UL (ref 1–4.8)
LYMPHOCYTES NFR BLD: 21.9 % (ref 18–48)
MCH RBC QN AUTO: 28.6 PG (ref 27–31)
MCHC RBC AUTO-ENTMCNC: 31.8 G/DL (ref 32–36)
MCV RBC AUTO: 90 FL (ref 82–98)
MONOCYTES # BLD AUTO: 0.4 K/UL (ref 0.3–1)
MONOCYTES NFR BLD: 8.8 % (ref 4–15)
NEUTROPHILS # BLD AUTO: 2.8 K/UL (ref 1.8–7.7)
NEUTROPHILS NFR BLD: 66.8 % (ref 38–73)
NRBC BLD-RTO: 0 /100 WBC
PLATELET # BLD AUTO: 237 K/UL (ref 150–450)
PMV BLD AUTO: 12.9 FL (ref 9.2–12.9)
RBC # BLD AUTO: 3.08 M/UL (ref 4–5.4)
TSH SERPL DL<=0.005 MIU/L-ACNC: 2.99 UIU/ML (ref 0.4–4)
WBC # BLD AUTO: 4.21 K/UL (ref 3.9–12.7)

## 2025-02-28 ENCOUNTER — PATIENT MESSAGE (OUTPATIENT)
Dept: INTERNAL MEDICINE | Facility: CLINIC | Age: OVER 89
End: 2025-02-28
Payer: MEDICARE

## 2025-03-11 ENCOUNTER — HOSPITAL ENCOUNTER (OUTPATIENT)
Dept: RADIOLOGY | Facility: HOSPITAL | Age: OVER 89
Discharge: HOME | End: 2025-03-11
Attending: INTERNAL MEDICINE
Payer: MEDICARE

## 2025-03-11 DIAGNOSIS — Z85.118 HISTORY OF LUNG CANCER: ICD-10-CM

## 2025-03-11 PROCEDURE — 71250 CT THORAX DX C-: CPT | Mod: TC

## 2025-03-17 ENCOUNTER — PATIENT MESSAGE (OUTPATIENT)
Dept: INTERNAL MEDICINE | Facility: CLINIC | Age: OVER 89
End: 2025-03-17
Payer: MEDICARE

## 2025-03-17 DIAGNOSIS — I31.39 PERICARDIAL EFFUSION: Primary | ICD-10-CM

## 2025-03-17 NOTE — TELEPHONE ENCOUNTER
Spoke with daughter  She has had dyspnea on exertion when she walks throught the house.     She has a large right pleural effusion and large pericardial effusion.    Will get an ECHO to look at pericardial effusion.    Daughter will discuss with her other sister about thoracentesis.  She is not sure she will comply with a thoracentesis

## 2025-03-24 ENCOUNTER — HOSPITAL ENCOUNTER (OUTPATIENT)
Dept: CARDIOLOGY | Facility: HOSPITAL | Age: OVER 89
Discharge: HOME | End: 2025-03-24
Attending: INTERNAL MEDICINE
Payer: MEDICARE

## 2025-03-24 VITALS
BODY MASS INDEX: 23.07 KG/M2 | DIASTOLIC BLOOD PRESSURE: 62 MMHG | HEART RATE: 103 BPM | SYSTOLIC BLOOD PRESSURE: 132 MMHG | WEIGHT: 147 LBS | HEIGHT: 67 IN

## 2025-03-24 DIAGNOSIS — I31.39 PERICARDIAL EFFUSION: ICD-10-CM

## 2025-03-24 LAB
ASCENDING AORTA: 2.47 CM
AV AREA BY CONTINUOUS VTI: 1.6 CM2
AV INDEX (PROSTH): 0.49
AV LVOT MEAN GRADIENT: 2 MMHG
AV LVOT PEAK GRADIENT: 3 MMHG
AV MEAN GRADIENT: 8 MMHG
AV PEAK GRADIENT: 14 MMHG
AV REGURGITATION PRESSURE HALF TIME: 287 MS
AV VALVE AREA BY VELOCITY RATIO: 1.5 CM²
AV VALVE AREA: 1.7 CM2
AV VELOCITY RATIO: 0.42
BSA FOR ECHO PROCEDURE: 1.78 M2
CV ECHO LV RWT: 0.49 CM
DOP CALC AO PEAK VEL: 1.9 M/S
DOP CALC AO VTI: 33.5 CM
DOP CALC LVOT AREA: 3.5 CM2
DOP CALC LVOT DIAMETER: 2.1 CM
DOP CALC LVOT PEAK VEL: 0.8 M/S
DOP CALC LVOT STROKE VOLUME: 56.8 CM3
DOP CALCLVOT PEAK VEL VTI: 16.4 CM
E WAVE DECELERATION TIME: 132 MS
E/A RATIO: 1.77
E/E' RATIO: 29 M/S
ECHO EF ESTIMATED: 45 %
ECHO LV POSTERIOR WALL: 0.9 CM (ref 0.6–1.1)
EJECTION FRACTION: 48 %
FRACTIONAL SHORTENING: 21.6 % (ref 28–44)
INTERVENTRICULAR SEPTUM: 0.9 CM (ref 0.6–1.1)
IVC DIAMETER: 2.1 CM
LA MAJOR: 5.1 CM
LA MINOR: 5.2 CM
LA WIDTH: 4 CM
LEFT ATRIUM SIZE: 3.5 CM
LEFT ATRIUM VOLUME INDEX MOD: 45 ML/M2
LEFT ATRIUM VOLUME INDEX: 35 ML/M2
LEFT ATRIUM VOLUME MOD: 79 ML
LEFT ATRIUM VOLUME: 61 CM3
LEFT INTERNAL DIMENSION IN SYSTOLE: 2.9 CM (ref 2.1–4)
LEFT VENTRICLE DIASTOLIC VOLUME INDEX: 32.77 ML/M2
LEFT VENTRICLE DIASTOLIC VOLUME: 58 ML
LEFT VENTRICLE MASS INDEX: 54.7 G/M2
LEFT VENTRICLE SYSTOLIC VOLUME INDEX: 18.1 ML/M2
LEFT VENTRICLE SYSTOLIC VOLUME: 32 ML
LEFT VENTRICULAR INTERNAL DIMENSION IN DIASTOLE: 3.7 CM (ref 3.5–6)
LEFT VENTRICULAR MASS: 96.9 G
LV LATERAL E/E' RATIO: 20.4
LV SEPTAL E/E' RATIO: 47.7
MV PEAK A VEL: 0.81 M/S
MV PEAK E VEL: 1.43 M/S
OHS CV RV/LV RATIO: 0.97 CM
PISA TR MAX VEL: 2.9 M/S
RA MAJOR: 4.78 CM
RA PRESSURE ESTIMATED: 8 MMHG
RA WIDTH: 4.55 CM
RIGHT ATRIAL AREA: 20 CM2
RIGHT VENTRICLE DIASTOLIC BASEL DIMENSION: 3.6 CM
RV TB RVSP: 11 MMHG
RV TISSUE DOPPLER FREE WALL SYSTOLIC VELOCITY 1 (APICAL 4 CHAMBER VIEW): 7.43 CM/S
SINUS: 2.91 CM
STJ: 2.24 CM
TDI LATERAL: 0.07 M/S
TDI SEPTAL: 0.03 M/S
TDI: 0.05 M/S
TRICUSPID ANNULAR PLANE SYSTOLIC EXCURSION: 0.97 CM
TV PEAK GRADIENT: 33 MMHG
TV REST PULMONARY ARTERY PRESSURE: 42 MMHG
Z-SCORE OF LEFT VENTRICULAR DIMENSION IN END DIASTOLE: -2.79
Z-SCORE OF LEFT VENTRICULAR DIMENSION IN END SYSTOLE: -0.34

## 2025-03-24 PROCEDURE — 93306 TTE W/DOPPLER COMPLETE: CPT | Mod: 26,,, | Performed by: INTERNAL MEDICINE

## 2025-03-24 PROCEDURE — 93306 TTE W/DOPPLER COMPLETE: CPT

## 2025-03-28 ENCOUNTER — PATIENT MESSAGE (OUTPATIENT)
Dept: INTERNAL MEDICINE | Facility: CLINIC | Age: OVER 89
End: 2025-03-28
Payer: MEDICARE

## 2025-03-28 DIAGNOSIS — J90 PLEURAL EFFUSION ON RIGHT: Primary | ICD-10-CM

## 2025-04-02 NOTE — TELEPHONE ENCOUNTER
Spoke with daughter.   Discussed ECHO results with daughter  Discussed CT scan results with patient and daughter    Patient would like to proceed with thoracentesis for right pleural effusion - needs to be both diagnostic and therapeutic. Pleural effusion could be due to diastolic chf for her prior lung cancer.     Removing the pleural fluid would help her breathing.    Orders placed for interventional radiology for thoracentesis    Discussed with patient and daughter that she will need to be off Eliquis for 2 days prior the procedure.     Message sent to IR schedulers

## 2025-04-04 ENCOUNTER — TELEPHONE (OUTPATIENT)
Dept: INTERVENTIONAL RADIOLOGY/VASCULAR | Facility: CLINIC | Age: OVER 89
End: 2025-04-04
Payer: MEDICARE

## 2025-04-08 ENCOUNTER — HOSPITAL ENCOUNTER (OUTPATIENT)
Dept: INTERVENTIONAL RADIOLOGY/VASCULAR | Facility: HOSPITAL | Age: OVER 89
Discharge: HOME | End: 2025-04-08
Attending: INTERNAL MEDICINE
Payer: MEDICARE

## 2025-04-08 ENCOUNTER — PATIENT MESSAGE (OUTPATIENT)
Dept: INTERNAL MEDICINE | Facility: CLINIC | Age: OVER 89
End: 2025-04-08
Payer: MEDICARE

## 2025-04-08 ENCOUNTER — HOSPITAL ENCOUNTER (OUTPATIENT)
Dept: RADIOLOGY | Facility: HOSPITAL | Age: OVER 89
Discharge: HOME | End: 2025-04-08
Attending: INTERNAL MEDICINE
Payer: MEDICARE

## 2025-04-08 VITALS
SYSTOLIC BLOOD PRESSURE: 155 MMHG | HEART RATE: 99 BPM | OXYGEN SATURATION: 93 % | RESPIRATION RATE: 20 BRPM | DIASTOLIC BLOOD PRESSURE: 79 MMHG

## 2025-04-08 DIAGNOSIS — J96.11 CHRONIC RESPIRATORY FAILURE WITH HYPOXIA: ICD-10-CM

## 2025-04-08 DIAGNOSIS — J90 PLEURAL EFFUSION ON RIGHT: ICD-10-CM

## 2025-04-08 DIAGNOSIS — N18.32 STAGE 3B CHRONIC KIDNEY DISEASE: Primary | ICD-10-CM

## 2025-04-08 DIAGNOSIS — Z98.890 S/P THORACENTESIS: ICD-10-CM

## 2025-04-08 LAB
AMYLASE FLD-CCNC: 32 U/L
APPEARANCE FLD: CLEAR
BASOPHILS NFR FLD MANUAL: 1 %
COLOR FLD: YELLOW
GLUCOSE FLD-MCNC: 142 MG/DL
LDH FLD L TO P-CCNC: 95 U/L
LYMPHOCYTES NFR FLD MANUAL: 94 %
MESOTHL CELL NFR FLD MANUAL: 2 %
MONOS+MACROS NFR FLD MANUAL: 3 %
PROT FLD-MCNC: 3.6 G/DL
WBC # FLD: 328 /CU MM

## 2025-04-08 PROCEDURE — 84157 ASSAY OF PROTEIN OTHER: CPT | Performed by: INTERNAL MEDICINE

## 2025-04-08 PROCEDURE — 87070 CULTURE OTHR SPECIMN AEROBIC: CPT | Performed by: INTERNAL MEDICINE

## 2025-04-08 PROCEDURE — 87205 SMEAR GRAM STAIN: CPT | Performed by: INTERNAL MEDICINE

## 2025-04-08 PROCEDURE — C1894 INTRO/SHEATH, NON-LASER: HCPCS

## 2025-04-08 PROCEDURE — 82150 ASSAY OF AMYLASE: CPT | Performed by: INTERNAL MEDICINE

## 2025-04-08 PROCEDURE — 88112 CYTOPATH CELL ENHANCE TECH: CPT | Mod: TC,59 | Performed by: INTERNAL MEDICINE

## 2025-04-08 PROCEDURE — 83615 LACTATE (LD) (LDH) ENZYME: CPT | Performed by: INTERNAL MEDICINE

## 2025-04-08 PROCEDURE — 82945 GLUCOSE OTHER FLUID: CPT

## 2025-04-08 PROCEDURE — 71045 X-RAY EXAM CHEST 1 VIEW: CPT | Mod: 26,,, | Performed by: RADIOLOGY

## 2025-04-08 PROCEDURE — 87116 MYCOBACTERIA CULTURE: CPT | Performed by: INTERNAL MEDICINE

## 2025-04-08 PROCEDURE — 89051 BODY FLUID CELL COUNT: CPT | Performed by: INTERNAL MEDICINE

## 2025-04-08 PROCEDURE — 71045 X-RAY EXAM CHEST 1 VIEW: CPT | Mod: TC

## 2025-04-08 PROCEDURE — 87206 SMEAR FLUORESCENT/ACID STAI: CPT | Performed by: INTERNAL MEDICINE

## 2025-04-08 NOTE — PROGRESS NOTES
Tarik said looks good. She can go home and follow up with the referring. / cliff Zavaleta PA / family informed and daughters in room with pt and to take home

## 2025-04-08 NOTE — PROCEDURES
Radiology Post-Procedure Note    Pre Op Diagnosis: Right Pleural Effusion  Post Op Diagnosis: Same    Procedure: Ultrasound Guided Right Thoracentesis    Procedure performed by: Yvonne Zavaleta PA-C    Written Informed Consent Obtained: Yes (Both Daughters)  Specimen Removed: YES (1L yellow, clear)  Estimated Blood Loss: Minimal    Findings:   Successful Right thoracentesis    Patient tolerated procedure well.    Follow up chest X-Ray reviewed by Dr. Hankins. No large pneumothorax. Patient stable for discharge.     Yvonne Zavaleta PA-C  Interventional Radiology  [phone]

## 2025-04-08 NOTE — PROGRESS NOTES
Prior to procedure PARADISE HERNÁNDEZ met with family and discussed procedure / pt tolerated far and radiology called for portable chest film / samples collected and sent to lab

## 2025-04-08 NOTE — H&P
Radiology History & Physical      SUBJECTIVE:     Chief Complaint: SOB    History of Present Illness:  Brunilda England is a 89 y.o. female who presents for US guided Right Thoracentesis.     Past Medical History:   Diagnosis Date    Alzheimer's disease, unspecified (CODE)     Anal cancer 1995    Anemia     Cancer 1995    anal cancer    Centrilobular emphysema 07/06/2020    Diabetes mellitus     With circulatory disorder    Lumbar radiculopathy 05/16/2014    Lung cancer 2012    s/p chemo/radiation    Macular degeneration     Macular hemorrhage of left eye     Neuropathy     Osteoporosis     Osteoporosis, unspecified 11/09/2012    Pure hypercholesterolemia      Past Surgical History:   Procedure Laterality Date    A-V CARDIAC PACEMAKER INSERTION Left 9/21/2023    Procedure: INSERTION, CARDIAC PACEMAKER, DUAL CHAMBER;  Surgeon: Giuseppe Roman MD;  Location: [facility] EP LAB;  Service: Cardiology;  Laterality: Left;  SSS, Dual PPM,BIO, anes, MB, 3 Prep    BRONCHOSCOPY      CHOLECYSTECTOMY      COLONOSCOPY      FLUOROSCOPIC ANGIOGRAPHY OF LOWER EXTREMITY WITH TOPICAL ULTRASOUND Right 12/6/2018    Procedure: ARTERIOGRAM-LEG AND ULTRASOUND;  Surgeon: SEBASTIÁN Mcpherson III, MD;  Location: [facility] OR [address] [place] [address];  Service: Peripheral Vascular;  Laterality: Right;  16.5 min  1059.42 mGy  59ml Dye  2ml Local    heart stent      HYSTERECTOMY      INCISIONAL BIOPSY Right 12/6/2019    Procedure: INCISIONAL BIOPSY;  Surgeon: Leslie Castillo MD;  Location: [facility] OR [address] [place] [address];  Service: Plastics;  Laterality: Right;    INTRAMEDULLARY RODDING OF FEMUR Left 5/30/2023    Procedure: INSERTION, INTRAMEDULLARY TEREZA, FEMUR;  Surgeon: Desmond Barker MD;  Location: [facility] OR [address] [place] [address];  Service: Orthopedics;  Laterality: Left;    left leg surgery      blockage    PERCUTANEOUS TRANSLUMINAL ANGIOPLASTY N/A 12/6/2018    Procedure: PTA (ANGIOPLASTY, PERCUTANEOUS, TRANSLUMINAL);  Surgeon: SEBASTIÁN Mcpherson III, MD;  [facility];  Service:  Peripheral Vascular;  Laterality: N/A;    RECONSTRUCTION USING FLAP Right 12/6/2019    Procedure: RECONSTRUCTION USING FLAP/FULL THICKNESS MRESETION AND RECONSTRUCTION RIGHT UPPER EYELID WITH BIOPSY;  Surgeon: Leslie Castillo MD;  Location: [facility] OR [address] [place] [address];  Service: Plastics;  Laterality: Right;    TONSILLECTOMY         Home Meds:   Prior to Admission medications    Medication Sig Start Date End Date Taking? Authorizing Provider   acetaminophen (TYLENOL) 500 MG tablet Take 2 tablets (1,000 mg total) by mouth every 8 (eight) hours as needed for Pain. 6/21/23   Sarah Liang NP   amLODIPine-benazepriL (LOTREL) 10-40 mg per capsule Take 1 capsule by mouth once daily. 2/25/25 2/25/26  Pepper Whitfield MD   apixaban (ELIQUIS) 5 mg Tab Take 1 tablet (5 mg total) by mouth 2 (two) times daily. 7/17/24   Princess Mojica PA-C   artificial tears (ISOPTO TEARS) 0.5 % ophthalmic solution Place 1 drop into both eyes as needed. 6/21/23   Sarah Liang NP   azelastine (ASTELIN) 137 mcg (0.1 %) nasal spray 1 spray (137 mcg total) by Nasal route 2 (two) times daily. 5/5/23 2/25/25  Pepper Whitfield MD   blood pressure monitor Kit Use daily to monitor blood pressure 3/26/24   Pepper Whitfield MD   cyanocobalamin (VITAMIN B-12) 1000 MCG tablet Take 1,000 mcg by mouth every morning.    Provider, Historical   donepeziL (ARICEPT) 5 MG tablet Take 1 tablet (5 mg total) by mouth every evening. 1/29/25 1/29/26  Pepper Whitfield MD   DULoxetine (CYMBALTA) 60 MG capsule TAKE ONE CAPSULE BY MOUTH EVERY MORNING 11/25/24   Pepper Whitfield MD   estradioL (ESTRACE) 0.01 % (0.1 mg/gram) vaginal cream USE ONE-HALF GRAM VAGINALLY TWICE a WEEK 2/25/25   Pepper Whitfield MD   hydrALAZINE (APRESOLINE) 25 MG tablet Take 1 tablet (25 mg total) by mouth every 8 (eight) hours. 7/17/24 7/17/25  Princess Mojica, PA-C   metoprolol tartrate (LOPRESSOR) 50 MG tablet Take 1 tablet (50 mg total) by mouth 2 (two) times daily. 2/6/25    Pepper Whitfield MD   nitroGLYCERIN (NITROSTAT) 0.4 MG SL tablet Place 1 tablet (0.4 mg total) under the tongue every 5 (five) minutes as needed for Chest pain. 2/25/25   Pepper Whitfield MD   QUEtiapine (SEROQUEL) 25 MG Tab Take 2 tablets (50 mg total) by mouth every evening. 1/28/25   Teresa Marquez MD   simvastatin (ZOCOR) 40 MG tablet Take 1 tablet (40 mg total) by mouth once daily. 7/26/24   Pepper Whitfield MD   vitamin D (VITAMIN D3) 1000 units Tab Take 1,000 Units by mouth once daily.    Provider, Historical     Anticoagulants/Antiplatelets:  Eliquis     Allergies:   Review of patient's allergies indicates:   Allergen Reactions    Bextra [valdecoxib] Swelling    Gabapentin Diarrhea and Nausea Only     Sedation History:  no adverse reactions    Review of Systems:   Hematological: no known coagulopathies  Respiratory: no shortness of breath  Cardiovascular: no chest pain  Gastrointestinal: no abdominal pain  Genito-Urinary: no dysuria  Musculoskeletal: negative  Neurological: no TIA or stroke symptoms         OBJECTIVE:     Vital Signs (Most Recent)  Pulse: 103 (04/08/25 1329)  Resp: (!) 22 (04/08/25 1329)  BP: (!) 152/74 (04/08/25 1329)  SpO2: (!) 93 % (04/08/25 1329)    Physical Exam:  ASA: 2  Mallampati: n/a    General: no acute distress  Mental Status: Altered. Consent obtained from both daughters.   HEENT: normocephalic, atraumatic  Chest: unlabored breathing  Heart: regular heart rate  Abdomen: nondistended  Extremity: moves all extremities    ASSESSMENT/PLAN:     Sedation Plan: Local  Patient will undergo US guided Right thoracentesis.     Yvonne Zavaleta PA-C  Interventional Radiology  [phone]

## 2025-04-09 LAB
ACID FAST MOD KINY STN SPEC: NORMAL
GRAM STN SPEC: NORMAL
GRAM STN SPEC: NORMAL

## 2025-04-10 LAB
ESTROGEN SERPL-MCNC: NORMAL PG/ML
INSULIN SERPL-ACNC: NORMAL U[IU]/ML
LAB AP GROSS DESCRIPTION: NORMAL
LAB AP NON-GYN INTERPRETATION SPECIMEN 1: NORMAL
LAB AP PERFORMING LOCATION(S): NORMAL
T3RU NFR SERPL: NORMAL %

## 2025-04-11 LAB — MAYO GENERIC ORDERABLE RESULT: NORMAL

## 2025-04-12 LAB — BACTERIA FLD CULT: NORMAL

## 2025-04-14 ENCOUNTER — PATIENT MESSAGE (OUTPATIENT)
Dept: INTERNAL MEDICINE | Facility: CLINIC | Age: OVER 89
End: 2025-04-14
Payer: MEDICARE

## 2025-04-16 NOTE — TELEPHONE ENCOUNTER
Spoke with daughter   Daughter gave her a breathing treatments - which helped the breathing.   Reviewed the fluid analysis with her    Suggested that she give her mother a breathing treatment once or twice a day.

## 2025-04-17 NOTE — PROGRESS NOTES
HPI    6month  oct/Dfe  Dls: 12/31/2022 Dr. Ruelas     Pt states she has noticed a decline in her vision since her last visit.   She feels OS.     Recently had hip surgery (06/2023)  -Flashes  -Floaters   -Pain     Eye meds:   No gtts   Last edited by Evelyne Aggarwal on 8/18/2023 10:46 AM.            OCT - Drusen, RPE atrophy, PED,  No SRF OD, SRF OS improving and small SRH    Prior FA:  Shows window defect, no active leakage OU      A/P    1. Wet AMD OU  - H/o injections OU q3-4 months with Dr. Coreas  - No active leakage/edema OD cont to monitor  s/p Avastin OS x 2 here    With macular atrophy OS - conservative mgmt going forward    Discussed Syfovre OD, but given extent of atrophy - will hold off    11/16 - had rebleed, but given poor prognosis, will monitor    2. HTN Ret OU    3. DM - No DR  BS/BP/chol contour    4. PCIOL OU    5. PVD OU    6. Chalazia RUL  Jonathan following      12 months OCT   Yes, see if Alyse MILIAN or Arturo will send external order

## 2025-04-21 ENCOUNTER — PATIENT MESSAGE (OUTPATIENT)
Dept: INTERNAL MEDICINE | Facility: CLINIC | Age: OVER 89
End: 2025-04-21
Payer: MEDICARE

## 2025-04-21 DIAGNOSIS — J96.11 CHRONIC RESPIRATORY FAILURE WITH HYPOXIA: Primary | ICD-10-CM

## 2025-04-21 DIAGNOSIS — I50.32 CHRONIC DIASTOLIC HEART FAILURE: ICD-10-CM

## 2025-04-21 NOTE — TELEPHONE ENCOUNTER
Spoke with Daughter  She was very weak 2 days ago.  She did not have a good night thursay night and Friday am April 17 and 18.  She slept all day on April 18.  She wet the bed.  She bathed her mother. Mother was very weak.  She generally lynn not drink a lot of water.   She has been asking for more water on April 19. She was biting her lip and lip was chapped. Mother feels that she was dehydrated.  Daughter started to give her liquid IV orally.     Daughter states mother does not want any more emergency visit. Little better yesterday and eating better.  She has been confused this morning. She is lying in the bed awake.  Daughter does not know how weak she is this morning    Discussed hospice. Daughter is in agreement.    [facility]    Please fax referral

## 2025-04-21 NOTE — TELEPHONE ENCOUNTER
Attempted to contact patient to schedule FU appt with Dr. Mcpherson. Voice message left for patient. Appt scheduled, appt letter placed in mail. ----- Message from Kendal Davila sent at 12/6/2018  2:23 PM CST -----  Contact: [facility] Patient's post Op nurse   Needs Advice    Reason for call: Patient needs a f/u  In 2wks with STEPHANIA and duplex         Communication Preference: [phone]    Additional Information: please contact the patient to accommodate her        [de-identified] : Lumbar Physical Exam Gait - Normal Station - Normal Sagittal Balance - Normal Compensatory Mechanism? - None  Heel walk - Normal Toe walk - unable to toe walk  Reflexes  Patellar - Normal Gastroc - Normal Clonus - No  Hip Exam - Normal Straight leg raise - None  Pulses - 2+ DP/PT Range of motion - Normal   Sensation Sensation intact to light touch in L1, L2, L3, L4, L5, and S1 dermatomes bilaterally Motor IP Quad HS TA Gastroc EHL Right 5/5 5/5 5/5 5/5 5/5 5/5 5/5 Left 5/5 5/5 5/5 5/5 5/5 5/5 5/5  [de-identified] : Lumbar MRI severe L4-L5 spinal stenosis  L3-L5 Spondylolisthesis  previous imaging: Lumbar Radiographs degenerative scoliosis facet arthropathy no obvious issues in terms of height or morphology severe left hip osteoarthritis  L5 spondylolisthesis  MRI Lumbar Spine (2023) L4-L5 severe spinal stenosis L3-L4 moderate spinal stenosis

## 2025-04-24 ENCOUNTER — TELEPHONE (OUTPATIENT)
Dept: ELECTROPHYSIOLOGY | Facility: CLINIC | Age: OVER 89
End: 2025-04-24
Payer: MEDICARE

## 2025-04-24 ENCOUNTER — CLINICAL SUPPORT (OUTPATIENT)
Dept: CARDIOLOGY | Facility: HOSPITAL | Age: OVER 89
End: 2025-04-24
Payer: MEDICARE

## 2025-04-24 ENCOUNTER — CLINICAL SUPPORT (OUTPATIENT)
Dept: CARDIOLOGY | Facility: HOSPITAL | Age: OVER 89
End: 2025-04-24
Attending: INTERNAL MEDICINE
Payer: MEDICARE

## 2025-04-24 DIAGNOSIS — I49.5 SICK SINUS SYNDROME: ICD-10-CM

## 2025-04-24 DIAGNOSIS — Z95.0 PRESENCE OF CARDIAC PACEMAKER: ICD-10-CM

## 2025-04-24 PROCEDURE — 93294 REM INTERROG EVL PM/LDLS PM: CPT | Mod: ,,, | Performed by: INTERNAL MEDICINE

## 2025-04-24 PROCEDURE — 93296 REM INTERROG EVL PM/IDS: CPT | Performed by: INTERNAL MEDICINE

## 2025-04-24 NOTE — TELEPHONE ENCOUNTER
Verbal order received to increase Lopressor to 100mg in am and continue 50 mg po in pm if pt's Bp allows.    Spoke with pt's daughter Keegan. Explained med change to help with RVR.  HH took her bp today and was 144/66.  Advised pt's daughter to get a digital bp cuff to check bp between HH visits and to go back to Lopressor 50 mg in am and 50 mg in pm if bp is < 100/60.      Will send new rx to Dr. De Los Santos

## 2025-04-24 NOTE — TELEPHONE ENCOUNTER
Persistent AF seen on HM.  Appears pt went into AF in the end of January, received alert for AF with RVR on Billable remote today.    Spoke with pt's daughter Chisell.  Asymptomatic. Has had Pulmonary issues.   On 4/8/25 pt had  Rt Lung thoracentesis for Pleural effusion.  Hx of lung CA and emphysema.  Has had dehydration. On Liquid IV and Powerade  Pts heart rate on pulse ox 114 bpm.    Pt has Sundowners and sleeps alot during the day so misses doses of meds.  Prescribed Eliquis and Lopressor 50 mg po BID.  Recently started Hospice for Home Health assistance per daughter.    Will forward to Dr. De Los Santos for Dr. Roman to review.            ________________

## 2025-04-25 ENCOUNTER — TELEPHONE (OUTPATIENT)
Dept: ELECTROPHYSIOLOGY | Facility: CLINIC | Age: OVER 89
End: 2025-04-25
Payer: MEDICARE

## 2025-04-25 DIAGNOSIS — I48.0 PAF (PAROXYSMAL ATRIAL FIBRILLATION): Primary | ICD-10-CM

## 2025-04-25 RX ORDER — METOPROLOL TARTRATE 50 MG/1
TABLET ORAL
Qty: 90 TABLET | Refills: 11 | Status: SHIPPED | OUTPATIENT
Start: 2025-04-25

## 2025-04-29 LAB
OHS CV AF BURDEN PERCENT: 49
OHS CV DC REMOTE DEVICE TYPE: NORMAL
OHS CV ICD SHOCK: NO
OHS CV RV PACING PERCENT: 6 %

## 2025-06-19 ENCOUNTER — PATIENT MESSAGE (OUTPATIENT)
Dept: INTERNAL MEDICINE | Facility: CLINIC | Age: OVER 89
End: 2025-06-19
Payer: MEDICARE

## 2025-06-22 NOTE — TELEPHONE ENCOUNTER
Call [facility] and see what is going on - she may be on too much oxygen for a portable device

## 2025-06-23 NOTE — TELEPHONE ENCOUNTER
Called and spoke to Sarah   She said she send her over portable o2 tank   But she did say it will only last for 2 hours   She will fax over the order to us

## 2025-07-09 ENCOUNTER — PATIENT MESSAGE (OUTPATIENT)
Dept: INTERNAL MEDICINE | Facility: CLINIC | Age: OVER 89
End: 2025-07-09
Payer: MEDICARE

## 2025-07-11 NOTE — TELEPHONE ENCOUNTER
Spoke to Sarah   She sd that the portabl tank is ok   But they will only last for 2 hrs   Called and left a message for the daughter   Also sent a my o message as well to her

## 2025-07-21 ENCOUNTER — PATIENT MESSAGE (OUTPATIENT)
Dept: ADMINISTRATIVE | Facility: HOSPITAL | Age: OVER 89
End: 2025-07-21
Payer: MEDICARE

## 2025-07-24 ENCOUNTER — CLINICAL SUPPORT (OUTPATIENT)
Dept: CARDIOLOGY | Facility: HOSPITAL | Age: OVER 89
End: 2025-07-24
Payer: MEDICARE

## 2025-07-24 ENCOUNTER — CLINICAL SUPPORT (OUTPATIENT)
Dept: CARDIOLOGY | Facility: HOSPITAL | Age: OVER 89
End: 2025-07-24
Attending: INTERNAL MEDICINE
Payer: MEDICARE

## 2025-07-24 DIAGNOSIS — I49.5 SICK SINUS SYNDROME: ICD-10-CM

## 2025-07-24 DIAGNOSIS — Z95.0 PRESENCE OF CARDIAC PACEMAKER: ICD-10-CM

## 2025-07-24 PROCEDURE — 93296 REM INTERROG EVL PM/IDS: CPT | Performed by: INTERNAL MEDICINE

## 2025-07-24 PROCEDURE — 93294 REM INTERROG EVL PM/LDLS PM: CPT | Mod: ,,, | Performed by: INTERNAL MEDICINE

## 2025-07-28 LAB
OHS CV AF BURDEN PERCENT: 62
OHS CV DC REMOTE DEVICE TYPE: NORMAL
OHS CV RV PACING PERCENT: 5 %

## 2025-07-30 ENCOUNTER — PATIENT MESSAGE (OUTPATIENT)
Dept: INTERNAL MEDICINE | Facility: CLINIC | Age: OVER 89
End: 2025-07-30
Payer: MEDICARE

## 2025-07-30 NOTE — TELEPHONE ENCOUNTER
Spoke with daughter  TO start on nitrofurantioin 100 mg twice daily for  a UTI  She is sleeping more and is more disorientated.  She is continuing to decline.

## 2025-08-12 ENCOUNTER — PATIENT MESSAGE (OUTPATIENT)
Dept: INTERNAL MEDICINE | Facility: CLINIC | Age: OVER 89
End: 2025-08-12
Payer: MEDICARE

## 2025-08-18 ENCOUNTER — PATIENT OUTREACH (OUTPATIENT)
Dept: ADMINISTRATIVE | Facility: HOSPITAL | Age: OVER 89
End: 2025-08-18
Payer: MEDICARE

## (undated) DEVICE — BLLN MUSTANG 7 X 20 X 75

## (undated) DEVICE — SHEATH SAFESHEATH II ULTRA 6FR

## (undated) DEVICE — SOL NS 1000CC

## (undated) DEVICE — GAUZE SPONGE 4X4 12PLY

## (undated) DEVICE — KIT INTRO MICRO NIT VSI 4FR

## (undated) DEVICE — ADHESIVE DERMABOND ADVANCED

## (undated) DEVICE — PACK PACER PERMANENT OMC

## (undated) DEVICE — CATH SEEKER .035IN 90CM

## (undated) DEVICE — SOL 9P NACL IRR PIC IL

## (undated) DEVICE — INTRODUCER VASC RADPQ 6FRX10CM

## (undated) DEVICE — SPONGE COTTON TRAY 4X4IN

## (undated) DEVICE — SLING SWATHE UNIVERSAL FOAM

## (undated) DEVICE — DEVICE CLOSURE MYNX GRIP 5FR

## (undated) DEVICE — SUT VICRYL PLUS 0 CT1 18IN

## (undated) DEVICE — STOPCOCK NDLS INJ MALE LL IV

## (undated) DEVICE — SET MICROPUNCTURE

## (undated) DEVICE — SHEATH ANSEL FLEXOR 5FRX45CM

## (undated) DEVICE — Device

## (undated) DEVICE — SUT VICRYL PLUS 2-0 CT1 18

## (undated) DEVICE — TRAY MINOR ORTHO OMC

## (undated) DEVICE — SYS LABEL CORRECT MED

## (undated) DEVICE — SPONGE DERMACEA 4X4IN 12PLY

## (undated) DEVICE — COVER INSTR ELASTIC BAND 40X20

## (undated) DEVICE — BLLN MUSTANG 6 X 100 X 75

## (undated) DEVICE — VISE RADIFOCUS MULTI TORQUE

## (undated) DEVICE — SET DECANTER MEDICHOICE

## (undated) DEVICE — CATH ANGIO SOFT VU 5FR 65CM

## (undated) DEVICE — DRESSING TRANS 4X4 3/4

## (undated) DEVICE — GUIDEWIRE LAUREATE 035IN 260CM

## (undated) DEVICE — DRAPE INCISE IOBAN 2 23X17IN

## (undated) DEVICE — DRESSING AQUACEL FOAM 5 X 5

## (undated) DEVICE — INTRODUCER VASC RADPQ 5FRX10CM

## (undated) DEVICE — BLLN MUSTANG 5X60X75

## (undated) DEVICE — ELECTRODE REM PLYHSV RETURN 9

## (undated) DEVICE — DRAPE C-ARM ELAS CLIP 42X120IN

## (undated) DEVICE — DRILL T2 ALPHA LOK 4.2X360MM

## (undated) DEVICE — BNDG COFLEX FOAM LF2 ST 4X5YD

## (undated) DEVICE — DRAPE THREE-QTR REINF 53X77IN

## (undated) DEVICE — KIT PT CARE HANA PROFX SSXT

## (undated) DEVICE — TAPE SURG DURAPORE 2 X10YD

## (undated) DEVICE — INFLATOR ENCORE

## (undated) DEVICE — DRAPE C-ARMOR EQUIPMENT COVER

## (undated) DEVICE — TEGADERM IV

## (undated) DEVICE — SELECTRA 3D-65-42

## (undated) DEVICE — CATH 4FR 65CM BERN

## (undated) DEVICE — DRAPE IOBAN 2 STERI

## (undated) DEVICE — COVERS PROBE NR-48 STERILE

## (undated) DEVICE — KIT SELECTRA ACCESSORY

## (undated) DEVICE — PAD DEFIB CADENCE ADULT R2

## (undated) DEVICE — SUT MONOCRYL 3-0 PS-2 UND

## (undated) DEVICE — DRESSING TRANS 4X4 TEGADERM

## (undated) DEVICE — SELECTRA 3D-55-42

## (undated) DEVICE — SHEATH SAFE ULTRA 9FR

## (undated) DEVICE — DRESSING AQUACEL RIBBON 2X45CM